# Patient Record
Sex: MALE | Race: BLACK OR AFRICAN AMERICAN | NOT HISPANIC OR LATINO | Employment: OTHER | URBAN - METROPOLITAN AREA
[De-identification: names, ages, dates, MRNs, and addresses within clinical notes are randomized per-mention and may not be internally consistent; named-entity substitution may affect disease eponyms.]

---

## 2017-01-09 ENCOUNTER — OFFICE VISIT (OUTPATIENT)
Dept: UROLOGY | Facility: CLINIC | Age: 72
End: 2017-01-09
Payer: MEDICARE

## 2017-01-09 VITALS
HEART RATE: 97 BPM | BODY MASS INDEX: 28.78 KG/M2 | HEIGHT: 70 IN | SYSTOLIC BLOOD PRESSURE: 137 MMHG | WEIGHT: 201.06 LBS | DIASTOLIC BLOOD PRESSURE: 73 MMHG

## 2017-01-09 DIAGNOSIS — N40.1 BPH WITH URINARY OBSTRUCTION: Primary | ICD-10-CM

## 2017-01-09 DIAGNOSIS — E11.42 DIABETIC PERIPHERAL NEUROPATHY ASSOCIATED WITH TYPE 2 DIABETES MELLITUS: ICD-10-CM

## 2017-01-09 DIAGNOSIS — N13.8 BPH WITH URINARY OBSTRUCTION: Primary | ICD-10-CM

## 2017-01-09 DIAGNOSIS — N40.1 BENIGN NODULAR PROSTATIC HYPERPLASIA WITH LOWER URINARY TRACT SYMPTOMS: ICD-10-CM

## 2017-01-09 DIAGNOSIS — N53.19 DISORDER OF EJACULATION: ICD-10-CM

## 2017-01-09 DIAGNOSIS — R39.12 WEAK URINE STREAM: ICD-10-CM

## 2017-01-09 DIAGNOSIS — N52.9 ED (ERECTILE DYSFUNCTION) OF ORGANIC ORIGIN: ICD-10-CM

## 2017-01-09 PROCEDURE — 3066F NEPHROPATHY DOC TX: CPT | Mod: S$GLB,,, | Performed by: UROLOGY

## 2017-01-09 PROCEDURE — 99205 OFFICE O/P NEW HI 60 MIN: CPT | Mod: S$GLB,,, | Performed by: UROLOGY

## 2017-01-09 PROCEDURE — 1157F ADVNC CARE PLAN IN RCRD: CPT | Mod: S$GLB,,, | Performed by: UROLOGY

## 2017-01-09 PROCEDURE — 1160F RVW MEDS BY RX/DR IN RCRD: CPT | Mod: S$GLB,,, | Performed by: UROLOGY

## 2017-01-09 PROCEDURE — 1159F MED LIST DOCD IN RCRD: CPT | Mod: S$GLB,,, | Performed by: UROLOGY

## 2017-01-09 PROCEDURE — 99999 PR PBB SHADOW E&M-EST. PATIENT-LVL III: CPT | Mod: PBBFAC,,, | Performed by: UROLOGY

## 2017-01-09 PROCEDURE — 3078F DIAST BP <80 MM HG: CPT | Mod: S$GLB,,, | Performed by: UROLOGY

## 2017-01-09 PROCEDURE — 3075F SYST BP GE 130 - 139MM HG: CPT | Mod: S$GLB,,, | Performed by: UROLOGY

## 2017-01-09 PROCEDURE — 3045F PR MOST RECENT HEMOGLOBIN A1C LEVEL 7.0-9.0%: CPT | Mod: S$GLB,,, | Performed by: UROLOGY

## 2017-01-09 PROCEDURE — 1126F AMNT PAIN NOTED NONE PRSNT: CPT | Mod: S$GLB,,, | Performed by: UROLOGY

## 2017-01-09 RX ORDER — TADALAFIL 20 MG/1
20 TABLET ORAL
Qty: 6 TABLET | Refills: 12 | Status: SHIPPED | OUTPATIENT
Start: 2017-01-09 | End: 2017-01-13

## 2017-01-09 RX ORDER — TADALAFIL 20 MG/1
20 TABLET ORAL
Qty: 6 TABLET | Refills: 12 | Status: SHIPPED | OUTPATIENT
Start: 2017-01-09 | End: 2017-01-09 | Stop reason: SDUPTHER

## 2017-01-09 RX ORDER — CIPROFLOXACIN 250 MG/1
500 TABLET, FILM COATED ORAL ONCE
Status: CANCELLED | OUTPATIENT
Start: 2017-01-09 | End: 2017-01-09

## 2017-01-09 RX ORDER — LIDOCAINE HYDROCHLORIDE 20 MG/ML
JELLY TOPICAL ONCE
Status: CANCELLED | OUTPATIENT
Start: 2017-01-09 | End: 2017-01-09

## 2017-01-09 RX ORDER — LORAZEPAM 1 MG/1
2 TABLET ORAL EVERY 6 HOURS PRN
Qty: 2 TABLET | Refills: 0 | Status: SHIPPED | OUTPATIENT
Start: 2017-01-09 | End: 2017-05-03

## 2017-01-09 NOTE — MR AVS SNAPSHOT
[place] [facility]  [address] [facility]  [place] [address]  Phone: [phone]                  Emerson Menendez   2017 8:40 AM   Office Visit    Description:  Male : 1945   Provider:  Juan Walker MD   Department:  [place] [facility]           Reason for Visit     Benign Prostatic Hypertrophy           Diagnoses this Visit        Comments    BPH with urinary obstruction    -  Primary     Benign nodular prostatic hyperplasia with lower urinary tract symptoms         ED (erectile dysfunction) of organic origin         Diabetic peripheral neuropathy associated with type 2 diabetes mellitus         Weak urine stream         Disorder of ejaculation                To Do List           Future Appointments        Provider Department Dept Phone    2017 7:30 AM LAB, HEMONC CANCER BLDG [facility] [phone]    2017 8:30 AM Silvana Engel MD [facility] [phone]    2017 8:00 AM SUDS, UROLOGY [facility] - Urology 4th Floor [phone]    2/3/2017 8:30 AM Roberta Sagastume MD [facility] - Internal Medicine [phone]      Goals (5 Years of Data)     None      Follow-Up and Disposition     Return for suds cysto.       These Medications        Disp Refills Start End    lorazepam (ATIVAN) 1 MG tablet 2 tablet 0 2017     Take 2 tablets (2 mg total) by mouth every 6 (six) hours as needed for Anxiety. Take 2 tablets 30 mins before the procedure - Oral    Pharmacy: [id number] - [place], [place] - [address] AT [facility] Ph #: [phone]       tadalafil (CIALIS) 20 MG Tab 6 tablet 12 2017    Take 1 tablet (20 mg total) by mouth as needed. - Oral    Pharmacy: [id number] - [place], [place] - [address] AT [facility] Ph #: [phone]         [facility]     [facility] Care Line -  Assistance  Registered nurses in  "the [facility] provide clinical advisement, health education, appointment booking, and other advisory services.  Call for this free service at [phone].             Medications           Message regarding Medications     Verify the changes and/or additions to your medication regime listed below are the same as discussed with your clinician today.  If any of these changes or additions are incorrect, please notify your healthcare provider.        START taking these NEW medications        Refills    lorazepam (ATIVAN) 1 MG tablet 0    Sig: Take 2 tablets (2 mg total) by mouth every 6 (six) hours as needed for Anxiety. Take 2 tablets 30 mins before the procedure    Class: Print    Route: Oral    tadalafil (CIALIS) 20 MG Tab 12    Sig: Take 1 tablet (20 mg total) by mouth as needed.    Class: Print    Route: Oral           Verify that the below list of medications is an accurate representation of the medications you are currently taking.  If none reported, the list may be blank. If incorrect, please contact your healthcare provider. Carry this list with you in case of emergency.           Current Medications     ACCU-CHEK OMAYRA Misc USE AS DIRECTED    aspirin (ECOTRIN) 81 MG EC tablet Take 1 tablet (81 mg total) by mouth once daily.    BD INSULIN PEN NEEDLE UF SHORT 31 gauge x 5/16" Ndle USE 1 NEEDLE TO INJECT INSULIN ONE TIME DAILY    blood glucose control, normal (METER-CHECK) Soln One meter. True test meter. Use as directed    blood sugar diagnostic Strp Check twice daily    blood-glucose meter kit Use as instructed. Insulin dependent diabetic    blood-glucose meter kit Use as instructed, accucheck omayra meter.    cholecalciferol, vitamin D3, (VITAMIN D) 2,000 unit Cap Take by mouth. 1 Capsule Oral Every day.  over the counter    citalopram (CELEXA) 20 MG tablet TAKE 1 TABLET BY MOUTH EVERY EVENING AFTER DINNER    diazePAM (VALIUM) 5 MG tablet TAKE 1 TABLET BY MOUTH EVERY 6 HOURS AS NEEDED FOR ANXIETY " "   gabapentin (NEURONTIN) 100 MG capsule One capsule in am, one in afternoon as needed for foot pain and 1-3 at bedtime.    hydrocodone-acetaminophen 5-325mg (NORCO) 5-325 mg per tablet Take 1 tablet by mouth every 4 to 6 hours as needed for Pain.    insulin aspart (NOVOLOG FLEXPEN) 100 unit/mL InPn pen Inject 3 Units into the skin 3 (three) times daily with meals.    insulin syringe-needle U-100 (ADVOCATE SYRINGES) 0.3 mL 31 gauge x 5/16 Syrg Uses 4 time a day.    lancets Misc Use twice daily    LANTUS SOLOSTAR 100 unit/mL (3 mL) InPn pen INJECT 14 UNITS INTO THE SKIN EVERY EVENING (DISCARD AND BEGIN A NEW PEN AFTER 28 DAYS)    latanoprost 0.005 % ophthalmic solution INSTILL 1 DROP INTO BOTH EYES EVERY EVENING.    losartan (COZAAR) 25 MG tablet TAKE 1 TABLET EVERY DAY    omeprazole (PRILOSEC) 20 MG capsule TAKE 2 CAPSULES ONE TIME DAILY    pravastatin (PRAVACHOL) 40 MG tablet TAKE 1 TABLET ONE TIME DAILY    sildenafil (VIAGRA) 100 MG tablet Take by mouth. 1 Tablet Oral    tadalafil (CIALIS) 5 MG tablet Take 4 tablets (20 mg total) by mouth once daily.    tamsulosin (FLOMAX) 0.4 mg Cp24 Take 1 capsule (0.4 mg total) by mouth once daily.    tizanidine (ZANAFLEX) 4 MG tablet 1/2-1 tablet every 8 hours as needed for muscle spasm    trazodone (DESYREL) 50 MG tablet TAKE 1 TABLET BY MOUTH EVERY EVENING    lorazepam (ATIVAN) 1 MG tablet Take 2 tablets (2 mg total) by mouth every 6 (six) hours as needed for Anxiety. Take 2 tablets 30 mins before the procedure    tadalafil (CIALIS) 20 MG Tab Take 1 tablet (20 mg total) by mouth as needed.           Clinical Reference Information           Vital Signs - Last Recorded  Most recent update: 1/9/2017  8:41 AM by Verona Hale MA    BP Pulse Ht Wt BMI    137/73 97 5' 10" (1.778 m) 91.2 kg (201 lb 1 oz) 28.85 kg/m2      Blood Pressure          Most Recent Value    BP  137/73      Allergies as of 1/9/2017     Penicillins      Immunizations Administered on Date of Encounter - " 1/9/2017     None      Orders Placed During Today's Visit     Future Labs/Procedures Expected by Expires    Cystoscopy  As directed 1/9/2018    Simple Urodynamics w/ Cysto  As directed 3/12/2017

## 2017-01-09 NOTE — LETTER
January 9, 2017      Roberta Sagastume MD  [address]  [place] [address]           [place] [facility]  [address] [facility]  [place] [address]  Phone: [phone]          Patient: Emerson Menendez   MR Number: [id number]   YOB: 1945   Date of Visit: 1/9/2017       Dear Dr. Roberta Sagastume:    Thank you for referring Emerson Menendez to me for evaluation. Attached you will find relevant portions of my assessment and plan of care.    If you have questions, please do not hesitate to call me. I look forward to following Emerson Menendez along with you.    Sincerely,    uJan Walker MD    Enclosure  CC:  No Recipients    If you would like to receive this communication electronically, please contact [email or web address] or [phone] to request more information on [employer] access.    For providers and/or their staff who would like to refer a patient to [facility], please contact us through our one-stop-shop provider referral line, [facility], at [phone].    If you feel you have received this communication in error or would no longer like to receive these types of communications, please e-mail [email or web address]

## 2017-01-09 NOTE — PROGRESS NOTES
CC: BPH    Emerson Menendez is a 71 y.o. man who is here for the evaluation of Benign Prostatic Hypertrophy  a new pt referred by his PCP for weak urine flow.  Has been of Flomax.  C/o nocturia 2 x.   C/o thirsty, thus he drinks tea.  C/o ED and lack of ejaculation.    States that he had laser prostatectomy by me on 11/13/13.  I did evaluate him with suds cysto prior to his surgery.  CONCLUSIONS:   1. BPH with obstruction.  2. Incomplete relaxation of the sphincter.  3. Decreased bladder capacity ( 235 ml)  4. Incomplete bladder emptying.  5. Sensory urgency    Hx of DM.  Denies flank pain, dysuira, hematuria .      Past Medical History   Diagnosis Date    Anxiety 3/16/2015    BPH (benign prostatic hypertrophy) 9/24/2012    Depression 3/16/2015    GERD (gastroesophageal reflux disease) 9/24/2012    Microalbuminuria 9/24/2012    Osteoarthritis of cervical spine 9/24/2012    Osteoarthritis of knee 9/24/2012    Type II or unspecified type diabetes mellitus without mention of complication, not stated as uncontrolled 9/24/2012     Past Surgical History   Procedure Laterality Date    Cataract extraction  2012     Right eye    Prostate surgery       Social History   Substance Use Topics    Smoking status: Never Smoker    Smokeless tobacco: Never Used    Alcohol use No     Family History   Problem Relation Age of Onset    Hypertension Brother     Kidney failure Daughter      due to medication    Blindness Neg Hx     Cancer Neg Hx     Cataracts Neg Hx     Diabetes Neg Hx     Glaucoma Neg Hx     Macular degeneration Neg Hx     Retinal detachment Neg Hx     Strabismus Neg Hx     Stroke Neg Hx     Thyroid disease Neg Hx      Allergy:  Review of patient's allergies indicates:   Allergen Reactions    Penicillins      Knees locked up     Outpatient Encounter Prescriptions as of 1/9/2017   Medication Sig Dispense Refill    ACCU-CHEK OMAYRA Misc USE AS DIRECTED 1 each 0    aspirin (ECOTRIN) 81 MG EC  "tablet Take 1 tablet (81 mg total) by mouth once daily. 100 tablet 3    BD INSULIN PEN NEEDLE UF SHORT 31 gauge x 5/16" Ndle USE 1 NEEDLE TO INJECT INSULIN ONE TIME DAILY 300 each 3    blood glucose control, normal (METER-CHECK) Soln One meter. True test meter. Use as directed 1 each 0    blood sugar diagnostic Strp Check twice daily 200 strip 4    blood-glucose meter kit Use as instructed. Insulin dependent diabetic 1 each 0    blood-glucose meter kit Use as instructed, accucheck jose meter. 1 each 0    cholecalciferol, vitamin D3, (VITAMIN D) 2,000 unit Cap Take by mouth. 1 Capsule Oral Every day.  over the counter      citalopram (CELEXA) 20 MG tablet TAKE 1 TABLET BY MOUTH EVERY EVENING AFTER DINNER 30 tablet 11    diazePAM (VALIUM) 5 MG tablet TAKE 1 TABLET BY MOUTH EVERY 6 HOURS AS NEEDED FOR ANXIETY 30 tablet 1    gabapentin (NEURONTIN) 100 MG capsule One capsule in am, one in afternoon as needed for foot pain and 1-3 at bedtime. 450 capsule 3    hydrocodone-acetaminophen 5-325mg (NORCO) 5-325 mg per tablet Take 1 tablet by mouth every 4 to 6 hours as needed for Pain. 60 tablet 0    insulin aspart (NOVOLOG FLEXPEN) 100 unit/mL InPn pen Inject 3 Units into the skin 3 (three) times daily with meals. 8.1 mL 3    insulin syringe-needle U-100 (ADVOCATE SYRINGES) 0.3 mL 31 gauge x 5/16 Syrg Uses 4 time a day. 400 each 3    lancets Misc Use twice daily 200 each 4    LANTUS SOLOSTAR 100 unit/mL (3 mL) InPn pen INJECT 14 UNITS INTO THE SKIN EVERY EVENING (DISCARD AND BEGIN A NEW PEN AFTER 28 DAYS) (Patient taking differently: INJECT 18 UNITS INTO THE SKIN EVERY EVENING (DISCARD AND BEGIN A NEW PEN AFTER 28 DAYS)) 15 mL 3    latanoprost 0.005 % ophthalmic solution INSTILL 1 DROP INTO BOTH EYES EVERY EVENING. 3 Bottle 4    losartan (COZAAR) 25 MG tablet TAKE 1 TABLET EVERY DAY 90 tablet 4    omeprazole (PRILOSEC) 20 MG capsule TAKE 2 CAPSULES ONE TIME DAILY 180 capsule 3    pravastatin (PRAVACHOL) 40 " MG tablet TAKE 1 TABLET ONE TIME DAILY 90 tablet 3    sildenafil (VIAGRA) 100 MG tablet Take by mouth. 1 Tablet Oral      tadalafil (CIALIS) 5 MG tablet Take 4 tablets (20 mg total) by mouth once daily. 30 tablet 2    tamsulosin (FLOMAX) 0.4 mg Cp24 Take 1 capsule (0.4 mg total) by mouth once daily. 90 capsule 3    tizanidine (ZANAFLEX) 4 MG tablet 1/2-1 tablet every 8 hours as needed for muscle spasm 90 tablet 1    trazodone (DESYREL) 50 MG tablet TAKE 1 TABLET BY MOUTH EVERY EVENING (Patient taking differently: TAKE 1 TABLET BY MOUTH EVERY EVENING as needed) 90 tablet 4    lorazepam (ATIVAN) 1 MG tablet Take 2 tablets (2 mg total) by mouth every 6 (six) hours as needed for Anxiety. Take 2 tablets 30 mins before the procedure 2 tablet 0    tadalafil (CIALIS) 20 MG Tab Take 1 tablet (20 mg total) by mouth as needed. 6 tablet 12    [DISCONTINUED] tadalafil (CIALIS) 20 MG Tab Take 1 tablet (20 mg total) by mouth as needed. 6 tablet 12    [DISCONTINUED] tizanidine (ZANAFLEX) 4 MG tablet TAKE 1/2 TO 1 TABLET BY MOUTH EVERY 8 HOURS AS NEEDED FOR MUSCLE SPASMS 270 tablet 1     No facility-administered encounter medications on file as of 1/9/2017.      Review of Systems   General ROS: GENERAL:  No weight gain or loss  SKIN:  No rashes or lacerations  HEAD:  No headaches  EYES:  No exophthalmos, jaundice or blindness  EARS:  No dizziness, tinnitus or hearing loss  NOSE:  No changes in smell  MOUTH & THROAT:  No dyskinetic movements or obvious goiter  CHEST:  No shortness of breath, hyperventilation or cough  CARDIOVASCULAR:  No tachycardia or chest pain  ABDOMEN:  No nausea, vomiting, pain, constipation or diarrhea  URINARY:  No frequency, dysuria or sexual dysfunction  ENDOCRINE:  No polydipsia, polyuria  MUSCULOSKELETAL:  No pain or stiffness of the joints  NEUROLOGIC:  No weakness, sensory changes, seizures, confusion, memory loss, tremor or other abnormal movements  Physical Exam     Vitals:    01/09/17 0838    BP: 137/73   Pulse: 97     General Appearance:  Alert, cooperative, no distress, appears stated age   Head:  Normocephalic, without obvious abnormality, atraumatic   Eyes:  PERRL, conjunctiva/corneas clear, EOM's intact, fundi benign, both eyes   Ears:  Normal TM's and external ear canals, both ears   Nose: Nares normal, septum midline, mucosa normal, no drainage or sinus tenderness   Throat: Lips, mucosa, and tongue normal; teeth and gums normal   Neck: Supple, symmetrical, trachea midline, no adenopathy, thyroid: not enlarged, symmetric, no tenderness/mass/nodules, no carotid bruit or JVD   Back:   Symmetric, no curvature, ROM normal, no CVA tenderness   Lungs:   Clear to auscultation bilaterally, respirations unlabored   Chest Wall:  No tenderness or deformity   Heart:  Regular rate and rhythm, S1, S2 normal, no murmur, rub or gallop   Abdomen:   Soft, non-tender, bowel sounds active all four quadrants,  no masses, no organomegaly of liver and spleen  No hernia noted   Genitalia:  Scrotum: no rash or lesion  Normal symmetric epididymis without masses  Normal vas palpated  Normal size, symmetric testicles with no masses   Normal urethral meatus with no discharge  Normal circumcised penis with no lesion   Rectal:  Normal perineum and anus upon inspection.  Normal tone, no masses or tenderness;   Extremities: Extremities normal, atraumatic, no cyanosis or edema   Pulses: 2+ and symmetric   Skin: Skin color, texture, turgor normal, no rashes or lesions   Lymph nodes: Cervical, supraclavicular, and axillary nodes normal   Neurologic: Normal     Prostate 30 grams smooth with no nodule or tenderness.    LABS:  Lab Results   Component Value Date    PSA 0.82 08/01/2016    PSA 0.72 04/17/2015    PSA 0.79 12/09/2013    PSA 0.63 02/16/2012    PSA 0.8 03/05/2008    PSADIAG 0.77 09/13/2013     Results for orders placed or performed in visit on 09/13/13   Prostate Specific Antigen, Diagnostic   Result Value Ref Range    PSA  DIAGNOSTIC 0.77 0.00 - 4.00 ng/mL     Lab Results   Component Value Date    CREATININE 1.6 (H) 12/19/2016    CREATININE 1.5 (H) 11/21/2016    CREATININE 1.6 (H) 10/24/2016     Results for orders placed or performed in visit on 09/13/13   Testosterone   Result Value Ref Range    Testosterone, Total 608 195.0 - 1138.0 ng/dL     Urine Culture, Routine   Date Value Ref Range Status   11/22/2013 No growth  Final     UA: pH 5, clear    Assessment and Plan:  Emerson was seen today for benign prostatic hypertrophy.    Diagnoses and all orders for this visit:    BPH with urinary obstruction  -     Simple Urodynamics w/ Cysto; Future  -     Cystoscopy; Future    Benign nodular prostatic hyperplasia with lower urinary tract symptoms    ED (erectile dysfunction) of organic origin  -     Discontinue: tadalafil (CIALIS) 20 MG Tab; Take 1 tablet (20 mg total) by mouth as needed.  -     tadalafil (CIALIS) 20 MG Tab; Take 1 tablet (20 mg total) by mouth as needed.    Diabetic peripheral neuropathy associated with type 2 diabetes mellitus  -     Simple Urodynamics w/ Cysto; Future    Weak urine stream  -     Simple Urodynamics w/ Cysto; Future  -     lorazepam (ATIVAN) 1 MG tablet; Take 2 tablets (2 mg total) by mouth every 6 (six) hours as needed for Anxiety. Take 2 tablets 30 mins before the procedure  -     Cystoscopy; Future    Disorder of ejaculation    Other orders  -     lidocaine HCl 2% urojet; Place into the urethra once.  -     ciprofloxacin HCl tablet 500 mg; Take 2 tablets (500 mg total) by mouth once.    extensive discussion carried out.  He was very nervous about SUDS cysto.  He can take Ativan before the procedure.    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    [facility] pharmacy recommended for his tadalafil refills. ( $15 per pill).  Rx given.    Follow-up:  Return for suds cysto.

## 2017-01-16 ENCOUNTER — OFFICE VISIT (OUTPATIENT)
Dept: HEMATOLOGY/ONCOLOGY | Facility: CLINIC | Age: 72
End: 2017-01-16
Payer: MEDICARE

## 2017-01-16 ENCOUNTER — LAB VISIT (OUTPATIENT)
Dept: LAB | Facility: HOSPITAL | Age: 72
End: 2017-01-16
Attending: INTERNAL MEDICINE
Payer: MEDICARE

## 2017-01-16 ENCOUNTER — RESEARCH ENCOUNTER (OUTPATIENT)
Dept: RESEARCH | Facility: HOSPITAL | Age: 72
End: 2017-01-16

## 2017-01-16 VITALS
HEART RATE: 80 BPM | BODY MASS INDEX: 29.06 KG/M2 | HEIGHT: 69 IN | DIASTOLIC BLOOD PRESSURE: 72 MMHG | RESPIRATION RATE: 78 BRPM | SYSTOLIC BLOOD PRESSURE: 153 MMHG | WEIGHT: 196.19 LBS | TEMPERATURE: 98 F

## 2017-01-16 DIAGNOSIS — Z00.6 EXAMINATION OF PARTICIPANT IN CLINICAL TRIAL: ICD-10-CM

## 2017-01-16 DIAGNOSIS — D47.2 SMOLDERING MULTIPLE MYELOMA: Primary | ICD-10-CM

## 2017-01-16 DIAGNOSIS — D47.2 SMOLDERING MULTIPLE MYELOMA (SMM): ICD-10-CM

## 2017-01-16 LAB
ALBUMIN SERPL BCP-MCNC: 3.7 G/DL
ALP SERPL-CCNC: 55 U/L
ALT SERPL W/O P-5'-P-CCNC: 16 U/L
ANION GAP SERPL CALC-SCNC: 9 MMOL/L
AST SERPL-CCNC: 13 U/L
BASOPHILS # BLD AUTO: 0.02 K/UL
BASOPHILS NFR BLD: 0.5 %
BILIRUB SERPL-MCNC: 0.4 MG/DL
BUN SERPL-MCNC: 16 MG/DL
CALCIUM SERPL-MCNC: 9.3 MG/DL
CHLORIDE SERPL-SCNC: 102 MMOL/L
CO2 SERPL-SCNC: 27 MMOL/L
CREAT SERPL-MCNC: 1.6 MG/DL
DIFFERENTIAL METHOD: ABNORMAL
EOSINOPHIL # BLD AUTO: 0.2 K/UL
EOSINOPHIL NFR BLD: 5.5 %
ERYTHROCYTE [DISTWIDTH] IN BLOOD BY AUTOMATED COUNT: 13.2 %
EST. GFR  (AFRICAN AMERICAN): 49.7 ML/MIN/1.73 M^2
EST. GFR  (NON AFRICAN AMERICAN): 43 ML/MIN/1.73 M^2
GLUCOSE SERPL-MCNC: 198 MG/DL
HCT VFR BLD AUTO: 37.2 %
HGB BLD-MCNC: 12.1 G/DL
IGA SERPL-MCNC: 1582 MG/DL
IGG SERPL-MCNC: 903 MG/DL
IGM SERPL-MCNC: 41 MG/DL
LYMPHOCYTES # BLD AUTO: 1.6 K/UL
LYMPHOCYTES NFR BLD: 38.5 %
MAGNESIUM SERPL-MCNC: 1.8 MG/DL
MCH RBC QN AUTO: 28.8 PG
MCHC RBC AUTO-ENTMCNC: 32.5 %
MCV RBC AUTO: 89 FL
MONOCYTES # BLD AUTO: 0.3 K/UL
MONOCYTES NFR BLD: 6.2 %
NEUTROPHILS # BLD AUTO: 2.1 K/UL
NEUTROPHILS NFR BLD: 49.3 %
PHOSPHATE SERPL-MCNC: 2.9 MG/DL
PLATELET # BLD AUTO: 216 K/UL
PMV BLD AUTO: 8.9 FL
POTASSIUM SERPL-SCNC: 4.2 MMOL/L
PROT SERPL-MCNC: 8.4 G/DL
RBC # BLD AUTO: 4.2 M/UL
SODIUM SERPL-SCNC: 138 MMOL/L
WBC # BLD AUTO: 4.18 K/UL

## 2017-01-16 PROCEDURE — 86334 IMMUNOFIX E-PHORESIS SERUM: CPT | Mod: 26,,, | Performed by: PATHOLOGY

## 2017-01-16 PROCEDURE — 99215 OFFICE O/P EST HI 40 MIN: CPT | Mod: Q1,S$GLB,, | Performed by: INTERNAL MEDICINE

## 2017-01-16 PROCEDURE — 1159F MED LIST DOCD IN RCRD: CPT | Mod: Q1,S$GLB,, | Performed by: INTERNAL MEDICINE

## 2017-01-16 PROCEDURE — 99999 PR PBB SHADOW E&M-EST. PATIENT-LVL III: CPT | Mod: PBBFAC,,, | Performed by: INTERNAL MEDICINE

## 2017-01-16 PROCEDURE — 1157F ADVNC CARE PLAN IN RCRD: CPT | Mod: Q1,S$GLB,, | Performed by: INTERNAL MEDICINE

## 2017-01-16 PROCEDURE — 3077F SYST BP >= 140 MM HG: CPT | Mod: Q1,S$GLB,, | Performed by: INTERNAL MEDICINE

## 2017-01-16 PROCEDURE — 84165 PROTEIN E-PHORESIS SERUM: CPT | Mod: 26,Q1,, | Performed by: PATHOLOGY

## 2017-01-16 PROCEDURE — 3078F DIAST BP <80 MM HG: CPT | Mod: Q1,S$GLB,, | Performed by: INTERNAL MEDICINE

## 2017-01-16 NOTE — PROGRESS NOTES
"  Monday, January 16th, 2016      Protocol: E3A06  Investigator: GIL Engel  Pt Initials: P, J      Arm B: Observation  Cycle 12, Day 28       Patient presents to clinic to evaluate ability to proceed with Cycle 13 of observation per above-mentioned protocol. Patient is awake, alert, and oriented to person, place, and time. He is ambulatory and states he has been feeling well. He states his knee pain is worsened and is now affecting his quality of life. He states he is experiencing more pain "than I every have and it has me feeling a little depressed."  He states he notices the limitations this causes when he attempts to climb the 4 flights of stairs up to his apartment.  He states he plans to have a discussion with Dr. Sagastume at his upcoming visit in a few weeks.  He states he also recently saw Dr. Walker who recommends a cystoscopy,  which the patient is "not too excited about." He states he also recently saw the endocrinologist recommended by Dr. Engel who kept his hyperglycemia meds the same but ordered a sliding scale for the patient to follow and he states his sugars are controlled using these guidelines. He states continued willingness to participate in above-mentioned study.      Review of AE's:  1. Hypertension, grade 2: BP today is 153/72 in clinic today. Patient admits becoming "very anxious" when he walks into the [facility] each time. He states he always feels better when he leaves and that BP issue when he comes here is situational. Not concerning per Dr. Engel. He was encouraged to check this at local drug store and report if this remains elevated.  He states understanding.  Will continue to follow this. AE ongoing.  2. Lower Back Pain and Neck Pain, grade 1: Patient has no complaints of this issue this month.  Patient states some intermittent pain to neck but that his work with PT is helping this issue. As previously stated, patient is not suspected to have bony myeloma involvement per Dr. Engel " "as these are pre-existing issues present at baseline. He was encouraged to keep up physical activity and exercise to help alleviate this. He states understanding. AE ongoing.  3.  Pain in extremity (knee), grade 2.  As stated above, patient is experiencing significant knee pain that is limiting his physical activity.  He states he will see his PCP soon to discuss a plan.  In the meantime, he plans to take ibuprofen and other pain medication as directed.  AE, grade 2.  4. Creatinine increased, grade 1: Serum creatinine is 1.6 mg/dl today and GFR 55.36. See urinary and prostate issues as described above. This was noted per Dr. Engel and not concerned for myeloma involvement at this time.  Will continue observation monthly and to monitor renal function closely. AE stable.   5. Peripheral sensory neuropathy, grade 1: This is a pre-existing issue that has increased in significance recently per patient. He states it is not painful and he continues to be capable of carrying out normal activities. He states he takes his gabapentin but "not as often as I'm supposed to." He was encouraged to take medication as prescribed and was encouraged to use his phone and tablet capabilities to assist with this as he always has these with him. He states understanding. AE ongoing.       No changes in other baseline AE's noted.      Per study chair, "the definition of progressive disease for this protocol is developing CRAB criteria that requires treatment systemically." Per Dr Engel, based on today's exam and lab results thus far, patient does not have any s/s of CRAB: Normal Calcium level (9.3), absence of Renal insufficiency (serum creatinine 1.6, and GFR 55.36 per [facility]--patient with a history of kidney dysfunction secondary to diabetes; Dr. Engel aware of these values and not concerned for myeloma involvement), absence of significant Anemia (hemoglobin 12.1) and absence of lytic Bone lesions (per baseline metastatic survey as " well as MRI--see MD note for further comment). See MD note for ECOG score and H&P and flowsheets for laboratory work, vitals, etc. All myeloma-related blood work from last cycle including SPEP and JAGUAR have remained stable and per Dr. Engel, patient shows no evidence of progression today. Patient informed that Dr. Engel will release all lab results to [facility]. He states understanding of this.       RN reinforced importance of patient following up monthly for assessment as well as lab work. Patient verbalizes understanding. Appointment for next month's visit was made in clinic today and appt slip is to be mailed to patient at a later date. Patient was given 's contact information and has MD contact information to call with any concerns, questions, or worsening of symptoms; he verbalized understanding.

## 2017-01-16 NOTE — PROGRESS NOTES
Subjective:       Patient ID: Emerson Menendez is a 71 y.o. male.    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 Test results identify a stable IgA kappa protein with beta globulin band at 1.63g/dL. Kappa free light chain is elevated at 4.40. CBC and calcium are stable. Creatinine with history of stage III CKD is stable at 1.4. Metastatic survey is negative for lytic lesions. MRI of the entire spine demonstrated age related changes but no convincing evidence of myeloma bone disease. Bone marrow biopsy identified about 13% plasma cells by morphology and FISH for myeloma identified a t(11;14) and trisomies 3,7, and 17. The patient is afebrile and appears clinically well. He was seen by his podiatrist and nephrologist since our last visit without any new or acute events.    The patient has not received any therapy for smoldering myeloma including bisphosphonates or steroids. He has been randomized to observation arm of the the clinical study. 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 He has no current or prior history of malignancy.    TODAY  Mr. Menendez returns today for monthly follow-up evaluation. He denies any interval events since last interview. He is having increased left knee pain. He has been walking the stairs in his parking garage as the elevators are out of service increasing his pain. He is taking aspirin/advil as needed. He continues physical therapy. Afebrile. ECOG PS is 0.    HPI  Review of Systems   Constitutional: Negative for activity change, appetite change, diaphoresis, fatigue, fever and unexpected weight change.    HENT: Negative.    Eyes: Negative.    Respiratory: Negative.    Cardiovascular: Negative for leg swelling.   Gastrointestinal: Negative.    Endocrine: Negative.    Genitourinary: Negative.    Musculoskeletal: Negative.    Skin: Negative.    Allergic/Immunologic: Negative.    Neurological:        Grade 0-1 peripheral neuropathy of bilateral feet.    Hematological: Negative for adenopathy. Does not bruise/bleed easily.   Psychiatric/Behavioral: Negative.        Objective:      Physical Exam   Constitutional: He is oriented to person, place, and time. He appears well-developed and well-nourished.   HENT:   Head: Normocephalic and atraumatic.   Right Ear: External ear normal.   Left Ear: External ear normal.   Nose: Nose normal.   Mouth/Throat: Oropharynx is clear and moist.   Eyes: Conjunctivae and EOM are normal. Pupils are equal, round, and reactive to light.   Neck: Normal range of motion. Neck supple.   Cardiovascular: Normal rate, regular rhythm and intact distal pulses.    No murmur heard.  Pulmonary/Chest: Effort normal and breath sounds normal. No respiratory distress.   Abdominal: Soft. Bowel sounds are normal. He exhibits no distension. There is no hepatosplenomegaly.   Musculoskeletal: He exhibits no edema or tenderness.   Neurological: He is alert and oriented to person, place, and time. No cranial nerve deficit.   Skin: Skin is warm and dry. No rash noted. No cyanosis. Nails show no clubbing.   Psychiatric: He has a normal mood and affect. His behavior is normal.   Nursing note and vitals reviewed.      Assessment:       1. Smoldering multiple myeloma        Plan:       The patient has a diagnosis of smoldering myeloma. There is no indication for immediate chemotherapy. We are monitoring renal function closely- basline creatinine of -1.5-1.6  We will continue observation as per the Randomized Phase III Trial of Lenalidomide Versus Observation Alone in Patients with 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. CBC and CMP are stable.  Plan for return in 1 month. Follow-up pending myeloma labs.

## 2017-01-17 LAB
ALBUMIN SERPL ELPH-MCNC: 3.92 G/DL
ALPHA1 GLOB SERPL ELPH-MCNC: 0.29 G/DL
ALPHA2 GLOB SERPL ELPH-MCNC: 0.8 G/DL
B-GLOBULIN SERPL ELPH-MCNC: 2.48 G/DL
GAMMA GLOB SERPL ELPH-MCNC: 0.61 G/DL
INTERPRETATION SERPL IFE-IMP: NORMAL
KAPPA LC SER QL IA: 5.25 MG/DL
KAPPA LC/LAMBDA SER IA: 2.85
LAMBDA LC SER QL IA: 1.84 MG/DL
PATHOLOGIST INTERPRETATION SPE: NORMAL
PROT SERPL-MCNC: 8.1 G/DL

## 2017-01-24 LAB — PATHOLOGIST INTERPRETATION IFE: NORMAL

## 2017-01-26 ENCOUNTER — PATIENT MESSAGE (OUTPATIENT)
Dept: SPORTS MEDICINE | Facility: CLINIC | Age: 72
End: 2017-01-26

## 2017-01-27 DIAGNOSIS — Z00.6 EXAMINATION OF PARTICIPANT IN CLINICAL TRIAL: ICD-10-CM

## 2017-01-27 DIAGNOSIS — C90.00 MULTIPLE MYELOMA, REMISSION STATUS UNSPECIFIED: Primary | ICD-10-CM

## 2017-02-02 ENCOUNTER — HOSPITAL ENCOUNTER (OUTPATIENT)
Dept: RADIOLOGY | Facility: HOSPITAL | Age: 72
Discharge: HOME | End: 2017-02-02
Attending: ORTHOPAEDIC SURGERY
Payer: MEDICARE

## 2017-02-02 ENCOUNTER — PROCEDURE VISIT (OUTPATIENT)
Dept: UROLOGY | Facility: CLINIC | Age: 72
End: 2017-02-02
Payer: MEDICARE

## 2017-02-02 ENCOUNTER — OFFICE VISIT (OUTPATIENT)
Dept: SPORTS MEDICINE | Facility: CLINIC | Age: 72
End: 2017-02-02
Payer: MEDICARE

## 2017-02-02 VITALS
SYSTOLIC BLOOD PRESSURE: 149 MMHG | HEART RATE: 88 BPM | BODY MASS INDEX: 26.48 KG/M2 | HEIGHT: 70 IN | WEIGHT: 185 LBS | TEMPERATURE: 98 F | DIASTOLIC BLOOD PRESSURE: 111 MMHG

## 2017-02-02 VITALS
HEIGHT: 70 IN | SYSTOLIC BLOOD PRESSURE: 134 MMHG | HEART RATE: 105 BPM | DIASTOLIC BLOOD PRESSURE: 75 MMHG | BODY MASS INDEX: 26.48 KG/M2 | WEIGHT: 185 LBS

## 2017-02-02 DIAGNOSIS — N32.81 OAB (OVERACTIVE BLADDER): ICD-10-CM

## 2017-02-02 DIAGNOSIS — R39.12 WEAK URINE STREAM: ICD-10-CM

## 2017-02-02 DIAGNOSIS — M25.561 PAIN IN BOTH KNEES, UNSPECIFIED CHRONICITY: Primary | ICD-10-CM

## 2017-02-02 DIAGNOSIS — N13.8 BPH WITH URINARY OBSTRUCTION: ICD-10-CM

## 2017-02-02 DIAGNOSIS — M25.562 PAIN IN BOTH KNEES, UNSPECIFIED CHRONICITY: ICD-10-CM

## 2017-02-02 DIAGNOSIS — M25.562 PAIN IN BOTH KNEES, UNSPECIFIED CHRONICITY: Primary | ICD-10-CM

## 2017-02-02 DIAGNOSIS — M25.561 PAIN IN BOTH KNEES, UNSPECIFIED CHRONICITY: ICD-10-CM

## 2017-02-02 DIAGNOSIS — D47.2 SMOLDERING MULTIPLE MYELOMA: ICD-10-CM

## 2017-02-02 DIAGNOSIS — E11.42 DIABETIC PERIPHERAL NEUROPATHY ASSOCIATED WITH TYPE 2 DIABETES MELLITUS: ICD-10-CM

## 2017-02-02 DIAGNOSIS — N40.1 BPH WITH URINARY OBSTRUCTION: ICD-10-CM

## 2017-02-02 PROCEDURE — 51741 ELECTRO-UROFLOWMETRY FIRST: CPT | Mod: 26,51,, | Performed by: UROLOGY

## 2017-02-02 PROCEDURE — 3078F DIAST BP <80 MM HG: CPT | Mod: S$GLB,,, | Performed by: ORTHOPAEDIC SURGERY

## 2017-02-02 PROCEDURE — 1160F RVW MEDS BY RX/DR IN RCRD: CPT | Mod: S$GLB,,, | Performed by: ORTHOPAEDIC SURGERY

## 2017-02-02 PROCEDURE — 1159F MED LIST DOCD IN RCRD: CPT | Mod: S$GLB,,, | Performed by: ORTHOPAEDIC SURGERY

## 2017-02-02 PROCEDURE — 20610 DRAIN/INJ JOINT/BURSA W/O US: CPT | Mod: 50,S$GLB,, | Performed by: ORTHOPAEDIC SURGERY

## 2017-02-02 PROCEDURE — 73564 X-RAY EXAM KNEE 4 OR MORE: CPT | Mod: TC,50,PO

## 2017-02-02 PROCEDURE — 52000 CYSTOURETHROSCOPY: CPT | Mod: 59,,, | Performed by: UROLOGY

## 2017-02-02 PROCEDURE — 1157F ADVNC CARE PLAN IN RCRD: CPT | Mod: S$GLB,,, | Performed by: ORTHOPAEDIC SURGERY

## 2017-02-02 PROCEDURE — 99999 PR PBB SHADOW E&M-EST. PATIENT-LVL III: CPT | Mod: PBBFAC,,, | Performed by: ORTHOPAEDIC SURGERY

## 2017-02-02 PROCEDURE — 51728 CYSTOMETROGRAM W/VP: CPT | Mod: 26,,, | Performed by: UROLOGY

## 2017-02-02 PROCEDURE — 73564 X-RAY EXAM KNEE 4 OR MORE: CPT | Mod: 26,50,, | Performed by: RADIOLOGY

## 2017-02-02 PROCEDURE — 51797 INTRAABDOMINAL PRESSURE TEST: CPT | Mod: 26,,, | Performed by: UROLOGY

## 2017-02-02 PROCEDURE — 1125F AMNT PAIN NOTED PAIN PRSNT: CPT | Mod: S$GLB,,, | Performed by: ORTHOPAEDIC SURGERY

## 2017-02-02 PROCEDURE — 51784 ANAL/URINARY MUSCLE STUDY: CPT | Mod: 26,51,, | Performed by: UROLOGY

## 2017-02-02 PROCEDURE — 3075F SYST BP GE 130 - 139MM HG: CPT | Mod: S$GLB,,, | Performed by: ORTHOPAEDIC SURGERY

## 2017-02-02 PROCEDURE — 99214 OFFICE O/P EST MOD 30 MIN: CPT | Mod: 25,S$GLB,, | Performed by: ORTHOPAEDIC SURGERY

## 2017-02-02 RX ORDER — OXYBUTYNIN CHLORIDE 5 MG/1
5 TABLET ORAL 2 TIMES DAILY
Qty: 180 TABLET | Refills: 12 | Status: SHIPPED | OUTPATIENT
Start: 2017-02-02 | End: 2017-02-02 | Stop reason: SDUPTHER

## 2017-02-02 RX ORDER — TAMSULOSIN HYDROCHLORIDE 0.4 MG/1
1 CAPSULE ORAL DAILY
Qty: 90 CAPSULE | Refills: 3 | Status: SHIPPED | OUTPATIENT
Start: 2017-02-02 | End: 2017-05-08 | Stop reason: SDUPTHER

## 2017-02-02 RX ORDER — OXYBUTYNIN CHLORIDE 5 MG/1
5 TABLET ORAL 2 TIMES DAILY
Qty: 180 TABLET | Refills: 12 | Status: SHIPPED | OUTPATIENT
Start: 2017-02-02 | End: 2018-07-20

## 2017-02-02 RX ORDER — TAMSULOSIN HYDROCHLORIDE 0.4 MG/1
1 CAPSULE ORAL DAILY
Qty: 90 CAPSULE | Refills: 3 | Status: SHIPPED | OUTPATIENT
Start: 2017-02-02 | End: 2017-02-02 | Stop reason: SDUPTHER

## 2017-02-02 RX ORDER — LIDOCAINE HYDROCHLORIDE 20 MG/ML
JELLY TOPICAL ONCE
Status: COMPLETED | OUTPATIENT
Start: 2017-02-02 | End: 2017-02-02

## 2017-02-02 RX ORDER — CIPROFLOXACIN 250 MG/1
500 TABLET, FILM COATED ORAL ONCE
Status: COMPLETED | OUTPATIENT
Start: 2017-02-02 | End: 2017-02-02

## 2017-02-02 RX ADMIN — TRIAMCINOLONE ACETONIDE 40 MG: 40 INJECTION, SUSPENSION INTRA-ARTICULAR; INTRAMUSCULAR at 11:02

## 2017-02-02 RX ADMIN — CIPROFLOXACIN 500 MG: 250 TABLET, FILM COATED ORAL at 11:02

## 2017-02-02 RX ADMIN — LIDOCAINE HYDROCHLORIDE: 20 JELLY TOPICAL at 11:02

## 2017-02-02 NOTE — PATIENT INSTRUCTIONS
SIMPLE URODYNAMIC STUDY (SUDS) & CYSTOSCOPY  UROLOGY CLINIC DISCHARGE INSTRUCTIONS    You have had a procedure that will require time to properly heal. Follow the instructions you have been given on how to care for yourself once you are home. Below is additional information to help in your recovery.    ACTIVITY  · There are no restrictions in activity. Start doing again the things you did before the procedure.  · You may experience a slight burning sensation. You may notice a small amount of blood in your urine. This will clear up within a day. Call the clinic if this continues beyond 48 hours.    DIET  · Continue your normal diet. You may eat the same foods you ate before your procedure.  · Drink plenty of fluids during the first 24-48 hours following your procedure.    MEDICATIONS  · Resume all other previous medications from your prescribing physician.  · Continue any pre=procedure antibiotics until they are all gone.    SIGNS AND SYMPTOMS TO REPORT TO THE DOCTOR  · Chills or fever greater than 101° F within 24 hours of procedure.  · Changes in urination, such as increased bleeding, foul smell, cloudy urine, or painful urination.  · Call your doctor with any questions or concerns.    For any emergency situation, call [phone] immediately or go to your nearest emergency room.    [facility]  [phone]      Instructed by_________________________________          Patient Signature___________________________________                                                                                                                                                                                             If any problems after hours or weekends, you may call [phone] and ask for the urology resident on call.

## 2017-02-02 NOTE — MR AVS SNAPSHOT
"    [facility]  [address]  [facility] [address]  Phone: [phone]                  Emerson Menendez   2017 12:30 PM   Appointment    Description:  Male : 1945   Provider:  Chaim Stokes MD   Department:  [facility]                To Do List           Future Appointments        Provider Department Dept Phone    2017 8:00 AM SUDS, UROLOGY [facility] 4th Floor [phone]    2017 12:30 PM Chaim Stokes MD [facility] [phone]    2/3/2017 8:30 AM Roberta Sagastume MD [facility] [phone]    2017 11:50 AM LAB, HEMONC CANCER BLDG [facility] [phone]    2017 1:00 PM Silvana Engel MD [facility] [phone]      Goals (5 Years of Data)     None      [facility]     [facility] Care Line -  Assistance  Registered nurses in the [facility] provide clinical advisement, health education, appointment booking, and other advisory services.  Call for this free service at [phone].             Medications           Message regarding Medications     Verify the changes and/or additions to your medication regime listed below are the same as discussed with your clinician today.  If any of these changes or additions are incorrect, please notify your healthcare provider.             Verify that the below list of medications is an accurate representation of the medications you are currently taking.  If none reported, the list may be blank. If incorrect, please contact your healthcare provider. Carry this list with you in case of emergency.           Current Medications     ACCU-CHEK OMAYRA Misc USE AS DIRECTED    aspirin (ECOTRIN) 81 MG EC tablet Take 1 tablet (81 mg total) by mouth once daily.    BD INSULIN PEN NEEDLE UF SHORT 31 gauge x 5/16" Ndle USE 1 NEEDLE TO INJECT INSULIN ONE TIME DAILY    blood glucose control, " normal (METER-CHECK) Soln One meter. True test meter. Use as directed    blood sugar diagnostic Strp Check twice daily    blood-glucose meter kit Use as instructed. Insulin dependent diabetic    blood-glucose meter kit Use as instructed, accucheck jose meter.    cholecalciferol, vitamin D3, (VITAMIN D) 2,000 unit Cap Take by mouth. 1 Capsule Oral Every day.  over the counter    citalopram (CELEXA) 20 MG tablet TAKE 1 TABLET BY MOUTH EVERY EVENING AFTER DINNER    diazePAM (VALIUM) 5 MG tablet TAKE 1 TABLET BY MOUTH EVERY 6 HOURS AS NEEDED FOR ANXIETY    gabapentin (NEURONTIN) 100 MG capsule One capsule in am, one in afternoon as needed for foot pain and 1-3 at bedtime.    hydrocodone-acetaminophen 5-325mg (NORCO) 5-325 mg per tablet Take 1 tablet by mouth every 4 to 6 hours as needed for Pain.    insulin aspart (NOVOLOG FLEXPEN) 100 unit/mL InPn pen Inject 3 Units into the skin 3 (three) times daily with meals.    insulin syringe-needle U-100 (ADVOCATE SYRINGES) 0.3 mL 31 gauge x 5/16 Syrg Uses 4 time a day.    lancets Misc Use twice daily    LANTUS SOLOSTAR 100 unit/mL (3 mL) InPn pen INJECT 14 UNITS INTO THE SKIN EVERY EVENING (DISCARD AND BEGIN A NEW PEN AFTER 28 DAYS)    latanoprost 0.005 % ophthalmic solution INSTILL 1 DROP INTO BOTH EYES EVERY EVENING.    lorazepam (ATIVAN) 1 MG tablet Take 2 tablets (2 mg total) by mouth every 6 (six) hours as needed for Anxiety. Take 2 tablets 30 mins before the procedure    losartan (COZAAR) 25 MG tablet TAKE 1 TABLET EVERY DAY    omeprazole (PRILOSEC) 20 MG capsule TAKE 2 CAPSULES ONE TIME DAILY    pravastatin (PRAVACHOL) 40 MG tablet TAKE 1 TABLET ONE TIME DAILY    sildenafil (VIAGRA) 100 MG tablet Take by mouth. 1 Tablet Oral    tadalafil (CIALIS) 5 MG tablet Take 4 tablets (20 mg total) by mouth once daily.    tamsulosin (FLOMAX) 0.4 mg Cp24 Take 1 capsule (0.4 mg total) by mouth once daily.    tizanidine (ZANAFLEX) 4 MG tablet 1/2-1 tablet every 8 hours as needed for  muscle spasm    trazodone (DESYREL) 50 MG tablet TAKE 1 TABLET BY MOUTH EVERY EVENING           Clinical Reference Information           Allergies as of 2/2/2017     Penicillins      Immunizations Administered on Date of Encounter - 2/2/2017     None

## 2017-02-02 NOTE — PROGRESS NOTES
CHIEF COMPLAINT: Bilateral Knee pain.                                                          HISTORY OF PRESENT ILLNESS:  The patient is a 71 y.o. male  who presents for follow up evaluation of his bilateral knee pain.  He had bilateral cortisone injections on 6/9/16. Patient had bilateral cortisone injections 4 months ago which he states gave him about 3 months of relief.  He is currently having in the LEFT knee only          Review of Systems   Constitution: Negative. Negative for chills, fever and night sweats.   HENT: Negative for congestion and headaches.    Eyes: Negative for blurred vision, left vision loss and right vision loss.   Cardiovascular: Negative for chest pain and syncope.   Respiratory: Negative for cough and shortness of breath.    Endocrine: Negative for polydipsia, polyphagia and polyuria.   Hematologic/Lymphatic: Negative for bleeding problem. Does not bruise/bleed easily.   Skin: Negative for dry skin, itching and rash.   Musculoskeletal: Negative for falls and muscle weakness.   Gastrointestinal: Negative for abdominal pain and bowel incontinence.   Genitourinary: Negative for bladder incontinence and nocturia.   Neurological: Negative for disturbances in coordination, loss of balance and seizures.   Psychiatric/Behavioral: Negative for depression. The patient does not have insomnia.    Allergic/Immunologic: Negative for hives and persistent infections.     PAST MEDICAL HISTORY:   Past Medical History   Diagnosis Date    Anxiety 3/16/2015    BPH (benign prostatic hypertrophy) 9/24/2012    Depression 3/16/2015    GERD (gastroesophageal reflux disease) 9/24/2012    Microalbuminuria 9/24/2012    Osteoarthritis of cervical spine 9/24/2012    Osteoarthritis of knee 9/24/2012    Type II or unspecified type diabetes mellitus without mention of complication, not stated as uncontrolled 9/24/2012     PAST SURGICAL HISTORY:   Past Surgical History   Procedure Laterality Date    Cataract  "extraction  2012     Right eye    Prostate surgery       FAMILY HISTORY:   Family History   Problem Relation Age of Onset    Hypertension Brother     Kidney failure Daughter      due to medication    Blindness Neg Hx     Cancer Neg Hx     Cataracts Neg Hx     Diabetes Neg Hx     Glaucoma Neg Hx     Macular degeneration Neg Hx     Retinal detachment Neg Hx     Strabismus Neg Hx     Stroke Neg Hx     Thyroid disease Neg Hx      SOCIAL HISTORY:   Social History     Social History    Marital status: Single     Spouse name: N/A    Number of children: N/A    Years of education: N/A     Occupational History    Not on file.     Social History Main Topics    Smoking status: Never Smoker    Smokeless tobacco: Never Used    Alcohol use No    Drug use: No    Sexual activity: Yes     Partners: Male, Female     Other Topics Concern    Not on file     Social History Narrative       MEDICATIONS:   Current Outpatient Prescriptions:     ACCU-CHEK OMAYRA Misc, USE AS DIRECTED, Disp: 1 each, Rfl: 0    aspirin (ECOTRIN) 81 MG EC tablet, Take 1 tablet (81 mg total) by mouth once daily., Disp: 100 tablet, Rfl: 3    BD INSULIN PEN NEEDLE UF SHORT 31 gauge x 5/16" Ndle, USE 1 NEEDLE TO INJECT INSULIN ONE TIME DAILY, Disp: 300 each, Rfl: 3    blood glucose control, normal (METER-CHECK) Soln, One meter. True test meter. Use as directed, Disp: 1 each, Rfl: 0    blood sugar diagnostic Strp, Check twice daily, Disp: 200 strip, Rfl: 4    blood-glucose meter kit, Use as instructed. Insulin dependent diabetic, Disp: 1 each, Rfl: 0    blood-glucose meter kit, Use as instructed, accucheck omayra meter., Disp: 1 each, Rfl: 0    cholecalciferol, vitamin D3, (VITAMIN D) 2,000 unit Cap, Take by mouth. 1 Capsule Oral Every day.  over the counter, Disp: , Rfl:     citalopram (CELEXA) 20 MG tablet, TAKE 1 TABLET BY MOUTH EVERY EVENING AFTER DINNER, Disp: 30 tablet, Rfl: 11    diazePAM (VALIUM) 5 MG tablet, TAKE 1 TABLET BY MOUTH " EVERY 6 HOURS AS NEEDED FOR ANXIETY, Disp: 30 tablet, Rfl: 1    gabapentin (NEURONTIN) 100 MG capsule, One capsule in am, one in afternoon as needed for foot pain and 1-3 at bedtime., Disp: 450 capsule, Rfl: 3    hydrocodone-acetaminophen 5-325mg (NORCO) 5-325 mg per tablet, Take 1 tablet by mouth every 4 to 6 hours as needed for Pain., Disp: 60 tablet, Rfl: 0    insulin aspart (NOVOLOG FLEXPEN) 100 unit/mL InPn pen, Inject 3 Units into the skin 3 (three) times daily with meals., Disp: 8.1 mL, Rfl: 3    insulin syringe-needle U-100 (ADVOCATE SYRINGES) 0.3 mL 31 gauge x 5/16 Syrg, Uses 4 time a day., Disp: 400 each, Rfl: 3    lancets Misc, Use twice daily, Disp: 200 each, Rfl: 4    LANTUS SOLOSTAR 100 unit/mL (3 mL) InPn pen, INJECT 14 UNITS INTO THE SKIN EVERY EVENING (DISCARD AND BEGIN A NEW PEN AFTER 28 DAYS) (Patient taking differently: INJECT 18 UNITS INTO THE SKIN EVERY EVENING (DISCARD AND BEGIN A NEW PEN AFTER 28 DAYS)), Disp: 15 mL, Rfl: 3    latanoprost 0.005 % ophthalmic solution, INSTILL 1 DROP INTO BOTH EYES EVERY EVENING., Disp: 3 Bottle, Rfl: 4    lorazepam (ATIVAN) 1 MG tablet, Take 2 tablets (2 mg total) by mouth every 6 (six) hours as needed for Anxiety. Take 2 tablets 30 mins before the procedure, Disp: 2 tablet, Rfl: 0    losartan (COZAAR) 25 MG tablet, TAKE 1 TABLET EVERY DAY, Disp: 90 tablet, Rfl: 4    omeprazole (PRILOSEC) 20 MG capsule, TAKE 2 CAPSULES ONE TIME DAILY, Disp: 180 capsule, Rfl: 3    oxybutynin (DITROPAN) 5 MG Tab, Take 1 tablet (5 mg total) by mouth 2 (two) times daily., Disp: 180 tablet, Rfl: 12    pravastatin (PRAVACHOL) 40 MG tablet, TAKE 1 TABLET ONE TIME DAILY, Disp: 90 tablet, Rfl: 3    sildenafil (VIAGRA) 100 MG tablet, Take by mouth. 1 Tablet Oral, Disp: , Rfl:     tadalafil (CIALIS) 5 MG tablet, Take 4 tablets (20 mg total) by mouth once daily., Disp: 30 tablet, Rfl: 2    tamsulosin (FLOMAX) 0.4 mg Cp24, Take 1 capsule (0.4 mg total) by mouth once daily.,  "Disp: 90 capsule, Rfl: 3    tizanidine (ZANAFLEX) 4 MG tablet, 1/2-1 tablet every 8 hours as needed for muscle spasm, Disp: 90 tablet, Rfl: 1    trazodone (DESYREL) 50 MG tablet, TAKE 1 TABLET BY MOUTH EVERY EVENING (Patient taking differently: TAKE 1 TABLET BY MOUTH EVERY EVENING as needed), Disp: 90 tablet, Rfl: 4  No current facility-administered medications for this visit.   ALLERGIES:   Review of patient's allergies indicates:   Allergen Reactions    Penicillins      Knees locked up       VITAL SIGNS:   Visit Vitals    /75    Pulse 105    Ht 5' 10" (1.778 m)    Wt 83.9 kg (185 lb)    BMI 26.54 kg/m2        PHYSICAL EXAMINATION    General:  The patient is alert and oriented x 3.  Mood is pleasant.  Observation of ears, eyes and nose reveal no gross abnormalities.  No labored breathing observed.    Bilateral KNEE EXAMINATION     OBSERVATION / INSPECTION   Gait:   Nonantalgic   Alignment:  Neutral   Scars:   None   Muscle atrophy: Mild  Effusion:  None   Warmth:  None   Discoloration:   none     TENDERNESS / CREPITUS (T / C):          T / C      T / C   Patella   - / -   Lateral joint line   + / -   Peripatellar medial  -  Medial joint line    - / -   Peripatellar lateral -  Medial plica   - / -   Patellar tendon -   Popliteal fossa  - / -   Quad tendon   -   Gastrocnemius   -   Prepatellar Bursa - / -   Quadricep   -   Tibial tubercle  -  Thigh/hamstring  -   Pes anserine/HS -  Fibula    -   ITB   - / -  Tibia     -   Tib/fib joint  - / -  LCL    -     MFC   - / -   MCL: Proximal  -    LFC   - / -    Distal   -          ROM: (* = pain)  PASSIVE   ACTIVE    Left :   5 / 0 / 145   5 / 0 / 145     Right :    5 / 0 / 145   5 / 0 / 145    Patellofemoral examination:  See above noted areas of tenderness.   Patella position    Subluxation / dislocation: Centered           Sup. / Inf;   Normal   Crepitus (PF):    Absent   Patellar Mobility:       Medial-lateral:   Normal    Superior-inferior:  Normal "    Inferior tilt   Normal    Patellar tendon:  Normal   Lateral tilt:    Normal   J-sign:     None   Patellofemoral grind:   No pain       MENISCAL SIGNS:     Pain on terminal extension:  +  Pain on terminal flexion:  +  Sofiyas maneuver:  + for pain  Squat     + posterior joint pain    LIGAMENT EXAMINATION:  ACL / Lachman:  negative   PCL-Post.  drawer: normal 0 to 2mm  MCL- Valgus:  normal 0 to 2mm  LCL- Varus:  normal 0 to 2mm  Pivot shift: Negative  Dial Test: difference c/w other side   At 30° flexion: normal (< 5°)    At 90° flexion: normal (< 5°)   Reverse Pivot Shift:   normal (Equal)     STRENGTH: (* = with pain) PAINFUL SIDE   Quadricep   4/5   Hamstrin/5    EXTREMITY NEURO-VASCULAR EXAMINATION:   Sensation:  Grossly intact to light touch all dermatomal regions.   Motor Function:  Fully intact motor function at hip, knee, foot and ankle    DTRs;  quadriceps and  achilles 2+.  No clonus and downgoing Babinski.    Vascular status:  DP and PT pulses 2+, brisk capillary refill, symmetric.     Other Findings:     Radiographs: Mild joint space narrowing noted; no sign of fracture or dislocation       ASSESSMENT:    Left knee > right knee pain  1. Osteoarthritis  2. Lateral meniscal tear    PLAN:   1. Pain control  2. Physical therapy  3. Cortisone injection bilateral knee  4. RTC 2 months

## 2017-02-02 NOTE — MR AVS SNAPSHOT
[facility] - Urology 4th Floor  [address]  [facility] [address]  Phone: [phone]                  Emerson Menendez   2017 8:00 AM   Procedure visit    Description:  Male : 1945   Provider:  SUDS, UROLOGY   Department:  [facility] - Urology 4th Floor           Diagnoses this Visit        Comments    BPH with urinary obstruction         Diabetic peripheral neuropathy associated with type 2 diabetes mellitus         Weak urine stream                To Do List           Future Appointments        Provider Department Dept Phone    2017 12:30 PM Chaim Stokes MD [place] - Sports Medicine [phone]    2/3/2017 8:30 AM Roberta Sagastume MD [facility] - Internal Medicine [phone]    2017 11:50 AM LAB, HEMONC CANCER BLDG [facility] [phone]    2017 1:00 PM Silvana Engel MD [place] [phone]      Goals (5 Years of Data)     None      [facility]     [facility] -  Assistance  Registered nurses in the [facility] provide clinical advisement, health education, appointment booking, and other advisory services.  Call for this free service at [phone].             Medications           Message regarding Medications     Verify the changes and/or additions to your medication regime listed below are the same as discussed with your clinician today.  If any of these changes or additions are incorrect, please notify your healthcare provider.             Verify that the below list of medications is an accurate representation of the medications you are currently taking.  If none reported, the list may be blank. If incorrect, please contact your healthcare provider. Carry this list with you in case of emergency.           Current Medications     aspirin (ECOTRIN) 81 MG EC tablet Take 1 tablet (81 mg total) by mouth once daily.    cholecalciferol, vitamin D3, (VITAMIN D) 2,000 unit Cap Take  "by mouth. 1 Capsule Oral Every day.  over the counter    citalopram (CELEXA) 20 MG tablet TAKE 1 TABLET BY MOUTH EVERY EVENING AFTER DINNER    diazePAM (VALIUM) 5 MG tablet TAKE 1 TABLET BY MOUTH EVERY 6 HOURS AS NEEDED FOR ANXIETY    gabapentin (NEURONTIN) 100 MG capsule One capsule in am, one in afternoon as needed for foot pain and 1-3 at bedtime.    insulin aspart (NOVOLOG FLEXPEN) 100 unit/mL InPn pen Inject 3 Units into the skin 3 (three) times daily with meals.    insulin syringe-needle U-100 (ADVOCATE SYRINGES) 0.3 mL 31 gauge x 5/16 Syrg Uses 4 time a day.    lancets Misc Use twice daily    LANTUS SOLOSTAR 100 unit/mL (3 mL) InPn pen INJECT 14 UNITS INTO THE SKIN EVERY EVENING (DISCARD AND BEGIN A NEW PEN AFTER 28 DAYS)    latanoprost 0.005 % ophthalmic solution INSTILL 1 DROP INTO BOTH EYES EVERY EVENING.    losartan (COZAAR) 25 MG tablet TAKE 1 TABLET EVERY DAY    omeprazole (PRILOSEC) 20 MG capsule TAKE 2 CAPSULES ONE TIME DAILY    pravastatin (PRAVACHOL) 40 MG tablet TAKE 1 TABLET ONE TIME DAILY    tamsulosin (FLOMAX) 0.4 mg Cp24 Take 1 capsule (0.4 mg total) by mouth once daily.    trazodone (DESYREL) 50 MG tablet TAKE 1 TABLET BY MOUTH EVERY EVENING    ACCU-CHEK OMAYRA Misc USE AS DIRECTED    BD INSULIN PEN NEEDLE UF SHORT 31 gauge x 5/16" Ndle USE 1 NEEDLE TO INJECT INSULIN ONE TIME DAILY    blood glucose control, normal (METER-CHECK) Soln One meter. True test meter. Use as directed    blood sugar diagnostic Strp Check twice daily    blood-glucose meter kit Use as instructed. Insulin dependent diabetic    blood-glucose meter kit Use as instructed, accucheck omayra meter.    hydrocodone-acetaminophen 5-325mg (NORCO) 5-325 mg per tablet Take 1 tablet by mouth every 4 to 6 hours as needed for Pain.    lorazepam (ATIVAN) 1 MG tablet Take 2 tablets (2 mg total) by mouth every 6 (six) hours as needed for Anxiety. Take 2 tablets 30 mins before the procedure    sildenafil (VIAGRA) 100 MG tablet Take by mouth. 1 " "Tablet Oral    tadalafil (CIALIS) 5 MG tablet Take 4 tablets (20 mg total) by mouth once daily.    tizanidine (ZANAFLEX) 4 MG tablet 1/2-1 tablet every 8 hours as needed for muscle spasm           Clinical Reference Information           Vital Signs - Last Recorded  Most recent update: 2/2/2017  8:29 AM by Jillian Prince LPN    BP Pulse Temp Ht Wt BMI    (!) (P) 160/92 (P) 86 (P) 98.4 °F (36.9 °C) (Oral) 5' 10" (1.778 m) 83.9 kg (185 lb) 26.54 kg/m2      Blood Pressure          Most Recent Value    BP  (!)  (P) 160/92      Allergies as of 2/2/2017     Penicillins      Immunizations Administered on Date of Encounter - 2/2/2017     None      Orders Placed During Today's Visit      Normal Orders This Visit    Simple Urodynamics w/ Cysto       Instructions    SIMPLE URODYNAMIC STUDY (SUDS) & CYSTOSCOPY  UROLOGY CLINIC DISCHARGE INSTRUCTIONS    You have had a procedure that will require time to properly heal. Follow the instructions you have been given on how to care for yourself once you are home. Below is additional information to help in your recovery.    ACTIVITY  · There are no restrictions in activity. Start doing again the things you did before the procedure.  · You may experience a slight burning sensation. You may notice a small amount of blood in your urine. This will clear up within a day. Call the clinic if this continues beyond 48 hours.    DIET  · Continue your normal diet. You may eat the same foods you ate before your procedure.  · Drink plenty of fluids during the first 24-48 hours following your procedure.    MEDICATIONS  · Resume all other previous medications from your prescribing physician.  · Continue any pre=procedure antibiotics until they are all gone.    SIGNS AND SYMPTOMS TO REPORT TO THE DOCTOR  · Chills or fever greater than 101° F within 24 hours of procedure.  · Changes in urination, such as increased bleeding, foul smell, cloudy urine, or painful urination.  · Call your doctor with any " questions or concerns.    For any emergency situation, call 911 immediately or go to your nearest emergency room.    [facility]  [phone]      Instructed by_________________________________          Patient Signature___________________________________                                                                                                                                                                                             If any problems after hours or weekends, you may call [phone] and ask for the urology resident on call.

## 2017-02-03 ENCOUNTER — OFFICE VISIT (OUTPATIENT)
Dept: INTERNAL MEDICINE | Facility: CLINIC | Age: 72
End: 2017-02-03
Payer: MEDICARE

## 2017-02-03 VITALS
DIASTOLIC BLOOD PRESSURE: 70 MMHG | HEIGHT: 70 IN | OXYGEN SATURATION: 99 % | SYSTOLIC BLOOD PRESSURE: 120 MMHG | WEIGHT: 199.31 LBS | BODY MASS INDEX: 28.53 KG/M2 | HEART RATE: 91 BPM

## 2017-02-03 DIAGNOSIS — R80.9 MICROALBUMINURIA: ICD-10-CM

## 2017-02-03 DIAGNOSIS — E13.9 DIABETES MELLITUS DUE TO ABNORMAL INSULIN: ICD-10-CM

## 2017-02-03 DIAGNOSIS — K21.9 GASTROESOPHAGEAL REFLUX DISEASE WITHOUT ESOPHAGITIS: ICD-10-CM

## 2017-02-03 DIAGNOSIS — E78.00 PURE HYPERCHOLESTEROLEMIA: ICD-10-CM

## 2017-02-03 DIAGNOSIS — D47.2 SMOLDERING MULTIPLE MYELOMA: ICD-10-CM

## 2017-02-03 DIAGNOSIS — I10 ESSENTIAL HYPERTENSION: ICD-10-CM

## 2017-02-03 DIAGNOSIS — Z79.4 TYPE 2 DIABETES MELLITUS WITH DIABETIC NEUROPATHY, WITH LONG-TERM CURRENT USE OF INSULIN: ICD-10-CM

## 2017-02-03 DIAGNOSIS — E11.40 TYPE 2 DIABETES MELLITUS WITH DIABETIC NEUROPATHY, WITH LONG-TERM CURRENT USE OF INSULIN: ICD-10-CM

## 2017-02-03 DIAGNOSIS — M17.0 PRIMARY OSTEOARTHRITIS OF BOTH KNEES: Primary | ICD-10-CM

## 2017-02-03 PROCEDURE — 3078F DIAST BP <80 MM HG: CPT | Mod: S$GLB,,, | Performed by: INTERNAL MEDICINE

## 2017-02-03 PROCEDURE — 99214 OFFICE O/P EST MOD 30 MIN: CPT | Mod: 25,S$GLB,, | Performed by: INTERNAL MEDICINE

## 2017-02-03 PROCEDURE — 99999 PR PBB SHADOW E&M-EST. PATIENT-LVL III: CPT | Mod: PBBFAC,,, | Performed by: INTERNAL MEDICINE

## 2017-02-03 PROCEDURE — 3074F SYST BP LT 130 MM HG: CPT | Mod: S$GLB,,, | Performed by: INTERNAL MEDICINE

## 2017-02-03 PROCEDURE — 3066F NEPHROPATHY DOC TX: CPT | Mod: S$GLB,,, | Performed by: INTERNAL MEDICINE

## 2017-02-03 PROCEDURE — 1157F ADVNC CARE PLAN IN RCRD: CPT | Mod: S$GLB,,, | Performed by: INTERNAL MEDICINE

## 2017-02-03 PROCEDURE — 1159F MED LIST DOCD IN RCRD: CPT | Mod: S$GLB,,, | Performed by: INTERNAL MEDICINE

## 2017-02-03 PROCEDURE — 1160F RVW MEDS BY RX/DR IN RCRD: CPT | Mod: S$GLB,,, | Performed by: INTERNAL MEDICINE

## 2017-02-03 PROCEDURE — 3045F PR MOST RECENT HEMOGLOBIN A1C LEVEL 7.0-9.0%: CPT | Mod: S$GLB,,, | Performed by: INTERNAL MEDICINE

## 2017-02-03 RX ORDER — TRIAMCINOLONE ACETONIDE 40 MG/ML
40 INJECTION, SUSPENSION INTRA-ARTICULAR; INTRAMUSCULAR
Status: DISCONTINUED | OUTPATIENT
Start: 2017-02-03 | End: 2017-02-03 | Stop reason: HOSPADM

## 2017-02-03 RX ORDER — DIAZEPAM 5 MG/1
5 TABLET ORAL EVERY 6 HOURS PRN
Qty: 60 TABLET | Refills: 3 | Status: SHIPPED | OUTPATIENT
Start: 2017-02-03 | End: 2017-05-03 | Stop reason: SDUPTHER

## 2017-02-03 NOTE — PROGRESS NOTES
CHIEF COMPLAINT: Follow up of smoldering multiple myeloma, diabetes, hypertension, back and neck pain    HISTORY OF PRESENT ILLNESS: This is a 71-year-old man who presents for follow up of above     HE is in a study for his smoldering multiple myeloma. He is in the observation arm and is being monitoring monthly. He saw Dr Engel 1/16/17      HE is followed by endocrine for his diabetes. HE wants to see a different person. His medication was changed to Trulicity and he had low blood sugars. He brought the sugar readings in to the office and did not get adequate responses. He took the Trulicity that he had for a month. It is too expensive $280. He is taking Lantus 18 units at bedtime and Novolog 3 - 5 units at meals.  Blood sugars have been in the low 100.  He is taking losartan 25 mg 1 tablet daily to protect his kidney. No polydipsia or polyuria       His back and neck was doing better in the healthy back program. When he stopped exercising his neck and back are a little worse. HE has more range of motion in his neck. He no longer needs hydrocodone apap regularly. He wants to get back to the healthy back program. He has not need tizanidine.       Knees have been worse. He had an injection by Dr Stokes, yesterday 2/2/17 which has helped. His sugars are higher today. He is out of omega XL 2 tablets twice daily which has helped his joints - he plans to go back to that      Reflux is controlled on omeprazole 20 mg 2 tablets daily. No chest pain, shortness of breath, nausea, voimting, constipation, diarrhea, numbness, weakness.        He had laser vaporization and enucleation of the prostate 11/13/13. He denies any dysuria or hematuria now. He has occasional urinary frequency. He is taking FLomax 0.4 mg daily for his prostate. HE had a cystoscope yesterday and oxybutynin was prescribed which he has not started.      He has hyperlipidemia, on pravastatin 40 mg daily. No joint pain or muscle pain from the pravastatin.  "       He has been taking aspirin 81 mg daily vitamin D supplement 2000 units daily.        Anxiety is doing better on celexa 20 mg daily. Mood is better. He takes diazepam 5 mg at bedtime for his sleep and anxiety. It helps him sleep.       HE is taking gabapentin 100 mg one in the morning and one in the afternoon and 3 at bedtime. Foot pain is better and controlled with the gabapentin.    PAST MEDICAL HISTORY:   1. Diabetes mellitus.   2. Hyperlipidemia.   3. Reflux.   4. BPH.   5. Osteoarthritis of the knees.   6. Neck pain.   7. History of right lateral epicondylitis.   8. History of lumps removed from the breast as a teenager.   9. OA cervical spine     SOCIAL HISTORY: Does not smoke, does not drink. 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 He works for the [employer].     FAMILY HISTORY: Mother is living at age 86 with a heart condition. Father   in his late 40s of alcoholism. He has 5 sisters and 1 brother who are  healthy, one has a neurological disorder, one is anxious. His daughter has  lupus, on dialysis. She also has heart problems. She also had a brain   aneurysm that was stented.       PHYSICAL EXAMINATION:    Visit Vitals    /70    Pulse 91    Ht 5' 10" (1.778 m)    Wt 90.4 kg (199 lb 4.7 oz)    SpO2 99%    BMI 28.6 kg/m2       GENERAL: He is alert, oriented, no apparent distress. Affect within normal limits.   Conjunctivae anicteric. PERRL. Tympanic membranes clear. Oropharynx clear.   NECK: Supple. No cervical lymphadenopathy, no thyroid enlargement.   Respiratory: Effort normal. Lungs are clear to auscultation.   HEART: Regular rate and rhythm without murmurs, gallops or rubs.   No lower extremity edema.          ASSESSMENT AND PLAN:          Prevnar   PSA . colonoscopy normal 3/2012 and due in 2019       1. Diabetes mellitus with hyperglycemia and microalbuminuria - hemoglobin A1C ordered  2. Smoldering multiple myeloma - in study. Labs 2017 reviewed  3. OA knee - " better after injection. Physical therapy  4. Hypertension - controlled  5. Hyperlipidemia. On pravastatin  6. BPH. S/p laser -  Saw ruology  7. Depression - stable  8. Vitamin D deficiency - on vitamin D 2,000 units daily.    9.Back pain- better in healthy back program  10. Anxiety and depression- stable on celexa 20 mg one tablet daily. Diazepam as neeeded for sleep  11. Peripheral neuropathy -controlled on gabapentin  12. I will see him back in 3 - 4 months, sooner if problems arise

## 2017-02-03 NOTE — MR AVS SNAPSHOT
[facility] - Internal Medicine  [address]  [facility] [address]  Phone: [phone]  Fax: [phone]                  Emerson Menendez   2/3/2017 8:30 AM   Office Visit    Description:  Male : 1945   Provider:  Roberta Sagastume MD   Department:  [facility] - Internal Medicine           Reason for Visit     Follow-up           Diagnoses this Visit        Comments    Primary osteoarthritis of both knees    -  Primary     Diabetes mellitus due to abnormal insulin         Smoldering multiple myeloma                To Do List           Future Appointments        Provider Department Dept Phone    2017 11:50 AM LAB, HEMONC CANCER BLDG [facility] [phone]    2017 1:00 PM Silvana Engel MD [place] [phone]    2017 8:45 AM Chaim Stokes MD [place] - Sports Medicine [phone]    5/3/2017 8:00 AM Roberta Sagastume MD [facility] - Internal Medicine [phone]      Goals (5 Years of Data)     None       These Medications        Disp Refills Start End    diazePAM (VALIUM) 5 MG tablet 60 tablet 3 2/3/2017     Take 1 tablet (5 mg total) by mouth every 6 (six) hours as needed. - Oral    Pharmacy: [id number] - [place], [place] - [address] AT [facility] Ph #: [phone]         [facility] On Call     [facility] Care [facility] -  Assistance  Registered nurses in the [facility] provide clinical advisement, health education, appointment booking, and other advisory services.  Call for this free service at [phone].             Medications           Message regarding Medications     Verify the changes and/or additions to your medication regime listed below are the same as discussed with your clinician today.  If any of these changes or additions are incorrect, please notify your healthcare provider.        CHANGE how you are taking these medications     Start  "Taking Instead of    diazePAM (VALIUM) 5 MG tablet diazePAM (VALIUM) 5 MG tablet    Dosage:  Take 1 tablet (5 mg total) by mouth every 6 (six) hours as needed. Dosage:  TAKE 1 TABLET BY MOUTH EVERY 6 HOURS AS NEEDED FOR ANXIETY    Reason for Change:  Reorder            Verify that the below list of medications is an accurate representation of the medications you are currently taking.  If none reported, the list may be blank. If incorrect, please contact your healthcare provider. Carry this list with you in case of emergency.           Current Medications     ACCU-CHEK OMAYRA Misc USE AS DIRECTED    aspirin (ECOTRIN) 81 MG EC tablet Take 1 tablet (81 mg total) by mouth once daily.    BD INSULIN PEN NEEDLE UF SHORT 31 gauge x 5/16" Ndle USE 1 NEEDLE TO INJECT INSULIN ONE TIME DAILY    blood glucose control, normal (METER-CHECK) Soln One meter. True test meter. Use as directed    blood sugar diagnostic Strp Check twice daily    blood-glucose meter kit Use as instructed. Insulin dependent diabetic    blood-glucose meter kit Use as instructed, accucheck omayra meter.    cholecalciferol, vitamin D3, (VITAMIN D) 2,000 unit Cap Take by mouth. 1 Capsule Oral Every day.  over the counter    citalopram (CELEXA) 20 MG tablet TAKE 1 TABLET BY MOUTH EVERY EVENING AFTER DINNER    diazePAM (VALIUM) 5 MG tablet Take 1 tablet (5 mg total) by mouth every 6 (six) hours as needed.    gabapentin (NEURONTIN) 100 MG capsule One capsule in am, one in afternoon as needed for foot pain and 1-3 at bedtime.    hydrocodone-acetaminophen 5-325mg (NORCO) 5-325 mg per tablet Take 1 tablet by mouth every 4 to 6 hours as needed for Pain.    insulin aspart (NOVOLOG FLEXPEN) 100 unit/mL InPn pen Inject 3 Units into the skin 3 (three) times daily with meals.    insulin syringe-needle U-100 (ADVOCATE SYRINGES) 0.3 mL 31 gauge x 5/16 Syrg Uses 4 time a day.    lancets Misc Use twice daily    LANTUS SOLOSTAR 100 unit/mL (3 mL) InPn pen INJECT 14 UNITS INTO THE " "SKIN EVERY EVENING (DISCARD AND BEGIN A NEW PEN AFTER 28 DAYS)    latanoprost 0.005 % ophthalmic solution INSTILL 1 DROP INTO BOTH EYES EVERY EVENING.    lorazepam (ATIVAN) 1 MG tablet Take 2 tablets (2 mg total) by mouth every 6 (six) hours as needed for Anxiety. Take 2 tablets 30 mins before the procedure    losartan (COZAAR) 25 MG tablet TAKE 1 TABLET EVERY DAY    omeprazole (PRILOSEC) 20 MG capsule TAKE 2 CAPSULES ONE TIME DAILY    oxybutynin (DITROPAN) 5 MG Tab Take 1 tablet (5 mg total) by mouth 2 (two) times daily.    pravastatin (PRAVACHOL) 40 MG tablet TAKE 1 TABLET ONE TIME DAILY    sildenafil (VIAGRA) 100 MG tablet Take by mouth. 1 Tablet Oral    tadalafil (CIALIS) 5 MG tablet Take 4 tablets (20 mg total) by mouth once daily.    tamsulosin (FLOMAX) 0.4 mg Cp24 Take 1 capsule (0.4 mg total) by mouth once daily.    tizanidine (ZANAFLEX) 4 MG tablet 1/2-1 tablet every 8 hours as needed for muscle spasm    trazodone (DESYREL) 50 MG tablet TAKE 1 TABLET BY MOUTH EVERY EVENING           Clinical Reference Information           Your Vitals Were     BP Pulse Height Weight SpO2 BMI    120/70 91 5' 10" (1.778 m) 90.4 kg (199 lb 4.7 oz) 99% 28.6 kg/m2      Blood Pressure          Most Recent Value    BP  120/70      Allergies as of 2/3/2017     Penicillins      Immunizations Administered on Date of Encounter - 2/3/2017     None      Orders Placed During Today's Visit      Normal Orders This Visit    Ambulatory consult to Physical Therapy     Future Labs/Procedures Expected by Expires    Hemoglobin A1c  As directed 2/3/2018      Language Assistance Services     ATTENTION: Language assistance services are available, free of charge. Please call [phone].      ATENCIÓN: Si abdiazizla sascha, tiene a shelley disposición servicios gratuitos de asistencia lingüística. Llame al [phone].     REVA Ý: N?u b?n nói Ti?ng Vi?t, có các d?ch v? h? tr? ngôn ng? mi?n phí dành cho b?n. G?i s? [phone].         [facility] - " Internal Medicine complies with applicable Federal civil rights laws and does not discriminate on the basis of race, color, national origin, age, disability, or sex.

## 2017-02-03 NOTE — PROCEDURES
Simple Urodynamics w/ Cysto  Date/Time: 2/2/2017 6:14 PM  Performed by: WILFRIDO HOLGUIN.  Authorized by: WILFRIDO HOLGUIN.   Comments: Procedure Date:  02/02/2017    Procedure:   Diagnostic Cystourethroscopy   Complex Cystometrogram   Voiding / Pressure Study with Intrarectal Balloon   Complex Uroflow   Electromyogram of Anal Sphincter.     Pre-OP Diagnosis:   Frequency,urgency, s/p TURP   Post-OP Diagnosis:   same   Anesthesia:   Anesthesia Administered:   Intraurethral instillation of 10 mL 2% lidocaine (Xylocaine) jelly.   Findings:   --- Bladder ---   CYSTOMETROGRAM ( Filling Phase ):   Cystometric Numeric Data:   - First Desire (Sensation): 163 mL at 7cm of water.   - Normal Desire: 176mL at 7 cm of water.   - Strong Desire: 207 mL at 11 cm of water.   - Urgency (Imminent Void) : 271 mL at 12cm of water.   - Maximum Cystometric Capacity: 271 mL.   Compliance:   - normal.   Leak Point Pressure:   - Valsalva ( Abdominal ) Leak Point Pressure: none.   UROFLOW:   - Voided Volume: 262 mL.   - Residual Urine: 100 mL.   - Maximum Flow Rate: 27 mL/sec.   - Flow Pattern: normal  VOIDING PRESSURE STUDY ( Voiding Phase ):   Detrusor Pressure:   - Maximum Detrusor Pressure: 60cm of water.   - Detrusor Pressure at Maximum Flow: 46 cm of water.   - Detrusor Contraction Characteristics: Sustained contraction(s).   ELECTROMYOGRAM:   - normal.     ---Diagnostic Cystourethroscopy ---   Normal urethra.    Prostate s/p TURP with preserved apical tissues  Width of Bladder Neck Opening: Approximately 18 Fr.   Normal bladder with increased hyperemic area, ? Glomerulation, ? IC.   Normal ureteral orifices bilaterally.       Description of Procedure:   Informed Consent:   - Risks, benefits and alternatives of procedure discussed with   patient and informed consent obtained.   Patient Position:   - Supine.   --- Bladder ---   Prep and Drape:   - Patient prepped and draped in usual sterile fashion using povidone   iodine (Betadine).   ---  Diagnostic Cystourethroscopy ---   Instruments:   - 16 Fr flexible cystoscope with 0 degree lens.   Procedure Details:   - Cystoscope passed under vision into bladder.   - Bladder and urethra examined in their entirety with findings as   above.   --- Urodynamic Studies ---   Procedure Details:   Cystometrogram:   - Catheter(s) passed into the bladder.   - Rectal balloon inserted.   - Catheter(s) connected to infusion medium and to pressure recording   device.   - Infusion Rate: 30 mL / min.   Electromyogram:   - Perineal electromyogram pad placed and connected to electromyogram   recording device.   Equipment:   - Catheters: Double lumen catheter.   - Medium: Liquid.   - Pressure Recording Device: Calibrated electronic equipment.   Complications:   No immediate complications.    CONCLUSIONS:   1.  Sensory urgency  2. Early IC  3. S/p TURP    Avoid bladder irritants.  IC smart diet recommended.  Oxybutynin, flomax given.    Diagnoses and associated orders for this visit:     BPH with urinary obstruction  Simple Urodynamics w/ Cysto     Diabetic peripheral neuropathy associated with type 2 diabetes mellitus  Simple Urodynamics w/ Cysto     Weak urine stream  Simple Urodynamics w/ Cysto     OAB (overactive bladder)  Discontinue: oxybutynin (DITROPAN) 5 MG Tab; Take 1 tablet (5 mg total) by mouth 2 (two) times daily.  oxybutynin (DITROPAN) 5 MG Tab; Take 1 tablet (5 mg total) by mouth 2 (two) times daily.     Smoldering multiple myeloma  Discontinue: tamsulosin (FLOMAX) 0.4 mg Cp24; Take 1 capsule (0.4 mg total) by mouth once daily.  tamsulosin (FLOMAX) 0.4 mg Cp24; Take 1 capsule (0.4 mg total) by mouth once daily.     Other orders  ciprofloxacin HCl tablet 500 mg; Take 2 tablets (500 mg total) by mouth once.  lidocaine HCl 2% urojet; Place into the urethra once.      Post-OP Plan:   Patient was discharged home in a stable condition.  Medications: cipro  Follow up:  6 months

## 2017-02-03 NOTE — PROCEDURES
Large Joint Aspiration/Injection  Date/Time: 2/2/2017 11:53 AM  Performed by: DHRUV REIS  Authorized by: DHRUV REIS     Consent Done?:  Yes (Verbal)  Indications:  Pain  Procedure site marked: Yes    Timeout: Prior to procedure the correct patient, procedure, and site was verified      Location:  Knee  Site:  L knee and R knee  Prep: Patient was prepped and draped in usual sterile fashion    Ultrasonic Guidance for needle placement: No  Needle size:  22 G  Approach:  Superior  Medications:  40 mg triamcinolone acetonide 40 mg/mL; 40 mg triamcinolone acetonide 40 mg/mL  Patient tolerance:  Patient tolerated the procedure well with no immediate complications

## 2017-02-09 RX ORDER — PEN NEEDLE, DIABETIC 31 GX5/16"
NEEDLE, DISPOSABLE MISCELLANEOUS
Qty: 100 EACH | Refills: 3 | Status: SHIPPED | OUTPATIENT
Start: 2017-02-09 | End: 2017-05-26 | Stop reason: SDUPTHER

## 2017-02-10 ENCOUNTER — CLINICAL SUPPORT (OUTPATIENT)
Dept: REHABILITATION | Facility: HOSPITAL | Age: 72
End: 2017-02-10
Attending: INTERNAL MEDICINE
Payer: MEDICARE

## 2017-02-10 DIAGNOSIS — R29.898 LEFT LEG WEAKNESS: ICD-10-CM

## 2017-02-10 PROCEDURE — 97110 THERAPEUTIC EXERCISES: CPT

## 2017-02-10 PROCEDURE — G8979 MOBILITY GOAL STATUS: HCPCS | Mod: CK

## 2017-02-10 PROCEDURE — 97161 PT EVAL LOW COMPLEX 20 MIN: CPT

## 2017-02-10 PROCEDURE — G8978 MOBILITY CURRENT STATUS: HCPCS | Mod: CK

## 2017-02-10 NOTE — PROGRESS NOTES
[facility]  PATIENT EVALUATION    Date: 02/10/2017  Referring Physician: Roberta Sagastume MD  Visit #: 1   Start Time:  930  Stop Time:  1030    History     History of Present Illness: Patient with 10 year h/o B knee pain, about 2 months ago started having severe sharp pain left medial knee.  Was in healthy back program with relief of neck and back pain.  Gets regular injections in knees with (+) relief, but sharp pain has not stopped (occur daily).  Pain with walking, pain with bending knees to don shoes.  Pain limits walking more than a half hour. Severe pain with ascending/descending stairs and ladder (semi-retired )  Primary Diagnosis: DJD B knees  Treatment Diagnosis: L knee stiffness, BLE weakness  Past Medical History   Diagnosis Date    Anxiety 3/16/2015    BPH (benign prostatic hypertrophy) 9/24/2012    Depression 3/16/2015    GERD (gastroesophageal reflux disease) 9/24/2012    Microalbuminuria 9/24/2012    Osteoarthritis of cervical spine 9/24/2012    Osteoarthritis of knee 9/24/2012    Type II or unspecified type diabetes mellitus without mention of complication, not stated as uncontrolled 9/24/2012     Prior Therapy: healthy back  Sports/Recreational Activities: works as , walking  Patient Therapy Goals: to be able to walk and ascend/descend stairs with less pain    Subjective     Pain:  Location:left knee  Pain Scale: 0/10 at best 0/10 now  8/10 at worst  Activities Which Increase Pain: walking, steps, ladder  Activities Which Decrease Pain: pain medication, ice and rest    Objective     Gait: decreased pilar and step length, antalgic gait LLE with decreased    Posture: forward head, increased thoracic kyphosis, decreased lumbar lordosis, weight shifted onto RLE     Right Left Right Left    Strength Strength AROM/PROM AROM/PROM   Hip flexion 4 4 WFL WFL   Extension 4- 3+ WFL WFL   Glute max 4- 3+     Abduction 4 4- WFL WFL    ER   WFL WFL   IR   WFL WFL   Knee ext 5 4 5/0 0/0   Knee flexion 5 4 130/135 135/140   DF 5 4 WFL WFL   PF 5 4 WFL WFL       Palpation: mildly tender L medial knee and medial patellar facet    Sensation: light touch intact BLE      Functional Limitations Reports - G Codes  Category: Mobility  CMS Impairment/Limitation/Restriction for FOTO Knee Survey    Status Limitation  G-Code   CMS Severity Modifier  Intake   41%  59%   Current Status  CK - At least 40 percent but less than 60 percent  Predicted  55%  45%   Goal Status   CK - At least 40 percent but less than 60 percent    Treatment:   Bridges with alternating LE extension  Side lying clamshells with orange TB  SLR flexion and extension  Standing gastroc stretch  Single leg heel raise  Leg press in pain free range      Assessment       Initial Assessment:  Patient presents with limitations in  ROM, strength, balance and posture affecting his mobility, ADLs, and work .  He   will benefit from outpatient physical therapy to improve his ROM, strength, balance and posture to enable him  to return to full pain free function  Rehab Potential: good    Short Term Goals (3 Weeks):   - Pt will increase ROM by 5 degrees L knee to enable normal gait and ADLs  - Pt will increase strength by 1/2 grade BLE to enable normal gait and ADLs  - Able to walk 1 mile with less than 4/10 pain  - Able to ascend 8 steps with less than 4/10 pain  - Supervised HEP    Long Term Goals (6 Weeks):   - Pt will increase ROM to WNL to to enable normal gait and ADLs  - Pt will increase strength to 4+/5 BLE  to enable normal gait and ADLs  -  Able to walk 3 miles with less than 3/10 pain  - Able to work with less than 3/10 pain  - Independent HEP  Plan   Pt will be seen 1-2 times per week for 6 weeks. Recommended Treatment Plan will include:  Manual soft tissue and/or joint mobilization  Therapeutic Exercise  Neuromuscular re-education    Individualized Home Exercise Program  Modalities:  heat/ice, estim, US as needed   Pt education  Therapist: Abiel Chakraborty PT    I CERTIFY THE NEED FOR THESE SERVICES FURNISHED UNDER THIS PLAN OF TREATMENT AND WHILE UNDER MY CARE    Physician's comments: ________________________________________________________________________________________________________________________________________________    Physician's Name: ___________________________________

## 2017-02-13 ENCOUNTER — LAB VISIT (OUTPATIENT)
Dept: LAB | Facility: HOSPITAL | Age: 72
End: 2017-02-13
Attending: INTERNAL MEDICINE
Payer: MEDICARE

## 2017-02-13 ENCOUNTER — OFFICE VISIT (OUTPATIENT)
Dept: HEMATOLOGY/ONCOLOGY | Facility: CLINIC | Age: 72
End: 2017-02-13
Payer: MEDICARE

## 2017-02-13 ENCOUNTER — RESEARCH ENCOUNTER (OUTPATIENT)
Dept: RESEARCH | Facility: HOSPITAL | Age: 72
End: 2017-02-13

## 2017-02-13 VITALS
SYSTOLIC BLOOD PRESSURE: 142 MMHG | RESPIRATION RATE: 16 BRPM | DIASTOLIC BLOOD PRESSURE: 74 MMHG | WEIGHT: 194.25 LBS | HEIGHT: 70 IN | BODY MASS INDEX: 27.81 KG/M2 | TEMPERATURE: 97 F | HEART RATE: 99 BPM

## 2017-02-13 DIAGNOSIS — Z00.6 EXAMINATION OF PARTICIPANT IN CLINICAL TRIAL: ICD-10-CM

## 2017-02-13 DIAGNOSIS — D47.2 SMOLDERING MULTIPLE MYELOMA: Primary | ICD-10-CM

## 2017-02-13 DIAGNOSIS — C90.00 MULTIPLE MYELOMA, REMISSION STATUS UNSPECIFIED: ICD-10-CM

## 2017-02-13 DIAGNOSIS — E13.9 DIABETES MELLITUS DUE TO ABNORMAL INSULIN: ICD-10-CM

## 2017-02-13 LAB
ALBUMIN SERPL BCP-MCNC: 3.8 G/DL
ALP SERPL-CCNC: 51 U/L
ALT SERPL W/O P-5'-P-CCNC: 22 U/L
ANION GAP SERPL CALC-SCNC: 8 MMOL/L
AST SERPL-CCNC: 17 U/L
BASOPHILS # BLD AUTO: 0.03 K/UL
BASOPHILS NFR BLD: 0.5 %
BILIRUB SERPL-MCNC: 0.9 MG/DL
BUN SERPL-MCNC: 20 MG/DL
CALCIUM SERPL-MCNC: 9.9 MG/DL
CHLORIDE SERPL-SCNC: 102 MMOL/L
CO2 SERPL-SCNC: 26 MMOL/L
CREAT SERPL-MCNC: 1.6 MG/DL
DIFFERENTIAL METHOD: ABNORMAL
EOSINOPHIL # BLD AUTO: 0.2 K/UL
EOSINOPHIL NFR BLD: 3.5 %
ERYTHROCYTE [DISTWIDTH] IN BLOOD BY AUTOMATED COUNT: 13.8 %
EST. GFR  (AFRICAN AMERICAN): 49.7 ML/MIN/1.73 M^2
EST. GFR  (NON AFRICAN AMERICAN): 43 ML/MIN/1.73 M^2
GLUCOSE SERPL-MCNC: 154 MG/DL
HCT VFR BLD AUTO: 35.2 %
HGB BLD-MCNC: 11.4 G/DL
IGA SERPL-MCNC: 1439 MG/DL
IGG SERPL-MCNC: 901 MG/DL
IGM SERPL-MCNC: 43 MG/DL
LYMPHOCYTES # BLD AUTO: 2.1 K/UL
LYMPHOCYTES NFR BLD: 35.1 %
MAGNESIUM SERPL-MCNC: 1.9 MG/DL
MCH RBC QN AUTO: 28.7 PG
MCHC RBC AUTO-ENTMCNC: 32.4 %
MCV RBC AUTO: 89 FL
MONOCYTES # BLD AUTO: 0.6 K/UL
MONOCYTES NFR BLD: 9.2 %
NEUTROPHILS # BLD AUTO: 3.1 K/UL
NEUTROPHILS NFR BLD: 51.5 %
PHOSPHATE SERPL-MCNC: 3.2 MG/DL
PLATELET # BLD AUTO: 231 K/UL
PMV BLD AUTO: 9.3 FL
POTASSIUM SERPL-SCNC: 4.6 MMOL/L
PROT SERPL-MCNC: 8.3 G/DL
RBC # BLD AUTO: 3.97 M/UL
SODIUM SERPL-SCNC: 136 MMOL/L
WBC # BLD AUTO: 6.06 K/UL

## 2017-02-13 PROCEDURE — 1157F ADVNC CARE PLAN IN RCRD: CPT | Mod: Q1,S$GLB,, | Performed by: INTERNAL MEDICINE

## 2017-02-13 PROCEDURE — 84165 PROTEIN E-PHORESIS SERUM: CPT | Mod: 26,Q1,, | Performed by: PATHOLOGY

## 2017-02-13 PROCEDURE — 86334 IMMUNOFIX E-PHORESIS SERUM: CPT | Mod: 26,Q1,, | Performed by: PATHOLOGY

## 2017-02-13 PROCEDURE — 99999 PR PBB SHADOW E&M-EST. PATIENT-LVL III: CPT | Mod: PBBFAC,,, | Performed by: INTERNAL MEDICINE

## 2017-02-13 PROCEDURE — 99214 OFFICE O/P EST MOD 30 MIN: CPT | Mod: Q1,S$GLB,, | Performed by: INTERNAL MEDICINE

## 2017-02-13 PROCEDURE — 1126F AMNT PAIN NOTED NONE PRSNT: CPT | Mod: Q1,S$GLB,, | Performed by: INTERNAL MEDICINE

## 2017-02-13 PROCEDURE — 3077F SYST BP >= 140 MM HG: CPT | Mod: Q1,S$GLB,, | Performed by: INTERNAL MEDICINE

## 2017-02-13 PROCEDURE — 3078F DIAST BP <80 MM HG: CPT | Mod: Q1,S$GLB,, | Performed by: INTERNAL MEDICINE

## 2017-02-13 PROCEDURE — 1159F MED LIST DOCD IN RCRD: CPT | Mod: Q1,S$GLB,, | Performed by: INTERNAL MEDICINE

## 2017-02-13 NOTE — PROGRESS NOTES
Subjective:       Patient ID: Emerson Menendez is a 71 y.o. male.    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 Test results identify a stable IgA kappa protein with beta globulin band at 1.63g/dL. Kappa free light chain is elevated at 4.40. CBC and calcium are stable. Creatinine with history of stage III CKD is stable at 1.4. Metastatic survey is negative for lytic lesions. MRI of the entire spine demonstrated age related changes but no convincing evidence of myeloma bone disease. Bone marrow biopsy identified about 13% plasma cells by morphology and FISH for myeloma identified a t(11;14) and trisomies 3,7, and 17. The patient is afebrile and appears clinically well. He was seen by his podiatrist and nephrologist since our last visit without any new or acute events.    The patient has not received any therapy for smoldering myeloma including bisphosphonates or steroids. He has been randomized to observation arm of the the clinical study. 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 He has no current or prior history of malignancy.    TODAY  Mr. Menendez returns today for monthly follow-up evaluation. He denies any interval events since last interview. He is having increased left knee pain and plans to restart physical therapy. Afebrile. ECOG PS is 0.    HPI  Review of Systems   Constitutional: Negative for activity change, appetite change, diaphoresis, fatigue, fever and unexpected weight change.   HENT: Negative.    Eyes: Negative.    Respiratory: Negative.    Cardiovascular: Negative for leg swelling.    Gastrointestinal: Negative.    Endocrine: Negative.    Genitourinary: Negative.    Musculoskeletal: Negative.    Skin: Negative.    Allergic/Immunologic: Negative.    Neurological:        Grade 0-1 peripheral neuropathy of bilateral feet.    Hematological: Negative for adenopathy. Does not bruise/bleed easily.   Psychiatric/Behavioral: Negative.        Objective:      Physical Exam   Constitutional: He is oriented to person, place, and time. He appears well-developed and well-nourished.   HENT:   Head: Normocephalic and atraumatic.   Right Ear: External ear normal.   Left Ear: External ear normal.   Nose: Nose normal.   Mouth/Throat: Oropharynx is clear and moist.   Eyes: Conjunctivae and EOM are normal. Pupils are equal, round, and reactive to light.   Neck: Normal range of motion. Neck supple.   Cardiovascular: Normal rate, regular rhythm and intact distal pulses.    No murmur heard.  Pulmonary/Chest: Effort normal and breath sounds normal. No respiratory distress.   Abdominal: Soft. Bowel sounds are normal. He exhibits no distension. There is no hepatosplenomegaly.   Musculoskeletal: He exhibits no edema or tenderness.   Neurological: He is alert and oriented to person, place, and time. No cranial nerve deficit.   Skin: Skin is warm and dry. No rash noted. No cyanosis. Nails show no clubbing.   Psychiatric: He has a normal mood and affect. His behavior is normal.   Nursing note and vitals reviewed.      Assessment:       1. Smoldering multiple myeloma        Plan:       The patient has a diagnosis of smoldering myeloma. There is no indication for immediate chemotherapy. 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 CBC and CMP are stable.  Plan for return in 1 month. Follow-up pending myeloma labs.

## 2017-02-13 NOTE — PROGRESS NOTES
"  Monday, February 13th, 2016      Protocol: [id number]  Investigator: GIL Engel  Pt Initials: LUCÍA LORENZ      Arm B: Observation  Cycle 13, Day 28       Patient presents to clinic to evaluate ability to proceed with Cycle 14 of observation per above-mentioned protocol. Patient is awake, alert, and oriented to person, place, and time. He is ambulatory and states he has been feeling well. He states is knee pain is stable and has set up recurring PT appt's.  He discussed this as well as blood sugar control at a recent visit with Dr. Sagastume.  He denies new potential CRAB symptoms.  He states continued willingness to participate in above-mentioned study.      Review of AE's:  1. Hypertension, grade 2: BP today is 142/74 in clinic today. Patient admits becoming "very anxious" when he walks into the [facility] each time. He states he always feels better when he leaves and that BP issue when he comes here is situational. Not concerning per Dr. Engel. He was encouraged to check this at local drug store and report if this remains elevated.  He states understanding.  Will continue to follow this. AE ongoing.  2. Lower Back Pain and Neck Pain, grade 1: Patient has no complaints of this issue this month.  Patient states some intermittent pain to neck but that his work with PT is helping this issue. As previously stated, patient is not suspected to have bony myeloma involvement per Dr. Engel as these are pre-existing issues present at baseline. He was encouraged to keep up physical activity and exercise to help alleviate this. He states understanding. AE ongoing.  3.  Pain in extremity (knee), grade 2.  As stated above, patient is continues to experience knee pain, but states he has plans to initiate PT for this.  He states he will see his PCP soon to discuss a plan.  In the meantime, he plans to take ibuprofen and other pain medication as directed.  AE, grade 2.  4. Creatinine increased, grade 1: Serum creatinine is 1.6 mg/dl " "today and GFR 53.53. See urinary and prostate issues as described above. This was noted per Dr. Engel and not concerned for myeloma involvement at this time.  Will continue observation monthly and to monitor renal function closely. AE stable.   5. Peripheral sensory neuropathy, grade 1: Patient does not verbalize complaints of this symptom today. He states it is not painful and he continues to be capable of carrying out normal activities. He states he takes his gabapentin but "not as often as I'm supposed to." He was encouraged to take medication as prescribed and was encouraged to use his phone and tablet capabilities to assist with this as he always has these with him. He states understanding. AE ongoing.       No changes in other baseline AE's noted.      Per study chair, "the definition of progressive disease for this protocol is developing CRAB criteria that requires treatment systemically." Per Dr Engel, based on today's exam and lab results thus far, patient does not have any s/s of CRAB: Normal Calcium level (9.9), absence of Renal insufficiency (serum creatinine 1.6, and GFR 53.53 per [facility]-Gault--patient with a history of kidney dysfunction secondary to diabetes; Dr. Engel aware of these values and not concerned for myeloma involvement), absence of significant Anemia (hemoglobin 11.4) and absence of lytic Bone lesions (per baseline metastatic survey as well as MRI--see MD note for further comment). See MD note for ECOG score and H&P and flowsheets for laboratory work, vitals, etc. All myeloma-related blood work from last cycle including SPEP and JAGUAR have remained stable and per Dr. Engel, patient shows no evidence of progression today. Patient informed that Dr. Engel will release all lab results to [facility]. He states understanding of this.       RN reinforced importance of patient following up monthly for assessment as well as lab work. Patient verbalizes understanding. Appointment for next month's visit " was made in clinic today and appt slip is to be mailed to patient at a later date. Patient was given 's contact information and has MD contact information to call with any concerns, questions, or worsening of symptoms; he verbalized understanding.

## 2017-02-14 LAB
ALBUMIN SERPL ELPH-MCNC: 3.94 G/DL
ALPHA1 GLOB SERPL ELPH-MCNC: 0.27 G/DL
ALPHA2 GLOB SERPL ELPH-MCNC: 0.82 G/DL
B-GLOBULIN SERPL ELPH-MCNC: 2.29 G/DL
ESTIMATED AVG GLUCOSE: 186 MG/DL
GAMMA GLOB SERPL ELPH-MCNC: 0.58 G/DL
HBA1C MFR BLD HPLC: 8.1 %
INTERPRETATION SERPL IFE-IMP: NORMAL
KAPPA LC SER QL IA: 4.74 MG/DL
KAPPA LC/LAMBDA SER IA: 2.96
LAMBDA LC SER QL IA: 1.6 MG/DL
PATHOLOGIST INTERPRETATION IFE: NORMAL
PATHOLOGIST INTERPRETATION SPE: NORMAL
PROT SERPL-MCNC: 7.9 G/DL

## 2017-02-22 ENCOUNTER — CLINICAL SUPPORT (OUTPATIENT)
Dept: REHABILITATION | Facility: HOSPITAL | Age: 72
End: 2017-02-22
Attending: INTERNAL MEDICINE
Payer: MEDICARE

## 2017-02-22 DIAGNOSIS — R29.898 LEFT LEG WEAKNESS: ICD-10-CM

## 2017-02-22 PROCEDURE — 97110 THERAPEUTIC EXERCISES: CPT

## 2017-02-22 NOTE — PROGRESS NOTES
"                                                  Physical Therapy Daily Note     Date: 02/22/2017  Name: Emerson Menendez  Clinic Number: [id number]  Diagnosis:   Encounter Diagnosis   Name Primary?    Left leg weakness      Physician: Roberta Sagastume MD    Evaluation Date: 2/10/17  Visit #: 2  Start Time:  740  Stop Time:  830  Total Treatment Time: 50  Subjective     Pt reports "I'm doing about the same.  My knee still really hurts when I walk"    Pain: 8/10 with ambulation, none currently    Objective       Patient received individual therapy to increase strength, ROM, core stabilization and flexibility with activities as follows:     Therapeutic exercises: (50 minutes )   Stationary bike 10 min  Bridges with alternating LE extension  Side lying clamshells with orange TB  SLR flexion with abdominal bracing 3#  SLR extension 3# prone  Standing gastroc stretch  Single leg heel raise  Leg press in pain free range 80# 3x10  Seated leg curl 25# 3x10    Pt demo good understanding of the education provided. Emerson demonstrated good return demonstration of activities.     Education provided:  Emerson verbalized good understanding of education provided.   No spiritual or educational barriers to learning identified.    Assessment     This is a 71 y.o. male referred to outpatient physical therapy and presents with a medical diagnosis of knee DJD and demonstrates limitations in ROM, strength, balance and posture Pt prognosis is Good. Pt will continue to benefit from skilled outpatient physical therapy to address the impaired ROM, strength, balance and posture, provide pt/family education and to maximize pt's level  of independence in the home and community environment.    Will the patient continue to benefit from skilled PT intervention? yes        Medical necessity is demonstrated by:   - Pain and weakness limit functional mobility  -Increased difficulty with ADLs   - Requires skilled supervision to complete and progress " HEP       Plan   Continue with established Plan of Care towards PT goals.      Therapist: Abiel Chakraborty, PT

## 2017-03-06 ENCOUNTER — CLINICAL SUPPORT (OUTPATIENT)
Dept: REHABILITATION | Facility: HOSPITAL | Age: 72
End: 2017-03-06
Attending: INTERNAL MEDICINE
Payer: MEDICARE

## 2017-03-06 DIAGNOSIS — R29.898 LEFT LEG WEAKNESS: ICD-10-CM

## 2017-03-06 PROCEDURE — 97110 THERAPEUTIC EXERCISES: CPT

## 2017-03-06 NOTE — PROGRESS NOTES
"                                                  Physical Therapy Daily Note     Date: 03/06/2017  Name: Emerson Menendez  Clinic Number: [id number]  Diagnosis:   Encounter Diagnosis   Name Primary?    Left leg weakness      Physician: Roberta Sagastume MD    Evaluation Date: 2/10/17  Visit #: 3  Start Time:  730  Stop Time:  830  Total Treatment Time: 60  Subjective     Pt reports "I'm doing about the same.  I need to make a more concentrated effort to do the exercises and then I think I'll start to feel better"    Pain: 7/10 with ambulation, none currently    Objective       Patient received individual therapy to increase strength, ROM, core stabilization and flexibility with activities as follows:     Therapeutic exercises: (50 minutes )   Stationary bike 10 min  Bridges with alternating LE extension  Side lying clamshells with orange TB  SLR flexion with abdominal bracing 3#  SLR extension 3# prone  Standing gastroc stretch  Single leg heel raise  Leg press in pain free range 80# 3x10  Seated leg curl 25# 3x10    Pt demo good understanding of the education provided. Emerson demonstrated good return demonstration of activities.     Education provided:  Emerson verbalized good understanding of education provided.   No spiritual or educational barriers to learning identified.    Assessment     This is a 71 y.o. male referred to outpatient physical therapy and presents with a medical diagnosis of knee DJD and demonstrates limitations in ROM, strength, balance and posture Pt prognosis is Good. Pt will continue to benefit from skilled outpatient physical therapy to address the impaired ROM, strength, balance and posture, provide pt/family education and to maximize pt's level  of independence in the home and community environment.    Will the patient continue to benefit from skilled PT intervention? yes        Medical necessity is demonstrated by:   - Pain and weakness limit functional mobility  -Increased difficulty " with ADLs   - Requires skilled supervision to complete and progress HEP       Plan   Continue with established Plan of Care towards PT goals.      Therapist: Abiel Chakraborty, PT

## 2017-03-08 ENCOUNTER — CLINICAL SUPPORT (OUTPATIENT)
Dept: REHABILITATION | Facility: HOSPITAL | Age: 72
End: 2017-03-08
Attending: INTERNAL MEDICINE
Payer: MEDICARE

## 2017-03-08 DIAGNOSIS — R29.898 LEFT LEG WEAKNESS: ICD-10-CM

## 2017-03-08 PROCEDURE — 97110 THERAPEUTIC EXERCISES: CPT

## 2017-03-08 NOTE — PROGRESS NOTES
"                                                  Physical Therapy Daily Note     Date: 03/08/2017  Name: Emerson Menendez  Clinic Number: [id number]  Diagnosis:   Encounter Diagnosis   Name Primary?    Left leg weakness      Physician: Roberta Sagastume MD    Evaluation Date: 2/10/17  Visit #: 3  Start Time:  730  Stop Time:  830  Total Treatment Time: 60  Subjective     Pt reports "I'm doing a little bit better today.  It still gets sore but its moving better"    Pain: 7/10 with ambulation, none currently    Objective       Patient received individual therapy to increase strength, ROM, core stabilization and flexibility with activities as follows:     Therapeutic exercises: (50 minutes )   Stationary bike 10 min  Bridges with alternating LE extension  Side lying clamshells with orange TB  SLR flexion with abdominal bracing 3#  SLR extension 3# prone  Standing gastroc stretch  Single leg heel raise  Leg press in pain free range 80# 3x10  Seated leg curl 25# 3x10    Pt demo good understanding of the education provided. Emerson demonstrated good return demonstration of activities.     Education provided:  Emerson verbalized good understanding of education provided.   No spiritual or educational barriers to learning identified.    Assessment     This is a 71 y.o. male referred to outpatient physical therapy and presents with a medical diagnosis of knee DJD and demonstrates limitations in ROM, strength, balance and posture Pt prognosis is Good. Pt will continue to benefit from skilled outpatient physical therapy to address the impaired ROM, strength, balance and posture, provide pt/family education and to maximize pt's level  of independence in the home and community environment.    Will the patient continue to benefit from skilled PT intervention? yes        Medical necessity is demonstrated by:   - Pain and weakness limit functional mobility  -Increased difficulty with ADLs   - Requires skilled supervision to complete " and progress HEP       Plan   Continue with established Plan of Care towards PT goals.      Therapist: Abiel Chakraborty, PT

## 2017-03-14 ENCOUNTER — LAB VISIT (OUTPATIENT)
Dept: LAB | Facility: HOSPITAL | Age: 72
End: 2017-03-14
Attending: INTERNAL MEDICINE
Payer: MEDICARE

## 2017-03-14 ENCOUNTER — RESEARCH ENCOUNTER (OUTPATIENT)
Dept: RESEARCH | Facility: HOSPITAL | Age: 72
End: 2017-03-14

## 2017-03-14 ENCOUNTER — OFFICE VISIT (OUTPATIENT)
Dept: HEMATOLOGY/ONCOLOGY | Facility: CLINIC | Age: 72
End: 2017-03-14
Payer: MEDICARE

## 2017-03-14 VITALS
HEIGHT: 70 IN | OXYGEN SATURATION: 97 % | SYSTOLIC BLOOD PRESSURE: 144 MMHG | RESPIRATION RATE: 18 BRPM | HEART RATE: 84 BPM | BODY MASS INDEX: 27.55 KG/M2 | DIASTOLIC BLOOD PRESSURE: 72 MMHG | WEIGHT: 192.44 LBS | TEMPERATURE: 98 F

## 2017-03-14 DIAGNOSIS — C90.00 MULTIPLE MYELOMA, REMISSION STATUS UNSPECIFIED: ICD-10-CM

## 2017-03-14 DIAGNOSIS — D47.2 SMOLDERING MULTIPLE MYELOMA: Primary | ICD-10-CM

## 2017-03-14 DIAGNOSIS — Z00.6 EXAMINATION OF PARTICIPANT IN CLINICAL TRIAL: ICD-10-CM

## 2017-03-14 LAB
ALBUMIN SERPL BCP-MCNC: 3.6 G/DL
ALP SERPL-CCNC: 61 U/L
ALT SERPL W/O P-5'-P-CCNC: 19 U/L
ANION GAP SERPL CALC-SCNC: 10 MMOL/L
AST SERPL-CCNC: 14 U/L
BASOPHILS # BLD AUTO: 0.03 K/UL
BASOPHILS NFR BLD: 0.6 %
BILIRUB SERPL-MCNC: 0.7 MG/DL
BUN SERPL-MCNC: 20 MG/DL
CALCIUM SERPL-MCNC: 9.6 MG/DL
CHLORIDE SERPL-SCNC: 101 MMOL/L
CO2 SERPL-SCNC: 26 MMOL/L
CREAT SERPL-MCNC: 1.7 MG/DL
DIFFERENTIAL METHOD: ABNORMAL
EOSINOPHIL # BLD AUTO: 0.2 K/UL
EOSINOPHIL NFR BLD: 4.2 %
ERYTHROCYTE [DISTWIDTH] IN BLOOD BY AUTOMATED COUNT: 13.8 %
EST. GFR  (AFRICAN AMERICAN): 46.2 ML/MIN/1.73 M^2
EST. GFR  (NON AFRICAN AMERICAN): 40 ML/MIN/1.73 M^2
GLUCOSE SERPL-MCNC: 251 MG/DL
HCT VFR BLD AUTO: 37.3 %
HGB BLD-MCNC: 11.8 G/DL
IGA SERPL-MCNC: 1524 MG/DL
IGG SERPL-MCNC: 915 MG/DL
IGM SERPL-MCNC: 42 MG/DL
LYMPHOCYTES # BLD AUTO: 2.2 K/UL
LYMPHOCYTES NFR BLD: 41.1 %
MAGNESIUM SERPL-MCNC: 1.9 MG/DL
MCH RBC QN AUTO: 28.6 PG
MCHC RBC AUTO-ENTMCNC: 31.6 %
MCV RBC AUTO: 90 FL
MONOCYTES # BLD AUTO: 0.4 K/UL
MONOCYTES NFR BLD: 6.6 %
NEUTROPHILS # BLD AUTO: 2.5 K/UL
NEUTROPHILS NFR BLD: 47.3 %
PHOSPHATE SERPL-MCNC: 3.1 MG/DL
PLATELET # BLD AUTO: 240 K/UL
PMV BLD AUTO: 9.5 FL
POTASSIUM SERPL-SCNC: 4.4 MMOL/L
PROT SERPL-MCNC: 8.3 G/DL
RBC # BLD AUTO: 4.13 M/UL
SODIUM SERPL-SCNC: 137 MMOL/L
WBC # BLD AUTO: 5.3 K/UL

## 2017-03-14 PROCEDURE — 99215 OFFICE O/P EST HI 40 MIN: CPT | Mod: S$GLB,,, | Performed by: INTERNAL MEDICINE

## 2017-03-14 PROCEDURE — 3077F SYST BP >= 140 MM HG: CPT | Mod: Q1,S$GLB,, | Performed by: INTERNAL MEDICINE

## 2017-03-14 PROCEDURE — 1159F MED LIST DOCD IN RCRD: CPT | Mod: Q1,S$GLB,, | Performed by: INTERNAL MEDICINE

## 2017-03-14 PROCEDURE — 99999 PR PBB SHADOW E&M-EST. PATIENT-LVL III: CPT | Mod: PBBFAC,,, | Performed by: INTERNAL MEDICINE

## 2017-03-14 PROCEDURE — 86334 IMMUNOFIX E-PHORESIS SERUM: CPT | Mod: 26,Q1,, | Performed by: PATHOLOGY

## 2017-03-14 PROCEDURE — 1160F RVW MEDS BY RX/DR IN RCRD: CPT | Mod: Q1,S$GLB,, | Performed by: INTERNAL MEDICINE

## 2017-03-14 PROCEDURE — 84165 PROTEIN E-PHORESIS SERUM: CPT | Mod: 26,Q1,, | Performed by: PATHOLOGY

## 2017-03-14 PROCEDURE — 83735 ASSAY OF MAGNESIUM: CPT

## 2017-03-14 PROCEDURE — 1125F AMNT PAIN NOTED PAIN PRSNT: CPT | Mod: Q1,S$GLB,, | Performed by: INTERNAL MEDICINE

## 2017-03-14 PROCEDURE — 84165 PROTEIN E-PHORESIS SERUM: CPT

## 2017-03-14 PROCEDURE — 3078F DIAST BP <80 MM HG: CPT | Mod: Q1,S$GLB,, | Performed by: INTERNAL MEDICINE

## 2017-03-14 PROCEDURE — 36415 COLL VENOUS BLD VENIPUNCTURE: CPT

## 2017-03-14 PROCEDURE — 86334 IMMUNOFIX E-PHORESIS SERUM: CPT

## 2017-03-14 PROCEDURE — 84100 ASSAY OF PHOSPHORUS: CPT

## 2017-03-14 PROCEDURE — 83520 IMMUNOASSAY QUANT NOS NONAB: CPT

## 2017-03-14 PROCEDURE — 85025 COMPLETE CBC W/AUTO DIFF WBC: CPT

## 2017-03-14 PROCEDURE — 82784 ASSAY IGA/IGD/IGG/IGM EACH: CPT | Mod: 59

## 2017-03-14 PROCEDURE — 80053 COMPREHEN METABOLIC PANEL: CPT

## 2017-03-14 PROCEDURE — 82784 ASSAY IGA/IGD/IGG/IGM EACH: CPT

## 2017-03-14 PROCEDURE — 1157F ADVNC CARE PLAN IN RCRD: CPT | Mod: Q1,S$GLB,, | Performed by: INTERNAL MEDICINE

## 2017-03-14 NOTE — PROGRESS NOTES
Subjective:       Patient ID: Emerson Menendez is a 71 y.o. male.    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 Test results identify a stable IgA kappa protein with beta globulin band at 1.63g/dL. Kappa free light chain is elevated at 4.40. CBC and calcium are stable. Creatinine with history of stage III CKD is stable at 1.4. Metastatic survey is negative for lytic lesions. MRI of the entire spine demonstrated age related changes but no convincing evidence of myeloma bone disease. Bone marrow biopsy identified about 13% plasma cells by morphology and FISH for myeloma identified a t(11;14) and trisomies 3,7, and 17. The patient is afebrile and appears clinically well. He was seen by his podiatrist and nephrologist since our last visit without any new or acute events.    The patient has not received any therapy for smoldering myeloma including bisphosphonates or steroids. He has been randomized to observation arm of the the clinical study. 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 He has no current or prior history of malignancy.    TODAY  Mr. Menendez returns today for monthly follow-up evaluation. He denies any interval events since last interview. He is having increased left knee pain and has now completed about 5 physical therapy sessions. Afebrile. ECOG PS is 0.    HPI  Review of Systems   Constitutional: Negative for activity change, appetite change, diaphoresis, fatigue, fever and unexpected weight change.   HENT: Negative.    Eyes: Negative.    Respiratory: Negative.    Cardiovascular: Negative for leg swelling.    Gastrointestinal: Negative.    Endocrine: Negative.    Genitourinary: Negative.    Musculoskeletal: Negative.    Skin: Negative.    Allergic/Immunologic: Negative.    Neurological:        Grade 0-1 peripheral neuropathy of bilateral feet.    Hematological: Negative for adenopathy. Does not bruise/bleed easily.   Psychiatric/Behavioral: Negative.        Objective:      Physical Exam   Constitutional: He is oriented to person, place, and time. He appears well-developed and well-nourished.   HENT:   Head: Normocephalic and atraumatic.   Right Ear: External ear normal.   Left Ear: External ear normal.   Nose: Nose normal.   Mouth/Throat: Oropharynx is clear and moist.   Eyes: Conjunctivae and EOM are normal. Pupils are equal, round, and reactive to light.   Neck: Normal range of motion. Neck supple.   Cardiovascular: Normal rate, regular rhythm and intact distal pulses.    No murmur heard.  Pulmonary/Chest: Effort normal and breath sounds normal. No respiratory distress.   Abdominal: Soft. Bowel sounds are normal. He exhibits no distension. There is no hepatosplenomegaly.   Musculoskeletal: He exhibits no edema or tenderness.   Neurological: He is alert and oriented to person, place, and time. No cranial nerve deficit.   Skin: Skin is warm and dry. No rash noted. No cyanosis. Nails show no clubbing.   Psychiatric: He has a normal mood and affect. His behavior is normal.   Nursing note and vitals reviewed.      Assessment:       1. Smoldering multiple myeloma        Plan:       The patient has a diagnosis of smoldering myeloma. There is no indication for immediate chemotherapy. 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 CBC and CMP are stable.  Plan for return in 1 month. Follow-up pending myeloma labs.

## 2017-03-14 NOTE — PROGRESS NOTES
"    Tuesday, March 14th, 2017      Protocol: [id number]  Investigator: GIL Engel  Pt Initials: LUCÍA LORENZ      Arm B: Observation  Cycle 14, Day 28       Patient presents to clinic to evaluate ability to proceed with Cycle 115 of observation per above-mentioned protocol. Patient is awake, alert, and oriented to person, place, and time. He is ambulatory and states he has been feeling well. He states is knee pain is increasing and he thinks he may be getting to the point where he needs to more seriously consider knee replacement surgery.  He states he continues to work with PT to help improve his lower body strength but it is not helping much in the way of his knee pain.  He also states recent injections have not helped with the pain as it previously has.  He denies new potential CRAB symptoms.  He states continued willingness to participate in above-mentioned study.      Review of AE's:  1. Hypertension, grade 2: BP today is 144/72  in clinic today. Patient admits becoming "very anxious" when he walks into the [facility] [place] each time. He states he always feels better when he leaves and that BP issue when he comes here is situational. Not concerning per Dr. Engel. He was encouraged to check this at local drug store and report if this remains elevated.  He states understanding.  Will continue to follow this. AE ongoing.  2. Lower Back Pain and Neck Pain, grade 1: Patient has no complaints of this issue this month.  Patient states some intermittent pain to neck but that his work with PT is helping this issue. As previously stated, patient is not suspected to have bony myeloma involvement per Dr. Engel as these are pre-existing issues present at baseline. He was encouraged to keep up physical activity and exercise to help alleviate this. He states understanding. AE ongoing.  3.  Pain in extremity (knee), grade 2.  As stated above, patient is having increasing pain to his knees.  He states he may be looking to be set up with new " "orthopedic surgeon "soon" to discuss surgical options.  In the meantime, he plans to take ibuprofen and other pain medication as directed.  AE, grade 2.  4. Creatinine increased, grade 1: Serum creatinine is 1.7 mg/dl today and GFR 49.93. See urinary and prostate issues as described above. He states Dr. Walker has recently prescribed oxybutinin and he has yet to take this regularly, stating he "tends to miss" some of his nighttime doses.  He states he will work on maintaining compliance with this.  This was noted per Dr. Engel and not concerned for myeloma involvement at this time.  Will continue observation monthly and to monitor renal function closely. AE stable.   5. Peripheral sensory neuropathy, grade 1: Patient does not verbalize complaints of this symptom today. He states it is not painful and he continues to be capable of carrying out normal activities. He states he takes his gabapentin but "not as often as I'm supposed to." He was encouraged to take medication as prescribed and was encouraged to use his phone and tablet capabilities to assist with this as he always has these with him. He states understanding. AE ongoing.       No changes in other baseline AE's noted.      Per study chair, "the definition of progressive disease for this protocol is developing CRAB criteria that requires treatment systemically." Per Dr Engel, based on today's exam and lab results thus far, patient does not have any s/s of CRAB: Normal Calcium level (9.6), absence of Renal insufficiency (serum creatinine 1.7, and GFR 49.93 per [facility]--patient with a history of kidney dysfunction secondary to diabetes; Dr. Engel aware of these values and not concerned for myeloma involvement), absence of significant Anemia (hemoglobin 11.8) and absence of lytic Bone lesions (per baseline metastatic survey as well as MRI--see MD note for further comment). See MD note for ECOG score and H&P and flowsheets for laboratory work, vitals, etc. All " myeloma-related blood work from last cycle including SPEP and JAGUAR have remained stable and per Dr. Engel, patient shows no evidence of progression today. Patient informed that Dr. Engel will release all lab results to [facility]. He states understanding of this.       RN reinforced importance of patient following up monthly for assessment as well as lab work. Patient verbalizes understanding. Appointment for next month's visit was made in clinic today and appt slip is to be mailed to patient at a later date. Patient was given 's contact information and has MD contact information to call with any concerns, questions, or worsening of symptoms; he verbalized understanding.

## 2017-03-15 LAB
ALBUMIN SERPL ELPH-MCNC: 3.93 G/DL
ALPHA1 GLOB SERPL ELPH-MCNC: 0.27 G/DL
ALPHA2 GLOB SERPL ELPH-MCNC: 0.76 G/DL
B-GLOBULIN SERPL ELPH-MCNC: 2.41 G/DL
GAMMA GLOB SERPL ELPH-MCNC: 0.54 G/DL
INTERPRETATION SERPL IFE-IMP: NORMAL
KAPPA LC SER QL IA: 5.79 MG/DL
KAPPA LC/LAMBDA SER IA: 3.05
LAMBDA LC SER QL IA: 1.9 MG/DL
PROT SERPL-MCNC: 7.9 G/DL

## 2017-03-20 LAB — PATHOLOGIST INTERPRETATION SPE: NORMAL

## 2017-03-21 LAB — PATHOLOGIST INTERPRETATION IFE: NORMAL

## 2017-04-04 ENCOUNTER — DOCUMENTATION ONLY (OUTPATIENT)
Dept: REHABILITATION | Facility: OTHER | Age: 72
End: 2017-04-04

## 2017-04-04 NOTE — PROGRESS NOTES
DC FROM [facility]     Pt was treated 13 times total from 2/1/16 to 3/2/16 and again 8/5/16 to 12/21/16.  Pt had gaps in treatment due to having to work out of town.  Treatments consisted of stretching and strengthening exercises for the lower back.  Goals of PT partially met.  Pt did not return for further follow up due to other health concerns and finances per outreach call made by our [facility].  DC from [facility] as pt did not return for further care.

## 2017-04-06 ENCOUNTER — OFFICE VISIT (OUTPATIENT)
Dept: SPORTS MEDICINE | Facility: CLINIC | Age: 72
End: 2017-04-06
Payer: MEDICARE

## 2017-04-06 VITALS
HEART RATE: 89 BPM | WEIGHT: 190 LBS | DIASTOLIC BLOOD PRESSURE: 72 MMHG | BODY MASS INDEX: 27.2 KG/M2 | SYSTOLIC BLOOD PRESSURE: 127 MMHG | HEIGHT: 70 IN

## 2017-04-06 DIAGNOSIS — M25.562 LEFT KNEE PAIN: Primary | ICD-10-CM

## 2017-04-06 PROCEDURE — 1157F ADVNC CARE PLAN IN RCRD: CPT | Mod: S$GLB,,, | Performed by: ORTHOPAEDIC SURGERY

## 2017-04-06 PROCEDURE — 1125F AMNT PAIN NOTED PAIN PRSNT: CPT | Mod: S$GLB,,, | Performed by: ORTHOPAEDIC SURGERY

## 2017-04-06 PROCEDURE — 99999 PR PBB SHADOW E&M-EST. PATIENT-LVL III: CPT | Mod: PBBFAC,,, | Performed by: ORTHOPAEDIC SURGERY

## 2017-04-06 PROCEDURE — 99214 OFFICE O/P EST MOD 30 MIN: CPT | Mod: S$GLB,,, | Performed by: ORTHOPAEDIC SURGERY

## 2017-04-06 PROCEDURE — 1159F MED LIST DOCD IN RCRD: CPT | Mod: S$GLB,,, | Performed by: ORTHOPAEDIC SURGERY

## 2017-04-06 PROCEDURE — 3078F DIAST BP <80 MM HG: CPT | Mod: S$GLB,,, | Performed by: ORTHOPAEDIC SURGERY

## 2017-04-06 PROCEDURE — 1160F RVW MEDS BY RX/DR IN RCRD: CPT | Mod: S$GLB,,, | Performed by: ORTHOPAEDIC SURGERY

## 2017-04-06 PROCEDURE — 3074F SYST BP LT 130 MM HG: CPT | Mod: S$GLB,,, | Performed by: ORTHOPAEDIC SURGERY

## 2017-04-06 RX ORDER — MELOXICAM 15 MG/1
15 TABLET ORAL DAILY
Qty: 30 TABLET | Refills: 2 | Status: SHIPPED | OUTPATIENT
Start: 2017-04-06 | End: 2017-04-17 | Stop reason: HOSPADM

## 2017-04-06 NOTE — MR AVS SNAPSHOT
"    [facility]  [address]  [facility] [address]  Phone: [phone]                  Emerson Menendez   2017 8:45 AM   Appointment    Description:  Male : 1945   Provider:  Chaim Stokes MD   Department:  [facility]                To Do List           Future Appointments        Provider Department Dept Phone    2017 8:45 AM Chaim Stokes MD [facility] [phone]    4/10/2017 11:50 AM LAB, [facility] [phone]    4/10/2017 1:20 PM Silvana Engel MD [facility] [phone]    5/3/2017 8:00 AM Roberta Sagastume MD [facility] [phone]      Goals (5 Years of Data)     None      [facility]     [facility] Nurse Care Line -  Assistance  Unless otherwise directed by your provider, please contact [facility], our nurse care line that is available for  assistance.     Registered nurses in the [facility] provide: appointment scheduling, clinical advisement, health education, and other advisory services.  Call: [phone] (toll free)               Medications           Message regarding Medications     Verify the changes and/or additions to your medication regime listed below are the same as discussed with your clinician today.  If any of these changes or additions are incorrect, please notify your healthcare provider.             Verify that the below list of medications is an accurate representation of the medications you are currently taking.  If none reported, the list may be blank. If incorrect, please contact your healthcare provider. Carry this list with you in case of emergency.           Current Medications     ACCU-CHEK OMAYRA Misc USE AS DIRECTED    aspirin (ECOTRIN) 81 MG EC tablet Take 1 tablet (81 mg total) by mouth once daily.    BD INSULIN PEN NEEDLE UF SHORT 31 gauge x 5/16" Ndle USE 1 NEW NEEDLE TO " INJECT INSULIN ONE TIME DAILY    blood glucose control, normal (METER-CHECK) Soln One meter. True test meter. Use as directed    blood sugar diagnostic Strp Check twice daily    blood-glucose meter kit Use as instructed. Insulin dependent diabetic    blood-glucose meter kit Use as instructed, accucheck jose meter.    cholecalciferol, vitamin D3, (VITAMIN D) 2,000 unit Cap Take by mouth. 1 Capsule Oral Every day.  over the counter    citalopram (CELEXA) 20 MG tablet TAKE 1 TABLET BY MOUTH EVERY EVENING AFTER DINNER    diazePAM (VALIUM) 5 MG tablet Take 1 tablet (5 mg total) by mouth every 6 (six) hours as needed.    gabapentin (NEURONTIN) 100 MG capsule One capsule in am, one in afternoon as needed for foot pain and 1-3 at bedtime.    hydrocodone-acetaminophen 5-325mg (NORCO) 5-325 mg per tablet Take 1 tablet by mouth every 4 to 6 hours as needed for Pain.    insulin aspart (NOVOLOG FLEXPEN) 100 unit/mL InPn pen Inject 3 Units into the skin 3 (three) times daily with meals.    insulin syringe-needle U-100 (ADVOCATE SYRINGES) 0.3 mL 31 gauge x 5/16 Syrg Uses 4 time a day.    lancets Misc Use twice daily    LANTUS SOLOSTAR 100 unit/mL (3 mL) InPn pen INJECT 14 UNITS INTO THE SKIN EVERY EVENING (DISCARD AND BEGIN A NEW PEN AFTER 28 DAYS)    latanoprost 0.005 % ophthalmic solution INSTILL 1 DROP INTO BOTH EYES EVERY EVENING.    lorazepam (ATIVAN) 1 MG tablet Take 2 tablets (2 mg total) by mouth every 6 (six) hours as needed for Anxiety. Take 2 tablets 30 mins before the procedure    losartan (COZAAR) 25 MG tablet TAKE 1 TABLET EVERY DAY    omeprazole (PRILOSEC) 20 MG capsule TAKE 2 CAPSULES ONE TIME DAILY    oxybutynin (DITROPAN) 5 MG Tab Take 1 tablet (5 mg total) by mouth 2 (two) times daily.    pravastatin (PRAVACHOL) 40 MG tablet TAKE 1 TABLET ONE TIME DAILY    sildenafil (VIAGRA) 100 MG tablet Take by mouth. 1 Tablet Oral    tadalafil (CIALIS) 5 MG tablet Take 4 tablets (20 mg total) by mouth once daily.     tamsulosin (FLOMAX) 0.4 mg Cp24 Take 1 capsule (0.4 mg total) by mouth once daily.    tizanidine (ZANAFLEX) 4 MG tablet 1/2-1 tablet every 8 hours as needed for muscle spasm    trazodone (DESYREL) 50 MG tablet TAKE 1 TABLET BY MOUTH EVERY EVENING           Clinical Reference Information           Allergies as of 4/6/2017     Penicillins      Immunizations Administered on Date of Encounter - 4/6/2017     None      Language Assistance Services     ATTENTION: Language assistance services are available, free of charge. Please call [phone].      ATENCIÓN: Si abdiazizla sascha, tiene a shelley disposición servicios gratuitos de asistencia lingüística. Llame al [phone].     CHÚ Ý: N?u b?n nói Ti?ng Vi?t, có các d?ch v? h? tr? ngôn ng? mi?n phí dành cho b?n. G?i s? [phone].         [facility] complies with applicable Federal civil rights laws and does not discriminate on the basis of race, color, national origin, age, disability, or sex.

## 2017-04-06 NOTE — PROGRESS NOTES
CHIEF COMPLAINT: Bilateral knee pain (Left>Right)                                                         HISTORY OF PRESENT ILLNESS:  The patient is a 71 y.o. male  who presents for follow up evaluation of his bilateral knee pain.  His LEFT knee is now worse than his RIGHT knee pain.  He feels that physical therapy is not helping.  Describes his left knee pain as anterolateral location and as sharp.  Pain with prolonged walking.  Pain at night.  He wears a knee brace and at time wears it even at night.  He had a bilateral cortisone injections on 2/2/17 and feels this helped his joint line pain.    + mechanical symptoms. Pain: 6/10    Review of Systems   Constitution: Negative. Negative for chills, fever and night sweats.   HENT: Negative for congestion and headaches.    Eyes: Negative for blurred vision, left vision loss and right vision loss.   Cardiovascular: Negative for chest pain and syncope.   Respiratory: Negative for cough and shortness of breath.    Endocrine: Negative for polydipsia, polyphagia and polyuria.   Hematologic/Lymphatic: Negative for bleeding problem. Does not bruise/bleed easily.   Skin: Negative for dry skin, itching and rash.   Musculoskeletal: Negative for falls and muscle weakness.   Gastrointestinal: Negative for abdominal pain and bowel incontinence.   Genitourinary: Negative for bladder incontinence and nocturia.   Neurological: Negative for disturbances in coordination, loss of balance and seizures.   Psychiatric/Behavioral: Negative for depression. The patient does not have insomnia.    Allergic/Immunologic: Negative for hives and persistent infections.     PAST MEDICAL HISTORY:   Past Medical History:   Diagnosis Date    Anxiety 3/16/2015    BPH (benign prostatic hypertrophy) 9/24/2012    Depression 3/16/2015    GERD (gastroesophageal reflux disease) 9/24/2012    Microalbuminuria 9/24/2012    Osteoarthritis of cervical spine 9/24/2012    Osteoarthritis of knee 9/24/2012     "Type II or unspecified type diabetes mellitus without mention of complication, not stated as uncontrolled 9/24/2012     PAST SURGICAL HISTORY:   Past Surgical History:   Procedure Laterality Date    CATARACT EXTRACTION  2012    Right eye    PROSTATE SURGERY       FAMILY HISTORY:   Family History   Problem Relation Age of Onset    Hypertension Brother     Kidney failure Daughter      due to medication    Blindness Neg Hx     Cancer Neg Hx     Cataracts Neg Hx     Diabetes Neg Hx     Glaucoma Neg Hx     Macular degeneration Neg Hx     Retinal detachment Neg Hx     Strabismus Neg Hx     Stroke Neg Hx     Thyroid disease Neg Hx      SOCIAL HISTORY:   Social History     Social History    Marital status: Single     Spouse name: N/A    Number of children: N/A    Years of education: N/A     Occupational History    Not on file.     Social History Main Topics    Smoking status: Never Smoker    Smokeless tobacco: Never Used    Alcohol use No    Drug use: No    Sexual activity: Yes     Partners: Male, Female     Other Topics Concern    Not on file     Social History Narrative       MEDICATIONS:   Current Outpatient Prescriptions:     ACCU-CHEK OMAYRA Misc, USE AS DIRECTED, Disp: 1 each, Rfl: 0    aspirin (ECOTRIN) 81 MG EC tablet, Take 1 tablet (81 mg total) by mouth once daily., Disp: 100 tablet, Rfl: 3    BD INSULIN PEN NEEDLE UF SHORT 31 gauge x 5/16" Ndle, USE 1 NEW NEEDLE TO INJECT INSULIN ONE TIME DAILY, Disp: 100 each, Rfl: 3    blood glucose control, normal (METER-CHECK) Soln, One meter. True test meter. Use as directed, Disp: 1 each, Rfl: 0    blood sugar diagnostic Strp, Check twice daily, Disp: 200 strip, Rfl: 4    blood-glucose meter kit, Use as instructed. Insulin dependent diabetic, Disp: 1 each, Rfl: 0    blood-glucose meter kit, Use as instructed, accucheck omayra meter., Disp: 1 each, Rfl: 0    cholecalciferol, vitamin D3, (VITAMIN D) 2,000 unit Cap, Take by mouth. 1 Capsule Oral " Every day.  over the counter, Disp: , Rfl:     citalopram (CELEXA) 20 MG tablet, TAKE 1 TABLET BY MOUTH EVERY EVENING AFTER DINNER, Disp: 30 tablet, Rfl: 11    diazePAM (VALIUM) 5 MG tablet, Take 1 tablet (5 mg total) by mouth every 6 (six) hours as needed., Disp: 60 tablet, Rfl: 3    gabapentin (NEURONTIN) 100 MG capsule, One capsule in am, one in afternoon as needed for foot pain and 1-3 at bedtime., Disp: 450 capsule, Rfl: 3    hydrocodone-acetaminophen 5-325mg (NORCO) 5-325 mg per tablet, Take 1 tablet by mouth every 4 to 6 hours as needed for Pain., Disp: 60 tablet, Rfl: 0    insulin syringe-needle U-100 (ADVOCATE SYRINGES) 0.3 mL 31 gauge x 5/16 Syrg, Uses 4 time a day., Disp: 400 each, Rfl: 3    lancets Misc, Use twice daily, Disp: 200 each, Rfl: 4    LANTUS SOLOSTAR 100 unit/mL (3 mL) InPn pen, INJECT 14 UNITS INTO THE SKIN EVERY EVENING (DISCARD AND BEGIN A NEW PEN AFTER 28 DAYS) (Patient taking differently: INJECT 18 UNITS INTO THE SKIN EVERY EVENING (DISCARD AND BEGIN A NEW PEN AFTER 28 DAYS)), Disp: 15 mL, Rfl: 3    latanoprost 0.005 % ophthalmic solution, INSTILL 1 DROP INTO BOTH EYES EVERY EVENING., Disp: 3 Bottle, Rfl: 4    lorazepam (ATIVAN) 1 MG tablet, Take 2 tablets (2 mg total) by mouth every 6 (six) hours as needed for Anxiety. Take 2 tablets 30 mins before the procedure, Disp: 2 tablet, Rfl: 0    losartan (COZAAR) 25 MG tablet, TAKE 1 TABLET EVERY DAY, Disp: 90 tablet, Rfl: 4    omeprazole (PRILOSEC) 20 MG capsule, TAKE 2 CAPSULES ONE TIME DAILY, Disp: 180 capsule, Rfl: 3    pravastatin (PRAVACHOL) 40 MG tablet, TAKE 1 TABLET ONE TIME DAILY, Disp: 90 tablet, Rfl: 3    sildenafil (VIAGRA) 100 MG tablet, Take by mouth. 1 Tablet Oral, Disp: , Rfl:     tadalafil (CIALIS) 5 MG tablet, Take 4 tablets (20 mg total) by mouth once daily., Disp: 30 tablet, Rfl: 2    tamsulosin (FLOMAX) 0.4 mg Cp24, Take 1 capsule (0.4 mg total) by mouth once daily., Disp: 90 capsule, Rfl: 3    tizanidine  "(ZANAFLEX) 4 MG tablet, 1/2-1 tablet every 8 hours as needed for muscle spasm, Disp: 90 tablet, Rfl: 1    trazodone (DESYREL) 50 MG tablet, TAKE 1 TABLET BY MOUTH EVERY EVENING (Patient taking differently: TAKE 1 TABLET BY MOUTH EVERY EVENING as needed), Disp: 90 tablet, Rfl: 4    insulin aspart (NOVOLOG FLEXPEN) 100 unit/mL InPn pen, Inject 3 Units into the skin 3 (three) times daily with meals., Disp: 8.1 mL, Rfl: 3    meloxicam (MOBIC) 15 MG tablet, Take 1 tablet (15 mg total) by mouth once daily., Disp: 30 tablet, Rfl: 2    oxybutynin (DITROPAN) 5 MG Tab, Take 1 tablet (5 mg total) by mouth 2 (two) times daily., Disp: 180 tablet, Rfl: 12  ALLERGIES:   Review of patient's allergies indicates:   Allergen Reactions    Penicillins      Knees locked up       VITAL SIGNS:   /72  Pulse 89  Ht 5' 10" (1.778 m)  Wt 86.2 kg (190 lb)  BMI 27.26 kg/m2     PHYSICAL EXAMINATION    General:  The patient is alert and oriented x 3.  Mood is pleasant.  Observation of ears, eyes and nose reveal no gross abnormalities.  No labored breathing observed.    Bilateral KNEE EXAMINATION     OBSERVATION / INSPECTION   Gait:   Antalgic   Alignment:  Neutral   Scars:   None   Muscle atrophy: Mild  Effusion:  None   Warmth:  None   Discoloration:   none     TENDERNESS / CREPITUS (T / C):          T / C      T / C   Patella   - / -   Lateral joint line   + / -   Peripatellar medial  -  Medial joint line    - / -   Peripatellar lateral -  Medial plica   - / -   Patellar tendon -   Popliteal fossa   - / -   Quad tendon   -   Gastrocnemius   -   Prepatellar Bursa - / -   Quadricep   -   Tibial tubercle  -  Thigh/hamstring  -   Pes anserine/HS -  Fibula    -   ITB   - / -  Tibia     -   Tib/fib joint  - / -  LCL    -     MFC   - / -   MCL: Proximal  -    LFC   - / -    Distal   -          ROM: (* = pain)  PASSIVE   ACTIVE    Left :   5 / 0 / 145   5 / 0 / 145     Right :    5 / 0 / 145   5 / 0 / 145    Patellofemoral " examination:  See above noted areas of tenderness.   Patella position    Subluxation / dislocation: Centered           Sup. / Inf;   Normal   Crepitus (PF):    Absent   Patellar Mobility:       Medial-lateral:   Normal    Superior-inferior:  Normal    Inferior tilt   Normal    Patellar tendon:  Normal   Lateral tilt:    Normal   J-sign:     None   Patellofemoral grind:   No pain       MENISCAL SIGNS:     Pain on terminal extension:  +  Pain on terminal flexion:  +  Sofiyas maneuver:  + for pain  Squat     + posterior joint pain    LIGAMENT EXAMINATION:  ACL / Lachman:  negative   PCL-Post.  drawer: normal 0 to 2mm  MCL- Valgus:  normal 0 to 2mm  LCL- Varus:  normal 0 to 2mm  Pivot shift: Negative  Dial Test: difference c/w other side   At 30° flexion: normal (< 5°)    At 90° flexion: normal (< 5°)   Reverse Pivot Shift:   normal (Equal)     STRENGTH: (* = with pain) PAINFUL SIDE   Quadricep   4/5   Hamstrin/5    EXTREMITY NEURO-VASCULAR EXAMINATION:   Sensation:  Grossly intact to light touch all dermatomal regions.   Motor Function:  Fully intact motor function at hip, knee, foot and ankle    DTRs;  quadriceps and  achilles 2+.  No clonus and downgoing Babinski.    Vascular status:  DP and PT pulses 2+, brisk capillary refill, symmetric.     XRAYS(17): There are degenerative changes of both knees most severe in the medial compartments.    ASSESSMENT:    Right knee pain  Left knee pain, possible meniscus tear    PLAN:   1. Stop physical therapy  2. MRI left knee  3. Mobic 15mg  4. Follow up in clinic for MRI results

## 2017-04-10 ENCOUNTER — RESEARCH ENCOUNTER (OUTPATIENT)
Dept: RESEARCH | Facility: HOSPITAL | Age: 72
End: 2017-04-10

## 2017-04-10 ENCOUNTER — LAB VISIT (OUTPATIENT)
Dept: LAB | Facility: HOSPITAL | Age: 72
End: 2017-04-10
Attending: INTERNAL MEDICINE
Payer: MEDICARE

## 2017-04-10 ENCOUNTER — OFFICE VISIT (OUTPATIENT)
Dept: HEMATOLOGY/ONCOLOGY | Facility: CLINIC | Age: 72
End: 2017-04-10
Payer: MEDICARE

## 2017-04-10 VITALS
WEIGHT: 193.13 LBS | DIASTOLIC BLOOD PRESSURE: 78 MMHG | HEIGHT: 70 IN | TEMPERATURE: 98 F | BODY MASS INDEX: 27.65 KG/M2 | HEART RATE: 85 BPM | SYSTOLIC BLOOD PRESSURE: 122 MMHG

## 2017-04-10 DIAGNOSIS — Z00.6 EXAMINATION OF PARTICIPANT IN CLINICAL TRIAL: ICD-10-CM

## 2017-04-10 DIAGNOSIS — D47.2 SMOLDERING MULTIPLE MYELOMA: Primary | ICD-10-CM

## 2017-04-10 DIAGNOSIS — C90.00 MULTIPLE MYELOMA, REMISSION STATUS UNSPECIFIED: ICD-10-CM

## 2017-04-10 LAB
ALBUMIN SERPL BCP-MCNC: 3.8 G/DL
ALP SERPL-CCNC: 56 U/L
ALT SERPL W/O P-5'-P-CCNC: 20 U/L
ANION GAP SERPL CALC-SCNC: 12 MMOL/L
AST SERPL-CCNC: 23 U/L
BASOPHILS # BLD AUTO: 0.04 K/UL
BASOPHILS NFR BLD: 0.8 %
BILIRUB SERPL-MCNC: 0.7 MG/DL
BUN SERPL-MCNC: 22 MG/DL
CALCIUM SERPL-MCNC: 9.6 MG/DL
CHLORIDE SERPL-SCNC: 100 MMOL/L
CO2 SERPL-SCNC: 24 MMOL/L
CREAT SERPL-MCNC: 1.7 MG/DL
DIFFERENTIAL METHOD: ABNORMAL
EOSINOPHIL # BLD AUTO: 0.2 K/UL
EOSINOPHIL NFR BLD: 4.5 %
ERYTHROCYTE [DISTWIDTH] IN BLOOD BY AUTOMATED COUNT: 13.6 %
EST. GFR  (AFRICAN AMERICAN): 46.2 ML/MIN/1.73 M^2
EST. GFR  (NON AFRICAN AMERICAN): 40 ML/MIN/1.73 M^2
GLUCOSE SERPL-MCNC: 228 MG/DL
HCT VFR BLD AUTO: 34.7 %
HGB BLD-MCNC: 11.7 G/DL
IGA SERPL-MCNC: 1499 MG/DL
IGG SERPL-MCNC: 930 MG/DL
IGM SERPL-MCNC: 40 MG/DL
LYMPHOCYTES # BLD AUTO: 1.6 K/UL
LYMPHOCYTES NFR BLD: 31.8 %
MAGNESIUM SERPL-MCNC: 1.8 MG/DL
MCH RBC QN AUTO: 28.5 PG
MCHC RBC AUTO-ENTMCNC: 33.7 %
MCV RBC AUTO: 85 FL
MONOCYTES # BLD AUTO: 0.3 K/UL
MONOCYTES NFR BLD: 6.1 %
NEUTROPHILS # BLD AUTO: 2.8 K/UL
NEUTROPHILS NFR BLD: 56.6 %
PHOSPHATE SERPL-MCNC: 4 MG/DL
PLATELET # BLD AUTO: 248 K/UL
PMV BLD AUTO: 9.2 FL
POTASSIUM SERPL-SCNC: 4.9 MMOL/L
PROT SERPL-MCNC: 8.4 G/DL
RBC # BLD AUTO: 4.1 M/UL
SODIUM SERPL-SCNC: 136 MMOL/L
WBC # BLD AUTO: 4.93 K/UL

## 2017-04-10 PROCEDURE — 1159F MED LIST DOCD IN RCRD: CPT | Mod: Q1,S$GLB,, | Performed by: INTERNAL MEDICINE

## 2017-04-10 PROCEDURE — 86334 IMMUNOFIX E-PHORESIS SERUM: CPT | Mod: 26,,, | Performed by: PATHOLOGY

## 2017-04-10 PROCEDURE — 99999 PR PBB SHADOW E&M-EST. PATIENT-LVL III: CPT | Mod: PBBFAC,,, | Performed by: INTERNAL MEDICINE

## 2017-04-10 PROCEDURE — 3078F DIAST BP <80 MM HG: CPT | Mod: Q1,S$GLB,, | Performed by: INTERNAL MEDICINE

## 2017-04-10 PROCEDURE — 1160F RVW MEDS BY RX/DR IN RCRD: CPT | Mod: Q1,S$GLB,, | Performed by: INTERNAL MEDICINE

## 2017-04-10 PROCEDURE — 99215 OFFICE O/P EST HI 40 MIN: CPT | Mod: Q1,S$GLB,, | Performed by: INTERNAL MEDICINE

## 2017-04-10 PROCEDURE — 84165 PROTEIN E-PHORESIS SERUM: CPT | Mod: 26,,, | Performed by: PATHOLOGY

## 2017-04-10 PROCEDURE — 1126F AMNT PAIN NOTED NONE PRSNT: CPT | Mod: Q1,S$GLB,, | Performed by: INTERNAL MEDICINE

## 2017-04-10 PROCEDURE — 3074F SYST BP LT 130 MM HG: CPT | Mod: Q1,S$GLB,, | Performed by: INTERNAL MEDICINE

## 2017-04-10 NOTE — PROGRESS NOTES
"  Monday, April 10th, 2017      Protocol: [id number]  Investigator: GIL Engel  Pt Initials: LUCÍA LORENZ      Arm B: Observation  Cycle 15, Day 28       Patient presents to clinic to evaluate ability to proceed with Cycle 16 of observation per above-mentioned protocol. Patient is awake, alert, and oriented to person, place, and time. He is ambulatory and states he has been feeling well. He states his knee pain is stable from last month.  He has discontinued himself from PT because he feels he can do the exercises on his own.  He states he has an MRI scheduled tomorrow to further clarify his increased knee pain over the past few weeks and months.  He denies new potential CRAB symptoms.  He states he is still having compliance issues with his medications, but plans to sit down tonight and "organize them." He states continued willingness to participate in above-mentioned study.      Review of AE's:  1. Hypertension, grade 2: BP today is 122/78  in clinic today. Patient admits becoming "very anxious" when he walks into the [facility] each time. He states he always feels better when he leaves and that BP issue when he comes here is situational. Not concerning per Dr. Engel. He was encouraged to check this at local drug store and report if this remains elevated.  He states understanding.  Will continue to follow this. AE ongoing.  2. Lower Back Pain and Neck Pain, grade 1: Patient has no complaints of this issue this month.  Patient states some intermittent pain to neck but that his work with PT is helping this issue. As previously stated, patient is not suspected to have bony myeloma involvement per Dr. Engel as these are pre-existing issues present at baseline. He was encouraged to keep up physical activity and exercise to help alleviate this. He states understanding. AE ongoing.  3.  Pain in extremity (knee), grade 2.  Patient states his ongoing knee pain, particularly to his left knee is much worse.  See above regarding " "upcoming MRI to clarify increased pain issues as of late.  In the meantime, he plans to take ibuprofen and other pain medication as directed.  AE, grade 2.  4. Creatinine increased, grade 1: Serum creatinine is 1.7 mg/dl today and GFR 49.93. See urinary and prostate issues as described above. He states Dr. Walker has recently prescribed oxybutinin and he has yet to take this regularly, stating he "tends to miss" some of his nighttime doses.  He states he will work on maintaining compliance with this.  This was noted per Dr. Engel and not concerned for myeloma involvement at this time.  Will continue observation monthly and to monitor renal function closely. AE stable.   5. Peripheral sensory neuropathy, grade 1: Patient does not verbalize complaints of this symptom today. He states it is not painful and he continues to be capable of carrying out normal activities. He states he takes his gabapentin but "not as often as I'm supposed to." He was encouraged to take medication as prescribed and was encouraged to use his phone and tablet capabilities to assist with this as he always has these with him. He states understanding. AE ongoing.       No changes in other baseline AE's noted.      Per study chair, "the definition of progressive disease for this protocol is developing CRAB criteria that requires treatment systemically." Per Dr Engel, based on today's exam and lab results thus far, patient does not have any s/s of CRAB: Normal Calcium level (9.6), absence of Renal insufficiency (serum creatinine 1.7, and GFR 50.1 per [facility]roft-Gault--patient with a history of kidney dysfunction secondary to diabetes; Dr. Engel aware of these values and not concerned for myeloma involvement), absence of significant Anemia (hemoglobin 11.7) and absence of lytic Bone lesions (per baseline metastatic survey as well as MRI--see MD note for further comment). See MD note for ECOG score and H&P and flowsheets for laboratory work, vitals, etc. All " myeloma-related blood work from last cycle including SPEP and JAGUAR have remained stable and per Dr. Engel, patient shows no evidence of progression today. Patient informed that Dr. Engel will release all lab results to [facility]. He states understanding of this.       RN reinforced importance of patient following up monthly for assessment as well as lab work. Patient verbalizes understanding. Appointment for next month's visit was made in clinic today and appt slip is to be mailed to patient at a later date. Patient was given 's contact information and has MD contact information to call with any concerns, questions, or worsening of symptoms; he verbalized understanding.

## 2017-04-11 LAB
ALBUMIN SERPL ELPH-MCNC: 4.1 G/DL
ALPHA1 GLOB SERPL ELPH-MCNC: 0.25 G/DL
ALPHA2 GLOB SERPL ELPH-MCNC: 0.75 G/DL
B-GLOBULIN SERPL ELPH-MCNC: 2.46 G/DL
GAMMA GLOB SERPL ELPH-MCNC: 0.63 G/DL
INTERPRETATION SERPL IFE-IMP: NORMAL
KAPPA LC SER QL IA: 5.47 MG/DL
KAPPA LC/LAMBDA SER IA: 3.4
LAMBDA LC SER QL IA: 1.61 MG/DL
PATHOLOGIST INTERPRETATION IFE: NORMAL
PATHOLOGIST INTERPRETATION SPE: NORMAL
PROT SERPL-MCNC: 8.2 G/DL

## 2017-04-12 ENCOUNTER — HOSPITAL ENCOUNTER (OUTPATIENT)
Dept: RADIOLOGY | Facility: HOSPITAL | Age: 72
Discharge: HOME | End: 2017-04-12
Attending: ORTHOPAEDIC SURGERY
Payer: MEDICARE

## 2017-04-12 DIAGNOSIS — M25.562 LEFT KNEE PAIN: ICD-10-CM

## 2017-04-12 PROCEDURE — 73721 MRI JNT OF LWR EXTRE W/O DYE: CPT | Mod: 26,LT,, | Performed by: RADIOLOGY

## 2017-04-12 PROCEDURE — 73721 MRI JNT OF LWR EXTRE W/O DYE: CPT | Mod: TC,LT

## 2017-04-16 NOTE — PROGRESS NOTES
Subjective:       Patient ID: Emerson Menendez is a 71 y.o. male.    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 Test results identify a stable IgA kappa protein with beta globulin band at 1.63g/dL. Kappa free light chain is elevated at 4.40. CBC and calcium are stable. Creatinine with history of stage III CKD is stable at 1.4. Metastatic survey is negative for lytic lesions. MRI of the entire spine demonstrated age related changes but no convincing evidence of myeloma bone disease. Bone marrow biopsy identified about 13% plasma cells by morphology and FISH for myeloma identified a t(11;14) and trisomies 3,7, and 17. The patient is afebrile and appears clinically well. He was seen by his podiatrist and nephrologist since our last visit without any new or acute events.    The patient has not received any therapy for smoldering myeloma including bisphosphonates or steroids. He has been randomized to observation arm of the the clinical study. 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 He has no current or prior history of malignancy.    TODAY  Mr. Menendez returns today for monthly follow-up evaluation. He denies any interval events since last interview. He is still having increased left knee pain. Afebrile. ECOG PS is 0.    HPI  Review of Systems   Constitutional: Negative for activity change, appetite change, diaphoresis, fatigue, fever and unexpected weight change.   HENT: Negative.    Eyes: Negative.    Respiratory: Negative.    Cardiovascular: Negative for leg swelling.   Gastrointestinal: Negative.    Endocrine: Negative.     Genitourinary: Negative.    Musculoskeletal: Negative.    Skin: Negative.    Allergic/Immunologic: Negative.    Neurological:        Grade 0-1 peripheral neuropathy of bilateral feet.    Hematological: Negative for adenopathy. Does not bruise/bleed easily.   Psychiatric/Behavioral: Negative.        Objective:      Physical Exam   Constitutional: He is oriented to person, place, and time. He appears well-developed and well-nourished.   HENT:   Head: Normocephalic and atraumatic.   Right Ear: External ear normal.   Left Ear: External ear normal.   Nose: Nose normal.   Mouth/Throat: Oropharynx is clear and moist.   Eyes: Conjunctivae and EOM are normal. Pupils are equal, round, and reactive to light.   Neck: Normal range of motion. Neck supple.   Cardiovascular: Normal rate, regular rhythm and intact distal pulses.    No murmur heard.  Pulmonary/Chest: Effort normal and breath sounds normal. No respiratory distress.   Abdominal: Soft. Bowel sounds are normal. He exhibits no distension. There is no hepatosplenomegaly.   Musculoskeletal: He exhibits no edema or tenderness.   Neurological: He is alert and oriented to person, place, and time. No cranial nerve deficit.   Skin: Skin is warm and dry. No rash noted. No cyanosis. Nails show no clubbing.   Psychiatric: He has a normal mood and affect. His behavior is normal.   Nursing note and vitals reviewed.      Assessment:       1. Smoldering multiple myeloma        Plan:       The patient has a diagnosis of smoldering myeloma. There is no indication for immediate chemotherapy. 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 CBC and CMP are stable.  Plan for return in 1 month. Follow-up pending myeloma labs.

## 2017-04-17 ENCOUNTER — OFFICE VISIT (OUTPATIENT)
Dept: SPORTS MEDICINE | Facility: CLINIC | Age: 72
End: 2017-04-17
Payer: MEDICARE

## 2017-04-17 VITALS
HEART RATE: 95 BPM | SYSTOLIC BLOOD PRESSURE: 139 MMHG | BODY MASS INDEX: 27.63 KG/M2 | DIASTOLIC BLOOD PRESSURE: 65 MMHG | WEIGHT: 193 LBS | HEIGHT: 70 IN

## 2017-04-17 DIAGNOSIS — M17.11 PRIMARY OSTEOARTHRITIS OF RIGHT KNEE: ICD-10-CM

## 2017-04-17 DIAGNOSIS — G89.29 CHRONIC PAIN OF RIGHT KNEE: ICD-10-CM

## 2017-04-17 DIAGNOSIS — M25.561 CHRONIC PAIN OF RIGHT KNEE: ICD-10-CM

## 2017-04-17 DIAGNOSIS — G89.29 CHRONIC PAIN OF LEFT KNEE: Primary | ICD-10-CM

## 2017-04-17 DIAGNOSIS — M25.562 CHRONIC PAIN OF LEFT KNEE: Primary | ICD-10-CM

## 2017-04-17 DIAGNOSIS — M17.12 PRIMARY OSTEOARTHRITIS OF LEFT KNEE: ICD-10-CM

## 2017-04-17 PROCEDURE — 20610 DRAIN/INJ JOINT/BURSA W/O US: CPT | Mod: LT,S$GLB,, | Performed by: PHYSICIAN ASSISTANT

## 2017-04-17 PROCEDURE — 99999 PR PBB SHADOW E&M-EST. PATIENT-LVL V: CPT | Mod: PBBFAC,,, | Performed by: PHYSICIAN ASSISTANT

## 2017-04-17 PROCEDURE — 3075F SYST BP GE 130 - 139MM HG: CPT | Mod: S$GLB,,, | Performed by: PHYSICIAN ASSISTANT

## 2017-04-17 PROCEDURE — 99214 OFFICE O/P EST MOD 30 MIN: CPT | Mod: 25,S$GLB,, | Performed by: PHYSICIAN ASSISTANT

## 2017-04-17 PROCEDURE — 1160F RVW MEDS BY RX/DR IN RCRD: CPT | Mod: S$GLB,,, | Performed by: PHYSICIAN ASSISTANT

## 2017-04-17 PROCEDURE — 1159F MED LIST DOCD IN RCRD: CPT | Mod: S$GLB,,, | Performed by: PHYSICIAN ASSISTANT

## 2017-04-17 PROCEDURE — 3078F DIAST BP <80 MM HG: CPT | Mod: S$GLB,,, | Performed by: PHYSICIAN ASSISTANT

## 2017-04-17 PROCEDURE — 1125F AMNT PAIN NOTED PAIN PRSNT: CPT | Mod: S$GLB,,, | Performed by: PHYSICIAN ASSISTANT

## 2017-04-17 RX ORDER — TRIAMCINOLONE ACETONIDE 40 MG/ML
40 INJECTION, SUSPENSION INTRA-ARTICULAR; INTRAMUSCULAR
Status: COMPLETED | OUTPATIENT
Start: 2017-04-17 | End: 2017-04-17

## 2017-04-17 RX ORDER — LIDOCAINE HYDROCHLORIDE 10 MG/ML
2 INJECTION INFILTRATION; PERINEURAL
Status: COMPLETED | OUTPATIENT
Start: 2017-04-17 | End: 2017-04-17

## 2017-04-17 RX ORDER — BUPIVACAINE HYDROCHLORIDE 2.5 MG/ML
2 INJECTION, SOLUTION INFILTRATION; PERINEURAL
Status: COMPLETED | OUTPATIENT
Start: 2017-04-17 | End: 2017-04-17

## 2017-04-17 RX ORDER — DICLOFENAC SODIUM 10 MG/G
2 GEL TOPICAL 2 TIMES DAILY
Qty: 1 TUBE | Refills: 0 | Status: SHIPPED | OUTPATIENT
Start: 2017-04-17 | End: 2018-05-28

## 2017-04-17 RX ADMIN — BUPIVACAINE HYDROCHLORIDE 5 MG: 2.5 INJECTION, SOLUTION INFILTRATION; PERINEURAL at 09:04

## 2017-04-17 RX ADMIN — TRIAMCINOLONE ACETONIDE 40 MG: 40 INJECTION, SUSPENSION INTRA-ARTICULAR; INTRAMUSCULAR at 09:04

## 2017-04-17 RX ADMIN — LIDOCAINE HYDROCHLORIDE 2 ML: 10 INJECTION INFILTRATION; PERINEURAL at 09:04

## 2017-04-17 NOTE — MR AVS SNAPSHOT
[facility]  [address]  [facility] [address]  Phone: [phone]                  Emerson Menendez   2017 8:30 AM   Office Visit    Description:  Male : 1945   Provider:  Richar Kelly III, PA-C   Department:  [facility]           Reason for Visit     Left Knee - Follow-up           Diagnoses this Visit        Comments    Chronic pain of left knee    -  Primary            To Do List           Future Appointments        Provider Department Dept Phone    5/3/2017 8:00 AM Roberta Sagastume MD [facility] - Internal Medicine [phone]    2017 8:00 AM LAB, HEMONC CANCER BLDG [facility] [phone]    2017 9:00 AM Silvana Engel MD [facility] [phone]      Goals (5 Years of Data)     None       These Medications        Disp Refills Start End    diclofenac sodium (VOLTAREN) 1 % Gel 1 Tube 0 2017     Apply 2 g topically 2 (two) times daily. To the anterior left knee prn pain - Topical    Pharmacy: [id number] - [place], [place] - [address] AT [facility] Ph #: [phone]         [facility]     [facility] Nurse Care Line -  Assistance  Unless otherwise directed by your provider, please contact [facility], our nurse care line that is available for  assistance.     Registered nurses in the [facility] provide: appointment scheduling, clinical advisement, health education, and other advisory services.  Call: [phone] (toll free)               Medications           Message regarding Medications     Verify the changes and/or additions to your medication regime listed below are the same as discussed with your clinician today.  If any of these changes or additions are incorrect, please notify your healthcare provider.        START taking these NEW medications        Refills    diclofenac sodium (VOLTAREN) 1 % Gel 0  "   Sig: Apply 2 g topically 2 (two) times daily. To the anterior left knee prn pain    Class: Normal    Route: Topical      STOP taking these medications     meloxicam (MOBIC) 15 MG tablet Take 1 tablet (15 mg total) by mouth once daily.           Verify that the below list of medications is an accurate representation of the medications you are currently taking.  If none reported, the list may be blank. If incorrect, please contact your healthcare provider. Carry this list with you in case of emergency.           Current Medications     ACCU-CHEK OMAYRA Misc USE AS DIRECTED    aspirin (ECOTRIN) 81 MG EC tablet Take 1 tablet (81 mg total) by mouth once daily.    BD INSULIN PEN NEEDLE UF SHORT 31 gauge x 5/16" Ndle USE 1 NEW NEEDLE TO INJECT INSULIN ONE TIME DAILY    blood glucose control, normal (METER-CHECK) Soln One meter. True test meter. Use as directed    blood sugar diagnostic Strp Check twice daily    blood-glucose meter kit Use as instructed. Insulin dependent diabetic    blood-glucose meter kit Use as instructed, accucheck omayra meter.    cholecalciferol, vitamin D3, (VITAMIN D) 2,000 unit Cap Take by mouth. 1 Capsule Oral Every day.  over the counter    citalopram (CELEXA) 20 MG tablet TAKE 1 TABLET BY MOUTH EVERY EVENING AFTER DINNER    diazePAM (VALIUM) 5 MG tablet Take 1 tablet (5 mg total) by mouth every 6 (six) hours as needed.    gabapentin (NEURONTIN) 100 MG capsule One capsule in am, one in afternoon as needed for foot pain and 1-3 at bedtime.    hydrocodone-acetaminophen 5-325mg (NORCO) 5-325 mg per tablet Take 1 tablet by mouth every 4 to 6 hours as needed for Pain.    insulin syringe-needle U-100 (ADVOCATE SYRINGES) 0.3 mL 31 gauge x 5/16 Syrg Uses 4 time a day.    lancets Misc Use twice daily    LANTUS SOLOSTAR 100 unit/mL (3 mL) InPn pen INJECT 14 UNITS INTO THE SKIN EVERY EVENING (DISCARD AND BEGIN A NEW PEN AFTER 28 DAYS)    latanoprost 0.005 % ophthalmic solution INSTILL 1 DROP INTO BOTH EYES " "EVERY EVENING.    lorazepam (ATIVAN) 1 MG tablet Take 2 tablets (2 mg total) by mouth every 6 (six) hours as needed for Anxiety. Take 2 tablets 30 mins before the procedure    losartan (COZAAR) 25 MG tablet TAKE 1 TABLET EVERY DAY    omeprazole (PRILOSEC) 20 MG capsule TAKE 2 CAPSULES ONE TIME DAILY    pravastatin (PRAVACHOL) 40 MG tablet TAKE 1 TABLET ONE TIME DAILY    sildenafil (VIAGRA) 100 MG tablet Take by mouth. 1 Tablet Oral    tadalafil (CIALIS) 5 MG tablet Take 4 tablets (20 mg total) by mouth once daily.    tamsulosin (FLOMAX) 0.4 mg Cp24 Take 1 capsule (0.4 mg total) by mouth once daily.    tizanidine (ZANAFLEX) 4 MG tablet 1/2-1 tablet every 8 hours as needed for muscle spasm    trazodone (DESYREL) 50 MG tablet TAKE 1 TABLET BY MOUTH EVERY EVENING    diclofenac sodium (VOLTAREN) 1 % Gel Apply 2 g topically 2 (two) times daily. To the anterior left knee prn pain    insulin aspart (NOVOLOG FLEXPEN) 100 unit/mL InPn pen Inject 3 Units into the skin 3 (three) times daily with meals.    oxybutynin (DITROPAN) 5 MG Tab Take 1 tablet (5 mg total) by mouth 2 (two) times daily.           Clinical Reference Information           Your Vitals Were     BP Pulse Height Weight BMI    139/65 95 5' 10" (1.778 m) 87.5 kg (193 lb) 27.69 kg/m2      Blood Pressure          Most Recent Value    BP  139/65      Allergies as of 4/17/2017     Penicillins      Immunizations Administered on Date of Encounter - 4/17/2017     None      Instructions      Stop taking the Mobic (meloxicam) at discharge from clinic today.     You can use the Voltaren gel only as you need it for knee pain.         Diclofenac skin gel  What is this medicine?  DICLOFENAC (dye KLOE fen ak) is a non-steroidal anti-inflammatory drug (NSAID). The 1% skin gel is used to treat osteoarthritis of the hands or knees. The 3% skin gel is used to treat actinic keratosis.  How should I use this medicine?  This medicine is for external use only. Follow the directions on " the prescription label. Wash hands before and after use. Do not get this medicine in your eyes. If you do, rinse out with plenty of cool tap water. Use your doses at regular intervals. Do not use your medicine more often than directed.  A special MedGuide will be given to you by the pharmacist with each prescription and refill of the 1% gel. Be sure to read this information carefully each time.  Talk to your pediatrician regarding the use of this medicine in children. Special care may be needed. The 3% gel is not approved for use in children.  What side effects may I notice from receiving this medicine?  Side effects that you should report to your doctor or health care professional as soon as possible:  · allergic reactions like skin rash, itching or hives, swelling of the face, lips, or tongue  · black or bloody stools, blood in the urine or vomit  · blurred vision  · chest pain  · difficulty breathing or wheezing  · nausea or vomiting  · redness, blistering, peeling or loosening of the skin, including inside the mouth  · slurred speech or weakness on one side of the body  · trouble passing urine or change in the amount of urine  · unexplained weight gain or swelling  · unusually weak or tired  · yellowing of eyes or skin  Side effects that usually do not require medical attention (report to your doctor or health care professional if they continue or are bothersome):  · dizziness  · dry skin  · headache  · heartburn  · increased sensitivity to the sun  · stomach pain  · tingling at the application site  What may interact with this medicine?  · aspirin  · NSAIDs, medicines for pain and inflammation, like ibuprofen or naproxen  Do not use any other skin products without telling your doctor or health care professional.  What if I miss a dose?  If you miss a dose, use it as soon as you can. If it is almost time for your next dose, use only that dose. Do not use double or extra doses.  Where should I keep my  medicine?  Keep out of the reach of children.  Store the 1% gel at room temperature between 15 and 30 degrees C (59 and 86 degrees F). Store the 3% gel at room temperature between 20 and 25 degrees C (68 and 77 degrees F). Protect from light. Throw away any unused medicine after the expiration date.  What should I tell my health care provider before I take this medicine?  They need to know if you have any of these conditions:  · asthma  · bleeding problems  · coronary artery bypass graft (CABG) surgery within the past 2 weeks  · heart disease  · high blood pressure  · if you frequently drink alcohol containing drinks  · kidney disease  · liver disease  · open or infected skin  · stomach problems  · an unusual or allergic reaction to diclofenac, aspirin, other NSAIDs, other medicines, benzyl alcohol (3% gel only), foods, dyes, or preservatives  · pregnant or trying to get pregnant  · breast-feeding  What should I watch for while using this medicine?  Tell your doctor or healthcare professional if your symptoms do not start to get better or if they get worse. You will need to follow up with your health care provider to monitor your progress. You may need to be treated for up to 3 months if you are using the 3% gel, but the full effect may not occur until 1 month after stopping treatment. If you develop a severe skin reaction, contact your doctor or health care professional immediately.  This medicine can make you more sensitive to the sun. Keep out of the sun. If you cannot avoid being in the sun, wear protective clothing and use sunscreen. Do not use sun lamps or tanning beds/booths.  Do not take medicines such as ibuprofen and naproxen with this medicine. Side effects such as stomach upset, nausea, or ulcers may be more likely to occur. Many medicines available without a prescription should not be taken with this medicine.  This medicine does not prevent heart attack or stroke. In fact, this medicine may increase  the chance of a heart attack or stroke. The chance may increase with longer use of this medicine and in people who have heart disease. If you take aspirin to prevent heart attack or stroke, talk with your doctor or health care professional.  This medicine can cause ulcers and bleeding in the stomach and intestines at any time during treatment. Do not smoke cigarettes or drink alcohol. These increase irritation to your stomach and can make it more susceptible to damage from this medicine. Ulcers and bleeding can happen without warning symptoms and can cause death.  You may get drowsy or dizzy. Do not drive, use machinery, or do anything that needs mental alertness until you know how this medicine affects you. Do not stand or sit up quickly, especially if you are an older patient. This reduces the risk of dizzy or fainting spells.  This medicine can cause you to bleed more easily. Try to avoid damage to your teeth and gums when you brush or floss your teeth.  Date Last Reviewed:   NOTE:This sheet is a summary. It may not cover all possible information. If you have questions about this medicine, talk to your doctor, pharmacist, or health care provider. Copyright© 2016 [employer]             Language Assistance Services     ATTENTION: Language assistance services are available, free of charge. Please call [phone].      ATENCIÓN: Si abdiazizla sascha, tiene a shelley disposición servicios gratuitos de asistencia lingüística. Llame al [phone].     REVA Ý: N?u b?n nói Ti?ng Vi?t, có các d?ch v? h? tr? ngôn ng? mi?n phí dành cho b?n. G?i s? [phone].         [facility] complies with applicable Federal civil rights laws and does not discriminate on the basis of race, color, national origin, age, disability, or sex.

## 2017-04-17 NOTE — PATIENT INSTRUCTIONS
Stop taking the Mobic (meloxicam) at discharge from clinic today.     You can use the Voltaren gel only as you need it for knee pain.         Diclofenac skin gel  What is this medicine?  DICLOFENAC (dye KLOE fen ak) is a non-steroidal anti-inflammatory drug (NSAID). The 1% skin gel is used to treat osteoarthritis of the hands or knees. The 3% skin gel is used to treat actinic keratosis.  How should I use this medicine?  This medicine is for external use only. Follow the directions on the prescription label. Wash hands before and after use. Do not get this medicine in your eyes. If you do, rinse out with plenty of cool tap water. Use your doses at regular intervals. Do not use your medicine more often than directed.  A special MedGuide will be given to you by the pharmacist with each prescription and refill of the 1% gel. Be sure to read this information carefully each time.  Talk to your pediatrician regarding the use of this medicine in children. Special care may be needed. The 3% gel is not approved for use in children.  What side effects may I notice from receiving this medicine?  Side effects that you should report to your doctor or health care professional as soon as possible:  · allergic reactions like skin rash, itching or hives, swelling of the face, lips, or tongue  · black or bloody stools, blood in the urine or vomit  · blurred vision  · chest pain  · difficulty breathing or wheezing  · nausea or vomiting  · redness, blistering, peeling or loosening of the skin, including inside the mouth  · slurred speech or weakness on one side of the body  · trouble passing urine or change in the amount of urine  · unexplained weight gain or swelling  · unusually weak or tired  · yellowing of eyes or skin  Side effects that usually do not require medical attention (report to your doctor or health care professional if they continue or are bothersome):  · dizziness  · dry skin  · headache  · heartburn  · increased  sensitivity to the sun  · stomach pain  · tingling at the application site  What may interact with this medicine?  · aspirin  · NSAIDs, medicines for pain and inflammation, like ibuprofen or naproxen  Do not use any other skin products without telling your doctor or health care professional.  What if I miss a dose?  If you miss a dose, use it as soon as you can. If it is almost time for your next dose, use only that dose. Do not use double or extra doses.  Where should I keep my medicine?  Keep out of the reach of children.  Store the 1% gel at room temperature between 15 and 30 degrees C (59 and 86 degrees F). Store the 3% gel at room temperature between 20 and 25 degrees C (68 and 77 degrees F). Protect from light. Throw away any unused medicine after the expiration date.  What should I tell my health care provider before I take this medicine?  They need to know if you have any of these conditions:  · asthma  · bleeding problems  · coronary artery bypass graft (CABG) surgery within the past 2 weeks  · heart disease  · high blood pressure  · if you frequently drink alcohol containing drinks  · kidney disease  · liver disease  · open or infected skin  · stomach problems  · an unusual or allergic reaction to diclofenac, aspirin, other NSAIDs, other medicines, benzyl alcohol (3% gel only), foods, dyes, or preservatives  · pregnant or trying to get pregnant  · breast-feeding  What should I watch for while using this medicine?  Tell your doctor or healthcare professional if your symptoms do not start to get better or if they get worse. You will need to follow up with your health care provider to monitor your progress. You may need to be treated for up to 3 months if you are using the 3% gel, but the full effect may not occur until 1 month after stopping treatment. If you develop a severe skin reaction, contact your doctor or health care professional immediately.  This medicine can make you more sensitive to the sun.  Keep out of the sun. If you cannot avoid being in the sun, wear protective clothing and use sunscreen. Do not use sun lamps or tanning beds/booths.  Do not take medicines such as ibuprofen and naproxen with this medicine. Side effects such as stomach upset, nausea, or ulcers may be more likely to occur. Many medicines available without a prescription should not be taken with this medicine.  This medicine does not prevent heart attack or stroke. In fact, this medicine may increase the chance of a heart attack or stroke. The chance may increase with longer use of this medicine and in people who have heart disease. If you take aspirin to prevent heart attack or stroke, talk with your doctor or health care professional.  This medicine can cause ulcers and bleeding in the stomach and intestines at any time during treatment. Do not smoke cigarettes or drink alcohol. These increase irritation to your stomach and can make it more susceptible to damage from this medicine. Ulcers and bleeding can happen without warning symptoms and can cause death.  You may get drowsy or dizzy. Do not drive, use machinery, or do anything that needs mental alertness until you know how this medicine affects you. Do not stand or sit up quickly, especially if you are an older patient. This reduces the risk of dizzy or fainting spells.  This medicine can cause you to bleed more easily. Try to avoid damage to your teeth and gums when you brush or floss your teeth.  Date Last Reviewed:   NOTE:This sheet is a summary. It may not cover all possible information. If you have questions about this medicine, talk to your doctor, pharmacist, or health care provider. Copyright© 2016 [employer]

## 2017-04-19 NOTE — PROGRESS NOTES
CHIEF COMPLAINT: Bilateral knee pain (Left>Right)                                                         HISTORY OF PRESENT ILLNESS:  The patient is a 71 y.o. male  who presents for follow up evaluation of his bilateral knee pain.  His LEFT knee is now worse than his RIGHT knee pain.  He feels that physical therapy is not helping.  Describes his left knee pain as anterolateral location and as sharp.  Pain with prolonged walking.  Pain at night.  He wears a knee brace and at time wears it even at night.  He had a bilateral cortisone injections on 2/2/17 and feels this helped his joint line pain but pain has now returned. He report moderate to severe left knee pain.    + mechanical symptoms. Pain: 6/10    Review of Systems   Constitution: Negative. Negative for chills, fever and night sweats.   HENT: Negative for congestion and headaches.    Eyes: Negative for blurred vision, left vision loss and right vision loss.   Cardiovascular: Negative for chest pain and syncope.   Respiratory: Negative for cough and shortness of breath.    Endocrine: Negative for polydipsia, polyphagia and polyuria.   Hematologic/Lymphatic: Negative for bleeding problem. Does not bruise/bleed easily.   Skin: Negative for dry skin, itching and rash.   Musculoskeletal: Negative for falls and muscle weakness.   Gastrointestinal: Negative for abdominal pain and bowel incontinence.   Genitourinary: Negative for bladder incontinence and nocturia.   Neurological: Negative for disturbances in coordination, loss of balance and seizures.   Psychiatric/Behavioral: Negative for depression. The patient does not have insomnia.    Allergic/Immunologic: Negative for hives and persistent infections.     PAST MEDICAL HISTORY:   Past Medical History:   Diagnosis Date    Anxiety 3/16/2015    BPH (benign prostatic hypertrophy) 9/24/2012    Depression 3/16/2015    GERD (gastroesophageal reflux disease) 9/24/2012    Microalbuminuria 9/24/2012    Osteoarthritis  "of cervical spine 9/24/2012    Osteoarthritis of knee 9/24/2012    Type II or unspecified type diabetes mellitus without mention of complication, not stated as uncontrolled 9/24/2012     PAST SURGICAL HISTORY:   Past Surgical History:   Procedure Laterality Date    CATARACT EXTRACTION  2012    Right eye    PROSTATE SURGERY       FAMILY HISTORY:   Family History   Problem Relation Age of Onset    Hypertension Brother     Kidney failure Daughter      due to medication    Blindness Neg Hx     Cancer Neg Hx     Cataracts Neg Hx     Diabetes Neg Hx     Glaucoma Neg Hx     Macular degeneration Neg Hx     Retinal detachment Neg Hx     Strabismus Neg Hx     Stroke Neg Hx     Thyroid disease Neg Hx      SOCIAL HISTORY:   Social History     Social History    Marital status: Single     Spouse name: N/A    Number of children: N/A    Years of education: N/A     Occupational History    Not on file.     Social History Main Topics    Smoking status: Never Smoker    Smokeless tobacco: Never Used    Alcohol use No    Drug use: No    Sexual activity: Yes     Partners: Male, Female     Other Topics Concern    Not on file     Social History Narrative       MEDICATIONS:   Current Outpatient Prescriptions:     ACCU-CHEK OMAYRA Misc, USE AS DIRECTED, Disp: 1 each, Rfl: 0    aspirin (ECOTRIN) 81 MG EC tablet, Take 1 tablet (81 mg total) by mouth once daily., Disp: 100 tablet, Rfl: 3    BD INSULIN PEN NEEDLE UF SHORT 31 gauge x 5/16" Ndle, USE 1 NEW NEEDLE TO INJECT INSULIN ONE TIME DAILY, Disp: 100 each, Rfl: 3    blood glucose control, normal (METER-CHECK) Jenny One meter. True test meter. Use as directed, Disp: 1 each, Rfl: 0    blood sugar diagnostic Strp, Check twice daily, Disp: 200 strip, Rfl: 4    blood-glucose meter kit, Use as instructed. Insulin dependent diabetic, Disp: 1 each, Rfl: 0    blood-glucose meter kit, Use as instructed, accucheck omayra meter., Disp: 1 each, Rfl: 0    cholecalciferol, " vitamin D3, (VITAMIN D) 2,000 unit Cap, Take by mouth. 1 Capsule Oral Every day.  over the counter, Disp: , Rfl:     citalopram (CELEXA) 20 MG tablet, TAKE 1 TABLET BY MOUTH EVERY EVENING AFTER DINNER, Disp: 30 tablet, Rfl: 11    diazePAM (VALIUM) 5 MG tablet, Take 1 tablet (5 mg total) by mouth every 6 (six) hours as needed., Disp: 60 tablet, Rfl: 3    gabapentin (NEURONTIN) 100 MG capsule, One capsule in am, one in afternoon as needed for foot pain and 1-3 at bedtime., Disp: 450 capsule, Rfl: 3    hydrocodone-acetaminophen 5-325mg (NORCO) 5-325 mg per tablet, Take 1 tablet by mouth every 4 to 6 hours as needed for Pain., Disp: 60 tablet, Rfl: 0    insulin syringe-needle U-100 (ADVOCATE SYRINGES) 0.3 mL 31 gauge x 5/16 Syrg, Uses 4 time a day., Disp: 400 each, Rfl: 3    lancets Misc, Use twice daily, Disp: 200 each, Rfl: 4    LANTUS SOLOSTAR 100 unit/mL (3 mL) InPn pen, INJECT 14 UNITS INTO THE SKIN EVERY EVENING (DISCARD AND BEGIN A NEW PEN AFTER 28 DAYS) (Patient taking differently: INJECT 18 UNITS INTO THE SKIN EVERY EVENING (DISCARD AND BEGIN A NEW PEN AFTER 28 DAYS)), Disp: 15 mL, Rfl: 3    latanoprost 0.005 % ophthalmic solution, INSTILL 1 DROP INTO BOTH EYES EVERY EVENING., Disp: 3 Bottle, Rfl: 4    lorazepam (ATIVAN) 1 MG tablet, Take 2 tablets (2 mg total) by mouth every 6 (six) hours as needed for Anxiety. Take 2 tablets 30 mins before the procedure, Disp: 2 tablet, Rfl: 0    losartan (COZAAR) 25 MG tablet, TAKE 1 TABLET EVERY DAY, Disp: 90 tablet, Rfl: 4    omeprazole (PRILOSEC) 20 MG capsule, TAKE 2 CAPSULES ONE TIME DAILY, Disp: 180 capsule, Rfl: 3    pravastatin (PRAVACHOL) 40 MG tablet, TAKE 1 TABLET ONE TIME DAILY, Disp: 90 tablet, Rfl: 3    sildenafil (VIAGRA) 100 MG tablet, Take by mouth. 1 Tablet Oral, Disp: , Rfl:     tadalafil (CIALIS) 5 MG tablet, Take 4 tablets (20 mg total) by mouth once daily., Disp: 30 tablet, Rfl: 2    tamsulosin (FLOMAX) 0.4 mg Cp24, Take 1 capsule (0.4 mg  "total) by mouth once daily., Disp: 90 capsule, Rfl: 3    tizanidine (ZANAFLEX) 4 MG tablet, 1/2-1 tablet every 8 hours as needed for muscle spasm, Disp: 90 tablet, Rfl: 1    trazodone (DESYREL) 50 MG tablet, TAKE 1 TABLET BY MOUTH EVERY EVENING (Patient taking differently: TAKE 1 TABLET BY MOUTH EVERY EVENING as needed), Disp: 90 tablet, Rfl: 4    diclofenac sodium (VOLTAREN) 1 % Gel, Apply 2 g topically 2 (two) times daily. To the anterior left knee prn pain, Disp: 1 Tube, Rfl: 0    insulin aspart (NOVOLOG FLEXPEN) 100 unit/mL InPn pen, Inject 3 Units into the skin 3 (three) times daily with meals., Disp: 8.1 mL, Rfl: 3    oxybutynin (DITROPAN) 5 MG Tab, Take 1 tablet (5 mg total) by mouth 2 (two) times daily., Disp: 180 tablet, Rfl: 12  ALLERGIES:   Review of patient's allergies indicates:   Allergen Reactions    Penicillins      Knees locked up       VITAL SIGNS:   /65  Pulse 95  Ht 5' 10" (1.778 m)  Wt 87.5 kg (193 lb)  BMI 27.69 kg/m2     PHYSICAL EXAMINATION    General:  The patient is alert and oriented x 3.  Mood is pleasant.  Observation of ears, eyes and nose reveal no gross abnormalities.  No labored breathing observed.    Bilateral KNEE EXAMINATION     OBSERVATION / INSPECTION   Gait:   Antalgic   Alignment:  Neutral   Scars:   None   Muscle atrophy: Mild  Effusion:  None   Warmth:  None   Discoloration:   none     TENDERNESS / CREPITUS (T / C):          T / C      T / C   Patella   - / -   Lateral joint line   + / -   Peripatellar medial  -  Medial joint line    - / -   Peripatellar lateral -  Medial plica   - / -   Patellar tendon -   Popliteal fossa   - / -   Quad tendon   -   Gastrocnemius   -   Prepatellar Bursa - / -   Quadricep   -   Tibial tubercle  -  Thigh/hamstring  -   Pes anserine/HS -  Fibula    -   ITB   - / -  Tibia     -   Tib/fib joint  - / -  LCL    -     MFC   - / -   MCL: Proximal  -    LFC   - / -    Distal   -          ROM: (* = pain)  PASSIVE   ACTIVE    Left " :   5 / 0 / 135   5 / 0 / 135     Right :    5  0 / 135   5 / 0 / 135    Patellofemoral examination:  See above noted areas of tenderness.   Patella position    Subluxation / dislocation: Centered           Sup. / Inf;   Normal   Crepitus (PF):    Absent   Patellar Mobility:       Medial-lateral:   Normal    Superior-inferior:  Normal    Inferior tilt   Normal    Patellar tendon:  Normal   Lateral tilt:    Normal   J-sign:     None   Patellofemoral grind:   No pain       MENISCAL SIGNS:     Pain on terminal extension:  +  Pain on terminal flexion:  +  Sofiyas maneuver:  + for pain  Squat     + posterior joint pain    LIGAMENT EXAMINATION:  ACL / Lachman:  negative   PCL-Post.  drawer: normal 0 to 2mm  MCL- Valgus:  normal 0 to 2mm  LCL- Varus:  normal 0 to 2mm  Pivot shift: Negative  Dial Test: difference c/w other side   At 30° flexion: normal (< 5°)    At 90° flexion: normal (< 5°)   Reverse Pivot Shift:   normal (Equal)     STRENGTH: (* = with pain) PAINFUL SIDE   Quadricep   4/5   Hamstrin/5    EXTREMITY NEURO-VASCULAR EXAMINATION:   Sensation:  Grossly intact to light touch all dermatomal regions.   Motor Function:  Fully intact motor function at hip, knee, foot and ankle    DTRs;  quadriceps and  achilles 2+.  No clonus and downgoing Babinski.    Vascular status:  DP and PT pulses 2+, brisk capillary refill, symmetric.     XRAYS(17): There are degenerative changes of both knees most severe in the medial compartments.    MRI (17): Left knee    Ligaments and menisci are normal.  Cartilage:   Patellofemoral: Full-thickness fissuring and fibrillation of articular cartilage along the medial patellar facet with mild subchondral edema.  Fibrillation of articular cartilage along the lateral patellar facet without subchondral edema.  Full-thickness fissuring and fibrillation of articular cartilage along the trochlea groove without significant subchondral edema.  Medial tibiofemoral: Full-thickness  defect of articular cartilage along the mid and posterior weight-bearing medial femoral condyle measuring 4.3 x 2.5 cm in AP and transverse dimensions without significant subchondral edema.  Fibrillation and full-thickness fissuring of articular cartilage along the medial tibial plateau without subchondral edema.  Lateral tibiofemoral: Articular cartilage is maintained.      ASSESSMENT:    Chronic Right knee pain  Chronic left knee pain  Bilateral knee osteoarthritis    PLAN:   1. Continue PT.   2. MRI left knee results discussed with the patient today.  3. DC mobic. Use small amount of voltaren gel to left knee prn pain.  4. Injection to left knee today due to severe pain.   PROCEDURE NOTE: left KNEE INJECTION  After time out was performed, including verification of patient ID, procedure, site and side, availability of information and equipment, review of safety issues, and agreement with consent, the procedure site was marked and the patient was prepped aseptically. A diagnostic and therapeutic injection of 1cc 40 mg kenalog and 1% lidocaine/0.5% sensorcaine was given under sterile technique using a 22g x 1.5 needle into the superior lateral left knee with the patient in a lying position.   The patient had no adverse reactions to the medication. Pain decreased. The patient was instructed to apply ice to the joint for 20 minutes and avoid strenuous activities for 24-36 hours following the injection. Patient was warned of possible blood sugar and/or blood pressure changes during that time. Told to call clinic or go to ED if the changes become concerning. Following that time, patient can resume regular activities.    5. Bilateral synvisc prior authorizations placed today. RTC for injections  6. Consider  brace in future.     All patients questions were answered. Patient was advised to call us with any concerns or questions.

## 2017-05-01 ENCOUNTER — PATIENT MESSAGE (OUTPATIENT)
Dept: SPORTS MEDICINE | Facility: CLINIC | Age: 72
End: 2017-05-01

## 2017-05-02 DIAGNOSIS — D47.2 SMOLDERING MULTIPLE MYELOMA: ICD-10-CM

## 2017-05-02 RX ORDER — CITALOPRAM 20 MG/1
TABLET, FILM COATED ORAL
Qty: 90 TABLET | Refills: 11 | Status: SHIPPED | OUTPATIENT
Start: 2017-05-02 | End: 2018-10-16 | Stop reason: SDUPTHER

## 2017-05-02 RX ORDER — GABAPENTIN 100 MG/1
CAPSULE ORAL
Qty: 450 CAPSULE | Refills: 3 | Status: SHIPPED | OUTPATIENT
Start: 2017-05-02 | End: 2018-10-16 | Stop reason: SDUPTHER

## 2017-05-03 ENCOUNTER — OFFICE VISIT (OUTPATIENT)
Dept: INTERNAL MEDICINE | Facility: CLINIC | Age: 72
End: 2017-05-03
Payer: MEDICARE

## 2017-05-03 VITALS
SYSTOLIC BLOOD PRESSURE: 126 MMHG | HEIGHT: 70 IN | BODY MASS INDEX: 27.22 KG/M2 | DIASTOLIC BLOOD PRESSURE: 74 MMHG | WEIGHT: 190.13 LBS | HEART RATE: 80 BPM

## 2017-05-03 DIAGNOSIS — N13.8 BPH WITH URINARY OBSTRUCTION: ICD-10-CM

## 2017-05-03 DIAGNOSIS — D80.4 IGM DEFICIENCY: ICD-10-CM

## 2017-05-03 DIAGNOSIS — I10 ESSENTIAL HYPERTENSION: ICD-10-CM

## 2017-05-03 DIAGNOSIS — E11.42 DIABETIC PERIPHERAL NEUROPATHY ASSOCIATED WITH TYPE 2 DIABETES MELLITUS: ICD-10-CM

## 2017-05-03 DIAGNOSIS — E13.9 DIABETES MELLITUS DUE TO ABNORMAL INSULIN: ICD-10-CM

## 2017-05-03 DIAGNOSIS — N18.30 CKD (CHRONIC KIDNEY DISEASE), STAGE III: ICD-10-CM

## 2017-05-03 DIAGNOSIS — Z00.00 ROUTINE GENERAL MEDICAL EXAMINATION AT A HEALTH CARE FACILITY: Primary | ICD-10-CM

## 2017-05-03 DIAGNOSIS — N40.1 BPH WITH URINARY OBSTRUCTION: ICD-10-CM

## 2017-05-03 DIAGNOSIS — K21.9 GASTROESOPHAGEAL REFLUX DISEASE WITHOUT ESOPHAGITIS: ICD-10-CM

## 2017-05-03 DIAGNOSIS — D47.2 SMOLDERING MULTIPLE MYELOMA: ICD-10-CM

## 2017-05-03 DIAGNOSIS — E11.9 ENCOUNTER FOR DIABETIC FOOT EXAM: ICD-10-CM

## 2017-05-03 DIAGNOSIS — E53.8 VITAMIN B12 DEFICIENCY: ICD-10-CM

## 2017-05-03 DIAGNOSIS — E55.9 VITAMIN D DEFICIENCY DISEASE: ICD-10-CM

## 2017-05-03 PROCEDURE — 99213 OFFICE O/P EST LOW 20 MIN: CPT | Mod: 25,S$GLB,, | Performed by: INTERNAL MEDICINE

## 2017-05-03 PROCEDURE — 99397 PER PM REEVAL EST PAT 65+ YR: CPT | Mod: 25,S$GLB,, | Performed by: INTERNAL MEDICINE

## 2017-05-03 PROCEDURE — 3078F DIAST BP <80 MM HG: CPT | Mod: S$GLB,,, | Performed by: INTERNAL MEDICINE

## 2017-05-03 PROCEDURE — 3074F SYST BP LT 130 MM HG: CPT | Mod: S$GLB,,, | Performed by: INTERNAL MEDICINE

## 2017-05-03 PROCEDURE — 99999 PR PBB SHADOW E&M-EST. PATIENT-LVL V: CPT | Mod: PBBFAC,,, | Performed by: INTERNAL MEDICINE

## 2017-05-03 RX ORDER — INSULIN ASPART 100 [IU]/ML
3-5 INJECTION, SOLUTION INTRAVENOUS; SUBCUTANEOUS
Qty: 1 BOX | Refills: 3 | Status: SHIPPED | OUTPATIENT
Start: 2017-05-03 | End: 2017-05-26 | Stop reason: SDUPTHER

## 2017-05-03 RX ORDER — DIAZEPAM 5 MG/1
5 TABLET ORAL EVERY 6 HOURS PRN
Qty: 60 TABLET | Refills: 3 | Status: SHIPPED | OUTPATIENT
Start: 2017-05-03 | End: 2017-09-13 | Stop reason: SDUPTHER

## 2017-05-03 RX ORDER — TIZANIDINE 4 MG/1
TABLET ORAL
Qty: 90 TABLET | Refills: 1 | Status: SHIPPED | OUTPATIENT
Start: 2017-05-03 | End: 2021-12-13

## 2017-05-03 RX ORDER — OMEPRAZOLE 20 MG/1
40 CAPSULE, DELAYED RELEASE ORAL DAILY
Qty: 180 CAPSULE | Refills: 3 | Status: SHIPPED | OUTPATIENT
Start: 2017-05-03 | End: 2018-03-03 | Stop reason: SDUPTHER

## 2017-05-03 NOTE — PROGRESS NOTES
CHIEF COMPLAINT: Annual exam and Follow up of smoldering multiple myeloma, diabetes, hypertension, back and neck pain    HISTORY OF PRESENT ILLNESS: This is a 71-year-old man who presents for follow up of above      HE is in a study for his smoldering multiple myeloma. He is in the observation arm and is being monitoring monthly. He saw Dr Engel 4/10/17. No indication for chemotherapy at this time.      Blood sugars have been 100-200.  He is taking Lantus 18 units at bedtime and Novolog 3 - 5 units at meals.  He is taking losartan 25 mg 1 tablet daily to protect his kidney. No polydipsia or polyuria       His back and neck was doing better in the healthy back program.  He no longer needs hydrocodone apap regularly. He takes tizanidine as needed.       Knees are better after an injection. He had an injection by Dr Stokes, yesterday 2/2/17 which has helped.     Reflux is controlled on omeprazole 20 mg 2 tablets daily. No chest pain, shortness of breath, nausea, voimting, constipation, diarrhea, numbness, weakness.        He had laser vaporization and enucleation of the prostate 11/13/13. He denies any dysuria or hematuria now. Urinary frequency is better.  He is taking FLomax 0.4 mg daily and oxybutynin 5 mg twice daily for urinary frequency and  for his prostate.    He has hyperlipidemia, on pravastatin 40 mg daily. No joint pain or muscle pain from the pravastatin.        He has been taking aspirin 81 mg daily vitamin D supplement 2000 units daily.        Anxiety is doing better on celexa 20 mg daily. Mood is better. He takes diazepam 5 mg at bedtime for his sleep and anxiety. It helps him sleep.       HE is taking gabapentin 100 mg one in the afternoon and 3 at bedtime. He skips the morning dose due to other medications. Foot pain is better and controlled with the gabapentin.    PAST MEDICAL HISTORY:   1. Diabetes mellitus.   2. Hyperlipidemia.   3. Reflux.   4. BPH.   5. Osteoarthritis of the knees.   6. Neck  "pain.   7. History of right lateral epicondylitis.   8. History of lumps removed from the breast as a teenager.   9. OA cervical spine     SOCIAL HISTORY: Does not smoke, does not drink. 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 He works for the [employer].     FAMILY HISTORY: Mother is living at age 86 with a heart condition. Father   in his late 40s of alcoholism. He has 5 sisters and 1 brother who are  healthy, one has a neurological disorder, one is anxious. His daughter has  lupus, on dialysis. She also has heart problems. She also had a brain   aneurysm that was stented.       PHYSICAL EXAMINATION:    /74 (BP Location: Right arm, Patient Position: Sitting, BP Method: Manual)  Pulse 80  Ht 5' 10" (1.778 m)  Wt 86.3 kg (190 lb 2.4 oz)  BMI 27.28 kg/m2    GENERAL: He is alert, oriented, no apparent distress. Affect within normal limits.   Conjunctivae anicteric. PERRL. Tympanic membranes clear. Oropharynx clear.   NECK: Supple. No cervical lymphadenopathy, no thyroid enlargement.   Respiratory: Effort normal. Lungs are clear to auscultation.   HEART: Regular rate and rhythm without murmurs, gallops or rubs.   No lower extremity edema.          ASSESSMENT AND PLAN:     Annual exam - discussed diet, exercise and safety issues.  Prevnar   PSA . colonoscopy normal 3/2012 and due in 2019      Other medical problems discussed at this visit. Billing will be separate and based on time. Spent 15 minutes in counseling regarding these medical problems.       1. Diabetes mellitus with hyperglycemia and microalbuminuria - hemoglobin A1C ordered  2. 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 Labs  reviewed  3. OA knee - stable  4. Hypertension - controlled  5. Hyperlipidemia. On pravastatin  6. BPH. S/p laser - Saw urology  7. Depression - stable  8. Vitamin D deficiency - on vitamin D 2,000 units daily.    9.Back pain-stable  10. Anxiety and depression- stable on celexa 20 mg one tablet " daily. Diazepam as neeeded for sleep  11. Peripheral neuropathy -controlled on gabapentin. Foot exam  12. CRI - labs  I will see him back in 4 months, sooner if problems arise

## 2017-05-08 DIAGNOSIS — D47.2 SMOLDERING MULTIPLE MYELOMA: ICD-10-CM

## 2017-05-08 DIAGNOSIS — H40.1192 PRIMARY OPEN-ANGLE GLAUCOMA, MODERATE STAGE: ICD-10-CM

## 2017-05-08 RX ORDER — TAMSULOSIN HYDROCHLORIDE 0.4 MG/1
CAPSULE ORAL
Qty: 90 CAPSULE | Refills: 3 | Status: SHIPPED | OUTPATIENT
Start: 2017-05-08 | End: 2018-03-03 | Stop reason: SDUPTHER

## 2017-05-08 RX ORDER — LATANOPROST 50 UG/ML
SOLUTION/ DROPS OPHTHALMIC
Qty: 3 BOTTLE | Refills: 4 | Status: SHIPPED | OUTPATIENT
Start: 2017-05-08 | End: 2019-04-01 | Stop reason: SDUPTHER

## 2017-05-11 ENCOUNTER — OFFICE VISIT (OUTPATIENT)
Dept: HEMATOLOGY/ONCOLOGY | Facility: CLINIC | Age: 72
End: 2017-05-11
Payer: MEDICARE

## 2017-05-11 ENCOUNTER — RESEARCH ENCOUNTER (OUTPATIENT)
Dept: RESEARCH | Facility: HOSPITAL | Age: 72
End: 2017-05-11

## 2017-05-11 ENCOUNTER — LAB VISIT (OUTPATIENT)
Dept: LAB | Facility: HOSPITAL | Age: 72
End: 2017-05-11
Attending: INTERNAL MEDICINE
Payer: MEDICARE

## 2017-05-11 VITALS
BODY MASS INDEX: 27.55 KG/M2 | SYSTOLIC BLOOD PRESSURE: 142 MMHG | DIASTOLIC BLOOD PRESSURE: 70 MMHG | WEIGHT: 192.44 LBS | TEMPERATURE: 98 F | HEART RATE: 69 BPM | HEIGHT: 70 IN

## 2017-05-11 DIAGNOSIS — E55.9 VITAMIN D DEFICIENCY DISEASE: ICD-10-CM

## 2017-05-11 DIAGNOSIS — E53.8 VITAMIN B12 DEFICIENCY: ICD-10-CM

## 2017-05-11 DIAGNOSIS — C90.00 MULTIPLE MYELOMA, REMISSION STATUS UNSPECIFIED: ICD-10-CM

## 2017-05-11 DIAGNOSIS — D47.2 SMOLDERING MULTIPLE MYELOMA: Primary | ICD-10-CM

## 2017-05-11 DIAGNOSIS — E13.9 DIABETES MELLITUS DUE TO ABNORMAL INSULIN: ICD-10-CM

## 2017-05-11 DIAGNOSIS — Z00.6 EXAMINATION OF PARTICIPANT IN CLINICAL TRIAL: ICD-10-CM

## 2017-05-11 DIAGNOSIS — N40.1 BENIGN NODULAR PROSTATIC HYPERPLASIA WITH LOWER URINARY TRACT SYMPTOMS: ICD-10-CM

## 2017-05-11 DIAGNOSIS — E11.42 DIABETIC PERIPHERAL NEUROPATHY ASSOCIATED WITH TYPE 2 DIABETES MELLITUS: ICD-10-CM

## 2017-05-11 LAB
25(OH)D3+25(OH)D2 SERPL-MCNC: 32 NG/ML
ALBUMIN SERPL BCP-MCNC: 3.6 G/DL
ALP SERPL-CCNC: 56 U/L
ALT SERPL W/O P-5'-P-CCNC: 20 U/L
ANION GAP SERPL CALC-SCNC: 10 MMOL/L
AST SERPL-CCNC: 25 U/L
BASOPHILS # BLD AUTO: 0.01 K/UL
BASOPHILS NFR BLD: 0.2 %
BILIRUB SERPL-MCNC: 0.6 MG/DL
BUN SERPL-MCNC: 19 MG/DL
CALCIUM SERPL-MCNC: 9.7 MG/DL
CHLORIDE SERPL-SCNC: 98 MMOL/L
CHOLEST/HDLC SERPL: 5.2 {RATIO}
CO2 SERPL-SCNC: 25 MMOL/L
CREAT SERPL-MCNC: 1.7 MG/DL
DIFFERENTIAL METHOD: ABNORMAL
EOSINOPHIL # BLD AUTO: 0.2 K/UL
EOSINOPHIL NFR BLD: 5.1 %
ERYTHROCYTE [DISTWIDTH] IN BLOOD BY AUTOMATED COUNT: 13.4 %
EST. GFR  (AFRICAN AMERICAN): 46.2 ML/MIN/1.73 M^2
EST. GFR  (NON AFRICAN AMERICAN): 40 ML/MIN/1.73 M^2
ESTIMATED AVG GLUCOSE: 229 MG/DL
GLUCOSE SERPL-MCNC: 352 MG/DL
HBA1C MFR BLD HPLC: 9.6 %
HCT VFR BLD AUTO: 35 %
HDL/CHOLESTEROL RATIO: 19.1 %
HDLC SERPL-MCNC: 131 MG/DL
HDLC SERPL-MCNC: 25 MG/DL
HGB BLD-MCNC: 11.9 G/DL
IGA SERPL-MCNC: 1539 MG/DL
IGG SERPL-MCNC: 920 MG/DL
IGM SERPL-MCNC: 41 MG/DL
LDLC SERPL CALC-MCNC: 50 MG/DL
LYMPHOCYTES # BLD AUTO: 1.5 K/UL
LYMPHOCYTES NFR BLD: 35.3 %
MAGNESIUM SERPL-MCNC: 1.8 MG/DL
MCH RBC QN AUTO: 28.8 PG
MCHC RBC AUTO-ENTMCNC: 34 %
MCV RBC AUTO: 85 FL
MONOCYTES # BLD AUTO: 0.3 K/UL
MONOCYTES NFR BLD: 6.8 %
NEUTROPHILS # BLD AUTO: 2.2 K/UL
NEUTROPHILS NFR BLD: 52.6 %
NONHDLC SERPL-MCNC: 106 MG/DL
PHOSPHATE SERPL-MCNC: 2.5 MG/DL
PLATELET # BLD AUTO: 221 K/UL
PMV BLD AUTO: 8.9 FL
POTASSIUM SERPL-SCNC: 4.9 MMOL/L
PROT SERPL-MCNC: 8.5 G/DL
RBC # BLD AUTO: 4.13 M/UL
SODIUM SERPL-SCNC: 133 MMOL/L
TRIGL SERPL-MCNC: 280 MG/DL
TSH SERPL DL<=0.005 MIU/L-ACNC: 1.3 UIU/ML
VIT B12 SERPL-MCNC: 1544 PG/ML
WBC # BLD AUTO: 4.14 K/UL

## 2017-05-11 PROCEDURE — 99215 OFFICE O/P EST HI 40 MIN: CPT | Mod: Q1,S$GLB,, | Performed by: INTERNAL MEDICINE

## 2017-05-11 PROCEDURE — 84165 PROTEIN E-PHORESIS SERUM: CPT | Mod: 26,Q1,, | Performed by: PATHOLOGY

## 2017-05-11 PROCEDURE — 1160F RVW MEDS BY RX/DR IN RCRD: CPT | Mod: Q1,S$GLB,, | Performed by: INTERNAL MEDICINE

## 2017-05-11 PROCEDURE — 83036 HEMOGLOBIN GLYCOSYLATED A1C: CPT

## 2017-05-11 PROCEDURE — 84165 PROTEIN E-PHORESIS SERUM: CPT

## 2017-05-11 PROCEDURE — 3046F HEMOGLOBIN A1C LEVEL >9.0%: CPT | Mod: Q1,S$GLB,, | Performed by: INTERNAL MEDICINE

## 2017-05-11 PROCEDURE — 82607 VITAMIN B-12: CPT

## 2017-05-11 PROCEDURE — 82784 ASSAY IGA/IGD/IGG/IGM EACH: CPT | Mod: 59

## 2017-05-11 PROCEDURE — 36415 COLL VENOUS BLD VENIPUNCTURE: CPT

## 2017-05-11 PROCEDURE — 86334 IMMUNOFIX E-PHORESIS SERUM: CPT

## 2017-05-11 PROCEDURE — 83735 ASSAY OF MAGNESIUM: CPT

## 2017-05-11 PROCEDURE — 4010F ACE/ARB THERAPY RXD/TAKEN: CPT | Mod: Q1,S$GLB,, | Performed by: INTERNAL MEDICINE

## 2017-05-11 PROCEDURE — 80053 COMPREHEN METABOLIC PANEL: CPT

## 2017-05-11 PROCEDURE — 85025 COMPLETE CBC W/AUTO DIFF WBC: CPT

## 2017-05-11 PROCEDURE — 1126F AMNT PAIN NOTED NONE PRSNT: CPT | Mod: Q1,S$GLB,, | Performed by: INTERNAL MEDICINE

## 2017-05-11 PROCEDURE — 80061 LIPID PANEL: CPT

## 2017-05-11 PROCEDURE — 3078F DIAST BP <80 MM HG: CPT | Mod: Q1,S$GLB,, | Performed by: INTERNAL MEDICINE

## 2017-05-11 PROCEDURE — 99999 PR PBB SHADOW E&M-EST. PATIENT-LVL III: CPT | Mod: PBBFAC,,, | Performed by: INTERNAL MEDICINE

## 2017-05-11 PROCEDURE — 84443 ASSAY THYROID STIM HORMONE: CPT

## 2017-05-11 PROCEDURE — 86334 IMMUNOFIX E-PHORESIS SERUM: CPT | Mod: 26,Q1,, | Performed by: PATHOLOGY

## 2017-05-11 PROCEDURE — 1159F MED LIST DOCD IN RCRD: CPT | Mod: Q1,S$GLB,, | Performed by: INTERNAL MEDICINE

## 2017-05-11 PROCEDURE — 83520 IMMUNOASSAY QUANT NOS NONAB: CPT

## 2017-05-11 PROCEDURE — 82784 ASSAY IGA/IGD/IGG/IGM EACH: CPT

## 2017-05-11 PROCEDURE — 82306 VITAMIN D 25 HYDROXY: CPT

## 2017-05-11 PROCEDURE — 3077F SYST BP >= 140 MM HG: CPT | Mod: Q1,S$GLB,, | Performed by: INTERNAL MEDICINE

## 2017-05-11 PROCEDURE — 84100 ASSAY OF PHOSPHORUS: CPT

## 2017-05-11 NOTE — PROGRESS NOTES
"  Thursday, May 11th, 2017      Protocol: E3A06  Investigator: GIL Engel  Pt Initials: PLUCÍA      Arm B: Observation  Cycle 16, Day 31      Patient presents to clinic to evaluate ability to proceed with Cycle 17 of observation per above-mentioned protocol. Patient is awake, alert, and oriented to person, place, and time. He is ambulatory and states he has been feeling well. He states his knee pain is much improved with use of new knee brace that he presents with today.  He states he feels as though he has a "new lease on life."  He denies new potential CRAB symptoms. He presents several days late for this appointment due to scheduling conflict with his work and being out of town.  He states continued willingness to participate in above-mentioned study.      Review of AE's:  1. Hypertension, grade 2: BP today is 142/70  in clinic today. This is stable from previous visits and consistent with his known hypertensive issues when he has visits here.  He was encouraged to monitor BP's at home or at local drug store and report if this remains elevated.  He states understanding.  Will continue to follow this. AE ongoing.  2. Lower Back Pain and Neck Pain, grade 1: Patient has no complaints of this issue this month.  Patient states some intermittent pain to neck but that his work with PT is helping this issue. As previously stated, patient is not suspected to have bony myeloma involvement per Dr. Engel as these are pre-existing issues present at baseline. He was encouraged to keep up physical activity and exercise to help alleviate this. He states understanding. AE ongoing.  3.  Pain in extremity (knee), grade 2.  See above regarding statements of much improved symptoms.  AE improved, now grade 1.  4. Creatinine increased, grade 1: Serum creatinine is 1.7 mg/dl today and GFR 49.93. See urinary and prostate issues as described above. He states he continues to take oxybutinin and flomax per Dr. Walker. He states he might try to take " "flomax at night now to see if it will help with his night wakings to urinate. This was noted per Dr. Engel and not concerned for myeloma involvement at this time.  Will continue observation monthly and to monitor renal function closely. AE stable.   5. Peripheral sensory neuropathy, grade 1: Patient does not verbalize complaints of this symptom today. He has a pending visit with a podiatrist to get fitted for "diabetic shoes." AE ongoing.       No changes in other baseline AE's noted.      Per study chair, "the definition of progressive disease for this protocol is developing CRAB criteria that requires treatment systemically." Per Dr Engel, based on today's exam and lab results thus far, patient does not have any s/s of CRAB: Normal Calcium level (9.7), absence of Renal insufficiency (serum creatinine 1.7, and GFR 49.93 per [facility]--patient with a history of kidney dysfunction secondary to diabetes; Dr. Engel aware of these values and not concerned for myeloma involvement), absence of significant Anemia (hemoglobin 11.9) and absence of lytic Bone lesions (per baseline metastatic survey as well as MRI--see MD note for further comment). See MD note for ECOG score and H&P and flowsheets for laboratory work, vitals, etc. All myeloma-related blood work from last cycle including SPEP and JAGUAR have remained stable and per Dr. Engel, patient shows no evidence of progression today. Patient informed that Dr. Engel will release all lab results to [facility]. He states understanding of this.       RN reinforced importance of patient following up monthly for assessment as well as lab work. Patient verbalizes understanding. Appointment for next month's visit was made in clinic today and appt slip is to be mailed to patient at a later date. Patient was given 's contact information and has MD contact information to call with any concerns, questions, or worsening of symptoms; he verbalized understanding.          "

## 2017-05-12 LAB
ALBUMIN SERPL ELPH-MCNC: 4 G/DL
ALPHA1 GLOB SERPL ELPH-MCNC: 0.28 G/DL
ALPHA2 GLOB SERPL ELPH-MCNC: 0.79 G/DL
B-GLOBULIN SERPL ELPH-MCNC: 2.49 G/DL
GAMMA GLOB SERPL ELPH-MCNC: 0.64 G/DL
INTERPRETATION SERPL IFE-IMP: NORMAL
KAPPA LC SER QL IA: 5.48 MG/DL
KAPPA LC/LAMBDA SER IA: 3.63
LAMBDA LC SER QL IA: 1.51 MG/DL
PATHOLOGIST INTERPRETATION IFE: NORMAL
PATHOLOGIST INTERPRETATION SPE: NORMAL
PROT SERPL-MCNC: 8.2 G/DL

## 2017-05-14 ENCOUNTER — TELEPHONE (OUTPATIENT)
Dept: INTERNAL MEDICINE | Facility: CLINIC | Age: 72
End: 2017-05-14

## 2017-05-14 DIAGNOSIS — E13.9 DIABETES MELLITUS DUE TO ABNORMAL INSULIN: Primary | ICD-10-CM

## 2017-05-14 NOTE — TELEPHONE ENCOUNTER
Please notify him  BLood sugars are higher - A1C is 9.6 or average of 226.  He needs to see the health  and endocrine again.  Please have him book an endocrine apt and apt with Nelly for discussion of diabetes - need to see why compliance is an issue.

## 2017-05-15 ENCOUNTER — OFFICE VISIT (OUTPATIENT)
Dept: SPORTS MEDICINE | Facility: CLINIC | Age: 72
End: 2017-05-15
Payer: MEDICARE

## 2017-05-15 VITALS
WEIGHT: 192 LBS | DIASTOLIC BLOOD PRESSURE: 67 MMHG | BODY MASS INDEX: 27.49 KG/M2 | HEART RATE: 95 BPM | SYSTOLIC BLOOD PRESSURE: 116 MMHG | HEIGHT: 70 IN

## 2017-05-15 DIAGNOSIS — M25.561 CHRONIC PAIN OF RIGHT KNEE: ICD-10-CM

## 2017-05-15 DIAGNOSIS — G89.29 CHRONIC PAIN OF LEFT KNEE: Primary | ICD-10-CM

## 2017-05-15 DIAGNOSIS — G89.29 CHRONIC PAIN OF RIGHT KNEE: ICD-10-CM

## 2017-05-15 DIAGNOSIS — M17.11 PRIMARY OSTEOARTHRITIS OF RIGHT KNEE: ICD-10-CM

## 2017-05-15 DIAGNOSIS — M25.562 CHRONIC PAIN OF LEFT KNEE: Primary | ICD-10-CM

## 2017-05-15 DIAGNOSIS — M17.12 PRIMARY OSTEOARTHRITIS OF LEFT KNEE: ICD-10-CM

## 2017-05-15 PROCEDURE — 99499 UNLISTED E&M SERVICE: CPT | Mod: S$GLB,,, | Performed by: PHYSICIAN ASSISTANT

## 2017-05-15 PROCEDURE — 99999 PR PBB SHADOW E&M-EST. PATIENT-LVL III: CPT | Mod: PBBFAC,,, | Performed by: PHYSICIAN ASSISTANT

## 2017-05-15 PROCEDURE — 20610 DRAIN/INJ JOINT/BURSA W/O US: CPT | Mod: 50,S$GLB,, | Performed by: PHYSICIAN ASSISTANT

## 2017-05-15 NOTE — LETTER
May 15, 2017      Gabriella Shin MD  [address]  [place] [address]           [facility]  [address]  [facility] [address]  Phone: [phone]          Patient: Emerson Menendez   MR Number: [id number]   YOB: 1945   Date of Visit: 5/15/2017       Dear Dr. Gabriella Shin:    Thank you for referring Emerson Menendez to me for evaluation. Attached you will find relevant portions of my assessment and plan of care.    If you have questions, please do not hesitate to call me. I look forward to following Emerson Menendez along with you.    Sincerely,    Richar Kelly III, PA-C    Enclosure  CC:  No Recipients    If you would like to receive this communication electronically, please contact [email or web address] or [phone] to request more information on [facility] Link access.    For providers and/or their staff who would like to refer a patient to [facility], please contact us through our one-stop-shop provider referral line, [facility], at [phone].    If you feel you have received this communication in error or would no longer like to receive these types of communications, please e-mail [email or web address]

## 2017-05-15 NOTE — MR AVS SNAPSHOT
[facility]  [address]  [facility] [address]  Phone: [phone]                  Emerson Menendez   5/15/2017 10:15 AM   Appointment    Description:  Male : 1945   Provider:  Richar Kelly III, PA-C   Department:  [facility]                To Do List           Future Appointments        Provider Department Dept Phone    5/15/2017 10:15 AM Richar Kelly III, PA-C [facility] [phone]    2017 3:00 PM Richar Kelly III, PA-C [facility] [phone]    2017 3:00 PM Richar Kelly III, PA-C [facility] [phone]    2017 8:30 AM LAB, H[facility] BLDG [facility] [phone]    2017 9:40 AM Silvana Engel MD [facility] [phone]      Goals (5 Years of Data)     None      [facility]     [facility] Nurse Care Line -  Assistance  Unless otherwise directed by your provider, please contact [facility], our nurse care line that is available for  assistance.     Registered nurses in the [facility] Center provide: appointment scheduling, clinical advisement, health education, and other advisory services.  Call: [phone] (toll free)               Medications           Message regarding Medications     Verify the changes and/or additions to your medication regime listed below are the same as discussed with your clinician today.  If any of these changes or additions are incorrect, please notify your healthcare provider.             Verify that the below list of medications is an accurate representation of the medications you are currently taking.  If none reported, the list may be blank. If incorrect, please contact your healthcare provider. Carry this list with you in case of emergency.           Current Medications     ACCU-CHEK OMAYRA Misc USE AS DIRECTED    aspirin (ECOTRIN) 81 MG EC tablet Take 1 tablet (81 mg total)  "by mouth once daily.    BD INSULIN PEN NEEDLE UF SHORT 31 gauge x 5/16" Ndle USE 1 NEW NEEDLE TO INJECT INSULIN ONE TIME DAILY    blood glucose control, normal (METER-CHECK) Soln One meter. True test meter. Use as directed    blood sugar diagnostic Strp Check twice daily    blood-glucose meter kit Use as instructed. Insulin dependent diabetic    blood-glucose meter kit Use as instructed, accucheck jose meter.    cholecalciferol, vitamin D3, (VITAMIN D) 2,000 unit Cap Take by mouth. 1 Capsule Oral Every day.  over the counter    citalopram (CELEXA) 20 MG tablet TAKE 1 TABLET EVERY EVENING AFTER DINNER    diazePAM (VALIUM) 5 MG tablet Take 1 tablet (5 mg total) by mouth every 6 (six) hours as needed.    diclofenac sodium (VOLTAREN) 1 % Gel Apply 2 g topically 2 (two) times daily. To the anterior left knee prn pain    gabapentin (NEURONTIN) 100 MG capsule TAKE 1 CAPSULE EVERY MORNING, 1 CAPSULE IN THE AFTERNOON, AND 1 TO 3 CAPSULE(S) AT BEDTIME AS NEEDED FOR FOOT PAIN    hydrocodone-acetaminophen 5-325mg (NORCO) 5-325 mg per tablet Take 1 tablet by mouth every 4 to 6 hours as needed for Pain.    insulin aspart (NOVOLOG FLEXPEN) 100 unit/mL InPn pen Inject 3-5 Units into the skin 3 (three) times daily with meals.    insulin syringe-needle U-100 (ADVOCATE SYRINGES) 0.3 mL 31 gauge x 5/16 Syrg Uses 4 time a day.    lancets Misc Use twice daily    LANTUS SOLOSTAR 100 unit/mL (3 mL) InPn pen INJECT 14 UNITS INTO THE SKIN EVERY EVENING (DISCARD AND BEGIN A NEW PEN AFTER 28 DAYS)    latanoprost 0.005 % ophthalmic solution INSTILL 1 DROP INTO BOTH EYES EVERY EVENING    losartan (COZAAR) 25 MG tablet TAKE 1 TABLET EVERY DAY    omeprazole (PRILOSEC) 20 MG capsule Take 2 capsules (40 mg total) by mouth once daily.    oxybutynin (DITROPAN) 5 MG Tab Take 1 tablet (5 mg total) by mouth 2 (two) times daily.    pravastatin (PRAVACHOL) 40 MG tablet TAKE 1 TABLET ONE TIME DAILY    sildenafil (VIAGRA) 100 MG tablet Take by mouth. 1 " Tablet Oral    tadalafil (CIALIS) 5 MG tablet Take 4 tablets (20 mg total) by mouth once daily.    tamsulosin (FLOMAX) 0.4 mg Cp24 TAKE 1 CAPSULE ONE TIME DAILY    tizanidine (ZANAFLEX) 4 MG tablet 1/2-1 tablet every 8 hours as needed for muscle spasm           Clinical Reference Information           Allergies as of 5/15/2017     Penicillins      Immunizations Administered on Date of Encounter - 5/15/2017     None      Language Assistance Services     ATTENTION: Language assistance services are available, free of charge. Please call [phone].      ATENCIÓN: Si habla sascha, tiene a shelley disposición servicios gratuitos de asistencia lingüística. Llame al [phone].     REVA Ý: N?u b?n nói Ti?ng Vi?t, có các d?ch v? h? tr? ngôn ng? mi?n phí dành cho b?n. G?i s? [phone].         [facility] complies with applicable Federal civil rights laws and does not discriminate on the basis of race, color, national origin, age, disability, or sex.

## 2017-05-15 NOTE — TELEPHONE ENCOUNTER
Pt advised, he states that he spoke with someone in endocrine who said they would get back with him. After the pt didn't receive a call back he showed up in the Endocrine department to speak with someone and no one would come out. Pt states that he was prescribed trulicity and his blood sugar dropped to 64. Pt wanted to speak with someone because he switched his medication because the medication he was prescribed dropped his blood sugar too low. Pt doesn't want to go back and see the same doctor if they can't help him when needed.

## 2017-05-15 NOTE — PROGRESS NOTES
Subjective:       Patient ID: Emerson Menendez is a 71 y.o. male.    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 Test results identify a stable IgA kappa protein with beta globulin band at 1.63g/dL. Kappa free light chain is elevated at 4.40. CBC and calcium are stable. Creatinine with history of stage III CKD is stable at 1.4. Metastatic survey is negative for lytic lesions. MRI of the entire spine demonstrated age related changes but no convincing evidence of myeloma bone disease. Bone marrow biopsy identified about 13% plasma cells by morphology and FISH for myeloma identified a t(11;14) and trisomies 3,7, and 17. The patient is afebrile and appears clinically well. He was seen by his podiatrist and nephrologist since our last visit without any new or acute events.    The patient has not received any therapy for smoldering myeloma including bisphosphonates or steroids. He has been randomized to observation arm of the the clinical study. 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 He has no current or prior history of malignancy.    TODAY  Mr. Menendez returns today for monthly follow-up evaluation. He denies any interval events since last interview. Bilateral knee pain has resolved with new braces. Afebrile. ECOG PS is 0.    HPI  Review of Systems   Constitutional: Negative for activity change, appetite change, diaphoresis, fatigue, fever and unexpected weight change.   HENT: Negative.    Eyes: Negative.    Respiratory: Negative.    Cardiovascular: Negative for leg swelling.   Gastrointestinal: Negative.    Endocrine: Negative.     Genitourinary: Negative.    Musculoskeletal: Negative.    Skin: Negative.    Allergic/Immunologic: Negative.    Neurological:        Grade 0-1 peripheral neuropathy of bilateral feet.    Hematological: Negative for adenopathy. Does not bruise/bleed easily.   Psychiatric/Behavioral: Negative.        Objective:      Physical Exam   Constitutional: He is oriented to person, place, and time. He appears well-developed and well-nourished.   HENT:   Head: Normocephalic and atraumatic.   Right Ear: External ear normal.   Left Ear: External ear normal.   Nose: Nose normal.   Mouth/Throat: Oropharynx is clear and moist.   Eyes: Conjunctivae and EOM are normal. Pupils are equal, round, and reactive to light.   Neck: Normal range of motion. Neck supple.   Cardiovascular: Normal rate, regular rhythm and intact distal pulses.    No murmur heard.  Pulmonary/Chest: Effort normal and breath sounds normal. No respiratory distress.   Abdominal: Soft. Bowel sounds are normal. He exhibits no distension. There is no hepatosplenomegaly.   Musculoskeletal: He exhibits no edema or tenderness.   Neurological: He is alert and oriented to person, place, and time. No cranial nerve deficit.   Skin: Skin is warm and dry. No rash noted. No cyanosis. Nails show no clubbing.   Psychiatric: He has a normal mood and affect. His behavior is normal.   Nursing note and vitals reviewed.      Assessment:       1. Smoldering multiple myeloma    2. Benign nodular prostatic hyperplasia with lower urinary tract symptoms    3. Diabetic peripheral neuropathy associated with type 2 diabetes mellitus        Plan:       The patient has a diagnosis of smoldering myeloma. There is no indication for immediate chemotherapy. We are monitoring renal function closely- baseline creatinine of -1.5-1.6  We will continue observation as per the Randomized Phase III Trial of Lenalidomide Versus Observation Alone in Patients with 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. CBC and CMP are stable.  Plan for return in 1 month. Follow-up pending myeloma labs.

## 2017-05-16 NOTE — PROGRESS NOTES
Patient is here for follow up of knee arthritis. Pt is requesting synvisc injection #1.  [facility] Has failed other conservative modalities including NSAIDS, activity modification, weight loss.    He has received good relief with synvisc injections in the past.     The prior shots was tolerated well.    He also recently received bilateral knee  braces which are providing him great pain relief.     PHYSICAL EXAMINATION:     General: The patient is alert and oriented x 3. Mood is pleasant.   Observation of ears, eyes and nose reveals no gross abnormalities. No   labored breathing observed.     No signs of infection or adverse reaction to knee.    PROCEDURE NOTE:   bilateral KNEE INJECTION  After time out was performed, including verification of patient ID, procedure, site and side, availability of information and equipment, review of safety issues, and agreement with consent, the procedure site was marked and the patient was prepped aseptically.    The bilateral knee were prepped with betadine and injected under sterile conditions from an anterolateral approach with patient lying and then 2cc of Synvisc was injected into bilateral knee joints . Patient tolerated the injection well. The patient had no adverse reactions to the medication.      RTC 1 week for 2nd injection.  Continue bilateral  braces  All questions were answered, pt will contact us for questions or concerns in the interim.

## 2017-05-16 NOTE — TELEPHONE ENCOUNTER
"Please call patient to schedule diabetes education with "needs empowerment" in the comments    Thanks  "

## 2017-05-16 NOTE — TELEPHONE ENCOUNTER
Tiffanie  Can you see this patient or suggest someone in endocrine who has the time to spend with him?  Sugars are out of control and I suspect compliance is an issue because it seems that he can get his sugars under control pretty quickly  He has seen a few MD in Endocrine but they really do not have the time to spend with him    Michelle Granados

## 2017-05-17 ENCOUNTER — CLINICAL SUPPORT (OUTPATIENT)
Dept: DIABETES | Facility: CLINIC | Age: 72
End: 2017-05-17
Payer: MEDICARE

## 2017-05-17 ENCOUNTER — INITIAL CONSULT (OUTPATIENT)
Dept: OPTOMETRY | Facility: CLINIC | Age: 72
End: 2017-05-17
Payer: MEDICARE

## 2017-05-17 DIAGNOSIS — Z79.4 UNCONTROLLED TYPE 2 DIABETES MELLITUS WITH HYPERGLYCEMIA, WITH LONG-TERM CURRENT USE OF INSULIN: Primary | ICD-10-CM

## 2017-05-17 DIAGNOSIS — Z79.4 CURRENT USE OF INSULIN: ICD-10-CM

## 2017-05-17 DIAGNOSIS — H25.12 NUCLEAR SCLEROSIS, LEFT: ICD-10-CM

## 2017-05-17 DIAGNOSIS — Z96.1 PSEUDOPHAKIA OF RIGHT EYE: ICD-10-CM

## 2017-05-17 DIAGNOSIS — H52.7 REFRACTIVE ERROR: ICD-10-CM

## 2017-05-17 DIAGNOSIS — H40.1132 PRIMARY OPEN ANGLE GLAUCOMA OF BOTH EYES, MODERATE STAGE: ICD-10-CM

## 2017-05-17 DIAGNOSIS — H35.371 EPIRETINAL MEMBRANE (ERM) OF RIGHT EYE: ICD-10-CM

## 2017-05-17 DIAGNOSIS — E11.65 UNCONTROLLED TYPE 2 DIABETES MELLITUS WITH HYPERGLYCEMIA, WITH LONG-TERM CURRENT USE OF INSULIN: Primary | ICD-10-CM

## 2017-05-17 DIAGNOSIS — E13.9 DIABETES MELLITUS DUE TO ABNORMAL INSULIN: ICD-10-CM

## 2017-05-17 DIAGNOSIS — H35.033 HYPERTENSIVE RETINOPATHY OF BOTH EYES: ICD-10-CM

## 2017-05-17 DIAGNOSIS — E11.9 TYPE 2 DIABETES MELLITUS WITHOUT RETINOPATHY: Primary | ICD-10-CM

## 2017-05-17 PROCEDURE — 92014 COMPRE OPH EXAM EST PT 1/>: CPT | Mod: S$GLB,,, | Performed by: OPTOMETRIST

## 2017-05-17 PROCEDURE — 99999 PR PBB SHADOW E&M-EST. PATIENT-LVL II: CPT | Mod: PBBFAC,,, | Performed by: OPTOMETRIST

## 2017-05-17 PROCEDURE — G0108 DIAB MANAGE TRN  PER INDIV: HCPCS | Mod: S$GLB,,, | Performed by: DIETITIAN, REGISTERED

## 2017-05-17 NOTE — LETTER
May 17, 2017      Roberta Sagastume MD  [address]  [facility] [address]           [facility]  [address]  [facility] [address]  Phone: [phone]          Patient: Emerson Menendez   MR Number: [id number]   YOB: 1945   Date of Visit: 5/17/2017       Dear Dr. Roberta Sagastume:    Thank you for referring Emerson Menendez to me for evaluation. Attached you will find relevant portions of my assessment and plan of care.    If you have questions, please do not hesitate to call me. I look forward to following Emerson Menendez along with you.    Sincerely,    Karon Burgos, OD    Enclosure  CC:  No Recipients    If you would like to receive this communication electronically, please contact [email or web address] or [phone] to request more information on [employer] Link access.    For providers and/or their staff who would like to refer a patient to [facility], please contact us through our one-stop-shop provider referral line, [facility] , at [phone].    If you feel you have received this communication in error or would no longer like to receive these types of communications, please e-mail [email or web address]

## 2017-05-17 NOTE — Clinical Note
Dear Dr. Sagastume and Jessica,  Thank you for referring Mr. Menendez for a diabetic eye examination; there is no diabetic retinopathy. He is overdue to see his ophthalmologist for glaucoma monitoring, so we have scheduled this appointment for him. Please let me know if you have questions.  Sincerely, Karon Burgos OD

## 2017-05-17 NOTE — MR AVS SNAPSHOT
[facility]  [address]  [facility] [address]  Phone: [phone]                  Emerson Menendez   2017 9:40 AM   Initial consult    Description:  Male : 1945   Provider:  Karon Burgos OD   Department:  [facility]           Reason for Visit     Diabetic Eye Exam                To Do List           Future Appointments        Provider Department Dept Phone    2017 3:00 PM BITA Gore III  [facility] [phone]    2017 8:00 AM DIABETES EMPOWERMENT PROGRAM [facility] [phone]    2017 3:00 PM BITA Gore III  [facility] [phone]    2017 8:30 AM LAB, HEMON CANCER BLDG [facility] [phone]    2017 9:40 AM Silvana Engel MD [facility] [phone]      Goals (5 Years of Data)     None      [facility]     [facility] Nurse Care Line -  Assistance  Unless otherwise directed by your provider, please contact [facility], our nurse care line that is available for  assistance.     Registered nurses in the [facility] Center provide: appointment scheduling, clinical advisement, health education, and other advisory services.  Call: [phone] (toll free)               Medications           Message regarding Medications     Verify the changes and/or additions to your medication regime listed below are the same as discussed with your clinician today.  If any of these changes or additions are incorrect, please notify your healthcare provider.             Verify that the below list of medications is an accurate representation of the medications you are currently taking.  If none reported, the list may be blank. If incorrect, please contact your healthcare provider. Carry this list with you in case of emergency.           Current Medications     ACCU-CHEK OMAYRA Misc USE AS DIRECTED    aspirin  "(ECOTRIN) 81 MG EC tablet Take 1 tablet (81 mg total) by mouth once daily.    BD INSULIN PEN NEEDLE UF SHORT 31 gauge x 5/16" Ndle USE 1 NEW NEEDLE TO INJECT INSULIN ONE TIME DAILY    blood glucose control, normal (METER-CHECK) Soln One meter. True test meter. Use as directed    blood sugar diagnostic Strp Check twice daily    blood-glucose meter kit Use as instructed. Insulin dependent diabetic    blood-glucose meter kit Use as instructed, accucheck jose meter.    cholecalciferol, vitamin D3, (VITAMIN D) 2,000 unit Cap Take by mouth. 1 Capsule Oral Every day.  over the counter    citalopram (CELEXA) 20 MG tablet TAKE 1 TABLET EVERY EVENING AFTER DINNER    diazePAM (VALIUM) 5 MG tablet Take 1 tablet (5 mg total) by mouth every 6 (six) hours as needed.    diclofenac sodium (VOLTAREN) 1 % Gel Apply 2 g topically 2 (two) times daily. To the anterior left knee prn pain    gabapentin (NEURONTIN) 100 MG capsule TAKE 1 CAPSULE EVERY MORNING, 1 CAPSULE IN THE AFTERNOON, AND 1 TO 3 CAPSULE(S) AT BEDTIME AS NEEDED FOR FOOT PAIN    hydrocodone-acetaminophen 5-325mg (NORCO) 5-325 mg per tablet Take 1 tablet by mouth every 4 to 6 hours as needed for Pain.    insulin aspart (NOVOLOG FLEXPEN) 100 unit/mL InPn pen Inject 3-5 Units into the skin 3 (three) times daily with meals.    insulin syringe-needle U-100 (ADVOCATE SYRINGES) 0.3 mL 31 gauge x 5/16 Syrg Uses 4 time a day.    lancets Misc Use twice daily    LANTUS SOLOSTAR 100 unit/mL (3 mL) InPn pen INJECT 14 UNITS INTO THE SKIN EVERY EVENING (DISCARD AND BEGIN A NEW PEN AFTER 28 DAYS)    latanoprost 0.005 % ophthalmic solution INSTILL 1 DROP INTO BOTH EYES EVERY EVENING    losartan (COZAAR) 25 MG tablet TAKE 1 TABLET EVERY DAY    omeprazole (PRILOSEC) 20 MG capsule Take 2 capsules (40 mg total) by mouth once daily.    oxybutynin (DITROPAN) 5 MG Tab Take 1 tablet (5 mg total) by mouth 2 (two) times daily.    pravastatin (PRAVACHOL) 40 MG tablet TAKE 1 TABLET ONE TIME DAILY    " sildenafil (VIAGRA) 100 MG tablet Take by mouth. 1 Tablet Oral    tadalafil (CIALIS) 5 MG tablet Take 4 tablets (20 mg total) by mouth once daily.    tamsulosin (FLOMAX) 0.4 mg Cp24 TAKE 1 CAPSULE ONE TIME DAILY    tizanidine (ZANAFLEX) 4 MG tablet 1/2-1 tablet every 8 hours as needed for muscle spasm           Clinical Reference Information           Allergies as of 5/17/2017     Penicillins      Immunizations Administered on Date of Encounter - 5/17/2017     None      Instructions    Thank you very much for coming in for your eye examination. It was a pleasure working with you today. Below is some information you might find helpful.     CATARACT    Symptoms and Signs:  A cataract starts out small, and at first has little effect on your vision. You may notice that your vision is blurred a little, like looking through a cloudy piece of glass or viewing an impressionist painting. A cataract may make light from the sun or a lamp seem too bright or glaring. Or you may notice when you drive at night that the oncoming headlights cause more glare than before. Colors may not appear as bright as they once did.  The type of cataract you have will affect exactly which symptoms you experience and how soon they will occur. When a nuclear cataract first develops it can bring about a temporary improvement in your near vision, called second sight. Unfortunately, the improved vision is short-lived and will disappear as the cataract worsens. Meanwhile, a sub-capsular cataract may not produce any symptoms until it's well-developed.    Causes:  No one knows for sure why the eye's lens changes as we age, forming cataracts. Researchers are gradually identifying factors that may cause cataracts - and information that may help to prevent them.  Many studies suggest that exposure to ultraviolet light is associated with cataracts, so eye care practitioners recommend wearing sunglasses and a wide-brimmed hat to lessen your exposure.  Other  studies suggest people with diabetes are at risk for developing a cataract.   Some eye care practitioners believe that a diet high in antioxidants, such as beta-carotene (vitamin A), selenium and vitamins C and E, may forestall cataracts.  The most important of these is probably vitamin C; it might be helpful to supplement the diet with an extra Vitamin C tablet.  Meanwhile, eating a lot of salt may increase your risk.  Other risk factors include cigarette smoke, air pollution and heavy alcohol consumption.  We simply recommend that you be careful to use sunglasses and to take Vitamin C.    Treatment:  When symptoms begin to appear, we can improve your vision for a while using new glasses, strong bifocals, magnification, appropriate lighting or other visual aids.  This is true in your case; your cataract does not impact your vision very much at this time. If you experience any of the symptoms we described you can return at any time. Otherwise it is fine to see you in 1 year.    Karon Burgos, SHAI        Language Assistance Services     ATTENTION: Language assistance services are available, free of charge. Please call [phone].      ATENCIÓN: Si abdiazizla sascha, tiene a shelley disposición servicios gratuitos de asistencia lingüística. Llame al [phone].     REVA Ý: N?u b?n nói Ti?ng Vi?t, có các d?ch v? h? tr? ngôn ng? mi?n phí dành cho b?n. G?i s? [phone].         [facility] complies with applicable Federal civil rights laws and does not discriminate on the basis of race, color, national origin, age, disability, or sex.

## 2017-05-17 NOTE — PROGRESS NOTES
HPI     Mr. Emerson Menendez was referred by Roberta Sagastume MD for a   diabetic eye exam  Pt has glaucoma and is being managed Dr. Wheatley; last visit 9/07/16, pt   is overdue for 4-month f/u but next available appointment with Dr. Wheatley is not until July 2017.    Patient states no complaints about eye or vision today. Pt states he wears   his current glasses for mainly reading only, glasses are about 2 years   old. He declines refraction today.     (+)drops: Latanoprost OU QHS (he reports excellent compliance since last   visit, last used about 8:00pm last night; refills not needed at this time)  (-)flashes  (-)floaters  (-)diplopia    Diabetic yes, since 2000  Pt says blood sugar has not been stable  Hemoglobin A1C       Date                     Value               Ref Range             Status                05/11/2017               9.6 (H)             4.5 - 6.2 %           Final                 02/13/2017               8.1 (H)             4.5 - 6.2 %           Final                11/21/2016               7.3 (H)             4.5 - 6.2 %         Final                  OCULAR HISTORY  Last Eye Exam 09/07/16 Dr. Wheatley   Last DFE: 08/12/15  (+)eye surgery: cataract surgery OD    Cataract OS  Moderate POAG OU, treated with Xalatan QHS OU  Hypertensive retinopathy OU    FAMILY HISTORY  Glaucoma: unknown to pt         Last edited by Karon Burgos, OD on 5/17/2017 10:16 AM.         Assessment /Plan     For exam results, see Encounter Report.    Type 2 diabetes mellitus without retinopathy  Current use of insulin   Uncontrolled, but no retinopathy noted OU. Continue management of DM as directed by PCP. Pt met with dietician earlier today and pt now has a plan to improve blood glucose. Monitor with DFE in 1 year, or RTC immediately with any vision changes.    Primary open angle glaucoma of both eyes, moderate stage   Continue Xalatan QHS OU; pt reports excellent compliance. Pt is overdue for follow-up with  Dr. Wheatley; appointment scheduled for 08/01/17.    Nuclear sclerosis, left   Cause of reduced best-corrected visual acuity OS (PH 20/25-1). Causing narrowing of angle. See Dr. Wheatley on 08/01/17 who will determine necessity of cataract surgery at that time.    Pseudophakia of right eye   Doing well. Monitor.    Epiretinal membrane (ERM) of right eye   Very mild. Not previously noted. Monitor.    Hypertensive retinopathy of both eyes   As previously noted, stable. BP has been well controlled recently. Discussed potential risk for retinal vascular occlusions. Continue management of HTN as directed by PCP. Monitor with yearly DFE.    Refractive error   Pt declined refraction today. Continue with current glasses for now.      RTC 08/01/17 to re-establish care with Dr. Wheatley

## 2017-05-17 NOTE — PROGRESS NOTES
Diabetes Education  Author: Jessica Mercado RD  Date: 5/17/2017    Diabetes Education Visit  Diabetes Education Record Assessment/Progress: Initial    Diabetes Type  Diabetes Type : Type II    Diabetes History  Diabetes Diagnosis: >10 years    Nutrition  Meal Planning: skipping meals, water, artificial sweeteners  Meal Plan 24 Hour Recall - Breakfast: toast, turkey sausage, sugar free jelly  Meal Plan 24 Hour Recall - Lunch: sometimes skips - keeps PB crackers in truck  Meal Plan 24 Hour Recall - Dinner: significant other cooks - meat, starch, vegetable  Meal Plan 24 Hour Recall - Snack: sometimes snacks after breakfast - pecan pinwheel and sometimes late at night - PB and J sandwich    Monitoring   Self Monitoring : SMBG 2-3 times daily - no logs at visit today - reports variability 130-260, had one episode hypo of 68 when on Trulicity and MDI  Blood Glucose Logs: No    Exercise   Exercise Type: use exercise equipment  Frequency: 3-5 Times per week    Current Diabetes Treatment   Current Treatment: Insulin (Novolog 3-5 units ac TID (usually takes 4 units) and Lantus 18 units once every evening)    Social History  Preferred Learning Method: Face to Face, Reading Materials  Primary Support: Self, Other (significant other attended visit today)  Smoking Status: Never a Smoker  Alcohol Use: Never                             Barriers to Change  Barriers to Change: None  Learning Challenges : None    Readiness to Learn   Readiness to Learn : Acceptance    Cultural Influences  Cultural Influences: No    Diabetes Education Assessment/Progress    Acute Complications (preventing, detecting, and treating acute complications): Discussion, Instructed, Competent (verbalizes/demonstrates), Written Materials Provided, Individual Session    Chronic Complications (preventing, detecting, and treating chronic complications): Discussion, Instructed, Competent (verbalizes/demonstrates), Written Materials Provided, Individual Session  (Due for eye exam - scheduled for today with Dr. Burgos. Reviewed care schedule. )    Diabetes Disease Process (diabetes disease process and treatment options): Discussion, Instructed, Competent (verbalizes/demonstrates), Written Materials Provided, Individual Session    Nutrition (Incorporating nutritional management into one's lifestyle): Discussion, Instructed, Competent (verbalizes/demonstrates), Written Materials Provided, Individual Session (Reviewed sources of CHO, serving sizes, timing/spacing of meals, avoid skipping meals, and rec'd eating pattern with 45-60g CHO/meal, 3 meals daily, and limiting most snacks to 0-5g CHO.)    Physical Activity (incorporating physical activity into one's lifestyle): Discussion, Instructed, Competent (verbalizes/demonstrates), Written Materials Provided, Individual Session (Had set back d/t knee problems but has started going to gym in his building. Discussed benefits and goals of physical activity. )    Medications (states correct name, dose, onset, peak, duration, side effects & timing of meds): Discussion, Instructed, Competent (verbalizes/demonstrates), Written Materials Provided, Individual Session (Reviewed timing, dosage, and MOA of DM meds. Reports sometimes takes extra Novolog at night if BG is high. Reviewed acting time of Novolog and Lantus. Stressed Novolog should be taken before breakfast, lunch and dinner and do not take extra between.)    Monitoring (monitoring blood glucose/other parameters & using results): Discussion, Instructed, Competent (verbalizes/demonstrates), Written Materials Provided, Individual Session (Provided log sheets. Advised to SMBG 4 times daily, AC/HS and bring logs to empowerment appt.)    Goal Setting and Problem Solving (verbalizes behavior change strategies & sets realistic goals): Discussion, Individual Session    Behavior Change (developing personal strategies to health & behavior change): Discussion, Individual  Session    Goals  Healthy Eating: Set  Start Date: 05/17/17  Target Date: 08/17/17  Monitoring: Set  Start Date: 05/17/17  Target Date: 08/17/17         Diabetes Care Plan/Intervention  Education Plan/Intervention: Diabetes Empowerment Program (Empowerment appt scheduled for next week, 5/26.)    Diabetes Meal Plan  Restrictions: Restricted Carbohydrate  Carbohydrate Per Meal: 45-60g    Education Units of Time   Time Spent: 60 min      Health Maintenance Topics with due status: Not Due       Topic Last Completion Date    Colonoscopy 03/22/2012    TETANUS VACCINE 12/17/2013    Influenza Vaccine 09/30/2014    Foot Exam 07/19/2016    Eye Exam 09/07/2016    Lipid Panel 05/11/2017    Hemoglobin A1c 05/11/2017     There are no preventive care reminders to display for this patient.

## 2017-05-17 NOTE — PATIENT INSTRUCTIONS
Thank you very much for coming in for your eye examination. It was a pleasure working with you today. Below is some information you might find helpful.     CATARACT    Symptoms and Signs:  A cataract starts out small, and at first has little effect on your vision. You may notice that your vision is blurred a little, like looking through a cloudy piece of glass or viewing an impressionist painting. A cataract may make light from the sun or a lamp seem too bright or glaring. Or you may notice when you drive at night that the oncoming headlights cause more glare than before. Colors may not appear as bright as they once did.  The type of cataract you have will affect exactly which symptoms you experience and how soon they will occur. When a nuclear cataract first develops it can bring about a temporary improvement in your near vision, called second sight. Unfortunately, the improved vision is short-lived and will disappear as the cataract worsens. Meanwhile, a sub-capsular cataract may not produce any symptoms until it's well-developed.    Causes:  No one knows for sure why the eye's lens changes as we age, forming cataracts. Researchers are gradually identifying factors that may cause cataracts - and information that may help to prevent them.  Many studies suggest that exposure to ultraviolet light is associated with cataracts, so eye care practitioners recommend wearing sunglasses and a wide-brimmed hat to lessen your exposure.  Other studies suggest people with diabetes are at risk for developing a cataract.   Some eye care practitioners believe that a diet high in antioxidants, such as beta-carotene (vitamin A), selenium and vitamins C and E, may forestall cataracts.  The most important of these is probably vitamin C; it might be helpful to supplement the diet with an extra Vitamin C tablet.  Meanwhile, eating a lot of salt may increase your risk.  Other risk factors include cigarette smoke, air pollution and heavy  alcohol consumption.  We simply recommend that you be careful to use sunglasses and to take Vitamin C.    Treatment:  When symptoms begin to appear, we can improve your vision for a while using new glasses, strong bifocals, magnification, appropriate lighting or other visual aids.  This is true in your case; your cataract does not impact your vision very much at this time. If you experience any of the symptoms we described you can return at any time. Otherwise it is fine to see you in 1 year.    Karon Burgos, OD

## 2017-05-22 ENCOUNTER — OFFICE VISIT (OUTPATIENT)
Dept: SPORTS MEDICINE | Facility: CLINIC | Age: 72
End: 2017-05-22
Payer: MEDICARE

## 2017-05-22 ENCOUNTER — TELEPHONE (OUTPATIENT)
Dept: INTERNAL MEDICINE | Facility: CLINIC | Age: 72
End: 2017-05-22

## 2017-05-22 VITALS
BODY MASS INDEX: 27.49 KG/M2 | HEART RATE: 96 BPM | WEIGHT: 192 LBS | SYSTOLIC BLOOD PRESSURE: 132 MMHG | DIASTOLIC BLOOD PRESSURE: 66 MMHG | HEIGHT: 70 IN

## 2017-05-22 DIAGNOSIS — M25.562 CHRONIC PAIN OF LEFT KNEE: Primary | ICD-10-CM

## 2017-05-22 DIAGNOSIS — M17.12 PRIMARY OSTEOARTHRITIS OF LEFT KNEE: ICD-10-CM

## 2017-05-22 DIAGNOSIS — G89.29 CHRONIC PAIN OF LEFT KNEE: Primary | ICD-10-CM

## 2017-05-22 DIAGNOSIS — M25.561 CHRONIC PAIN OF RIGHT KNEE: ICD-10-CM

## 2017-05-22 DIAGNOSIS — G89.29 CHRONIC PAIN OF RIGHT KNEE: ICD-10-CM

## 2017-05-22 DIAGNOSIS — M17.11 PRIMARY OSTEOARTHRITIS OF RIGHT KNEE: ICD-10-CM

## 2017-05-22 PROCEDURE — 99999 PR PBB SHADOW E&M-EST. PATIENT-LVL III: CPT | Mod: PBBFAC,,, | Performed by: PHYSICIAN ASSISTANT

## 2017-05-22 PROCEDURE — 20610 DRAIN/INJ JOINT/BURSA W/O US: CPT | Mod: 50,S$GLB,, | Performed by: PHYSICIAN ASSISTANT

## 2017-05-22 PROCEDURE — 99499 UNLISTED E&M SERVICE: CPT | Mod: S$GLB,,, | Performed by: PHYSICIAN ASSISTANT

## 2017-05-22 NOTE — PROGRESS NOTES
Patient is here for follow up of knee arthritis. Pt is requesting synvisc injection #2.  [facility] Has failed other conservative modalities including NSAIDS, activity modification, weight loss.    He has received good relief with synvisc injections in the past.     The prior shots was tolerated well.    He also recently received bilateral knee  braces which are providing him great pain relief.     PHYSICAL EXAMINATION:     General: The patient is alert and oriented x 3. Mood is pleasant.   Observation of ears, eyes and nose reveals no gross abnormalities. No   labored breathing observed.     No signs of infection or adverse reaction to knee.    PROCEDURE NOTE:   bilateral KNEE INJECTION  After time out was performed, including verification of patient ID, procedure, site and side, availability of information and equipment, review of safety issues, and agreement with consent, the procedure site was marked and the patient was prepped aseptically.    The bilateral knee were prepped with betadine and injected under sterile conditions from an anterolateral approach with patient lying and then 2cc of Synvisc was injected into bilateral knee joints . Patient tolerated the injection well. The patient had no adverse reactions to the medication.      RTC 1 week for 3rd injection.  Continue bilateral  braces  All questions were answered, pt will contact us for questions or concerns in the interim.

## 2017-05-22 NOTE — TELEPHONE ENCOUNTER
..Patient referred by Dr. Sagastume for Health coaching (Diabetes, lifestyle changes).  Called patient and gave an explanation about Health Coaching program and invited participation.   Patient states he would like to participate.  Set appointment for 5/31/17 at 0730.   Gave direct contact number to call if he/ she has any questions [phone].   Nelly Delong RN  Health

## 2017-05-22 NOTE — LETTER
May 22, 2017      Gabriella Shin MD  [address]  [place] [address]           [facility]  [address]  [facility] [address]  Phone: [phone]          Patient: Emerson Menendez   MR Number: [id number]   YOB: 1945   Date of Visit: 5/22/2017       Dear Dr. Gabriella Shin:    Thank you for referring Emerson Menendez to me for evaluation. Attached you will find relevant portions of my assessment and plan of care.    If you have questions, please do not hesitate to call me. I look forward to following Emerson Menendez along with you.    Sincerely,    Richar Kelly III, PA-C    Enclosure  CC:  No Recipients    If you would like to receive this communication electronically, please contact [email or web address] or [phone] to request more information on [employer] Link access.    For providers and/or their staff who would like to refer a patient to [facility], please contact us through our one-stop-shop provider referral line, [facility], at [phone].    If you feel you have received this communication in error or would no longer like to receive these types of communications, please e-mail [email or web address]

## 2017-05-26 ENCOUNTER — CLINICAL SUPPORT (OUTPATIENT)
Dept: DIABETES | Facility: CLINIC | Age: 72
End: 2017-05-26
Payer: MEDICARE

## 2017-05-26 VITALS
BODY MASS INDEX: 27.24 KG/M2 | HEART RATE: 69 BPM | HEIGHT: 70 IN | SYSTOLIC BLOOD PRESSURE: 130 MMHG | WEIGHT: 190.25 LBS | DIASTOLIC BLOOD PRESSURE: 74 MMHG

## 2017-05-26 DIAGNOSIS — E11.65 TYPE 2 DIABETES MELLITUS WITH HYPERGLYCEMIA, WITH LONG-TERM CURRENT USE OF INSULIN: Primary | ICD-10-CM

## 2017-05-26 DIAGNOSIS — N18.30 CKD STAGE 3 DUE TO TYPE 2 DIABETES MELLITUS: Chronic | ICD-10-CM

## 2017-05-26 DIAGNOSIS — D47.2 SMOLDERING MULTIPLE MYELOMA: ICD-10-CM

## 2017-05-26 DIAGNOSIS — Z79.4 TYPE 2 DIABETES MELLITUS WITH HYPERGLYCEMIA, WITH LONG-TERM CURRENT USE OF INSULIN: Primary | ICD-10-CM

## 2017-05-26 DIAGNOSIS — E11.22 CKD STAGE 3 DUE TO TYPE 2 DIABETES MELLITUS: Chronic | ICD-10-CM

## 2017-05-26 DIAGNOSIS — E11.29 MICROALBUMINURIA DUE TO TYPE 2 DIABETES MELLITUS: Chronic | ICD-10-CM

## 2017-05-26 DIAGNOSIS — E66.3 OVERWEIGHT (BMI 25.0-29.9): Chronic | ICD-10-CM

## 2017-05-26 DIAGNOSIS — E11.42 TYPE 2 DIABETES MELLITUS WITH DIABETIC POLYNEUROPATHY, WITH LONG-TERM CURRENT USE OF INSULIN: Chronic | ICD-10-CM

## 2017-05-26 DIAGNOSIS — E11.65 TYPE 2 DIABETES MELLITUS WITH HYPERGLYCEMIA, WITH LONG-TERM CURRENT USE OF INSULIN: Primary | Chronic | ICD-10-CM

## 2017-05-26 DIAGNOSIS — E78.00 PURE HYPERCHOLESTEROLEMIA: ICD-10-CM

## 2017-05-26 DIAGNOSIS — Z79.4 TYPE 2 DIABETES MELLITUS WITH HYPERGLYCEMIA, WITH LONG-TERM CURRENT USE OF INSULIN: Primary | Chronic | ICD-10-CM

## 2017-05-26 DIAGNOSIS — Z79.4 TYPE 2 DIABETES MELLITUS WITH DIABETIC POLYNEUROPATHY, WITH LONG-TERM CURRENT USE OF INSULIN: Chronic | ICD-10-CM

## 2017-05-26 DIAGNOSIS — R80.9 MICROALBUMINURIA DUE TO TYPE 2 DIABETES MELLITUS: Chronic | ICD-10-CM

## 2017-05-26 DIAGNOSIS — I10 ESSENTIAL HYPERTENSION: ICD-10-CM

## 2017-05-26 PROCEDURE — 99214 OFFICE O/P EST MOD 30 MIN: CPT | Mod: S$GLB,,, | Performed by: NURSE PRACTITIONER

## 2017-05-26 PROCEDURE — 99999 PR PBB SHADOW E&M-EST. PATIENT-LVL V: CPT | Mod: PBBFAC,,,

## 2017-05-26 PROCEDURE — G0108 DIAB MANAGE TRN  PER INDIV: HCPCS | Mod: S$GLB,,, | Performed by: DIETITIAN, REGISTERED

## 2017-05-26 RX ORDER — PEN NEEDLE, DIABETIC 30 GX3/16"
NEEDLE, DISPOSABLE MISCELLANEOUS
Qty: 350 EACH | Refills: 3 | Status: SHIPPED | OUTPATIENT
Start: 2017-05-26 | End: 2017-07-20

## 2017-05-26 RX ORDER — INSULIN DEGLUDEC 100 U/ML
22 INJECTION, SOLUTION SUBCUTANEOUS NIGHTLY
Qty: 5 SYRINGE | Refills: 3 | Status: SHIPPED | OUTPATIENT
Start: 2017-05-26 | End: 2017-06-29

## 2017-05-26 RX ORDER — INSULIN ASPART 100 [IU]/ML
INJECTION, SOLUTION INTRAVENOUS; SUBCUTANEOUS
Qty: 1 BOX | Refills: 3 | Status: SHIPPED | OUTPATIENT
Start: 2017-05-26 | End: 2017-06-29

## 2017-05-26 NOTE — PROGRESS NOTES
Diabetes Education  Author: Lisa Feng RD  Date: 5/26/2017    Diabetes Education Visit  Diabetes Education Record Assessment/Progress: Comprehensive/Group (empowerment #1)    Diabetes Type  Diabetes Type : Type II    Nutrition  Meal Plan 24 Hour Recall - Lunch:  (sometimes skips lunch)  Meal Plan 24 Hour Recall - Snack:  (will wake up in the middle of the night and have cookies sometimes)      Current Diabetes Treatment   Current Treatment: Insulin (Lantus 14 units HS; Novolog 3-5 units AC)    Social History  Preferred Learning Method: Face to Face, Hands On  Primary Support: Self, Daughter (daughter present at visit)      Barriers to Change  Barriers to Change: None  Learning Challenges : None    Readiness to Learn   Readiness to Learn : Acceptance    Cultural Influences  Cultural Influences: No        Diabetes Education Assessment/Progress  Acute Complications (preventing, detecting, and treating acute complications): Discussion, Instructed (Reviewed hypo symptoms and how to treat)    Nutrition (Incorporating nutritional management into one's lifestyle): Discussion, Competent (verbalizes/demonstrates), Written Materials Provided, Instructed (Reviewed ADA diet and carb counting in more detail. Pt sometimes eats little/no carbs at a meal (tomato/cucumber salad with dressing). Discussed having at least 15g carb at meals to be able to take prandial Novolog dose. Discussed alternative snack ideas to normal sugar free cookies. Reviewed appropriate meals/snacks to have with prandial dose.)    Medications (states correct name, dose, onset, peak, duration, side effects & timing of meds): Discussion, Individual Session, Demonstration, Competent (verbalizes/demonstrates), Written Materials Provided, Instructed (Changing Lantus to Tresiba 22 units nightly. Reviewed MOA of Tresiba; discussed dosage and timing. Continuing Novolog 4/4/3 units with meals. Adding correction scale 150-200 +1. Explained correction scale with  examples. Discussed taking additional dinner dose of 3 units Novolog when he gets up in the middle of the night and eats cookies or other carb snacks. Emphasized importance of taking novolog >4 hours apart)    Monitoring (monitoring blood glucose/other parameters & using results): Discussion, Written Materials Provided, Instructed (Instructed to SMBG 4 times daily and bring logs to all appts)        Goals  Medications: Set (Will take medications as prescibed)         Diabetes Care Plan/Intervention  Education Plan/Intervention: Diabetes Empowerment Program    Diabetes Meal Plan  Restrictions: Restricted Carbohydrate    Education Units of Time   Time Spent: 45 min      Health Maintenance Topics with due status: Not Due       Topic Last Completion Date    Colonoscopy 03/22/2012    TETANUS VACCINE 12/17/2013    Influenza Vaccine 09/30/2014    Lipid Panel 05/11/2017    Hemoglobin A1c 05/11/2017    Eye Exam 05/17/2017     Health Maintenance Due   Topic Date Due    Foot Exam  07/19/2017

## 2017-05-26 NOTE — PATIENT INSTRUCTIONS
Change lantus to tresiba and increase to 22 units daily     Change Novolog to 4 units before every meal, 3 units with dinner     Snacks can be an important part of a balanced, healthy meal plan. They allow you to eat more frequently, feeling full and satisfied throughout the day. Also, they allow you to spread carbohydrates evenly, which may stabilize blood sugars.  Plus, snacks are enjoyable!     The amount of carbohydrate needed at snacks varies. Generally, about 15-30 grams of carbohydrate per snack is recommended.  Below you will find some tasty treats.       0-5 gm carb   Crystal Light   Vitamin Water Zero   Herbal tea, unsweetened   2 tsp peanut butter on celery   1./2 cup sugar-free jell-o   1 sugar-free popsicle   ¼ cup blueberries   8oz Blue Emily unsweetened almond milk   5 baby carrots & celery sticks, cucumbers, bell peppers dipped in ¼ cup salsa, 2Tbsp light ranch dressing or 2Tbsp plain Greek yogurt   10 Goldfish crackers   ½ oz low-fat cheese or string cheese   1 closed handful of nuts, unsalted   1 Tbsp of sunflower seeds, unsalted   1 cup Smart Pop popcorn   1 whole grain brown rice cake        15 gm carb   1 small piece of fruit or ½ banana or 1/2 cup lite canned fruit   3 angy cracker squares   3 cups Smart Pop popcorn, top spray butter, Betancourt lite salt or cinnamon and Truvia   5 Vanilla Wafers   ½ cup low fat, no added sugar ice cream or frozen yogurt (Blue bell, Blue Bunny, Weight Watchers, Skinny Cow)   ½ turkey, ham, or chicken sandwich   ½ c fruit with ½ c Cottage cheese   4-6 unsalted wheat crackers with 1 oz low fat cheese or 1 tbsp peanut butter    30-45 goldfish crackers (depending on flavor)    7-8 Sabianism mini brown rice cakes (caramel, apple cinnamon, chocolate)    12 Sabianism mini brown rice cakes (cheddar, bbq, ranch)    1/3 cup hummus dip with raw veg   1/2 whole wheat bisi, 1Tbsp hummus   Mini Pizza (1/2 whole wheat English muffin, low-fat  cheese,  tomato sauce)   100 calorie snack pack (Oreo, Chips Ahoy, Ritz Mix, Baked Cheetos)   4-6 oz. light or Greek Style yogurt (Chobani, Yoplait, Okios, Stoneyfield)   ½ cup sugar-free pudding     6 in. wheat tortilla or bisi oven toasted chips (topped with spray butter flavoring, cinnamon, Truvia OR spray butter, garlic powder, chili powder)    18 BBQ Popchips (available at Target, Whole Foods, Fresh Market)         Snacks should be less than 15 grams of carbs

## 2017-05-26 NOTE — PROGRESS NOTES
CC:  Diabetes.     HPI: Emerson Menendez is a 71 y.o. male presents [facility]. The patient has had diabetes along with associated comorbidities indicated in the Visit Diagnosis section of this encounter. The patient was diagnosed with Type 2 diabetes in >10 years ago.     VISIT #: 1    1st visit - Today, presents with wife with intermittent BG readings on logs. Currently guessing at Novolog dose to administer with meals, as he does not always check his BG before meal and eats varying carb intake meals. Currently snacking heavily on sugar free chocolate because he thought it didn't have any carbs, not readings labels correctly. Previously tried Trulicit which worked well for controlling BG (even with some hypo related to basal insulin dose + Trulicity) but patient could not afford long term because of donut hole     DIABETES COMPLICATIONS: nephropathy and peripheral neuropathy     STANDARDS of CARE:  Statin:  Yes - lipitor   ACEI or ARB:  Yes - ace-i  ASA:  Yes - 81 mg   Last eye exam 5/2017 no retinopathy   Microalbumin/Creatinine ratio:  Lab Results   Component Value Date    MICALBCREAT 73.2 (H) 06/30/2014       CURRENT A1C:    Hemoglobin A1C   Date Value Ref Range Status   05/11/2017 9.6 (H) 4.5 - 6.2 % Final     Comment:     According to ADA guidelines, hemoglobin A1C <7.0% represents  optimal control in non-pregnant diabetic patients.  Different  metrics may apply to specific populations.   Standards of Medical Care in Diabetes - 2016.  For the purpose of screening for the presence of diabetes:  <5.7%     Consistent with the absence of diabetes  5.7-6.4%  Consistent with increasing risk for diabetes   (prediabetes)  >or=6.5%  Consistent with diabetes  Currently no consensus exists for use of hemoglobin A1C  for diagnosis of diabetes for children.     02/13/2017 8.1 (H) 4.5 - 6.2 % Final     Comment:     According to ADA guidelines, hemoglobin A1C <7.0% represents  optimal  control in non-pregnant diabetic patients.  Different  metrics may apply to specific populations.   Standards of Medical Care in Diabetes - 2016.  For the purpose of screening for the presence of diabetes:  <5.7%     Consistent with the absence of diabetes  5.7-6.4%  Consistent with increasing risk for diabetes   (prediabetes)  >or=6.5%  Consistent with diabetes  Currently no consensus exists for use of hemoglobin A1C  for diagnosis of diabetes for children.     11/21/2016 7.3 (H) 4.5 - 6.2 % Final     Comment:     According to ADA guidelines, hemoglobin A1C <7.0% represents  optimal control in non-pregnant diabetic patients.  Different  metrics may apply to specific populations.   Standards of Medical Care in Diabetes - 2016.  For the purpose of screening for the presence of diabetes:  <5.7%     Consistent with the absence of diabetes  5.7-6.4%  Consistent with increasing risk for diabetes   (prediabetes)  >or=6.5%  Consistent with diabetes  Currently no consensus exists for use of hemoglobin A1C  for diagnosis of diabetes for children.         GOAL A1C: <7%    DM MEDICATIONS USED IN THE PAST: Trulicity, Glimeperide, Metformin, Januvia     MEDICATIONS UPON START OF PROGRAM: Novolog 3-5, Lantus 18 units nightly  Denies missing lantus dose  Guessing on Novolog dose, sometimes missing doses     CURRENT DIABETES MEDICATIONS: Novolog 3-5, Lantus 18 units nightly  Denies missing lantus dose  Guessing on Novolog dose, sometimes missing doses     DO YOU USE THE [facility] EMAIL SYSTEM? Yes    BLOOD GLUCOSE MONITORING:            HYPOGLYCEMIA:  No    MEALS:   Eating   B - 1/2 glucerna or boost shake, turkey sausage and 1 piece toast or toast   L - subway 6 inch or hot dog from gas station  Snacks on peanut butter crackers in between lunch and dinner   D - chicken and salad or vegetable     Bedtime snack peanut butter crackers    CURRENT EXERCISE:  Yes - goes to the gym near his house     OCCUPATION: retired     MEDICATIONS,  "ALLERGIES, LABS, VS's, MEDICAL, SURGICAL, SOCIAL, AND FAMILY HISTORY reviewed and updated in the appropriate sections during this encounter    ROS:     Constitutional: Negative for weight change  Endocrine:  Denies polyuria, polydipsia, +2 nocturia.  HENT: Denies neck swelling, lumps, horseness or trouble swallowing. Denies any personal or family history of thyroid cancer.    Eyes: Negative for visual disturbance.   Respiratory: Negative for cough or shortness or breath.  Cardiovascular: Negative for chest pain or claudication.   Gastrointestinal: Negative for nausea, diarrhea, vomiting, bloating.  No history of pancreatitis or gastroparesis.  Genitourinary: Negative for urgency, frequency, frequent urinary tract infections.  Skin: Denies prolonged wound healing.  Neurological: Negative for syncope, + numbness/burning of extremities.  Psychiatric/Behavioral: Negative for depression or anxiety      All other systems reviewed and are negative.    PE:  Constitutional: Ht 5' 10" (1.778 m)   Wt 86.3 kg (190 lb 4.1 oz)   BMI 27.30 kg/m²    Well developed, well nourished, NAD.    HENT:   External ears, nose: no masses. No significant findings.   Hearing: normal     Neck:  No trachial deviation present, No neck masses noticed   Thyroid:  No thyromegaly present.  No thyroid tenderness.      Cardiovascular:    Auscultation:  No murmurs or abnormal sounds   Lower extremities:  No edema or cyanosis.     Respiratory:    Effort:  Normal, no use of accessory muscles.   Auscultation: breath sounds normal, no adventitious sounds.  Abdomen:     Exam:  Soft, non-tender, no masses.      No hernia noted.  Skin:    Inspection: no rashes, lesion or ulcers, + acanthosis nigracans.   Palpation: no induration or nodules.    Insulin injection sites: no lipoatrophy or lipohypertrophy.  Psychiatric:  Judgement and insight good with normal mood and affect.  Musculoskeletal:  Gait steady. No gross abnormalities.      FOOT EXAMINATION: "   Protective Sensation (w/ 10 gram monofilament):  Right: Intact  Left: Intact    Visual Inspection:  Callus -  Bilateral    Pedal Pulses:   Right: Present  Left: Present    Posterior tibialis:   Right:Present  Left: Present    Decreased vibratory response to 128 Hz tuning fork bilaterally.       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   Reviewed A1c and BG goals.  Discussed diagnosis of DM, progression of disease, long term complications and tx options, including GLP-1 (if qualifies for Medicare extra help) versus Vgo versus adjusting MDI.     For now, will adjust MDI until basal insulin dose >20, then would be great candidate for Vgo. Change Lantus to Tresiba and increase to 22 units nightly, change Novolog to 4/4/3 AC (lower carb intake with dinner) plus correction scale (150 +1), no correction at night      Instructed to monitor BG ac/hs, document on BG logs, and bring complete BG logs to all visits - given BG logs and envelopes to mail logs every 2 weeks until f/u appt     RTC in 6 week for further titration, Vgo discussion       2- CKD 3 with microalbuminuria - discussed presence of CKD 3 and micro, followed by nephrology, needs improved BG control     3 - Peripheral neuropathy - Educated patient to check feet daily for any foreign objects and/or wounds. Discussed with patient the importance of wearing appropriate footwear at all times, not to walk barefoot ever, and to check shoes before putting them on feet. Instructed patient to keep feet dry by regularly changing shoes and socks and drying feet after baths and exercises. Also, instructed patient to report any new lesions, discolorations, or  swelling to a healthcare professional.    4- Hypertension - goal < 140/90 mmHg -  At goal, on ACE-I, continue current medications   BP Readings from Last 3 Encounters:   05/22/17 132/66   05/15/17 116/67   05/11/17 (!) 142/70       5- Hyperlipidemia -  LFT's WNL. On statin, LDL at goal     Lab Results   Component Value Date    LDLCALC 50.0 (L) 05/11/2017       6- Body mass index is 27.3 kg/m². = Overweight. Modest weight loss (5-10%) may greatly improve BG control     7 - Smoldering myeloma - currently enrolled in study

## 2017-05-26 NOTE — Clinical Note
Emerson Menendez attended [facility] today and his insulin doses were adjusted, with the plan to change to Vgo insulin delivery device at his next visit.  Emerson Menendez will be seen back in 6 weeks.  Please let me know if I can be of any assistance. Thank you for allowing me to participate in Emerson Menendez 's care.   Thanks BRITNEY Walters Endocrinology Nurse Practitioner

## 2017-05-29 ENCOUNTER — PATIENT MESSAGE (OUTPATIENT)
Dept: UROLOGY | Facility: CLINIC | Age: 72
End: 2017-05-29

## 2017-05-29 ENCOUNTER — OFFICE VISIT (OUTPATIENT)
Dept: SPORTS MEDICINE | Facility: CLINIC | Age: 72
End: 2017-05-29
Payer: MEDICARE

## 2017-05-29 VITALS
HEART RATE: 91 BPM | SYSTOLIC BLOOD PRESSURE: 118 MMHG | WEIGHT: 190 LBS | BODY MASS INDEX: 27.2 KG/M2 | HEIGHT: 70 IN | DIASTOLIC BLOOD PRESSURE: 67 MMHG

## 2017-05-29 DIAGNOSIS — M17.12 PRIMARY OSTEOARTHRITIS OF LEFT KNEE: ICD-10-CM

## 2017-05-29 DIAGNOSIS — M25.561 CHRONIC PAIN OF RIGHT KNEE: ICD-10-CM

## 2017-05-29 DIAGNOSIS — M25.562 CHRONIC PAIN OF LEFT KNEE: Primary | ICD-10-CM

## 2017-05-29 DIAGNOSIS — M17.11 PRIMARY OSTEOARTHRITIS OF RIGHT KNEE: ICD-10-CM

## 2017-05-29 DIAGNOSIS — G89.29 CHRONIC PAIN OF LEFT KNEE: Primary | ICD-10-CM

## 2017-05-29 DIAGNOSIS — G89.29 CHRONIC PAIN OF RIGHT KNEE: ICD-10-CM

## 2017-05-29 PROCEDURE — 99499 UNLISTED E&M SERVICE: CPT | Mod: S$GLB,,, | Performed by: PHYSICIAN ASSISTANT

## 2017-05-29 PROCEDURE — 99999 PR PBB SHADOW E&M-EST. PATIENT-LVL V: CPT | Mod: PBBFAC,,, | Performed by: PHYSICIAN ASSISTANT

## 2017-05-29 PROCEDURE — 20610 DRAIN/INJ JOINT/BURSA W/O US: CPT | Mod: 50,S$GLB,, | Performed by: PHYSICIAN ASSISTANT

## 2017-05-29 RX ORDER — TADALAFIL 20 MG/1
20 TABLET ORAL
COMMUNITY
End: 2017-05-29 | Stop reason: SDUPTHER

## 2017-05-29 RX ORDER — TADALAFIL 20 MG/1
20 TABLET ORAL
Qty: 6 TABLET | Refills: 11 | Status: SHIPPED | OUTPATIENT
Start: 2017-05-29 | End: 2017-07-20

## 2017-05-29 NOTE — LETTER
May 29, 2017      Gabriella Shin MD  [address]  [place] [address]           [facility]  [address]  [facility] [address]  Phone: [phone]          Patient: Emerson Menendez   MR Number: [id number]   YOB: 1945   Date of Visit: 5/29/2017       Dear Dr. Gabriella Shin:    Thank you for referring Emerson Menendez to me for evaluation. Attached you will find relevant portions of my assessment and plan of care.    If you have questions, please do not hesitate to call me. I look forward to following Emerson Menendez along with you.    Sincerely,    Richar Kelly III, PA-C    Enclosure  CC:  No Recipients    If you would like to receive this communication electronically, please contact [email or web address] or [phone] to request more information on [facility] access.    For providers and/or their staff who would like to refer a patient to [facility], please contact us through our one-stop-shop provider referral line, [facility], at [phone].    If you feel you have received this communication in error or would no longer like to receive these types of communications, please e-mail [email or web address]

## 2017-05-29 NOTE — PROGRESS NOTES
Patient is here for follow up of knee arthritis. Pt is requesting synvisc injection #3.  PMFH reviewed per encounter record. Has failed other conservative modalities including NSAIDS, activity modification, weight loss.    He has received good relief with synvisc injections in the past.     The prior shots was tolerated well.    He also recently received bilateral knee  braces which are providing him great pain relief.     PHYSICAL EXAMINATION:     General: The patient is alert and oriented x 3. Mood is pleasant.   Observation of ears, eyes and nose reveals no gross abnormalities. No   labored breathing observed.     No signs of infection or adverse reaction to knee.    PROCEDURE NOTE:   bilateral KNEE INJECTION  After time out was performed, including verification of patient ID, procedure, site and side, availability of information and equipment, review of safety issues, and agreement with consent, the procedure site was marked and the patient was prepped aseptically.    The bilateral knee were prepped with betadine and injected under sterile conditions from an anterolateral approach with patient lying and then 2cc of Synvisc was injected into bilateral knee joints . Patient tolerated the injection well. The patient had no adverse reactions to the medication.      RTC prn knee pain  Continue bilateral  braces  All questions were answered, pt will contact us for questions or concerns in the interim.

## 2017-06-08 ENCOUNTER — RESEARCH ENCOUNTER (OUTPATIENT)
Dept: RESEARCH | Facility: HOSPITAL | Age: 72
End: 2017-06-08

## 2017-06-08 ENCOUNTER — LAB VISIT (OUTPATIENT)
Dept: LAB | Facility: HOSPITAL | Age: 72
End: 2017-06-08
Attending: INTERNAL MEDICINE
Payer: MEDICARE

## 2017-06-08 ENCOUNTER — OFFICE VISIT (OUTPATIENT)
Dept: HEMATOLOGY/ONCOLOGY | Facility: CLINIC | Age: 72
End: 2017-06-08
Payer: MEDICARE

## 2017-06-08 VITALS
RESPIRATION RATE: 14 BRPM | HEART RATE: 76 BPM | OXYGEN SATURATION: 98 % | HEIGHT: 70 IN | TEMPERATURE: 98 F | WEIGHT: 191.56 LBS | DIASTOLIC BLOOD PRESSURE: 71 MMHG | BODY MASS INDEX: 27.42 KG/M2 | SYSTOLIC BLOOD PRESSURE: 146 MMHG

## 2017-06-08 DIAGNOSIS — E11.42 DIABETIC PERIPHERAL NEUROPATHY ASSOCIATED WITH TYPE 2 DIABETES MELLITUS: ICD-10-CM

## 2017-06-08 DIAGNOSIS — N40.1 BENIGN NODULAR PROSTATIC HYPERPLASIA WITH LOWER URINARY TRACT SYMPTOMS: ICD-10-CM

## 2017-06-08 DIAGNOSIS — Z00.6 EXAMINATION OF PARTICIPANT IN CLINICAL TRIAL: ICD-10-CM

## 2017-06-08 DIAGNOSIS — C90.00 MULTIPLE MYELOMA, REMISSION STATUS UNSPECIFIED: ICD-10-CM

## 2017-06-08 DIAGNOSIS — E11.49 TYPE 2 DIABETES MELLITUS WITH OTHER NEUROLOGIC COMPLICATION: ICD-10-CM

## 2017-06-08 DIAGNOSIS — D47.2 SMOLDERING MULTIPLE MYELOMA: Primary | ICD-10-CM

## 2017-06-08 LAB
ALBUMIN SERPL BCP-MCNC: 3.8 G/DL
ALP SERPL-CCNC: 50 U/L
ALT SERPL W/O P-5'-P-CCNC: 23 U/L
ANION GAP SERPL CALC-SCNC: 10 MMOL/L
AST SERPL-CCNC: 18 U/L
BASOPHILS # BLD AUTO: 0.02 K/UL
BASOPHILS NFR BLD: 0.4 %
BILIRUB SERPL-MCNC: 0.8 MG/DL
BUN SERPL-MCNC: 25 MG/DL
CALCIUM SERPL-MCNC: 10 MG/DL
CHLORIDE SERPL-SCNC: 100 MMOL/L
CO2 SERPL-SCNC: 27 MMOL/L
CREAT SERPL-MCNC: 1.7 MG/DL
DIFFERENTIAL METHOD: ABNORMAL
EOSINOPHIL # BLD AUTO: 0.3 K/UL
EOSINOPHIL NFR BLD: 5.6 %
ERYTHROCYTE [DISTWIDTH] IN BLOOD BY AUTOMATED COUNT: 13.6 %
EST. GFR  (AFRICAN AMERICAN): 46.2 ML/MIN/1.73 M^2
EST. GFR  (NON AFRICAN AMERICAN): 40 ML/MIN/1.73 M^2
GLUCOSE SERPL-MCNC: 184 MG/DL
HCT VFR BLD AUTO: 37.9 %
HGB BLD-MCNC: 12.3 G/DL
IGA SERPL-MCNC: 1594 MG/DL
IGG SERPL-MCNC: 933 MG/DL
IGM SERPL-MCNC: 44 MG/DL
LYMPHOCYTES # BLD AUTO: 1.9 K/UL
LYMPHOCYTES NFR BLD: 38.6 %
MAGNESIUM SERPL-MCNC: 1.8 MG/DL
MCH RBC QN AUTO: 28.4 PG
MCHC RBC AUTO-ENTMCNC: 32.5 %
MCV RBC AUTO: 88 FL
MONOCYTES # BLD AUTO: 0.2 K/UL
MONOCYTES NFR BLD: 4.8 %
NEUTROPHILS # BLD AUTO: 2.5 K/UL
NEUTROPHILS NFR BLD: 50.4 %
PHOSPHATE SERPL-MCNC: 3.7 MG/DL
PLATELET # BLD AUTO: 219 K/UL
PMV BLD AUTO: 9 FL
POTASSIUM SERPL-SCNC: 4.6 MMOL/L
PROT SERPL-MCNC: 8.5 G/DL
RBC # BLD AUTO: 4.33 M/UL
SODIUM SERPL-SCNC: 137 MMOL/L
WBC # BLD AUTO: 4.98 K/UL

## 2017-06-08 PROCEDURE — 99215 OFFICE O/P EST HI 40 MIN: CPT | Mod: Q1,S$GLB,, | Performed by: INTERNAL MEDICINE

## 2017-06-08 PROCEDURE — 3046F HEMOGLOBIN A1C LEVEL >9.0%: CPT | Mod: Q1,S$GLB,, | Performed by: INTERNAL MEDICINE

## 2017-06-08 PROCEDURE — 99999 PR PBB SHADOW E&M-EST. PATIENT-LVL III: CPT | Mod: PBBFAC,,, | Performed by: INTERNAL MEDICINE

## 2017-06-08 PROCEDURE — 84165 PROTEIN E-PHORESIS SERUM: CPT | Mod: 26,Q1,, | Performed by: PATHOLOGY

## 2017-06-08 PROCEDURE — 1126F AMNT PAIN NOTED NONE PRSNT: CPT | Mod: Q1,S$GLB,, | Performed by: INTERNAL MEDICINE

## 2017-06-08 PROCEDURE — 3066F NEPHROPATHY DOC TX: CPT | Mod: Q1,S$GLB,, | Performed by: INTERNAL MEDICINE

## 2017-06-08 PROCEDURE — 1159F MED LIST DOCD IN RCRD: CPT | Mod: Q1,S$GLB,, | Performed by: INTERNAL MEDICINE

## 2017-06-08 PROCEDURE — 86334 IMMUNOFIX E-PHORESIS SERUM: CPT | Mod: 26,Q1,, | Performed by: PATHOLOGY

## 2017-06-08 RX ORDER — GLIMEPIRIDE 2 MG/1
TABLET ORAL
Refills: 2 | COMMUNITY
Start: 2017-05-23 | End: 2017-07-20 | Stop reason: SDUPTHER

## 2017-06-08 NOTE — PROGRESS NOTES
"  Thursday, June 8th, 2017      Protocol: E3A06  Sponsor:  [facility]  IRB #: [id number]  Investigator: GIL Engel  Pt Initials: PLUCÍA      Arm B: Observation  Cycle 17, Day 28      Patient presents to clinic to evaluate ability to proceed with Cycle 18 of observation per above-mentioned protocol. Patient is awake, alert, and oriented to person, place, and time. He is ambulatory and states he has been feeling well. He states his knee pain remains stable but is trying to get a better grasp on controlling his diabetes.  He states he attended a session with a dietician recently and that he "learned a lot."  He denies new potential CRAB symptoms. He states continued willingness to participate in above-mentioned study.      Review of AE's:  1. Hypertension, grade 2: BP today is 146/71  in clinic today. This is stable from previous visits and consistent with his known hypertensive issues when he has visits here.  He was encouraged to monitor this at home as well.  He states understanding.  Will continue to follow this. AE ongoing.  2. Lower Back Pain and Neck Pain, grade 1: Patient has no complaints of this issue this month.  States he learned techniques from past PT visits and is doing this at home. As previously stated, patient is not suspected to have bony myeloma involvement per Dr. Engel as these are pre-existing issues present at baseline. He was encouraged to keep up physical activity and exercise to help alleviate this. He states understanding. AE ongoing.  3.  Pain in extremity (knee), grade 2.  See above regarding statements of much improved symptoms.  AE improved, now grade 1.  4. Creatinine increased, grade 1: Serum creatinine is 1.7 mg/dl today and GFR 49.7. See urinary and prostate issues as described above. He states he continues to take oxybutinin and flomax per Dr. Walker. He states he moved this medication to nighttime and has noticed improvement. This was noted per Dr. Engel and not concerned for myeloma " "involvement at this time.  Will continue observation monthly and to monitor renal function closely. AE stable.   5. Peripheral sensory neuropathy, grade 1: Patient does not verbalize complaints of this symptom today. He still plans to get fitted for diabetic shoes. AE ongoing.       No changes in other baseline AE's noted.      Per study chair, "the definition of progressive disease for this protocol is developing CRAB criteria that requires treatment systemically." Per Dr Engel, based on today's exam and lab results thus far, patient does not have any s/s of CRAB: Normal Calcium level (10.0), absence of Renal insufficiency (serum creatinine 1.7, and GFR 49.7 per [facility]--patient with a history of kidney dysfunction secondary to diabetes; Dr. Engel aware of these values and not concerned for myeloma involvement), absence of significant Anemia (hemoglobin 12.3) and absence of lytic Bone lesions (per baseline metastatic survey as well as MRI--see MD note for further comment). See MD note for ECOG score and H&P and flowsheets for laboratory work, vitals, etc. All myeloma-related blood work from last cycle including SPEP and JAGUAR have remained stable and per Dr. Engel, patient shows no evidence of progression today. Patient informed that Dr. Engel will release all lab results to [facility]. He states understanding of this.       RN reinforced importance of patient following up monthly for assessment as well as lab work. Patient verbalizes understanding. Appointment for next month's visit was made in clinic today and appt slip is to be mailed to patient at a later date. Patient was given 's contact information and has MD contact information to call with any concerns, questions, or worsening of symptoms; he verbalized understanding.          "

## 2017-06-09 LAB
ALBUMIN SERPL ELPH-MCNC: 4.1 G/DL
ALPHA1 GLOB SERPL ELPH-MCNC: 0.28 G/DL
ALPHA2 GLOB SERPL ELPH-MCNC: 0.78 G/DL
B-GLOBULIN SERPL ELPH-MCNC: 2.62 G/DL
GAMMA GLOB SERPL ELPH-MCNC: 0.51 G/DL
INTERPRETATION SERPL IFE-IMP: NORMAL
KAPPA LC SER QL IA: 5.93 MG/DL
KAPPA LC/LAMBDA SER IA: 3.75
LAMBDA LC SER QL IA: 1.58 MG/DL
PATHOLOGIST INTERPRETATION IFE: NORMAL
PATHOLOGIST INTERPRETATION SPE: NORMAL
PROT SERPL-MCNC: 8.3 G/DL

## 2017-06-22 ENCOUNTER — TELEPHONE (OUTPATIENT)
Dept: DIABETES | Facility: CLINIC | Age: 72
End: 2017-06-22

## 2017-06-22 NOTE — TELEPHONE ENCOUNTER
Called and spoke with patient as instructed per Tiffanie Wilhelm NP. Patient explains that, he has NOT been taking the Novolog because when he did, his blood sugars would drop to 60's and 70's and he was very symptomatic- even when he did eat meals.  He is however, still taking his Lantus but is wondering if he even needs it because his blood sugars are between 110-120 in the mornings, 92 after eating lunch and 90 after eating dinner.  He has been not eating any sugars and monitoring his carbohydrates very closely.  He will continue to keep the blood sugar logs for you.  Please advise.  Patient is requesting a call back.  He feels that if he had been eating correctly from the beginning, he would have never had insulin prescribed.

## 2017-06-22 NOTE — TELEPHONE ENCOUNTER
Please notify patient that I received his BG logs. His blood sugar readings are very variable. Is he missing doses of Novolog or holding doses of Novolog, even if he eats?    Thanks

## 2017-06-29 RX ORDER — INSULIN GLARGINE 100 [IU]/ML
18 INJECTION, SOLUTION SUBCUTANEOUS NIGHTLY
Qty: 15 ML | Refills: 3 | Status: SHIPPED | OUTPATIENT
Start: 2017-06-29 | End: 2017-07-20 | Stop reason: SDUPTHER

## 2017-06-29 NOTE — TELEPHONE ENCOUNTER
Pt cannot attend [facility] appt scheduled for next week 7/7. Please reschedule pt for opening on Thurs 7/6 @ 130pm. Pt already aware and confirmed appt time change.    Thanks        Called pt, not taking Novolog due to hypoglycemia. Pt has changed his eating and lifestyle habits, resulting in much improved BG control. Instructed pt to d/c Novolog, continue Lantus 18 units nightly. Instructed pt to bring BG logs to next week's f/u appt and will discuss chronic medications at that time . Pt verbalized understanding

## 2017-07-06 ENCOUNTER — OFFICE VISIT (OUTPATIENT)
Dept: HEMATOLOGY/ONCOLOGY | Facility: CLINIC | Age: 72
End: 2017-07-06
Payer: MEDICARE

## 2017-07-06 ENCOUNTER — RESEARCH ENCOUNTER (OUTPATIENT)
Dept: RESEARCH | Facility: HOSPITAL | Age: 72
End: 2017-07-06

## 2017-07-06 ENCOUNTER — LAB VISIT (OUTPATIENT)
Dept: LAB | Facility: HOSPITAL | Age: 72
End: 2017-07-06
Attending: INTERNAL MEDICINE
Payer: MEDICARE

## 2017-07-06 VITALS
SYSTOLIC BLOOD PRESSURE: 123 MMHG | HEART RATE: 95 BPM | WEIGHT: 185.44 LBS | HEIGHT: 70 IN | TEMPERATURE: 99 F | BODY MASS INDEX: 26.55 KG/M2 | DIASTOLIC BLOOD PRESSURE: 77 MMHG

## 2017-07-06 DIAGNOSIS — C90.00 MULTIPLE MYELOMA, REMISSION STATUS UNSPECIFIED: ICD-10-CM

## 2017-07-06 DIAGNOSIS — M17.0 BILATERAL PRIMARY OSTEOARTHRITIS OF KNEE: ICD-10-CM

## 2017-07-06 DIAGNOSIS — Z00.6 EXAMINATION OF PARTICIPANT IN CLINICAL TRIAL: ICD-10-CM

## 2017-07-06 DIAGNOSIS — Z00.6 CLINICAL TRIAL PARTICIPANT: ICD-10-CM

## 2017-07-06 DIAGNOSIS — D47.2 SMOLDERING MULTIPLE MYELOMA (SMM): Primary | ICD-10-CM

## 2017-07-06 LAB
ALBUMIN SERPL BCP-MCNC: 4.2 G/DL
ALP SERPL-CCNC: 45 U/L
ALT SERPL W/O P-5'-P-CCNC: 24 U/L
ANION GAP SERPL CALC-SCNC: 10 MMOL/L
AST SERPL-CCNC: 20 U/L
BASOPHILS # BLD AUTO: 0.03 K/UL
BASOPHILS NFR BLD: 0.5 %
BILIRUB SERPL-MCNC: 0.9 MG/DL
BUN SERPL-MCNC: 25 MG/DL
CALCIUM SERPL-MCNC: 10 MG/DL
CHLORIDE SERPL-SCNC: 103 MMOL/L
CO2 SERPL-SCNC: 26 MMOL/L
CREAT SERPL-MCNC: 1.9 MG/DL
DIFFERENTIAL METHOD: ABNORMAL
EOSINOPHIL # BLD AUTO: 0.2 K/UL
EOSINOPHIL NFR BLD: 4.1 %
ERYTHROCYTE [DISTWIDTH] IN BLOOD BY AUTOMATED COUNT: 13.7 %
EST. GFR  (AFRICAN AMERICAN): 40 ML/MIN/1.73 M^2
EST. GFR  (NON AFRICAN AMERICAN): 35 ML/MIN/1.73 M^2
GLUCOSE SERPL-MCNC: 141 MG/DL
HCT VFR BLD AUTO: 35.6 %
HGB BLD-MCNC: 11.7 G/DL
IGA SERPL-MCNC: 1494 MG/DL
IGG SERPL-MCNC: 997 MG/DL
IGM SERPL-MCNC: 39 MG/DL
LYMPHOCYTES # BLD AUTO: 1.8 K/UL
LYMPHOCYTES NFR BLD: 31.5 %
MAGNESIUM SERPL-MCNC: 1.9 MG/DL
MCH RBC QN AUTO: 28.5 PG
MCHC RBC AUTO-ENTMCNC: 32.9 %
MCV RBC AUTO: 87 FL
MONOCYTES # BLD AUTO: 0.4 K/UL
MONOCYTES NFR BLD: 6 %
NEUTROPHILS # BLD AUTO: 3.4 K/UL
NEUTROPHILS NFR BLD: 57.7 %
PHOSPHATE SERPL-MCNC: 3.2 MG/DL
PLATELET # BLD AUTO: 261 K/UL
PMV BLD AUTO: 8.8 FL
POTASSIUM SERPL-SCNC: 4.6 MMOL/L
PROT SERPL-MCNC: 8.7 G/DL
RBC # BLD AUTO: 4.1 M/UL
SODIUM SERPL-SCNC: 139 MMOL/L
WBC # BLD AUTO: 5.81 K/UL

## 2017-07-06 PROCEDURE — 86334 IMMUNOFIX E-PHORESIS SERUM: CPT | Mod: 26,Q1,, | Performed by: PATHOLOGY

## 2017-07-06 PROCEDURE — 99999 PR PBB SHADOW E&M-EST. PATIENT-LVL V: CPT | Mod: PBBFAC,,, | Performed by: INTERNAL MEDICINE

## 2017-07-06 PROCEDURE — 84165 PROTEIN E-PHORESIS SERUM: CPT | Mod: 26,Q1,, | Performed by: PATHOLOGY

## 2017-07-06 PROCEDURE — 1159F MED LIST DOCD IN RCRD: CPT | Mod: Q1,S$GLB,, | Performed by: INTERNAL MEDICINE

## 2017-07-06 PROCEDURE — 1126F AMNT PAIN NOTED NONE PRSNT: CPT | Mod: Q1,S$GLB,, | Performed by: INTERNAL MEDICINE

## 2017-07-06 PROCEDURE — 99214 OFFICE O/P EST MOD 30 MIN: CPT | Mod: Q1,S$GLB,, | Performed by: INTERNAL MEDICINE

## 2017-07-06 NOTE — PROGRESS NOTES
Subjective:       Patient ID: Emerson Menendez is a 71 y.o. male.    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 Test results identify a stable IgA kappa protein with beta globulin band at 1.63g/dL. Kappa free light chain is elevated at 4.40. CBC and calcium are stable. Creatinine with history of stage III CKD is stable at 1.4. Metastatic survey is negative for lytic lesions. MRI of the entire spine demonstrated age related changes but no convincing evidence of myeloma bone disease. Bone marrow biopsy identified about 13% plasma cells by morphology and FISH for myeloma identified a t(11;14) and trisomies 3,7, and 17. The patient is afebrile and appears clinically well. He was seen by his podiatrist and nephrologist since our last visit without any new or acute events.    The patient has not received any therapy for smoldering myeloma including bisphosphonates or steroids. He has been randomized to observation arm of the the clinical study. 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 He has no current or prior history of malignancy.    TODAY  Mr. Menendez returns today for monthly follow-up evaluation. He denies any interval events since last interview. Bilateral knee pain significantly resolved with braces but he does find them burdensome.   No other clinical signs/symptoms of progression of his smoldering myeloma.  I am seeing patient for Dr. Engel who is out on vacation.      HPI  Review of Systems   Constitutional: Negative for activity change, appetite change, diaphoresis, fatigue, fever and unexpected weight change.   HENT:  Negative.    Eyes: Negative.    Respiratory: Negative.    Cardiovascular: Negative for leg swelling.   Gastrointestinal: Negative.    Endocrine: Negative.    Genitourinary: Negative.    Musculoskeletal: Negative.    Skin: Negative.    Allergic/Immunologic: Negative.    Neurological:        Grade 0-1 peripheral neuropathy of bilateral feet.    Hematological: Negative for adenopathy. Does not bruise/bleed easily.   Psychiatric/Behavioral: Negative.        Objective:       Vitals:    07/06/17 1423   BP: 123/77   Pulse: 95   Temp: 98.5 °F (36.9 °C)       Physical Exam   Constitutional: He is oriented to person, place, and time. He appears well-developed and well-nourished.   HENT:   Head: Normocephalic and atraumatic.   Right Ear: External ear normal.   Left Ear: External ear normal.   Nose: Nose normal.   Mouth/Throat: Oropharynx is clear and moist.   Eyes: Conjunctivae and EOM are normal. Pupils are equal, round, and reactive to light.   Neck: Normal range of motion. Neck supple.   Cardiovascular: Normal rate, regular rhythm and intact distal pulses.    No murmur heard.  Pulmonary/Chest: Effort normal and breath sounds normal. No respiratory distress.   Abdominal: Soft. Bowel sounds are normal. He exhibits no distension. There is no hepatosplenomegaly.   Musculoskeletal: He exhibits no edema or tenderness.   Neurological: He is alert and oriented to person, place, and time. No cranial nerve deficit.   Skin: Skin is warm and dry. No rash noted. No cyanosis. Nails show no clubbing.   Psychiatric: He has a normal mood and affect. His behavior is normal.   Nursing note and vitals reviewed.      Assessment:       1. Smoldering multiple myeloma (SMM)    2. Bilateral primary osteoarthritis of knee    3. Clinical trial participant        Plan:       The patient has a diagnosis of smoldering myeloma. There is no indication for immediate chemotherapy. We are monitoring renal function closely- baseline creatinine of -1.5-1.6 is  stable today   We will continue observation as per the Randomized Phase III Trial of Lenalidomide Versus Observation Alone in Patients with 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. CBC and CMP are stable.  Complete biochemical studies from today are pending.  Plan for return in 1 month.  Will refer to [facility] in orthopedics for evaluation for surgical intervention for his bilateral knee OA.   Follow-up pending myeloma labs.    Guicho Jiang MD  Hematology & Stem Cell Transplant

## 2017-07-06 NOTE — Clinical Note
eduar, can you please refer patient to Dr. [facility] in orthopedics dept -chrystal, ill defer the follow up date/ scheduing with Dr. Engel in a month to you

## 2017-07-06 NOTE — PROGRESS NOTES
Thursday, July 6th, 2017      Protocol: E3A06  Sponsor:  [facility]  IRB #: [id number]  Investigator: GIL Engel  Pt Initials: PLUCÍA      Arm B: Observation  Cycle 18, Day 28      Patient presents to clinic to evaluate ability to proceed with Cycle 19 of observation per above-mentioned protocol. Patient is awake, alert, and oriented to person, place, and time. He is ambulatory and states he has been feeling well. He states his knee pain remains stable with use of knee stabilizing brace but is trying to get a better grasp on controlling his diabetes.  He states he continues to attend diabetes counseling sessions, and he missed his appt today and wishes to reschedule.  He denies new potential CRAB symptoms. He states continued willingness to participate in above-mentioned study.        Review of AE's:  1. Hypertension, grade 1: BP today is 123/77  in clinic today. This is improved from previous visits and consistent with his known hypertensive issues when he has visits here.  He was encouraged to monitor this at home as well.  He states understanding.  Will continue to follow this. AE ongoing.  2. Lower Back Pain and Neck Pain, grade 1: Patient has no complaints of this issue this month.  States he learned techniques from past PT visits and is doing this at home. As previously stated, patient is not suspected to have bony myeloma involvement per Dr. Engel as these are pre-existing issues present at baseline. He was encouraged to keep up physical activity and exercise to help alleviate this. He states understanding. AE ongoing.  3.  Pain in extremity (knee), grade 2.  See above regarding statements of much improved symptoms.  Wearing knee stabilizing braces today.  Expressed interest at Dr. Jiang's suggestion of pursing minimally invasive knee replacement surgery.  MD to refer to orthopedist.  In any case, patient states he has little to no pain when wearing braces.   AE improved, now grade 1.  4. Creatinine  "increased, grade 1: Serum creatinine is 1.9 mg/dl today and GFR 43.3. Per Dr. Engel this has not been related to myeloma and is related chronic kidney, prostate issues.  He states he continues to take oxybutinin and flomax per Dr. Walker. He states he moved this medication to nighttime and has noticed improvement. This was noted per Dr. Engel and not concerned for myeloma involvement at this time.  Will continue observation monthly and to monitor renal function closely. AE stable.   5. Peripheral sensory neuropathy, grade 1: Patient does not verbalize complaints of this symptom today. He still plans to get fitted for diabetic shoes. AE ongoing.       No changes in other baseline AE's noted.      Per study chair, "the definition of progressive disease for this protocol is developing CRAB criteria that requires treatment systemically." Per Dr Engel, based on today's exam and lab results thus far, patient does not have any s/s of CRAB: Normal Calcium level (10.0), absence of Renal insufficiency (serum creatinine 1.7, and GFR 49.7 per [facility]--patient with a history of kidney dysfunction secondary to diabetes; Dr. Engel aware of these values and not concerned for myeloma involvement), absence of significant Anemia (hemoglobin 11.7, stable from baseline hgb level at study entry) and absence of lytic Bone lesions (per baseline metastatic survey as well as MRI--see MD note for further comment). See MD note for ECOG score and H&P and flowsheets for laboratory work, vitals, etc. All myeloma-related blood work from last cycle including SPEP and JAGUAR have remained stable and per Dr. Engel, patient shows no evidence of progression today. Patient informed that Dr. Engel will release all lab results to [facility]. He states understanding of this.       RN reinforced importance of patient following up monthly for assessment as well as lab work. Patient verbalizes understanding. Appointment for next month's visit was made in clinic " today and appt slip is to be mailed to patient at a later date. Patient was given Research RN's contact information and has MD contact information to call with any concerns, questions, or worsening of symptoms; he verbalized understanding.    MD to refer patient to Dr. [facility] for knee replacement discussion.  Research RN to assist in getting [facility] appt that was missed today rescheduled.

## 2017-07-07 ENCOUNTER — TELEPHONE (OUTPATIENT)
Dept: ORTHOPEDICS | Facility: CLINIC | Age: 72
End: 2017-07-07

## 2017-07-07 ENCOUNTER — TELEPHONE (OUTPATIENT)
Dept: INTERNAL MEDICINE | Facility: CLINIC | Age: 72
End: 2017-07-07

## 2017-07-07 LAB
ALBUMIN SERPL ELPH-MCNC: 4.16 G/DL
ALPHA1 GLOB SERPL ELPH-MCNC: 0.29 G/DL
ALPHA2 GLOB SERPL ELPH-MCNC: 0.82 G/DL
B-GLOBULIN SERPL ELPH-MCNC: 2.66 G/DL
GAMMA GLOB SERPL ELPH-MCNC: 0.58 G/DL
INTERPRETATION SERPL IFE-IMP: NORMAL
KAPPA LC SER QL IA: 5.7 MG/DL
KAPPA LC/LAMBDA SER IA: 3.93
LAMBDA LC SER QL IA: 1.45 MG/DL
PATHOLOGIST INTERPRETATION IFE: NORMAL
PATHOLOGIST INTERPRETATION SPE: NORMAL
PROT SERPL-MCNC: 8.5 G/DL

## 2017-07-07 NOTE — TELEPHONE ENCOUNTER
----- Message from Meme Valles MA sent at 7/6/2017  4:06 PM CDT -----  Pt needs to reschedule empowerment appt he missed today.

## 2017-07-20 ENCOUNTER — LAB VISIT (OUTPATIENT)
Dept: LAB | Facility: HOSPITAL | Age: 72
End: 2017-07-20
Payer: MEDICARE

## 2017-07-20 ENCOUNTER — CLINICAL SUPPORT (OUTPATIENT)
Dept: DIABETES | Facility: CLINIC | Age: 72
End: 2017-07-20
Payer: MEDICARE

## 2017-07-20 ENCOUNTER — PATIENT MESSAGE (OUTPATIENT)
Dept: DIABETES | Facility: CLINIC | Age: 72
End: 2017-07-20

## 2017-07-20 VITALS
BODY MASS INDEX: 27.21 KG/M2 | HEART RATE: 78 BPM | DIASTOLIC BLOOD PRESSURE: 72 MMHG | WEIGHT: 190.06 LBS | SYSTOLIC BLOOD PRESSURE: 132 MMHG | HEIGHT: 70 IN

## 2017-07-20 DIAGNOSIS — Z79.4 TYPE 2 DIABETES MELLITUS WITH DIABETIC POLYNEUROPATHY, WITH LONG-TERM CURRENT USE OF INSULIN: Chronic | ICD-10-CM

## 2017-07-20 DIAGNOSIS — Z79.4 TYPE 2 DIABETES MELLITUS WITH HYPERGLYCEMIA, WITH LONG-TERM CURRENT USE OF INSULIN: Chronic | ICD-10-CM

## 2017-07-20 DIAGNOSIS — D47.2 SMOLDERING MULTIPLE MYELOMA: ICD-10-CM

## 2017-07-20 DIAGNOSIS — E66.3 OVERWEIGHT (BMI 25.0-29.9): Chronic | ICD-10-CM

## 2017-07-20 DIAGNOSIS — I10 ESSENTIAL HYPERTENSION: ICD-10-CM

## 2017-07-20 DIAGNOSIS — E78.00 PURE HYPERCHOLESTEROLEMIA: ICD-10-CM

## 2017-07-20 DIAGNOSIS — E11.65 TYPE 2 DIABETES MELLITUS WITH HYPERGLYCEMIA, WITH LONG-TERM CURRENT USE OF INSULIN: Chronic | ICD-10-CM

## 2017-07-20 DIAGNOSIS — N18.30 CKD STAGE 3 DUE TO TYPE 2 DIABETES MELLITUS: Chronic | ICD-10-CM

## 2017-07-20 DIAGNOSIS — E11.29 MICROALBUMINURIA DUE TO TYPE 2 DIABETES MELLITUS: Chronic | ICD-10-CM

## 2017-07-20 DIAGNOSIS — E11.22 CKD STAGE 3 DUE TO TYPE 2 DIABETES MELLITUS: Chronic | ICD-10-CM

## 2017-07-20 DIAGNOSIS — Z79.4 TYPE 2 DIABETES MELLITUS WITH HYPERGLYCEMIA, WITH LONG-TERM CURRENT USE OF INSULIN: Primary | Chronic | ICD-10-CM

## 2017-07-20 DIAGNOSIS — R80.9 MICROALBUMINURIA DUE TO TYPE 2 DIABETES MELLITUS: Chronic | ICD-10-CM

## 2017-07-20 DIAGNOSIS — E11.65 TYPE 2 DIABETES MELLITUS WITH HYPERGLYCEMIA, WITH LONG-TERM CURRENT USE OF INSULIN: Primary | Chronic | ICD-10-CM

## 2017-07-20 DIAGNOSIS — E11.42 TYPE 2 DIABETES MELLITUS WITH DIABETIC POLYNEUROPATHY, WITH LONG-TERM CURRENT USE OF INSULIN: Chronic | ICD-10-CM

## 2017-07-20 LAB
ESTIMATED AVG GLUCOSE: 163 MG/DL
HBA1C MFR BLD HPLC: 7.3 %

## 2017-07-20 PROCEDURE — 36415 COLL VENOUS BLD VENIPUNCTURE: CPT

## 2017-07-20 PROCEDURE — 83036 HEMOGLOBIN GLYCOSYLATED A1C: CPT

## 2017-07-20 PROCEDURE — 99214 OFFICE O/P EST MOD 30 MIN: CPT | Mod: S$GLB,,, | Performed by: NURSE PRACTITIONER

## 2017-07-20 PROCEDURE — 99999 PR PBB SHADOW E&M-EST. PATIENT-LVL V: CPT | Mod: PBBFAC,,, | Performed by: NURSE PRACTITIONER

## 2017-07-20 RX ORDER — INSULIN GLARGINE 100 [IU]/ML
18 INJECTION, SOLUTION SUBCUTANEOUS NIGHTLY
Qty: 15 ML | Refills: 3
Start: 2017-07-20 | End: 2017-08-09

## 2017-07-20 RX ORDER — INSULIN ASPART 100 [IU]/ML
INJECTION, SOLUTION INTRAVENOUS; SUBCUTANEOUS
Qty: 2 VIAL | Refills: 4 | Status: SHIPPED | OUTPATIENT
Start: 2017-07-20 | End: 2017-08-16

## 2017-07-20 RX ORDER — GLIMEPIRIDE 2 MG/1
2 TABLET ORAL
Qty: 30 TABLET | Refills: 3 | Status: SHIPPED | OUTPATIENT
Start: 2017-07-20 | End: 2017-07-20

## 2017-07-20 NOTE — PATIENT INSTRUCTIONS
Limit snacks to less than 15 grams of carbs    For now, Continue your lantus 18 units nightly. If you are taking Glimeperide, STOP  Taking it    The night before your diabetes education visit to start the Vgo training, do NOT take your Lantus    Once you start the Vgo, you will click 2 times with meals    If you ever have a blood sugar <70 on the Vgo, call me     Goal for your blood sugar is less than 140     Snacks can be an important part of a balanced, healthy meal plan. They allow you to eat more frequently, feeling full and satisfied throughout the day. Also, they allow you to spread carbohydrates evenly, which may stabilize blood sugars.  Plus, snacks are enjoyable!     The amount of carbohydrate needed at snacks varies. Generally, about 15-30 grams of carbohydrate per snack is recommended.  Below you will find some tasty treats.       0-5 gm carb   Crystal Light   Vitamin Water Zero   Herbal tea, unsweetened   2 tsp peanut butter on celery   1./2 cup sugar-free jell-o   1 sugar-free popsicle   ¼ cup blueberries   8oz Blue Emily unsweetened almond milk   5 baby carrots & celery sticks, cucumbers, bell peppers dipped in ¼ cup salsa, 2Tbsp light ranch dressing or 2Tbsp plain Greek yogurt   10 Goldfish crackers   ½ oz low-fat cheese or string cheese   1 closed handful of nuts, unsalted   1 Tbsp of sunflower seeds, unsalted   1 cup Smart Pop popcorn   1 whole grain brown rice cake        15 gm carb   1 small piece of fruit or ½ banana or 1/2 cup lite canned fruit   3 angy cracker squares   3 cups Smart Pop popcorn, top spray butter, Betancourt lite salt or cinnamon and Truvia   5 Vanilla Wafers   ½ cup low fat, no added sugar ice cream or frozen yogurt (Blue bell, Blue Bunny, Weight Watchers, Skinny Cow)   ½ turkey, ham, or chicken sandwich   ½ c fruit with ½ c Cottage cheese   4-6 unsalted wheat crackers with 1 oz low fat cheese or 1 tbsp peanut butter    30-45 goldfish crackers (depending  on flavor)    7-8 Zoroastrian mini brown rice cakes (caramel, apple cinnamon, chocolate)    12 Zoroastrian mini brown rice cakes (cheddar, bbq, ranch)    1/3 cup hummus dip with raw veg   1/2 whole wheat bisi, 1Tbsp hummus   Mini Pizza (1/2 whole wheat English muffin, low-fat  cheese, tomato sauce)   100 calorie snack pack (Oreo, Chips Ahoy, Ritz Mix, Baked Cheetos)   4-6 oz. light or Greek Style yogurt (Chobani, Yoplait, Okios, Stoneyfield)   ½ cup sugar-free pudding     6 in. wheat tortilla or bisi oven toasted chips (topped with spray butter flavoring, cinnamon, Truvia OR spray butter, garlic powder, chili powder)    18 BBQ Popchips (available at [employer], Whole Foods, Fresh Market)                 Continuous Glucose Monitoring (CGM)  Your healthcare provider has put you on the Freestyle Leah Pro Sensor system. At your next appointment, your CGM will be downloaded and reviewed so your healthcare provider can see your blood sugar trends and patterns. Your medication and diet may be adjusted based on this downloaded information.    What You Need To Do:   Wear the sensor on the back of your upper arm for the amount of time designated by the CGM clinic    You have no restrictions on your diet or activity.    Maintain a daily log of your blood glucose readings, diet, exercise, and dosing/timing of your diabetes medications.     Continue regular blood glucose self-testing per your providers recommendation using your own glucometer.    Important Things About Your CGM Test   If you have an MRI, a CT scan, or a diathermy treatment, you must remove your sensor prior to the procedure. If this occurs, notify your healthcare provider.      A Little Extra Care:   Showering, Bathing, and Swimming: The sensor is water-resistant and can be worn while bathing, showering, or swimming as long as you do not take it deeper than 3 feet or keep it underwater for more than 30 minutes at a time.   Getting dressed: Use care to avoid  catching the sensor on clothing while getting dressed.   Exercising: Intensive exercise may cause the sensor to loosen due to sweat or movement of the sensor.     Day of Test (Inserting the CGM Sensor device)    The CGM Sensor device is placed on the top of your skin on the back of your upper arm     Throughout the Test    Continue regular blood glucose self-testing per your providers recommendation using your own glucometer.   Maintain a daily log of your blood glucose readings, diet, exercise, and dosing/timing of your diabetes medications.      Last Day of the Test (Removing the CGM Sensor device)    Loosen the adhesive attached to the CGM Sensor device.    Pull the CGM Sensor device away from your skin    Place the CGM Sensor device in the biohazard plastic bag   Return all items you were given on the first day of the test including log sheets and items you placed in the biohazard bag.    Bring items between 7:30 am and 9:00 am to the [facility] located at [address] [place] [address], [address] [place] [address].     When to Contact the Clinic   Call [phone] (Monday-Friday) or [phone] after hours if:    Your CGM Sensor falls off    You have pain, severe itching, or bleeding at the site     Revised: 01/2017      © 2015 [facility] ([email or web address]) is a non-profit, academic, multi-specialty, healthcare delivery system dedicated to patient care, research and education.     UNDERSTANDING CONTINUOUS GLUCOSE MONITORING     What is continuous glucose monitoring?   Continuous glucose monitoring system (CGMS) is a method used to record your blood sugar levels over a period of time (usually 5 days). Your home blood glucose monitoring is very helpful, but only measures blood glucose levels at various points in time and can miss dangerous high and low patterns, especially during the night while you sleep. Continuous glucose monitoring provides a continuous  24-hour measurement of your blood glucose levels.     Who benefits from continuous glucose monitoring?    People who experience dangerous high and low blood glucose readings.    People who suffer from hypoglycemia unawareness and cannot feel when their blood glucose is dropping.    People who desire better blood glucose control.    People with elevated A1C levels.     How does continuous glucose monitoring work?   A glucose sensor is inserted on the top of your skin on the back of your arm. We do not leave a needle under your skin. The blood glucose sensor measures your blood sugar level every 15 minutes, 24 hours a day. At the end of the 5 days, the CGM Sensor is then downloaded and reviewed so your healthcare provider can see your blood sugar trends and patterns.     What are your responsibilities to ensure successful testing?   It is recommended that you monitor your blood readings as directed by your healthcare provider.  It is vital that you document the correct times of your medications, meals, snacks, blood sugar readings, and any exercise.       How do you prepare for the test?   There is nothing you need to do to prepare for the test. You do not need to fast (restrict food intake) the night before the test.     Revised: 01/2017    © 2015 [facility] ([email or web address]) is a non-profit, academic, multi-specialty, healthcare delivery system dedicated to patient care, research and education.

## 2017-07-20 NOTE — PROGRESS NOTES
CC:  Diabetes.     HPI: Emerson Menendez is a 71 y.o. male presents for visit in [facility]. The patient has had diabetes along with associated comorbidities indicated in the Visit Diagnosis section of this encounter. The patient was diagnosed with Type 2 diabetes in >10 years ago.     VISIT #: 2    1st visit - Presented with girlfriend with intermittent BG readings on logs. Was on Novolog, but not consistently checking BG before meal and eats varying carb intake meals. Could not afford Trulicity long term because of donut hole. At this visit, MDI was redosed.     2nd visit - Today, presents alone with no logs. Since last visit, + hypo and Novolog discontinued. Currently taking basal insulin once daily, unsure if taking Amaryl. Wishes to try Vgo     DIABETES COMPLICATIONS: nephropathy and peripheral neuropathy     STANDARDS of CARE:  Statin:  Yes - lipitor   ACEI or ARB:  Yes - ace-i  ASA:  Yes - 81 mg   Last eye exam 5/2017 no retinopathy   Microalbumin/Creatinine ratio:  Lab Results   Component Value Date    MICALBCREAT 73.2 (H) 06/30/2014       CURRENT A1C:    Hemoglobin A1C   Date Value Ref Range Status   05/11/2017 9.6 (H) 4.5 - 6.2 % Final     Comment:     According to ADA guidelines, hemoglobin A1C <7.0% represents  optimal control in non-pregnant diabetic patients.  Different  metrics may apply to specific populations.   Standards of Medical Care in Diabetes - 2016.  For the purpose of screening for the presence of diabetes:  <5.7%     Consistent with the absence of diabetes  5.7-6.4%  Consistent with increasing risk for diabetes   (prediabetes)  >or=6.5%  Consistent with diabetes  Currently no consensus exists for use of hemoglobin A1C  for diagnosis of diabetes for children.     02/13/2017 8.1 (H) 4.5 - 6.2 % Final     Comment:     According to ADA guidelines, hemoglobin A1C <7.0% represents  optimal control in non-pregnant diabetic patients.  Different  metrics may apply to specific  populations.   Standards of Medical Care in Diabetes - 2016.  For the purpose of screening for the presence of diabetes:  <5.7%     Consistent with the absence of diabetes  5.7-6.4%  Consistent with increasing risk for diabetes   (prediabetes)  >or=6.5%  Consistent with diabetes  Currently no consensus exists for use of hemoglobin A1C  for diagnosis of diabetes for children.     2016 7.3 (H) 4.5 - 6.2 % Final     Comment:     According to ADA guidelines, hemoglobin A1C <7.0% represents  optimal control in non-pregnant diabetic patients.  Different  metrics may apply to specific populations.   Standards of Medical Care in Diabetes - 2016.  For the purpose of screening for the presence of diabetes:  <5.7%     Consistent with the absence of diabetes  5.7-6.4%  Consistent with increasing risk for diabetes   (prediabetes)  >or=6.5%  Consistent with diabetes  Currently no consensus exists for use of hemoglobin A1C  for diagnosis of diabetes for children.         GOAL A1C: <7%    DM MEDICATIONS USED IN THE PAST: Trulicity, Glimeperide, Metformin, Januvia     MEDICATIONS UPON START OF PROGRAM: Novolog 3-5, Lantus 18 units nightly  Denies missing lantus dose  Guessing on Novolog dose, sometimes missing doses     CURRENT DIABETES MEDICATIONS: Lantus 18 units nightly, unsure if he is taking Glimeperide 2 mg daily   Lantus @ 8pm nightly - denies missing any Lantus doses  Unsure if he is taking Glimeperide medication     DO YOU USE THE [facility] EMAIL SYSTEM? Yes    BLOOD GLUCOSE MONITORIN-3 times per day, forgot logs, reports 's-180s, rare 200s    HYPOGLYCEMIA:  No    MEALS:   Eating   B - 1/2 glucerna or boost shake  L - subway 6 inch or hot dog from [employer] station  Snacks on 13g carb snack bar in between lunch and dinner   D - chicken and salad or vegetable     Bedtime snack changed to low carb bar (13g carbs) - much improved     CURRENT EXERCISE:  Yes - goes to the gym near his house and walking at least 3  "miles daily     MEDICATIONS, ALLERGIES, LABS, VS's, MEDICAL, SURGICAL, SOCIAL, AND FAMILY HISTORY reviewed and updated in the appropriate sections during this encounter    ROS:     Constitutional: Negative for weight change  Endocrine:  Denies polyuria, polydipsia, +2 nocturia (chronic).  HENT: Denies neck swelling, lumps, horseness or trouble swallowing. Denies any personal or family history of thyroid cancer.    Eyes: Negative for visual disturbance.   Respiratory: Negative for cough or shortness or breath.  Cardiovascular: Negative for chest pain or claudication.   Gastrointestinal: Negative for nausea, diarrhea, vomiting, bloating.  No history of pancreatitis or gastroparesis.  Genitourinary: Negative for urgency, frequency, frequent urinary tract infections.  Skin: Denies prolonged wound healing.  Neurological: Negative for syncope, + numbness/burning of extremities.  Psychiatric/Behavioral: Negative for depression or anxiety      All other systems reviewed and are negative.    PE:  Constitutional: /72   Pulse 78   Ht 5' 10" (1.778 m)   Wt 86.2 kg (190 lb 0.6 oz)   BMI 27.27 kg/m²    Well developed, well nourished, NAD.    HENT:   External ears, nose: no masses. No significant findings.   Hearing: normal     Neck:  No trachial deviation present, No neck masses noticed   Thyroid:  No thyromegaly present.  No thyroid tenderness.      Cardiovascular:    Auscultation:  No murmurs or abnormal sounds   Lower extremities:  No edema or cyanosis.     Respiratory:    Effort:  Normal, no use of accessory muscles.   Auscultation: breath sounds normal, no adventitious sounds.  Abdomen:     Exam:  Soft, non-tender, no masses.      No hernia noted.  Skin:    Inspection: no rashes, lesion or ulcers, + acanthosis nigracans.   Palpation: no induration or nodules.    Insulin injection sites: no lipoatrophy or lipohypertrophy.  Psychiatric:  Judgement and insight good with normal mood and affect.  Musculoskeletal:  Gait " steady. No gross abnormalities.      FOOT EXAMINATION:   Protective Sensation (w/ 10 gram monofilament):  Right: Intact  Left: Intact    Visual Inspection:  Callus -  Bilateral    Pedal Pulses:   Right: Present  Left: Present    Posterior tibialis:   Right:Present  Left: Present    Decreased vibratory response to 128 Hz tuning fork bilaterally.     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  Discussed tx options, including CARY versus DPP-4. Versus Vgo. Pt wishes to try Vgo. For now, continue Lantus 18 units nightly. Instructed pt to STOP Amaryl if he is still taking it. Once Vgo training complete, start Vgo 20 with 2 clicks with meals.     Limited options, as cannot use Metformin, SGLT-2 due to kidney function     Continue to monitor BG ac/hs, document on BG logs, and bring complete BG logs to all visits - will email logs weekly after Vgo start    F/u in [facility] in 3 months with BMP, A1c and MAC before - then will need chronic endo care with me on 6th floor chronic diabetes clinic   CGMS before f/u visit  Diabetes education in next 2 weeks for Vgo start   A1c today     2- CKD 3 with microalbuminuria - discussed presence of CKD 3 and micro, needs improved BG control. Needs nephrology f/u, to be scheduled      3 - Peripheral neuropathy - Educated patient to check feet daily for any foreign objects and/or wounds. Discussed with patient the importance of wearing appropriate footwear at all times, not to walk barefoot ever, and to check shoes before putting them on feet. Instructed patient to keep feet dry by regularly changing shoes and socks and drying feet after baths and exercises. Also, instructed patient to report any new lesions, discolorations, or swelling to a healthcare professional.    4- Hypertension - goal <  140/90 mmHg -  At goal, on ACE-I, continue current medications   BP Readings from Last 3 Encounters:   07/20/17 132/72   07/06/17 123/77   06/08/17 (!) 146/71       5- Hyperlipidemia -  LFT's WNL. On statin, LDL at goal     Lab Results   Component Value Date    LDLCALC 50.0 (L) 05/11/2017       6- Body mass index is 27.27 kg/m². = Overweight. Modest weight loss (5-10%) may greatly improve BG control     7 - Smoldering myeloma - currently enrolled in study

## 2017-07-26 ENCOUNTER — TELEPHONE (OUTPATIENT)
Dept: ENDOCRINOLOGY | Facility: CLINIC | Age: 72
End: 2017-07-26

## 2017-07-26 ENCOUNTER — OFFICE VISIT (OUTPATIENT)
Dept: NEPHROLOGY | Facility: CLINIC | Age: 72
End: 2017-07-26
Payer: MEDICARE

## 2017-07-26 VITALS
HEART RATE: 104 BPM | OXYGEN SATURATION: 95 % | DIASTOLIC BLOOD PRESSURE: 70 MMHG | SYSTOLIC BLOOD PRESSURE: 118 MMHG | WEIGHT: 188.25 LBS | BODY MASS INDEX: 26.95 KG/M2 | HEIGHT: 70 IN

## 2017-07-26 DIAGNOSIS — D47.2 SMOLDERING MULTIPLE MYELOMA: ICD-10-CM

## 2017-07-26 DIAGNOSIS — N18.30 CKD STAGE 3 DUE TO TYPE 2 DIABETES MELLITUS: Primary | Chronic | ICD-10-CM

## 2017-07-26 DIAGNOSIS — E11.22 CKD STAGE 3 DUE TO TYPE 2 DIABETES MELLITUS: Primary | Chronic | ICD-10-CM

## 2017-07-26 PROCEDURE — 3045F PR MOST RECENT HEMOGLOBIN A1C LEVEL 7.0-9.0%: CPT | Mod: GC,S$GLB,, | Performed by: INTERNAL MEDICINE

## 2017-07-26 PROCEDURE — 99999 PR PBB SHADOW E&M-EST. PATIENT-LVL V: CPT | Mod: PBBFAC,GC,, | Performed by: INTERNAL MEDICINE

## 2017-07-26 PROCEDURE — 1126F AMNT PAIN NOTED NONE PRSNT: CPT | Mod: GC,S$GLB,, | Performed by: INTERNAL MEDICINE

## 2017-07-26 PROCEDURE — 99214 OFFICE O/P EST MOD 30 MIN: CPT | Mod: GC,S$GLB,, | Performed by: INTERNAL MEDICINE

## 2017-07-26 PROCEDURE — 3066F NEPHROPATHY DOC TX: CPT | Mod: GC,S$GLB,, | Performed by: INTERNAL MEDICINE

## 2017-07-26 PROCEDURE — 1159F MED LIST DOCD IN RCRD: CPT | Mod: GC,S$GLB,, | Performed by: INTERNAL MEDICINE

## 2017-07-26 NOTE — ASSESSMENT & PLAN NOTE
Closely followed by heme/onc not on any specific treatment, but in a research study, at this point we need to prove whether his kidneys are involved and if this is the cause for his worsening renal function and/or whether this is only representative of progressive DM nephropathy

## 2017-07-26 NOTE — TELEPHONE ENCOUNTER
----- Message from Kris Mario sent at 7/26/2017 12:15 PM CDT -----  Contact: Patient  Requesting a call back in regards to Rx tadalafil (CIALIS) 5 MG tablet.    Patient stated that a PA is required.    Please call [phone].

## 2017-07-26 NOTE — ASSESSMENT & PLAN NOTE
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

## 2017-07-26 NOTE — PATIENT INSTRUCTIONS
1) get blood work and urine tests done as ordered prior to your next appointment  2) renal ultrasound

## 2017-07-26 NOTE — TELEPHONE ENCOUNTER
Tiffanie, would you like me to move forward and try to get a PA or is there an alternative you would like to use?  I would be happy to call if you prefer.

## 2017-07-26 NOTE — PROGRESS NOTES
Subjective:       Patient ID: Emerson Menendez is a 72 y.o. Black or  male who presents for follow-up evaluation of Acute Renal Failure; Glomerulonephritis; and Hypokalemia    Here for follow up on CKD  The patient has a history of DM with microalbuminuria, depression/anxiety, hyperlipidemia, BPH s/p laser-TUR . No diabetic retinopathy in both eyes   The patient has no family history of kidney disease (daughter on dialysis, had aneurysm), no history of kidney stones. The patient does use NSAIDS (Aleve pm and daily meloxicam) or herbal supplements.   Overall he is doing quite well. In his work up for his kidney disease he was discovered to have new diagnosis of smoldering myeloma (kappa light chain) and is followed by Hem/onc for that.    He was seen by us in May of 2016 and the plan was to see him back in three months for possible renal biopsy, ocer the past year his renal function seems to have gotten slightly worse from more or less stage IIIa last year to close to stage IIIb today.      Review of Systems   Constitutional: Negative for fever and unexpected weight change.   Respiratory: Negative for cough, chest tightness, shortness of breath and wheezing.    Cardiovascular: Negative for chest pain and leg swelling.   Gastrointestinal: Negative for abdominal distention, abdominal pain, diarrhea and nausea.   Endocrine: Negative for polydipsia.   Genitourinary: Negative for difficulty urinating, dysuria, flank pain, frequency and hematuria.   Musculoskeletal: Negative for arthralgias and myalgias.   Skin: Negative for rash.   Allergic/Immunologic: Negative for immunocompromised state.   Neurological: Negative for dizziness and light-headedness.   Hematological: Negative for adenopathy.   Psychiatric/Behavioral: Negative for confusion.       Objective:      Physical Exam   Constitutional: He is oriented to person, place, and time. He appears well-developed.   HENT:   Head: Normocephalic and  atraumatic.   Eyes: EOM are normal.   Neck: No JVD present.   Cardiovascular: Normal rate and regular rhythm.  Exam reveals no friction rub.    Pulmonary/Chest: Effort normal and breath sounds normal.   Abdominal: Soft. He exhibits no distension.   Musculoskeletal: He exhibits no edema.   Neurological: He is alert and oriented to person, place, and time.   Skin: Skin is warm.   Psychiatric: He has a normal mood and affect.       Assessment:       1. CKD stage 3 due to type 2 diabetes mellitus    2. Smoldering multiple myeloma        Plan:       Emerson was seen today for acute renal failure, glomerulonephritis and hypokalemia.    Diagnoses and all orders for this visit:    CKD stage 3 due to type 2 diabetes mellitus  -     US Retroperitoneal Complete; Future  -     Protein / creatinine ratio, urine; Future  -     Renal function panel; Future  -     PTH, intact; Future  -     Ferritin; Future  -     Iron and TIBC; Future  -     CBC auto differential; Future    Smoldering multiple myeloma        Closely followed by heme/onc not on any specific treatment, but in a research study, at this point we need to prove whether his kidneys are involved and if this is the cause for his worsening renal function and/or whether this is only representative of progressive DM nephropathy

## 2017-07-31 NOTE — PROGRESS NOTES
[facility] STAFF NOTE    The note from the fellow/resident was reviewed. I have personally interviewed and examined the patient. There were no additional findings with regards to the history or physical exam.    I agree with the assessment and plan of  Dr. Leon Urena    Patient with worsening renal function and monoclonal gammopathy (MM). Will schedule for kidney biopsy to see if he has any light chain associated renal pathology.

## 2017-08-01 ENCOUNTER — OFFICE VISIT (OUTPATIENT)
Dept: OPHTHALMOLOGY | Facility: CLINIC | Age: 72
End: 2017-08-01
Payer: MEDICARE

## 2017-08-01 ENCOUNTER — TELEPHONE (OUTPATIENT)
Dept: INTERNAL MEDICINE | Facility: CLINIC | Age: 72
End: 2017-08-01

## 2017-08-01 ENCOUNTER — TELEPHONE (OUTPATIENT)
Dept: ENDOCRINOLOGY | Facility: CLINIC | Age: 72
End: 2017-08-01

## 2017-08-01 DIAGNOSIS — Z98.41 STATUS POST CATARACT EXTRACTION AND INSERTION OF INTRAOCULAR LENS OF RIGHT EYE: ICD-10-CM

## 2017-08-01 DIAGNOSIS — H40.1132 PRIMARY OPEN ANGLE GLAUCOMA OF BOTH EYES, MODERATE STAGE: Primary | ICD-10-CM

## 2017-08-01 DIAGNOSIS — H25.12 NUCLEAR SCLEROSIS, LEFT: ICD-10-CM

## 2017-08-01 DIAGNOSIS — Z96.1 STATUS POST CATARACT EXTRACTION AND INSERTION OF INTRAOCULAR LENS OF RIGHT EYE: ICD-10-CM

## 2017-08-01 PROCEDURE — 99999 PR PBB SHADOW E&M-EST. PATIENT-LVL II: CPT | Mod: PBBFAC,,, | Performed by: OPHTHALMOLOGY

## 2017-08-01 PROCEDURE — 92012 INTRM OPH EXAM EST PATIENT: CPT | Mod: S$GLB,,, | Performed by: OPHTHALMOLOGY

## 2017-08-01 RX ORDER — PEN NEEDLE, DIABETIC 31 GX5/16"
NEEDLE, DISPOSABLE MISCELLANEOUS
COMMUNITY
Start: 2017-06-22 | End: 2017-08-09

## 2017-08-01 NOTE — TELEPHONE ENCOUNTER
----- Message from Ness Goyal sent at 8/1/2017  9:16 AM CDT -----  Contact: self  Pt stated that the pharmacy needs prior authorization for tadalafil (CIALIS) 5 MG tablet.  Please call pts pharmacy to discuss.    Pt can be reached at [phone]

## 2017-08-01 NOTE — TELEPHONE ENCOUNTER
Called and spoke with patient.  Explains that he had ran out of his regular insulin and had an appointment for V-go Education on the 7th, but he came in today because he was across the street and spoke to a lady does not remember her name but, she was able to schedule him for tomorrow at 8am versus August 7 and a sample of the V-go also, he was told to just take his Novolog he was on before for now and he took a dose last night- his blood sugars have been running in the 200's.

## 2017-08-01 NOTE — TELEPHONE ENCOUNTER
----- Message from Ness Goyal sent at 8/1/2017  9:11 AM CDT -----  Contact: self  Pt is changing over to the v-go insulin on Aug 7th.  He has been out of his old insulin for two days.  His sugar levels are elevated and needs some insulin to last him the next week until he receives the v-go.  Pt does not want to pay for a whole month of the old insulin because he is going to be receiving the new insulin in 7 days.    Is there any way to prescribe pt just a week of the old insulin just to get him through the next week? Please call pt to discuss.  He can be reached at [phone]    Pt uses [facility] [address] and [facility].

## 2017-08-01 NOTE — PROGRESS NOTES
HPI     Glaucoma    Additional comments: overdue iop chk           Comments   IOP/DFE    DLS: 09/07/2016--Dr. Wheatley  Overdue for check      Latanoprost Q HS OU            Last edited by Leon Duong on 8/1/2017 10:26 AM. (History)            Assessment /Plan     For exam results, see Encounter Report.    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

## 2017-08-02 ENCOUNTER — CLINICAL SUPPORT (OUTPATIENT)
Dept: DIABETES | Facility: CLINIC | Age: 72
End: 2017-08-02
Payer: MEDICARE

## 2017-08-02 DIAGNOSIS — E11.65 TYPE 2 DIABETES MELLITUS WITH HYPERGLYCEMIA, WITH LONG-TERM CURRENT USE OF INSULIN: Chronic | ICD-10-CM

## 2017-08-02 DIAGNOSIS — Z79.4 TYPE 2 DIABETES MELLITUS WITH HYPERGLYCEMIA, WITH LONG-TERM CURRENT USE OF INSULIN: Chronic | ICD-10-CM

## 2017-08-02 PROCEDURE — G0108 DIAB MANAGE TRN  PER INDIV: HCPCS | Mod: S$GLB,,, | Performed by: DIETITIAN, REGISTERED

## 2017-08-02 NOTE — PROGRESS NOTES
Diabetes Education  Author: Lisa Feng RD  Date: 8/2/2017    Diabetes Education Visit  Diabetes Education Record Assessment/Progress: Comprehensive/Group (VGo start)    Diabetes Type  Diabetes Type : Type II    Exercise   Exercise Type:  (fairly active; swimming, walking)    Current Diabetes Treatment   Current Treatment: Insulin, Oral Medication (Lantus 18 units nightly; glimiperide)    Social History  Preferred Learning Method: Face to Face  Primary Support: Self (girlfriend)    Barriers to Change  Barriers to Change: None  Learning Challenges : None    Readiness to Learn   Readiness to Learn : Acceptance    Cultural Influences  Cultural Influences: No      Diabetes Education Assessment/Progress  Acute Complications (preventing, detecting, and treating acute complications): Discussion, Instructed (Reviewed hypo symptoms and how to treat. Instructed to call if any BG's <80)    Medications (states correct name, dose, onset, peak, duration, side effects & timing of meds): Discussion, Demonstration, Return Demonstration, Written Materials Provided, Instructed, Individual Session (Stop all orals; stop lantus. Starting VGo 20. Has been out of Lantus x 3 days.)    V-GO INSULIN DELIVERY INSTRUCTIONS   Patient is here for instructions on use of the V-GO 20 which will provide 20 units of basal insulin given over 24 hours (0.83 units/hr) and up to 36 units of on-demand bolus dosing in 2-Unit increments. Patient will be giving 4 units of Novolog at each meal (2 clicks). Patient had been instructed to hold his Lantus this AM. Patient also advised that she will discontinue taking the Lantus flexpens and that he will only use the Novolog vial with the V-Go system.     Patient was instructed on the following:    Insert vial into EZ Fill and leave in place until vial is empty   Remove plug and insert V-Go    Draw insulin into EZ Fill and fill V-Go with insulin (check that V-Go is full of insulin)    Remove V-GO and clean  and replace plug (advised to wipe the circular opening with an alcohol swab before replacing)    Select site for V-Go (site selection reviewed) and prepare infusion site with aseptic technique    Remove button cover and adhesive liner    Attach V-Go to site selected and insert needle by pushing needle button in to activate basal rate    Instructed patient on how to activate the bolus ready button and to push the bolus delivery button into the V-Go to deliver 2 units of insulin (1 push = 2 units). Patient advised that once all 36 units of the on-demand bolus have been given, the bolus buttons will lock, but the basal insulin will continue for the remainder of the 24 hours    To remove, release needle by sliding and pressing the needle release button    Remove the V-Go and discard (the V-Go is 100% disposable)   Patient instructed that the V-Go needs to be changed every 24 hours.    Patient was able to perform successful return demonstration of all.     General precautions reviewed with the patient:    V-Go needs to be removed before having an X-ray, MRI or CT scan (or any similar test or procedure). Replace with a new V-Go after the test or procedure is completed. Patient also advised to check with her doctor before any type of surgical procedure to see if the V-Go can be worn.    Patient advised to monitor her BG 4 times a day (pre-meals and at HS)    Patient advised to always carry back up Novolog and Levemir insulin pens (non-) should a problem develop with the V-Go. Patient also advised that she should have directions from her MD regarding insulin doses should she need to switch back to Novolog/Levemir pens temporarily.    The V-Go should not be exposed to direct sunlight, hot tub, whirlpool or sauna use (replace with a new filled V-Go afterward)    Check that the V-Go is securely in place during and after periods of increased physical activity, exposure to water or gone under water to the  depth of 3 feet, 3 inches (the V-Go can go under water and continue to work safely)    Reviewed s/s and tx of hypoglycemia  All of patient's questions and concerns were addressed. A sample V-Go 20 kit was provided to the patient. Patient instructed to keep a BG log (log sheets provided) and send BG readings to MD in 1 week for review. Phone number was provided and patient advised to call with any questions or concerns.       Monitoring (monitoring blood glucose/other parameters & using results): Discussion, Written Materials Provided, Instructed (Instructed to SMBG 4 times daily and bring logs to all appts)        Goals  Healthy Eating: In Progress  Monitoring: In Progress  Medications: Set (Will use VGo as instructed)         Diabetes Care Plan/Intervention  Education Plan/Intervention: Diabetes Empowerment Program, Individual Follow-Up DSMT      Diabetes Meal Plan  Restrictions: Restricted Carbohydrate    Education Units of Time   Time Spent: 60 min      Health Maintenance Topics with due status: Not Due       Topic Last Completion Date    Colonoscopy 03/22/2012    TETANUS VACCINE 12/17/2013    Lipid Panel 05/11/2017    Foot Exam 05/26/2017    Hemoglobin A1c 07/20/2017    Eye Exam 08/01/2017     Health Maintenance Due   Topic Date Due    Influenza Vaccine  08/01/2017

## 2017-08-03 ENCOUNTER — RESEARCH ENCOUNTER (OUTPATIENT)
Dept: RESEARCH | Facility: HOSPITAL | Age: 72
End: 2017-08-03

## 2017-08-03 ENCOUNTER — LAB VISIT (OUTPATIENT)
Dept: LAB | Facility: HOSPITAL | Age: 72
End: 2017-08-03
Attending: INTERNAL MEDICINE
Payer: MEDICARE

## 2017-08-03 ENCOUNTER — OFFICE VISIT (OUTPATIENT)
Dept: HEMATOLOGY/ONCOLOGY | Facility: CLINIC | Age: 72
End: 2017-08-03
Payer: MEDICARE

## 2017-08-03 VITALS
HEIGHT: 70 IN | SYSTOLIC BLOOD PRESSURE: 157 MMHG | RESPIRATION RATE: 18 BRPM | HEART RATE: 64 BPM | WEIGHT: 192 LBS | DIASTOLIC BLOOD PRESSURE: 64 MMHG | BODY MASS INDEX: 27.49 KG/M2 | TEMPERATURE: 98 F | OXYGEN SATURATION: 100 %

## 2017-08-03 DIAGNOSIS — E11.22 CKD STAGE 3 DUE TO TYPE 2 DIABETES MELLITUS: Chronic | ICD-10-CM

## 2017-08-03 DIAGNOSIS — D47.2 SMOLDERING MULTIPLE MYELOMA (SMM): Primary | ICD-10-CM

## 2017-08-03 DIAGNOSIS — M17.0 BILATERAL PRIMARY OSTEOARTHRITIS OF KNEE: ICD-10-CM

## 2017-08-03 DIAGNOSIS — C90.00 MULTIPLE MYELOMA, REMISSION STATUS UNSPECIFIED: ICD-10-CM

## 2017-08-03 DIAGNOSIS — Z00.6 EXAMINATION OF PARTICIPANT IN CLINICAL TRIAL: ICD-10-CM

## 2017-08-03 DIAGNOSIS — N18.30 CKD STAGE 3 DUE TO TYPE 2 DIABETES MELLITUS: Chronic | ICD-10-CM

## 2017-08-03 LAB
ALBUMIN SERPL BCP-MCNC: 3.6 G/DL
ALP SERPL-CCNC: 48 U/L
ALT SERPL W/O P-5'-P-CCNC: 23 U/L
ANION GAP SERPL CALC-SCNC: 9 MMOL/L
AST SERPL-CCNC: 17 U/L
BASOPHILS # BLD AUTO: 0.02 K/UL
BASOPHILS NFR BLD: 0.4 %
BILIRUB SERPL-MCNC: 0.6 MG/DL
BUN SERPL-MCNC: 18 MG/DL
CALCIUM SERPL-MCNC: 10 MG/DL
CHLORIDE SERPL-SCNC: 105 MMOL/L
CO2 SERPL-SCNC: 28 MMOL/L
CREAT SERPL-MCNC: 1.6 MG/DL
DIFFERENTIAL METHOD: ABNORMAL
EOSINOPHIL # BLD AUTO: 0 K/UL
EOSINOPHIL NFR BLD: 0.7 %
ERYTHROCYTE [DISTWIDTH] IN BLOOD BY AUTOMATED COUNT: 14 %
EST. GFR  (AFRICAN AMERICAN): 49.4 ML/MIN/1.73 M^2
EST. GFR  (NON AFRICAN AMERICAN): 42.7 ML/MIN/1.73 M^2
GLUCOSE SERPL-MCNC: 117 MG/DL
HCT VFR BLD AUTO: 35.9 %
HGB BLD-MCNC: 11.8 G/DL
IGA SERPL-MCNC: 1415 MG/DL
IGG SERPL-MCNC: 966 MG/DL
IGM SERPL-MCNC: 32 MG/DL
LYMPHOCYTES # BLD AUTO: 1.7 K/UL
LYMPHOCYTES NFR BLD: 38.2 %
MAGNESIUM SERPL-MCNC: 1.7 MG/DL
MCH RBC QN AUTO: 28.9 PG
MCHC RBC AUTO-ENTMCNC: 32.9 G/DL
MCV RBC AUTO: 88 FL
MONOCYTES # BLD AUTO: 0.3 K/UL
MONOCYTES NFR BLD: 6.6 %
NEUTROPHILS # BLD AUTO: 2.4 K/UL
NEUTROPHILS NFR BLD: 54.1 %
PHOSPHATE SERPL-MCNC: 3.2 MG/DL
PLATELET # BLD AUTO: 217 K/UL
PMV BLD AUTO: 8.8 FL
POTASSIUM SERPL-SCNC: 4.8 MMOL/L
PROT SERPL-MCNC: 8.2 G/DL
RBC # BLD AUTO: 4.09 M/UL
SODIUM SERPL-SCNC: 142 MMOL/L
WBC # BLD AUTO: 4.53 K/UL

## 2017-08-03 PROCEDURE — 80053 COMPREHEN METABOLIC PANEL: CPT

## 2017-08-03 PROCEDURE — 84165 PROTEIN E-PHORESIS SERUM: CPT | Mod: 26,Q1,, | Performed by: PATHOLOGY

## 2017-08-03 PROCEDURE — 83735 ASSAY OF MAGNESIUM: CPT

## 2017-08-03 PROCEDURE — 82784 ASSAY IGA/IGD/IGG/IGM EACH: CPT | Mod: 59

## 2017-08-03 PROCEDURE — 82784 ASSAY IGA/IGD/IGG/IGM EACH: CPT

## 2017-08-03 PROCEDURE — 99999 PR PBB SHADOW E&M-EST. PATIENT-LVL III: CPT | Mod: PBBFAC,,, | Performed by: INTERNAL MEDICINE

## 2017-08-03 PROCEDURE — 85025 COMPLETE CBC W/AUTO DIFF WBC: CPT

## 2017-08-03 PROCEDURE — 3066F NEPHROPATHY DOC TX: CPT | Mod: Q1,S$GLB,, | Performed by: INTERNAL MEDICINE

## 2017-08-03 PROCEDURE — 84165 PROTEIN E-PHORESIS SERUM: CPT

## 2017-08-03 PROCEDURE — 99215 OFFICE O/P EST HI 40 MIN: CPT | Mod: Q1,S$GLB,, | Performed by: INTERNAL MEDICINE

## 2017-08-03 PROCEDURE — 86334 IMMUNOFIX E-PHORESIS SERUM: CPT

## 2017-08-03 PROCEDURE — 1126F AMNT PAIN NOTED NONE PRSNT: CPT | Mod: Q1,S$GLB,, | Performed by: INTERNAL MEDICINE

## 2017-08-03 PROCEDURE — 83520 IMMUNOASSAY QUANT NOS NONAB: CPT

## 2017-08-03 PROCEDURE — 1159F MED LIST DOCD IN RCRD: CPT | Mod: Q1,S$GLB,, | Performed by: INTERNAL MEDICINE

## 2017-08-03 PROCEDURE — 86334 IMMUNOFIX E-PHORESIS SERUM: CPT | Mod: 26,Q1,, | Performed by: PATHOLOGY

## 2017-08-03 PROCEDURE — 3045F PR MOST RECENT HEMOGLOBIN A1C LEVEL 7.0-9.0%: CPT | Mod: Q1,S$GLB,, | Performed by: INTERNAL MEDICINE

## 2017-08-03 PROCEDURE — 84100 ASSAY OF PHOSPHORUS: CPT

## 2017-08-03 PROCEDURE — 36415 COLL VENOUS BLD VENIPUNCTURE: CPT

## 2017-08-03 NOTE — PROGRESS NOTES
"  Thursday, August 3rd, 2017      Protocol: E3A06  Sponsor:  [facility]  IRB #: [id number]  Investigator: GIL Engel  Pt Initials: LUCÍA LORENZ      Arm B: Observation  Cycle 19, Day 28      Patient presents to clinic to evaluate ability to proceed with Cycle 20 of observation per above-mentioned protocol. Patient is awake, alert, and oriented to person, place, and time. He is ambulatory and states he has been feeling well. He continues to wear knee braces which allow him to do all of his usual activities, however he looks forward to upcoming visit with Dr. [facility] for potential orthoscopic knee surgery for permanent fix.  He voices frustration with recent diabetes and kidney issues, but that he is "getting a handle on it."  He states his nephrologist would like to perform a kidney biopsy in order to rule out light chain pathology of his recent renal dysfunction.   He states he continues to attend diabetes counseling sessions.  He denies new potential CRAB symptoms. He states continued willingness to participate in above-mentioned study.        Review of AE's:  1. Hypertension, grade 1: BP today is 157/64  in clinic today. This is elevated from last visit and he states it "tends to jump up when I come here."  He was encouraged to monitor this at home as well.  He states understanding.  Will continue to follow this. AE ongoing.  2. Lower Back Pain and Neck Pain, grade 1: Patient has no complaints of this issue this month. As previously stated, patient is not suspected to have bony myeloma involvement per Dr. Engel as these are pre-existing issues present at baseline. He was encouraged to keep up physical activity and exercise to help alleviate this. He states understanding. AE ongoing.  3.  Pain in extremity (knee), grade 1.  See above regarding statements of much improved symptoms.  Wearing knee stabilizing braces today.  Will see Dr. [facility] next week to explore surgical options.   AE, grade 1.  4. Creatinine increased, grade " "1: Serum creatinine is 1.6 mg/dl today and GFR 52.17. Per Dr. Engel this has not been related to myeloma and is related chronic kidney issues likely secondary to diabetes and hypertension.  This result was noted per Dr. Engel and not concerned for myeloma involvement at this time.  But as stated above, Nephrologist plans to do biopsy to confirm source of increased creatinine.  Will continue observation monthly and to monitor renal function closely. AE stable.   5. Peripheral sensory neuropathy, grade 1: Patient does not verbalize complaints of this symptom today. He still plans to get fitted for diabetic shoes. AE ongoing.       No changes in other baseline AE's noted.      Per study chair, "the definition of progressive disease for this protocol is developing CRAB criteria that requires treatment systemically." Per Dr Engel, based on today's exam and lab results thus far, patient does not have any s/s of CRAB: Normal Calcium level (10.0), absence of Renal insufficiency (serum creatinine 1.6, and GFR 52.17 per [facility]--patient with a history of kidney dysfunction secondary to diabetes; Dr. Engel aware of these values and not concerned for myeloma involvement), absence of significant Anemia (hemoglobin 11.8, stable from baseline hgb level at study entry) and absence of lytic Bone lesions (per baseline metastatic survey as well as MRI--see MD note for further comment). See MD note for ECOG score and H&P and flowsheets for laboratory work, vitals, etc. All myeloma-related blood work from last cycle including SPEP and JAGUAR have remained stable and per Dr. Engel, patient shows no evidence of progression today. Patient informed that Dr. Engel will release all lab results to [facility]. He states understanding of this.       RN reinforced importance of patient following up monthly for assessment as well as lab work. Patient verbalizes understanding. Appointment for next month's visit was made in clinic today and appt slip " is to be mailed to patient at a later date. Patient was given Research RN's contact information and has MD contact information to call with any concerns, questions, or worsening of symptoms; he verbalized understanding.

## 2017-08-04 LAB
ALBUMIN SERPL ELPH-MCNC: 3.95 G/DL
ALPHA1 GLOB SERPL ELPH-MCNC: 0.27 G/DL
ALPHA2 GLOB SERPL ELPH-MCNC: 0.76 G/DL
B-GLOBULIN SERPL ELPH-MCNC: 2.45 G/DL
GAMMA GLOB SERPL ELPH-MCNC: 0.57 G/DL
INTERPRETATION SERPL IFE-IMP: NORMAL
KAPPA LC SER QL IA: 4.84 MG/DL
KAPPA LC/LAMBDA SER IA: 3.29
LAMBDA LC SER QL IA: 1.47 MG/DL
PATHOLOGIST INTERPRETATION IFE: NORMAL
PATHOLOGIST INTERPRETATION SPE: NORMAL
PROT SERPL-MCNC: 8 G/DL

## 2017-08-04 NOTE — PROGRESS NOTES
Subjective:       Patient ID: Emerson Menendez is a 72 y.o. male.    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 Test results identify a stable IgA kappa protein with beta globulin band at 1.63g/dL. Kappa free light chain is elevated at 4.40. CBC and calcium are stable. Creatinine with history of stage III CKD is stable at 1.4. Metastatic survey is negative for lytic lesions. MRI of the entire spine demonstrated age related changes but no convincing evidence of myeloma bone disease. Bone marrow biopsy identified about 13% plasma cells by morphology and FISH for myeloma identified a t(11;14) and trisomies 3,7, and 17. The patient is afebrile and appears clinically well. He was seen by his podiatrist and nephrologist since our last visit without any new or acute events.    The patient has not received any therapy for smoldering myeloma including bisphosphonates or steroids. He has been randomized to observation arm of the the clinical study. 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 He has no current or prior history of malignancy.    TODAY  Mr. Menendez returns today for monthly follow-up evaluation. He denies any interval events since last interview.   No clinical signs/symptoms of progression of his smoldering myeloma.    Nephrology has recommended kidney biopsy for chronic renal disease    HPI  Review of Systems   Constitutional: Negative for activity change, appetite change, diaphoresis, fatigue, fever and unexpected weight change.   HENT: Negative.    Eyes: Negative.    Respiratory: Negative.    Cardiovascular:  Negative for leg swelling.   Gastrointestinal: Negative.    Endocrine: Negative.    Genitourinary: Negative.    Musculoskeletal: Negative.    Skin: Negative.    Allergic/Immunologic: Negative.    Neurological:        Grade 0-1 peripheral neuropathy of bilateral feet.    Hematological: Negative for adenopathy. Does not bruise/bleed easily.   Psychiatric/Behavioral: Negative.        Objective:       Vitals:    08/03/17 1420   BP: (!) 157/64   Pulse: 64   Resp: 18   Temp: 98.2 °F (36.8 °C)       Physical Exam   Constitutional: He is oriented to person, place, and time. He appears well-developed and well-nourished.   HENT:   Head: Normocephalic and atraumatic.   Right Ear: External ear normal.   Left Ear: External ear normal.   Nose: Nose normal.   Mouth/Throat: Oropharynx is clear and moist.   Eyes: Conjunctivae and EOM are normal. Pupils are equal, round, and reactive to light.   Neck: Normal range of motion. Neck supple.   Cardiovascular: Normal rate, regular rhythm and intact distal pulses.    No murmur heard.  Pulmonary/Chest: Effort normal and breath sounds normal. No respiratory distress.   Abdominal: Soft. Bowel sounds are normal. He exhibits no distension. There is no hepatosplenomegaly.   Musculoskeletal: He exhibits no edema or tenderness.   Neurological: He is alert and oriented to person, place, and time. No cranial nerve deficit.   Skin: Skin is warm and dry. No rash noted. No cyanosis. Nails show no clubbing.   Psychiatric: He has a normal mood and affect. His behavior is normal.   Nursing note and vitals reviewed.      Assessment:       1. Smoldering multiple myeloma (SMM)    2. Bilateral primary osteoarthritis of knee    3. CKD stage 3 due to type 2 diabetes mellitus        Plan:       The patient has a diagnosis of smoldering myeloma. There is no indication for immediate chemotherapy. We are monitoring renal function closely- baseline creatinine of -1.5-1.6 is stable today   We will continue  observation as per the Randomized Phase III Trial of Lenalidomide Versus Observation Alone in Patients with 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. CBC and CMP are stable.  Complete biochemical studies from today are pending.  Plan for return in 1 month.

## 2017-08-08 ENCOUNTER — OFFICE VISIT (OUTPATIENT)
Dept: ORTHOPEDICS | Facility: CLINIC | Age: 72
End: 2017-08-08
Payer: MEDICARE

## 2017-08-08 VITALS — HEIGHT: 70 IN | BODY MASS INDEX: 27.2 KG/M2 | WEIGHT: 190 LBS

## 2017-08-08 DIAGNOSIS — M17.0 PRIMARY OSTEOARTHRITIS OF BOTH KNEES: Primary | ICD-10-CM

## 2017-08-08 PROCEDURE — 99213 OFFICE O/P EST LOW 20 MIN: CPT | Mod: S$GLB,,, | Performed by: ORTHOPAEDIC SURGERY

## 2017-08-08 PROCEDURE — 1159F MED LIST DOCD IN RCRD: CPT | Mod: S$GLB,,, | Performed by: ORTHOPAEDIC SURGERY

## 2017-08-08 PROCEDURE — 99999 PR PBB SHADOW E&M-EST. PATIENT-LVL II: CPT | Mod: PBBFAC,,, | Performed by: ORTHOPAEDIC SURGERY

## 2017-08-08 PROCEDURE — 1125F AMNT PAIN NOTED PAIN PRSNT: CPT | Mod: S$GLB,,, | Performed by: ORTHOPAEDIC SURGERY

## 2017-08-08 PROCEDURE — 3008F BODY MASS INDEX DOCD: CPT | Mod: S$GLB,,, | Performed by: ORTHOPAEDIC SURGERY

## 2017-08-08 NOTE — LETTER
August 8, 2017      Richar Jiang MD  [address]  [facility] [address]           [facility]  [address]  [facility] [address]  Phone: [phone]          Patient: Emerson Menendez   MR Number: [id number]   YOB: 1945   Date of Visit: 8/8/2017       Dear Dr. Richar Jiang:    Thank you for referring Emerson Menendez to me for evaluation. Attached you will find relevant portions of my assessment and plan of care.    If you have questions, please do not hesitate to call me. I look forward to following Emerson Menendez along with you.    Sincerely,    [facility] MD    Enclosure  CC:  No Recipients    If you would like to receive this communication electronically, please contact [email or web address] or [phone] to request more information on [employer] access.    For providers and/or their staff who would like to refer a patient to [facility], please contact us through our one-stop-shop provider referral line, [facility], at [phone].    If you feel you have received this communication in error or would no longer like to receive these types of communications, please e-mail [email or web address]

## 2017-08-08 NOTE — PROGRESS NOTES
"HPI:    Emerson Menendez is a 72 y.o. male who is here today for   Chief Complaint   Patient presents with    Left Knee - Pain    Right Knee - Pain   .  Patient has been seen by sports medicine for the arthritis in his knee.  He is wearing the unweight brace.    Duration: 6 months  Intensity: severe  Character of pain: sharp  Location: He reports that the pain is predominately  medial    Past Medical History:   Diagnosis Date    Anxiety 3/16/2015    BPH (benign prostatic hypertrophy) 9/24/2012    Depression 3/16/2015    GERD (gastroesophageal reflux disease) 9/24/2012    Microalbuminuria 9/24/2012    Osteoarthritis of cervical spine 9/24/2012    Osteoarthritis of knee 9/24/2012    Type II or unspecified type diabetes mellitus without mention of complication, not stated as uncontrolled 9/24/2012          Current Outpatient Prescriptions:     ACCU-CHEK OMAYRA Misc, USE AS DIRECTED, Disp: 1 each, Rfl: 0    aspirin (ECOTRIN) 81 MG EC tablet, Take 1 tablet (81 mg total) by mouth once daily., Disp: 100 tablet, Rfl: 3    BD INSULIN PEN NEEDLE UF SHORT 31 gauge x 5/16" Ndle, , Disp: , Rfl:     blood glucose control, normal (METER-CHECK) Soln, One meter. True test meter. Use as directed, Disp: 1 each, Rfl: 0    blood sugar diagnostic Strp, Check twice daily, Disp: 200 strip, Rfl: 4    blood-glucose meter kit, Use as instructed, accucheck omayra meter., Disp: 1 each, Rfl: 0    cholecalciferol, vitamin D3, (VITAMIN D) 2,000 unit Cap, Take by mouth. 1 Capsule Oral Every day.  over the counter, Disp: , Rfl:     citalopram (CELEXA) 20 MG tablet, TAKE 1 TABLET EVERY EVENING AFTER DINNER, Disp: 90 tablet, Rfl: 11    diazePAM (VALIUM) 5 MG tablet, Take 1 tablet (5 mg total) by mouth every 6 (six) hours as needed., Disp: 60 tablet, Rfl: 3    diclofenac sodium (VOLTAREN) 1 % Gel, Apply 2 g topically 2 (two) times daily. To the anterior left knee prn pain, Disp: 1 Tube, Rfl: 0    gabapentin (NEURONTIN) 100 MG " capsule, TAKE 1 CAPSULE EVERY MORNING, 1 CAPSULE IN THE AFTERNOON, AND 1 TO 3 CAPSULE(S) AT BEDTIME AS NEEDED FOR FOOT PAIN, Disp: 450 capsule, Rfl: 3    hydrocodone-acetaminophen 5-325mg (NORCO) 5-325 mg per tablet, Take 1 tablet by mouth every 4 to 6 hours as needed for Pain., Disp: 60 tablet, Rfl: 0    insulin aspart (NOVOLOG) 100 unit/mL injection, To use with Vgo 20 with 2 clicks with meals, Disp: 2 vial, Rfl: 4    insulin glargine (LANTUS SOLOSTAR) 100 unit/mL (3 mL) InPn pen, Inject 18 Units into the skin every evening. TO STOP ONCE HE STARTS VGO, Disp: 15 mL, Rfl: 3    lancets Misc, Use twice daily, Disp: 200 each, Rfl: 4    latanoprost 0.005 % ophthalmic solution, INSTILL 1 DROP INTO BOTH EYES EVERY EVENING, Disp: 3 Bottle, Rfl: 4    losartan (COZAAR) 25 MG tablet, TAKE 1 TABLET EVERY DAY, Disp: 90 tablet, Rfl: 4    omeprazole (PRILOSEC) 20 MG capsule, Take 2 capsules (40 mg total) by mouth once daily., Disp: 180 capsule, Rfl: 3    oxybutynin (DITROPAN) 5 MG Tab, Take 1 tablet (5 mg total) by mouth 2 (two) times daily., Disp: 180 tablet, Rfl: 12    pravastatin (PRAVACHOL) 40 MG tablet, TAKE 1 TABLET ONE TIME DAILY, Disp: 90 tablet, Rfl: 3    sub-q insulin device, 20 unit (VGO 20) Nury, 1 Device by Misc.(Non-Drug; Combo Route) route once daily., Disp: 30 Device, Rfl: 4    tadalafil (CIALIS) 5 MG tablet, Take 4 tablets (20 mg total) by mouth once daily., Disp: 30 tablet, Rfl: 2    tamsulosin (FLOMAX) 0.4 mg Cp24, TAKE 1 CAPSULE ONE TIME DAILY, Disp: 90 capsule, Rfl: 3    tizanidine (ZANAFLEX) 4 MG tablet, 1/2-1 tablet every 8 hours as needed for muscle spasm, Disp: 90 tablet, Rfl: 1     Review of patient's allergies indicates:   Allergen Reactions    Penicillins      Knees locked up        ROS  Constitutional: Negative for fever, Negative for weight loss  HENT Negative for congestion  Cardiovascular: Negative chest pain  Respiratory: Negative Shortness of breath  Heme: Negative excessive  "bleeding  Skin:NegativeItching, Negative breakdown  Musculoskeletal:Negative for back pain, Positive for joint pain, Negative muscle pain, Negative muscle weakness  Neurological: Negative for numbness and paresthesias   Psychiatric/Behavioral: Negative altered mental status, Negative for depression    Physical Exam:   Ht 5' 10" (1.778 m)   Wt 86.2 kg (190 lb)   BMI 27.26 kg/m²   General appearance: This is a well-developed, Well nourished male No obvious acute distress.  Psychiatric: normal mood and affect;  pleasant and conversant; judgment and thought content normal  Gait is coordinated. Patient has antalgic gait to the bilateral  Cardiovascular: There are no swelling or varicosities present.   Respiratory: normal respiratory effort   Lymphatic: no adenopathy   Neurologic: alert and oriented to person, place, and time   Deep Tendon Reflexes are normal;  Coordination and Balance: Normal   Musculoskeletal   Neck    ROM shows normal flexion and extension and lateral rotation    Palpation: Non-tender    Stability is normal    Strength is normal    Skin is normal without masses and lesions    Sensation is intact to light touch   Back    ROM showsnormal flexion, extension    and  rotation    Palpation shows no masses    Stability is normal    Strength to flexion and extension well maintained    Core strength is diminished    Skin shows no rashes or cafe au lait spots;     Sensation is intact to light touch    Right Knee  Swelling None  TendernessMedial joint line  Range of Motion: 125    Left Knee: Swelling Mild  TendernessMedial joint line  Range of Motion: 125    Radiograph   Osteoarthritis: moderate  Angle: mild varus    Assessment: Osteoarthritis of the knees.    Plan: Have discussed patient's surgical options and nonsurgical options.  At this point we'll delay any surgical plans.  Patient will continue to use the braces.  "

## 2017-08-09 ENCOUNTER — PATIENT MESSAGE (OUTPATIENT)
Dept: DIABETES | Facility: CLINIC | Age: 72
End: 2017-08-09

## 2017-08-09 ENCOUNTER — TELEPHONE (OUTPATIENT)
Dept: INTERNAL MEDICINE | Facility: CLINIC | Age: 72
End: 2017-08-09

## 2017-08-09 NOTE — TELEPHONE ENCOUNTER
Noted patient has not read letter from Tiffanie Wilhelm NP from 7/20/2017 letter mailed to address on file.

## 2017-08-09 NOTE — TELEPHONE ENCOUNTER
I am doing a PA for the pt's medication Cialis 5 mg tablet. I am wanting to confirm that you want him to take Take 4 tablets (20 mg total) by mouth once daily? Please advise

## 2017-08-14 RX ORDER — INSULIN ASPART 100 [IU]/ML
INJECTION, SOLUTION INTRAVENOUS; SUBCUTANEOUS
Qty: 15 ML | Refills: 0 | Status: SHIPPED | OUTPATIENT
Start: 2017-08-14 | End: 2017-08-16 | Stop reason: SDUPTHER

## 2017-08-15 ENCOUNTER — TELEPHONE (OUTPATIENT)
Dept: DIABETES | Facility: CLINIC | Age: 72
End: 2017-08-15

## 2017-08-15 DIAGNOSIS — E11.42 TYPE 2 DIABETES MELLITUS WITH DIABETIC POLYNEUROPATHY, WITH LONG-TERM CURRENT USE OF INSULIN: Primary | Chronic | ICD-10-CM

## 2017-08-15 DIAGNOSIS — Z79.4 TYPE 2 DIABETES MELLITUS WITH DIABETIC POLYNEUROPATHY, WITH LONG-TERM CURRENT USE OF INSULIN: Primary | Chronic | ICD-10-CM

## 2017-08-15 NOTE — TELEPHONE ENCOUNTER
Please contact pt to obtain BG logs from the past 2 weeks after starting the Vgo.     Pt has not read email inquring about logs    How many times is he clicking with meals?    Thanks

## 2017-08-16 RX ORDER — INSULIN ASPART 100 [IU]/ML
INJECTION, SOLUTION INTRAVENOUS; SUBCUTANEOUS
Qty: 15 ML | Refills: 2 | Status: SHIPPED | OUTPATIENT
Start: 2017-08-16 | End: 2017-09-13 | Stop reason: SDUPTHER

## 2017-08-16 RX ORDER — INSULIN GLARGINE 100 [IU]/ML
18 INJECTION, SOLUTION SUBCUTANEOUS NIGHTLY
Qty: 15 ML | Refills: 2 | Status: SHIPPED | OUTPATIENT
Start: 2017-08-16 | End: 2018-01-15

## 2017-08-16 NOTE — TELEPHONE ENCOUNTER
Called and spoke with patient.  Patient explains that, he has not started the V-go as of yet because he found out that for each container without the medicine is $200 a month and the medicine is $70 each and, he cannot afford that.  He received 2 container samples.  He has only been taking the Novolog.  Patient explains that his blood sugar this morning was 149 and 147 yesterday morning.  I asked him if he could provide me with more readings and explained that, he has not got things together in the last couple of weeks and feels somewhat depressed with all this this situation with the V-go.  I explained to him that you have sent him e-mails through his [facility] portal but, he has not been reading them.  Patient explains that, he is starting to get things together and will be reading them.

## 2017-08-16 NOTE — TELEPHONE ENCOUNTER
Spoke to pt, pt likely in the donut hole considering quoted price of the Vgo. Will change back to Lantus 18 units nightly, will start Novolog 4 units with largest meal of the day, reinforced d/c of Amaryl     Pt verbalized understanding of above, instructed to email me logs in 2 weeks    Also suggested that pt can request appeal via [employer] to decrease cost of Vgo medication. Instructed pt to call [employer] and ask if appeal possible, if so, he will email me and I will complete appeal paperwork

## 2017-08-31 ENCOUNTER — RESEARCH ENCOUNTER (OUTPATIENT)
Dept: RESEARCH | Facility: HOSPITAL | Age: 72
End: 2017-08-31

## 2017-08-31 ENCOUNTER — LAB VISIT (OUTPATIENT)
Dept: LAB | Facility: HOSPITAL | Age: 72
End: 2017-08-31
Attending: INTERNAL MEDICINE
Payer: MEDICARE

## 2017-08-31 ENCOUNTER — OFFICE VISIT (OUTPATIENT)
Dept: HEMATOLOGY/ONCOLOGY | Facility: CLINIC | Age: 72
End: 2017-08-31
Payer: MEDICARE

## 2017-08-31 VITALS
HEART RATE: 71 BPM | TEMPERATURE: 98 F | DIASTOLIC BLOOD PRESSURE: 73 MMHG | HEIGHT: 70 IN | SYSTOLIC BLOOD PRESSURE: 136 MMHG | WEIGHT: 190.94 LBS | BODY MASS INDEX: 27.34 KG/M2

## 2017-08-31 DIAGNOSIS — D47.2 SMOLDERING MULTIPLE MYELOMA (SMM): Primary | ICD-10-CM

## 2017-08-31 DIAGNOSIS — C90.00 MULTIPLE MYELOMA, REMISSION STATUS UNSPECIFIED: ICD-10-CM

## 2017-08-31 DIAGNOSIS — N18.30 CKD STAGE 3 DUE TO TYPE 2 DIABETES MELLITUS: ICD-10-CM

## 2017-08-31 DIAGNOSIS — E11.49 TYPE 2 DIABETES MELLITUS WITH OTHER NEUROLOGIC COMPLICATION: ICD-10-CM

## 2017-08-31 DIAGNOSIS — Z00.6 EXAMINATION OF PARTICIPANT IN CLINICAL TRIAL: ICD-10-CM

## 2017-08-31 DIAGNOSIS — E11.22 CKD STAGE 3 DUE TO TYPE 2 DIABETES MELLITUS: ICD-10-CM

## 2017-08-31 LAB
ALBUMIN SERPL BCP-MCNC: 3.4 G/DL
ALP SERPL-CCNC: 54 U/L
ALT SERPL W/O P-5'-P-CCNC: 19 U/L
ANION GAP SERPL CALC-SCNC: 9 MMOL/L
AST SERPL-CCNC: 17 U/L
BASOPHILS # BLD AUTO: 0.03 K/UL
BASOPHILS NFR BLD: 0.7 %
BILIRUB SERPL-MCNC: 0.5 MG/DL
BUN SERPL-MCNC: 24 MG/DL
CALCIUM SERPL-MCNC: 9.3 MG/DL
CHLORIDE SERPL-SCNC: 103 MMOL/L
CO2 SERPL-SCNC: 25 MMOL/L
CREAT SERPL-MCNC: 1.9 MG/DL
DIFFERENTIAL METHOD: ABNORMAL
EOSINOPHIL # BLD AUTO: 0.2 K/UL
EOSINOPHIL NFR BLD: 5.1 %
ERYTHROCYTE [DISTWIDTH] IN BLOOD BY AUTOMATED COUNT: 14 %
EST. GFR  (AFRICAN AMERICAN): 40.1 ML/MIN/1.73 M^2
EST. GFR  (NON AFRICAN AMERICAN): 34.7 ML/MIN/1.73 M^2
GLUCOSE SERPL-MCNC: 233 MG/DL
HCT VFR BLD AUTO: 34.8 %
HGB BLD-MCNC: 11.4 G/DL
IGA SERPL-MCNC: 1443 MG/DL
IGG SERPL-MCNC: 932 MG/DL
IGM SERPL-MCNC: 28 MG/DL
LYMPHOCYTES # BLD AUTO: 1.4 K/UL
LYMPHOCYTES NFR BLD: 34.5 %
MAGNESIUM SERPL-MCNC: 1.9 MG/DL
MCH RBC QN AUTO: 28.6 PG
MCHC RBC AUTO-ENTMCNC: 32.8 G/DL
MCV RBC AUTO: 87 FL
MONOCYTES # BLD AUTO: 0.3 K/UL
MONOCYTES NFR BLD: 7.7 %
NEUTROPHILS # BLD AUTO: 2.1 K/UL
NEUTROPHILS NFR BLD: 51.8 %
PHOSPHATE SERPL-MCNC: 2.9 MG/DL
PLATELET # BLD AUTO: 200 K/UL
PMV BLD AUTO: 9.3 FL
POTASSIUM SERPL-SCNC: 4.6 MMOL/L
PROT SERPL-MCNC: 7.9 G/DL
RBC # BLD AUTO: 3.99 M/UL
SODIUM SERPL-SCNC: 137 MMOL/L
WBC # BLD AUTO: 4.14 K/UL

## 2017-08-31 PROCEDURE — 84165 PROTEIN E-PHORESIS SERUM: CPT

## 2017-08-31 PROCEDURE — 84165 PROTEIN E-PHORESIS SERUM: CPT | Mod: 26,Q1,, | Performed by: PATHOLOGY

## 2017-08-31 PROCEDURE — 1126F AMNT PAIN NOTED NONE PRSNT: CPT | Mod: Q1,S$GLB,, | Performed by: INTERNAL MEDICINE

## 2017-08-31 PROCEDURE — 82784 ASSAY IGA/IGD/IGG/IGM EACH: CPT | Mod: 59

## 2017-08-31 PROCEDURE — 3066F NEPHROPATHY DOC TX: CPT | Mod: Q1,S$GLB,, | Performed by: INTERNAL MEDICINE

## 2017-08-31 PROCEDURE — 3008F BODY MASS INDEX DOCD: CPT | Mod: Q1,S$GLB,, | Performed by: INTERNAL MEDICINE

## 2017-08-31 PROCEDURE — 36415 COLL VENOUS BLD VENIPUNCTURE: CPT

## 2017-08-31 PROCEDURE — 3078F DIAST BP <80 MM HG: CPT | Mod: Q1,S$GLB,, | Performed by: INTERNAL MEDICINE

## 2017-08-31 PROCEDURE — 86334 IMMUNOFIX E-PHORESIS SERUM: CPT | Mod: 26,Q1,, | Performed by: PATHOLOGY

## 2017-08-31 PROCEDURE — 99215 OFFICE O/P EST HI 40 MIN: CPT | Mod: Q1,S$GLB,, | Performed by: INTERNAL MEDICINE

## 2017-08-31 PROCEDURE — 3045F PR MOST RECENT HEMOGLOBIN A1C LEVEL 7.0-9.0%: CPT | Mod: Q1,S$GLB,, | Performed by: INTERNAL MEDICINE

## 2017-08-31 PROCEDURE — 3075F SYST BP GE 130 - 139MM HG: CPT | Mod: Q1,S$GLB,, | Performed by: INTERNAL MEDICINE

## 2017-08-31 PROCEDURE — 83520 IMMUNOASSAY QUANT NOS NONAB: CPT

## 2017-08-31 PROCEDURE — 85025 COMPLETE CBC W/AUTO DIFF WBC: CPT

## 2017-08-31 PROCEDURE — 82784 ASSAY IGA/IGD/IGG/IGM EACH: CPT

## 2017-08-31 PROCEDURE — 99999 PR PBB SHADOW E&M-EST. PATIENT-LVL III: CPT | Mod: PBBFAC,,, | Performed by: INTERNAL MEDICINE

## 2017-08-31 PROCEDURE — 83735 ASSAY OF MAGNESIUM: CPT

## 2017-08-31 PROCEDURE — 80053 COMPREHEN METABOLIC PANEL: CPT

## 2017-08-31 PROCEDURE — 1159F MED LIST DOCD IN RCRD: CPT | Mod: Q1,S$GLB,, | Performed by: INTERNAL MEDICINE

## 2017-08-31 PROCEDURE — 84100 ASSAY OF PHOSPHORUS: CPT

## 2017-08-31 PROCEDURE — 86334 IMMUNOFIX E-PHORESIS SERUM: CPT

## 2017-08-31 NOTE — PROGRESS NOTES
"  Thursday, August 31st, 2017      Protocol: E3A06  Sponsor:  [facility]  IRB #: [id number]  Investigator: GIL Engel  Pt Initials: LUCÍA LORENZ      Arm B: Observation  Cycle 20, Day 28      Patient presents to clinic to evaluate ability to proceed with Cycle 21 of observation per above-mentioned protocol. Patient is awake, alert, and oriented to person, place, and time. He is ambulatory and states he has been feeling well. He states he recently saw Dr. [facility] who recommended that he continue to wear braces as long as that seems to keep him pain free and engaging in usual activities.  He states once this ceases working, then it may be time to consider that option.  Patient happy that he does not need surgery now and in agreement with MD plan. He states he has been getting up less during the night to use the bathroom. He states he has "figured out" his diabetes medications and his blood sugars have been under better control as of late.  He denies new potential CRAB symptoms.  He states continued willingness to participate in above-mentioned study.        Review of AE's:  1. Hypertension, grade 1: BP today is 136/73  in clinic today. This is improved from that of last visit.  Will continue to follow this. AE ongoing.  2. Lower Back Pain and Neck Pain, grade 1: Patient has no complaints of this issue this month. As previously stated, patient is not suspected to have bony myeloma involvement per Dr. Engel as these are pre-existing issues present at baseline. He states he plans to increase his physical activity and to work out regularly. He states understanding. AE ongoing.  3.  Pain in extremity (knee), grade 1.  See above regarding regarding recent visit with Dr. [facility].  Wearing knee stabilizing braces today.  AE, grade 1.  4. Creatinine increased, grade 1: Serum creatinine is worsened from last month at 1.9 mg/dl today and GFR 52.17. Per Dr. Engel this has not been related to myeloma and is related chronic kidney issues likely " "secondary to diabetes and hypertension.  This result was noted per Dr. Engel and not concerned for myeloma involvement at this time.  But as stated above, Nephrologist plans to do biopsy to confirm source of increased creatinine.  Will continue observation monthly and to monitor renal function closely. AE stable.   5. Peripheral sensory neuropathy, grade 1: Patient does not verbalize complaints of this symptom today. He still plans to get fitted for diabetic shoes. AE ongoing.       No changes in other baseline AE's noted.      Per study chair, "the definition of progressive disease for this protocol is developing CRAB criteria that requires treatment systemically." Per Dr Engel, based on today's exam and lab results thus far, patient does not have any s/s of CRAB: Normal Calcium level (9.3), absence of Renal insufficiency (serum creatinine 1.9, and GFR 43.68 per [facility]--patient with a history of kidney dysfunction secondary to diabetes; Dr. Engel aware of these values and not concerned for myeloma involvement), absence of significant Anemia (hemoglobin 11.9, stable from baseline hgb level at study entry) and absence of lytic Bone lesions (per baseline metastatic survey as well as MRI--see MD note for further comment). See MD note for ECOG score and H&P and flowsheets for laboratory work, vitals, etc. All myeloma-related blood work from last cycle including SPEP and JAGUAR have remained stable and per Dr. Engel, patient shows no evidence of progression today. Patient informed that Dr. Engel will release all lab results to [facility]. He states understanding of this.       RN reinforced importance of patient following up monthly for assessment as well as lab work. Patient verbalizes understanding. Appointment for next month's visit was made in clinic today and appt slip is to be mailed to patient at a later date. Patient was given Research RN's contact information and has MD contact information to call with any " concerns, questions, or worsening of symptoms; he verbalized understanding.

## 2017-08-31 NOTE — PROGRESS NOTES
Subjective:       Patient ID: Emerson Menendez is a 72 y.o. male.    Chief Complaint: No chief complaint on file.  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 Test results identify a stable IgA kappa protein with beta globulin band at 1.63g/dL. Kappa free light chain is elevated at 4.40. CBC and calcium are stable. Creatinine with history of stage III CKD is stable at 1.4. Metastatic survey is negative for lytic lesions. MRI of the entire spine demonstrated age related changes but no convincing evidence of myeloma bone disease. Bone marrow biopsy identified about 13% plasma cells by morphology and FISH for myeloma identified a t(11;14) and trisomies 3,7, and 17. The patient is afebrile and appears clinically well. He was seen by his podiatrist and nephrologist since our last visit without any new or acute events.    The patient has not received any therapy for smoldering myeloma including bisphosphonates or steroids. He has been randomized to observation arm of the the clinical study. 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 He has no current or prior history of malignancy.    TODAY  Mr. Menendez returns today for monthly follow-up evaluation. He denies any interval events since last interview.   No clinical signs/symptoms of progression of his smoldering myeloma.    He has decreased night time urinary frequency but difficulty micturating in the morning. He is now on flomax and oxybutinin.    HPI  Review of Systems   Constitutional: Negative for activity change, appetite change, diaphoresis, fatigue, fever and unexpected weight change.   HENT: Negative.    Eyes:  Negative.    Respiratory: Negative.    Cardiovascular: Negative for leg swelling.   Gastrointestinal: Negative.    Endocrine: Negative.    Genitourinary: Negative.    Musculoskeletal: Negative.    Skin: Negative.    Allergic/Immunologic: Negative.    Neurological:        Grade 0-1 peripheral neuropathy of bilateral feet.    Hematological: Negative for adenopathy. Does not bruise/bleed easily.   Psychiatric/Behavioral: Negative.        Objective:       Vitals:    08/31/17 0901   BP: 136/73   Pulse: 71   Temp: 97.7 °F (36.5 °C)       Physical Exam   Constitutional: He is oriented to person, place, and time. He appears well-developed and well-nourished.   HENT:   Head: Normocephalic and atraumatic.   Right Ear: External ear normal.   Left Ear: External ear normal.   Nose: Nose normal.   Mouth/Throat: Oropharynx is clear and moist.   Eyes: Conjunctivae and EOM are normal. Pupils are equal, round, and reactive to light.   Neck: Normal range of motion. Neck supple.   Cardiovascular: Normal rate, regular rhythm and intact distal pulses.    No murmur heard.  Pulmonary/Chest: Effort normal and breath sounds normal. No respiratory distress.   Abdominal: Soft. Bowel sounds are normal. He exhibits no distension. There is no hepatosplenomegaly.   Musculoskeletal: He exhibits no edema or tenderness.   Neurological: He is alert and oriented to person, place, and time. No cranial nerve deficit.   Skin: Skin is warm and dry. No rash noted. No cyanosis. Nails show no clubbing.   Psychiatric: He has a normal mood and affect. His behavior is normal.   Nursing note and vitals reviewed.      Assessment:       1. Smoldering multiple myeloma (SMM)    2. CKD stage 3 due to type 2 diabetes mellitus    3. Type 2 diabetes mellitus with other neurologic complication        Plan:       The patient has a diagnosis of smoldering myeloma. There is no indication for immediate chemotherapy. We are monitoring renal function closely- baseline  creatinine of -1.9 is stable today- same level 2 months ago with subsequent improvement.   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 CBC and CMP are stable.  Complete biochemical studies from today are pending.  Plan for return in 1 month.

## 2017-09-01 LAB
ALBUMIN SERPL ELPH-MCNC: 3.8 G/DL
ALPHA1 GLOB SERPL ELPH-MCNC: 0.25 G/DL
ALPHA2 GLOB SERPL ELPH-MCNC: 0.72 G/DL
B-GLOBULIN SERPL ELPH-MCNC: 2.41 G/DL
GAMMA GLOB SERPL ELPH-MCNC: 0.51 G/DL
INTERPRETATION SERPL IFE-IMP: NORMAL
KAPPA LC SER QL IA: 5.53 MG/DL
KAPPA LC/LAMBDA SER IA: 3.57
LAMBDA LC SER QL IA: 1.55 MG/DL
PATHOLOGIST INTERPRETATION IFE: NORMAL
PATHOLOGIST INTERPRETATION SPE: NORMAL
PROT SERPL-MCNC: 7.7 G/DL

## 2017-09-13 ENCOUNTER — IMMUNIZATION (OUTPATIENT)
Dept: INTERNAL MEDICINE | Facility: CLINIC | Age: 72
End: 2017-09-13
Payer: MEDICARE

## 2017-09-13 ENCOUNTER — OFFICE VISIT (OUTPATIENT)
Dept: INTERNAL MEDICINE | Facility: CLINIC | Age: 72
End: 2017-09-13
Payer: MEDICARE

## 2017-09-13 VITALS
DIASTOLIC BLOOD PRESSURE: 78 MMHG | HEIGHT: 70 IN | WEIGHT: 193.69 LBS | OXYGEN SATURATION: 98 % | SYSTOLIC BLOOD PRESSURE: 134 MMHG | HEART RATE: 82 BPM | BODY MASS INDEX: 27.73 KG/M2

## 2017-09-13 DIAGNOSIS — I10 ESSENTIAL HYPERTENSION: ICD-10-CM

## 2017-09-13 DIAGNOSIS — Z79.4 TYPE 2 DIABETES MELLITUS WITH DIABETIC POLYNEUROPATHY, WITH LONG-TERM CURRENT USE OF INSULIN: Chronic | ICD-10-CM

## 2017-09-13 DIAGNOSIS — D47.2 SMOLDERING MULTIPLE MYELOMA: ICD-10-CM

## 2017-09-13 DIAGNOSIS — D80.4 IGM DEFICIENCY: ICD-10-CM

## 2017-09-13 DIAGNOSIS — N40.0: Primary | ICD-10-CM

## 2017-09-13 DIAGNOSIS — E11.42 TYPE 2 DIABETES MELLITUS WITH DIABETIC POLYNEUROPATHY, WITH LONG-TERM CURRENT USE OF INSULIN: Chronic | ICD-10-CM

## 2017-09-13 DIAGNOSIS — K21.9 GASTROESOPHAGEAL REFLUX DISEASE WITHOUT ESOPHAGITIS: ICD-10-CM

## 2017-09-13 PROCEDURE — 3066F NEPHROPATHY DOC TX: CPT | Mod: S$GLB,,, | Performed by: INTERNAL MEDICINE

## 2017-09-13 PROCEDURE — 99214 OFFICE O/P EST MOD 30 MIN: CPT | Mod: S$GLB,,, | Performed by: INTERNAL MEDICINE

## 2017-09-13 PROCEDURE — 1159F MED LIST DOCD IN RCRD: CPT | Mod: S$GLB,,, | Performed by: INTERNAL MEDICINE

## 2017-09-13 PROCEDURE — G0008 ADMIN INFLUENZA VIRUS VAC: HCPCS | Mod: S$GLB,,, | Performed by: INTERNAL MEDICINE

## 2017-09-13 PROCEDURE — 90662 IIV NO PRSV INCREASED AG IM: CPT | Mod: S$GLB,,, | Performed by: INTERNAL MEDICINE

## 2017-09-13 PROCEDURE — 99999 PR PBB SHADOW E&M-EST. PATIENT-LVL IV: CPT | Mod: PBBFAC,,, | Performed by: INTERNAL MEDICINE

## 2017-09-13 PROCEDURE — 3045F PR MOST RECENT HEMOGLOBIN A1C LEVEL 7.0-9.0%: CPT | Mod: S$GLB,,, | Performed by: INTERNAL MEDICINE

## 2017-09-13 PROCEDURE — 3008F BODY MASS INDEX DOCD: CPT | Mod: S$GLB,,, | Performed by: INTERNAL MEDICINE

## 2017-09-13 PROCEDURE — 3078F DIAST BP <80 MM HG: CPT | Mod: S$GLB,,, | Performed by: INTERNAL MEDICINE

## 2017-09-13 PROCEDURE — 1126F AMNT PAIN NOTED NONE PRSNT: CPT | Mod: S$GLB,,, | Performed by: INTERNAL MEDICINE

## 2017-09-13 PROCEDURE — 3075F SYST BP GE 130 - 139MM HG: CPT | Mod: S$GLB,,, | Performed by: INTERNAL MEDICINE

## 2017-09-13 RX ORDER — TADALAFIL 5 MG/1
5 TABLET ORAL DAILY PRN
Qty: 30 TABLET | Refills: 6 | Status: SHIPPED | OUTPATIENT
Start: 2017-09-13 | End: 2017-09-13 | Stop reason: SDUPTHER

## 2017-09-13 RX ORDER — LOSARTAN POTASSIUM 25 MG/1
25 TABLET ORAL DAILY
Qty: 90 TABLET | Refills: 4 | Status: SHIPPED | OUTPATIENT
Start: 2017-09-13 | End: 2018-10-16 | Stop reason: SDUPTHER

## 2017-09-13 RX ORDER — DIAZEPAM 5 MG/1
5 TABLET ORAL EVERY 6 HOURS PRN
Qty: 60 TABLET | Refills: 3 | Status: SHIPPED | OUTPATIENT
Start: 2017-09-13 | End: 2017-09-13 | Stop reason: SDUPTHER

## 2017-09-13 RX ORDER — TADALAFIL 5 MG/1
5 TABLET ORAL DAILY PRN
Qty: 30 TABLET | Refills: 6 | Status: SHIPPED | OUTPATIENT
Start: 2017-09-13 | End: 2018-04-18

## 2017-09-13 RX ORDER — GLIMEPIRIDE 2 MG/1
2 TABLET ORAL DAILY
Qty: 90 TABLET | Refills: 4 | Status: SHIPPED | OUTPATIENT
Start: 2017-09-13 | End: 2018-01-15

## 2017-09-13 RX ORDER — PRAVASTATIN SODIUM 40 MG/1
40 TABLET ORAL DAILY
Qty: 90 TABLET | Refills: 4 | Status: SHIPPED | OUTPATIENT
Start: 2017-09-13 | End: 2018-10-16 | Stop reason: SDUPTHER

## 2017-09-13 RX ORDER — HYDROCODONE BITARTRATE AND ACETAMINOPHEN 5; 325 MG/1; MG/1
1 TABLET ORAL
Qty: 60 TABLET | Refills: 0 | Status: SHIPPED | OUTPATIENT
Start: 2017-09-13 | End: 2018-01-17 | Stop reason: SDUPTHER

## 2017-09-13 RX ORDER — INSULIN ASPART 100 [IU]/ML
INJECTION, SOLUTION INTRAVENOUS; SUBCUTANEOUS
Qty: 15 ML | Refills: 2 | Status: SHIPPED | OUTPATIENT
Start: 2017-09-13 | End: 2017-11-21 | Stop reason: SDUPTHER

## 2017-09-13 RX ORDER — TADALAFIL 20 MG/1
TABLET ORAL
Qty: 20 TABLET | Refills: 4 | Status: SHIPPED | OUTPATIENT
Start: 2017-09-13 | End: 2018-03-07 | Stop reason: SDUPTHER

## 2017-09-13 RX ORDER — DIAZEPAM 5 MG/1
5 TABLET ORAL EVERY 6 HOURS PRN
Qty: 180 TABLET | Refills: 1 | Status: SHIPPED | OUTPATIENT
Start: 2017-09-13 | End: 2018-03-07 | Stop reason: SDUPTHER

## 2017-09-13 RX ORDER — TADALAFIL 20 MG/1
TABLET ORAL
Qty: 20 TABLET | Refills: 4 | Status: SHIPPED | OUTPATIENT
Start: 2017-09-13 | End: 2017-09-13 | Stop reason: SDUPTHER

## 2017-09-13 RX ORDER — GLIMEPIRIDE 2 MG/1
2 TABLET ORAL DAILY
COMMUNITY
End: 2017-09-13 | Stop reason: SDUPTHER

## 2017-09-13 NOTE — PROGRESS NOTES
CHIEF COMPLAINT: Follow up of smoldering multiple myeloma, diabetes, hypertension, back and neck pain    HISTORY OF PRESENT ILLNESS: This is a 72-year-old man who presents for follow up of above      HE is in a study for his smoldering multiple myeloma. He is in the observation arm and is being monitoring monthly. He saw Dr Engel 8/31/17. No indication for chemotherapy at this time.      Blood sugars have been better. He has been doing carb counting which has helped. He is taking Lantus 18 units at bedtime and Novolog 4 units at meals. He has not been having to take the Novlog as much as he is taking glimepiride 2 mg daily. He is taking losartan 25 mg 1 tablet daily to protect his kidney. No polydipsia or polyuria       His back and neck was doing better in the healthy back program.  He no longer needs hydrocodone apap regularly. He takes tizanidine as needed. HE is wearing braces for his knees which is helping stability and his pain      Knees are better after an injection. He had an injection by Dr Stokes, yesterday 2/2/17 which has helped.      Reflux is controlled on omeprazole 20 mg 2 tablets daily. HE has heartburn when he does not take the omeprazole No chest pain, shortness of breath, nausea, voimting, constipation, diarrhea, numbness, weakness.        He had laser vaporization and enucleation of the prostate 11/13/13. He denies any dysuria or hematuria now. Urinary frequency is better.  He is taking FLomax 0.4 mg nightly and oxybutynin 5 mg twice daily for urinary frequency and for his prostate.    He has hyperlipidemia, on pravastatin 40 mg daily. No joint pain or muscle pain from the pravastatin.        He has been taking aspirin 81 mg daily vitamin D supplement 2000 units daily.        Anxiety is controlled on celexa 20 mg daily. Mood is better. He takes diazepam 5 mg at bedtime for his sleep and anxiety. It helps him sleep.       HE is taking gabapentin 100 mg 2 twice daily which helps with the  "tingling in his toes.Foot pain is better and controlled with the gabapentin.    PAST MEDICAL HISTORY:   1. Diabetes mellitus.   2. Hyperlipidemia.   3. Reflux.   4. BPH.   5. Osteoarthritis of the knees.   6. Neck pain.   7. History of right lateral epicondylitis.   8. History of lumps removed from the breast as a teenager.   9. OA cervical spine     SOCIAL HISTORY: Does not smoke, does not drink. 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 He works for the [employer].     FAMILY HISTORY: Mother is living at age 86 with a heart condition. Father   in his late 40s of alcoholism. He has 5 sisters and 1 brother who are  healthy, one has a neurological disorder, one is anxious. His daughter has  lupus, on dialysis. She also has heart problems. She also had a brain   aneurysm that was stented.       PHYSICAL EXAMINATION:    /78 (BP Location: Right arm, Patient Position: Sitting, BP Method: Medium (Manual))   Pulse 82   Ht 5' 10" (1.778 m)   Wt 87.9 kg (193 lb 11.2 oz)   SpO2 98%   BMI 27.79 kg/m²     GENERAL: He is alert, oriented, no apparent distress. Affect within normal limits.   Conjunctivae anicteric. PERRL. Tympanic membranes clear. Oropharynx clear.   NECK: Supple. No cervical lymphadenopathy, no thyroid enlargement.   Respiratory: Effort normal. Lungs are clear to auscultation.   HEART: Regular rate and rhythm without murmurs, gallops or rubs.   No lower extremity edema.      Hemoglobin A1C 7.3 on 17    Labs 17 reviewed        ASSESSMENT AND PLAN:      1. Diabetes mellitus with hyperglycemia and microalbuminuria - followed by Tiffanie Wilhelm  2. 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 Labs reviewed  3. OA knee - stable  4. Hypertension - controlled  5. Hyperlipidemia. On pravastatin  6. BPH. S/p laser - Saw urology  7. Depression - stable  8. Vitamin D deficiency - on vitamin D 2,000 units daily.    9.Back pain-stable  10. Anxiety and depression- stable on celexa 20 mg one " tablet daily. Diazepam as neeeded for sleep  11. Peripheral neuropathy -controlled on gabapentin.   12. CRI -monitored closely by heme onc  I will see him back in 4 months, sooner if problems arise       Prevnar 7/16  PSA 8/16. colonoscopy normal 3/2012 and due in 2019

## 2017-09-28 ENCOUNTER — LAB VISIT (OUTPATIENT)
Dept: LAB | Facility: HOSPITAL | Age: 72
End: 2017-09-28
Payer: MEDICARE

## 2017-09-28 ENCOUNTER — PATIENT MESSAGE (OUTPATIENT)
Dept: DIABETES | Facility: CLINIC | Age: 72
End: 2017-09-28

## 2017-09-28 DIAGNOSIS — E11.65 TYPE 2 DIABETES MELLITUS WITH HYPERGLYCEMIA, WITH LONG-TERM CURRENT USE OF INSULIN: Chronic | ICD-10-CM

## 2017-09-28 DIAGNOSIS — Z79.4 TYPE 2 DIABETES MELLITUS WITH HYPERGLYCEMIA, WITH LONG-TERM CURRENT USE OF INSULIN: Chronic | ICD-10-CM

## 2017-09-28 LAB
ANION GAP SERPL CALC-SCNC: 12 MMOL/L
BUN SERPL-MCNC: 18 MG/DL
CALCIUM SERPL-MCNC: 9.7 MG/DL
CHLORIDE SERPL-SCNC: 103 MMOL/L
CO2 SERPL-SCNC: 24 MMOL/L
CREAT SERPL-MCNC: 1.7 MG/DL
EST. GFR  (AFRICAN AMERICAN): 45.9 ML/MIN/1.73 M^2
EST. GFR  (NON AFRICAN AMERICAN): 39.7 ML/MIN/1.73 M^2
ESTIMATED AVG GLUCOSE: 154 MG/DL
GLUCOSE SERPL-MCNC: 171 MG/DL
HBA1C MFR BLD HPLC: 7 %
POTASSIUM SERPL-SCNC: 4.3 MMOL/L
SODIUM SERPL-SCNC: 139 MMOL/L

## 2017-09-28 PROCEDURE — 80048 BASIC METABOLIC PNL TOTAL CA: CPT

## 2017-09-28 PROCEDURE — 36415 COLL VENOUS BLD VENIPUNCTURE: CPT

## 2017-09-28 PROCEDURE — 83036 HEMOGLOBIN GLYCOSYLATED A1C: CPT

## 2017-09-29 ENCOUNTER — PATIENT MESSAGE (OUTPATIENT)
Dept: DIABETES | Facility: CLINIC | Age: 72
End: 2017-09-29

## 2017-10-02 ENCOUNTER — TELEPHONE (OUTPATIENT)
Dept: RESEARCH | Facility: HOSPITAL | Age: 72
End: 2017-10-02

## 2017-10-02 NOTE — TELEPHONE ENCOUNTER
Monday, October 2nd, 2017      Protocol: E3A06  Sponsor:  [facility]  IRB #: [id number]  Investigator: GIL Engel Pt Initials: LUCÍA LORENZ      Arm B: Observation    Telephone    Research RN called patient this AM to follow up on missed appt this morning.  Patient states he thought he had rescheduled the appt and he is out of town until Friday of this week.  Appt rescheduled for Friday, 10/6 with labs at 7:40 and visit at 8:40.  Patient states understanding of this and will also view appt on [facility].    KEYUR Shah RN notified and will forward message to Dr. Engel.

## 2017-10-06 ENCOUNTER — RESEARCH ENCOUNTER (OUTPATIENT)
Dept: RESEARCH | Facility: HOSPITAL | Age: 72
End: 2017-10-06

## 2017-10-06 ENCOUNTER — OFFICE VISIT (OUTPATIENT)
Dept: HEMATOLOGY/ONCOLOGY | Facility: CLINIC | Age: 72
End: 2017-10-06
Payer: MEDICARE

## 2017-10-06 ENCOUNTER — LAB VISIT (OUTPATIENT)
Dept: LAB | Facility: HOSPITAL | Age: 72
End: 2017-10-06
Attending: INTERNAL MEDICINE
Payer: MEDICARE

## 2017-10-06 VITALS
HEART RATE: 76 BPM | DIASTOLIC BLOOD PRESSURE: 74 MMHG | WEIGHT: 198 LBS | HEIGHT: 70 IN | BODY MASS INDEX: 28.35 KG/M2 | SYSTOLIC BLOOD PRESSURE: 137 MMHG | TEMPERATURE: 98 F

## 2017-10-06 DIAGNOSIS — E11.42 TYPE 2 DIABETES MELLITUS WITH DIABETIC POLYNEUROPATHY, WITH LONG-TERM CURRENT USE OF INSULIN: Chronic | ICD-10-CM

## 2017-10-06 DIAGNOSIS — C90.00 MULTIPLE MYELOMA, REMISSION STATUS UNSPECIFIED: ICD-10-CM

## 2017-10-06 DIAGNOSIS — Z79.4 TYPE 2 DIABETES MELLITUS WITH DIABETIC POLYNEUROPATHY, WITH LONG-TERM CURRENT USE OF INSULIN: Chronic | ICD-10-CM

## 2017-10-06 DIAGNOSIS — N18.30 CKD STAGE 3 DUE TO TYPE 2 DIABETES MELLITUS: Chronic | ICD-10-CM

## 2017-10-06 DIAGNOSIS — E11.22 CKD STAGE 3 DUE TO TYPE 2 DIABETES MELLITUS: Chronic | ICD-10-CM

## 2017-10-06 DIAGNOSIS — D47.2 SMOLDERING MULTIPLE MYELOMA: Primary | ICD-10-CM

## 2017-10-06 DIAGNOSIS — M79.671 FOOT PAIN, RIGHT: ICD-10-CM

## 2017-10-06 DIAGNOSIS — Z00.6 EXAMINATION OF PARTICIPANT IN CLINICAL TRIAL: ICD-10-CM

## 2017-10-06 LAB
ALBUMIN SERPL BCP-MCNC: 3.5 G/DL
ALBUMIN SERPL BCP-MCNC: 3.5 G/DL
ALP SERPL-CCNC: 53 U/L
ALT SERPL W/O P-5'-P-CCNC: 26 U/L
ANION GAP SERPL CALC-SCNC: 9 MMOL/L
ANION GAP SERPL CALC-SCNC: 9 MMOL/L
AST SERPL-CCNC: 20 U/L
BASOPHILS # BLD AUTO: 0.02 K/UL
BASOPHILS # BLD AUTO: 0.02 K/UL
BASOPHILS NFR BLD: 0.4 %
BASOPHILS NFR BLD: 0.4 %
BILIRUB SERPL-MCNC: 0.4 MG/DL
BUN SERPL-MCNC: 26 MG/DL
BUN SERPL-MCNC: 26 MG/DL
CALCIUM SERPL-MCNC: 9.7 MG/DL
CALCIUM SERPL-MCNC: 9.7 MG/DL
CHLORIDE SERPL-SCNC: 101 MMOL/L
CHLORIDE SERPL-SCNC: 101 MMOL/L
CO2 SERPL-SCNC: 27 MMOL/L
CO2 SERPL-SCNC: 27 MMOL/L
CREAT SERPL-MCNC: 1.8 MG/DL
CREAT SERPL-MCNC: 1.8 MG/DL
DIFFERENTIAL METHOD: ABNORMAL
DIFFERENTIAL METHOD: ABNORMAL
EOSINOPHIL # BLD AUTO: 0.3 K/UL
EOSINOPHIL # BLD AUTO: 0.3 K/UL
EOSINOPHIL NFR BLD: 4.7 %
EOSINOPHIL NFR BLD: 4.7 %
ERYTHROCYTE [DISTWIDTH] IN BLOOD BY AUTOMATED COUNT: 14.1 %
ERYTHROCYTE [DISTWIDTH] IN BLOOD BY AUTOMATED COUNT: 14.1 %
EST. GFR  (AFRICAN AMERICAN): 42.8 ML/MIN/1.73 M^2
EST. GFR  (AFRICAN AMERICAN): 42.8 ML/MIN/1.73 M^2
EST. GFR  (NON AFRICAN AMERICAN): 37 ML/MIN/1.73 M^2
EST. GFR  (NON AFRICAN AMERICAN): 37 ML/MIN/1.73 M^2
FERRITIN SERPL-MCNC: 497 NG/ML
GLUCOSE SERPL-MCNC: 187 MG/DL
GLUCOSE SERPL-MCNC: 187 MG/DL
HCT VFR BLD AUTO: 35.8 %
HCT VFR BLD AUTO: 35.8 %
HGB BLD-MCNC: 11.5 G/DL
HGB BLD-MCNC: 11.5 G/DL
IGA SERPL-MCNC: 1387 MG/DL
IGG SERPL-MCNC: 944 MG/DL
IGM SERPL-MCNC: 32 MG/DL
IRON SERPL-MCNC: 50 UG/DL
LYMPHOCYTES # BLD AUTO: 1.9 K/UL
LYMPHOCYTES # BLD AUTO: 1.9 K/UL
LYMPHOCYTES NFR BLD: 34.5 %
LYMPHOCYTES NFR BLD: 34.5 %
MAGNESIUM SERPL-MCNC: 1.6 MG/DL
MCH RBC QN AUTO: 28.1 PG
MCH RBC QN AUTO: 28.1 PG
MCHC RBC AUTO-ENTMCNC: 32.1 G/DL
MCHC RBC AUTO-ENTMCNC: 32.1 G/DL
MCV RBC AUTO: 88 FL
MCV RBC AUTO: 88 FL
MONOCYTES # BLD AUTO: 0.4 K/UL
MONOCYTES # BLD AUTO: 0.4 K/UL
MONOCYTES NFR BLD: 7.6 %
MONOCYTES NFR BLD: 7.6 %
NEUTROPHILS # BLD AUTO: 2.9 K/UL
NEUTROPHILS # BLD AUTO: 2.9 K/UL
NEUTROPHILS NFR BLD: 52.6 %
NEUTROPHILS NFR BLD: 52.6 %
PHOSPHATE SERPL-MCNC: 3.4 MG/DL
PHOSPHATE SERPL-MCNC: 3.4 MG/DL
PLATELET # BLD AUTO: 196 K/UL
PLATELET # BLD AUTO: 196 K/UL
PMV BLD AUTO: 9 FL
PMV BLD AUTO: 9 FL
POTASSIUM SERPL-SCNC: 4.4 MMOL/L
POTASSIUM SERPL-SCNC: 4.4 MMOL/L
PROT SERPL-MCNC: 8.1 G/DL
PTH-INTACT SERPL-MCNC: 43 PG/ML
RBC # BLD AUTO: 4.09 M/UL
RBC # BLD AUTO: 4.09 M/UL
SATURATED IRON: 19 %
SODIUM SERPL-SCNC: 137 MMOL/L
SODIUM SERPL-SCNC: 137 MMOL/L
TOTAL IRON BINDING CAPACITY: 265 UG/DL
TRANSFERRIN SERPL-MCNC: 179 MG/DL
WBC # BLD AUTO: 5.56 K/UL
WBC # BLD AUTO: 5.56 K/UL

## 2017-10-06 PROCEDURE — 82784 ASSAY IGA/IGD/IGG/IGM EACH: CPT | Mod: 59

## 2017-10-06 PROCEDURE — 84165 PROTEIN E-PHORESIS SERUM: CPT

## 2017-10-06 PROCEDURE — 99999 PR PBB SHADOW E&M-EST. PATIENT-LVL III: CPT | Mod: PBBFAC,,, | Performed by: INTERNAL MEDICINE

## 2017-10-06 PROCEDURE — 83520 IMMUNOASSAY QUANT NOS NONAB: CPT

## 2017-10-06 PROCEDURE — 99215 OFFICE O/P EST HI 40 MIN: CPT | Mod: Q1,S$GLB,, | Performed by: INTERNAL MEDICINE

## 2017-10-06 PROCEDURE — 82784 ASSAY IGA/IGD/IGG/IGM EACH: CPT

## 2017-10-06 PROCEDURE — 85025 COMPLETE CBC W/AUTO DIFF WBC: CPT

## 2017-10-06 PROCEDURE — 82728 ASSAY OF FERRITIN: CPT

## 2017-10-06 PROCEDURE — 80053 COMPREHEN METABOLIC PANEL: CPT

## 2017-10-06 PROCEDURE — 84100 ASSAY OF PHOSPHORUS: CPT

## 2017-10-06 PROCEDURE — 86334 IMMUNOFIX E-PHORESIS SERUM: CPT | Mod: 26,Q1,, | Performed by: PATHOLOGY

## 2017-10-06 PROCEDURE — 83540 ASSAY OF IRON: CPT

## 2017-10-06 PROCEDURE — 83970 ASSAY OF PARATHORMONE: CPT

## 2017-10-06 PROCEDURE — 83735 ASSAY OF MAGNESIUM: CPT

## 2017-10-06 PROCEDURE — 36415 COLL VENOUS BLD VENIPUNCTURE: CPT

## 2017-10-06 PROCEDURE — 86334 IMMUNOFIX E-PHORESIS SERUM: CPT

## 2017-10-06 PROCEDURE — 84165 PROTEIN E-PHORESIS SERUM: CPT | Mod: 26,Q1,, | Performed by: PATHOLOGY

## 2017-10-06 NOTE — PROGRESS NOTES
Friday, October 6th, 2017      Protocol: E3A06  Sponsor:  [facility]  IRB #: [id number]  Investigator: GIL Engel  Pt Initials: LUCÍA LORENZ      Arm B: Observation  Cycle 21, Day 28      Patient presents to clinic to evaluate ability to proceed with Cycle 22 of observation per above-mentioned protocol. He reports about a week late as MD was unavailabile last week and the patient was out of town earlier this week.  Patient is awake, alert, and oriented to person, place, and time.  He states he had a nice time on his trip and tolerated the trip well.  He states he needs to get a handle on his exercise and medication regimens.  He was provided with a printed list of his medications and each med was reviewed.  He states the med list is up to date.  He plans to keep a log of his medications moving forward.   He states his right achilles area has been giving him pain over the past 3 weeks or so.  He is inquiring about seeing podiatry.  He has seen Dr. Marshall Reyes in the past and was encouraged to send a message to this MD's office via [facility] to make an appt.  He states understanding of this.  He denies new potential CRAB symptoms.  He states continued willingness to participate in above-mentioned study.    Review of AE's:  1. Hypertension, grade 1: BP today is 137/74 in clinic today. This is improved from that of last visit.  Will continue to follow this. AE ongoing.  2. Lower Back Pain and Neck Pain, grade 1: Patient has no complaints of this issue this month. As previously stated, patient is not suspected to have bony myeloma involvement per Dr. Engel as these are pre-existing issues present at baseline. He states he plans to increase his physical activity and to work out regularly. He states understanding. AE ongoing.  3.  Pain in extremity (knee), grade 1.  Continues to wear knee stabilizing braces today.  States having no issues with walking and increased activity on his recent trip.  AE, grade 1.  4. Creatinine increased,  "grade 1: Serum creatinine is slightly improved from last month and is 1.8 mg/dl today and GFR 52.17. Per Dr. Engel this has not been related to myeloma and is related chronic kidney issues likely secondary to diabetes and hypertension.  At last visit, patient states that nephrologist plans to do biopsy to confirm source of increased creatinine.  Will continue observation monthly and to monitor renal function closely. AE stable.   5. Peripheral sensory neuropathy, grade 1: Patient does not verbalize complaints of this symptom today. He still plans to get fitted for diabetic shoes. AE ongoing.       No changes in other baseline AE's noted.      Per study chair, "the definition of progressive disease for this protocol is developing CRAB criteria that requires treatment systemically." Per Dr Engel, based on today's exam and lab results thus far, patient does not have any s/s of CRAB: Normal Calcium level (9.7), absence of Renal insufficiency (serum creatinine 1.8, and GFR 47.81 per [facility]--patient with a history of kidney dysfunction secondary to diabetes; Dr. Engel aware of these values and not concerned for myeloma involvement), absence of significant Anemia (hemoglobin 11.5, stable from baseline hgb level at study entry) and absence of lytic Bone lesions (per baseline metastatic survey as well as MRI--see MD note for further comment). See MD note for ECOG score and H&P and flowsheets for laboratory work, vitals, etc. All myeloma-related blood work from last cycle including SPEP and JAGUAR have remained stable and per Dr. Engel, patient shows no evidence of progression today. Patient informed that Dr. Engel will release all lab results to [facility]. He states understanding of this.       RN reinforced importance of patient following up monthly for assessment as well as lab work. Patient verbalizes understanding. Appointment for next month's visit to be made a later date and appt slip is to be mailed to patient at a " later date. Patient was given Research RN's contact information and has MD contact information to call with any concerns, questions, or worsening of symptoms; he verbalized understanding.

## 2017-10-06 NOTE — PROGRESS NOTES
Subjective:       Patient ID: Emerson Menendez is a 72 y.o. male.    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 Test results identify a stable IgA kappa protein with beta globulin band at 1.63g/dL. Kappa free light chain is elevated at 4.40. CBC and calcium are stable. Creatinine with history of stage III CKD is stable at 1.4. Metastatic survey is negative for lytic lesions. MRI of the entire spine demonstrated age related changes but no convincing evidence of myeloma bone disease. Bone marrow biopsy identified about 13% plasma cells by morphology and FISH for myeloma identified a t(11;14) and trisomies 3,7, and 17. The patient is afebrile and appears clinically well. He was seen by his podiatrist and nephrologist since our last visit without any new or acute events.    The patient has not received any therapy for smoldering myeloma including bisphosphonates or steroids. He has been randomized to observation arm of the the clinical study. 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 He has no current or prior history of malignancy.    TODAY  Mr. Menendez returns today for monthly follow-up evaluation. He denies any acute interval events since last interview.   No clinical signs/symptoms of progression of his smoldering myeloma.    He is having increased pain in the right foot and will request a podiatry evaluation. Will also have an insulin monitor for the past week to help adjust diabetes medications.    HPI  Review of Systems   Constitutional: Negative for activity change, appetite change, diaphoresis, fatigue, fever and unexpected  weight change.   HENT: Negative.    Eyes: Negative.    Respiratory: Negative.    Cardiovascular: Negative for leg swelling.   Gastrointestinal: Negative.    Endocrine: Negative.    Genitourinary: Negative.    Musculoskeletal: Negative.    Skin: Negative.    Allergic/Immunologic: Negative.    Neurological:        Grade 0-1 peripheral neuropathy of bilateral feet.    Hematological: Negative for adenopathy. Does not bruise/bleed easily.   Psychiatric/Behavioral: Negative.        Objective:       Vitals:    10/06/17 0825   BP: 137/74   Pulse: 76   Temp: 97.6 °F (36.4 °C)       Physical Exam   Constitutional: He is oriented to person, place, and time. He appears well-developed and well-nourished.   HENT:   Head: Normocephalic and atraumatic.   Right Ear: External ear normal.   Left Ear: External ear normal.   Nose: Nose normal.   Mouth/Throat: Oropharynx is clear and moist.   Eyes: Conjunctivae and EOM are normal. Pupils are equal, round, and reactive to light.   Neck: Normal range of motion. Neck supple.   Cardiovascular: Normal rate, regular rhythm and intact distal pulses.    No murmur heard.  Pulmonary/Chest: Effort normal and breath sounds normal. No respiratory distress.   Abdominal: Soft. Bowel sounds are normal. He exhibits no distension. There is no hepatosplenomegaly.   Musculoskeletal: He exhibits no edema or tenderness.   Neurological: He is alert and oriented to person, place, and time. No cranial nerve deficit.   Skin: Skin is warm and dry. No rash noted. No cyanosis. Nails show no clubbing.   Psychiatric: He has a normal mood and affect. His behavior is normal.   Nursing note and vitals reviewed.      Assessment:       1. Smoldering multiple myeloma    2. Type 2 diabetes mellitus with diabetic polyneuropathy, with long-term current use of insulin    3. Foot pain, right        Plan:       The patient has a diagnosis of smoldering myeloma. There is no indication for immediate chemotherapy. We are monitoring  renal function closely- baseline creatinine of -1.8 is stable today.   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 CBC and CMP are stable.  Complete biochemical studies from today are pending.  Plan for return in 1 month.

## 2017-10-09 LAB
ALBUMIN SERPL ELPH-MCNC: 3.96 G/DL
ALPHA1 GLOB SERPL ELPH-MCNC: 0.27 G/DL
ALPHA2 GLOB SERPL ELPH-MCNC: 0.73 G/DL
B-GLOBULIN SERPL ELPH-MCNC: 2.35 G/DL
GAMMA GLOB SERPL ELPH-MCNC: 0.59 G/DL
INTERPRETATION SERPL IFE-IMP: NORMAL
KAPPA LC SER QL IA: 5.95 MG/DL
KAPPA LC/LAMBDA SER IA: 3.65
LAMBDA LC SER QL IA: 1.63 MG/DL
PATHOLOGIST INTERPRETATION IFE: NORMAL
PATHOLOGIST INTERPRETATION SPE: NORMAL
PROT SERPL-MCNC: 7.9 G/DL

## 2017-10-15 ENCOUNTER — PATIENT MESSAGE (OUTPATIENT)
Dept: ENDOCRINOLOGY | Facility: CLINIC | Age: 72
End: 2017-10-15

## 2017-10-16 ENCOUNTER — OFFICE VISIT (OUTPATIENT)
Dept: PODIATRY | Facility: CLINIC | Age: 72
End: 2017-10-16
Payer: MEDICARE

## 2017-10-16 VITALS
BODY MASS INDEX: 28.29 KG/M2 | HEIGHT: 70 IN | SYSTOLIC BLOOD PRESSURE: 149 MMHG | WEIGHT: 197.63 LBS | DIASTOLIC BLOOD PRESSURE: 80 MMHG | HEART RATE: 66 BPM

## 2017-10-16 DIAGNOSIS — L84 TYPE 2 DIABETES MELLITUS WITH PRESSURE CALLUS: ICD-10-CM

## 2017-10-16 DIAGNOSIS — M72.2 PLANTAR FASCIITIS: ICD-10-CM

## 2017-10-16 DIAGNOSIS — E11.49 TYPE II DIABETES MELLITUS WITH NEUROLOGICAL MANIFESTATIONS: Primary | ICD-10-CM

## 2017-10-16 DIAGNOSIS — E11.628 TYPE 2 DIABETES MELLITUS WITH PRESSURE CALLUS: ICD-10-CM

## 2017-10-16 DIAGNOSIS — M62.469 GASTROCNEMIUS EQUINUS, UNSPECIFIED LATERALITY: ICD-10-CM

## 2017-10-16 PROCEDURE — 99999 PR PBB SHADOW E&M-EST. PATIENT-LVL V: CPT | Mod: PBBFAC,,, | Performed by: PODIATRIST

## 2017-10-16 PROCEDURE — 99214 OFFICE O/P EST MOD 30 MIN: CPT | Mod: 25,S$GLB,, | Performed by: PODIATRIST

## 2017-10-16 PROCEDURE — 20550 NJX 1 TENDON SHEATH/LIGAMENT: CPT | Mod: RT,S$GLB,, | Performed by: PODIATRIST

## 2017-10-16 RX ORDER — TRIAMCINOLONE ACETONIDE 40 MG/ML
20 INJECTION, SUSPENSION INTRA-ARTICULAR; INTRAMUSCULAR ONCE
Status: COMPLETED | OUTPATIENT
Start: 2017-10-16 | End: 2017-10-16

## 2017-10-16 RX ADMIN — TRIAMCINOLONE ACETONIDE 20 MG: 40 INJECTION, SUSPENSION INTRA-ARTICULAR; INTRAMUSCULAR at 03:10

## 2017-10-16 NOTE — PROGRESS NOTES
Subjective:      Patient ID: Emerson Menendez is a 72 y.o. male.    Chief Complaint: Diabetes Mellitus (PCP Dr. Sagasutme 9/13/17); Diabetic Foot Exam; Routine Foot Care; Foot Problem (right ft./); and Heel Pain (right)    Emerson is a 72 y.o. male who presents to the clinic upon referral from Dr. Mcdowell  for evaluation and treatment of diabetic feet. Emerson has a past medical history of Anxiety (3/16/2015); BPH (benign prostatic hypertrophy) (9/24/2012); Depression (3/16/2015); GERD (gastroesophageal reflux disease) (9/24/2012); Microalbuminuria (9/24/2012); Osteoarthritis of cervical spine (9/24/2012); Osteoarthritis of knee (9/24/2012); and Type II or unspecified type diabetes mellitus without mention of complication, not stated as uncontrolled (9/24/2012). Patient relates no major problem with feet. Only complaints today consist of pain at right plantar heel for the last few week. Denies falls or injury. Pain with first steps after rest and after increased time on feet. Denies history of lower extremity wounds, infections and/or injury.      PCP: Roberta Sagastume MD      Hemoglobin A1C   Date Value Ref Range Status   09/28/2017 7.0 (H) 4.0 - 5.6 % Final     Comment:     According to ADA guidelines, hemoglobin A1c <7.0% represents  optimal control in non-pregnant diabetic patients. Different  metrics may apply to specific patient populations.   Standards of Medical Care in Diabetes-2016.  For the purpose of screening for the presence of diabetes:  <5.7%     Consistent with the absence of diabetes  5.7-6.4%  Consistent with increasing risk for diabetes   (prediabetes)  >or=6.5%  Consistent with diabetes  Currently, no consensus exists for use of hemoglobin A1c  for diagnosis of diabetes for children.  This Hemoglobin A1c assay has significant interference with fetal   hemoglobin   (HbF). The results are invalid for patients with abnormal amounts of   HbF,   including those with known Hereditary Persistence   of  Fetal Hemoglobin. Heterozygous hemoglobin variants (HbAS, HbAC,   HbAD, HbAE, HbA2) do not significantly interfere with this assay;   however, presence of multiple variants in a sample may impact the %   interference.     07/20/2017 7.3 (H) 4.0 - 5.6 % Final     Comment:     According to ADA guidelines, hemoglobin A1c <7.0% represents  optimal control in non-pregnant diabetic patients. Different  metrics may apply to specific patient populations.   Standards of Medical Care in Diabetes-2016.  For the purpose of screening for the presence of diabetes:  <5.7%     Consistent with the absence of diabetes  5.7-6.4%  Consistent with increasing risk for diabetes   (prediabetes)  >or=6.5%  Consistent with diabetes  Currently, no consensus exists for use of hemoglobin A1c  for diagnosis of diabetes for children.  This Hemoglobin A1c assay has significant interference with fetal   hemoglobin   (HbF). The results are invalid for patients with abnormal amounts of   HbF,   including those with known Hereditary Persistence   of Fetal Hemoglobin. Heterozygous hemoglobin variants (HbAS, HbAC,   HbAD, HbAE, HbA2) do not significantly interfere with this assay;   however, presence of multiple variants in a sample may impact the %   interference.     05/11/2017 9.6 (H) 4.5 - 6.2 % Final     Comment:     According to ADA guidelines, hemoglobin A1C <7.0% represents  optimal control in non-pregnant diabetic patients.  Different  metrics may apply to specific populations.   Standards of Medical Care in Diabetes - 2016.  For the purpose of screening for the presence of diabetes:  <5.7%     Consistent with the absence of diabetes  5.7-6.4%  Consistent with increasing risk for diabetes   (prediabetes)  >or=6.5%  Consistent with diabetes  Currently no consensus exists for use of hemoglobin A1C  for diagnosis of diabetes for children.             Review of Systems   Constitution: Negative for chills, fever, weakness, malaise/fatigue and night  sweats.   Cardiovascular: Negative for chest pain, leg swelling, orthopnea and palpitations.   Respiratory: Negative for cough, shortness of breath and wheezing.    Skin: Negative for color change, itching, poor wound healing and rash.   Musculoskeletal: Negative for arthritis, gout, joint pain, joint swelling, muscle weakness and myalgias.   Gastrointestinal: Negative for abdominal pain, constipation and nausea.   Neurological: Positive for numbness and paresthesias. Negative for disturbances in coordination, dizziness, focal weakness and tremors.           Objective:      Physical Exam   Constitutional: He is oriented to person, place, and time. Vital signs are normal. He appears well-developed. He is cooperative. No distress.   Cardiovascular: Intact distal pulses.    Pulses:       Dorsalis pedis pulses are 2+ on the right side, and 2+ on the left side.        Posterior tibial pulses are 2+ on the right side, and 2+ on the left side.   Musculoskeletal:        Right ankle: Normal.        Left ankle: Normal.        Right foot: There is tenderness and deformity. There is normal range of motion, normal capillary refill and no crepitus.        Left foot: There is normal range of motion, no bony tenderness, normal capillary refill, no crepitus and no deformity.   Mild pain on palpation plantar medial and central heel. No pain with ROM or MMT. No pain with medial and lateral compression of heel.    Ankle dorsiflexion is less than 5 degrees past neurtral with knee extended. 10 degrees of DF achieved with knee flexion.       Neurological: He is alert and oriented to person, place, and time. He has normal strength. No sensory deficit. He exhibits normal muscle tone. Gait normal.   Reflex Scores:       Achilles reflexes are 2+ on the right side and 2+ on the left side.  Negative Tinels sign and Sherri's click, bilat.   Skin: Skin is intact. Capillary refill takes 2 to 3 seconds. Lesion noted. No abrasion, no ecchymosis and  no rash noted. No erythema. Nails show no clubbing.   Hyperkeratotic lesions at the following locations:   Plantar hallux IPJ, bilat.              Assessment:       Encounter Diagnoses   Name Primary?    Type II diabetes mellitus with neurological manifestations Yes    Plantar fasciitis - Right Foot     Gastrocnemius equinus, unspecified laterality     Type 2 diabetes mellitus with pressure callus          Plan:       Emerson was seen today for diabetes mellitus, diabetic foot exam, routine foot care, foot problem and heel pain.    Diagnoses and all orders for this visit:    Type II diabetes mellitus with neurological manifestations  -     DIABETIC SHOES FOR HOME USE    Plantar fasciitis - Right Foot  -     DIABETIC SHOES FOR HOME USE  -     triamcinolone acetonide injection 20 mg; Inject 0.5 mLs (20 mg total) into the muscle once.    Gastrocnemius equinus, unspecified laterality  -     DIABETIC SHOES FOR HOME USE    Type 2 diabetes mellitus with pressure callus  -     DIABETIC SHOES FOR HOME USE      I counseled the patient on his conditions, their implications and medical management.        - Shoe inspection. Diabetic Foot Education. Patient reminded of the importance of good nutrition and blood sugar control to help prevent podiatric complications of diabetes. Patient instructed on proper foot hygeine. We discussed wearing proper shoe gear, daily foot inspections, never walking without protective shoe gear.    1. Stretch calf and plantar fascia 10x per day for 30 sec and before getting out of bed.    2. Supportive shoes at all times    3. Orthotic, OTC. Will consider custom as needed.    4. NSAIDS daily x 2-3 weeks    5. ICE massage with frozen water bottle 2x per day for 30 minutes.    6. Will consider night splint, steroid injection and/or physical therapy as needed.      A steroid injection was performed at right plantar fascia using 1% plain Lidocaine and 20 mg of Kenalog. This was well tolerated.

## 2017-10-16 NOTE — PATIENT INSTRUCTIONS
Diabetes: Inspecting Your Feet  Diabetes increases your chances of developing foot problems. So inspect your feet every day. This helps you find small skin irritations before they become serious infections. If you have trouble seeing the bottoms of your feet, use a mirror or ask a family member or friend to help.    How to check your feet  Below are tips to help you look for foot problems. Try to check your feet at the same time each day, such as when you get out of bed in the morning:  · Check the top of each foot. The tops of toes, back of the heel, and outer edge of the foot can get a lot of rubbing from poor-fitting shoes.  · Check the bottom of each foot. Daily wear and tear often leads to problems at pressure spots.  · Check the toes and nails. Fungal infections often occur between toes. Toenail problems can also be a sign of fungal infections or lead to breaks in the skin.  · Check your shoes, too. Loose objects inside a shoe can injure the foot. Use your hand to feel inside your shoes for things like jered, loose stitching, or rough areas that could irritate your skin.  Warning signs  Look for any color changes in the foot. Redness with streaks can signal a severe infection, which needs immediate medical attention. Tell your doctor right away if you have any of these problems:  · Swelling, sometimes with color changes, may be a sign of poor blood flow or infection. Symptoms include tenderness and an increase in the size of your foot.  · Warm or hot areas on your feet may be signs of infection. A foot that is cold may not be getting enough blood.  · Sensations such as burning, tingling, or pins and needles can be signs of a problem. Also check for areas that may be numb.  · Hot spots are caused by friction or pressure. Look for hot spots in areas that get a lot of rubbing. Hot spots can turn into blisters, calluses, or sores.  · Cracks and sores are caused by dry or irritated skin. They are a sign that  the skin is breaking down, which can lead to infection.  · Toenail problems to watch for include nails growing into the skin (ingrown toenail) and causing redness or pain. Thick, yellow, or discolored nails can signal a fungal infection.  · Drainage and odor can develop from untreated sores and ulcers. Call your doctor right away if you notice white or yellow drainage, bleeding, or unpleasant odor.   © 0546-2247 [employer]. [address] [place] [address], [place] [address]. All rights reserved. This information is not intended as a substitute for professional medical care. Always follow your healthcare professional's instructions.    Diabetic Foot Care    Diabetes can lead to a number of different foot complications. Fortunately, most of these complications can be prevented with a little extra foot care. If diabetes is not well controlled, the high blood sugar can cause damage to blood vessels and result in poor circulation to the foot. When the skin does not get enough blood flow, it becomes prone to pressure sores and ulcers, which heal slowly.  High blood sugar can also damage nerves, interfering with the ability to feel pain and pressure. When you cant feel your foot normally, it is easy to injure your skin, bones and joints without knowing it. For these reasons diabetes increases the risk of fungal infections, bunions and ulcers. Deep ulcers can lead to bone infection. Gangrene is the most serious foot complication of diabetes. It usually occurs on the tips of the toes as blacked areas of skin. The black area is dead tissue. In severe cases, gangrene spreads to involve the entire toe, other toes and the entire foot. Foot or toe amputation may be required. Good foot care and blood sugar control can prevent this.    Home Care  1. Wear comfortable, proper fitting shoes.  2. Wash your feet daily with warm water and mild soap.  3. After drying, apply a moisturizing cream or lotion.  4. Check your feet daily  for skin breaks, blisters, swelling, or redness. Look between your toes also.  5. Wear cotton socks and change them every day.  6. Trim toe nails carefully and do not cut your cuticles.  7. Strive to keep your blood sugar under control with a combination of medicines, diet and activity.  8. If you smoke and have diabetes, it is very important that you stop. Smoking reduces blood flow to your foot.  9. Avoid activities that increase your risk of foot injury:  · Do not walk barefoot.  · Do not use heating pads or hot water bottles on your feet.  · Do not put your foot in a hot tub without first checking the temperature with your hand.  10) Schedule yearly foot exams.    Follow Up  with your doctor or as advised by our staff. Report any cut, puncture, scrape, other injury, blister, ingrown toenail or ulcer on your foot.    Get Prompt Medical Attention  if any of the following occur:  -- Open ulcer with pus draining from the wound  -- Increasing foot or leg pain  -- New areas of redness or swelling or tender areas of the foot    © 3222-6746 [employer]. [address] [place] [address]. All rights reserved. This information is not intended as a substitute for professional medical care. Always follow your healthcare professional's instructions.    Diabetic shoe locations:    [facility] [address] [place] [address]  [phone]  No Medicare, [facility], United    [facility]  [address] [place] [address]  [phone]  Medicare  No Medicaid, Aetna, [facility]    Diabetes [facility]  [phone]  No PHN  [facility]  [address] [place] [address] [phone]  Medicare    [facility]  [address], [place] [phone]  [address] [place], [place] [phone]  Medicare      [address] [place] [address]  [phone]  [email or web address]  No Medicare   Medicaid & PHN only    [facility] [place], [facility]  [address] [place] [id number] [phone]  No Medicare  Medicaid, Hum[employer], PHN, BC/BS    [facility]  [address] [place] [address] [phone]  [email or web address]  No Medicare, No Aetna    [facility]  [address] [facility] [place]  [phone]  Medicare, Medicaid, PHN  No Cigna, Aetna    [facility]  [address] [place] [phone]  [email or web address]  Does not accept insurance          1. Stretch calf and plantar fascia 10x per day for 30 sec and before getting out of bed.    2. Supportive shoes at all times (athletic shoe including spence, new balance, asics, HOKA or casual shoes like Dansko, Viviana, Naot, Vionoic, Fit flop  clog or wedge with extra heel padding and arch support.    ([employer], [employer], [employer], Masseys, Goodfeet, Cantilever, Feet First, Foot Solutions, Therapeutic shoes, [employer], [facility] pro shop) [email or web address]    3. Orthotic (recommend the following brands: Superfeet, Spenco, Powerstep, Sof Sole Fit Series)    4. NSAID's for 2-3 weeks if no GI or Kidney problems (example: ibuprofen 600 mg 2 x per day)    5. ICE massage with frozen water bottle 2x per day for 30 minutes.    6. Consider night splint, custom orthotics, therapy and/or steroid injection    What Is Plantar Fasciitis?   The plantar fascia is a ligament-like band running from your heel to the ball of your foot. This band pulls on the heel bone, raising the arch of your foot as it pushes off the ground. But if your foot moves incorrectly, the plantar fascia may become strained. The fascia may swell and its tiny fibers may begin to fray, causing plantar fasciitis.  Causes  Plantar fasciitis is often caused by poor foot mechanics. If your foot flattens too much, the fascia may overstretch and swell. If your foot flattens too little, the fascia may ache from being pulled too tight.    The plantar fascia is a thick,  fibrous layer of tissue that covers the bones on the bottom of your foot. It holds the foot bones in an arched position. Plantar fasciitis is a painful swelling of the plantar fascia.  A heel spur is an overgrowth of bone where the plantar fascia attaches to the heel bone. The heel spur itself usually doesnt cause pain. However, the heel spur might be a sign of plantar fasciitis which may cause your foot pain. There is no specific treatment for heel spurs.   Plantar fasciitis can develop slowly or suddenly. It usually affects one foot at a time. Heel pain can feel sharp, like a knife sticking into the bottom of your foot. You may feel pain after exercising, long-distance jogging, stair climbing, long periods of standing, or after standing up.  Risk factors for plantar fasciitis include: arthritis, diabetes, obesity or recent weight gain, flat foot, and having high arches. Wearing high heels, loose shoes, or shoes with poor arch support adds to the risk.    Foot pain is usually worse in the morning. But it improves with walking. By the end of the day there may be a dull aching. Treatment includes short-term rest and controlling inflammation. It may take up to 9 months before all symptoms go away. In rare cases, a steroid injection in the foot, or surgery, may be needed.  Home care  · If you are overweight, lose weight to help healing.  · Choose supportive shoes with good arch support and shock absorbency. Replace athletic shoes when they become worn out. Dont walk or run barefoot.  · Premade or custom-fitted shoe inserts may be helpful. Inserts made of silicone seem to be the most effective. Custom-made inserts can be provided by a podiatrist or foot specialist, physical therapist, or orthopedist.  · Premade or custom-made night splints keep the heel stretched out while you sleep. They may prevent morning pain.  · Avoid activities that stress the feet: jogging, prolonged standing or walking, contact sports,  etc.  · First thing in the morning and before sports, stretch the bottom of your foot. Gently flex your ankle so the toes move toward your knee.  · Icing may help control heel pain. Apply an ice pack to the heel for 10-20 minutes as a preventive. Or ice your heel after a severe flare-up of symptoms. You may repeat this every 1-2 hours as needed.  · You may use over-the-counter pain medicine to control pain, unless another medicine was prescribed. Anti-inflammatory pain medicines, such as ibuprofen or naproxen, may work better than acetaminophen. If you have chronic liver or kidney disease or ever had a stomach ulcer or GI bleeding, talk with your healthcare provider before using these medicines.  · Shoe inserts, a night splint, or a special boot may be needed. Use these as directed by your healthcare provider.      Treating Plantar Fasciitis    First, your doctor relieves pain. Then, the cause of your problem may be found and corrected. If your pain is due to poor foot mechanics, custom-made shoe inserts (orthoses) may help.    Reduce Symptoms:  · To relieve mild symptoms, try aspirin, ibuprofen, or other medications as directed. Rubbing ice on the affected area may also help.  · To reduce severe pain and swelling, your doctor may prescribe pills or injections or a walking cast in some instances. Physical therapy, such as ultrasound or a daily stretching program, may also be recommended. Surgery is rarely required.  · To reduce symptoms caused by poor foot mechanics, your foot may be taped. This supports the arch and temporarily controls movement. Night splints may also help by stretching the fascia.    Control Movement  If taping helps, your doctor may prescribe orthoses. Built from plaster casts of your feet, these inserts control the way your foot moves. As a result, your symptoms should go away.  If Surgery Is Needed  Your doctor may consider surgery if other types of treatment don't control your pain. During  surgery, the plantar fascia is partially cut to release tension. As you heal, fibrous tissue fills the space between the heel bone and the plantar fascia.   Reduce Overuse  Every time your foot strikes the ground, the plantar fascia is stretched. You can reduce the strain on the plantar fascia and the possibility of overuse by following these suggestions:  · Lose any excess weight.  · Avoid running on hard or uneven ground.  · Use orthoses at all times in your shoes and house slippers.  © 8208-8210 [employer]. [address] [place] [address]. All rights reserved. This information is not intended as a substitute for professional medical care. Always follow your healthcare professional's instructions.            Lower Body Exercises: Calf Stretch    This exercise both stretches and strengthens your lower body to help your back. Do the exercise as often as suggested by your health care provider. As you work out, dont rush or strain. Use an exercise mat, pillow, or folded towel to protect your knees and other sensitive areas.  · Face a wall 2 feet away. Step toward the wall with one foot.  · Place both palms on the wall and bend your front knee.  · Lean forward, keeping the back leg straight and the heel on the floor.  · Hold for 20 seconds. Switch legs.  © 8645-2745 [employer]. [address] [place] [address]. All rights reserved. This information is not intended as a substitute for professional medical care. Always follow your healthcare professional's instructions.      These instructions are for your right foot. Switch sides for your left foot.  10. Sit in a chair. Rest your right ankle on your left knee.  11. Hold your toes with your right hand. Gently bend the toes backward. Feel a stretch in the undersides of the toes and ball of the foot. Hold for 30 to 60 seconds.  12. Then gently bend the toes in the other direction. Gently press on them until your foot is  pointed. Hold for 30 to 60 seconds.  13. Repeat 5 times, or as instructed.  Date Last Reviewed: 5/1/2016  © 2782-8179 [employer], [employer]. [address] [place] [address], [place] [address]. All rights reserved. This information is not intended as a substitute for professional medical care. Always follow your healthcare professional's instructions.

## 2017-10-18 ENCOUNTER — TELEPHONE (OUTPATIENT)
Dept: ENDOCRINOLOGY | Facility: CLINIC | Age: 72
End: 2017-10-18

## 2017-10-18 NOTE — TELEPHONE ENCOUNTER
----- Message from Carlota Geronimo sent at 10/18/2017  9:08 AM CDT -----  Contact: Self [phone]  PT needs to reschedule CGMS monitoring appointment until next week.

## 2017-10-24 ENCOUNTER — OFFICE VISIT (OUTPATIENT)
Dept: SPORTS MEDICINE | Facility: CLINIC | Age: 72
End: 2017-10-24
Payer: MEDICARE

## 2017-10-24 VITALS
SYSTOLIC BLOOD PRESSURE: 131 MMHG | BODY MASS INDEX: 28.2 KG/M2 | HEIGHT: 70 IN | WEIGHT: 197 LBS | HEART RATE: 87 BPM | DIASTOLIC BLOOD PRESSURE: 75 MMHG

## 2017-10-24 DIAGNOSIS — M25.562 BILATERAL KNEE PAIN: ICD-10-CM

## 2017-10-24 DIAGNOSIS — M17.12 DEGENERATIVE ARTHRITIS OF LEFT KNEE: ICD-10-CM

## 2017-10-24 DIAGNOSIS — M25.561 BILATERAL KNEE PAIN: ICD-10-CM

## 2017-10-24 DIAGNOSIS — M17.11 OSTEOARTHRITIS OF RIGHT KNEE: Primary | ICD-10-CM

## 2017-10-24 PROCEDURE — 99214 OFFICE O/P EST MOD 30 MIN: CPT | Mod: 25,S$GLB,, | Performed by: ORTHOPAEDIC SURGERY

## 2017-10-24 PROCEDURE — 20610 DRAIN/INJ JOINT/BURSA W/O US: CPT | Mod: 50,S$GLB,, | Performed by: ORTHOPAEDIC SURGERY

## 2017-10-24 PROCEDURE — 99999 PR PBB SHADOW E&M-EST. PATIENT-LVL III: CPT | Mod: PBBFAC,,, | Performed by: ORTHOPAEDIC SURGERY

## 2017-10-24 RX ORDER — TRIAMCINOLONE ACETONIDE 40 MG/ML
40 INJECTION, SUSPENSION INTRA-ARTICULAR; INTRAMUSCULAR
Status: DISCONTINUED | OUTPATIENT
Start: 2017-10-24 | End: 2017-10-24 | Stop reason: HOSPADM

## 2017-10-24 RX ADMIN — TRIAMCINOLONE ACETONIDE 40 MG: 40 INJECTION, SUSPENSION INTRA-ARTICULAR; INTRAMUSCULAR at 05:10

## 2017-10-24 NOTE — PROGRESS NOTES
CHIEF COMPLAINT: Bilateral knee pain (Left>Right)                                                         HISTORY OF PRESENT ILLNESS:  The patient is a 71 y.o. male  who presents for follow up evaluation of his bilateral knee pain.      He finished the Euflexxa series in May 2017, and did not reports much improvement.  He wears his bilateral knee braces during the day and this helps his knee pain.  Issues is currently at night, when he is not wearing his braces.    + mechanical symptoms. Pain: 7/10    Review of Systems   Constitution: Negative. Negative for chills, fever and night sweats.   HENT: Negative for congestion and headaches.    Eyes: Negative for blurred vision, left vision loss and right vision loss.   Cardiovascular: Negative for chest pain and syncope.   Respiratory: Negative for cough and shortness of breath.    Endocrine: Negative for polydipsia, polyphagia and polyuria.   Hematologic/Lymphatic: Negative for bleeding problem. Does not bruise/bleed easily.   Skin: Negative for dry skin, itching and rash.   Musculoskeletal: Negative for falls and muscle weakness.   Gastrointestinal: Negative for abdominal pain and bowel incontinence.   Genitourinary: Negative for bladder incontinence and nocturia.   Neurological: Negative for disturbances in coordination, loss of balance and seizures.   Psychiatric/Behavioral: Negative for depression. The patient does not have insomnia.    Allergic/Immunologic: Negative for hives and persistent infections.     PAST MEDICAL HISTORY:   Past Medical History:   Diagnosis Date    Anxiety 3/16/2015    BPH (benign prostatic hypertrophy) 9/24/2012    Depression 3/16/2015    GERD (gastroesophageal reflux disease) 9/24/2012    Microalbuminuria 9/24/2012    Osteoarthritis of cervical spine 9/24/2012    Osteoarthritis of knee 9/24/2012    Type II or unspecified type diabetes mellitus without mention of complication, not stated as uncontrolled 9/24/2012     PAST SURGICAL  HISTORY:   Past Surgical History:   Procedure Laterality Date    CATARACT EXTRACTION  2012    Right eye    PROSTATE SURGERY       FAMILY HISTORY:   Family History   Problem Relation Age of Onset    Hypertension Brother     Kidney failure Daughter      due to medication    Blindness Neg Hx     Cancer Neg Hx     Cataracts Neg Hx     Diabetes Neg Hx     Glaucoma Neg Hx     Macular degeneration Neg Hx     Retinal detachment Neg Hx     Strabismus Neg Hx     Stroke Neg Hx     Thyroid disease Neg Hx      SOCIAL HISTORY:   Social History     Social History    Marital status: Single     Spouse name: N/A    Number of children: N/A    Years of education: N/A     Occupational History    Not on file.     Social History Main Topics    Smoking status: Never Smoker    Smokeless tobacco: Never Used    Alcohol use No    Drug use: No    Sexual activity: Yes     Partners: Female     Birth control/ protection: None     Other Topics Concern    Not on file     Social History Narrative    No narrative on file       MEDICATIONS:   Current Outpatient Prescriptions:     ACCU-CHEK OMAYRA Misc, USE AS DIRECTED, Disp: 1 each, Rfl: 0    aspirin (ECOTRIN) 81 MG EC tablet, Take 1 tablet (81 mg total) by mouth once daily., Disp: 100 tablet, Rfl: 3    blood glucose control, normal (METER-CHECK) Soln, One meter. True test meter. Use as directed, Disp: 1 each, Rfl: 0    blood sugar diagnostic Strp, Check twice daily, Disp: 200 strip, Rfl: 4    cholecalciferol, vitamin D3, (VITAMIN D) 2,000 unit Cap, Take by mouth. 1 Capsule Oral Every day.  over the counter, Disp: , Rfl:     citalopram (CELEXA) 20 MG tablet, TAKE 1 TABLET EVERY EVENING AFTER DINNER, Disp: 90 tablet, Rfl: 11    diazePAM (VALIUM) 5 MG tablet, Take 1 tablet (5 mg total) by mouth every 6 (six) hours as needed., Disp: 180 tablet, Rfl: 1    diclofenac sodium (VOLTAREN) 1 % Gel, Apply 2 g topically 2 (two) times daily. To the anterior left knee prn pain, Disp: 1  Tube, Rfl: 0    gabapentin (NEURONTIN) 100 MG capsule, TAKE 1 CAPSULE EVERY MORNING, 1 CAPSULE IN THE AFTERNOON, AND 1 TO 3 CAPSULE(S) AT BEDTIME AS NEEDED FOR FOOT PAIN, Disp: 450 capsule, Rfl: 3    glimepiride (AMARYL) 2 MG tablet, Take 1 tablet (2 mg total) by mouth once daily., Disp: 90 tablet, Rfl: 4    hydrocodone-acetaminophen 5-325mg (NORCO) 5-325 mg per tablet, Take 1 tablet by mouth every 4 to 6 hours as needed for Pain., Disp: 60 tablet, Rfl: 0    insulin aspart (NOVOLOG FLEXPEN) 100 unit/mL InPn pen, To inject 4 units before largest meal of the day plus correction scale, max TDD 50 units daily, Disp: 15 mL, Rfl: 2    insulin glargine (LANTUS SOLOSTAR) 100 unit/mL (3 mL) InPn pen, Inject 18 Units into the skin every evening., Disp: 15 mL, Rfl: 2    lancets Misc, Use twice daily, Disp: 200 each, Rfl: 4    latanoprost 0.005 % ophthalmic solution, INSTILL 1 DROP INTO BOTH EYES EVERY EVENING, Disp: 3 Bottle, Rfl: 4    losartan (COZAAR) 25 MG tablet, Take 1 tablet (25 mg total) by mouth once daily., Disp: 90 tablet, Rfl: 4    omeprazole (PRILOSEC) 20 MG capsule, Take 2 capsules (40 mg total) by mouth once daily., Disp: 180 capsule, Rfl: 3    oxybutynin (DITROPAN) 5 MG Tab, Take 1 tablet (5 mg total) by mouth 2 (two) times daily., Disp: 180 tablet, Rfl: 12    pravastatin (PRAVACHOL) 40 MG tablet, Take 1 tablet (40 mg total) by mouth once daily., Disp: 90 tablet, Rfl: 4    tadalafil (CIALIS) 20 MG Tab, Use one tablet every 36 hours, Disp: 20 tablet, Rfl: 4    tadalafil (CIALIS) 5 MG tablet, Take 1 tablet (5 mg total) by mouth daily as needed for Erectile Dysfunction., Disp: 30 tablet, Rfl: 6    tamsulosin (FLOMAX) 0.4 mg Cp24, TAKE 1 CAPSULE ONE TIME DAILY, Disp: 90 capsule, Rfl: 3    tizanidine (ZANAFLEX) 4 MG tablet, 1/2-1 tablet every 8 hours as needed for muscle spasm, Disp: 90 tablet, Rfl: 1  ALLERGIES:   Review of patient's allergies indicates:   Allergen Reactions    Penicillins      Knees  "locked up       VITAL SIGNS:   /75   Pulse 87   Ht 5' 10" (1.778 m)   Wt 89.4 kg (197 lb)   BMI 28.27 kg/m²      PHYSICAL EXAMINATION    General:  The patient is alert and oriented x 3.  Mood is pleasant.  Observation of ears, eyes and nose reveal no gross abnormalities.  No labored breathing observed.    Bilateral KNEE EXAMINATION     OBSERVATION / INSPECTION   Gait:   Antalgic   Alignment:  Neutral   Scars:   None   Muscle atrophy: Mild  Effusion:  None   Warmth:  None   Discoloration:   none     TENDERNESS / CREPITUS (T / C):          T / C      T / C   Patella   - / -   Lateral joint line   + / -   Peripatellar medial  -  Medial joint line    - / -   Peripatellar lateral -  Medial plica   - / -   Patellar tendon -   Popliteal fossa   - / -   Quad tendon   -   Gastrocnemius   -   Prepatellar Bursa - / -   Quadricep   -   Tibial tubercle  -  Thigh/hamstring  -   Pes anserine/HS -  Fibula    -   ITB   - / -  Tibia     -   Tib/fib joint  - / -  LCL    -     MFC   - / -   MCL: Proximal  -    LFC   - / -    Distal   -          ROM: (* = pain)  PASSIVE   ACTIVE    Left :   5 / 0 / 145   5 / 0 / 145     Right :    5 / 0 / 145   5 / 0 / 145    Patellofemoral examination:  See above noted areas of tenderness.   Patella position    Subluxation / dislocation: Centered           Sup. / Inf;   Normal   Crepitus (PF):    Absent   Patellar Mobility:       Medial-lateral:   Normal    Superior-inferior:  Normal    Inferior tilt   Normal    Patellar tendon:  Normal   Lateral tilt:    Normal   J-sign:     None   Patellofemoral grind:   No pain       MENISCAL SIGNS:     Pain on terminal extension:  +  Pain on terminal flexion:  +  Sofiyas maneuver:  + for pain  Squat     + posterior joint pain    LIGAMENT EXAMINATION:  ACL / Lachman:  negative   PCL-Post.  drawer: normal 0 to 2mm  MCL- Valgus:  normal 0 to 2mm  LCL- Varus:  normal 0 to 2mm  Pivot shift: Negative  Dial Test: difference c/w other side   At 30° " flexion: normal (< 5°)    At 90° flexion: normal (< 5°)   Reverse Pivot Shift:   normal (Equal)     STRENGTH: (* = with pain) PAINFUL SIDE   Quadricep   4/5   Hamstrin/5    EXTREMITY NEURO-VASCULAR EXAMINATION:   Sensation:  Grossly intact to light touch all dermatomal regions.   Motor Function:  Fully intact motor function at hip, knee, foot and ankle    DTRs;  quadriceps and  achilles 2+.  No clonus and downgoing Babinski.    Vascular status:  DP and PT pulses 2+, brisk capillary refill, symmetric.     XRAYS(17): There are degenerative changes of both knees most severe in the medial compartments.    ASSESSMENT:    Bilateral knee pain    PLAN:   1. Bilateral cortisone injection today (2 x 0.5Kenalog:3Marcaine)  2. Follow up in 6 months

## 2017-10-24 NOTE — PROCEDURES
Large Joint Aspiration/Injection  Date/Time: 10/24/2017 5:03 PM  Performed by: DHRUV REIS  Authorized by: DHRUV REIS     Consent Done?:  Yes (Verbal)  Indications:  Pain  Procedure site marked: Yes    Timeout: Prior to procedure the correct patient, procedure, and site was verified      Location:  Knee  Site:  L knee and R knee  Prep: Patient was prepped and draped in usual sterile fashion    Ultrasonic Guidance for needle placement: No  Needle size:  22 G  Approach:  Superior  Medications:  40 mg triamcinolone acetonide 40 mg/mL; 40 mg triamcinolone acetonide 40 mg/mL  Patient tolerance:  Patient tolerated the procedure well with no immediate complications

## 2017-11-03 ENCOUNTER — TELEPHONE (OUTPATIENT)
Dept: HEMATOLOGY/ONCOLOGY | Facility: CLINIC | Age: 72
End: 2017-11-03

## 2017-11-03 ENCOUNTER — TELEPHONE (OUTPATIENT)
Dept: TRANSPLANT | Facility: HOSPITAL | Age: 72
End: 2017-11-03

## 2017-11-03 NOTE — TELEPHONE ENCOUNTER
----- Message from Julianne Yin, RN sent at 11/1/2017  3:26 PM CDT -----  Jersey Pittman,    The patient actually called earlier this week and said he can't make that appt anyway as he will be out of town.  He was supposed to get back with me but haven't heard from him.  I just put another call in to him.  I will also send a message to Dr. Engel asking when she can squeeze him in.    Julianne Martinez      ----- Message -----  From: Kimberly Miller RN  Sent: 11/1/2017   3:05 PM  To: Julianne Yin, RN, Toya Pittman,  I cc'd Julianne who is the research nurse for this trial so she can address the issue.  If you identify the patient's disease and/or open the patient's chart, you'll  know which nurse to send the email to so it's not missed. These days, I don't open Epic everyday.  Let me know if you need help.   Kimberly Martinez    ----- Message -----  From: Toya Ontiveros  Sent: 10/30/2017   3:50 PM  To: PAT Campbell,   Mr. Menendez has an appointment on Nov. 3rd. I just was told Dr. Engel won't be in. Is there another physician the patient can see of another Date?  ----- Message -----  From: Reshma Solorio MA  Sent: 10/30/2017   3:47 PM  To: Toya Ontiveros    I would ask the research nurse Mitzi to see what she would like to do with this concern    Michelle Justice  ----- Message -----  From: Toya Ontiveros  Sent: 10/30/2017   3:39 PM  To: [facility] Staff    Good afternoon,  I scheduled Mr. Menendez for November 3rd with Dr. Engel. I was just told Dr. Engel won't be in that day. Is there another physician the patient can see? If not when can I schedule him? The patient is a research patient.  Thanks

## 2017-11-03 NOTE — TELEPHONE ENCOUNTER
Spoke with Julianne Yin, research RN about pt missing lab and MD visit today. Was informed that pt was out of town and already planned to reschedule with Dr. Engel on Monday at 11:40. Research RN will inform research  to adjust appt date/times.     Daphney Abel DNP, NP  Hematology/Oncology

## 2017-11-10 ENCOUNTER — TELEPHONE (OUTPATIENT)
Dept: RESEARCH | Facility: HOSPITAL | Age: 72
End: 2017-11-10

## 2017-11-10 NOTE — TELEPHONE ENCOUNTER
Friday, November 10th, 2017    Telephone    Research RN called patient regarding appt this morning with Dr. Engel.  He states he is in [place] and is not able to make his appt today.  States he forgot about the appt.  Patient was rescheduled for Tuesday, 11/14 with labs at 7:40 and Dr. Engel at 8:40.  Pt states understanding of this.  Research RN also offered to call the pt the day before his appt to remind him.  He accepts this offer.  Will follow up on Monday.

## 2017-11-14 ENCOUNTER — LAB VISIT (OUTPATIENT)
Dept: LAB | Facility: HOSPITAL | Age: 72
End: 2017-11-14
Attending: INTERNAL MEDICINE
Payer: MEDICARE

## 2017-11-14 ENCOUNTER — OFFICE VISIT (OUTPATIENT)
Dept: HEMATOLOGY/ONCOLOGY | Facility: CLINIC | Age: 72
End: 2017-11-14
Payer: MEDICARE

## 2017-11-14 ENCOUNTER — RESEARCH ENCOUNTER (OUTPATIENT)
Dept: HEMATOLOGY/ONCOLOGY | Facility: CLINIC | Age: 72
End: 2017-11-14

## 2017-11-14 VITALS
TEMPERATURE: 98 F | DIASTOLIC BLOOD PRESSURE: 74 MMHG | WEIGHT: 189 LBS | HEIGHT: 70 IN | BODY MASS INDEX: 27.06 KG/M2 | RESPIRATION RATE: 18 BRPM | OXYGEN SATURATION: 97 % | SYSTOLIC BLOOD PRESSURE: 147 MMHG | HEART RATE: 76 BPM

## 2017-11-14 DIAGNOSIS — E11.42 TYPE 2 DIABETES MELLITUS WITH DIABETIC POLYNEUROPATHY, WITH LONG-TERM CURRENT USE OF INSULIN: ICD-10-CM

## 2017-11-14 DIAGNOSIS — N18.30 CKD STAGE 3 DUE TO TYPE 2 DIABETES MELLITUS: ICD-10-CM

## 2017-11-14 DIAGNOSIS — Z79.4 TYPE 2 DIABETES MELLITUS WITH DIABETIC POLYNEUROPATHY, WITH LONG-TERM CURRENT USE OF INSULIN: ICD-10-CM

## 2017-11-14 DIAGNOSIS — C90.00 MULTIPLE MYELOMA, REMISSION STATUS UNSPECIFIED: ICD-10-CM

## 2017-11-14 DIAGNOSIS — D47.2 SMOLDERING MULTIPLE MYELOMA: Primary | ICD-10-CM

## 2017-11-14 DIAGNOSIS — Z00.6 EXAMINATION OF PARTICIPANT IN CLINICAL TRIAL: ICD-10-CM

## 2017-11-14 DIAGNOSIS — E11.22 CKD STAGE 3 DUE TO TYPE 2 DIABETES MELLITUS: ICD-10-CM

## 2017-11-14 LAB
ALBUMIN SERPL BCP-MCNC: 3.4 G/DL
ALP SERPL-CCNC: 51 U/L
ALT SERPL W/O P-5'-P-CCNC: 26 U/L
ANION GAP SERPL CALC-SCNC: 8 MMOL/L
AST SERPL-CCNC: 19 U/L
BASOPHILS # BLD AUTO: 0.02 K/UL
BASOPHILS NFR BLD: 0.4 %
BILIRUB SERPL-MCNC: 0.7 MG/DL
BUN SERPL-MCNC: 18 MG/DL
CALCIUM SERPL-MCNC: 9.4 MG/DL
CHLORIDE SERPL-SCNC: 104 MMOL/L
CO2 SERPL-SCNC: 27 MMOL/L
CREAT SERPL-MCNC: 1.7 MG/DL
DIFFERENTIAL METHOD: ABNORMAL
EOSINOPHIL # BLD AUTO: 0.2 K/UL
EOSINOPHIL NFR BLD: 3.4 %
ERYTHROCYTE [DISTWIDTH] IN BLOOD BY AUTOMATED COUNT: 14.3 %
EST. GFR  (AFRICAN AMERICAN): 45.9 ML/MIN/1.73 M^2
EST. GFR  (NON AFRICAN AMERICAN): 39.7 ML/MIN/1.73 M^2
GLUCOSE SERPL-MCNC: 105 MG/DL
HCT VFR BLD AUTO: 36.4 %
HGB BLD-MCNC: 11.5 G/DL
IGA SERPL-MCNC: 1481 MG/DL
IGG SERPL-MCNC: 948 MG/DL
IGM SERPL-MCNC: 30 MG/DL
IMM GRANULOCYTES # BLD AUTO: 0.01 K/UL
IMM GRANULOCYTES NFR BLD AUTO: 0.2 %
LYMPHOCYTES # BLD AUTO: 1.5 K/UL
LYMPHOCYTES NFR BLD: 32.5 %
MAGNESIUM SERPL-MCNC: 2.2 MG/DL
MCH RBC QN AUTO: 28.3 PG
MCHC RBC AUTO-ENTMCNC: 31.6 G/DL
MCV RBC AUTO: 90 FL
MONOCYTES # BLD AUTO: 0.4 K/UL
MONOCYTES NFR BLD: 7.9 %
NEUTROPHILS # BLD AUTO: 2.6 K/UL
NEUTROPHILS NFR BLD: 55.6 %
NRBC BLD-RTO: 0 /100 WBC
PHOSPHATE SERPL-MCNC: 2.7 MG/DL
PLATELET # BLD AUTO: 210 K/UL
PMV BLD AUTO: 8.8 FL
POTASSIUM SERPL-SCNC: 4.2 MMOL/L
PROT SERPL-MCNC: 8.3 G/DL
RBC # BLD AUTO: 4.06 M/UL
SODIUM SERPL-SCNC: 139 MMOL/L
WBC # BLD AUTO: 4.71 K/UL

## 2017-11-14 PROCEDURE — 99999 PR PBB SHADOW E&M-EST. PATIENT-LVL V: CPT | Mod: PBBFAC,,, | Performed by: INTERNAL MEDICINE

## 2017-11-14 PROCEDURE — 82784 ASSAY IGA/IGD/IGG/IGM EACH: CPT

## 2017-11-14 PROCEDURE — 84165 PROTEIN E-PHORESIS SERUM: CPT | Mod: 26,Q1,, | Performed by: PATHOLOGY

## 2017-11-14 PROCEDURE — 99215 OFFICE O/P EST HI 40 MIN: CPT | Mod: Q1,S$GLB,, | Performed by: INTERNAL MEDICINE

## 2017-11-14 PROCEDURE — 85025 COMPLETE CBC W/AUTO DIFF WBC: CPT

## 2017-11-14 PROCEDURE — 83735 ASSAY OF MAGNESIUM: CPT

## 2017-11-14 PROCEDURE — 86334 IMMUNOFIX E-PHORESIS SERUM: CPT

## 2017-11-14 PROCEDURE — 84165 PROTEIN E-PHORESIS SERUM: CPT

## 2017-11-14 PROCEDURE — 36415 COLL VENOUS BLD VENIPUNCTURE: CPT

## 2017-11-14 PROCEDURE — 83520 IMMUNOASSAY QUANT NOS NONAB: CPT

## 2017-11-14 PROCEDURE — 84100 ASSAY OF PHOSPHORUS: CPT

## 2017-11-14 PROCEDURE — 80053 COMPREHEN METABOLIC PANEL: CPT

## 2017-11-14 PROCEDURE — 86334 IMMUNOFIX E-PHORESIS SERUM: CPT | Mod: 26,Q1,, | Performed by: PATHOLOGY

## 2017-11-14 PROCEDURE — 82784 ASSAY IGA/IGD/IGG/IGM EACH: CPT | Mod: 59

## 2017-11-14 RX ORDER — PEN NEEDLE, DIABETIC 31 GX5/16"
NEEDLE, DISPOSABLE MISCELLANEOUS
COMMUNITY
Start: 2017-11-13 | End: 2018-01-17 | Stop reason: SDUPTHER

## 2017-11-14 NOTE — PROGRESS NOTES
Subjective:       Patient ID: Emerson Menendez is a 72 y.o. male.    Chief Complaint: Smoldering multiple myeloma and Results  The patient is a very pleasant 70 year old man who returns today after completing his evaluation for enrollment in the ECOG-ACRIN study of the Randomized Phase III Trial of Lenalidomide Versus Observation Alone in Patients with 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. Test results identify a stable IgA kappa protein with beta globulin band at 1.63g/dL. Kappa free light chain is elevated at 4.40. CBC and calcium are stable. Creatinine with history of stage III CKD is stable at 1.4. Metastatic survey is negative for lytic lesions. MRI of the entire spine demonstrated age related changes but no convincing evidence of myeloma bone disease. Bone marrow biopsy identified about 13% plasma cells by morphology and FISH for myeloma identified a t(11;14) and trisomies 3,7, and 17. The patient is afebrile and appears clinically well. He was seen by his podiatrist and nephrologist since our last visit without any new or acute events.    The patient has not received any therapy for smoldering myeloma including bisphosphonates or steroids. He has been randomized to observation arm of the the clinical study. 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 He has no current or prior history of malignancy.    TODAY  Mr. Menendez returns today for monthly follow-up evaluation. He denies any acute interval events since last interview.   No clinical signs/symptoms of progression of his smoldering myeloma.    He plans to  his diabetic shoe prescription this week.    HPI  Review of Systems   Constitutional: Negative for activity change, appetite change, diaphoresis, fatigue, fever and unexpected weight change.   HENT: Negative.    Eyes: Negative.    Respiratory: Negative.     Cardiovascular: Negative for leg swelling.   Gastrointestinal: Negative.    Endocrine: Negative.    Genitourinary: Negative.    Musculoskeletal: Negative.    Skin: Negative.    Allergic/Immunologic: Negative.    Neurological:        Grade 0-1 peripheral neuropathy of bilateral feet.    Hematological: Negative for adenopathy. Does not bruise/bleed easily.   Psychiatric/Behavioral: Negative.        Objective:       Vitals:    11/14/17 0832   BP: (!) 147/74   Pulse: 76   Resp: 18   Temp: 98 °F (36.7 °C)       Physical Exam   Constitutional: He is oriented to person, place, and time. He appears well-developed and well-nourished.   HENT:   Head: Normocephalic and atraumatic.   Right Ear: External ear normal.   Left Ear: External ear normal.   Nose: Nose normal.   Mouth/Throat: Oropharynx is clear and moist.   Eyes: Conjunctivae and EOM are normal. Pupils are equal, round, and reactive to light.   Neck: Normal range of motion. Neck supple.   Cardiovascular: Normal rate, regular rhythm and intact distal pulses.    No murmur heard.  Pulmonary/Chest: Effort normal and breath sounds normal. No respiratory distress.   Abdominal: Soft. Bowel sounds are normal. He exhibits no distension. There is no hepatosplenomegaly.   Musculoskeletal: He exhibits no edema or tenderness.   Neurological: He is alert and oriented to person, place, and time. No cranial nerve deficit.   Skin: Skin is warm and dry. No rash noted. No cyanosis. Nails show no clubbing.   Psychiatric: He has a normal mood and affect. His behavior is normal.   Nursing note and vitals reviewed.      Assessment:       1. Smoldering multiple myeloma    2. Type 2 diabetes mellitus with diabetic polyneuropathy, with long-term current use of insulin    3. CKD stage 3 due to type 2 diabetes mellitus        Plan:       The patient has a diagnosis of smoldering myeloma. There is no indication for immediate chemotherapy. We are monitoring renal function closely- baseline  creatinine of -1.7 is stable today.   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 CBC and CMP are stable.  Complete biochemical studies from today are pending.  Plan for return in 1 month.

## 2017-11-14 NOTE — PROGRESS NOTES
Tuesday, November 14th, 2017      Protocol: [id number] Randomized Phase III Trial of Lenalidomide vs Observation Alone in Patients with 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.   Sponsor:  [facility]  IRB #: [id number]  Investigator: GIL Engel  Pt Initials: LUCÍA LORENZ      Arm B: Observation  Cycle 22, Day 28      Patient presents to clinic to evaluate ability to proceed with Cycle 23 of observation per above-mentioned protocol. He reports 11 days late for this visit.  On his originally scheduled visit on November 3rd, patient forgot he was out of town.  He was rescheduled to Friday, 11/10, but forgot about appt.  He was rescheduled again to today.  Patent was counseled on need to report for visits on time as much as possible.  He states he has had a busy month with two losses in the family as well as work issues.  Will work with patient to help encourage compliance with study schedule.  Patient is awake, alert, and oriented to person, place, and time.  He states he continues to have plantar fascitis bothering him for which he states he received an injection recently which has helped.  He also received an injection to his knees in addition to wearing knee brace.  He denies new potential CRAB symptoms.  He states continued willingness to participate in above-mentioned study.    Review of AE's:  1. Hypertension, grade 1: BP today is 147/74 in clinic today. This is stable, slightly increased from last visit.  Will continue to follow this. AE ongoing.  2. Lower Back Pain and Neck Pain, grade 1: Patient has no complaints of this issue this month. As previously stated, patient is not suspected to have bony myeloma involvement per Dr. Engel as these are pre-existing issues present at baseline. He states he plans to increase his physical activity and to work out regularly. He states understanding. AE ongoing.  3.  Pain in extremity (knee), grade 1.  Continues to wear knee stabilizing braces today.  As stated above, just  "had injections.  States having no issues with walking and increased activity on his recent trip.  AE, grade 1.  4. Creatinine increased, grade 1: Serum creatinine is stable at 1.7 mg/dl today and GFR 48.31. Per Dr. Engel this has not been related to myeloma and is related chronic kidney issues likely secondary to diabetes and hypertension.  Will continue observation monthly and to monitor renal function closely. AE stable.   5. Peripheral sensory neuropathy, grade 1: Patient does not verbalize complaints of this symptom today. He still plans to get fitted for diabetic shoes.  States he is taking neurontin for this per Dr. Sagastume.  He states when he remembers to take it regularly, he has no issues.  He has begun to dispense into his pill box which is helping with compliance.  AE ongoing.       No changes in other baseline AE's noted.      Per study chair, "the definition of progressive disease for this protocol is developing CRAB criteria that requires treatment systemically." Per Dr Engel, based on today's exam and lab results thus far, patient does not have any s/s of CRAB: Normal Calcium level (9.4), absence of Renal insufficiency (serum creatinine 1.7, and GFR 48.31 per [facility]--patient with a history of kidney dysfunction secondary to diabetes; Dr. Engel aware of these values and not concerned for myeloma involvement), absence of significant Anemia (hemoglobin 11.5, stable from baseline hgb level at study entry) and absence of lytic Bone lesions (per baseline metastatic survey as well as MRI--see MD note for further comment). See MD note for ECOG score and H&P and flowsheets for laboratory work, vitals, etc. All myeloma-related blood work from last cycle including SPEP and JAGUAR have remained stable and per Dr. Engel, patient shows no evidence of progression today. Patient informed that Dr. Engel will release all lab results to [facility]. He states understanding of this.       RN reinforced importance of " patient following up monthly for assessment as well as lab work. Patient verbalizes understanding. Appointment for next month's visit to be made a later date and appt slip is to be mailed to patient at a later date. Patient's preferences were changed so that he would get electronic notifications prior to his visits that will hopefully help remind him of his visits.   WESLEY Ontiveros also call call patient the day before his visits to remind him.  Patient was given Research RN's contact information and has MD contact information to call with any concerns, questions, or worsening of symptoms; he verbalized understanding.

## 2017-11-15 LAB
ALBUMIN SERPL ELPH-MCNC: 3.85 G/DL
ALPHA1 GLOB SERPL ELPH-MCNC: 0.26 G/DL
ALPHA2 GLOB SERPL ELPH-MCNC: 0.73 G/DL
B-GLOBULIN SERPL ELPH-MCNC: 2.29 G/DL
GAMMA GLOB SERPL ELPH-MCNC: 0.66 G/DL
INTERPRETATION SERPL IFE-IMP: NORMAL
KAPPA LC SER QL IA: 4.8 MG/DL
KAPPA LC/LAMBDA SER IA: 3.53
LAMBDA LC SER QL IA: 1.36 MG/DL
PATHOLOGIST INTERPRETATION IFE: NORMAL
PATHOLOGIST INTERPRETATION SPE: NORMAL
PROT SERPL-MCNC: 7.8 G/DL

## 2017-11-21 DIAGNOSIS — E11.42 TYPE 2 DIABETES MELLITUS WITH DIABETIC POLYNEUROPATHY, WITH LONG-TERM CURRENT USE OF INSULIN: Chronic | ICD-10-CM

## 2017-11-21 DIAGNOSIS — Z79.4 TYPE 2 DIABETES MELLITUS WITH DIABETIC POLYNEUROPATHY, WITH LONG-TERM CURRENT USE OF INSULIN: Chronic | ICD-10-CM

## 2017-11-21 RX ORDER — INSULIN ASPART 100 [IU]/ML
INJECTION, SOLUTION INTRAVENOUS; SUBCUTANEOUS
Qty: 45 ML | Refills: 2 | Status: SHIPPED | OUTPATIENT
Start: 2017-11-21 | End: 2018-01-15

## 2017-11-22 ENCOUNTER — OFFICE VISIT (OUTPATIENT)
Dept: NEPHROLOGY | Facility: CLINIC | Age: 72
End: 2017-11-22
Payer: MEDICARE

## 2017-11-22 VITALS
HEIGHT: 70 IN | WEIGHT: 194.25 LBS | BODY MASS INDEX: 27.81 KG/M2 | SYSTOLIC BLOOD PRESSURE: 144 MMHG | HEART RATE: 79 BPM | DIASTOLIC BLOOD PRESSURE: 70 MMHG | OXYGEN SATURATION: 99 %

## 2017-11-22 DIAGNOSIS — N18.30 CHRONIC KIDNEY DISEASE, STAGE III (MODERATE): Primary | ICD-10-CM

## 2017-11-22 DIAGNOSIS — C90.00 MULTIPLE MYELOMA, REMISSION STATUS UNSPECIFIED: ICD-10-CM

## 2017-11-22 PROCEDURE — 99999 PR PBB SHADOW E&M-EST. PATIENT-LVL V: CPT | Mod: PBBFAC,GC,, | Performed by: INTERNAL MEDICINE

## 2017-11-22 PROCEDURE — 99213 OFFICE O/P EST LOW 20 MIN: CPT | Mod: GC,S$GLB,, | Performed by: INTERNAL MEDICINE

## 2017-11-22 NOTE — PROGRESS NOTES
Subjective:       Patient ID: Emerson Menendez is a 72 y.o. Black or  male who presents for follow-up evaluation of Chronic Kidney Disease and Chronic Renal Failure    Presenting for follow up of chronic kidney disease, on last visit we wanted more of a work up (including biopsy) due to progression, but unfortunately he was not able to get this done, his renal function has remained stable since July.      Review of Systems   Constitutional: Negative for fever and unexpected weight change.   Respiratory: Negative for cough, chest tightness, shortness of breath and wheezing.    Cardiovascular: Negative for chest pain and leg swelling.   Gastrointestinal: Negative for abdominal distention, abdominal pain, diarrhea and nausea.   Endocrine: Negative for polydipsia.   Genitourinary: Negative for difficulty urinating, dysuria, flank pain, frequency and hematuria.   Musculoskeletal: Negative for arthralgias and myalgias.   Skin: Negative for rash.   Allergic/Immunologic: Negative for immunocompromised state.   Neurological: Negative for dizziness and light-headedness.   Hematological: Negative for adenopathy.   Psychiatric/Behavioral: Negative for confusion.       Objective:      Physical Exam   Constitutional: He is oriented to person, place, and time. He appears well-developed.   HENT:   Head: Normocephalic and atraumatic.   Eyes: EOM are normal.   Neck: No JVD present.   Cardiovascular: Normal rate and regular rhythm.  Exam reveals no friction rub.    Pulmonary/Chest: Effort normal and breath sounds normal.   Abdominal: Soft. He exhibits no distension.   Musculoskeletal: He exhibits no edema.   Neurological: He is alert and oriented to person, place, and time.   Skin: Skin is warm.   Psychiatric: He has a normal mood and affect.       Assessment & Plan:       Chronic kidney disease, stage III (moderate)  Stable since last visit, but as oulined at that time, there has been progression over the past  little over a year or so and for which it is not clear why, ?related to Mm, if so this would likely change his treatment for the Mm, I.e., more aggressive, so although stable since July we're still recommending biopsy (he unfortunately was not able to have done on his previous appointment)

## 2017-11-22 NOTE — ASSESSMENT & PLAN NOTE
Stable since last visit, but as oulined at that time, there has been progression over the past little over a year or so and for which it is not clear why, ?related to Mm, if so this would likely change his treatment for the Mm, I.e., more aggressive, so although stable since July we're still recommending biopsy (he unfortunately was not able to have done on his previous appointment)

## 2017-11-22 NOTE — PATIENT INSTRUCTIONS
1) follow up in January  2) have lab work and imaging studies done before your next appointment  3) avoid NSAIDs

## 2017-11-27 ENCOUNTER — OFFICE VISIT (OUTPATIENT)
Dept: PODIATRY | Facility: CLINIC | Age: 72
End: 2017-11-27
Payer: MEDICARE

## 2017-11-27 VITALS
HEART RATE: 105 BPM | HEIGHT: 70 IN | BODY MASS INDEX: 27.44 KG/M2 | WEIGHT: 191.69 LBS | SYSTOLIC BLOOD PRESSURE: 122 MMHG | DIASTOLIC BLOOD PRESSURE: 73 MMHG

## 2017-11-27 DIAGNOSIS — L84 TYPE 2 DIABETES MELLITUS WITH PRESSURE CALLUS: ICD-10-CM

## 2017-11-27 DIAGNOSIS — M62.469 GASTROCNEMIUS EQUINUS, UNSPECIFIED LATERALITY: ICD-10-CM

## 2017-11-27 DIAGNOSIS — M72.2 PLANTAR FASCIITIS: ICD-10-CM

## 2017-11-27 DIAGNOSIS — E11.628 TYPE 2 DIABETES MELLITUS WITH PRESSURE CALLUS: ICD-10-CM

## 2017-11-27 DIAGNOSIS — E11.49 TYPE II DIABETES MELLITUS WITH NEUROLOGICAL MANIFESTATIONS: Primary | ICD-10-CM

## 2017-11-27 PROCEDURE — 99999 PR PBB SHADOW E&M-EST. PATIENT-LVL V: CPT | Mod: PBBFAC,,, | Performed by: PODIATRIST

## 2017-11-27 PROCEDURE — 99212 OFFICE O/P EST SF 10 MIN: CPT | Mod: 25,S$GLB,, | Performed by: PODIATRIST

## 2017-11-27 PROCEDURE — 20550 NJX 1 TENDON SHEATH/LIGAMENT: CPT | Mod: RT,S$GLB,, | Performed by: PODIATRIST

## 2017-11-27 RX ADMIN — TRIAMCINOLONE ACETONIDE 20 MG: 40 INJECTION, SUSPENSION INTRA-ARTICULAR; INTRAMUSCULAR at 12:11

## 2017-11-27 NOTE — PROGRESS NOTES
Subjective:      Patient ID: Emerson Menendez is a 72 y.o. male.    Chief Complaint: Foot Problem (right ft./9/13/17 PCP Dr. Sagastume); Heel Pain; and Diabetes Mellitus    Emerson is a 72 y.o. male who presents to the clinic upon referral from Dr. Dimple camarillo. provider found  for evaluation and treatment of diabetic feet. Emerson has a past medical history of Anxiety (3/16/2015); BPH (benign prostatic hypertrophy) (9/24/2012); Depression (3/16/2015); GERD (gastroesophageal reflux disease) (9/24/2012); Microalbuminuria (9/24/2012); Osteoarthritis of cervical spine (9/24/2012); Osteoarthritis of knee (9/24/2012); and Type II or unspecified type diabetes mellitus without mention of complication, not stated as uncontrolled (9/24/2012). Patient relates no major problem with feet. Only complaints today consist of pain at right plantar heel for the last few week. Denies falls or injury. Pain with first steps after rest and after increased time on feet. Denies history of lower extremity wounds, infections and/or injury.      PCP: Roberta Sagastume MD      Hemoglobin A1C   Date Value Ref Range Status   09/28/2017 7.0 (H) 4.0 - 5.6 % Final     Comment:     According to ADA guidelines, hemoglobin A1c <7.0% represents  optimal control in non-pregnant diabetic patients. Different  metrics may apply to specific patient populations.   Standards of Medical Care in Diabetes-2016.  For the purpose of screening for the presence of diabetes:  <5.7%     Consistent with the absence of diabetes  5.7-6.4%  Consistent with increasing risk for diabetes   (prediabetes)  >or=6.5%  Consistent with diabetes  Currently, no consensus exists for use of hemoglobin A1c  for diagnosis of diabetes for children.  This Hemoglobin A1c assay has significant interference with fetal   hemoglobin   (HbF). The results are invalid for patients with abnormal amounts of   HbF,   including those with known Hereditary Persistence   of Fetal Hemoglobin. Heterozygous  hemoglobin variants (HbAS, HbAC,   HbAD, HbAE, HbA2) do not significantly interfere with this assay;   however, presence of multiple variants in a sample may impact the %   interference.     07/20/2017 7.3 (H) 4.0 - 5.6 % Final     Comment:     According to ADA guidelines, hemoglobin A1c <7.0% represents  optimal control in non-pregnant diabetic patients. Different  metrics may apply to specific patient populations.   Standards of Medical Care in Diabetes-2016.  For the purpose of screening for the presence of diabetes:  <5.7%     Consistent with the absence of diabetes  5.7-6.4%  Consistent with increasing risk for diabetes   (prediabetes)  >or=6.5%  Consistent with diabetes  Currently, no consensus exists for use of hemoglobin A1c  for diagnosis of diabetes for children.  This Hemoglobin A1c assay has significant interference with fetal   hemoglobin   (HbF). The results are invalid for patients with abnormal amounts of   HbF,   including those with known Hereditary Persistence   of Fetal Hemoglobin. Heterozygous hemoglobin variants (HbAS, HbAC,   HbAD, HbAE, HbA2) do not significantly interfere with this assay;   however, presence of multiple variants in a sample may impact the %   interference.     05/11/2017 9.6 (H) 4.5 - 6.2 % Final     Comment:     According to ADA guidelines, hemoglobin A1C <7.0% represents  optimal control in non-pregnant diabetic patients.  Different  metrics may apply to specific populations.   Standards of Medical Care in Diabetes - 2016.  For the purpose of screening for the presence of diabetes:  <5.7%     Consistent with the absence of diabetes  5.7-6.4%  Consistent with increasing risk for diabetes   (prediabetes)  >or=6.5%  Consistent with diabetes  Currently no consensus exists for use of hemoglobin A1C  for diagnosis of diabetes for children.             Review of Systems   Constitution: Negative for chills, fever, weakness, malaise/fatigue and night sweats.   Cardiovascular:  Negative for chest pain, leg swelling, orthopnea and palpitations.   Respiratory: Negative for cough, shortness of breath and wheezing.    Skin: Negative for color change, itching, poor wound healing and rash.   Musculoskeletal: Negative for arthritis, gout, joint pain, joint swelling, muscle weakness and myalgias.   Gastrointestinal: Negative for abdominal pain, constipation and nausea.   Neurological: Positive for numbness and paresthesias. Negative for disturbances in coordination, dizziness, focal weakness and tremors.           Objective:      Physical Exam   Constitutional: He is oriented to person, place, and time. Vital signs are normal. He appears well-developed. He is cooperative. No distress.   Cardiovascular: Intact distal pulses.    Pulses:       Dorsalis pedis pulses are 2+ on the right side, and 2+ on the left side.        Posterior tibial pulses are 2+ on the right side, and 2+ on the left side.   Musculoskeletal:        Right ankle: Normal.        Left ankle: Normal.        Right foot: There is tenderness and deformity. There is normal range of motion, normal capillary refill and no crepitus.        Left foot: There is normal range of motion, no bony tenderness, normal capillary refill, no crepitus and no deformity.   Mild pain on palpation plantar medial and central heel. No pain with ROM or MMT. No pain with medial and lateral compression of heel.    Ankle dorsiflexion is less than 5 degrees past neurtral with knee extended. 10 degrees of DF achieved with knee flexion.       Neurological: He is alert and oriented to person, place, and time. He has normal strength. No sensory deficit. He exhibits normal muscle tone. Gait normal.   Reflex Scores:       Achilles reflexes are 2+ on the right side and 2+ on the left side.  Negative Tinels sign and Sherri's click, bilat.   Skin: Skin is intact. Capillary refill takes 2 to 3 seconds. Lesion noted. No abrasion, no ecchymosis and no rash noted. No  erythema. Nails show no clubbing.   Hyperkeratotic lesions at the following locations:   Plantar hallux IPJ, bilat.              Assessment:       Encounter Diagnoses   Name Primary?    Plantar fasciitis - Right Foot     Type II diabetes mellitus with neurological manifestations Yes    Gastrocnemius equinus, unspecified laterality     Type 2 diabetes mellitus with pressure callus          Plan:       Emerson was seen today for foot problem, heel pain and diabetes mellitus.    Diagnoses and all orders for this visit:    Type II diabetes mellitus with neurological manifestations  -     DIABETIC SHOES FOR HOME USE    Plantar fasciitis - Right Foot  -     DIABETIC SHOES FOR HOME USE    Gastrocnemius equinus, unspecified laterality  -     DIABETIC SHOES FOR HOME USE    Type 2 diabetes mellitus with pressure callus  -     DIABETIC SHOES FOR HOME USE      I counseled the patient on his conditions, their implications and medical management.        - Shoe inspection. Diabetic Foot Education. Patient reminded of the importance of good nutrition and blood sugar control to help prevent podiatric complications of diabetes. Patient instructed on proper foot hygeine. We discussed wearing proper shoe gear, daily foot inspections, never walking without protective shoe gear.    1. Stretch calf and plantar fascia 10x per day for 30 sec and before getting out of bed.    2. Supportive shoes at all times    3. Orthotic, OTC. Will consider custom as needed.    4. NSAIDS daily x 2-3 weeks    5. ICE massage with frozen water bottle 2x per day for 30 minutes.    6. Will consider night splint, steroid injection and/or physical therapy as needed.      A steroid injection was performed at right plantar fascia using 1% plain Lidocaine and 20 mg of Kenalog. This was well tolerated.

## 2017-11-27 NOTE — PATIENT INSTRUCTIONS
1. Stretch calf and plantar fascia 10x per day for 30 sec and before getting out of bed.    2. Supportive shoes at all times (athletic shoe including spence, new balance, asics, HOKA.    ([employer], Phid[employer]ides, [place] running company, Masseys, Goodfeet, Cantilever, Feet First, Foot Solutions, Therapeutic shoes, SAS, [facility] [employer] [place] pro [employer]) [email or web address]    3. Orthotic (recommend the following brands: Superfeet, Spenco, Powerstep, Sof Sole Fit Series)    4. NSAID's for 2-3 weeks if no GI or Kidney problems (example: ibuprofen 600 mg 2 x per day)    5. ICE massage with frozen water bottle 2x per day for 30 minutes.    6. Consider night splint, custom orthotics, therapy and/or steroid injection    What Is Plantar Fasciitis?   The plantar fascia is a ligament-like band running from your heel to the ball of your foot. This band pulls on the heel bone, raising the arch of your foot as it pushes off the ground. But if your foot moves incorrectly, the plantar fascia may become strained. The fascia may swell and its tiny fibers may begin to fray, causing plantar fasciitis.  Causes  Plantar fasciitis is often caused by poor foot mechanics. If your foot flattens too much, the fascia may overstretch and swell. If your foot flattens too little, the fascia may ache from being pulled too tight.    The plantar fascia is a thick, fibrous layer of tissue that covers the bones on the bottom of your foot. It holds the foot bones in an arched position. Plantar fasciitis is a painful swelling of the plantar fascia.  A heel spur is an overgrowth of bone where the plantar fascia attaches to the heel bone. The heel spur itself usually doesnt cause pain. However, the heel spur might be a sign of plantar fasciitis which may cause your foot pain. There is no specific treatment for heel spurs.   Plantar fasciitis can develop slowly or suddenly. It usually affects one foot at a time. Heel pain can feel sharp, like  a knife sticking into the bottom of your foot. You may feel pain after exercising, long-distance jogging, stair climbing, long periods of standing, or after standing up.  Risk factors for plantar fasciitis include: arthritis, diabetes, obesity or recent weight gain, flat foot, and having high arches. Wearing high heels, loose shoes, or shoes with poor arch support adds to the risk.    Foot pain is usually worse in the morning. But it improves with walking. By the end of the day there may be a dull aching. Treatment includes short-term rest and controlling inflammation. It may take up to 9 months before all symptoms go away. In rare cases, a steroid injection in the foot, or surgery, may be needed.  Home care  · If you are overweight, lose weight to help healing.  · Choose supportive shoes with good arch support and shock absorbency. Replace athletic shoes when they become worn out. Dont walk or run barefoot.  · Premade or custom-fitted shoe inserts may be helpful. Inserts made of silicone seem to be the most effective. Custom-made inserts can be provided by a podiatrist or foot specialist, physical therapist, or orthopedist.  · Premade or custom-made night splints keep the heel stretched out while you sleep. They may prevent morning pain.  · Avoid activities that stress the feet: jogging, prolonged standing or walking, contact sports, etc.  · First thing in the morning and before sports, stretch the bottom of your foot. Gently flex your ankle so the toes move toward your knee.  · Icing may help control heel pain. Apply an ice pack to the heel for 10-20 minutes as a preventive. Or ice your heel after a severe flare-up of symptoms. You may repeat this every 1-2 hours as needed.  · You may use over-the-counter pain medicine to control pain, unless another medicine was prescribed. Anti-inflammatory pain medicines, such as ibuprofen or naproxen, may work better than acetaminophen. If you have chronic liver or kidney  disease or ever had a stomach ulcer or GI bleeding, talk with your healthcare provider before using these medicines.  · Shoe inserts, a night splint, or a special boot may be needed. Use these as directed by your healthcare provider.      Treating Plantar Fasciitis    First, your doctor relieves pain. Then, the cause of your problem may be found and corrected. If your pain is due to poor foot mechanics, custom-made shoe inserts (orthoses) may help.    Reduce Symptoms:  · To relieve mild symptoms, try aspirin, ibuprofen, or other medications as directed. Rubbing ice on the affected area may also help.  · To reduce severe pain and swelling, your doctor may prescribe pills or injections or a walking cast in some instances. Physical therapy, such as ultrasound or a daily stretching program, may also be recommended. Surgery is rarely required.  · To reduce symptoms caused by poor foot mechanics, your foot may be taped. This supports the arch and temporarily controls movement. Night splints may also help by stretching the fascia.    Control Movement  If taping helps, your doctor may prescribe orthoses. Built from plaster casts of your feet, these inserts control the way your foot moves. As a result, your symptoms should go away.  If Surgery Is Needed  Your doctor may consider surgery if other types of treatment don't control your pain. During surgery, the plantar fascia is partially cut to release tension. As you heal, fibrous tissue fills the space between the heel bone and the plantar fascia.   Reduce Overuse  Every time your foot strikes the ground, the plantar fascia is stretched. You can reduce the strain on the plantar fascia and the possibility of overuse by following these suggestions:  · Lose any excess weight.  · Avoid running on hard or uneven ground.  · Use orthoses at all times in your shoes and house slippers.  © 8749-0639 [employer]. [address] [place] [address], [place] [address]. All rights  reserved. This information is not intended as a substitute for professional medical care. Always follow your healthcare professional's instructions.            Lower Body Exercises: Calf Stretch    This exercise both stretches and strengthens your lower body to help your back. Do the exercise as often as suggested by your health care provider. As you work out, dont rush or strain. Use an exercise mat, pillow, or folded towel to protect your knees and other sensitive areas.  · Face a wall 2 feet away. Step toward the wall with one foot.  · Place both palms on the wall and bend your front knee.  · Lean forward, keeping the back leg straight and the heel on the floor.  · Hold for 20 seconds. Switch legs.  © 2386-5622 [employer]. [address] [place] [address]. All rights reserved. This information is not intended as a substitute for professional medical care. Always follow your healthcare professional's instructions.      These instructions are for your right foot. Switch sides for your left foot.  1. Sit in a chair. Rest your right ankle on your left knee.  2. Hold your toes with your right hand. Gently bend the toes backward. Feel a stretch in the undersides of the toes and ball of the foot. Hold for 30 to 60 seconds.  3. Then gently bend the toes in the other direction. Gently press on them until your foot is pointed. Hold for 30 to 60 seconds.  4. Repeat 5 times, or as instructed.  Date Last Reviewed: 5/1/2016  © 6513-7540 [employer]. [address] [place] [address]. All rights reserved. This information is not intended as a substitute for professional medical care. Always follow your healthcare professional's instructions.        Diabetic shoe locations:    [employer]  [facility] [place] [address]  [employer]  [phone]    \[facility]\"" [facility] [address] [facility], [place] [id number] [phone]  No Medicare, Cigna,  [facility]  [address] [facility]  [place] [address]  [phone]  Medicare  No Medicaid, Aetna, BC/BC, [place]    [facility]  [phone]  No PHN  [facility]  [address] [place] [address]  [phone]  Medicare    [employer]  [address] [place], [place] [phone]  [address] [facility]  [place] [address] [phone]  Medicare      [address] [place], [place] [id number] [phone]  [email or web address]  No Medicare   Medicaid & PHN only    [facility] [place]  [address] [place] [address] [phone]  No Medicare  Medicaid, Humana, PHN, BC/    [facility]  [address] [facility]  [place] [address] [phone]  [email or web address]  No Medicare, No Aetna    [facility]  [address] [place], [place]  [phone]  Medicare, Medicaid, PHN  No Cigna, Aetna    [employer]  [address] [place] [phone]  [email or web address]  Does not accept insurance

## 2017-11-28 NOTE — PROGRESS NOTES
[facility] STAFF NOTE    The note from the fellow/resident was reviewed. I have personally interviewed and examined the patient. There were no additional findings with regards to the history or physical exam.    I agree with the assessment and plan of  Dr. Leon Urena    Patient with slowly progressive kidney disease and low degree proteinuria with a Paraprotein band at the beta/gamma junction = 1.62 g/dL. Will schedule for kidney biopsy. Patient informed and agreeable.

## 2017-12-03 RX ORDER — TRIAMCINOLONE ACETONIDE 40 MG/ML
20 INJECTION, SUSPENSION INTRA-ARTICULAR; INTRAMUSCULAR ONCE
Status: COMPLETED | OUTPATIENT
Start: 2017-11-27 | End: 2017-11-27

## 2017-12-13 ENCOUNTER — TELEPHONE (OUTPATIENT)
Dept: RESEARCH | Facility: HOSPITAL | Age: 72
End: 2017-12-13

## 2017-12-13 NOTE — TELEPHONE ENCOUNTER
Telephone    Research RN called to confirm tomorrow's appt, patient states he had a death in the family and cannot make tomorrow's appt.  He asked that it be rescheduled.  Patient to call back as soon as able to give availability to reschedule.      Update:  Thursday, 12/14/17:    Patient contacted by  WESLEY Ontiveros and rescheduled for Monday, 12/18 to see Dr. Engel at 2PM.  Will follow.

## 2017-12-15 ENCOUNTER — TELEPHONE (OUTPATIENT)
Dept: ENDOCRINOLOGY | Facility: CLINIC | Age: 72
End: 2017-12-15

## 2017-12-15 DIAGNOSIS — Z79.4 TYPE 2 DIABETES MELLITUS WITH DIABETIC POLYNEUROPATHY, WITH LONG-TERM CURRENT USE OF INSULIN: Primary | Chronic | ICD-10-CM

## 2017-12-15 DIAGNOSIS — E11.42 TYPE 2 DIABETES MELLITUS WITH DIABETIC POLYNEUROPATHY, WITH LONG-TERM CURRENT USE OF INSULIN: Primary | Chronic | ICD-10-CM

## 2017-12-15 NOTE — TELEPHONE ENCOUNTER
----- Message from Carlota Geronimo sent at 12/15/2017 12:12 PM CST -----  Contact: Self [phone]  PT states Shazia advised him that his prescription for VGO should have been approved. he's asking that a prescription be sent to the pharmacy.

## 2017-12-15 NOTE — TELEPHONE ENCOUNTER
Called and spoke to pt, he heard from his insurance company that Vgo may be covered and he wants to send an rx to the pharmacy to see if the price is affordable.    Discussed with pt that willing to send Rx to [facility] to see coverage but if Vgo covered, needs re-fresher education and can also discuss using regular insulin vials at that time. Pt needs to email me when he finds out if Vgo is covered before he ever attempts to use it, pt agrees.     If pt starts on vgo, needs diabetes education re-fresher and also f/u with me.

## 2017-12-18 ENCOUNTER — HOSPITAL ENCOUNTER (OUTPATIENT)
Dept: RADIOLOGY | Facility: OTHER | Age: 72
Discharge: HOME | End: 2017-12-18
Attending: INTERNAL MEDICINE
Payer: MEDICARE

## 2017-12-18 ENCOUNTER — RESEARCH ENCOUNTER (OUTPATIENT)
Dept: RESEARCH | Facility: HOSPITAL | Age: 72
End: 2017-12-18

## 2017-12-18 ENCOUNTER — OFFICE VISIT (OUTPATIENT)
Dept: HEMATOLOGY/ONCOLOGY | Facility: CLINIC | Age: 72
End: 2017-12-18
Payer: MEDICARE

## 2017-12-18 VITALS
RESPIRATION RATE: 16 BRPM | HEART RATE: 84 BPM | HEIGHT: 70 IN | TEMPERATURE: 98 F | WEIGHT: 191 LBS | OXYGEN SATURATION: 95 % | BODY MASS INDEX: 27.35 KG/M2 | SYSTOLIC BLOOD PRESSURE: 137 MMHG | DIASTOLIC BLOOD PRESSURE: 82 MMHG

## 2017-12-18 DIAGNOSIS — E11.42 TYPE 2 DIABETES MELLITUS WITH DIABETIC POLYNEUROPATHY, WITH LONG-TERM CURRENT USE OF INSULIN: Chronic | ICD-10-CM

## 2017-12-18 DIAGNOSIS — E11.22 CKD STAGE 3 DUE TO TYPE 2 DIABETES MELLITUS: ICD-10-CM

## 2017-12-18 DIAGNOSIS — N18.30 CKD STAGE 3 DUE TO TYPE 2 DIABETES MELLITUS: ICD-10-CM

## 2017-12-18 DIAGNOSIS — Z79.4 TYPE 2 DIABETES MELLITUS WITH DIABETIC POLYNEUROPATHY, WITH LONG-TERM CURRENT USE OF INSULIN: Chronic | ICD-10-CM

## 2017-12-18 DIAGNOSIS — D47.2 SMOLDERING MULTIPLE MYELOMA: Primary | ICD-10-CM

## 2017-12-18 DIAGNOSIS — C90.00 MULTIPLE MYELOMA, REMISSION STATUS UNSPECIFIED: ICD-10-CM

## 2017-12-18 DIAGNOSIS — N18.30 CHRONIC KIDNEY DISEASE, STAGE III (MODERATE): ICD-10-CM

## 2017-12-18 DIAGNOSIS — E11.42 TYPE 2 DIABETES MELLITUS WITH DIABETIC POLYNEUROPATHY, WITH LONG-TERM CURRENT USE OF INSULIN: ICD-10-CM

## 2017-12-18 DIAGNOSIS — Z79.4 TYPE 2 DIABETES MELLITUS WITH DIABETIC POLYNEUROPATHY, WITH LONG-TERM CURRENT USE OF INSULIN: ICD-10-CM

## 2017-12-18 PROCEDURE — 76770 US EXAM ABDO BACK WALL COMP: CPT | Mod: 26,,, | Performed by: RADIOLOGY

## 2017-12-18 PROCEDURE — 99999 PR PBB SHADOW E&M-EST. PATIENT-LVL III: CPT | Mod: PBBFAC,,, | Performed by: INTERNAL MEDICINE

## 2017-12-18 PROCEDURE — 76770 US EXAM ABDO BACK WALL COMP: CPT | Mod: TC

## 2017-12-18 PROCEDURE — 99214 OFFICE O/P EST MOD 30 MIN: CPT | Mod: Q1,S$GLB,, | Performed by: INTERNAL MEDICINE

## 2017-12-18 NOTE — PROGRESS NOTES
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   Sponsor:  [facility]  IRB #: [id number]  Investigator: GIL Engel  Pt Initials: LUCÍA LORENZ      Arm B: Observation  Cycle 23, Day 28      Patient presents to clinic to evaluate ability to proceed with Cycle 24 of observation per above-mentioned protocol. He reports four days late due to a death in the family on his regularly scheduled visit.  Patient was once again counseled on need to report every 28 days as per protocol, see below.  Patient is awake, alert, and oriented to person, place, and time.  He has no new issues to report this month.  He states he had an ultrasound of his kidneys this morning.  He also obtained a prescription for diabetic shoes.  He denies new potential CRAB symptoms.  He states continued willingness to participate in above-mentioned study, continued participation and compliance with protocol parameters detailed below.    Review of AE's:  1. Hypertension, grade 1: BP today is 137/82  in clinic today. This is stable, improved slightly from that of last month.  Will continue to follow this. AE ongoing.  2. Lower Back Pain and Neck Pain, grade 1: Patient has no complaints of this issue this month. As previously stated, patient is not suspected to have bony myeloma involvement per Dr. Engel as these are pre-existing issues present at baseline. He states he plans to increase his physical activity and to work out regularly. He states understanding. AE ongoing.  3.  Pain in extremity (knee), grade 1.  Continues to wear knee stabilizing braces today.  As stated above, just had injections.  States having no issues with walking and increased activity on his recent trip.  AE, grade 1.  4. Creatinine increased, grade 1: Serum creatinine is stable at 1.7 mg/dl today and GFR 48.82. Per Dr. Engel this has not been related to  "myeloma and is related chronic kidney issues likely secondary to diabetes and hypertension.  Will continue observation monthly and to monitor renal function closely. AE stable.   5. Peripheral sensory neuropathy, grade 1: Patient does not verbalize complaints of this symptom today. As stated above, got rx for diabetic shoes.  States he is taking neurontin for this per Dr. Sagastume.  He states when he remembers to take it regularly, he has no issues.  He has begun to dispense into his pill box which is helping with compliance.  AE ongoing.       No changes in other baseline AE's noted.      Per study chair, "the definition of progressive disease for this protocol is developing CRAB criteria that requires treatment systemically." Per Dr Engel, based on today's exam and lab results thus far, patient does not have any s/s of CRAB: Normal Calcium level (9.5), absence of Renal insufficiency (serum creatinine 1.7, and GFR 48.82 per [facility]roft-Gault--patient with a history of kidney dysfunction secondary to diabetes; Dr. Engel aware of these values and not concerned for myeloma involvement), absence of significant Anemia (hemoglobin 11.4, stable from baseline hgb level at study entry) and absence of lytic Bone lesions (per baseline metastatic survey as well as MRI--see MD note for further comment). See MD note for ECOG score and H&P and flowsheets for laboratory work, vitals, etc. All myeloma-related blood work from last cycle including SPEP and JAGUAR have remained stable and per Dr. Engel, patient shows no evidence of progression today. Patient informed that Dr. Engel will release all lab results to [facility]. He states understanding of this.       Research RN and Dr. Engel had lengthy discussion with patient regarding compliance with protocol schedule as over the course of the last several months, patient has not been able to make multiple appointments which required rescheduling and being outside of study-mandated 28 day study " "visit.  Patient was reminded that he agreed to maintain compliance with the trial and study entry.  He was reminded that study participation is voluntary and if he finds the study requirements too difficult, he may revoke his consent at any time.  Patient states he has simply had a "very busy and lots of unexpected events" occur over the past several months, but that things should "die down" in January.  He states he appreciates being followed monthly and would like to continue doing this on trial.      Tentative appointment for next month's visit discussed to be January 15th; appt to be made a later date and appt slip is to be maPailed to patient at a later date.  Patient was given Research RN's contact information and has MD contact information to call with any concerns, questions, or worsening of symptoms; he verbalized understanding.                  "

## 2017-12-19 ENCOUNTER — PATIENT MESSAGE (OUTPATIENT)
Dept: ENDOCRINOLOGY | Facility: CLINIC | Age: 72
End: 2017-12-19

## 2017-12-19 RX ORDER — INSULIN ASPART 100 [IU]/ML
INJECTION, SOLUTION INTRAVENOUS; SUBCUTANEOUS
Qty: 45 ML | Refills: 2 | OUTPATIENT
Start: 2017-12-19

## 2017-12-19 NOTE — PROGRESS NOTES
Subjective:       Patient ID: Emerson Menendez is a 72 y.o. male.    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 Test results identify a stable IgA kappa protein with beta globulin band at 1.63g/dL. Kappa free light chain is elevated at 4.40. CBC and calcium are stable. Creatinine with history of stage III CKD is stable at 1.4. Metastatic survey is negative for lytic lesions. MRI of the entire spine demonstrated age related changes but no convincing evidence of myeloma bone disease. Bone marrow biopsy identified about 13% plasma cells by morphology and FISH for myeloma identified a t(11;14) and trisomies 3,7, and 17. The patient is afebrile and appears clinically well. He was seen by his podiatrist and nephrologist since our last visit without any new or acute events.    The patient has not received any therapy for smoldering myeloma including bisphosphonates or steroids. He has been randomized to observation arm of the the clinical study. 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 He has no current or prior history of malignancy.    TODAY  Mr. Menendez returns today for monthly follow-up evaluation. He denies any acute interval events since last interview.   No clinical signs/symptoms of progression of his smoldering myeloma.    He plans to  his diabetic shoe prescription this week.    HPI  Review of Systems   Constitutional: Negative for activity change, appetite change, diaphoresis, fatigue, fever and unexpected weight change.   HENT: Negative.    Eyes: Negative.    Respiratory: Negative.    Cardiovascular: Negative for leg  swelling.   Gastrointestinal: Negative.    Endocrine: Negative.    Genitourinary: Negative.    Musculoskeletal: Negative.    Skin: Negative.    Allergic/Immunologic: Negative.    Neurological:        Grade 0-1 peripheral neuropathy of bilateral feet.    Hematological: Negative for adenopathy. Does not bruise/bleed easily.   Psychiatric/Behavioral: Negative.        Objective:       Vitals:    12/18/17 1400   BP: 137/82   Pulse: 84   Resp: 16   Temp: 97.7 °F (36.5 °C)       Physical Exam   Constitutional: He is oriented to person, place, and time. He appears well-developed and well-nourished.   HENT:   Head: Normocephalic and atraumatic.   Right Ear: External ear normal.   Left Ear: External ear normal.   Nose: Nose normal.   Mouth/Throat: Oropharynx is clear and moist.   Eyes: Conjunctivae and EOM are normal. Pupils are equal, round, and reactive to light.   Neck: Normal range of motion. Neck supple.   Cardiovascular: Normal rate, regular rhythm and intact distal pulses.    No murmur heard.  Pulmonary/Chest: Effort normal and breath sounds normal. No respiratory distress.   Abdominal: Soft. Bowel sounds are normal. He exhibits no distension. There is no hepatosplenomegaly.   Musculoskeletal: He exhibits no edema or tenderness.   Neurological: He is alert and oriented to person, place, and time. No cranial nerve deficit.   Skin: Skin is warm and dry. No rash noted. No cyanosis. Nails show no clubbing.   Psychiatric: He has a normal mood and affect. His behavior is normal.   Nursing note and vitals reviewed.      Assessment:       1. Smoldering multiple myeloma    2. Type 2 diabetes mellitus with diabetic polyneuropathy, with long-term current use of insulin    3. CKD stage 3 due to type 2 diabetes mellitus        Plan:       The patient has a diagnosis of smoldering myeloma. There is no indication for immediate chemotherapy. We are monitoring renal function closely- baseline creatinine of -1.7 is stable today.   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 CBC and CMP are stable.  Complete biochemical studies from today are pending.  Plan for return in 1 month.

## 2017-12-20 ENCOUNTER — PATIENT OUTREACH (OUTPATIENT)
Dept: DIABETES | Facility: CLINIC | Age: 72
End: 2017-12-20

## 2017-12-27 ENCOUNTER — PATIENT MESSAGE (OUTPATIENT)
Dept: ENDOCRINOLOGY | Facility: CLINIC | Age: 72
End: 2017-12-27

## 2018-01-02 ENCOUNTER — PATIENT MESSAGE (OUTPATIENT)
Dept: ENDOCRINOLOGY | Facility: CLINIC | Age: 73
End: 2018-01-02

## 2018-01-04 ENCOUNTER — TELEPHONE (OUTPATIENT)
Dept: ENDOCRINOLOGY | Facility: CLINIC | Age: 73
End: 2018-01-04

## 2018-01-04 DIAGNOSIS — E11.42 TYPE 2 DIABETES MELLITUS WITH DIABETIC POLYNEUROPATHY, WITH LONG-TERM CURRENT USE OF INSULIN: Primary | Chronic | ICD-10-CM

## 2018-01-04 DIAGNOSIS — Z79.4 TYPE 2 DIABETES MELLITUS WITH DIABETIC POLYNEUROPATHY, WITH LONG-TERM CURRENT USE OF INSULIN: Primary | Chronic | ICD-10-CM

## 2018-01-04 NOTE — TELEPHONE ENCOUNTER
According to patient message to us- he would like an appointment.  Looks like for DM Education for V-go start but then, patient is requesting a call back from Tiffanie Wilhelm NP to discuss what other possible alternative to insulin?

## 2018-01-04 NOTE — TELEPHONE ENCOUNTER
Pt previously seen in [facility]. Needs f/u with any endocrine provider in next 1-3 months (whatever is open) with BMP and A1c before. Please contact pt to schedule     Thanks

## 2018-01-04 NOTE — TELEPHONE ENCOUNTER
Can you please contact pt to schedule Vgo refresher training with RENATA Feng in primary care? Pt received formal Vgo training in August but never started the device because of cost. He is now ready to start it but needs a refresher education visit     Thanks

## 2018-01-04 NOTE — TELEPHONE ENCOUNTER
----- Message from Sarah Lennon sent at 1/4/2018  9:17 AM CST -----  Contact: [employer]  Appointment Request From: Emerson Menendez    With Provider: Other - (see comments)    Would Accept With:Only the person I've selected    Preferred Date Range: Any date 1/4/2018 or later    Preferred Times: Any    Reason for visit: Request an Appt    Comments:  Tiffanie Wilhelm, please call Emerson Menendez in reference to cheaper form to insulin. Thanks

## 2018-01-10 ENCOUNTER — CLINICAL SUPPORT (OUTPATIENT)
Dept: DIABETES | Facility: CLINIC | Age: 73
End: 2018-01-10
Payer: MEDICARE

## 2018-01-10 DIAGNOSIS — Z79.4 TYPE 2 DIABETES MELLITUS WITH HYPERGLYCEMIA, WITH LONG-TERM CURRENT USE OF INSULIN: Chronic | ICD-10-CM

## 2018-01-10 DIAGNOSIS — E11.65 TYPE 2 DIABETES MELLITUS WITH HYPERGLYCEMIA, WITH LONG-TERM CURRENT USE OF INSULIN: Chronic | ICD-10-CM

## 2018-01-10 PROCEDURE — G0108 DIAB MANAGE TRN  PER INDIV: HCPCS | Mod: S$GLB,,, | Performed by: DIETITIAN, REGISTERED

## 2018-01-10 NOTE — TELEPHONE ENCOUNTER
Called and spoke with patient.  Patient is requesting an appointment for next week with any Endocrine provider.  Scheduled to see Dr. Coleman on Monday 1/15/18 with Hgb A1C to be drawn prior- linked to labs already scheduled.  Patient verbalizes understanding.

## 2018-01-12 ENCOUNTER — TELEPHONE (OUTPATIENT)
Dept: RESEARCH | Facility: HOSPITAL | Age: 73
End: 2018-01-12

## 2018-01-12 NOTE — TELEPHONE ENCOUNTER
Called patient to remind of Monday, 1/15 appt with Dr. Engel.  Left voicemail with reminder of date/ time of appt.

## 2018-01-12 NOTE — PROGRESS NOTES
Diabetes Education  Author: Lisa Feng RD  Date: 1/12/2018    Diabetes Education Visit  Diabetes Education Record Assessment/Progress: Comprehensive/Group (VGo re-start/review)    Diabetes Type  Diabetes Type : Type II    Diabetes History  Diabetes Diagnosis: >10 years    Nutrition  Meal Planning:  (2-3 meals per day; occasional snacking, including in the middle of the night)    Monitoring   Self Monitoring :  (SMBG 2-3 times daily)  Blood Glucose Logs: No      Current Diabetes Treatment   Current Treatment: Insulin (lantus 18 units daily; novolog 4 units AC)    Social History  Preferred Learning Method: Face to Face  Primary Support: Self  Occupation:  (retired electritian, but still works often)  Smoking Status: Never a Smoker    Barriers to Change  Barriers to Change: None  Learning Challenges : None    Readiness to Learn   Readiness to Learn : Acceptance    Cultural Influences  Cultural Influences: No    Diabetes Education Assessment/Progress  Medications (states correct name, dose, onset, peak, duration, side effects & timing of meds): Demonstration, Discussion, Return Demonstration, Instructed, Individual Session, Comprehends Key Points, Written Materials Provided, Demonstrates Understanding/Competency(verbalizes/demonstrates)  Receiving re-training on VGo today, as he was trained back in August but never started the Vgo. Pt remembers most of all steps accurately. Reviewed all necessary instructions and guidelines.    He will use of the V-GO 20 which will provide 30 units of basal insulin given over 24 hours (0.83 units/hr) and up to 36 units of on-demand bolus dosing in 2-Unit increments. Patient will be giving 4 units of Relion Regular at each meal (2 clicks). Patient had been instructed to hold his Lantus this AM. Patient also advised that he will discontinue taking the Novolog and Lantus flexpens and that he will only use the Regular vial with the V-Go system.     Patient was instructed on the  following:    Insert vial into EZ Fill and leave in place until vial is empty   Remove plug and insert V-Go    Draw insulin into EZ Fill and fill V-Go with insulin (check that V-Go is full of insulin)    Remove V-GO and clean and replace plug (advised to wipe the circular opening with an alcohol swab before replacing)    Select site for V-Go (site selection reviewed) and prepare infusion site with aseptic technique    Remove button cover and adhesive liner    Attach V-Go to site selected and insert needle by pushing needle button in to activate basal rate    Instructed patient on how to activate the bolus ready button and to push the bolus delivery button into the V-Go to deliver 2 units of insulin (1 push = 2 units). Patient advised that once all 36 units of the on-demand bolus have been given, the bolus buttons will lock, but the basal insulin will continue for the remainder of the 24 hours    To remove, release needle by sliding and pressing the needle release button    Remove the V-Go and discard (the V-Go is 100% disposable)   Patient instructed that the V-Go needs to be changed every 24 hours.    Patient was able to perform successful return demonstration of all.     General precautions reviewed with the patient:    V-Go needs to be removed before having an X-ray, MRI or CT scan (or any similar test or procedure). Replace with a new V-Go after the test or procedure is completed. Patient also advised to check with her doctor before any type of surgical procedure to see if the V-Go can be worn.    Patient advised to monitor her BG 4 times a day (pre-meals and at HS)    Patient advised to always carry back up Novolog and Levemir insulin pens (non-) should a problem develop with the V-Go. Patient also advised that she should have directions from her MD regarding insulin doses should she need to switch back to Novolog/Levemir pens temporarily.    The V-Go should not be exposed to direct  sunlight, hot tub, whirlpool or sauna use (replace with a new filled V-Go afterward)    Check that the V-Go is securely in place during and after periods of increased physical activity, exposure to water or gone under water to the depth of 3 feet, 3 inches (the V-Go can go under water and continue to work safely)    Reviewed s/s and tx of hypoglycemia  All of patient's questions and concerns were addressed.Patient instructed to keep a BG log and bring BG readings to me in 3 weeks for review. Phone number was provided and patient advised to call with any questions or concerns.     Monitoring (monitoring blood glucose/other parameters & using results): Discussion, Instructed  Instructed pt to continue monitoring BG 2-3 times daily and bring logs to all appts.    Acute Complications (preventing, detecting, and treating acute complications): Discussion  Reviewed hypo symptoms and appropriate treatment.    Diabetes Distress and Support Systems: Discussion, Individual Session   Pt reports some mild distress with diabetes providers. Acknowledged pt's disappointment and conveyed reasoning for his reported mix-ups. Encouraged pt to continue following with diabetes team, especially keep in contact with me. Provided pt with multiple phone numbers for contact.        Goals  Patient has selected/evaluated goals during today's session: Yes, evaluated    Medications: In Progress (Will use VGo as instructed)  Start Date: 08/02/17  Target Date: 01/29/18         Diabetes Care Plan/Intervention  Education Plan/Intervention: Individual Follow-Up DSMT      Diabetes Meal Plan  Restrictions: Restricted Carbohydrate      Education Units of Time   Time Spent: 75 min      Health Maintenance Topics with due status: Not Due       Topic Last Completion Date    Colonoscopy 03/22/2012    TETANUS VACCINE 12/17/2013    Lipid Panel 05/11/2017    Foot Exam 05/26/2017    Eye Exam 08/01/2017    Hemoglobin A1c 09/28/2017     There are no preventive  care reminders to display for this patient.

## 2018-01-15 ENCOUNTER — RESEARCH ENCOUNTER (OUTPATIENT)
Dept: RESEARCH | Facility: HOSPITAL | Age: 73
End: 2018-01-15

## 2018-01-15 ENCOUNTER — OFFICE VISIT (OUTPATIENT)
Dept: ENDOCRINOLOGY | Facility: CLINIC | Age: 73
End: 2018-01-15
Payer: MEDICARE

## 2018-01-15 ENCOUNTER — OFFICE VISIT (OUTPATIENT)
Dept: HEMATOLOGY/ONCOLOGY | Facility: CLINIC | Age: 73
End: 2018-01-15
Payer: MEDICARE

## 2018-01-15 ENCOUNTER — LAB VISIT (OUTPATIENT)
Dept: LAB | Facility: HOSPITAL | Age: 73
End: 2018-01-15
Attending: INTERNAL MEDICINE
Payer: MEDICARE

## 2018-01-15 VITALS
WEIGHT: 198 LBS | RESPIRATION RATE: 16 BRPM | TEMPERATURE: 99 F | DIASTOLIC BLOOD PRESSURE: 80 MMHG | HEART RATE: 89 BPM | SYSTOLIC BLOOD PRESSURE: 145 MMHG | BODY MASS INDEX: 28.35 KG/M2 | HEIGHT: 70 IN | OXYGEN SATURATION: 96 %

## 2018-01-15 VITALS
SYSTOLIC BLOOD PRESSURE: 144 MMHG | WEIGHT: 197.56 LBS | BODY MASS INDEX: 28.28 KG/M2 | DIASTOLIC BLOOD PRESSURE: 80 MMHG | HEART RATE: 75 BPM | HEIGHT: 70 IN

## 2018-01-15 DIAGNOSIS — E11.42 TYPE 2 DIABETES MELLITUS WITH DIABETIC POLYNEUROPATHY, WITH LONG-TERM CURRENT USE OF INSULIN: ICD-10-CM

## 2018-01-15 DIAGNOSIS — E11.22 CKD STAGE 3 DUE TO TYPE 2 DIABETES MELLITUS: ICD-10-CM

## 2018-01-15 DIAGNOSIS — D47.2 SMOLDERING MULTIPLE MYELOMA: Primary | ICD-10-CM

## 2018-01-15 DIAGNOSIS — E78.5 HYPERLIPIDEMIA, UNSPECIFIED HYPERLIPIDEMIA TYPE: ICD-10-CM

## 2018-01-15 DIAGNOSIS — N18.30 CKD STAGE 3 DUE TO TYPE 2 DIABETES MELLITUS: ICD-10-CM

## 2018-01-15 DIAGNOSIS — C90.00 MULTIPLE MYELOMA, REMISSION STATUS UNSPECIFIED: ICD-10-CM

## 2018-01-15 DIAGNOSIS — Z79.4 TYPE 2 DIABETES MELLITUS WITH DIABETIC POLYNEUROPATHY, WITH LONG-TERM CURRENT USE OF INSULIN: ICD-10-CM

## 2018-01-15 DIAGNOSIS — E11.65 TYPE 2 DIABETES MELLITUS WITH HYPERGLYCEMIA, UNSPECIFIED LONG TERM INSULIN USE STATUS: Primary | ICD-10-CM

## 2018-01-15 DIAGNOSIS — Z00.6 EXAMINATION OF PARTICIPANT IN CLINICAL TRIAL: ICD-10-CM

## 2018-01-15 LAB
ALBUMIN SERPL BCP-MCNC: 3.7 G/DL
ALP SERPL-CCNC: 49 U/L
ALT SERPL W/O P-5'-P-CCNC: 26 U/L
ANION GAP SERPL CALC-SCNC: 8 MMOL/L
AST SERPL-CCNC: 17 U/L
BASOPHILS # BLD AUTO: 0.02 K/UL
BASOPHILS NFR BLD: 0.4 %
BILIRUB SERPL-MCNC: 0.5 MG/DL
BUN SERPL-MCNC: 18 MG/DL
CALCIUM SERPL-MCNC: 9.6 MG/DL
CHLORIDE SERPL-SCNC: 99 MMOL/L
CO2 SERPL-SCNC: 30 MMOL/L
CREAT SERPL-MCNC: 1.5 MG/DL
DIFFERENTIAL METHOD: ABNORMAL
EOSINOPHIL # BLD AUTO: 0.2 K/UL
EOSINOPHIL NFR BLD: 4.3 %
ERYTHROCYTE [DISTWIDTH] IN BLOOD BY AUTOMATED COUNT: 13.2 %
EST. GFR  (AFRICAN AMERICAN): 53.4 ML/MIN/1.73 M^2
EST. GFR  (NON AFRICAN AMERICAN): 46.2 ML/MIN/1.73 M^2
ESTIMATED AVG GLUCOSE: 166 MG/DL
GLUCOSE SERPL-MCNC: 156 MG/DL
GLUCOSE SERPL-MCNC: 186 MG/DL (ref 70–110)
HBA1C MFR BLD HPLC: 7.4 %
HCT VFR BLD AUTO: 37.6 %
HGB BLD-MCNC: 12.1 G/DL
IGA SERPL-MCNC: 1680 MG/DL
IGG SERPL-MCNC: 929 MG/DL
IGM SERPL-MCNC: 43 MG/DL
IMM GRANULOCYTES # BLD AUTO: 0.01 K/UL
IMM GRANULOCYTES NFR BLD AUTO: 0.2 %
LYMPHOCYTES # BLD AUTO: 1.8 K/UL
LYMPHOCYTES NFR BLD: 34.6 %
MAGNESIUM SERPL-MCNC: 1.8 MG/DL
MCH RBC QN AUTO: 28.7 PG
MCHC RBC AUTO-ENTMCNC: 32.2 G/DL
MCV RBC AUTO: 89 FL
MONOCYTES # BLD AUTO: 0.4 K/UL
MONOCYTES NFR BLD: 7.5 %
NEUTROPHILS # BLD AUTO: 2.7 K/UL
NEUTROPHILS NFR BLD: 53 %
NRBC BLD-RTO: 0 /100 WBC
PHOSPHATE SERPL-MCNC: 2.7 MG/DL
PLATELET # BLD AUTO: 244 K/UL
PMV BLD AUTO: 8.8 FL
POTASSIUM SERPL-SCNC: 4.1 MMOL/L
PROT SERPL-MCNC: 8.8 G/DL
RBC # BLD AUTO: 4.21 M/UL
SODIUM SERPL-SCNC: 137 MMOL/L
WBC # BLD AUTO: 5.06 K/UL

## 2018-01-15 PROCEDURE — 84165 PROTEIN E-PHORESIS SERUM: CPT

## 2018-01-15 PROCEDURE — 84100 ASSAY OF PHOSPHORUS: CPT

## 2018-01-15 PROCEDURE — 83520 IMMUNOASSAY QUANT NOS NONAB: CPT | Mod: 59

## 2018-01-15 PROCEDURE — 99215 OFFICE O/P EST HI 40 MIN: CPT | Mod: Q1,S$GLB,, | Performed by: INTERNAL MEDICINE

## 2018-01-15 PROCEDURE — 82784 ASSAY IGA/IGD/IGG/IGM EACH: CPT

## 2018-01-15 PROCEDURE — 82784 ASSAY IGA/IGD/IGG/IGM EACH: CPT | Mod: 59

## 2018-01-15 PROCEDURE — 36415 COLL VENOUS BLD VENIPUNCTURE: CPT

## 2018-01-15 PROCEDURE — 83735 ASSAY OF MAGNESIUM: CPT

## 2018-01-15 PROCEDURE — 80053 COMPREHEN METABOLIC PANEL: CPT | Mod: Q1

## 2018-01-15 PROCEDURE — 84165 PROTEIN E-PHORESIS SERUM: CPT | Mod: 26,Q1,, | Performed by: PATHOLOGY

## 2018-01-15 PROCEDURE — 99999 PR PBB SHADOW E&M-EST. PATIENT-LVL V: CPT | Mod: PBBFAC,,, | Performed by: INTERNAL MEDICINE

## 2018-01-15 PROCEDURE — 99214 OFFICE O/P EST MOD 30 MIN: CPT | Mod: S$GLB,,, | Performed by: INTERNAL MEDICINE

## 2018-01-15 PROCEDURE — 85025 COMPLETE CBC W/AUTO DIFF WBC: CPT

## 2018-01-15 PROCEDURE — 86334 IMMUNOFIX E-PHORESIS SERUM: CPT | Mod: 26,Q1,, | Performed by: PATHOLOGY

## 2018-01-15 PROCEDURE — 82784 ASSAY IGA/IGD/IGG/IGM EACH: CPT | Mod: 59,Q1

## 2018-01-15 PROCEDURE — 82948 REAGENT STRIP/BLOOD GLUCOSE: CPT | Mod: S$GLB,,, | Performed by: INTERNAL MEDICINE

## 2018-01-15 PROCEDURE — 83036 HEMOGLOBIN GLYCOSYLATED A1C: CPT | Mod: Q1

## 2018-01-15 PROCEDURE — 86334 IMMUNOFIX E-PHORESIS SERUM: CPT

## 2018-01-15 NOTE — PROGRESS NOTES
"[facility]  CLINIC NOTE    Patient Name: Emerson Menendez  YOB: 1945    PRESENTING HISTORY       History of Present Illness:  Mr. Emerson Menendez is a 72 y.o. male w/ significant PMHx of:    # multiple myloma "part of research study"  # CKD "following with nephrology"  # HTN "on losartan"  ## DM type 2 >10 years     Pt is here for follow up on his diabetes, he recently started on V-Go20 two weeks ago, he state that he has some symptoms of hyperglycemia, He had one episodes of hypoglycemia since the V-Go  BS around 70, He thinks he might clicked the V-Go twice.    Currently he click once before each meal, and check the BS after 1 hr and if it's high he give him self another click.  He was unclear about take other oral hypoglycemic medication, he promised to email, being it next visit.  He is not on lantus or any other insulin regimen.    His BS runs 260 - 110  Last HbA1c 01/15/18: 7.4%    Last eye exam = 3 months ago.  Last podiatry visit < 1 year.    Review of Systems:  Constitutional: no fever or chills  Eyes: no visual changes  ENT: no nasal congestion or sore throat  Respiratory: no cough or shortness of breath  Cardiovascular: no chest pain or palpitations  Gastrointestinal: no nausea or vomiting, no abdominal pain or change in bowel habits  Genitourinary: no hematuria or dysuria  Musculoskeletal: no arthralgias or myalgias  Neurological: no seizures or tremors    PAST HISTORY:     Past Medical History:   Diagnosis Date    Anxiety 3/16/2015    BPH (benign prostatic hypertrophy) 9/24/2012    Depression 3/16/2015    GERD (gastroesophageal reflux disease) 9/24/2012    Microalbuminuria 9/24/2012    Osteoarthritis of cervical spine 9/24/2012    Osteoarthritis of knee 9/24/2012    Type II or unspecified type diabetes mellitus without mention of complication, not stated as uncontrolled 9/24/2012       Past Surgical History:   Procedure Laterality Date    CATARACT " "EXTRACTION  2012    Right eye    PROSTATE SURGERY         Family History   Problem Relation Age of Onset    Hypertension Brother     Kidney failure Daughter      due to medication    Blindness Neg Hx     Cancer Neg Hx     Cataracts Neg Hx     Diabetes Neg Hx     Glaucoma Neg Hx     Macular degeneration Neg Hx     Retinal detachment Neg Hx     Strabismus Neg Hx     Stroke Neg Hx     Thyroid disease Neg Hx        Social History     Social History    Marital status: Single     Spouse name: N/A    Number of children: N/A    Years of education: N/A     Social History Main Topics    Smoking status: Never Smoker    Smokeless tobacco: Never Used    Alcohol use No    Drug use: No    Sexual activity: Yes     Partners: Female     Birth control/ protection: None     Other Topics Concern    None     Social History Narrative    None       MEDICATIONS & ALLERGIES:     Current Outpatient Prescriptions on File Prior to Visit   Medication Sig    ACCU-CHEK OMAYRA Misc USE AS DIRECTED    aspirin (ECOTRIN) 81 MG EC tablet Take 1 tablet (81 mg total) by mouth once daily.    BD INSULIN PEN NEEDLE UF SHORT 31 gauge x 5/16" Ndle     blood glucose control, normal (METER-CHECK) Soln One meter. True test meter. Use as directed    blood sugar diagnostic Strp Check twice daily    cholecalciferol, vitamin D3, (VITAMIN D) 2,000 unit Cap Take by mouth. 1 Capsule Oral Every day.  over the counter    citalopram (CELEXA) 20 MG tablet TAKE 1 TABLET EVERY EVENING AFTER DINNER    diazePAM (VALIUM) 5 MG tablet Take 1 tablet (5 mg total) by mouth every 6 (six) hours as needed.    diclofenac sodium (VOLTAREN) 1 % Gel Apply 2 g topically 2 (two) times daily. To the anterior left knee prn pain    gabapentin (NEURONTIN) 100 MG capsule TAKE 1 CAPSULE EVERY MORNING, 1 CAPSULE IN THE AFTERNOON, AND 1 TO 3 CAPSULE(S) AT BEDTIME AS NEEDED FOR FOOT PAIN    glimepiride (AMARYL) 2 MG tablet Take 1 tablet (2 mg total) by mouth once " "daily.    hydrocodone-acetaminophen 5-325mg (NORCO) 5-325 mg per tablet Take 1 tablet by mouth every 4 to 6 hours as needed for Pain.    insulin glargine (LANTUS SOLOSTAR) 100 unit/mL (3 mL) InPn pen Inject 18 Units into the skin every evening.    insulin regular 100 unit/mL Inj injection To use with Vgo 20 with 2 clicks with meals.  * Use Relion brand regular insulin* Needs 2 vials monthly.    lancets Misc Use twice daily    latanoprost 0.005 % ophthalmic solution INSTILL 1 DROP INTO BOTH EYES EVERY EVENING    losartan (COZAAR) 25 MG tablet Take 1 tablet (25 mg total) by mouth once daily.    NOVOLOG FLEXPEN 100 unit/mL InPn pen INJECT  4 UNITS SUBCUTANEOUSLY  BEFORE  LARGEST  MEAL OF THE DAY PLUS CORRECTION SCALE. MAX DAILY DOSE IS 50 UNITS    omeprazole (PRILOSEC) 20 MG capsule Take 2 capsules (40 mg total) by mouth once daily.    oxybutynin (DITROPAN) 5 MG Tab Take 1 tablet (5 mg total) by mouth 2 (two) times daily.    pravastatin (PRAVACHOL) 40 MG tablet Take 1 tablet (40 mg total) by mouth once daily.    sub-q insulin device, 20 unit (VGO 20) Nury 1 Device by Misc.(Non-Drug; Combo Route) route once daily.    tamsulosin (FLOMAX) 0.4 mg Cp24 TAKE 1 CAPSULE ONE TIME DAILY    tizanidine (ZANAFLEX) 4 MG tablet 1/2-1 tablet every 8 hours as needed for muscle spasm    tadalafil (CIALIS) 20 MG Tab Use one tablet every 36 hours    tadalafil (CIALIS) 5 MG tablet Take 1 tablet (5 mg total) by mouth daily as needed for Erectile Dysfunction.     No current facility-administered medications on file prior to visit.        Review of patient's allergies indicates:   Allergen Reactions    Penicillins      Knees locked up       OBJECTIVE:   Vital Signs:  Vitals:    01/15/18 0926   BP: (!) 144/80   Pulse: 75   Weight: 89.6 kg (197 lb 8.5 oz)   Height: 5' 10" (1.778 m)       Recent Results (from the past 24 hour(s))   CBC W/ AUTO DIFFERENTIAL    Collection Time: 01/15/18  7:42 AM   Result Value Ref Range    WBC 5.06 " 3.90 - 12.70 K/uL    RBC 4.21 (L) 4.60 - 6.20 M/uL    Hemoglobin 12.1 (L) 14.0 - 18.0 g/dL    Hematocrit 37.6 (L) 40.0 - 54.0 %    MCV 89 82 - 98 fL    MCH 28.7 27.0 - 31.0 pg    MCHC 32.2 32.0 - 36.0 g/dL    RDW 13.2 11.5 - 14.5 %    Platelets 244 150 - 350 K/uL    MPV 8.8 (L) 9.2 - 12.9 fL    Immature Granulocytes 0.2 0.0 - 0.5 %    Gran # 2.7 1.8 - 7.7 K/uL    Immature Grans (Abs) 0.01 0.00 - 0.04 K/uL    Lymph # 1.8 1.0 - 4.8 K/uL    Mono # 0.4 0.3 - 1.0 K/uL    Eos # 0.2 0.0 - 0.5 K/uL    Baso # 0.02 0.00 - 0.20 K/uL    nRBC 0 0 /100 WBC    Gran% 53.0 38.0 - 73.0 %    Lymph% 34.6 18.0 - 48.0 %    Mono% 7.5 4.0 - 15.0 %    Eosinophil% 4.3 0.0 - 8.0 %    Basophil% 0.4 0.0 - 1.9 %    Differential Method Automated    COMPREHENSIVE METABOLIC PANEL    Collection Time: 01/15/18  7:42 AM   Result Value Ref Range    Sodium 137 136 - 145 mmol/L    Potassium 4.1 3.5 - 5.1 mmol/L    Chloride 99 95 - 110 mmol/L    CO2 30 (H) 23 - 29 mmol/L    Glucose 156 (H) 70 - 110 mg/dL    BUN, Bld 18 8 - 23 mg/dL    Creatinine 1.5 (H) 0.5 - 1.4 mg/dL    Calcium 9.6 8.7 - 10.5 mg/dL    Total Protein 8.8 (H) 6.0 - 8.4 g/dL    Albumin 3.7 3.5 - 5.2 g/dL    Total Bilirubin 0.5 0.1 - 1.0 mg/dL    Alkaline Phosphatase 49 (L) 55 - 135 U/L    AST 17 10 - 40 U/L    ALT 26 10 - 44 U/L    Anion Gap 8 8 - 16 mmol/L    eGFR if African American 53.4 (A) >60 mL/min/1.73 m^2    eGFR if non  46.2 (A) >60 mL/min/1.73 m^2   IGA    Collection Time: 01/15/18  7:42 AM   Result Value Ref Range    IgA 1680 (H) 40 - 350 mg/dL   IGG    Collection Time: 01/15/18  7:42 AM   Result Value Ref Range    IgG - Serum 929 650 - 1600 mg/dL   IGM    Collection Time: 01/15/18  7:42 AM   Result Value Ref Range    IgM 43 (L) 50 - 300 mg/dL   PROTEIN ELECTROPHORESIS, SERUM    Collection Time: 01/15/18  7:42 AM   Result Value Ref Range    Protein, Serum 8.3 6.0 - 8.4 g/dL   MAGNESIUM    Collection Time: 01/15/18  7:42 AM   Result Value Ref Range    Magnesium 1.8  "1.6 - 2.6 mg/dL   PHOSPHORUS    Collection Time: 01/15/18  7:42 AM   Result Value Ref Range    Phosphorus 2.7 2.7 - 4.5 mg/dL   Hemoglobin A1c    Collection Time: 01/15/18  7:42 AM   Result Value Ref Range    Hemoglobin A1C 7.4 (H) 4.0 - 5.6 %    Estimated Avg Glucose 166 (H) 68 - 131 mg/dL   POCT glucose    Collection Time: 01/15/18  9:58 AM   Result Value Ref Range    POC Glucose 186 (A) 70 - 110 mg/dL         Physical Exam:   General:  Well developed, well nourished, no acute distress  HEENT:  Normocephalic, atraumatic, PERRL, EOMI, clear sclera, ears normal, throat clear without erythema or exudates  CVS:  RRR, S1 and S2 normal, no murmurs, rubs, gallops  Resp:  Lungs clear to auscultation, no wheezes, rales, rhonchi, cough  GI:  Abdomen soft, non-tender, non-distended, normoactive bowel sounds, no masses  MSK:  No muscle atrophy, cyanosis, peripheral edema, full range of motion  Skin:  No rashes, ulcers, erythema  Neuro:  CNII-XII grossly intact  Psych:  Alert and oriented to person, place, and time    Foot Exam: intact vibration, not wounds or cuts.    ASSESSMENT & PLAN:     Emerson was seen today for diabetes mellitus.    Diagnoses and all orders for this visit:    Type 2 diabetes mellitus with hyperglycemia, unspecified long term insulin use status  -     POCT glucose  -     TSH; Future  -     Lipid panel; Future  -     Hemoglobin A1c; Future  -     Comprehensive metabolic panel; Future  -     Microalbumin/creatinine urine ratio; Future  -     MEDICAL ALERT BRACELET    - Would continue on V- Go 20, "1 click before small meals and 2 clicks before big meals"  - Keep BS log, fax it to the clinic.  - bring all medicine next visit to verify.    Other orders  -     glucagon (human recombinant) inj 1mg/mL kit; Inject 1 mL (1 mg total) into the muscle as needed.          Reuben Singh  PGY-1      "

## 2018-01-15 NOTE — PROGRESS NOTES
I have reviewed and concur with the resident's history, physical, assessment, and plan.  I have personally interviewed and examined the patient at bedside.  See below addendum for my evaluation and additional findings.    Mr. Menendez is a 72 y.o. M with history of DM2, multiple myeloma, CKD, here for followup.    Longstanding Dm2, recently switched to vGO20.  Pt often tangential but currently taking 1-2 clicks per meal depending on meal size, and sometimes corrects with 1 click 1hr after meals if in the high 100s.     A1c appropriate - no reported low sugars but also no logs - asked pt to bring with next visit.  Also asked pt to take FS 2hr post meals if he wishes and to correct only if FS >200.    For his hyperlipidemia, LDL at goal on pravastatin  Pt also to bring medication list as he thinks he is on an oral medication for DM although says his amaryl was stopped.   Prescribed glucagon kit and medical alert bracelet    Pt to drop off logs in 1 month    David Coleman M.D.  Endocrinology

## 2018-01-15 NOTE — PROGRESS NOTES
Monday, January 15th, 2018      Protocol: [id number] Randomized Phase III Trial of Lenalidomide vs Observation Alone in Patients with 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.   Sponsor:  [facility]  IRB #: [id number]  Investigator: GIL Engel  Pt Initials: LUCÍA LORENZ      Arm B: Observation  Cycle 24, Day 28      Patient presents to clinic to evaluate ability to proceed with Cycle 25 of observation per above-mentioned protocol. He reports on schedule per protocol for his visit today.  He has no significant issues to report this month.  He states he recently performed a urine test and is waiting to hear back regarding results and a decision from kidney doctor regarding need for kidney biopsy.  He just began wearing insulin pump per his endocrinologist and reports stable blod glucose readings since starting.  He states he is doing better with carb recommendations.  He denies new potential CRAB symptoms.  He states continued willingness to participate in above-mentioned study, continued participation and compliance with protocol parameters detailed below.    Review of AE's:  1. Hypertension, grade 1: BP today is 145/80  in clinic today. This is stable from that of last month.  Will continue to follow this. AE ongoing.  2. Lower Back Pain and Neck Pain, grade 1: Patient has no complaints of this issue this month. As previously stated, patient is not suspected to have bony myeloma involvement per Dr. Engel as these are pre-existing issues present at baseline. He states he plans to increase his physical activity and to work out regularly. He states understanding. AE ongoing.  3.  Pain in extremity (knee), grade 1.  Continues to wear knee stabilizing braces today.  No new issues to report regarding this.  AE, grade 1.  4. Creatinine increased, grade 1: Serum creatinine is slightly improved at 1.5 mg/dl today and GFR 57.37. Per Dr. Engel this has not been related to myeloma and is related chronic kidney issues likely  "secondary to diabetes and hypertension.  Will continue observation monthly and to monitor renal function closely. AE stable.   5. Peripheral sensory neuropathy, grade 1: Patient does not verbalize complaints of this symptom today. Recently got rx for diabetic shoes.  States he is taking neurontin for this per Dr. Sagastume.  He states when he remembers to take it regularly, he has no issues.  He has begun to dispense into his pill box which is helping with compliance.  AE ongoing.       No changes in other baseline AE's noted.      Per study chair, "the definition of progressive disease for this protocol is developing CRAB criteria that requires treatment systemically." Per Dr Engel, based on today's exam and lab results thus far, patient does not have any s/s of CRAB: Normal Calcium level (9.6), absence of Renal insufficiency (serum creatinine 1.7, and GFR 57.37 per [facility]--patient with a history of kidney dysfunction secondary to diabetes; Dr. Engel aware of these values and not concerned for myeloma involvement), absence of significant Anemia (hemoglobin 12.1, stable from baseline hgb level at study entry) and absence of lytic Bone lesions (per baseline metastatic survey as well as MRI--see MD note for further comment). See MD note for ECOG score and H&P and flowsheets for laboratory work, vitals, etc. All myeloma-related blood work from last cycle including SPEP and JAGUAR have remained stable and per Dr. Engel, patient shows no evidence of progression today. Patient informed that Dr. Engel will release all lab results to [facility]. He states understanding of this. End of cycle 24 QOL's performed today.  Patient completed QOL's without assistance.       Patient will be out of town for next regularly scheduled appt; appt made for Thursday, 2/8 to accommodate patient's schedule.  Patient was given Research RN's contact information and has MD contact information to call with any concerns, questions, or worsening " of symptoms; he verbalized understanding.

## 2018-01-15 NOTE — PATIENT INSTRUCTIONS
1 click before small meals    2 clicks before big meals    If you check sugar 2 hours after the meal and sugar is >200, then give 1 click if you want    Send in Pharmacy Development log in 1 month    Call me with your medication list when you get home

## 2018-01-15 NOTE — PROGRESS NOTES
Subjective:       Patient ID: Emerson Menendez is a 72 y.o. male.    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 Test results identify a stable IgA kappa protein with beta globulin band at 1.63g/dL. Kappa free light chain is elevated at 4.40. CBC and calcium are stable. Creatinine with history of stage III CKD is stable at 1.4. Metastatic survey is negative for lytic lesions. MRI of the entire spine demonstrated age related changes but no convincing evidence of myeloma bone disease. Bone marrow biopsy identified about 13% plasma cells by morphology and FISH for myeloma identified a t(11;14) and trisomies 3,7, and 17. The patient is afebrile and appears clinically well. He was seen by his podiatrist and nephrologist since our last visit without any new or acute events.    The patient has not received any therapy for smoldering myeloma including bisphosphonates or steroids. He has been randomized to observation arm of the the clinical study. 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 He has no current or prior history of malignancy.    TODAY  Mr. Menendez returns today for monthly follow-up evaluation. He denies any acute interval events since last interview.   No clinical signs/symptoms of progression of his smoldering myeloma.    He has follow-up with nephrology to consider renal biopsy and with endocrinology for review of insulin pump. CBC and CMP are stable today.    HPI  Review of Systems   Constitutional: Negative for activity change, appetite change, diaphoresis, fatigue, fever and unexpected weight change.   HENT: Negative.     Eyes: Negative.    Respiratory: Negative.    Cardiovascular: Negative for leg swelling.   Gastrointestinal: Negative.    Endocrine: Negative.    Genitourinary: Negative.    Musculoskeletal: Negative.    Skin: Negative.    Allergic/Immunologic: Negative.    Neurological:        Grade 0-1 peripheral neuropathy of bilateral feet.    Hematological: Negative for adenopathy. Does not bruise/bleed easily.   Psychiatric/Behavioral: Negative.        Objective:       Vitals:    01/15/18 0812   BP: (!) 145/80   Pulse: 89   Resp: 16   Temp: 99 °F (37.2 °C)       Physical Exam   Constitutional: He is oriented to person, place, and time. He appears well-developed and well-nourished.   HENT:   Head: Normocephalic and atraumatic.   Right Ear: External ear normal.   Left Ear: External ear normal.   Nose: Nose normal.   Mouth/Throat: Oropharynx is clear and moist.   Eyes: Conjunctivae and EOM are normal. Pupils are equal, round, and reactive to light.   Neck: Normal range of motion. Neck supple.   Cardiovascular: Normal rate, regular rhythm and intact distal pulses.    No murmur heard.  Pulmonary/Chest: Effort normal and breath sounds normal. No respiratory distress.   Abdominal: Soft. Bowel sounds are normal. He exhibits no distension. There is no hepatosplenomegaly.   Musculoskeletal: He exhibits no edema or tenderness.   Neurological: He is alert and oriented to person, place, and time. No cranial nerve deficit.   Skin: Skin is warm and dry. No rash noted. No cyanosis. Nails show no clubbing.   Psychiatric: He has a normal mood and affect. His behavior is normal.   Nursing note and vitals reviewed.      Assessment:       1. Smoldering multiple myeloma    2. Type 2 diabetes mellitus with diabetic polyneuropathy, with long-term current use of insulin    3. CKD stage 3 due to type 2 diabetes mellitus        Plan:       The patient has a diagnosis of smoldering myeloma. There is no indication for immediate chemotherapy. We are  monitoring renal function closely- baseline creatinine of -1.7 is stable today.   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 CBC and CMP are stable.  Complete biochemical studies from today are pending.  Plan for return in 1 month.  Endocrinology and Nephrology to monitor diabetes and renal failure respectively.

## 2018-01-16 DIAGNOSIS — D47.2 SMOLDERING MULTIPLE MYELOMA: Primary | ICD-10-CM

## 2018-01-16 DIAGNOSIS — Z00.6 EXAMINATION OF PARTICIPANT IN CLINICAL TRIAL: ICD-10-CM

## 2018-01-16 LAB
ALBUMIN SERPL ELPH-MCNC: 3.95 G/DL
ALPHA1 GLOB SERPL ELPH-MCNC: 0.29 G/DL
ALPHA2 GLOB SERPL ELPH-MCNC: 0.84 G/DL
B-GLOBULIN SERPL ELPH-MCNC: 2.7 G/DL
GAMMA GLOB SERPL ELPH-MCNC: 0.52 G/DL
INTERPRETATION SERPL IFE-IMP: NORMAL
KAPPA LC SER QL IA: 6.05 MG/DL
KAPPA LC/LAMBDA SER IA: 3.64
LAMBDA LC SER QL IA: 1.66 MG/DL
PATHOLOGIST INTERPRETATION IFE: NORMAL
PATHOLOGIST INTERPRETATION SPE: NORMAL
PROT SERPL-MCNC: 8.3 G/DL

## 2018-01-17 ENCOUNTER — OFFICE VISIT (OUTPATIENT)
Dept: INTERNAL MEDICINE | Facility: CLINIC | Age: 73
End: 2018-01-17
Payer: MEDICARE

## 2018-01-17 ENCOUNTER — PATIENT MESSAGE (OUTPATIENT)
Dept: INTERNAL MEDICINE | Facility: CLINIC | Age: 73
End: 2018-01-17

## 2018-01-17 VITALS
WEIGHT: 199 LBS | DIASTOLIC BLOOD PRESSURE: 70 MMHG | SYSTOLIC BLOOD PRESSURE: 120 MMHG | BODY MASS INDEX: 28.49 KG/M2 | HEART RATE: 93 BPM | HEIGHT: 70 IN | OXYGEN SATURATION: 98 %

## 2018-01-17 DIAGNOSIS — N18.30 CHRONIC KIDNEY DISEASE, STAGE III (MODERATE): Chronic | ICD-10-CM

## 2018-01-17 DIAGNOSIS — E11.42 TYPE 2 DIABETES MELLITUS WITH DIABETIC POLYNEUROPATHY, WITH LONG-TERM CURRENT USE OF INSULIN: Chronic | ICD-10-CM

## 2018-01-17 DIAGNOSIS — J20.9 ACUTE BRONCHITIS, UNSPECIFIED ORGANISM: Primary | ICD-10-CM

## 2018-01-17 DIAGNOSIS — D80.4 IGM DEFICIENCY: ICD-10-CM

## 2018-01-17 DIAGNOSIS — M17.9 OSTEOARTHRITIS OF KNEE, UNSPECIFIED LATERALITY, UNSPECIFIED OSTEOARTHRITIS TYPE: ICD-10-CM

## 2018-01-17 DIAGNOSIS — Z79.4 TYPE 2 DIABETES MELLITUS WITH DIABETIC POLYNEUROPATHY, WITH LONG-TERM CURRENT USE OF INSULIN: Chronic | ICD-10-CM

## 2018-01-17 DIAGNOSIS — N40.1 BPH WITH URINARY OBSTRUCTION: ICD-10-CM

## 2018-01-17 DIAGNOSIS — E11.29 MICROALBUMINURIA DUE TO TYPE 2 DIABETES MELLITUS: Chronic | ICD-10-CM

## 2018-01-17 DIAGNOSIS — D47.2 SMOLDERING MULTIPLE MYELOMA: ICD-10-CM

## 2018-01-17 DIAGNOSIS — R80.9 MICROALBUMINURIA DUE TO TYPE 2 DIABETES MELLITUS: Chronic | ICD-10-CM

## 2018-01-17 DIAGNOSIS — N13.8 BPH WITH URINARY OBSTRUCTION: ICD-10-CM

## 2018-01-17 DIAGNOSIS — K21.9 GASTROESOPHAGEAL REFLUX DISEASE WITHOUT ESOPHAGITIS: ICD-10-CM

## 2018-01-17 DIAGNOSIS — I10 ESSENTIAL HYPERTENSION: ICD-10-CM

## 2018-01-17 DIAGNOSIS — E78.00 PURE HYPERCHOLESTEROLEMIA: ICD-10-CM

## 2018-01-17 DIAGNOSIS — M47.812 CERVICAL SPONDYLOSIS WITHOUT MYELOPATHY: ICD-10-CM

## 2018-01-17 PROCEDURE — 99214 OFFICE O/P EST MOD 30 MIN: CPT | Mod: S$GLB,,, | Performed by: INTERNAL MEDICINE

## 2018-01-17 PROCEDURE — 99999 PR PBB SHADOW E&M-EST. PATIENT-LVL III: CPT | Mod: PBBFAC,,, | Performed by: INTERNAL MEDICINE

## 2018-01-17 RX ORDER — HYDROCODONE BITARTRATE AND HOMATROPINE METHYLBROMIDE ORAL SOLUTION 5; 1.5 MG/5ML; MG/5ML
5 LIQUID ORAL EVERY 6 HOURS PRN
Qty: 473 ML | Refills: 0 | Status: SHIPPED | OUTPATIENT
Start: 2018-01-17 | End: 2018-03-07 | Stop reason: ALTCHOICE

## 2018-01-17 RX ORDER — HYDROCODONE BITARTRATE AND ACETAMINOPHEN 5; 325 MG/1; MG/1
1 TABLET ORAL
Qty: 60 TABLET | Refills: 0 | Status: SHIPPED | OUTPATIENT
Start: 2018-01-17 | End: 2018-09-13

## 2018-01-17 RX ORDER — DOXYCYCLINE 100 MG/1
100 CAPSULE ORAL 2 TIMES DAILY
Qty: 20 CAPSULE | Refills: 0 | Status: SHIPPED | OUTPATIENT
Start: 2018-01-17 | End: 2018-01-27

## 2018-01-17 RX ORDER — PEN NEEDLE, DIABETIC 31 GX5/16"
NEEDLE, DISPOSABLE MISCELLANEOUS
Qty: 300 EACH | Refills: 3 | Status: SHIPPED | OUTPATIENT
Start: 2018-01-17 | End: 2019-03-22 | Stop reason: SDUPTHER

## 2018-01-17 NOTE — PROGRESS NOTES
CHIEF COMPLAINT: Follow up of smoldering multiple myeloma, diabetes, hypertension, back and neck pain    HISTORY OF PRESENT ILLNESS: This is a 72-year-old man who presents for follow up of above.    HE had a cough and congestion 2 weeks ago that he got over. His girlfriend got sick and he caught a cold from her 3 days ago. HE has nasal congestion and chest congestion with very little drainage. HE has a dry cough. He has a scratchy throat. No ear pain. No fever, chills, night sweats, nausea, vomiting, constipation, diarrhea. HE has been taking Theraflu and AlkaSelzer cold  With minimal relief.       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 He is in the observation arm and is being monitoring monthly. He saw Dr Engel 1/15/18 No indication for chemotherapy at this time.      Blood sugars have been better. He has been doing carb counting which has helped. He is using the VGO and blood sugars have been good. He is taking losartan 25 mg 1 tablet daily to protect his kidney. No polydipsia or polyuria       His back and neck was doing better in the healthy back program.  He rarely needs hydrocodone apap regularly. He takes tizanidine as needed. HE is wearing braces for his knees which is helping stability and his pain      Knees are better after an injection. He had an injection by Dr Stokes, yesterday 2/2/17 which has helped.      Reflux is controlled on omeprazole 20 mg 2 tablets daily. HE has heartburn when he does not take the omeprazole No chest pain, shortness of breath, nausea, voimting, constipation, diarrhea, numbness, weakness.        He had laser vaporization and enucleation of the prostate 11/13/13. He denies any dysuria or hematuria now. Urinary frequency is better.  He is taking FLomax 0.4 mg nightly and oxybutynin 5 mg twice daily for urinary frequency and for his prostate.    He has hyperlipidemia, on pravastatin 40 mg daily. No joint pain or muscle pain from the pravastatin.        He has  "been taking aspirin 81 mg daily vitamin D supplement 2000 units daily.        Anxiety is controlled on celexa 20 mg daily. Mood is better. He takes diazepam 5 mg at bedtime as needed (2-3 times a week()  for his sleep and anxiety. It helps him sleep.     HE is taking gabapentin 100 mg 2 twice daily which helps with the tingling in his toes.Foot pain is better and controlled with the gabapentin.    PAST MEDICAL HISTORY:   1. Diabetes mellitus.   2. Hyperlipidemia.   3. Reflux.   4. BPH.   5. Osteoarthritis of the knees.   6. Neck pain.   7. History of right lateral epicondylitis.   8. History of lumps removed from the breast as a teenager.   9. OA cervical spine     SOCIAL HISTORY: Does not smoke, does not drink. 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 He works for the [employer].     FAMILY HISTORY: Mother is living at age 86 with a heart condition. Father   in his late 40s of alcoholism. He has 5 sisters and 1 brother who are  healthy, one has a neurological disorder, one is anxious. His daughter has  lupus, on dialysis. She also has heart problems. She also had a brain   aneurysm that was stented.       PHYSICAL EXAMINATION:    /70   Pulse 93   Ht 5' 10" (1.778 m)   Wt 90.3 kg (199 lb)   SpO2 98%   BMI 28.55 kg/m²     GENERAL: He is alert, oriented, no apparent distress. Affect within normal limits.   Conjunctivae anicteric. PERRL. Tympanic membranes clear. Oropharynx clear.   NECK: Supple. No cervical lymphadenopathy, no thyroid enlargement.   Respiratory: Effort normal. Lungs are clear to auscultation.   HEART: Regular rate and rhythm without murmurs, gallops or rubs.   No lower extremity edema.       Hemoglobin A1C 7.4 on 1/15/18     Labs 1/15/18 reviewed        ASSESSMENT AND PLAN:   1. Acute bacterial sinussitis - doxycycline 100 mg twice daily for 10 days. Hycodan cough syruos  1. Diabetes mellitus with hyperglycemia and microalbuminuria - followed by endocrine  2. Smoldering multiple " myeloma with IGM deficiency- in study. Labs reviewed  3. OA knee - stable  4. Hypertension - controlled  5. Hyperlipidemia. On pravastatin  6. BPH. S/p laser - Saw urology  7. Depression - stable  8. Vitamin D deficiency - on vitamin D 2,000 units daily.    9.Back pain-stable  10. Anxiety and depression- stable on celexa 20 mg one tablet daily. Diazepam as neeeded for sleep  11. Peripheral neuropathy -controlled on gabapentin.   12. CRI -monitored closely by heme onc and nephrology. Needs kidney biopsy  I will see him back in 4 months, sooner if problems arise        Prevnar 7/16  PSA 8/16. colonoscopy normal 3/2012 and due in 2019

## 2018-01-17 NOTE — Clinical Note
Saw Mr Menendez today. In your note, you wanted to do a kidney biopsy. Pt would like to schedule. Who does he need to contact or will your office contact him? Thanks Roberta Sagastume

## 2018-01-19 DIAGNOSIS — D47.2 SMOLDERING MULTIPLE MYELOMA (SMM): Primary | ICD-10-CM

## 2018-01-19 DIAGNOSIS — Z00.6 EXAMINATION OF PARTICIPANT IN CLINICAL TRIAL: ICD-10-CM

## 2018-01-25 ENCOUNTER — PATIENT MESSAGE (OUTPATIENT)
Dept: INTERNAL MEDICINE | Facility: CLINIC | Age: 73
End: 2018-01-25

## 2018-01-25 DIAGNOSIS — J98.01 BRONCHOSPASM: Primary | ICD-10-CM

## 2018-01-25 RX ORDER — PREDNISONE 10 MG/1
10 TABLET ORAL DAILY
Qty: 10 TABLET | Refills: 0 | Status: SHIPPED | OUTPATIENT
Start: 2018-01-25 | End: 2018-02-04

## 2018-01-28 ENCOUNTER — PATIENT MESSAGE (OUTPATIENT)
Dept: INTERNAL MEDICINE | Facility: CLINIC | Age: 73
End: 2018-01-28

## 2018-01-28 DIAGNOSIS — R05.9 COUGH: Primary | ICD-10-CM

## 2018-01-29 ENCOUNTER — HOSPITAL ENCOUNTER (OUTPATIENT)
Dept: RADIOLOGY | Facility: HOSPITAL | Age: 73
Discharge: HOME | End: 2018-01-29
Attending: INTERNAL MEDICINE
Payer: MEDICARE

## 2018-01-29 ENCOUNTER — OFFICE VISIT (OUTPATIENT)
Dept: INTERNAL MEDICINE | Facility: CLINIC | Age: 73
End: 2018-01-29
Payer: MEDICARE

## 2018-01-29 ENCOUNTER — CLINICAL SUPPORT (OUTPATIENT)
Dept: DIABETES | Facility: CLINIC | Age: 73
End: 2018-01-29
Payer: MEDICARE

## 2018-01-29 VITALS
OXYGEN SATURATION: 95 % | HEIGHT: 70 IN | HEART RATE: 100 BPM | BODY MASS INDEX: 27.94 KG/M2 | DIASTOLIC BLOOD PRESSURE: 60 MMHG | SYSTOLIC BLOOD PRESSURE: 130 MMHG | WEIGHT: 195.13 LBS

## 2018-01-29 DIAGNOSIS — J98.01 BRONCHOSPASM: Primary | ICD-10-CM

## 2018-01-29 DIAGNOSIS — Z79.4 TYPE 2 DIABETES MELLITUS WITH HYPERGLYCEMIA, WITH LONG-TERM CURRENT USE OF INSULIN: Chronic | ICD-10-CM

## 2018-01-29 DIAGNOSIS — R05.9 COUGH: ICD-10-CM

## 2018-01-29 DIAGNOSIS — E11.65 TYPE 2 DIABETES MELLITUS WITH HYPERGLYCEMIA, WITH LONG-TERM CURRENT USE OF INSULIN: Chronic | ICD-10-CM

## 2018-01-29 PROCEDURE — 94640 AIRWAY INHALATION TREATMENT: CPT | Mod: S$GLB,,, | Performed by: INTERNAL MEDICINE

## 2018-01-29 PROCEDURE — 71046 X-RAY EXAM CHEST 2 VIEWS: CPT | Mod: 26,,, | Performed by: RADIOLOGY

## 2018-01-29 PROCEDURE — 71046 X-RAY EXAM CHEST 2 VIEWS: CPT | Mod: TC

## 2018-01-29 PROCEDURE — 99999 PR PBB SHADOW E&M-EST. PATIENT-LVL II: CPT | Mod: PBBFAC,,, | Performed by: INTERNAL MEDICINE

## 2018-01-29 PROCEDURE — G0108 DIAB MANAGE TRN  PER INDIV: HCPCS | Mod: S$GLB,,, | Performed by: DIETITIAN, REGISTERED

## 2018-01-29 PROCEDURE — 99214 OFFICE O/P EST MOD 30 MIN: CPT | Mod: 25,S$GLB,, | Performed by: INTERNAL MEDICINE

## 2018-01-29 RX ORDER — ALBUTEROL SULFATE 90 UG/1
2 AEROSOL, METERED RESPIRATORY (INHALATION) EVERY 6 HOURS PRN
Qty: 1 EACH | Refills: 11 | Status: SHIPPED | OUTPATIENT
Start: 2018-01-29 | End: 2018-05-28

## 2018-01-29 RX ORDER — ALBUTEROL SULFATE 0.83 MG/ML
2.5 SOLUTION RESPIRATORY (INHALATION)
Status: COMPLETED | OUTPATIENT
Start: 2018-01-29 | End: 2018-01-29

## 2018-01-29 RX ADMIN — ALBUTEROL SULFATE 2.5 MG: 0.83 SOLUTION RESPIRATORY (INHALATION) at 11:01

## 2018-01-29 RX ADMIN — ALBUTEROL SULFATE 2.5 MG: 0.83 SOLUTION RESPIRATORY (INHALATION) at 02:01

## 2018-01-29 NOTE — PROGRESS NOTES
"Diabetes Education  Author: Lisa Feng RD  Date: 1/29/2018    Diabetes Education Visit  Diabetes Education Record Assessment/Progress: Comprehensive/Group    Diabetes Type  Diabetes Type : Type II    Diabetes History  Diabetes Diagnosis: >10 years    Nutrition  Meal Planning: diet drinks (usually 2-3 meals per day; +snacks sometimes; trying to cut back on snacking at night)  What type of sweetener do you use?: Splenda  What type of beverages do you drink?: diet soda/tea  Meal Plan 24 Hour Recall - Breakfast:  (Atkins shake; 2 days per week: 1 pc toast, eggs, sausage)  Meal Plan 24 Hour Recall - Lunch:  (meat, non-starchy veggie; sometimes sandwich)  Meal Plan 24 Hour Recall - Dinner:  (usually meat, starch, vegetable)  Meal Plan 24 Hour Recall - Snack:  (trying to cut back on late night snacking; crackers sometimes)    Monitoring   Self Monitoring :  (SMBG 2-3 times daily; one episode of "hypo" with BG of 70; no other hypos reported; BG averaging 100-180; recently started daily Prednisone and woke up with BG of 190 this AM)  Blood Glucose Logs: No    Exercise   Exercise Type: swimming, walking, use exercise equipment  Frequency: 3-5 Times per week    Current Diabetes Treatment   Current Treatment: Insulin (Relion Regular used in VGo 20 - 1 to 2 clicks with meals)    Social History  Preferred Learning Method: Face to Face, Reading Materials  Primary Support: Self  Educational Level: College Graduate  Occupation:  (retired electritian)  Smoking Status: Never a Smoker    Barriers to Change  Barriers to Change: None  Learning Challenges : None    Readiness to Learn   Readiness to Learn : Acceptance    Cultural Influences  Cultural Influences: No    Diabetes Education Assessment/Progress  Diabetes Disease Process (diabetes disease process and treatment options): Discussion, Written Materials Provided (Reviewed disease course and discussed reasons for minor jump in A1c. Discussed BG ranges and methods to keep BG within " targe range, before and after meals. )    Nutrition (Incorporating nutritional management into one's lifestyle): Discussion, Instructed, Comprehends Key Points (Reviewed carb vs non-carb foods. Discussed stricter carb counting and instructed pt on appropriate carb ranges to correspond with # of VGo clicks. Pt has ultimately been guessing on how many clicks to take based on his pre-meal BG and size (volume) of meal. Reviewed insulin use with total carb amounts. He is currently taking 1 click with small meals and 2 clicks with large meals, though no set structure to this. Instructed pt to take 0 clicks with 0-25 gm carb; he usually has 16 gm carb with breakfast and doesn't click - ends up with BG of 120 before lunch. Instructed to take 1 click with total carb of 26-45 gm, and 2 clicks with 46-65 gm, no matter the total volume/size of the meal. If eating a meal >65 gm carb, which he rarely does, take typical 2 clicks before meal, re-test BG 2 hours after meal and if still >200, take 1 additional click.)    Medications (states correct name, dose, onset, peak, duration, side effects & timing of meds): Discussion, Instructed, Individual Session, Written Materials Provided (Reviewed use of VGo device; pt using and changing appropriately. Clarified # of clicks to take with set carb amounts; see above. Recently started on prednisone for current cough/illness. Discussed need to likely increase insulin dose while taking steroid, and up to 2-3 weeks after stopping steroid. Will discuss with amanda CUEVAS and relay instructions to pt. Instructed pt on ability to wait a designated amount of time after clicks and before eating corresponding meal, IF starting a meal with a higher BG. Provided pt with chart: pre-Meal BG  - take clicks 10 min before eating;  - take clicks 30 min before eating;  - take clicks 40 min before eating; etc.)    Monitoring (monitoring blood glucose/other parameters & using results): Discussion,  "Instructed, Individual Session, Comprehends Key Points, Written Materials Provided (Reviewed goal BG and appropriate increases in BG after meals eaten. Introduced to Freestyle Leah and discussed usage. Pt is very interested in device and would like Rx if covered. Will discuss with Dr. Coleman and send rx if covered by insurance.)    Acute Complications (preventing, detecting, and treating acute complications): Discussion, Instructed, Individual Session, Written Materials Provided, Comprehends Key Points (One "hypo" of 70 since starting Vgo. Feels symptoms <80. Treating appropriately with peppermints. Intends to buy glucose tabs to keep in car.)    Clinical (diabetes and other pertinent medical history): Discussion (Started daily prednisone yesterday; likely needs increase in insulin dosage; will discuss with pt's endo MD.)    Cognitive (knowledge of self-management skills, functional health literacy): Discussion, Individual Session, Demonstrates Understanding/Competency (verbalizes/demonstrates) (Shows competency in basic knowledge of diabetes and insulin management. )    Behavioral (readiness for change, lifestyle practices, self-care behaviors): Demonstrates Understanding/Competency (verbalizes/demonstrates)        Goals  Patient has selected/evaluated goals during today's session: Yes, evaluated    Healthy Eating: Set (Will count carbs and take appropriate amount of clicks depending on amount eaten)  Start Date: 01/29/18    Medications: In Progress (Will use VGo as instructed)  Start Date: 01/10/18         Diabetes Care Plan/Intervention  Education Plan/Intervention: Individual Follow-Up DSMT      Diabetes Meal Plan  Restrictions: Restricted Carbohydrate  Carbohydrate Per Meal: 45-60g, 30-45g  Carbohydrate Per Snack :  (0-10 gm)      Education Units of Time   Time Spent: 60 min      Health Maintenance Topics with due status: Not Due       Topic Last Completion Date    Colonoscopy 03/22/2012    TETANUS VACCINE " 12/17/2013    Lipid Panel 05/11/2017    Foot Exam 05/26/2017    Eye Exam 08/01/2017    Hemoglobin A1c 01/15/2018     There are no preventive care reminders to display for this patient.

## 2018-01-29 NOTE — PROGRESS NOTES
CHIEF COMPLAINT: Cough    HISTORY OF PRESENT ILLNESS: This is a 72-year-old man who presents due to cough.    HE has had a dry cough for the last 2-3 weeks.  He took the course of doxycycline 100 mg twice daily for 10 days which did not help. HE continued to have a dry cough.  HE has been wheezing and short of breath. He started on prednisone 10 mg daily on 1/25/18.  The prednisone has helped a little. Yesterday he had some slight yellow mucous.  He feels like he is opening up a little.  No fever, chills, nausea, vomiting. HE has also been taking alkaselzer cold plus which has helped some.     Blood sugar was higher on the prednisone. HE does an extra click on the V GO which is bring his sugars down. He had a blood sugar of 180 this morning.       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 He is in the observation arm and is being monitoring monthly. He saw Dr Engel 1/15/18 No indication for chemotherapy at this time.       He is taking losartan 25 mg 1 tablet daily to protect his kidney. No polydipsia or polyuria       His back and neck was doing better in the healthy back program.  He rarely needs hydrocodone apap regularly. He takes tizanidine as needed. HE is wearing braces for his knees which is helping stability and his pain      Knees are better after an injection. He had an injection by Dr Stokes, yesterday 2/2/17 which has helped.      Reflux is controlled on omeprazole 20 mg 2 tablets daily. HE has heartburn when he does not take the omeprazole No chest pain, shortness of breath, nausea, voimting, constipation, diarrhea, numbness, weakness.        He had laser vaporization and enucleation of the prostate 11/13/13. He denies any dysuria or hematuria now. Urinary frequency is better.  He is taking FLomax 0.4 mg nightly and oxybutynin 5 mg twice daily for urinary frequency and for his prostate.    He has hyperlipidemia, on pravastatin 40 mg daily. No joint pain or muscle pain from the  "pravastatin.        He has been taking aspirin 81 mg daily vitamin D supplement 2000 units daily.        Anxiety is controlled on celexa 20 mg daily. Mood is better. He takes diazepam 5 mg at bedtime as needed (2-3 times a week()  for his sleep and anxiety. It helps him sleep.      HE is taking gabapentin 100 mg 2 twice daily which helps with the tingling in his toes.Foot pain is better and controlled with the gabapentin.    PAST MEDICAL HISTORY:   1. Diabetes mellitus.   2. Hyperlipidemia.   3. Reflux.   4. BPH.   5. Osteoarthritis of the knees.   6. Neck pain.   7. History of right lateral epicondylitis.   8. History of lumps removed from the breast as a teenager.   9. OA cervical spine     SOCIAL HISTORY: Does not smoke, does not drink. 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     FAMILY HISTORY: Mother is living at age 86 with a heart condition. Father   in his late 40s of alcoholism. He has 5 sisters and 1 brother who are  healthy, one has a neurological disorder, one is anxious. His daughter has  lupus, on dialysis. She also has heart problems. She also had a brain   aneurysm that was stented.       PHYSICAL EXAMINATION:    /60   Pulse 100   Ht 5' 10" (1.778 m)   Wt 88.5 kg (195 lb 1.7 oz)   SpO2 95%   BMI 27.99 kg/m²       GENERAL: He is alert, oriented, no apparent distress. Affect within normal limits.   Conjunctivae anicteric. PERRL. Tympanic membranes clear. Oropharynx clear.   NECK: Supple. No cervical lymphadenopathy, no thyroid enlargement.   Respiratory: Effort normal. Lungs are clear to auscultation.   HEART: Regular rate and rhythm without murmurs, gallops or rubs.   No lower extremity edema.         CXR - no pneumonia    Albuterol neb x2 and cough improved    ASSESSMENT AND PLAN:   1. Bronchospasm - improved after breathing treatment. ALbuterol HFA as needed. Finish prednisone.     1. Diabetes mellitus with hyperglycemia and microalbuminuria - followed " by endocrine. Watch sugars  2. 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 Labs reviewed  3. OA knee - stable  4. Hypertension - controlled  5. Hyperlipidemia. On pravastatin  6. BPH. S/p laser - Saw urology  7. Depression - stable  8. Vitamin D deficiency - on vitamin D 2,000 units daily.    9.Back pain-stable  10. Anxiety and depression- stable on celexa 20 mg one tablet daily. Diazepam as neeeded for sleep  11. Peripheral neuropathy -controlled on gabapentin.   12. CRI -monitored closely by heme onc and nephrology. Needs kidney biopsy  I will see him back in 4 months, sooner if problems arise        Prevnar 7/16  PSA 8/16. colonoscopy normal 3/2012 and due in 2019

## 2018-01-29 NOTE — Clinical Note
Dr. Coleman, Mr Menendez came in for f/u education today. He started 10 mg daily prednisone yesterday for a cough. BG's had been running close to normal, except this morning woke up with 190. I instructed him that he will likely need increased clicks while taking the prednisone. I told him to go up to 2 clicks with small meals, and 3 clicks with large meals (he is currently doing 1 with small, 2 with large). I told him I'd call if you wanted to do something differently. He knows to continue to increase by 1 click at a time if BG's remaining high on prednisone. We also went over the appropriate amount of carb to match appropriate clicks.   He is also VERY interested in the new Freestyle Leah and would like to start using it if covered. Can we send in an Rx for it? Or have your MA figure if covered? I will schedule for training if so.  Thanks! Lisa Feng

## 2018-01-30 RX ORDER — ALBUTEROL SULFATE 0.83 MG/ML
2.5 SOLUTION RESPIRATORY (INHALATION)
Status: COMPLETED | OUTPATIENT
Start: 2018-01-30 | End: 2018-01-29

## 2018-02-08 ENCOUNTER — RESEARCH ENCOUNTER (OUTPATIENT)
Dept: RESEARCH | Facility: HOSPITAL | Age: 73
End: 2018-02-08

## 2018-02-08 ENCOUNTER — OFFICE VISIT (OUTPATIENT)
Dept: HEMATOLOGY/ONCOLOGY | Facility: CLINIC | Age: 73
End: 2018-02-08
Payer: MEDICARE

## 2018-02-08 ENCOUNTER — LAB VISIT (OUTPATIENT)
Dept: LAB | Facility: HOSPITAL | Age: 73
End: 2018-02-08
Attending: INTERNAL MEDICINE
Payer: MEDICARE

## 2018-02-08 VITALS
SYSTOLIC BLOOD PRESSURE: 164 MMHG | RESPIRATION RATE: 16 BRPM | DIASTOLIC BLOOD PRESSURE: 98 MMHG | BODY MASS INDEX: 27.67 KG/M2 | WEIGHT: 193.31 LBS | HEIGHT: 70 IN | TEMPERATURE: 98 F | OXYGEN SATURATION: 97 % | HEART RATE: 96 BPM

## 2018-02-08 DIAGNOSIS — D47.2 SMOLDERING MULTIPLE MYELOMA: Primary | ICD-10-CM

## 2018-02-08 DIAGNOSIS — C90.00 MULTIPLE MYELOMA, REMISSION STATUS UNSPECIFIED: ICD-10-CM

## 2018-02-08 DIAGNOSIS — D47.2 SMOLDERING MULTIPLE MYELOMA: ICD-10-CM

## 2018-02-08 DIAGNOSIS — N18.30 CHRONIC KIDNEY DISEASE, STAGE III (MODERATE): ICD-10-CM

## 2018-02-08 DIAGNOSIS — Z00.6 EXAMINATION OF PARTICIPANT IN CLINICAL TRIAL: ICD-10-CM

## 2018-02-08 LAB
ALBUMIN SERPL BCP-MCNC: 3.4 G/DL
ALBUMIN SERPL BCP-MCNC: 3.4 G/DL
ALP SERPL-CCNC: 47 U/L
ALT SERPL W/O P-5'-P-CCNC: 25 U/L
ANION GAP SERPL CALC-SCNC: 12 MMOL/L
ANION GAP SERPL CALC-SCNC: 12 MMOL/L
AST SERPL-CCNC: 18 U/L
BASOPHILS # BLD AUTO: 0.03 K/UL
BASOPHILS # BLD AUTO: 0.04 K/UL
BASOPHILS NFR BLD: 0.6 %
BASOPHILS NFR BLD: 0.8 %
BILIRUB SERPL-MCNC: 0.8 MG/DL
BUN SERPL-MCNC: 14 MG/DL
BUN SERPL-MCNC: 14 MG/DL
CALCIUM SERPL-MCNC: 9.8 MG/DL
CALCIUM SERPL-MCNC: 9.8 MG/DL
CHLORIDE SERPL-SCNC: 102 MMOL/L
CHLORIDE SERPL-SCNC: 102 MMOL/L
CO2 SERPL-SCNC: 24 MMOL/L
CO2 SERPL-SCNC: 24 MMOL/L
CREAT SERPL-MCNC: 1.5 MG/DL
CREAT SERPL-MCNC: 1.5 MG/DL
DIFFERENTIAL METHOD: ABNORMAL
DIFFERENTIAL METHOD: ABNORMAL
EOSINOPHIL # BLD AUTO: 0.2 K/UL
EOSINOPHIL # BLD AUTO: 0.2 K/UL
EOSINOPHIL NFR BLD: 3.7 %
EOSINOPHIL NFR BLD: 3.8 %
ERYTHROCYTE [DISTWIDTH] IN BLOOD BY AUTOMATED COUNT: 14.1 %
ERYTHROCYTE [DISTWIDTH] IN BLOOD BY AUTOMATED COUNT: 14.2 %
EST. GFR  (AFRICAN AMERICAN): 53.4 ML/MIN/1.73 M^2
EST. GFR  (AFRICAN AMERICAN): 53.4 ML/MIN/1.73 M^2
EST. GFR  (NON AFRICAN AMERICAN): 46.2 ML/MIN/1.73 M^2
EST. GFR  (NON AFRICAN AMERICAN): 46.2 ML/MIN/1.73 M^2
GLUCOSE SERPL-MCNC: 179 MG/DL
GLUCOSE SERPL-MCNC: 179 MG/DL
HCT VFR BLD AUTO: 34 %
HCT VFR BLD AUTO: 34.5 %
HGB BLD-MCNC: 11.4 G/DL
HGB BLD-MCNC: 11.4 G/DL
IGA SERPL-MCNC: 1482 MG/DL
IGG SERPL-MCNC: 946 MG/DL
IGM SERPL-MCNC: 40 MG/DL
IMM GRANULOCYTES # BLD AUTO: 0.01 K/UL
IMM GRANULOCYTES # BLD AUTO: 0.01 K/UL
IMM GRANULOCYTES NFR BLD AUTO: 0.2 %
IMM GRANULOCYTES NFR BLD AUTO: 0.2 %
LYMPHOCYTES # BLD AUTO: 1.4 K/UL
LYMPHOCYTES # BLD AUTO: 1.5 K/UL
LYMPHOCYTES NFR BLD: 27.8 %
LYMPHOCYTES NFR BLD: 27.9 %
MAGNESIUM SERPL-MCNC: 1.8 MG/DL
MCH RBC QN AUTO: 29.1 PG
MCH RBC QN AUTO: 29.4 PG
MCHC RBC AUTO-ENTMCNC: 33 G/DL
MCHC RBC AUTO-ENTMCNC: 33.5 G/DL
MCV RBC AUTO: 88 FL
MCV RBC AUTO: 88 FL
MONOCYTES # BLD AUTO: 0.4 K/UL
MONOCYTES # BLD AUTO: 0.4 K/UL
MONOCYTES NFR BLD: 7.1 %
MONOCYTES NFR BLD: 7.9 %
NEUTROPHILS # BLD AUTO: 3.1 K/UL
NEUTROPHILS # BLD AUTO: 3.2 K/UL
NEUTROPHILS NFR BLD: 59.7 %
NEUTROPHILS NFR BLD: 60.3 %
NRBC BLD-RTO: 0 /100 WBC
NRBC BLD-RTO: 0 /100 WBC
PHOSPHATE SERPL-MCNC: 2.2 MG/DL
PHOSPHATE SERPL-MCNC: 2.3 MG/DL
PLATELET # BLD AUTO: 282 K/UL
PLATELET # BLD AUTO: 284 K/UL
PMV BLD AUTO: 8.9 FL
PMV BLD AUTO: 8.9 FL
POTASSIUM SERPL-SCNC: 4.2 MMOL/L
POTASSIUM SERPL-SCNC: 4.2 MMOL/L
PROT SERPL-MCNC: 8.2 G/DL
RBC # BLD AUTO: 3.88 M/UL
RBC # BLD AUTO: 3.92 M/UL
SODIUM SERPL-SCNC: 138 MMOL/L
SODIUM SERPL-SCNC: 138 MMOL/L
WBC # BLD AUTO: 5.09 K/UL
WBC # BLD AUTO: 5.32 K/UL

## 2018-02-08 PROCEDURE — 86334 IMMUNOFIX E-PHORESIS SERUM: CPT | Mod: 26,Q1,, | Performed by: PATHOLOGY

## 2018-02-08 PROCEDURE — 36415 COLL VENOUS BLD VENIPUNCTURE: CPT | Mod: Q1

## 2018-02-08 PROCEDURE — 1126F AMNT PAIN NOTED NONE PRSNT: CPT | Mod: Q1,S$GLB,, | Performed by: INTERNAL MEDICINE

## 2018-02-08 PROCEDURE — 83735 ASSAY OF MAGNESIUM: CPT

## 2018-02-08 PROCEDURE — 99213 OFFICE O/P EST LOW 20 MIN: CPT | Performed by: INTERNAL MEDICINE

## 2018-02-08 PROCEDURE — 82784 ASSAY IGA/IGD/IGG/IGM EACH: CPT | Mod: 59

## 2018-02-08 PROCEDURE — 99999 PR PBB SHADOW E&M-EST. PATIENT-LVL III: CPT | Mod: PBBFAC,,, | Performed by: INTERNAL MEDICINE

## 2018-02-08 PROCEDURE — 86334 IMMUNOFIX E-PHORESIS SERUM: CPT

## 2018-02-08 PROCEDURE — 99215 OFFICE O/P EST HI 40 MIN: CPT | Mod: Q1,S$GLB,, | Performed by: INTERNAL MEDICINE

## 2018-02-08 PROCEDURE — 85025 COMPLETE CBC W/AUTO DIFF WBC: CPT | Mod: 91

## 2018-02-08 PROCEDURE — 82784 ASSAY IGA/IGD/IGG/IGM EACH: CPT | Mod: Q1

## 2018-02-08 PROCEDURE — 83520 IMMUNOASSAY QUANT NOS NONAB: CPT | Mod: 59,Q1

## 2018-02-08 PROCEDURE — 80053 COMPREHEN METABOLIC PANEL: CPT

## 2018-02-08 PROCEDURE — 3008F BODY MASS INDEX DOCD: CPT | Mod: Q1,S$GLB,, | Performed by: INTERNAL MEDICINE

## 2018-02-08 PROCEDURE — 1159F MED LIST DOCD IN RCRD: CPT | Mod: Q1,S$GLB,, | Performed by: INTERNAL MEDICINE

## 2018-02-08 PROCEDURE — 84165 PROTEIN E-PHORESIS SERUM: CPT

## 2018-02-08 PROCEDURE — 84165 PROTEIN E-PHORESIS SERUM: CPT | Mod: 26,Q1,, | Performed by: PATHOLOGY

## 2018-02-08 PROCEDURE — 80069 RENAL FUNCTION PANEL: CPT | Mod: Q1

## 2018-02-08 PROCEDURE — 84100 ASSAY OF PHOSPHORUS: CPT | Mod: Q1

## 2018-02-08 RX ORDER — INSULIN GLARGINE 100 [IU]/ML
INJECTION, SOLUTION SUBCUTANEOUS
Refills: 2 | COMMUNITY
Start: 2017-11-11 | End: 2018-04-18

## 2018-02-08 NOTE — PROGRESS NOTES
Subjective:       Patient ID: Emerson Menendez is a 72 y.o. male.    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 Test results identify a stable IgA kappa protein with beta globulin band at 1.63g/dL. Kappa free light chain is elevated at 4.40. CBC and calcium are stable. Creatinine with history of stage III CKD is stable at 1.4. Metastatic survey is negative for lytic lesions. MRI of the entire spine demonstrated age related changes but no convincing evidence of myeloma bone disease. Bone marrow biopsy identified about 13% plasma cells by morphology and FISH for myeloma identified a t(11;14) and trisomies 3,7, and 17. The patient is afebrile and appears clinically well. He was seen by his podiatrist and nephrologist since our last visit without any new or acute events.    The patient has not received any therapy for smoldering myeloma including bisphosphonates or steroids. He has been randomized to observation arm of the the clinical study. 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 He has no current or prior history of malignancy.    TODAY  Mr. Menendez returns today for monthly follow-up evaluation. He denies any acute interval events since last interview.   No clinical signs/symptoms of progression of his smoldering myeloma.    Followed up with endocrinology for insulin pump adjustments. Their recommendations caused symptomatic low blood sugar. He is able to keep blood sugar stable through diet. CBC and CMP are stable today.    HPI  Review of Systems   Constitutional: Negative for activity change, appetite change, diaphoresis,  fatigue, fever and unexpected weight change.   HENT: Negative.    Eyes: Negative.    Respiratory: Negative.    Cardiovascular: Negative for leg swelling.   Gastrointestinal: Negative.    Endocrine: Negative.    Genitourinary: Negative.    Musculoskeletal: Negative.    Skin: Negative.    Allergic/Immunologic: Negative.    Neurological:        Grade 0-1 peripheral neuropathy of bilateral feet.    Hematological: Negative for adenopathy. Does not bruise/bleed easily.   Psychiatric/Behavioral: Negative.        Objective:       Vitals:    02/08/18 1437   BP: (!) 164/98   Pulse: 96   Resp: 16   Temp: 97.9 °F (36.6 °C)       Physical Exam   Constitutional: He is oriented to person, place, and time. He appears well-developed and well-nourished.   HENT:   Head: Normocephalic and atraumatic.   Right Ear: External ear normal.   Left Ear: External ear normal.   Nose: Nose normal.   Mouth/Throat: Oropharynx is clear and moist.   Eyes: Conjunctivae and EOM are normal. Pupils are equal, round, and reactive to light.   Neck: Normal range of motion. Neck supple.   Cardiovascular: Normal rate, regular rhythm and intact distal pulses.    No murmur heard.  Pulmonary/Chest: Effort normal and breath sounds normal. No respiratory distress.   Abdominal: Soft. Bowel sounds are normal. He exhibits no distension. There is no hepatosplenomegaly.   Musculoskeletal: He exhibits no edema or tenderness.   Neurological: He is alert and oriented to person, place, and time. No cranial nerve deficit.   Skin: Skin is warm and dry. No rash noted. No cyanosis. Nails show no clubbing.   Psychiatric: He has a normal mood and affect. His behavior is normal.   Nursing note and vitals reviewed.      Assessment:       1. Smoldering multiple myeloma        Plan:       The patient has a diagnosis of smoldering myeloma. There is no indication for immediate chemotherapy. We are monitoring renal function closely- baseline creatinine of -1.5 is stable today.   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 CBC and CMP are stable.  Complete biochemical studies from today are pending.  Plan for return in 1 month.  Endocrinology and Nephrology to monitor diabetes and renal failure respectively.

## 2018-02-08 NOTE — PROGRESS NOTES
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   Sponsor:  [facility]  IRB #: [id number]  Investigator: GIL Engel  Pt Initials: LUCÍA LORENZ       Arm B: Observation  Cycle 25, Day 28      Patient presents to clinic to evaluate ability to proceed with Cycle 26 of observation per above-mentioned protocol. He reports several days ahead of schedule . He states he has been "pretty down" this month as he was suffering from pneumonia, which has since mostly resolved.  He continues to wear insulin pump per his endocrinologist but states he is self-managing the extra injections as he has had several episodes of hyoglycemia following official recommendations.  He denies new potential CRAB symptoms.  He states continued willingness to participate in above-mentioned study, continued participation and compliance with protocol parameters detailed below.    Review of AE's:  1. Hypertension, grade 3: BP today is 164/98  in clinic today.  Will continue to follow this. AE worsened this month, now grade 3.  2. Lower Back Pain and Neck Pain, grade 1: Patient has no complaints of this issue this month. As previously stated, patient is not suspected to have bony myeloma involvement per Dr. Engel as these are pre-existing issues present at baseline. He states he plans to increase his physical activity and to work out regularly. He states understanding. AE ongoing.  3.  Pain in extremity (knee), grade 1.  Continues to wear knee stabilizing braces today.  No new issues to report regarding this.  AE, grade 1.  4. Creatinine increased, grade 1: Serum creatinine is slightly improved at 1.5 mg/dl today and GFR 56.03. Per Dr. Engel this has not been related to myeloma and is related chronic kidney issues likely secondary to diabetes and hypertension.  Will continue observation monthly and to monitor renal function " "closely. AE stable.   5. Peripheral sensory neuropathy, grade 1: Patient does not verbalize complaints of this symptom today. Recently got rx for diabetic shoes.  States he is taking neurontin for this per Dr. Sagastume.  He states when he remembers to take it regularly, he has no issues.  He has begun to dispense into his pill box which is helping with compliance.  AE ongoing.         No changes in other baseline AE's noted.      Per study chair, "the definition of progressive disease for this protocol is developing CRAB criteria that requires treatment systemically." Per Dr Engel, based on today's exam and lab results thus far, patient does not have any s/s of CRAB: Normal Calcium level (9.8), absence of Renal insufficiency (serum creatinine 1.7, and GFR 56.03 per Cockroft-Gault--patient with a history of kidney dysfunction secondary to diabetes; Dr. Engel aware of these values and not concerned for myeloma involvement), absence of significant Anemia (hemoglobin 11.4, stable from baseline hgb level at study entry) and absence of lytic Bone lesions (per baseline metastatic survey as well as MRI--see MD note for further comment). See MD note for ECOG score and H&P and flowsheets for laboratory work, vitals, etc. All myeloma-related blood work from last cycle including SPEP and JAGUAR have remained stable and per Dr. Engel, patient shows no evidence of progression at last result. Patient informed that Dr. Engel will release all lab results to [facility]. He states understanding of this. QOL's not required at this timepoint.        Next appt to be scheduled at a later date.  Patient states he should not have any conflicts with next month's appt.  Patient was given Research RN's contact information and has MD contact information to call with any concerns, questions, or worsening of symptoms; he verbalized understanding.                  "

## 2018-02-09 LAB
ALBUMIN SERPL ELPH-MCNC: 3.92 G/DL
ALPHA1 GLOB SERPL ELPH-MCNC: 0.27 G/DL
ALPHA2 GLOB SERPL ELPH-MCNC: 0.8 G/DL
B-GLOBULIN SERPL ELPH-MCNC: 2.4 G/DL
GAMMA GLOB SERPL ELPH-MCNC: 0.61 G/DL
INTERPRETATION SERPL IFE-IMP: NORMAL
KAPPA LC SER QL IA: 4.97 MG/DL
KAPPA LC/LAMBDA SER IA: 3.45
LAMBDA LC SER QL IA: 1.44 MG/DL
PATHOLOGIST INTERPRETATION IFE: NORMAL
PATHOLOGIST INTERPRETATION SPE: NORMAL
PROT SERPL-MCNC: 8 G/DL

## 2018-02-27 DIAGNOSIS — D47.2 SMOLDERING MULTIPLE MYELOMA: ICD-10-CM

## 2018-02-27 DIAGNOSIS — N32.81 OAB (OVERACTIVE BLADDER): ICD-10-CM

## 2018-02-27 RX ORDER — DIAZEPAM 5 MG/1
TABLET ORAL
Qty: 60 TABLET | Refills: 0 | Status: SHIPPED | OUTPATIENT
Start: 2018-02-27 | End: 2018-03-13 | Stop reason: SDUPTHER

## 2018-02-27 RX ORDER — OXYBUTYNIN CHLORIDE 5 MG/1
TABLET ORAL
Qty: 180 TABLET | Refills: 0 | Status: SHIPPED | OUTPATIENT
Start: 2018-02-27 | End: 2018-03-07 | Stop reason: SDUPTHER

## 2018-03-03 DIAGNOSIS — D47.2 SMOLDERING MULTIPLE MYELOMA: ICD-10-CM

## 2018-03-04 RX ORDER — TAMSULOSIN HYDROCHLORIDE 0.4 MG/1
CAPSULE ORAL
Qty: 90 CAPSULE | Refills: 3 | Status: SHIPPED | OUTPATIENT
Start: 2018-03-04 | End: 2018-08-07 | Stop reason: SDUPTHER

## 2018-03-04 RX ORDER — OMEPRAZOLE 20 MG/1
CAPSULE, DELAYED RELEASE ORAL
Qty: 180 CAPSULE | Refills: 3 | Status: SHIPPED | OUTPATIENT
Start: 2018-03-04 | End: 2018-11-20 | Stop reason: SDUPTHER

## 2018-03-07 ENCOUNTER — OFFICE VISIT (OUTPATIENT)
Dept: NEPHROLOGY | Facility: CLINIC | Age: 73
End: 2018-03-07
Payer: MEDICARE

## 2018-03-07 VITALS
DIASTOLIC BLOOD PRESSURE: 70 MMHG | HEART RATE: 88 BPM | HEIGHT: 70 IN | OXYGEN SATURATION: 98 % | WEIGHT: 197.56 LBS | BODY MASS INDEX: 28.28 KG/M2 | SYSTOLIC BLOOD PRESSURE: 124 MMHG

## 2018-03-07 DIAGNOSIS — N18.30 CHRONIC KIDNEY DISEASE, STAGE III (MODERATE): Chronic | ICD-10-CM

## 2018-03-07 DIAGNOSIS — N18.1 CKD (CHRONIC KIDNEY DISEASE) STAGE 1, GFR 90 ML/MIN OR GREATER: Primary | ICD-10-CM

## 2018-03-07 PROCEDURE — 99999 PR PBB SHADOW E&M-EST. PATIENT-LVL V: CPT | Mod: PBBFAC,GC,, | Performed by: INTERNAL MEDICINE

## 2018-03-07 PROCEDURE — 3078F DIAST BP <80 MM HG: CPT | Mod: GC,S$GLB,, | Performed by: INTERNAL MEDICINE

## 2018-03-07 PROCEDURE — 99214 OFFICE O/P EST MOD 30 MIN: CPT | Mod: GC,S$GLB,, | Performed by: INTERNAL MEDICINE

## 2018-03-07 PROCEDURE — 3074F SYST BP LT 130 MM HG: CPT | Mod: GC,S$GLB,, | Performed by: INTERNAL MEDICINE

## 2018-03-07 RX ORDER — GLIMEPIRIDE 2 MG/1
TABLET ORAL
COMMUNITY
Start: 2018-03-06 | End: 2019-08-13

## 2018-03-07 RX ORDER — INSULIN ASPART 100 [IU]/ML
INJECTION, SOLUTION INTRAVENOUS; SUBCUTANEOUS
COMMUNITY
Start: 2018-03-06 | End: 2018-04-18

## 2018-03-07 NOTE — ASSESSMENT & PLAN NOTE
1. CKD: MM vs Dm, was initially going to biopsy, but has not done yet and now sCr is coming down, we're recommending continuing to observe, repeat labs and UPC in a few months and make decision at that time     Lab Results   Component Value Date    CREATININE 1.5 (H) 02/08/2018    CREATININE 1.5 (H) 02/08/2018     Protein Creatinine Ratios:    Prot/Creat Ratio, Ur   Date Value Ref Range Status   09/28/2017 0.11 0.00 - 0.20 Final   05/03/2016 0.44 (H) 0.00 - 0.20 Final   12/07/2015 0.09 0.00 - 0.20 Final     ·   ·   Acid-Base:   Lab Results   Component Value Date     02/08/2018     02/08/2018    K 4.2 02/08/2018    K 4.2 02/08/2018    CO2 24 02/08/2018    CO2 24 02/08/2018     2. HTN: Blood pressures excellent control    3. Renal osteodystrophy: last PTH   Lab Results   Component Value Date    PTH 43.0 10/06/2017    CALCIUM 9.8 02/08/2018    CALCIUM 9.8 02/08/2018    PHOS 2.2 (L) 02/08/2018    PHOS 2.3 (L) 02/08/2018       4. Anemia:   Lab Results   Component Value Date    HGB 11.4 (L) 02/08/2018    HGB 11.4 (L) 02/08/2018        5. DM:  Last HbA1C   Lab Results   Component Value Date    HGBA1C 7.4 (H) 01/15/2018       6. Lipid management:   Lab Results   Component Value Date    LDLCALC 50.0 (L) 05/11/2017       7. ESRD planing: not on the horizon at this time    Follow up in 3 months with    RFP  [facility]    Patient seen with Dr Baum

## 2018-03-07 NOTE — PROGRESS NOTES
Subjective:       Patient ID: Emerson Menendez is a 72 y.o. Black or  male who presents for follow-up evaluation of Chronic Kidney Disease    Presenting for follow up of chronic kidney disease, on last visit we wanted more of a work up (including biopsy) due to progression, but unfortunately he was not able to get this done, his renal function has now improved and sCr down to 1.5 (from 1.7 on his last visit), last UPC ratio essentially negative from last fall, we don't have one for today's visit.      Review of Systems   Constitutional: Negative for fever and unexpected weight change.   Respiratory: Negative for cough, chest tightness, shortness of breath and wheezing.    Cardiovascular: Negative for chest pain and leg swelling.   Gastrointestinal: Negative for abdominal distention, abdominal pain, diarrhea and nausea.   Endocrine: Negative for polydipsia.   Genitourinary: Negative for difficulty urinating, dysuria, flank pain, frequency and hematuria.   Musculoskeletal: Negative for arthralgias and myalgias.   Skin: Negative for rash.   Allergic/Immunologic: Negative for immunocompromised state.   Neurological: Negative for dizziness and light-headedness.   Hematological: Negative for adenopathy.   Psychiatric/Behavioral: Negative for confusion.       Objective:      Physical Exam   Constitutional: He is oriented to person, place, and time.   HENT:   Head: Atraumatic.   Neck: No JVD present.   Cardiovascular: Normal rate and regular rhythm.  Exam reveals no friction rub.    Pulmonary/Chest: Effort normal and breath sounds normal.   Abdominal: Soft. He exhibits no distension.   Musculoskeletal: He exhibits no edema.   Neurological: He is alert and oriented to person, place, and time.   Skin: Skin is warm.   Psychiatric: He has a normal mood and affect.       Assessment & Plan:       Chronic kidney disease, stage III (moderate)  1. CKD: MM vs Dm, was initially going to biopsy, but has not done yet  and now sCr is coming down, we're recommending continuing to observe, repeat labs and UPC in a few months and make decision at that time     Lab Results   Component Value Date    CREATININE 1.5 (H) 02/08/2018    CREATININE 1.5 (H) 02/08/2018     Protein Creatinine Ratios:    Prot/Creat Ratio, Ur   Date Value Ref Range Status   09/28/2017 0.11 0.00 - 0.20 Final   05/03/2016 0.44 (H) 0.00 - 0.20 Final   12/07/2015 0.09 0.00 - 0.20 Final     ·   ·   Acid-Base:   Lab Results   Component Value Date     02/08/2018     02/08/2018    K 4.2 02/08/2018    K 4.2 02/08/2018    CO2 24 02/08/2018    CO2 24 02/08/2018     2. HTN: Blood pressures excellent control    3. Renal osteodystrophy: last PTH   Lab Results   Component Value Date    PTH 43.0 10/06/2017    CALCIUM 9.8 02/08/2018    CALCIUM 9.8 02/08/2018    PHOS 2.2 (L) 02/08/2018    PHOS 2.3 (L) 02/08/2018       4. Anemia:   Lab Results   Component Value Date    HGB 11.4 (L) 02/08/2018    HGB 11.4 (L) 02/08/2018        5. DM:  Last HbA1C   Lab Results   Component Value Date    HGBA1C 7.4 (H) 01/15/2018       6. Lipid management:   Lab Results   Component Value Date    LDLCALC 50.0 (L) 05/11/2017       7. ESRD planing: not on the horizon at this time    Follow up in 3 months with    RFP  [facility]    Patient seen with Dr Baum

## 2018-03-08 ENCOUNTER — LAB VISIT (OUTPATIENT)
Dept: LAB | Facility: HOSPITAL | Age: 73
End: 2018-03-08
Payer: MEDICARE

## 2018-03-08 ENCOUNTER — RESEARCH ENCOUNTER (OUTPATIENT)
Dept: RESEARCH | Facility: HOSPITAL | Age: 73
End: 2018-03-08

## 2018-03-08 ENCOUNTER — OFFICE VISIT (OUTPATIENT)
Dept: HEMATOLOGY/ONCOLOGY | Facility: CLINIC | Age: 73
End: 2018-03-08
Payer: MEDICARE

## 2018-03-08 VITALS
BODY MASS INDEX: 27.92 KG/M2 | WEIGHT: 195 LBS | SYSTOLIC BLOOD PRESSURE: 125 MMHG | TEMPERATURE: 98 F | RESPIRATION RATE: 16 BRPM | HEIGHT: 70 IN | OXYGEN SATURATION: 96 % | HEART RATE: 86 BPM | DIASTOLIC BLOOD PRESSURE: 70 MMHG

## 2018-03-08 DIAGNOSIS — E11.42 TYPE 2 DIABETES MELLITUS WITH DIABETIC POLYNEUROPATHY, WITH LONG-TERM CURRENT USE OF INSULIN: ICD-10-CM

## 2018-03-08 DIAGNOSIS — Z79.4 TYPE 2 DIABETES MELLITUS WITH DIABETIC POLYNEUROPATHY, WITH LONG-TERM CURRENT USE OF INSULIN: ICD-10-CM

## 2018-03-08 DIAGNOSIS — D47.2 SMOLDERING MULTIPLE MYELOMA: ICD-10-CM

## 2018-03-08 DIAGNOSIS — E11.22 CKD STAGE 3 DUE TO TYPE 2 DIABETES MELLITUS: ICD-10-CM

## 2018-03-08 DIAGNOSIS — D47.2 SMOLDERING MULTIPLE MYELOMA: Primary | ICD-10-CM

## 2018-03-08 DIAGNOSIS — Z00.6 EXAMINATION OF PARTICIPANT IN CLINICAL TRIAL: ICD-10-CM

## 2018-03-08 DIAGNOSIS — N18.30 CKD STAGE 3 DUE TO TYPE 2 DIABETES MELLITUS: ICD-10-CM

## 2018-03-08 LAB
ALBUMIN SERPL BCP-MCNC: 3.7 G/DL
ALP SERPL-CCNC: 44 U/L
ALT SERPL W/O P-5'-P-CCNC: 17 U/L
ANION GAP SERPL CALC-SCNC: 9 MMOL/L
AST SERPL-CCNC: 18 U/L
BASOPHILS # BLD AUTO: 0.04 K/UL
BASOPHILS NFR BLD: 1 %
BILIRUB SERPL-MCNC: 0.5 MG/DL
BUN SERPL-MCNC: 17 MG/DL
CALCIUM SERPL-MCNC: 9.9 MG/DL
CHLORIDE SERPL-SCNC: 102 MMOL/L
CO2 SERPL-SCNC: 27 MMOL/L
CREAT SERPL-MCNC: 1.6 MG/DL
DIFFERENTIAL METHOD: ABNORMAL
EOSINOPHIL # BLD AUTO: 0.1 K/UL
EOSINOPHIL NFR BLD: 3.6 %
ERYTHROCYTE [DISTWIDTH] IN BLOOD BY AUTOMATED COUNT: 13.7 %
EST. GFR  (AFRICAN AMERICAN): 49.4 ML/MIN/1.73 M^2
EST. GFR  (NON AFRICAN AMERICAN): 42.7 ML/MIN/1.73 M^2
GLUCOSE SERPL-MCNC: 136 MG/DL
HCT VFR BLD AUTO: 36.2 %
HGB BLD-MCNC: 11.6 G/DL
IGA SERPL-MCNC: 1551 MG/DL
IGG SERPL-MCNC: 963 MG/DL
IGM SERPL-MCNC: 42 MG/DL
IMM GRANULOCYTES # BLD AUTO: 0.01 K/UL
IMM GRANULOCYTES NFR BLD AUTO: 0.3 %
LYMPHOCYTES # BLD AUTO: 1.1 K/UL
LYMPHOCYTES NFR BLD: 29.5 %
MAGNESIUM SERPL-MCNC: 1.8 MG/DL
MCH RBC QN AUTO: 28.4 PG
MCHC RBC AUTO-ENTMCNC: 32 G/DL
MCV RBC AUTO: 89 FL
MONOCYTES # BLD AUTO: 0.3 K/UL
MONOCYTES NFR BLD: 6.5 %
NEUTROPHILS # BLD AUTO: 2.3 K/UL
NEUTROPHILS NFR BLD: 59.1 %
NRBC BLD-RTO: 0 /100 WBC
PHOSPHATE SERPL-MCNC: 3.3 MG/DL
PLATELET # BLD AUTO: 256 K/UL
PMV BLD AUTO: 8.9 FL
POTASSIUM SERPL-SCNC: 4.7 MMOL/L
PROT SERPL-MCNC: 8.3 G/DL
RBC # BLD AUTO: 4.09 M/UL
SODIUM SERPL-SCNC: 138 MMOL/L
WBC # BLD AUTO: 3.86 K/UL

## 2018-03-08 PROCEDURE — 86334 IMMUNOFIX E-PHORESIS SERUM: CPT | Mod: 26,Q1,, | Performed by: PATHOLOGY

## 2018-03-08 PROCEDURE — 83735 ASSAY OF MAGNESIUM: CPT

## 2018-03-08 PROCEDURE — 86334 IMMUNOFIX E-PHORESIS SERUM: CPT

## 2018-03-08 PROCEDURE — 82784 ASSAY IGA/IGD/IGG/IGM EACH: CPT | Mod: 59,Q1

## 2018-03-08 PROCEDURE — 99215 OFFICE O/P EST HI 40 MIN: CPT | Mod: Q1,S$GLB,, | Performed by: INTERNAL MEDICINE

## 2018-03-08 PROCEDURE — 84165 PROTEIN E-PHORESIS SERUM: CPT

## 2018-03-08 PROCEDURE — 82784 ASSAY IGA/IGD/IGG/IGM EACH: CPT | Mod: 59

## 2018-03-08 PROCEDURE — 83520 IMMUNOASSAY QUANT NOS NONAB: CPT | Mod: 59,Q1

## 2018-03-08 PROCEDURE — 36415 COLL VENOUS BLD VENIPUNCTURE: CPT

## 2018-03-08 PROCEDURE — 80053 COMPREHEN METABOLIC PANEL: CPT | Mod: Q1

## 2018-03-08 PROCEDURE — 84100 ASSAY OF PHOSPHORUS: CPT

## 2018-03-08 PROCEDURE — 85025 COMPLETE CBC W/AUTO DIFF WBC: CPT

## 2018-03-08 PROCEDURE — 99999 PR PBB SHADOW E&M-EST. PATIENT-LVL III: CPT | Mod: PBBFAC,,, | Performed by: INTERNAL MEDICINE

## 2018-03-08 PROCEDURE — 84165 PROTEIN E-PHORESIS SERUM: CPT | Mod: 26,Q1,, | Performed by: PATHOLOGY

## 2018-03-08 PROCEDURE — 82784 ASSAY IGA/IGD/IGG/IGM EACH: CPT

## 2018-03-08 NOTE — PROGRESS NOTES
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   Sponsor:  [facility]  IRB #: [id number]  Investigator: GIL Engel  Pt Initials: LUCÍA LORENZ       Arm B: Observation  Cycle 26, Day 28      Patient presents to clinic to evaluate ability to proceed with Cycle 27 of observation per above-mentioned protocol. He states that he has had a "good month."  He continues to wear insulin pump per his endocrinologist but continues to self-manage the extra injections. He states that he wants to look into a patch that will test his sugars that connects to his smart phone.  He did recently go see his nephrologist, who, per patient, stated that he will not need a biopsy at this time, as his creatinine has remained elevated but stable. He denies new potential CRAB symptoms.  He states continued willingness to participate in above-mentioned study, continued participation and compliance with protocol parameters detailed below.    Review of Baseline AE's:  1. Hypertension, grade 1: BP today is 125/70  in clinic today.  Will continue to follow this. Has worsned at previous visit; Has returned to baseline, currently, AE ongoing  2. Lower Back Pain and Neck Pain, grade 1: Patient has no complaints of this issue this month. As previously stated, patient is not suspected to have bony myeloma involvement per Dr. Engel as these are pre-existing issues present at baseline.  AE ongoing.  3.  Pain in extremity (knee), grade 1.  Continues to wear knee stabilizing braces today.  Per patient, he "thinks it may be time to another injection." States it has been about six months.  AE ongoing  4. Peripheral sensory neuropathy, grade 1: Patient does not verbalize complaints of this symptom today. Continues to take neurontin for this per Dr. Sagastume.  He states when he remembers to take it regularly, he has no issues.  He has begun " "to dispense into his pill box which is helping with compliance.  AE ongoing.   5. Anemia, Grade 1: Hgb/Hct - 11.6/36.2     Review of AE's:   1. Creatinine increased, grade 1: Serum creatinine is slightly improved at 1.6 mg/dl today and GFR 53.01. As previously stated, Dr. Engel states this has not been related to myeloma and is related chronic kidney issues likely secondary to diabetes and hypertension.  Will continue observation monthly and to monitor renal function closely. AE stable, ongoing.   2. White blood cells, decreased, grade 1: WBC: 3.86      *Please note this list is not all-inclusive, please see AE log for physician reviewed list of adverse events.       Per study chair, "the definition of progressive disease for this protocol is developing CRAB criteria that requires treatment systemically." Per Dr Engel, based on today's exam and lab results thus far, patient does not have any s/s of CRAB: Normal Calcium level (9.9), absence of Renal insufficiency (serum creatinine 1.5, and GFR 56.54 per Cockroft-Gault--patient with a history of kidney dysfunction secondary to diabetes; Dr. Engel aware of these values and not concerned for myeloma involvement), absence of significant Anemia (hemoglobin 11.6, stable from baseline hgb level at study entry) and absence of lytic Bone lesions (per baseline metastatic survey as well as MRI--see MD note for further comment). See MD note for ECOG score and H&P and flowsheets for laboratory work, vitals, etc. All myeloma-related blood work from last cycle including SPEP and JAGUAR have remained stable, and are currently pending for this cycle. Per Dr. Engel, patient shows no evidence of progression at last result. Patient informed that Dr. Engel will release all lab results to [facility]. He states understanding of this. QOL's not required at this timepoint.        Next appt to be scheduled at a later date.  Patient states he should not have any conflicts with next month's appt.  " Patient was given Research RN's contact information and has MD contact information to call with any concerns, questions, or worsening of symptoms; he verbalized understanding.

## 2018-03-09 LAB
ALBUMIN SERPL ELPH-MCNC: 3.92 G/DL
ALPHA1 GLOB SERPL ELPH-MCNC: 0.28 G/DL
ALPHA2 GLOB SERPL ELPH-MCNC: 0.8 G/DL
B-GLOBULIN SERPL ELPH-MCNC: 2.54 G/DL
GAMMA GLOB SERPL ELPH-MCNC: 0.56 G/DL
INTERPRETATION SERPL IFE-IMP: NORMAL
KAPPA LC SER QL IA: 5.55 MG/DL
KAPPA LC/LAMBDA SER IA: 3.65
LAMBDA LC SER QL IA: 1.52 MG/DL
PATHOLOGIST INTERPRETATION IFE: NORMAL
PATHOLOGIST INTERPRETATION SPE: NORMAL
PROT SERPL-MCNC: 8.1 G/DL

## 2018-03-09 NOTE — PROGRESS NOTES
Subjective:       Patient ID: Emerson Menendez is a 72 y.o. male.    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 Test results identify a stable IgA kappa protein with beta globulin band at 1.63g/dL. Kappa free light chain is elevated at 4.40. CBC and calcium are stable. Creatinine with history of stage III CKD is stable at 1.4. Metastatic survey is negative for lytic lesions. MRI of the entire spine demonstrated age related changes but no convincing evidence of myeloma bone disease. Bone marrow biopsy identified about 13% plasma cells by morphology and FISH for myeloma identified a t(11;14) and trisomies 3,7, and 17. The patient is afebrile and appears clinically well. He was seen by his podiatrist and nephrologist since our last visit without any new or acute events.    The patient has not received any therapy for smoldering myeloma including bisphosphonates or steroids. He has been randomized to observation arm of the the clinical study. 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 He has no current or prior history of malignancy.    TODAY  Mr. Menendez returns today for monthly follow-up evaluation. He denies any acute interval events since last interview.   No clinical signs/symptoms of progression of his smoldering myeloma.    Followed up with nephrology and prior planned renal biopsy is cancelled due to satisfactory renal function per patient. Blood pressure improved today.    HPI  Review of Systems   Constitutional: Negative for activity change, appetite change, diaphoresis, fatigue, fever and unexpected weight change.   HENT:  Negative.    Eyes: Negative.    Respiratory: Negative.    Cardiovascular: Negative for leg swelling.   Gastrointestinal: Negative.    Endocrine: Negative.    Genitourinary: Negative.    Musculoskeletal: Negative.    Skin: Negative.    Allergic/Immunologic: Negative.    Neurological:        Grade 0-1 peripheral neuropathy of bilateral feet.    Hematological: Negative for adenopathy. Does not bruise/bleed easily.   Psychiatric/Behavioral: Negative.        Objective:       Vitals:    03/08/18 1338   BP: 125/70   Pulse: 86   Resp: 16   Temp: 98.1 °F (36.7 °C)       Physical Exam   Constitutional: He is oriented to person, place, and time. He appears well-developed and well-nourished.   HENT:   Head: Normocephalic and atraumatic.   Right Ear: External ear normal.   Left Ear: External ear normal.   Nose: Nose normal.   Mouth/Throat: Oropharynx is clear and moist.   Eyes: Conjunctivae and EOM are normal. Pupils are equal, round, and reactive to light.   Neck: Normal range of motion. Neck supple.   Cardiovascular: Normal rate, regular rhythm and intact distal pulses.    No murmur heard.  Pulmonary/Chest: Effort normal and breath sounds normal. No respiratory distress.   Abdominal: Soft. Bowel sounds are normal. He exhibits no distension. There is no hepatosplenomegaly.   Musculoskeletal: He exhibits no edema or tenderness.   Neurological: He is alert and oriented to person, place, and time. No cranial nerve deficit.   Skin: Skin is warm and dry. No rash noted. No cyanosis. Nails show no clubbing.   Psychiatric: He has a normal mood and affect. His behavior is normal.   Nursing note and vitals reviewed.      Assessment:       1. Smoldering multiple myeloma    2. Type 2 diabetes mellitus with diabetic polyneuropathy, with long-term current use of insulin    3. CKD stage 3 due to type 2 diabetes mellitus        Plan:       The patient has a diagnosis of smoldering myeloma. There is no indication for immediate chemotherapy. We  are monitoring renal function closely- baseline creatinine of -1.6 is stable today.   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 CBC and CMP are stable.  Complete biochemical studies from today are pending.  Plan for return in 1 month.  Endocrinology and Nephrology to monitor diabetes and renal failure respectively.

## 2018-03-09 NOTE — PROGRESS NOTES
[facility]    The note from the fellow/resident was reviewed. I have personally interviewed and examined the patient. There were no additional findings with regards to the history or physical exam.    I agree with the assessment and plan of  Dr. Leon Urena

## 2018-03-13 DIAGNOSIS — D47.2 SMOLDERING MULTIPLE MYELOMA: ICD-10-CM

## 2018-03-13 RX ORDER — DIAZEPAM 5 MG/1
TABLET ORAL
Qty: 60 TABLET | Refills: 1 | Status: SHIPPED | OUTPATIENT
Start: 2018-03-13 | End: 2018-08-07 | Stop reason: SDUPTHER

## 2018-03-20 ENCOUNTER — PATIENT MESSAGE (OUTPATIENT)
Dept: SPORTS MEDICINE | Facility: CLINIC | Age: 73
End: 2018-03-20

## 2018-03-30 ENCOUNTER — PATIENT MESSAGE (OUTPATIENT)
Dept: INTERNAL MEDICINE | Facility: CLINIC | Age: 73
End: 2018-03-30

## 2018-03-30 ENCOUNTER — PATIENT MESSAGE (OUTPATIENT)
Dept: SPORTS MEDICINE | Facility: CLINIC | Age: 73
End: 2018-03-30

## 2018-03-30 DIAGNOSIS — E13.9 DIABETES MELLITUS DUE TO ABNORMAL INSULIN: Primary | ICD-10-CM

## 2018-04-02 ENCOUNTER — OFFICE VISIT (OUTPATIENT)
Dept: SPORTS MEDICINE | Facility: CLINIC | Age: 73
End: 2018-04-02
Payer: MEDICARE

## 2018-04-02 ENCOUNTER — OFFICE VISIT (OUTPATIENT)
Dept: HEMATOLOGY/ONCOLOGY | Facility: CLINIC | Age: 73
End: 2018-04-02
Payer: MEDICARE

## 2018-04-02 ENCOUNTER — RESEARCH ENCOUNTER (OUTPATIENT)
Dept: RESEARCH | Facility: HOSPITAL | Age: 73
End: 2018-04-02

## 2018-04-02 ENCOUNTER — LAB VISIT (OUTPATIENT)
Dept: LAB | Facility: HOSPITAL | Age: 73
End: 2018-04-02
Attending: INTERNAL MEDICINE
Payer: MEDICARE

## 2018-04-02 VITALS
SYSTOLIC BLOOD PRESSURE: 146 MMHG | TEMPERATURE: 98 F | DIASTOLIC BLOOD PRESSURE: 77 MMHG | RESPIRATION RATE: 16 BRPM | BODY MASS INDEX: 27.77 KG/M2 | HEIGHT: 70 IN | OXYGEN SATURATION: 98 % | WEIGHT: 194 LBS | HEART RATE: 93 BPM

## 2018-04-02 VITALS — BODY MASS INDEX: 27.77 KG/M2 | HEIGHT: 70 IN | WEIGHT: 194 LBS

## 2018-04-02 DIAGNOSIS — Z00.6 EXAMINATION OF PARTICIPANT IN CLINICAL TRIAL: ICD-10-CM

## 2018-04-02 DIAGNOSIS — M17.12 PRIMARY OSTEOARTHRITIS OF LEFT KNEE: ICD-10-CM

## 2018-04-02 DIAGNOSIS — E11.42 TYPE 2 DIABETES MELLITUS WITH DIABETIC POLYNEUROPATHY, WITH LONG-TERM CURRENT USE OF INSULIN: ICD-10-CM

## 2018-04-02 DIAGNOSIS — M17.11 PRIMARY OSTEOARTHRITIS OF RIGHT KNEE: Primary | ICD-10-CM

## 2018-04-02 DIAGNOSIS — D47.2 SMOLDERING MULTIPLE MYELOMA: ICD-10-CM

## 2018-04-02 DIAGNOSIS — Z79.4 TYPE 2 DIABETES MELLITUS WITH DIABETIC POLYNEUROPATHY, WITH LONG-TERM CURRENT USE OF INSULIN: ICD-10-CM

## 2018-04-02 DIAGNOSIS — N18.30 CKD STAGE 3 DUE TO TYPE 2 DIABETES MELLITUS: ICD-10-CM

## 2018-04-02 DIAGNOSIS — D47.2 SMOLDERING MULTIPLE MYELOMA: Primary | ICD-10-CM

## 2018-04-02 DIAGNOSIS — E11.22 CKD STAGE 3 DUE TO TYPE 2 DIABETES MELLITUS: ICD-10-CM

## 2018-04-02 LAB
ALBUMIN SERPL BCP-MCNC: 3.7 G/DL
ALP SERPL-CCNC: 48 U/L
ALT SERPL W/O P-5'-P-CCNC: 20 U/L
ANION GAP SERPL CALC-SCNC: 9 MMOL/L
AST SERPL-CCNC: 15 U/L
BASOPHILS # BLD AUTO: 0.03 K/UL
BASOPHILS NFR BLD: 0.5 %
BILIRUB SERPL-MCNC: 0.5 MG/DL
BUN SERPL-MCNC: 19 MG/DL
CALCIUM SERPL-MCNC: 9.3 MG/DL
CHLORIDE SERPL-SCNC: 104 MMOL/L
CO2 SERPL-SCNC: 23 MMOL/L
CREAT SERPL-MCNC: 1.5 MG/DL
DIFFERENTIAL METHOD: ABNORMAL
EOSINOPHIL # BLD AUTO: 0.2 K/UL
EOSINOPHIL NFR BLD: 3 %
ERYTHROCYTE [DISTWIDTH] IN BLOOD BY AUTOMATED COUNT: 13.6 %
EST. GFR  (AFRICAN AMERICAN): 53.4 ML/MIN/1.73 M^2
EST. GFR  (NON AFRICAN AMERICAN): 46.2 ML/MIN/1.73 M^2
GLUCOSE SERPL-MCNC: 217 MG/DL
HCT VFR BLD AUTO: 34.6 %
HGB BLD-MCNC: 11.4 G/DL
IGA SERPL-MCNC: 1605 MG/DL
IGG SERPL-MCNC: 946 MG/DL
IGM SERPL-MCNC: 36 MG/DL
IMM GRANULOCYTES # BLD AUTO: 0.01 K/UL
IMM GRANULOCYTES NFR BLD AUTO: 0.2 %
LYMPHOCYTES # BLD AUTO: 1.7 K/UL
LYMPHOCYTES NFR BLD: 27.6 %
MAGNESIUM SERPL-MCNC: 2 MG/DL
MCH RBC QN AUTO: 29.3 PG
MCHC RBC AUTO-ENTMCNC: 32.9 G/DL
MCV RBC AUTO: 89 FL
MONOCYTES # BLD AUTO: 0.3 K/UL
MONOCYTES NFR BLD: 4.7 %
NEUTROPHILS # BLD AUTO: 3.8 K/UL
NEUTROPHILS NFR BLD: 64 %
NRBC BLD-RTO: 0 /100 WBC
PHOSPHATE SERPL-MCNC: 2.7 MG/DL
PLATELET # BLD AUTO: 243 K/UL
PMV BLD AUTO: 9.4 FL
POTASSIUM SERPL-SCNC: 4.4 MMOL/L
PROT SERPL-MCNC: 8.3 G/DL
RBC # BLD AUTO: 3.89 M/UL
SODIUM SERPL-SCNC: 136 MMOL/L
WBC # BLD AUTO: 5.98 K/UL

## 2018-04-02 PROCEDURE — 3078F DIAST BP <80 MM HG: CPT | Mod: CPTII,S$GLB,, | Performed by: INTERNAL MEDICINE

## 2018-04-02 PROCEDURE — 99215 OFFICE O/P EST HI 40 MIN: CPT | Mod: S$GLB,,, | Performed by: INTERNAL MEDICINE

## 2018-04-02 PROCEDURE — 99999 PR PBB SHADOW E&M-EST. PATIENT-LVL IV: CPT | Mod: PBBFAC,,, | Performed by: PHYSICIAN ASSISTANT

## 2018-04-02 PROCEDURE — 84165 PROTEIN E-PHORESIS SERUM: CPT

## 2018-04-02 PROCEDURE — 84100 ASSAY OF PHOSPHORUS: CPT

## 2018-04-02 PROCEDURE — 82784 ASSAY IGA/IGD/IGG/IGM EACH: CPT

## 2018-04-02 PROCEDURE — 82784 ASSAY IGA/IGD/IGG/IGM EACH: CPT | Mod: 59

## 2018-04-02 PROCEDURE — 80053 COMPREHEN METABOLIC PANEL: CPT | Mod: Q1

## 2018-04-02 PROCEDURE — 99214 OFFICE O/P EST MOD 30 MIN: CPT | Mod: Q1,25,S$GLB, | Performed by: PHYSICIAN ASSISTANT

## 2018-04-02 PROCEDURE — 84165 PROTEIN E-PHORESIS SERUM: CPT | Mod: 26,,, | Performed by: PATHOLOGY

## 2018-04-02 PROCEDURE — 86334 IMMUNOFIX E-PHORESIS SERUM: CPT | Mod: 26,,, | Performed by: PATHOLOGY

## 2018-04-02 PROCEDURE — 83520 IMMUNOASSAY QUANT NOS NONAB: CPT | Mod: 59

## 2018-04-02 PROCEDURE — 36415 COLL VENOUS BLD VENIPUNCTURE: CPT

## 2018-04-02 PROCEDURE — 86334 IMMUNOFIX E-PHORESIS SERUM: CPT

## 2018-04-02 PROCEDURE — 3077F SYST BP >= 140 MM HG: CPT | Mod: CPTII,S$GLB,, | Performed by: INTERNAL MEDICINE

## 2018-04-02 PROCEDURE — 20610 DRAIN/INJ JOINT/BURSA W/O US: CPT | Mod: 50,S$GLB,, | Performed by: PHYSICIAN ASSISTANT

## 2018-04-02 PROCEDURE — 83735 ASSAY OF MAGNESIUM: CPT

## 2018-04-02 PROCEDURE — 82784 ASSAY IGA/IGD/IGG/IGM EACH: CPT | Mod: 59,Q1

## 2018-04-02 PROCEDURE — 3045F PR MOST RECENT HEMOGLOBIN A1C LEVEL 7.0-9.0%: CPT | Mod: CPTII,S$GLB,, | Performed by: INTERNAL MEDICINE

## 2018-04-02 PROCEDURE — 99999 PR PBB SHADOW E&M-EST. PATIENT-LVL V: CPT | Mod: PBBFAC,,, | Performed by: INTERNAL MEDICINE

## 2018-04-02 PROCEDURE — 85025 COMPLETE CBC W/AUTO DIFF WBC: CPT

## 2018-04-02 RX ORDER — TRIAMCINOLONE ACETONIDE 40 MG/ML
40 INJECTION, SUSPENSION INTRA-ARTICULAR; INTRAMUSCULAR
Status: COMPLETED | OUTPATIENT
Start: 2018-04-02 | End: 2018-04-02

## 2018-04-02 RX ORDER — BUPIVACAINE HYDROCHLORIDE 2.5 MG/ML
3 INJECTION, SOLUTION INFILTRATION; PERINEURAL
Status: COMPLETED | OUTPATIENT
Start: 2018-04-02 | End: 2018-04-02

## 2018-04-02 RX ADMIN — BUPIVACAINE HYDROCHLORIDE 7.5 MG: 2.5 INJECTION, SOLUTION INFILTRATION; PERINEURAL at 04:04

## 2018-04-02 RX ADMIN — TRIAMCINOLONE ACETONIDE 40 MG: 40 INJECTION, SUSPENSION INTRA-ARTICULAR; INTRAMUSCULAR at 04:04

## 2018-04-02 NOTE — PROGRESS NOTES
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   Sponsor:  [facility]  IRB #: [id number]  Investigator: GIL Engel  Pt Initials: LUCÍA LORENZ       Arm B: Observation  Cycle 27, Day 28        Patient presents to clinic to evaluate ability to proceed with Cycle 28 of observation per above-mentioned protocol. He states that he has been doing well since last month.  He continues to wear insulin pump per his endocrinologist but continues to self-manage the extra injections. He states that he wants to look into a patch that will test his sugars that connects to his smart phone. He denies new potential CRAB symptoms.  He states that he has started working out. He states continued willingness to participate in above-mentioned study.    Review of Baseline AE's:  1. Hypertension, grade 2: BP today is 146/77  in clinic today.  Will continue to follow this. AE worsened from baseline currently.(Grade 1) AE now a Grade 2  2. Lower Back Pain and Neck Pain, grade 1: Patient has no complaints of this issue this month. As previously stated, patient is not suspected to have bony myeloma involvement per Dr. Engel as these are pre-existing issues present at baseline.  AE ongoing.  3.  Pain in extremity (knee), grade 1.  Continues to wear knee stabilizing braces today.  Going to get an "injection" today and states that he hopes this helps.  AE ongoing  4. Peripheral sensory neuropathy, grade 1: Patient does not verbalize complaints of this symptom today. Continues to take neurontin for this per Dr. Sagastume.  He states when he remembers to take it regularly, he has no issues.  He has begun to dispense into his pill box which is helping with compliance.  AE ongoing.   5. Anemia, Grade 1: Hgb/Hct - 11.4/34.6      Review of AE's:   1. Creatinine increased, grade 1: Serum creatinine is at 1.5 mg/dl today and GFR 56.22. " "As previously stated, Dr. Engel states this has not been related to myeloma and is related chronic kidney issues likely secondary to diabetes and hypertension.  Will continue observation monthly and to monitor renal function closely. AE stable, ongoing.   2. White blood cells, decreased, grade 1: WBC: 5.98, AE improved at this time.      *Please note this list is not all-inclusive, please see AE log for physician reviewed list of adverse events.       Per study chair, "the definition of progressive disease for this protocol is developing CRAB criteria that requires treatment systemically." Per Dr Egnel, based on today's exam and lab results thus far, patient does not have any s/s of CRAB: Normal Calcium level (9.9), absence of Renal insufficiency (serum creatinine 1.5, and GFR 56.54 per [facility]ro[facility]-Gault--patient with a history of kidney dysfunction secondary to diabetes; Dr. Engel aware of these values and not concerned for myeloma involvement), absence of significant Anemia (hemoglobin 11.6, stable from baseline hgb level at study entry) and absence of lytic Bone lesions (per baseline metastatic survey as well as MRI--see MD note for further comment). See MD note for ECOG score and H&P and flowsheets for laboratory work, vitals, etc. All myeloma-related blood work from last cycle including SPEP and JAGUAR have remained stable, and are currently pending for this cycle. Per Dr. Engel, patient shows no evidence of progression at last result. Patient informed that Dr. Engel will release all lab results to [facility]. He states understanding of this. QOL's not required at this timepoint.        Next appt to be scheduled at a later date.  Patient states he should not have any conflicts with next month's appt.  Patient was given Research RN's contact information and has MD contact information to call with any concerns, questions, or worsening of symptoms; he verbalized understanding.                                  "

## 2018-04-02 NOTE — PROGRESS NOTES
Subjective:       Patient ID: Emerson Menendez is a 72 y.o. male.    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 Test results identify a stable IgA kappa protein with beta globulin band at 1.63g/dL. Kappa free light chain is elevated at 4.40. CBC and calcium are stable. Creatinine with history of stage III CKD is stable at 1.4. Metastatic survey is negative for lytic lesions. MRI of the entire spine demonstrated age related changes but no convincing evidence of myeloma bone disease. Bone marrow biopsy identified about 13% plasma cells by morphology and FISH for myeloma identified a t(11;14) and trisomies 3,7, and 17. The patient is afebrile and appears clinically well. He was seen by his podiatrist and nephrologist since our last visit without any new or acute events.    The patient has not received any therapy for smoldering myeloma including bisphosphonates or steroids. He has been randomized to observation arm of the the clinical study. 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 He has no current or prior history of malignancy.    TODAY  Mr. Menendez returns today for monthly follow-up evaluation. He denies any acute interval events since last interview.   No clinical signs/symptoms of progression of his smoldering myeloma.        HPI  Review of Systems   Constitutional: Negative for activity change, appetite change, diaphoresis, fatigue, fever and unexpected weight change.   HENT: Negative.    Eyes: Negative.    Respiratory: Negative.    Cardiovascular: Negative for leg swelling.   Gastrointestinal: Negative.    Endocrine:  Negative.    Genitourinary: Negative.    Musculoskeletal: Negative.    Skin: Negative.    Allergic/Immunologic: Negative.    Neurological:        Grade 0-1 peripheral neuropathy of bilateral feet.    Hematological: Negative for adenopathy. Does not bruise/bleed easily.   Psychiatric/Behavioral: Negative.        Objective:       Vitals:    04/02/18 1446   BP: (!) 146/77   Pulse: 93   Resp: 16   Temp: 97.9 °F (36.6 °C)       Physical Exam   Constitutional: He is oriented to person, place, and time. He appears well-developed and well-nourished.   HENT:   Head: Normocephalic and atraumatic.   Right Ear: External ear normal.   Left Ear: External ear normal.   Nose: Nose normal.   Mouth/Throat: Oropharynx is clear and moist.   Eyes: Conjunctivae and EOM are normal. Pupils are equal, round, and reactive to light.   Neck: Normal range of motion. Neck supple.   Cardiovascular: Normal rate, regular rhythm and intact distal pulses.    No murmur heard.  Pulmonary/Chest: Effort normal and breath sounds normal. No respiratory distress.   Abdominal: Soft. Bowel sounds are normal. He exhibits no distension. There is no hepatosplenomegaly.   Musculoskeletal: He exhibits no edema or tenderness.   Neurological: He is alert and oriented to person, place, and time. No cranial nerve deficit.   Skin: Skin is warm and dry. No rash noted. No cyanosis. Nails show no clubbing.   Psychiatric: He has a normal mood and affect. His behavior is normal.   Nursing note and vitals reviewed.      Assessment:       1. Smoldering multiple myeloma    2. Type 2 diabetes mellitus with diabetic polyneuropathy, with long-term current use of insulin    3. CKD stage 3 due to type 2 diabetes mellitus        Plan:       The patient has a diagnosis of smoldering myeloma. There is no indication for immediate chemotherapy. We are monitoring renal function closely- baseline creatinine of -1.5 is stable today.   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 CBC and CMP are stable.  Complete biochemical studies from today are pending.  Plan for return in 1 month.  Endocrinology and Nephrology to monitor diabetes and renal failure respectively.

## 2018-04-02 NOTE — PROGRESS NOTES
CHIEF COMPLAINT: Bilateral knee pain                                           HISTORY OF PRESENT ILLNESS:  The patient is a 72 y.o. male  who presents for follow up evaluation of his bilateral knee pain, requesting b/l CSI injections.    He finished the Euflexxa series in May 2017, and did not reports much improvement.  He wears his bilateral knee braces during the day and this helps his knee pain.  Issues is currently at night, when he is not wearing his braces. No new trauma or injury.    + mechanical symptoms. Pain: 8/10 at its worst    Review of Systems   Constitution: Negative. Negative for chills, fever and night sweats.   HENT: Negative for congestion and headaches.    Eyes: Negative for blurred vision, left vision loss and right vision loss.   Cardiovascular: Negative for chest pain and syncope.   Respiratory: Negative for cough and shortness of breath.    Endocrine: Negative for polydipsia, polyphagia and polyuria.   Hematologic/Lymphatic: Negative for bleeding problem. Does not bruise/bleed easily.   Skin: Negative for dry skin, itching and rash.   Musculoskeletal: Negative for falls and muscle weakness.   Gastrointestinal: Negative for abdominal pain and bowel incontinence.   Genitourinary: Negative for bladder incontinence and nocturia.   Neurological: Negative for disturbances in coordination, loss of balance and seizures.   Psychiatric/Behavioral: Negative for depression. The patient does not have insomnia.    Allergic/Immunologic: Negative for hives and persistent infections.     PAST MEDICAL HISTORY:   Past Medical History:   Diagnosis Date    Anxiety 3/16/2015    BPH (benign prostatic hypertrophy) 9/24/2012    Depression 3/16/2015    GERD (gastroesophageal reflux disease) 9/24/2012    Microalbuminuria 9/24/2012    Osteoarthritis of cervical spine 9/24/2012    Osteoarthritis of knee 9/24/2012    Type II or unspecified type diabetes mellitus without mention of complication, not stated as  "uncontrolled 9/24/2012     PAST SURGICAL HISTORY:   Past Surgical History:   Procedure Laterality Date    CATARACT EXTRACTION  2012    Right eye    PROSTATE SURGERY       FAMILY HISTORY:   Family History   Problem Relation Age of Onset    Hypertension Brother     Kidney failure Daughter      due to medication    Blindness Neg Hx     Cancer Neg Hx     Cataracts Neg Hx     Diabetes Neg Hx     Glaucoma Neg Hx     Macular degeneration Neg Hx     Retinal detachment Neg Hx     Strabismus Neg Hx     Stroke Neg Hx     Thyroid disease Neg Hx      SOCIAL HISTORY:   Social History     Social History    Marital status: Single     Spouse name: N/A    Number of children: N/A    Years of education: N/A     Occupational History    Not on file.     Social History Main Topics    Smoking status: Never Smoker    Smokeless tobacco: Never Used    Alcohol use No    Drug use: No    Sexual activity: Yes     Partners: Female     Birth control/ protection: None     Other Topics Concern    Not on file     Social History Narrative    No narrative on file       MEDICATIONS:   Current Outpatient Prescriptions:     ACCU-CHEK OMAYRA Misc, USE AS DIRECTED, Disp: 1 each, Rfl: 0    albuterol 90 mcg/actuation inhaler, Inhale 2 puffs into the lungs every 6 (six) hours as needed for Wheezing., Disp: 1 each, Rfl: 11    aspirin (ECOTRIN) 81 MG EC tablet, Take 1 tablet (81 mg total) by mouth once daily., Disp: 100 tablet, Rfl: 3    BD INSULIN PEN NEEDLE UF SHORT 31 gauge x 5/16" Ndle, USE TO INJECT INSULIN ONE TIME DAILY, Disp: 300 each, Rfl: 3    blood glucose control, normal (METER-CHECK) Soln, One meter. True test meter. Use as directed, Disp: 1 each, Rfl: 0    blood sugar diagnostic Strp, Omayra Meter Check twice daily, Disp: 200 strip, Rfl: 4    cholecalciferol, vitamin D3, (VITAMIN D) 2,000 unit Cap, Take by mouth. 1 Capsule Oral Every day.  over the counter, Disp: , Rfl:     citalopram (CELEXA) 20 MG tablet, TAKE 1 TABLET " EVERY EVENING AFTER DINNER, Disp: 90 tablet, Rfl: 11    diazePAM (VALIUM) 5 MG tablet, TAKE 1 TABLET EVERY 6 HOURS AS NEEDED, Disp: 60 tablet, Rfl: 1    diclofenac sodium (VOLTAREN) 1 % Gel, Apply 2 g topically 2 (two) times daily. To the anterior left knee prn pain, Disp: 1 Tube, Rfl: 0    gabapentin (NEURONTIN) 100 MG capsule, TAKE 1 CAPSULE EVERY MORNING, 1 CAPSULE IN THE AFTERNOON, AND 1 TO 3 CAPSULE(S) AT BEDTIME AS NEEDED FOR FOOT PAIN, Disp: 450 capsule, Rfl: 3    glimepiride (AMARYL) 2 MG tablet, daily with breakfast. , Disp: , Rfl:     glucagon (human recombinant) inj 1mg/mL kit, Inject 1 mL (1 mg total) into the muscle as needed., Disp: 1 kit, Rfl: 0    hydrocodone-acetaminophen 5-325mg (NORCO) 5-325 mg per tablet, Take 1 tablet by mouth every 4 to 6 hours as needed for Pain., Disp: 60 tablet, Rfl: 0    insulin regular 100 unit/mL Inj injection, To use with Vgo 20 with 2 clicks with meals.  * Use Relion brand regular insulin* Needs 2 vials monthly., Disp: 20 mL, Rfl: 3    lancets Misc, Use twice daily, Disp: 200 each, Rfl: 4    LANTUS SOLOSTAR 100 unit/mL (3 mL) InPn pen, INJECT 18 UNITS UNDER THE SKIN  QPM, Disp: , Rfl: 2    latanoprost 0.005 % ophthalmic solution, INSTILL 1 DROP INTO BOTH EYES EVERY EVENING, Disp: 3 Bottle, Rfl: 4    losartan (COZAAR) 25 MG tablet, Take 1 tablet (25 mg total) by mouth once daily., Disp: 90 tablet, Rfl: 4    NOVOLOG FLEXPEN U-100 INSULIN 100 unit/mL InPn pen, , Disp: , Rfl:     omeprazole (PRILOSEC) 20 MG capsule, TAKE 2 CAPSULES ONE TIME DAILY, Disp: 180 capsule, Rfl: 3    oxybutynin (DITROPAN) 5 MG Tab, Take 1 tablet (5 mg total) by mouth 2 (two) times daily., Disp: 180 tablet, Rfl: 12    pravastatin (PRAVACHOL) 40 MG tablet, Take 1 tablet (40 mg total) by mouth once daily., Disp: 90 tablet, Rfl: 4    sub-q insulin device, 20 unit (VGO 20) Nury, 1 Device by Misc.(Non-Drug; Combo Route) route once daily., Disp: 30 Device, Rfl: 4    tadalafil (CIALIS) 5  "MG tablet, Take 1 tablet (5 mg total) by mouth daily as needed for Erectile Dysfunction., Disp: 30 tablet, Rfl: 6    tamsulosin (FLOMAX) 0.4 mg Cp24, TAKE 1 CAPSULE EVERY DAY, Disp: 90 capsule, Rfl: 3    tizanidine (ZANAFLEX) 4 MG tablet, 1/2-1 tablet every 8 hours as needed for muscle spasm, Disp: 90 tablet, Rfl: 1  ALLERGIES:   Review of patient's allergies indicates:   Allergen Reactions    Penicillins      Knees locked up       VITAL SIGNS:   Ht 5' 10" (1.778 m)   Wt 88 kg (194 lb)   BMI 27.84 kg/m²      PHYSICAL EXAMINATION    General:  The patient is alert and oriented x 3.  Mood is pleasant.  Observation of ears, eyes and nose reveal no gross abnormalities.  No labored breathing observed.    Bilateral KNEE EXAMINATION     OBSERVATION / INSPECTION   Gait:   Antalgic   Alignment:  Neutral   Scars:   None   Muscle atrophy: Mild  Effusion:  None   Warmth:  None   Discoloration:   none     TENDERNESS / CREPITUS (T / C):          T / C      T / C   Patella   - / -   Lateral joint line   + / -   Peripatellar medial  -  Medial joint line    - / -   Peripatellar lateral -  Medial plica   - / -   Patellar tendon -   Popliteal fossa   - / -   Quad tendon   -   Gastrocnemius   -   Prepatellar Bursa - / -   Quadricep   -   Tibial tubercle  -  Thigh/hamstring  -   Pes anserine/HS -  Fibula    -   ITB   - / -  Tibia     -   Tib/fib joint  - / -  LCL    -     MFC   - / -   MCL: Proximal  -    LFC   - / -    Distal   -          ROM: (* = pain)  PASSIVE   ACTIVE    Left :   5 / 0 / 145   5 / 0 / 145     Right :    5 / 0 / 145   5 / 0 / 145    Patellofemoral examination:  See above noted areas of tenderness.   Patella position    Subluxation / dislocation: Centered           Sup. / Inf;   Normal   Crepitus (PF):    Absent   Patellar Mobility:       Medial-lateral:   Normal    Superior-inferior:  Normal    Inferior tilt   Normal    Patellar tendon:  Normal   Lateral tilt:    Normal   J-sign:     None   Patellofemoral " grind:   No pain       MENISCAL SIGNS:     Pain on terminal extension:  + (left and right)  Pain on terminal flexion:  + (left)  Sofiyas maneuver:  - for pain  Squat     + posterior joint pain    LIGAMENT EXAMINATION:  ACL / Lachman:  negative   PCL-Post.  drawer: normal 0 to 2mm  MCL- Valgus:  normal 0 to 2mm  LCL- Varus:  normal 0 to 2mm  Pivot shift: Negative  Dial Test: difference c/w other side   At 30° flexion: normal (< 5°)    At 90° flexion: normal (< 5°)   Reverse Pivot Shift:   normal (Equal)     STRENGTH: (* = with pain) PAINFUL SIDE   Quadricep   5/5   Hamstrin/5    EXTREMITY NEURO-VASCULAR EXAMINATION:   Sensation:  Grossly intact to light touch all dermatomal regions.   Motor Function:  Fully intact motor function at hip, knee, foot and ankle    DTRs;  quadriceps and  achilles 2+.  No clonus and downgoing Babinski.    Vascular status:  DP and PT pulses 2+, brisk capillary refill, symmetric.     XRAYS(17): There are degenerative changes of both knees most severe in the medial compartments.    ASSESSMENT:    Bilateral knee pain    PLAN:   1. Bilateral cortisone injection today   Injection Procedure  A time out was performed, including verification of patient ID, procedure, site and side, availability of information and equipment, review of safety issues, and agreement with consent, the procedure site was marked.    After time out was performed, the patient was prepped aseptically with povidone-iodine swabsticks. A diagnostic and therapeutic injection of 3:1cc marcaine:kenalog was given under sterile technique using a 22.5 gauge needle from the Superolateral  aspect of the bilateral Knee Joint in the supine position.      Emerson Menendez had no adverse reactions to the medication. Pain decreased. He was instructed to apply ice to the joint for 20 minutes and avoid strenuous activities for 24-36 hours following the injection. He was warned of possible blood sugar and/or blood pressure  changes during that time. Following that time, he can resume regular activities.    He was reminded to call the clinic immediately for any adverse side effects as explained in clinic today.    2. Follow up in 6 months, card provided  3. Handicap pass filled out

## 2018-04-03 LAB
ALBUMIN SERPL ELPH-MCNC: 3.88 G/DL
ALPHA1 GLOB SERPL ELPH-MCNC: 0.27 G/DL
ALPHA2 GLOB SERPL ELPH-MCNC: 0.73 G/DL
B-GLOBULIN SERPL ELPH-MCNC: 2.5 G/DL
GAMMA GLOB SERPL ELPH-MCNC: 0.52 G/DL
INTERPRETATION SERPL IFE-IMP: NORMAL
KAPPA LC SER QL IA: 5.39 MG/DL
KAPPA LC/LAMBDA SER IA: 3.39
LAMBDA LC SER QL IA: 1.59 MG/DL
PATHOLOGIST INTERPRETATION IFE: NORMAL
PATHOLOGIST INTERPRETATION SPE: NORMAL
PROT SERPL-MCNC: 7.9 G/DL

## 2018-04-09 ENCOUNTER — TELEPHONE (OUTPATIENT)
Dept: RESEARCH | Facility: HOSPITAL | Age: 73
End: 2018-04-09

## 2018-04-09 NOTE — TELEPHONE ENCOUNTER
Monday, April 9th, 2018    Telephone    Second attempt to contact patient to schedule next cycle's appt with Dr. Engel, due around 4/30/18.  No answer, VM left.  Will follow.

## 2018-04-11 ENCOUNTER — LAB VISIT (OUTPATIENT)
Dept: LAB | Facility: HOSPITAL | Age: 73
End: 2018-04-11
Attending: INTERNAL MEDICINE
Payer: MEDICARE

## 2018-04-11 DIAGNOSIS — E11.42 TYPE 2 DIABETES MELLITUS WITH DIABETIC POLYNEUROPATHY, WITH LONG-TERM CURRENT USE OF INSULIN: Chronic | ICD-10-CM

## 2018-04-11 DIAGNOSIS — Z79.4 TYPE 2 DIABETES MELLITUS WITH DIABETIC POLYNEUROPATHY, WITH LONG-TERM CURRENT USE OF INSULIN: Chronic | ICD-10-CM

## 2018-04-11 DIAGNOSIS — E11.65 TYPE 2 DIABETES MELLITUS WITH HYPERGLYCEMIA, UNSPECIFIED LONG TERM INSULIN USE STATUS: ICD-10-CM

## 2018-04-11 LAB
ALBUMIN SERPL BCP-MCNC: 3.9 G/DL
ALP SERPL-CCNC: 51 U/L
ALT SERPL W/O P-5'-P-CCNC: 21 U/L
ANION GAP SERPL CALC-SCNC: 12 MMOL/L
AST SERPL-CCNC: 13 U/L
BILIRUB SERPL-MCNC: 0.7 MG/DL
BUN SERPL-MCNC: 19 MG/DL
CALCIUM SERPL-MCNC: 10.2 MG/DL
CHLORIDE SERPL-SCNC: 98 MMOL/L
CHOLEST SERPL-MCNC: 209 MG/DL
CHOLEST/HDLC SERPL: 6.5 {RATIO}
CO2 SERPL-SCNC: 26 MMOL/L
CREAT SERPL-MCNC: 1.6 MG/DL
EST. GFR  (AFRICAN AMERICAN): 49 ML/MIN/1.73 M^2
EST. GFR  (NON AFRICAN AMERICAN): 43 ML/MIN/1.73 M^2
ESTIMATED AVG GLUCOSE: 171 MG/DL
ESTIMATED AVG GLUCOSE: 171 MG/DL
GLUCOSE SERPL-MCNC: 231 MG/DL
HBA1C MFR BLD HPLC: 7.6 %
HBA1C MFR BLD HPLC: 7.6 %
HDLC SERPL-MCNC: 32 MG/DL
HDLC SERPL: 15.3 %
LDLC SERPL CALC-MCNC: 110.6 MG/DL
NONHDLC SERPL-MCNC: 177 MG/DL
POTASSIUM SERPL-SCNC: 4.4 MMOL/L
PROT SERPL-MCNC: 8.9 G/DL
SODIUM SERPL-SCNC: 136 MMOL/L
TRIGL SERPL-MCNC: 332 MG/DL
TSH SERPL DL<=0.005 MIU/L-ACNC: 1.33 UIU/ML

## 2018-04-11 PROCEDURE — 80061 LIPID PANEL: CPT

## 2018-04-11 PROCEDURE — 84443 ASSAY THYROID STIM HORMONE: CPT

## 2018-04-11 PROCEDURE — 36415 COLL VENOUS BLD VENIPUNCTURE: CPT

## 2018-04-11 PROCEDURE — 80053 COMPREHEN METABOLIC PANEL: CPT

## 2018-04-11 PROCEDURE — 83036 HEMOGLOBIN GLYCOSYLATED A1C: CPT

## 2018-04-18 ENCOUNTER — OFFICE VISIT (OUTPATIENT)
Dept: ENDOCRINOLOGY | Facility: CLINIC | Age: 73
End: 2018-04-18
Payer: MEDICARE

## 2018-04-18 VITALS
DIASTOLIC BLOOD PRESSURE: 64 MMHG | SYSTOLIC BLOOD PRESSURE: 126 MMHG | HEART RATE: 69 BPM | BODY MASS INDEX: 27.15 KG/M2 | WEIGHT: 189.63 LBS | HEIGHT: 70 IN

## 2018-04-18 DIAGNOSIS — E78.5 HYPERLIPIDEMIA, UNSPECIFIED HYPERLIPIDEMIA TYPE: ICD-10-CM

## 2018-04-18 DIAGNOSIS — Z79.4 CONTROLLED TYPE 2 DIABETES MELLITUS WITH OTHER DIABETIC KIDNEY COMPLICATION, WITH LONG-TERM CURRENT USE OF INSULIN: Primary | ICD-10-CM

## 2018-04-18 DIAGNOSIS — E11.29 CONTROLLED TYPE 2 DIABETES MELLITUS WITH OTHER DIABETIC KIDNEY COMPLICATION, WITH LONG-TERM CURRENT USE OF INSULIN: Primary | ICD-10-CM

## 2018-04-18 PROCEDURE — 99999 PR PBB SHADOW E&M-EST. PATIENT-LVL IV: CPT | Mod: PBBFAC,,, | Performed by: INTERNAL MEDICINE

## 2018-04-18 PROCEDURE — 99214 OFFICE O/P EST MOD 30 MIN: CPT | Mod: S$GLB,,, | Performed by: INTERNAL MEDICINE

## 2018-04-18 PROCEDURE — 3045F PR MOST RECENT HEMOGLOBIN A1C LEVEL 7.0-9.0%: CPT | Mod: CPTII,S$GLB,, | Performed by: INTERNAL MEDICINE

## 2018-04-18 PROCEDURE — 3078F DIAST BP <80 MM HG: CPT | Mod: CPTII,S$GLB,, | Performed by: INTERNAL MEDICINE

## 2018-04-18 PROCEDURE — 3074F SYST BP LT 130 MM HG: CPT | Mod: CPTII,S$GLB,, | Performed by: INTERNAL MEDICINE

## 2018-04-18 NOTE — PROGRESS NOTES
Mr. Damon is a 72 y.o. M with history of DM2, Hl, multiple myeloma, CKD, here for followup. Lives with girlfriend who cooks for him.     Longstanding Dm2.     Recently switched to vGO20 (and glimepiride 2mg daily). Takes 0-2 clicks w meals (gives 1-2 clicks if sugars are in high 100s or more), used to give more but had hypoglycemia down to 50s months ago.  2 weeks ago got knee steroid injection.    FS here 130s.      Reviewed log - checking glucoses 4 times daily - FS in morning in low to mid 100s, later in the day FS low to mid 100s as well.  No low sugars recently, no episodes of diaphoresis or tremulousness.  In last 2 weeks some FS in 200s but pt feels due to steroid injections.     In MM study at [facility].     Last ophtho visit late 2017.     Has numbness of feet on gabapentin, takes as needed.         Component      Latest Ref Rng & Units 4/11/2018 1/15/2018   eGFR if African American      >60 mL/min/1.73 m:2 49 (A)    eGFR if non African American      >60 mL/min/1.73 m:2 43 (A)    Cholesterol      120 - 199 mg/dL 209 (H)    Triglycerides      30 - 150 mg/dL 332 (H)    HDL      40 - 75 mg/dL 32 (L)    LDL Cholesterol      63.0 - 159.0 mg/dL 110.6    HDL/Chol Ratio      20.0 - 50.0 % 15.3 (L)    Total Cholesterol/HDL Ratio      2.0 - 5.0 6.5 (H)    Non-HDL Cholesterol      mg/dL 177    Microalbum.,U,Random      ug/mL 323.0    Creatinine, Random Ur      23.0 - 375.0 mg/dL 64.0    Microalb Creat Ratio      0.0 - 30.0 ug/mg 504.7 (H)    Hemoglobin A1C      4.0 - 5.6 % 7.6 (H) 7.4 (H)   Estimated Avg Glucose      68 - 131 mg/dL 171 (H) 166 (H)   TSH      0.400 - 4.000 uIU/mL 1.328      Past Medical History:   Diagnosis Date    Anxiety 3/16/2015    BPH (benign prostatic hypertrophy) 9/24/2012    Depression 3/16/2015    GERD (gastroesophageal reflux disease) 9/24/2012    Microalbuminuria 9/24/2012    Osteoarthritis of cervical spine 9/24/2012    Osteoarthritis of knee 9/24/2012    Type II or unspecified type  "diabetes mellitus without mention of complication, not stated as uncontrolled 9/24/2012        reports that he has never smoked. He has never used smokeless tobacco. He reports that he does not drink alcohol or use drugs.    Family History   Problem Relation Age of Onset    Hypertension Brother     Kidney failure Daughter      due to medication    Blindness Neg Hx     Cancer Neg Hx     Cataracts Neg Hx     Diabetes Neg Hx     Glaucoma Neg Hx     Macular degeneration Neg Hx     Retinal detachment Neg Hx     Strabismus Neg Hx     Stroke Neg Hx     Thyroid disease Neg Hx        Past Surgical History:   Procedure Laterality Date    CATARACT EXTRACTION  2012    Right eye    PROSTATE SURGERY         Patient's Medications   New Prescriptions    No medications on file   Previous Medications    ACCU-CHEK OMAYRA MISC    USE AS DIRECTED    ALBUTEROL 90 MCG/ACTUATION INHALER    Inhale 2 puffs into the lungs every 6 (six) hours as needed for Wheezing.    ASPIRIN (ECOTRIN) 81 MG EC TABLET    Take 1 tablet (81 mg total) by mouth once daily.    BD INSULIN PEN NEEDLE UF SHORT 31 GAUGE X 5/16" NDLE    USE TO INJECT INSULIN ONE TIME DAILY    BLOOD GLUCOSE CONTROL, NORMAL (METER-CHECK) SOLN    One meter. True test meter. Use as directed    BLOOD SUGAR DIAGNOSTIC STRP    Omayra Meter Check twice daily    CHOLECALCIFEROL, VITAMIN D3, (VITAMIN D) 2,000 UNIT CAP    Take by mouth. 1 Capsule Oral Every day.  over the counter    CITALOPRAM (CELEXA) 20 MG TABLET    TAKE 1 TABLET EVERY EVENING AFTER DINNER    DIAZEPAM (VALIUM) 5 MG TABLET    TAKE 1 TABLET EVERY 6 HOURS AS NEEDED    DICLOFENAC SODIUM (VOLTAREN) 1 % GEL    Apply 2 g topically 2 (two) times daily. To the anterior left knee prn pain    GABAPENTIN (NEURONTIN) 100 MG CAPSULE    TAKE 1 CAPSULE EVERY MORNING, 1 CAPSULE IN THE AFTERNOON, AND 1 TO 3 CAPSULE(S) AT BEDTIME AS NEEDED FOR FOOT PAIN    GLIMEPIRIDE (AMARYL) 2 MG TABLET    daily with breakfast.     GLUCAGON (HUMAN " "RECOMBINANT) INJ 1MG/ML KIT    Inject 1 mL (1 mg total) into the muscle as needed.    HYDROCODONE-ACETAMINOPHEN 5-325MG (NORCO) 5-325 MG PER TABLET    Take 1 tablet by mouth every 4 to 6 hours as needed for Pain.    INSULIN REGULAR 100 UNIT/ML INJ INJECTION    To use with Vgo 20 with 2 clicks with meals.  * Use Relion brand regular insulin* Needs 2 vials monthly.    LANCETS MISC    Use twice daily    LANTUS SOLOSTAR 100 UNIT/ML (3 ML) INPN PEN    INJECT 18 UNITS UNDER THE SKIN  QPM    LATANOPROST 0.005 % OPHTHALMIC SOLUTION    INSTILL 1 DROP INTO BOTH EYES EVERY EVENING    LOSARTAN (COZAAR) 25 MG TABLET    Take 1 tablet (25 mg total) by mouth once daily.    NOVOLOG FLEXPEN U-100 INSULIN 100 UNIT/ML INPN PEN        OMEPRAZOLE (PRILOSEC) 20 MG CAPSULE    TAKE 2 CAPSULES ONE TIME DAILY    OXYBUTYNIN (DITROPAN) 5 MG TAB    Take 1 tablet (5 mg total) by mouth 2 (two) times daily.    PRAVASTATIN (PRAVACHOL) 40 MG TABLET    Take 1 tablet (40 mg total) by mouth once daily.    SUB-Q INSULIN DEVICE, 20 UNIT (VGO 20) JARETH    1 Device by Misc.(Non-Drug; Combo Route) route once daily.    TADALAFIL (CIALIS) 5 MG TABLET    Take 1 tablet (5 mg total) by mouth daily as needed for Erectile Dysfunction.    TAMSULOSIN (FLOMAX) 0.4 MG CP24    TAKE 1 CAPSULE EVERY DAY    TIZANIDINE (ZANAFLEX) 4 MG TABLET    1/2-1 tablet every 8 hours as needed for muscle spasm   Modified Medications    No medications on file   Discontinued Medications    No medications on file         Review of Systems:  General: no fevers  Eyes: no vision changes  ENT: no sore throat  Lung: no sob  CV: no palpitations  GI: no nausea   : no dysuria   Endo: no heat intolerance  Heme: no easy bruising   MSK: no joint swelling        /64   Pulse 69   Ht 5' 10" (1.778 m)   Wt 86 kg (189 lb 9.5 oz)   BMI 27.20 kg/m²     Physical Exam:  General: normal body habitus, not in acute distress   Eyes: anicteric, no proptosis   ENT: no facial lesions, no oral lesions "   Neck: no masses, no LAD   Lung; ctabl, no wheezing or stridor   GI: normal bowel sounds, nondistended   CV: RRR, no rubs or murmurs   Skin: exposed skin without bruising or bleeding   Ext: no peripheral edema or erythema  Neuro: AOx3, moving all extremities, normal gait     Labs:    Chemistry        Component Value Date/Time     04/11/2018 0837    K 4.4 04/11/2018 0837    CL 98 04/11/2018 0837    CO2 26 04/11/2018 0837    BUN 19 04/11/2018 0837    CREATININE 1.6 (H) 04/11/2018 0837     (H) 04/11/2018 0837        Component Value Date/Time    CALCIUM 10.2 04/11/2018 0837    ALKPHOS 51 (L) 04/11/2018 0837    AST 13 04/11/2018 0837    ALT 21 04/11/2018 0837    BILITOT 0.7 04/11/2018 0837    ESTGFRAFRICA 49 (A) 04/11/2018 0837    EGFRNONAA 43 (A) 04/11/2018 0837          Lab Results   Component Value Date    WBC 5.98 04/02/2018    HGB 11.4 (L) 04/02/2018    HCT 34.6 (L) 04/02/2018    MCV 89 04/02/2018     04/02/2018        Lab Results   Component Value Date    HDL 32 (L) 04/11/2018    HDL 25 (L) 05/11/2017    HDL 23 (L) 11/21/2016     Lab Results   Component Value Date    LDLCALC 110.6 04/11/2018    LDLCALC 50.0 (L) 05/11/2017    LDLCALC Invalid, Trig>400.0 11/21/2016     Lab Results   Component Value Date    TRIG 332 (H) 04/11/2018    TRIG 280 (H) 05/11/2017    TRIG 513 (H) 11/21/2016     Lab Results   Component Value Date    CHOLHDL 15.3 (L) 04/11/2018    CHOLHDL 19.1 (L) 05/11/2017    CHOLHDL 12.3 (L) 11/21/2016       Hemoglobin A1C   Date Value Ref Range Status   04/11/2018 7.6 (H) 4.0 - 5.6 % Final     Comment:     According to ADA guidelines, hemoglobin A1c <7.0% represents  optimal control in non-pregnant diabetic patients. Different  metrics may apply to specific patient populations.   Standards of Medical Care in Diabetes-2016.  For the purpose of screening for the presence of diabetes:  <5.7%     Consistent with the absence of diabetes  5.7-6.4%  Consistent with increasing risk for  diabetes   (prediabetes)  >or=6.5%  Consistent with diabetes  Currently, no consensus exists for use of hemoglobin A1c  for diagnosis of diabetes for children.  This Hemoglobin A1c assay has significant interference with fetal   hemoglobin   (HbF). The results are invalid for patients with abnormal amounts of   HbF,   including those with known Hereditary Persistence   of Fetal Hemoglobin. Heterozygous hemoglobin variants (HbAS, HbAC,   HbAD, HbAE, HbA2) do not significantly interfere with this assay;   however, presence of multiple variants in a sample may impact the %   interference.     04/11/2018 7.6 (H) 4.0 - 5.6 % Final     Comment:     According to ADA guidelines, hemoglobin A1c <7.0% represents  optimal control in non-pregnant diabetic patients. Different  metrics may apply to specific patient populations.   Standards of Medical Care in Diabetes-2016.  For the purpose of screening for the presence of diabetes:  <5.7%     Consistent with the absence of diabetes  5.7-6.4%  Consistent with increasing risk for diabetes   (prediabetes)  >or=6.5%  Consistent with diabetes  Currently, no consensus exists for use of hemoglobin A1c  for diagnosis of diabetes for children.  This Hemoglobin A1c assay has significant interference with fetal   hemoglobin   (HbF). The results are invalid for patients with abnormal amounts of   HbF,   including those with known Hereditary Persistence   of Fetal Hemoglobin. Heterozygous hemoglobin variants (HbAS, HbAC,   HbAD, HbAE, HbA2) do not significantly interfere with this assay;   however, presence of multiple variants in a sample may impact the %   interference.     01/15/2018 7.4 (H) 4.0 - 5.6 % Final     Comment:     According to ADA guidelines, hemoglobin A1c <7.0% represents  optimal control in non-pregnant diabetic patients. Different  metrics may apply to specific patient populations.   Standards of Medical Care in Diabetes-2016.  For the purpose of screening for the presence  of diabetes:  <5.7%     Consistent with the absence of diabetes  5.7-6.4%  Consistent with increasing risk for diabetes   (prediabetes)  >or=6.5%  Consistent with diabetes  Currently, no consensus exists for use of hemoglobin A1c  for diagnosis of diabetes for children.  This Hemoglobin A1c assay has significant interference with fetal   hemoglobin   (HbF). The results are invalid for patients with abnormal amounts of   HbF,   including those with known Hereditary Persistence   of Fetal Hemoglobin. Heterozygous hemoglobin variants (HbAS, HbAC,   HbAD, HbAE, HbA2) do not significantly interfere with this assay;   however, presence of multiple variants in a sample may impact the %   interference.         Lab Results   Component Value Date    TSH 1.328 04/11/2018     Assessment and Plan:      Mr. Damon with history of DM2, Hl, multiple myeloma, CKD, here for followup.    Dm2:   A1c appropriate - continue to check FS 4 times daily  Continue glimperide 2mg daily and VGO20 0-2 clicks w meals - I offered to switch to lantus as pt doesn't seem to be  Using much clicks however he prefers the option of giving clicks if needed  Pt interested in Leah meter     Hl:  LDL likely acceptable on pravastatin given his age    RTC 3months DM educator  RTC 6 monthsn w labs      David Coleman M.D.  Endocrinology

## 2018-04-30 ENCOUNTER — RESEARCH ENCOUNTER (OUTPATIENT)
Dept: RESEARCH | Facility: HOSPITAL | Age: 73
End: 2018-04-30

## 2018-04-30 ENCOUNTER — LAB VISIT (OUTPATIENT)
Dept: LAB | Facility: HOSPITAL | Age: 73
End: 2018-04-30
Payer: MEDICARE

## 2018-04-30 ENCOUNTER — OFFICE VISIT (OUTPATIENT)
Dept: HEMATOLOGY/ONCOLOGY | Facility: CLINIC | Age: 73
End: 2018-04-30
Payer: MEDICARE

## 2018-04-30 VITALS
DIASTOLIC BLOOD PRESSURE: 72 MMHG | BODY MASS INDEX: 27.49 KG/M2 | TEMPERATURE: 98 F | WEIGHT: 192 LBS | RESPIRATION RATE: 18 BRPM | OXYGEN SATURATION: 95 % | HEART RATE: 95 BPM | HEIGHT: 70 IN | SYSTOLIC BLOOD PRESSURE: 138 MMHG

## 2018-04-30 DIAGNOSIS — E11.22 CKD STAGE 3 DUE TO TYPE 2 DIABETES MELLITUS: ICD-10-CM

## 2018-04-30 DIAGNOSIS — Z00.6 EXAMINATION OF PARTICIPANT IN CLINICAL TRIAL: ICD-10-CM

## 2018-04-30 DIAGNOSIS — Z79.4 TYPE 2 DIABETES MELLITUS WITH DIABETIC POLYNEUROPATHY, WITH LONG-TERM CURRENT USE OF INSULIN: ICD-10-CM

## 2018-04-30 DIAGNOSIS — E11.42 TYPE 2 DIABETES MELLITUS WITH DIABETIC POLYNEUROPATHY, WITH LONG-TERM CURRENT USE OF INSULIN: ICD-10-CM

## 2018-04-30 DIAGNOSIS — D47.2 SMOLDERING MULTIPLE MYELOMA: Primary | ICD-10-CM

## 2018-04-30 DIAGNOSIS — N18.30 CKD STAGE 3 DUE TO TYPE 2 DIABETES MELLITUS: ICD-10-CM

## 2018-04-30 DIAGNOSIS — D47.2 SMOLDERING MULTIPLE MYELOMA: ICD-10-CM

## 2018-04-30 LAB
ALBUMIN SERPL BCP-MCNC: 3.7 G/DL
ALP SERPL-CCNC: 48 U/L
ALT SERPL W/O P-5'-P-CCNC: 29 U/L
ANION GAP SERPL CALC-SCNC: 10 MMOL/L
AST SERPL-CCNC: 20 U/L
BASOPHILS # BLD AUTO: 0.04 K/UL
BASOPHILS NFR BLD: 0.8 %
BILIRUB SERPL-MCNC: 0.6 MG/DL
BUN SERPL-MCNC: 16 MG/DL
CALCIUM SERPL-MCNC: 10.4 MG/DL
CHLORIDE SERPL-SCNC: 102 MMOL/L
CO2 SERPL-SCNC: 25 MMOL/L
CREAT SERPL-MCNC: 1.6 MG/DL
DIFFERENTIAL METHOD: ABNORMAL
EOSINOPHIL # BLD AUTO: 0.1 K/UL
EOSINOPHIL NFR BLD: 2.5 %
ERYTHROCYTE [DISTWIDTH] IN BLOOD BY AUTOMATED COUNT: 13.6 %
EST. GFR  (AFRICAN AMERICAN): 49.4 ML/MIN/1.73 M^2
EST. GFR  (NON AFRICAN AMERICAN): 42.7 ML/MIN/1.73 M^2
GLUCOSE SERPL-MCNC: 170 MG/DL
HCT VFR BLD AUTO: 37.8 %
HGB BLD-MCNC: 12.3 G/DL
IGA SERPL-MCNC: 1699 MG/DL
IGG SERPL-MCNC: 935 MG/DL
IGM SERPL-MCNC: 43 MG/DL
IMM GRANULOCYTES # BLD AUTO: 0.01 K/UL
IMM GRANULOCYTES NFR BLD AUTO: 0.2 %
LYMPHOCYTES # BLD AUTO: 1.7 K/UL
LYMPHOCYTES NFR BLD: 32.5 %
MAGNESIUM SERPL-MCNC: 1.8 MG/DL
MCH RBC QN AUTO: 28.7 PG
MCHC RBC AUTO-ENTMCNC: 32.5 G/DL
MCV RBC AUTO: 88 FL
MONOCYTES # BLD AUTO: 0.4 K/UL
MONOCYTES NFR BLD: 6.7 %
NEUTROPHILS # BLD AUTO: 3 K/UL
NEUTROPHILS NFR BLD: 57.3 %
NRBC BLD-RTO: 0 /100 WBC
PHOSPHATE SERPL-MCNC: 3 MG/DL
PLATELET # BLD AUTO: 247 K/UL
PMV BLD AUTO: 9.1 FL
POTASSIUM SERPL-SCNC: 4.3 MMOL/L
PROT SERPL-MCNC: 8.7 G/DL
RBC # BLD AUTO: 4.28 M/UL
SODIUM SERPL-SCNC: 137 MMOL/L
WBC # BLD AUTO: 5.23 K/UL

## 2018-04-30 PROCEDURE — 84100 ASSAY OF PHOSPHORUS: CPT

## 2018-04-30 PROCEDURE — 86334 IMMUNOFIX E-PHORESIS SERUM: CPT

## 2018-04-30 PROCEDURE — 86334 IMMUNOFIX E-PHORESIS SERUM: CPT | Mod: 26,Q1,, | Performed by: PATHOLOGY

## 2018-04-30 PROCEDURE — 36415 COLL VENOUS BLD VENIPUNCTURE: CPT | Mod: Q1

## 2018-04-30 PROCEDURE — 82784 ASSAY IGA/IGD/IGG/IGM EACH: CPT | Mod: 59,Q1

## 2018-04-30 PROCEDURE — 84165 PROTEIN E-PHORESIS SERUM: CPT

## 2018-04-30 PROCEDURE — 83735 ASSAY OF MAGNESIUM: CPT

## 2018-04-30 PROCEDURE — 83520 IMMUNOASSAY QUANT NOS NONAB: CPT | Mod: 59

## 2018-04-30 PROCEDURE — 99215 OFFICE O/P EST HI 40 MIN: CPT | Mod: Q1,S$GLB,, | Performed by: INTERNAL MEDICINE

## 2018-04-30 PROCEDURE — 80053 COMPREHEN METABOLIC PANEL: CPT | Mod: Q1

## 2018-04-30 PROCEDURE — 84165 PROTEIN E-PHORESIS SERUM: CPT | Mod: 26,Q1,, | Performed by: PATHOLOGY

## 2018-04-30 PROCEDURE — 99999 PR PBB SHADOW E&M-EST. PATIENT-LVL III: CPT | Mod: PBBFAC,,, | Performed by: INTERNAL MEDICINE

## 2018-04-30 PROCEDURE — 85025 COMPLETE CBC W/AUTO DIFF WBC: CPT

## 2018-04-30 PROCEDURE — 82784 ASSAY IGA/IGD/IGG/IGM EACH: CPT

## 2018-04-30 NOTE — PROGRESS NOTES
"  Monday April 30th, 2018      Protocol: [id number] Randomized Phase III Trial of Lenalidomide vs Observation Alone in Patients with 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.   Sponsor:  [facility]  IRB #: [id number]  Investigator: GIL Engel  Pt Initials: LUCÍA LORENZ       Arm B: Observation  Cycle 28, Day 28        Patient presents to clinic to evaluate ability to proceed with Cycle 29 of observation per above-mentioned protocol. He states that he has been doing well since last month.  He continues to wear insulin pump per his endocrinologist but continues to self-manage the extra injections. \He denies new potential CRAB symptoms.  He states that he has started working out. He states continued willingness to participate in above-mentioned study.    Review of Baseline AE's:    *Please note this list is not all-inclusive, please see AE log for physician reviewed list of adverse events.   1. Hypertension, grade 1: BP today is 138/72  in clinic today.  Will continue to follow this. AE worsened from baseline currently.(Grade 1) AE returned to baseline   2. Lower Back Pain and Neck Pain, grade 1: Patient has no complaints of this issue this month. As previously stated, patient is not suspected to have bony myeloma involvement per Dr. Engel as these are pre-existing issues present at baseline.  AE ongoing.  3.  Pain in extremity (knee), grade 1.  Continues to wear knee stabilizing braces today. States that getting his "injection" has helped,  AE ongoing  4. Peripheral sensory neuropathy, grade 1: Patient does not verbalize complaints of this symptom today. Continues to take neurontin for this per Dr. Sagastume.  He states when remembers to take it, it helps. AE ongoing.   5. Anemia, Grade 1: Hgb/Hct - 12.3/37.8     Review of AE's:     *Please note this list is not all-inclusive, please see AE log for physician reviewed list of adverse events.   1. Creatinine increased, grade 1: Serum creatinine is at 1.6 mg/dl today and " "GFR 5217. As previously stated, Dr. Engel states this has not been related to myeloma and is related chronic kidney issues likely secondary to diabetes and hypertension.  Will continue observation monthly and to monitor renal function closely. AE stable, ongoing.     Per study chair, "the definition of progressive disease for this protocol is developing CRAB criteria that requires treatment systemically." Per Dr Engel, based on today's exam and lab results thus far, patient does not have any s/s of CRAB: Normal Calcium level (9.9), absence of Renal insufficiency (serum creatinine 1.5, and GFR 56.54 per [facility]ro[facility]-Gault--patient with a history of kidney dysfunction secondary to diabetes; Dr. Engel aware of these values and not concerned for myeloma involvement), absence of significant Anemia (hemoglobin 11.6, stable from baseline hgb level at study entry) and absence of lytic Bone lesions (per baseline metastatic survey as well as MRI--see MD note for further comment). See MD note for ECOG score and H&P and flowsheets for laboratory work, vitals, etc. All myeloma-related blood work from last cycle including SPEP and JAGUAR have remained stable, and are currently pending for this cycle. Per Dr. Engel, patient shows no evidence of progression at last result. Patient informed that Dr. Engel will release all lab results to [facility]. He states understanding of this. QOL's not required at this timepoint.        Next appts scheduled and given to patient.  Patient states he should not have any conflicts with next month's appt.  Patient was given Research RN's contact information and has MD contact information to call with any concerns, questions, or worsening of symptoms; he verbalized understanding.                                  "

## 2018-05-01 LAB
ALBUMIN SERPL ELPH-MCNC: 4.07 G/DL
ALPHA1 GLOB SERPL ELPH-MCNC: 0.27 G/DL
ALPHA2 GLOB SERPL ELPH-MCNC: 0.78 G/DL
B-GLOBULIN SERPL ELPH-MCNC: 2.78 G/DL
GAMMA GLOB SERPL ELPH-MCNC: 0.5 G/DL
INTERPRETATION SERPL IFE-IMP: NORMAL
KAPPA LC SER QL IA: 5.13 MG/DL
KAPPA LC/LAMBDA SER IA: 3.27
LAMBDA LC SER QL IA: 1.57 MG/DL
PATHOLOGIST INTERPRETATION IFE: NORMAL
PATHOLOGIST INTERPRETATION SPE: NORMAL
PROT SERPL-MCNC: 8.4 G/DL

## 2018-05-01 NOTE — PROGRESS NOTES
Subjective:       Patient ID: Emerson Menendez is a 72 y.o. male.    Chief Complaint: Smoldering multiple myeloma and Results  The patient is a very pleasant 70 year old man who returns today after completing his evaluation for enrollment in the ECOG-ACRIN study of the Randomized Phase III Trial of Lenalidomide Versus Observation Alone in Patients with 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. Test results identify a stable IgA kappa protein with beta globulin band at 1.63g/dL. Kappa free light chain is elevated at 4.40. CBC and calcium are stable. Creatinine with history of stage III CKD is stable at 1.4. Metastatic survey is negative for lytic lesions. MRI of the entire spine demonstrated age related changes but no convincing evidence of myeloma bone disease. Bone marrow biopsy identified about 13% plasma cells by morphology and FISH for myeloma identified a t(11;14) and trisomies 3,7, and 17. The patient is afebrile and appears clinically well. He was seen by his podiatrist and nephrologist since our last visit without any new or acute events.    The patient has not received any therapy for smoldering myeloma including bisphosphonates or steroids. He has been randomized to observation arm of the the clinical study. 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 He has no current or prior history of malignancy.    TODAY  Mr. Menendez returns today for monthly follow-up evaluation. He denies any acute interval events since last interview.   No clinical signs/symptoms of progression of his smoldering myeloma.        HPI  Review of Systems   Constitutional: Negative for activity change, appetite change, diaphoresis, fatigue, fever and unexpected weight change.   HENT: Negative.    Eyes: Negative.    Respiratory: Negative.    Cardiovascular: Negative for leg swelling.   Gastrointestinal:  Negative.    Endocrine: Negative.    Genitourinary: Negative.    Musculoskeletal: Negative.    Skin: Negative.    Allergic/Immunologic: Negative.    Neurological:        Grade 0-1 peripheral neuropathy of bilateral feet.    Hematological: Negative for adenopathy. Does not bruise/bleed easily.   Psychiatric/Behavioral: Negative.        Objective:       Vitals:    04/30/18 1354   BP: 138/72   Pulse: 95   Resp: 18   Temp: 97.7 °F (36.5 °C)       Physical Exam   Constitutional: He is oriented to person, place, and time. He appears well-developed and well-nourished.   HENT:   Head: Normocephalic and atraumatic.   Right Ear: External ear normal.   Left Ear: External ear normal.   Nose: Nose normal.   Mouth/Throat: Oropharynx is clear and moist.   Eyes: Conjunctivae and EOM are normal. Pupils are equal, round, and reactive to light.   Neck: Normal range of motion. Neck supple.   Cardiovascular: Normal rate, regular rhythm and intact distal pulses.    No murmur heard.  Pulmonary/Chest: Effort normal and breath sounds normal. No respiratory distress.   Abdominal: Soft. Bowel sounds are normal. He exhibits no distension. There is no hepatosplenomegaly.   Musculoskeletal: He exhibits no edema or tenderness.   Neurological: He is alert and oriented to person, place, and time. No cranial nerve deficit.   Skin: Skin is warm and dry. No rash noted. No cyanosis. Nails show no clubbing.   Psychiatric: He has a normal mood and affect. His behavior is normal.   Nursing note and vitals reviewed.      Assessment:       1. Smoldering multiple myeloma    2. Type 2 diabetes mellitus with diabetic polyneuropathy, with long-term current use of insulin    3. CKD stage 3 due to type 2 diabetes mellitus        Plan:       The patient has a diagnosis of smoldering myeloma. There is no indication for immediate chemotherapy. We are monitoring renal function closely- baseline creatinine of -1.5 is stable today.   We will continue observation as per  the Randomized Phase III Trial of Lenalidomide Versus Observation Alone in Patients with 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. CBC and CMP are stable.  Complete biochemical studies from today are pending.  Plan for return in 1 month.  Endocrinology and Nephrology to monitor diabetes and renal failure respectively.

## 2018-05-04 ENCOUNTER — TELEPHONE (OUTPATIENT)
Dept: ENDOCRINOLOGY | Facility: CLINIC | Age: 73
End: 2018-05-04

## 2018-05-04 ENCOUNTER — PATIENT OUTREACH (OUTPATIENT)
Dept: DIABETES | Facility: CLINIC | Age: 73
End: 2018-05-04

## 2018-05-04 DIAGNOSIS — E11.65 TYPE 2 DIABETES MELLITUS WITH HYPERGLYCEMIA, UNSPECIFIED WHETHER LONG TERM INSULIN USE: Primary | ICD-10-CM

## 2018-05-04 NOTE — PROGRESS NOTES
Hi - tell patient:    1. I reviewed his log and sugars are good - continue current regimen     2. I have prescribed the new Leah meter - we will try to get it covered by insurance because he is checking sugars 4 times daily now     Let's make him a followup with DM educator in 2-3 weeks for Leah meter teaching.    Thank you.

## 2018-05-04 NOTE — TELEPHONE ENCOUNTER
----- Message from Meme Sullivan MA sent at 5/4/2018 11:24 AM CDT -----  Logs attached, please let your support staff know if there are any changes to the regimen.

## 2018-05-09 DIAGNOSIS — E11.65 TYPE 2 DIABETES MELLITUS WITH HYPERGLYCEMIA, UNSPECIFIED WHETHER LONG TERM INSULIN USE: Primary | ICD-10-CM

## 2018-05-09 RX ORDER — DEXTROSE 4 G
TABLET,CHEWABLE ORAL
Qty: 1 EACH | Refills: 0 | Status: SHIPPED | OUTPATIENT
Start: 2018-05-09 | End: 2018-10-24 | Stop reason: SDUPTHER

## 2018-05-09 NOTE — TELEPHONE ENCOUNTER
----- Message from Carlota Geronimo sent at 5/9/2018  1:23 PM CDT -----  Contact: Self [phone]  PT is requesting an order for an accu check avia plus meter.     ..  ..  [facility] [address] [place], [place] - [address] AT [facility] [address] [place] [address]  Phone: [phone] Fax: [phone]

## 2018-05-17 ENCOUNTER — TELEPHONE (OUTPATIENT)
Dept: INTERNAL MEDICINE | Facility: CLINIC | Age: 73
End: 2018-05-17

## 2018-05-17 NOTE — TELEPHONE ENCOUNTER
PA sent to insurance thru [email or web address] review decision will be faxed to the office within 48-72 hours

## 2018-05-17 NOTE — TELEPHONE ENCOUNTER
"----- Message from Jeannie Rivera sent at 5/17/2018  3:35 PM CDT -----  Contact: XanderVICKY      ----- Message -----  From: Luba Sinha  Sent: 5/17/2018   3:20 PM  To: [facility] BRAUN Staff    Prior Authorization Needed    Rx: diazePAM (VALIUM) 5 MG tablet    To submit the PA:    1: Go to " [email or web address] " and click "Enter a Key"    2. Enter the patient's last name and date of birth and the key.      KEY: [id number]    3. Complete the forms and click "send to Plan"    Please notify pharmacy when prior authorization has been approved.    Thank You    "

## 2018-05-23 ENCOUNTER — OFFICE VISIT (OUTPATIENT)
Dept: INTERNAL MEDICINE | Facility: CLINIC | Age: 73
End: 2018-05-23
Payer: MEDICARE

## 2018-05-23 ENCOUNTER — HOSPITAL ENCOUNTER (OUTPATIENT)
Dept: RADIOLOGY | Facility: HOSPITAL | Age: 73
Discharge: HOME | End: 2018-05-23
Attending: INTERNAL MEDICINE
Payer: MEDICARE

## 2018-05-23 VITALS
DIASTOLIC BLOOD PRESSURE: 70 MMHG | SYSTOLIC BLOOD PRESSURE: 128 MMHG | BODY MASS INDEX: 27.84 KG/M2 | WEIGHT: 194.44 LBS | OXYGEN SATURATION: 98 % | HEIGHT: 70 IN | HEART RATE: 79 BPM

## 2018-05-23 DIAGNOSIS — I10 ESSENTIAL HYPERTENSION: ICD-10-CM

## 2018-05-23 DIAGNOSIS — N18.30 CHRONIC KIDNEY DISEASE, STAGE III (MODERATE): Chronic | ICD-10-CM

## 2018-05-23 DIAGNOSIS — M25.552 LEFT HIP PAIN: Primary | ICD-10-CM

## 2018-05-23 DIAGNOSIS — M25.552 LEFT HIP PAIN: ICD-10-CM

## 2018-05-23 DIAGNOSIS — D47.2 SMOLDERING MULTIPLE MYELOMA: ICD-10-CM

## 2018-05-23 DIAGNOSIS — K21.9 GASTROESOPHAGEAL REFLUX DISEASE WITHOUT ESOPHAGITIS: ICD-10-CM

## 2018-05-23 PROCEDURE — 3078F DIAST BP <80 MM HG: CPT | Mod: CPTII,S$GLB,, | Performed by: INTERNAL MEDICINE

## 2018-05-23 PROCEDURE — 73502 X-RAY EXAM HIP UNI 2-3 VIEWS: CPT | Mod: TC,LT

## 2018-05-23 PROCEDURE — 3074F SYST BP LT 130 MM HG: CPT | Mod: CPTII,S$GLB,, | Performed by: INTERNAL MEDICINE

## 2018-05-23 PROCEDURE — 99999 PR PBB SHADOW E&M-EST. PATIENT-LVL III: CPT | Mod: PBBFAC,,, | Performed by: INTERNAL MEDICINE

## 2018-05-23 PROCEDURE — 99214 OFFICE O/P EST MOD 30 MIN: CPT | Mod: S$GLB,,, | Performed by: INTERNAL MEDICINE

## 2018-05-23 PROCEDURE — 73502 X-RAY EXAM HIP UNI 2-3 VIEWS: CPT | Mod: 26,LT,, | Performed by: RADIOLOGY

## 2018-05-23 RX ORDER — INSULIN ASPART 100 [IU]/ML
INJECTION, SOLUTION INTRAVENOUS; SUBCUTANEOUS
COMMUNITY
Start: 2018-05-17 | End: 2018-07-18 | Stop reason: DRUGHIGH

## 2018-05-23 NOTE — PROGRESS NOTES
CHIEF COMPLAINT: Follow up of multiple myeloma, diabetes, hypertension, reflux, hyperlipidemia    HISTORY OF PRESENT ILLNESS: This is a 72-year-old man who presents for follow up of above     HE is now on the VGo 20 which has helped his blood sugars. HE will give himself one click which will bring his blood sugars down. He has to be very careful for low blood sugars. HE also takes glimepriride 2 mg daily. HE saw Endocrine 4/18/18.    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 He is in the observation arm and is being monitoring monthly. He saw Dr Engel 4/30/18. No indication for chemotherapy at this time.       He is taking losartan 25 mg 1 tablet daily to protect his kidney. No polydipsia or polyuria       His back and neck was doing better in the healthy back program.  He does not take hydrocodone apap. He takes tizanidine as needed (rarely). HE is wearing braces for his knees which is helping stability and his pain      Knees are better after an injection. He had an injection by Dr Stokes, yesterday 2/2/17 which has helped.      Reflux is controlled on omeprazole 20 mg 2 tablets daily. HE has heartburn when he does not take the omeprazole No chest pain, shortness of breath, nausea, voimting, constipation, diarrhea, numbness, weakness.        He had laser vaporization and enucleation of the prostate 11/13/13. He denies any dysuria or hematuria now. Urinary frequency is better.  He is taking FLomax 0.4 mg nightly and oxybutynin 5 mg twice daily for urinary frequency and for his prostate.    He has hyperlipidemia, on pravastatin 40 mg daily. No joint pain or muscle pain from the pravastatin.        He has been taking aspirin 81 mg daily vitamin D supplement 2000 units daily.        Anxiety is controlled on celexa 20 mg daily. Mood is better. He takes diazepam 5 mg at bedtime as needed (2-3 times a week()  for his sleep and anxiety. It helps him sleep.      HE is taking gabapentin 100 mg 2 capsules as  "needed. He does not take daily. Feet are doing ok. He will take the gabapentin when he has tingling in his feet. He had an injection in his left foot for plantar fasciitis.     He has had left hip pain the last 6-7 months. Pain is worse with prolonged sitting.  He limps when he walks.     PAST MEDICAL HISTORY:   1. Diabetes mellitus.   2. Hyperlipidemia.   3. Reflux.   4. BPH.   5. Osteoarthritis of the knees.   6. Neck pain.   7. History of right lateral epicondylitis.   8. History of lumps removed from the breast as a teenager.   9. OA cervical spine     SOCIAL HISTORY: Does not smoke, does not drink. He owns an    company. He works for the [employer].     FAMILY HISTORY: Mother is living at age 86 with a heart condition. Father   in his late 40s of alcoholism. He has 5 sisters and 1 brother who are  healthy, one has a neurological disorder, one is anxious. His daughter has  lupus, on dialysis. She also has heart problems. She also had a brain   aneurysm that was stented.       PHYSICAL EXAMINATION:    /70 (BP Location: Left arm, Patient Position: Sitting, BP Method: Medium (Manual))   Pulse 79   Ht 5' 10" (1.778 m)   Wt 88.2 kg (194 lb 7.1 oz)   SpO2 98%   BMI 27.90 kg/m²     GENERAL: He is alert, oriented, no apparent distress. Affect within normal limits.   Conjunctivae anicteric. PERRL. Tympanic membranes clear. Oropharynx clear.   NECK: Supple. No cervical lymphadenopathy, no thyroid enlargement.   Respiratory: Effort normal. Lungs are clear to auscultation.   HEART: Regular rate and rhythm without murmurs, gallops or rubs.   No lower extremity edema.    ABDOMEN: soft, non distended, non tender, bowel sounds present, no hepatosplenomgaly  Pain with flexion and external rotation of the left hip     ASSESSMENT AND PLAN:   1. Diabetes mellitus with hyperglycemia and microalbuminuria - followed by endocrine. Watch sugars. Doing better. Watch for low blood sugars  2. 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 Labs reviewed  3. OA knee - stable  4. Hypertension - controlled  5. Hyperlipidemia. On pravastatin  6. BPH. S/p laser - Saw urology  7. Depression - stable  8. Vitamin D deficiency - on vitamin D 2,000 units daily.    9.Back pain-stable  10. Anxiety and depression- stable on celexa 20 mg one tablet daily.   11. Peripheral neuropathy -controlled on gabapentin.   12. CRI -monitored closely by heme onc and nephrology.   13. Left hip pain - xray and to ortho  I will see him back in 4 months, sooner if problems arise        Prevnar 7/16  PSA 8/16. colonoscopy normal 3/2012 and due in 2019

## 2018-05-28 ENCOUNTER — LAB VISIT (OUTPATIENT)
Dept: LAB | Facility: HOSPITAL | Age: 73
End: 2018-05-28
Payer: MEDICARE

## 2018-05-28 ENCOUNTER — OFFICE VISIT (OUTPATIENT)
Dept: HEMATOLOGY/ONCOLOGY | Facility: CLINIC | Age: 73
End: 2018-05-28
Payer: MEDICARE

## 2018-05-28 ENCOUNTER — RESEARCH ENCOUNTER (OUTPATIENT)
Dept: RESEARCH | Facility: HOSPITAL | Age: 73
End: 2018-05-28

## 2018-05-28 VITALS
OXYGEN SATURATION: 96 % | HEART RATE: 75 BPM | HEIGHT: 70 IN | SYSTOLIC BLOOD PRESSURE: 140 MMHG | WEIGHT: 191.38 LBS | DIASTOLIC BLOOD PRESSURE: 82 MMHG | BODY MASS INDEX: 27.4 KG/M2

## 2018-05-28 DIAGNOSIS — E11.22 CKD STAGE 3 DUE TO TYPE 2 DIABETES MELLITUS: ICD-10-CM

## 2018-05-28 DIAGNOSIS — D47.2 SMOLDERING MULTIPLE MYELOMA: ICD-10-CM

## 2018-05-28 DIAGNOSIS — E11.42 TYPE 2 DIABETES MELLITUS WITH DIABETIC POLYNEUROPATHY, WITH LONG-TERM CURRENT USE OF INSULIN: ICD-10-CM

## 2018-05-28 DIAGNOSIS — Z79.4 TYPE 2 DIABETES MELLITUS WITH DIABETIC POLYNEUROPATHY, WITH LONG-TERM CURRENT USE OF INSULIN: ICD-10-CM

## 2018-05-28 DIAGNOSIS — D47.2 SMOLDERING MULTIPLE MYELOMA: Primary | ICD-10-CM

## 2018-05-28 DIAGNOSIS — N18.30 CKD STAGE 3 DUE TO TYPE 2 DIABETES MELLITUS: ICD-10-CM

## 2018-05-28 DIAGNOSIS — Z00.6 EXAMINATION OF PARTICIPANT IN CLINICAL TRIAL: ICD-10-CM

## 2018-05-28 LAB
ALBUMIN SERPL BCP-MCNC: 3.7 G/DL
ALP SERPL-CCNC: 44 U/L
ALT SERPL W/O P-5'-P-CCNC: 28 U/L
ANION GAP SERPL CALC-SCNC: 9 MMOL/L
AST SERPL-CCNC: 23 U/L
BASOPHILS # BLD AUTO: 0.03 K/UL
BASOPHILS NFR BLD: 0.7 %
BILIRUB SERPL-MCNC: 0.6 MG/DL
BUN SERPL-MCNC: 13 MG/DL
CALCIUM SERPL-MCNC: 9.8 MG/DL
CHLORIDE SERPL-SCNC: 102 MMOL/L
CO2 SERPL-SCNC: 25 MMOL/L
CREAT SERPL-MCNC: 1.5 MG/DL
DIFFERENTIAL METHOD: ABNORMAL
EOSINOPHIL # BLD AUTO: 0.2 K/UL
EOSINOPHIL NFR BLD: 3.7 %
ERYTHROCYTE [DISTWIDTH] IN BLOOD BY AUTOMATED COUNT: 14.1 %
EST. GFR  (AFRICAN AMERICAN): 53.4 ML/MIN/1.73 M^2
EST. GFR  (NON AFRICAN AMERICAN): 46.2 ML/MIN/1.73 M^2
GLUCOSE SERPL-MCNC: 144 MG/DL
HCT VFR BLD AUTO: 36.1 %
HGB BLD-MCNC: 11.7 G/DL
IGA SERPL-MCNC: 1557 MG/DL
IGG SERPL-MCNC: 894 MG/DL
IGM SERPL-MCNC: 34 MG/DL
IMM GRANULOCYTES # BLD AUTO: 0.01 K/UL
IMM GRANULOCYTES NFR BLD AUTO: 0.2 %
LYMPHOCYTES # BLD AUTO: 1.6 K/UL
LYMPHOCYTES NFR BLD: 35.6 %
MAGNESIUM SERPL-MCNC: 2 MG/DL
MCH RBC QN AUTO: 29.3 PG
MCHC RBC AUTO-ENTMCNC: 32.4 G/DL
MCV RBC AUTO: 91 FL
MONOCYTES # BLD AUTO: 0.3 K/UL
MONOCYTES NFR BLD: 7.2 %
NEUTROPHILS # BLD AUTO: 2.4 K/UL
NEUTROPHILS NFR BLD: 52.6 %
NRBC BLD-RTO: 0 /100 WBC
PHOSPHATE SERPL-MCNC: 3.1 MG/DL
PLATELET # BLD AUTO: 222 K/UL
PMV BLD AUTO: 9 FL
POTASSIUM SERPL-SCNC: 4.3 MMOL/L
PROT SERPL-MCNC: 8.1 G/DL
RBC # BLD AUTO: 3.99 M/UL
SODIUM SERPL-SCNC: 136 MMOL/L
WBC # BLD AUTO: 4.58 K/UL

## 2018-05-28 PROCEDURE — 84165 PROTEIN E-PHORESIS SERUM: CPT | Mod: 26,Q1,, | Performed by: PATHOLOGY

## 2018-05-28 PROCEDURE — 99215 OFFICE O/P EST HI 40 MIN: CPT | Mod: S$GLB,,, | Performed by: INTERNAL MEDICINE

## 2018-05-28 PROCEDURE — 86334 IMMUNOFIX E-PHORESIS SERUM: CPT | Mod: Q1

## 2018-05-28 PROCEDURE — 3045F PR MOST RECENT HEMOGLOBIN A1C LEVEL 7.0-9.0%: CPT | Mod: CPTII,S$GLB,, | Performed by: INTERNAL MEDICINE

## 2018-05-28 PROCEDURE — 85025 COMPLETE CBC W/AUTO DIFF WBC: CPT | Mod: Q1

## 2018-05-28 PROCEDURE — 36415 COLL VENOUS BLD VENIPUNCTURE: CPT | Mod: Q1

## 2018-05-28 PROCEDURE — 3079F DIAST BP 80-89 MM HG: CPT | Mod: CPTII,S$GLB,, | Performed by: INTERNAL MEDICINE

## 2018-05-28 PROCEDURE — 82784 ASSAY IGA/IGD/IGG/IGM EACH: CPT

## 2018-05-28 PROCEDURE — 86334 IMMUNOFIX E-PHORESIS SERUM: CPT | Mod: 26,Q1,, | Performed by: PATHOLOGY

## 2018-05-28 PROCEDURE — 83735 ASSAY OF MAGNESIUM: CPT

## 2018-05-28 PROCEDURE — 82784 ASSAY IGA/IGD/IGG/IGM EACH: CPT | Mod: 59,Q1

## 2018-05-28 PROCEDURE — 84165 PROTEIN E-PHORESIS SERUM: CPT

## 2018-05-28 PROCEDURE — 83520 IMMUNOASSAY QUANT NOS NONAB: CPT | Mod: 59,Q1

## 2018-05-28 PROCEDURE — 84100 ASSAY OF PHOSPHORUS: CPT

## 2018-05-28 PROCEDURE — 99999 PR PBB SHADOW E&M-EST. PATIENT-LVL III: CPT | Mod: PBBFAC,,, | Performed by: INTERNAL MEDICINE

## 2018-05-28 PROCEDURE — 80053 COMPREHEN METABOLIC PANEL: CPT | Mod: Q1

## 2018-05-28 PROCEDURE — 3077F SYST BP >= 140 MM HG: CPT | Mod: CPTII,S$GLB,, | Performed by: INTERNAL MEDICINE

## 2018-05-28 NOTE — PROGRESS NOTES
"Monday May 28th, 2018      Protocol: [id number] Randomized Phase III Trial of Lenalidomide vs Observation Alone in Patients with 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.   Sponsor:  [facility]  IRB #: [id number]  Investigator: GIL Engel  Pt Initials: LUCÍA LORENZ       Arm B: Observation  Cycle 29, Day 28        Patient presents to clinic to evaluate ability to proceed with Cycle 30 of observation per above-mentioned protocol. He states that he has been doing well since last month.  He continues to wear insulin pump per his endocrinologist but continues to self-manage the extra injections. \He denies new potential CRAB symptoms.  He states that he has started working out. He states continued willingness to participate in above-mentioned study.    Review of Baseline AE's:    *Please note this list is not all-inclusive, please see AE log for physician reviewed list of adverse events.   1. Hypertension, grade 2: BP today is 140/82  in clinic today.  Will continue to follow this.   2. Lower Back Pain and Neck Pain, grade 1: Patient has no complaints of this issue this month. As previously stated, patient is not suspected to have bony myeloma involvement per Dr. Engel as these are pre-existing issues present at baseline.  AE ongoing.  3.  Pain in extremity (knee), grade 1.  Continues to wear knee stabilizing braces today. States that when he does not wear him he "runs into trouble." AE ongoing  4. Peripheral sensory neuropathy, grade 1: Patient does not verbalize complaints of this symptom today. Continues to take neurontin for this per Dr. Sagastume. AE ongoing.   5. Anemia, Grade 1: Hgb/Hct - 11.7/36.1     Review of AE's:     *Please note this list is not all-inclusive, please see AE log for physician reviewed list of adverse events.   1. Creatinine increased, grade 1: Serum creatinine is at 1.5 mg/dl today and GFR 55.46. As previously stated, Dr. Engel states this has not been related to myeloma and is related chronic " "kidney issues likely secondary to diabetes and hypertension.  Will continue observation monthly and to monitor renal function closely. AE stable, ongoing.     Per study chair, "the definition of progressive disease for this protocol is developing CRAB criteria that requires treatment systemically." Per Dr Engel, based on today's exam and lab results thus far, patient does not have any s/s of CRAB: Normal Calcium level (9.9), absence of Renal insufficiency (serum creatinine 1.5, and GFR 56.54 per [facility]--patient with a history of kidney dysfunction secondary to diabetes; Dr. Engel aware of these values and not concerned for myeloma involvement), absence of significant Anemia (hemoglobin 11.6, stable from baseline hgb level at study entry) and absence of lytic Bone lesions (per baseline metastatic survey as well as MRI--see MD note for further comment). See MD note for ECOG score and H&P and flowsheets for laboratory work, vitals, etc. All myeloma-related blood work from last cycle including SPEP and JAGUAR have remained stable, and are currently pending for this cycle. Per Dr. Engel, patient shows no evidence of progression at last result. Patient informed that Dr. Engel will release all lab results to [facility]. He states understanding of this. QOL's due at next cycle per protocol.      Next appts scheduled and given to patient.  Patient states he should not have any conflicts with next month's appt.  Patient was given Research RN's contact information and has MD contact information to call with any concerns, questions, or worsening of symptoms; he verbalized understanding.                                  "

## 2018-05-28 NOTE — PROGRESS NOTES
Subjective:       Patient ID: Emerson Menendez is a 72 y.o. male.    Chief Complaint: Smoldering multiple myeloma  The patient is a very pleasant 70 year old man who returns today after completing his evaluation for enrollment in the ECOG-ACRIN study of the Randomized Phase III Trial of Lenalidomide Versus Observation Alone in Patients with 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. Test results identify a stable IgA kappa protein with beta globulin band at 1.63g/dL. Kappa free light chain is elevated at 4.40. CBC and calcium are stable. Creatinine with history of stage III CKD is stable at 1.4. Metastatic survey is negative for lytic lesions. MRI of the entire spine demonstrated age related changes but no convincing evidence of myeloma bone disease. Bone marrow biopsy identified about 13% plasma cells by morphology and FISH for myeloma identified a t(11;14) and trisomies 3,7, and 17. The patient is afebrile and appears clinically well. He was seen by his podiatrist and nephrologist since our last visit without any new or acute events.    The patient has not received any therapy for smoldering myeloma including bisphosphonates or steroids. He has been randomized to observation arm of the the clinical study. 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 He has no current or prior history of malignancy.    TODAY  Mr. Menendez returns today for monthly follow-up evaluation. He denies any acute interval events since last interview.   No clinical signs/symptoms of progression of his smoldering myeloma.        HPI  Review of Systems   Constitutional: Negative for activity change, appetite change, diaphoresis, fatigue, fever and unexpected weight change.   HENT: Negative.    Eyes: Negative.    Respiratory: Negative.    Cardiovascular: Negative for leg swelling.   Gastrointestinal: Negative.     Endocrine: Negative.    Genitourinary: Negative.    Musculoskeletal: Negative.    Skin: Negative.    Allergic/Immunologic: Negative.    Neurological:        Grade 0-1 peripheral neuropathy of bilateral feet.    Hematological: Negative for adenopathy. Does not bruise/bleed easily.   Psychiatric/Behavioral: Negative.        Objective:       Vitals:    05/28/18 1029   BP: (!) 140/82   Pulse: 75       Physical Exam   Constitutional: He is oriented to person, place, and time. He appears well-developed and well-nourished.   HENT:   Head: Normocephalic and atraumatic.   Right Ear: External ear normal.   Left Ear: External ear normal.   Nose: Nose normal.   Mouth/Throat: Oropharynx is clear and moist.   Eyes: Conjunctivae and EOM are normal. Pupils are equal, round, and reactive to light.   Neck: Normal range of motion. Neck supple.   Cardiovascular: Normal rate, regular rhythm and intact distal pulses.    No murmur heard.  Pulmonary/Chest: Effort normal and breath sounds normal. No respiratory distress.   Abdominal: Soft. Bowel sounds are normal. He exhibits no distension. There is no hepatosplenomegaly.   Musculoskeletal: He exhibits no edema or tenderness.   Neurological: He is alert and oriented to person, place, and time. No cranial nerve deficit.   Skin: Skin is warm and dry. No rash noted. No cyanosis. Nails show no clubbing.   Psychiatric: He has a normal mood and affect. His behavior is normal.   Nursing note and vitals reviewed.      Assessment:       1. Smoldering multiple myeloma    2. Type 2 diabetes mellitus with diabetic polyneuropathy, with long-term current use of insulin    3. CKD stage 3 due to type 2 diabetes mellitus        Plan:       The patient has a diagnosis of smoldering myeloma. There is no indication for immediate chemotherapy. We are monitoring renal function closely- baseline creatinine of -1.5 is stable today.   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 CBC and CMP are stable.  Complete biochemical studies from today are pending.  Plan for return in 1 month.  Endocrinology and Nephrology to monitor diabetes and renal failure respectively.

## 2018-05-29 LAB
ALBUMIN SERPL ELPH-MCNC: 3.93 G/DL
ALPHA1 GLOB SERPL ELPH-MCNC: 0.28 G/DL
ALPHA2 GLOB SERPL ELPH-MCNC: 0.83 G/DL
B-GLOBULIN SERPL ELPH-MCNC: 2.55 G/DL
GAMMA GLOB SERPL ELPH-MCNC: 0.51 G/DL
INTERPRETATION SERPL IFE-IMP: NORMAL
KAPPA LC SER QL IA: 5.6 MG/DL
KAPPA LC/LAMBDA SER IA: 3.03
LAMBDA LC SER QL IA: 1.85 MG/DL
PATHOLOGIST INTERPRETATION IFE: NORMAL
PATHOLOGIST INTERPRETATION SPE: NORMAL
PROT SERPL-MCNC: 8.1 G/DL

## 2018-06-03 ENCOUNTER — PATIENT MESSAGE (OUTPATIENT)
Dept: UROLOGY | Facility: CLINIC | Age: 73
End: 2018-06-03

## 2018-06-04 ENCOUNTER — OFFICE VISIT (OUTPATIENT)
Dept: UROLOGY | Facility: CLINIC | Age: 73
End: 2018-06-04
Payer: MEDICARE

## 2018-06-04 VITALS — DIASTOLIC BLOOD PRESSURE: 65 MMHG | HEART RATE: 104 BPM | SYSTOLIC BLOOD PRESSURE: 113 MMHG

## 2018-06-04 DIAGNOSIS — R39.198 DIFFICULTY URINATING: Primary | ICD-10-CM

## 2018-06-04 DIAGNOSIS — R35.1 NOCTURIA: ICD-10-CM

## 2018-06-04 PROCEDURE — 81002 URINALYSIS NONAUTO W/O SCOPE: CPT | Mod: S$GLB,,, | Performed by: PHYSICIAN ASSISTANT

## 2018-06-04 PROCEDURE — 51798 US URINE CAPACITY MEASURE: CPT | Mod: S$GLB,,, | Performed by: PHYSICIAN ASSISTANT

## 2018-06-04 PROCEDURE — 99999 PR PBB SHADOW E&M-EST. PATIENT-LVL III: CPT | Mod: PBBFAC,,, | Performed by: PHYSICIAN ASSISTANT

## 2018-06-04 PROCEDURE — 3078F DIAST BP <80 MM HG: CPT | Mod: CPTII,S$GLB,, | Performed by: PHYSICIAN ASSISTANT

## 2018-06-04 PROCEDURE — 3074F SYST BP LT 130 MM HG: CPT | Mod: CPTII,S$GLB,, | Performed by: PHYSICIAN ASSISTANT

## 2018-06-04 PROCEDURE — 99214 OFFICE O/P EST MOD 30 MIN: CPT | Mod: 25,S$GLB,, | Performed by: PHYSICIAN ASSISTANT

## 2018-06-04 NOTE — PROGRESS NOTES
CHIEF COMPLAINT:    Mr. Menendez is a 72 y.o. male presenting for difficulty urinating at night.    PRESENTING ILLNESS:    Emerson Menendez is a 72 y.o. male with a PMH of BPH who presents for difficulty urinating at night.  For the last couple of weeks patient reports inability to void at night.  He gets up at least twice a night. By the morning he is experiencing bladder pressure and pain.  He is able to void after he drinks 2 cups of coffee.  He limits his fluid intake around 8p.m. and goes to bed around 10:30p.  He started taking oxybutynin at night around the same time he started having trouble urinating at night.  He takes flomax at night.  He does not have any issues urinating during the day.      Diagnostic Cystourethroscopy 2/2017:   Normal urethra.    Prostate s/p TURP with preserved apical tissues  Width of Bladder Neck Opening: Approximately 18 Fr.   Normal bladder with increased hyperemic area, ? Glomerulation, ? IC.   Normal ureteral orifices bilaterally.     CONCLUSIONS:   1.  Sensory urgency  2. Early IC  3. S/p TURP    Avoid bladder irritants.  IC smart diet recommended.  Oxybutynin, flomax given.       REVIEW OF SYSTEMS:    Constitutional: Negative for fever and chills.   HENT: Negative for hearing loss.   Eyes: Negative for visual disturbance.   Respiratory: Negative for shortness of breath.   Cardiovascular: Negative for chest pain.   Gastrointestinal: Negative for vomiting, and constipation.   Genitourinary: See HPI  Neurological: Negative for dizziness.   Hematological: Does not bruise/bleed easily.   Psychiatric/Behavioral: Negative for confusion.       PATIENT HISTORY:    Past Medical History:   Diagnosis Date    Anxiety 3/16/2015    BPH (benign prostatic hypertrophy) 9/24/2012    Depression 3/16/2015    GERD (gastroesophageal reflux disease) 9/24/2012    Microalbuminuria 9/24/2012    Osteoarthritis of cervical spine 9/24/2012    Osteoarthritis of knee 9/24/2012    Type II or  "unspecified type diabetes mellitus without mention of complication, not stated as uncontrolled 9/24/2012       Past Surgical History:   Procedure Laterality Date    CATARACT EXTRACTION  2012    Right eye    PROSTATE SURGERY         Family History   Problem Relation Age of Onset    Hypertension Brother     Kidney failure Daughter         due to medication    Blindness Neg Hx     Cancer Neg Hx     Cataracts Neg Hx     Diabetes Neg Hx     Glaucoma Neg Hx     Macular degeneration Neg Hx     Retinal detachment Neg Hx     Strabismus Neg Hx     Stroke Neg Hx     Thyroid disease Neg Hx        Social History     Social History    Marital status: Single     Spouse name: N/A    Number of children: N/A    Years of education: N/A     Occupational History    Not on file.     Social History Main Topics    Smoking status: Never Smoker    Smokeless tobacco: Never Used    Alcohol use No    Drug use: No    Sexual activity: Yes     Partners: Female     Birth control/ protection: None     Other Topics Concern    Not on file     Social History Narrative    No narrative on file       Allergies:  Penicillins    Medications:    Current Outpatient Prescriptions:     aspirin (ECOTRIN) 81 MG EC tablet, Take 1 tablet (81 mg total) by mouth once daily., Disp: 100 tablet, Rfl: 3    BD INSULIN PEN NEEDLE UF SHORT 31 gauge x 5/16" Ndle, USE TO INJECT INSULIN ONE TIME DAILY, Disp: 300 each, Rfl: 3    blood glucose control, normal (METER-CHECK) Soln, One meter. True test meter. Use as directed, Disp: 1 each, Rfl: 0    blood sugar diagnostic Strp, Omayra Meter Check twice daily, Disp: 200 strip, Rfl: 4    blood-glucose meter (ACCU-CHEK OMAYRA) [facility], USE AS DIRECTED. Accucheck elie plus, Disp: 1 each, Rfl: 0    cholecalciferol, vitamin D3, (VITAMIN D) 2,000 unit Cap, Take by mouth. 1 Capsule Oral Every day.  over the counter, Disp: , Rfl:     citalopram (CELEXA) 20 MG tablet, TAKE 1 TABLET EVERY EVENING AFTER DINNER, Disp: " 90 tablet, Rfl: 11    diazePAM (VALIUM) 5 MG tablet, TAKE 1 TABLET EVERY 6 HOURS AS NEEDED, Disp: 60 tablet, Rfl: 1    flash glucose scanning reader (FREESTYLE CHRISTIANO READER) Misc, 1 each by Misc.(Non-Drug; Combo Route) route 4 (four) times daily., Disp: 1 each, Rfl: 0    flash glucose sensor (FREESTYLE CHRISTIANO SENSOR) Kit, 1 each by Misc.(Non-Drug; Combo Route) route once a week., Disp: 4 kit, Rfl: 6    gabapentin (NEURONTIN) 100 MG capsule, TAKE 1 CAPSULE EVERY MORNING, 1 CAPSULE IN THE AFTERNOON, AND 1 TO 3 CAPSULE(S) AT BEDTIME AS NEEDED FOR FOOT PAIN, Disp: 450 capsule, Rfl: 3    glimepiride (AMARYL) 2 MG tablet, daily with breakfast. , Disp: , Rfl:     glucagon (human recombinant) inj 1mg/mL kit, Inject 1 mL (1 mg total) into the muscle as needed., Disp: 1 kit, Rfl: 0    hydrocodone-acetaminophen 5-325mg (NORCO) 5-325 mg per tablet, Take 1 tablet by mouth every 4 to 6 hours as needed for Pain., Disp: 60 tablet, Rfl: 0    insulin regular 100 unit/mL Inj injection, To use with Vgo 20 with 2 clicks with meals.  * Use Relion brand regular insulin* Needs 2 vials monthly., Disp: 20 mL, Rfl: 3    lancets Misc, Use twice daily, Disp: 200 each, Rfl: 4    latanoprost 0.005 % ophthalmic solution, INSTILL 1 DROP INTO BOTH EYES EVERY EVENING, Disp: 3 Bottle, Rfl: 4    losartan (COZAAR) 25 MG tablet, Take 1 tablet (25 mg total) by mouth once daily., Disp: 90 tablet, Rfl: 4    NOVOLOG FLEXPEN U-100 INSULIN 100 unit/mL InPn pen, , Disp: , Rfl:     omeprazole (PRILOSEC) 20 MG capsule, TAKE 2 CAPSULES ONE TIME DAILY, Disp: 180 capsule, Rfl: 3    oxybutynin (DITROPAN) 5 MG Tab, Take 1 tablet (5 mg total) by mouth 2 (two) times daily., Disp: 180 tablet, Rfl: 12    pravastatin (PRAVACHOL) 40 MG tablet, Take 1 tablet (40 mg total) by mouth once daily., Disp: 90 tablet, Rfl: 4    sub-q insulin device, 20 unit (VGO 20) [place], 1 Device by Misc.(Non-Drug; Combo Route) route once daily., Disp: 30 Device, Rfl: 4     tamsulosin (FLOMAX) 0.4 mg Cp24, TAKE 1 CAPSULE EVERY DAY, Disp: 90 capsule, Rfl: 3    tizanidine (ZANAFLEX) 4 MG tablet, 1/2-1 tablet every 8 hours as needed for muscle spasm, Disp: 90 tablet, Rfl: 1    PHYSICAL EXAMINATION:    Constitutional: He appears well-developed and well-nourished.  He is in no apparent distress.    Eyes: No scleral icterus noted bilaterally.  No discharge noted bilaterally.      Cardiovascular: Normal rate.  No pitting edema noted in lower extremities bilaterally    Pulmonary/Chest: Effort normal. No respiratory distress.     Abdominal:  He exhibits no distension.  There is no CVA tenderness.     Lymphadenopathy:        Right: No supraclavicular adenopathy present.        Left: No supraclavicular adenopathy present.     Neurological: He is alert and oriented to person, place, and time.     Skin: Skin is warm and dry.     Psych: Cooperative with normal affect.    Genitourinary: declined    Physical Exam    Bladder scan performed by Nurse Carrera.  PVR 42ml    LABS:    U/a: 1.015, pH 5, negative.    Lab Results   Component Value Date    PSA 0.82 08/01/2016    PSA 0.72 04/17/2015    PSA 0.79 12/09/2013    PSADIAG 0.77 09/13/2013       IMPRESSION:    Encounter Diagnoses   Name Primary?    Difficulty urinating Yes    Nocturia          PLAN:  I spent 25 minutes with the patient of which more than half was spent in direct consultation with the patient in regards to our treatment and plan.    Instructed patient not to take oxybutynin at night. Discussed MOA of medication and how it causes the bladder not to contract as often.    If he still has difficulty urinating despite stopping oxybutynin, he was instructed that he can increase flomax to BID.  Discussed prostate cancer screening.  He declined VITALY today and will like to follow up with Dr. Walker to check it.  Will mail appointment slip.        Madeline Vazquez PA-C

## 2018-06-07 ENCOUNTER — PATIENT MESSAGE (OUTPATIENT)
Dept: INTERNAL MEDICINE | Facility: CLINIC | Age: 73
End: 2018-06-07

## 2018-06-07 DIAGNOSIS — M72.2 PLANTAR FASCIITIS: Primary | ICD-10-CM

## 2018-06-13 ENCOUNTER — HOSPITAL ENCOUNTER (OUTPATIENT)
Dept: RADIOLOGY | Facility: HOSPITAL | Age: 73
Discharge: HOME | End: 2018-06-13
Attending: PHYSICIAN ASSISTANT
Payer: MEDICARE

## 2018-06-13 ENCOUNTER — OFFICE VISIT (OUTPATIENT)
Dept: ORTHOPEDICS | Facility: CLINIC | Age: 73
End: 2018-06-13
Payer: MEDICARE

## 2018-06-13 VITALS
DIASTOLIC BLOOD PRESSURE: 82 MMHG | WEIGHT: 191.38 LBS | HEART RATE: 87 BPM | BODY MASS INDEX: 27.4 KG/M2 | HEIGHT: 70 IN | SYSTOLIC BLOOD PRESSURE: 145 MMHG

## 2018-06-13 DIAGNOSIS — M79.671 RIGHT FOOT PAIN: ICD-10-CM

## 2018-06-13 DIAGNOSIS — M79.671 RIGHT FOOT PAIN: Primary | ICD-10-CM

## 2018-06-13 DIAGNOSIS — M79.672 LEFT FOOT PAIN: ICD-10-CM

## 2018-06-13 DIAGNOSIS — M72.2 PLANTAR FASCIITIS OF RIGHT FOOT: Primary | ICD-10-CM

## 2018-06-13 DIAGNOSIS — M79.672 LEFT FOOT PAIN: Primary | ICD-10-CM

## 2018-06-13 PROCEDURE — 73630 X-RAY EXAM OF FOOT: CPT | Mod: TC,RT

## 2018-06-13 PROCEDURE — 3077F SYST BP >= 140 MM HG: CPT | Mod: CPTII,S$GLB,, | Performed by: PHYSICIAN ASSISTANT

## 2018-06-13 PROCEDURE — 20550 NJX 1 TENDON SHEATH/LIGAMENT: CPT | Mod: RT,S$GLB,, | Performed by: PHYSICIAN ASSISTANT

## 2018-06-13 PROCEDURE — 99214 OFFICE O/P EST MOD 30 MIN: CPT | Mod: 25,S$GLB,, | Performed by: PHYSICIAN ASSISTANT

## 2018-06-13 PROCEDURE — 99999 PR PBB SHADOW E&M-EST. PATIENT-LVL IV: CPT | Mod: PBBFAC,,, | Performed by: PHYSICIAN ASSISTANT

## 2018-06-13 PROCEDURE — 3079F DIAST BP 80-89 MM HG: CPT | Mod: CPTII,S$GLB,, | Performed by: PHYSICIAN ASSISTANT

## 2018-06-13 PROCEDURE — 73630 X-RAY EXAM OF FOOT: CPT | Mod: 26,RT,, | Performed by: RADIOLOGY

## 2018-06-13 RX ORDER — TRIAMCINOLONE ACETONIDE 40 MG/ML
40 INJECTION, SUSPENSION INTRA-ARTICULAR; INTRAMUSCULAR
Status: COMPLETED | OUTPATIENT
Start: 2018-06-13 | End: 2018-06-13

## 2018-06-13 RX ADMIN — TRIAMCINOLONE ACETONIDE 40 MG: 40 INJECTION, SUSPENSION INTRA-ARTICULAR; INTRAMUSCULAR at 07:06

## 2018-06-14 NOTE — PROGRESS NOTES
"  SUBJECTIVE:     Chief Complaint & History of Present Illness:  Emerson Menendez is a  New  patient 72 y.o. male who is seen here today with a complaint of    Chief Complaint   Patient presents with    Right Foot - Pain    .  He is here today for evaluation treatment of progressively worsening right foot pain in the plantar fascia.  Patient has a well-documented history of a recurrent plantar fasciitis was seen a proximal he 6 months ago underwent injection therapy for his left plantar fascia with very good results he is tried all conservative treatments for his right foot pain to include ice massage anti-inflammatories, over-the-counter orthotics anti-inflammatories and rest but has progressively worsening pain.  He is scheduled to travel out of the country later this week and will be required to undergo some prolonged ambulation.  Additionally has a prescription for diabetic shoes but is unable to find a shop to fill his prescription  On a scale of 1-10, with 10 being worst pain imaginable, he rates this pain as 4 on good days and 8 on bad days.  he describes the pain as tender and sore.    Review of patient's allergies indicates:   Allergen Reactions    Penicillins      Knees locked up         Current Outpatient Prescriptions   Medication Sig Dispense Refill    aspirin (ECOTRIN) 81 MG EC tablet Take 1 tablet (81 mg total) by mouth once daily. 100 tablet 3    BD INSULIN PEN NEEDLE UF SHORT 31 gauge x 5/16" Ndle USE TO INJECT INSULIN ONE TIME DAILY 300 each 3    blood glucose control, normal (METER-CHECK) Soln One meter. True test meter. Use as directed 1 each 0    blood sugar diagnostic Strp Omayra Meter Check twice daily 200 strip 4    blood-glucose meter (ACCU-CHEK OMAYRA) Misc USE AS DIRECTED. Accucheck elie plus 1 each 0    cholecalciferol, vitamin D3, (VITAMIN D) 2,000 unit Cap Take by mouth. 1 Capsule Oral Every day.  over the counter      citalopram (CELEXA) 20 MG tablet TAKE 1 TABLET EVERY " EVENING AFTER DINNER 90 tablet 11    diazePAM (VALIUM) 5 MG tablet TAKE 1 TABLET EVERY 6 HOURS AS NEEDED 60 tablet 1    flash glucose scanning reader (FREESTYLE CHRISTIANO READER) Misc 1 each by Misc.(Non-Drug; Combo Route) route 4 (four) times daily. 1 each 0    flash glucose sensor (FREESTYLE CHRISTIANO SENSOR) Kit 1 each by Misc.(Non-Drug; Combo Route) route once a week. 4 kit 6    gabapentin (NEURONTIN) 100 MG capsule TAKE 1 CAPSULE EVERY MORNING, 1 CAPSULE IN THE AFTERNOON, AND 1 TO 3 CAPSULE(S) AT BEDTIME AS NEEDED FOR FOOT PAIN 450 capsule 3    glimepiride (AMARYL) 2 MG tablet daily with breakfast.       glucagon (human recombinant) inj 1mg/mL kit Inject 1 mL (1 mg total) into the muscle as needed. 1 kit 0    hydrocodone-acetaminophen 5-325mg (NORCO) 5-325 mg per tablet Take 1 tablet by mouth every 4 to 6 hours as needed for Pain. 60 tablet 0    insulin regular 100 unit/mL Inj injection To use with Vgo 20 with 2 clicks with meals.  * Use Relion brand regular insulin* Needs 2 vials monthly. 20 mL 3    lancets Misc Use twice daily 200 each 4    latanoprost 0.005 % ophthalmic solution INSTILL 1 DROP INTO BOTH EYES EVERY EVENING 3 Bottle 4    losartan (COZAAR) 25 MG tablet Take 1 tablet (25 mg total) by mouth once daily. 90 tablet 4    NOVOLOG FLEXPEN U-100 INSULIN 100 unit/mL InPn pen       omeprazole (PRILOSEC) 20 MG capsule TAKE 2 CAPSULES ONE TIME DAILY 180 capsule 3    oxybutynin (DITROPAN) 5 MG Tab Take 1 tablet (5 mg total) by mouth 2 (two) times daily. 180 tablet 12    pravastatin (PRAVACHOL) 40 MG tablet Take 1 tablet (40 mg total) by mouth once daily. 90 tablet 4    sub-q insulin device, 20 unit (VGO 20) Nury 1 Device by Misc.(Non-Drug; Combo Route) route once daily. 30 Device 4    tamsulosin (FLOMAX) 0.4 mg Cp24 TAKE 1 CAPSULE EVERY DAY 90 capsule 3    tizanidine (ZANAFLEX) 4 MG tablet 1/2-1 tablet every 8 hours as needed for muscle spasm 90 tablet 1     No current facility-administered  "medications for this visit.        Past Medical History:   Diagnosis Date    Anxiety 3/16/2015    BPH (benign prostatic hypertrophy) 9/24/2012    Depression 3/16/2015    GERD (gastroesophageal reflux disease) 9/24/2012    Microalbuminuria 9/24/2012    Osteoarthritis of cervical spine 9/24/2012    Osteoarthritis of knee 9/24/2012    Type II or unspecified type diabetes mellitus without mention of complication, not stated as uncontrolled 9/24/2012       Past Surgical History:   Procedure Laterality Date    CATARACT EXTRACTION  2012    Right eye    PROSTATE SURGERY         Vital Signs (Most Recent)  Vitals:    06/13/18 1903   BP: (!) 145/82   Pulse: 87       Review of Systems:  ROS:  Constitutional: no fever or chills  Eyes: no visual changes  ENT: no nasal congestion or sore throat  Respiratory: no cough or shortness of breath  Cardiovascular: no chest pain or palpitations  Gastrointestinal: no nausea or vomiting, tolerating diet, Positive for GERD  Genitourinary: no hematuria or dysuria, Positive CK D stage  Integument/Breast: no rash or pruritis  Hematologic/Lymphatic: no easy bruising or lymphadenopathy, Positive for anemia  Musculoskeletal: no arthralgias or myalgias  Neurological: no seizures or tremors  Behavioral/Psych: no auditory or visual hallucinations, Positive for anxiety and depression  Endocrine: no heat or cold intolerance, Positive for type 1 diabetes with polyneuropathy      OBJECTIVE:     PHYSICAL EXAM:  Height: 5' 10" (177.8 cm) Weight: 86.8 kg (191 lb 5.8 oz), General Appearance: Well nourished, well developed, in no acute distress.  Neurological: Mood & affect are normal.  right  Foot/Ankle    Skin: normal  Swelling: none  Warmth: no warmth  Tenderness: moderate and severe  ROM: 90 degrees dorsiflexion, 180 degrees plantarflexion, 40 degrees inversion and 40 degrees eversion  Strength: normal, 5 on 5, 5 of 5 EHL strength, 5 of 5 EDL strength and 5 of 5 peroneus brevis  Gait: " antalgic  Stability: anterior drawer: negative, exterior rotation test: negative, Lachman: negative and Cotton test: negative    point tenderness over the heel pad and tenderness at Achilles tendon insertion          RADIOGRAPHS:  X-rays taken today films reviewed by demonstrate no evidence of fracture dislocation or about the foot or ankle positive heel spurring at the calcaneus both on the plantar surface in the Achilles insert    ASSESSMENT/PLAN:     Plan:  Length of fasciitis we'll proceed with therapeutic diagnostic cortisone injection of the plantar fascia      The injection site was identified and the skin was prepared with an ETOH solution. The posterior plantar fascia of the   right  foot was injected with 1 ml of Celestone and 2 ml Lidocaine under sterile technique. Emerson Menendez tolerated the procedure well, he was advised to rest the  foot  today, ice and support. he did receive immediate relief of the pain in and about his  foot  he was told this would be short lived and is secondary to the lidocaine. he may have an increase in his discomfort tonight followed by steady improvement over the next several days. It may take 1-3 weeks following the injection to get the full benefit of the medication.  I will see him back in 4-6 months. Sooner if he has any problems or concerns.    Emerson Menendez was advised to monitor his blood sugars closely over the next several days. The steroid may cause a rise in them. If his glucose levels rise to a point he is uncomfortable or he is unable to control them is is to contact his PCP or go immediately to the emergency department.

## 2018-06-25 ENCOUNTER — RESEARCH ENCOUNTER (OUTPATIENT)
Dept: RESEARCH | Facility: HOSPITAL | Age: 73
End: 2018-06-25

## 2018-06-25 ENCOUNTER — OFFICE VISIT (OUTPATIENT)
Dept: HEMATOLOGY/ONCOLOGY | Facility: CLINIC | Age: 73
End: 2018-06-25
Payer: MEDICARE

## 2018-06-25 ENCOUNTER — LAB VISIT (OUTPATIENT)
Dept: LAB | Facility: HOSPITAL | Age: 73
End: 2018-06-25
Payer: MEDICARE

## 2018-06-25 VITALS
WEIGHT: 190.25 LBS | BODY MASS INDEX: 30.58 KG/M2 | OXYGEN SATURATION: 99 % | SYSTOLIC BLOOD PRESSURE: 144 MMHG | RESPIRATION RATE: 20 BRPM | DIASTOLIC BLOOD PRESSURE: 86 MMHG | TEMPERATURE: 98 F | HEART RATE: 96 BPM | HEIGHT: 66 IN

## 2018-06-25 DIAGNOSIS — D47.2 SMOLDERING MULTIPLE MYELOMA: Primary | ICD-10-CM

## 2018-06-25 DIAGNOSIS — Z00.6 EXAMINATION OF PARTICIPANT IN CLINICAL TRIAL: ICD-10-CM

## 2018-06-25 DIAGNOSIS — D47.2 SMOLDERING MULTIPLE MYELOMA: ICD-10-CM

## 2018-06-25 DIAGNOSIS — N18.30 CKD STAGE 3 DUE TO TYPE 2 DIABETES MELLITUS: ICD-10-CM

## 2018-06-25 DIAGNOSIS — E11.22 CKD STAGE 3 DUE TO TYPE 2 DIABETES MELLITUS: ICD-10-CM

## 2018-06-25 DIAGNOSIS — E11.42 TYPE 2 DIABETES MELLITUS WITH DIABETIC POLYNEUROPATHY, WITH LONG-TERM CURRENT USE OF INSULIN: ICD-10-CM

## 2018-06-25 DIAGNOSIS — Z79.4 TYPE 2 DIABETES MELLITUS WITH DIABETIC POLYNEUROPATHY, WITH LONG-TERM CURRENT USE OF INSULIN: ICD-10-CM

## 2018-06-25 LAB
ALBUMIN SERPL BCP-MCNC: 3.8 G/DL
ALP SERPL-CCNC: 54 U/L
ALT SERPL W/O P-5'-P-CCNC: 25 U/L
ANION GAP SERPL CALC-SCNC: 11 MMOL/L
AST SERPL-CCNC: 17 U/L
BASOPHILS # BLD AUTO: 0.03 K/UL
BASOPHILS NFR BLD: 0.5 %
BILIRUB SERPL-MCNC: 0.7 MG/DL
BUN SERPL-MCNC: 22 MG/DL
CALCIUM SERPL-MCNC: 10.2 MG/DL
CHLORIDE SERPL-SCNC: 100 MMOL/L
CO2 SERPL-SCNC: 26 MMOL/L
CREAT SERPL-MCNC: 1.8 MG/DL
DIFFERENTIAL METHOD: ABNORMAL
EOSINOPHIL # BLD AUTO: 0.2 K/UL
EOSINOPHIL NFR BLD: 2.8 %
ERYTHROCYTE [DISTWIDTH] IN BLOOD BY AUTOMATED COUNT: 13.9 %
EST. GFR  (AFRICAN AMERICAN): 42.8 ML/MIN/1.73 M^2
EST. GFR  (NON AFRICAN AMERICAN): 37 ML/MIN/1.73 M^2
GLUCOSE SERPL-MCNC: 264 MG/DL
HCT VFR BLD AUTO: 39.3 %
HGB BLD-MCNC: 12.7 G/DL
IGA SERPL-MCNC: 1717 MG/DL
IGG SERPL-MCNC: 993 MG/DL
IGM SERPL-MCNC: 47 MG/DL
IMM GRANULOCYTES # BLD AUTO: 0.01 K/UL
IMM GRANULOCYTES NFR BLD AUTO: 0.2 %
LYMPHOCYTES # BLD AUTO: 1.7 K/UL
LYMPHOCYTES NFR BLD: 30.4 %
MAGNESIUM SERPL-MCNC: 2.3 MG/DL
MCH RBC QN AUTO: 29.3 PG
MCHC RBC AUTO-ENTMCNC: 32.3 G/DL
MCV RBC AUTO: 91 FL
MONOCYTES # BLD AUTO: 0.4 K/UL
MONOCYTES NFR BLD: 6.5 %
NEUTROPHILS # BLD AUTO: 3.4 K/UL
NEUTROPHILS NFR BLD: 59.6 %
NRBC BLD-RTO: 0 /100 WBC
PHOSPHATE SERPL-MCNC: 2.9 MG/DL
PLATELET # BLD AUTO: 253 K/UL
PMV BLD AUTO: 9.2 FL
POTASSIUM SERPL-SCNC: 4.3 MMOL/L
PROT SERPL-MCNC: 8.9 G/DL
RBC # BLD AUTO: 4.33 M/UL
SODIUM SERPL-SCNC: 137 MMOL/L
WBC # BLD AUTO: 5.7 K/UL

## 2018-06-25 PROCEDURE — 84165 PROTEIN E-PHORESIS SERUM: CPT | Mod: 26,Q1,, | Performed by: PATHOLOGY

## 2018-06-25 PROCEDURE — 83520 IMMUNOASSAY QUANT NOS NONAB: CPT | Mod: 59

## 2018-06-25 PROCEDURE — 80053 COMPREHEN METABOLIC PANEL: CPT | Mod: Q1

## 2018-06-25 PROCEDURE — 36415 COLL VENOUS BLD VENIPUNCTURE: CPT

## 2018-06-25 PROCEDURE — 82784 ASSAY IGA/IGD/IGG/IGM EACH: CPT | Mod: 59

## 2018-06-25 PROCEDURE — 99999 PR PBB SHADOW E&M-EST. PATIENT-LVL V: CPT | Mod: PBBFAC,,, | Performed by: INTERNAL MEDICINE

## 2018-06-25 PROCEDURE — 84100 ASSAY OF PHOSPHORUS: CPT | Mod: Q1

## 2018-06-25 PROCEDURE — 86334 IMMUNOFIX E-PHORESIS SERUM: CPT | Mod: 26,Q1,, | Performed by: PATHOLOGY

## 2018-06-25 PROCEDURE — 83735 ASSAY OF MAGNESIUM: CPT

## 2018-06-25 PROCEDURE — 82784 ASSAY IGA/IGD/IGG/IGM EACH: CPT

## 2018-06-25 PROCEDURE — 86334 IMMUNOFIX E-PHORESIS SERUM: CPT | Mod: Q1

## 2018-06-25 PROCEDURE — 99215 OFFICE O/P EST HI 40 MIN: CPT | Mod: Q1,S$GLB,, | Performed by: INTERNAL MEDICINE

## 2018-06-25 PROCEDURE — 85025 COMPLETE CBC W/AUTO DIFF WBC: CPT

## 2018-06-25 PROCEDURE — 84165 PROTEIN E-PHORESIS SERUM: CPT

## 2018-06-25 NOTE — PROGRESS NOTES
Subjective:       Patient ID: Emerson Menendez is a 72 y.o. male.    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 Test results identify a stable IgA kappa protein with beta globulin band at 1.63g/dL. Kappa free light chain is elevated at 4.40. CBC and calcium are stable. Creatinine with history of stage III CKD is stable at 1.4. Metastatic survey is negative for lytic lesions. MRI of the entire spine demonstrated age related changes but no convincing evidence of myeloma bone disease. Bone marrow biopsy identified about 13% plasma cells by morphology and FISH for myeloma identified a t(11;14) and trisomies 3,7, and 17. The patient is afebrile and appears clinically well. He was seen by his podiatrist and nephrologist since our last visit without any new or acute events.    The patient has not received any therapy for smoldering myeloma including bisphosphonates or steroids. He has been randomized to observation arm of the the clinical study. 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 He has no current or prior history of malignancy.    TODAY  Mr. Menendez returns today for monthly follow-up evaluation. He denies any acute interval events since last interview.   No clinical signs/symptoms of progression of his smoldering myeloma.  \  Creatinine is elevated today, suspect dehydration after limited intake over past month and current extreme heat related to weather changes.      HPI  Review of Systems   Constitutional: Negative for activity change, appetite change, diaphoresis, fatigue, fever and unexpected weight change.    HENT: Negative.    Eyes: Negative.    Respiratory: Negative.    Cardiovascular: Negative for leg swelling.   Gastrointestinal: Negative.    Endocrine: Negative.    Genitourinary: Negative.    Musculoskeletal: Negative.    Skin: Negative.    Allergic/Immunologic: Negative.    Neurological:        Grade 0-1 peripheral neuropathy of bilateral feet.    Hematological: Negative for adenopathy. Does not bruise/bleed easily.   Psychiatric/Behavioral: Negative.        Objective:       Vitals:    06/25/18 0853   BP: (!) 144/86   Pulse: 96   Resp: 20   Temp: 97.5 °F (36.4 °C)       Physical Exam   Constitutional: He is oriented to person, place, and time. He appears well-developed and well-nourished.   HENT:   Head: Normocephalic and atraumatic.   Right Ear: External ear normal.   Left Ear: External ear normal.   Nose: Nose normal.   Mouth/Throat: Oropharynx is clear and moist.   Eyes: Conjunctivae and EOM are normal. Pupils are equal, round, and reactive to light.   Neck: Normal range of motion. Neck supple.   Cardiovascular: Normal rate, regular rhythm and intact distal pulses.    No murmur heard.  Pulmonary/Chest: Effort normal and breath sounds normal. No respiratory distress.   Abdominal: Soft. Bowel sounds are normal. He exhibits no distension. There is no hepatosplenomegaly.   Musculoskeletal: He exhibits no edema or tenderness.   Neurological: He is alert and oriented to person, place, and time. No cranial nerve deficit.   Skin: Skin is warm and dry. No rash noted. No cyanosis. Nails show no clubbing.   Psychiatric: He has a normal mood and affect. His behavior is normal.   Nursing note and vitals reviewed.      Assessment:       1. Smoldering multiple myeloma    2. Type 2 diabetes mellitus with diabetic polyneuropathy, with long-term current use of insulin    3. CKD stage 3 due to type 2 diabetes mellitus        Plan:       The patient has a diagnosis of smoldering myeloma. There is no indication for immediate  chemotherapy. We are monitoring renal function closely- baseline creatinine of -1.5 is increased to 1.8 today. Patient instructed to increase oral hydration.  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 CBC and CMP are stable.  Complete biochemical studies from today are pending.  Plan for return in 1 month.  Endocrinology and Nephrology to monitor diabetes and renal failure respectively.

## 2018-06-25 NOTE — PROGRESS NOTES
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   Sponsor:  [facility]  IRB #: [id number]  Investigator: GIL Engel  Pt Initials: LUCÍA LORENZ       Arm B: Observation  Cycle 30, Day 28        Patient presents to clinic to evaluate ability to proceed with Cycle 31 of observation per above-mentioned protocol. He states that he has been doing well since last month.  He continues to wear insulin pump per his endocrinologist but continues to self-manage the extra injections. He denies new potential CRAB symptoms.  He states that he has started working out and just recently returned from a trip to [place] where he did a lot of walking. He states continued willingness to participate in above-mentioned study.    Review of Baseline AE's:    *Please note this list is not all-inclusive, please see AE log for physician reviewed list of adverse events.   1. Hypertension, grade 2: BP today is 144/86 in clinic today.  Will continue to follow this.   2. Lower Back Pain and Neck Pain, grade 1: Patient has no complaints of this issue this month. As previously stated, patient is not suspected to have bony myeloma involvement per Dr. Engel as these are pre-existing issues present at baseline.  AE ongoing.  3.  Pain in extremity (knee), grade 1.  Continues to wear knee stabilizing braces today and states that pain arises when he takes them off. AE ongoing  4. Peripheral sensory neuropathy, grade 1: Patient does not verbalize complaints of this symptom today. Continues to take neurontin for this per Dr. Sagastume. AE ongoing.   5. Anemia, Grade 1: Hgb/Hct - 12.7/39.3     Review of AE's:     *Please note this list is not all-inclusive, please see AE log for physician reviewed list of adverse events.   1. Creatinine increased, grade 2: Serum creatinine is at 1.8 mg/dl today and GFR 45.95. As previously stated, Dr. Engel  "states this has not been related to myeloma and is related chronic kidney issues likely secondary to diabetes and hypertension.  Will continue observation monthly and to monitor renal function closely. Per MD, encouraged to increase water intake, as patient states he hasn't been drinking a lot recently. AE worsening from baseline.     Per study chair, "the definition of progressive disease for this protocol is developing CRAB criteria that requires treatment systemically." Per Dr Engel, based on today's exam and lab results thus far, patient does not have any s/s of CRAB: Normal Calcium level (9.9), absence of Renal insufficiency (serum creatinine 1.5, and GFR 56.54 per [facility]ro[facility]-Gault--patient with a history of kidney dysfunction secondary to diabetes; Dr. Engel aware of these values and not concerned for myeloma involvement), absence of significant Anemia (hemoglobin 12.7, stable from baseline hgb level at study entry) and absence of lytic Bone lesions (per baseline metastatic survey as well as MRI--see MD note for further comment). See MD note for ECOG score and H&P and flowsheets for laboratory work, vitals, etc. All myeloma-related blood work from last cycle including SPEP and JAGUAR have remained stable, and are currently pending for this cycle. Per Dr. Engel, patient shows no evidence of progression at last result. Patient informed that Dr. Engel will release all lab results to [facility]. He states understanding of this. QOL's completed per study protocol.       Next appts scheduled and given to patient.  Patient states he should not have any conflicts with next month's appt.  Patient was given Research RN's contact information and has MD contact information to call with any concerns, questions, or worsening of symptoms; he verbalized understanding.                                  "

## 2018-06-26 LAB
ALBUMIN SERPL ELPH-MCNC: 4.1 G/DL
ALPHA1 GLOB SERPL ELPH-MCNC: 0.28 G/DL
ALPHA2 GLOB SERPL ELPH-MCNC: 0.85 G/DL
B-GLOBULIN SERPL ELPH-MCNC: 2.84 G/DL
GAMMA GLOB SERPL ELPH-MCNC: 0.52 G/DL
INTERPRETATION SERPL IFE-IMP: NORMAL
KAPPA LC SER QL IA: 6.48 MG/DL
KAPPA LC/LAMBDA SER IA: 3.86
LAMBDA LC SER QL IA: 1.68 MG/DL
PATHOLOGIST INTERPRETATION IFE: NORMAL
PATHOLOGIST INTERPRETATION SPE: NORMAL
PROT SERPL-MCNC: 8.6 G/DL

## 2018-07-01 ENCOUNTER — PATIENT MESSAGE (OUTPATIENT)
Dept: INTERNAL MEDICINE | Facility: CLINIC | Age: 73
End: 2018-07-01

## 2018-07-17 ENCOUNTER — PATIENT MESSAGE (OUTPATIENT)
Dept: INTERNAL MEDICINE | Facility: CLINIC | Age: 73
End: 2018-07-17

## 2018-07-17 DIAGNOSIS — N18.30 TYPE 2 DIABETES MELLITUS WITH STAGE 3 CHRONIC KIDNEY DISEASE, WITH LONG-TERM CURRENT USE OF INSULIN: Primary | ICD-10-CM

## 2018-07-17 DIAGNOSIS — Z79.4 TYPE 2 DIABETES MELLITUS WITH STAGE 3 CHRONIC KIDNEY DISEASE, WITH LONG-TERM CURRENT USE OF INSULIN: Primary | ICD-10-CM

## 2018-07-17 DIAGNOSIS — E11.22 TYPE 2 DIABETES MELLITUS WITH STAGE 3 CHRONIC KIDNEY DISEASE, WITH LONG-TERM CURRENT USE OF INSULIN: Primary | ICD-10-CM

## 2018-07-17 DIAGNOSIS — M25.559 ARTHRALGIA OF HIP, UNSPECIFIED LATERALITY: Primary | ICD-10-CM

## 2018-07-18 ENCOUNTER — TELEPHONE (OUTPATIENT)
Dept: INTERNAL MEDICINE | Facility: CLINIC | Age: 73
End: 2018-07-18

## 2018-07-18 ENCOUNTER — CLINICAL SUPPORT (OUTPATIENT)
Dept: DIABETES | Facility: CLINIC | Age: 73
End: 2018-07-18
Payer: MEDICARE

## 2018-07-18 DIAGNOSIS — Z79.4 CONTROLLED TYPE 2 DIABETES MELLITUS WITH OTHER DIABETIC KIDNEY COMPLICATION, WITH LONG-TERM CURRENT USE OF INSULIN: ICD-10-CM

## 2018-07-18 DIAGNOSIS — E11.29 CONTROLLED TYPE 2 DIABETES MELLITUS WITH OTHER DIABETIC KIDNEY COMPLICATION, WITH LONG-TERM CURRENT USE OF INSULIN: ICD-10-CM

## 2018-07-18 PROCEDURE — G0108 DIAB MANAGE TRN  PER INDIV: HCPCS | Mod: S$GLB,,, | Performed by: INTERNAL MEDICINE

## 2018-07-18 NOTE — PROGRESS NOTES
"No logs with him today. Seen by Dr. Coleman 5/4 and by Lisa Feng diabetes educator 1/29. He started on Vgo Checks BG 5-6 times per day. avgs about 2 clicks per day. Reports no hypoglycemia in past 2 weeks.     If above 180 would give a click.     He and girlfriend broke up a few weeks ago.     Electrical and . He is unsure if he is taking glimepiride or not. He will call m     b- boost  l-mandy  D- girlfriend prepares meal, cucumber and tomatoes and baked chx    Says "sweets are my problem." At night will eat a few cookies. And wakes up during night to urinate, will eat angy crackers and cheese.     Takes glimepiride.       "

## 2018-07-18 NOTE — PROGRESS NOTES
"Diabetes Education  Author: Paola Garnica RD, CDE  Date: 7/18/2018    Diabetes Education Visit  Diabetes Education Record Assessment/Progress: Comprehensive/Group    Diabetes Type  Diabetes Type : Type II    No logs with him today. Seen by Dr. Coleman 5/4 and by Lisa Feng diabetes educator 1/29. He started on Vgo Checks BG 5-6 times per day. avgs about 2 clicks per day. Also taking glimepiride. Reports no hypoglycemia in past 2 weeks.      If above 180 would give a click.      He and girlfriend broke up a few weeks ago.      Electrical and . He is unsure if he is taking glimepiride or not. He will call m      b- boost  l-salad  D- girlfriend prepares meal, cucumber and tomatoes and baked chx     Says "sweets are my problem." At night will eat a few cookies. And wakes up during night to urinate, will eat angy crackers and cheese.      Has 6 vials of unused, unexpired Lantus in refrigerator from when he was previously taking it. Per Dr. Coleman, he will discontinue VGo and regular insulin, continue taking glimepiride and start lantus 20 units hs. He will send in log in 1 week for review.     Monitoring   Self Monitoring : SMBG 3-4 times per day   Blood Glucose Logs: No    Current Diabetes Treatment   Current Treatment: Insulin, Oral Medication (VGo, amaryl)    Social History  Preferred Learning Method: Face to Face    Barriers to Change  Barriers to Change: None  Learning Challenges : None    Readiness to Learn   Readiness to Learn : Eager    Cultural Influences  Cultural Influences: No    Diabetes Education Assessment/Progress  Diabetes Disease Process (diabetes disease process and treatment options): Discussion, Written Materials Provided, Individual Session  Nutrition (Incorporating nutritional management into one's lifestyle): Discussion, Written Materials Provided, Individual Session  Physical Activity (incorporating physical activity into one's lifestyle): Discussion, Written Materials " Provided, Individual Session  Medications (states correct name, dose, onset, peak, duration, side effects & timing of meds): Discussion, Written Materials Provided, Individual Session  Monitoring (monitoring blood glucose/other parameters & using results): Discussion, Individual Session, Written Materials Provided  Acute Complications (preventing, detecting, and treating acute complications): Discussion, Written Materials Provided, Individual Session  Chronic Complications (preventing, detecting, and treating chronic complications): Discussion, Written Materials Provided, Individual Session  Clinical (diabetes, other pertinent medical history, and relevant comorbidities reviewed during visit): Discussion, Written Materials Provided, Individual Session  Cognitive (knowledge of self-management skills, functional health literacy): Discussion, Written Materials Provided, Individual Session  Psychosocial (emotional response to diabetes): Discussion, Written Materials Provided, Individual Session  Diabetes Distress and Support Systems: Discussion, Individual Session, Written Materials Provided  Behavioral (readiness for change, lifestyle practices, self-care behaviors): Discussion, Individual Session, Written Materials Provided    Goals  Patient has selected/evaluated goals during today's session: Yes, evaluated  Monitoring: In Progress (keep written record of BG and return log for return in 1 week)    Diabetes Care Plan/Intervention  Education Plan/Intervention: Individual Follow-Up DSMT    Diabetes Meal Plan  Carbohydrate Per Meal: 45-60g    Education Units of Time   Time Spent: 60 min    Health Maintenance was reviewed today with patient. Discussed with patient importance of routine eye exams, foot exams/foot care, blood work (i.e.: A1c, microalbumin, and lipid), dental visits, yearly flu vaccine, and pneumonia vaccine as indicated by PCP. Patient verbalized understanding.     Health Maintenance Topics with due status:  Not Due       Topic Last Completion Date    Colonoscopy 03/22/2012    TETANUS VACCINE 12/17/2013    Influenza Vaccine 09/13/2017    Lipid Panel 04/11/2018    Hemoglobin A1c 04/11/2018    Low Dose Statin 06/25/2018     Health Maintenance Due   Topic Date Due    Foot Exam  05/26/2018    Eye Exam  08/01/2018

## 2018-07-21 DIAGNOSIS — N32.81 OAB (OVERACTIVE BLADDER): ICD-10-CM

## 2018-07-23 ENCOUNTER — LAB VISIT (OUTPATIENT)
Dept: LAB | Facility: HOSPITAL | Age: 73
End: 2018-07-23
Payer: MEDICARE

## 2018-07-23 ENCOUNTER — OFFICE VISIT (OUTPATIENT)
Dept: HEMATOLOGY/ONCOLOGY | Facility: CLINIC | Age: 73
End: 2018-07-23
Payer: MEDICARE

## 2018-07-23 ENCOUNTER — RESEARCH ENCOUNTER (OUTPATIENT)
Dept: HEMATOLOGY/ONCOLOGY | Facility: CLINIC | Age: 73
End: 2018-07-23

## 2018-07-23 VITALS
RESPIRATION RATE: 17 BRPM | WEIGHT: 191.81 LBS | DIASTOLIC BLOOD PRESSURE: 78 MMHG | HEART RATE: 73 BPM | TEMPERATURE: 97 F | HEIGHT: 66 IN | OXYGEN SATURATION: 100 % | BODY MASS INDEX: 30.82 KG/M2 | SYSTOLIC BLOOD PRESSURE: 139 MMHG

## 2018-07-23 DIAGNOSIS — D47.2 SMOLDERING MULTIPLE MYELOMA: Primary | ICD-10-CM

## 2018-07-23 DIAGNOSIS — Z79.4 TYPE 2 DIABETES MELLITUS WITH DIABETIC POLYNEUROPATHY, WITH LONG-TERM CURRENT USE OF INSULIN: ICD-10-CM

## 2018-07-23 DIAGNOSIS — Z00.6 EXAMINATION OF PARTICIPANT IN CLINICAL TRIAL: ICD-10-CM

## 2018-07-23 DIAGNOSIS — E11.42 TYPE 2 DIABETES MELLITUS WITH DIABETIC POLYNEUROPATHY, WITH LONG-TERM CURRENT USE OF INSULIN: ICD-10-CM

## 2018-07-23 DIAGNOSIS — N18.30 CKD STAGE 3 DUE TO TYPE 2 DIABETES MELLITUS: ICD-10-CM

## 2018-07-23 DIAGNOSIS — E11.22 CKD STAGE 3 DUE TO TYPE 2 DIABETES MELLITUS: ICD-10-CM

## 2018-07-23 DIAGNOSIS — D47.2 SMOLDERING MULTIPLE MYELOMA: ICD-10-CM

## 2018-07-23 LAB
ALBUMIN SERPL BCP-MCNC: 3.8 G/DL
ALP SERPL-CCNC: 50 U/L
ALT SERPL W/O P-5'-P-CCNC: 21 U/L
ANION GAP SERPL CALC-SCNC: 9 MMOL/L
AST SERPL-CCNC: 22 U/L
BASOPHILS # BLD AUTO: 0.03 K/UL
BASOPHILS NFR BLD: 0.7 %
BILIRUB SERPL-MCNC: 0.9 MG/DL
BUN SERPL-MCNC: 22 MG/DL
CALCIUM SERPL-MCNC: 10.1 MG/DL
CHLORIDE SERPL-SCNC: 102 MMOL/L
CO2 SERPL-SCNC: 25 MMOL/L
CREAT SERPL-MCNC: 1.8 MG/DL
DIFFERENTIAL METHOD: ABNORMAL
EOSINOPHIL # BLD AUTO: 0.4 K/UL
EOSINOPHIL NFR BLD: 9.1 %
ERYTHROCYTE [DISTWIDTH] IN BLOOD BY AUTOMATED COUNT: 13.4 %
EST. GFR  (AFRICAN AMERICAN): 42.5 ML/MIN/1.73 M^2
EST. GFR  (NON AFRICAN AMERICAN): 36.8 ML/MIN/1.73 M^2
GLUCOSE SERPL-MCNC: 93 MG/DL
HCT VFR BLD AUTO: 35.5 %
HGB BLD-MCNC: 11.4 G/DL
IGA SERPL-MCNC: 1577 MG/DL
IGG SERPL-MCNC: 957 MG/DL
IGM SERPL-MCNC: 36 MG/DL
IMM GRANULOCYTES # BLD AUTO: 0 K/UL
IMM GRANULOCYTES NFR BLD AUTO: 0 %
LYMPHOCYTES # BLD AUTO: 1.5 K/UL
LYMPHOCYTES NFR BLD: 32.9 %
MAGNESIUM SERPL-MCNC: 2.3 MG/DL
MCH RBC QN AUTO: 29.2 PG
MCHC RBC AUTO-ENTMCNC: 32.1 G/DL
MCV RBC AUTO: 91 FL
MONOCYTES # BLD AUTO: 0.4 K/UL
MONOCYTES NFR BLD: 8.6 %
NEUTROPHILS # BLD AUTO: 2.2 K/UL
NEUTROPHILS NFR BLD: 48.7 %
NRBC BLD-RTO: 0 /100 WBC
PHOSPHATE SERPL-MCNC: 2.6 MG/DL
PLATELET # BLD AUTO: 219 K/UL
PMV BLD AUTO: 9 FL
POTASSIUM SERPL-SCNC: 4.9 MMOL/L
PROT SERPL-MCNC: 8.4 G/DL
RBC # BLD AUTO: 3.9 M/UL
SODIUM SERPL-SCNC: 136 MMOL/L
WBC # BLD AUTO: 4.53 K/UL

## 2018-07-23 PROCEDURE — 3045F PR MOST RECENT HEMOGLOBIN A1C LEVEL 7.0-9.0%: CPT | Mod: CPTII,S$GLB,, | Performed by: INTERNAL MEDICINE

## 2018-07-23 PROCEDURE — 84165 PROTEIN E-PHORESIS SERUM: CPT | Mod: 26,,, | Performed by: PATHOLOGY

## 2018-07-23 PROCEDURE — 86334 IMMUNOFIX E-PHORESIS SERUM: CPT | Mod: 26,,, | Performed by: PATHOLOGY

## 2018-07-23 PROCEDURE — 3075F SYST BP GE 130 - 139MM HG: CPT | Mod: CPTII,S$GLB,, | Performed by: INTERNAL MEDICINE

## 2018-07-23 PROCEDURE — 86334 IMMUNOFIX E-PHORESIS SERUM: CPT

## 2018-07-23 PROCEDURE — 84100 ASSAY OF PHOSPHORUS: CPT

## 2018-07-23 PROCEDURE — 82784 ASSAY IGA/IGD/IGG/IGM EACH: CPT | Mod: 59

## 2018-07-23 PROCEDURE — 84165 PROTEIN E-PHORESIS SERUM: CPT

## 2018-07-23 PROCEDURE — 83735 ASSAY OF MAGNESIUM: CPT

## 2018-07-23 PROCEDURE — 85025 COMPLETE CBC W/AUTO DIFF WBC: CPT

## 2018-07-23 PROCEDURE — 83520 IMMUNOASSAY QUANT NOS NONAB: CPT | Mod: 59

## 2018-07-23 PROCEDURE — 3078F DIAST BP <80 MM HG: CPT | Mod: CPTII,S$GLB,, | Performed by: INTERNAL MEDICINE

## 2018-07-23 PROCEDURE — 80053 COMPREHEN METABOLIC PANEL: CPT

## 2018-07-23 PROCEDURE — 99215 OFFICE O/P EST HI 40 MIN: CPT | Mod: Q1,S$GLB,, | Performed by: INTERNAL MEDICINE

## 2018-07-23 PROCEDURE — 82784 ASSAY IGA/IGD/IGG/IGM EACH: CPT

## 2018-07-23 PROCEDURE — 99999 PR PBB SHADOW E&M-EST. PATIENT-LVL III: CPT | Mod: PBBFAC,,, | Performed by: INTERNAL MEDICINE

## 2018-07-23 RX ORDER — OXYBUTYNIN CHLORIDE 5 MG/1
TABLET ORAL
Qty: 180 TABLET | Refills: 0 | Status: SHIPPED | OUTPATIENT
Start: 2018-07-23 | End: 2018-08-07

## 2018-07-23 NOTE — PROGRESS NOTES
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   Sponsor:  [facility]  IRB #: [id number]  Investigator: GIL Engel  Pt Initials: LUCÍA LORENZ       Arm B: Observation  Cycle 31, Day 28        Patient presents to clinic to evaluate ability to proceed with Cycle 32 of observation per above-mentioned protocol. He states that he has been doing well since last month.  States that the endocrinologist and diabetic nurse are "working on my sugars." and is currently not using the insulin pump. F/U visit with them scheduled in a week.  He denies new potential CRAB symptoms.  He states that he has continued to work out occasionally, and "just needs to get in a routine."  He states continued willingness to participate in above-mentioned study.    Review of Baseline AE's:    *Please note this list is not all-inclusive, please see AE log for physician reviewed list of adverse events.   1. Hypertension, grade 1: BP today is 139/78 in clinic today.  Returned to baseline  2. Lower Back Pain and Neck Pain, grade 1: Patient has no complaints of this issue this month. As previously stated, patient is not suspected to have bony myeloma involvement per Dr. Engel as these are pre-existing issues present at baseline.  AE ongoing.  3.  Pain in extremity (knee), grade 1.  Continues to wear knee stabilizing braces today and states that pain arises when he takes them off. AE ongoing  4. Peripheral sensory neuropathy, grade 1: Patient does not verbalize complaints of this symptom today. Continues to take neurontin for this per Dr. Sagastume. AE ongoing.   5. Anemia, Grade 1: Hgb/Hct - 11.4/35.5     Review of AE's:     *Please note this list is not all-inclusive, please see AE log for physician reviewed list of adverse events.   1. Creatinine increased, grade 2: Serum creatinine is at 1.8 mg/dl today and GFR 45.65. As " "previously stated, Dr. Engel states this has not been related to myeloma and is related chronic kidney issues likely secondary to diabetes and hypertension.  Will continue observation monthly and to monitor renal function closely. Per MD, encouraged to increase water intake, as patient states he hasn't been drinking a lot recently. AE worsening from baseline.   2. Hypophosphatemia, Grade 1: Phosphorus- 2.6, no supplementation needed per Dr. Engel. Will continue to monitor    Per study chair, "the definition of progressive disease for this protocol is developing CRAB criteria that requires treatment systemically." Per Dr Engel, based on today's exam and lab results thus far, patient does not have any s/s of CRAB: Normal Calcium level (10.1), absence of Renal insufficiency (serum creatinine 1.8, and GFR 45.65 per [facility]--patient with a history of kidney dysfunction secondary to diabetes; Dr. Engel aware of these values and not concerned for myeloma involvement), absence of significant Anemia (hemoglobin 11.4, stable from baseline hgb level at study entry) and absence of lytic Bone lesions (per baseline metastatic survey as well as MRI--see MD note for further comment). See MD note for ECOG score and H&P and flowsheets for laboratory work, vitals, etc. All myeloma-related blood work from last cycle including SPEP and JAGUAR have remained stable, and are currently pending for this cycle. Per Dr. Engel, patient shows no evidence of progression at last result. Patient informed that Dr. nEgel will release all lab results to [facility]. He states understanding of this. QOL's note required at this time point per study protocol.       Next appts scheduled and given to patient.  Patient states he should not have any conflicts with next month's appt.  Patient was given Research RN's contact information and has MD contact information to call with any concerns, questions, or worsening of symptoms; he verbalized " understanding.    -----------------------------------  Due to an updated risk features for lenalidomide, the informed consent has been updated. Review and re-signing of the informed consent required for treatment patients (patient is on observational plan). Please see attached informed consent process. Patient verbalizes understanding and was able to summarize updates to the consent. Patient, Emerson Menendez signed the informed consent form for the above-mentioned study. Research RN reviewed each page of the consent form  with the patient and  all questions were answered satisfactorily. Patient denied a copy of the informed consent at this time. The original consent was scanned into electronic medical records (EPIC) and filed into the subject's research study binder.

## 2018-07-24 LAB
ALBUMIN SERPL ELPH-MCNC: 3.96 G/DL
ALPHA1 GLOB SERPL ELPH-MCNC: 0.27 G/DL
ALPHA2 GLOB SERPL ELPH-MCNC: 0.78 G/DL
B-GLOBULIN SERPL ELPH-MCNC: 2.5 G/DL
GAMMA GLOB SERPL ELPH-MCNC: 0.5 G/DL
INTERPRETATION SERPL IFE-IMP: NORMAL
KAPPA LC SER QL IA: 6.35 MG/DL
KAPPA LC/LAMBDA SER IA: 3.55
LAMBDA LC SER QL IA: 1.79 MG/DL
PATHOLOGIST INTERPRETATION IFE: NORMAL
PATHOLOGIST INTERPRETATION SPE: NORMAL
PROT SERPL-MCNC: 8 G/DL

## 2018-07-26 ENCOUNTER — CLINICAL SUPPORT (OUTPATIENT)
Dept: REHABILITATION | Facility: OTHER | Age: 73
End: 2018-07-26
Payer: MEDICARE

## 2018-07-26 DIAGNOSIS — M25.552 PAIN OF BOTH HIP JOINTS: ICD-10-CM

## 2018-07-26 DIAGNOSIS — M25.551 PAIN OF BOTH HIP JOINTS: ICD-10-CM

## 2018-07-26 PROCEDURE — G8979 MOBILITY GOAL STATUS: HCPCS | Mod: CJ,PN | Performed by: PHYSICAL THERAPIST

## 2018-07-26 PROCEDURE — 97161 PT EVAL LOW COMPLEX 20 MIN: CPT | Mod: PN | Performed by: PHYSICAL THERAPIST

## 2018-07-26 PROCEDURE — G8978 MOBILITY CURRENT STATUS: HCPCS | Mod: CJ,PN | Performed by: PHYSICAL THERAPIST

## 2018-07-26 PROCEDURE — 97110 THERAPEUTIC EXERCISES: CPT | Mod: PN | Performed by: PHYSICAL THERAPIST

## 2018-07-26 NOTE — PLAN OF CARE
TIME RECORD    Date: 07/26/2018    Start Time:  0805  Stop Time:  0855      OUTPATIENT PHYSICAL THERAPY   PATIENT EVALUATION    Primary Diagnosis:   1. Pain of both hip joints       Treatment Diagnosis: muscle weakness, decreased rom, decreased flexibility, joints stiffness  Past Medical History:   Diagnosis Date    Anxiety 3/16/2015    BPH (benign prostatic hypertrophy) 9/24/2012    Depression 3/16/2015    GERD (gastroesophageal reflux disease) 9/24/2012    Microalbuminuria 9/24/2012    Osteoarthritis of cervical spine 9/24/2012    Osteoarthritis of knee 9/24/2012    Type II or unspecified type diabetes mellitus without mention of complication, not stated as uncontrolled 9/24/2012     Past Surgical History:   Procedure Laterality Date    CATARACT EXTRACTION  2012    Right eye    PROSTATE SURGERY       Precautions: cancer  Prior Therapy: yes  Medications: Emerson Menendez has a current medication list which includes the following prescription(s): aspirin, bd ultra-fine short pen needle, blood glucose control, normal, blood sugar diagnostic, blood-glucose meter, cholecalciferol (vitamin d3), citalopram, diazepam, flash glucose scanning reader, flash glucose sensor, gabapentin, glimepiride, glucagon (human recombinant), hydrocodone-acetaminophen, lancets, latanoprost, losartan, omeprazole, oxybutynin, pravastatin, tamsulosin, and tizanidine.  Nutrition:  Normal  History of Present Illness: Mild DJD and hip joint space narrowing  Prior Level of Function: Independent; gym in [employer] and does LE weight machines (leg press, HS curls, heel raises)  Social History: works on [place] and lives between [facility]  Place of Residence (Steps/Adaptations): apartment complex   Functional Deficits Leading to Referral/Nature of Injury: pain with prolonged sitting and driving  Patient Therapy Goals: To learn the procedure for treating his hip pain including exercises and heat vs ice    Subjective      Emerson Menendez states B hip pain that started in the left about 6-8 months ago. He is undergoing cancer and diabetic treatments right now so his hip pain was more in the background. No chemo. His hip pain has been progressively worsening and had xrays done in may that showed mild arthritis. He contacted the Dr for a referral to PT to learn what to do about his hip pain. He travels a lot for work between [facility] and [place]. The long drives may be aggravating his pain. He has B knee pain and wears braces for standing and walking. He has been to therapy for his neck and back before and continues to do the exercises at home. Also reports current plantar fascitis and diabetic neuropathy.     Pain:  Location: posterior lateral hip, R buttocks   Description: Tight  Activities Which Increase Pain: prolonged driving,   Activities Which Decrease Pain: sleeping on heating pad  Pain Scale: 5/10 at best 6/10 now  10/10 at worst    Objective     Posture: genu varum B  Palpation: TTP right glute med/min, B greater trochanter and IT band  Sensation: grossly intact to light touch in LE's, diminished plantar feet    Range of Motion/Strength:     Lumbar Spine Active ROM, * Indicates pain with movement    Flexion: 75%  Extension: 50%  Left Side Bend:75%  Right Side Bend:75%     Hip  Right   Left  Pain/Dysfunction with Movement    AROM PROM MMT AROM PROM MMT    Flexion 90 95 4- 100 105 4 Pain in knees   Extension - --- 4- --- --- 4    Abduction --- --- 4- --- --- 4- Painful with resisted MMT B   Adduction   4   4+    Internal rotation 28 30 4+ 35 40 4+ Painful B   External rotation 20 22 4 20 30 4-      MMT Knee:  Flexion: 5 B  Extension: 5 B    Flexibility: Hamstring flexibility: mild tightness L  Hip flexors: tight B R>L  Gastroc: tight B  Gait: Without AD  Analysis: Assistance indepedent  Bed Mobility:Independent  Transfers: Independent  Special Tests:   SLR: neg  Slump: neg  PRERNA:+ B  FADIR: neg  Ely's: positive  "B    Treatment: PT Evaluation Completed? Yes  Discussed Plan of Care with patient: Yes    Patient received 15 minutes of therapeutic exercise & instruction including:  Seated gastroc stretch with towel B  S/l clams x 10  Bridging x 10  Prone glute sets 10 x 10"  Prone rectus femoris str 15" x 4    Patient received 5 minutes of manual therapy including:  Long leg and lateral R hip traction    Written Home Exercises Provided: issued HEP with above exercises  Patient demo good understanding of the education provided. Patient demo good return demo of skill of exercises.      Assessment       Initial Assessment: Emerson is a 73 year old active male referred to physical therapy for diagnosis of B hip pain. Patient has the following impairments upon initial evaluation: decreased lumbar spine and hip ROM, decreased LE flexibility, B hip weakness. Patient to benefit from skilled physical therapy to address above deficits and maximize functional independence. Patient appears motivated to improve condition and is a good candidate for physical therapy. Patient has verbalized understanding of plan of care and set goals. Patient has no identified cultural, spiritual, or educational needs that would impair learning.      History  Co-morbidities and personal factors that may impact the plan of care Examination  Body Structures and Functions, activity limitations and participation restrictions that may impact the plan of care Clinical Presentation   Decision Making/ Complexity Score   Co-morbidities:       smoldering multiple myeloma, DM.            Personal Factors:    Body Regions:back, hips, knees, ankle    Body Systems:     Musculoskeletal:  weakness, impaired functional mobility, pain, decreased ROM, impaired joint extensibility and impaired muscle length    Neuromuscular:    Activity limitations: prolonged sitting      Participation Restrictions: driving         stable      Low     CMS Impairment/Limitation/Restriction for FOTO " Hip Survey  Status Limitation G-Code CMS Severity Modifier  Intake 60% 40% Current Status CK - At least 40 percent but less than 60 percent  Predicted 67% 33% Goal Status+ CJ - At least 20 percent but less than 40 percent    Rehab Potiential: good    Short Term GOALS: 4 weeks  1. Patient to be independent with home exercise program for improved self management of condition  2. Patient demonstrates independence with Postural Awareness.   3. Patient to report reduction in B hip pain by 40% or greater with ADL's  4. Patient to have improved flexibility through hip flexors for improved posture and gait    Long Term GOALS: 8 weeks  1. Patient demonstrates increased lumbar spine AROM to WFL to improve tolerance to functional activities pain free.   2. Patient demonstrates increased strength BLE's to 5/5 or greater to improve tolerance to functional activities pain free.   3. Patient to have decreased subjective report of disability as noted by a score of 33% or less on the FOTO questionnaire   4. Patient demonstrates increased H hip external rotation by 10 degrees or greater for improved performance of ADL's and donning shoes      Plan     Certification Period: 7/26/2018 to 9/26/2018  Recommended Treatment Plan: 1-2 times per week for 6-8 weeks: Gait Training, Manual Therapy, Moist Heat/ Ice, Neuromuscular Re-ed, Patient Education, Therapeutic Activites, Therapeutic Exercise and modalities PRN  Other Recommendations: Patient may be seen by PTA as part of treatment team        Therapist: Jyoti Fagan, PT

## 2018-07-27 NOTE — PROGRESS NOTES
Subjective:       Patient ID: Emerson Menendez is a 73 y.o. male.    Chief Complaint: No chief complaint on file.  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 Test results identify a stable IgA kappa protein with beta globulin band at 1.63g/dL. Kappa free light chain is elevated at 4.40. CBC and calcium are stable. Creatinine with history of stage III CKD is stable at 1.4. Metastatic survey is negative for lytic lesions. MRI of the entire spine demonstrated age related changes but no convincing evidence of myeloma bone disease. Bone marrow biopsy identified about 13% plasma cells by morphology and FISH for myeloma identified a t(11;14) and trisomies 3,7, and 17. The patient is afebrile and appears clinically well. He was seen by his podiatrist and nephrologist since our last visit without any new or acute events.    The patient has not received any therapy for smoldering myeloma including bisphosphonates or steroids. He has been randomized to observation arm of the the clinical study. 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 He has no current or prior history of malignancy.    TODAY  Mr. Menendez returns today for monthly follow-up evaluation. He reports changes to his insulin plan since last visit- he is off his pump and back on injections while endocrinology follows his blood sugar levels.   No clinical signs/symptoms of progression of his smoldering myeloma.  Creatinine is stable today.       HPI  Review of Systems   Constitutional: Negative for activity change, appetite change, diaphoresis, fatigue, fever and unexpected weight change.   HENT: Negative.    Eyes:  Negative.    Respiratory: Negative.    Cardiovascular: Negative for leg swelling.   Gastrointestinal: Negative.    Endocrine: Negative.    Genitourinary: Negative.    Musculoskeletal: Negative.    Skin: Negative.    Allergic/Immunologic: Negative.    Neurological:        Grade 0-1 peripheral neuropathy of bilateral feet.    Hematological: Negative for adenopathy. Does not bruise/bleed easily.   Psychiatric/Behavioral: Negative.        Objective:       Vitals:    07/23/18 1420   BP: 139/78   Pulse: 73   Resp: 17   Temp: 97.4 °F (36.3 °C)       Physical Exam   Constitutional: He is oriented to person, place, and time. He appears well-developed and well-nourished.   HENT:   Head: Normocephalic and atraumatic.   Right Ear: External ear normal.   Left Ear: External ear normal.   Nose: Nose normal.   Mouth/Throat: Oropharynx is clear and moist.   Eyes: Conjunctivae and EOM are normal. Pupils are equal, round, and reactive to light.   Neck: Normal range of motion. Neck supple.   Cardiovascular: Normal rate, regular rhythm and intact distal pulses.    No murmur heard.  Pulmonary/Chest: Effort normal and breath sounds normal. No respiratory distress.   Abdominal: Soft. Bowel sounds are normal. He exhibits no distension. There is no hepatosplenomegaly.   Musculoskeletal: He exhibits no edema or tenderness.   Neurological: He is alert and oriented to person, place, and time. No cranial nerve deficit.   Skin: Skin is warm and dry. No rash noted. No cyanosis. Nails show no clubbing.   Psychiatric: He has a normal mood and affect. His behavior is normal.   Nursing note and vitals reviewed.      Assessment:       1. Smoldering multiple myeloma    2. Type 2 diabetes mellitus with diabetic polyneuropathy, with long-term current use of insulin    3. CKD stage 3 due to type 2 diabetes mellitus        Plan:       The patient has a diagnosis of smoldering myeloma. There is no indication for immediate chemotherapy. We are monitoring  renal function closely- baseline creatinine of -1.5 is stable at 1.8 today. 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 CBC and CMP are stable.  Complete biochemical studies from today are stable.  Plan for return in 1 month.  Endocrinology and Nephrology to monitor diabetes and renal failure respectively.

## 2018-08-02 ENCOUNTER — CLINICAL SUPPORT (OUTPATIENT)
Dept: REHABILITATION | Facility: OTHER | Age: 73
End: 2018-08-02
Payer: MEDICARE

## 2018-08-02 DIAGNOSIS — M25.552 PAIN OF BOTH HIP JOINTS: ICD-10-CM

## 2018-08-02 DIAGNOSIS — M25.551 PAIN OF BOTH HIP JOINTS: ICD-10-CM

## 2018-08-02 PROCEDURE — 97110 THERAPEUTIC EXERCISES: CPT | Mod: PN | Performed by: PHYSICAL THERAPIST

## 2018-08-02 NOTE — PROGRESS NOTES
"                                                    Physical Therapy Daily Note     Name: Emerson Menendez  [facility] Number: [id number]  Diagnosis:   Encounter Diagnosis   Name Primary?    Pain of both hip joints      Physician: Roberta Sagastume MD  Precautions: standard  Visit #: 2 of 20  PTA Visit #: 0  Time In: 0900  Time Out: 0950    Subjective     Pt reports: Doing the stretches daily and seem to help.   Pain Scale: Emerson rates pain on a scale of 0-10 to be 5 currently.    Objective     Emerson received individual therapeutic exercises to develop strength, endurance, ROM, flexibility, posture and core stabilization for 40 minutes including:    Upright bike 6 min    Seated gastroc stretch with towel B- reviewed for HEP  Standing gastroc stretch wedge 30" x 3  S/l clams x 10, gtb  Supine clams with hip adductor block 3 min  Bridging 3 x 10  Supine IT band stretch with strap 20" x 3  Prone glute sets 10 x 10"  Prone rectus femoris str 15" x 4    Emerson received the following manual therapy techniques: Joint mobilizations and Manual traction were applied to the: B hips for 0 minutes including:    The patient received the following supervised modalities after being cleared for contradictions: deferred to home    Written Home Exercises Provided: reviewed HEP from eval   Pt demo good understanding of the education provided. Emerson demonstrated good return demonstration of activities.     Education provided re:  Emerson verbalized good understanding of education provided.   No spiritual or educational barriers to learning provided    Assessment     Patient tolerated treatment well with reduction in symptoms following RX. Pt demonstrating compliance with HEP and motivated to improve condition.   This is a 73 y.o. male referred to outpatient physical therapy and presents with a medical diagnosis of B hip pain and demonstrates limitations as described in the problem list. Pt prognosis is Good. Pt will continue to benefit from " skilled outpatient physical therapy to address the deficits listed in the problem list, provide pt/family education and to maximize pt's level of independence in the home and community environment.     Goals as follows:  Short Term GOALS: 4 weeks  1. Patient to be independent with home exercise program for improved self management of condition  2. Patient demonstrates independence with Postural Awareness.   3. Patient to report reduction in B hip pain by 40% or greater with ADL's  4. Patient to have improved flexibility through hip flexors for improved posture and gait     Long Term GOALS: 8 weeks  1. Patient demonstrates increased lumbar spine AROM to WFL to improve tolerance to functional activities pain free.   2. Patient demonstrates increased strength BLE's to 5/5 or greater to improve tolerance to functional activities pain free.   3. Patient to have decreased subjective report of disability as noted by a score of 33% or less on the FOTO questionnaire   4. Patient demonstrates increased H hip external rotation by 10 degrees or greater for improved performance of ADL's and donning shoes     Plan     Continue with established Plan of Care towards PT goals.    Therapist: Jyoti Fagan, PT  8/2/2018

## 2018-08-07 ENCOUNTER — CLINICAL SUPPORT (OUTPATIENT)
Dept: REHABILITATION | Facility: OTHER | Age: 73
End: 2018-08-07
Payer: MEDICARE

## 2018-08-07 ENCOUNTER — OFFICE VISIT (OUTPATIENT)
Dept: UROLOGY | Facility: CLINIC | Age: 73
End: 2018-08-07
Payer: MEDICARE

## 2018-08-07 VITALS
HEIGHT: 70 IN | DIASTOLIC BLOOD PRESSURE: 73 MMHG | HEART RATE: 96 BPM | SYSTOLIC BLOOD PRESSURE: 117 MMHG | WEIGHT: 189.63 LBS | BODY MASS INDEX: 27.15 KG/M2

## 2018-08-07 DIAGNOSIS — M25.552 PAIN OF BOTH HIP JOINTS: ICD-10-CM

## 2018-08-07 DIAGNOSIS — D47.2 SMOLDERING MULTIPLE MYELOMA: ICD-10-CM

## 2018-08-07 DIAGNOSIS — M25.551 PAIN OF BOTH HIP JOINTS: ICD-10-CM

## 2018-08-07 DIAGNOSIS — N40.1 BPH WITH URINARY OBSTRUCTION: Primary | ICD-10-CM

## 2018-08-07 DIAGNOSIS — N13.8 BPH WITH URINARY OBSTRUCTION: Primary | ICD-10-CM

## 2018-08-07 DIAGNOSIS — E11.65 TYPE 2 DIABETES MELLITUS WITH HYPERGLYCEMIA, UNSPECIFIED WHETHER LONG TERM INSULIN USE: Primary | ICD-10-CM

## 2018-08-07 DIAGNOSIS — R35.1 NOCTURIA: ICD-10-CM

## 2018-08-07 PROCEDURE — 99214 OFFICE O/P EST MOD 30 MIN: CPT | Mod: S$GLB,,, | Performed by: UROLOGY

## 2018-08-07 PROCEDURE — 3074F SYST BP LT 130 MM HG: CPT | Mod: CPTII,S$GLB,, | Performed by: UROLOGY

## 2018-08-07 PROCEDURE — 99999 PR PBB SHADOW E&M-EST. PATIENT-LVL IV: CPT | Mod: PBBFAC,,, | Performed by: UROLOGY

## 2018-08-07 PROCEDURE — 97110 THERAPEUTIC EXERCISES: CPT | Mod: PN | Performed by: PHYSICAL THERAPIST

## 2018-08-07 PROCEDURE — 3078F DIAST BP <80 MM HG: CPT | Mod: CPTII,S$GLB,, | Performed by: UROLOGY

## 2018-08-07 RX ORDER — TAMSULOSIN HYDROCHLORIDE 0.4 MG/1
1 CAPSULE ORAL DAILY
Qty: 90 CAPSULE | Refills: 3 | Status: SHIPPED | OUTPATIENT
Start: 2018-08-07 | End: 2018-09-13 | Stop reason: SDUPTHER

## 2018-08-07 RX ORDER — DIAZEPAM 5 MG/1
5 TABLET ORAL EVERY 6 HOURS PRN
Qty: 60 TABLET | Refills: 1 | Status: SHIPPED | OUTPATIENT
Start: 2018-08-07 | End: 2018-10-16 | Stop reason: SDUPTHER

## 2018-08-07 NOTE — LETTER
August 7, 2018      Roberta Sagastume MD  [address]  [place] [address]           [facility] - Urology 4th Floor  [address]  [place] [address]  Phone: [phone]          Patient: Emerson Menendez   MR Number: [id number]   YOB: 1945   Date of Visit: 8/7/2018       Dear Dr. Roberta Sagastume:    Thank you for referring Emerson Menendez to me for evaluation. Attached you will find relevant portions of my assessment and plan of care.    If you have questions, please do not hesitate to call me. I look forward to following Emerson Menendez along with you.    Sincerely,    Juan Walker MD    Enclosure  CC:  No Recipients    If you would like to receive this communication electronically, please contact [email or web address] or [phone] to request more information on [employer] access.    For providers and/or their staff who would like to refer a patient to [facility], please contact us through our one-stop-shop provider referral line, [facility], at [phone].    If you feel you have received this communication in error or would no longer like to receive these types of communications, please e-mail [email or web address]

## 2018-08-07 NOTE — PROGRESS NOTES
CHIEF COMPLAINT:    Mr. Menendez is a 73 y.o. male presenting for difficulty urinating at night.      PRESENTING ILLNESS:    Emerson Menendez is a 73 y.o. male with a PMH of BPH and diabetes, who presents for difficulty urinating at night. No issues with voiding during the day. Takes Flomax in the morning.  He experienced that after stop taking oxybutynin at night, he has been able to void better.  However, he is still bothered by getting 2 x a night to urinate.    Patient was previously seen by Madeline Vazquez on 06/04/18.    For the last couple of weeks patient reports inability to void at night.  He gets up at least twice a night. By the morning he is experiencing bladder pressure and pain.  He is able to void after he drinks 2 cups of coffee.  He limits his fluid intake around 8p.m. and goes to bed around 10:30p.  He started taking oxybutynin at night around the same time he started having trouble urinating at night.  He takes flomax at night.  He does not have any issues urinating during the day.    S/p laser TURP by me back in 11/13/13.    Diagnostic Cystourethroscopy 2/2017:   Normal urethra.    Prostate s/p TURP with preserved apical tissues  Width of Bladder Neck Opening: Approximately 18 Fr.   Normal bladder with increased hyperemic area, ? Glomerulation, ? IC.   Normal ureteral orifices bilaterally.     CONCLUSIONS:   1. Sensory urgency  2. Early IC  3. S/p TURP          REVIEW OF SYSTEMS:    Constitutional: Negative for fever and chills.   HENT: Negative for hearing loss.   Eyes: Negative for visual disturbance.   Respiratory: Negative for shortness of breath.   Cardiovascular: Negative for chest pain.   Gastrointestinal: Negative for vomiting, and constipation.   Genitourinary: See HPI  Neurological: Negative for dizziness.   Hematological: Does not bruise/bleed easily.   Psychiatric/Behavioral: Negative for confusion.        PATIENT HISTORY:    Past Medical History:   Diagnosis Date    Anxiety  "3/16/2015    BPH (benign prostatic hypertrophy) 9/24/2012    Depression 3/16/2015    GERD (gastroesophageal reflux disease) 9/24/2012    Microalbuminuria 9/24/2012    Osteoarthritis of cervical spine 9/24/2012    Osteoarthritis of knee 9/24/2012    Type II or unspecified type diabetes mellitus without mention of complication, not stated as uncontrolled 9/24/2012       Past Surgical History:   Procedure Laterality Date    CATARACT EXTRACTION  2012    Right eye    PROSTATE SURGERY         Family History   Problem Relation Age of Onset    Hypertension Brother     Kidney failure Daughter         due to medication    Blindness Neg Hx     Cancer Neg Hx     Cataracts Neg Hx     Diabetes Neg Hx     Glaucoma Neg Hx     Macular degeneration Neg Hx     Retinal detachment Neg Hx     Strabismus Neg Hx     Stroke Neg Hx     Thyroid disease Neg Hx        Social History     Social History    Marital status: Single     Spouse name: N/A    Number of children: N/A    Years of education: N/A     Occupational History    Not on file.     Social History Main Topics    Smoking status: Never Smoker    Smokeless tobacco: Never Used    Alcohol use No    Drug use: No    Sexual activity: Yes     Partners: Female     Birth control/ protection: None     Other Topics Concern    Not on file     Social History Narrative    No narrative on file       Allergies:  Penicillins    Medications:    Current Outpatient Prescriptions:     aspirin (ECOTRIN) 81 MG EC tablet, Take 1 tablet (81 mg total) by mouth once daily., Disp: 100 tablet, Rfl: 3    BD INSULIN PEN NEEDLE UF SHORT 31 gauge x 5/16" Ndle, USE TO INJECT INSULIN ONE TIME DAILY, Disp: 300 each, Rfl: 3    blood glucose control, normal (METER-CHECK) Soln, One meter. True test meter. Use as directed, Disp: 1 each, Rfl: 0    blood sugar diagnostic Strp, Omayra Meter Check twice daily, Disp: 200 strip, Rfl: 4    blood-glucose meter (ACCU-CHEK OMAYRA) [facility], USE AS " DIRECTED. Accucheck elie plus, Disp: 1 each, Rfl: 0    cholecalciferol, vitamin D3, (VITAMIN D) 2,000 unit Cap, Take by mouth. 1 Capsule Oral Every day.  over the counter, Disp: , Rfl:     citalopram (CELEXA) 20 MG tablet, TAKE 1 TABLET EVERY EVENING AFTER DINNER, Disp: 90 tablet, Rfl: 11    diazePAM (VALIUM) 5 MG tablet, TAKE 1 TABLET EVERY 6 HOURS AS NEEDED, Disp: 60 tablet, Rfl: 1    flash glucose sensor (FREESTYLE CHRISTIANO SENSOR) Kit, 1 each by Misc.(Non-Drug; Combo Route) route once a week., Disp: 4 kit, Rfl: 6    gabapentin (NEURONTIN) 100 MG capsule, TAKE 1 CAPSULE EVERY MORNING, 1 CAPSULE IN THE AFTERNOON, AND 1 TO 3 CAPSULE(S) AT BEDTIME AS NEEDED FOR FOOT PAIN, Disp: 450 capsule, Rfl: 3    glimepiride (AMARYL) 2 MG tablet, daily with breakfast. , Disp: , Rfl:     lancets Misc, Use twice daily, Disp: 200 each, Rfl: 4    latanoprost 0.005 % ophthalmic solution, INSTILL 1 DROP INTO BOTH EYES EVERY EVENING, Disp: 3 Bottle, Rfl: 4    losartan (COZAAR) 25 MG tablet, Take 1 tablet (25 mg total) by mouth once daily., Disp: 90 tablet, Rfl: 4    omeprazole (PRILOSEC) 20 MG capsule, TAKE 2 CAPSULES ONE TIME DAILY, Disp: 180 capsule, Rfl: 3    pravastatin (PRAVACHOL) 40 MG tablet, Take 1 tablet (40 mg total) by mouth once daily., Disp: 90 tablet, Rfl: 4    tamsulosin (FLOMAX) 0.4 mg Cap, Take 1 capsule (0.4 mg total) by mouth once daily., Disp: 90 capsule, Rfl: 3    tizanidine (ZANAFLEX) 4 MG tablet, 1/2-1 tablet every 8 hours as needed for muscle spasm, Disp: 90 tablet, Rfl: 1    desmopressin (NOCTIVA) 0.83 mcg/spray (0.1 mL) Spry, 1 Squirt by Nasal route every evening., Disp: 3.8 g, Rfl: 5    flash glucose scanning reader (FREESTYLE CHRISTIANO READER) Misc, 1 each by Misc.(Non-Drug; Combo Route) route 4 (four) times daily., Disp: 1 each, Rfl: 0    glucagon (human recombinant) inj 1mg/mL kit, Inject 1 mL (1 mg total) into the muscle as needed., Disp: 1 kit, Rfl: 0    hydrocodone-acetaminophen 5-325mg  (NORCO) 5-325 mg per tablet, Take 1 tablet by mouth every 4 to 6 hours as needed for Pain., Disp: 60 tablet, Rfl: 0    PHYSICAL EXAMINATION:    Constitutional: He appears well-developed and well-nourished.  He is in no apparent distress.    Eyes: No scleral icterus noted bilaterally.  No discharge noted bilaterally.      Cardiovascular: Normal rate.  No pitting edema noted in lower extremities bilaterally    Pulmonary/Chest: Effort normal. No respiratory distress.     Abdominal:  He exhibits no distension.  There is no CVA tenderness.     Lymphadenopathy:        Right: No supraclavicular adenopathy present.        Left: No supraclavicular adenopathy present.     Neurological: He is alert and oriented to person, place, and time.     Skin: Skin is warm and dry.     Psych: Cooperative with normal affect.    Genitourinary: declined    Physical Exam   Constitutional: He is oriented to person, place, and time. He appears well-developed and well-nourished. No distress.   HENT:   Head: Normocephalic and atraumatic.   Right Ear: External ear normal.   Left Ear: External ear normal.   Nose: Nose normal.   Mouth/Throat: Oropharynx is clear and moist.   Eyes: Conjunctivae are normal. Pupils are equal, round, and reactive to light.   Neck: Normal range of motion. Neck supple. No JVD present. No tracheal deviation present. No thyromegaly present.   Cardiovascular: Normal rate, regular rhythm, normal heart sounds and intact distal pulses.  Exam reveals no gallop and no friction rub.    No murmur heard.  Pulmonary/Chest: Effort normal and breath sounds normal. No respiratory distress. He has no wheezes. He exhibits no tenderness.   Abdominal: Soft. Bowel sounds are normal. He exhibits no distension and no mass. There is no tenderness. There is no rebound and no guarding.   Genitourinary: Rectum normal, prostate normal and penis normal. No penile tenderness.   Musculoskeletal: Normal range of motion. He exhibits no edema, tenderness  or deformity.   Lymphadenopathy:     He has no cervical adenopathy.   Neurological: He is alert and oriented to person, place, and time.   Skin: Skin is warm and dry. He is not diaphoretic.     Psychiatric: He has a normal mood and affect. His behavior is normal. Thought content normal.       LABS:    Lab Results   Component Value Date    PSA 0.82 08/01/2016    PSA 0.72 04/17/2015    PSA 0.79 12/09/2013    PSADIAG 0.77 09/13/2013       ASSESSMENT and PLAN :    Emerson was seen today for benign prostatic hypertrophy.    Diagnoses and all orders for this visit:    BPH with urinary obstruction    Nocturia  -     desmopressin (NOCTIVA) 0.83 mcg/spray (0.1 mL) Spry; 1 Squirt by Nasal route every evening.  -     Basic metabolic panel; Future  -     Basic metabolic panel; Future    Smoldering multiple myeloma  -     tamsulosin (FLOMAX) 0.4 mg Cap; Take 1 capsule (0.4 mg total) by mouth once daily.      Continue taking Flomax in the morning.  Discussed history of diabetes in combination with medication causing difficulty urinating at night.  Patient would like to try Noctiva 30 minutes before bed, to reduce urine production.  Follow up with blood test within a week of starting Noctiva and again a month later.  The detailed instruction along with its brochure on Noctiva given.  After 1 month trial, if his nocturia not improving, may consider increase its dose.    I spent 25 minutes with the patient of which more than half was spent in direct consultation with the patient in regards to our treatment and plan.      Follow-up:  Follow-up for BMP 1 wk and 1 month later after Noctiva.   RTC 6 months with me.    IAbdiaziz, am acting as a scribe on this patient encounter in the presence and under the supervision of Dr. Walker.    08/07/2018 4:00 PM    Dr. Aaron CRUZ personally performed the services described in this documentation. All medical record entries made by the scribe were at my direction and in my presence.  I have reviewed  the chart and agree that the record reflects my personal performance and is accurate and complete.     Dr. Walker  4:48 PM 08/07/2018

## 2018-08-07 NOTE — PROGRESS NOTES
"                                                    Physical Therapy Daily Note     Name: Emerson Menendez  Clinic Number: [id number]  Diagnosis:   Encounter Diagnosis   Name Primary?    Pain of both hip joints      Physician: Roberta Sagastume MD  Precautions: standard  Visit #: 3 of 20  PTA Visit #: 0  Time In: 0705  george Out: 0755    Subjective     Pt reports: Working his legs at the gym in his apartment building and his hips are feeling better. No increase pain in his knees following last visit and is without his braces today.   Pain Scale: Emerson rates pain on a scale of 0-10 to be 5 currently.    Objective     Emerson received individual therapeutic exercises to develop strength, endurance, ROM, flexibility, posture and core stabilization for 50 minutes including:    Upright bike 6 min    Seated gastroc stretch with towel B- reviewed for HEP  Standing gastroc stretch wedge 30" x 3  +Heel raises x 30  +DL press 2 cords 3 min  S/l clams x 10, gtb  Supine clams with hip adductor block 3 min  Bridging with PB 3 x 10  Supine IT band stretch with strap 20" x 3  Prone glute sets 10 x 10"  Prone rectus femoris str 15" x 4    Emerson received the following manual therapy techniques: Joint mobilizations and Manual traction were applied to the: B hips for 0 minutes including:    The patient received the following supervised modalities after being cleared for contradictions: deferred to home    Written Home Exercises Provided: reviewed HEP from eval   Pt demo good understanding of the education provided. Emerson demonstrated good return demonstration of activities.     Education provided re:  Emerson verbalized good understanding of education provided.   No spiritual or educational barriers to learning provided    Assessment     Patient tolerated treatment well. Good tolerance to addition of CKC strengthening and progressive weight bearing without provocation of pain.   This is a 73 y.o. male referred to outpatient physical " therapy and presents with a medical diagnosis of B hip pain and demonstrates limitations as described in the problem list. Pt prognosis is Good. Pt will continue to benefit from skilled outpatient physical therapy to address the deficits listed in the problem list, provide pt/family education and to maximize pt's level of independence in the home and community environment.     Goals as follows:  Short Term GOALS: 4 weeks  1. Patient to be independent with home exercise program for improved self management of condition  2. Patient demonstrates independence with Postural Awareness.   3. Patient to report reduction in B hip pain by 40% or greater with ADL's  4. Patient to have improved flexibility through hip flexors for improved posture and gait     Long Term GOALS: 8 weeks  1. Patient demonstrates increased lumbar spine AROM to WFL to improve tolerance to functional activities pain free.   2. Patient demonstrates increased strength BLE's to 5/5 or greater to improve tolerance to functional activities pain free.   3. Patient to have decreased subjective report of disability as noted by a score of 33% or less on the FOTO questionnaire   4. Patient demonstrates increased H hip external rotation by 10 degrees or greater for improved performance of ADL's and donning shoes     Plan     Continue with established Plan of Care towards PT goals.    Therapist: Jyoti Fagan, PT  8/7/2018

## 2018-08-07 NOTE — PATIENT INSTRUCTIONS
Lab Results   Component Value Date    PSA 0.82 08/01/2016    PSA 0.72 04/17/2015    PSA 0.79 12/09/2013    PSADIAG 0.77 09/13/2013

## 2018-08-08 ENCOUNTER — PATIENT MESSAGE (OUTPATIENT)
Dept: OPHTHALMOLOGY | Facility: CLINIC | Age: 73
End: 2018-08-08

## 2018-08-08 ENCOUNTER — PATIENT MESSAGE (OUTPATIENT)
Dept: NEPHROLOGY | Facility: CLINIC | Age: 73
End: 2018-08-08

## 2018-08-09 ENCOUNTER — PATIENT MESSAGE (OUTPATIENT)
Dept: HEMATOLOGY/ONCOLOGY | Facility: CLINIC | Age: 73
End: 2018-08-09

## 2018-08-09 RX ORDER — TAMSULOSIN HYDROCHLORIDE 0.4 MG/1
1 CAPSULE ORAL DAILY
Qty: 90 CAPSULE | Refills: 3 | Status: SHIPPED | OUTPATIENT
Start: 2018-08-09 | End: 2019-08-13 | Stop reason: SDUPTHER

## 2018-08-17 ENCOUNTER — PATIENT MESSAGE (OUTPATIENT)
Dept: DIABETES | Facility: CLINIC | Age: 73
End: 2018-08-17

## 2018-08-20 ENCOUNTER — OFFICE VISIT (OUTPATIENT)
Dept: HEMATOLOGY/ONCOLOGY | Facility: CLINIC | Age: 73
End: 2018-08-20
Payer: MEDICARE

## 2018-08-20 ENCOUNTER — RESEARCH ENCOUNTER (OUTPATIENT)
Dept: LAB | Facility: HOSPITAL | Age: 73
End: 2018-08-20

## 2018-08-20 ENCOUNTER — LAB VISIT (OUTPATIENT)
Dept: LAB | Facility: HOSPITAL | Age: 73
End: 2018-08-20
Payer: MEDICARE

## 2018-08-20 VITALS
SYSTOLIC BLOOD PRESSURE: 136 MMHG | HEART RATE: 83 BPM | RESPIRATION RATE: 18 BRPM | BODY MASS INDEX: 27.65 KG/M2 | DIASTOLIC BLOOD PRESSURE: 79 MMHG | HEIGHT: 70 IN | OXYGEN SATURATION: 95 % | WEIGHT: 193.13 LBS | TEMPERATURE: 98 F

## 2018-08-20 DIAGNOSIS — E11.42 TYPE 2 DIABETES MELLITUS WITH DIABETIC POLYNEUROPATHY, WITH LONG-TERM CURRENT USE OF INSULIN: ICD-10-CM

## 2018-08-20 DIAGNOSIS — D47.2 SMOLDERING MULTIPLE MYELOMA: Primary | ICD-10-CM

## 2018-08-20 DIAGNOSIS — D47.2 SMOLDERING MULTIPLE MYELOMA: ICD-10-CM

## 2018-08-20 DIAGNOSIS — Z00.6 EXAMINATION OF PARTICIPANT IN CLINICAL TRIAL: ICD-10-CM

## 2018-08-20 DIAGNOSIS — N18.30 CKD STAGE 3 DUE TO TYPE 2 DIABETES MELLITUS: ICD-10-CM

## 2018-08-20 DIAGNOSIS — Z79.4 TYPE 2 DIABETES MELLITUS WITH DIABETIC POLYNEUROPATHY, WITH LONG-TERM CURRENT USE OF INSULIN: ICD-10-CM

## 2018-08-20 DIAGNOSIS — E11.22 CKD STAGE 3 DUE TO TYPE 2 DIABETES MELLITUS: ICD-10-CM

## 2018-08-20 LAB
ALBUMIN SERPL BCP-MCNC: 3.7 G/DL
ALP SERPL-CCNC: 47 U/L
ALT SERPL W/O P-5'-P-CCNC: 24 U/L
ANION GAP SERPL CALC-SCNC: 8 MMOL/L
AST SERPL-CCNC: 20 U/L
BASOPHILS # BLD AUTO: 0.05 K/UL
BASOPHILS NFR BLD: 0.8 %
BILIRUB SERPL-MCNC: 0.8 MG/DL
BUN SERPL-MCNC: 17 MG/DL
CALCIUM SERPL-MCNC: 9.6 MG/DL
CHLORIDE SERPL-SCNC: 105 MMOL/L
CO2 SERPL-SCNC: 26 MMOL/L
CREAT SERPL-MCNC: 1.7 MG/DL
DIFFERENTIAL METHOD: ABNORMAL
EOSINOPHIL # BLD AUTO: 0.2 K/UL
EOSINOPHIL NFR BLD: 3.9 %
ERYTHROCYTE [DISTWIDTH] IN BLOOD BY AUTOMATED COUNT: 13.3 %
EST. GFR  (AFRICAN AMERICAN): 45.6 ML/MIN/1.73 M^2
EST. GFR  (NON AFRICAN AMERICAN): 39.4 ML/MIN/1.73 M^2
GLUCOSE SERPL-MCNC: 157 MG/DL
HCT VFR BLD AUTO: 35.8 %
HGB BLD-MCNC: 11.2 G/DL
IGA SERPL-MCNC: 1526 MG/DL
IGG SERPL-MCNC: 974 MG/DL
IGM SERPL-MCNC: 40 MG/DL
IMM GRANULOCYTES # BLD AUTO: 0.01 K/UL
IMM GRANULOCYTES NFR BLD AUTO: 0.2 %
LYMPHOCYTES # BLD AUTO: 1.6 K/UL
LYMPHOCYTES NFR BLD: 26.1 %
MAGNESIUM SERPL-MCNC: 2 MG/DL
MCH RBC QN AUTO: 28.6 PG
MCHC RBC AUTO-ENTMCNC: 31.3 G/DL
MCV RBC AUTO: 91 FL
MONOCYTES # BLD AUTO: 0.5 K/UL
MONOCYTES NFR BLD: 7.8 %
NEUTROPHILS # BLD AUTO: 3.8 K/UL
NEUTROPHILS NFR BLD: 61.2 %
NRBC BLD-RTO: 0 /100 WBC
PHOSPHATE SERPL-MCNC: 2.4 MG/DL
PLATELET # BLD AUTO: 243 K/UL
PMV BLD AUTO: 8.8 FL
POTASSIUM SERPL-SCNC: 5 MMOL/L
PROT SERPL-MCNC: 8.4 G/DL
RBC # BLD AUTO: 3.92 M/UL
SODIUM SERPL-SCNC: 139 MMOL/L
WBC # BLD AUTO: 6.16 K/UL

## 2018-08-20 PROCEDURE — 86334 IMMUNOFIX E-PHORESIS SERUM: CPT | Mod: 26,Q1,, | Performed by: PATHOLOGY

## 2018-08-20 PROCEDURE — 82784 ASSAY IGA/IGD/IGG/IGM EACH: CPT | Mod: Q1

## 2018-08-20 PROCEDURE — 84165 PROTEIN E-PHORESIS SERUM: CPT | Mod: 26,Q1,, | Performed by: PATHOLOGY

## 2018-08-20 PROCEDURE — 36415 COLL VENOUS BLD VENIPUNCTURE: CPT | Mod: Q1

## 2018-08-20 PROCEDURE — 83735 ASSAY OF MAGNESIUM: CPT

## 2018-08-20 PROCEDURE — 85025 COMPLETE CBC W/AUTO DIFF WBC: CPT

## 2018-08-20 PROCEDURE — 99999 PR PBB SHADOW E&M-EST. PATIENT-LVL III: CPT | Mod: PBBFAC,,, | Performed by: INTERNAL MEDICINE

## 2018-08-20 PROCEDURE — 86334 IMMUNOFIX E-PHORESIS SERUM: CPT

## 2018-08-20 PROCEDURE — 84100 ASSAY OF PHOSPHORUS: CPT

## 2018-08-20 PROCEDURE — 84165 PROTEIN E-PHORESIS SERUM: CPT

## 2018-08-20 PROCEDURE — 82784 ASSAY IGA/IGD/IGG/IGM EACH: CPT | Mod: 59

## 2018-08-20 PROCEDURE — 82784 ASSAY IGA/IGD/IGG/IGM EACH: CPT | Mod: 59,Q1

## 2018-08-20 PROCEDURE — 80053 COMPREHEN METABOLIC PANEL: CPT | Mod: Q1

## 2018-08-20 PROCEDURE — 99215 OFFICE O/P EST HI 40 MIN: CPT | Mod: Q1,S$GLB,, | Performed by: INTERNAL MEDICINE

## 2018-08-20 PROCEDURE — 83520 IMMUNOASSAY QUANT NOS NONAB: CPT | Mod: 59,Q1

## 2018-08-20 RX ORDER — INSULIN GLARGINE 100 [IU]/ML
100 INJECTION, SOLUTION SUBCUTANEOUS NIGHTLY
COMMUNITY
End: 2018-08-23

## 2018-08-20 RX ORDER — INSULIN GLARGINE 100 [IU]/ML
100 INJECTION, SOLUTION SUBCUTANEOUS DAILY
Refills: 0 | Status: CANCELLED | OUTPATIENT
Start: 2018-08-20 | End: 2019-08-20

## 2018-08-20 NOTE — PROGRESS NOTES
Monday August 20th, 2018      Protocol: [id number] Randomized Phase III Trial of Lenalidomide vs Observation Alone in Patients with 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.   Sponsor:  [facility]  IRB #: [id number]  Investigator: GIL Engel  Pt Initials: LUCÍA LORENZ       Arm B: Observation  Cycle 32, Day 28        Patient presents to clinic to evaluate ability to proceed with Cycle 33 of observation per above-mentioned protocol. He states that he has been doing well since last month.  Continues to work with his endocrinologist and diabetic nurse to manage his sugars. He denies new potential CRAB symptoms. He states continued willingness to participate in above-mentioned study.    Review of Baseline AE's:    *Please note this list is not all-inclusive, please see AE log for physician reviewed list of adverse events.   1. Hypertension, grade 1: BP today is 136/79 in clinic today, AE ongoing  2. Lower Back Pain and Neck Pain, grade 1: Patient has no complaints of this month. As previously stated, patient is not suspected to have bony myeloma involvement per Dr. Engel as these are pre-existing issues present at baseline.  AE ongoing.  3.  Pain in extremity (knee), grade 1.  Continues to wear knee stabilizing braces today and states that pain arises when he takes them off. AE ongoing  4. Peripheral sensory neuropathy, grade 1: Patient does not verbalize complaints of this today. Continues to take neurontin for this per Dr. Sagastume. AE ongoing.   5. Anemia, Grade 1: Hgb/Hct - 11.2/35.8, AE ongoing     Review of AE's:     *Please note this list is not all-inclusive, please see AE log for physician reviewed list of adverse events.   1. Creatinine increased, grade 2: Serum creatinine is at 1.7 mg/dl today and GFR 48.67. As previously stated, Dr. Engel states this has not been related to myeloma and is related chronic kidney issues likely secondary to diabetes and hypertension.  Will continue observation monthly and to  "monitor renal function closely. Per MD, encouraged to increase water intake, as patient states he hasn't been drinking a lot recently. AE worsening from baseline.   2. Hypophosphatemia, Grade 2: Phosphorus- 2.4, no supplementation needed per Dr. Engel. Will continue to monitor    Per study chair, "the definition of progressive disease for this protocol is developing CRAB criteria that requires treatment systemically." Per Dr Engel, based on today's exam and lab results thus far, patient does not have any s/s of CRAB: Normal Calcium level (9.6), absence of Renal insufficiency (serum creatinine 1.7, and GFR 48.67 per [facility]--patient with a history of kidney dysfunction secondary to diabetes; Dr. Engel aware of these values and not concerned for myeloma involvement), absence of significant Anemia (hemoglobin 11.2, stable from baseline hgb level at study entry) and absence of lytic Bone lesions (per baseline metastatic survey as well as MRI--see MD note for further comment). See MD note for ECOG score and H&P and flowsheets for laboratory work, vitals, etc. All myeloma-related blood work from last cycle including SPEP and JAGUAR have remained stable, and are currently pending for this cycle. Per Dr. Engel, patient shows no evidence of progression at last result. Patient informed that Dr. Engel will release all lab results to [facility]. He states understanding of this. QOL's not required at this time point per study protocol.       Next appts scheduled and given to patient.  Patient states he should not have any conflicts with next month's appt.  Patient was given Research RN's contact information and has MD contact information to call with any concerns, questions, or worsening of symptoms; he verbalized understanding.                                      "

## 2018-08-21 LAB
ALBUMIN SERPL ELPH-MCNC: 3.88 G/DL
ALPHA1 GLOB SERPL ELPH-MCNC: 0.27 G/DL
ALPHA2 GLOB SERPL ELPH-MCNC: 0.86 G/DL
B-GLOBULIN SERPL ELPH-MCNC: 2.62 G/DL
GAMMA GLOB SERPL ELPH-MCNC: 0.57 G/DL
INTERPRETATION SERPL IFE-IMP: NORMAL
KAPPA LC SER QL IA: 5.67 MG/DL
KAPPA LC/LAMBDA SER IA: 3.52
LAMBDA LC SER QL IA: 1.61 MG/DL
PATHOLOGIST INTERPRETATION IFE: NORMAL
PATHOLOGIST INTERPRETATION SPE: NORMAL
PROT SERPL-MCNC: 8.2 G/DL

## 2018-08-21 NOTE — PROGRESS NOTES
Subjective:       Patient ID: Emerson Menendez is a 73 y.o. male.    Chief Complaint: Smoldering multiple myeloma; Results; and medication refill has not been completed by diabetes doctor   The patient is a very pleasant 70 year old man who returns today after completing his evaluation for enrollment in the ECOG-ACRIN study of the Randomized Phase III Trial of Lenalidomide Versus Observation Alone in Patients with 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. Test results identify a stable IgA kappa protein with beta globulin band at 1.63g/dL. Kappa free light chain is elevated at 4.40. CBC and calcium are stable. Creatinine with history of stage III CKD is stable at 1.4. Metastatic survey is negative for lytic lesions. MRI of the entire spine demonstrated age related changes but no convincing evidence of myeloma bone disease. Bone marrow biopsy identified about 13% plasma cells by morphology and FISH for myeloma identified a t(11;14) and trisomies 3,7, and 17. The patient is afebrile and appears clinically well. He was seen by his podiatrist and nephrologist since our last visit without any new or acute events.    The patient has not received any therapy for smoldering myeloma including bisphosphonates or steroids. He has been randomized to observation arm of the the clinical study. 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 He has no current or prior history of malignancy.    TODAY  Mr. Menendez returns today for monthly follow-up evaluation. He reports changes to his insulin plan since last visit- he is off his pump and back on injections while endocrinology follows his blood sugar levels. Also reports his sister was recently diagnosed with stage 1 breast cancer. No clinical signs/symptoms of progression of his smoldering myeloma.  Creatinine is stable today.       HPI  Review of Systems    Constitutional: Negative for activity change, appetite change, diaphoresis, fatigue, fever and unexpected weight change.   HENT: Negative.    Eyes: Negative.    Respiratory: Negative.    Cardiovascular: Negative for leg swelling.   Gastrointestinal: Negative.    Endocrine: Negative.    Genitourinary: Negative.    Musculoskeletal: Negative.    Skin: Negative.    Allergic/Immunologic: Negative.    Neurological:        Grade 0-1 peripheral neuropathy of bilateral feet.    Hematological: Negative for adenopathy. Does not bruise/bleed easily.   Psychiatric/Behavioral: Negative.        Objective:       Vitals:    08/20/18 1342   BP: 136/79   Pulse: 83   Resp: 18   Temp: 97.7 °F (36.5 °C)       Physical Exam   Constitutional: He is oriented to person, place, and time. He appears well-developed and well-nourished.   HENT:   Head: Normocephalic and atraumatic.   Right Ear: External ear normal.   Left Ear: External ear normal.   Nose: Nose normal.   Mouth/Throat: Oropharynx is clear and moist.   Eyes: Conjunctivae and EOM are normal. Pupils are equal, round, and reactive to light.   Neck: Normal range of motion. Neck supple.   Cardiovascular: Normal rate, regular rhythm and intact distal pulses.   No murmur heard.  Pulmonary/Chest: Effort normal and breath sounds normal. No respiratory distress.   Abdominal: Soft. Bowel sounds are normal. He exhibits no distension. There is no hepatosplenomegaly.   Musculoskeletal: He exhibits no edema or tenderness.   Neurological: He is alert and oriented to person, place, and time. No cranial nerve deficit.   Skin: Skin is warm and dry. No rash noted. No cyanosis. Nails show no clubbing.   Psychiatric: He has a normal mood and affect. His behavior is normal.   Nursing note and vitals reviewed.      Assessment:       1. Smoldering multiple myeloma    2. Type 2 diabetes mellitus with diabetic polyneuropathy, with long-term current use of insulin    3. CKD stage 3 due to type 2 diabetes  mellitus        Plan:       The patient has a diagnosis of smoldering myeloma. There is no indication for immediate chemotherapy. We are monitoring renal function closely- baseline creatinine of -1.5 is stable at 17 today. 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 CBC and CMP are stable.  Complete biochemical studies from today are pending.  Plan for return in 1 month.  Endocrinology and Nephrology to monitor diabetes and renal failure respectively.

## 2018-08-23 ENCOUNTER — PATIENT MESSAGE (OUTPATIENT)
Dept: DIABETES | Facility: CLINIC | Age: 73
End: 2018-08-23

## 2018-08-23 ENCOUNTER — PATIENT MESSAGE (OUTPATIENT)
Dept: INTERNAL MEDICINE | Facility: CLINIC | Age: 73
End: 2018-08-23

## 2018-08-23 RX ORDER — INSULIN GLARGINE 100 [IU]/ML
15 INJECTION, SOLUTION SUBCUTANEOUS NIGHTLY
Qty: 1 BOX | Refills: 6 | Status: SHIPPED | OUTPATIENT
Start: 2018-08-23 | End: 2018-09-04 | Stop reason: SDUPTHER

## 2018-08-27 ENCOUNTER — PATIENT MESSAGE (OUTPATIENT)
Dept: SPORTS MEDICINE | Facility: CLINIC | Age: 73
End: 2018-08-27

## 2018-08-28 ENCOUNTER — CLINICAL SUPPORT (OUTPATIENT)
Dept: REHABILITATION | Facility: OTHER | Age: 73
End: 2018-08-28
Payer: MEDICARE

## 2018-08-28 ENCOUNTER — OFFICE VISIT (OUTPATIENT)
Dept: SPORTS MEDICINE | Facility: CLINIC | Age: 73
End: 2018-08-28
Payer: MEDICARE

## 2018-08-28 ENCOUNTER — TELEPHONE (OUTPATIENT)
Dept: SPORTS MEDICINE | Facility: CLINIC | Age: 73
End: 2018-08-28

## 2018-08-28 VITALS
HEIGHT: 70 IN | WEIGHT: 193 LBS | HEART RATE: 94 BPM | BODY MASS INDEX: 27.63 KG/M2 | SYSTOLIC BLOOD PRESSURE: 120 MMHG | DIASTOLIC BLOOD PRESSURE: 64 MMHG

## 2018-08-28 DIAGNOSIS — M25.551 PAIN OF BOTH HIP JOINTS: ICD-10-CM

## 2018-08-28 DIAGNOSIS — M17.11 PRIMARY OSTEOARTHRITIS OF RIGHT KNEE: ICD-10-CM

## 2018-08-28 DIAGNOSIS — M17.12 PRIMARY OSTEOARTHRITIS OF LEFT KNEE: Primary | ICD-10-CM

## 2018-08-28 DIAGNOSIS — M25.552 PAIN OF BOTH HIP JOINTS: ICD-10-CM

## 2018-08-28 PROCEDURE — 3074F SYST BP LT 130 MM HG: CPT | Mod: CPTII,S$GLB,, | Performed by: PHYSICIAN ASSISTANT

## 2018-08-28 PROCEDURE — 99213 OFFICE O/P EST LOW 20 MIN: CPT | Mod: 25,S$GLB,, | Performed by: PHYSICIAN ASSISTANT

## 2018-08-28 PROCEDURE — 99999 PR PBB SHADOW E&M-EST. PATIENT-LVL IV: CPT | Mod: PBBFAC,,, | Performed by: PHYSICIAN ASSISTANT

## 2018-08-28 PROCEDURE — 97110 THERAPEUTIC EXERCISES: CPT | Mod: PN | Performed by: PHYSICAL THERAPIST

## 2018-08-28 PROCEDURE — 3078F DIAST BP <80 MM HG: CPT | Mod: CPTII,S$GLB,, | Performed by: PHYSICIAN ASSISTANT

## 2018-08-28 PROCEDURE — 20610 DRAIN/INJ JOINT/BURSA W/O US: CPT | Mod: LT,S$GLB,, | Performed by: PHYSICIAN ASSISTANT

## 2018-08-28 RX ORDER — BUPIVACAINE HYDROCHLORIDE 2.5 MG/ML
3 INJECTION, SOLUTION INFILTRATION; PERINEURAL
Status: COMPLETED | OUTPATIENT
Start: 2018-08-28 | End: 2018-08-28

## 2018-08-28 RX ORDER — TRIAMCINOLONE ACETONIDE 40 MG/ML
40 INJECTION, SUSPENSION INTRA-ARTICULAR; INTRAMUSCULAR
Status: COMPLETED | OUTPATIENT
Start: 2018-08-28 | End: 2018-08-28

## 2018-08-28 RX ADMIN — BUPIVACAINE HYDROCHLORIDE 7.5 MG: 2.5 INJECTION, SOLUTION INFILTRATION; PERINEURAL at 03:08

## 2018-08-28 RX ADMIN — TRIAMCINOLONE ACETONIDE 40 MG: 40 INJECTION, SUSPENSION INTRA-ARTICULAR; INTRAMUSCULAR at 03:08

## 2018-08-28 NOTE — PROGRESS NOTES
CHIEF COMPLAINT: Left knee pain                                           HISTORY OF PRESENT ILLNESS:  The patient is a 72 y.o. male  who presents for follow up evaluation of his bilateral knee pain, left worse than right, requesting left CSI injection. He has been doing PT at the \[facility]\"" location and a HEP.    He finished the Euflexxa series in May 2017. He wears his bilateral medial  braces during the day and this helps his knee pain.  Issues is currently at night, when he is not wearing his braces. No new trauma or injury.    + mechanical symptoms. Pain: 7/10 at its worst    Review of Systems   Constitution: Negative. Negative for chills, fever and night sweats.   HENT: Negative for congestion and headaches.    Eyes: Negative for blurred vision, left vision loss and right vision loss.   Cardiovascular: Negative for chest pain and syncope.   Respiratory: Negative for cough and shortness of breath.    Endocrine: Negative for polydipsia, polyphagia and polyuria.   Hematologic/Lymphatic: Negative for bleeding problem. Does not bruise/bleed easily.   Skin: Negative for dry skin, itching and rash.   Musculoskeletal: Negative for falls and muscle weakness.   Gastrointestinal: Negative for abdominal pain and bowel incontinence.   Genitourinary: Negative for bladder incontinence and nocturia.   Neurological: Negative for disturbances in coordination, loss of balance and seizures.   Psychiatric/Behavioral: Negative for depression. The patient does not have insomnia.    Allergic/Immunologic: Negative for hives and persistent infections.     PAST MEDICAL HISTORY:   Past Medical History:   Diagnosis Date    Anxiety 3/16/2015    BPH (benign prostatic hypertrophy) 9/24/2012    Depression 3/16/2015    GERD (gastroesophageal reflux disease) 9/24/2012    Microalbuminuria 9/24/2012    Osteoarthritis of cervical spine 9/24/2012    Osteoarthritis of knee 9/24/2012    Type II or unspecified type diabetes mellitus  "without mention of complication, not stated as uncontrolled 9/24/2012     PAST SURGICAL HISTORY:   Past Surgical History:   Procedure Laterality Date    CATARACT EXTRACTION  2012    Right eye    PROSTATE SURGERY       FAMILY HISTORY:   Family History   Problem Relation Age of Onset    Hypertension Brother     Kidney failure Daughter         due to medication    Blindness Neg Hx     Cancer Neg Hx     Cataracts Neg Hx     Diabetes Neg Hx     Glaucoma Neg Hx     Macular degeneration Neg Hx     Retinal detachment Neg Hx     Strabismus Neg Hx     Stroke Neg Hx     Thyroid disease Neg Hx      SOCIAL HISTORY:   Social History     Socioeconomic History    Marital status: Single     Spouse name: Not on file    Number of children: Not on file    Years of education: Not on file    Highest education level: Not on file   Social Needs    Financial resource strain: Not on file    Food insecurity - worry: Not on file    Food insecurity - inability: Not on file    Transportation needs - medical: Not on file    Transportation needs - non-medical: Not on file   Occupational History    Not on file   Tobacco Use    Smoking status: Never Smoker    Smokeless tobacco: Never Used   Substance and Sexual Activity    Alcohol use: No    Drug use: No    Sexual activity: Yes     Partners: Female     Birth control/protection: None   Other Topics Concern    Not on file   Social History Narrative    Not on file       MEDICATIONS:   Current Outpatient Medications:     aspirin (ECOTRIN) 81 MG EC tablet, Take 1 tablet (81 mg total) by mouth once daily., Disp: 100 tablet, Rfl: 3    BD INSULIN PEN NEEDLE UF SHORT 31 gauge x 5/16" Ndle, USE TO INJECT INSULIN ONE TIME DAILY, Disp: 300 each, Rfl: 3    blood glucose control, normal (METER-CHECK) Soln, One meter. True test meter. Use as directed, Disp: 1 each, Rfl: 0    blood sugar diagnostic Strp, Gloria Meter Check twice daily, Disp: 200 strip, Rfl: 4    blood-glucose meter " (ACCU-CHEK OMAYRA) [facility], USE AS DIRECTED. Accucheck elie plus, Disp: 1 each, Rfl: 0    cholecalciferol, vitamin D3, (VITAMIN D) 2,000 unit Cap, Take by mouth. 1 Capsule Oral Every day.  over the counter, Disp: , Rfl:     citalopram (CELEXA) 20 MG tablet, TAKE 1 TABLET EVERY EVENING AFTER DINNER, Disp: 90 tablet, Rfl: 11    desmopressin (NOCTIVA) 0.83 mcg/spray (0.1 mL) Spry, 1 Squirt by Nasal route every evening., Disp: 3.8 g, Rfl: 5    diazePAM (VALIUM) 5 MG tablet, Take 1 tablet (5 mg total) by mouth every 6 (six) hours as needed., Disp: 60 tablet, Rfl: 1    flash glucose scanning reader (FREESTYLE CHRISTIANO READER) Misc, 1 each by Misc.(Non-Drug; Combo Route) route 4 (four) times daily., Disp: 1 each, Rfl: 0    flash glucose sensor (FREESTYLE CHRISTIANO SENSOR) Kit, 1 each by Misc.(Non-Drug; Combo Route) route once a week., Disp: 4 kit, Rfl: 6    gabapentin (NEURONTIN) 100 MG capsule, TAKE 1 CAPSULE EVERY MORNING, 1 CAPSULE IN THE AFTERNOON, AND 1 TO 3 CAPSULE(S) AT BEDTIME AS NEEDED FOR FOOT PAIN, Disp: 450 capsule, Rfl: 3    glimepiride (AMARYL) 2 MG tablet, daily with breakfast. , Disp: , Rfl:     glucagon (human recombinant) inj 1mg/mL kit, Inject 1 mL (1 mg total) into the muscle as needed., Disp: 1 kit, Rfl: 0    hydrocodone-acetaminophen 5-325mg (NORCO) 5-325 mg per tablet, Take 1 tablet by mouth every 4 to 6 hours as needed for Pain., Disp: 60 tablet, Rfl: 0    insulin glargine (LANTUS SOLOSTAR U-100 INSULIN) 100 unit/mL (3 mL) InPn pen, Inject 15 Units into the skin every evening., Disp: 1 Box, Rfl: 6    lancets Misc, Use twice daily, Disp: 200 each, Rfl: 4    latanoprost 0.005 % ophthalmic solution, INSTILL 1 DROP INTO BOTH EYES EVERY EVENING, Disp: 3 Bottle, Rfl: 4    losartan (COZAAR) 25 MG tablet, Take 1 tablet (25 mg total) by mouth once daily., Disp: 90 tablet, Rfl: 4    omeprazole (PRILOSEC) 20 MG capsule, TAKE 2 CAPSULES ONE TIME DAILY, Disp: 180 capsule, Rfl: 3    pravastatin (PRAVACHOL)  "40 MG tablet, Take 1 tablet (40 mg total) by mouth once daily., Disp: 90 tablet, Rfl: 4    tamsulosin (FLOMAX) 0.4 mg Cap, Take 1 capsule (0.4 mg total) by mouth once daily., Disp: 90 capsule, Rfl: 3    tamsulosin (FLOMAX) 0.4 mg Cap, Take 1 capsule (0.4 mg total) by mouth once daily., Disp: 90 capsule, Rfl: 3    tizanidine (ZANAFLEX) 4 MG tablet, 1/2-1 tablet every 8 hours as needed for muscle spasm, Disp: 90 tablet, Rfl: 1  ALLERGIES:   Review of patient's allergies indicates:   Allergen Reactions    Penicillins      Knees locked up       VITAL SIGNS:   /64   Pulse 94   Ht 5' 10" (1.778 m)   Wt 87.5 kg (193 lb)   BMI 27.69 kg/m²      PHYSICAL EXAMINATION    General:  The patient is alert and oriented x 3.  Mood is pleasant.  Observation of ears, eyes and nose reveal no gross abnormalities.  No labored breathing observed.    Bilateral KNEE EXAMINATION     OBSERVATION / INSPECTION   Gait:   Antalgic   Alignment:  Neutral   Scars:   None   Muscle atrophy: Mild  Effusion:  None   Warmth:  None   Discoloration:   none     TENDERNESS / CREPITUS (T / C):          T / C      T / C   Patella   - / -   Lateral joint line   + / -   Peripatellar medial  -  Medial joint line    - / -   Peripatellar lateral -  Medial plica   - / -   Patellar tendon -   Popliteal fossa   - / -   Quad tendon   -   Gastrocnemius   -   Prepatellar Bursa - / -   Quadricep   -   Tibial tubercle  -  Thigh/hamstring  -   Pes anserine/HS -  Fibula    -   ITB   - / -  Tibia     -   Tib/fib joint  - / -  LCL    -     MFC   - / -   MCL: Proximal  -    LFC   - / -    Distal   -          ROM: (* = pain)  PASSIVE   ACTIVE    Left :   0 / 0 / 145   0 / 0 / 145     Right :    0 / 0 / 145   0 / 0 / 145    Patellofemoral examination:  See above noted areas of tenderness.   Patella position    Subluxation / dislocation: Centered           Sup. / Inf;   Normal   Crepitus (PF):    Absent   Patellar " Mobility:       Medial-lateral:   Normal    Superior-inferior:  Normal    Inferior tilt   Normal    Patellar tendon:  Normal   Lateral tilt:    Normal   J-sign:     None   Patellofemoral grind:   No pain       MENISCAL SIGNS:     Pain on terminal extension:  -  Pain on terminal flexion:  + (left)  Sofiyas maneuver:  - for pain  Squat     + posterior joint pain    LIGAMENT EXAMINATION:  ACL / Lachman:  negative   PCL-Post.  drawer: normal 0 to 2mm  MCL- Valgus:  normal 0 to 2mm  LCL- Varus:  normal 0 to 2mm    STRENGTH: (* = with pain) PAINFUL SIDE   Quadricep   5/5   Hamstrin/5    EXTREMITY NEURO-VASCULAR EXAMINATION:   Sensation:  Grossly intact to light touch all dermatomal regions.   Motor Function:  Fully intact motor function at hip, knee, foot and ankle    DTRs;  quadriceps and  achilles 2+.  No clonus and downgoing Babinski.    Vascular status:  DP and PT pulses 2+, brisk capillary refill, symmetric.     XRAYS(17): There are degenerative changes of both knees most severe in the medial compartments.    ASSESSMENT:    Bilateral knee pain    PLAN:   1. Left knee CSI today   Injection Procedure  A time out was performed, including verification of patient ID, procedure, site and side, availability of information and equipment, review of safety issues, and agreement with consent, the procedure site was marked.    After time out was performed, the patient was prepped aseptically with povidone-iodine swabsticks. A diagnostic and therapeutic injection of 3:1cc marcaine:kenalog was given under sterile technique using a 22.5 gauge needle from the Superolateral aspect of the left Knee Joint in the supine position.      Emerson Hugo Gemma had no adverse reactions to the medication. Pain decreased. He was instructed to apply ice to the joint for 20 minutes and avoid strenuous activities for 24-36 hours following the injection. He was warned of possible blood sugar and/or blood pressure changes during that  time. Following that time, he can resume regular activities.    He was reminded to call the clinic immediately for any adverse side effects as explained in clinic today.    2. Follow up in sept. For euflexxa injections

## 2018-08-28 NOTE — PROGRESS NOTES
"                                                    Physical Therapy Daily Note     Name: Emerson Menendez  Clinic Number: [id number]  Diagnosis:   Encounter Diagnosis   Name Primary?    Pain of both hip joints      Physician: Roberta Sagastume MD  Precautions: standard  Visit #: 3 of 20  PTA Visit #: 0  Time In: 0705  george Out: 0755    Subjective     Pt reports: Working his legs at the gym in his apartment building and his hips are feeling better. No increase pain in his knees following last visit and is without his braces today.   Pain Scale: Emerson rates pain on a scale of 0-10 to be 5 currently.    Objective     Lumbar Spine Active ROM and B hip noted to be within functional limits    MMT    Left  Right    Hip:  Flexion    5/5   5/5  Extension   5/5   4+/5  Abduction   5/5   4/5  Adduction   5/5   5/5  External Rotation  5/5   5/5  Internal rotation  5/5   4+/5    Knee:  Flexion    5/5   5/5  Extension   5/5   5/5    Ankle:  Dorsiflexion   5/5   5/5  Plantar flexion   5/5   5/5    Emerson received individual therapeutic exercises to develop strength, endurance, ROM, flexibility, posture and core stabilization for 45 minutes including:    Upright bike 8 min    Seated gastroc stretch with towel B  Standing gastroc stretch wedge 30" x 3  Heel raises x 30  DL press 3 cords 3 min  S/l clams x 10, gtb- reviewed for HEP  Supine clams with hip adductor block 3 min  Bridging with PB 3 x 10  Supine IT band stretch with strap 20" x 3- reviewed for HEP  Prone glute sets 10 x 10"- reviewed for HEP  Prone rectus femoris str 15" x 4- reviewed for HEP  +monster walks otb 2 laps 25ft    Emerson received the following manual therapy techniques: Joint mobilizations and Manual traction were applied to the: B hips for 0 minutes including:    The patient received the following supervised modalities after being cleared for contradictions: deferred to home    Written Home Exercises Provided: reviewed HEP from [facility]   Pt demo good " understanding of the education provided. Emerson demonstrated good return demonstration of activities.     Education provided re:  Emerson verbalized good understanding of education provided.   No spiritual or educational barriers to learning provided    Assessment     Patient tolerated treatment well with One month reassessment performed today. Patient demonstrating improved lumbar spine/ hip ROM and BLE strength as noted by recent objective measures. Patient has met 4/4 short term and 3/4 long term goals.Patient to benefit from continued skilled physical therapy for improved B hip strength and functional mobility.     This is a 73 y.o. male referred to outpatient physical therapy and presents with a medical diagnosis of B hip pain and demonstrates limitations as described in the problem list. Pt prognosis is Good. Pt will continue to benefit from skilled outpatient physical therapy to address the deficits listed in the problem list, provide pt/family education and to maximize pt's level of independence in the home and community environment.     CMS Impairment/Limitation/Restriction for FOTO Hip Survey  Status Limitation G-Code CMS Severity Modifier  Intake 60% 40%  Predicted 67% 33% Goal Status+ CJ - At least 20 percent but less than 40 percent  8/28/2018 69% 31% Current Status CJ - At least 20 percent but less than 40 percent    Goals as follows:  Short Term GOALS: 4 weeks  1. Patient to be independent with home exercise program for improved self management of condition- met  2. Patient demonstrates independence with Postural Awareness. - met  3. Patient to report reduction in B hip pain by 40% or greater with ADL's- met  4. Patient to have improved flexibility through hip flexors for improved posture and gait- met     Long Term GOALS: 8 weeks  1. Patient demonstrates increased lumbar spine AROM to WFL to improve tolerance to functional activities pain free. - met  2. Patient demonstrates increased strength BLE's to  5/5 or greater to improve tolerance to functional activities pain free. - in progress  3. Patient to have decreased subjective report of disability as noted by a score of 33% or less on the FOTO questionnaire - met  4. Patient demonstrates increased H hip external rotation by 10 degrees or greater for improved performance of ADL's and donning shoes- met     Plan     Continue with established Plan of Care towards PT goals.    Therapist: Jyoti Fagan, PT  8/28/2018

## 2018-08-29 NOTE — PROGRESS NOTES
Assessment /Plan     For exam results, see Encounter Report.    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

## 2018-08-31 ENCOUNTER — LAB VISIT (OUTPATIENT)
Dept: LAB | Facility: HOSPITAL | Age: 73
End: 2018-08-31
Payer: MEDICARE

## 2018-08-31 DIAGNOSIS — N18.1 CKD (CHRONIC KIDNEY DISEASE) STAGE 1, GFR 90 ML/MIN OR GREATER: ICD-10-CM

## 2018-08-31 LAB
ALBUMIN SERPL BCP-MCNC: 3.8 G/DL
ANION GAP SERPL CALC-SCNC: 8 MMOL/L
BUN SERPL-MCNC: 16 MG/DL
CALCIUM SERPL-MCNC: 9.5 MG/DL
CHLORIDE SERPL-SCNC: 104 MMOL/L
CO2 SERPL-SCNC: 25 MMOL/L
CREAT SERPL-MCNC: 1.7 MG/DL
EST. GFR  (AFRICAN AMERICAN): 45.6 ML/MIN/1.73 M^2
EST. GFR  (NON AFRICAN AMERICAN): 39.4 ML/MIN/1.73 M^2
GLUCOSE SERPL-MCNC: 155 MG/DL
PHOSPHATE SERPL-MCNC: 2.7 MG/DL
POTASSIUM SERPL-SCNC: 4.2 MMOL/L
SODIUM SERPL-SCNC: 137 MMOL/L

## 2018-08-31 PROCEDURE — 80069 RENAL FUNCTION PANEL: CPT

## 2018-08-31 PROCEDURE — 36415 COLL VENOUS BLD VENIPUNCTURE: CPT

## 2018-09-04 ENCOUNTER — TELEPHONE (OUTPATIENT)
Dept: INTERNAL MEDICINE | Facility: CLINIC | Age: 73
End: 2018-09-04

## 2018-09-04 ENCOUNTER — TELEPHONE (OUTPATIENT)
Dept: ENDOCRINOLOGY | Facility: CLINIC | Age: 73
End: 2018-09-04

## 2018-09-04 DIAGNOSIS — E11.65 TYPE 2 DIABETES MELLITUS WITH HYPERGLYCEMIA, UNSPECIFIED WHETHER LONG TERM INSULIN USE: ICD-10-CM

## 2018-09-04 DIAGNOSIS — N18.30 TYPE 2 DIABETES MELLITUS WITH STAGE 3 CHRONIC KIDNEY DISEASE, WITH LONG-TERM CURRENT USE OF INSULIN: ICD-10-CM

## 2018-09-04 DIAGNOSIS — E11.22 TYPE 2 DIABETES MELLITUS WITH STAGE 3 CHRONIC KIDNEY DISEASE, WITH LONG-TERM CURRENT USE OF INSULIN: ICD-10-CM

## 2018-09-04 DIAGNOSIS — Z79.4 TYPE 2 DIABETES MELLITUS WITH STAGE 3 CHRONIC KIDNEY DISEASE, WITH LONG-TERM CURRENT USE OF INSULIN: ICD-10-CM

## 2018-09-04 RX ORDER — INSULIN GLARGINE 100 [IU]/ML
15 INJECTION, SOLUTION SUBCUTANEOUS NIGHTLY
Qty: 1 BOX | Refills: 6 | Status: SHIPPED | OUTPATIENT
Start: 2018-09-04 | End: 2019-01-24 | Stop reason: SDUPTHER

## 2018-09-12 ENCOUNTER — OFFICE VISIT (OUTPATIENT)
Dept: OPHTHALMOLOGY | Facility: CLINIC | Age: 73
End: 2018-09-12
Payer: MEDICARE

## 2018-09-12 DIAGNOSIS — Z96.1 STATUS POST CATARACT EXTRACTION AND INSERTION OF INTRAOCULAR LENS OF RIGHT EYE: ICD-10-CM

## 2018-09-12 DIAGNOSIS — Z98.41 STATUS POST CATARACT EXTRACTION AND INSERTION OF INTRAOCULAR LENS OF RIGHT EYE: ICD-10-CM

## 2018-09-12 DIAGNOSIS — H40.039 ANATOMICAL NARROW ANGLE: ICD-10-CM

## 2018-09-12 DIAGNOSIS — H25.12 NUCLEAR SCLEROSIS OF LEFT EYE: ICD-10-CM

## 2018-09-12 DIAGNOSIS — H40.1132 PRIMARY OPEN ANGLE GLAUCOMA OF BOTH EYES, MODERATE STAGE: Primary | ICD-10-CM

## 2018-09-12 PROCEDURE — 99999 PR PBB SHADOW E&M-EST. PATIENT-LVL II: CPT | Mod: PBBFAC,,, | Performed by: OPHTHALMOLOGY

## 2018-09-12 PROCEDURE — 92133 CPTRZD OPH DX IMG PST SGM ON: CPT | Mod: PBBFAC | Performed by: OPHTHALMOLOGY

## 2018-09-12 PROCEDURE — 92020 GONIOSCOPY: CPT | Mod: PBBFAC | Performed by: OPHTHALMOLOGY

## 2018-09-12 PROCEDURE — 92014 COMPRE OPH EXAM EST PT 1/>: CPT | Mod: S$PBB,,, | Performed by: OPHTHALMOLOGY

## 2018-09-12 PROCEDURE — 99212 OFFICE O/P EST SF 10 MIN: CPT | Mod: PBBFAC | Performed by: OPHTHALMOLOGY

## 2018-09-12 PROCEDURE — 92020 GONIOSCOPY: CPT | Mod: S$PBB,,, | Performed by: OPHTHALMOLOGY

## 2018-09-12 RX ORDER — INSULIN ASPART 100 [IU]/ML
INJECTION, SOLUTION INTRAVENOUS; SUBCUTANEOUS
COMMUNITY
Start: 2018-08-22 | End: 2019-04-03

## 2018-09-13 ENCOUNTER — OFFICE VISIT (OUTPATIENT)
Dept: NEPHROLOGY | Facility: CLINIC | Age: 73
End: 2018-09-13
Payer: MEDICARE

## 2018-09-13 VITALS
HEIGHT: 70 IN | BODY MASS INDEX: 27.55 KG/M2 | HEART RATE: 91 BPM | DIASTOLIC BLOOD PRESSURE: 70 MMHG | SYSTOLIC BLOOD PRESSURE: 130 MMHG | OXYGEN SATURATION: 99 % | WEIGHT: 192.44 LBS

## 2018-09-13 DIAGNOSIS — N18.30 CHRONIC KIDNEY DISEASE, STAGE III (MODERATE): Chronic | ICD-10-CM

## 2018-09-13 DIAGNOSIS — N18.1 CKD (CHRONIC KIDNEY DISEASE) STAGE 1, GFR 90 ML/MIN OR GREATER: Primary | ICD-10-CM

## 2018-09-13 PROBLEM — H40.039 ANATOMICAL NARROW ANGLE: Status: ACTIVE | Noted: 2018-09-13

## 2018-09-13 PROCEDURE — 3075F SYST BP GE 130 - 139MM HG: CPT | Mod: CPTII,GC,, | Performed by: INTERNAL MEDICINE

## 2018-09-13 PROCEDURE — 99214 OFFICE O/P EST MOD 30 MIN: CPT | Mod: S$PBB,GC,, | Performed by: INTERNAL MEDICINE

## 2018-09-13 PROCEDURE — 3078F DIAST BP <80 MM HG: CPT | Mod: CPTII,GC,, | Performed by: INTERNAL MEDICINE

## 2018-09-13 PROCEDURE — 99999 PR PBB SHADOW E&M-EST. PATIENT-LVL V: CPT | Mod: PBBFAC,GC,, | Performed by: INTERNAL MEDICINE

## 2018-09-13 PROCEDURE — 1101F PT FALLS ASSESS-DOCD LE1/YR: CPT | Mod: CPTII,GC,, | Performed by: INTERNAL MEDICINE

## 2018-09-13 PROCEDURE — 99215 OFFICE O/P EST HI 40 MIN: CPT | Mod: PBBFAC | Performed by: INTERNAL MEDICINE

## 2018-09-13 NOTE — PROGRESS NOTES
Assessment /Plan     For exam results, see Encounter Report.    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

## 2018-09-13 NOTE — PROGRESS NOTES
Subjective:       Patient ID: Emerson Menendez is a 73 y.o. Black or  male who presents for follow-up evaluation of Chronic Renal Failure    Since we saw him last time he has no complaints, his sCr is stable at 1.7, he reports no urinary changes, he denies NSAIDs.  History of DM, A1c in the mid 7s, also MGUS/MM, followed closely by heme/onc and in a study.      Review of Systems   Constitutional: Negative for fever and unexpected weight change.   Respiratory: Negative for cough, chest tightness, shortness of breath and wheezing.    Cardiovascular: Negative for chest pain and leg swelling.   Gastrointestinal: Negative for abdominal distention, abdominal pain, diarrhea and nausea.   Endocrine: Negative for polydipsia.   Genitourinary: Negative for difficulty urinating, dysuria, flank pain, frequency and hematuria.   Musculoskeletal: Negative for arthralgias and myalgias.   Skin: Negative for rash.   Allergic/Immunologic: Negative for immunocompromised state.   Neurological: Negative for dizziness and light-headedness.   Hematological: Negative for adenopathy.   Psychiatric/Behavioral: Negative for confusion.       Objective:      Physical Exam   Constitutional: He is oriented to person, place, and time. He appears well-developed.   HENT:   Head: Normocephalic and atraumatic.   Neck: No JVD present.   Cardiovascular: Normal rate and regular rhythm. Exam reveals no friction rub.   Pulmonary/Chest: Effort normal and breath sounds normal.   Abdominal: Soft. He exhibits no distension.   Musculoskeletal: He exhibits no edema.   Neurological: He is alert and oriented to person, place, and time.   Skin: Skin is warm.   Psychiatric: He has a normal mood and affect.       Assessment & Plan:       Chronic kidney disease, stage III (moderate)  1. CKD: probably from underlying diabets, with possibly an HTN component, noted with MM/MGUS, folled by heme/onc, at this time her renal function is stable and not  changing     Lab Results   Component Value Date    CREATININE 1.7 (H) 08/31/2018     Protein Creatinine Ratios: stable/improving    Prot/Creat Ratio, Ur   Date Value Ref Range Status   09/28/2017 0.11 0.00 - 0.20 Final   05/03/2016 0.44 (H) 0.00 - 0.20 Final   12/07/2015 0.09 0.00 - 0.20 Final     ·   ·   Acid-Base: not an issue  Lab Results   Component Value Date     08/31/2018    K 4.2 08/31/2018    CO2 25 08/31/2018     2. HTN: Blood pressures well controlled    3. Renal osteodystrophy: last PTH WNL  Lab Results   Component Value Date    PTH 43.0 10/06/2017    CALCIUM 9.5 08/31/2018    PHOS 2.7 08/31/2018       4. Anemia: anemia, likely from underlying MM/MGUS, low iron levels as per October of last year, we're repeating  Lab Results   Component Value Date    HGB 11.2 (L) 08/20/2018        5. DM:  Last HbA1C reviewed, managed by PCP  Lab Results   Component Value Date    HGBA1C 7.6 (H) 04/11/2018    HGBA1C 7.6 (H) 04/11/2018       6. Lipid management: reviewed, managed by PCP  Lab Results   Component Value Date    LDLCALC 110.6 04/11/2018       7. ESRD planing: no need for at this time    Follow up in 2-3 months    Discussed with Dr Allan, currently no issues will continue follow serial renal panels and UPC ratios, optimal BP and DM control    Follow up in 4-6 months with    [facility]  [facility]

## 2018-09-13 NOTE — ASSESSMENT & PLAN NOTE
1. CKD: probably from underlying diabets, with possibly an HTN component, noted with MM/MGUS, folled by heme/onc, at this time her renal function is stable and not changing     Lab Results   Component Value Date    CREATININE 1.7 (H) 08/31/2018     Protein Creatinine Ratios: stable/improving    Prot/Creat Ratio, Ur   Date Value Ref Range Status   09/28/2017 0.11 0.00 - 0.20 Final   05/03/2016 0.44 (H) 0.00 - 0.20 Final   12/07/2015 0.09 0.00 - 0.20 Final     ·   ·   Acid-Base: not an issue  Lab Results   Component Value Date     08/31/2018    K 4.2 08/31/2018    CO2 25 08/31/2018     2. HTN: Blood pressures well controlled    3. Renal osteodystrophy: last PTH WNL  Lab Results   Component Value Date    PTH 43.0 10/06/2017    CALCIUM 9.5 08/31/2018    PHOS 2.7 08/31/2018       4. Anemia: anemia, likely from underlying MM/MGUS, low iron levels as per October of last year, we're repeating  Lab Results   Component Value Date    HGB 11.2 (L) 08/20/2018        5. DM:  Last HbA1C reviewed, managed by PCP  Lab Results   Component Value Date    HGBA1C 7.6 (H) 04/11/2018    HGBA1C 7.6 (H) 04/11/2018       6. Lipid management: reviewed, managed by PCP  Lab Results   Component Value Date    LDLCALC 110.6 04/11/2018       7. ESRD planing: no need for at this time    Follow up in 2-3 months    Discussed with Dr Allan, currently no issues will continue follow serial renal panels and UPC ratios, optimal BP and DM control    Follow up in 4-6 months with    [facility]    [facility]

## 2018-09-17 ENCOUNTER — RESEARCH ENCOUNTER (OUTPATIENT)
Dept: RESEARCH | Facility: HOSPITAL | Age: 73
End: 2018-09-17

## 2018-09-17 ENCOUNTER — OFFICE VISIT (OUTPATIENT)
Dept: HEMATOLOGY/ONCOLOGY | Facility: CLINIC | Age: 73
End: 2018-09-17
Payer: MEDICARE

## 2018-09-17 ENCOUNTER — LAB VISIT (OUTPATIENT)
Dept: LAB | Facility: HOSPITAL | Age: 73
End: 2018-09-17
Payer: MEDICARE

## 2018-09-17 VITALS
TEMPERATURE: 98 F | OXYGEN SATURATION: 98 % | SYSTOLIC BLOOD PRESSURE: 128 MMHG | HEART RATE: 84 BPM | DIASTOLIC BLOOD PRESSURE: 69 MMHG | WEIGHT: 192.44 LBS | BODY MASS INDEX: 27.55 KG/M2 | HEIGHT: 70 IN

## 2018-09-17 DIAGNOSIS — Z79.4 TYPE 2 DIABETES MELLITUS WITH HYPERGLYCEMIA, WITH LONG-TERM CURRENT USE OF INSULIN: Chronic | ICD-10-CM

## 2018-09-17 DIAGNOSIS — N18.30 CKD STAGE 3 DUE TO TYPE 2 DIABETES MELLITUS: ICD-10-CM

## 2018-09-17 DIAGNOSIS — D47.2 SMOLDERING MULTIPLE MYELOMA: Primary | ICD-10-CM

## 2018-09-17 DIAGNOSIS — E11.22 CKD STAGE 3 DUE TO TYPE 2 DIABETES MELLITUS: ICD-10-CM

## 2018-09-17 DIAGNOSIS — E11.65 TYPE 2 DIABETES MELLITUS WITH HYPERGLYCEMIA, WITH LONG-TERM CURRENT USE OF INSULIN: Chronic | ICD-10-CM

## 2018-09-17 DIAGNOSIS — Z00.6 EXAMINATION OF PARTICIPANT IN CLINICAL TRIAL: ICD-10-CM

## 2018-09-17 DIAGNOSIS — D47.2 SMOLDERING MULTIPLE MYELOMA: ICD-10-CM

## 2018-09-17 LAB
ALBUMIN SERPL BCP-MCNC: 3.7 G/DL
ALP SERPL-CCNC: 48 U/L
ALT SERPL W/O P-5'-P-CCNC: 27 U/L
ANION GAP SERPL CALC-SCNC: 9 MMOL/L
AST SERPL-CCNC: 18 U/L
BASOPHILS # BLD AUTO: 0.03 K/UL
BASOPHILS NFR BLD: 0.6 %
BILIRUB SERPL-MCNC: 0.9 MG/DL
BUN SERPL-MCNC: 23 MG/DL
CALCIUM SERPL-MCNC: 9.7 MG/DL
CHLORIDE SERPL-SCNC: 100 MMOL/L
CO2 SERPL-SCNC: 25 MMOL/L
CREAT SERPL-MCNC: 1.7 MG/DL
DIFFERENTIAL METHOD: ABNORMAL
EOSINOPHIL # BLD AUTO: 0.1 K/UL
EOSINOPHIL NFR BLD: 2.8 %
ERYTHROCYTE [DISTWIDTH] IN BLOOD BY AUTOMATED COUNT: 13.2 %
EST. GFR  (AFRICAN AMERICAN): 45.6 ML/MIN/1.73 M^2
EST. GFR  (NON AFRICAN AMERICAN): 39.4 ML/MIN/1.73 M^2
GLUCOSE SERPL-MCNC: 215 MG/DL
HCT VFR BLD AUTO: 36.7 %
HGB BLD-MCNC: 11.9 G/DL
IGA SERPL-MCNC: 1668 MG/DL
IGG SERPL-MCNC: 958 MG/DL
IGM SERPL-MCNC: 47 MG/DL
IMM GRANULOCYTES # BLD AUTO: 0.01 K/UL
IMM GRANULOCYTES NFR BLD AUTO: 0.2 %
LYMPHOCYTES # BLD AUTO: 1.6 K/UL
LYMPHOCYTES NFR BLD: 30.7 %
MAGNESIUM SERPL-MCNC: 2 MG/DL
MCH RBC QN AUTO: 29.2 PG
MCHC RBC AUTO-ENTMCNC: 32.4 G/DL
MCV RBC AUTO: 90 FL
MONOCYTES # BLD AUTO: 0.3 K/UL
MONOCYTES NFR BLD: 6.3 %
NEUTROPHILS # BLD AUTO: 3 K/UL
NEUTROPHILS NFR BLD: 59.4 %
NRBC BLD-RTO: 0 /100 WBC
PHOSPHATE SERPL-MCNC: 3.1 MG/DL
PLATELET # BLD AUTO: 244 K/UL
PMV BLD AUTO: 8.9 FL
POTASSIUM SERPL-SCNC: 4.6 MMOL/L
PROT SERPL-MCNC: 8.3 G/DL
RBC # BLD AUTO: 4.07 M/UL
SODIUM SERPL-SCNC: 134 MMOL/L
WBC # BLD AUTO: 5.05 K/UL

## 2018-09-17 PROCEDURE — 3045F PR MOST RECENT HEMOGLOBIN A1C LEVEL 7.0-9.0%: CPT | Mod: CPTII,S$GLB,, | Performed by: INTERNAL MEDICINE

## 2018-09-17 PROCEDURE — 80053 COMPREHEN METABOLIC PANEL: CPT

## 2018-09-17 PROCEDURE — 84165 PROTEIN E-PHORESIS SERUM: CPT

## 2018-09-17 PROCEDURE — 84100 ASSAY OF PHOSPHORUS: CPT

## 2018-09-17 PROCEDURE — 99215 OFFICE O/P EST HI 40 MIN: CPT | Mod: S$GLB,,, | Performed by: INTERNAL MEDICINE

## 2018-09-17 PROCEDURE — 83520 IMMUNOASSAY QUANT NOS NONAB: CPT | Mod: 59,Q1

## 2018-09-17 PROCEDURE — 86334 IMMUNOFIX E-PHORESIS SERUM: CPT | Mod: 26,Q1,, | Performed by: PATHOLOGY

## 2018-09-17 PROCEDURE — 86334 IMMUNOFIX E-PHORESIS SERUM: CPT

## 2018-09-17 PROCEDURE — 85025 COMPLETE CBC W/AUTO DIFF WBC: CPT

## 2018-09-17 PROCEDURE — 84165 PROTEIN E-PHORESIS SERUM: CPT | Mod: 26,Q1,, | Performed by: PATHOLOGY

## 2018-09-17 PROCEDURE — 82784 ASSAY IGA/IGD/IGG/IGM EACH: CPT | Mod: 59

## 2018-09-17 PROCEDURE — 36415 COLL VENOUS BLD VENIPUNCTURE: CPT | Mod: Q1

## 2018-09-17 PROCEDURE — 99213 OFFICE O/P EST LOW 20 MIN: CPT | Mod: PBBFAC | Performed by: INTERNAL MEDICINE

## 2018-09-17 PROCEDURE — 82784 ASSAY IGA/IGD/IGG/IGM EACH: CPT | Mod: 59,Q1

## 2018-09-17 PROCEDURE — 82784 ASSAY IGA/IGD/IGG/IGM EACH: CPT

## 2018-09-17 PROCEDURE — 3078F DIAST BP <80 MM HG: CPT | Mod: CPTII,S$GLB,, | Performed by: INTERNAL MEDICINE

## 2018-09-17 PROCEDURE — 3074F SYST BP LT 130 MM HG: CPT | Mod: CPTII,S$GLB,, | Performed by: INTERNAL MEDICINE

## 2018-09-17 PROCEDURE — 1101F PT FALLS ASSESS-DOCD LE1/YR: CPT | Mod: CPTII,S$GLB,, | Performed by: INTERNAL MEDICINE

## 2018-09-17 PROCEDURE — 83735 ASSAY OF MAGNESIUM: CPT | Mod: Q1

## 2018-09-17 PROCEDURE — 99999 PR PBB SHADOW E&M-EST. PATIENT-LVL III: CPT | Mod: PBBFAC,,, | Performed by: INTERNAL MEDICINE

## 2018-09-17 NOTE — PROGRESS NOTES
Subjective:       Patient ID: Emerson Menendez is a 73 y.o. male.    Chief Complaint: Smoldering multiple myeloma  The patient is a very pleasant 70 year old man who returns today after completing his evaluation for enrollment in the ECOG-ACRIN study of the Randomized Phase III Trial of Lenalidomide Versus Observation Alone in Patients with 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. Test results identify a stable IgA kappa protein with beta globulin band at 1.63g/dL. Kappa free light chain is elevated at 4.40. CBC and calcium are stable. Creatinine with history of stage III CKD is stable at 1.4. Metastatic survey is negative for lytic lesions. MRI of the entire spine demonstrated age related changes but no convincing evidence of myeloma bone disease. Bone marrow biopsy identified about 13% plasma cells by morphology and FISH for myeloma identified a t(11;14) and trisomies 3,7, and 17. The patient is afebrile and appears clinically well. He was seen by his podiatrist and nephrologist since our last visit without any new or acute events.    The patient has not received any therapy for smoldering myeloma including bisphosphonates or steroids. He has been randomized to observation arm of the the clinical study. 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 He has no current or prior history of malignancy.    TODAY  Mr. Menendez returns today for monthly follow-up evaluation. No clinical signs/symptoms of progression of his smoldering myeloma. Creatinine is stable today.       HPI  Review of Systems   Constitutional: Negative for activity change, appetite change, diaphoresis, fatigue, fever and unexpected weight change.   HENT: Negative.    Eyes: Negative.    Respiratory: Negative.    Cardiovascular: Negative for leg swelling.   Gastrointestinal: Negative.    Endocrine: Negative.    Genitourinary:  Negative.    Musculoskeletal: Negative.    Skin: Negative.    Allergic/Immunologic: Negative.    Neurological:        Grade 0-1 peripheral neuropathy of bilateral feet.    Hematological: Negative for adenopathy. Does not bruise/bleed easily.   Psychiatric/Behavioral: Negative.        Objective:       Vitals:    09/17/18 1100   BP: 128/69   Pulse: 84   Temp: 97.7 °F (36.5 °C)       Physical Exam   Constitutional: He is oriented to person, place, and time. He appears well-developed and well-nourished.   HENT:   Head: Normocephalic and atraumatic.   Right Ear: External ear normal.   Left Ear: External ear normal.   Nose: Nose normal.   Mouth/Throat: Oropharynx is clear and moist.   Eyes: Conjunctivae and EOM are normal. Pupils are equal, round, and reactive to light.   Neck: Normal range of motion. Neck supple.   Cardiovascular: Normal rate, regular rhythm and intact distal pulses.   No murmur heard.  Pulmonary/Chest: Effort normal and breath sounds normal. No respiratory distress.   Abdominal: Soft. Bowel sounds are normal. He exhibits no distension. There is no hepatosplenomegaly.   Musculoskeletal: He exhibits no edema or tenderness.   Neurological: He is alert and oriented to person, place, and time. No cranial nerve deficit.   Skin: Skin is warm and dry. No rash noted. No cyanosis. Nails show no clubbing.   Psychiatric: He has a normal mood and affect. His behavior is normal.   Nursing note and vitals reviewed.      Assessment:       1. Smoldering multiple myeloma    2. CKD stage 3 due to type 2 diabetes mellitus    3. Type 2 diabetes mellitus with hyperglycemia, with long-term current use of insulin        Plan:       The patient has a diagnosis of smoldering myeloma. There is no indication for immediate chemotherapy. We are monitoring renal function closely- baseline creatinine of -1.5 is stable at 1.7 today. We will continue observation as per the Randomized Phase III Trial of Lenalidomide Versus Observation  Alone in Patients with 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. CBC and CMP are stable.  Complete biochemical studies from today are pending.  Plan for return in 1 month.  Endocrinology and Nephrology to monitor diabetes and renal failure respectively.

## 2018-09-17 NOTE — PROGRESS NOTES
"  Monday, September 17th, 2018      Protocol: [id number] Randomized Phase III Trial of Lenalidomide vs Observation Alone in Patients with 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.   Sponsor:  [facility]  IRB #: [id number]  Study ID: [id number]  Investigator: GIL Engel  Pt Initials: LUCÍA LORENZ       Arm B: Observation  Cycle 33, Day 28        Patient presents to clinic to evaluate ability to proceed with Cycle 34 of observation per above-mentioned protocol. He states that he has been doing well since last month.  Research RN clarified some confusion regarding recent blood work requested by nephrology, as patient thought it was ordered by Dr. Engel and was "concerned."  He denies new potential CRAB symptoms. He states continued willingness to participate in above-mentioned study.     Review of Baseline AE's:    *Please note this list is not all-inclusive, please see AE log for physician reviewed list of adverse events.   1. Hypertension, grade 1: BP today is improved today at 128/69. AE ongoing  2. Lower Back Pain and Neck Pain, grade 1: Patient has no complaints of this month. As previously stated, patient is not suspected to have bony myeloma involvement per Dr. Engel as these are pre-existing issues present at baseline.  AE ongoing.  3. Pain in extremity (knee), grade 1.  Continues to wear knee stabilizing braces and states he is about to have another "Gel injection" to his knee.  AE ongoing   4. Peripheral sensory neuropathy, grade 1: Patient does not verbalize complaints of this today. Has gabapentin and takes as needed.  "Haven't taken that one in a while" per patient. AE ongoing.   5. Anemia, Grade 1: Hgb/Hct - 11.9/36.7.  Slightly improved from last month. AE ongoing                 Review of AE's:     *Please note this list is not all-inclusive, please see AE log for physician reviewed list of adverse events.   1. Creatinine increased, grade 2: Serum creatinine is at 1.7 mg/dl today and GFR 48.67. As previously " "stated, Dr. Engel states this has not been related to myeloma and is related chronic kidney issues likely secondary to diabetes and hypertension.  Will continue observation monthly and to monitor renal function closely. Per MD, encouraged to increase water intake, as patient states he hasn't been drinking a lot recently. AE worsening from baseline.   2. Hypophosphatemia, Grade 2: Phosphorus WNL at 3.1 today.  AE resolved for now.  3. Hyponatremia, Grade 1:  Sodium slightly out of range at 134 today.  AE, grade 1.      Per study chair, "the definition of progressive disease for this protocol is developing CRAB criteria that requires treatment systemically." Per Dr Engel, based on today's exam and lab results thus far, patient does not have any s/s of CRAB: Normal Calcium level (9.7), absence of Renal insufficiency (serum creatinine 1.7, and GFR 48.67 per [facility]--patient with a history of kidney dysfunction secondary to diabetes; Dr. Engel aware of these values and not concerned for myeloma involvement), absence of significant Anemia (hemoglobin 11.9, stable from baseline hgb level at study entry) and absence of lytic Bone lesions (per baseline metastatic survey as well as MRI--see MD note for further comment). See MD note for ECOG score and H&P and flowsheets for laboratory work, vitals, etc. All myeloma-related blood work from last cycle including SPEP and JAGUAR have remained stable, and are currently pending for this cycle. Per Dr. Engel, patient shows no evidence of progression at last result. Patient informed that Dr. Engel will release all lab results to [facility]. He states understanding of this. QOL's not required at this time point per study protocol.       Next appts scheduled and patient aware of this.  Patient states he should not have any conflicts with next month's appt.  Will continue to schedule patient several months out, which seems to help maintain compliance with visits.    Patient states having " research RN's contact information and has MD contact information to call with any concerns, questions, or worsening of symptoms; he verbalized understanding.

## 2018-09-18 LAB
ALBUMIN SERPL ELPH-MCNC: 3.95 G/DL
ALPHA1 GLOB SERPL ELPH-MCNC: 0.25 G/DL
ALPHA2 GLOB SERPL ELPH-MCNC: 0.79 G/DL
B-GLOBULIN SERPL ELPH-MCNC: 2.65 G/DL
GAMMA GLOB SERPL ELPH-MCNC: 0.57 G/DL
INTERPRETATION SERPL IFE-IMP: NORMAL
KAPPA LC SER QL IA: 5.4 MG/DL
KAPPA LC/LAMBDA SER IA: 3.31
LAMBDA LC SER QL IA: 1.63 MG/DL
PATHOLOGIST INTERPRETATION IFE: NORMAL
PATHOLOGIST INTERPRETATION SPE: NORMAL
PROT SERPL-MCNC: 8.2 G/DL

## 2018-09-18 NOTE — PROGRESS NOTES
ATTENDING PHYSICIAN ATTESTATION  I have personally interviewed and examined the patient. I thoroughly reviewed the demographic, clinical, laboratorial and imaging information available in medical records. I agree with the assessment and recommendations provided by the subspecialty resident. Dr. Urena was under my supervision.

## 2018-09-19 ENCOUNTER — TELEPHONE (OUTPATIENT)
Dept: SPORTS MEDICINE | Facility: CLINIC | Age: 73
End: 2018-09-19

## 2018-09-21 ENCOUNTER — OFFICE VISIT (OUTPATIENT)
Dept: SPORTS MEDICINE | Facility: CLINIC | Age: 73
End: 2018-09-21
Payer: MEDICARE

## 2018-09-21 ENCOUNTER — OFFICE VISIT (OUTPATIENT)
Dept: INTERNAL MEDICINE | Facility: CLINIC | Age: 73
End: 2018-09-21
Payer: MEDICARE

## 2018-09-21 ENCOUNTER — TELEPHONE (OUTPATIENT)
Dept: SPORTS MEDICINE | Facility: CLINIC | Age: 73
End: 2018-09-21

## 2018-09-21 ENCOUNTER — IMMUNIZATION (OUTPATIENT)
Dept: INTERNAL MEDICINE | Facility: CLINIC | Age: 73
End: 2018-09-21
Payer: MEDICARE

## 2018-09-21 VITALS
BODY MASS INDEX: 27.35 KG/M2 | HEART RATE: 94 BPM | DIASTOLIC BLOOD PRESSURE: 78 MMHG | OXYGEN SATURATION: 96 % | HEIGHT: 70 IN | HEIGHT: 70 IN | HEART RATE: 80 BPM | SYSTOLIC BLOOD PRESSURE: 110 MMHG | BODY MASS INDEX: 27.4 KG/M2 | SYSTOLIC BLOOD PRESSURE: 143 MMHG | WEIGHT: 191 LBS | DIASTOLIC BLOOD PRESSURE: 70 MMHG | WEIGHT: 191.38 LBS

## 2018-09-21 DIAGNOSIS — D64.9 ANEMIA, UNSPECIFIED TYPE: ICD-10-CM

## 2018-09-21 DIAGNOSIS — M17.11 PRIMARY OSTEOARTHRITIS OF RIGHT KNEE: Primary | ICD-10-CM

## 2018-09-21 DIAGNOSIS — E78.00 PURE HYPERCHOLESTEROLEMIA: ICD-10-CM

## 2018-09-21 DIAGNOSIS — E11.65 TYPE 2 DIABETES MELLITUS WITH HYPERGLYCEMIA, WITH LONG-TERM CURRENT USE OF INSULIN: Chronic | ICD-10-CM

## 2018-09-21 DIAGNOSIS — Z79.4 TYPE 2 DIABETES MELLITUS WITH HYPERGLYCEMIA, WITH LONG-TERM CURRENT USE OF INSULIN: Chronic | ICD-10-CM

## 2018-09-21 DIAGNOSIS — M17.9 OSTEOARTHRITIS OF KNEE, UNSPECIFIED LATERALITY, UNSPECIFIED OSTEOARTHRITIS TYPE: ICD-10-CM

## 2018-09-21 DIAGNOSIS — N40.0: ICD-10-CM

## 2018-09-21 DIAGNOSIS — I10 ESSENTIAL HYPERTENSION: ICD-10-CM

## 2018-09-21 DIAGNOSIS — Z12.5 SCREENING PSA (PROSTATE SPECIFIC ANTIGEN): Primary | ICD-10-CM

## 2018-09-21 DIAGNOSIS — M17.12 PRIMARY OSTEOARTHRITIS OF LEFT KNEE: ICD-10-CM

## 2018-09-21 DIAGNOSIS — K21.9 GASTROESOPHAGEAL REFLUX DISEASE WITHOUT ESOPHAGITIS: ICD-10-CM

## 2018-09-21 DIAGNOSIS — N18.30 CHRONIC KIDNEY DISEASE, STAGE III (MODERATE): Chronic | ICD-10-CM

## 2018-09-21 DIAGNOSIS — Z00.00 ROUTINE GENERAL MEDICAL EXAMINATION AT A HEALTH CARE FACILITY: ICD-10-CM

## 2018-09-21 PROCEDURE — 3045F PR MOST RECENT HEMOGLOBIN A1C LEVEL 7.0-9.0%: CPT | Mod: CPTII,,, | Performed by: INTERNAL MEDICINE

## 2018-09-21 PROCEDURE — 3078F DIAST BP <80 MM HG: CPT | Mod: CPTII,,, | Performed by: INTERNAL MEDICINE

## 2018-09-21 PROCEDURE — 99397 PER PM REEVAL EST PAT 65+ YR: CPT | Mod: 25,S$PBB,, | Performed by: INTERNAL MEDICINE

## 2018-09-21 PROCEDURE — 99213 OFFICE O/P EST LOW 20 MIN: CPT | Mod: 25,S$PBB,, | Performed by: INTERNAL MEDICINE

## 2018-09-21 PROCEDURE — 99499 UNLISTED E&M SERVICE: CPT | Mod: S$PBB,,, | Performed by: PHYSICIAN ASSISTANT

## 2018-09-21 PROCEDURE — 99212 OFFICE O/P EST SF 10 MIN: CPT | Mod: PBBFAC,27 | Performed by: INTERNAL MEDICINE

## 2018-09-21 PROCEDURE — G0008 ADMIN INFLUENZA VIRUS VAC: HCPCS | Mod: PBBFAC

## 2018-09-21 PROCEDURE — 99999 PR PBB SHADOW E&M-EST. PATIENT-LVL IV: CPT | Mod: PBBFAC,,, | Performed by: PHYSICIAN ASSISTANT

## 2018-09-21 PROCEDURE — 99214 OFFICE O/P EST MOD 30 MIN: CPT | Mod: PBBFAC,PO,25 | Performed by: PHYSICIAN ASSISTANT

## 2018-09-21 PROCEDURE — 99999 PR PBB SHADOW E&M-EST. PATIENT-LVL II: CPT | Mod: PBBFAC,,, | Performed by: INTERNAL MEDICINE

## 2018-09-21 PROCEDURE — 90662 IIV NO PRSV INCREASED AG IM: CPT | Mod: PBBFAC

## 2018-09-21 PROCEDURE — 20610 DRAIN/INJ JOINT/BURSA W/O US: CPT | Mod: 50,S$PBB,, | Performed by: PHYSICIAN ASSISTANT

## 2018-09-21 PROCEDURE — 3074F SYST BP LT 130 MM HG: CPT | Mod: CPTII,,, | Performed by: INTERNAL MEDICINE

## 2018-09-21 PROCEDURE — 20610 DRAIN/INJ JOINT/BURSA W/O US: CPT | Mod: 50,PBBFAC,PO | Performed by: PHYSICIAN ASSISTANT

## 2018-09-21 RX ORDER — DOXYCYCLINE HYCLATE 100 MG
100 TABLET ORAL 2 TIMES DAILY
Qty: 20 TABLET | Refills: 3 | Status: SHIPPED | OUTPATIENT
Start: 2018-09-21 | End: 2018-10-01

## 2018-09-21 RX ADMIN — Medication 20 MG: at 04:09

## 2018-09-21 NOTE — PROGRESS NOTES
CHIEF COMPLAINT: Follow up of multiple myeloma, diabetes, hypertension, reflux, hyperlipidemia    HISTORY OF PRESENT ILLNESS: This is a 73-year-old man who presents for follow up of above    He has seen neprhology 9/13/18 and kidney function is stable.      HE is now on Lantus 18 units daily.  He does not need Novolog.  Blood sugar kept dropping on VGO 20 because he changed his diet. HE is now on a low carbohydrate diet. Blood sugar in the morning is 90. Blood sugar in the evening 100-170. HE also takes glimepriride 2 mg daily.      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 He is in the observation arm and is being monitoring monthly. He saw Dr Engel 9/17/18. No indication for chemotherapy at this time.       He is taking losartan 25 mg 1 tablet daily to protect his kidney. No polydipsia or polyuria       His back and neck was doing better in the healthy back program.  He does not take hydrocodone apap. He takes tizanidine as needed (rarely). HE is wearing braces for his knees which is helping stability and his pain. He is getting EUflexxa injecitons for his knees.        Reflux is controlled on omeprazole 20 mg 2 tablets daily. HE has heartburn when he does not take the omeprazole No chest pain, shortness of breath, nausea, voimting, constipation, diarrhea, numbness, weakness.        He had laser vaporization and enucleation of the prostate 11/13/13. He denies any dysuria or hematuria now. HE saw Dr Walker. Oxybutynin was stopped. HE is urinting better. He continues FLomax 0.4 mg nightly     He has hyperlipidemia, on pravastatin 40 mg daily. No joint pain or muscle pain from the pravastatin.        He has been taking aspirin 81 mg daily vitamin D supplement 2000 units daily.        Anxiety is controlled on celexa 20 mg daily. Mood is better. He takes diazepam 5 mg at bedtime as needed (2-3 times a week()  for his sleep and anxiety. It helps him sleep.      HE is taking gabapentin 100 mg 2 capsules as  "needed. He does not take daily. Feet are doing ok. He will take the gabapentin when he has tingling in his feet. He had an injection in his left foot for plantar fasciitis.     He did physical therapy for his hips which has helped tremendously.     He has recurrent boils in axilla that come and go. They get painful at times.       PAST MEDICAL HISTORY:   1. Diabetes mellitus.   2. Hyperlipidemia.   3. Reflux.   4. BPH.   5. Osteoarthritis of the knees.   6. Neck pain.   7. History of right lateral epicondylitis.   8. History of lumps removed from the breast as a teenager.   9. OA cervical spine     SOCIAL HISTORY: Does not smoke, does not drink. He owns an    company. He works for the [employer].     FAMILY HISTORY: Mother is living at age 86 with a heart condition. Father   in his late 40s of alcoholism. He has 5 sisters and 1 brother who are  healthy, one has a neurological disorder, one is anxious. His daughter has  lupus, on dialysis. She also has heart problems. She also had a brain   aneurysm that was stented.       PHYSICAL EXAMINATION:     /70 (BP Location: Left arm, Patient Position: Sitting, BP Method: Medium (Manual))   Pulse 80   Ht 5' 10" (1.778 m)   Wt 86.8 kg (191 lb 6.4 oz)   SpO2 96%   BMI 27.46 kg/m²     GENERAL: He is alert, oriented, no apparent distress. Affect within normal limits.   Conjunctivae anicteric. PERRL. Tympanic membranes clear. Oropharynx clear.   NECK: Supple. No cervical lymphadenopathy, no thyroid enlargement.   Respiratory: Effort normal. Lungs are clear to auscultation.   HEART: Regular rate and rhythm without murmurs, gallops or rubs.   No lower extremity edema.    ABDOMEN: soft, non distended, non tender, bowel sounds present, no hepatosplenomgaly  Healing boils in axilla bilaterally     ASSESSMENT AND PLAN:     Annual exam - discussed diet, exercise and safety issues.    Prevnar   PSA  - at next lab draw. colonoscopy normal 3/2012 " and due in 2019    Influenza    Other medical problems discussed at this visit. Billing will be separate and based on time. Spent 15 minutes in counseling regarding these medical problems.    1. Diabetes mellitus with hyperglycemia and microalbuminuria - followed by endocrine. Watch sugars. Doing better. Watch for low blood sugars  2. 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 Labs reviewed  3. OA knee - to have injection today  4. Hypertension - controlled  5. Hyperlipidemia. On pravastatin  6. BPH. S/p laser - Saw urology  7. Depression - stable  8. Vitamin D deficiency - on vitamin D 2,000 units daily.    9.Back pain-stable  10. Anxiety and depression- stable on celexa 20 mg one tablet daily.   11. Peripheral neuropathy -controlled on gabapentin.   12. CRI -stable - monitored closely by heme onc and nephrology.   13.boils in axilla bilaterally - doxycycline 100 mg twice daily for 10 days  I will see him back in 4 months, sooner if problems arise        Prevnar 7/16  PSA 8/16. colonoscopy normal 3/2012 and due in 2019

## 2018-09-21 NOTE — PROGRESS NOTES
Patient is here for follow up of bilateral knee arthritis. Patient is requesting Euflexxa injection #1.  [facility] Has failed other conservative modalities including NSAIDS, activity modification, weight loss.    The prior shot was tolerated well.    PHYSICAL EXAMINATION:     General: The patient is alert and oriented x 3. Mood is pleasant.   Observation of ears, eyes and nose reveals no gross abnormalities. No   labored breathing observed.     No signs of infection or adverse reaction to knee.       Injection Procedure  A time out was performed, including verification of patient ID, procedure, site and side, availability of information and equipment, review of safety issues, and agreement with consent, the procedure site was marked.    After time out was performed, the patient was prepped aseptically with povidone-iodine swabsticks. A diagnostic and therapeutic injection of 2cc Euflexxa was given under sterile technique using a 22.5 gauge needle from the Superolateral  aspect of the bilateral Knee Joint in the supine position. Lot No: [id number]. Exp date: 2019-09-16.     Emerson Menendez had no adverse reactions to the medication. Pain decreased. He was instructed to apply ice to the joint for 20 minutes and avoid strenuous activities for 24-36 hours following the injection. He was warned of possible blood sugar and/or blood pressure changes during that time. Following that time, he can resume regular activities.    He was reminded to call the clinic immediately for any adverse side effects as explained in clinic today.

## 2018-09-24 ENCOUNTER — CLINICAL SUPPORT (OUTPATIENT)
Dept: OPHTHALMOLOGY | Facility: CLINIC | Age: 73
End: 2018-09-24
Payer: MEDICARE

## 2018-09-24 ENCOUNTER — OFFICE VISIT (OUTPATIENT)
Dept: OPHTHALMOLOGY | Facility: CLINIC | Age: 73
End: 2018-09-24
Payer: MEDICARE

## 2018-09-24 DIAGNOSIS — Z96.1 STATUS POST CATARACT EXTRACTION AND INSERTION OF INTRAOCULAR LENS OF RIGHT EYE: ICD-10-CM

## 2018-09-24 DIAGNOSIS — H40.039 ANATOMICAL NARROW ANGLE: ICD-10-CM

## 2018-09-24 DIAGNOSIS — H40.1132 PRIMARY OPEN ANGLE GLAUCOMA OF BOTH EYES, MODERATE STAGE: ICD-10-CM

## 2018-09-24 DIAGNOSIS — H40.1132 PRIMARY OPEN ANGLE GLAUCOMA OF BOTH EYES, MODERATE STAGE: Primary | ICD-10-CM

## 2018-09-24 DIAGNOSIS — Z98.41 STATUS POST CATARACT EXTRACTION AND INSERTION OF INTRAOCULAR LENS OF RIGHT EYE: ICD-10-CM

## 2018-09-24 DIAGNOSIS — H25.12 NUCLEAR SCLEROSIS OF LEFT EYE: ICD-10-CM

## 2018-09-24 PROCEDURE — 92012 INTRM OPH EXAM EST PATIENT: CPT | Mod: S$PBB,,, | Performed by: OPHTHALMOLOGY

## 2018-09-24 PROCEDURE — 92083 EXTENDED VISUAL FIELD XM: CPT | Mod: 26,S$PBB,, | Performed by: OPHTHALMOLOGY

## 2018-09-24 PROCEDURE — 99212 OFFICE O/P EST SF 10 MIN: CPT | Mod: PBBFAC,25 | Performed by: OPHTHALMOLOGY

## 2018-09-24 PROCEDURE — 92083 EXTENDED VISUAL FIELD XM: CPT | Mod: PBBFAC

## 2018-09-24 PROCEDURE — 99999 PR PBB SHADOW E&M-EST. PATIENT-LVL II: CPT | Mod: PBBFAC,,, | Performed by: OPHTHALMOLOGY

## 2018-09-28 ENCOUNTER — OFFICE VISIT (OUTPATIENT)
Dept: SPORTS MEDICINE | Facility: CLINIC | Age: 73
End: 2018-09-28
Payer: MEDICARE

## 2018-09-28 VITALS
SYSTOLIC BLOOD PRESSURE: 136 MMHG | HEART RATE: 96 BPM | BODY MASS INDEX: 27.35 KG/M2 | HEIGHT: 70 IN | DIASTOLIC BLOOD PRESSURE: 74 MMHG | WEIGHT: 191 LBS

## 2018-09-28 DIAGNOSIS — M17.11 PRIMARY OSTEOARTHRITIS OF RIGHT KNEE: Primary | ICD-10-CM

## 2018-09-28 DIAGNOSIS — M17.12 PRIMARY OSTEOARTHRITIS OF LEFT KNEE: ICD-10-CM

## 2018-09-28 PROCEDURE — 99499 UNLISTED E&M SERVICE: CPT | Mod: S$PBB,,, | Performed by: PHYSICIAN ASSISTANT

## 2018-09-28 PROCEDURE — 20610 DRAIN/INJ JOINT/BURSA W/O US: CPT | Mod: 50,PBBFAC,PO | Performed by: PHYSICIAN ASSISTANT

## 2018-09-28 PROCEDURE — 99999 PR PBB SHADOW E&M-EST. PATIENT-LVL IV: CPT | Mod: PBBFAC,,, | Performed by: PHYSICIAN ASSISTANT

## 2018-09-28 PROCEDURE — 99214 OFFICE O/P EST MOD 30 MIN: CPT | Mod: PBBFAC,PO | Performed by: PHYSICIAN ASSISTANT

## 2018-09-28 PROCEDURE — 20610 DRAIN/INJ JOINT/BURSA W/O US: CPT | Mod: 50,S$PBB,, | Performed by: PHYSICIAN ASSISTANT

## 2018-09-28 RX ADMIN — Medication 20 MG: at 04:09

## 2018-09-28 NOTE — PROGRESS NOTES
Patient is here for follow up of bilateral knee arthritis. Patient is requesting Euflexxa injection #2.  [facility] Has failed other conservative modalities including NSAIDS, activity modification, weight loss.    The prior shot was tolerated well.    PHYSICAL EXAMINATION:     General: The patient is alert and oriented x 3. Mood is pleasant.   Observation of ears, eyes and nose reveals no gross abnormalities. No   labored breathing observed.     No signs of infection or adverse reaction to knee.       Injection Procedure  A time out was performed, including verification of patient ID, procedure, site and side, availability of information and equipment, review of safety issues, and agreement with consent, the procedure site was marked.    After time out was performed, the patient was prepped aseptically with povidone-iodine swabsticks. A diagnostic and therapeutic injection of 2cc Euflexxa was given under sterile technique using a 22.5 gauge needle from the Superolateral  aspect of the bilateral Knee Joint in the supine position. Lot No: [id number]. Exp date: 2019-09-16.     Emerson Menendez had no adverse reactions to the medication. Pain decreased. He was instructed to apply ice to the joint for 20 minutes and avoid strenuous activities for 24-36 hours following the injection. He was warned of possible blood sugar and/or blood pressure changes during that time. Following that time, he can resume regular activities.    He was reminded to call the clinic immediately for any adverse side effects as explained in clinic today.

## 2018-10-05 ENCOUNTER — OFFICE VISIT (OUTPATIENT)
Dept: SPORTS MEDICINE | Facility: CLINIC | Age: 73
End: 2018-10-05
Payer: MEDICARE

## 2018-10-05 VITALS
SYSTOLIC BLOOD PRESSURE: 122 MMHG | DIASTOLIC BLOOD PRESSURE: 74 MMHG | WEIGHT: 191 LBS | HEIGHT: 70 IN | BODY MASS INDEX: 27.35 KG/M2 | HEART RATE: 107 BPM

## 2018-10-05 DIAGNOSIS — M17.12 PRIMARY OSTEOARTHRITIS OF LEFT KNEE: ICD-10-CM

## 2018-10-05 DIAGNOSIS — M17.11 PRIMARY OSTEOARTHRITIS OF RIGHT KNEE: Primary | ICD-10-CM

## 2018-10-05 PROCEDURE — 20610 DRAIN/INJ JOINT/BURSA W/O US: CPT | Mod: 50,S$PBB,, | Performed by: PHYSICIAN ASSISTANT

## 2018-10-05 PROCEDURE — 20610 DRAIN/INJ JOINT/BURSA W/O US: CPT | Mod: 50,PBBFAC,PO | Performed by: PHYSICIAN ASSISTANT

## 2018-10-05 PROCEDURE — 99214 OFFICE O/P EST MOD 30 MIN: CPT | Mod: PBBFAC,PO,25 | Performed by: PHYSICIAN ASSISTANT

## 2018-10-05 PROCEDURE — 99499 UNLISTED E&M SERVICE: CPT | Mod: S$PBB,,, | Performed by: PHYSICIAN ASSISTANT

## 2018-10-05 PROCEDURE — 99999 PR PBB SHADOW E&M-EST. PATIENT-LVL IV: CPT | Mod: PBBFAC,,, | Performed by: PHYSICIAN ASSISTANT

## 2018-10-05 RX ADMIN — Medication 20 MG: at 04:10

## 2018-10-05 NOTE — PROGRESS NOTES
Patient is here for follow up of bilateral knee arthritis. Patient is requesting Euflexxa injection #3.  [facility] Has failed other conservative modalities including NSAIDS, activity modification, weight loss.    The prior shot was tolerated well.    PHYSICAL EXAMINATION:     General: The patient is alert and oriented x 3. Mood is pleasant.   Observation of ears, eyes and nose reveals no gross abnormalities. No   labored breathing observed.     No signs of infection or adverse reaction to knee.       Injection Procedure  A time out was performed, including verification of patient ID, procedure, site and side, availability of information and equipment, review of safety issues, and agreement with consent, the procedure site was marked.    After time out was performed, the patient was prepped aseptically with povidone-iodine swabsticks. A diagnostic and therapeutic injection of 2cc Euflexxa was given under sterile technique using a 22.5 gauge needle from the Superolateral aspect of the bilateral Knee Joint in the supine position. Lot No: [id number]. Exp date: 2019-09-23.     Emerson Menendez had no adverse reactions to the medication. Pain decreased. He was instructed to apply ice to the joint for 20 minutes and avoid strenuous activities for 24-36 hours following the injection. He was warned of possible blood sugar and/or blood pressure changes during that time. Following that time, he can resume regular activities.    He was reminded to call the clinic immediately for any adverse side effects as explained in clinic today.    RTC in 2 months for follow up

## 2018-10-15 ENCOUNTER — LAB VISIT (OUTPATIENT)
Dept: LAB | Facility: HOSPITAL | Age: 73
End: 2018-10-15
Payer: MEDICARE

## 2018-10-15 ENCOUNTER — RESEARCH ENCOUNTER (OUTPATIENT)
Dept: RESEARCH | Facility: HOSPITAL | Age: 73
End: 2018-10-15

## 2018-10-15 ENCOUNTER — OFFICE VISIT (OUTPATIENT)
Dept: HEMATOLOGY/ONCOLOGY | Facility: CLINIC | Age: 73
End: 2018-10-15
Payer: MEDICARE

## 2018-10-15 VITALS
HEIGHT: 70 IN | BODY MASS INDEX: 26.67 KG/M2 | RESPIRATION RATE: 19 BRPM | HEART RATE: 97 BPM | WEIGHT: 186.31 LBS | TEMPERATURE: 98 F | OXYGEN SATURATION: 100 % | DIASTOLIC BLOOD PRESSURE: 69 MMHG | SYSTOLIC BLOOD PRESSURE: 135 MMHG

## 2018-10-15 DIAGNOSIS — E11.22 CKD STAGE 3 DUE TO TYPE 2 DIABETES MELLITUS: ICD-10-CM

## 2018-10-15 DIAGNOSIS — E11.29 CONTROLLED TYPE 2 DIABETES MELLITUS WITH OTHER DIABETIC KIDNEY COMPLICATION, WITH LONG-TERM CURRENT USE OF INSULIN: ICD-10-CM

## 2018-10-15 DIAGNOSIS — E11.42 TYPE 2 DIABETES MELLITUS WITH DIABETIC POLYNEUROPATHY, WITH LONG-TERM CURRENT USE OF INSULIN: ICD-10-CM

## 2018-10-15 DIAGNOSIS — Z12.5 SCREENING PSA (PROSTATE SPECIFIC ANTIGEN): ICD-10-CM

## 2018-10-15 DIAGNOSIS — N18.30 CKD STAGE 3 DUE TO TYPE 2 DIABETES MELLITUS: ICD-10-CM

## 2018-10-15 DIAGNOSIS — D47.2 SMOLDERING MULTIPLE MYELOMA: Primary | ICD-10-CM

## 2018-10-15 DIAGNOSIS — D47.2 SMOLDERING MULTIPLE MYELOMA: ICD-10-CM

## 2018-10-15 DIAGNOSIS — Z00.6 EXAMINATION OF PARTICIPANT IN CLINICAL TRIAL: ICD-10-CM

## 2018-10-15 DIAGNOSIS — Z79.4 TYPE 2 DIABETES MELLITUS WITH DIABETIC POLYNEUROPATHY, WITH LONG-TERM CURRENT USE OF INSULIN: ICD-10-CM

## 2018-10-15 DIAGNOSIS — E78.5 HYPERLIPIDEMIA, UNSPECIFIED HYPERLIPIDEMIA TYPE: ICD-10-CM

## 2018-10-15 DIAGNOSIS — Z79.4 CONTROLLED TYPE 2 DIABETES MELLITUS WITH OTHER DIABETIC KIDNEY COMPLICATION, WITH LONG-TERM CURRENT USE OF INSULIN: ICD-10-CM

## 2018-10-15 LAB
ALBUMIN SERPL BCP-MCNC: 3.7 G/DL
ALP SERPL-CCNC: 49 U/L
ALT SERPL W/O P-5'-P-CCNC: 40 U/L
ANION GAP SERPL CALC-SCNC: 8 MMOL/L
AST SERPL-CCNC: 21 U/L
BASOPHILS # BLD AUTO: 0.04 K/UL
BASOPHILS NFR BLD: 0.7 %
BILIRUB SERPL-MCNC: 0.3 MG/DL
BUN SERPL-MCNC: 18 MG/DL
CALCIUM SERPL-MCNC: 9.7 MG/DL
CHLORIDE SERPL-SCNC: 103 MMOL/L
CO2 SERPL-SCNC: 26 MMOL/L
COMPLEXED PSA SERPL-MCNC: 1.1 NG/ML
CREAT SERPL-MCNC: 1.7 MG/DL
DIFFERENTIAL METHOD: ABNORMAL
EOSINOPHIL # BLD AUTO: 0.1 K/UL
EOSINOPHIL NFR BLD: 2.2 %
ERYTHROCYTE [DISTWIDTH] IN BLOOD BY AUTOMATED COUNT: 13.8 %
EST. GFR  (AFRICAN AMERICAN): 45.6 ML/MIN/1.73 M^2
EST. GFR  (NON AFRICAN AMERICAN): 39.4 ML/MIN/1.73 M^2
ESTIMATED AVG GLUCOSE: 171 MG/DL
GLUCOSE SERPL-MCNC: 197 MG/DL
HBA1C MFR BLD HPLC: 7.6 %
HCT VFR BLD AUTO: 32.8 %
HGB BLD-MCNC: 10.6 G/DL
IGA SERPL-MCNC: 1402 MG/DL
IGG SERPL-MCNC: 896 MG/DL
IGM SERPL-MCNC: 37 MG/DL
IMM GRANULOCYTES # BLD AUTO: 0.01 K/UL
IMM GRANULOCYTES NFR BLD AUTO: 0.2 %
LYMPHOCYTES # BLD AUTO: 1.5 K/UL
LYMPHOCYTES NFR BLD: 28.2 %
MAGNESIUM SERPL-MCNC: 2.1 MG/DL
MCH RBC QN AUTO: 29.7 PG
MCHC RBC AUTO-ENTMCNC: 32.3 G/DL
MCV RBC AUTO: 92 FL
MONOCYTES # BLD AUTO: 0.3 K/UL
MONOCYTES NFR BLD: 6.3 %
NEUTROPHILS # BLD AUTO: 3.3 K/UL
NEUTROPHILS NFR BLD: 62.4 %
NRBC BLD-RTO: 0 /100 WBC
PHOSPHATE SERPL-MCNC: 2.9 MG/DL
PLATELET # BLD AUTO: 257 K/UL
PMV BLD AUTO: 8.5 FL
POTASSIUM SERPL-SCNC: 4.7 MMOL/L
PROT SERPL-MCNC: 8.1 G/DL
RBC # BLD AUTO: 3.57 M/UL
SODIUM SERPL-SCNC: 137 MMOL/L
TSH SERPL DL<=0.005 MIU/L-ACNC: 1.32 UIU/ML
WBC # BLD AUTO: 5.36 K/UL

## 2018-10-15 PROCEDURE — 83036 HEMOGLOBIN GLYCOSYLATED A1C: CPT | Mod: Q1

## 2018-10-15 PROCEDURE — 84165 PROTEIN E-PHORESIS SERUM: CPT | Mod: 26,Q1,, | Performed by: PATHOLOGY

## 2018-10-15 PROCEDURE — 3078F DIAST BP <80 MM HG: CPT | Mod: CPTII,S$GLB,, | Performed by: INTERNAL MEDICINE

## 2018-10-15 PROCEDURE — 84443 ASSAY THYROID STIM HORMONE: CPT | Mod: Q1

## 2018-10-15 PROCEDURE — 36415 COLL VENOUS BLD VENIPUNCTURE: CPT | Mod: Q1

## 2018-10-15 PROCEDURE — 86334 IMMUNOFIX E-PHORESIS SERUM: CPT | Mod: 26,Q1,, | Performed by: PATHOLOGY

## 2018-10-15 PROCEDURE — 82784 ASSAY IGA/IGD/IGG/IGM EACH: CPT | Mod: 59

## 2018-10-15 PROCEDURE — 80053 COMPREHEN METABOLIC PANEL: CPT

## 2018-10-15 PROCEDURE — 3045F PR MOST RECENT HEMOGLOBIN A1C LEVEL 7.0-9.0%: CPT | Mod: CPTII,S$GLB,, | Performed by: INTERNAL MEDICINE

## 2018-10-15 PROCEDURE — 82784 ASSAY IGA/IGD/IGG/IGM EACH: CPT | Mod: 59,Q1

## 2018-10-15 PROCEDURE — 99215 OFFICE O/P EST HI 40 MIN: CPT | Mod: S$GLB,,, | Performed by: INTERNAL MEDICINE

## 2018-10-15 PROCEDURE — 83520 IMMUNOASSAY QUANT NOS NONAB: CPT

## 2018-10-15 PROCEDURE — 99213 OFFICE O/P EST LOW 20 MIN: CPT | Mod: PBBFAC,Q1 | Performed by: INTERNAL MEDICINE

## 2018-10-15 PROCEDURE — 85025 COMPLETE CBC W/AUTO DIFF WBC: CPT

## 2018-10-15 PROCEDURE — 1101F PT FALLS ASSESS-DOCD LE1/YR: CPT | Mod: CPTII,S$GLB,, | Performed by: INTERNAL MEDICINE

## 2018-10-15 PROCEDURE — 84165 PROTEIN E-PHORESIS SERUM: CPT | Mod: Q1

## 2018-10-15 PROCEDURE — 3075F SYST BP GE 130 - 139MM HG: CPT | Mod: CPTII,S$GLB,, | Performed by: INTERNAL MEDICINE

## 2018-10-15 PROCEDURE — 84153 ASSAY OF PSA TOTAL: CPT

## 2018-10-15 PROCEDURE — 82784 ASSAY IGA/IGD/IGG/IGM EACH: CPT

## 2018-10-15 PROCEDURE — 84100 ASSAY OF PHOSPHORUS: CPT

## 2018-10-15 PROCEDURE — 99999 PR PBB SHADOW E&M-EST. PATIENT-LVL III: CPT | Mod: PBBFAC,,, | Performed by: INTERNAL MEDICINE

## 2018-10-15 PROCEDURE — 86334 IMMUNOFIX E-PHORESIS SERUM: CPT

## 2018-10-15 PROCEDURE — 83735 ASSAY OF MAGNESIUM: CPT

## 2018-10-15 NOTE — PROGRESS NOTES
Subjective:       Patient ID: Emerson Menendez is a 73 y.o. male.    Chief Complaint: No chief complaint on file.  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 Test results identify a stable IgA kappa protein with beta globulin band at 1.63g/dL. Kappa free light chain is elevated at 4.40. CBC and calcium are stable. Creatinine with history of stage III CKD is stable at 1.4. Metastatic survey is negative for lytic lesions. MRI of the entire spine demonstrated age related changes but no convincing evidence of myeloma bone disease. Bone marrow biopsy identified about 13% plasma cells by morphology and FISH for myeloma identified a t(11;14) and trisomies 3,7, and 17. The patient is afebrile and appears clinically well. He was seen by his podiatrist and nephrologist since our last visit without any new or acute events.    The patient has not received any therapy for smoldering myeloma including bisphosphonates or steroids. He has been randomized to observation arm of the the clinical study. 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 He has no current or prior history of malignancy.    TODAY  Mr. Menendez returns today for monthly follow-up evaluation. No clinical signs/symptoms of progression of his smoldering myeloma. Creatinine and blood sugar are elevated today- grandkids stayed with him this weekend, had cake and ice cream.      HPI  Review of Systems   Constitutional: Negative for activity change, appetite change, diaphoresis, fatigue, fever and unexpected weight change.   HENT: Negative.    Eyes: Negative.    Respiratory: Negative.    Cardiovascular: Negative for leg  swelling.   Gastrointestinal: Negative.    Endocrine: Negative.    Genitourinary: Negative.    Musculoskeletal: Negative.    Skin: Negative.    Allergic/Immunologic: Negative.    Neurological:        Grade 0-1 peripheral neuropathy of bilateral feet.    Hematological: Negative for adenopathy. Does not bruise/bleed easily.   Psychiatric/Behavioral: Negative.        Objective:       Vitals:    10/15/18 1315   BP: 135/69   Pulse: 97   Resp: 19   Temp: 97.7 °F (36.5 °C)       Physical Exam   Constitutional: He is oriented to person, place, and time. He appears well-developed and well-nourished.   HENT:   Head: Normocephalic and atraumatic.   Right Ear: External ear normal.   Left Ear: External ear normal.   Nose: Nose normal.   Mouth/Throat: Oropharynx is clear and moist.   Eyes: Conjunctivae and EOM are normal. Pupils are equal, round, and reactive to light.   Neck: Normal range of motion. Neck supple.   Cardiovascular: Normal rate, regular rhythm and intact distal pulses.   No murmur heard.  Pulmonary/Chest: Effort normal and breath sounds normal. No respiratory distress.   Abdominal: Soft. Bowel sounds are normal. He exhibits no distension. There is no hepatosplenomegaly.   Musculoskeletal: He exhibits no edema or tenderness.   Neurological: He is alert and oriented to person, place, and time. No cranial nerve deficit.   Skin: Skin is warm and dry. No rash noted. No cyanosis. Nails show no clubbing.   Psychiatric: He has a normal mood and affect. His behavior is normal.   Nursing note and vitals reviewed.      Assessment:       1. Smoldering multiple myeloma    2. CKD stage 3 due to type 2 diabetes mellitus    3. Type 2 diabetes mellitus with diabetic polyneuropathy, with long-term current use of insulin        Plan:       The patient has a diagnosis of smoldering myeloma. There is no indication for immediate chemotherapy. We are monitoring renal function closely- baseline creatinine of -1.5 is stable at 1.7 today.  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 CBC and CMP are stable.  Complete biochemical studies from today are pending.  Plan for return in 1 month.  Endocrinology and Nephrology to monitor diabetes and renal failure respectively. Diet is now mostly liquid/soft solids due to recent dental work.

## 2018-10-15 NOTE — PROGRESS NOTES
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   Sponsor:  [facility]  IRB #: [id number]  Study ID: [id number]  Investigator: GIL Engel  Pt Initials: LUCÍA LORENZ       Arm B: Observation  Cycle 34, Day 28        Patient presents to clinic to evaluate ability to proceed with Cycle 35 of observation per above-mentioned protocol. He states that he has been doing well since last month. States his tenuous blood sugar numbers is his biggest health issue currently.  He denies new potential CRAB symptoms. He states continued willingness to participate in above-mentioned study.     Review of Baseline AE's:    *Please note this list is not all-inclusive, please see AE log for physician reviewed list of adverse events.   1. Hypertension, grade 1: BP today is improved today at 135/69. AE ongoing  2. Lower Back Pain and Neck Pain, grade 1: Patient has no complaints of this month. As previously stated, patient is not suspected to have bony myeloma involvement per Dr. Engle as these are pre-existing issues present at baseline.  AE ongoing.  3. Pain in extremity (knee), grade 1.  Continues to wear knee stabilizing braces.  AE ongoing   4. Peripheral sensory neuropathy, grade 1: Patient does not verbalize complaints of this today. Has gabapentin and takes as needed.  "Haven't taken that one in a while" per patient. AE ongoing.   5. Anemia, Grade 1: Hgb/Hct - 10.6/32.8.  This has decreased from last month and was noted per Dr. Engel.  Myeloma labs pending. AE ongoing                 Review of AE's:     *Please note this list is not all-inclusive, please see AE log for physician reviewed list of adverse events.   1. Creatinine increased, grade 2: Serum creatinine is at 1.7 mg/dl today and GFR 48.67. As previously stated, Dr. Engel states this has not been related to myeloma and is related chronic kidney issues " "likely secondary to diabetes and hypertension.  Will continue observation monthly and to monitor renal function closely. Per MD, encouraged to increase water intake, as patient states he hasn't been drinking a lot recently. AE worsened from baseline.   2. Hyponatremia, Grade 1:  WNL today at 137.  AE resolved for now.      Per study chair, "the definition of progressive disease for this protocol is developing CRAB criteria that requires treatment systemically." Per Dr Engel, based on today's exam and lab results thus far, patient does not have any s/s of CRAB: Normal Calcium level (9.7), absence of Renal insufficiency (serum creatinine 1.7, and GFR 46.94 per Cockroft-Gault--patient with a history of kidney dysfunction secondary to diabetes; Dr. Engel aware of these values and not concerned for myeloma involvement), absence of significant Anemia (hemoglobin 10.6, has decreased from baseline; will await myeloma labs for clarity relationship to myeloma) and absence of lytic Bone lesions (per baseline metastatic survey as well as MRI--see MD note for further comment). See MD note for ECOG score and H&P and flowsheets for laboratory work, vitals, etc. All myeloma-related blood work from last cycle including SPEP and JAGUAR have remained stable, and are currently pending for this cycle. Per Dr. Engel, patient shows no evidence of progression at last result. Patient informed that Dr. Engel will release all lab results to [facility]. He states understanding of this. QOL's not required at this time point per study protocol.       Patient states conflict with next month's appointments; being modified to meet patient's needs.  Message sent to  for adjustment.  Will continue to schedule patient several months out, which seems to help maintain compliance with visits.    Patient states having research RN's contact information and has MD contact information to call with any concerns, questions, or worsening of symptoms; he " verbalized understanding.

## 2018-10-16 ENCOUNTER — OFFICE VISIT (OUTPATIENT)
Dept: INTERNAL MEDICINE | Facility: CLINIC | Age: 73
End: 2018-10-16
Payer: MEDICARE

## 2018-10-16 VITALS
HEIGHT: 70 IN | BODY MASS INDEX: 27.16 KG/M2 | HEART RATE: 80 BPM | WEIGHT: 189.69 LBS | SYSTOLIC BLOOD PRESSURE: 122 MMHG | DIASTOLIC BLOOD PRESSURE: 70 MMHG

## 2018-10-16 DIAGNOSIS — M47.812 OSTEOARTHRITIS OF CERVICAL SPINE, UNSPECIFIED SPINAL OSTEOARTHRITIS COMPLICATION STATUS: ICD-10-CM

## 2018-10-16 DIAGNOSIS — D47.2 SMOLDERING MULTIPLE MYELOMA: ICD-10-CM

## 2018-10-16 DIAGNOSIS — M17.9 OSTEOARTHRITIS OF KNEE, UNSPECIFIED LATERALITY, UNSPECIFIED OSTEOARTHRITIS TYPE: ICD-10-CM

## 2018-10-16 DIAGNOSIS — N18.30 CHRONIC KIDNEY DISEASE, STAGE III (MODERATE): Chronic | ICD-10-CM

## 2018-10-16 DIAGNOSIS — K21.9 GASTROESOPHAGEAL REFLUX DISEASE WITHOUT ESOPHAGITIS: ICD-10-CM

## 2018-10-16 DIAGNOSIS — L02.429 BOIL, AXILLA: ICD-10-CM

## 2018-10-16 DIAGNOSIS — I10 ESSENTIAL HYPERTENSION: Primary | ICD-10-CM

## 2018-10-16 LAB
ALBUMIN SERPL ELPH-MCNC: 3.79 G/DL
ALPHA1 GLOB SERPL ELPH-MCNC: 0.32 G/DL
ALPHA2 GLOB SERPL ELPH-MCNC: 0.85 G/DL
B-GLOBULIN SERPL ELPH-MCNC: 2.41 G/DL
GAMMA GLOB SERPL ELPH-MCNC: 0.52 G/DL
INTERPRETATION SERPL IFE-IMP: NORMAL
KAPPA LC SER QL IA: 5.37 MG/DL
KAPPA LC/LAMBDA SER IA: 3.4
LAMBDA LC SER QL IA: 1.58 MG/DL
PATHOLOGIST INTERPRETATION IFE: NORMAL
PATHOLOGIST INTERPRETATION SPE: NORMAL
PROT SERPL-MCNC: 7.9 G/DL

## 2018-10-16 PROCEDURE — 1101F PT FALLS ASSESS-DOCD LE1/YR: CPT | Mod: CPTII,,, | Performed by: INTERNAL MEDICINE

## 2018-10-16 PROCEDURE — 99999 PR PBB SHADOW E&M-EST. PATIENT-LVL III: CPT | Mod: PBBFAC,,, | Performed by: INTERNAL MEDICINE

## 2018-10-16 PROCEDURE — 99213 OFFICE O/P EST LOW 20 MIN: CPT | Mod: PBBFAC | Performed by: INTERNAL MEDICINE

## 2018-10-16 PROCEDURE — 3078F DIAST BP <80 MM HG: CPT | Mod: CPTII,,, | Performed by: INTERNAL MEDICINE

## 2018-10-16 PROCEDURE — 3074F SYST BP LT 130 MM HG: CPT | Mod: CPTII,,, | Performed by: INTERNAL MEDICINE

## 2018-10-16 PROCEDURE — 99214 OFFICE O/P EST MOD 30 MIN: CPT | Mod: S$PBB,,, | Performed by: INTERNAL MEDICINE

## 2018-10-16 RX ORDER — GABAPENTIN 100 MG/1
CAPSULE ORAL
Qty: 450 CAPSULE | Refills: 3 | Status: SHIPPED | OUTPATIENT
Start: 2018-10-16 | End: 2020-04-22 | Stop reason: SDUPTHER

## 2018-10-16 RX ORDER — PRAVASTATIN SODIUM 40 MG/1
40 TABLET ORAL DAILY
Qty: 90 TABLET | Refills: 4 | Status: SHIPPED | OUTPATIENT
Start: 2018-10-16 | End: 2019-08-13 | Stop reason: SDUPTHER

## 2018-10-16 RX ORDER — DIAZEPAM 5 MG/1
5 TABLET ORAL EVERY 6 HOURS PRN
Qty: 60 TABLET | Refills: 1 | Status: SHIPPED | OUTPATIENT
Start: 2018-10-16 | End: 2019-01-31 | Stop reason: SDUPTHER

## 2018-10-16 RX ORDER — LOSARTAN POTASSIUM 25 MG/1
25 TABLET ORAL DAILY
Qty: 90 TABLET | Refills: 4 | Status: SHIPPED | OUTPATIENT
Start: 2018-10-16 | End: 2019-08-13 | Stop reason: SDUPTHER

## 2018-10-16 RX ORDER — CITALOPRAM 20 MG/1
20 TABLET, FILM COATED ORAL DAILY
Qty: 90 TABLET | Refills: 11 | Status: SHIPPED | OUTPATIENT
Start: 2018-10-16 | End: 2019-08-13 | Stop reason: SDUPTHER

## 2018-10-16 RX ORDER — DOXYCYCLINE HYCLATE 50 MG/1
50 CAPSULE ORAL DAILY
Qty: 30 CAPSULE | Refills: 1 | Status: SHIPPED | OUTPATIENT
Start: 2018-10-16 | End: 2018-12-17 | Stop reason: SDUPTHER

## 2018-10-16 NOTE — PROGRESS NOTES
CHIEF COMPLAINT: Follow up of multiple myeloma, diabetes, hypertension, reflux, hyperlipidemia    HISTORY OF PRESENT ILLNESS: This is a 73-year-old man who presents for follow up of above    HE has had his wisdom teeth removed in Mid September 2018. He had to have them removed for dentures. HE had to have repeat procedure to remove a piece of bone that had broken off.  He had been on a liquid diet.  He has lost some weight. HE is healing. He needs at least a week before dentures can be placed.      He has seen neprhology 9/13/18 and kidney function is stable.      He is on Lantus 18 units daily.  HE did not have low blood sugars when he was not eating well. He does not need Novolog.  Blood sugar kept dropping on VGO 20 because he changed his diet. HE is now on a low carbohydrate diet. Blood sugar in the morning is 90. Blood sugar in the evening 100-170. HE also takes glimepriride 2 mg daily.      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 He is in the observation arm and is being monitoring monthly. He saw Dr Engel 10/15/18. No indication for chemotherapy at this time.       He is taking losartan 25 mg 1 tablet daily to protect his kidney. No polydipsia or polyuria       His back and neck was doing better in the healthy back program.  He does not take hydrocodone apap. He takes tizanidine as needed (rarely). HE is wearing braces for his knees which is helping stability and his pain. He got EUflexxa injecitons for his knees.      Reflux is controlled on omeprazole 20 mg 2 tablets daily. HE has heartburn when he does not take the omeprazole No chest pain, shortness of breath, nausea, voimting, constipation, diarrhea, numbness, weakness.        He had laser vaporization and enucleation of the prostate 11/13/13. He denies any dysuria or hematuria now. HE saw Dr Walker. Oxybutynin was stopped. HE is urinting better. He continues FLomax 0.4 mg nightly     He has hyperlipidemia, on pravastatin 40 mg daily. No joint  "pain or muscle pain from the pravastatin.        He has been taking aspirin 81 mg daily vitamin D supplement 2000 units daily.        Anxiety is controlled on celexa 20 mg daily. Mood is better. He takes diazepam 5 mg at bedtime as needed (2-3 times a week()  for his sleep and anxiety. It helps him sleep.      HE is taking gabapentin 100 mg 2 capsules as needed. He does not take daily. Feet are doing ok. He will take the gabapentin when he has tingling in his feet. He had an injection in his left foot for plantar fasciitis.      He did physical therapy for his hips which has helped tremendously.      He has recurrent boils in axilla that come and go. HE takes doxycycline 100 mg once daily which helps the boils       PAST MEDICAL HISTORY:   1. Diabetes mellitus.   2. Hyperlipidemia.   3. Reflux.   4. BPH.   5. Osteoarthritis of the knees.   6. Neck pain.   7. History of right lateral epicondylitis.   8. History of lumps removed from the breast as a teenager.   9. OA cervical spine     SOCIAL HISTORY: Does not smoke, does not drink. He owns an    company. He works for the [employer].     FAMILY HISTORY: Mother is living at age 86 with a heart condition. Father   in his late 40s of alcoholism. He has 5 sisters and 1 brother who are  healthy, one has a neurological disorder, one is anxious. His daughter has  lupus, on dialysis. She also has heart problems. She also had a brain   aneurysm that was stented.       PHYSICAL EXAMINATION:    /70 (BP Location: Right arm, Patient Position: Sitting, BP Method: Medium (Manual))   Pulse 80   Ht 5' 10" (1.778 m)   Wt 86 kg (189 lb 11.2 oz)   BMI 27.22 kg/m²     GENERAL: He is alert, oriented, no apparent distress. Affect within normal limits.   Conjunctivae anicteric. PERRL. Tympanic membranes clear. Oropharynx gumes healing.    NECK: Supple. No cervical lymphadenopathy, no thyroid enlargement.   Respiratory: Effort normal. Lungs are clear to " auscultation.   HEART: Regular rate and rhythm without murmurs, gallops or rubs.   No lower extremity edema.    ABDOMEN: soft, non distended, non tender, bowel sounds present, no hepatosplenomgaly  Healing boils in axilla bilaterally     ASSESSMENT AND PLAN:        1. Diabetes mellitus with hyperglycemia and microalbuminuria - followed by endocrine. Doing better. Watch for low blood sugars  2. 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 Labs reviewed  3. OA knee -doing well.   4. Hypertension - controlled  5. Hyperlipidemia. On pravastatin  6. BPH. S/p laser - Saw urology 8/18  7. Vitamin D deficiency - on vitamin D 2,000 units daily.    9. Back pain-stable  10. Anxiety and depression- stable on celexa 20 mg one tablet daily.   11. Peripheral neuropathy -controlled on gabapentin.   12. CRI -stable - monitored closely by heme onc and nephrology.   13. boils in axilla bilaterally - doxycycline 50 mg daily.  I will see him back in 4 months, sooner if problems arise        Prevnar 7/16  PSA 8/16. colonoscopy normal 3/2012 and due in 2019    Influenza 9/21/18

## 2018-10-20 DIAGNOSIS — N32.81 OAB (OVERACTIVE BLADDER): ICD-10-CM

## 2018-10-22 RX ORDER — OXYBUTYNIN CHLORIDE 5 MG/1
TABLET ORAL
Qty: 180 TABLET | Refills: 0 | Status: SHIPPED | OUTPATIENT
Start: 2018-10-22 | End: 2019-01-19 | Stop reason: SDUPTHER

## 2018-10-24 ENCOUNTER — PATIENT MESSAGE (OUTPATIENT)
Dept: INTERNAL MEDICINE | Facility: CLINIC | Age: 73
End: 2018-10-24

## 2018-10-24 DIAGNOSIS — E11.65 TYPE 2 DIABETES MELLITUS WITH HYPERGLYCEMIA, UNSPECIFIED WHETHER LONG TERM INSULIN USE: ICD-10-CM

## 2018-10-24 RX ORDER — DEXTROSE 4 G
TABLET,CHEWABLE ORAL
Qty: 1 EACH | Refills: 0 | Status: SHIPPED | OUTPATIENT
Start: 2018-10-24 | End: 2019-04-16 | Stop reason: SDUPTHER

## 2018-11-12 ENCOUNTER — RESEARCH ENCOUNTER (OUTPATIENT)
Dept: RESEARCH | Facility: HOSPITAL | Age: 73
End: 2018-11-12

## 2018-11-12 ENCOUNTER — LAB VISIT (OUTPATIENT)
Dept: LAB | Facility: HOSPITAL | Age: 73
End: 2018-11-12
Attending: INTERNAL MEDICINE
Payer: MEDICARE

## 2018-11-12 DIAGNOSIS — D47.2 SMOLDERING MULTIPLE MYELOMA: ICD-10-CM

## 2018-11-12 DIAGNOSIS — Z00.6 EXAMINATION OF PARTICIPANT IN CLINICAL TRIAL: ICD-10-CM

## 2018-11-12 LAB
ALBUMIN SERPL BCP-MCNC: 3.6 G/DL
ALP SERPL-CCNC: 42 U/L
ALT SERPL W/O P-5'-P-CCNC: 20 U/L
ANION GAP SERPL CALC-SCNC: 7 MMOL/L
AST SERPL-CCNC: 18 U/L
BASOPHILS # BLD AUTO: 0.04 K/UL
BASOPHILS NFR BLD: 0.7 %
BILIRUB SERPL-MCNC: 0.7 MG/DL
BUN SERPL-MCNC: 17 MG/DL
CALCIUM SERPL-MCNC: 10.1 MG/DL
CHLORIDE SERPL-SCNC: 103 MMOL/L
CO2 SERPL-SCNC: 28 MMOL/L
CREAT SERPL-MCNC: 1.6 MG/DL
DIFFERENTIAL METHOD: ABNORMAL
EOSINOPHIL # BLD AUTO: 0.2 K/UL
EOSINOPHIL NFR BLD: 2.6 %
ERYTHROCYTE [DISTWIDTH] IN BLOOD BY AUTOMATED COUNT: 13.7 %
EST. GFR  (AFRICAN AMERICAN): 49 ML/MIN/1.73 M^2
EST. GFR  (NON AFRICAN AMERICAN): 42.4 ML/MIN/1.73 M^2
GLUCOSE SERPL-MCNC: 102 MG/DL
HCT VFR BLD AUTO: 33.4 %
HGB BLD-MCNC: 10.7 G/DL
IGA SERPL-MCNC: 1478 MG/DL
IGG SERPL-MCNC: 907 MG/DL
IGM SERPL-MCNC: 41 MG/DL
IMM GRANULOCYTES # BLD AUTO: 0.01 K/UL
IMM GRANULOCYTES NFR BLD AUTO: 0.2 %
LYMPHOCYTES # BLD AUTO: 1.7 K/UL
LYMPHOCYTES NFR BLD: 30.3 %
MAGNESIUM SERPL-MCNC: 1.9 MG/DL
MCH RBC QN AUTO: 29 PG
MCHC RBC AUTO-ENTMCNC: 32 G/DL
MCV RBC AUTO: 91 FL
MONOCYTES # BLD AUTO: 0.4 K/UL
MONOCYTES NFR BLD: 6.7 %
NEUTROPHILS # BLD AUTO: 3.4 K/UL
NEUTROPHILS NFR BLD: 59.5 %
NRBC BLD-RTO: 0 /100 WBC
PHOSPHATE SERPL-MCNC: 3.3 MG/DL
PLATELET # BLD AUTO: 236 K/UL
PMV BLD AUTO: 8.6 FL
POTASSIUM SERPL-SCNC: 4.7 MMOL/L
PROT SERPL-MCNC: 8.2 G/DL
RBC # BLD AUTO: 3.69 M/UL
SODIUM SERPL-SCNC: 138 MMOL/L
WBC # BLD AUTO: 5.71 K/UL

## 2018-11-12 PROCEDURE — 84100 ASSAY OF PHOSPHORUS: CPT

## 2018-11-12 PROCEDURE — 80053 COMPREHEN METABOLIC PANEL: CPT

## 2018-11-12 PROCEDURE — 83520 IMMUNOASSAY QUANT NOS NONAB: CPT | Mod: Q1

## 2018-11-12 PROCEDURE — 82784 ASSAY IGA/IGD/IGG/IGM EACH: CPT | Mod: 59,Q1

## 2018-11-12 PROCEDURE — 82784 ASSAY IGA/IGD/IGG/IGM EACH: CPT | Mod: Q1

## 2018-11-12 PROCEDURE — 36415 COLL VENOUS BLD VENIPUNCTURE: CPT | Mod: Q1

## 2018-11-12 PROCEDURE — 86334 IMMUNOFIX E-PHORESIS SERUM: CPT | Mod: 26,Q1,, | Performed by: PATHOLOGY

## 2018-11-12 PROCEDURE — 84165 PROTEIN E-PHORESIS SERUM: CPT | Mod: Q1

## 2018-11-12 PROCEDURE — 86334 IMMUNOFIX E-PHORESIS SERUM: CPT | Mod: Q1

## 2018-11-12 PROCEDURE — 84165 PROTEIN E-PHORESIS SERUM: CPT | Mod: 26,Q1,, | Performed by: PATHOLOGY

## 2018-11-12 PROCEDURE — 83735 ASSAY OF MAGNESIUM: CPT | Mod: Q1

## 2018-11-12 PROCEDURE — 85025 COMPLETE CBC W/AUTO DIFF WBC: CPT | Mod: Q1

## 2018-11-12 PROCEDURE — 82784 ASSAY IGA/IGD/IGG/IGM EACH: CPT | Mod: 59

## 2018-11-13 LAB
ALBUMIN SERPL ELPH-MCNC: 3.87 G/DL
ALPHA1 GLOB SERPL ELPH-MCNC: 0.29 G/DL
ALPHA2 GLOB SERPL ELPH-MCNC: 0.82 G/DL
B-GLOBULIN SERPL ELPH-MCNC: 2.51 G/DL
GAMMA GLOB SERPL ELPH-MCNC: 0.51 G/DL
INTERPRETATION SERPL IFE-IMP: NORMAL
KAPPA LC SER QL IA: 5.22 MG/DL
KAPPA LC/LAMBDA SER IA: 3.87
LAMBDA LC SER QL IA: 1.35 MG/DL
PATHOLOGIST INTERPRETATION IFE: NORMAL
PATHOLOGIST INTERPRETATION SPE: NORMAL
PROT SERPL-MCNC: 8 G/DL

## 2018-11-13 NOTE — PROGRESS NOTES
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   Sponsor:  [facility]  IRB #: [id number]  Study ID: [id number]  Investigator: GIL Engel Pt Initials: LUCÍA LORENZ       Arm B: Observation  Cycle 35, Day 28     LABS and AE Assessment ONLY       Patient presented to clinic two days ahead of scheduled visit by mistake.  Patient was able to have blood work drawn and was instructed to come back so that he may be seen by nurse practitioner.  However he states she had car trouble and was not able to return to the clinic that day.  RN called him next day (11/14) and did AE assessment over the phone (see below).   He states that he has been doing well since last month.  He denies new potential CRAB symptoms. Dr. Engel OK with basing this month's evaluation off of blood work and AE reporting this month.  Will log missed procedures as deviations.  He states continued willingness to participate in above-mentioned study.     Review of Baseline AE's:    *Please note this list is not all-inclusive, please see AE log for physician reviewed list of adverse events.   1. Hypertension, grade 1:not able to be evaluated at this visit. AE ongoing  2. Lower Back Pain and Neck Pain, grade 1: Patient has no complaints of this month. As previously stated, patient is not suspected to have bony myeloma involvement per Dr. Engel as these are pre-existing issues present at baseline.  AE ongoing.  3. Pain in extremity (knee), grade 1.  Continues to wear knee stabilizing braces.  AE ongoing   4. Peripheral sensory neuropathy, grade 1: Patient does not verbalize complaints of this today. Has gabapentin and takes as needed.  "Haven't taken that one in a while" per patient. AE ongoing.   5. Anemia, Grade 1: Hgb/Hct -10.7/33.4.  Result reviewed by Dr. Engel.   AE ongoing                 Review of AE's:     *Please note this list " "is not all-inclusive, please see AE log for physician reviewed list of adverse events.   1. Creatinine increased, grade 2: Serum creatinine is at 1.6 mg/dl today and GFR 49.99 (based on last month's weight).  Reviewed per Dr. Engel.   As previously stated, Dr. Engel states this has not been related to myeloma and is related chronic kidney issues likely secondary to diabetes and hypertension.  Will continue observation monthly and to monitor renal function closely. Per MD, encouraged to increase water intake, as patient states he hasn't been drinking a lot recently. AE worsened from baseline.      Per study chair, "the definition of progressive disease for this protocol is developing CRAB criteria that requires treatment systemically." Per Dr Engel, based on today's exam and lab results thus far, patient does not have any s/s of CRAB: Normal Calcium level (10.1), absence of Renal insufficiency (serum creatinine 1.6, and GFR 49.99 per Cockroft-Gault--patient with a history of kidney dysfunction secondary to diabetes; Dr. Engel aware of these values and not concerned for myeloma involvement), absence of significant Anemia (hemoglobin 10.7, has decreased from baseline; will await myeloma labs for clarity relationship to myeloma) and absence of lytic Bone lesions (per baseline metastatic survey as well as MRI--see MD note for further comment). ECOG score, H&P and  Vitals,missed at this timepoint. All myeloma-related blood work from last cycle including SPEP and JAGUAR have remained stable, and are currently pending for this cycle. Per Dr. Engel, patient shows no evidence of progression at last result. Patient informed that Dr. Engel will release all lab results to [facility]. He states understanding of this. QOL's not required at this time point per study protocol.       Next month's appt reviewed with patient, he states understanding.  Will continue to schedule patient several months out, which seems to help maintain compliance " with visits.    Patient states having research RN's contact information and has MD contact information to call with any concerns, questions, or worsening of symptoms; he verbalized understanding.

## 2018-11-20 ENCOUNTER — PATIENT MESSAGE (OUTPATIENT)
Dept: INTERNAL MEDICINE | Facility: CLINIC | Age: 73
End: 2018-11-20

## 2018-11-20 RX ORDER — OMEPRAZOLE 20 MG/1
40 CAPSULE, DELAYED RELEASE ORAL DAILY
Qty: 180 CAPSULE | Refills: 3 | Status: SHIPPED | OUTPATIENT
Start: 2018-11-20 | End: 2019-01-31 | Stop reason: SDUPTHER

## 2018-11-21 ENCOUNTER — TELEPHONE (OUTPATIENT)
Dept: SPORTS MEDICINE | Facility: CLINIC | Age: 73
End: 2018-11-21

## 2018-12-06 ENCOUNTER — PATIENT MESSAGE (OUTPATIENT)
Dept: SPORTS MEDICINE | Facility: CLINIC | Age: 73
End: 2018-12-06

## 2018-12-07 ENCOUNTER — OFFICE VISIT (OUTPATIENT)
Dept: SPORTS MEDICINE | Facility: CLINIC | Age: 73
End: 2018-12-07
Payer: MEDICARE

## 2018-12-07 ENCOUNTER — TELEPHONE (OUTPATIENT)
Dept: SPORTS MEDICINE | Facility: CLINIC | Age: 73
End: 2018-12-07

## 2018-12-07 VITALS
DIASTOLIC BLOOD PRESSURE: 67 MMHG | WEIGHT: 189 LBS | SYSTOLIC BLOOD PRESSURE: 128 MMHG | HEIGHT: 70 IN | HEART RATE: 106 BPM | BODY MASS INDEX: 27.06 KG/M2

## 2018-12-07 DIAGNOSIS — M17.12 PRIMARY OSTEOARTHRITIS OF LEFT KNEE: Primary | ICD-10-CM

## 2018-12-07 DIAGNOSIS — M17.11 PRIMARY OSTEOARTHRITIS OF RIGHT KNEE: ICD-10-CM

## 2018-12-07 PROCEDURE — 99999 PR PBB SHADOW E&M-EST. PATIENT-LVL IV: CPT | Mod: PBBFAC,,, | Performed by: PHYSICIAN ASSISTANT

## 2018-12-07 PROCEDURE — 3074F SYST BP LT 130 MM HG: CPT | Mod: CPTII,S$GLB,, | Performed by: PHYSICIAN ASSISTANT

## 2018-12-07 PROCEDURE — 99214 OFFICE O/P EST MOD 30 MIN: CPT | Mod: 25,S$GLB,, | Performed by: PHYSICIAN ASSISTANT

## 2018-12-07 PROCEDURE — 3078F DIAST BP <80 MM HG: CPT | Mod: CPTII,S$GLB,, | Performed by: PHYSICIAN ASSISTANT

## 2018-12-07 PROCEDURE — 1101F PT FALLS ASSESS-DOCD LE1/YR: CPT | Mod: CPTII,S$GLB,, | Performed by: PHYSICIAN ASSISTANT

## 2018-12-07 PROCEDURE — 20610 DRAIN/INJ JOINT/BURSA W/O US: CPT | Mod: LT,S$GLB,, | Performed by: PHYSICIAN ASSISTANT

## 2018-12-07 RX ORDER — LIDOCAINE HYDROCHLORIDE 10 MG/ML
1 INJECTION INFILTRATION; PERINEURAL
Status: COMPLETED | OUTPATIENT
Start: 2018-12-07 | End: 2018-12-07

## 2018-12-07 RX ORDER — BUPIVACAINE HYDROCHLORIDE 5 MG/ML
2 INJECTION, SOLUTION PERINEURAL
Status: COMPLETED | OUTPATIENT
Start: 2018-12-07 | End: 2018-12-07

## 2018-12-07 RX ORDER — TRIAMCINOLONE ACETONIDE 40 MG/ML
40 INJECTION, SUSPENSION INTRA-ARTICULAR; INTRAMUSCULAR
Status: COMPLETED | OUTPATIENT
Start: 2018-12-07 | End: 2018-12-07

## 2018-12-07 RX ADMIN — TRIAMCINOLONE ACETONIDE 40 MG: 40 INJECTION, SUSPENSION INTRA-ARTICULAR; INTRAMUSCULAR at 08:12

## 2018-12-07 RX ADMIN — BUPIVACAINE HYDROCHLORIDE 10 MG: 5 INJECTION, SOLUTION PERINEURAL at 08:12

## 2018-12-07 RX ADMIN — LIDOCAINE HYDROCHLORIDE 1 ML: 10 INJECTION INFILTRATION; PERINEURAL at 08:12

## 2018-12-07 NOTE — PROGRESS NOTES
CHIEF COMPLAINT: bilateral knee pain                                           HISTORY OF PRESENT ILLNESS:  The patient is a 72 y.o. male  who presents for follow up evaluation of his bilateral knee pain, left worse than right, requesting left CSI injection. He has been doing a HEP in his gym. He finished the Euflexxa series in October 2017. He was doing well but had a lot of night pain last night. He wears his medial  brace during the day and this helps his knee pain.  Issues is currently at night, when he is not wearing his braces. No new trauma or injury. He wants to avoid getting a TKA.    + mechanical symptoms. Pain: 8/10 at its worst    Review of Systems   Constitution: Negative. Negative for chills, fever and night sweats.   HENT: Negative for congestion and headaches.    Eyes: Negative for blurred vision, left vision loss and right vision loss.   Cardiovascular: Negative for chest pain and syncope.   Respiratory: Negative for cough and shortness of breath.    Endocrine: Negative for polydipsia, polyphagia and polyuria.   Hematologic/Lymphatic: Negative for bleeding problem. Does not bruise/bleed easily.   Skin: Negative for dry skin, itching and rash.   Musculoskeletal: Negative for falls and muscle weakness.   Gastrointestinal: Negative for abdominal pain and bowel incontinence.   Genitourinary: Negative for bladder incontinence and nocturia.   Neurological: Negative for disturbances in coordination, loss of balance and seizures.   Psychiatric/Behavioral: Negative for depression. The patient does not have insomnia.    Allergic/Immunologic: Negative for hives and persistent infections.     PAST MEDICAL HISTORY:   Past Medical History:   Diagnosis Date    Anxiety 3/16/2015    BPH (benign prostatic hypertrophy) 9/24/2012    Depression 3/16/2015    GERD (gastroesophageal reflux disease) 9/24/2012    Microalbuminuria 9/24/2012    Osteoarthritis of cervical spine 9/24/2012    Osteoarthritis of knee  "9/24/2012    Type II or unspecified type diabetes mellitus without mention of complication, not stated as uncontrolled 9/24/2012     PAST SURGICAL HISTORY:   Past Surgical History:   Procedure Laterality Date    CATARACT EXTRACTION  2012    Right eye    CYSTOSCOPY N/A 11/13/2013    Performed by Juan Walker MD at [facility] OR [address] [place] [address]    PROSTATE SURGERY      TURP, USING THULIUM LASER N/A 11/13/2013    Performed by Juan Walker MD at [facility] OR [address] [place] [address]     FAMILY HISTORY:   Family History   Problem Relation Age of Onset    Hypertension Brother     Kidney failure Daughter         due to medication    Blindness Neg Hx     Cancer Neg Hx     Cataracts Neg Hx     Diabetes Neg Hx     Glaucoma Neg Hx     Macular degeneration Neg Hx     Retinal detachment Neg Hx     Strabismus Neg Hx     Stroke Neg Hx     Thyroid disease Neg Hx      SOCIAL HISTORY:   Social History     Socioeconomic History    Marital status: Single     Spouse name: Not on file    Number of children: Not on file    Years of education: Not on file    Highest education level: Not on file   Social Needs    Financial resource strain: Not on file    Food insecurity - worry: Not on file    Food insecurity - inability: Not on file    Transportation needs - medical: Not on file    Transportation needs - non-medical: Not on file   Occupational History    Not on file   Tobacco Use    Smoking status: Never Smoker    Smokeless tobacco: Never Used   Substance and Sexual Activity    Alcohol use: No    Drug use: No    Sexual activity: Yes     Partners: Female     Birth control/protection: None   Other Topics Concern    Not on file   Social History Narrative    Not on file       MEDICATIONS:   Current Outpatient Medications:     aspirin (ECOTRIN) 81 MG EC tablet, Take 1 tablet (81 mg total) by mouth once daily., Disp: 100 tablet, Rfl: 3    BD INSULIN PEN NEEDLE UF SHORT 31 gauge x 5/16" Ndle, USE TO INJECT INSULIN ONE TIME DAILY, Disp: 300 " each, Rfl: 3    blood glucose control, normal (METER-CHECK) Soln, One meter. True test meter. Use as directed, Disp: 1 each, Rfl: 0    blood sugar diagnostic Strp, Omayra Meter Check twice daily, Disp: 200 strip, Rfl: 4    blood-glucose meter (ACCU-CHEK OMAYRA) [facility], USE AS DIRECTED. Accucheck elie plus, Disp: 1 each, Rfl: 0    cholecalciferol, vitamin D3, (VITAMIN D) 2,000 unit Cap, Take by mouth. 1 Capsule Oral Every day.  over the counter, Disp: , Rfl:     citalopram (CELEXA) 20 MG tablet, Take 1 tablet (20 mg total) by mouth once daily., Disp: 90 tablet, Rfl: 11    diazePAM (VALIUM) 5 MG tablet, Take 1 tablet (5 mg total) by mouth every 6 (six) hours as needed., Disp: 60 tablet, Rfl: 1    doxycycline (VIBRAMYCIN) 50 MG capsule, Take 1 capsule (50 mg total) by mouth once daily., Disp: 30 capsule, Rfl: 1    flash glucose scanning reader (FREESTYLE CHRISTIANO READER) Misc, 1 each by Misc.(Non-Drug; Combo Route) route 4 (four) times daily., Disp: 1 each, Rfl: 0    flash glucose sensor (FREESTYLE CHRISTIANO SENSOR) Kit, 1 each by Misc.(Non-Drug; Combo Route) route once a week., Disp: 3 kit, Rfl: 6    gabapentin (NEURONTIN) 100 MG capsule, TAKE 1 CAPSULE EVERY MORNING, 1 CAPSULE IN THE AFTERNOON, AND 1 TO 3 CAPSULE(S) AT BEDTIME AS NEEDED FOR FOOT PAIN, Disp: 450 capsule, Rfl: 3    glimepiride (AMARYL) 2 MG tablet, daily with breakfast. , Disp: , Rfl:     glucagon (human recombinant) inj 1mg/mL kit, Inject 1 mL (1 mg total) into the muscle as needed., Disp: 1 kit, Rfl: 0    insulin glargine (LANTUS SOLOSTAR U-100 INSULIN) 100 unit/mL (3 mL) InPn pen, Inject 15 Units into the skin every evening., Disp: 1 Box, Rfl: 6    lancets Misc, Use twice daily, Disp: 200 each, Rfl: 4    latanoprost 0.005 % ophthalmic solution, INSTILL 1 DROP INTO BOTH EYES EVERY EVENING, Disp: 3 Bottle, Rfl: 4    losartan (COZAAR) 25 MG tablet, Take 1 tablet (25 mg total) by mouth once daily., Disp: 90 tablet, Rfl: 4    NOVOLOG FLEXPEN U-100  "INSULIN 100 unit/mL InPn pen, , Disp: , Rfl:     omeprazole (PRILOSEC) 20 MG capsule, Take 2 capsules (40 mg total) by mouth once daily., Disp: 180 capsule, Rfl: 3    oxybutynin (DITROPAN) 5 MG Tab, TAKE 1 TABLET(5 MG) BY MOUTH TWICE DAILY, Disp: 180 tablet, Rfl: 0    pravastatin (PRAVACHOL) 40 MG tablet, Take 1 tablet (40 mg total) by mouth once daily., Disp: 90 tablet, Rfl: 4    tamsulosin (FLOMAX) 0.4 mg Cap, Take 1 capsule (0.4 mg total) by mouth once daily., Disp: 90 capsule, Rfl: 3    tizanidine (ZANAFLEX) 4 MG tablet, 1/2-1 tablet every 8 hours as needed for muscle spasm, Disp: 90 tablet, Rfl: 1  ALLERGIES:   Review of patient's allergies indicates:   Allergen Reactions    Penicillins      Knees locked up       VITAL SIGNS:   /67   Pulse 106   Ht 5' 10" (1.778 m)   Wt 85.7 kg (189 lb)   BMI 27.12 kg/m²      PHYSICAL EXAMINATION    General:  The patient is alert and oriented x 3.  Mood is pleasant.  Observation of ears, eyes and nose reveal no gross abnormalities.  No labored breathing observed.    Bilateral KNEE EXAMINATION     OBSERVATION / INSPECTION   Gait:   Antalgic   Alignment:  Neutral   Scars:   None   Muscle atrophy: Mild  Effusion:  None   Warmth:  None   Discoloration:   none     TENDERNESS / CREPITUS (T / C):          T / C      T / C   Patella   - / -   Lateral joint line   - / -   Peripatellar medial  -  Medial joint line    + / -   Peripatellar lateral -  Medial plica   - / -   Patellar tendon -   Popliteal fossa   - / -   Quad tendon   -   Gastrocnemius   -   Prepatellar Bursa - / -   Quadricep   -   Tibial tubercle  -  Thigh/hamstring  -   Pes anserine/HS -  Fibula    -   ITB   - / -  Tibia     -   Tib/fib joint  - / -  LCL    -     MFC   - / -   MCL: Proximal  -    LFC   - / -    Distal   -          ROM: (* = pain)  PASSIVE   ACTIVE    Left :   0 / 0 / 145   0 / 0 / 145     Right :    0 / 0 / 145   0 / 0 / 145    Patellofemoral examination:  See above noted areas of " tenderness.   Patella position    Subluxation / dislocation: Centered           Sup. / Inf;   Normal   Crepitus (PF):    Absent   Patellar Mobility:       Medial-lateral:   Normal    Superior-inferior:  Normal    Inferior tilt   Normal    Patellar tendon:  Normal   Lateral tilt:    Normal   J-sign:     None   Patellofemoral grind:   No pain       MENISCAL SIGNS:     Pain on terminal extension:  -  Pain on terminal flexion:  + (left)  Sofiyas maneuver:  - for pain  Squat     + posterior joint pain    LIGAMENT EXAMINATION:  ACL / Lachman:  negative   PCL-Post.  drawer: normal 0 to 2mm  MCL- Valgus:  normal 0 to 2mm  LCL- Varus:  normal 0 to 2mm    STRENGTH: (* = with pain) PAINFUL SIDE   Quadricep   5/5   Hamstrin/5    EXTREMITY NEURO-VASCULAR EXAMINATION:   Sensation:  Grossly intact to light touch all dermatomal regions.   Motor Function:  Fully intact motor function at hip, knee, foot and ankle    DTRs;  quadriceps and  achilles 2+.  No clonus and downgoing Babinski.    Vascular status:  DP and PT pulses 2+, brisk capillary refill, symmetric.     XRAYS(17): There are degenerative changes of both knees most severe in the medial compartments.    ASSESSMENT:    Bilateral knee pain    PLAN:   1. Left knee CSI today   Injection Procedure  A time out was performed, including verification of patient ID, procedure, site and side, availability of information and equipment, review of safety issues, and agreement with consent, the procedure site was marked.    After time out was performed, the patient was prepped aseptically with povidone-iodine swabsticks. A diagnostic and therapeutic injection of 2:1:1cc sensorcaine:lidocaine:kenalog was given under sterile technique using a 22.5 gauge needle from the Superolateral aspect of the left Knee Joint in the supine position.      Emerson Menendez had no adverse reactions to the medication. Pain decreased. He was instructed to apply ice to the joint for 20  minutes and avoid strenuous activities for 24-36 hours following the injection. He was warned of possible blood sugar and/or blood pressure changes during that time. Following that time, he can resume regular activities.    He was reminded to call the clinic immediately for any adverse side effects as explained in clinic today.    2. Follow up for second series of euflexxa injections

## 2018-12-12 ENCOUNTER — LAB VISIT (OUTPATIENT)
Dept: LAB | Facility: HOSPITAL | Age: 73
End: 2018-12-12
Payer: MEDICARE

## 2018-12-12 ENCOUNTER — OFFICE VISIT (OUTPATIENT)
Dept: HEMATOLOGY/ONCOLOGY | Facility: CLINIC | Age: 73
End: 2018-12-12
Payer: MEDICARE

## 2018-12-12 ENCOUNTER — RESEARCH ENCOUNTER (OUTPATIENT)
Dept: RESEARCH | Facility: HOSPITAL | Age: 73
End: 2018-12-12

## 2018-12-12 VITALS
OXYGEN SATURATION: 97 % | DIASTOLIC BLOOD PRESSURE: 78 MMHG | TEMPERATURE: 99 F | HEIGHT: 70 IN | BODY MASS INDEX: 27.24 KG/M2 | RESPIRATION RATE: 20 BRPM | SYSTOLIC BLOOD PRESSURE: 137 MMHG | HEART RATE: 97 BPM | WEIGHT: 190.25 LBS

## 2018-12-12 DIAGNOSIS — E11.22 CKD STAGE 3 DUE TO TYPE 2 DIABETES MELLITUS: ICD-10-CM

## 2018-12-12 DIAGNOSIS — D47.2 SMOLDERING MULTIPLE MYELOMA: Primary | ICD-10-CM

## 2018-12-12 DIAGNOSIS — Z79.4 TYPE 2 DIABETES MELLITUS WITH DIABETIC POLYNEUROPATHY, WITH LONG-TERM CURRENT USE OF INSULIN: ICD-10-CM

## 2018-12-12 DIAGNOSIS — Z00.6 EXAMINATION OF PARTICIPANT IN CLINICAL TRIAL: ICD-10-CM

## 2018-12-12 DIAGNOSIS — N18.30 CKD STAGE 3 DUE TO TYPE 2 DIABETES MELLITUS: ICD-10-CM

## 2018-12-12 DIAGNOSIS — E11.42 TYPE 2 DIABETES MELLITUS WITH DIABETIC POLYNEUROPATHY, WITH LONG-TERM CURRENT USE OF INSULIN: ICD-10-CM

## 2018-12-12 DIAGNOSIS — D47.2 SMOLDERING MULTIPLE MYELOMA: ICD-10-CM

## 2018-12-12 LAB
ALBUMIN SERPL BCP-MCNC: 3.7 G/DL
ALP SERPL-CCNC: 46 U/L
ALT SERPL W/O P-5'-P-CCNC: 27 U/L
ANION GAP SERPL CALC-SCNC: 10 MMOL/L
AST SERPL-CCNC: 17 U/L
BASOPHILS # BLD AUTO: 0.03 K/UL
BASOPHILS NFR BLD: 0.4 %
BILIRUB SERPL-MCNC: 0.5 MG/DL
BUN SERPL-MCNC: 16 MG/DL
CALCIUM SERPL-MCNC: 10 MG/DL
CHLORIDE SERPL-SCNC: 102 MMOL/L
CO2 SERPL-SCNC: 24 MMOL/L
CREAT SERPL-MCNC: 1.7 MG/DL
DIFFERENTIAL METHOD: ABNORMAL
EOSINOPHIL # BLD AUTO: 0.1 K/UL
EOSINOPHIL NFR BLD: 2 %
ERYTHROCYTE [DISTWIDTH] IN BLOOD BY AUTOMATED COUNT: 13 %
EST. GFR  (AFRICAN AMERICAN): 45.6 ML/MIN/1.73 M^2
EST. GFR  (NON AFRICAN AMERICAN): 39.4 ML/MIN/1.73 M^2
GLUCOSE SERPL-MCNC: 135 MG/DL
HCT VFR BLD AUTO: 35 %
HGB BLD-MCNC: 11.5 G/DL
IGA SERPL-MCNC: 1491 MG/DL
IGG SERPL-MCNC: 929 MG/DL
IGM SERPL-MCNC: 40 MG/DL
IMM GRANULOCYTES # BLD AUTO: 0.02 K/UL
IMM GRANULOCYTES NFR BLD AUTO: 0.3 %
LYMPHOCYTES # BLD AUTO: 1.4 K/UL
LYMPHOCYTES NFR BLD: 19.4 %
MAGNESIUM SERPL-MCNC: 1.8 MG/DL
MCH RBC QN AUTO: 29 PG
MCHC RBC AUTO-ENTMCNC: 32.9 G/DL
MCV RBC AUTO: 88 FL
MONOCYTES # BLD AUTO: 0.5 K/UL
MONOCYTES NFR BLD: 6.8 %
NEUTROPHILS # BLD AUTO: 5 K/UL
NEUTROPHILS NFR BLD: 71.1 %
NRBC BLD-RTO: 0 /100 WBC
PHOSPHATE SERPL-MCNC: 2.9 MG/DL
PLATELET # BLD AUTO: 225 K/UL
PMV BLD AUTO: 8.8 FL
POTASSIUM SERPL-SCNC: 4.4 MMOL/L
PROT SERPL-MCNC: 8.3 G/DL
RBC # BLD AUTO: 3.96 M/UL
SODIUM SERPL-SCNC: 136 MMOL/L
WBC # BLD AUTO: 7.05 K/UL

## 2018-12-12 PROCEDURE — 99999 PR PBB SHADOW E&M-EST. PATIENT-LVL III: CPT | Mod: PBBFAC,,, | Performed by: INTERNAL MEDICINE

## 2018-12-12 PROCEDURE — 1101F PT FALLS ASSESS-DOCD LE1/YR: CPT | Mod: CPTII,S$GLB,, | Performed by: INTERNAL MEDICINE

## 2018-12-12 PROCEDURE — 36415 COLL VENOUS BLD VENIPUNCTURE: CPT

## 2018-12-12 PROCEDURE — 84100 ASSAY OF PHOSPHORUS: CPT | Mod: Q1

## 2018-12-12 PROCEDURE — 83735 ASSAY OF MAGNESIUM: CPT

## 2018-12-12 PROCEDURE — 82784 ASSAY IGA/IGD/IGG/IGM EACH: CPT | Mod: 59

## 2018-12-12 PROCEDURE — 83520 IMMUNOASSAY QUANT NOS NONAB: CPT | Mod: Q1

## 2018-12-12 PROCEDURE — 3045F PR MOST RECENT HEMOGLOBIN A1C LEVEL 7.0-9.0%: CPT | Mod: CPTII,S$GLB,, | Performed by: INTERNAL MEDICINE

## 2018-12-12 PROCEDURE — 3078F DIAST BP <80 MM HG: CPT | Mod: CPTII,S$GLB,, | Performed by: INTERNAL MEDICINE

## 2018-12-12 PROCEDURE — 84165 PROTEIN E-PHORESIS SERUM: CPT

## 2018-12-12 PROCEDURE — 3075F SYST BP GE 130 - 139MM HG: CPT | Mod: CPTII,S$GLB,, | Performed by: INTERNAL MEDICINE

## 2018-12-12 PROCEDURE — 82784 ASSAY IGA/IGD/IGG/IGM EACH: CPT

## 2018-12-12 PROCEDURE — 80053 COMPREHEN METABOLIC PANEL: CPT | Mod: Q1

## 2018-12-12 PROCEDURE — 86334 IMMUNOFIX E-PHORESIS SERUM: CPT | Mod: Q1

## 2018-12-12 PROCEDURE — 99215 OFFICE O/P EST HI 40 MIN: CPT | Mod: S$GLB,,, | Performed by: INTERNAL MEDICINE

## 2018-12-12 PROCEDURE — 86334 IMMUNOFIX E-PHORESIS SERUM: CPT | Mod: 26,Q1,, | Performed by: PATHOLOGY

## 2018-12-12 PROCEDURE — 84165 PROTEIN E-PHORESIS SERUM: CPT | Mod: 26,Q1,, | Performed by: PATHOLOGY

## 2018-12-12 PROCEDURE — 85025 COMPLETE CBC W/AUTO DIFF WBC: CPT

## 2018-12-13 LAB
ALBUMIN SERPL ELPH-MCNC: 3.96 G/DL
ALPHA1 GLOB SERPL ELPH-MCNC: 0.29 G/DL
ALPHA2 GLOB SERPL ELPH-MCNC: 0.84 G/DL
B-GLOBULIN SERPL ELPH-MCNC: 2.48 G/DL
GAMMA GLOB SERPL ELPH-MCNC: 0.63 G/DL
INTERPRETATION SERPL IFE-IMP: NORMAL
KAPPA LC SER QL IA: 4.66 MG/DL
KAPPA LC/LAMBDA SER IA: 3.67
LAMBDA LC SER QL IA: 1.27 MG/DL
PATHOLOGIST INTERPRETATION IFE: NORMAL
PATHOLOGIST INTERPRETATION SPE: NORMAL
PROT SERPL-MCNC: 8.2 G/DL

## 2018-12-14 NOTE — PROGRESS NOTES
Subjective:       Patient ID: Emerson Menendez is a 73 y.o. male.    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 Test results identify a stable IgA kappa protein with beta globulin band at 1.63g/dL. Kappa free light chain is elevated at 4.40. CBC and calcium are stable. Creatinine with history of stage III CKD is stable at 1.4. Metastatic survey is negative for lytic lesions. MRI of the entire spine demonstrated age related changes but no convincing evidence of myeloma bone disease. Bone marrow biopsy identified about 13% plasma cells by morphology and FISH for myeloma identified a t(11;14) and trisomies 3,7, and 17. The patient is afebrile and appears clinically well. He was seen by his podiatrist and nephrologist since our last visit without any new or acute events.    The patient has not received any therapy for smoldering myeloma including bisphosphonates or steroids. He has been randomized to observation arm of the the clinical study. 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 He has no current or prior history of malignancy.    TODAY  Mr. Menendez returns today for monthly follow-up evaluation. No clinical signs/symptoms of progression of his smoldering myeloma.       HPI  Review of Systems   Constitutional: Negative for activity change, appetite change, diaphoresis, fatigue, fever and unexpected weight change.   HENT: Negative.    Eyes: Negative.    Respiratory: Negative.    Cardiovascular: Negative for leg swelling.   Gastrointestinal: Negative.    Endocrine: Negative.    Genitourinary: Negative.    Musculoskeletal: Negative.     Skin: Negative.    Allergic/Immunologic: Negative.    Neurological:        Grade 0-1 peripheral neuropathy of bilateral feet.    Hematological: Negative for adenopathy. Does not bruise/bleed easily.   Psychiatric/Behavioral: Negative.        Objective:       Vitals:    12/12/18 1455   BP: 137/78   Pulse: 97   Resp: 20   Temp: 98.6 °F (37 °C)       Physical Exam   Constitutional: He is oriented to person, place, and time. He appears well-developed and well-nourished.   HENT:   Head: Normocephalic and atraumatic.   Right Ear: External ear normal.   Left Ear: External ear normal.   Nose: Nose normal.   Mouth/Throat: Oropharynx is clear and moist.   Eyes: Conjunctivae and EOM are normal. Pupils are equal, round, and reactive to light.   Neck: Normal range of motion. Neck supple.   Cardiovascular: Normal rate, regular rhythm and intact distal pulses.   No murmur heard.  Pulmonary/Chest: Effort normal and breath sounds normal. No respiratory distress.   Abdominal: Soft. Bowel sounds are normal. He exhibits no distension. There is no hepatosplenomegaly.   Musculoskeletal: He exhibits no edema or tenderness.   Neurological: He is alert and oriented to person, place, and time. No cranial nerve deficit.   Skin: Skin is warm and dry. No rash noted. No cyanosis. Nails show no clubbing.   Psychiatric: He has a normal mood and affect. His behavior is normal.   Nursing note and vitals reviewed.      Assessment:       1. Smoldering multiple myeloma    2. CKD stage 3 due to type 2 diabetes mellitus    3. Type 2 diabetes mellitus with diabetic polyneuropathy, with long-term current use of insulin        Plan:       The patient has a diagnosis of smoldering myeloma. There is no indication for immediate chemotherapy. We are monitoring renal function closely- baseline creatinine of -1.5 is stable at 1.7 today. 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 CBC and CMP are stable.  Complete biochemical studies from today are pending.  Plan for return in 1 month.  Endocrinology and Nephrology to monitor diabetes and renal failure respectively.

## 2018-12-17 RX ORDER — DOXYCYCLINE HYCLATE 50 MG/1
CAPSULE ORAL
Qty: 30 CAPSULE | Refills: 0 | Status: SHIPPED | OUTPATIENT
Start: 2018-12-17 | End: 2019-04-16

## 2018-12-22 ENCOUNTER — PATIENT MESSAGE (OUTPATIENT)
Dept: INTERNAL MEDICINE | Facility: CLINIC | Age: 73
End: 2018-12-22

## 2018-12-22 DIAGNOSIS — N52.01 ERECTILE DYSFUNCTION DUE TO ARTERIAL INSUFFICIENCY: Primary | ICD-10-CM

## 2018-12-26 RX ORDER — TADALAFIL 5 MG/1
5 TABLET ORAL DAILY PRN
Qty: 30 TABLET | Refills: 0 | Status: SHIPPED | OUTPATIENT
Start: 2018-12-26 | End: 2018-12-27 | Stop reason: SDUPTHER

## 2018-12-27 ENCOUNTER — PATIENT MESSAGE (OUTPATIENT)
Dept: INTERNAL MEDICINE | Facility: CLINIC | Age: 73
End: 2018-12-27

## 2018-12-27 DIAGNOSIS — N52.01 ERECTILE DYSFUNCTION DUE TO ARTERIAL INSUFFICIENCY: ICD-10-CM

## 2018-12-27 RX ORDER — TADALAFIL 5 MG/1
5 TABLET ORAL DAILY PRN
Qty: 30 TABLET | Refills: 4 | Status: SHIPPED | OUTPATIENT
Start: 2018-12-27 | End: 2019-01-24 | Stop reason: SDUPTHER

## 2019-01-06 ENCOUNTER — PATIENT MESSAGE (OUTPATIENT)
Dept: INTERNAL MEDICINE | Facility: CLINIC | Age: 74
End: 2019-01-06

## 2019-01-16 ENCOUNTER — OFFICE VISIT (OUTPATIENT)
Dept: OPHTHALMOLOGY | Facility: CLINIC | Age: 74
End: 2019-01-16
Payer: MEDICARE

## 2019-01-16 DIAGNOSIS — Z98.41 STATUS POST CATARACT EXTRACTION AND INSERTION OF INTRAOCULAR LENS OF RIGHT EYE: ICD-10-CM

## 2019-01-16 DIAGNOSIS — D47.2 SMOLDERING MULTIPLE MYELOMA: ICD-10-CM

## 2019-01-16 DIAGNOSIS — C90.00 MULTIPLE MYELOMA, REMISSION STATUS UNSPECIFIED: Primary | ICD-10-CM

## 2019-01-16 DIAGNOSIS — H40.1132 PRIMARY OPEN ANGLE GLAUCOMA OF BOTH EYES, MODERATE STAGE: Primary | ICD-10-CM

## 2019-01-16 DIAGNOSIS — Z96.1 STATUS POST CATARACT EXTRACTION AND INSERTION OF INTRAOCULAR LENS OF RIGHT EYE: ICD-10-CM

## 2019-01-16 DIAGNOSIS — H40.039 ANATOMICAL NARROW ANGLE: ICD-10-CM

## 2019-01-16 DIAGNOSIS — Z00.6 EXAMINATION OF PARTICIPANT IN CLINICAL TRIAL: ICD-10-CM

## 2019-01-16 DIAGNOSIS — H25.12 NUCLEAR SCLEROSIS OF LEFT EYE: ICD-10-CM

## 2019-01-16 PROCEDURE — 92012 INTRM OPH EXAM EST PATIENT: CPT | Mod: S$GLB,,, | Performed by: OPHTHALMOLOGY

## 2019-01-16 PROCEDURE — 99999 PR PBB SHADOW E&M-EST. PATIENT-LVL II: CPT | Mod: PBBFAC,,, | Performed by: OPHTHALMOLOGY

## 2019-01-16 PROCEDURE — 99999 PR PBB SHADOW E&M-EST. PATIENT-LVL II: ICD-10-PCS | Mod: PBBFAC,,, | Performed by: OPHTHALMOLOGY

## 2019-01-16 PROCEDURE — 92012 PR EYE EXAM, EST PATIENT,INTERMED: ICD-10-PCS | Mod: S$GLB,,, | Performed by: OPHTHALMOLOGY

## 2019-01-16 NOTE — PROGRESS NOTES
Assessment /Plan     For exam results, see Encounter Report.    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 --> discussed      Both eyes --> better adherence  Xal q HS    NSC OS --> discussed narrow angling / glaucoma  Observe    PC IOL OD  Quiet  Clear and intact    NIDDM since 2000  No BDR or CSME  Control    HTN ret  Control      Plan  RTC 6 weeks with IOP ==> 1/16/2019 steroid response Knee ?? --> discussed  RTC sooner prn with good understanding

## 2019-01-19 DIAGNOSIS — N32.81 OAB (OVERACTIVE BLADDER): ICD-10-CM

## 2019-01-21 RX ORDER — OXYBUTYNIN CHLORIDE 5 MG/1
TABLET ORAL
Qty: 180 TABLET | Refills: 0 | Status: SHIPPED | OUTPATIENT
Start: 2019-01-21 | End: 2019-04-16

## 2019-01-23 ENCOUNTER — PATIENT MESSAGE (OUTPATIENT)
Dept: INTERNAL MEDICINE | Facility: CLINIC | Age: 74
End: 2019-01-23

## 2019-01-23 DIAGNOSIS — N52.01 ERECTILE DYSFUNCTION DUE TO ARTERIAL INSUFFICIENCY: ICD-10-CM

## 2019-01-24 ENCOUNTER — PATIENT MESSAGE (OUTPATIENT)
Dept: INTERNAL MEDICINE | Facility: CLINIC | Age: 74
End: 2019-01-24

## 2019-01-24 ENCOUNTER — LAB VISIT (OUTPATIENT)
Dept: LAB | Facility: HOSPITAL | Age: 74
End: 2019-01-24
Attending: INTERNAL MEDICINE
Payer: MEDICARE

## 2019-01-24 DIAGNOSIS — Z00.6 EXAMINATION OF PARTICIPANT IN CLINICAL TRIAL: ICD-10-CM

## 2019-01-24 DIAGNOSIS — C90.00 MULTIPLE MYELOMA, REMISSION STATUS UNSPECIFIED: ICD-10-CM

## 2019-01-24 LAB
ALBUMIN SERPL BCP-MCNC: 3.5 G/DL
ALP SERPL-CCNC: 44 U/L
ALT SERPL W/O P-5'-P-CCNC: 23 U/L
ANION GAP SERPL CALC-SCNC: 11 MMOL/L
AST SERPL-CCNC: 22 U/L
BASOPHILS # BLD AUTO: 0.04 K/UL
BASOPHILS NFR BLD: 0.8 %
BILIRUB SERPL-MCNC: 0.6 MG/DL
BUN SERPL-MCNC: 30 MG/DL
CALCIUM SERPL-MCNC: 9.9 MG/DL
CHLORIDE SERPL-SCNC: 104 MMOL/L
CO2 SERPL-SCNC: 23 MMOL/L
CREAT SERPL-MCNC: 1.6 MG/DL
DIFFERENTIAL METHOD: ABNORMAL
EOSINOPHIL # BLD AUTO: 0.2 K/UL
EOSINOPHIL NFR BLD: 3.6 %
ERYTHROCYTE [DISTWIDTH] IN BLOOD BY AUTOMATED COUNT: 13.7 %
EST. GFR  (AFRICAN AMERICAN): 49 ML/MIN/1.73 M^2
EST. GFR  (NON AFRICAN AMERICAN): 42.4 ML/MIN/1.73 M^2
GLUCOSE SERPL-MCNC: 127 MG/DL
HCT VFR BLD AUTO: 34.4 %
HGB BLD-MCNC: 11.3 G/DL
IGA SERPL-MCNC: 1476 MG/DL
IGG SERPL-MCNC: 824 MG/DL
IGM SERPL-MCNC: 40 MG/DL
IMM GRANULOCYTES # BLD AUTO: 0.01 K/UL
IMM GRANULOCYTES NFR BLD AUTO: 0.2 %
LYMPHOCYTES # BLD AUTO: 1.5 K/UL
LYMPHOCYTES NFR BLD: 29.6 %
MAGNESIUM SERPL-MCNC: 1.7 MG/DL
MCH RBC QN AUTO: 28.8 PG
MCHC RBC AUTO-ENTMCNC: 32.8 G/DL
MCV RBC AUTO: 88 FL
MONOCYTES # BLD AUTO: 0.4 K/UL
MONOCYTES NFR BLD: 8 %
NEUTROPHILS # BLD AUTO: 2.9 K/UL
NEUTROPHILS NFR BLD: 57.8 %
NRBC BLD-RTO: 0 /100 WBC
PHOSPHATE SERPL-MCNC: 3.3 MG/DL
PLATELET # BLD AUTO: 252 K/UL
PMV BLD AUTO: 8.8 FL
POTASSIUM SERPL-SCNC: 4.3 MMOL/L
PROT SERPL-MCNC: 8.2 G/DL
RBC # BLD AUTO: 3.92 M/UL
SODIUM SERPL-SCNC: 138 MMOL/L
WBC # BLD AUTO: 5 K/UL

## 2019-01-24 PROCEDURE — 84165 PROTEIN E-PHORESIS SERUM: CPT | Mod: 26,Q1,, | Performed by: PATHOLOGY

## 2019-01-24 PROCEDURE — 80053 COMPREHEN METABOLIC PANEL: CPT

## 2019-01-24 PROCEDURE — 84165 PROTEIN E-PHORESIS SERUM: CPT | Mod: Q1

## 2019-01-24 PROCEDURE — 86334 PATHOLOGIST INTERPRETATION IFE: ICD-10-PCS | Mod: 26,Q1,, | Performed by: PATHOLOGY

## 2019-01-24 PROCEDURE — 86334 IMMUNOFIX E-PHORESIS SERUM: CPT | Mod: 26,Q1,, | Performed by: PATHOLOGY

## 2019-01-24 PROCEDURE — 84165 PATHOLOGIST INTERPRETATION SPE: ICD-10-PCS | Mod: 26,Q1,, | Performed by: PATHOLOGY

## 2019-01-24 PROCEDURE — 83735 ASSAY OF MAGNESIUM: CPT

## 2019-01-24 PROCEDURE — 86334 IMMUNOFIX E-PHORESIS SERUM: CPT | Mod: Q1

## 2019-01-24 PROCEDURE — 36415 COLL VENOUS BLD VENIPUNCTURE: CPT

## 2019-01-24 PROCEDURE — 82784 ASSAY IGA/IGD/IGG/IGM EACH: CPT | Mod: 59

## 2019-01-24 PROCEDURE — 84100 ASSAY OF PHOSPHORUS: CPT | Mod: Q1

## 2019-01-24 PROCEDURE — 83520 IMMUNOASSAY QUANT NOS NONAB: CPT | Mod: Q1

## 2019-01-24 PROCEDURE — 85025 COMPLETE CBC W/AUTO DIFF WBC: CPT

## 2019-01-24 RX ORDER — INSULIN GLARGINE 100 [IU]/ML
15 INJECTION, SOLUTION SUBCUTANEOUS NIGHTLY
Qty: 1 BOX | Refills: 6 | Status: SHIPPED | OUTPATIENT
Start: 2019-01-24 | End: 2019-08-13

## 2019-01-24 RX ORDER — TADALAFIL 5 MG/1
5 TABLET ORAL DAILY PRN
Qty: 30 TABLET | Refills: 4 | Status: SHIPPED | OUTPATIENT
Start: 2019-01-24 | End: 2019-01-31 | Stop reason: SDUPTHER

## 2019-01-24 RX ORDER — INSULIN GLARGINE 100 [IU]/ML
15 INJECTION, SOLUTION SUBCUTANEOUS NIGHTLY
Qty: 1 BOX | Refills: 6 | Status: SHIPPED | OUTPATIENT
Start: 2019-01-24 | End: 2019-01-24 | Stop reason: SDUPTHER

## 2019-01-25 ENCOUNTER — RESEARCH ENCOUNTER (OUTPATIENT)
Dept: RESEARCH | Facility: HOSPITAL | Age: 74
End: 2019-01-25

## 2019-01-25 ENCOUNTER — OFFICE VISIT (OUTPATIENT)
Dept: HEMATOLOGY/ONCOLOGY | Facility: CLINIC | Age: 74
End: 2019-01-25
Payer: MEDICARE

## 2019-01-25 VITALS
SYSTOLIC BLOOD PRESSURE: 142 MMHG | DIASTOLIC BLOOD PRESSURE: 73 MMHG | RESPIRATION RATE: 20 BRPM | TEMPERATURE: 98 F | HEIGHT: 70 IN | WEIGHT: 195.13 LBS | OXYGEN SATURATION: 97 % | HEART RATE: 92 BPM | BODY MASS INDEX: 27.94 KG/M2

## 2019-01-25 DIAGNOSIS — D47.2 SMOLDERING MULTIPLE MYELOMA: Primary | ICD-10-CM

## 2019-01-25 LAB
ALBUMIN SERPL ELPH-MCNC: 4.16 G/DL
ALPHA1 GLOB SERPL ELPH-MCNC: 0.27 G/DL
ALPHA2 GLOB SERPL ELPH-MCNC: 0.81 G/DL
B-GLOBULIN SERPL ELPH-MCNC: 2.44 G/DL
GAMMA GLOB SERPL ELPH-MCNC: 0.52 G/DL
INTERPRETATION SERPL IFE-IMP: NORMAL
KAPPA LC SER QL IA: 6.4 MG/DL
KAPPA LC/LAMBDA SER IA: 3.72
LAMBDA LC SER QL IA: 1.72 MG/DL
PATHOLOGIST INTERPRETATION IFE: NORMAL
PATHOLOGIST INTERPRETATION SPE: NORMAL
PROT SERPL-MCNC: 8.2 G/DL

## 2019-01-25 PROCEDURE — 99999 PR PBB SHADOW E&M-EST. PATIENT-LVL III: ICD-10-PCS | Mod: PBBFAC,,, | Performed by: INTERNAL MEDICINE

## 2019-01-25 PROCEDURE — 99999 PR PBB SHADOW E&M-EST. PATIENT-LVL III: CPT | Mod: PBBFAC,,, | Performed by: INTERNAL MEDICINE

## 2019-01-25 PROCEDURE — 99215 PR OFFICE/OUTPT VISIT, EST, LEVL V, 40-54 MIN: ICD-10-PCS | Mod: Q1,S$GLB,, | Performed by: INTERNAL MEDICINE

## 2019-01-25 PROCEDURE — 99215 OFFICE O/P EST HI 40 MIN: CPT | Mod: Q1,S$GLB,, | Performed by: INTERNAL MEDICINE

## 2019-01-25 NOTE — PROGRESS NOTES
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   Sponsor:  [facility]  IRB# [id number]  Study ID: [id number]  Investigator: GIL Engel  Pt Initials: LUCÍA LORENZ       Arm B: Observation  Cycle 37, Day 28       Patient presented to evaluate for ability to proceed with above-mentioned protocol.  He presents about two weeks late as he states he forgot about his 1/10 appt with Dr. Engel and her schedule could not accommodate his visit until today.  States no new issues. He denies new potential CRAB symptoms.  He states continued willingness to participate in above-mentioned study.     Review of Baseline AE's:    *Please note this list is not all-inclusive, please see AE log for physician reviewed list of adverse events.   1. Hypertension, grade 1:142/73 today.  AE ongoing  2. Lower Back Pain and Neck Pain, grade 1: Patient has no complaints of this month. As previously stated, patient is not suspected to have bony myeloma involvement per Dr. Engel as these are pre-existing issues present at baseline.  AE ongoing.  3. Pain in extremity (knee), grade 1.  Continues to wear knee stabilizing braces, recently s/p injections to the knee.  AE ongoing   4. Peripheral sensory neuropathy, grade 1: Patient does not verbalize complaints of this today. Has gabapentin and takes as needed.  "Haven't taken that one in a while" per patient. AE ongoing.   5. Anemia, Grade 1: Hgb/Hct -11.3/34.4.  Result reviewed by Dr. Engel.   AE ongoing                 Review of AE's:     *Please note this list is not all-inclusive, please see AE log for physician reviewed list of adverse events.   1. Creatinine increased, grade 2: Serum creatinine is at 1.6 mg/dl today and GFR 52.24.  Reviewed per Dr. Engel.   As previously stated, Dr. Engel states this has not been related to myeloma and is related chronic kidney issues likely " "secondary to diabetes and hypertension.  Will continue observation monthly and to monitor renal function closely. Per MD, encouraged to increase water intake. AE worsened from baseline.      Per study chair, "the definition of progressive disease for this protocol is developing CRAB criteria that requires treatment systemically." Per Dr Engel, based on today's exam and lab results thus far, patient does not have any s/s of CRAB: Normal Calcium level (10.0), absence of Renal insufficiency (serum creatinine 1.6, and GFR 52.24 per Cockroft-Gault--patient with a history of kidney dysfunction secondary to diabetes; Dr. Engel aware of these values and not concerned for myeloma involvement), absence of significant Anemia (hemoglobin 11.5, has decreased from baseline; will await myeloma labs for clarity relationship to myeloma) and absence of lytic Bone lesions (per baseline metastatic survey as well as MRI--see MD note for further comment). See MD note for ECOG score and H&P and flowsheets for laboratory work, vitals, etc. All myeloma-related blood work from last cycle including SPEP and JAGUAR have remained stable, and are currently pending for this cycle. Per Dr. Engel, patient shows no evidence of progression at last result. Patient informed that Dr. Engel will release all lab results to [facility]. He states understanding of this. QOL's not required at this timepoint.         Discussed next month's appointments with patient; he states having a conflict as he will be out of town at next scheduled visit.  He states he will be back in town on 2/28, appts scheduled for 7AM labs and 730 MD visit.   Will continue to schedule patient as far out as possible, which seems to help maintain compliance with visits.    Patient states having research RN's contact information and has MD contact information to call with any concerns, questions, or worsening of symptoms; he verbalized understanding.        "

## 2019-01-27 NOTE — PROGRESS NOTES
Subjective:       Patient ID: Emerson Menendez is a 73 y.o. male.    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 Test results identify a stable IgA kappa protein with beta globulin band at 1.63g/dL. Kappa free light chain is elevated at 4.40. CBC and calcium are stable. Creatinine with history of stage III CKD is stable at 1.4. Metastatic survey is negative for lytic lesions. MRI of the entire spine demonstrated age related changes but no convincing evidence of myeloma bone disease. Bone marrow biopsy identified about 13% plasma cells by morphology and FISH for myeloma identified a t(11;14) and trisomies 3,7, and 17. The patient is afebrile and appears clinically well. He was seen by his podiatrist and nephrologist since our last visit without any new or acute events.    The patient has not received any therapy for smoldering myeloma including bisphosphonates or steroids. He has been randomized to observation arm of the the clinical study. 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 He has no current or prior history of malignancy.    TODAY  Mr. Menendez returns today for monthly follow-up evaluation. No clinical signs/symptoms of progression of his smoldering myeloma.       HPI  Review of Systems   Constitutional: Negative for activity change, appetite change, diaphoresis, fatigue, fever and unexpected weight change.   HENT: Negative.    Eyes: Negative.    Respiratory: Negative.    Cardiovascular: Negative for leg swelling.   Gastrointestinal: Negative.    Endocrine: Negative.    Genitourinary: Negative.    Musculoskeletal: Negative.     Skin: Negative.    Allergic/Immunologic: Negative.    Neurological:        Grade 0-1 peripheral neuropathy of bilateral feet.    Hematological: Negative for adenopathy. Does not bruise/bleed easily.   Psychiatric/Behavioral: Negative.        Objective:       Vitals:    01/25/19 0744   BP: (!) 142/73   Pulse: 92   Resp: 20   Temp: 98.3 °F (36.8 °C)       Physical Exam   Constitutional: He is oriented to person, place, and time. He appears well-developed and well-nourished.   HENT:   Head: Normocephalic and atraumatic.   Right Ear: External ear normal.   Left Ear: External ear normal.   Nose: Nose normal.   Mouth/Throat: Oropharynx is clear and moist.   Eyes: Conjunctivae and EOM are normal. Pupils are equal, round, and reactive to light.   Neck: Normal range of motion. Neck supple.   Cardiovascular: Normal rate, regular rhythm and intact distal pulses.   No murmur heard.  Pulmonary/Chest: Effort normal and breath sounds normal. No respiratory distress.   Abdominal: Soft. Bowel sounds are normal. He exhibits no distension. There is no hepatosplenomegaly.   Musculoskeletal: He exhibits no edema or tenderness.   Neurological: He is alert and oriented to person, place, and time. No cranial nerve deficit.   Skin: Skin is warm and dry. No rash noted. No cyanosis. Nails show no clubbing.   Psychiatric: He has a normal mood and affect. His behavior is normal.   Nursing note and vitals reviewed.      Assessment:       1. Smoldering multiple myeloma        Plan:       The patient has a diagnosis of smoldering myeloma. There is no indication for immediate chemotherapy. We are monitoring renal function closely- baseline creatinine of -1.5 is stable at 1.6 today. 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 CBC and CMP are stable.  Complete biochemical studies from today are pending.  Plan for  return in 1 month.  Endocrinology and Nephrology to monitor diabetes and renal failure respectively.

## 2019-01-31 ENCOUNTER — TELEPHONE (OUTPATIENT)
Dept: INTERNAL MEDICINE | Facility: CLINIC | Age: 74
End: 2019-01-31

## 2019-01-31 DIAGNOSIS — D47.2 SMOLDERING MULTIPLE MYELOMA: ICD-10-CM

## 2019-01-31 DIAGNOSIS — E13.9 DIABETES MELLITUS DUE TO ABNORMAL INSULIN: Primary | ICD-10-CM

## 2019-01-31 DIAGNOSIS — N52.01 ERECTILE DYSFUNCTION DUE TO ARTERIAL INSUFFICIENCY: ICD-10-CM

## 2019-01-31 RX ORDER — OMEPRAZOLE 20 MG/1
40 CAPSULE, DELAYED RELEASE ORAL DAILY
Qty: 180 CAPSULE | Refills: 3 | Status: SHIPPED | OUTPATIENT
Start: 2019-01-31 | End: 2019-08-13 | Stop reason: SDUPTHER

## 2019-02-01 RX ORDER — TADALAFIL 5 MG/1
5 TABLET ORAL DAILY PRN
Qty: 30 TABLET | Refills: 4 | Status: SHIPPED | OUTPATIENT
Start: 2019-02-01 | End: 2019-08-02 | Stop reason: SDUPTHER

## 2019-02-01 RX ORDER — DIAZEPAM 5 MG/1
5 TABLET ORAL EVERY 6 HOURS PRN
Qty: 60 TABLET | Refills: 1 | Status: SHIPPED | OUTPATIENT
Start: 2019-02-01 | End: 2019-08-13

## 2019-02-01 NOTE — TELEPHONE ENCOUNTER
----- Message from Lisa Schmidt sent at 2/1/2019  3:19 PM CST -----  Contact: PT Portal Request  Appointment Request From: Emerson Menendez    With Provider: Roberta Sagastume MD [[facility] - Internal Medicine]    Preferred Date Range: 2/1/2019 - 2/16/2019    Preferred Times: Monday Morning, Tuesday Morning, Wednesday Morning, Thursday Morning, Friday Morning    Reason for visit: Diabetic educator    Comments:  Time to revisit

## 2019-02-27 ENCOUNTER — PATIENT MESSAGE (OUTPATIENT)
Dept: OPHTHALMOLOGY | Facility: CLINIC | Age: 74
End: 2019-02-27

## 2019-02-28 ENCOUNTER — OFFICE VISIT (OUTPATIENT)
Dept: HEMATOLOGY/ONCOLOGY | Facility: CLINIC | Age: 74
End: 2019-02-28
Payer: MEDICARE

## 2019-02-28 ENCOUNTER — RESEARCH ENCOUNTER (OUTPATIENT)
Dept: RESEARCH | Facility: HOSPITAL | Age: 74
End: 2019-02-28

## 2019-02-28 ENCOUNTER — LAB VISIT (OUTPATIENT)
Dept: LAB | Facility: HOSPITAL | Age: 74
End: 2019-02-28
Attending: INTERNAL MEDICINE
Payer: MEDICARE

## 2019-02-28 VITALS
OXYGEN SATURATION: 98 % | TEMPERATURE: 98 F | HEIGHT: 70 IN | BODY MASS INDEX: 27.94 KG/M2 | SYSTOLIC BLOOD PRESSURE: 139 MMHG | RESPIRATION RATE: 20 BRPM | WEIGHT: 195.13 LBS | DIASTOLIC BLOOD PRESSURE: 77 MMHG | HEART RATE: 90 BPM

## 2019-02-28 DIAGNOSIS — E11.22 CKD STAGE 3 DUE TO TYPE 2 DIABETES MELLITUS: ICD-10-CM

## 2019-02-28 DIAGNOSIS — E11.42 TYPE 2 DIABETES MELLITUS WITH DIABETIC POLYNEUROPATHY, WITH LONG-TERM CURRENT USE OF INSULIN: ICD-10-CM

## 2019-02-28 DIAGNOSIS — D47.2 SMOLDERING MULTIPLE MYELOMA: Primary | ICD-10-CM

## 2019-02-28 DIAGNOSIS — Z79.4 TYPE 2 DIABETES MELLITUS WITH DIABETIC POLYNEUROPATHY, WITH LONG-TERM CURRENT USE OF INSULIN: ICD-10-CM

## 2019-02-28 DIAGNOSIS — Z00.6 EXAMINATION OF PARTICIPANT IN CLINICAL TRIAL: ICD-10-CM

## 2019-02-28 DIAGNOSIS — N18.30 CKD STAGE 3 DUE TO TYPE 2 DIABETES MELLITUS: ICD-10-CM

## 2019-02-28 DIAGNOSIS — C90.00 MULTIPLE MYELOMA, REMISSION STATUS UNSPECIFIED: ICD-10-CM

## 2019-02-28 LAB
ALBUMIN SERPL BCP-MCNC: 3.7 G/DL
ALP SERPL-CCNC: 44 U/L
ALT SERPL W/O P-5'-P-CCNC: 30 U/L
ANION GAP SERPL CALC-SCNC: 9 MMOL/L
AST SERPL-CCNC: 32 U/L
BASOPHILS # BLD AUTO: 0.04 K/UL
BASOPHILS NFR BLD: 0.8 %
BILIRUB SERPL-MCNC: 1 MG/DL
BUN SERPL-MCNC: 24 MG/DL
CALCIUM SERPL-MCNC: 9.8 MG/DL
CHLORIDE SERPL-SCNC: 102 MMOL/L
CO2 SERPL-SCNC: 27 MMOL/L
CREAT SERPL-MCNC: 1.9 MG/DL
DIFFERENTIAL METHOD: ABNORMAL
EOSINOPHIL # BLD AUTO: 0.3 K/UL
EOSINOPHIL NFR BLD: 5.8 %
ERYTHROCYTE [DISTWIDTH] IN BLOOD BY AUTOMATED COUNT: 14.3 %
EST. GFR  (AFRICAN AMERICAN): 39.8 ML/MIN/1.73 M^2
EST. GFR  (NON AFRICAN AMERICAN): 34.5 ML/MIN/1.73 M^2
GLUCOSE SERPL-MCNC: 155 MG/DL
HCT VFR BLD AUTO: 35.7 %
HGB BLD-MCNC: 11.6 G/DL
IGA SERPL-MCNC: 1603 MG/DL
IGG SERPL-MCNC: 867 MG/DL
IGM SERPL-MCNC: 37 MG/DL
IMM GRANULOCYTES # BLD AUTO: 0.01 K/UL
IMM GRANULOCYTES NFR BLD AUTO: 0.2 %
LYMPHOCYTES # BLD AUTO: 1.9 K/UL
LYMPHOCYTES NFR BLD: 39.4 %
MAGNESIUM SERPL-MCNC: 1.9 MG/DL
MCH RBC QN AUTO: 28.7 PG
MCHC RBC AUTO-ENTMCNC: 32.5 G/DL
MCV RBC AUTO: 88 FL
MONOCYTES # BLD AUTO: 0.4 K/UL
MONOCYTES NFR BLD: 8.1 %
NEUTROPHILS # BLD AUTO: 2.2 K/UL
NEUTROPHILS NFR BLD: 45.7 %
NRBC BLD-RTO: 0 /100 WBC
PHOSPHATE SERPL-MCNC: 3.9 MG/DL
PLATELET # BLD AUTO: 222 K/UL
PMV BLD AUTO: 8.6 FL
POTASSIUM SERPL-SCNC: 4.6 MMOL/L
PROT SERPL-MCNC: 8 G/DL
RBC # BLD AUTO: 4.04 M/UL
SODIUM SERPL-SCNC: 138 MMOL/L
WBC # BLD AUTO: 4.82 K/UL

## 2019-02-28 PROCEDURE — 99999 PR PBB SHADOW E&M-EST. PATIENT-LVL III: ICD-10-PCS | Mod: PBBFAC,,, | Performed by: INTERNAL MEDICINE

## 2019-02-28 PROCEDURE — 84165 PROTEIN E-PHORESIS SERUM: CPT | Mod: Q1

## 2019-02-28 PROCEDURE — 83735 ASSAY OF MAGNESIUM: CPT | Mod: Q1

## 2019-02-28 PROCEDURE — 82784 ASSAY IGA/IGD/IGG/IGM EACH: CPT | Mod: 59,Q1

## 2019-02-28 PROCEDURE — 80053 COMPREHEN METABOLIC PANEL: CPT

## 2019-02-28 PROCEDURE — 86334 PATHOLOGIST INTERPRETATION IFE: ICD-10-PCS | Mod: 26,Q1,, | Performed by: PATHOLOGY

## 2019-02-28 PROCEDURE — 86334 IMMUNOFIX E-PHORESIS SERUM: CPT | Mod: 26,Q1,, | Performed by: PATHOLOGY

## 2019-02-28 PROCEDURE — 99215 OFFICE O/P EST HI 40 MIN: CPT | Mod: Q1,S$GLB,, | Performed by: INTERNAL MEDICINE

## 2019-02-28 PROCEDURE — 36415 COLL VENOUS BLD VENIPUNCTURE: CPT

## 2019-02-28 PROCEDURE — 84165 PROTEIN E-PHORESIS SERUM: CPT | Mod: 26,Q1,, | Performed by: PATHOLOGY

## 2019-02-28 PROCEDURE — 86334 IMMUNOFIX E-PHORESIS SERUM: CPT

## 2019-02-28 PROCEDURE — 84100 ASSAY OF PHOSPHORUS: CPT | Mod: Q1

## 2019-02-28 PROCEDURE — 83520 IMMUNOASSAY QUANT NOS NONAB: CPT

## 2019-02-28 PROCEDURE — 99215 PR OFFICE/OUTPT VISIT, EST, LEVL V, 40-54 MIN: ICD-10-PCS | Mod: Q1,S$GLB,, | Performed by: INTERNAL MEDICINE

## 2019-02-28 PROCEDURE — 84165 PATHOLOGIST INTERPRETATION SPE: ICD-10-PCS | Mod: 26,Q1,, | Performed by: PATHOLOGY

## 2019-02-28 PROCEDURE — 85025 COMPLETE CBC W/AUTO DIFF WBC: CPT | Mod: Q1

## 2019-02-28 PROCEDURE — 99999 PR PBB SHADOW E&M-EST. PATIENT-LVL III: CPT | Mod: PBBFAC,,, | Performed by: INTERNAL MEDICINE

## 2019-02-28 NOTE — PROGRESS NOTES
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   Sponsor:  [facility]  IRB# [id number]  Study ID: [id number]  Investigator: GIL Engel  Pt Initials: LUCÍA LORENZ       Arm B: Observation  Cycle 38, Day 28       Patient presented to evaluate for ability to proceed with above-mentioned protocol.  He presents about a week late due to being out of town and inavailability of Dr. Engel on his scheduled visit date.  States no new issues. He denies new potential CRAB symptoms.  He states continued willingness to participate in above-mentioned study.     Review of Baseline AE's:    *Please note this list is not all-inclusive, please see AE log for physician reviewed list of adverse events.   1. Hypertension, grade 1:139/77 today.  AE ongoing  2. Lower Back Pain and Neck Pain, grade 1: Patient has no complaints of this month. Worked with PT and helps with range of motion.  As previously stated, patient is not suspected to have bony myeloma involvement per Dr. Engel as these are pre-existing issues present at baseline.  AE ongoing.  3. Pain in extremity (knee), grade 1.  Continues to wear knee stabilizing braces,  s/p "gel" injections to the knee about 3 months ago.  AE ongoing   4. Peripheral sensory neuropathy, grade 1: Patient does not verbalize complaints of this today. Has gabapentin and takes as needed.  "Haven't taken that one in a while" per patient. AE ongoing.   5. Anemia, Grade 1: Hgb/Hct -11.6/35.7.  Result reviewed by Dr. Engel.   AE ongoing                 Review of AE's:     *Please note this list is not all-inclusive, please see AE log for physician reviewed list of adverse events.   1. Creatinine increased, grade 2: Serum creatinine is increased at 1.9 mg/dl today and GFR 43.99.  Per Dr. Engel, they patient is likely not drinking enough and was encouraged to hydrate to improve this. As " "previously stated, Dr. Engel states this has not been related to myeloma and is related chronic kidney issues likely secondary to diabetes and hypertension.  Will continue observation monthly and to monitor renal function closely. Per MD, encouraged to increase water intake. AE worsened from baseline.      Per study chair, "the definition of progressive disease for this protocol is developing CRAB criteria that requires treatment systemically." Per Dr Engel, based on today's exam and lab results thus far, patient does not have any s/s of CRAB: Normal Calcium level (9.8), absence of Renal insufficiency (serum creatinine 1.9, and GFR 43.99 per Cockroft-Gault (thought to be r/t dehydration)--patient with a history of kidney dysfunction secondary to diabetes; Dr. Engel aware of these values and not concerned for myeloma involvement), absence of significant Anemia (hemoglobin 11.6, has decreased from baseline; will await myeloma labs for clarity relationship to myeloma) and absence of lytic Bone lesions (per baseline metastatic survey as well as MRI--see MD note for further comment). See MD note for ECOG score and H&P and flowsheets for laboratory work, vitals, etc. All myeloma-related blood work from last cycle including SPEP and JAGUAR have remained stable, and are currently pending for this cycle. Per Dr. Engel, patient shows no evidence of progression at last result. Patient informed that Dr. Engel will release all lab results to [facility]. He states understanding of this. QOL's not required at this timepoint.         Patient has f/u scheduled for next several months.  Patient was informed to review appointments and alert us of any conflict.   Will continue to schedule patient as far out as possible, which seems to help maintain compliance with visits.    Patient states having research RN's contact information and has MD contact information to call with any concerns, questions, or worsening of symptoms; he verbalized " understanding.

## 2019-02-28 NOTE — PROGRESS NOTES
Subjective:       Patient ID: Emerson Menendez is a 73 y.o. male.    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 Test results identify a stable IgA kappa protein with beta globulin band at 1.63g/dL. Kappa free light chain is elevated at 4.40. CBC and calcium are stable. Creatinine with history of stage III CKD is stable at 1.4. Metastatic survey is negative for lytic lesions. MRI of the entire spine demonstrated age related changes but no convincing evidence of myeloma bone disease. Bone marrow biopsy identified about 13% plasma cells by morphology and FISH for myeloma identified a t(11;14) and trisomies 3,7, and 17. The patient is afebrile and appears clinically well. He was seen by his podiatrist and nephrologist since our last visit without any new or acute events.    The patient has not received any therapy for smoldering myeloma including bisphosphonates or steroids. He has been randomized to observation arm of the the clinical study. 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 He has no current or prior history of malignancy.    TODAY  Mr. Menendez returns today for monthly follow-up evaluation. No clinical signs/symptoms of progression of his smoldering myeloma.       HPI  Review of Systems   Constitutional: Negative for activity change, appetite change, diaphoresis, fatigue, fever and unexpected weight change.   HENT: Negative.    Eyes: Negative.    Respiratory: Negative.    Cardiovascular: Negative for leg swelling.   Gastrointestinal: Negative.    Endocrine: Negative.    Genitourinary: Negative.    Musculoskeletal: Negative.     Skin: Negative.    Allergic/Immunologic: Negative.    Neurological:        Grade 0-1 peripheral neuropathy of bilateral feet.    Hematological: Negative for adenopathy. Does not bruise/bleed easily.   Psychiatric/Behavioral: Negative.        Objective:       Vitals:    02/28/19 0715   BP: 139/77   Pulse: 90   Resp: 20   Temp: 98.4 °F (36.9 °C)       Physical Exam   Constitutional: He is oriented to person, place, and time. He appears well-developed and well-nourished.   HENT:   Head: Normocephalic and atraumatic.   Right Ear: External ear normal.   Left Ear: External ear normal.   Nose: Nose normal.   Mouth/Throat: Oropharynx is clear and moist.   Eyes: Conjunctivae and EOM are normal. Pupils are equal, round, and reactive to light.   Neck: Normal range of motion. Neck supple.   Cardiovascular: Normal rate, regular rhythm and intact distal pulses.   No murmur heard.  Pulmonary/Chest: Effort normal and breath sounds normal. No respiratory distress.   Abdominal: Soft. Bowel sounds are normal. He exhibits no distension. There is no hepatosplenomegaly.   Musculoskeletal: He exhibits no edema or tenderness.   Neurological: He is alert and oriented to person, place, and time. No cranial nerve deficit.   Skin: Skin is warm and dry. No rash noted. No cyanosis. Nails show no clubbing.   Psychiatric: He has a normal mood and affect. His behavior is normal.   Nursing note and vitals reviewed.      Assessment:       No diagnosis found.    Plan:       The patient has a diagnosis of smoldering myeloma. There is no indication for immediate chemotherapy. We are monitoring renal function closely- baseline creatinine of -1.5 is increased at 1.9 today. Recommend increased oral hydration- repeat in 1 month.  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 CBC and CMP are overall stable.  Complete  biochemical studies from today are pending.  Plan for return in 1 month.  Endocrinology and Nephrology to monitor diabetes and renal failure respectively.

## 2019-03-01 LAB
ALBUMIN SERPL ELPH-MCNC: 4.08 G/DL
ALPHA1 GLOB SERPL ELPH-MCNC: 0.25 G/DL
ALPHA2 GLOB SERPL ELPH-MCNC: 0.77 G/DL
B-GLOBULIN SERPL ELPH-MCNC: 2.49 G/DL
GAMMA GLOB SERPL ELPH-MCNC: 0.5 G/DL
INTERPRETATION SERPL IFE-IMP: NORMAL
KAPPA LC SER QL IA: 6.13 MG/DL
KAPPA LC/LAMBDA SER IA: 3.3
LAMBDA LC SER QL IA: 1.86 MG/DL
PATHOLOGIST INTERPRETATION IFE: NORMAL
PATHOLOGIST INTERPRETATION SPE: NORMAL
PROT SERPL-MCNC: 8.1 G/DL

## 2019-03-20 ENCOUNTER — TELEPHONE (OUTPATIENT)
Dept: SPORTS MEDICINE | Facility: CLINIC | Age: 74
End: 2019-03-20

## 2019-03-21 ENCOUNTER — TELEPHONE (OUTPATIENT)
Dept: SPORTS MEDICINE | Facility: CLINIC | Age: 74
End: 2019-03-21

## 2019-03-22 DIAGNOSIS — E13.9 DIABETES MELLITUS DUE TO ABNORMAL INSULIN: ICD-10-CM

## 2019-03-22 RX ORDER — BLOOD SUGAR DIAGNOSTIC
STRIP MISCELLANEOUS
Qty: 400 STRIP | Refills: 3 | Status: SHIPPED | OUTPATIENT
Start: 2019-03-22 | End: 2019-04-16 | Stop reason: SDUPTHER

## 2019-03-22 RX ORDER — PEN NEEDLE, DIABETIC 31 GX5/16"
NEEDLE, DISPOSABLE MISCELLANEOUS
Qty: 100 EACH | Refills: 3 | Status: SHIPPED | OUTPATIENT
Start: 2019-03-22 | End: 2020-03-06 | Stop reason: SDUPTHER

## 2019-03-22 RX ORDER — GLIMEPIRIDE 2 MG/1
2 TABLET ORAL DAILY
Qty: 90 TABLET | Refills: 4 | Status: SHIPPED | OUTPATIENT
Start: 2019-03-22 | End: 2020-04-22 | Stop reason: SDUPTHER

## 2019-03-27 ENCOUNTER — PATIENT MESSAGE (OUTPATIENT)
Dept: INTERNAL MEDICINE | Facility: CLINIC | Age: 74
End: 2019-03-27

## 2019-03-27 DIAGNOSIS — D47.2 SMOLDERING MULTIPLE MYELOMA: ICD-10-CM

## 2019-03-28 RX ORDER — DIAZEPAM 5 MG/1
TABLET ORAL
Qty: 60 TABLET | Refills: 1 | Status: SHIPPED | OUTPATIENT
Start: 2019-03-28 | End: 2019-09-17

## 2019-03-29 ENCOUNTER — OFFICE VISIT (OUTPATIENT)
Dept: HEMATOLOGY/ONCOLOGY | Facility: CLINIC | Age: 74
End: 2019-03-29
Payer: MEDICARE

## 2019-03-29 ENCOUNTER — PATIENT MESSAGE (OUTPATIENT)
Dept: SPORTS MEDICINE | Facility: CLINIC | Age: 74
End: 2019-03-29

## 2019-03-29 ENCOUNTER — LAB VISIT (OUTPATIENT)
Dept: LAB | Facility: HOSPITAL | Age: 74
End: 2019-03-29
Attending: INTERNAL MEDICINE
Payer: MEDICARE

## 2019-03-29 ENCOUNTER — RESEARCH ENCOUNTER (OUTPATIENT)
Dept: RESEARCH | Facility: HOSPITAL | Age: 74
End: 2019-03-29

## 2019-03-29 VITALS
BODY MASS INDEX: 27.27 KG/M2 | WEIGHT: 190.5 LBS | OXYGEN SATURATION: 97 % | HEIGHT: 70 IN | DIASTOLIC BLOOD PRESSURE: 79 MMHG | SYSTOLIC BLOOD PRESSURE: 139 MMHG | HEART RATE: 91 BPM | TEMPERATURE: 98 F | RESPIRATION RATE: 20 BRPM

## 2019-03-29 DIAGNOSIS — Z00.6 EXAMINATION OF PARTICIPANT IN CLINICAL TRIAL: ICD-10-CM

## 2019-03-29 DIAGNOSIS — E11.22 CKD STAGE 3 DUE TO TYPE 2 DIABETES MELLITUS: ICD-10-CM

## 2019-03-29 DIAGNOSIS — E11.42 TYPE 2 DIABETES MELLITUS WITH DIABETIC POLYNEUROPATHY, WITH LONG-TERM CURRENT USE OF INSULIN: ICD-10-CM

## 2019-03-29 DIAGNOSIS — Z79.4 TYPE 2 DIABETES MELLITUS WITH DIABETIC POLYNEUROPATHY, WITH LONG-TERM CURRENT USE OF INSULIN: ICD-10-CM

## 2019-03-29 DIAGNOSIS — M79.671 FOOT PAIN, RIGHT: ICD-10-CM

## 2019-03-29 DIAGNOSIS — D47.2 SMOLDERING MULTIPLE MYELOMA: Primary | ICD-10-CM

## 2019-03-29 DIAGNOSIS — C90.00 MULTIPLE MYELOMA, REMISSION STATUS UNSPECIFIED: ICD-10-CM

## 2019-03-29 DIAGNOSIS — N18.30 CKD STAGE 3 DUE TO TYPE 2 DIABETES MELLITUS: ICD-10-CM

## 2019-03-29 LAB
ALBUMIN SERPL BCP-MCNC: 3.5 G/DL (ref 3.5–5.2)
ALP SERPL-CCNC: 52 U/L (ref 55–135)
ALT SERPL W/O P-5'-P-CCNC: 23 U/L (ref 10–44)
ANION GAP SERPL CALC-SCNC: 12 MMOL/L (ref 8–16)
AST SERPL-CCNC: 24 U/L (ref 10–40)
BASOPHILS # BLD AUTO: 0.06 K/UL (ref 0–0.2)
BASOPHILS NFR BLD: 1.1 % (ref 0–1.9)
BILIRUB SERPL-MCNC: 0.4 MG/DL (ref 0.1–1)
BUN SERPL-MCNC: 25 MG/DL (ref 8–23)
CALCIUM SERPL-MCNC: 9.5 MG/DL (ref 8.7–10.5)
CHLORIDE SERPL-SCNC: 101 MMOL/L (ref 95–110)
CO2 SERPL-SCNC: 24 MMOL/L (ref 23–29)
CREAT SERPL-MCNC: 1.8 MG/DL (ref 0.5–1.4)
DIFFERENTIAL METHOD: ABNORMAL
EOSINOPHIL # BLD AUTO: 0.3 K/UL (ref 0–0.5)
EOSINOPHIL NFR BLD: 5.1 % (ref 0–8)
ERYTHROCYTE [DISTWIDTH] IN BLOOD BY AUTOMATED COUNT: 14 % (ref 11.5–14.5)
EST. GFR  (AFRICAN AMERICAN): 42.5 ML/MIN/1.73 M^2
EST. GFR  (NON AFRICAN AMERICAN): 36.8 ML/MIN/1.73 M^2
GLUCOSE SERPL-MCNC: 144 MG/DL (ref 70–110)
HCT VFR BLD AUTO: 38.5 % (ref 40–54)
HGB BLD-MCNC: 12.4 G/DL (ref 14–18)
IGA SERPL-MCNC: 1600 MG/DL (ref 40–350)
IGG SERPL-MCNC: 928 MG/DL (ref 650–1600)
IGM SERPL-MCNC: 47 MG/DL (ref 50–300)
IMM GRANULOCYTES # BLD AUTO: 0 K/UL (ref 0–0.04)
IMM GRANULOCYTES NFR BLD AUTO: 0 % (ref 0–0.5)
LYMPHOCYTES # BLD AUTO: 2.1 K/UL (ref 1–4.8)
LYMPHOCYTES NFR BLD: 36.8 % (ref 18–48)
MAGNESIUM SERPL-MCNC: 2.3 MG/DL (ref 1.6–2.6)
MCH RBC QN AUTO: 29.2 PG (ref 27–31)
MCHC RBC AUTO-ENTMCNC: 32.2 G/DL (ref 32–36)
MCV RBC AUTO: 91 FL (ref 82–98)
MONOCYTES # BLD AUTO: 0.4 K/UL (ref 0.3–1)
MONOCYTES NFR BLD: 7.7 % (ref 4–15)
NEUTROPHILS # BLD AUTO: 2.8 K/UL (ref 1.8–7.7)
NEUTROPHILS NFR BLD: 49.3 % (ref 38–73)
NRBC BLD-RTO: 0 /100 WBC
PHOSPHATE SERPL-MCNC: 3.6 MG/DL (ref 2.7–4.5)
PLATELET # BLD AUTO: 281 K/UL (ref 150–350)
PMV BLD AUTO: 9.3 FL (ref 9.2–12.9)
POTASSIUM SERPL-SCNC: 4.9 MMOL/L (ref 3.5–5.1)
PROT SERPL-MCNC: 8.5 G/DL (ref 6–8.4)
RBC # BLD AUTO: 4.25 M/UL (ref 4.6–6.2)
SODIUM SERPL-SCNC: 137 MMOL/L (ref 136–145)
WBC # BLD AUTO: 5.7 K/UL (ref 3.9–12.7)

## 2019-03-29 PROCEDURE — 84165 PROTEIN E-PHORESIS SERUM: CPT | Mod: Q1

## 2019-03-29 PROCEDURE — 36415 COLL VENOUS BLD VENIPUNCTURE: CPT

## 2019-03-29 PROCEDURE — 83520 IMMUNOASSAY QUANT NOS NONAB: CPT | Mod: 59

## 2019-03-29 PROCEDURE — 80053 COMPREHEN METABOLIC PANEL: CPT

## 2019-03-29 PROCEDURE — 84165 PATHOLOGIST INTERPRETATION SPE: ICD-10-PCS | Mod: 26,Q1,, | Performed by: PATHOLOGY

## 2019-03-29 PROCEDURE — 85025 COMPLETE CBC W/AUTO DIFF WBC: CPT

## 2019-03-29 PROCEDURE — 99215 OFFICE O/P EST HI 40 MIN: CPT | Mod: Q1,S$GLB,, | Performed by: INTERNAL MEDICINE

## 2019-03-29 PROCEDURE — 83735 ASSAY OF MAGNESIUM: CPT | Mod: Q1

## 2019-03-29 PROCEDURE — 84165 PROTEIN E-PHORESIS SERUM: CPT | Mod: 26,Q1,, | Performed by: PATHOLOGY

## 2019-03-29 PROCEDURE — 86334 IMMUNOFIX E-PHORESIS SERUM: CPT | Mod: 26,Q1,, | Performed by: PATHOLOGY

## 2019-03-29 PROCEDURE — 82784 ASSAY IGA/IGD/IGG/IGM EACH: CPT | Mod: 59

## 2019-03-29 PROCEDURE — 99999 PR PBB SHADOW E&M-EST. PATIENT-LVL III: CPT | Mod: PBBFAC,,, | Performed by: INTERNAL MEDICINE

## 2019-03-29 PROCEDURE — 86334 PATHOLOGIST INTERPRETATION IFE: ICD-10-PCS | Mod: 26,Q1,, | Performed by: PATHOLOGY

## 2019-03-29 PROCEDURE — 84100 ASSAY OF PHOSPHORUS: CPT

## 2019-03-29 PROCEDURE — 86334 IMMUNOFIX E-PHORESIS SERUM: CPT

## 2019-03-29 PROCEDURE — 99215 PR OFFICE/OUTPT VISIT, EST, LEVL V, 40-54 MIN: ICD-10-PCS | Mod: Q1,S$GLB,, | Performed by: INTERNAL MEDICINE

## 2019-03-29 PROCEDURE — 99999 PR PBB SHADOW E&M-EST. PATIENT-LVL III: ICD-10-PCS | Mod: PBBFAC,,, | Performed by: INTERNAL MEDICINE

## 2019-03-29 NOTE — PROGRESS NOTES
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   Sponsor:  [facility]  IRB# [id number]  Study ID: [id number]  Investigator: GIL Engel Pt Initials: LUCÍA LORENZ       Arm B: Observation  Cycle 39, Day 28       Patient presented to evaluate for ability to proceed with above-mentioned protocol.  He presents one day late due to scheduling conflicts.  States no new issues. States he has been exercising more.  He states having a period recently where he was more fatigued than usual, but attributes this to increased activity.  He denies other new potential CRAB symptoms.  He states continued willingness to participate in above-mentioned study.     Review of Baseline AE's:    *Please note this list is not all-inclusive, please see AE log for physician reviewed list of adverse events.   1. Hypertension, grade 1:139/79 today.  AE ongoing  2. Lower Back Pain and Neck Pain, grade 1: Patient has no complaints of this month. Worked with PT and helps with range of motion.  As previously stated, patient is not suspected to have bony myeloma involvement per Dr. Engel as these are pre-existing issues present at baseline.  AE ongoing.  3. Pain in extremity (knee), grade 1.  Continues to wear knee stabilizing braces,  Has planned "gel" injections to the knee in a few weeks.  AE ongoing   4. Peripheral sensory neuropathy, grade 1: Patient does not verbalize complaints of this today. Has gabapentin and takes as needed.  "Haven't taken that one in a while" per patient. AE ongoing.   5. Anemia, Grade 1: Hgb/Hct -12.4/38.5.  Result reviewed by Dr. Engel.   AE ongoing                 Review of AE's:     *Please note this list is not all-inclusive, please see AE log for physician reviewed list of adverse events.   1. Creatinine increased, grade 2: Serum creatinine is increased at 1.8 mg/dl today and GFR 45.33.  This is " "slightly improved from last cycle. As previously stated, Dr. Engel states this has not been related to myeloma and is related chronic kidney issues likely secondary to diabetes and hypertension.  Will continue observation monthly and to monitor renal function closely. Per MD, encouraged to increase water intake. AE worsened from baseline.      Per study chair, "the definition of progressive disease for this protocol is developing CRAB criteria that requires treatment systemically." Per Dr Engel, based on today's exam and lab results thus far, patient does not have any s/s of CRAB: Normal Calcium level (9.5), absence of Renal insufficiency (serum creatinine 1.8, and GFR 45.33 per [facility]roft-Gault (thought to be r/t dehydration)--patient with a history of kidney dysfunction secondary to diabetes; Dr. Engel aware of these values and not concerned for myeloma involvement), absence of significant Anemia (hemoglobin 12.4, improved; will await myeloma labs for clarity relationship to myeloma) and absence of lytic Bone lesions (per baseline metastatic survey as well as MRI--see MD note for further comment). See MD note for ECOG score and H&P and flowsheets for laboratory work, vitals, etc. All myeloma-related blood work from last cycle including SPEP and JAGUAR have remained stable, and are currently pending for this cycle. Per Dr. Engel, patient shows no evidence of progression at last result. Patient informed that Dr. Engel will release all lab results to [facility]. He states understanding of this. QOL's not required at this timepoint.         Patient has f/u scheduled for next several months.  Patient was informed to review appointments and alert us of any conflict.   Will continue to schedule patient as far out as possible, which seems to help maintain compliance with visits.    Patient states having research RN's contact information and has MD contact information to call with any concerns, questions, or worsening of symptoms; he " verbalized understanding.

## 2019-03-29 NOTE — PROGRESS NOTES
Subjective:       Patient ID: Emerson Menendez is a 73 y.o. male.    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 Test results identify a stable IgA kappa protein with beta globulin band at 1.63g/dL. Kappa free light chain is elevated at 4.40. CBC and calcium are stable. Creatinine with history of stage III CKD is stable at 1.4. Metastatic survey is negative for lytic lesions. MRI of the entire spine demonstrated age related changes but no convincing evidence of myeloma bone disease. Bone marrow biopsy identified about 13% plasma cells by morphology and FISH for myeloma identified a t(11;14) and trisomies 3,7, and 17. The patient is afebrile and appears clinically well. He was seen by his podiatrist and nephrologist since our last visit without any new or acute events.    The patient has not received any therapy for smoldering myeloma including bisphosphonates or steroids. He has been randomized to observation arm of the the clinical study. 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 He has no current or prior history of malignancy.    TODAY  Mr. Menendez returns today for monthly follow-up evaluation. No clinical signs/symptoms of progression of his smoldering myeloma.   No acute interval events. He has increased physical activity to loose weight. He is having plantar fascitis pain of the right foot and will contact PCP for referral to Sports Medicine for injection.  Also also scheduled in Mid-April for knee injections.      HPI  Review of Systems   Constitutional: Negative for activity change, appetite change,  diaphoresis, fatigue, fever and unexpected weight change.   HENT: Negative.    Eyes: Negative.    Respiratory: Negative.    Cardiovascular: Negative for leg swelling.   Gastrointestinal: Negative.    Endocrine: Negative.    Genitourinary: Negative.    Musculoskeletal: Negative.    Skin: Negative.    Allergic/Immunologic: Negative.    Neurological:        Grade 0-1 peripheral neuropathy of bilateral feet.    Hematological: Negative for adenopathy. Does not bruise/bleed easily.   Psychiatric/Behavioral: Negative.        Objective:       Vitals:    03/29/19 0734   BP: 139/79   Pulse: 91   Resp: 20   Temp: 98.3 °F (36.8 °C)       Physical Exam   Constitutional: He is oriented to person, place, and time. He appears well-developed and well-nourished.   HENT:   Head: Normocephalic and atraumatic.   Right Ear: External ear normal.   Left Ear: External ear normal.   Nose: Nose normal.   Mouth/Throat: Oropharynx is clear and moist.   Eyes: Pupils are equal, round, and reactive to light. Conjunctivae and EOM are normal.   Neck: Normal range of motion. Neck supple.   Cardiovascular: Normal rate, regular rhythm and intact distal pulses.   No murmur heard.  Pulmonary/Chest: Effort normal and breath sounds normal. No respiratory distress.   Abdominal: Soft. Bowel sounds are normal. He exhibits no distension. There is no hepatosplenomegaly.   Musculoskeletal: He exhibits no edema or tenderness.   Neurological: He is alert and oriented to person, place, and time. No cranial nerve deficit.   Skin: Skin is warm and dry. No rash noted. No cyanosis. Nails show no clubbing.   Psychiatric: He has a normal mood and affect. His behavior is normal.   Nursing note and vitals reviewed.      Assessment:       1. Smoldering multiple myeloma    2. CKD stage 3 due to type 2 diabetes mellitus    3. Type 2 diabetes mellitus with diabetic polyneuropathy, with long-term current use of insulin    4. Foot pain, right        Plan:       The patient has a  diagnosis of smoldering myeloma. There is no indication for immediate chemotherapy. We are monitoring renal function closely- baseline creatinine of -1.5 is pending repeat today. Recommend increased oral hydration- repeat in 1 month.  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 CBC and CMP are overall stable.  Complete biochemical studies from today are pending.  Plan for return in 1 month.  Endocrinology and Nephrology to monitor diabetes and renal failure respectively.

## 2019-04-01 ENCOUNTER — TELEPHONE (OUTPATIENT)
Dept: INTERNAL MEDICINE | Facility: CLINIC | Age: 74
End: 2019-04-01

## 2019-04-01 DIAGNOSIS — H40.1192 PRIMARY OPEN-ANGLE GLAUCOMA, MODERATE STAGE: ICD-10-CM

## 2019-04-01 DIAGNOSIS — M79.673 PAIN OF FOOT, UNSPECIFIED LATERALITY: Primary | ICD-10-CM

## 2019-04-01 LAB
ALBUMIN SERPL ELPH-MCNC: 4.29 G/DL (ref 3.35–5.55)
ALPHA1 GLOB SERPL ELPH-MCNC: 0.28 G/DL (ref 0.17–0.41)
ALPHA2 GLOB SERPL ELPH-MCNC: 0.92 G/DL (ref 0.43–0.99)
B-GLOBULIN SERPL ELPH-MCNC: 2.61 G/DL (ref 0.5–1.1)
GAMMA GLOB SERPL ELPH-MCNC: 0.7 G/DL (ref 0.67–1.58)
INTERPRETATION SERPL IFE-IMP: NORMAL
KAPPA LC SER QL IA: 6.25 MG/DL (ref 0.33–1.94)
KAPPA LC/LAMBDA SER IA: 3.43 (ref 0.26–1.65)
LAMBDA LC SER QL IA: 1.82 MG/DL (ref 0.57–2.63)
PROT SERPL-MCNC: 8.8 G/DL (ref 6–8.4)

## 2019-04-01 RX ORDER — LATANOPROST 50 UG/ML
1 SOLUTION/ DROPS OPHTHALMIC NIGHTLY
Qty: 7.5 ML | Refills: 3 | Status: SHIPPED | OUTPATIENT
Start: 2019-04-01 | End: 2021-02-19 | Stop reason: SDUPTHER

## 2019-04-01 NOTE — TELEPHONE ENCOUNTER
----- Message from Lisa Schmidt sent at 4/1/2019 11:47 AM CDT -----  Contact: PT Portal Request  Appointment Request From: Emerson Menendez    With Provider: Roberta Sagastume MD [[facility] - Internal Medicine]    Preferred Date Range: Any date 4/3/2019 or later    Preferred Times: Any time    Reason for visit: Plantar fasciitis    Comments:  Need injection , can't walk on foot.

## 2019-04-02 ENCOUNTER — OFFICE VISIT (OUTPATIENT)
Dept: ORTHOPEDICS | Facility: CLINIC | Age: 74
End: 2019-04-02
Payer: MEDICARE

## 2019-04-02 VITALS
HEIGHT: 70 IN | DIASTOLIC BLOOD PRESSURE: 89 MMHG | WEIGHT: 192.13 LBS | SYSTOLIC BLOOD PRESSURE: 157 MMHG | BODY MASS INDEX: 27.51 KG/M2 | HEART RATE: 82 BPM

## 2019-04-02 DIAGNOSIS — M72.2 PLANTAR FASCIITIS OF RIGHT FOOT: Primary | ICD-10-CM

## 2019-04-02 LAB
PATHOLOGIST INTERPRETATION IFE: NORMAL
PATHOLOGIST INTERPRETATION SPE: NORMAL

## 2019-04-02 PROCEDURE — 3079F DIAST BP 80-89 MM HG: CPT | Mod: CPTII,S$GLB,, | Performed by: PHYSICIAN ASSISTANT

## 2019-04-02 PROCEDURE — 99213 OFFICE O/P EST LOW 20 MIN: CPT | Mod: 25,S$GLB,, | Performed by: PHYSICIAN ASSISTANT

## 2019-04-02 PROCEDURE — 20551 NJX 1 TENDON ORIGIN/INSJ: CPT | Mod: RT,S$GLB,, | Performed by: PHYSICIAN ASSISTANT

## 2019-04-02 PROCEDURE — 3077F SYST BP >= 140 MM HG: CPT | Mod: CPTII,S$GLB,, | Performed by: PHYSICIAN ASSISTANT

## 2019-04-02 PROCEDURE — 99213 PR OFFICE/OUTPT VISIT, EST, LEVL III, 20-29 MIN: ICD-10-PCS | Mod: 25,S$GLB,, | Performed by: PHYSICIAN ASSISTANT

## 2019-04-02 PROCEDURE — 3077F PR MOST RECENT SYSTOLIC BLOOD PRESSURE >= 140 MM HG: ICD-10-PCS | Mod: CPTII,S$GLB,, | Performed by: PHYSICIAN ASSISTANT

## 2019-04-02 PROCEDURE — 99999 PR PBB SHADOW E&M-EST. PATIENT-LVL III: ICD-10-PCS | Mod: PBBFAC,,, | Performed by: PHYSICIAN ASSISTANT

## 2019-04-02 PROCEDURE — 3079F PR MOST RECENT DIASTOLIC BLOOD PRESSURE 80-89 MM HG: ICD-10-PCS | Mod: CPTII,S$GLB,, | Performed by: PHYSICIAN ASSISTANT

## 2019-04-02 PROCEDURE — 1101F PR PT FALLS ASSESS DOC 0-1 FALLS W/OUT INJ PAST YR: ICD-10-PCS | Mod: CPTII,S$GLB,, | Performed by: PHYSICIAN ASSISTANT

## 2019-04-02 PROCEDURE — 20551 PR INJECT TENDON ORIGIN/INSERT: ICD-10-PCS | Mod: RT,S$GLB,, | Performed by: PHYSICIAN ASSISTANT

## 2019-04-02 PROCEDURE — 1101F PT FALLS ASSESS-DOCD LE1/YR: CPT | Mod: CPTII,S$GLB,, | Performed by: PHYSICIAN ASSISTANT

## 2019-04-02 PROCEDURE — 99999 PR PBB SHADOW E&M-EST. PATIENT-LVL III: CPT | Mod: PBBFAC,,, | Performed by: PHYSICIAN ASSISTANT

## 2019-04-02 RX ORDER — TRIAMCINOLONE ACETONIDE 40 MG/ML
40 INJECTION, SUSPENSION INTRA-ARTICULAR; INTRAMUSCULAR
Status: COMPLETED | OUTPATIENT
Start: 2019-04-02 | End: 2019-04-02

## 2019-04-02 RX ADMIN — TRIAMCINOLONE ACETONIDE 40 MG: 40 INJECTION, SUSPENSION INTRA-ARTICULAR; INTRAMUSCULAR at 03:04

## 2019-04-02 NOTE — PROGRESS NOTES
"  SUBJECTIVE:     Chief Complaint & History of Present Illness:  Emerson Menendez is a  Established  patient 73 y.o. male who is seen here today with a complaint of  right foot pain .  He has patient well-known to me was last seen and treated the clinic for this condition 06/13/2018 at which time he had undergone a cortisone injection of the right plantar fascia.  He responded very well from the injection has done great until approximately 3-4 weeks ago beginning have return of pain soreness in the foot. He did get orthotics after our last visit and has been very diligent about protecting his foot or in the proper footwear.  On a scale of 1-10, with 10 being worst pain imaginable, he rates this pain as 3 on good days and 6 on bad days.  he describes the pain as tender.    Review of patient's allergies indicates:   Allergen Reactions    Penicillins      Knees locked up         Current Outpatient Medications   Medication Sig Dispense Refill    ACCU-CHEK ELIE PLUS TEST STRP Strp TEST FOUR TIMES DAILY 400 strip 3    aspirin (ECOTRIN) 81 MG EC tablet Take 1 tablet (81 mg total) by mouth once daily. 100 tablet 3    BD ULTRA-FINE SHORT PEN NEEDLE 31 gauge x 5/16" Ndle USE TO INJECT INSULIN ONE TIME DAILY 100 each 3    blood glucose control, normal (METER-CHECK) Soln One meter. True test meter. Use as directed 1 each 0    blood-glucose meter (ACCU-CHEK OMAYRA) Misc USE AS DIRECTED. Accucheck elie plus 1 each 0    cholecalciferol, vitamin D3, (VITAMIN D) 2,000 unit Cap Take by mouth. 1 Capsule Oral Every day.  over the counter      citalopram (CELEXA) 20 MG tablet Take 1 tablet (20 mg total) by mouth once daily. 90 tablet 11    diazePAM (VALIUM) 5 MG tablet Take 1 tablet (5 mg total) by mouth every 6 (six) hours as needed. 60 tablet 1    diazePAM (VALIUM) 5 MG tablet TAKE 1 TABLET(5 MG) BY MOUTH EVERY 6 HOURS AS NEEDED 60 tablet 1    doxycycline (VIBRAMYCIN) 50 MG capsule TAKE 1 CAPSULE(50 MG) BY MOUTH EVERY " DAY 30 capsule 0    flash glucose scanning reader (FREESTYLE CHRISTIANO READER) Misc 1 each by Misc.(Non-Drug; Combo Route) route 4 (four) times daily. 1 each 0    flash glucose sensor (FREESTYLE CHRISTIANO SENSOR) Kit 1 each by Misc.(Non-Drug; Combo Route) route once a week. 3 kit 6    gabapentin (NEURONTIN) 100 MG capsule TAKE 1 CAPSULE EVERY MORNING, 1 CAPSULE IN THE AFTERNOON, AND 1 TO 3 CAPSULE(S) AT BEDTIME AS NEEDED FOR FOOT PAIN 450 capsule 3    glimepiride (AMARYL) 2 MG tablet daily with breakfast.       glimepiride (AMARYL) 2 MG tablet TAKE 1 TABLET (2 MG TOTAL) BY MOUTH ONCE DAILY. 90 tablet 4    glucagon (human recombinant) inj 1mg/mL kit Inject 1 mL (1 mg total) into the muscle as needed. 1 kit 0    insulin (LANTUS SOLOSTAR U-100 INSULIN) glargine 100 units/mL (3mL) SubQ pen Inject 15 Units into the skin every evening. 1 Box 6    insulin (LANTUS SOLOSTAR U-100 INSULIN) glargine 100 units/mL (3mL) SubQ pen Inject 15 Units into the skin every evening. 1 Box 6    lancets Misc Use twice daily 200 each 4    latanoprost 0.005 % ophthalmic solution Place 1 drop into both eyes every evening. 7.5 mL 3    losartan (COZAAR) 25 MG tablet Take 1 tablet (25 mg total) by mouth once daily. 90 tablet 4    NOVOLOG FLEXPEN U-100 INSULIN 100 unit/mL InPn pen       omeprazole (PRILOSEC) 20 MG capsule Take 2 capsules (40 mg total) by mouth once daily. 180 capsule 3    oxybutynin (DITROPAN) 5 MG Tab TAKE 1 TABLET(5 MG) BY MOUTH TWICE DAILY 180 tablet 0    pravastatin (PRAVACHOL) 40 MG tablet Take 1 tablet (40 mg total) by mouth once daily. 90 tablet 4    tadalafil (CIALIS) 5 MG tablet Take 1 tablet (5 mg total) by mouth daily as needed for Erectile Dysfunction. 30 tablet 4    tamsulosin (FLOMAX) 0.4 mg Cap Take 1 capsule (0.4 mg total) by mouth once daily. 90 capsule 3    tizanidine (ZANAFLEX) 4 MG tablet 1/2-1 tablet every 8 hours as needed for muscle spasm 90 tablet 1     Current Facility-Administered Medications    Medication Dose Route Frequency Provider Last Rate Last Dose    triamcinolone acetonide injection 40 mg  40 mg Intra-articular 1 time in Clinic/HOD Sonu Dickerson PA-C           Past Medical History:   Diagnosis Date    Anxiety 3/16/2015    BPH (benign prostatic hypertrophy) 9/24/2012    Depression 3/16/2015    GERD (gastroesophageal reflux disease) 9/24/2012    Microalbuminuria 9/24/2012    Osteoarthritis of cervical spine 9/24/2012    Osteoarthritis of knee 9/24/2012    Type II or unspecified type diabetes mellitus without mention of complication, not stated as uncontrolled 9/24/2012       Past Surgical History:   Procedure Laterality Date    CATARACT EXTRACTION  2012    Right eye    CYSTOSCOPY N/A 11/13/2013    Performed by Juan Walker MD at [facility] OR [address] [place] [address]    PROSTATE SURGERY      TURP, USING THULIUM LASER N/A 11/13/2013    Performed by Juan Walker MD at [facility] OR [address] [place] [address]       Vital Signs (Most Recent)  There were no vitals filed for this visit.    Review of Systems:  ROS:  Constitutional: no fever or chills  Eyes: no visual changes  ENT: no nasal congestion or sore throat  Respiratory: no cough or shortness of breath  Cardiovascular: no chest pain or palpitations  Gastrointestinal: no nausea or vomiting, tolerating diet, Positive for GERD  Genitourinary: no hematuria or dysuria, Positive CK D stage  Integument/Breast: no rash or pruritis  Hematologic/Lymphatic: no easy bruising or lymphadenopathy, Positive for anemia  Musculoskeletal: no arthralgias or myalgias  Neurological: no seizures or tremors  Behavioral/Psych: no auditory or visual hallucinations, Positive for anxiety and depression  Endocrine: no heat or cold intolerance, Positive for type 1 diabetes with polyneuropathy         OBJECTIVE:     PHYSICAL EXAM:     , General Appearance: Well nourished, well developed, in no acute distress.  Neurological: Mood & affect are normal.  right  Foot/Ankle    Skin: normal  Swelling:  none  Warmth: no warmth  Tenderness: moderate and severe  ROM: 90 degrees dorsiflexion, 180 degrees plantarflexion, 30 degrees inversion and 25 degrees eversion  Strength: normal, 5 on 5 tibialis anterior strength, 5 of 5 EHL strength, 5 of 5 EDL strength, 5 of 5 FHL and 5 of 5 FDL  Gait: normal  Stability: anterior drawer: negative, exterior rotation test: negative, Lachman: negative and Cotton test: negative    soft tissue swelling noted over the Insertion of the plantar fascia, point tenderness of the mid plantar fascia and normal microcirculation and capillary reflow            ASSESSMENT/PLAN:       ICD-10-CM ICD-9-CM   1. Plantar fasciitis of right foot M72.2 728.71       Plan:  Patient requesting repeat injection today plantar fasciitis  Prescription for new custom orthotics bilateral feet  Scheduled follow-up appoint with Podiatry for diabetic foot care    The injection site was identified and the skin was prepared with an ETOH solution. The insertion of the plantar fascia of the   right  foot was injected with 1 ml of Kenelog and 2 ml Lidocaine under sterile technique. Emerson Menendez tolerated the procedure well, he was advised to rest the  foot  today, ice and support. he did receive immediate relief of the pain in and about his  foot  he was told this would be short lived and is secondary to the lidocaine. he may have an increase in his discomfort tonight followed by steady improvement over the next several days. It may take 1-3 weeks following the injection to get the full benefit of the medication.  I will see him back in 6 months. Sooner if he has any problems or concerns.    Emerson Menendez was advised to monitor his blood sugars closely over the next several days. The steroid may cause a rise in them. If his glucose levels rise to a point he is uncomfortable or he is unable to control them is is to contact his PCP or go immediately to the emergency department.

## 2019-04-03 ENCOUNTER — CLINICAL SUPPORT (OUTPATIENT)
Dept: DIABETES | Facility: CLINIC | Age: 74
End: 2019-04-03
Payer: MEDICARE

## 2019-04-03 DIAGNOSIS — E13.9 DIABETES MELLITUS DUE TO ABNORMAL INSULIN: ICD-10-CM

## 2019-04-03 DIAGNOSIS — E11.9 DIABETES MELLITUS WITHOUT COMPLICATION: Primary | ICD-10-CM

## 2019-04-03 PROCEDURE — G0108 PR DIAB MANAGE TRN  PER INDIV: ICD-10-PCS | Mod: S$GLB,,, | Performed by: DIETITIAN, REGISTERED

## 2019-04-03 PROCEDURE — G0108 DIAB MANAGE TRN  PER INDIV: HCPCS | Mod: S$GLB,,, | Performed by: DIETITIAN, REGISTERED

## 2019-04-03 NOTE — PROGRESS NOTES
Diabetes Education  Author: Jo Ibanez RD, CDE  Date: 4/3/2019    Diabetes Care Management Summary  Diabetes Education Record Assessment/Progress: Initial     Diabetes Type  Diabetes Type : Type II(Last seen 7/18/18)    Diabetes History  Diabetes Diagnosis: >10 years  Current Treatment: Oral Medication, Insulin    Health Maintenance was reviewed today with patient. Discussed with patient importance of routine eye exams, foot exams/foot care, blood work (i.e.: A1c, microalbumin, and lipid), dental visits, yearly flu vaccine, and pneumonia vaccine as indicated by PCP. Patient verbalized understanding.     Health Maintenance Topics with due status: Not Due       Topic Last Completion Date    TETANUS VACCINE 12/17/2013    Lipid Panel 04/11/2018    Hemoglobin A1c 10/15/2018    Eye Exam 01/16/2019    Low Dose Statin 04/02/2019     Health Maintenance Due   Topic Date Due    Foot Exam  01/15/2019    Colonoscopy  03/22/2019     Nutrition  Meal Planning: skipping meals, snacks between meal, artificial sweeteners, water, eats out often(2-3 meals a day)  What type of sweetener do you use?: Splenda  What type of beverages do you drink?: other (see comments), diet soda/tea, water, milk(Protein shake in the AM (milk in shake))    Monitoring   Self Monitoring : (Checks 2-3 times a day)  Blood Glucose Logs: No(Reports BGs run 100-160s)  Do you use a personal continuous glucose monitor?: No  In the last month, how often have you had a low blood sugar reaction?: never  What are your symptoms of low blood sugar?: (N/A)  How do you treat low blood sugar?: (N/A)  Can you tell when your blood sugar is too high?: no  How do you treat high blood sugar?: (None)    Exercise   Exercise Type: (Exercise bands)  Frequency: (2-3 days a week)  Duration: (Varies from 20-1.5 hours)    Current Diabetes Treatment   Current Treatment: Oral Medication, Insulin    Social History  Preferred Learning Method: Face to Face  Primary Support: Self  Smoking  Status: Never a Smoker  Alcohol Use: Never    DDS-2 Score  ( > 3 = SIGNIFICANT DISTRESS): 1    Barriers to Change  Barriers to Change: None  Learning Challenges : None    Readiness to Learn   Readiness to Learn : Acceptance    Cultural Influences  Cultural Influences: No    Diabetes Education Assessment/Progress  Diabetes Disease Process (diabetes disease process and treatment options): Instructed, Discussion, Individual Session, Written Materials Provided  Nutrition (Incorporating nutritional management into one's lifestyle): Instructed, Discussion, Individual Session, Written Materials Provided  Physical Activity (incorporating physical activity into one's lifestyle): Instructed, Discussion, Individual Session, Written Materials Provided  Medications (states correct name, dose, onset, peak, duration, side effects & timing of meds): Instructed, Discussion, Individual Session, Written Materials Provided  Monitoring (monitoring blood glucose/other parameters & using results): Instructed, Discussion, Individual Session, Written Materials Provided  Acute Complications (preventing, detecting, and treating acute complications): Instructed, Discussion, Individual Session, Written Materials Provided  Chronic Complications (preventing, detecting, and treating chronic complications): Instructed, Discussion, Individual Session, Written Materials Provided  Clinical (diabetes, other pertinent medical history, and relevant comorbidities reviewed during visit): Instructed, Discussion, Individual Session  Cognitive (knowledge of self-management skills, functional health literacy): Instructed, Discussion, Individual Session  Psychosocial (emotional response to diabetes): Instructed, Discussion, Individual Session  Diabetes Distress and Support Systems: Instructed, Discussion, Individual Session  Behavioral (readiness for change, lifestyle practices, self-care behaviors): Instructed, Discussion, Individual Session    Goals  Patient  has selected/evaluated goals during today's session: Yes, selected  Other: Set(Continue current DM MGT)    Diabetes Care Plan/Intervention  Education Plan/Intervention: Individual Follow-Up DSMT    Diabetes Meal Plan  Carbohydrate Per Meal: 30-45g  Carbohydrate Per Snack : 7-15g    Today's Self-Management Care Plan was developed with the patient's input and is based on barriers identified during today's assessment.    The long and short-term goals in the care plan were written with the patient/caregiver's input. The patient has agreed to work toward these goals to improve his overall diabetes control.      The patient received a copy of today's self-management plan and verbalized understanding of the care plan, goals, and all of today's instructions.      The patient was encouraged to communicate with his physician and care team regarding his condition(s) and treatment.  I provided the patient with my contact information today and encouraged him to contact me via phone or patient portal as needed.     Education Units of Time   Time Spent: 45 min

## 2019-04-11 ENCOUNTER — OFFICE VISIT (OUTPATIENT)
Dept: PODIATRY | Facility: CLINIC | Age: 74
End: 2019-04-11
Payer: MEDICARE

## 2019-04-11 VITALS
BODY MASS INDEX: 36.25 KG/M2 | WEIGHT: 192 LBS | SYSTOLIC BLOOD PRESSURE: 138 MMHG | HEART RATE: 92 BPM | DIASTOLIC BLOOD PRESSURE: 74 MMHG | HEIGHT: 61 IN

## 2019-04-11 DIAGNOSIS — Z79.4 TYPE 2 DIABETES MELLITUS WITH DIABETIC POLYNEUROPATHY, WITH LONG-TERM CURRENT USE OF INSULIN: Primary | Chronic | ICD-10-CM

## 2019-04-11 DIAGNOSIS — E11.42 TYPE 2 DIABETES MELLITUS WITH DIABETIC POLYNEUROPATHY, WITH LONG-TERM CURRENT USE OF INSULIN: Primary | Chronic | ICD-10-CM

## 2019-04-11 PROCEDURE — 1101F PT FALLS ASSESS-DOCD LE1/YR: CPT | Mod: CPTII,S$GLB,, | Performed by: PODIATRIST

## 2019-04-11 PROCEDURE — 99213 PR OFFICE/OUTPT VISIT, EST, LEVL III, 20-29 MIN: ICD-10-PCS | Mod: S$GLB,,, | Performed by: PODIATRIST

## 2019-04-11 PROCEDURE — 99999 PR PBB SHADOW E&M-EST. PATIENT-LVL IV: ICD-10-PCS | Mod: PBBFAC,,, | Performed by: PODIATRIST

## 2019-04-11 PROCEDURE — 1101F PR PT FALLS ASSESS DOC 0-1 FALLS W/OUT INJ PAST YR: ICD-10-PCS | Mod: CPTII,S$GLB,, | Performed by: PODIATRIST

## 2019-04-11 PROCEDURE — 99999 PR PBB SHADOW E&M-EST. PATIENT-LVL IV: CPT | Mod: PBBFAC,,, | Performed by: PODIATRIST

## 2019-04-11 PROCEDURE — 3078F PR MOST RECENT DIASTOLIC BLOOD PRESSURE < 80 MM HG: ICD-10-PCS | Mod: CPTII,S$GLB,, | Performed by: PODIATRIST

## 2019-04-11 PROCEDURE — 99213 OFFICE O/P EST LOW 20 MIN: CPT | Mod: S$GLB,,, | Performed by: PODIATRIST

## 2019-04-11 PROCEDURE — 3078F DIAST BP <80 MM HG: CPT | Mod: CPTII,S$GLB,, | Performed by: PODIATRIST

## 2019-04-11 PROCEDURE — 3045F PR MOST RECENT HEMOGLOBIN A1C LEVEL 7.0-9.0%: ICD-10-PCS | Mod: CPTII,S$GLB,, | Performed by: PODIATRIST

## 2019-04-11 PROCEDURE — 3075F PR MOST RECENT SYSTOLIC BLOOD PRESS GE 130-139MM HG: ICD-10-PCS | Mod: CPTII,S$GLB,, | Performed by: PODIATRIST

## 2019-04-11 PROCEDURE — 3075F SYST BP GE 130 - 139MM HG: CPT | Mod: CPTII,S$GLB,, | Performed by: PODIATRIST

## 2019-04-11 PROCEDURE — 3045F PR MOST RECENT HEMOGLOBIN A1C LEVEL 7.0-9.0%: CPT | Mod: CPTII,S$GLB,, | Performed by: PODIATRIST

## 2019-04-11 NOTE — PROGRESS NOTES
Subjective:      Patient ID: Emerson Menendez is a 73 y.o. male.    Chief Complaint: Diabetes Mellitus (10/16/2018 dr foster branch) and Diabetic Foot Exam    Emerson is a 73 y.o. male who presents to the clinic upon referral from Dr. Branch  for evaluation and treatment of diabetic feet. Emerson has a past medical history of Anxiety (3/16/2015), BPH (benign prostatic hypertrophy) (9/24/2012), Depression (3/16/2015), GERD (gastroesophageal reflux disease) (9/24/2012), Microalbuminuria (9/24/2012), Osteoarthritis of cervical spine (9/24/2012), Osteoarthritis of knee (9/24/2012), and Type II or unspecified type diabetes mellitus without mention of complication, not stated as uncontrolled (9/24/2012). Patient relates no major problem with feet. Only complaints today consist of  Diabetic foot exam and some mild pain in his right heel.    PCP: Foster Branch MD    Date Last Seen by PCP: 10/16/2018 dr foster branch    Current shoe gear: Tennis shoes    Hemoglobin A1C   Date Value Ref Range Status   10/15/2018 7.6 (H) 4.0 - 5.6 % Final     Comment:     ADA Screening Guidelines:  5.7-6.4%  Consistent with prediabetes  >or=6.5%  Consistent with diabetes  High levels of fetal hemoglobin interfere with the HbA1C  assay. Heterozygous hemoglobin variants (HbS, HgC, etc)do  not significantly interfere with this assay.   However, presence of multiple variants may affect accuracy.     04/11/2018 7.6 (H) 4.0 - 5.6 % Final     Comment:     According to ADA guidelines, hemoglobin A1c <7.0% represents  optimal control in non-pregnant diabetic patients. Different  metrics may apply to specific patient populations.   Standards of Medical Care in Diabetes-2016.  For the purpose of screening for the presence of diabetes:  <5.7%     Consistent with the absence of diabetes  5.7-6.4%  Consistent with increasing risk for diabetes   (prediabetes)  >or=6.5%  Consistent with diabetes  Currently, no consensus exists for use of hemoglobin  A1c  for diagnosis of diabetes for children.  This Hemoglobin A1c assay has significant interference with fetal   hemoglobin   (HbF). The results are invalid for patients with abnormal amounts of   HbF,   including those with known Hereditary Persistence   of Fetal Hemoglobin. Heterozygous hemoglobin variants (HbAS, HbAC,   HbAD, HbAE, HbA2) do not significantly interfere with this assay;   however, presence of multiple variants in a sample may impact the %   interference.     04/11/2018 7.6 (H) 4.0 - 5.6 % Final     Comment:     According to ADA guidelines, hemoglobin A1c <7.0% represents  optimal control in non-pregnant diabetic patients. Different  metrics may apply to specific patient populations.   Standards of Medical Care in Diabetes-2016.  For the purpose of screening for the presence of diabetes:  <5.7%     Consistent with the absence of diabetes  5.7-6.4%  Consistent with increasing risk for diabetes   (prediabetes)  >or=6.5%  Consistent with diabetes  Currently, no consensus exists for use of hemoglobin A1c  for diagnosis of diabetes for children.  This Hemoglobin A1c assay has significant interference with fetal   hemoglobin   (HbF). The results are invalid for patients with abnormal amounts of   HbF,   including those with known Hereditary Persistence   of Fetal Hemoglobin. Heterozygous hemoglobin variants (HbAS, HbAC,   HbAD, HbAE, HbA2) do not significantly interfere with this assay;   however, presence of multiple variants in a sample may impact the %   interference.             Review of Systems   Constitution: Negative for chills, fever and malaise/fatigue.   HENT: Negative for hearing loss.    Cardiovascular: Negative for claudication.   Respiratory: Negative for shortness of breath.    Skin: Negative for flushing and rash.   Musculoskeletal: Negative for joint pain and myalgias.   Neurological: Negative for loss of balance, numbness, paresthesias and sensory change.   Psychiatric/Behavioral:  Negative for altered mental status.           Objective:      Physical Exam   Cardiovascular:   Pulses:       Dorsalis pedis pulses are 2+ on the right side, and 2+ on the left side.        Posterior tibial pulses are 2+ on the right side, and 2+ on the left side.   No edema noted to b/L LEs   Musculoskeletal:   Adequate joint ROM noted to all lower extremity muscle groups with no pain or crepitation noted. Muscle strength is 5/5 in all groups bilaterally.  Mild POP to right plantar heel   Feet:   Right Foot:   Protective Sensation: 5 sites tested. 5 sites sensed.   Left Foot:   Protective Sensation: 5 sites tested. 5 sites sensed.   Neurological:   Intact gross sensation noted to b/L LEs   Skin:   Normal skin tugor noted.   No open lesion noted b/L  Skin temp is warm to warm from proximal to distal b/L.  Webspaces clean, dry, and intact  Nails x10 are elongated and non-dystrophic               Assessment:       Encounter Diagnosis   Name Primary?    Type 2 diabetes mellitus with diabetic polyneuropathy, with long-term current use of insulin Yes         Plan:       Emerson was seen today for diabetes mellitus and diabetic foot exam.    Diagnoses and all orders for this visit:    Type 2 diabetes mellitus with diabetic polyneuropathy, with long-term current use of insulin      I counseled the patient on his conditions, their implications and medical management.      Diabetic foot exam performed on pt. Pt advised on daily monitoring and moisturizing of the feet and keeping the webspaces clean and dry Patient advised to contact the clinic if any new pedal problems should arise.   RTC in 1 year or sooner if any new pedal problems should arise.    .

## 2019-04-12 ENCOUNTER — OFFICE VISIT (OUTPATIENT)
Dept: SPORTS MEDICINE | Facility: CLINIC | Age: 74
End: 2019-04-12
Payer: MEDICARE

## 2019-04-12 VITALS
WEIGHT: 192 LBS | BODY MASS INDEX: 36.25 KG/M2 | SYSTOLIC BLOOD PRESSURE: 138 MMHG | DIASTOLIC BLOOD PRESSURE: 76 MMHG | HEIGHT: 61 IN | HEART RATE: 99 BPM

## 2019-04-12 DIAGNOSIS — M17.11 PRIMARY OSTEOARTHRITIS OF RIGHT KNEE: ICD-10-CM

## 2019-04-12 DIAGNOSIS — M17.12 PRIMARY OSTEOARTHRITIS OF LEFT KNEE: Primary | ICD-10-CM

## 2019-04-12 PROCEDURE — 99499 NO LOS: ICD-10-PCS | Mod: S$GLB,,, | Performed by: PHYSICIAN ASSISTANT

## 2019-04-12 PROCEDURE — 99999 PR PBB SHADOW E&M-EST. PATIENT-LVL IV: CPT | Mod: PBBFAC,,, | Performed by: PHYSICIAN ASSISTANT

## 2019-04-12 PROCEDURE — 20610 PR DRAIN/INJECT LARGE JOINT/BURSA: ICD-10-PCS | Mod: 50,S$GLB,, | Performed by: PHYSICIAN ASSISTANT

## 2019-04-12 PROCEDURE — 99499 UNLISTED E&M SERVICE: CPT | Mod: S$GLB,,, | Performed by: PHYSICIAN ASSISTANT

## 2019-04-12 PROCEDURE — 99999 PR PBB SHADOW E&M-EST. PATIENT-LVL IV: ICD-10-PCS | Mod: PBBFAC,,, | Performed by: PHYSICIAN ASSISTANT

## 2019-04-12 PROCEDURE — 20610 DRAIN/INJ JOINT/BURSA W/O US: CPT | Mod: 50,S$GLB,, | Performed by: PHYSICIAN ASSISTANT

## 2019-04-12 NOTE — PROGRESS NOTES
Patient is here for follow up of bilateral knee arthritis. Patient is requesting Euflexxa injection #1.  [facility] Has failed other conservative modalities including NSAIDS, activity modification, weight loss.    The prior shot was tolerated well.    PHYSICAL EXAMINATION:     General: The patient is alert and oriented x 3. Mood is pleasant.   Observation of ears, eyes and nose reveals no gross abnormalities. No   labored breathing observed.     No signs of infection or adverse reaction to knee.       Injection Procedure  A time out was performed, including verification of patient ID, procedure, site and side, availability of information and equipment, review of safety issues, and agreement with consent, the procedure site was marked.    After time out was performed, the patient was prepped aseptically with povidone-iodine swabsticks. A diagnostic and therapeutic injection of 2cc Euflexxa was given under sterile technique using a 22.5 gauge needle from the Superolateral  aspect of the bilateral Knee Joint in the supine position. Lot No: [id number]. Exp date: 2020-03-17.     Emerson Menendez had no adverse reactions to the medication. Pain decreased. He was instructed to apply ice to the joint for 20 minutes and avoid strenuous activities for 24-36 hours following the injection. He was warned of possible blood sugar and/or blood pressure changes during that time. Following that time, he can resume regular activities.    He was reminded to call the clinic immediately for any adverse side effects as explained in clinic today.

## 2019-04-16 ENCOUNTER — LAB VISIT (OUTPATIENT)
Dept: LAB | Facility: HOSPITAL | Age: 74
End: 2019-04-16
Attending: INTERNAL MEDICINE
Payer: MEDICARE

## 2019-04-16 ENCOUNTER — OFFICE VISIT (OUTPATIENT)
Dept: INTERNAL MEDICINE | Facility: CLINIC | Age: 74
End: 2019-04-16
Payer: MEDICARE

## 2019-04-16 VITALS
SYSTOLIC BLOOD PRESSURE: 122 MMHG | DIASTOLIC BLOOD PRESSURE: 70 MMHG | WEIGHT: 187 LBS | BODY MASS INDEX: 26.77 KG/M2 | HEART RATE: 96 BPM | OXYGEN SATURATION: 97 % | HEIGHT: 70 IN

## 2019-04-16 DIAGNOSIS — E53.8 VITAMIN B12 DEFICIENCY: ICD-10-CM

## 2019-04-16 DIAGNOSIS — E11.65 TYPE 2 DIABETES MELLITUS WITH HYPERGLYCEMIA, WITH LONG-TERM CURRENT USE OF INSULIN: Primary | Chronic | ICD-10-CM

## 2019-04-16 DIAGNOSIS — E55.9 VITAMIN D DEFICIENCY DISEASE: ICD-10-CM

## 2019-04-16 DIAGNOSIS — I10 ESSENTIAL HYPERTENSION: ICD-10-CM

## 2019-04-16 DIAGNOSIS — K21.9 GASTROESOPHAGEAL REFLUX DISEASE WITHOUT ESOPHAGITIS: ICD-10-CM

## 2019-04-16 DIAGNOSIS — D47.2 SMOLDERING MULTIPLE MYELOMA: ICD-10-CM

## 2019-04-16 DIAGNOSIS — M17.9 OSTEOARTHRITIS OF KNEE, UNSPECIFIED LATERALITY, UNSPECIFIED OSTEOARTHRITIS TYPE: ICD-10-CM

## 2019-04-16 DIAGNOSIS — E13.9 DIABETES MELLITUS DUE TO ABNORMAL INSULIN: ICD-10-CM

## 2019-04-16 DIAGNOSIS — E11.65 TYPE 2 DIABETES MELLITUS WITH HYPERGLYCEMIA, UNSPECIFIED WHETHER LONG TERM INSULIN USE: ICD-10-CM

## 2019-04-16 DIAGNOSIS — N18.30 CHRONIC KIDNEY DISEASE, STAGE III (MODERATE): Chronic | ICD-10-CM

## 2019-04-16 DIAGNOSIS — E11.9 DIABETES MELLITUS WITHOUT COMPLICATION: ICD-10-CM

## 2019-04-16 DIAGNOSIS — Z79.4 TYPE 2 DIABETES MELLITUS WITH HYPERGLYCEMIA, WITH LONG-TERM CURRENT USE OF INSULIN: Primary | Chronic | ICD-10-CM

## 2019-04-16 LAB
25(OH)D3+25(OH)D2 SERPL-MCNC: 22 NG/ML (ref 30–96)
ESTIMATED AVG GLUCOSE: 177 MG/DL (ref 68–131)
HBA1C MFR BLD HPLC: 7.8 % (ref 4–5.6)
TSH SERPL DL<=0.005 MIU/L-ACNC: 1.31 UIU/ML (ref 0.4–4)
VIT B12 SERPL-MCNC: 1391 PG/ML (ref 210–950)

## 2019-04-16 PROCEDURE — 3045F PR MOST RECENT HEMOGLOBIN A1C LEVEL 7.0-9.0%: CPT | Mod: CPTII,S$GLB,, | Performed by: INTERNAL MEDICINE

## 2019-04-16 PROCEDURE — 1101F PT FALLS ASSESS-DOCD LE1/YR: CPT | Mod: CPTII,S$GLB,, | Performed by: INTERNAL MEDICINE

## 2019-04-16 PROCEDURE — 3045F PR MOST RECENT HEMOGLOBIN A1C LEVEL 7.0-9.0%: ICD-10-PCS | Mod: CPTII,S$GLB,, | Performed by: INTERNAL MEDICINE

## 2019-04-16 PROCEDURE — 1101F PR PT FALLS ASSESS DOC 0-1 FALLS W/OUT INJ PAST YR: ICD-10-PCS | Mod: CPTII,S$GLB,, | Performed by: INTERNAL MEDICINE

## 2019-04-16 PROCEDURE — 99999 PR PBB SHADOW E&M-EST. PATIENT-LVL III: CPT | Mod: PBBFAC,,, | Performed by: INTERNAL MEDICINE

## 2019-04-16 PROCEDURE — 82607 VITAMIN B-12: CPT

## 2019-04-16 PROCEDURE — 99214 PR OFFICE/OUTPT VISIT, EST, LEVL IV, 30-39 MIN: ICD-10-PCS | Mod: S$GLB,,, | Performed by: INTERNAL MEDICINE

## 2019-04-16 PROCEDURE — 36415 COLL VENOUS BLD VENIPUNCTURE: CPT

## 2019-04-16 PROCEDURE — 99214 OFFICE O/P EST MOD 30 MIN: CPT | Mod: S$GLB,,, | Performed by: INTERNAL MEDICINE

## 2019-04-16 PROCEDURE — 3074F PR MOST RECENT SYSTOLIC BLOOD PRESSURE < 130 MM HG: ICD-10-PCS | Mod: CPTII,S$GLB,, | Performed by: INTERNAL MEDICINE

## 2019-04-16 PROCEDURE — 84443 ASSAY THYROID STIM HORMONE: CPT

## 2019-04-16 PROCEDURE — 83036 HEMOGLOBIN GLYCOSYLATED A1C: CPT

## 2019-04-16 PROCEDURE — 82306 VITAMIN D 25 HYDROXY: CPT

## 2019-04-16 PROCEDURE — 99999 PR PBB SHADOW E&M-EST. PATIENT-LVL III: ICD-10-PCS | Mod: PBBFAC,,, | Performed by: INTERNAL MEDICINE

## 2019-04-16 PROCEDURE — 3078F PR MOST RECENT DIASTOLIC BLOOD PRESSURE < 80 MM HG: ICD-10-PCS | Mod: CPTII,S$GLB,, | Performed by: INTERNAL MEDICINE

## 2019-04-16 PROCEDURE — 3074F SYST BP LT 130 MM HG: CPT | Mod: CPTII,S$GLB,, | Performed by: INTERNAL MEDICINE

## 2019-04-16 PROCEDURE — 3078F DIAST BP <80 MM HG: CPT | Mod: CPTII,S$GLB,, | Performed by: INTERNAL MEDICINE

## 2019-04-16 RX ORDER — DEXTROSE 4 G
TABLET,CHEWABLE ORAL
Qty: 1 EACH | Refills: 0 | Status: SHIPPED | OUTPATIENT
Start: 2019-04-16 | End: 2021-02-11 | Stop reason: SDUPTHER

## 2019-04-16 NOTE — PROGRESS NOTES
CHIEF COMPLAINT: Follow up of multiple myeloma, diabetes, hypertension, reflux, hyperlipidemia    HISTORY OF PRESENT ILLNESS: This is a 73-year-old man who presents for follow up of above     HE continues to have dental work done. Appetite has been ok. He has been working on his diet and has lost 8 pounds in the last 2 months.     HE had Euflexxa injection #1 on 4/12/19 in both knees.  He continues to have some knee pain.      He has seen neprhology 9/13/18 and kidney function is stable. He needs a follow up with nephrology     HE saw the diabetic educator in early April. He is on Lantus 20 units daily.  HE did not have low blood sugars when he was not eating well. He does not need Novolog.  Blood sugar kept dropping on VGO 20 because he changed his diet. HE is now on a low carbohydrate diet. Blood sugar in the morning is 90. Blood sugar in the evening 100-170. HE also takes glimepriride 2 mg daily.      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 He is in the observation arm and is being monitoring monthly. He saw Dr Engel 3/29/19. No indication for chemotherapy at this time.       He is taking losartan 25 mg 1 tablet daily to protect his kidney. No polydipsia or polyuria       His back and neck was doing better in the healthy back program.  He does not take hydrocodone apap. He takes tizanidine as needed (rarely). HE is wearing braces for his knees which is helping stability and his pain.      Reflux is controlled on omeprazole 20 mg 2 tablets daily. HE has heartburn when he does not take the omeprazole No chest pain, shortness of breath, nausea, voimting, constipation, diarrhea, numbness, weakness.        He had laser vaporization and enucleation of the prostate 11/13/13. He denies any dysuria or hematuria now. HE saw Dr Walker. Oxybutynin was stopped. HE is urinting better. He continues FLomax 0.4 mg nightly     He has hyperlipidemia, on pravastatin 40 mg daily. No joint pain or muscle pain from the  "pravastatin.        He has been taking aspirin 81 mg daily vitamin D supplement 2000 units daily.        Anxiety is controlled on celexa 20 mg daily. Mood is better. He takes diazepam 5 mg at bedtime as needed (2-3 times a week()  for his sleep and anxiety. It helps him sleep.      HE is taking gabapentin 100 mg 2 capsules as needed. He does not take daily. Feet are doing ok. He continues to have some mild pain in his feet from plantar fasciitis.  Pain is better.    He did physical therapy for his hips which has helped tremendously.      He has finished doxycycline 50 mg daily for recurrent boils. Boils have resolved.      PAST MEDICAL HISTORY:   1. Diabetes mellitus.   2. Hyperlipidemia.   3. Reflux.   4. BPH.   5. Osteoarthritis of the knees.   6. Neck pain.   7. History of right lateral epicondylitis.   8. History of lumps removed from the breast as a teenager.   9. OA cervical spine     SOCIAL HISTORY: Does not smoke, does not drink. He owns an    company. He works for the [employer].     FAMILY HISTORY: Mother is living at age 95 with a heart condition. She had a mild stroke. Father  in his late 40s of alcoholism. He has 5 sisters and 1 brother. one has a neurological disorder, one is anxious. ONe sister  after a fall. His daughter has  lupus, on dialysis, heart problems, and a brain aneurysm that was stented.       PHYSICAL EXAMINATION:    /70 (BP Location: Right arm, Patient Position: Sitting, BP Method: Large (Manual))   Pulse 96   Ht 5' 10" (1.778 m)   Wt 84.8 kg (187 lb)   SpO2 97%   BMI 26.83 kg/m²     GENERAL: He is alert, oriented, no apparent distress. Affect within normal limits.   Conjunctivae anicteric. PERRL. Tympanic membranes clear. Oropharynx gumes healing.    NECK: Supple. No cervical lymphadenopathy, no thyroid enlargement.   Respiratory: Effort normal. Lungs are clear to auscultation.   HEART: Regular rate and rhythm without murmurs, gallops or rubs.   No " lower extremity edema.    ABDOMEN: soft, non distended, non tender, bowel sounds present, no hepatosplenomgaly       ASSESSMENT AND PLAN:        1. Diabetes mellitus with hyperglycemia and microalbuminuria - followed by endocrine. Doing better. Watch for low blood sugars  2. 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 Labs reviewed  3. OA knee -getting Euflexxa.   4. Hypertension - controlled  5. Hyperlipidemia. On pravastatin  6. BPH. S/p laser - Saw urology 8/18  7. Vitamin D deficiency - on vitamin D 2,000 units daily.    9. Back pain-stable  10. Anxiety and depression- stable on celexa 20 mg one tablet daily.   11. Peripheral neuropathy -controlled on gabapentin.   12. CRI -stable - monitored closely by heme onc and nephrology.   13. I will see him back in 4 months, sooner if problems arise        Prevnar 7/16  PSA 8/16. colonoscopy normal 3/2012 and due in 2019    Influenza 9/21/18

## 2019-04-18 ENCOUNTER — OFFICE VISIT (OUTPATIENT)
Dept: SPORTS MEDICINE | Facility: CLINIC | Age: 74
End: 2019-04-18
Payer: MEDICARE

## 2019-04-18 VITALS
WEIGHT: 187 LBS | BODY MASS INDEX: 26.77 KG/M2 | HEIGHT: 70 IN | SYSTOLIC BLOOD PRESSURE: 137 MMHG | DIASTOLIC BLOOD PRESSURE: 76 MMHG | HEART RATE: 91 BPM

## 2019-04-18 DIAGNOSIS — M17.12 PRIMARY OSTEOARTHRITIS OF LEFT KNEE: Primary | ICD-10-CM

## 2019-04-18 DIAGNOSIS — M17.11 PRIMARY OSTEOARTHRITIS OF RIGHT KNEE: ICD-10-CM

## 2019-04-18 PROCEDURE — 99999 PR PBB SHADOW E&M-EST. PATIENT-LVL IV: CPT | Mod: PBBFAC,,, | Performed by: PHYSICIAN ASSISTANT

## 2019-04-18 PROCEDURE — 20610 PR DRAIN/INJECT LARGE JOINT/BURSA: ICD-10-PCS | Mod: 50,S$GLB,, | Performed by: PHYSICIAN ASSISTANT

## 2019-04-18 PROCEDURE — 99999 PR PBB SHADOW E&M-EST. PATIENT-LVL IV: ICD-10-PCS | Mod: PBBFAC,,, | Performed by: PHYSICIAN ASSISTANT

## 2019-04-18 PROCEDURE — 99499 NO LOS: ICD-10-PCS | Mod: S$GLB,,, | Performed by: PHYSICIAN ASSISTANT

## 2019-04-18 PROCEDURE — 99499 UNLISTED E&M SERVICE: CPT | Mod: S$GLB,,, | Performed by: PHYSICIAN ASSISTANT

## 2019-04-18 PROCEDURE — 20610 DRAIN/INJ JOINT/BURSA W/O US: CPT | Mod: 50,S$GLB,, | Performed by: PHYSICIAN ASSISTANT

## 2019-04-18 NOTE — PROGRESS NOTES
Patient is here for follow up of bilateral knee arthritis. Patient is requesting Euflexxa injection #2.  [facility] Has failed other conservative modalities including NSAIDS, activity modification, weight loss.    The prior shot was tolerated well.    PHYSICAL EXAMINATION:     General: The patient is alert and oriented x 3. Mood is pleasant.   Observation of ears, eyes and nose reveals no gross abnormalities. No   labored breathing observed.     No signs of infection or adverse reaction to knee.       Injection Procedure  A time out was performed, including verification of patient ID, procedure, site and side, availability of information and equipment, review of safety issues, and agreement with consent, the procedure site was marked.    After time out was performed, the patient was prepped aseptically with povidone-iodine swabsticks. A diagnostic and therapeutic injection of 2cc Euflexxa was given under sterile technique using a 22.5 gauge needle from the Superolateral  aspect of the bilateral Knee Joint in the supine position. Lot No: [id number]. Exp date: 2020-04-14.     Emerson Menendez had no adverse reactions to the medication. Pain decreased. He was instructed to apply ice to the joint for 20 minutes and avoid strenuous activities for 24-36 hours following the injection. He was warned of possible blood sugar and/or blood pressure changes during that time. Following that time, he can resume regular activities.    He was reminded to call the clinic immediately for any adverse side effects as explained in clinic today.

## 2019-04-24 ENCOUNTER — TELEPHONE (OUTPATIENT)
Dept: RESEARCH | Facility: HOSPITAL | Age: 74
End: 2019-04-24

## 2019-04-24 NOTE — TELEPHONE ENCOUNTER
Wednesday, April 24th, 2019      Protocol: [id number] Randomized Phase III Trial of Lenalidomide vs Observation Alone in Patients with 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.   Sponsor:  [facility]  IRB# [id number]  Study ID: [id number]  Investigator: GIL Engel  Pt Initials: P, J       Telephone    Called patient to remind him of his appointments tomorrow AM for labs and to see Dr. Engel.  He states understanding.  He was also instructed that my colleague Kimberly will be seeing him along with Dr. Engel as I will be out of office.  He states understanding.    No new issues to report at this time.  Will follow.

## 2019-04-25 ENCOUNTER — RESEARCH ENCOUNTER (OUTPATIENT)
Dept: HEMATOLOGY/ONCOLOGY | Facility: CLINIC | Age: 74
End: 2019-04-25

## 2019-04-25 ENCOUNTER — OFFICE VISIT (OUTPATIENT)
Dept: HEMATOLOGY/ONCOLOGY | Facility: CLINIC | Age: 74
End: 2019-04-25
Payer: MEDICARE

## 2019-04-25 ENCOUNTER — LAB VISIT (OUTPATIENT)
Dept: LAB | Facility: HOSPITAL | Age: 74
End: 2019-04-25
Attending: INTERNAL MEDICINE
Payer: MEDICARE

## 2019-04-25 VITALS
OXYGEN SATURATION: 97 % | DIASTOLIC BLOOD PRESSURE: 80 MMHG | TEMPERATURE: 98 F | HEART RATE: 94 BPM | BODY MASS INDEX: 27.61 KG/M2 | HEIGHT: 70 IN | RESPIRATION RATE: 15 BRPM | WEIGHT: 192.88 LBS | SYSTOLIC BLOOD PRESSURE: 146 MMHG

## 2019-04-25 DIAGNOSIS — C90.00 MULTIPLE MYELOMA, REMISSION STATUS UNSPECIFIED: ICD-10-CM

## 2019-04-25 DIAGNOSIS — D47.2 SMOLDERING MULTIPLE MYELOMA: Primary | ICD-10-CM

## 2019-04-25 DIAGNOSIS — Z79.4 TYPE 2 DIABETES MELLITUS WITH DIABETIC POLYNEUROPATHY, WITH LONG-TERM CURRENT USE OF INSULIN: ICD-10-CM

## 2019-04-25 DIAGNOSIS — E11.42 TYPE 2 DIABETES MELLITUS WITH DIABETIC POLYNEUROPATHY, WITH LONG-TERM CURRENT USE OF INSULIN: ICD-10-CM

## 2019-04-25 DIAGNOSIS — Z00.6 EXAMINATION OF PARTICIPANT IN CLINICAL TRIAL: ICD-10-CM

## 2019-04-25 DIAGNOSIS — N18.30 CKD STAGE 3 DUE TO TYPE 2 DIABETES MELLITUS: ICD-10-CM

## 2019-04-25 DIAGNOSIS — E11.22 CKD STAGE 3 DUE TO TYPE 2 DIABETES MELLITUS: ICD-10-CM

## 2019-04-25 LAB
ALBUMIN SERPL BCP-MCNC: 3.7 G/DL (ref 3.5–5.2)
ALP SERPL-CCNC: 54 U/L (ref 55–135)
ALT SERPL W/O P-5'-P-CCNC: 20 U/L (ref 10–44)
ANION GAP SERPL CALC-SCNC: 12 MMOL/L (ref 8–16)
AST SERPL-CCNC: 18 U/L (ref 10–40)
BASOPHILS # BLD AUTO: 0.05 K/UL (ref 0–0.2)
BASOPHILS NFR BLD: 1 % (ref 0–1.9)
BILIRUB SERPL-MCNC: 0.6 MG/DL (ref 0.1–1)
BUN SERPL-MCNC: 27 MG/DL (ref 8–23)
CALCIUM SERPL-MCNC: 9.9 MG/DL (ref 8.7–10.5)
CHLORIDE SERPL-SCNC: 103 MMOL/L (ref 95–110)
CO2 SERPL-SCNC: 23 MMOL/L (ref 23–29)
CREAT SERPL-MCNC: 1.8 MG/DL (ref 0.5–1.4)
DIFFERENTIAL METHOD: ABNORMAL
EOSINOPHIL # BLD AUTO: 0.2 K/UL (ref 0–0.5)
EOSINOPHIL NFR BLD: 4.5 % (ref 0–8)
ERYTHROCYTE [DISTWIDTH] IN BLOOD BY AUTOMATED COUNT: 13.9 % (ref 11.5–14.5)
EST. GFR  (AFRICAN AMERICAN): 42.5 ML/MIN/1.73 M^2
EST. GFR  (NON AFRICAN AMERICAN): 36.8 ML/MIN/1.73 M^2
GLUCOSE SERPL-MCNC: 157 MG/DL (ref 70–110)
HCT VFR BLD AUTO: 36.5 % (ref 40–54)
HGB BLD-MCNC: 12 G/DL (ref 14–18)
IGA SERPL-MCNC: 1630 MG/DL (ref 40–350)
IGG SERPL-MCNC: 914 MG/DL (ref 650–1600)
IGM SERPL-MCNC: 36 MG/DL (ref 50–300)
IMM GRANULOCYTES # BLD AUTO: 0 K/UL (ref 0–0.04)
IMM GRANULOCYTES NFR BLD AUTO: 0 % (ref 0–0.5)
LYMPHOCYTES # BLD AUTO: 1.7 K/UL (ref 1–4.8)
LYMPHOCYTES NFR BLD: 33.7 % (ref 18–48)
MAGNESIUM SERPL-MCNC: 2.2 MG/DL (ref 1.6–2.6)
MCH RBC QN AUTO: 29.3 PG (ref 27–31)
MCHC RBC AUTO-ENTMCNC: 32.9 G/DL (ref 32–36)
MCV RBC AUTO: 89 FL (ref 82–98)
MONOCYTES # BLD AUTO: 0.4 K/UL (ref 0.3–1)
MONOCYTES NFR BLD: 8.2 % (ref 4–15)
NEUTROPHILS # BLD AUTO: 2.7 K/UL (ref 1.8–7.7)
NEUTROPHILS NFR BLD: 52.6 % (ref 38–73)
NRBC BLD-RTO: 0 /100 WBC
PHOSPHATE SERPL-MCNC: 3.6 MG/DL (ref 2.7–4.5)
PLATELET # BLD AUTO: 252 K/UL (ref 150–350)
PMV BLD AUTO: 9.5 FL (ref 9.2–12.9)
POTASSIUM SERPL-SCNC: 4.6 MMOL/L (ref 3.5–5.1)
PROT SERPL-MCNC: 8.4 G/DL (ref 6–8.4)
RBC # BLD AUTO: 4.09 M/UL (ref 4.6–6.2)
SODIUM SERPL-SCNC: 138 MMOL/L (ref 136–145)
WBC # BLD AUTO: 5.1 K/UL (ref 3.9–12.7)

## 2019-04-25 PROCEDURE — 82784 ASSAY IGA/IGD/IGG/IGM EACH: CPT | Mod: Q1

## 2019-04-25 PROCEDURE — 86334 IMMUNOFIX E-PHORESIS SERUM: CPT | Mod: 26,Q1,, | Performed by: PATHOLOGY

## 2019-04-25 PROCEDURE — 99215 PR OFFICE/OUTPT VISIT, EST, LEVL V, 40-54 MIN: ICD-10-PCS | Mod: Q1,S$GLB,, | Performed by: INTERNAL MEDICINE

## 2019-04-25 PROCEDURE — 86334 PATHOLOGIST INTERPRETATION IFE: ICD-10-PCS | Mod: 26,Q1,, | Performed by: PATHOLOGY

## 2019-04-25 PROCEDURE — 99999 PR PBB SHADOW E&M-EST. PATIENT-LVL V: ICD-10-PCS | Mod: PBBFAC,,, | Performed by: INTERNAL MEDICINE

## 2019-04-25 PROCEDURE — 83520 IMMUNOASSAY QUANT NOS NONAB: CPT

## 2019-04-25 PROCEDURE — 86334 IMMUNOFIX E-PHORESIS SERUM: CPT

## 2019-04-25 PROCEDURE — 84100 ASSAY OF PHOSPHORUS: CPT | Mod: Q1

## 2019-04-25 PROCEDURE — 85025 COMPLETE CBC W/AUTO DIFF WBC: CPT | Mod: Q1

## 2019-04-25 PROCEDURE — 99999 PR PBB SHADOW E&M-EST. PATIENT-LVL V: CPT | Mod: PBBFAC,,, | Performed by: INTERNAL MEDICINE

## 2019-04-25 PROCEDURE — 84165 PROTEIN E-PHORESIS SERUM: CPT

## 2019-04-25 PROCEDURE — 99215 OFFICE O/P EST HI 40 MIN: CPT | Mod: Q1,S$GLB,, | Performed by: INTERNAL MEDICINE

## 2019-04-25 PROCEDURE — 36415 COLL VENOUS BLD VENIPUNCTURE: CPT

## 2019-04-25 PROCEDURE — 84165 PATHOLOGIST INTERPRETATION SPE: ICD-10-PCS | Mod: 26,Q1,, | Performed by: PATHOLOGY

## 2019-04-25 PROCEDURE — 84165 PROTEIN E-PHORESIS SERUM: CPT | Mod: 26,Q1,, | Performed by: PATHOLOGY

## 2019-04-25 PROCEDURE — 83735 ASSAY OF MAGNESIUM: CPT

## 2019-04-25 PROCEDURE — 80053 COMPREHEN METABOLIC PANEL: CPT

## 2019-04-25 NOTE — PROGRESS NOTES
Subjective:       Patient ID: Emerson Menendez is a 73 y.o. male.    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 Test results identify a stable IgA kappa protein with beta globulin band at 1.63g/dL. Kappa free light chain is elevated at 4.40. CBC and calcium are stable. Creatinine with history of stage III CKD is stable at 1.4. Metastatic survey is negative for lytic lesions. MRI of the entire spine demonstrated age related changes but no convincing evidence of myeloma bone disease. Bone marrow biopsy identified about 13% plasma cells by morphology and FISH for myeloma identified a t(11;14) and trisomies 3,7, and 17. The patient is afebrile and appears clinically well. He was seen by his podiatrist and nephrologist since our last visit without any new or acute events.    The patient has not received any therapy for smoldering myeloma including bisphosphonates or steroids. He has been randomized to observation arm of the the clinical study. 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 He has no current or prior history of malignancy.    TODAY  Mr. Menendez returns today for monthly follow-up evaluation. No clinical signs/symptoms of progression of his smoldering myeloma.   No acute interval events. Knee pain is stable today. Plantar fasciitis pain has improved.  Getting new BG monitor from Endocrine; current one is broken.    HPI  Review of Systems   Constitutional: Negative for activity change, appetite change, diaphoresis, fatigue, fever and unexpected weight change.   HENT: Negative.    Eyes: Negative.    Respiratory:  Negative.    Cardiovascular: Negative for leg swelling.   Gastrointestinal: Negative.    Endocrine: Negative.    Genitourinary: Negative.    Musculoskeletal: Negative.    Skin: Negative.    Allergic/Immunologic: Negative.    Neurological:        Grade 0-1 peripheral neuropathy of bilateral feet.    Hematological: Negative for adenopathy. Does not bruise/bleed easily.   Psychiatric/Behavioral: Negative.        Objective:       Vitals:    04/25/19 0929   BP: (!) 146/80   Pulse: 94   Resp: 15   Temp: 98.3 °F (36.8 °C)       Physical Exam   Constitutional: He is oriented to person, place, and time. He appears well-developed and well-nourished.   HENT:   Head: Normocephalic and atraumatic.   Right Ear: External ear normal.   Left Ear: External ear normal.   Nose: Nose normal.   Mouth/Throat: Oropharynx is clear and moist.   Eyes: Pupils are equal, round, and reactive to light. Conjunctivae and EOM are normal.   Neck: Normal range of motion. Neck supple.   Cardiovascular: Normal rate, regular rhythm and intact distal pulses.   No murmur heard.  Pulmonary/Chest: Effort normal and breath sounds normal. No respiratory distress.   Abdominal: Soft. Bowel sounds are normal. He exhibits no distension. There is no hepatosplenomegaly.   Musculoskeletal: He exhibits no edema or tenderness.   Neurological: He is alert and oriented to person, place, and time. No cranial nerve deficit.   Skin: Skin is warm and dry. No rash noted. No cyanosis. Nails show no clubbing.   Psychiatric: He has a normal mood and affect. His behavior is normal.   Nursing note and vitals reviewed.      Assessment:       1. Smoldering multiple myeloma    2. CKD stage 3 due to type 2 diabetes mellitus    3. Type 2 diabetes mellitus with diabetic polyneuropathy, with long-term current use of insulin        Plan:       The patient has a diagnosis of smoldering myeloma. There is no indication for immediate chemotherapy. We are monitoring renal function closely-  baseline creatinine of -1.5 is pending repeat today.  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 CBC and CMP are overall stable. Total protein is unchanged from last month. Complete biochemical studies from today are pending.  Plan for return in 1 month.  Endocrinology and Nephrology to monitor diabetes and renal failure respectively.

## 2019-04-25 NOTE — PROGRESS NOTES
"  Thursday, April 25th, 2019      Protocol: [id number] Randomized Phase III Trial of Lenalidomide vs Observation Alone in Patients with 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.   Sponsor:  [facility]  IRB# [id number]  Study ID: [id number]  Investigator: GIL Engel  Pt Initials: LUCÍA LORENZ       Arm B: Observation  Cycle 40, Day 28       Patient presented to evaluate for ability to proceed with above-mentioned protocol. States no new issues. He denies other new potential CRAB symptoms. He states continued willingness to participate in above-mentioned study.     Review of Baseline AE's:    *Please note this list is not all-inclusive, please see AE log for physician reviewed list of adverse events.   1. Hypertension, grade 1:146/80 today.  AE ongoing  2. Lower Back Pain and Neck Pain, grade 1: Patient has no complaints of this month. Worked with PT and helps with range of motion.  As previously stated, patient is not suspected to have bony myeloma involvement per Dr. Engel as these are pre-existing issues present at baseline.  AE ongoing.  3. Pain in extremity (knee), grade 1.  Continues to wear knee stabilizing braces, received "gel" injections to the knee within the last couple of weeks.  AE ongoing   4. Peripheral sensory neuropathy, grade 1: Patient does not verbalize complaints of this today. Has gabapentin and takes as needed.  "Haven't taken that one in a while" per patient. AE ongoing.   5. Anemia, Grade 1: Hgb/Hct -12/36.5.  Result reviewed by Dr. Engel. AE ongoing            Review of AE's:     *Please note this list is not all-inclusive, please see AE log for physician reviewed list of adverse events.   1. Creatinine increased, grade 2: Serum creatinine remains increased at 1.8 mg/dl today and GFR 46. This is slightly improved from last cycle. As previously stated, Dr. Engel states this has not been related to myeloma and is related chronic kidney issues likely secondary to diabetes and hypertension. Will continue " "observation monthly and to monitor renal function closely. Per MD, encouraged to increase water intake. AE worsened from baseline.      Per study chair, "the definition of progressive disease for this protocol is developing CRAB criteria that requires treatment systemically." Per Dr nEgel, based on today's exam and lab results thus far, patient does not have any s/s of CRAB: Normal Calcium level (9.9), absence of Renal insufficiency (serum creatinine 1.8, and GFR 46 per [facility]roft-Gault (thought to be r/t dehydration)--patient with a history of kidney dysfunction secondary to diabetes; Dr. Engel aware of these values and not concerned for myeloma involvement), absence of significant Anemia (hemoglobin 12, stable; will await myeloma labs for clarity relationship to myeloma) and absence of lytic Bone lesions (per baseline metastatic survey as well as MRI--see MD note for further comment). See MD note for ECOG score and H&P and flowsheets for laboratory work, vitals, etc. All myeloma-related blood work from last cycle including SPEP and JAGUAR have remained stable, and are currently pending for this cycle. Per Dr. Engel, patient shows no evidence of progression at last result. Patient informed that Dr. Engel will release all lab results to [facility]. He states understanding of this. QOL's not required at this timepoint.         Patient has f/u scheduled for next several months.  Patient was informed to review appointments and alert us of any conflict. Will continue to schedule patient as far out as possible, which seems to help maintain compliance with visits. Patient states having research RN's contact information and has MD contact information to call with any concerns, questions, or worsening of symptoms; he verbalized understanding.          "

## 2019-04-26 ENCOUNTER — OFFICE VISIT (OUTPATIENT)
Dept: SPORTS MEDICINE | Facility: CLINIC | Age: 74
End: 2019-04-26
Payer: MEDICARE

## 2019-04-26 VITALS
SYSTOLIC BLOOD PRESSURE: 124 MMHG | WEIGHT: 192 LBS | HEART RATE: 96 BPM | HEIGHT: 70 IN | DIASTOLIC BLOOD PRESSURE: 72 MMHG | BODY MASS INDEX: 27.49 KG/M2

## 2019-04-26 DIAGNOSIS — M17.12 PRIMARY OSTEOARTHRITIS OF LEFT KNEE: Primary | ICD-10-CM

## 2019-04-26 DIAGNOSIS — M17.11 PRIMARY OSTEOARTHRITIS OF RIGHT KNEE: ICD-10-CM

## 2019-04-26 LAB
ALBUMIN SERPL ELPH-MCNC: 4.45 G/DL (ref 3.35–5.55)
ALPHA1 GLOB SERPL ELPH-MCNC: 0.26 G/DL (ref 0.17–0.41)
ALPHA2 GLOB SERPL ELPH-MCNC: 0.85 G/DL (ref 0.43–0.99)
B-GLOBULIN SERPL ELPH-MCNC: 2.72 G/DL (ref 0.5–1.1)
GAMMA GLOB SERPL ELPH-MCNC: 0.51 G/DL (ref 0.67–1.58)
INTERPRETATION SERPL IFE-IMP: NORMAL
KAPPA LC SER QL IA: 6.2 MG/DL (ref 0.33–1.94)
KAPPA LC/LAMBDA SER IA: 3.83 (ref 0.26–1.65)
LAMBDA LC SER QL IA: 1.62 MG/DL (ref 0.57–2.63)
PATHOLOGIST INTERPRETATION IFE: NORMAL
PATHOLOGIST INTERPRETATION SPE: NORMAL
PROT SERPL-MCNC: 8.8 G/DL (ref 6–8.4)

## 2019-04-26 PROCEDURE — 20610 DRAIN/INJ JOINT/BURSA W/O US: CPT | Mod: 50,S$GLB,, | Performed by: PHYSICIAN ASSISTANT

## 2019-04-26 PROCEDURE — 99999 PR PBB SHADOW E&M-EST. PATIENT-LVL IV: ICD-10-PCS | Mod: PBBFAC,,, | Performed by: PHYSICIAN ASSISTANT

## 2019-04-26 PROCEDURE — 20610 PR DRAIN/INJECT LARGE JOINT/BURSA: ICD-10-PCS | Mod: 50,S$GLB,, | Performed by: PHYSICIAN ASSISTANT

## 2019-04-26 PROCEDURE — 99499 UNLISTED E&M SERVICE: CPT | Mod: S$GLB,,, | Performed by: PHYSICIAN ASSISTANT

## 2019-04-26 PROCEDURE — 99499 NO LOS: ICD-10-PCS | Mod: S$GLB,,, | Performed by: PHYSICIAN ASSISTANT

## 2019-04-26 PROCEDURE — 99999 PR PBB SHADOW E&M-EST. PATIENT-LVL IV: CPT | Mod: PBBFAC,,, | Performed by: PHYSICIAN ASSISTANT

## 2019-04-26 NOTE — PROGRESS NOTES
Patient is here for follow up of bilateral knee arthritis. Patient is requesting Euflexxa injection #3.  [facility] encounter record. Has failed other conservative modalities including NSAIDS, activity modification, weight loss.    The prior shot was tolerated well.    PHYSICAL EXAMINATION:     General: The patient is alert and oriented x 3. Mood is pleasant.   Observation of ears, eyes and nose reveals no gross abnormalities. No   labored breathing observed.     No signs of infection or adverse reaction to knee.       Injection Procedure  A time out was performed, including verification of patient ID, procedure, site and side, availability of information and equipment, review of safety issues, and agreement with consent, the procedure site was marked.    After time out was performed, the patient was prepped aseptically with povidone-iodine swabsticks. A diagnostic and therapeutic injection of 2cc Euflexxa was given under sterile technique using a 22.5 gauge needle from the Superolateral  aspect of the bilateral Knee Joint in the supine position. Lot No: [id number]. Exp date: 2020-04-14.     Emerson Menendez had no adverse reactions to the medication. Pain decreased. He was instructed to apply ice to the joint for 20 minutes and avoid strenuous activities for 24-36 hours following the injection. He was warned of possible blood sugar and/or blood pressure changes during that time. Following that time, he can resume regular activities.    He was reminded to call the clinic immediately for any adverse side effects as explained in clinic today.

## 2019-05-23 ENCOUNTER — LAB VISIT (OUTPATIENT)
Dept: LAB | Facility: HOSPITAL | Age: 74
End: 2019-05-23
Attending: INTERNAL MEDICINE
Payer: MEDICARE

## 2019-05-23 ENCOUNTER — OFFICE VISIT (OUTPATIENT)
Dept: HEMATOLOGY/ONCOLOGY | Facility: CLINIC | Age: 74
End: 2019-05-23
Payer: MEDICARE

## 2019-05-23 ENCOUNTER — RESEARCH ENCOUNTER (OUTPATIENT)
Dept: HEMATOLOGY/ONCOLOGY | Facility: CLINIC | Age: 74
End: 2019-05-23

## 2019-05-23 VITALS
RESPIRATION RATE: 15 BRPM | DIASTOLIC BLOOD PRESSURE: 84 MMHG | WEIGHT: 195.56 LBS | HEART RATE: 99 BPM | BODY MASS INDEX: 28 KG/M2 | TEMPERATURE: 98 F | SYSTOLIC BLOOD PRESSURE: 134 MMHG | OXYGEN SATURATION: 98 % | HEIGHT: 70 IN

## 2019-05-23 DIAGNOSIS — C90.00 MULTIPLE MYELOMA, REMISSION STATUS UNSPECIFIED: ICD-10-CM

## 2019-05-23 DIAGNOSIS — Z79.4 TYPE 2 DIABETES MELLITUS WITH DIABETIC POLYNEUROPATHY, WITH LONG-TERM CURRENT USE OF INSULIN: ICD-10-CM

## 2019-05-23 DIAGNOSIS — Z00.6 EXAMINATION OF PARTICIPANT IN CLINICAL TRIAL: ICD-10-CM

## 2019-05-23 DIAGNOSIS — D47.2 SMOLDERING MULTIPLE MYELOMA: Primary | ICD-10-CM

## 2019-05-23 DIAGNOSIS — N18.30 CKD STAGE 3 DUE TO TYPE 2 DIABETES MELLITUS: ICD-10-CM

## 2019-05-23 DIAGNOSIS — E11.42 TYPE 2 DIABETES MELLITUS WITH DIABETIC POLYNEUROPATHY, WITH LONG-TERM CURRENT USE OF INSULIN: ICD-10-CM

## 2019-05-23 DIAGNOSIS — E11.22 CKD STAGE 3 DUE TO TYPE 2 DIABETES MELLITUS: ICD-10-CM

## 2019-05-23 LAB
ALBUMIN SERPL BCP-MCNC: 3.6 G/DL (ref 3.5–5.2)
ALP SERPL-CCNC: 53 U/L (ref 55–135)
ALT SERPL W/O P-5'-P-CCNC: 24 U/L (ref 10–44)
ANION GAP SERPL CALC-SCNC: 9 MMOL/L (ref 8–16)
AST SERPL-CCNC: 25 U/L (ref 10–40)
BASOPHILS # BLD AUTO: 0.03 K/UL (ref 0–0.2)
BASOPHILS NFR BLD: 0.5 % (ref 0–1.9)
BILIRUB SERPL-MCNC: 0.5 MG/DL (ref 0.1–1)
BUN SERPL-MCNC: 19 MG/DL (ref 8–23)
CALCIUM SERPL-MCNC: 9.9 MG/DL (ref 8.7–10.5)
CHLORIDE SERPL-SCNC: 103 MMOL/L (ref 95–110)
CO2 SERPL-SCNC: 26 MMOL/L (ref 23–29)
CREAT SERPL-MCNC: 1.8 MG/DL (ref 0.5–1.4)
DIFFERENTIAL METHOD: ABNORMAL
EOSINOPHIL # BLD AUTO: 0.2 K/UL (ref 0–0.5)
EOSINOPHIL NFR BLD: 3.1 % (ref 0–8)
ERYTHROCYTE [DISTWIDTH] IN BLOOD BY AUTOMATED COUNT: 14.1 % (ref 11.5–14.5)
EST. GFR  (AFRICAN AMERICAN): 42.5 ML/MIN/1.73 M^2
EST. GFR  (NON AFRICAN AMERICAN): 36.8 ML/MIN/1.73 M^2
GLUCOSE SERPL-MCNC: 112 MG/DL (ref 70–110)
HCT VFR BLD AUTO: 34.5 % (ref 40–54)
HGB BLD-MCNC: 11.3 G/DL (ref 14–18)
IGA SERPL-MCNC: 1618 MG/DL (ref 40–350)
IGG SERPL-MCNC: 892 MG/DL (ref 650–1600)
IGM SERPL-MCNC: 31 MG/DL (ref 50–300)
IMM GRANULOCYTES # BLD AUTO: 0.01 K/UL (ref 0–0.04)
IMM GRANULOCYTES NFR BLD AUTO: 0.2 % (ref 0–0.5)
LYMPHOCYTES # BLD AUTO: 1.7 K/UL (ref 1–4.8)
LYMPHOCYTES NFR BLD: 28.7 % (ref 18–48)
MAGNESIUM SERPL-MCNC: 2.1 MG/DL (ref 1.6–2.6)
MCH RBC QN AUTO: 28.4 PG (ref 27–31)
MCHC RBC AUTO-ENTMCNC: 32.8 G/DL (ref 32–36)
MCV RBC AUTO: 87 FL (ref 82–98)
MONOCYTES # BLD AUTO: 0.5 K/UL (ref 0.3–1)
MONOCYTES NFR BLD: 7.8 % (ref 4–15)
NEUTROPHILS # BLD AUTO: 3.4 K/UL (ref 1.8–7.7)
NEUTROPHILS NFR BLD: 59.7 % (ref 38–73)
NRBC BLD-RTO: 0 /100 WBC
PHOSPHATE SERPL-MCNC: 3 MG/DL (ref 2.7–4.5)
PLATELET # BLD AUTO: 238 K/UL (ref 150–350)
PMV BLD AUTO: 9.3 FL (ref 9.2–12.9)
POTASSIUM SERPL-SCNC: 4.7 MMOL/L (ref 3.5–5.1)
PROT SERPL-MCNC: 8.3 G/DL (ref 6–8.4)
RBC # BLD AUTO: 3.98 M/UL (ref 4.6–6.2)
SODIUM SERPL-SCNC: 138 MMOL/L (ref 136–145)
WBC # BLD AUTO: 5.74 K/UL (ref 3.9–12.7)

## 2019-05-23 PROCEDURE — 84165 PATHOLOGIST INTERPRETATION SPE: ICD-10-PCS | Mod: 26,Q1,, | Performed by: PATHOLOGY

## 2019-05-23 PROCEDURE — 99215 PR OFFICE/OUTPT VISIT, EST, LEVL V, 40-54 MIN: ICD-10-PCS | Mod: Q1,S$GLB,, | Performed by: INTERNAL MEDICINE

## 2019-05-23 PROCEDURE — 82784 ASSAY IGA/IGD/IGG/IGM EACH: CPT | Mod: 59

## 2019-05-23 PROCEDURE — 86334 IMMUNOFIX E-PHORESIS SERUM: CPT | Mod: 26,Q1,, | Performed by: PATHOLOGY

## 2019-05-23 PROCEDURE — 83520 IMMUNOASSAY QUANT NOS NONAB: CPT | Mod: Q1

## 2019-05-23 PROCEDURE — 86334 IMMUNOFIX E-PHORESIS SERUM: CPT | Mod: Q1

## 2019-05-23 PROCEDURE — 99999 PR PBB SHADOW E&M-EST. PATIENT-LVL V: CPT | Mod: PBBFAC,,, | Performed by: INTERNAL MEDICINE

## 2019-05-23 PROCEDURE — 84165 PROTEIN E-PHORESIS SERUM: CPT | Mod: 26,Q1,, | Performed by: PATHOLOGY

## 2019-05-23 PROCEDURE — 86334 PATHOLOGIST INTERPRETATION IFE: ICD-10-PCS | Mod: 26,Q1,, | Performed by: PATHOLOGY

## 2019-05-23 PROCEDURE — 84165 PROTEIN E-PHORESIS SERUM: CPT | Mod: Q1

## 2019-05-23 PROCEDURE — 84100 ASSAY OF PHOSPHORUS: CPT | Mod: Q1

## 2019-05-23 PROCEDURE — 99215 OFFICE O/P EST HI 40 MIN: CPT | Mod: Q1,S$GLB,, | Performed by: INTERNAL MEDICINE

## 2019-05-23 PROCEDURE — 80053 COMPREHEN METABOLIC PANEL: CPT

## 2019-05-23 PROCEDURE — 36415 COLL VENOUS BLD VENIPUNCTURE: CPT

## 2019-05-23 PROCEDURE — 99999 PR PBB SHADOW E&M-EST. PATIENT-LVL V: ICD-10-PCS | Mod: PBBFAC,,, | Performed by: INTERNAL MEDICINE

## 2019-05-23 PROCEDURE — 83735 ASSAY OF MAGNESIUM: CPT | Mod: Q1

## 2019-05-23 PROCEDURE — 85025 COMPLETE CBC W/AUTO DIFF WBC: CPT

## 2019-05-23 NOTE — PROGRESS NOTES
Subjective:       Patient ID: Emerson Menendez is a 73 y.o. male.    Chief Complaint: Smoldering multiple myeloma  The patient is a very pleasant 70 year old man who returns today after completing his evaluation for enrollment in the ECOG-ACRIN study of the Randomized Phase III Trial of Lenalidomide Versus Observation Alone in Patients with 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. Test results identify a stable IgA kappa protein with beta globulin band at 1.63g/dL. Kappa free light chain is elevated at 4.40. CBC and calcium are stable. Creatinine with history of stage III CKD is stable at 1.4. Metastatic survey is negative for lytic lesions. MRI of the entire spine demonstrated age related changes but no convincing evidence of myeloma bone disease. Bone marrow biopsy identified about 13% plasma cells by morphology and FISH for myeloma identified a t(11;14) and trisomies 3,7, and 17. The patient is afebrile and appears clinically well. He was seen by his podiatrist and nephrologist since our last visit without any new or acute events.    The patient has not received any therapy for smoldering myeloma including bisphosphonates or steroids. He has been randomized to observation arm of the the clinical study. 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 He has no current or prior history of malignancy.    TODAY  Mr. Menendez returns today for monthly follow-up evaluation. No clinical signs/symptoms of progression of his smoldering myeloma.   No acute interval events. Knee pain is stable today. Plantar fasciitis pain has improved after injections.  CBC, CMP, is stable. M protein and free light chains are pending.    HPI  Review of Systems   Constitutional: Negative for activity change, appetite change, diaphoresis, fatigue, fever and unexpected weight change.   HENT: Negative.    Eyes: Negative.     Respiratory: Negative.    Cardiovascular: Negative for leg swelling.   Gastrointestinal: Negative.    Endocrine: Negative.    Genitourinary: Negative.    Musculoskeletal: Negative.    Skin: Negative.    Allergic/Immunologic: Negative.    Neurological:        Grade 0-1 peripheral neuropathy of bilateral feet.    Hematological: Negative for adenopathy. Does not bruise/bleed easily.   Psychiatric/Behavioral: Negative.        Objective:       Vitals:    05/23/19 1507   BP: 134/84   Pulse: 99   Resp: 15   Temp: 98.4 °F (36.9 °C)       Physical Exam   Constitutional: He is oriented to person, place, and time. He appears well-developed and well-nourished.   HENT:   Head: Normocephalic and atraumatic.   Right Ear: External ear normal.   Left Ear: External ear normal.   Nose: Nose normal.   Mouth/Throat: Oropharynx is clear and moist.   Eyes: Pupils are equal, round, and reactive to light. Conjunctivae and EOM are normal.   Neck: Normal range of motion. Neck supple.   Cardiovascular: Normal rate, regular rhythm and intact distal pulses.   No murmur heard.  Pulmonary/Chest: Effort normal and breath sounds normal. No respiratory distress.   Abdominal: Soft. Bowel sounds are normal. He exhibits no distension. There is no hepatosplenomegaly.   Musculoskeletal: He exhibits no edema or tenderness.   Neurological: He is alert and oriented to person, place, and time. No cranial nerve deficit.   Skin: Skin is warm and dry. No rash noted. No cyanosis. Nails show no clubbing.   Psychiatric: He has a normal mood and affect. His behavior is normal.   Nursing note and vitals reviewed.      Assessment:       1. Smoldering multiple myeloma    2. CKD stage 3 due to type 2 diabetes mellitus    3. Type 2 diabetes mellitus with diabetic polyneuropathy, with long-term current use of insulin        Plan:       The patient has a diagnosis of smoldering myeloma. There is no indication for immediate chemotherapy. We are monitoring renal function  closely- baseline creatinine of -1.5 - has been stable at 1.8 past few months.  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 Total protein remains normal, Mprotein increased to 1.9 2 months ago, then decreased to 1.6 last month. Free light chains are stable.  Plan for return in 1 month.  Endocrinology and Nephrology to monitor diabetes and renal failure respectively.

## 2019-05-24 LAB
ALBUMIN SERPL ELPH-MCNC: 3.95 G/DL (ref 3.35–5.55)
ALPHA1 GLOB SERPL ELPH-MCNC: 0.26 G/DL (ref 0.17–0.41)
ALPHA2 GLOB SERPL ELPH-MCNC: 0.8 G/DL (ref 0.43–0.99)
B-GLOBULIN SERPL ELPH-MCNC: 2.48 G/DL (ref 0.5–1.1)
GAMMA GLOB SERPL ELPH-MCNC: 0.5 G/DL (ref 0.67–1.58)
INTERPRETATION SERPL IFE-IMP: NORMAL
KAPPA LC SER QL IA: 4.68 MG/DL (ref 0.33–1.94)
KAPPA LC/LAMBDA SER IA: 3.06 (ref 0.26–1.65)
LAMBDA LC SER QL IA: 1.53 MG/DL (ref 0.57–2.63)
PATHOLOGIST INTERPRETATION IFE: NORMAL
PATHOLOGIST INTERPRETATION SPE: NORMAL
PROT SERPL-MCNC: 8 G/DL (ref 6–8.4)

## 2019-05-24 NOTE — PROGRESS NOTES
"  Thursday, May 24th, 2019      Protocol: [id number] Randomized Phase III Trial of Lenalidomide vs Observation Alone in Patients with 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.   Sponsor:  [facility]  IRB# [id number]  Study ID: [id number]  Investigator: GIL Engel  Pt Initials: LUCÍA LORENZ       Arm B: Observation  Cycle 41, Day 28       Patient presented to evaluate for ability to proceed with above-mentioned protocol. States no new issues. He denies other new potential CRAB symptoms. He states continued willingness to participate in above-mentioned study.     Review of Baseline AE's:    *Please note this list is not all-inclusive, please see AE log for physician reviewed list of adverse events.   1. Hypertension, grade 1:134/84 today.  AE ongoing  2. Lower Back Pain and Neck Pain, grade 1: Patient has no complaints of this month. Worked with PT and helps with range of motion.  As previously stated, patient is not suspected to have bony myeloma involvement per Dr. Engel as these are pre-existing issues present at baseline.  AE ongoing.  3. Pain in extremity (knee), grade 1.  Continues to wear knee stabilizing braces, received "gel" injections to the knee within the last couple of weeks.  AE ongoing   4. Peripheral sensory neuropathy, grade 1: Patient does not verbalize complaints of this today. Has gabapentin and takes as needed.  "Haven't taken that one in a while" per patient. AE ongoing.   5. Anemia, Grade 1: Hgb/Hct -11.3/34.5.  Result reviewed by Dr. Engel. AE ongoing            Review of AE's:     *Please note this list is not all-inclusive, please see AE log for physician reviewed list of adverse events.   1. Creatinine increased, grade 2: Serum creatinine remains increased at 1.8 mg/dl today and GFR 42. This is slightly worse from last cycle. As previously stated, Dr. Engel states this has not been related to myeloma and is related chronic kidney issues likely secondary to diabetes and hypertension. Will continue " "observation monthly and to monitor renal function closely. Per MD, encouraged to increase water intake. AE worsened from baseline.      Per study chair, "the definition of progressive disease for this protocol is developing CRAB criteria that requires treatment systemically." Per Dr Engel, based on today's exam and lab results thus far, patient does not have any s/s of CRAB: Normal Calcium level (9.9), absence of Renal insufficiency (serum creatinine 1.8, and GFR 42 per Cockroft-Gault (thought to be r/t dehydration)--patient with a history of kidney dysfunction secondary to diabetes; Dr. Engel aware of these values and not concerned for myeloma involvement), absence of significant Anemia (hemoglobin 11.3, stable; will await myeloma labs for clarity relationship to myeloma) and absence of lytic Bone lesions (per baseline metastatic survey as well as MRI--see MD note for further comment). See MD note for ECOG score and H&P and flowsheets for laboratory work, vitals, etc. All myeloma-related blood work from last cycle including SPEP and JAGUAR have remained stable, and are currently pending for this cycle. Per Dr. Engel, patient shows no evidence of progression at last result. Patient informed that Dr. Engel will release all lab results to [facility]. He states understanding of this. QOL's not required at this timepoint.         Patient has f/u scheduled for next several months.  Patient was informed to review appointments and alert us of any conflict. Will continue to schedule patient as far out as possible, which seems to help maintain compliance with visits. Patient states having research RN's contact information and has MD contact information to call with any concerns, questions, or worsening of symptoms; he verbalized understanding.          "

## 2019-06-26 ENCOUNTER — LAB VISIT (OUTPATIENT)
Dept: LAB | Facility: HOSPITAL | Age: 74
End: 2019-06-26
Attending: INTERNAL MEDICINE
Payer: MEDICARE

## 2019-06-26 ENCOUNTER — OFFICE VISIT (OUTPATIENT)
Dept: HEMATOLOGY/ONCOLOGY | Facility: CLINIC | Age: 74
End: 2019-06-26
Payer: MEDICARE

## 2019-06-26 ENCOUNTER — RESEARCH ENCOUNTER (OUTPATIENT)
Dept: RESEARCH | Facility: HOSPITAL | Age: 74
End: 2019-06-26

## 2019-06-26 VITALS
BODY MASS INDEX: 27.61 KG/M2 | DIASTOLIC BLOOD PRESSURE: 83 MMHG | TEMPERATURE: 98 F | SYSTOLIC BLOOD PRESSURE: 117 MMHG | OXYGEN SATURATION: 98 % | HEIGHT: 70 IN | HEART RATE: 88 BPM | WEIGHT: 192.88 LBS

## 2019-06-26 DIAGNOSIS — Z79.4 TYPE 2 DIABETES MELLITUS WITH DIABETIC POLYNEUROPATHY, WITH LONG-TERM CURRENT USE OF INSULIN: ICD-10-CM

## 2019-06-26 DIAGNOSIS — N18.30 CKD STAGE 3 DUE TO TYPE 2 DIABETES MELLITUS: ICD-10-CM

## 2019-06-26 DIAGNOSIS — E11.42 TYPE 2 DIABETES MELLITUS WITH DIABETIC POLYNEUROPATHY, WITH LONG-TERM CURRENT USE OF INSULIN: ICD-10-CM

## 2019-06-26 DIAGNOSIS — C90.00 MULTIPLE MYELOMA, REMISSION STATUS UNSPECIFIED: ICD-10-CM

## 2019-06-26 DIAGNOSIS — D47.2 SMOLDERING MULTIPLE MYELOMA: Primary | ICD-10-CM

## 2019-06-26 DIAGNOSIS — E11.22 CKD STAGE 3 DUE TO TYPE 2 DIABETES MELLITUS: ICD-10-CM

## 2019-06-26 DIAGNOSIS — Z00.6 EXAMINATION OF PARTICIPANT IN CLINICAL TRIAL: ICD-10-CM

## 2019-06-26 LAB
ALBUMIN SERPL BCP-MCNC: 3.4 G/DL (ref 3.5–5.2)
ALP SERPL-CCNC: 53 U/L (ref 55–135)
ALT SERPL W/O P-5'-P-CCNC: 20 U/L (ref 10–44)
ANION GAP SERPL CALC-SCNC: 8 MMOL/L (ref 8–16)
AST SERPL-CCNC: 19 U/L (ref 10–40)
BASOPHILS # BLD AUTO: 0.04 K/UL (ref 0–0.2)
BASOPHILS NFR BLD: 0.6 % (ref 0–1.9)
BILIRUB SERPL-MCNC: 0.4 MG/DL (ref 0.1–1)
BUN SERPL-MCNC: 17 MG/DL (ref 8–23)
CALCIUM SERPL-MCNC: 9.7 MG/DL (ref 8.7–10.5)
CHLORIDE SERPL-SCNC: 101 MMOL/L (ref 95–110)
CO2 SERPL-SCNC: 27 MMOL/L (ref 23–29)
CREAT SERPL-MCNC: 1.6 MG/DL (ref 0.5–1.4)
DIFFERENTIAL METHOD: ABNORMAL
EOSINOPHIL # BLD AUTO: 0.2 K/UL (ref 0–0.5)
EOSINOPHIL NFR BLD: 2.3 % (ref 0–8)
ERYTHROCYTE [DISTWIDTH] IN BLOOD BY AUTOMATED COUNT: 13.9 % (ref 11.5–14.5)
EST. GFR  (AFRICAN AMERICAN): 49 ML/MIN/1.73 M^2
EST. GFR  (NON AFRICAN AMERICAN): 42.4 ML/MIN/1.73 M^2
GLUCOSE SERPL-MCNC: 132 MG/DL (ref 70–110)
HCT VFR BLD AUTO: 36.2 % (ref 40–54)
HGB BLD-MCNC: 11.6 G/DL (ref 14–18)
IGA SERPL-MCNC: 1559 MG/DL (ref 40–350)
IGG SERPL-MCNC: 893 MG/DL (ref 650–1600)
IGM SERPL-MCNC: 41 MG/DL (ref 50–300)
IMM GRANULOCYTES # BLD AUTO: 0.02 K/UL (ref 0–0.04)
IMM GRANULOCYTES NFR BLD AUTO: 0.3 % (ref 0–0.5)
LYMPHOCYTES # BLD AUTO: 1.8 K/UL (ref 1–4.8)
LYMPHOCYTES NFR BLD: 26.4 % (ref 18–48)
MAGNESIUM SERPL-MCNC: 1.9 MG/DL (ref 1.6–2.6)
MCH RBC QN AUTO: 28.5 PG (ref 27–31)
MCHC RBC AUTO-ENTMCNC: 32 G/DL (ref 32–36)
MCV RBC AUTO: 89 FL (ref 82–98)
MONOCYTES # BLD AUTO: 0.4 K/UL (ref 0.3–1)
MONOCYTES NFR BLD: 6.2 % (ref 4–15)
NEUTROPHILS # BLD AUTO: 4.5 K/UL (ref 1.8–7.7)
NEUTROPHILS NFR BLD: 64.2 % (ref 38–73)
NRBC BLD-RTO: 0 /100 WBC
PHOSPHATE SERPL-MCNC: 3.1 MG/DL (ref 2.7–4.5)
PLATELET # BLD AUTO: 245 K/UL (ref 150–350)
PMV BLD AUTO: 9 FL (ref 9.2–12.9)
POTASSIUM SERPL-SCNC: 5 MMOL/L (ref 3.5–5.1)
PROT SERPL-MCNC: 8.3 G/DL (ref 6–8.4)
RBC # BLD AUTO: 4.07 M/UL (ref 4.6–6.2)
SODIUM SERPL-SCNC: 136 MMOL/L (ref 136–145)
WBC # BLD AUTO: 6.98 K/UL (ref 3.9–12.7)

## 2019-06-26 PROCEDURE — 86334 PATHOLOGIST INTERPRETATION IFE: ICD-10-PCS | Mod: 26,Q1,, | Performed by: PATHOLOGY

## 2019-06-26 PROCEDURE — 99215 OFFICE O/P EST HI 40 MIN: CPT | Mod: Q1,S$GLB,, | Performed by: INTERNAL MEDICINE

## 2019-06-26 PROCEDURE — 82784 ASSAY IGA/IGD/IGG/IGM EACH: CPT | Mod: 59

## 2019-06-26 PROCEDURE — 99215 PR OFFICE/OUTPT VISIT, EST, LEVL V, 40-54 MIN: ICD-10-PCS | Mod: Q1,S$GLB,, | Performed by: INTERNAL MEDICINE

## 2019-06-26 PROCEDURE — 84165 PROTEIN E-PHORESIS SERUM: CPT

## 2019-06-26 PROCEDURE — 85025 COMPLETE CBC W/AUTO DIFF WBC: CPT | Mod: Q1

## 2019-06-26 PROCEDURE — 86334 IMMUNOFIX E-PHORESIS SERUM: CPT

## 2019-06-26 PROCEDURE — 99999 PR PBB SHADOW E&M-EST. PATIENT-LVL III: CPT | Mod: PBBFAC,,, | Performed by: INTERNAL MEDICINE

## 2019-06-26 PROCEDURE — 84165 PROTEIN E-PHORESIS SERUM: CPT | Mod: 26,Q1,, | Performed by: PATHOLOGY

## 2019-06-26 PROCEDURE — 84100 ASSAY OF PHOSPHORUS: CPT | Mod: Q1

## 2019-06-26 PROCEDURE — 36415 COLL VENOUS BLD VENIPUNCTURE: CPT | Mod: Q1

## 2019-06-26 PROCEDURE — 83735 ASSAY OF MAGNESIUM: CPT | Mod: Q1

## 2019-06-26 PROCEDURE — 84165 PATHOLOGIST INTERPRETATION SPE: ICD-10-PCS | Mod: 26,Q1,, | Performed by: PATHOLOGY

## 2019-06-26 PROCEDURE — 86334 IMMUNOFIX E-PHORESIS SERUM: CPT | Mod: 26,Q1,, | Performed by: PATHOLOGY

## 2019-06-26 PROCEDURE — 83520 IMMUNOASSAY QUANT NOS NONAB: CPT

## 2019-06-26 PROCEDURE — 80053 COMPREHEN METABOLIC PANEL: CPT

## 2019-06-26 PROCEDURE — 99999 PR PBB SHADOW E&M-EST. PATIENT-LVL III: ICD-10-PCS | Mod: PBBFAC,,, | Performed by: INTERNAL MEDICINE

## 2019-06-26 NOTE — PROGRESS NOTES
Subjective:       Patient ID: Emerson Menendez is a 73 y.o. male.    Chief Complaint: Smoldering multiple myeloma  The patient is a very pleasant 70 year old man who returns today after completing his evaluation for enrollment in the ECOG-ACRIN study of the Randomized Phase III Trial of Lenalidomide Versus Observation Alone in Patients with 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. Test results identify a stable IgA kappa protein with beta globulin band at 1.63g/dL. Kappa free light chain is elevated at 4.40. CBC and calcium are stable. Creatinine with history of stage III CKD is stable at 1.4. Metastatic survey is negative for lytic lesions. MRI of the entire spine demonstrated age related changes but no convincing evidence of myeloma bone disease. Bone marrow biopsy identified about 13% plasma cells by morphology and FISH for myeloma identified a t(11;14) and trisomies 3,7, and 17. The patient is afebrile and appears clinically well. He was seen by his podiatrist and nephrologist since our last visit without any new or acute events.    The patient has not received any therapy for smoldering myeloma including bisphosphonates or steroids. He has been randomized to observation arm of the the clinical study. 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 He has no current or prior history of malignancy.    TODAY  Mr. Menendez returns today for monthly follow-up evaluation. No clinical signs/symptoms of progression of his smoldering myeloma.   No acute interval events. Knee pain is stable today. Recent episode of food poisoning while traveling from [place] this past week.  CBC, CMP, is stable. M protein and free light chains are pending.    HPI  Review of Systems   Constitutional: Negative for activity change, appetite change, diaphoresis, fatigue, fever and unexpected weight change.   HENT:  Negative.    Eyes: Negative.    Respiratory: Negative.    Cardiovascular: Negative for leg swelling.   Gastrointestinal: Negative.    Endocrine: Negative.    Genitourinary: Negative.    Musculoskeletal: Negative.    Skin: Negative.    Allergic/Immunologic: Negative.    Neurological:        Grade 0-1 peripheral neuropathy of bilateral feet.    Hematological: Negative for adenopathy. Does not bruise/bleed easily.   Psychiatric/Behavioral: Negative.        Objective:       Vitals:    06/26/19 1353   BP: 117/83   Pulse: 88   Temp: 98.3 °F (36.8 °C)       Physical Exam   Constitutional: He is oriented to person, place, and time. He appears well-developed and well-nourished.   HENT:   Head: Normocephalic and atraumatic.   Right Ear: External ear normal.   Left Ear: External ear normal.   Nose: Nose normal.   Mouth/Throat: Oropharynx is clear and moist.   Eyes: Pupils are equal, round, and reactive to light. Conjunctivae and EOM are normal.   Neck: Normal range of motion. Neck supple.   Cardiovascular: Normal rate, regular rhythm and intact distal pulses.   No murmur heard.  Pulmonary/Chest: Effort normal and breath sounds normal. No respiratory distress.   Abdominal: Soft. Bowel sounds are normal. He exhibits no distension. There is no hepatosplenomegaly.   Musculoskeletal: He exhibits no edema or tenderness.   Neurological: He is alert and oriented to person, place, and time. No cranial nerve deficit.   Skin: Skin is warm and dry. No rash noted. No cyanosis. Nails show no clubbing.   Psychiatric: He has a normal mood and affect. His behavior is normal.   Nursing note and vitals reviewed.      Assessment:       1. Smoldering multiple myeloma    2. CKD stage 3 due to type 2 diabetes mellitus    3. Type 2 diabetes mellitus with diabetic polyneuropathy, with long-term current use of insulin        Plan:       The patient has a diagnosis of smoldering myeloma. There is no indication for immediate chemotherapy. We are  monitoring renal function closely- baseline creatinine of -1.5 - has been stable at 1.8 past few months; now trending to 1.6.  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 Total protein remains normal, M protein has been stable; pending today. Free light chains are stable; pending today.  Plan for return in 1 month.  Endocrinology and Nephrology to monitor diabetes and renal failure respectively.

## 2019-06-26 NOTE — PROGRESS NOTES
"   Wednesday, June 26th, 2019    Protocol: [id number] Randomized Phase III Trial of Lenalidomide vs Observation Alone in Patients with 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.   Sponsor:  [facility]  IRB# [id number]  Study ID: [id number]  Investigator: GIL Engel  Pt Initials: LUCÍA LORENZ       Arm B: Observation  Cycle 42, Day 28       Patient presented to evaluate for ability to proceed with above-mentioned protocol. Patient presents for visit 6 days late due to patient being out of town and limited provider availability. States he just got over food poisoning but is feeling normal again for first day.  He denies other new potential CRAB symptoms. He states continued willingness to participate in above-mentioned study.     Review of Baseline AE's:    *Please note this list is not all-inclusive, please see AE log for physician reviewed list of adverse events.   1. Hypertension, grade 1: Well controlled today at 117/83 today.  AE ongoing  2. Lower Back Pain and Neck Pain, grade 1: Patient has no complaints of this month. Worked with PT and helps with range of motion.  As previously stated, patient is not suspected to have bony myeloma involvement per Dr. Engel as these are pre-existing issues present at baseline.  AE ongoing.  3. Pain in extremity (knee), grade 1.  Continues to wear knee stabilizing braces, received "gel" injections to the knee within the last couple of weeks.  AE ongoing   4. Peripheral sensory neuropathy, grade 1: Patient does not verbalize complaints of this today. Has gabapentin and takes as needed.  "Haven't taken that one in a while" per patient. AE ongoing.   5. Anemia, Grade 1: Hgb/Hct - stable at 11.6/36.2.  Result reviewed by Dr. Engel. AE ongoing            Review of AE's:     *Please note this list is not all-inclusive, please see AE log for physician reviewed list of adverse events.   1. Creatinine increased, grade 2: Serum creatinine remains increased at 1.6 mg/dl today and GFR 51.65. This is " "slightly improved from last cycle. As previously stated, Dr. Engel states this has not been related to myeloma and is related chronic kidney issues likely secondary to diabetes and hypertension. Will continue observation monthly and to monitor renal function closely. Per MD, encouraged to increase water intake. AE stable from baseline      Per study chair, "the definition of progressive disease for this protocol is developing CRAB criteria that requires treatment systemically." Per Dr Engel, based on today's exam and lab results thus far, patient does not have any s/s of CRAB: Normal Calcium level (9.7), absence of Renal insufficiency (serum creatinine 1.6, and GFR 51.65 per Cockroft-Gault) --patient with a history of kidney dysfunction secondary to diabetes; Dr. Engel aware of these values and not concerned for myeloma involvement), absence of significant Anemia (hemoglobin 11.6, stable; will await myeloma labs for clarity relationship to myeloma) and absence of lytic Bone lesions (per baseline metastatic survey as well as MRI--see MD note for further comment). See MD note for ECOG score and H&P and flowsheets for laboratory work, vitals, etc. All myeloma-related blood work from last cycle including SPEP and JAGUAR have remained stable, and are currently pending for this cycle. Per Dr. Engel, patient shows no evidence of progression at last result. Patient informed that Dr. Engel will release all lab results to [facility]. He states understanding of this. QOL's  required at this timepoint, performed over the phone following visit as paper forms were not available.           Patient has f/u scheduled for next several months.  Patient was informed to review appointments and alert us of any conflict. Will continue to schedule patient as far out as possible, which seems to help maintain compliance with visits. Patient states having research RN's contact information and has MD contact information to call with any concerns, " questions, or worsening of symptoms; he verbalized understanding.

## 2019-06-27 LAB
ALBUMIN SERPL ELPH-MCNC: 3.78 G/DL (ref 3.35–5.55)
ALPHA1 GLOB SERPL ELPH-MCNC: 0.3 G/DL (ref 0.17–0.41)
ALPHA2 GLOB SERPL ELPH-MCNC: 0.9 G/DL (ref 0.43–0.99)
B-GLOBULIN SERPL ELPH-MCNC: 2.5 G/DL (ref 0.5–1.1)
GAMMA GLOB SERPL ELPH-MCNC: 0.52 G/DL (ref 0.67–1.58)
INTERPRETATION SERPL IFE-IMP: NORMAL
KAPPA LC SER QL IA: 8.3 MG/DL (ref 0.33–1.94)
KAPPA LC/LAMBDA SER IA: 4.23 (ref 0.26–1.65)
LAMBDA LC SER QL IA: 1.96 MG/DL (ref 0.57–2.63)
PATHOLOGIST INTERPRETATION IFE: NORMAL
PATHOLOGIST INTERPRETATION SPE: NORMAL
PROT SERPL-MCNC: 8 G/DL (ref 6–8.4)

## 2019-07-09 ENCOUNTER — PATIENT MESSAGE (OUTPATIENT)
Dept: PODIATRY | Facility: CLINIC | Age: 74
End: 2019-07-09

## 2019-07-15 ENCOUNTER — PATIENT MESSAGE (OUTPATIENT)
Dept: INTERNAL MEDICINE | Facility: CLINIC | Age: 74
End: 2019-07-15

## 2019-07-15 ENCOUNTER — PATIENT MESSAGE (OUTPATIENT)
Dept: SPORTS MEDICINE | Facility: CLINIC | Age: 74
End: 2019-07-15

## 2019-07-15 ENCOUNTER — TELEPHONE (OUTPATIENT)
Dept: PODIATRY | Facility: CLINIC | Age: 74
End: 2019-07-15

## 2019-07-15 DIAGNOSIS — M72.2 PLANTAR FASCIITIS: Primary | ICD-10-CM

## 2019-07-15 NOTE — TELEPHONE ENCOUNTER
----- Message from Sarika Thacker sent at 7/15/2019 11:58 AM CDT -----  Contact: patient   Please call pt at [phone]    Patient is requesting a immediate appt. Patient is not able to walk    Thank you

## 2019-07-15 NOTE — TELEPHONE ENCOUNTER
Called patient states he is in severe pain unable to walk and needs an injection soon, schedule appointment for 7/16/19 at 2:45 with Dr. Short for evaluation of foot pain.

## 2019-07-17 ENCOUNTER — OFFICE VISIT (OUTPATIENT)
Dept: PODIATRY | Facility: CLINIC | Age: 74
End: 2019-07-17
Payer: MEDICARE

## 2019-07-17 VITALS
WEIGHT: 192 LBS | BODY MASS INDEX: 36.25 KG/M2 | SYSTOLIC BLOOD PRESSURE: 148 MMHG | HEART RATE: 104 BPM | HEIGHT: 61 IN | DIASTOLIC BLOOD PRESSURE: 90 MMHG

## 2019-07-17 DIAGNOSIS — M72.2 PLANTAR FASCIITIS: Primary | ICD-10-CM

## 2019-07-17 PROCEDURE — 3080F PR MOST RECENT DIASTOLIC BLOOD PRESSURE >= 90 MM HG: ICD-10-PCS | Mod: CPTII,S$GLB,, | Performed by: PODIATRIST

## 2019-07-17 PROCEDURE — 3080F DIAST BP >= 90 MM HG: CPT | Mod: CPTII,S$GLB,, | Performed by: PODIATRIST

## 2019-07-17 PROCEDURE — 3077F SYST BP >= 140 MM HG: CPT | Mod: CPTII,S$GLB,, | Performed by: PODIATRIST

## 2019-07-17 PROCEDURE — 20550 NJX 1 TENDON SHEATH/LIGAMENT: CPT | Mod: RT,S$GLB,, | Performed by: PODIATRIST

## 2019-07-17 PROCEDURE — 20550 PR INJECT TENDON SHEATH/LIGAMENT: ICD-10-PCS | Mod: RT,S$GLB,, | Performed by: PODIATRIST

## 2019-07-17 PROCEDURE — 3077F PR MOST RECENT SYSTOLIC BLOOD PRESSURE >= 140 MM HG: ICD-10-PCS | Mod: CPTII,S$GLB,, | Performed by: PODIATRIST

## 2019-07-17 PROCEDURE — 99213 OFFICE O/P EST LOW 20 MIN: CPT | Mod: 25,S$GLB,, | Performed by: PODIATRIST

## 2019-07-17 PROCEDURE — 1101F PT FALLS ASSESS-DOCD LE1/YR: CPT | Mod: CPTII,S$GLB,, | Performed by: PODIATRIST

## 2019-07-17 PROCEDURE — 99213 PR OFFICE/OUTPT VISIT, EST, LEVL III, 20-29 MIN: ICD-10-PCS | Mod: 25,S$GLB,, | Performed by: PODIATRIST

## 2019-07-17 PROCEDURE — 1101F PR PT FALLS ASSESS DOC 0-1 FALLS W/OUT INJ PAST YR: ICD-10-PCS | Mod: CPTII,S$GLB,, | Performed by: PODIATRIST

## 2019-07-17 PROCEDURE — 99999 PR PBB SHADOW E&M-EST. PATIENT-LVL IV: ICD-10-PCS | Mod: PBBFAC,,, | Performed by: PODIATRIST

## 2019-07-17 PROCEDURE — 99999 PR PBB SHADOW E&M-EST. PATIENT-LVL IV: CPT | Mod: PBBFAC,,, | Performed by: PODIATRIST

## 2019-07-17 RX ORDER — MELOXICAM 15 MG/1
15 TABLET ORAL DAILY
Qty: 30 TABLET | Refills: 0 | Status: SHIPPED | OUTPATIENT
Start: 2019-07-17 | End: 2019-07-17 | Stop reason: SDUPTHER

## 2019-07-17 RX ADMIN — TRIAMCINOLONE ACETONIDE 40 MG: 40 INJECTION, SUSPENSION INTRA-ARTICULAR; INTRAMUSCULAR at 09:07

## 2019-07-17 RX ADMIN — DEXAMETHASONE SODIUM PHOSPHATE 4 MG: 4 INJECTION, SOLUTION INTRA-ARTICULAR; INTRALESIONAL; INTRAMUSCULAR; INTRAVENOUS; SOFT TISSUE at 09:07

## 2019-07-18 ENCOUNTER — OFFICE VISIT (OUTPATIENT)
Dept: SPORTS MEDICINE | Facility: CLINIC | Age: 74
End: 2019-07-18
Payer: MEDICARE

## 2019-07-18 VITALS
WEIGHT: 185 LBS | BODY MASS INDEX: 26.48 KG/M2 | DIASTOLIC BLOOD PRESSURE: 75 MMHG | HEART RATE: 112 BPM | HEIGHT: 70 IN | SYSTOLIC BLOOD PRESSURE: 129 MMHG

## 2019-07-18 DIAGNOSIS — M17.12 PRIMARY OSTEOARTHRITIS OF LEFT KNEE: ICD-10-CM

## 2019-07-18 DIAGNOSIS — M17.11 PRIMARY OSTEOARTHRITIS OF RIGHT KNEE: Primary | ICD-10-CM

## 2019-07-18 PROCEDURE — 99214 OFFICE O/P EST MOD 30 MIN: CPT | Mod: 25,S$GLB,, | Performed by: PHYSICIAN ASSISTANT

## 2019-07-18 PROCEDURE — 3074F SYST BP LT 130 MM HG: CPT | Mod: CPTII,S$GLB,, | Performed by: PHYSICIAN ASSISTANT

## 2019-07-18 PROCEDURE — 3078F PR MOST RECENT DIASTOLIC BLOOD PRESSURE < 80 MM HG: ICD-10-PCS | Mod: CPTII,S$GLB,, | Performed by: PHYSICIAN ASSISTANT

## 2019-07-18 PROCEDURE — 1101F PR PT FALLS ASSESS DOC 0-1 FALLS W/OUT INJ PAST YR: ICD-10-PCS | Mod: CPTII,S$GLB,, | Performed by: PHYSICIAN ASSISTANT

## 2019-07-18 PROCEDURE — 3078F DIAST BP <80 MM HG: CPT | Mod: CPTII,S$GLB,, | Performed by: PHYSICIAN ASSISTANT

## 2019-07-18 PROCEDURE — 99999 PR PBB SHADOW E&M-EST. PATIENT-LVL IV: CPT | Mod: PBBFAC,,, | Performed by: PHYSICIAN ASSISTANT

## 2019-07-18 PROCEDURE — 99214 PR OFFICE/OUTPT VISIT, EST, LEVL IV, 30-39 MIN: ICD-10-PCS | Mod: 25,S$GLB,, | Performed by: PHYSICIAN ASSISTANT

## 2019-07-18 PROCEDURE — 3074F PR MOST RECENT SYSTOLIC BLOOD PRESSURE < 130 MM HG: ICD-10-PCS | Mod: CPTII,S$GLB,, | Performed by: PHYSICIAN ASSISTANT

## 2019-07-18 PROCEDURE — 99999 PR PBB SHADOW E&M-EST. PATIENT-LVL IV: ICD-10-PCS | Mod: PBBFAC,,, | Performed by: PHYSICIAN ASSISTANT

## 2019-07-18 PROCEDURE — 20610 DRAIN/INJ JOINT/BURSA W/O US: CPT | Mod: 50,S$GLB,, | Performed by: PHYSICIAN ASSISTANT

## 2019-07-18 PROCEDURE — 1101F PT FALLS ASSESS-DOCD LE1/YR: CPT | Mod: CPTII,S$GLB,, | Performed by: PHYSICIAN ASSISTANT

## 2019-07-18 PROCEDURE — 20610 PR DRAIN/INJECT LARGE JOINT/BURSA: ICD-10-PCS | Mod: 50,S$GLB,, | Performed by: PHYSICIAN ASSISTANT

## 2019-07-18 RX ORDER — LIDOCAINE HYDROCHLORIDE 10 MG/ML
2 INJECTION INFILTRATION; PERINEURAL
Status: COMPLETED | OUTPATIENT
Start: 2019-07-18 | End: 2019-07-18

## 2019-07-18 RX ORDER — BUPIVACAINE HYDROCHLORIDE 2.5 MG/ML
2 INJECTION, SOLUTION INFILTRATION; PERINEURAL
Status: COMPLETED | OUTPATIENT
Start: 2019-07-18 | End: 2019-07-18

## 2019-07-18 RX ORDER — TRIAMCINOLONE ACETONIDE 40 MG/ML
40 INJECTION, SUSPENSION INTRA-ARTICULAR; INTRAMUSCULAR
Status: COMPLETED | OUTPATIENT
Start: 2019-07-18 | End: 2019-07-18

## 2019-07-18 RX ADMIN — TRIAMCINOLONE ACETONIDE 40 MG: 40 INJECTION, SUSPENSION INTRA-ARTICULAR; INTRAMUSCULAR at 03:07

## 2019-07-18 RX ADMIN — BUPIVACAINE HYDROCHLORIDE 5 MG: 2.5 INJECTION, SOLUTION INFILTRATION; PERINEURAL at 03:07

## 2019-07-18 RX ADMIN — LIDOCAINE HYDROCHLORIDE 2 ML: 10 INJECTION INFILTRATION; PERINEURAL at 03:07

## 2019-07-18 NOTE — PROGRESS NOTES
CHIEF COMPLAINT: bilateral knee pain                              Interval hx: Pt reports a return of knee pain 3 months after Euflexxa series injections. % relief until now. Pain symptoms are similar as before. Requesting CSI today.           HPI:  The patient is a 72 y.o. male  who presents for follow up evaluation of his bilateral knee pain, left worse than right, requesting left CSI injection. He has been doing a HEP in his gym. He finished the Euflexxa series in October 2017. He was doing well but had a lot of night pain last night. He wears his medial  brace during the day and this helps his knee pain.  Issues is currently at night, when he is not wearing his braces. No new trauma or injury. He wants to avoid getting a TKA.    + mechanical symptoms. Pain: 8/10 at its worst    Review of Systems   Constitution: Negative. Negative for chills, fever and night sweats.   HENT: Negative for congestion and headaches.    Eyes: Negative for blurred vision, left vision loss and right vision loss.   Cardiovascular: Negative for chest pain and syncope.   Respiratory: Negative for cough and shortness of breath.    Endocrine: Negative for polydipsia, polyphagia and polyuria.   Hematologic/Lymphatic: Negative for bleeding problem. Does not bruise/bleed easily.   Skin: Negative for dry skin, itching and rash.   Musculoskeletal: Negative for falls and muscle weakness.   Gastrointestinal: Negative for abdominal pain and bowel incontinence.   Genitourinary: Negative for bladder incontinence and nocturia.   Neurological: Negative for disturbances in coordination, loss of balance and seizures.   Psychiatric/Behavioral: Negative for depression. The patient does not have insomnia.    Allergic/Immunologic: Negative for hives and persistent infections.     PAST MEDICAL HISTORY:   Past Medical History:   Diagnosis Date    Anxiety 3/16/2015    BPH (benign prostatic hypertrophy) 9/24/2012    Depression 3/16/2015    GERD  (gastroesophageal reflux disease) 9/24/2012    Microalbuminuria 9/24/2012    Osteoarthritis of cervical spine 9/24/2012    Osteoarthritis of knee 9/24/2012    Type II or unspecified type diabetes mellitus without mention of complication, not stated as uncontrolled 9/24/2012     PAST SURGICAL HISTORY:   Past Surgical History:   Procedure Laterality Date    CATARACT EXTRACTION  2012    Right eye    CYSTOSCOPY N/A 11/13/2013    Performed by Juan Walker MD at [facility] OR [address] [place] [address]    PROSTATE SURGERY      TURP, USING THULIUM LASER N/A 11/13/2013    Performed by Juan Walker MD at [facility] OR [address] [place] [address]     FAMILY HISTORY:   Family History   Problem Relation Age of Onset    Hypertension Brother     Kidney failure Daughter         due to medication    Blindness Neg Hx     Cancer Neg Hx     Cataracts Neg Hx     Diabetes Neg Hx     Glaucoma Neg Hx     Macular degeneration Neg Hx     Retinal detachment Neg Hx     Strabismus Neg Hx     Stroke Neg Hx     Thyroid disease Neg Hx      SOCIAL HISTORY:   Social History     Socioeconomic History    Marital status: Single     Spouse name: Not on file    Number of children: Not on file    Years of education: Not on file    Highest education level: Not on file   Occupational History    Not on file   Social Needs    Financial resource strain: Not on file    Food insecurity:     Worry: Not on file     Inability: Not on file    Transportation needs:     Medical: Not on file     Non-medical: Not on file   Tobacco Use    Smoking status: Never Smoker    Smokeless tobacco: Never Used   Substance and Sexual Activity    Alcohol use: No    Drug use: No    Sexual activity: Yes     Partners: Female     Birth control/protection: None   Lifestyle    Physical activity:     Days per week: Not on file     Minutes per session: Not on file    Stress: Not on file   Relationships    Social connections:     Talks on phone: Not on file     Gets together: Not on file     Attends  "Church service: Not on file     Active member of club or organization: Not on file     Attends meetings of clubs or organizations: Not on file     Relationship status: Not on file   Other Topics Concern    Not on file   Social History Narrative    Not on file       MEDICATIONS:   Current Outpatient Medications:     aspirin (ECOTRIN) 81 MG EC tablet, Take 1 tablet (81 mg total) by mouth once daily., Disp: 100 tablet, Rfl: 3    BD ULTRA-FINE SHORT PEN NEEDLE 31 gauge x 5/16" Ndle, USE TO INJECT INSULIN ONE TIME DAILY, Disp: 100 each, Rfl: 3    blood glucose control, normal (METER-CHECK) Soln, One meter. True test meter. Use as directed, Disp: 1 each, Rfl: 0    blood sugar diagnostic (ACCU-CHEK STEFANO PLUS TEST STRP) Strp, TEST FOUR TIMES DAILY, Disp: 400 strip, Rfl: 3    blood-glucose meter (ACCU-CHEK OMAYRA) [facility], USE AS DIRECTED. Accucheck stefano plus, Disp: 1 each, Rfl: 0    cholecalciferol, vitamin D3, (VITAMIN D) 2,000 unit Cap, Take by mouth. 1 Capsule Oral Every day.  over the counter, Disp: , Rfl:     citalopram (CELEXA) 20 MG tablet, Take 1 tablet (20 mg total) by mouth once daily., Disp: 90 tablet, Rfl: 11    diazePAM (VALIUM) 5 MG tablet, Take 1 tablet (5 mg total) by mouth every 6 (six) hours as needed., Disp: 60 tablet, Rfl: 1    diazePAM (VALIUM) 5 MG tablet, TAKE 1 TABLET(5 MG) BY MOUTH EVERY 6 HOURS AS NEEDED, Disp: 60 tablet, Rfl: 1    gabapentin (NEURONTIN) 100 MG capsule, TAKE 1 CAPSULE EVERY MORNING, 1 CAPSULE IN THE AFTERNOON, AND 1 TO 3 CAPSULE(S) AT BEDTIME AS NEEDED FOR FOOT PAIN, Disp: 450 capsule, Rfl: 3    glimepiride (AMARYL) 2 MG tablet, daily with breakfast. , Disp: , Rfl:     glimepiride (AMARYL) 2 MG tablet, TAKE 1 TABLET (2 MG TOTAL) BY MOUTH ONCE DAILY., Disp: 90 tablet, Rfl: 4    insulin (LANTUS SOLOSTAR U-100 INSULIN) glargine 100 units/mL (3mL) SubQ pen, Inject 15 Units into the skin every evening., Disp: 1 Box, Rfl: 6    insulin (LANTUS SOLOSTAR U-100 INSULIN) " "glargine 100 units/mL (3mL) SubQ pen, Inject 15 Units into the skin every evening., Disp: 1 Box, Rfl: 6    lancets Misc, Use twice daily, Disp: 200 each, Rfl: 4    latanoprost 0.005 % ophthalmic solution, Place 1 drop into both eyes every evening., Disp: 7.5 mL, Rfl: 3    losartan (COZAAR) 25 MG tablet, Take 1 tablet (25 mg total) by mouth once daily., Disp: 90 tablet, Rfl: 4    meloxicam (MOBIC) 15 MG tablet, Take 1 tablet (15 mg total) by mouth once daily., Disp: 30 tablet, Rfl: 0    omeprazole (PRILOSEC) 20 MG capsule, Take 2 capsules (40 mg total) by mouth once daily., Disp: 180 capsule, Rfl: 3    pravastatin (PRAVACHOL) 40 MG tablet, Take 1 tablet (40 mg total) by mouth once daily., Disp: 90 tablet, Rfl: 4    tadalafil (CIALIS) 5 MG tablet, Take 1 tablet (5 mg total) by mouth daily as needed for Erectile Dysfunction., Disp: 30 tablet, Rfl: 4    tamsulosin (FLOMAX) 0.4 mg Cap, Take 1 capsule (0.4 mg total) by mouth once daily., Disp: 90 capsule, Rfl: 3    tizanidine (ZANAFLEX) 4 MG tablet, 1/2-1 tablet every 8 hours as needed for muscle spasm, Disp: 90 tablet, Rfl: 1    glucagon (human recombinant) inj 1mg/mL kit, Inject 1 mL (1 mg total) into the muscle as needed., Disp: 1 kit, Rfl: 0  ALLERGIES:   Review of patient's allergies indicates:   Allergen Reactions    Penicillins      Knees locked up       VITAL SIGNS:   /75   Pulse (!) 112   Ht 5' 10" (1.778 m)   Wt 83.9 kg (185 lb)   BMI 26.54 kg/m²      PHYSICAL EXAMINATION    General:  The patient is alert and oriented x 3.  Mood is pleasant.  Observation of ears, eyes and nose reveal no gross abnormalities.  No labored breathing observed.    Bilateral KNEE EXAMINATION     OBSERVATION / INSPECTION   Gait:   Antalgic   Alignment:  Neutral   Scars:   None   Muscle atrophy: Mild  Effusion:  None   Warmth:  None   Discoloration:   none     TENDERNESS / CREPITUS (T / C):          T / C      T / C   Patella   - / -   Lateral joint line   - / " -   Peripatellar medial  -  Medial joint line    + / -   Peripatellar lateral -  Medial plica   - / -   Patellar tendon -   Popliteal fossa   - / -   Quad tendon   -   Gastrocnemius   -   Prepatellar Bursa - / -   Quadricep   -   Tibial tubercle  -  Thigh/hamstring  -   Pes anserine/HS -  Fibula    -   ITB   - / -  Tibia     -   Tib/fib joint  - / -  LCL    -     MFC   - / -   MCL: Proximal  -    LFC   - / -    Distal   -          ROM: (* = pain)  PASSIVE   ACTIVE    Left :   *0 / 0 / *145   *0 / 0 / 145     Right :    *0 / 0 / *145   0 / 0 / 145    Patellofemoral examination:  See above noted areas of tenderness.   Patella position    Subluxation / dislocation: Centered           Sup. / Inf;   Normal   Crepitus (PF):    Absent   Patellar Mobility:       Medial-lateral:   Normal    Superior-inferior:  Normal    Inferior tilt   Normal    Patellar tendon:  Normal   Lateral tilt:    Normal   J-sign:     None   Patellofemoral grind:   No pain       MENISCAL SIGNS:     Pain on terminal extension:  -  Pain on terminal flexion:  + (left)  Sofiyas maneuver:  - for pain  Squat     + posterior joint pain    LIGAMENT EXAMINATION:  ACL / Lachman:  negative   PCL-Post.  drawer: normal 0 to 2mm  MCL- Valgus:  normal 0 to 2mm  LCL- Varus:  normal 0 to 2mm    STRENGTH: (* = with pain) PAINFUL SIDE   Quadricep   *4/5   Hamstrin/5    EXTREMITY NEURO-VASCULAR EXAMINATION:   Sensation:  Grossly intact to light touch all dermatomal regions.   Motor Function:  Fully intact motor function at hip, knee, foot and ankle    DTRs;  quadriceps and  achilles 2+.  No clonus and downgoing Babinski.    Vascular status:  DP and PT pulses 2+, brisk capillary refill, symmetric.     XRAYS(17): There are degenerative changes of both knees most severe in the medial compartments.    Pt has a hx of Diabetes, type 2  Lab Results   Component Value Date    HGBA1C 7.8 (H) 2019         ASSESSMENT:      ICD-10-CM ICD-9-CM   1. Primary  osteoarthritis of right knee M17.11 715.16   2. Primary osteoarthritis of left knee M17.12 715.16       PLAN:   1. We have discussed a variety of treatment options including medications, injections, physical therapy and other alternative treatments. Pt is requesting CSI and HEP at this time. Euflexxa at next visit.    I made the decision to obtain old records of the patient including previous notes and imaging. I independently reviewed and interpreted lab results today as well as prior imaging.     1. Injection Procedure  A time out was performed, including verification of patient ID, procedure, site and side, availability of information and equipment, review of safety issues, and agreement with consent, the procedure site was marked.    After time out was performed, the patient was prepped aseptically with chloraprep swabsticks. A diagnostic and therapeutic injection of 1:4cc Kenalog/Lidocaine/Marcaine was given under sterile technique using a 22g x 1.5 needle from the Superolateral  aspect of the bilateral Knee Joint in the supine position.      Emerson Menendez had no adverse reactions to the medication. Pain decreased. He was instructed to apply ice to the joint for 20 minutes and avoid strenuous activities for 24-36 hours following the injection. He was warned of possible blood sugar and/or blood pressure changes during that time. Following that time, he can resume regular activities.    He was reminded to call the clinic immediately for any adverse side effects as explained in clinic today.    2. Prior-authorization for bilateral Euflexxa series injections.  3. Continue HEP  4. Ice compress to the affected area 2-3x a day for 15-20 minutes as needed for pain management.  5. RTC to see Christophe Gallo PA-C in 3 months for follow-up and Euflexxa series bilateral knee.    All of the patient's questions were answered and the patient will contact us if they have any questions or concerns in the interim.

## 2019-07-21 RX ORDER — DEXAMETHASONE SODIUM PHOSPHATE 4 MG/ML
4 INJECTION, SOLUTION INTRA-ARTICULAR; INTRALESIONAL; INTRAMUSCULAR; INTRAVENOUS; SOFT TISSUE ONCE
Status: COMPLETED | OUTPATIENT
Start: 2019-07-17 | End: 2019-07-17

## 2019-07-21 RX ORDER — TRIAMCINOLONE ACETONIDE 40 MG/ML
40 INJECTION, SUSPENSION INTRA-ARTICULAR; INTRAMUSCULAR ONCE
Status: COMPLETED | OUTPATIENT
Start: 2019-07-17 | End: 2019-07-17

## 2019-07-22 RX ORDER — MELOXICAM 15 MG/1
TABLET ORAL
Qty: 90 TABLET | Refills: 0 | Status: SHIPPED | OUTPATIENT
Start: 2019-07-22 | End: 2019-08-13

## 2019-07-22 NOTE — PROGRESS NOTES
Subjective:      Patient ID: Emerson Menendez is a 73 y.o. male.    Chief Complaint: Diabetes Mellitus (04/16/2019 dr foster branch) and Diabetic Foot Exam    Emerson is a 73 y.o. male who presents to the clinic complaining of heel pain in the right foot, especially with the first step in the morning. The pain is described as Throbbing. The onset of the pain was sudden and has worsened over the past several days. Emerson rates the pain as 9/10. He denies a history of trauma. Prior treatments include none.    Review of Systems   Constitution: Negative for chills, fever and malaise/fatigue.   HENT: Negative for hearing loss.    Cardiovascular: Negative for claudication.   Respiratory: Negative for shortness of breath.    Skin: Negative for flushing and rash.   Musculoskeletal: Negative for joint pain and myalgias.   Neurological: Negative for loss of balance, numbness, paresthesias and sensory change.   Psychiatric/Behavioral: Negative for altered mental status.           Objective:      Physical Exam   Cardiovascular:   Pulses:       Dorsalis pedis pulses are 1+ on the right side, and 2+ on the left side.        Posterior tibial pulses are 2+ on the right side, and 2+ on the left side.   No edema noted b/L   Musculoskeletal:   Non reducible equinus noted to right lower extremity  Pain upon palpation to plantar medial heel noted, right       Feet:   Right Foot:   Protective Sensation: 5 sites tested. 5 sites sensed.   Left Foot:   Protective Sensation: 5 sites tested. 5 sites sensed.   Neurological: He has normal strength.   Gross sensation intact b/L   Skin:   Normal skin tugor noted.   No open lesion noted b/L  Skin temp is warm to warm from proximal to distal b/L.  Webspaces clean, dry, and intact     Psychiatric: He has a normal mood and affect.             Assessment:       Encounter Diagnosis   Name Primary?    Plantar fasciitis - Right Foot Yes         Plan:       Emerson was seen today for diabetes mellitus and  diabetic foot exam.    Diagnoses and all orders for this visit:    Plantar fasciitis - Right Foot    Other orders  -     meloxicam (MOBIC) 15 MG tablet; Take 1 tablet (15 mg total) by mouth once daily.  -     dexamethasone injection 4 mg  -     triamcinolone acetonide injection 40 mg      I counseled the patient on his conditions, their implications and medical management.      Etiologies of plantar fasciitis discussed with the pt. Pt advised to stretch the achilles tendon complex daily and especially before increased activity and sports. Pt advised on RICE and OTC NSAIDs for residual pain. Pt advised to purchase gel heel cups to be worn in shoes.        Pt prescribed melixocam 15mg once daily for pain.     Injection consisting of 1:1:1 mixture of 1% lidocaine plain, dexamethasone, and kenalog 40 given into right heel, pt tolerated the injection well.  RTC in 2 weeks or sooner if any new pedal problems should arise or if condition worsens.       .

## 2019-07-23 ENCOUNTER — LAB VISIT (OUTPATIENT)
Dept: LAB | Facility: HOSPITAL | Age: 74
End: 2019-07-23
Attending: INTERNAL MEDICINE
Payer: MEDICARE

## 2019-07-23 ENCOUNTER — OFFICE VISIT (OUTPATIENT)
Dept: HEMATOLOGY/ONCOLOGY | Facility: CLINIC | Age: 74
End: 2019-07-23
Payer: MEDICARE

## 2019-07-23 ENCOUNTER — RESEARCH ENCOUNTER (OUTPATIENT)
Dept: RESEARCH | Facility: HOSPITAL | Age: 74
End: 2019-07-23

## 2019-07-23 VITALS
OXYGEN SATURATION: 97 % | WEIGHT: 188.5 LBS | HEART RATE: 103 BPM | BODY MASS INDEX: 26.99 KG/M2 | SYSTOLIC BLOOD PRESSURE: 117 MMHG | HEIGHT: 70 IN | DIASTOLIC BLOOD PRESSURE: 84 MMHG | TEMPERATURE: 99 F

## 2019-07-23 DIAGNOSIS — C90.00 MULTIPLE MYELOMA, REMISSION STATUS UNSPECIFIED: ICD-10-CM

## 2019-07-23 DIAGNOSIS — D47.2 SMOLDERING MULTIPLE MYELOMA: Primary | ICD-10-CM

## 2019-07-23 DIAGNOSIS — Z79.4 TYPE 2 DIABETES MELLITUS WITH DIABETIC POLYNEUROPATHY, WITH LONG-TERM CURRENT USE OF INSULIN: ICD-10-CM

## 2019-07-23 DIAGNOSIS — Z00.6 EXAMINATION OF PARTICIPANT IN CLINICAL TRIAL: ICD-10-CM

## 2019-07-23 DIAGNOSIS — E11.22 CKD STAGE 3 DUE TO TYPE 2 DIABETES MELLITUS: ICD-10-CM

## 2019-07-23 DIAGNOSIS — N18.30 CKD STAGE 3 DUE TO TYPE 2 DIABETES MELLITUS: ICD-10-CM

## 2019-07-23 DIAGNOSIS — E11.42 TYPE 2 DIABETES MELLITUS WITH DIABETIC POLYNEUROPATHY, WITH LONG-TERM CURRENT USE OF INSULIN: ICD-10-CM

## 2019-07-23 LAB
ALBUMIN SERPL BCP-MCNC: 3.5 G/DL (ref 3.5–5.2)
ALP SERPL-CCNC: 53 U/L (ref 55–135)
ALT SERPL W/O P-5'-P-CCNC: 22 U/L (ref 10–44)
ANION GAP SERPL CALC-SCNC: 9 MMOL/L (ref 8–16)
AST SERPL-CCNC: 12 U/L (ref 10–40)
BASOPHILS # BLD AUTO: 0.03 K/UL (ref 0–0.2)
BASOPHILS NFR BLD: 0.5 % (ref 0–1.9)
BILIRUB SERPL-MCNC: 0.5 MG/DL (ref 0.1–1)
BUN SERPL-MCNC: 22 MG/DL (ref 8–23)
CALCIUM SERPL-MCNC: 9.7 MG/DL (ref 8.7–10.5)
CHLORIDE SERPL-SCNC: 105 MMOL/L (ref 95–110)
CO2 SERPL-SCNC: 24 MMOL/L (ref 23–29)
CREAT SERPL-MCNC: 1.7 MG/DL (ref 0.5–1.4)
DIFFERENTIAL METHOD: ABNORMAL
EOSINOPHIL # BLD AUTO: 0.2 K/UL (ref 0–0.5)
EOSINOPHIL NFR BLD: 3.7 % (ref 0–8)
ERYTHROCYTE [DISTWIDTH] IN BLOOD BY AUTOMATED COUNT: 14 % (ref 11.5–14.5)
EST. GFR  (AFRICAN AMERICAN): 45.2 ML/MIN/1.73 M^2
EST. GFR  (NON AFRICAN AMERICAN): 39.1 ML/MIN/1.73 M^2
GLUCOSE SERPL-MCNC: 202 MG/DL (ref 70–110)
HCT VFR BLD AUTO: 36.8 % (ref 40–54)
HGB BLD-MCNC: 11.9 G/DL (ref 14–18)
IGA SERPL-MCNC: 1640 MG/DL (ref 40–350)
IGG SERPL-MCNC: 958 MG/DL (ref 650–1600)
IGM SERPL-MCNC: 32 MG/DL (ref 50–300)
IMM GRANULOCYTES # BLD AUTO: 0.02 K/UL (ref 0–0.04)
IMM GRANULOCYTES NFR BLD AUTO: 0.3 % (ref 0–0.5)
LYMPHOCYTES # BLD AUTO: 2.2 K/UL (ref 1–4.8)
LYMPHOCYTES NFR BLD: 35.1 % (ref 18–48)
MAGNESIUM SERPL-MCNC: 1.9 MG/DL (ref 1.6–2.6)
MCH RBC QN AUTO: 28.2 PG (ref 27–31)
MCHC RBC AUTO-ENTMCNC: 32.3 G/DL (ref 32–36)
MCV RBC AUTO: 87 FL (ref 82–98)
MONOCYTES # BLD AUTO: 0.5 K/UL (ref 0.3–1)
MONOCYTES NFR BLD: 7.4 % (ref 4–15)
NEUTROPHILS # BLD AUTO: 3.3 K/UL (ref 1.8–7.7)
NEUTROPHILS NFR BLD: 53 % (ref 38–73)
NRBC BLD-RTO: 0 /100 WBC
PHOSPHATE SERPL-MCNC: 2.9 MG/DL (ref 2.7–4.5)
PLATELET # BLD AUTO: 257 K/UL (ref 150–350)
PMV BLD AUTO: 9 FL (ref 9.2–12.9)
POTASSIUM SERPL-SCNC: 4.5 MMOL/L (ref 3.5–5.1)
PROT SERPL-MCNC: 8.3 G/DL (ref 6–8.4)
RBC # BLD AUTO: 4.22 M/UL (ref 4.6–6.2)
SODIUM SERPL-SCNC: 138 MMOL/L (ref 136–145)
WBC # BLD AUTO: 6.18 K/UL (ref 3.9–12.7)

## 2019-07-23 PROCEDURE — 82784 ASSAY IGA/IGD/IGG/IGM EACH: CPT | Mod: 59,Q1

## 2019-07-23 PROCEDURE — 80053 COMPREHEN METABOLIC PANEL: CPT | Mod: Q1

## 2019-07-23 PROCEDURE — 83735 ASSAY OF MAGNESIUM: CPT | Mod: Q1

## 2019-07-23 PROCEDURE — 84100 ASSAY OF PHOSPHORUS: CPT | Mod: Q1

## 2019-07-23 PROCEDURE — 86334 PATHOLOGIST INTERPRETATION IFE: ICD-10-PCS | Mod: 26,Q1,, | Performed by: PATHOLOGY

## 2019-07-23 PROCEDURE — 83520 IMMUNOASSAY QUANT NOS NONAB: CPT | Mod: 59

## 2019-07-23 PROCEDURE — 99999 PR PBB SHADOW E&M-EST. PATIENT-LVL IV: CPT | Mod: PBBFAC,,, | Performed by: INTERNAL MEDICINE

## 2019-07-23 PROCEDURE — 99215 PR OFFICE/OUTPT VISIT, EST, LEVL V, 40-54 MIN: ICD-10-PCS | Mod: Q1,S$GLB,, | Performed by: INTERNAL MEDICINE

## 2019-07-23 PROCEDURE — 86334 IMMUNOFIX E-PHORESIS SERUM: CPT | Mod: 26,Q1,, | Performed by: PATHOLOGY

## 2019-07-23 PROCEDURE — 99999 PR PBB SHADOW E&M-EST. PATIENT-LVL IV: ICD-10-PCS | Mod: PBBFAC,,, | Performed by: INTERNAL MEDICINE

## 2019-07-23 PROCEDURE — 84165 PATHOLOGIST INTERPRETATION SPE: ICD-10-PCS | Mod: 26,Q1,, | Performed by: PATHOLOGY

## 2019-07-23 PROCEDURE — 85025 COMPLETE CBC W/AUTO DIFF WBC: CPT | Mod: Q1

## 2019-07-23 PROCEDURE — 99215 OFFICE O/P EST HI 40 MIN: CPT | Mod: Q1,S$GLB,, | Performed by: INTERNAL MEDICINE

## 2019-07-23 PROCEDURE — 36415 COLL VENOUS BLD VENIPUNCTURE: CPT

## 2019-07-23 PROCEDURE — 84165 PROTEIN E-PHORESIS SERUM: CPT | Mod: 26,Q1,, | Performed by: PATHOLOGY

## 2019-07-23 PROCEDURE — 86334 IMMUNOFIX E-PHORESIS SERUM: CPT

## 2019-07-23 PROCEDURE — 84165 PROTEIN E-PHORESIS SERUM: CPT | Mod: Q1

## 2019-07-23 NOTE — PROGRESS NOTES
"   Tuesday July 23, 2019    Protocol: W0V85--T Randomized Phase III Tr ial of Lenalidomide vs Observation Alone in Patients with 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.   Sponsor:  [facility]  IRB# [id number]  Study ID: [id number]  Investigator: GIL Engel Pt Initials: LUCÍA LORENZ       Arm B: Observation  Cycle 43, Day 28       Patient presents to clinic this morning for above cycle evaluation. He states continued willingness to participate in above-mentioned study and reports no new symptoms or exacerbation of existing symptoms other than return of "plantar fascitis." See AE note below.      Review of Baseline AE's:    *Please note this list is not all-inclusive, please see AE log for physician reviewed list of adverse events.   1. Hypertension, grade 1: Well controlled today at 117/84 today.  AE ongoing  2. Lower Back Pain and Neck Pain, grade 1: Patient has no complaints as of today/ over last month. Worked with PT and helps with range of motion.  As previously stated, patient is not suspected to have bony myeloma involvement per Dr. Engel  as these are pre-existing issues present at baseline.  AE ongoing.  3. Pain in extremity (knee), grade 1. States no change today; continues to wear knee stabilizing braces, but did not wear today as was "in a hurry to get here."  AE ongoing   4. Peripheral sensory neuropathy, grade 1: Patient does not verbalize complaints today. Has gabapentin prescribed and takes as needed.  States has not taken in last weeks. AE ongoing.   5. Anemia, Grade 1: Hgb/Hct - stable at 11.9/36.8.  Result reviewed by Dr. Engel. AE ongoing  6. Pain in extremity (right arch/foot-plantar fascitis) Grade 1: Patient states he has had this issue in the past; "acting up a few weeks ago, I couldn't really walk around." Patient states he had a steriod injection which has resulted in relief/resolution of the pain to manageable, not requiring any additional medication. Will monitor.               Review of AE's: " "    *Please note this list is not all-inclusive, please see AE log for physician reviewed list of adverse events.   1. Creatinine increased, grade 2: Serum creatinine remains increased at 1.7 mg/dl today and calculated GFR 47. (GFR 45.2 per Epic/Lab calculation) This is slightly worse from last cycle but has trended in the recent past/monthly. As previously stated, Dr. Engel states this has not been related to myeloma and is related chronic kidney issues likely secondary to diabetes and hypertension. Will continue observation monthly and to monitor renal function closely. Per MD, encouraged to increase water intake. AE stable from baseline      Per study chair, "the definition of progressive disease for this protocol is developing CRAB criteria that requires treatment systemically." Per Dr Engel, based on today's exam and lab results thus far, patient does not have any s/s of CRAB: Normal Calcium level (9.7), absence of Renal insufficiency (serum creatinine 1.7, and GFR 47 per [facility]ro[facility]-Gault) --patient with a history of kidney dysfunction secondary to diabetes; Dr. Engel aware of these values and not concerned for myeloma involvement), absence of significant Anemia (hemoglobin 11.9, stable; will await myeloma labs for clarity relationship to myeloma) and absence of lytic Bone lesions (per baseline metastatic survey as well as MRI--see MD note for further comment). See MD note for ECOG score and H&P and flowsheets for laboratory work, vitals, etc. All myeloma-related blood work from last cycle including SPEP and JAGUAR have remained stable, and are currently pending for this cycle. Per Dr. Engel, patient shows no evidence of progression at last result. Patient informed that Dr. Engel will release all lab results to [facility]. He states understanding of this. QOL's not required at this timepoint, informed patient these will be due again in December/C48D28 visit. He states understanding.           Patient expressed recent " "("over the last month or so") noncompliance with diabetes management; discussed with Dr. Engel with her recommendation then to return to [facility] for assistance with regaining compliance. Patient informed that last HgA1c completed in April 2019; he recalls value/result and was encouraged to follow up with endocrinologist for labs/management. Patient states understanding and also expressed desire "to get back on track." Of note, he has f/u scheduled with [facility] for next month; he acknowledges this.    Patient also discussed recent loss of significant other/girlfriend with Dr. Engel and that he is processing this emotionally and at this time is managing as well as can be expected. Encouraged patient to reach out/contact staff if feels need to discuss/any issues. He states understanding.    Patient was informed to review appointments and alert us of any conflict. Will continue to schedule patient as far out as possible, which seems to help maintain compliance with visits. Patient states having research RN's contact information and has MD contact information to call with any concerns, questions, or worsening of symptoms; he verbalized understanding.             "

## 2019-07-23 NOTE — PROGRESS NOTES
Subjective:       Patient ID: Emerson Menendez is a 74 y.o. male.    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 Test results identify a stable IgA kappa protein with beta globulin band at 1.63g/dL. Kappa free light chain is elevated at 4.40. CBC and calcium are stable. Creatinine with history of stage III CKD is stable at 1.4. Metastatic survey is negative for lytic lesions. MRI of the entire spine demonstrated age related changes but no convincing evidence of myeloma bone disease. Bone marrow biopsy identified about 13% plasma cells by morphology and FISH for myeloma identified a t(11;14) and trisomies 3,7, and 17. The patient is afebrile and appears clinically well. He was seen by his podiatrist and nephrologist since our last visit without any new or acute events.    The patient has not received any therapy for smoldering myeloma including bisphosphonates or steroids. He has been randomized to observation arm of the the clinical study. 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 He has no current or prior history of malignancy.    TODAY  Mr. Menendez returns today for monthly follow-up evaluation. No clinical signs/symptoms of progression of his smoldering myeloma with CBC stable and rest of labs pending.  No acute interval events. Knee pain is stable today; had repeat injection for right foot plantar fasciitis with relief. Now using inserts and socks.   His longtime  he had recently seperated from passed, she was unknowingly ill and entered hospice.      HPI  Review of Systems   Constitutional: Negative  for activity change, appetite change, diaphoresis, fatigue, fever and unexpected weight change.   HENT: Negative.    Eyes: Negative.    Respiratory: Negative.    Cardiovascular: Negative for leg swelling.   Gastrointestinal: Negative.    Endocrine: Negative.    Genitourinary: Negative.    Musculoskeletal: Negative.    Skin: Negative.    Allergic/Immunologic: Negative.    Neurological:        Grade 0-1 peripheral neuropathy of bilateral feet.    Hematological: Negative for adenopathy. Does not bruise/bleed easily.   Psychiatric/Behavioral: Negative.        Objective:       Vitals:    07/23/19 0843   BP: 117/84   Pulse: 103   Temp: 98.6 °F (37 °C)       Physical Exam   Constitutional: He is oriented to person, place, and time. He appears well-developed and well-nourished.   HENT:   Head: Normocephalic and atraumatic.   Right Ear: External ear normal.   Left Ear: External ear normal.   Nose: Nose normal.   Mouth/Throat: Oropharynx is clear and moist.   Eyes: Pupils are equal, round, and reactive to light. Conjunctivae and EOM are normal.   Neck: Normal range of motion. Neck supple.   Cardiovascular: Normal rate, regular rhythm and intact distal pulses.   No murmur heard.  Pulmonary/Chest: Effort normal and breath sounds normal. No respiratory distress.   Abdominal: Soft. Bowel sounds are normal. He exhibits no distension. There is no hepatosplenomegaly.   Musculoskeletal: He exhibits no edema or tenderness.   Neurological: He is alert and oriented to person, place, and time. No cranial nerve deficit.   Skin: Skin is warm and dry. No rash noted. No cyanosis. Nails show no clubbing.   Psychiatric: He has a normal mood and affect. His behavior is normal.   Nursing note and vitals reviewed.      Assessment:       1. Smoldering multiple myeloma    2. CKD stage 3 due to type 2 diabetes mellitus    3. Type 2 diabetes mellitus with diabetic polyneuropathy, with long-term current use of insulin        Plan:       The patient  has a diagnosis of smoldering myeloma. There is no indication for immediate chemotherapy. We are monitoring renal function closely- baseline creatinine of -1.5 - has been stable at 1.6 last visit.  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 Total protein remains normal, M protein has been stable; pending today. Free light chains are stable; pending today.  Plan for return in 1 month.  Endocrinology and Nephrology to monitor diabetes and renal failure respectively. Upcoming visit with PCP is scheduled.

## 2019-07-24 LAB
ALBUMIN SERPL ELPH-MCNC: 3.83 G/DL (ref 3.35–5.55)
ALPHA1 GLOB SERPL ELPH-MCNC: 0.23 G/DL (ref 0.17–0.41)
ALPHA2 GLOB SERPL ELPH-MCNC: 0.8 G/DL (ref 0.43–0.99)
B-GLOBULIN SERPL ELPH-MCNC: 2.39 G/DL (ref 0.5–1.1)
GAMMA GLOB SERPL ELPH-MCNC: 0.55 G/DL (ref 0.67–1.58)
INTERPRETATION SERPL IFE-IMP: NORMAL
KAPPA LC SER QL IA: 5.43 MG/DL (ref 0.33–1.94)
KAPPA LC/LAMBDA SER IA: 2.94 (ref 0.26–1.65)
LAMBDA LC SER QL IA: 1.85 MG/DL (ref 0.57–2.63)
PATHOLOGIST INTERPRETATION IFE: NORMAL
PATHOLOGIST INTERPRETATION SPE: NORMAL
PROT SERPL-MCNC: 7.8 G/DL (ref 6–8.4)

## 2019-08-02 DIAGNOSIS — N52.01 ERECTILE DYSFUNCTION DUE TO ARTERIAL INSUFFICIENCY: ICD-10-CM

## 2019-08-02 RX ORDER — TADALAFIL 5 MG/1
TABLET ORAL
Qty: 90 TABLET | Refills: 2 | Status: SHIPPED | OUTPATIENT
Start: 2019-08-02 | End: 2019-10-28 | Stop reason: SDUPTHER

## 2019-08-07 ENCOUNTER — TELEPHONE (OUTPATIENT)
Dept: RESEARCH | Facility: HOSPITAL | Age: 74
End: 2019-08-07

## 2019-08-07 NOTE — TELEPHONE ENCOUNTER
Wednesday, August 7th, 2019    Protocol: I5H53--D Randomized Phase III Tr ial of Lenalidomide vs Observation Alone in Patients with 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.   Sponsor:  [facility]  IRB# [id number]  Study ID: [id number]  Investigator: GIL Engel Pt Initials: LUCÍA LORENZ       Arm B: Observation      Research RN checked in with patient as he seemed out of sorts at visit on 7/23/19. No answer, left VM to call if any issues or concerns.

## 2019-08-08 ENCOUNTER — RESEARCH ENCOUNTER (OUTPATIENT)
Dept: RESEARCH | Facility: HOSPITAL | Age: 74
End: 2019-08-08

## 2019-08-08 NOTE — PROGRESS NOTES
"   Thursday, August 8th, 2019    Protocol: X2T25--Y Randomized Phase III Tr ial of Lenalidomide vs Observation Alone in Patients with 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.   Sponsor:  [facility]  IRB# [id number]  Study ID: [id number]  Investigator: GIL Engel Pt Initials: LUCÍA OLRENZ       Arm B: Observation Follow Up Phone Call Regarding Status/Future Appointment Confirmation    Contacted subject via telephone to assess his current overall health status as he had reported minor symptoms at last study visit; refer to note dated 7/23/19.  Subject states he is doing well overall; states he is "feeling fine" and verbalizes dates of next set of appointments.   He states he has appointment next Tuesday, August 13th with internal medicine doctor to discuss diabetes/overall health; also states he has "shots" due for his knees.        This research RN reviewed potential October appointment dates; subject states he will call back later today to confirm that dates are acceptable.         "

## 2019-08-13 ENCOUNTER — DOCUMENTATION ONLY (OUTPATIENT)
Dept: INTERNAL MEDICINE | Facility: CLINIC | Age: 74
End: 2019-08-13

## 2019-08-13 ENCOUNTER — OFFICE VISIT (OUTPATIENT)
Dept: INTERNAL MEDICINE | Facility: CLINIC | Age: 74
End: 2019-08-13
Payer: MEDICARE

## 2019-08-13 ENCOUNTER — PATIENT MESSAGE (OUTPATIENT)
Dept: ADMINISTRATIVE | Facility: OTHER | Age: 74
End: 2019-08-13

## 2019-08-13 VITALS
OXYGEN SATURATION: 98 % | WEIGHT: 184.75 LBS | TEMPERATURE: 98 F | HEIGHT: 70 IN | BODY MASS INDEX: 26.45 KG/M2 | HEART RATE: 95 BPM | SYSTOLIC BLOOD PRESSURE: 124 MMHG | DIASTOLIC BLOOD PRESSURE: 76 MMHG

## 2019-08-13 DIAGNOSIS — D47.2 SMOLDERING MULTIPLE MYELOMA: ICD-10-CM

## 2019-08-13 DIAGNOSIS — E78.00 PURE HYPERCHOLESTEROLEMIA: ICD-10-CM

## 2019-08-13 DIAGNOSIS — E13.9 DIABETES MELLITUS DUE TO ABNORMAL INSULIN: Primary | ICD-10-CM

## 2019-08-13 DIAGNOSIS — I10 ESSENTIAL HYPERTENSION: ICD-10-CM

## 2019-08-13 DIAGNOSIS — M17.9 OSTEOARTHRITIS OF KNEE, UNSPECIFIED LATERALITY, UNSPECIFIED OSTEOARTHRITIS TYPE: ICD-10-CM

## 2019-08-13 DIAGNOSIS — N18.30 CHRONIC KIDNEY DISEASE, STAGE III (MODERATE): Chronic | ICD-10-CM

## 2019-08-13 DIAGNOSIS — N18.9 CHRONIC RENAL IMPAIRMENT, UNSPECIFIED CKD STAGE: ICD-10-CM

## 2019-08-13 DIAGNOSIS — K21.9 GASTROESOPHAGEAL REFLUX DISEASE WITHOUT ESOPHAGITIS: ICD-10-CM

## 2019-08-13 DIAGNOSIS — Z12.11 SCREENING FOR MALIGNANT NEOPLASM OF COLON: ICD-10-CM

## 2019-08-13 PROCEDURE — 99214 OFFICE O/P EST MOD 30 MIN: CPT | Mod: S$GLB,,, | Performed by: INTERNAL MEDICINE

## 2019-08-13 PROCEDURE — 99999 PR PBB SHADOW E&M-EST. PATIENT-LVL III: ICD-10-PCS | Mod: PBBFAC,,, | Performed by: INTERNAL MEDICINE

## 2019-08-13 PROCEDURE — 1101F PT FALLS ASSESS-DOCD LE1/YR: CPT | Mod: CPTII,S$GLB,, | Performed by: INTERNAL MEDICINE

## 2019-08-13 PROCEDURE — 3074F SYST BP LT 130 MM HG: CPT | Mod: CPTII,S$GLB,, | Performed by: INTERNAL MEDICINE

## 2019-08-13 PROCEDURE — 3074F PR MOST RECENT SYSTOLIC BLOOD PRESSURE < 130 MM HG: ICD-10-PCS | Mod: CPTII,S$GLB,, | Performed by: INTERNAL MEDICINE

## 2019-08-13 PROCEDURE — 99214 PR OFFICE/OUTPT VISIT, EST, LEVL IV, 30-39 MIN: ICD-10-PCS | Mod: S$GLB,,, | Performed by: INTERNAL MEDICINE

## 2019-08-13 PROCEDURE — 1101F PR PT FALLS ASSESS DOC 0-1 FALLS W/OUT INJ PAST YR: ICD-10-PCS | Mod: CPTII,S$GLB,, | Performed by: INTERNAL MEDICINE

## 2019-08-13 PROCEDURE — 3078F DIAST BP <80 MM HG: CPT | Mod: CPTII,S$GLB,, | Performed by: INTERNAL MEDICINE

## 2019-08-13 PROCEDURE — 3045F PR MOST RECENT HEMOGLOBIN A1C LEVEL 7.0-9.0%: CPT | Mod: CPTII,S$GLB,, | Performed by: INTERNAL MEDICINE

## 2019-08-13 PROCEDURE — 3045F PR MOST RECENT HEMOGLOBIN A1C LEVEL 7.0-9.0%: ICD-10-PCS | Mod: CPTII,S$GLB,, | Performed by: INTERNAL MEDICINE

## 2019-08-13 PROCEDURE — 3078F PR MOST RECENT DIASTOLIC BLOOD PRESSURE < 80 MM HG: ICD-10-PCS | Mod: CPTII,S$GLB,, | Performed by: INTERNAL MEDICINE

## 2019-08-13 PROCEDURE — 99999 PR PBB SHADOW E&M-EST. PATIENT-LVL III: CPT | Mod: PBBFAC,,, | Performed by: INTERNAL MEDICINE

## 2019-08-13 RX ORDER — LOSARTAN POTASSIUM 25 MG/1
25 TABLET ORAL DAILY
Qty: 90 TABLET | Refills: 4 | Status: SHIPPED | OUTPATIENT
Start: 2019-08-13 | End: 2020-06-25

## 2019-08-13 RX ORDER — PRAVASTATIN SODIUM 40 MG/1
40 TABLET ORAL DAILY
Qty: 90 TABLET | Refills: 4 | Status: SHIPPED | OUTPATIENT
Start: 2019-08-13 | End: 2020-06-26 | Stop reason: SDUPTHER

## 2019-08-13 RX ORDER — OMEPRAZOLE 20 MG/1
40 CAPSULE, DELAYED RELEASE ORAL DAILY
Qty: 180 CAPSULE | Refills: 3 | Status: SHIPPED | OUTPATIENT
Start: 2019-08-13 | End: 2020-03-06 | Stop reason: SDUPTHER

## 2019-08-13 RX ORDER — CITALOPRAM 20 MG/1
30 TABLET, FILM COATED ORAL DAILY
Qty: 135 TABLET | Refills: 4 | Status: SHIPPED | OUTPATIENT
Start: 2019-08-13 | End: 2019-12-13 | Stop reason: SDUPTHER

## 2019-08-13 RX ORDER — INSULIN GLARGINE 100 [IU]/ML
20 INJECTION, SOLUTION SUBCUTANEOUS NIGHTLY
Qty: 1 BOX | Refills: 6 | Status: SHIPPED | OUTPATIENT
Start: 2019-08-13 | End: 2020-03-19 | Stop reason: SDUPTHER

## 2019-08-13 RX ORDER — TAMSULOSIN HYDROCHLORIDE 0.4 MG/1
1 CAPSULE ORAL DAILY
Qty: 90 CAPSULE | Refills: 3 | Status: SHIPPED | OUTPATIENT
Start: 2019-08-13 | End: 2021-02-19 | Stop reason: SDUPTHER

## 2019-08-13 NOTE — PROGRESS NOTES
CHIEF COMPLAINT: Follow up of multiple myeloma, diabetes, hypertension, reflux, hyperlipidemia    HISTORY OF PRESENT ILLNESS: This is a 74-year-old man who presents for follow up of above    HE had steroid injection in his right foot for plantar fasciitis.  Foot is better. He has no pain in his right foot. He now has inserts in his shoes which helps as well.      HE had  3 Euflexxa injections (ending 4/26/19).  The Euflexxa injections lasted for about 2.5 months.  He had pain in the knees and had a steroid injections in both knees on 7/18/19. He does not have knee pain now with wearing braces.     Sugars were high after these injections. It took about 2 weeks to get his blood sugars under control. He had to use some Novolog to get it under control. Sugars are now in the 100's range.  He is on Lantus 20 units daily and glimepriride 2 mg daily. .    He has seen neprhology 9/13/18 and kidney function is stable. He needs a follow up with nephrology     HE is in a study for his smoldering multiple myeloma. He is in the observation arm and is being monitoring monthly. He saw Dr Engel 3/29/19. No indication for chemotherapy at this time.       He is taking losartan 25 mg 1 tablet daily to protect his kidney. No polydipsia or polyuria       His back and neck was doing better in the healthy back program.  He does not take hydrocodone apap. He takes tizanidine as needed (rarely). HE is wearing braces for his knees which is helping stability and his pain.      Reflux is controlled on omeprazole 20 mg 2 tablets daily. HE has heartburn when he does not take the omeprazole.  No chest pain, shortness of breath, nausea, voimting, constipation, diarrhea, numbness, weakness.        He had laser vaporization and enucleation of the prostate 11/13/13. He denies any dysuria or hematuria now. HE saw Dr Walker. Oxybutynin was stopped. HE is urinting better. He continues FLomax 0.4 mg nightly     He has hyperlipidemia, on pravastatin 40 mg  "daily. No joint pain or muscle pain from the pravastatin.        He has been taking aspirin 81 mg daily vitamin D supplement 2000 units daily.        Mood has been a little worse the last several months. Anxiety is controlled on celexa 20 mg daily.. He takes diazepam 5 mg at bedtime as needed (2-3 times a week()  for his sleep and anxiety. It helps him sleep.      HE is taking gabapentin 100 mg 2 capsules as needed. He does not take daily. Feet are doing ok. He continues to have some mild pain in his feet from plantar fasciitis.  Pain is better.     He has finished doxycycline 50 mg daily for recurrent boils. Boils have resolved.      PAST MEDICAL HISTORY:   1. Diabetes mellitus.   2. Hyperlipidemia.   3. Reflux.   4. BPH.   5. Osteoarthritis of the knees.   6. Neck pain.   7. History of right lateral epicondylitis.   8. History of lumps removed from the breast as a teenager.   9. OA cervical spine     SOCIAL HISTORY: Does not smoke, does not drink. 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 He works for the [employer].     FAMILY HISTORY: Mother is living at age 95 with a heart condition. She had a mild stroke. Father  in his late 40s of alcoholism. He has 5 sisters and 1 brother. one has a neurological disorder, one is anxious. ONe sister  after a fall. His daughter has  lupus, on dialysis, heart problems, and a brain aneurysm that was stented.       PHYSICAL EXAMINATION:    /76 (BP Location: Left arm, Patient Position: Sitting, BP Method: Large (Manual))   Pulse 95   Temp 97.5 °F (36.4 °C) (Oral)   Ht 5' 10" (1.778 m)   Wt 83.8 kg (184 lb 11.9 oz)   SpO2 98%   BMI 26.51 kg/m²     GENERAL: He is alert, oriented, no apparent distress. Affect within normal limits.   Conjunctivae anicteric. PERRL. Tympanic membranes clear. Oropharynx gumes healing.    NECK: Supple. No cervical lymphadenopathy, no thyroid enlargement.   Respiratory: Effort normal. Lungs are clear to auscultation.   HEART: Regular " rate and rhythm without murmurs, gallops or rubs.   No lower extremity edema.    ABDOMEN: soft, non distended, non tender, bowel sounds present, no hepatosplenomgaly            ASSESSMENT AND PLAN:        1. Diabetes mellitus with hyperglycemia and microalbuminuria - Digital diabetes.  Doing better. Watch for low blood sugars  2. 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 Labs reviewed  3. OA knee -stable currently  4. Hypertension - controlled  5. Hyperlipidemia. On pravastatin  6. BPH. S/p laser - Saw urology 8/18  7. Vitamin D deficiency - on vitamin D 2,000 units daily.    9. Back pain-stable  10. Anxiety and depression- increase citalopram to 30 mg daily. .   11. Peripheral neuropathy -controlled on gabapentin as needed   12. CRI -stable - monitored closely by heme onc and nephrology. Nephrology apt.  13. I will see him back in 4 months, sooner if problems arise        Prevnar 7/16  PSA 10/18. colonoscopy normal 3/2012 and due 2022.  FitKit was given to patient on 8/13/2019 2:15 PM   Influenza 9/21/18

## 2019-08-14 DIAGNOSIS — E11.9 TYPE 2 DIABETES MELLITUS: ICD-10-CM

## 2019-08-19 ENCOUNTER — TELEPHONE (OUTPATIENT)
Dept: NEPHROLOGY | Facility: CLINIC | Age: 74
End: 2019-08-19

## 2019-08-19 ENCOUNTER — TELEPHONE (OUTPATIENT)
Dept: RESEARCH | Facility: HOSPITAL | Age: 74
End: 2019-08-19

## 2019-08-19 NOTE — TELEPHONE ENCOUNTER
Explained that Dr. Urena has left but when the new fellows open their schedule or Dr. Baum, we will get him in. He said alright.  
10-Apr-2018 13:33

## 2019-08-20 ENCOUNTER — LAB VISIT (OUTPATIENT)
Dept: LAB | Facility: HOSPITAL | Age: 74
End: 2019-08-20
Attending: INTERNAL MEDICINE
Payer: MEDICARE

## 2019-08-20 ENCOUNTER — RESEARCH ENCOUNTER (OUTPATIENT)
Dept: RESEARCH | Facility: HOSPITAL | Age: 74
End: 2019-08-20

## 2019-08-20 ENCOUNTER — OFFICE VISIT (OUTPATIENT)
Dept: HEMATOLOGY/ONCOLOGY | Facility: CLINIC | Age: 74
End: 2019-08-20
Payer: MEDICARE

## 2019-08-20 VITALS
RESPIRATION RATE: 15 BRPM | HEART RATE: 81 BPM | BODY MASS INDEX: 26.61 KG/M2 | WEIGHT: 185.88 LBS | SYSTOLIC BLOOD PRESSURE: 130 MMHG | OXYGEN SATURATION: 99 % | TEMPERATURE: 98 F | DIASTOLIC BLOOD PRESSURE: 73 MMHG | HEIGHT: 70 IN

## 2019-08-20 DIAGNOSIS — D47.2 SMOLDERING MULTIPLE MYELOMA: Primary | ICD-10-CM

## 2019-08-20 DIAGNOSIS — C90.00 MULTIPLE MYELOMA, REMISSION STATUS UNSPECIFIED: ICD-10-CM

## 2019-08-20 DIAGNOSIS — Z00.6 EXAMINATION OF PARTICIPANT IN CLINICAL TRIAL: ICD-10-CM

## 2019-08-20 LAB
ALBUMIN SERPL BCP-MCNC: 3.6 G/DL (ref 3.5–5.2)
ALP SERPL-CCNC: 56 U/L (ref 55–135)
ALT SERPL W/O P-5'-P-CCNC: 20 U/L (ref 10–44)
ANION GAP SERPL CALC-SCNC: 12 MMOL/L (ref 8–16)
AST SERPL-CCNC: 19 U/L (ref 10–40)
BASOPHILS # BLD AUTO: 0.05 K/UL (ref 0–0.2)
BASOPHILS NFR BLD: 1 % (ref 0–1.9)
BILIRUB SERPL-MCNC: 0.3 MG/DL (ref 0.1–1)
BUN SERPL-MCNC: 23 MG/DL (ref 8–23)
CALCIUM SERPL-MCNC: 9.7 MG/DL (ref 8.7–10.5)
CHLORIDE SERPL-SCNC: 101 MMOL/L (ref 95–110)
CO2 SERPL-SCNC: 22 MMOL/L (ref 23–29)
CREAT SERPL-MCNC: 1.7 MG/DL (ref 0.5–1.4)
DIFFERENTIAL METHOD: ABNORMAL
EOSINOPHIL # BLD AUTO: 0.2 K/UL (ref 0–0.5)
EOSINOPHIL NFR BLD: 3.3 % (ref 0–8)
ERYTHROCYTE [DISTWIDTH] IN BLOOD BY AUTOMATED COUNT: 14.3 % (ref 11.5–14.5)
EST. GFR  (AFRICAN AMERICAN): 45.2 ML/MIN/1.73 M^2
EST. GFR  (NON AFRICAN AMERICAN): 39.1 ML/MIN/1.73 M^2
GLUCOSE SERPL-MCNC: 187 MG/DL (ref 70–110)
HCT VFR BLD AUTO: 36.7 % (ref 40–54)
HGB BLD-MCNC: 12.2 G/DL (ref 14–18)
IGA SERPL-MCNC: 1554 MG/DL (ref 40–350)
IGG SERPL-MCNC: 867 MG/DL (ref 650–1600)
IGM SERPL-MCNC: 42 MG/DL (ref 50–300)
IMM GRANULOCYTES # BLD AUTO: 0.01 K/UL (ref 0–0.04)
IMM GRANULOCYTES NFR BLD AUTO: 0.2 % (ref 0–0.5)
LYMPHOCYTES # BLD AUTO: 1.7 K/UL (ref 1–4.8)
LYMPHOCYTES NFR BLD: 34.2 % (ref 18–48)
MAGNESIUM SERPL-MCNC: 1.8 MG/DL (ref 1.6–2.6)
MCH RBC QN AUTO: 30.3 PG (ref 27–31)
MCHC RBC AUTO-ENTMCNC: 33.2 G/DL (ref 32–36)
MCV RBC AUTO: 91 FL (ref 82–98)
MONOCYTES # BLD AUTO: 0.3 K/UL (ref 0.3–1)
MONOCYTES NFR BLD: 6.7 % (ref 4–15)
NEUTROPHILS # BLD AUTO: 2.8 K/UL (ref 1.8–7.7)
NEUTROPHILS NFR BLD: 54.6 % (ref 38–73)
NRBC BLD-RTO: 0 /100 WBC
PHOSPHATE SERPL-MCNC: 2.8 MG/DL (ref 2.7–4.5)
PLATELET # BLD AUTO: 271 K/UL (ref 150–350)
PMV BLD AUTO: 9.1 FL (ref 9.2–12.9)
POTASSIUM SERPL-SCNC: 5.1 MMOL/L (ref 3.5–5.1)
PROT SERPL-MCNC: 8.2 G/DL (ref 6–8.4)
RBC # BLD AUTO: 4.03 M/UL (ref 4.6–6.2)
SODIUM SERPL-SCNC: 135 MMOL/L (ref 136–145)
WBC # BLD AUTO: 5.09 K/UL (ref 3.9–12.7)

## 2019-08-20 PROCEDURE — 80053 COMPREHEN METABOLIC PANEL: CPT

## 2019-08-20 PROCEDURE — 82784 ASSAY IGA/IGD/IGG/IGM EACH: CPT | Mod: 59

## 2019-08-20 PROCEDURE — 86334 IMMUNOFIX E-PHORESIS SERUM: CPT | Mod: 26,Q1,, | Performed by: PATHOLOGY

## 2019-08-20 PROCEDURE — 84100 ASSAY OF PHOSPHORUS: CPT | Mod: Q1

## 2019-08-20 PROCEDURE — 36415 COLL VENOUS BLD VENIPUNCTURE: CPT

## 2019-08-20 PROCEDURE — 84165 PATHOLOGIST INTERPRETATION SPE: ICD-10-PCS | Mod: 26,,, | Performed by: PATHOLOGY

## 2019-08-20 PROCEDURE — 86334 IMMUNOFIX E-PHORESIS SERUM: CPT | Mod: Q1

## 2019-08-20 PROCEDURE — 86334 PATHOLOGIST INTERPRETATION IFE: ICD-10-PCS | Mod: 26,Q1,, | Performed by: PATHOLOGY

## 2019-08-20 PROCEDURE — 84165 PROTEIN E-PHORESIS SERUM: CPT | Mod: 26,,, | Performed by: PATHOLOGY

## 2019-08-20 PROCEDURE — 99999 PR PBB SHADOW E&M-EST. PATIENT-LVL IV: CPT | Mod: PBBFAC,,, | Performed by: INTERNAL MEDICINE

## 2019-08-20 PROCEDURE — 99215 PR OFFICE/OUTPT VISIT, EST, LEVL V, 40-54 MIN: ICD-10-PCS | Mod: Q1,S$GLB,, | Performed by: INTERNAL MEDICINE

## 2019-08-20 PROCEDURE — 85025 COMPLETE CBC W/AUTO DIFF WBC: CPT | Mod: Q1

## 2019-08-20 PROCEDURE — 84165 PROTEIN E-PHORESIS SERUM: CPT

## 2019-08-20 PROCEDURE — 99999 PR PBB SHADOW E&M-EST. PATIENT-LVL IV: ICD-10-PCS | Mod: PBBFAC,,, | Performed by: INTERNAL MEDICINE

## 2019-08-20 PROCEDURE — 83520 IMMUNOASSAY QUANT NOS NONAB: CPT

## 2019-08-20 PROCEDURE — 99215 OFFICE O/P EST HI 40 MIN: CPT | Mod: Q1,S$GLB,, | Performed by: INTERNAL MEDICINE

## 2019-08-20 PROCEDURE — 83735 ASSAY OF MAGNESIUM: CPT | Mod: Q1

## 2019-08-20 NOTE — PROGRESS NOTES
"   Tuesday August 20, 2019    Protocol: [id number] Randomized Phase III Tr ial of Lenalidomide vs Observation Alone in Patients with 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.   Sponsor:  [facility]  IRB# [id number]  Study ID: [id number]  Investigator: GIL Engel Pt Initials: LCUÍA LORENZ       Arm B: Observation  Cycle 44, Day 28       Patient presents to clinic this morning for above cycle evaluation. He states continued willingness to participate in above-mentioned study and reports no new symptoms or exacerbation of existing symptoms. He states he feels that his diet is better controlled and that he, overall, feeling "good."  See AE note below.      Review of Baseline AE's:    *Please note this list is not all-inclusive, please see AE log for physician reviewed list of adverse events.   1. Hypertension, grade 1: Well controlled today at 130/73 today.  AE ongoing  2. Lower Back Pain and Neck Pain, grade 1: Patient has no complaints as of today/ over last month. Worked with PT and helps with range of motion.  As previously stated, patient is not suspected to have bony myeloma involvement per Dr. Engel as these are pre-existing issues present at baseline.  AE ongoing.  3. Pain in extremity (knee), grade 1. States no issues today; presents with knee stabilizing braces intact; has scheduled injection appointments for November 2019.  AE ongoing   4. Peripheral sensory neuropathy, grade 1: Patient does not verbalize complaints today. Has gabapentin prescribed and takes as needed.  States has not taken in last weeks. AE ongoing.   5. Anemia, Grade 1: Hgb/Hct - stable at 12.2/36.7.  Result reviewed by Dr. Engel. AE ongoing  6. Pain in extremity (right arch/foot-plantar fascitis) Grade 1: Patient states he currently has no issues with this; denies pain as per last visit. Patient states steriod injection last month resulted in relief/resolution of the pain to manageable, not requiring any additional medication. Will monitor. " "              Review of AE's:     *Please note this list is not all-inclusive, please see AE log for physician reviewed list of adverse events.   1. Creatinine increased, grade 2: Serum creatinine remains increased today at 1.7 mg/dl today and calculated GFR 47. (GFR 45.2 per Epic/Lab calculation) This is same value from last cycle and continues to trend monthly. As previously stated, Dr. Engel states this has not been related to myeloma and is related chronic kidney issues likely secondary to diabetes and hypertension. Will continue observation monthly and to monitor renal function closely. Per MD, encouraged to increase water intake. AE stable from baseline  Hyponatremia, Grade 1: Serum sodium today is 135 mmol/L; Dr Engel notified and aware. No orders noted. Will monitor.       Per study chair, "the definition of progressive disease for this protocol is developing CRAB criteria that requires treatment systemically." Per Dr Engel, based on today's exam and lab results thus far, patient does not have any s/s of CRAB: Normal Calcium level (9.7), absence of Renal insufficiency (serum creatinine 1.7, and GFR 47 per [facility]-G[facility]) --patient with a history of kidney dysfunction secondary to diabetes; Dr. Engel aware of these values and not concerned for myeloma involvement), absence of significant Anemia (hemoglobin 11.9, stable; will await myeloma labs for clarity relationship to myeloma) and absence of lytic Bone lesions (per baseline metastatic survey as well as MRI--see MD note for further comment). See MD note for ECOG score and H&P and flowsheets for laboratory work, vitals, etc. All myeloma-related blood work from last cycle including SPEP and JAGUAR have remained stable, and are currently pending for this cycle. Per Dr. Engel, patient shows no evidence of progression at last result. Patient informed that Dr. Engel will release all lab results to [facility]. He states understanding of this. QOL's not required at this " "timepoint, informed patient these will be due again in December/C48D28 visit. He states understanding.           Patient states, as per above interval history, diet is more aligned with controlling blood sugars.He is still scheduled to return to [facility] for assistance/surveillance with regard to maintaining compliance.  Of note, he has f/u scheduled with Internal Medicince for next month; he acknowledges this.    Patient also discussed recent relationship with "good friend" in [place] whom he relies on for company/support. Encouraged patient to reach out/contact staff if feels need to discuss any issues. He states understanding.    Patient was informed to reviewSeptember appointments and alert us of any conflict. Will continue to schedule patient as far out as possible, which seems to help maintain compliance with visits. Patient states having research RN's contact information and has MD contact information to call with any concerns, questions, or worsening of symptoms; he verbalized understanding.             "

## 2019-08-21 LAB
INTERPRETATION SERPL IFE-IMP: NORMAL
KAPPA LC SER QL IA: 5.38 MG/DL (ref 0.33–1.94)
KAPPA LC/LAMBDA SER IA: 3.13 (ref 0.26–1.65)
LAMBDA LC SER QL IA: 1.72 MG/DL (ref 0.57–2.63)
PATHOLOGIST INTERPRETATION IFE: NORMAL

## 2019-08-21 NOTE — PROGRESS NOTES
Subjective:       Patient ID: Emerson Menendez is a 74 y.o. male.    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 Test results identify a stable IgA kappa protein with beta globulin band at 1.63g/dL. Kappa free light chain is elevated at 4.40. CBC and calcium are stable. Creatinine with history of stage III CKD is stable at 1.4. Metastatic survey is negative for lytic lesions. MRI of the entire spine demonstrated age related changes but no convincing evidence of myeloma bone disease. Bone marrow biopsy identified about 13% plasma cells by morphology and FISH for myeloma identified a t(11;14) and trisomies 3,7, and 17. The patient is afebrile and appears clinically well. He was seen by his podiatrist and nephrologist since our last visit without any new or acute events.    The patient has not received any therapy for smoldering myeloma including bisphosphonates or steroids. He has been randomized to observation arm of the the clinical study. 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 He has no current or prior history of malignancy.    TODAY  Mr. Menendez returns today for monthly follow-up evaluation. No clinical signs/symptoms of progression of his smoldering myeloma with CBC stable and rest of labs pending.  No acute interval events. Knee pain is stable today; wearing bilateral braces.  Report blood sugar control has improved.      HPI  Review of Systems   Constitutional: Negative for activity change, appetite change, diaphoresis, fatigue, fever and unexpected weight change.   HENT: Negative.    Eyes: Negative.     Respiratory: Negative.    Cardiovascular: Negative for leg swelling.   Gastrointestinal: Negative.    Endocrine: Negative.    Genitourinary: Negative.    Musculoskeletal: Negative.    Skin: Negative.    Allergic/Immunologic: Negative.    Neurological:        Grade 0-1 peripheral neuropathy of bilateral feet.    Hematological: Negative for adenopathy. Does not bruise/bleed easily.   Psychiatric/Behavioral: Negative.        Objective:       Vitals:    08/20/19 0828   BP: 130/73   Pulse: 81   Resp: 15   Temp: 97.9 °F (36.6 °C)       Physical Exam   Constitutional: He is oriented to person, place, and time. He appears well-developed and well-nourished.   HENT:   Head: Normocephalic and atraumatic.   Right Ear: External ear normal.   Left Ear: External ear normal.   Nose: Nose normal.   Mouth/Throat: Oropharynx is clear and moist.   Eyes: Pupils are equal, round, and reactive to light. Conjunctivae and EOM are normal.   Neck: Normal range of motion. Neck supple.   Cardiovascular: Normal rate, regular rhythm and intact distal pulses.   No murmur heard.  Pulmonary/Chest: Effort normal and breath sounds normal. No respiratory distress.   Abdominal: Soft. Bowel sounds are normal. He exhibits no distension. There is no hepatosplenomegaly.   Musculoskeletal: He exhibits no edema or tenderness.   Neurological: He is alert and oriented to person, place, and time. No cranial nerve deficit.   Skin: Skin is warm and dry. No rash noted. No cyanosis. Nails show no clubbing.   Psychiatric: He has a normal mood and affect. His behavior is normal.   Nursing note and vitals reviewed.      Assessment:       1. Smoldering multiple myeloma        Plan:       The patient has a diagnosis of smoldering myeloma. There is no indication for immediate chemotherapy. We are monitoring renal function closely- baseline creatinine of -1.5..  We will continue observation as per the Randomized Phase III Trial of Lenalidomide Versus Observation Alone in  Patients with 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. Total protein remains normal, M protein has been stable; pending today. Free light chains are stable; pending today.  Plan for return in 1 month.  Endocrinology and Nephrology to monitor diabetes and renal failure respectively. Upcoming visit with PCP is scheduled.

## 2019-08-22 DIAGNOSIS — N18.1 CKD (CHRONIC KIDNEY DISEASE) STAGE 1, GFR 90 ML/MIN OR GREATER: Primary | ICD-10-CM

## 2019-08-26 LAB
ALBUMIN SERPL ELPH-MCNC: 4.01 G/DL (ref 3.35–5.55)
ALPHA1 GLOB SERPL ELPH-MCNC: 0.29 G/DL (ref 0.17–0.41)
ALPHA2 GLOB SERPL ELPH-MCNC: 0.88 G/DL (ref 0.43–0.99)
B-GLOBULIN SERPL ELPH-MCNC: 2.56 G/DL (ref 0.5–1.1)
GAMMA GLOB SERPL ELPH-MCNC: 0.56 G/DL (ref 0.67–1.58)
PATHOLOGIST INTERPRETATION SPE: NORMAL
PROT SERPL-MCNC: 8.3 G/DL (ref 6–8.4)

## 2019-09-13 DIAGNOSIS — D47.2 SMOLDERING MULTIPLE MYELOMA: ICD-10-CM

## 2019-09-14 RX ORDER — DIAZEPAM 5 MG/1
TABLET ORAL
Qty: 60 TABLET | Refills: 0 | Status: SHIPPED | OUTPATIENT
Start: 2019-09-14 | End: 2019-09-17 | Stop reason: SDUPTHER

## 2019-09-17 ENCOUNTER — LAB VISIT (OUTPATIENT)
Dept: LAB | Facility: HOSPITAL | Age: 74
End: 2019-09-17
Attending: INTERNAL MEDICINE
Payer: MEDICARE

## 2019-09-17 ENCOUNTER — OFFICE VISIT (OUTPATIENT)
Dept: HEMATOLOGY/ONCOLOGY | Facility: CLINIC | Age: 74
End: 2019-09-17
Payer: MEDICARE

## 2019-09-17 ENCOUNTER — RESEARCH ENCOUNTER (OUTPATIENT)
Dept: RESEARCH | Facility: HOSPITAL | Age: 74
End: 2019-09-17

## 2019-09-17 ENCOUNTER — TELEPHONE (OUTPATIENT)
Dept: INTERNAL MEDICINE | Facility: CLINIC | Age: 74
End: 2019-09-17

## 2019-09-17 VITALS
HEART RATE: 96 BPM | WEIGHT: 187.38 LBS | TEMPERATURE: 98 F | HEIGHT: 70 IN | SYSTOLIC BLOOD PRESSURE: 142 MMHG | OXYGEN SATURATION: 96 % | DIASTOLIC BLOOD PRESSURE: 77 MMHG | BODY MASS INDEX: 26.82 KG/M2

## 2019-09-17 DIAGNOSIS — Z00.6 EXAMINATION OF PARTICIPANT IN CLINICAL TRIAL: ICD-10-CM

## 2019-09-17 DIAGNOSIS — C90.00 MULTIPLE MYELOMA, REMISSION STATUS UNSPECIFIED: ICD-10-CM

## 2019-09-17 DIAGNOSIS — D47.2 SMOLDERING MULTIPLE MYELOMA: Primary | ICD-10-CM

## 2019-09-17 DIAGNOSIS — E13.9 DIABETES MELLITUS DUE TO ABNORMAL INSULIN: ICD-10-CM

## 2019-09-17 DIAGNOSIS — D47.2 SMOLDERING MULTIPLE MYELOMA: ICD-10-CM

## 2019-09-17 LAB
ALBUMIN SERPL BCP-MCNC: 3.7 G/DL (ref 3.5–5.2)
ALP SERPL-CCNC: 58 U/L (ref 55–135)
ALT SERPL W/O P-5'-P-CCNC: 21 U/L (ref 10–44)
ANION GAP SERPL CALC-SCNC: 11 MMOL/L (ref 8–16)
AST SERPL-CCNC: 21 U/L (ref 10–40)
BASOPHILS # BLD AUTO: 0.05 K/UL (ref 0–0.2)
BASOPHILS NFR BLD: 0.8 % (ref 0–1.9)
BILIRUB SERPL-MCNC: 0.5 MG/DL (ref 0.1–1)
BUN SERPL-MCNC: 16 MG/DL (ref 8–23)
CALCIUM SERPL-MCNC: 9.4 MG/DL (ref 8.7–10.5)
CHLORIDE SERPL-SCNC: 104 MMOL/L (ref 95–110)
CHOLEST SERPL-MCNC: 181 MG/DL (ref 120–199)
CHOLEST/HDLC SERPL: 6.2 {RATIO} (ref 2–5)
CO2 SERPL-SCNC: 23 MMOL/L (ref 23–29)
CREAT SERPL-MCNC: 1.7 MG/DL (ref 0.5–1.4)
DIFFERENTIAL METHOD: ABNORMAL
EOSINOPHIL # BLD AUTO: 0.2 K/UL (ref 0–0.5)
EOSINOPHIL NFR BLD: 2.7 % (ref 0–8)
ERYTHROCYTE [DISTWIDTH] IN BLOOD BY AUTOMATED COUNT: 14.5 % (ref 11.5–14.5)
EST. GFR  (AFRICAN AMERICAN): 45.2 ML/MIN/1.73 M^2
EST. GFR  (NON AFRICAN AMERICAN): 39.1 ML/MIN/1.73 M^2
ESTIMATED AVG GLUCOSE: 174 MG/DL (ref 68–131)
GLUCOSE SERPL-MCNC: 166 MG/DL (ref 70–110)
HBA1C MFR BLD HPLC: 7.7 % (ref 4–5.6)
HCT VFR BLD AUTO: 37.3 % (ref 40–54)
HDLC SERPL-MCNC: 29 MG/DL (ref 40–75)
HDLC SERPL: 16 % (ref 20–50)
HGB BLD-MCNC: 12.1 G/DL (ref 14–18)
IGA SERPL-MCNC: 1732 MG/DL (ref 40–350)
IGG SERPL-MCNC: 942 MG/DL (ref 650–1600)
IGM SERPL-MCNC: 57 MG/DL (ref 50–300)
IMM GRANULOCYTES # BLD AUTO: 0.02 K/UL (ref 0–0.04)
IMM GRANULOCYTES NFR BLD AUTO: 0.3 % (ref 0–0.5)
LDLC SERPL CALC-MCNC: 93 MG/DL (ref 63–159)
LYMPHOCYTES # BLD AUTO: 1.4 K/UL (ref 1–4.8)
LYMPHOCYTES NFR BLD: 21.7 % (ref 18–48)
MAGNESIUM SERPL-MCNC: 1.7 MG/DL (ref 1.6–2.6)
MCH RBC QN AUTO: 28.5 PG (ref 27–31)
MCHC RBC AUTO-ENTMCNC: 32.4 G/DL (ref 32–36)
MCV RBC AUTO: 88 FL (ref 82–98)
MONOCYTES # BLD AUTO: 0.4 K/UL (ref 0.3–1)
MONOCYTES NFR BLD: 5.6 % (ref 4–15)
NEUTROPHILS # BLD AUTO: 4.6 K/UL (ref 1.8–7.7)
NEUTROPHILS NFR BLD: 68.9 % (ref 38–73)
NONHDLC SERPL-MCNC: 152 MG/DL
NRBC BLD-RTO: 0 /100 WBC
PHOSPHATE SERPL-MCNC: 2.7 MG/DL (ref 2.7–4.5)
PLATELET # BLD AUTO: 296 K/UL (ref 150–350)
PMV BLD AUTO: 9 FL (ref 9.2–12.9)
POTASSIUM SERPL-SCNC: 4.6 MMOL/L (ref 3.5–5.1)
PROT SERPL-MCNC: 8.6 G/DL (ref 6–8.4)
RBC # BLD AUTO: 4.25 M/UL (ref 4.6–6.2)
SODIUM SERPL-SCNC: 138 MMOL/L (ref 136–145)
TRIGL SERPL-MCNC: 295 MG/DL (ref 30–150)
WBC # BLD AUTO: 6.63 K/UL (ref 3.9–12.7)

## 2019-09-17 PROCEDURE — 84100 ASSAY OF PHOSPHORUS: CPT

## 2019-09-17 PROCEDURE — 83036 HEMOGLOBIN GLYCOSYLATED A1C: CPT

## 2019-09-17 PROCEDURE — 99215 OFFICE O/P EST HI 40 MIN: CPT | Mod: Q1,S$GLB,, | Performed by: INTERNAL MEDICINE

## 2019-09-17 PROCEDURE — 86334 IMMUNOFIX E-PHORESIS SERUM: CPT | Mod: Q1

## 2019-09-17 PROCEDURE — 84165 PATHOLOGIST INTERPRETATION SPE: ICD-10-PCS | Mod: 26,Q1,, | Performed by: PATHOLOGY

## 2019-09-17 PROCEDURE — 99999 PR PBB SHADOW E&M-EST. PATIENT-LVL III: CPT | Mod: PBBFAC,,, | Performed by: INTERNAL MEDICINE

## 2019-09-17 PROCEDURE — 84165 PROTEIN E-PHORESIS SERUM: CPT

## 2019-09-17 PROCEDURE — 99215 PR OFFICE/OUTPT VISIT, EST, LEVL V, 40-54 MIN: ICD-10-PCS | Mod: Q1,S$GLB,, | Performed by: INTERNAL MEDICINE

## 2019-09-17 PROCEDURE — 83735 ASSAY OF MAGNESIUM: CPT

## 2019-09-17 PROCEDURE — 85025 COMPLETE CBC W/AUTO DIFF WBC: CPT

## 2019-09-17 PROCEDURE — 83520 IMMUNOASSAY QUANT NOS NONAB: CPT | Mod: 59

## 2019-09-17 PROCEDURE — 86334 IMMUNOFIX E-PHORESIS SERUM: CPT | Mod: 26,Q1,, | Performed by: PATHOLOGY

## 2019-09-17 PROCEDURE — 99999 PR PBB SHADOW E&M-EST. PATIENT-LVL III: ICD-10-PCS | Mod: PBBFAC,,, | Performed by: INTERNAL MEDICINE

## 2019-09-17 PROCEDURE — 80053 COMPREHEN METABOLIC PANEL: CPT | Mod: Q1

## 2019-09-17 PROCEDURE — 86334 PATHOLOGIST INTERPRETATION IFE: ICD-10-PCS | Mod: 26,Q1,, | Performed by: PATHOLOGY

## 2019-09-17 PROCEDURE — 36415 COLL VENOUS BLD VENIPUNCTURE: CPT

## 2019-09-17 PROCEDURE — 84165 PROTEIN E-PHORESIS SERUM: CPT | Mod: 26,Q1,, | Performed by: PATHOLOGY

## 2019-09-17 PROCEDURE — 80061 LIPID PANEL: CPT | Mod: Q1

## 2019-09-17 PROCEDURE — 82784 ASSAY IGA/IGD/IGG/IGM EACH: CPT | Mod: 59,Q1

## 2019-09-17 RX ORDER — DIAZEPAM 5 MG/1
TABLET ORAL
Qty: 60 TABLET | Refills: 0 | Status: SHIPPED | OUTPATIENT
Start: 2019-09-17 | End: 2019-11-07 | Stop reason: SDUPTHER

## 2019-09-17 NOTE — TELEPHONE ENCOUNTER
----- Message from Amy Howell sent at 9/17/2019 12:25 PM CDT -----  Contact: [phone]/ [facility]  Pharmacy is requesting a call from the office regarding patient's medication  diazePAM (VALIUM) 5 MG tablet.  The medication is on back order.    Pharmacy wants to know if they can fill the diazePAM 10mg, and the patient can take half.    Please advise, thank you.

## 2019-09-17 NOTE — TELEPHONE ENCOUNTER
Please notiyf   [facility] does not have diazepam.  Dr COURTNEY sent the prescription of diazepam to the primary care pharmacy in her building.

## 2019-09-17 NOTE — PROGRESS NOTES
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   Sponsor:  [facility]  IRB# [id number]  Study ID: [id number]  Investigator: GIL Engel  Pt Initials: LUCÍA LOERNZ       Arm B: Observation  Cycle 45, Day 28       Patient presents to clinic this afternoon for above cycle evaluation. He states continued willingness to participate in above-mentioned study and reports no new symptoms or exacerbation of existing symptoms. He states he feels that his diet is "a little bit off with too much sugar" but that he, overall, is feeling "good."  See AE note below.      Review of Baseline AE's:   1.Hypertension, Grade2 diastolic: BP today is 142/77 today. Subject denies headache/dizziness; no symptoms noted.   AE ongoing  2. Lower Back Pain and Neck Pain, grade 1: Patient has no complaints today/ over last month.  As previously stated, patient is not suspected to have bony myeloma involvement per Dr. Engel as these are pre-existing issues present at baseline.  AE ongoing.  3. Pain in extremity (knee), grade 1. States no issues today; presents with knee stabilizing braces intact; has scheduled injection appointments for November 2019.  AE ongoing   4. Peripheral sensory neuropathy, grade 1: Patient does not verbalize complaints today. Has gabapentin prescribed and takes as needed.  States has not taken in last weeks. AE ongoing.   5. Anemia, Grade 1: Hgb/Hct - stable at 12.1/37.3.  Result reviewed by Dr. Engel. AE ongoing  6. Pain in extremity (right arch/foot-plantar fascitis) Grade 1: Patient states he currently has no issues with this; denies pain as per last visit. Patient states steriod injection last month resulted in relief/resolution of the pain to manageable, not requiring any additional medication. AE resolved for now.              Review of AE's:     *Please note this list is not all-inclusive, please " "see AE log for physician reviewed list of adverse events.   1. Creatinine increased, grade 2: Serum creatinine remains increased today at 1.7 mg/dl today and calculated GFR 47. (GFR 45.2 per Epic/Lab calculation) This is same value from last cycle and continues to trend monthly. As previously stated, Dr. Engel states this has not been related to myeloma and is related chronic kidney issues likely secondary to diabetes and hypertension. Will continue observation monthly and to monitor renal function closely. Per MD, encouraged to increase water intake. AE stable from baseline  Hyponatremia, Grade 1: Serum sodium today is 138 mmol/L; AE resolved for now. Will monitor  Increased triglycerides,      Per study chair, "the definition of progressive disease for this protocol is developing CRAB criteria that requires treatment systemically." Per Dr Engel, based on today's exam and lab results thus far, patient does not have any s/s of CRAB: Normal Calcium level (9.7), absence of Renal insufficiency (serum creatinine 1.7, and GFR 47 per [facility]) --patient with a history of kidney dysfunction secondary to diabetes; Dr. Engel aware of these values and not concerned for myeloma involvement), absence of significant Anemia (hemoglobin 11.9, stable; will await myeloma labs for clarity relationship to myeloma) and absence of lytic Bone lesions (per baseline metastatic survey as well as MRI--see MD note for further comment). See MD note for ECOG score and H&P and flowsheets for laboratory work, vitals, etc. All myeloma-related blood work from last cycle including SPEP and JAGUAR have remained stable, and are currently pending for this cycle. Per Dr. Engel, patient shows no evidence of progression at last result. Patient informed that Dr. Engel will release all lab results to [facility]. He states understanding of this. QOL's not required at this timepoint, informed patient these will be due again in December/C48D28 visit. He states " "understanding.           Patient states, as per above interval history, diet is "a bit off" due to significant other baking sweets for him as well as current work schedule.  He states he will work to get more aligned with controlling blood sugars.He is still scheduled to return to [facility]. Lipid panel drawwn today; results abnormal; encouraged subject to discuss with primary care MD/diabetic care MD; states understanding. Dr. Engel aware of labs as well.    Patient remains in relationship with "good friend" in [place] whom he relies on for company/support. Encouraged patient to reach out/contact staff if feels need to discuss any issues. He states understanding.    Patient was informed to review October appointments and alert us of any conflict. Will continue to schedule patient as far out as possible, which seems to help maintain compliance with visits. Patient states having research RN's contact information and has MD contact information to call with any concerns, questions, or worsening of symptoms; he verbalized understanding.             "

## 2019-09-17 NOTE — PROGRESS NOTES
Subjective:       Patient ID: Emerson Menendez is a 74 y.o. male.    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 Test results identify a stable IgA kappa protein with beta globulin band at 1.63g/dL. Kappa free light chain is elevated at 4.40. CBC and calcium are stable. Creatinine with history of stage III CKD is stable at 1.4. Metastatic survey is negative for lytic lesions. MRI of the entire spine demonstrated age related changes but no convincing evidence of myeloma bone disease. Bone marrow biopsy identified about 13% plasma cells by morphology and FISH for myeloma identified a t(11;14) and trisomies 3,7, and 17. The patient is afebrile and appears clinically well. He was seen by his podiatrist and nephrologist since our last visit without any new or acute events.    The patient has not received any therapy for smoldering myeloma including bisphosphonates or steroids. He has been randomized to observation arm of the the clinical study. 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 He has no current or prior history of malignancy.    TODAY  Mr. Menendez returns today for monthly follow-up evaluation. No clinical signs/symptoms of progression of his smoldering myeloma with CBC stable and rest of labs pending.  No acute interval events.       HPI  Review of Systems   Constitutional: Negative for activity change, appetite change, diaphoresis, fatigue, fever and unexpected weight change.   HENT: Negative.    Eyes: Negative.    Respiratory: Negative.    Cardiovascular: Negative for leg swelling.   Gastrointestinal:  Negative.    Endocrine: Negative.    Genitourinary: Negative.    Musculoskeletal: Negative.    Skin: Negative.    Allergic/Immunologic: Negative.    Neurological:        Grade 0-1 peripheral neuropathy of bilateral feet.    Hematological: Negative for adenopathy. Does not bruise/bleed easily.   Psychiatric/Behavioral: Negative.        Objective:       Vitals:    09/17/19 1321   BP: (!) 142/77   Pulse: 96   Temp: 97.6 °F (36.4 °C)       Physical Exam   Constitutional: He is oriented to person, place, and time. He appears well-developed and well-nourished.   HENT:   Head: Normocephalic and atraumatic.   Right Ear: External ear normal.   Left Ear: External ear normal.   Nose: Nose normal.   Mouth/Throat: Oropharynx is clear and moist.   Eyes: Pupils are equal, round, and reactive to light. Conjunctivae and EOM are normal.   Neck: Normal range of motion. Neck supple.   Cardiovascular: Normal rate, regular rhythm and intact distal pulses.   No murmur heard.  Pulmonary/Chest: Effort normal and breath sounds normal. No respiratory distress.   Abdominal: Soft. Bowel sounds are normal. He exhibits no distension. There is no hepatosplenomegaly.   Musculoskeletal: He exhibits no edema or tenderness.   Neurological: He is alert and oriented to person, place, and time. No cranial nerve deficit.   Skin: Skin is warm and dry. No rash noted. No cyanosis. Nails show no clubbing.   Psychiatric: He has a normal mood and affect. His behavior is normal.   Nursing note and vitals reviewed.      Assessment:       1. Smoldering multiple myeloma        Plan:       The patient has a diagnosis of smoldering myeloma. There is no indication for immediate chemotherapy. We are monitoring renal function closely- baseline creatinine of -1.5..  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 Total protein remains normal, M  protein has been stable; pending today. Free light chains are stable; pending today.  Plan for return in 1 month.  Endocrinology and Nephrology to monitor diabetes and renal failure respectively. Upcoming visit with PCP is scheduled.

## 2019-09-18 LAB
ALBUMIN SERPL ELPH-MCNC: 4.03 G/DL (ref 3.35–5.55)
ALPHA1 GLOB SERPL ELPH-MCNC: 0.33 G/DL (ref 0.17–0.41)
ALPHA2 GLOB SERPL ELPH-MCNC: 0.83 G/DL (ref 0.43–0.99)
B-GLOBULIN SERPL ELPH-MCNC: 1.07 G/DL (ref 0.5–1.1)
GAMMA GLOB SERPL ELPH-MCNC: 2.04 G/DL (ref 0.67–1.58)
INTERPRETATION SERPL IFE-IMP: NORMAL
KAPPA LC SER QL IA: 4.58 MG/DL (ref 0.33–1.94)
KAPPA LC/LAMBDA SER IA: 2.68 (ref 0.26–1.65)
LAMBDA LC SER QL IA: 1.71 MG/DL (ref 0.57–2.63)
PATHOLOGIST INTERPRETATION IFE: NORMAL
PATHOLOGIST INTERPRETATION SPE: NORMAL
PROT SERPL-MCNC: 8.3 G/DL (ref 6–8.4)

## 2019-10-09 ENCOUNTER — HOSPITAL ENCOUNTER (OUTPATIENT)
Dept: RADIOLOGY | Facility: HOSPITAL | Age: 74
Discharge: HOME | End: 2019-10-09
Attending: PHYSICIAN ASSISTANT
Payer: MEDICARE

## 2019-10-09 ENCOUNTER — OFFICE VISIT (OUTPATIENT)
Dept: SPORTS MEDICINE | Facility: CLINIC | Age: 74
End: 2019-10-09
Payer: MEDICARE

## 2019-10-09 VITALS
SYSTOLIC BLOOD PRESSURE: 139 MMHG | HEIGHT: 70 IN | WEIGHT: 187 LBS | DIASTOLIC BLOOD PRESSURE: 71 MMHG | BODY MASS INDEX: 26.77 KG/M2 | HEART RATE: 97 BPM

## 2019-10-09 DIAGNOSIS — M25.561 PAIN IN BOTH KNEES, UNSPECIFIED CHRONICITY: ICD-10-CM

## 2019-10-09 DIAGNOSIS — M17.11 PRIMARY OSTEOARTHRITIS OF RIGHT KNEE: ICD-10-CM

## 2019-10-09 DIAGNOSIS — M17.12 PRIMARY OSTEOARTHRITIS OF LEFT KNEE: ICD-10-CM

## 2019-10-09 DIAGNOSIS — M25.562 PAIN IN BOTH KNEES, UNSPECIFIED CHRONICITY: ICD-10-CM

## 2019-10-09 DIAGNOSIS — M25.561 PAIN IN BOTH KNEES, UNSPECIFIED CHRONICITY: Primary | ICD-10-CM

## 2019-10-09 DIAGNOSIS — M25.562 PAIN IN BOTH KNEES, UNSPECIFIED CHRONICITY: Primary | ICD-10-CM

## 2019-10-09 PROCEDURE — 99999 PR PBB SHADOW E&M-EST. PATIENT-LVL IV: ICD-10-PCS | Mod: PBBFAC,,, | Performed by: PHYSICIAN ASSISTANT

## 2019-10-09 PROCEDURE — 20610 DRAIN/INJ JOINT/BURSA W/O US: CPT | Mod: LT,S$GLB,, | Performed by: PHYSICIAN ASSISTANT

## 2019-10-09 PROCEDURE — 3078F PR MOST RECENT DIASTOLIC BLOOD PRESSURE < 80 MM HG: ICD-10-PCS | Mod: CPTII,S$GLB,, | Performed by: PHYSICIAN ASSISTANT

## 2019-10-09 PROCEDURE — 1101F PR PT FALLS ASSESS DOC 0-1 FALLS W/OUT INJ PAST YR: ICD-10-PCS | Mod: CPTII,S$GLB,, | Performed by: PHYSICIAN ASSISTANT

## 2019-10-09 PROCEDURE — 20610 PR DRAIN/INJECT LARGE JOINT/BURSA: ICD-10-PCS | Mod: LT,S$GLB,, | Performed by: PHYSICIAN ASSISTANT

## 2019-10-09 PROCEDURE — 73564 X-RAY EXAM KNEE 4 OR MORE: CPT | Mod: 26,50,, | Performed by: RADIOLOGY

## 2019-10-09 PROCEDURE — 1101F PT FALLS ASSESS-DOCD LE1/YR: CPT | Mod: CPTII,S$GLB,, | Performed by: PHYSICIAN ASSISTANT

## 2019-10-09 PROCEDURE — 99999 PR PBB SHADOW E&M-EST. PATIENT-LVL IV: CPT | Mod: PBBFAC,,, | Performed by: PHYSICIAN ASSISTANT

## 2019-10-09 PROCEDURE — 3075F SYST BP GE 130 - 139MM HG: CPT | Mod: CPTII,S$GLB,, | Performed by: PHYSICIAN ASSISTANT

## 2019-10-09 PROCEDURE — 3075F PR MOST RECENT SYSTOLIC BLOOD PRESS GE 130-139MM HG: ICD-10-PCS | Mod: CPTII,S$GLB,, | Performed by: PHYSICIAN ASSISTANT

## 2019-10-09 PROCEDURE — 99213 PR OFFICE/OUTPT VISIT, EST, LEVL III, 20-29 MIN: ICD-10-PCS | Mod: 25,S$GLB,, | Performed by: PHYSICIAN ASSISTANT

## 2019-10-09 PROCEDURE — 73564 X-RAY EXAM KNEE 4 OR MORE: CPT | Mod: TC,50

## 2019-10-09 PROCEDURE — 3078F DIAST BP <80 MM HG: CPT | Mod: CPTII,S$GLB,, | Performed by: PHYSICIAN ASSISTANT

## 2019-10-09 PROCEDURE — 99213 OFFICE O/P EST LOW 20 MIN: CPT | Mod: 25,S$GLB,, | Performed by: PHYSICIAN ASSISTANT

## 2019-10-09 PROCEDURE — 73564 XR KNEE ORTHO BILAT WITH FLEXION: ICD-10-PCS | Mod: 26,50,, | Performed by: RADIOLOGY

## 2019-10-09 RX ORDER — TRIAMCINOLONE ACETONIDE 40 MG/ML
40 INJECTION, SUSPENSION INTRA-ARTICULAR; INTRAMUSCULAR
Status: COMPLETED | OUTPATIENT
Start: 2019-10-09 | End: 2019-10-09

## 2019-10-09 RX ORDER — LIDOCAINE HYDROCHLORIDE 10 MG/ML
2 INJECTION INFILTRATION; PERINEURAL
Status: COMPLETED | OUTPATIENT
Start: 2019-10-09 | End: 2019-10-09

## 2019-10-09 RX ORDER — BUPIVACAINE HYDROCHLORIDE 2.5 MG/ML
2 INJECTION, SOLUTION INFILTRATION; PERINEURAL
Status: COMPLETED | OUTPATIENT
Start: 2019-10-09 | End: 2019-10-09

## 2019-10-09 RX ADMIN — BUPIVACAINE HYDROCHLORIDE 5 MG: 2.5 INJECTION, SOLUTION INFILTRATION; PERINEURAL at 05:10

## 2019-10-09 RX ADMIN — TRIAMCINOLONE ACETONIDE 40 MG: 40 INJECTION, SUSPENSION INTRA-ARTICULAR; INTRAMUSCULAR at 05:10

## 2019-10-09 RX ADMIN — LIDOCAINE HYDROCHLORIDE 2 ML: 10 INJECTION INFILTRATION; PERINEURAL at 05:10

## 2019-10-09 NOTE — PROGRESS NOTES
CHIEF COMPLAINT: bilateral knee pain (L>R)                            Interval hx: Pt reports a return of knee pain 6 months after Euflexxa series injections and 3 months from previous Bilateral CSI. % relief until now. Pain symptoms are similar as before. Requesting CSI today in left knee only.           HPI:  The patient is a 72 y.o. male  who presents for follow up evaluation of his bilateral knee pain, left worse than right, requesting left CSI injection. He has been doing a HEP in his gym. He finished the Euflexxa series in October 2017. He was doing well but had a lot of night pain last night. He wears his medial  brace during the day and this helps his knee pain.  Issues is currently at night, when he is not wearing his braces. No new trauma or injury. He wants to avoid getting a TKA.    + mechanical symptoms. Pain: 8/10 at its worst    Review of Systems   Constitution: Negative. Negative for chills, fever and night sweats.   HENT: Negative for congestion and headaches.    Eyes: Negative for blurred vision, left vision loss and right vision loss.   Cardiovascular: Negative for chest pain and syncope.   Respiratory: Negative for cough and shortness of breath.    Endocrine: Negative for polydipsia, polyphagia and polyuria.   Hematologic/Lymphatic: Negative for bleeding problem. Does not bruise/bleed easily.   Skin: Negative for dry skin, itching and rash.   Musculoskeletal: Negative for falls and muscle weakness.   Gastrointestinal: Negative for abdominal pain and bowel incontinence.   Genitourinary: Negative for bladder incontinence and nocturia.   Neurological: Negative for disturbances in coordination, loss of balance and seizures.   Psychiatric/Behavioral: Negative for depression. The patient does not have insomnia.    Allergic/Immunologic: Negative for hives and persistent infections.     PAST MEDICAL HISTORY:   Past Medical History:   Diagnosis Date    Anxiety 3/16/2015    BPH (benign  prostatic hypertrophy) 9/24/2012    Depression 3/16/2015    GERD (gastroesophageal reflux disease) 9/24/2012    Microalbuminuria 9/24/2012    Osteoarthritis of cervical spine 9/24/2012    Osteoarthritis of knee 9/24/2012    Type II or unspecified type diabetes mellitus without mention of complication, not stated as uncontrolled 9/24/2012     PAST SURGICAL HISTORY:   Past Surgical History:   Procedure Laterality Date    CATARACT EXTRACTION  2012    Right eye    PROSTATE SURGERY       FAMILY HISTORY:   Family History   Problem Relation Age of Onset    Hypertension Brother     Kidney failure Daughter         due to medication    Blindness Neg Hx     Cancer Neg Hx     Cataracts Neg Hx     Diabetes Neg Hx     Glaucoma Neg Hx     Macular degeneration Neg Hx     Retinal detachment Neg Hx     Strabismus Neg Hx     Stroke Neg Hx     Thyroid disease Neg Hx      SOCIAL HISTORY:   Social History     Socioeconomic History    Marital status: Single     Spouse name: Not on file    Number of children: Not on file    Years of education: Not on file    Highest education level: Not on file   Occupational History    Not on file   Social Needs    Financial resource strain: Not on file    Food insecurity:     Worry: Not on file     Inability: Not on file    Transportation needs:     Medical: Not on file     Non-medical: Not on file   Tobacco Use    Smoking status: Never Smoker    Smokeless tobacco: Never Used   Substance and Sexual Activity    Alcohol use: No    Drug use: No    Sexual activity: Yes     Partners: Female     Birth control/protection: None   Lifestyle    Physical activity:     Days per week: Not on file     Minutes per session: Not on file    Stress: Not on file   Relationships    Social connections:     Talks on phone: Not on file     Gets together: Not on file     Attends Buddhist service: Not on file     Active member of club or organization: Not on file     Attends meetings of clubs  "or organizations: Not on file     Relationship status: Not on file   Other Topics Concern    Not on file   Social History Narrative    Not on file       MEDICATIONS:   Current Outpatient Medications:     aspirin (ECOTRIN) 81 MG EC tablet, Take 1 tablet (81 mg total) by mouth once daily., Disp: 100 tablet, Rfl: 3    BD ULTRA-FINE SHORT PEN NEEDLE 31 gauge x 5/16" Ndle, USE TO INJECT INSULIN ONE TIME DAILY, Disp: 100 each, Rfl: 3    blood glucose control, normal (METER-CHECK) Soln, One meter. True test meter. Use as directed, Disp: 1 each, Rfl: 0    blood sugar diagnostic (ACCU-CHEK STEFANO PLUS TEST STRP) Strp, TEST FOUR TIMES DAILY, Disp: 400 strip, Rfl: 3    blood-glucose meter (ACCU-CHEK OMAYRA) [facility], USE AS DIRECTED. Accucheck stefano plus, Disp: 1 each, Rfl: 0    cholecalciferol, vitamin D3, (VITAMIN D) 2,000 unit Cap, Take by mouth. 1 Capsule Oral Every day.  over the counter, Disp: , Rfl:     citalopram (CELEXA) 20 MG tablet, Take 1.5 tablets (30 mg total) by mouth once daily., Disp: 135 tablet, Rfl: 4    diazePAM (VALIUM) 5 MG tablet, TAKE 1 TABLET(5 MG) BY MOUTH EVERY 6 HOURS AS NEEDED, Disp: 60 tablet, Rfl: 0    flash glucose scanning reader (FREESTYLE CHRISTIANO 14 DAY READER) Misc, use as directed, Disp: 1 each, Rfl: 0    gabapentin (NEURONTIN) 100 MG capsule, TAKE 1 CAPSULE EVERY MORNING, 1 CAPSULE IN THE AFTERNOON, AND 1 TO 3 CAPSULE(S) AT BEDTIME AS NEEDED FOR FOOT PAIN, Disp: 450 capsule, Rfl: 3    glimepiride (AMARYL) 2 MG tablet, TAKE 1 TABLET (2 MG TOTAL) BY MOUTH ONCE DAILY., Disp: 90 tablet, Rfl: 4    insulin (LANTUS SOLOSTAR U-100 INSULIN) glargine 100 units/mL (3mL) SubQ pen, Inject 20 Units into the skin every evening., Disp: 1 Box, Rfl: 6    lancets Misc, Use twice daily, Disp: 200 each, Rfl: 4    latanoprost 0.005 % ophthalmic solution, Place 1 drop into both eyes every evening., Disp: 7.5 mL, Rfl: 3    losartan (COZAAR) 25 MG tablet, Take 1 tablet (25 mg total) by mouth once daily., " "Disp: 90 tablet, Rfl: 4    omeprazole (PRILOSEC) 20 MG capsule, Take 2 capsules (40 mg total) by mouth once daily., Disp: 180 capsule, Rfl: 3    pravastatin (PRAVACHOL) 40 MG tablet, Take 1 tablet (40 mg total) by mouth once daily., Disp: 90 tablet, Rfl: 4    tadalafil (CIALIS) 5 MG tablet, TAKE 1 TABLET(5 MG) BY MOUTH DAILY AS NEEDED FOR ERECTILE DYSFUNCTION, Disp: 90 tablet, Rfl: 2    tamsulosin (FLOMAX) 0.4 mg Cap, Take 1 capsule (0.4 mg total) by mouth once daily., Disp: 90 capsule, Rfl: 3    tizanidine (ZANAFLEX) 4 MG tablet, 1/2-1 tablet every 8 hours as needed for muscle spasm, Disp: 90 tablet, Rfl: 1    glucagon (human recombinant) inj 1mg/mL kit, Inject 1 mL (1 mg total) into the muscle as needed., Disp: 1 kit, Rfl: 0  No current facility-administered medications for this visit.   ALLERGIES:   Review of patient's allergies indicates:   Allergen Reactions    Penicillins      Knees locked up       VITAL SIGNS:   /71   Pulse 97   Ht 5' 10" (1.778 m)   Wt 84.8 kg (187 lb)   BMI 26.83 kg/m²      PHYSICAL EXAMINATION    General:  The patient is alert and oriented x 3.  Mood is pleasant.  Observation of ears, eyes and nose reveal no gross abnormalities.  No labored breathing observed.    Bilateral KNEE EXAMINATION     OBSERVATION / INSPECTION   Gait:   Antalgic   Alignment:  Neutral   Scars:   None   Muscle atrophy: Mild  Effusion:  None   Warmth:  None   Discoloration:   none     TENDERNESS / CREPITUS (T / C):          T / C      T / C   Patella   - / -   Lateral joint line   - / -   Peripatellar medial  -  Medial joint line    + / -   Peripatellar lateral -  Medial plica   - / -   Patellar tendon -   Popliteal fossa   - / -   Quad tendon   -   Gastrocnemius   -   Prepatellar Bursa - / -   Quadricep   -   Tibial tubercle  -  Thigh/hamstring  -   Pes anserine/HS -  Fibula    -   ITB   - / -  Tibia     -   Tib/fib joint  - / -  LCL    -     MFC   - / -   MCL: Proximal  -    LFC   - / - "    Distal   -          ROM: (* = pain)  PASSIVE   ACTIVE    Left :   *0 / 0 / *145   *0 / 0 / 145     Right :    *0 / 0 / *145   0 / 0 / 145    Patellofemoral examination:  See above noted areas of tenderness.   Patella position    Subluxation / dislocation: Centered           Sup. / Inf;   Normal   Crepitus (PF):    Absent   Patellar Mobility:       Medial-lateral:   Normal    Superior-inferior:  Normal    Inferior tilt   Normal    Patellar tendon:  Normal   Lateral tilt:    Normal   J-sign:     None   Patellofemoral grind:   No pain       MENISCAL SIGNS:     Pain on terminal extension:  -  Pain on terminal flexion:  + (left)  Sofiyas maneuver:  - for pain  Squat     + posterior joint pain    LIGAMENT EXAMINATION:  ACL / Lachman:  negative   PCL-Post.  drawer: normal 0 to 2mm  MCL- Valgus:  normal 0 to 2mm  LCL- Varus:  normal 0 to 2mm    STRENGTH: (* = with pain) PAINFUL SIDE   Quadricep   *4/5   Hamstrin/5    EXTREMITY NEURO-VASCULAR EXAMINATION:   Sensation:  Grossly intact to light touch all dermatomal regions.   Motor Function:  Fully intact motor function at hip, knee, foot and ankle    DTRs;  quadriceps and  achilles 2+.  No clonus and downgoing Babinski.    Vascular status:  DP and PT pulses 2+, brisk capillary refill, symmetric.     XRAYS(17): There are degenerative changes of both knees most severe in the medial compartments.    Pt has a hx of Diabetes, type 2  Lab Results   Component Value Date    HGBA1C 7.7 (H) 2019         ASSESSMENT:      ICD-10-CM ICD-9-CM   1. Pain in both knees, unspecified chronicity M25.561 719.46    M25.562    2. Primary osteoarthritis of left knee M17.12 715.16   3. Primary osteoarthritis of right knee M17.11 715.16       PLAN:   1. We have discussed a variety of treatment options including medications, injections, physical therapy and other alternative treatments. Pt is requesting CSI and HEP at this time. Euflexxa at next visit.    I made the decision  to obtain old records of the patient including previous notes and imaging. I independently reviewed and interpreted lab results today as well as prior imaging.     1. Injection Procedure  A time out was performed, including verification of patient ID, procedure, site and side, availability of information and equipment, review of safety issues, and agreement with consent, the procedure site was marked.    After time out was performed, the patient was prepped aseptically with chloraprep swabsticks. A diagnostic and therapeutic injection of 1:4cc Kenalog/Lidocaine/Marcaine was given under sterile technique using a 22g x 1.5 needle from the Superolateral  aspect of the left Knee Joint in the supine position.      Emerson Menendez had no adverse reactions to the medication. Pain decreased. He was instructed to apply ice to the joint for 20 minutes and avoid strenuous activities for 24-36 hours following the injection. He was warned of possible blood sugar and/or blood pressure changes during that time. Following that time, he can resume regular activities.    He was reminded to call the clinic immediately for any adverse side effects as explained in clinic today.    2. Prior-authorization for bilateral orthrovisc series injections.  3. Continue HEP  4. Ice compress to the affected area 2-3x a day for 15-20 minutes as needed for pain management.  5. RTC to see Christophe Gallo PA-C in 3 months for follow-up and Euflexxa series bilateral knee.    All of the patient's questions were answered and the patient will contact us if they have any questions or concerns in the interim.

## 2019-10-15 ENCOUNTER — RESEARCH ENCOUNTER (OUTPATIENT)
Dept: RESEARCH | Facility: HOSPITAL | Age: 74
End: 2019-10-15

## 2019-10-15 ENCOUNTER — LAB VISIT (OUTPATIENT)
Dept: LAB | Facility: HOSPITAL | Age: 74
End: 2019-10-15
Attending: INTERNAL MEDICINE
Payer: MEDICARE

## 2019-10-15 ENCOUNTER — OFFICE VISIT (OUTPATIENT)
Dept: HEMATOLOGY/ONCOLOGY | Facility: CLINIC | Age: 74
End: 2019-10-15
Payer: MEDICARE

## 2019-10-15 VITALS
HEIGHT: 70 IN | SYSTOLIC BLOOD PRESSURE: 131 MMHG | WEIGHT: 188.5 LBS | TEMPERATURE: 98 F | HEART RATE: 96 BPM | OXYGEN SATURATION: 97 % | RESPIRATION RATE: 15 BRPM | DIASTOLIC BLOOD PRESSURE: 69 MMHG | BODY MASS INDEX: 26.99 KG/M2

## 2019-10-15 DIAGNOSIS — D47.2 SMOLDERING MULTIPLE MYELOMA: Primary | ICD-10-CM

## 2019-10-15 DIAGNOSIS — N18.30 CKD STAGE 3 DUE TO TYPE 2 DIABETES MELLITUS: ICD-10-CM

## 2019-10-15 DIAGNOSIS — E11.42 TYPE 2 DIABETES MELLITUS WITH DIABETIC POLYNEUROPATHY, WITH LONG-TERM CURRENT USE OF INSULIN: ICD-10-CM

## 2019-10-15 DIAGNOSIS — Z79.4 TYPE 2 DIABETES MELLITUS WITH DIABETIC POLYNEUROPATHY, WITH LONG-TERM CURRENT USE OF INSULIN: ICD-10-CM

## 2019-10-15 DIAGNOSIS — C90.00 MULTIPLE MYELOMA, REMISSION STATUS UNSPECIFIED: ICD-10-CM

## 2019-10-15 DIAGNOSIS — E11.22 CKD STAGE 3 DUE TO TYPE 2 DIABETES MELLITUS: ICD-10-CM

## 2019-10-15 DIAGNOSIS — Z00.6 EXAMINATION OF PARTICIPANT IN CLINICAL TRIAL: ICD-10-CM

## 2019-10-15 LAB
ALBUMIN SERPL BCP-MCNC: 3.5 G/DL (ref 3.5–5.2)
ALP SERPL-CCNC: 59 U/L (ref 55–135)
ALT SERPL W/O P-5'-P-CCNC: 15 U/L (ref 10–44)
ANION GAP SERPL CALC-SCNC: 10 MMOL/L (ref 8–16)
AST SERPL-CCNC: 16 U/L (ref 10–40)
BASOPHILS # BLD AUTO: 0.06 K/UL (ref 0–0.2)
BASOPHILS NFR BLD: 0.9 % (ref 0–1.9)
BILIRUB SERPL-MCNC: 0.3 MG/DL (ref 0.1–1)
BUN SERPL-MCNC: 21 MG/DL (ref 8–23)
CALCIUM SERPL-MCNC: 9.4 MG/DL (ref 8.7–10.5)
CHLORIDE SERPL-SCNC: 103 MMOL/L (ref 95–110)
CO2 SERPL-SCNC: 25 MMOL/L (ref 23–29)
CREAT SERPL-MCNC: 1.7 MG/DL (ref 0.5–1.4)
DIFFERENTIAL METHOD: ABNORMAL
EOSINOPHIL # BLD AUTO: 0.4 K/UL (ref 0–0.5)
EOSINOPHIL NFR BLD: 6 % (ref 0–8)
ERYTHROCYTE [DISTWIDTH] IN BLOOD BY AUTOMATED COUNT: 14.1 % (ref 11.5–14.5)
EST. GFR  (AFRICAN AMERICAN): 45.2 ML/MIN/1.73 M^2
EST. GFR  (NON AFRICAN AMERICAN): 39.1 ML/MIN/1.73 M^2
GLUCOSE SERPL-MCNC: 231 MG/DL (ref 70–110)
HCT VFR BLD AUTO: 39.8 % (ref 40–54)
HGB BLD-MCNC: 12.6 G/DL (ref 14–18)
IGA SERPL-MCNC: 1564 MG/DL (ref 40–350)
IGG SERPL-MCNC: 899 MG/DL (ref 650–1600)
IGM SERPL-MCNC: 52 MG/DL (ref 50–300)
IMM GRANULOCYTES # BLD AUTO: 0.03 K/UL (ref 0–0.04)
IMM GRANULOCYTES NFR BLD AUTO: 0.5 % (ref 0–0.5)
LYMPHOCYTES # BLD AUTO: 2.2 K/UL (ref 1–4.8)
LYMPHOCYTES NFR BLD: 33.9 % (ref 18–48)
MAGNESIUM SERPL-MCNC: 1.7 MG/DL (ref 1.6–2.6)
MCH RBC QN AUTO: 29.2 PG (ref 27–31)
MCHC RBC AUTO-ENTMCNC: 31.7 G/DL (ref 32–36)
MCV RBC AUTO: 92 FL (ref 82–98)
MONOCYTES # BLD AUTO: 0.4 K/UL (ref 0.3–1)
MONOCYTES NFR BLD: 6.3 % (ref 4–15)
NEUTROPHILS # BLD AUTO: 3.4 K/UL (ref 1.8–7.7)
NEUTROPHILS NFR BLD: 52.4 % (ref 38–73)
NRBC BLD-RTO: 0 /100 WBC
PHOSPHATE SERPL-MCNC: 2.9 MG/DL (ref 2.7–4.5)
PLATELET # BLD AUTO: 285 K/UL (ref 150–350)
PMV BLD AUTO: 9.1 FL (ref 9.2–12.9)
POTASSIUM SERPL-SCNC: 5 MMOL/L (ref 3.5–5.1)
PROT SERPL-MCNC: 8 G/DL (ref 6–8.4)
RBC # BLD AUTO: 4.31 M/UL (ref 4.6–6.2)
SODIUM SERPL-SCNC: 138 MMOL/L (ref 136–145)
WBC # BLD AUTO: 6.49 K/UL (ref 3.9–12.7)

## 2019-10-15 PROCEDURE — 83520 IMMUNOASSAY QUANT NOS NONAB: CPT | Mod: 59,Q1

## 2019-10-15 PROCEDURE — 84100 ASSAY OF PHOSPHORUS: CPT

## 2019-10-15 PROCEDURE — 80053 COMPREHEN METABOLIC PANEL: CPT

## 2019-10-15 PROCEDURE — 85025 COMPLETE CBC W/AUTO DIFF WBC: CPT | Mod: Q1

## 2019-10-15 PROCEDURE — 36415 COLL VENOUS BLD VENIPUNCTURE: CPT

## 2019-10-15 PROCEDURE — 84165 PROTEIN E-PHORESIS SERUM: CPT | Mod: 26,Q1,, | Performed by: PATHOLOGY

## 2019-10-15 PROCEDURE — 99999 PR PBB SHADOW E&M-EST. PATIENT-LVL III: ICD-10-PCS | Mod: PBBFAC,,, | Performed by: INTERNAL MEDICINE

## 2019-10-15 PROCEDURE — 84165 PATHOLOGIST INTERPRETATION SPE: ICD-10-PCS | Mod: 26,Q1,, | Performed by: PATHOLOGY

## 2019-10-15 PROCEDURE — 99215 OFFICE O/P EST HI 40 MIN: CPT | Mod: Q1,GC,S$GLB, | Performed by: INTERNAL MEDICINE

## 2019-10-15 PROCEDURE — 84165 PROTEIN E-PHORESIS SERUM: CPT | Mod: Q1

## 2019-10-15 PROCEDURE — 86334 IMMUNOFIX E-PHORESIS SERUM: CPT

## 2019-10-15 PROCEDURE — 99999 PR PBB SHADOW E&M-EST. PATIENT-LVL III: CPT | Mod: PBBFAC,,, | Performed by: INTERNAL MEDICINE

## 2019-10-15 PROCEDURE — 82784 ASSAY IGA/IGD/IGG/IGM EACH: CPT | Mod: 59,Q1

## 2019-10-15 PROCEDURE — 99215 PR OFFICE/OUTPT VISIT, EST, LEVL V, 40-54 MIN: ICD-10-PCS | Mod: Q1,GC,S$GLB, | Performed by: INTERNAL MEDICINE

## 2019-10-15 PROCEDURE — 86334 IMMUNOFIX E-PHORESIS SERUM: CPT | Mod: 26,Q1,, | Performed by: PATHOLOGY

## 2019-10-15 PROCEDURE — 83735 ASSAY OF MAGNESIUM: CPT | Mod: Q1

## 2019-10-15 PROCEDURE — 86334 PATHOLOGIST INTERPRETATION IFE: ICD-10-PCS | Mod: 26,Q1,, | Performed by: PATHOLOGY

## 2019-10-15 NOTE — PROGRESS NOTES
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   Sponsor:  [facility]  IRB# [id number]  Study ID: [id number]  Investigator: GIL Engel  Pt Initials: LUCÍA LORENZ       Arm B: Observation  Cycle 46, Day 28       Patient presents to clinic this afternoon for above cycle evaluation. He states continued willingness to participate in above-mentioned study and reports no new symptoms or exacerbation of existing symptoms. He states he recently had a steroid injection to his knee that made is blood sugars "go crazy."  See AE note below.      Review of Baseline AE's:   1.Hypertension, Grade2 diastolic: BP today is 131/69 today. Subject denies headache/dizziness; no symptoms noted.   AE ongoing  2. Lower Back Pain and Neck Pain, grade 1: Patient has no complaints today/ over last month.  As previously stated, patient is not suspected to have bony myeloma involvement per Dr. Engel as these are pre-existing issues present at baseline.  AE ongoing.  3. Pain in extremity (knee), grade 1. As above, states he had recent injection to the knee.  Presents with knee stabilizing braces intact; has further injection appointments scheduled through end of year.  AE ongoing   4. Peripheral sensory neuropathy, grade 1: Patient does not verbalize complaints today. Has gabapentin prescribed and takes as needed.  States has not taken in last weeks. AE ongoing.   5. Anemia, Grade 1: Hgb/Hct - stable at 12.6/39.8.  Result reviewed by Dr. Engel. AE ongoing  6. Pain in extremity (right arch/foot-plantar fascitis) Grade 1: Patient states he currently has no issues with this; denies pain as per last visit. Patient states steriod injection last month resulted in relief/resolution of the pain to manageable, not requiring any additional medication. AE resolved for now.              Review of AE's:     *Please note this list is not " "all-inclusive, please see AE log for physician reviewed list of adverse events.   1. Creatinine increased, grade 2: Serum creatinine remains increased today at 1.7 mg/dl today and calculated GFR 46.8. (GFR 45.2 per Epic/Lab calculation) This is same value from last cycle and continues to trend monthly. As previously stated, Dr. Engel states this has not been related to myeloma and is related chronic kidney issues likely secondary to diabetes and hypertension. Will continue observation monthly and to monitor renal function closely. Per MD, encouraged to increase water intake. AE stable from baseline  2. Hyponatremia, Grade 1: Serum sodium today is 138 mmol/L; AE resolved for now. Will monitor  Increased triglycerides,      Per study chair, "the definition of progressive disease for this protocol is developing CRAB criteria that requires treatment systemically." Per Dr Engel, based on today's exam and lab results thus far, patient does not have any s/s of CRAB: Normal Calcium level (9.4), absence of Renal insufficiency (serum creatinine 1.7, and GFR 46.8 per [facility]) --patient with a history of kidney dysfunction secondary to diabetes; Dr. Engel aware of these values and not concerned for myeloma involvement), absence of significant Anemia (hemoglobin 12.6, stable; will await myeloma labs for clarity relationship to myeloma) and absence of lytic Bone lesions (per baseline metastatic survey as well as MRI--see MD note for further comment). See MD note for ECOG score and H&P and flowsheets for laboratory work, vitals, etc. All myeloma-related blood work from last cycle including SPEP and JAGUAR have remained stable, and are currently pending for this cycle. Per Dr. Engel, patient shows no evidence of progression at last result. Patient informed that Dr. Engel will release all lab results to [facility]. He states understanding of this. QOL's not required at this timepoint, informed patient these will be due again in " December/C48D28 visit. He states understanding.           He is still scheduled to return to Diabetes Clinic. Encouraged subject to discuss with primary care MD/diabetic care MD--particularly to get guidance on how to control blood glucose levels around steroid injections; states understanding. Dr. Engel aware of labs as well.      Encouraged patient to reach out/contact staff if feels need to discuss any issues. He states understanding.  Patient was informed to review October appointments and alert us of any conflict. Will continue to schedule patient as far out as possible, which seems to help maintain compliance with visits. Patient states having research RN's contact information and has MD contact information to call with any concerns, questions, or worsening of symptoms.  Discussed next month's appt date and time, he verbalized understanding.

## 2019-10-16 LAB
ALBUMIN SERPL ELPH-MCNC: 4.06 G/DL (ref 3.35–5.55)
ALPHA1 GLOB SERPL ELPH-MCNC: 0.36 G/DL (ref 0.17–0.41)
ALPHA2 GLOB SERPL ELPH-MCNC: 0.87 G/DL (ref 0.43–0.99)
B-GLOBULIN SERPL ELPH-MCNC: 2.6 G/DL (ref 0.5–1.1)
GAMMA GLOB SERPL ELPH-MCNC: 0.51 G/DL (ref 0.67–1.58)
INTERPRETATION SERPL IFE-IMP: NORMAL
KAPPA LC SER QL IA: 5.42 MG/DL (ref 0.33–1.94)
KAPPA LC/LAMBDA SER IA: 4.04 (ref 0.26–1.65)
LAMBDA LC SER QL IA: 1.34 MG/DL (ref 0.57–2.63)
PATHOLOGIST INTERPRETATION IFE: NORMAL
PATHOLOGIST INTERPRETATION SPE: NORMAL
PROT SERPL-MCNC: 8.4 G/DL (ref 6–8.4)

## 2019-10-21 RX ORDER — MELOXICAM 15 MG/1
TABLET ORAL
Qty: 90 TABLET | Refills: 0 | Status: SHIPPED | OUTPATIENT
Start: 2019-10-21 | End: 2020-11-19

## 2019-10-25 ENCOUNTER — PATIENT MESSAGE (OUTPATIENT)
Dept: UROLOGY | Facility: CLINIC | Age: 74
End: 2019-10-25

## 2019-10-28 ENCOUNTER — PATIENT MESSAGE (OUTPATIENT)
Dept: INTERNAL MEDICINE | Facility: CLINIC | Age: 74
End: 2019-10-28

## 2019-10-28 ENCOUNTER — LAB VISIT (OUTPATIENT)
Dept: LAB | Facility: OTHER | Age: 74
End: 2019-10-28
Payer: MEDICARE

## 2019-10-28 DIAGNOSIS — N52.01 ERECTILE DYSFUNCTION DUE TO ARTERIAL INSUFFICIENCY: ICD-10-CM

## 2019-10-28 DIAGNOSIS — N18.1 CKD (CHRONIC KIDNEY DISEASE) STAGE 1, GFR 90 ML/MIN OR GREATER: ICD-10-CM

## 2019-10-28 LAB
ANION GAP SERPL CALC-SCNC: 11 MMOL/L (ref 8–16)
BASOPHILS # BLD AUTO: 0.05 K/UL (ref 0–0.2)
BASOPHILS NFR BLD: 1.1 % (ref 0–1.9)
BUN SERPL-MCNC: 14 MG/DL (ref 8–23)
CALCIUM SERPL-MCNC: 9.5 MG/DL (ref 8.7–10.5)
CHLORIDE SERPL-SCNC: 102 MMOL/L (ref 95–110)
CO2 SERPL-SCNC: 25 MMOL/L (ref 23–29)
CREAT SERPL-MCNC: 1.6 MG/DL (ref 0.5–1.4)
DIFFERENTIAL METHOD: ABNORMAL
EOSINOPHIL # BLD AUTO: 0.3 K/UL (ref 0–0.5)
EOSINOPHIL NFR BLD: 6.6 % (ref 0–8)
ERYTHROCYTE [DISTWIDTH] IN BLOOD BY AUTOMATED COUNT: 14 % (ref 11.5–14.5)
EST. GFR  (AFRICAN AMERICAN): 49 ML/MIN/1.73 M^2
EST. GFR  (NON AFRICAN AMERICAN): 42 ML/MIN/1.73 M^2
GLUCOSE SERPL-MCNC: 185 MG/DL (ref 70–110)
HCT VFR BLD AUTO: 37.5 % (ref 40–54)
HGB BLD-MCNC: 11.8 G/DL (ref 14–18)
IMM GRANULOCYTES # BLD AUTO: 0.01 K/UL (ref 0–0.04)
IMM GRANULOCYTES NFR BLD AUTO: 0.2 % (ref 0–0.5)
LYMPHOCYTES # BLD AUTO: 1.6 K/UL (ref 1–4.8)
LYMPHOCYTES NFR BLD: 35.9 % (ref 18–48)
MCH RBC QN AUTO: 28.9 PG (ref 27–31)
MCHC RBC AUTO-ENTMCNC: 31.5 G/DL (ref 32–36)
MCV RBC AUTO: 92 FL (ref 82–98)
MONOCYTES # BLD AUTO: 0.4 K/UL (ref 0.3–1)
MONOCYTES NFR BLD: 8 % (ref 4–15)
NEUTROPHILS # BLD AUTO: 2.1 K/UL (ref 1.8–7.7)
NEUTROPHILS NFR BLD: 48.2 % (ref 38–73)
NRBC BLD-RTO: 0 /100 WBC
PLATELET # BLD AUTO: 245 K/UL (ref 150–350)
PMV BLD AUTO: 9.1 FL (ref 9.2–12.9)
POTASSIUM SERPL-SCNC: 4.1 MMOL/L (ref 3.5–5.1)
RBC # BLD AUTO: 4.09 M/UL (ref 4.6–6.2)
SODIUM SERPL-SCNC: 138 MMOL/L (ref 136–145)
WBC # BLD AUTO: 4.37 K/UL (ref 3.9–12.7)

## 2019-10-28 PROCEDURE — 80048 BASIC METABOLIC PNL TOTAL CA: CPT

## 2019-10-28 PROCEDURE — 85025 COMPLETE CBC W/AUTO DIFF WBC: CPT

## 2019-10-28 PROCEDURE — 36415 COLL VENOUS BLD VENIPUNCTURE: CPT

## 2019-10-28 RX ORDER — TADALAFIL 5 MG/1
TABLET ORAL
Qty: 90 TABLET | Refills: 2 | Status: SHIPPED | OUTPATIENT
Start: 2019-10-28 | End: 2020-03-05 | Stop reason: SDUPTHER

## 2019-10-29 ENCOUNTER — PATIENT OUTREACH (OUTPATIENT)
Dept: ADMINISTRATIVE | Facility: OTHER | Age: 74
End: 2019-10-29

## 2019-10-31 ENCOUNTER — TELEPHONE (OUTPATIENT)
Dept: SPORTS MEDICINE | Facility: CLINIC | Age: 74
End: 2019-10-31

## 2019-10-31 NOTE — TELEPHONE ENCOUNTER
LVM for patient to let him know his appt is still scheduled for 11/1 at 8:00 with Chritsophe Gallo PA-C.

## 2019-11-01 ENCOUNTER — OFFICE VISIT (OUTPATIENT)
Dept: SPORTS MEDICINE | Facility: CLINIC | Age: 74
End: 2019-11-01
Payer: MEDICARE

## 2019-11-01 VITALS
SYSTOLIC BLOOD PRESSURE: 143 MMHG | HEIGHT: 70 IN | HEART RATE: 95 BPM | DIASTOLIC BLOOD PRESSURE: 80 MMHG | BODY MASS INDEX: 26.92 KG/M2 | WEIGHT: 188 LBS

## 2019-11-01 DIAGNOSIS — M17.12 PRIMARY OSTEOARTHRITIS OF LEFT KNEE: Primary | ICD-10-CM

## 2019-11-01 DIAGNOSIS — M17.11 PRIMARY OSTEOARTHRITIS OF RIGHT KNEE: ICD-10-CM

## 2019-11-01 PROCEDURE — 99999 PR PBB SHADOW E&M-EST. PATIENT-LVL III: ICD-10-PCS | Mod: PBBFAC,,, | Performed by: PHYSICIAN ASSISTANT

## 2019-11-01 PROCEDURE — 99999 PR PBB SHADOW E&M-EST. PATIENT-LVL III: CPT | Mod: PBBFAC,,, | Performed by: PHYSICIAN ASSISTANT

## 2019-11-01 PROCEDURE — 20610 DRAIN/INJ JOINT/BURSA W/O US: CPT | Mod: 50,S$GLB,, | Performed by: PHYSICIAN ASSISTANT

## 2019-11-01 PROCEDURE — 20610 PR DRAIN/INJECT LARGE JOINT/BURSA: ICD-10-PCS | Mod: 50,S$GLB,, | Performed by: PHYSICIAN ASSISTANT

## 2019-11-01 PROCEDURE — 99499 NO LOS: ICD-10-PCS | Mod: S$GLB,,, | Performed by: PHYSICIAN ASSISTANT

## 2019-11-01 PROCEDURE — 99499 UNLISTED E&M SERVICE: CPT | Mod: S$GLB,,, | Performed by: PHYSICIAN ASSISTANT

## 2019-11-01 NOTE — PROGRESS NOTES
Emerson Menendez is here for follow up of bilateral knee arthritis. Pt is requesting Orthovisc series injections #1 of 3.  [facility] reviewed per encounter record. Has failed other conservative modalities including NSAIDS, activity modification, weight loss.    The prior shot was tolerated well.    PHYSICAL EXAMINATION:     General: The patient is alert and oriented x 3. Mood is pleasant.   Observation of ears, eyes and nose reveals no gross abnormalities. No   labored breathing observed.     No signs of infection or adverse reaction to knee.    Injection Procedure  A time out was performed, including verification of patient ID, procedure, site and side, availability of information and equipment, review of safety issues, and agreement with consent, the procedure site was marked.    After time out was performed, the patient was prepped aseptically with chloraprep. A diagnostic and therapeutic injection of 2cc Orthovisc was given under sterile technique using a 22g x 1.5 needle from the Superolateral  aspect of the bilateral Knee Joint in the supine position.      Emerson Menendez had no adverse reactions to the medication. Pain decreased. He was instructed to apply ice to the joint for 20 minutes and avoid strenuous activities for 24-36 hours following the injection. He was warned of possible blood sugar and/or blood pressure changes during that time. Following that time, he can resume regular activities.    He was reminded to call the clinic immediately for any adverse side effects as explained in clinic today.    RTC to see Christophe Gallo PA-C for Euflexxa series injections #2 of 3.    All of the patient's questions were answered and the patient will contact us if they have any questions or concerns in the interim.

## 2019-11-05 ENCOUNTER — PATIENT MESSAGE (OUTPATIENT)
Dept: UROLOGY | Facility: CLINIC | Age: 74
End: 2019-11-05

## 2019-11-05 ENCOUNTER — PATIENT MESSAGE (OUTPATIENT)
Dept: DIABETES | Facility: CLINIC | Age: 74
End: 2019-11-05

## 2019-11-06 ENCOUNTER — PATIENT OUTREACH (OUTPATIENT)
Dept: ADMINISTRATIVE | Facility: OTHER | Age: 74
End: 2019-11-06

## 2019-11-06 ENCOUNTER — PATIENT MESSAGE (OUTPATIENT)
Dept: INTERNAL MEDICINE | Facility: CLINIC | Age: 74
End: 2019-11-06

## 2019-11-07 ENCOUNTER — OFFICE VISIT (OUTPATIENT)
Dept: INTERNAL MEDICINE | Facility: CLINIC | Age: 74
End: 2019-11-07
Payer: MEDICARE

## 2019-11-07 ENCOUNTER — PATIENT MESSAGE (OUTPATIENT)
Dept: INTERNAL MEDICINE | Facility: CLINIC | Age: 74
End: 2019-11-07

## 2019-11-07 ENCOUNTER — TELEPHONE (OUTPATIENT)
Dept: INTERNAL MEDICINE | Facility: CLINIC | Age: 74
End: 2019-11-07

## 2019-11-07 ENCOUNTER — PATIENT OUTREACH (OUTPATIENT)
Dept: OTHER | Facility: OTHER | Age: 74
End: 2019-11-07

## 2019-11-07 VITALS
DIASTOLIC BLOOD PRESSURE: 80 MMHG | SYSTOLIC BLOOD PRESSURE: 156 MMHG | HEIGHT: 70 IN | WEIGHT: 193.56 LBS | HEART RATE: 87 BPM | OXYGEN SATURATION: 97 % | BODY MASS INDEX: 27.71 KG/M2

## 2019-11-07 DIAGNOSIS — Z79.4 TYPE 2 DIABETES MELLITUS WITH HYPERGLYCEMIA, WITH LONG-TERM CURRENT USE OF INSULIN: Chronic | ICD-10-CM

## 2019-11-07 DIAGNOSIS — R68.89 FLU-LIKE SYMPTOMS: Primary | ICD-10-CM

## 2019-11-07 DIAGNOSIS — E11.65 TYPE 2 DIABETES MELLITUS WITH HYPERGLYCEMIA, WITH LONG-TERM CURRENT USE OF INSULIN: Chronic | ICD-10-CM

## 2019-11-07 DIAGNOSIS — I10 ESSENTIAL HYPERTENSION: ICD-10-CM

## 2019-11-07 DIAGNOSIS — R05.9 COUGH: ICD-10-CM

## 2019-11-07 DIAGNOSIS — D47.2 SMOLDERING MULTIPLE MYELOMA: ICD-10-CM

## 2019-11-07 LAB
INFLUENZA A, MOLECULAR: NEGATIVE
INFLUENZA B, MOLECULAR: NEGATIVE
SPECIMEN SOURCE: NORMAL

## 2019-11-07 PROCEDURE — 1101F PR PT FALLS ASSESS DOC 0-1 FALLS W/OUT INJ PAST YR: ICD-10-PCS | Mod: CPTII,S$GLB,, | Performed by: NURSE PRACTITIONER

## 2019-11-07 PROCEDURE — 3079F PR MOST RECENT DIASTOLIC BLOOD PRESSURE 80-89 MM HG: ICD-10-PCS | Mod: CPTII,S$GLB,, | Performed by: NURSE PRACTITIONER

## 2019-11-07 PROCEDURE — 99999 PR PBB SHADOW E&M-EST. PATIENT-LVL V: CPT | Mod: PBBFAC,,, | Performed by: NURSE PRACTITIONER

## 2019-11-07 PROCEDURE — 99213 PR OFFICE/OUTPT VISIT, EST, LEVL III, 20-29 MIN: ICD-10-PCS | Mod: S$GLB,,, | Performed by: NURSE PRACTITIONER

## 2019-11-07 PROCEDURE — 99213 OFFICE O/P EST LOW 20 MIN: CPT | Mod: S$GLB,,, | Performed by: NURSE PRACTITIONER

## 2019-11-07 PROCEDURE — 3077F SYST BP >= 140 MM HG: CPT | Mod: CPTII,S$GLB,, | Performed by: NURSE PRACTITIONER

## 2019-11-07 PROCEDURE — 87502 INFLUENZA DNA AMP PROBE: CPT

## 2019-11-07 PROCEDURE — 1101F PT FALLS ASSESS-DOCD LE1/YR: CPT | Mod: CPTII,S$GLB,, | Performed by: NURSE PRACTITIONER

## 2019-11-07 PROCEDURE — 3079F DIAST BP 80-89 MM HG: CPT | Mod: CPTII,S$GLB,, | Performed by: NURSE PRACTITIONER

## 2019-11-07 PROCEDURE — 99999 PR PBB SHADOW E&M-EST. PATIENT-LVL V: ICD-10-PCS | Mod: PBBFAC,,, | Performed by: NURSE PRACTITIONER

## 2019-11-07 PROCEDURE — 3077F PR MOST RECENT SYSTOLIC BLOOD PRESSURE >= 140 MM HG: ICD-10-PCS | Mod: CPTII,S$GLB,, | Performed by: NURSE PRACTITIONER

## 2019-11-07 RX ORDER — DIAZEPAM 5 MG/1
TABLET ORAL
Qty: 60 TABLET | Refills: 0 | Status: SHIPPED | OUTPATIENT
Start: 2019-11-07 | End: 2019-12-13 | Stop reason: SDUPTHER

## 2019-11-07 RX ORDER — BENZONATATE 100 MG/1
100 CAPSULE ORAL 3 TIMES DAILY PRN
Qty: 30 CAPSULE | Refills: 0 | Status: SHIPPED | OUTPATIENT
Start: 2019-11-07 | End: 2019-11-17

## 2019-11-07 NOTE — PATIENT INSTRUCTIONS
Coricidin HBP   Mucinex 600mg twice daily    Alternate tylenol and motrin as needed for body aches and fever

## 2019-11-07 NOTE — PROGRESS NOTES
"INTERNAL MEDICINE URGENT CARE NOTE    CHIEF COMPLAINT     Chief Complaint   Patient presents with    Cough     since sunday       HPI     Emerson Menendez is a 74 y.o. male with OA of the cervical spine, anxiety/depression, HTN, HLD, CKD, BPH, OAB, anemia, multiple myeloma, DM2, GERD who presents for an urgent visit today.  He is an established pt of Dr Sagastume.     Here with c/o cough x4 days  - cough is nonproductive but loose.   Chest congestion and headaches.   Pt denies fever and chills. +body aches   Rib pain with coughing   Taking alkasetlzer cold plus and theraflu     HTN- did not take medication this morning     DM2- CBG poorly controlled 180 this morning     Past Medical History:  Past Medical History:   Diagnosis Date    Anxiety 3/16/2015    BPH (benign prostatic hypertrophy) 9/24/2012    Depression 3/16/2015    GERD (gastroesophageal reflux disease) 9/24/2012    Microalbuminuria 9/24/2012    Osteoarthritis of cervical spine 9/24/2012    Osteoarthritis of knee 9/24/2012    Type II or unspecified type diabetes mellitus without mention of complication, not stated as uncontrolled 9/24/2012       Home Medications:  Prior to Admission medications    Medication Sig Start Date End Date Taking? Authorizing Provider   aspirin (ECOTRIN) 81 MG EC tablet Take 1 tablet (81 mg total) by mouth once daily. 1/25/13   Roberta Sagastume MD   BD ULTRA-FINE SHORT PEN NEEDLE 31 gauge x 5/16" Ndle USE TO INJECT INSULIN ONE TIME DAILY 3/22/19   Roberta Sagastume MD   blood glucose control, normal (METER-CHECK) Soln One meter. True test meter. Use as directed 12/1/15   Roberta Sagastume MD   blood sugar diagnostic (ACCU-CHEK STEFANO PLUS TEST STRP) Strp TEST FOUR TIMES DAILY 4/16/19   Roberta Sagastume MD   blood-glucose meter (ACCU-CHEK OMAYRA) Misc USE AS DIRECTED. Accucheck stefano plus 4/16/19   Roberta Sagastume MD   cholecalciferol, vitamin D3, (VITAMIN D) 2,000 unit Cap Take by mouth. 1 Capsule Oral Every day. "  over the counter    Historical Provider, MD   citalopram (CELEXA) 20 MG tablet Take 1.5 tablets (30 mg total) by mouth once daily. 8/13/19   Roberta Sagastume MD   diazePAM (VALIUM) 5 MG tablet TAKE 1 TABLET(5 MG) BY MOUTH EVERY 6 HOURS AS NEEDED 9/17/19   Roberta Sagastume MD   flash glucose scanning reader (FREESTYLE CHRISTIANO 14 DAY READER) Misc use as directed 8/13/19   Roberta Sagastume MD   gabapentin (NEURONTIN) 100 MG capsule TAKE 1 CAPSULE EVERY MORNING, 1 CAPSULE IN THE AFTERNOON, AND 1 TO 3 CAPSULE(S) AT BEDTIME AS NEEDED FOR FOOT PAIN 10/16/18   Roberta Sagastume MD   glimepiride (AMARYL) 2 MG tablet TAKE 1 TABLET (2 MG TOTAL) BY MOUTH ONCE DAILY. 3/22/19   Roberta Sagastume MD   glucagon (human recombinant) inj 1mg/mL kit Inject 1 mL (1 mg total) into the muscle as needed. 1/15/18 10/15/19  David Coleman MD   insulin (LANTUS SOLOSTAR U-100 INSULIN) glargine 100 units/mL (3mL) SubQ pen Inject 20 Units into the skin every evening. 8/13/19 8/12/20  Roberta Sagastume MD   lancets Misc Use twice daily 3/16/15   Roberta Sagastume MD   latanoprost 0.005 % ophthalmic solution Place 1 drop into both eyes every evening. 4/1/19   Jose L Wheatley MD   losartan (COZAAR) 25 MG tablet Take 1 tablet (25 mg total) by mouth once daily. 8/13/19   Roberta Sagastume MD   meloxicam (MOBIC) 15 MG tablet TAKE 1 TABLET(15 MG) BY MOUTH EVERY DAY 10/21/19   Concepcion Short DPM   omeprazole (PRILOSEC) 20 MG capsule Take 2 capsules (40 mg total) by mouth once daily. 8/13/19   Roberta Sagastume MD   pravastatin (PRAVACHOL) 40 MG tablet Take 1 tablet (40 mg total) by mouth once daily. 8/13/19   Roberta Sagastume MD   tadalafil (CIALIS) 5 MG tablet TAKE 1 TABLET(5 MG) BY MOUTH DAILY AS NEEDED FOR ERECTILE DYSFUNCTION 10/28/19   Roberta Sagastume MD   tamsulosin (FLOMAX) 0.4 mg Cap Take 1 capsule (0.4 mg total) by mouth once daily. 8/13/19   Roberta Sagastume MD   tizanidine (ZANAFLEX) 4 MG tablet 1/2-1 tablet every 8  hours as needed for muscle spasm 5/3/17   Roberta Sagastume MD       Review of Systems:  Review of Systems   Constitutional: Negative for chills, fatigue and fever.   HENT: Positive for congestion and sore throat.    Respiratory: Positive for cough, chest tightness, shortness of breath and wheezing.    Cardiovascular: Negative for chest pain and palpitations.       Health Maintainence:   Immunizations:  Health Maintenance       Date Due Completion Date    Shingles Vaccine (1 of 2) 07/22/1996 ---    Colonoscopy 03/22/2019 3/22/2012    Influenza Vaccine (1) 09/01/2019 9/21/2018    Eye Exam 01/16/2020 1/16/2019    Hemoglobin A1c 03/17/2020 9/17/2019    Foot Exam 08/13/2020 8/13/2019    Override on 7/17/2019: Done    Override on 1/15/2018: Done (Per MD note)    Lipid Panel 09/17/2020 9/17/2019    Low Dose Statin 11/01/2020 11/1/2019    TETANUS VACCINE 12/17/2023 12/17/2013           PHYSICAL EXAM     There were no vitals taken for this visit.    Physical Exam   Constitutional: He is oriented to person, place, and time. He appears well-developed and well-nourished.   HENT:   Head: Normocephalic.   Right Ear: Tympanic membrane and external ear normal.   Left Ear: Tympanic membrane and external ear normal.   Nose: Rhinorrhea present. Right sinus exhibits no maxillary sinus tenderness and no frontal sinus tenderness. Left sinus exhibits no maxillary sinus tenderness and no frontal sinus tenderness.   Mouth/Throat: Oropharynx is clear and moist. No oropharyngeal exudate, posterior oropharyngeal edema or posterior oropharyngeal erythema.   Eyes: Pupils are equal, round, and reactive to light.   Neck: No JVD present. No tracheal deviation present. No thyromegaly present.   Cardiovascular: Normal rate, regular rhythm and intact distal pulses. Exam reveals no gallop and no friction rub.   No murmur heard.  Pulmonary/Chest: Breath sounds normal. No respiratory distress. He has no wheezes. He has no rales. He exhibits no  tenderness.   Abdominal: Soft. Bowel sounds are normal. He exhibits no distension. There is no tenderness.   Musculoskeletal: Normal range of motion. He exhibits no edema or tenderness.   Lymphadenopathy:     He has no cervical adenopathy.   Neurological: He is alert and oriented to person, place, and time.   Skin: Skin is warm and dry. No rash noted.   Psychiatric: He has a normal mood and affect. His behavior is normal.   Vitals reviewed.      LABS     Lab Results   Component Value Date    HGBA1C 7.7 (H) 09/17/2019     CMP  Sodium   Date Value Ref Range Status   10/28/2019 138 136 - 145 mmol/L Final     Potassium   Date Value Ref Range Status   10/28/2019 4.1 3.5 - 5.1 mmol/L Final     Chloride   Date Value Ref Range Status   10/28/2019 102 95 - 110 mmol/L Final     CO2   Date Value Ref Range Status   10/28/2019 25 23 - 29 mmol/L Final     Glucose   Date Value Ref Range Status   10/28/2019 185 (H) 70 - 110 mg/dL Final     BUN, Bld   Date Value Ref Range Status   10/28/2019 14 8 - 23 mg/dL Final     Creatinine   Date Value Ref Range Status   10/28/2019 1.6 (H) 0.5 - 1.4 mg/dL Final     Calcium   Date Value Ref Range Status   10/28/2019 9.5 8.7 - 10.5 mg/dL Final     Total Protein   Date Value Ref Range Status   10/15/2019 8.0 6.0 - 8.4 g/dL Final     Albumin   Date Value Ref Range Status   10/15/2019 3.5 3.5 - 5.2 g/dL Final     Total Bilirubin   Date Value Ref Range Status   10/15/2019 0.3 0.1 - 1.0 mg/dL Final     Comment:     For infants and newborns, interpretation of results should be based  on gestational age, weight and in agreement with clinical  observations.  Premature Infant recommended reference ranges:  Up to 24 hours.............<8.0 mg/dL  Up to 48 hours............<12.0 mg/dL  3-5 days..................<15.0 mg/dL  6-29 days.................<15.0 mg/dL       Alkaline Phosphatase   Date Value Ref Range Status   10/15/2019 59 55 - 135 U/L Final     AST   Date Value Ref Range Status   10/15/2019 16 10  - 40 U/L Final     ALT   Date Value Ref Range Status   10/15/2019 15 10 - 44 U/L Final     Anion Gap   Date Value Ref Range Status   10/28/2019 11 8 - 16 mmol/L Final     eGFR if    Date Value Ref Range Status   10/28/2019 49 (A) >60 mL/min/1.73 m^2 Final     eGFR if non    Date Value Ref Range Status   10/28/2019 42 (A) >60 mL/min/1.73 m^2 Final     Comment:     Calculation used to obtain the estimated glomerular filtration  rate (eGFR) is the CKD-EPI equation.        Lab Results   Component Value Date    WBC 4.37 10/28/2019    HGB 11.8 (L) 10/28/2019    HCT 37.5 (L) 10/28/2019    MCV 92 10/28/2019     10/28/2019     Lab Results   Component Value Date    CHOL 181 09/17/2019    CHOL 209 (H) 04/11/2018    CHOL 131 05/11/2017     Lab Results   Component Value Date    HDL 29 (L) 09/17/2019    HDL 32 (L) 04/11/2018    HDL 25 (L) 05/11/2017     Lab Results   Component Value Date    LDLCALC 93.0 09/17/2019    LDLCALC 110.6 04/11/2018    LDLCALC 50.0 (L) 05/11/2017     Lab Results   Component Value Date    TRIG 295 (H) 09/17/2019    TRIG 332 (H) 04/11/2018    TRIG 280 (H) 05/11/2017     Lab Results   Component Value Date    CHOLHDL 16.0 (L) 09/17/2019    CHOLHDL 15.3 (L) 04/11/2018    CHOLHDL 19.1 (L) 05/11/2017     Lab Results   Component Value Date    TSH 1.307 04/16/2019       ASSESSMENT/PLAN     Emerson Menendez is a 74 y.o. male with  Past Medical History:   Diagnosis Date    Anxiety 3/16/2015    BPH (benign prostatic hypertrophy) 9/24/2012    Depression 3/16/2015    GERD (gastroesophageal reflux disease) 9/24/2012    Microalbuminuria 9/24/2012    Osteoarthritis of cervical spine 9/24/2012    Osteoarthritis of knee 9/24/2012    Type II or unspecified type diabetes mellitus without mention of complication, not stated as uncontrolled 9/24/2012     Flu-like symptoms- will send flu swab. likely viral URI. Will treat symptoms. May alternate tylenol and advil for fever and  body aches. Increase fluids and rest.   -     Influenza A & B by Molecular    Cough- benzonatate as needed and mucinex as needed. Increase fluids.   -     benzonatate (TESSALON) 100 MG capsule; Take 1 capsule (100 mg total) by mouth 3 (three) times daily as needed.  Dispense: 30 capsule; Refill: 0    Type 2 diabetes mellitus with hyperglycemia, with long-term current use of insulin    Essential hypertension- poorly controlled off meds and BP increased by phenylephrine. Please avoid theraflu and alkaseltzer cold and flu. May use coricidin HBP and mucinex.       Follow up with PCP    Patient education provided from [facility]. Patient was counseled on when and how to seek emergent care.       Lois ELLISON, APRN, FNP-c   Department of Internal Medicine - [facility]  7:46 AM

## 2019-11-07 NOTE — LETTER
November 14, 2019     Emerson Menendez  [address]  [address]  [place] [address]       Dear Emerson,    Welcome to [facility] Our goal is to make care effective, proactive and convenient by using data you send us from home to better treat your chronic conditions.          My name is Ofelia Zavala, and I am your dedicated Digital Medicine clinician. As an expert in medication management, I will help ensure that the medications you are taking continue to provide the intended benefits and help you reach your goals. You can reach me directly at [phone] or by sending me a message directly through your [facility] account.      I am Arabella Belle and I will be your health . My job is to help you identify lifestyle changes to improve your disease control. We will talk about nutrition, exercise, and other ways you may be able to adjust your current habits to better your health. Additionally, we will help ensure you are completing the tests and screenings that are necessary to help manage your conditions. You can reach me directly at  or by sending me a message directly through your [facility] account.    Most importantly, YOU are at the center of this team. Together, we will work to improve your overall health and encourage you to meet your goals for a healthier lifestyle.     What we expect from YOU:  · Please take frequent home blood sugar measurements according to the frequency your physician and Digital Medicine care team specify. It is important that your team see both fasting and after meal readings.      Be available to receive phone calls or [facility] messages, when appropriate, from your care team. Please let us know if there are any specific days or times that work best for us to reach you via phone.     Complete routine tests and screenings. Dont worry, we will help keep you on track!           What you should expect from your Digital Medicine Care  Team:   We will work with you to create a personalized plan of care and provide you with encouragement and education, including regarding lifestyle changes, that could help you manage your disease states.     We will adjust your current medications, if needed, and continue to monitor your long-term progress.     We will provide you and your physician with monthly progress reports after you have been in the program for more than 30 days.     We will send you reminders through [facility] and text messages to help ensure you do not miss any testing deadlines to help manage your disease states.    You will be able to reach us by phone or through your [facility] account by clicking our names under Care Team on the right side of the home screen.    I look forward to working with you to achieve your blood pressure goals!    We look forward to working with you to help manage your health,    Sincerely,    Your Digital Medicine Team    Please visit our websites to learn more:   · Diabetes: [email or web address]      Remember, we are not available for emergencies. If you have an emergency, please contact your doctors office directly or call [facility] on-call ([phone] or [phone]) or 911.    Diabetes: We want help you get important tests and screenings done regularly to assure that your health needs are met. We have put a new system in place, called CareTouch that will help us improve how we monitor and reach out to you about the following lab tests that you will need to help manage your diabetes.  · Hemoglobin A1c testing (Frequency: Every 3 to 6 months, dependent on A1c goal)  · Nephropathy Assessment, generally urine micro albumin testing (Frequency: Yearly)  · Eye exam through a quick 30-minute Eye Photo Exam (Frequency: 1-2 Years, depending on result)    When necessary you can come in to one of the labs on the attached page any weekday between 10:30 am and 4:00 pm to have your tests done  prior to their due date. Tell the  you received a CareTouch letter, or just look for the CareTouch sign.

## 2019-11-08 ENCOUNTER — OFFICE VISIT (OUTPATIENT)
Dept: SPORTS MEDICINE | Facility: CLINIC | Age: 74
End: 2019-11-08
Payer: MEDICARE

## 2019-11-08 VITALS
BODY MASS INDEX: 27.63 KG/M2 | HEART RATE: 87 BPM | HEIGHT: 70 IN | DIASTOLIC BLOOD PRESSURE: 75 MMHG | SYSTOLIC BLOOD PRESSURE: 133 MMHG | WEIGHT: 193 LBS

## 2019-11-08 DIAGNOSIS — M17.12 PRIMARY OSTEOARTHRITIS OF LEFT KNEE: Primary | ICD-10-CM

## 2019-11-08 DIAGNOSIS — M17.11 PRIMARY OSTEOARTHRITIS OF RIGHT KNEE: ICD-10-CM

## 2019-11-08 PROCEDURE — 20610 PR DRAIN/INJECT LARGE JOINT/BURSA: ICD-10-PCS | Mod: 50,S$GLB,, | Performed by: PHYSICIAN ASSISTANT

## 2019-11-08 PROCEDURE — 99999 PR PBB SHADOW E&M-EST. PATIENT-LVL IV: CPT | Mod: PBBFAC,,, | Performed by: PHYSICIAN ASSISTANT

## 2019-11-08 PROCEDURE — 99499 UNLISTED E&M SERVICE: CPT | Mod: S$GLB,,, | Performed by: PHYSICIAN ASSISTANT

## 2019-11-08 PROCEDURE — 99999 PR PBB SHADOW E&M-EST. PATIENT-LVL IV: ICD-10-PCS | Mod: PBBFAC,,, | Performed by: PHYSICIAN ASSISTANT

## 2019-11-08 PROCEDURE — 99499 NO LOS: ICD-10-PCS | Mod: S$GLB,,, | Performed by: PHYSICIAN ASSISTANT

## 2019-11-08 PROCEDURE — 20610 DRAIN/INJ JOINT/BURSA W/O US: CPT | Mod: 50,S$GLB,, | Performed by: PHYSICIAN ASSISTANT

## 2019-11-08 NOTE — PROGRESS NOTES
Emerson Menendez is here for follow up of bilateral knee arthritis. Pt is requesting Orthovisc series injections #2 of 3.  [facility] reviewed per encounter record. Has failed other conservative modalities including NSAIDS, activity modification, weight loss.    The prior shot was tolerated well.    PHYSICAL EXAMINATION:     General: The patient is alert and oriented x 3. Mood is pleasant.   Observation of ears, eyes and nose reveals no gross abnormalities. No   labored breathing observed.     No signs of infection or adverse reaction to knee.    Injection Procedure  A time out was performed, including verification of patient ID, procedure, site and side, availability of information and equipment, review of safety issues, and agreement with consent, the procedure site was marked.    After time out was performed, the patient was prepped aseptically with chloraprep. A diagnostic and therapeutic injection of 2cc Orthovisc was given under sterile technique using a 22g x 1.5 needle from the Superolateral  aspect of the bilateral Knee Joint in the supine position.      Emerson Menendez had no adverse reactions to the medication. Pain decreased. He was instructed to apply ice to the joint for 20 minutes and avoid strenuous activities for 24-36 hours following the injection. He was warned of possible blood sugar and/or blood pressure changes during that time. Following that time, he can resume regular activities.    He was reminded to call the clinic immediately for any adverse side effects as explained in clinic today.    RTC to see Christophe Gallo PA-C for Euflexxa series injections #3 of 3.    All of the patient's questions were answered and the patient will contact us if they have any questions or concerns in the interim.

## 2019-11-11 ENCOUNTER — TELEPHONE (OUTPATIENT)
Dept: RESEARCH | Facility: HOSPITAL | Age: 74
End: 2019-11-11

## 2019-11-11 ENCOUNTER — TELEPHONE (OUTPATIENT)
Dept: HEMATOLOGY/ONCOLOGY | Facility: CLINIC | Age: 74
End: 2019-11-11

## 2019-11-11 ENCOUNTER — PATIENT MESSAGE (OUTPATIENT)
Dept: INTERNAL MEDICINE | Facility: CLINIC | Age: 74
End: 2019-11-11

## 2019-11-12 ENCOUNTER — OFFICE VISIT (OUTPATIENT)
Dept: HEMATOLOGY/ONCOLOGY | Facility: CLINIC | Age: 74
End: 2019-11-12
Payer: MEDICARE

## 2019-11-12 ENCOUNTER — RESEARCH ENCOUNTER (OUTPATIENT)
Dept: RESEARCH | Facility: HOSPITAL | Age: 74
End: 2019-11-12

## 2019-11-12 ENCOUNTER — LAB VISIT (OUTPATIENT)
Dept: LAB | Facility: HOSPITAL | Age: 74
End: 2019-11-12
Attending: INTERNAL MEDICINE
Payer: MEDICARE

## 2019-11-12 VITALS
HEART RATE: 83 BPM | SYSTOLIC BLOOD PRESSURE: 161 MMHG | HEIGHT: 70 IN | DIASTOLIC BLOOD PRESSURE: 89 MMHG | OXYGEN SATURATION: 98 % | WEIGHT: 192.88 LBS | RESPIRATION RATE: 17 BRPM | BODY MASS INDEX: 27.61 KG/M2 | TEMPERATURE: 98 F

## 2019-11-12 DIAGNOSIS — Z00.6 EXAMINATION OF PARTICIPANT IN CLINICAL TRIAL: ICD-10-CM

## 2019-11-12 DIAGNOSIS — Z79.4 TYPE 2 DIABETES MELLITUS WITH DIABETIC POLYNEUROPATHY, WITH LONG-TERM CURRENT USE OF INSULIN: ICD-10-CM

## 2019-11-12 DIAGNOSIS — E11.22 CKD STAGE 3 DUE TO TYPE 2 DIABETES MELLITUS: ICD-10-CM

## 2019-11-12 DIAGNOSIS — D47.2 SMOLDERING MULTIPLE MYELOMA: Primary | ICD-10-CM

## 2019-11-12 DIAGNOSIS — C90.00 MULTIPLE MYELOMA, REMISSION STATUS UNSPECIFIED: ICD-10-CM

## 2019-11-12 DIAGNOSIS — N18.30 CKD STAGE 3 DUE TO TYPE 2 DIABETES MELLITUS: ICD-10-CM

## 2019-11-12 DIAGNOSIS — E11.42 TYPE 2 DIABETES MELLITUS WITH DIABETIC POLYNEUROPATHY, WITH LONG-TERM CURRENT USE OF INSULIN: ICD-10-CM

## 2019-11-12 LAB
ALBUMIN SERPL BCP-MCNC: 3.5 G/DL (ref 3.5–5.2)
ALP SERPL-CCNC: 53 U/L (ref 55–135)
ALT SERPL W/O P-5'-P-CCNC: 24 U/L (ref 10–44)
ANION GAP SERPL CALC-SCNC: 7 MMOL/L (ref 8–16)
AST SERPL-CCNC: 30 U/L (ref 10–40)
BASOPHILS # BLD AUTO: 0.04 K/UL (ref 0–0.2)
BASOPHILS NFR BLD: 0.9 % (ref 0–1.9)
BILIRUB SERPL-MCNC: 0.4 MG/DL (ref 0.1–1)
BUN SERPL-MCNC: 16 MG/DL (ref 8–23)
CALCIUM SERPL-MCNC: 10 MG/DL (ref 8.7–10.5)
CHLORIDE SERPL-SCNC: 103 MMOL/L (ref 95–110)
CO2 SERPL-SCNC: 29 MMOL/L (ref 23–29)
CREAT SERPL-MCNC: 1.8 MG/DL (ref 0.5–1.4)
DIFFERENTIAL METHOD: ABNORMAL
EOSINOPHIL # BLD AUTO: 0.4 K/UL (ref 0–0.5)
EOSINOPHIL NFR BLD: 8.4 % (ref 0–8)
ERYTHROCYTE [DISTWIDTH] IN BLOOD BY AUTOMATED COUNT: 13.7 % (ref 11.5–14.5)
EST. GFR  (AFRICAN AMERICAN): 42.2 ML/MIN/1.73 M^2
EST. GFR  (NON AFRICAN AMERICAN): 36.5 ML/MIN/1.73 M^2
GLUCOSE SERPL-MCNC: 157 MG/DL (ref 70–110)
HCT VFR BLD AUTO: 37.3 % (ref 40–54)
HGB BLD-MCNC: 11.5 G/DL (ref 14–18)
IGA SERPL-MCNC: 1630 MG/DL (ref 40–350)
IGG SERPL-MCNC: 900 MG/DL (ref 650–1600)
IGM SERPL-MCNC: 50 MG/DL (ref 50–300)
IMM GRANULOCYTES # BLD AUTO: 0.01 K/UL (ref 0–0.04)
IMM GRANULOCYTES NFR BLD AUTO: 0.2 % (ref 0–0.5)
LYMPHOCYTES # BLD AUTO: 1.8 K/UL (ref 1–4.8)
LYMPHOCYTES NFR BLD: 38.3 % (ref 18–48)
MAGNESIUM SERPL-MCNC: 1.8 MG/DL (ref 1.6–2.6)
MCH RBC QN AUTO: 28.4 PG (ref 27–31)
MCHC RBC AUTO-ENTMCNC: 30.8 G/DL (ref 32–36)
MCV RBC AUTO: 92 FL (ref 82–98)
MONOCYTES # BLD AUTO: 0.4 K/UL (ref 0.3–1)
MONOCYTES NFR BLD: 8 % (ref 4–15)
NEUTROPHILS # BLD AUTO: 2.1 K/UL (ref 1.8–7.7)
NEUTROPHILS NFR BLD: 44.2 % (ref 38–73)
NRBC BLD-RTO: 0 /100 WBC
PHOSPHATE SERPL-MCNC: 3.6 MG/DL (ref 2.7–4.5)
PLATELET # BLD AUTO: 248 K/UL (ref 150–350)
PMV BLD AUTO: 9 FL (ref 9.2–12.9)
POTASSIUM SERPL-SCNC: 5 MMOL/L (ref 3.5–5.1)
PROT SERPL-MCNC: 8.7 G/DL (ref 6–8.4)
RBC # BLD AUTO: 4.05 M/UL (ref 4.6–6.2)
SODIUM SERPL-SCNC: 139 MMOL/L (ref 136–145)
WBC # BLD AUTO: 4.65 K/UL (ref 3.9–12.7)

## 2019-11-12 PROCEDURE — 86334 IMMUNOFIX E-PHORESIS SERUM: CPT | Mod: Q1

## 2019-11-12 PROCEDURE — 83735 ASSAY OF MAGNESIUM: CPT | Mod: Q1

## 2019-11-12 PROCEDURE — 99999 PR PBB SHADOW E&M-EST. PATIENT-LVL III: ICD-10-PCS | Mod: PBBFAC,,, | Performed by: INTERNAL MEDICINE

## 2019-11-12 PROCEDURE — 99215 OFFICE O/P EST HI 40 MIN: CPT | Mod: Q1,S$GLB,, | Performed by: INTERNAL MEDICINE

## 2019-11-12 PROCEDURE — 84165 PATHOLOGIST INTERPRETATION SPE: ICD-10-PCS | Mod: 26,Q1,, | Performed by: PATHOLOGY

## 2019-11-12 PROCEDURE — 84100 ASSAY OF PHOSPHORUS: CPT

## 2019-11-12 PROCEDURE — 84165 PROTEIN E-PHORESIS SERUM: CPT | Mod: 26,Q1,, | Performed by: PATHOLOGY

## 2019-11-12 PROCEDURE — 82784 ASSAY IGA/IGD/IGG/IGM EACH: CPT | Mod: 59

## 2019-11-12 PROCEDURE — 86334 PATHOLOGIST INTERPRETATION IFE: ICD-10-PCS | Mod: 26,Q1,, | Performed by: PATHOLOGY

## 2019-11-12 PROCEDURE — 86334 IMMUNOFIX E-PHORESIS SERUM: CPT | Mod: 26,Q1,, | Performed by: PATHOLOGY

## 2019-11-12 PROCEDURE — 83520 IMMUNOASSAY QUANT NOS NONAB: CPT

## 2019-11-12 PROCEDURE — 80053 COMPREHEN METABOLIC PANEL: CPT

## 2019-11-12 PROCEDURE — 84165 PROTEIN E-PHORESIS SERUM: CPT | Mod: Q1

## 2019-11-12 PROCEDURE — 36415 COLL VENOUS BLD VENIPUNCTURE: CPT | Mod: Q1

## 2019-11-12 PROCEDURE — 99999 PR PBB SHADOW E&M-EST. PATIENT-LVL III: CPT | Mod: PBBFAC,,, | Performed by: INTERNAL MEDICINE

## 2019-11-12 PROCEDURE — 99215 PR OFFICE/OUTPT VISIT, EST, LEVL V, 40-54 MIN: ICD-10-PCS | Mod: Q1,S$GLB,, | Performed by: INTERNAL MEDICINE

## 2019-11-12 PROCEDURE — 85025 COMPLETE CBC W/AUTO DIFF WBC: CPT | Mod: Q1

## 2019-11-12 NOTE — PROGRESS NOTES
Subjective:       Patient ID: Emerson Menendez is a 74 y.o. male.    Chief Complaint: No chief complaint on file.  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 Test results identify a stable IgA kappa protein with beta globulin band at 1.63g/dL. Kappa free light chain is elevated at 4.40. CBC and calcium are stable. Creatinine with history of stage III CKD is stable at 1.4. Metastatic survey is negative for lytic lesions. MRI of the entire spine demonstrated age related changes but no convincing evidence of myeloma bone disease. Bone marrow biopsy identified about 13% plasma cells by morphology and FISH for myeloma identified a t(11;14) and trisomies 3,7, and 17. The patient is afebrile and appears clinically well. He was seen by his podiatrist and nephrologist since our last visit without any new or acute events.    The patient has not received any therapy for smoldering myeloma including bisphosphonates or steroids. He has been randomized to observation arm of the the clinical study. 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 He has no current or prior history of malignancy.    TODAY  Mr. Menendez returns today for monthly follow-up evaluation. No clinical signs/symptoms of progression of his smoldering myeloma with CBC stable and CMP are stable. Myeloma labs are pending.  No acute interval events. Remains very active.      HPI  Review of Systems   Constitutional: Negative for activity change, appetite change, diaphoresis, fatigue, fever and unexpected weight change.   HENT: Negative.    Eyes: Negative.    Respiratory: Negative.    Cardiovascular: Negative for leg  swelling.   Gastrointestinal: Negative.    Endocrine: Negative.    Genitourinary: Negative.    Musculoskeletal: Negative.    Skin: Negative.    Allergic/Immunologic: Negative.    Neurological:        Grade 0-1 peripheral neuropathy of bilateral feet.    Hematological: Negative for adenopathy. Does not bruise/bleed easily.   Psychiatric/Behavioral: Negative.        Objective:       Vitals:    11/12/19 0846   BP: (!) 161/89   Pulse: 83   Resp: 17   Temp: 98.2 °F (36.8 °C)       Physical Exam   Constitutional: He is oriented to person, place, and time. He appears well-developed and well-nourished.   HENT:   Head: Normocephalic and atraumatic.   Right Ear: External ear normal.   Left Ear: External ear normal.   Nose: Nose normal.   Mouth/Throat: Oropharynx is clear and moist.   Eyes: Pupils are equal, round, and reactive to light. Conjunctivae and EOM are normal.   Neck: Normal range of motion. Neck supple.   Cardiovascular: Normal rate, regular rhythm and intact distal pulses.   No murmur heard.  Pulmonary/Chest: Effort normal and breath sounds normal. No respiratory distress.   Abdominal: Soft. Bowel sounds are normal. He exhibits no distension. There is no hepatosplenomegaly.   Musculoskeletal: He exhibits no edema or tenderness.   Neurological: He is alert and oriented to person, place, and time. No cranial nerve deficit.   Skin: Skin is warm and dry. No rash noted. No cyanosis. Nails show no clubbing.   Psychiatric: He has a normal mood and affect. His behavior is normal.   Nursing note and vitals reviewed.      Assessment:       No diagnosis found.    Plan:       The patient has a diagnosis of smoldering myeloma. There is no indication for immediate chemotherapy. We are monitoring renal function closely- baseline creatinine of -1.5..  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 Total  protein remains normal, M protein has been stable; pending levels today. Free light chains are stable..  Plan for return in 1 month.  Endocrinology and Nephrology to monitor diabetes and renal failure respectively.

## 2019-11-12 NOTE — PROGRESS NOTES
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   Sponsor:  [facility]  IRB# [id number]  Study ID: [id number]  Investigator: GIL Engel  Pt Initials: LUCÍA LORENZ       Arm B: Observation  Cycle 47, Day 28       Patient presents to clinic this morning for above cycle evaluation. He states continued willingness to participate in above-mentioned study and reports he is recovering from an upper respiratory/sinus infection for which he saw his internal medicine MD. He states he is recovering; has not had any fever; "just nasal congestion." He also states he is due for gel injections for his knee; reports no change to this AE.      Review of Baseline AE's:   1.Hypertension, Grade2 diastolic: BP today is 161/89 today. Subject denies headache/dizziness; no symptoms noted.   AE ongoing  2. Lower Back Pain and Neck Pain, grade 1: Patient has no complaints today/ over last month.  As previously stated, patient is not suspected to have bony myeloma involvement per Dr. Engel as these are pre-existing issues present at baseline.  AE ongoing.  3. Pain in extremity (knee), grade 1. As above, states he is due injection to the knee this week and that the injections do help alleviate symptoms.  Presents with knee stabilizing braces intact;    4. Peripheral sensory neuropathy, grade 1: Patient does not verbalize complaints today. Has gabapentin prescribed and takes as needed.  States has not taken recently.  AE ongoing.   5. Anemia, Grade 1: Hgb/Hct - stable at 11.5/37.3.  Result reviewed by Dr. Engel. AE ongoing            Review of AE's:     *Please note this list is not all-inclusive, please see AE log for physician reviewed list of adverse events.   1. Creatinine increased, grade 2: Serum creatinine remains increased today at 1.8 mg/dl today and calculated GFR 45. (GFR 36.5 per Epic/Lab calculation) This is " "decreased from last cycle and continues to trend monthly. As previously stated, Dr. Engel states this has not been related to myeloma and is related chronic kidney issues likely secondary to diabetes and hypertension. Will continue observation monthly and to monitor renal function closely. Per MD, encouraged to increase water intake. AE stable from baseline  2. Hyponatremia, Grade 1: Serum sodium today is 139 mmol/L; AE resolved for now. Will monitor  Increased triglycerides,      Per study chair, "the definition of progressive disease for this protocol is developing CRAB criteria that requires treatment systemically." Per Dr Engel, based on today's exam and lab results thus far, patient does not have any s/s of CRAB: Normal Calcium level (9.4), absence of Renal insufficiency (serum creatinine 1.7, and GFR 46.8 per [facility]) --patient with a history of kidney dysfunction secondary to diabetes; Dr. Engel aware of these values and not concerned for myeloma involvement), absence of significant Anemia (hemoglobin 12.6, stable; will await myeloma labs for clarity relationship to myeloma) and absence of lytic Bone lesions (per baseline metastatic survey as well as MRI--see MD note for further comment). See MD note for ECOG score and H&P and flowsheets for laboratory work, vitals, etc. All myeloma-related blood work from last cycle including SPEP and JAGUAR have remained stable, and are currently pending for this cycle. Per Dr. Engel, patient shows no evidence of progression at last result. Patient informed that Dr. Engel will release all lab results to [facility]. He states understanding of this. QOL's not required at this timepoint, informed patient these will be due again in December/C48D28 visit. He states understanding.           He is still scheduled to return to Diabetes Clinic. Encouraged subject to discuss with primary care MD/diabetic care MD--particularly to get guidance on how to control blood glucose levels " around steroid injections; states understanding. Dr. Engel aware of labs as well.      Encouraged patient to reach out/contact staff if feels need to discuss any issues. He states understanding.  Patient was informed to review December appointments and alert us of any conflict. He states he wishes only to see Dr Engel and will not show for appt if scheduled with any other provider. Discussed with Dr Engel; will take deviation for appt as she is not available Cycle 47 D28. Appt to be scheduled one day later.  Will continue to schedule patient as far out as possible, which seems to help maintain compliance with visits. Patient states having research RN's contact information and has MD contact information to call with any concerns, questions, or worsening of symptoms.  Discussed next month's appt date and time, he verbalized understanding.

## 2019-11-13 ENCOUNTER — PATIENT OUTREACH (OUTPATIENT)
Dept: ADMINISTRATIVE | Facility: OTHER | Age: 74
End: 2019-11-13

## 2019-11-13 ENCOUNTER — CLINICAL SUPPORT (OUTPATIENT)
Dept: DIABETES | Facility: CLINIC | Age: 74
End: 2019-11-13
Payer: MEDICARE

## 2019-11-13 DIAGNOSIS — E11.65 UNCONTROLLED TYPE 2 DIABETES MELLITUS WITH HYPERGLYCEMIA: Primary | ICD-10-CM

## 2019-11-13 DIAGNOSIS — Z79.4 TYPE 2 DIABETES MELLITUS WITH HYPERGLYCEMIA, WITH LONG-TERM CURRENT USE OF INSULIN: Chronic | ICD-10-CM

## 2019-11-13 DIAGNOSIS — E11.65 TYPE 2 DIABETES MELLITUS WITH HYPERGLYCEMIA, WITH LONG-TERM CURRENT USE OF INSULIN: Chronic | ICD-10-CM

## 2019-11-13 LAB
ALBUMIN SERPL ELPH-MCNC: 3.65 G/DL (ref 3.35–5.55)
ALPHA1 GLOB SERPL ELPH-MCNC: 0.35 G/DL (ref 0.17–0.41)
ALPHA2 GLOB SERPL ELPH-MCNC: 0.92 G/DL (ref 0.43–0.99)
B-GLOBULIN SERPL ELPH-MCNC: 2.54 G/DL (ref 0.5–1.1)
GAMMA GLOB SERPL ELPH-MCNC: 0.45 G/DL (ref 0.67–1.58)
INTERPRETATION SERPL IFE-IMP: NORMAL
KAPPA LC SER QL IA: 6.64 MG/DL (ref 0.33–1.94)
KAPPA LC/LAMBDA SER IA: 3.19 (ref 0.26–1.65)
LAMBDA LC SER QL IA: 2.08 MG/DL (ref 0.57–2.63)
PATHOLOGIST INTERPRETATION IFE: NORMAL
PATHOLOGIST INTERPRETATION SPE: NORMAL
PROT SERPL-MCNC: 7.9 G/DL (ref 6–8.4)

## 2019-11-13 PROCEDURE — 99999 PR PBB SHADOW E&M-EST. PATIENT-LVL III: CPT | Mod: PBBFAC,,,

## 2019-11-13 PROCEDURE — G0108 PR DIAB MANAGE TRN  PER INDIV: ICD-10-PCS | Mod: S$GLB,,, | Performed by: DIETITIAN, REGISTERED

## 2019-11-13 PROCEDURE — 99999 PR PBB SHADOW E&M-EST. PATIENT-LVL III: ICD-10-PCS | Mod: PBBFAC,,,

## 2019-11-13 PROCEDURE — G0108 DIAB MANAGE TRN  PER INDIV: HCPCS | Mod: S$GLB,,, | Performed by: DIETITIAN, REGISTERED

## 2019-11-13 NOTE — LETTER
November 13, 2019      Roberta Sagastume MD  [address] [facility] [address]         Patient: Emerson Menendez   MR Number: [id number]   YOB: 1945   Date of Visit: 11/13/2019       Dear Dr. Sagastume:    Thank you for referring Emerson for diabetes self-management education and support. He has completed all components of our Diabetes Management Program and his Self-Management Support Plan. Below is a summary of his clinical outcomes and goal progress.    Patient Outcomes:    A1c Status:   Lab Results   Component Value Date    HGBA1C 7.7 (H) 09/17/2019    HGBA1C 7.8 (H) 04/16/2019     Goals  Patient has selected/evaluated goals during today's session: Yes, evaluated  Other: % Met  Met Percentage : 100%    Follow up:   · Emerson to follow diabetes support plan indicated above  · Emerson to attend medical appointments as scheduled  · Emerson to update you on his DM education progress as needed      If you have questions, please do not hesitate to call me. I look forward to providing additional education and support as needed.    Sincerely,    Jo Ibanez RD, CDE

## 2019-11-13 NOTE — PROGRESS NOTES
Diabetes Education  Author: Jo Ibanez RD, CDE  Date: 11/13/2019    Diabetes Care Management Summary  Diabetes Education Record Assessment/Progress: Post Program/Follow-up(Recently enrolled in digital medicine; but has not yet started; someone reached out to him last week but patient was not feeling well and requested a call back)     Diabetes Type  Diabetes Type : Type II    Diabetes History  Diabetes Diagnosis: >10 years  Current Treatment: Oral Medication, Insulin    Health Maintenance was reviewed today with patient. Discussed with patient importance of routine eye exams, foot exams/foot care, blood work (i.e.: A1c, microalbumin, and lipid), dental visits, yearly flu vaccine, and pneumonia vaccine as indicated by PCP. Patient verbalized understanding.     Health Maintenance Topics with due status: Not Due       Topic Last Completion Date    TETANUS VACCINE 12/17/2013    Eye Exam 01/16/2019    Foot Exam 08/13/2019    Lipid Panel 09/17/2019    Hemoglobin A1c 09/17/2019    Low Dose Statin 11/12/2019     Health Maintenance Due   Topic Date Due    Colonoscopy  03/22/2019     Nutrition  Meal Planning: snacks between meal, 3 meals per day, water, artificial sweeteners, diet drinks, eats out seldom(Has a protein shake in the morning and eats lunch and dinner; may have a snack in the afternoon; soft drink occasionally)  What type of sweetener do you use?: Splenda, Equal  What type of beverages do you drink?: diet soda/tea, water, milk(Milk w/ protein shake)     Monitoring   Self Monitoring : (Checks BG 2+ times a day; recently enrolled with digital medicine; )  Blood Glucose Logs: Yes  Do you use a personal continuous glucose monitor?: No  In the last month, how often have you had a low blood sugar reaction?: never(May have one occasionally, but not often - may get to 60, 70s)  What are your symptoms of low blood sugar?: (Shaky, sweaty)  How do you treat low blood sugar?: (Glucose tablets)  Can you tell when your  blood sugar is too high?: no  How do you treat high blood sugar?: (None)    Exercise   Exercise Type: (Goes to the gym 2-3 days a week for 1-1.5 hours )    Current Diabetes Treatment   Current Treatment: Oral Medication, Insulin    Social History  Preferred Learning Method: Face to Face  Primary Support: Self  Smoking Status: Never a Smoker  Alcohol Use: Never    DDS-2 Score  ( > 3 = SIGNIFICANT DISTRESS): 1    Barriers to Change  Barriers to Change: None  Learning Challenges : None    Readiness to Learn   Readiness to Learn : Acceptance    Cultural Influences  Cultural Influences: No    Diabetes Education Assessment/Progress  Diabetes Disease Process (diabetes disease process and treatment options): Instructed, Discussion, Individual Session, Written Materials Provided, Demonstrates Understanding/Competency(verbalizes/demonstrates)  Nutrition (Incorporating nutritional management into one's lifestyle): Instructed, Discussion, Individual Session, Written Materials Provided, Demonstrates Understanding/Competency (verbalizes/demonstrates)(Reviewed CHO counting, label reading and addt'l resources to assist w/ CHO counting; plate method reviewed)  Physical Activity (incorporating physical activity into one's lifestyle): Instructed, Discussion, Individual Session, Written Materials Provided, Demonstrates Understanding/Competency (verbalizes/demonstrates)(Reviewed goals and benefits)  Medications (states correct name, dose, onset, peak, duration, side effects & timing of meds): Instructed, Discussion, Individual Session, Written Materials Provided, Demonstrates Understanding/Competency(verbalizes/demonstrates)(Reviewed goals and benefits)  Monitoring (monitoring blood glucose/other parameters & using results): Instructed, Discussion, Individual Session, Written Materials Provided, Demonstrates Understanding/Competency (verbalizes/demonstrates)(Reviewed SMBG schedule and BG goals)  Acute Complications (preventing,  detecting, and treating acute complications): Instructed, Discussion, Individual Session, Written Materials Provided, Demonstrates Understanding/Competency (verbalizes/demonstrates)(Reviewed s/s and treatment of hypoglycemia)  Chronic Complications (preventing, detecting, and treating chronic complications): Instructed, Discussion, Individual Session, Written Materials Provided, Demonstrates Understanding/Competency (verbalizes/demonstrates)(Reviewed standards of care)  Clinical (diabetes, other pertinent medical history, and relevant comorbidities reviewed during visit): Discussion, Comprehends Key Points  Cognitive (knowledge of self-management skills, functional health literacy): Discussion, Comprehends Key Points  Psychosocial (emotional response to diabetes): Discussion, Comprehends Key Points  Diabetes Distress and Support Systems: Discussion, Comprehends Key Points  Behavioral (readiness for change, lifestyle practices, self-care behaviors): Discussion, Comprehends Key Points    Overall patient does well with his diet and exercise in managing his diabetes. Elevated BGs may be related more to stress - emotional (daughter with health issues), recent cold, painful knees - needs knee replacement (receiving joint injections)    Goals  Patient has selected/evaluated goals during today's session: Yes, evaluated  Other: % Met  Met Percentage : 100%    Diabetes Self-Management Support Plan  Diabetes Learning: other  Other learning: (Digital Medicine Program)  Review Status: Patient has selected and agrees to support plan., Patient was provided Diabetes Self-Management Support Plan document that includes support options.    Diabetes Meal Plan  Carbohydrate Per Meal: 45-60g  Carbohydrate Per Snack : 7-15g    Today's Self-Management Care Plan was developed with the patient's input and is based on barriers identified during today's assessment.    The long and short-term goals in the care plan were written with the  patient/caregiver's input. The patient has agreed to work toward these goals to improve his overall diabetes control.      The patient received a copy of today's self-management plan and verbalized understanding of the care plan, goals, and all of today's instructions.      The patient was encouraged to communicate with his physician and care team regarding his condition(s) and treatment.  I provided the patient with my contact information today and encouraged him to contact me via phone or patient portal as needed.     Education Units of Time   Time Spent: 60 min

## 2019-11-14 NOTE — PROGRESS NOTES
Digital Medicine: Health  Introduction    Introduced Emerson Menendez to Digital Medicine. Discussed health  role and recommended lifestyle modifications.    The history is provided by the patient.     DIABETES    Our goal is to decrease A1c within patient-specific target levels and make the process convenient so patient can avoid extra trips to the office. Reducing A1C by merely 1% results in a decreased risk of complications of at least 10%. For example, an A1C reduced from 8.5% to 7.5% results in almost 40% lower risk of kidney, eye, and nerve disease      Reviewed that the Digital Medicine care team - consisting of a clinician and a health  - will follow the most current evidence-based national guidelines for treating your condition.  The health  will focus on lifestyle modifications and motivation while the clinician will focus on medication therapy.  The care team will review all data on a regular basis and reach out as needed.      Explained that one of the key parts of the program is communication with the care team.  Asked patient to respond to outreach attempts and complete questionnaires.  Stressed importance of medication adherence.      Instructed patient not to allow anyone else to use phone and monitoring device.  Explained that we expect patient to test their blood sugar as prescribed by their physician or Digital Medicine Clinician.      Reviewed general Self-Monitoring of Blood Glucose (SMBG) goals:  · FPG: <  mg/dL  · 1h PPG: < 180 mg/dL  · 2h PPG: < 160 mg/dL  · Bedtime: < 150 mg/dL    Explained to patient that the digital medicine team is not available for emergencies.  [facility] on[facility]call ([phone] or [phone]) or [phone] if needed.      Expected SMBG schedule: BID  Reviewed signs and symptoms of hypoglycemia (weakness, dizziness, hunger, shakiness, nausea, headache, heart palpitations, sweating, fatigue, anxiety, etc.).  Reviewed treatment of  hypoglycemia (15/15 rule).      Patient's A1C goal is less than or equal to 7.  Patient's most recent A1C result is above goal.  Lab Results     Component                Value               Date                     HGBA1C                   7.7 (H)             09/17/2019              Patient stated that he is still getting over an upper respiratory infection and thinks this has some effect on his recent BG readings.   Started taking readings after he had gotten steroid injections. Patient stated that his readings have been running much higher than they usually run since then.  Patient stated that he is well-aware of the signs and symptoms of hypoglycemia and knows how to treat it.             Last 6 Patient Entered Readings                                          Most Recent A1c:      Recent Readings 11/14/2019 11/13/2019 11/12/2019 11/11/2019 11/11/2019    Blood Glucose (mg/dL) 296 138 160 157 197          Intervention/Plan    There are no preventive care reminders to display for this patient.    Reviewed the importance of self-monitoring, medication adherence, and that the health  can be used as a resource for lifestyle modifications to help reduce or maintain a healthy lifestyle.    Sent link to [facility] webpages and my contact information via [employer] for future questions. Follow up scheduled.             Diet Screening   He has the following dietary restrictions: diabetic    He eats 2 meals and 1 snacks per day.  He skips meals regularly.  He has no standard fasting periods.      The patient states that he typically eats a non-home cooked meal (ex: dining out, take out, frozen meals) 0 times per week.  He has meals prepared by friends.    Patient lets friends grocery shop.  He gets groceries from the grocery store.      Barriers to a Healthy Diet: no barriers to healthy eating    Patient stated that a friend cooks his food for him and pre-packages all of his meals. The meals are cooked and  "frozen and they do not usually include rice or carbohydrates. Patient stated that he has weaned himself off of fried and fast food. He said that he has been able to stay away from those types of foods unless it is a social event. He said that he eats in small portions.   Patient stated that he always eats dinner but the other meal will be either breakfast or lunch. He said that sometimes if he does not eat, he will make a protein shake or drink a smoothie from [employer] or a Boost. He will also snack on things (peanut butter crackers/Ritz and peanut butter/wheat bread with peanut butter) in between meals.   Patient stated that he drinks about 6 cups of tea per day.   He said that since he has been diagnosed with Diabetes, he has never had as much control over his BG as he does now.     Physical Activity Screening   When asked if exercising, patient responded: no    He identified the following barriers to physical activity: motivation    Patient stated that he does not exercise but he knows that if he did, it would probably help his BG levels stay controlled. He said that he had been having some discrepancies with his condo management. He likes to sometimes work out in jeans and they told him that was prohibited and it "throws him off" to have to go upstairs to his condo to change and then come back down. Patient said that to solve that, he has started keeping a pair of sweatpants in his truck, but he still sometimes lacks the motivation to go and exercise. He said that he has a membership to [employer] as well.     Intervention(s): goal setting     Assigning the following patient goal(s): increase physical activity    Medication Adherence Screening   He misses doses: never      Patient identified the following reasons for non-compliance: schedule concerns    Patient has some confusion about what medication he should be taking because he has been stopped and started on two different kinds. He said that he " "still takes his "old" medication as it was prescribed but was not at home and could not recall the names of the medications he was referring to. He said that he saw Dr. Ibanez yesterday and she informed him that he soul ask his PCP for more instruction. Patient has an upcoming appointment with his PCP to address this issue, but not until December.     Tobacco and Alcohol Screening       Deferred.       SDOH  "

## 2019-11-15 ENCOUNTER — OFFICE VISIT (OUTPATIENT)
Dept: SPORTS MEDICINE | Facility: CLINIC | Age: 74
End: 2019-11-15
Payer: MEDICARE

## 2019-11-15 VITALS
DIASTOLIC BLOOD PRESSURE: 70 MMHG | WEIGHT: 192 LBS | SYSTOLIC BLOOD PRESSURE: 137 MMHG | BODY MASS INDEX: 27.49 KG/M2 | HEIGHT: 70 IN | HEART RATE: 92 BPM

## 2019-11-15 DIAGNOSIS — M17.12 PRIMARY OSTEOARTHRITIS OF LEFT KNEE: Primary | ICD-10-CM

## 2019-11-15 DIAGNOSIS — M17.11 PRIMARY OSTEOARTHRITIS OF RIGHT KNEE: ICD-10-CM

## 2019-11-15 PROCEDURE — 20610 DRAIN/INJ JOINT/BURSA W/O US: CPT | Mod: 50,S$GLB,, | Performed by: PHYSICIAN ASSISTANT

## 2019-11-15 PROCEDURE — 99999 PR PBB SHADOW E&M-EST. PATIENT-LVL IV: ICD-10-PCS | Mod: PBBFAC,,, | Performed by: PHYSICIAN ASSISTANT

## 2019-11-15 PROCEDURE — 99499 NO LOS: ICD-10-PCS | Mod: S$GLB,,, | Performed by: PHYSICIAN ASSISTANT

## 2019-11-15 PROCEDURE — 99499 UNLISTED E&M SERVICE: CPT | Mod: S$GLB,,, | Performed by: PHYSICIAN ASSISTANT

## 2019-11-15 PROCEDURE — 20610 PR DRAIN/INJECT LARGE JOINT/BURSA: ICD-10-PCS | Mod: 50,S$GLB,, | Performed by: PHYSICIAN ASSISTANT

## 2019-11-15 PROCEDURE — 99999 PR PBB SHADOW E&M-EST. PATIENT-LVL IV: CPT | Mod: PBBFAC,,, | Performed by: PHYSICIAN ASSISTANT

## 2019-11-15 NOTE — PROGRESS NOTES
Emerson Menendez is here for follow up of bilateral knee arthritis. Pt is requesting Orthovisc series injections #3 of 3.  [facility]. Has failed other conservative modalities including NSAIDS, activity modification, weight loss.    The prior shot was tolerated well.    PHYSICAL EXAMINATION:     General: The patient is alert and oriented x 3. Mood is pleasant.   Observation of ears, eyes and nose reveals no gross abnormalities. No   labored breathing observed.     No signs of infection or adverse reaction to knee.    Injection Procedure  A time out was performed, including verification of patient ID, procedure, site and side, availability of information and equipment, review of safety issues, and agreement with consent, the procedure site was marked.    After time out was performed, the patient was prepped aseptically with chloraprep. A diagnostic and therapeutic injection of 2cc Orthovisc was given under sterile technique using a 22g x 1.5 needle from the Superolateral  aspect of the bilateral Knee Joint in the supine position.      Emerson Menendez had no adverse reactions to the medication. Pain decreased. He was instructed to apply ice to the joint for 20 minutes and avoid strenuous activities for 24-36 hours following the injection. He was warned of possible blood sugar and/or blood pressure changes during that time. Following that time, he can resume regular activities.    He was reminded to call the clinic immediately for any adverse side effects as explained in clinic today.    RTC to see Christophe Gallo PA-C in 3 months for follow-up.    All of the patient's questions were answered and the patient will contact us if they have any questions or concerns in the interim.

## 2019-11-21 ENCOUNTER — PATIENT OUTREACH (OUTPATIENT)
Dept: OTHER | Facility: OTHER | Age: 74
End: 2019-11-21

## 2019-11-21 NOTE — PROGRESS NOTES
Digital Medicine: Clinician Introduction    Emerson Menendez is a 74 y.o. male who is newly enrolled in the Digital Medicine Clinic.    The following information was reviewed and updated:  Preferred pharmacy   [id number] - [place], [place] - [address] [facility] [address]  [address]  [facility] [address]  Phone: [phone] Fax: [phone]    [facility] - [place], [place] - [address]  [address] [facility] [address]  Phone: [phone] Fax: [phone]    [facility]  [address] [address]  Phone: [phone] Fax: [phone]    [facility] [place], [place] - [address]  [address]   [facility] [address]  Phone: [phone] Fax: [phone]      Patient prefers a 30 days supply.     Review of patient's allergies indicates:   Allergen Reactions    Penicillins      Knees locked up       Called patient to follow up regarding the DDMP (Diabetes Digital Medicine Program). Patient endorses adherence to medication regimen. Patient denies hypoglycemic s/sx (dizziness, extreme hunger, headaches, confusion, trouble concentrating, sweating, shaking, blurred vision, personality changes); patient denies hyperglycemic s/sx (increased thirst, increased urination, headaches, trouble concentrating, blurred vision, fatigue). He reports that he had a prescription for Novolog 4 units before meals but was informed about the VGO but did not like it because it was dropping his blood sugar too low. He now keeps glucose tablets with him at all times. He reports that now he checks the nutrition label for sugar content and carbohydrates. He has a friend who helps him cook meals so that he does not eat canned foods. He reports that he drinks shakes in the morning. He reports that he used to exercise regularly however when he began to have problems with his knees he stopped working out.  "He reports that he knows that sometimes his meals contribute to his elevated SMBG readings. He gets up in the middle of the night to eat some "unhealthy snacks".      The history is provided by the patient. No  was used.     DIABETES  Instructed patient not to allow anyone else to use phone and monitoring device.  Explained that we expect patient to test their blood sugar as prescribed by their physician or Digital Medicine Clinician.      Reviewed general Self-Monitoring of Blood Glucose (SMBG) goals:  · FPG: <  mg/dL  · 1h PPG: < 180 mg/dL  · 2h PPG: < 160 mg/dL  · Bedtime: < 150 mg/dL    Explained to patient that the digital medicine team is not available for emergencies.  Patient will call [facility] on-call ([phone] or [phone]) or [phone] if needed.      Expected SMBG schedule: BID  Patient does not have history of hypoglycemia.    Reviewed signs and symptoms of hypoglycemia (weakness, dizziness, hunger, shakiness, nausea, headache, heart palpitations, sweating, fatigue, anxiety, etc.).  Reviewed treatment of hypoglycemia (15/15 rule).      Reviewed signs or symptoms of hyperglycemia (headache, increased thirst, increased urination, fatigue, blurred vision, etc.).      Patient is on ace inhibitor or angiotensin II receptor blocker.   Patient is on statin.     Patient's A1C goals is less than or equal to 7. Patient's most recent A1C result is above goal. Lab Results     Component                Value               Date                     HGBA1C                   7.7 (H)             09/17/2019             Med Review complete.    Allergies reviewed.      Last 6 Patient Entered Readings                                          Most Recent A1c: 7.7% on 9/17/2019  (Goal: 7%)     Recent Readings 11/21/2019 11/20/2019 11/20/2019 11/19/2019 11/19/2019    Blood Glucose (mg/dL) 200 130 135 127 188        INTERVENTION(S)  reviewed appropriate dose schedule, reviewed monitoring technique, " encouragement/support and goal setting    PLAN  patient verbalizes understanding and continue monitoring    Assessment:  Reviewed recent readings. Per ADA guidelines (goal A1C < 7%), DM need to be addressed more throroughly today     Plan:  Continue current medication regimen. Provided a patient with list of healthy snack options and patient is to follow up with health  for a more extensive list. Reviewed SMBG goals with patient. Encouraged patient to refrain from midnight snacks by filling up on dinner with healthy vegetable options. In future, if SMBG remains elevated despite changes in his dietary habits, may initiate Trulicity 0.75 mg SQ weekly. I will continue to monitor regularly and will follow-up in 3 to 4  weeks, sooner if blood sugar begins to trend upward or downward.         There are no preventive care reminders to display for this patient.    Current Medication Regimen:    Diabetes Medications             glimepiride (AMARYL) 2 MG tablet TAKE 1 TABLET (2 MG TOTAL) BY MOUTH ONCE DAILY.    glucagon (human recombinant) inj 1mg/mL kit Inject 1 mL (1 mg total) into the muscle as needed.    insulin (LANTUS SOLOSTAR U-100 INSULIN) glargine 100 units/mL (3mL) SubQ pen Inject 20 Units into the skin every evening.        Reviewed the importance of self-monitoring, medication adherence, and that the health  can be used as a resource for lifestyle modifications to help reduce or maintain a healthy lifestyle.    Sent link to [facility]'s [employer] webpages and my contact information via [employer] for future questions. Follow up scheduled.

## 2019-11-29 ENCOUNTER — PATIENT OUTREACH (OUTPATIENT)
Dept: ADMINISTRATIVE | Facility: HOSPITAL | Age: 74
End: 2019-11-29

## 2019-11-29 DIAGNOSIS — Z12.11 COLON CANCER SCREENING: Primary | ICD-10-CM

## 2019-12-03 ENCOUNTER — PATIENT OUTREACH (OUTPATIENT)
Dept: OTHER | Facility: OTHER | Age: 74
End: 2019-12-03

## 2019-12-03 NOTE — PROGRESS NOTES
Digital Medicine: Health  Follow-Up    The history is provided by the patient.     Follow Up  Follow-up reason(s): goal follow-up and responding to task    Patient stated that he has been trying to do a little better with his BG readings. He said that he has been dealing with a bit of a cold recently so he thinks that has contributed to his BG readings.     Patient stated [facility] and he did not have his medicine for a few days but has since got back on track with taking his medication.     Task was placed for me to provide patient with some snack choices for him to take with him during the day. Patient would like these snack options so he does not have to be hungry between meals. Patient requested these resources be emailed to him.       INTERVENTION(S)  encouragement/support and Snacks     PLAN  patient verbalizes understanding, patient amenable to changes and continue monitoring      There are no preventive care reminders to display for this patient.      Last 6 Patient Entered Readings                                          Most Recent A1c: 7.7% on 9/17/2019  (Goal: 7%)     Recent Readings 12/3/2019 12/2/2019 12/2/2019 12/2/2019 12/1/2019    Blood Glucose (mg/dL) 174 188 258 166 313                    Diet Screening   No change to diet.      Physical Activity Screening   No change to exercise routine.        Medication Adherence Screening   He misses doses: never    He does not wonder if medications are working.  He knows purpose of medications.      Patient identified the following reasons for non-compliance: none    Patient reported no questions or concerns about his medication.     Tobacco and Alcohol Screening       Deferred.       SDOH

## 2019-12-10 ENCOUNTER — LAB VISIT (OUTPATIENT)
Dept: LAB | Facility: HOSPITAL | Age: 74
End: 2019-12-10
Attending: INTERNAL MEDICINE
Payer: MEDICARE

## 2019-12-10 DIAGNOSIS — C90.00 MULTIPLE MYELOMA, REMISSION STATUS UNSPECIFIED: ICD-10-CM

## 2019-12-10 DIAGNOSIS — Z00.6 EXAMINATION OF PARTICIPANT IN CLINICAL TRIAL: ICD-10-CM

## 2019-12-10 DIAGNOSIS — E11.65 UNCONTROLLED TYPE 2 DIABETES MELLITUS WITH HYPERGLYCEMIA: ICD-10-CM

## 2019-12-10 LAB
ALBUMIN SERPL BCP-MCNC: 3.6 G/DL (ref 3.5–5.2)
ALP SERPL-CCNC: 49 U/L (ref 55–135)
ALT SERPL W/O P-5'-P-CCNC: 28 U/L (ref 10–44)
ANION GAP SERPL CALC-SCNC: 10 MMOL/L (ref 8–16)
AST SERPL-CCNC: 25 U/L (ref 10–40)
BASOPHILS # BLD AUTO: 0.05 K/UL (ref 0–0.2)
BASOPHILS NFR BLD: 1 % (ref 0–1.9)
BILIRUB SERPL-MCNC: 0.5 MG/DL (ref 0.1–1)
BUN SERPL-MCNC: 14 MG/DL (ref 8–23)
CALCIUM SERPL-MCNC: 9.4 MG/DL (ref 8.7–10.5)
CHLORIDE SERPL-SCNC: 103 MMOL/L (ref 95–110)
CO2 SERPL-SCNC: 26 MMOL/L (ref 23–29)
CREAT SERPL-MCNC: 1.5 MG/DL (ref 0.5–1.4)
DIFFERENTIAL METHOD: ABNORMAL
EOSINOPHIL # BLD AUTO: 0.4 K/UL (ref 0–0.5)
EOSINOPHIL NFR BLD: 7.1 % (ref 0–8)
ERYTHROCYTE [DISTWIDTH] IN BLOOD BY AUTOMATED COUNT: 14.1 % (ref 11.5–14.5)
EST. GFR  (AFRICAN AMERICAN): 52.6 ML/MIN/1.73 M^2
EST. GFR  (NON AFRICAN AMERICAN): 45.5 ML/MIN/1.73 M^2
ESTIMATED AVG GLUCOSE: 171 MG/DL (ref 68–131)
GLUCOSE SERPL-MCNC: 125 MG/DL (ref 70–110)
HBA1C MFR BLD HPLC: 7.6 % (ref 4–5.6)
HCT VFR BLD AUTO: 37.4 % (ref 40–54)
HGB BLD-MCNC: 11.7 G/DL (ref 14–18)
IGA SERPL-MCNC: 1604 MG/DL (ref 40–350)
IGG SERPL-MCNC: 953 MG/DL (ref 650–1600)
IGM SERPL-MCNC: 46 MG/DL (ref 50–300)
IMM GRANULOCYTES # BLD AUTO: 0.01 K/UL (ref 0–0.04)
IMM GRANULOCYTES NFR BLD AUTO: 0.2 % (ref 0–0.5)
LYMPHOCYTES # BLD AUTO: 1.8 K/UL (ref 1–4.8)
LYMPHOCYTES NFR BLD: 35.2 % (ref 18–48)
MAGNESIUM SERPL-MCNC: 1.6 MG/DL (ref 1.6–2.6)
MCH RBC QN AUTO: 28.3 PG (ref 27–31)
MCHC RBC AUTO-ENTMCNC: 31.3 G/DL (ref 32–36)
MCV RBC AUTO: 91 FL (ref 82–98)
MONOCYTES # BLD AUTO: 0.3 K/UL (ref 0.3–1)
MONOCYTES NFR BLD: 5.7 % (ref 4–15)
NEUTROPHILS # BLD AUTO: 2.6 K/UL (ref 1.8–7.7)
NEUTROPHILS NFR BLD: 50.8 % (ref 38–73)
NRBC BLD-RTO: 0 /100 WBC
PHOSPHATE SERPL-MCNC: 2.7 MG/DL (ref 2.7–4.5)
PLATELET # BLD AUTO: 243 K/UL (ref 150–350)
PMV BLD AUTO: 9.1 FL (ref 9.2–12.9)
POTASSIUM SERPL-SCNC: 4.2 MMOL/L (ref 3.5–5.1)
PROT SERPL-MCNC: 8.2 G/DL (ref 6–8.4)
RBC # BLD AUTO: 4.13 M/UL (ref 4.6–6.2)
SODIUM SERPL-SCNC: 139 MMOL/L (ref 136–145)
WBC # BLD AUTO: 5.09 K/UL (ref 3.9–12.7)

## 2019-12-10 PROCEDURE — 85025 COMPLETE CBC W/AUTO DIFF WBC: CPT

## 2019-12-10 PROCEDURE — 86334 IMMUNOFIX E-PHORESIS SERUM: CPT | Mod: 26,Q1,, | Performed by: PATHOLOGY

## 2019-12-10 PROCEDURE — 84165 PROTEIN E-PHORESIS SERUM: CPT

## 2019-12-10 PROCEDURE — 80053 COMPREHEN METABOLIC PANEL: CPT | Mod: Q1

## 2019-12-10 PROCEDURE — 84100 ASSAY OF PHOSPHORUS: CPT | Mod: Q1

## 2019-12-10 PROCEDURE — 36415 COLL VENOUS BLD VENIPUNCTURE: CPT

## 2019-12-10 PROCEDURE — 83735 ASSAY OF MAGNESIUM: CPT

## 2019-12-10 PROCEDURE — 84165 PROTEIN E-PHORESIS SERUM: CPT | Mod: 26,Q1,, | Performed by: PATHOLOGY

## 2019-12-10 PROCEDURE — 83036 HEMOGLOBIN GLYCOSYLATED A1C: CPT

## 2019-12-10 PROCEDURE — 84165 PATHOLOGIST INTERPRETATION SPE: ICD-10-PCS | Mod: 26,Q1,, | Performed by: PATHOLOGY

## 2019-12-10 PROCEDURE — 83520 IMMUNOASSAY QUANT NOS NONAB: CPT

## 2019-12-10 PROCEDURE — 86334 IMMUNOFIX E-PHORESIS SERUM: CPT

## 2019-12-10 PROCEDURE — 82784 ASSAY IGA/IGD/IGG/IGM EACH: CPT | Mod: 59,Q1

## 2019-12-10 PROCEDURE — 86334 PATHOLOGIST INTERPRETATION IFE: ICD-10-PCS | Mod: 26,Q1,, | Performed by: PATHOLOGY

## 2019-12-11 ENCOUNTER — PATIENT MESSAGE (OUTPATIENT)
Dept: ORTHOPEDICS | Facility: CLINIC | Age: 74
End: 2019-12-11

## 2019-12-11 ENCOUNTER — PATIENT MESSAGE (OUTPATIENT)
Dept: UROLOGY | Facility: CLINIC | Age: 74
End: 2019-12-11

## 2019-12-11 ENCOUNTER — RESEARCH ENCOUNTER (OUTPATIENT)
Dept: RESEARCH | Facility: HOSPITAL | Age: 74
End: 2019-12-11

## 2019-12-11 ENCOUNTER — OFFICE VISIT (OUTPATIENT)
Dept: HEMATOLOGY/ONCOLOGY | Facility: CLINIC | Age: 74
End: 2019-12-11
Payer: MEDICARE

## 2019-12-11 VITALS
WEIGHT: 187.19 LBS | HEART RATE: 85 BPM | HEIGHT: 70 IN | DIASTOLIC BLOOD PRESSURE: 78 MMHG | BODY MASS INDEX: 26.8 KG/M2 | SYSTOLIC BLOOD PRESSURE: 153 MMHG | RESPIRATION RATE: 17 BRPM | OXYGEN SATURATION: 100 % | TEMPERATURE: 98 F

## 2019-12-11 DIAGNOSIS — D47.2 SMOLDERING MULTIPLE MYELOMA: Primary | ICD-10-CM

## 2019-12-11 DIAGNOSIS — N18.30 CKD STAGE 3 DUE TO TYPE 2 DIABETES MELLITUS: ICD-10-CM

## 2019-12-11 DIAGNOSIS — Z79.4 TYPE 2 DIABETES MELLITUS WITH DIABETIC POLYNEUROPATHY, WITH LONG-TERM CURRENT USE OF INSULIN: ICD-10-CM

## 2019-12-11 DIAGNOSIS — E11.22 CKD STAGE 3 DUE TO TYPE 2 DIABETES MELLITUS: ICD-10-CM

## 2019-12-11 DIAGNOSIS — E11.42 TYPE 2 DIABETES MELLITUS WITH DIABETIC POLYNEUROPATHY, WITH LONG-TERM CURRENT USE OF INSULIN: ICD-10-CM

## 2019-12-11 LAB
ALBUMIN SERPL ELPH-MCNC: 3.97 G/DL (ref 3.35–5.55)
ALPHA1 GLOB SERPL ELPH-MCNC: 0.3 G/DL (ref 0.17–0.41)
ALPHA2 GLOB SERPL ELPH-MCNC: 0.82 G/DL (ref 0.43–0.99)
B-GLOBULIN SERPL ELPH-MCNC: 1.03 G/DL (ref 0.5–1.1)
GAMMA GLOB SERPL ELPH-MCNC: 1.88 G/DL (ref 0.67–1.58)
INTERPRETATION SERPL IFE-IMP: NORMAL
KAPPA LC SER QL IA: 6.09 MG/DL (ref 0.33–1.94)
KAPPA LC/LAMBDA SER IA: 4.03 (ref 0.26–1.65)
LAMBDA LC SER QL IA: 1.51 MG/DL (ref 0.57–2.63)
PROT SERPL-MCNC: 8 G/DL (ref 6–8.4)

## 2019-12-11 PROCEDURE — 99999 PR PBB SHADOW E&M-EST. PATIENT-LVL III: CPT | Mod: PBBFAC,,, | Performed by: INTERNAL MEDICINE

## 2019-12-11 PROCEDURE — 99215 OFFICE O/P EST HI 40 MIN: CPT | Mod: Q1,S$GLB,, | Performed by: INTERNAL MEDICINE

## 2019-12-11 PROCEDURE — 99999 PR PBB SHADOW E&M-EST. PATIENT-LVL III: ICD-10-PCS | Mod: PBBFAC,,, | Performed by: INTERNAL MEDICINE

## 2019-12-11 PROCEDURE — 99215 PR OFFICE/OUTPT VISIT, EST, LEVL V, 40-54 MIN: ICD-10-PCS | Mod: Q1,S$GLB,, | Performed by: INTERNAL MEDICINE

## 2019-12-11 NOTE — PROGRESS NOTES
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   Sponsor:  [facility]  IRB# [id number]  Study ID: [id number]  Investigator: GIL Engel  Pt Initials: LUCÍA LORENZ       Arm B: Observation  Cycle 48, Day 28       Patient presents to clinic this morning for above cycle evaluation.  He has no major complaints today, other than ongoing issues with his knee, stating he feels it "may be time" for a knee replacement.  Plans to see ortho soon.  He states continued willingness to participate in above-mentioned study      Review of Baseline AE's:   1.Hypertension, Grade 2 diastolic: BP today is 153/78 today. Subject denies headache/dizziness; no symptoms noted.   AE ongoing  2. Lower Back Pain and Neck Pain, grade 1: Patient has no complaints today/ over last month.  As previously stated, patient is not suspected to have bony myeloma involvement per Dr. Engel as these are pre-existing issues present at baseline.  AE ongoing.  3. Pain in extremity (knee), grade 1. As above, states he is due injection to the knee this week and that the injections do help alleviate symptoms.  Presents with knee stabilizing braces intact.  As above will see ortho soon to discuss potential knee replacement.     4. Peripheral sensory neuropathy, grade 1: Patient does not verbalize complaints today. Has gabapentin prescribed and takes as needed.  States has not taken recently.  AE ongoing.   5. Anemia, Grade 1: Hgb/Hct - stable at 11.7/37.4.  Result reviewed by Dr. Engel. AE ongoing            Review of AE's:     *Please note this list is not all-inclusive, please see AE log for physician reviewed list of adverse events.   1. Creatinine increased, grade 2: Serum creatinine remains increased today at 1.5 mg/dl today and calculated GFR 52.67. (GFR 45.5 per Epic/Lab calculation) This is decreased from last cycle and continues to " "trend monthly. As previously stated, Dr. Engel states this has not been related to myeloma and is related chronic kidney issues likely secondary to diabetes and hypertension. Will continue observation monthly and to monitor renal function closely. Per MD, encouraged to increase water intake. AE stable from baseline  2. Hyponatremia, Grade 1: Serum sodium today is 139 mmol/L; AE resolved for now. Will monitor  Increased triglycerides,      Per study chair, "the definition of progressive disease for this protocol is developing CRAB criteria that requires treatment systemically." Per Dr Engel, based on today's exam and lab results thus far, patient does not have any s/s of CRAB: Normal Calcium level (9.4), absence of Renal insufficiency (serum creatinine 1.5, and GFR 52.67 per [place]ro[place]-Gault) --patient with a history of kidney dysfunction secondary to diabetes; Dr. Engel aware of these values and not concerned for myeloma involvement), absence of significant Anemia (hemoglobin 11.7, stable; will await myeloma labs for clarity relationship to myeloma) and absence of lytic Bone lesions (per baseline metastatic survey as well as MRI--see MD note for further comment). See MD note for ECOG score and H&P and flowsheets for laboratory work, vitals, etc. All myeloma-related blood work from last cycle including SPEP and JAGUAR have remained stable, and are currently pending for this cycle. Per Dr. Engel, patient shows no evidence of progression at last result. Patient informed that Dr. Engel will release all lab results to [facility]. He states understanding of this. QOL's required at this timepoint and completed independently by patient.           Encouraged patient to reach out/contact staff if feels need to discuss any issues. He states understanding.  Patient was informed to review January appointments and alert us of any conflict. He states he wishes only to see Dr Engel and will not show for appt if scheduled with any other " provider. Discussed with Dr Engel; will take deviation for appt as she is not available Cycle 47 D28. Appt to be scheduled one day later.  Will continue to schedule patient as far out as possible, which seems to help maintain compliance with visits. Patient states having research RN's contact information and has MD contact information to call with any concerns, questions, or worsening of symptoms.  Discussed next month's appt date and time, he verbalized understanding.

## 2019-12-11 NOTE — PROGRESS NOTES
Subjective:       Patient ID: Emerson Menendez is a 74 y.o. male.    Chief Complaint: No chief complaint on file.  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 Test results identify a stable IgA kappa protein with beta globulin band at 1.63g/dL. Kappa free light chain is elevated at 4.40. CBC and calcium are stable. Creatinine with history of stage III CKD is stable at 1.4. Metastatic survey is negative for lytic lesions. MRI of the entire spine demonstrated age related changes but no convincing evidence of myeloma bone disease. Bone marrow biopsy identified about 13% plasma cells by morphology and FISH for myeloma identified a t(11;14) and trisomies 3,7, and 17. The patient is afebrile and appears clinically well. He was seen by his podiatrist and nephrologist since our last visit without any new or acute events.    The patient has not received any therapy for smoldering myeloma including bisphosphonates or steroids. He has been randomized to observation arm of the the clinical study. 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 He has no current or prior history of malignancy.    TODAY  Mr. Menendez returns today for monthly follow-up evaluation. No clinical signs/symptoms of progression of his smoldering myeloma with CBC stable and CMP are stable. Myeloma labs are pending.  No acute interval events. Remains very active. Plans to revisit orthopedists about knees.      HPI  Review of Systems   Constitutional: Negative for activity change, appetite change, diaphoresis, fatigue, fever and unexpected weight change.   HENT: Negative.    Eyes: Negative.    Respiratory:  Negative.    Cardiovascular: Negative for leg swelling.   Gastrointestinal: Negative.    Endocrine: Negative.    Genitourinary: Negative.    Musculoskeletal: Negative.    Skin: Negative.    Allergic/Immunologic: Negative.    Neurological:        Grade 0-1 peripheral neuropathy of bilateral feet.    Hematological: Negative for adenopathy. Does not bruise/bleed easily.   Psychiatric/Behavioral: Negative.        Objective:       Vitals:    12/11/19 1057   BP: (!) 153/78   Pulse: 85   Resp: 17   Temp: 97.6 °F (36.4 °C)       Physical Exam   Constitutional: He is oriented to person, place, and time. He appears well-developed and well-nourished.   HENT:   Head: Normocephalic and atraumatic.   Right Ear: External ear normal.   Left Ear: External ear normal.   Nose: Nose normal.   Mouth/Throat: Oropharynx is clear and moist.   Eyes: Pupils are equal, round, and reactive to light. Conjunctivae and EOM are normal.   Neck: Normal range of motion. Neck supple.   Cardiovascular: Normal rate, regular rhythm and intact distal pulses.   No murmur heard.  Pulmonary/Chest: Effort normal and breath sounds normal. No respiratory distress.   Abdominal: Soft. Bowel sounds are normal. He exhibits no distension. There is no hepatosplenomegaly.   Musculoskeletal: He exhibits no edema or tenderness.   Neurological: He is alert and oriented to person, place, and time. No cranial nerve deficit.   Skin: Skin is warm and dry. No rash noted. No cyanosis. Nails show no clubbing.   Psychiatric: He has a normal mood and affect. His behavior is normal.   Nursing note and vitals reviewed.      Assessment:       1. Smoldering multiple myeloma    2. CKD stage 3 due to type 2 diabetes mellitus    3. Type 2 diabetes mellitus with diabetic polyneuropathy, with long-term current use of insulin        Plan:       The patient has a diagnosis of smoldering myeloma. There is no indication for immediate chemotherapy. We are monitoring renal function closely-  baseline creatinine of -1.5..  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 Total protein remains normal, M protein has been stable; pending levels today. Free light chains are stable..  Plan for return in 1 month.  Endocrinology and Nephrology to monitor diabetes and renal failure respectively.

## 2019-12-12 LAB
PATHOLOGIST INTERPRETATION IFE: NORMAL
PATHOLOGIST INTERPRETATION SPE: NORMAL

## 2019-12-13 ENCOUNTER — OFFICE VISIT (OUTPATIENT)
Dept: INTERNAL MEDICINE | Facility: CLINIC | Age: 74
End: 2019-12-13
Payer: MEDICARE

## 2019-12-13 VITALS
SYSTOLIC BLOOD PRESSURE: 120 MMHG | DIASTOLIC BLOOD PRESSURE: 70 MMHG | BODY MASS INDEX: 26.57 KG/M2 | HEIGHT: 70 IN | WEIGHT: 185.63 LBS | HEART RATE: 89 BPM | OXYGEN SATURATION: 99 %

## 2019-12-13 DIAGNOSIS — Z01.00 DIABETIC EYE EXAM: ICD-10-CM

## 2019-12-13 DIAGNOSIS — Z00.00 NORMAL FOOT EXAM: ICD-10-CM

## 2019-12-13 DIAGNOSIS — H40.9 GLAUCOMA, UNSPECIFIED GLAUCOMA TYPE, UNSPECIFIED LATERALITY: ICD-10-CM

## 2019-12-13 DIAGNOSIS — E11.9 DIABETIC EYE EXAM: ICD-10-CM

## 2019-12-13 DIAGNOSIS — D47.2 SMOLDERING MULTIPLE MYELOMA: ICD-10-CM

## 2019-12-13 DIAGNOSIS — Z00.00 ROUTINE GENERAL MEDICAL EXAMINATION AT A HEALTH CARE FACILITY: Primary | ICD-10-CM

## 2019-12-13 PROCEDURE — 3078F DIAST BP <80 MM HG: CPT | Mod: CPTII,S$GLB,, | Performed by: INTERNAL MEDICINE

## 2019-12-13 PROCEDURE — 3074F SYST BP LT 130 MM HG: CPT | Mod: CPTII,S$GLB,, | Performed by: INTERNAL MEDICINE

## 2019-12-13 PROCEDURE — 99999 PR PBB SHADOW E&M-EST. PATIENT-LVL III: CPT | Mod: PBBFAC,,, | Performed by: INTERNAL MEDICINE

## 2019-12-13 PROCEDURE — 99999 PR PBB SHADOW E&M-EST. PATIENT-LVL III: ICD-10-PCS | Mod: PBBFAC,,, | Performed by: INTERNAL MEDICINE

## 2019-12-13 PROCEDURE — 99397 PER PM REEVAL EST PAT 65+ YR: CPT | Mod: S$GLB,,, | Performed by: INTERNAL MEDICINE

## 2019-12-13 PROCEDURE — 99397 PR PREVENTIVE VISIT,EST,65 & OVER: ICD-10-PCS | Mod: S$GLB,,, | Performed by: INTERNAL MEDICINE

## 2019-12-13 PROCEDURE — 3078F PR MOST RECENT DIASTOLIC BLOOD PRESSURE < 80 MM HG: ICD-10-PCS | Mod: CPTII,S$GLB,, | Performed by: INTERNAL MEDICINE

## 2019-12-13 PROCEDURE — 3074F PR MOST RECENT SYSTOLIC BLOOD PRESSURE < 130 MM HG: ICD-10-PCS | Mod: CPTII,S$GLB,, | Performed by: INTERNAL MEDICINE

## 2019-12-13 RX ORDER — DIAZEPAM 5 MG/1
TABLET ORAL
Qty: 60 TABLET | Refills: 2 | Status: SHIPPED | OUTPATIENT
Start: 2019-12-13 | End: 2019-12-31 | Stop reason: SDUPTHER

## 2019-12-13 RX ORDER — CITALOPRAM 20 MG/1
30 TABLET, FILM COATED ORAL DAILY
Qty: 135 TABLET | Refills: 4 | Status: SHIPPED | OUTPATIENT
Start: 2019-12-13 | End: 2020-04-22 | Stop reason: SDUPTHER

## 2019-12-13 RX ORDER — TADALAFIL 20 MG/1
20 TABLET ORAL DAILY PRN
Qty: 30 TABLET | Refills: 3 | Status: SHIPPED | OUTPATIENT
Start: 2019-12-13 | End: 2020-03-19 | Stop reason: SDUPTHER

## 2019-12-13 NOTE — PROGRESS NOTES
[facility]PLA[facility]: Annual exam and Follow up of multiple myeloma, diabetes, hypertension, reflux, hyperlipidemia    HISTORY OF PRESENT ILLNESS: This is a 74-year-old man who presents for follow up of above     HE had an upper respiratory tract infection and got over it. Has a slight cough now.    HE had 2 steroid injections in his right foot for plantar fasciitis. He has inserts in his shoes.  NO problems in his foot.        HE had completed Orthovisc series injections in both knees on 11/15/19. Right knee is better. Left knee is worse. He will see Dr [facility] 12/17/19. He takes meloxicam 15 mg daily for his knees    He is in digitial diabetes now. He has not been exercising lately due to knees.  Hemoglobin A1C is 7.6 on 12/10/19 which is improved. Sugars are now in the 100's range.  He is on Lantus 20 units daily and glimepriride 2 mg daily. .     He has seen neprhology 9/13/18 and kidney function is stable. He needs a follow up with nephrology - has an apt 12/19/19 - needs to reschedule due to the holiday     HE is in a study for his smoldering multiple myeloma. He is in the observation arm and is being monitoring monthly. He saw Dr Engel 12/12/19. No indication for chemotherapy at this time.       He is taking losartan 25 mg 1 tablet daily to protect his kidney. No polydipsia or polyuria       His back and neck was doing better in the healthy back program.  He does not take hydrocodone apap. He takes tizanidine as needed (rarely). HE is wearing braces for his knees which is helping stability and his pain.      Reflux is controlled on omeprazole 20 mg 2 tablets daily. HE has heartburn when he does not take the omeprazole.  No chest pain, shortness of breath, nausea, voimting, constipation, diarrhea, numbness, weakness.        He had laser vaporization and enucleation of the prostate 11/13/13. He denies any dysuria or hematuria now. HE saw Dr Walker. Oxybutynin was stopped. HE is urinting better. He continues FLomax  0.4 mg nightly     He has hyperlipidemia, on pravastatin 40 mg daily. No joint pain or muscle pain from the pravastatin.        He has been taking aspirin 81 mg daily vitamin D supplement 2000 units daily.        After our last visit, he did not increase citalopram to 30 mg daily. Elias has been ill which has affected his mood. He continues to take celexa 20 mg daily.. He takes diazepam 5 mg at bedtime as needed (2-3 times a week()  for his sleep and anxiety. It helps him sleep.      HE is taking gabapentin 100 mg 2 capsules as needed. He does not take lately. Feet are doing ok.      He has finished doxycycline 50 mg daily for recurrent boils. Boils have resolved.      PAST MEDICAL HISTORY:   1. Diabetes mellitus.   2. Hyperlipidemia.   3. Reflux.   4. BPH.   5. Osteoarthritis of the knees.   6. Neck pain.   7. History of right lateral epicondylitis.   8. History of lumps removed from the breast as a teenager.   9. OA cervical spine   10. Multiple myeloma    SOCIAL HISTORY: Does not smoke, does not drink. He owns an    company. He works for the [employer].     FAMILY HISTORY: Mother is living at age 95 with a heart condition. She had a mild stroke. Father  in his late 40s of alcoholism. He has 5 sisters and 1 brother. one has a neurological disorder, one is anxious. ONe sister  after a fall. His daughter has  lupus, on dialysis, heart problems, and a brain aneurysm that was stented.       PHYSICAL EXAMINATION:      GENERAL: He is alert, oriented, no apparent distress. Affect within normal limits.   Conjunctivae anicteric. PERRL. Tympanic membranes clear. Oropharynx gumes healing.    NECK: Supple. No cervical lymphadenopathy, no thyroid enlargement.   Respiratory: Effort normal. Lungs are clear to auscultation.   HEART: Regular rate and rhythm without murmurs, gallops or rubs.   No lower extremity edema.    ABDOMEN: soft, non distended, non tender, bowel sounds present, no  hepatosplenomgaly        Labs 12/10/19  Reviewed A1C 7.6     ASSESSMENT AND PLAN:     Annual exam - discussed diet, exercise and safety issues.         1. Diabetes mellitus with hyperglycemia and microalbuminuria - Digital diabetes.  Doing better. Watch for low blood sugars  2. 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 Labs reviewed  3. OA knee -s/p Orthovisc series injections 11/15/19  4. Hypertension - controlled  5. Hyperlipidemia. On pravastatin  6. BPH. S/p laser - Saw urology 8/18- has erectile dysfunction - interested in penile pump - to urology. cialis 20 mg as needed  7. Vitamin D deficiency - on vitamin D 2,000 units daily.    9. Back pain-stable  10. Anxiety and depression- increase citalopram to 30 mg daily. .   11. Peripheral neuropathy -controlled on gabapentin as needed   12. CRI -stable - monitored closely by heme onc and nephrology. Nephrology apt.  13. I will see him back in 4 months, sooner if problems arise        Prevnar 7/16  PSA 10/18. colonoscopy normal 3/2012 and due 2022.  FitKit was given to patient on 8/13/2019 2:15 PM   Influenza 9/21/18

## 2019-12-16 ENCOUNTER — PATIENT OUTREACH (OUTPATIENT)
Dept: ADMINISTRATIVE | Facility: OTHER | Age: 74
End: 2019-12-16

## 2019-12-16 DIAGNOSIS — E11.42 TYPE 2 DIABETES MELLITUS WITH DIABETIC POLYNEUROPATHY, WITH LONG-TERM CURRENT USE OF INSULIN: Primary | Chronic | ICD-10-CM

## 2019-12-16 DIAGNOSIS — Z79.4 TYPE 2 DIABETES MELLITUS WITH DIABETIC POLYNEUROPATHY, WITH LONG-TERM CURRENT USE OF INSULIN: Primary | Chronic | ICD-10-CM

## 2019-12-17 ENCOUNTER — LAB VISIT (OUTPATIENT)
Dept: LAB | Facility: HOSPITAL | Age: 74
End: 2019-12-17
Payer: MEDICARE

## 2019-12-17 ENCOUNTER — DOCUMENTATION ONLY (OUTPATIENT)
Dept: INTERNAL MEDICINE | Facility: CLINIC | Age: 74
End: 2019-12-17

## 2019-12-17 ENCOUNTER — TELEPHONE (OUTPATIENT)
Dept: NEPHROLOGY | Facility: CLINIC | Age: 74
End: 2019-12-17

## 2019-12-17 ENCOUNTER — OFFICE VISIT (OUTPATIENT)
Dept: ORTHOPEDICS | Facility: CLINIC | Age: 74
End: 2019-12-17
Payer: MEDICARE

## 2019-12-17 VITALS — WEIGHT: 189.69 LBS | HEIGHT: 70 IN | BODY MASS INDEX: 27.16 KG/M2

## 2019-12-17 DIAGNOSIS — M17.0 PRIMARY OSTEOARTHRITIS OF BOTH KNEES: Primary | ICD-10-CM

## 2019-12-17 DIAGNOSIS — N18.1 CKD (CHRONIC KIDNEY DISEASE) STAGE 1, GFR 90 ML/MIN OR GREATER: Primary | ICD-10-CM

## 2019-12-17 DIAGNOSIS — N18.30 STAGE 3 CHRONIC KIDNEY DISEASE: ICD-10-CM

## 2019-12-17 DIAGNOSIS — N18.30 STAGE 3 CHRONIC KIDNEY DISEASE: Primary | ICD-10-CM

## 2019-12-17 LAB
25(OH)D3+25(OH)D2 SERPL-MCNC: 22 NG/ML (ref 30–96)
PTH-INTACT SERPL-MCNC: 51 PG/ML (ref 9–77)

## 2019-12-17 PROCEDURE — 20610 DRAIN/INJ JOINT/BURSA W/O US: CPT | Mod: LT,S$GLB,, | Performed by: ORTHOPAEDIC SURGERY

## 2019-12-17 PROCEDURE — 1125F PR PAIN SEVERITY QUANTIFIED, PAIN PRESENT: ICD-10-PCS | Mod: S$GLB,,, | Performed by: ORTHOPAEDIC SURGERY

## 2019-12-17 PROCEDURE — 1101F PT FALLS ASSESS-DOCD LE1/YR: CPT | Mod: CPTII,S$GLB,, | Performed by: ORTHOPAEDIC SURGERY

## 2019-12-17 PROCEDURE — 1101F PR PT FALLS ASSESS DOC 0-1 FALLS W/OUT INJ PAST YR: ICD-10-PCS | Mod: CPTII,S$GLB,, | Performed by: ORTHOPAEDIC SURGERY

## 2019-12-17 PROCEDURE — 99214 PR OFFICE/OUTPT VISIT, EST, LEVL IV, 30-39 MIN: ICD-10-PCS | Mod: 25,S$GLB,, | Performed by: ORTHOPAEDIC SURGERY

## 2019-12-17 PROCEDURE — 99999 PR PBB SHADOW E&M-EST. PATIENT-LVL III: ICD-10-PCS | Mod: PBBFAC,,, | Performed by: ORTHOPAEDIC SURGERY

## 2019-12-17 PROCEDURE — 99999 PR PBB SHADOW E&M-EST. PATIENT-LVL III: CPT | Mod: PBBFAC,,, | Performed by: ORTHOPAEDIC SURGERY

## 2019-12-17 PROCEDURE — 1125F AMNT PAIN NOTED PAIN PRSNT: CPT | Mod: S$GLB,,, | Performed by: ORTHOPAEDIC SURGERY

## 2019-12-17 PROCEDURE — 1159F MED LIST DOCD IN RCRD: CPT | Mod: S$GLB,,, | Performed by: ORTHOPAEDIC SURGERY

## 2019-12-17 PROCEDURE — 36415 COLL VENOUS BLD VENIPUNCTURE: CPT

## 2019-12-17 PROCEDURE — 82306 VITAMIN D 25 HYDROXY: CPT

## 2019-12-17 PROCEDURE — 83970 ASSAY OF PARATHORMONE: CPT

## 2019-12-17 PROCEDURE — 99214 OFFICE O/P EST MOD 30 MIN: CPT | Mod: 25,S$GLB,, | Performed by: ORTHOPAEDIC SURGERY

## 2019-12-17 PROCEDURE — 1159F PR MEDICATION LIST DOCUMENTED IN MEDICAL RECORD: ICD-10-PCS | Mod: S$GLB,,, | Performed by: ORTHOPAEDIC SURGERY

## 2019-12-17 PROCEDURE — 20610 LARGE JOINT ASPIRATION/INJECTION: L KNEE: ICD-10-PCS | Mod: LT,S$GLB,, | Performed by: ORTHOPAEDIC SURGERY

## 2019-12-17 RX ORDER — TRIAMCINOLONE ACETONIDE 40 MG/ML
40 INJECTION, SUSPENSION INTRA-ARTICULAR; INTRAMUSCULAR
Status: DISCONTINUED | OUTPATIENT
Start: 2019-12-17 | End: 2019-12-17 | Stop reason: HOSPADM

## 2019-12-17 RX ADMIN — TRIAMCINOLONE ACETONIDE 40 MG: 40 INJECTION, SUSPENSION INTRA-ARTICULAR; INTRAMUSCULAR at 01:12

## 2019-12-17 NOTE — PROGRESS NOTES
"HPI:    Emerson Menendez is a 74 y.o. male who is here today for   Chief Complaint   Patient presents with    Left Knee - Pain    Right Knee - Pain   .  Longstanding osteoarthritis of both knees.  Has MRI evidence of full cartilage loss medial joint.  Patient has been using injections of various sorts and braces to continue to be as active.  He has had a really bad last month.    Duration: 1 months  Intensity: severe  Character of pain: sharp  Location: He reports that the pain is predominately  medial    Past Medical History:   Diagnosis Date    Anxiety 3/16/2015    BPH (benign prostatic hypertrophy) 9/24/2012    Depression 3/16/2015    GERD (gastroesophageal reflux disease) 9/24/2012    Microalbuminuria 9/24/2012    Osteoarthritis of cervical spine 9/24/2012    Osteoarthritis of knee 9/24/2012    Type II or unspecified type diabetes mellitus without mention of complication, not stated as uncontrolled 9/24/2012          Current Outpatient Medications:     aspirin (ECOTRIN) 81 MG EC tablet, Take 1 tablet (81 mg total) by mouth once daily., Disp: 100 tablet, Rfl: 3    BD ULTRA-FINE SHORT PEN NEEDLE 31 gauge x 5/16" Ndle, USE TO INJECT INSULIN ONE TIME DAILY, Disp: 100 each, Rfl: 3    blood glucose control, normal (METER-CHECK) Soln, One meter. True test meter. Use as directed, Disp: 1 each, Rfl: 0    blood sugar diagnostic (ACCU-CHEK ELIE PLUS TEST STRP) Strp, TEST FOUR TIMES DAILY, Disp: 400 strip, Rfl: 3    blood-glucose meter (ACCU-CHEK OMAYRA) [facility] [place], USE AS DIRECTED. Accucheck elie plus, Disp: 1 each, Rfl: 0    cholecalciferol, vitamin D3, (VITAMIN D) 2,000 unit Cap, Take by mouth. 1 Capsule Oral Every day.  over the counter, Disp: , Rfl:     citalopram (CELEXA) 20 MG tablet, Take 1.5 tablets (30 mg total) by mouth once daily., Disp: 135 tablet, Rfl: 4    diazePAM (VALIUM) 5 MG tablet, TAKE 1 TABLET(5 MG) BY MOUTH EVERY 6 HOURS AS NEEDED, Disp: 60 tablet, Rfl: 2    flash glucose scanning " reader (AductionsE 14 DAY READER) Misc, use as directed, Disp: 1 each, Rfl: 0    gabapentin (NEURONTIN) 100 MG capsule, TAKE 1 CAPSULE EVERY MORNING, 1 CAPSULE IN THE AFTERNOON, AND 1 TO 3 CAPSULE(S) AT BEDTIME AS NEEDED FOR FOOT PAIN, Disp: 450 capsule, Rfl: 3    glimepiride (AMARYL) 2 MG tablet, TAKE 1 TABLET (2 MG TOTAL) BY MOUTH ONCE DAILY., Disp: 90 tablet, Rfl: 4    insulin (LANTUS SOLOSTAR U-100 INSULIN) glargine 100 units/mL (3mL) SubQ pen, Inject 20 Units into the skin every evening., Disp: 1 Box, Rfl: 6    lancets Misc, Use twice daily, Disp: 200 each, Rfl: 4    latanoprost 0.005 % ophthalmic solution, Place 1 drop into both eyes every evening., Disp: 7.5 mL, Rfl: 3    losartan (COZAAR) 25 MG tablet, Take 1 tablet (25 mg total) by mouth once daily., Disp: 90 tablet, Rfl: 4    meloxicam (MOBIC) 15 MG tablet, TAKE 1 TABLET(15 MG) BY MOUTH EVERY DAY, Disp: 90 tablet, Rfl: 0    omeprazole (PRILOSEC) 20 MG capsule, Take 2 capsules (40 mg total) by mouth once daily., Disp: 180 capsule, Rfl: 3    pravastatin (PRAVACHOL) 40 MG tablet, Take 1 tablet (40 mg total) by mouth once daily., Disp: 90 tablet, Rfl: 4    tadalafil (CIALIS) 20 MG Tab, Take 1 tablet (20 mg total) by mouth daily as needed., Disp: 30 tablet, Rfl: 3    tadalafil (CIALIS) 5 MG tablet, TAKE 1 TABLET(5 MG) BY MOUTH DAILY AS NEEDED FOR ERECTILE DYSFUNCTION, Disp: 90 tablet, Rfl: 2    tamsulosin (FLOMAX) 0.4 mg Cap, Take 1 capsule (0.4 mg total) by mouth once daily., Disp: 90 capsule, Rfl: 3    tizanidine (ZANAFLEX) 4 MG tablet, 1/2-1 tablet every 8 hours as needed for muscle spasm, Disp: 90 tablet, Rfl: 1    glucagon (human recombinant) inj 1mg/mL kit, Inject 1 mL (1 mg total) into the muscle as needed., Disp: 1 kit, Rfl: 0     Review of patient's allergies indicates:   Allergen Reactions    Penicillins      Knees locked up        ROS  Constitutional: Negative for fever, Negative for weight loss  HENT Negative for  "congestion  Cardiovascular: Negative chest pain  Respiratory: Negative Shortness of breath  Heme: Negative excessive bleeding  Skin:NegativeItching, Negative breakdown  Musculoskeletal:Negative for back pain, Positive for joint pain, Positive muscle pain, Positive muscle weakness  Neurological: Positive for numbness and paresthesias   Psychiatric/Behavioral: Negative altered mental status, Negative for depression    Physical Exam:   Ht 5' 10" (1.778 m)   Wt 86 kg (189 lb 11.3 oz)   BMI 27.22 kg/m²   General appearance: This is a well-developed, Well nourished male No obvious acute distress.  Psychiatric: normal mood and affect;  pleasant and conversant; judgment and thought content normal  Gait is coordinated. Patient has antalgic gait to the left  Cardiovascular: There are no swelling or varicosities present.   Respiratory: normal respiratory effort   Lymphatic: no adenopathy   Neurologic: alert and oriented to person, place, and time   Deep Tendon Reflexes are normal;  Coordination and Balance: Normal   Musculoskeletal   Neck    Right Knee  Swelling Mild  TendernessMedial joint line  Range of Motion: 125    Left Knee: Swelling Mild  TendernessMedial joint line  Range of Motion: 5-110    Radiograph   Osteoarthritis: severe  Angle: mild varus    Assessment:  Osteoarthritis and inflammation left knee    Plan:  Plan to do a low-dose cortisone injection left knee.  Patient has significant diabetes and do not want to cause problems.  "

## 2019-12-17 NOTE — PROCEDURES
Large Joint Aspiration/Injection: L knee  Date/Time: 12/17/2019 1:30 PM  Performed by: John L. [facility] Jr., MD  Authorized by: John L. [facility] Jr., MD     Consent Done?:  Yes (Verbal)  Indications:  Pain  Procedure site marked: Yes    Timeout: Prior to procedure the correct patient, procedure, and site was verified      Location:  Knee  Site:  L knee  Prep: Patient was prepped and draped in usual sterile fashion    Needle size:  22 G  Ultrasonic Guidance for needle placement: No  Approach:  Anteromedial  Medications:  40 mg triamcinolone acetonide 40 mg/mL  Patient tolerance:  Patient tolerated the procedure well with no immediate complications

## 2019-12-20 ENCOUNTER — PATIENT MESSAGE (OUTPATIENT)
Dept: UROLOGY | Facility: CLINIC | Age: 74
End: 2019-12-20

## 2019-12-30 ENCOUNTER — PATIENT OUTREACH (OUTPATIENT)
Dept: ADMINISTRATIVE | Facility: OTHER | Age: 74
End: 2019-12-30

## 2019-12-31 DIAGNOSIS — D47.2 SMOLDERING MULTIPLE MYELOMA: ICD-10-CM

## 2020-01-01 RX ORDER — DIAZEPAM 5 MG/1
TABLET ORAL
Qty: 60 TABLET | Refills: 2 | Status: SHIPPED | OUTPATIENT
Start: 2020-01-01 | End: 2020-03-19 | Stop reason: SDUPTHER

## 2020-01-02 ENCOUNTER — OFFICE VISIT (OUTPATIENT)
Dept: NEPHROLOGY | Facility: CLINIC | Age: 75
End: 2020-01-02
Payer: MEDICARE

## 2020-01-02 VITALS
BODY MASS INDEX: 26.7 KG/M2 | WEIGHT: 186.5 LBS | OXYGEN SATURATION: 98 % | DIASTOLIC BLOOD PRESSURE: 62 MMHG | HEIGHT: 70 IN | HEART RATE: 91 BPM | SYSTOLIC BLOOD PRESSURE: 120 MMHG

## 2020-01-02 DIAGNOSIS — N18.30 CHRONIC KIDNEY DISEASE, STAGE III (MODERATE): Primary | ICD-10-CM

## 2020-01-02 PROCEDURE — 3078F DIAST BP <80 MM HG: CPT | Mod: CPTII,GC,S$GLB, | Performed by: STUDENT IN AN ORGANIZED HEALTH CARE EDUCATION/TRAINING PROGRAM

## 2020-01-02 PROCEDURE — 99214 PR OFFICE/OUTPT VISIT, EST, LEVL IV, 30-39 MIN: ICD-10-PCS | Mod: GC,S$GLB,, | Performed by: STUDENT IN AN ORGANIZED HEALTH CARE EDUCATION/TRAINING PROGRAM

## 2020-01-02 PROCEDURE — 1159F MED LIST DOCD IN RCRD: CPT | Mod: GC,S$GLB,, | Performed by: STUDENT IN AN ORGANIZED HEALTH CARE EDUCATION/TRAINING PROGRAM

## 2020-01-02 PROCEDURE — 99999 PR PBB SHADOW E&M-EST. PATIENT-LVL IV: ICD-10-PCS | Mod: PBBFAC,GC,, | Performed by: STUDENT IN AN ORGANIZED HEALTH CARE EDUCATION/TRAINING PROGRAM

## 2020-01-02 PROCEDURE — 3074F SYST BP LT 130 MM HG: CPT | Mod: CPTII,GC,S$GLB, | Performed by: STUDENT IN AN ORGANIZED HEALTH CARE EDUCATION/TRAINING PROGRAM

## 2020-01-02 PROCEDURE — 1101F PR PT FALLS ASSESS DOC 0-1 FALLS W/OUT INJ PAST YR: ICD-10-PCS | Mod: CPTII,GC,S$GLB, | Performed by: STUDENT IN AN ORGANIZED HEALTH CARE EDUCATION/TRAINING PROGRAM

## 2020-01-02 PROCEDURE — 3074F PR MOST RECENT SYSTOLIC BLOOD PRESSURE < 130 MM HG: ICD-10-PCS | Mod: CPTII,GC,S$GLB, | Performed by: STUDENT IN AN ORGANIZED HEALTH CARE EDUCATION/TRAINING PROGRAM

## 2020-01-02 PROCEDURE — 99214 OFFICE O/P EST MOD 30 MIN: CPT | Mod: GC,S$GLB,, | Performed by: STUDENT IN AN ORGANIZED HEALTH CARE EDUCATION/TRAINING PROGRAM

## 2020-01-02 PROCEDURE — 1101F PT FALLS ASSESS-DOCD LE1/YR: CPT | Mod: CPTII,GC,S$GLB, | Performed by: STUDENT IN AN ORGANIZED HEALTH CARE EDUCATION/TRAINING PROGRAM

## 2020-01-02 PROCEDURE — 1125F PR PAIN SEVERITY QUANTIFIED, PAIN PRESENT: ICD-10-PCS | Mod: GC,S$GLB,, | Performed by: STUDENT IN AN ORGANIZED HEALTH CARE EDUCATION/TRAINING PROGRAM

## 2020-01-02 PROCEDURE — 1125F AMNT PAIN NOTED PAIN PRSNT: CPT | Mod: GC,S$GLB,, | Performed by: STUDENT IN AN ORGANIZED HEALTH CARE EDUCATION/TRAINING PROGRAM

## 2020-01-02 PROCEDURE — 1159F PR MEDICATION LIST DOCUMENTED IN MEDICAL RECORD: ICD-10-PCS | Mod: GC,S$GLB,, | Performed by: STUDENT IN AN ORGANIZED HEALTH CARE EDUCATION/TRAINING PROGRAM

## 2020-01-02 PROCEDURE — 3078F PR MOST RECENT DIASTOLIC BLOOD PRESSURE < 80 MM HG: ICD-10-PCS | Mod: CPTII,GC,S$GLB, | Performed by: STUDENT IN AN ORGANIZED HEALTH CARE EDUCATION/TRAINING PROGRAM

## 2020-01-02 PROCEDURE — 99999 PR PBB SHADOW E&M-EST. PATIENT-LVL IV: CPT | Mod: PBBFAC,GC,, | Performed by: STUDENT IN AN ORGANIZED HEALTH CARE EDUCATION/TRAINING PROGRAM

## 2020-01-02 NOTE — PATIENT INSTRUCTIONS
- Return to clinic in 6 months   - Labs for next appointment: CBC, CMP, U/A, UPr/Cr  - Monitor blood pressure at home

## 2020-01-03 NOTE — PROGRESS NOTES
I have personally interviewed and examined the patient. Clinical, laboratorial and imaging information available in medical records were reveiwed by me. I agree with the assessment and recommendations provided by Dr. Ferguson who was under my supervision.

## 2020-01-06 ENCOUNTER — PATIENT OUTREACH (OUTPATIENT)
Dept: ADMINISTRATIVE | Facility: OTHER | Age: 75
End: 2020-01-06

## 2020-01-09 ENCOUNTER — TELEPHONE (OUTPATIENT)
Dept: HEMATOLOGY/ONCOLOGY | Facility: CLINIC | Age: 75
End: 2020-01-09

## 2020-01-09 NOTE — TELEPHONE ENCOUNTER
----- Message from Radha Barrios sent at 1/9/2020  1:06 PM CST -----  Contact: pt  Reason: Calling to Reschedule appt that is now on 01/13/2020    Communication: [phone]

## 2020-01-09 NOTE — TELEPHONE ENCOUNTER
Informed patient that we will reach out tomorrow after consulting with the research team about changing the appointment.

## 2020-01-10 ENCOUNTER — PATIENT OUTREACH (OUTPATIENT)
Dept: OTHER | Facility: OTHER | Age: 75
End: 2020-01-10

## 2020-01-16 ENCOUNTER — LAB VISIT (OUTPATIENT)
Dept: LAB | Facility: HOSPITAL | Age: 75
End: 2020-01-16
Attending: INTERNAL MEDICINE
Payer: MEDICARE

## 2020-01-16 ENCOUNTER — RESEARCH ENCOUNTER (OUTPATIENT)
Dept: RESEARCH | Facility: HOSPITAL | Age: 75
End: 2020-01-16

## 2020-01-16 ENCOUNTER — OFFICE VISIT (OUTPATIENT)
Dept: HEMATOLOGY/ONCOLOGY | Facility: CLINIC | Age: 75
End: 2020-01-16
Payer: MEDICARE

## 2020-01-16 VITALS
SYSTOLIC BLOOD PRESSURE: 121 MMHG | DIASTOLIC BLOOD PRESSURE: 69 MMHG | HEIGHT: 70 IN | RESPIRATION RATE: 16 BRPM | TEMPERATURE: 98 F | HEART RATE: 91 BPM | OXYGEN SATURATION: 96 % | BODY MASS INDEX: 26.67 KG/M2 | WEIGHT: 186.31 LBS

## 2020-01-16 DIAGNOSIS — D47.2 SMOLDERING MULTIPLE MYELOMA: Primary | ICD-10-CM

## 2020-01-16 DIAGNOSIS — Z79.4 TYPE 2 DIABETES MELLITUS WITH DIABETIC POLYNEUROPATHY, WITH LONG-TERM CURRENT USE OF INSULIN: ICD-10-CM

## 2020-01-16 DIAGNOSIS — E11.42 TYPE 2 DIABETES MELLITUS WITH DIABETIC POLYNEUROPATHY, WITH LONG-TERM CURRENT USE OF INSULIN: ICD-10-CM

## 2020-01-16 DIAGNOSIS — Z00.6 EXAMINATION OF PARTICIPANT IN CLINICAL TRIAL: ICD-10-CM

## 2020-01-16 DIAGNOSIS — E11.22 CKD STAGE 3 DUE TO TYPE 2 DIABETES MELLITUS: ICD-10-CM

## 2020-01-16 DIAGNOSIS — C90.00 MULTIPLE MYELOMA, REMISSION STATUS UNSPECIFIED: ICD-10-CM

## 2020-01-16 DIAGNOSIS — N18.30 CKD STAGE 3 DUE TO TYPE 2 DIABETES MELLITUS: ICD-10-CM

## 2020-01-16 LAB
ALBUMIN SERPL BCP-MCNC: 3.6 G/DL (ref 3.5–5.2)
ALP SERPL-CCNC: 47 U/L (ref 55–135)
ALT SERPL W/O P-5'-P-CCNC: 31 U/L (ref 10–44)
ANION GAP SERPL CALC-SCNC: 7 MMOL/L (ref 8–16)
AST SERPL-CCNC: 20 U/L (ref 10–40)
BASOPHILS # BLD AUTO: 0.03 K/UL (ref 0–0.2)
BASOPHILS NFR BLD: 0.7 % (ref 0–1.9)
BILIRUB SERPL-MCNC: 0.4 MG/DL (ref 0.1–1)
BUN SERPL-MCNC: 16 MG/DL (ref 8–23)
CALCIUM SERPL-MCNC: 9.5 MG/DL (ref 8.7–10.5)
CHLORIDE SERPL-SCNC: 101 MMOL/L (ref 95–110)
CO2 SERPL-SCNC: 27 MMOL/L (ref 23–29)
CREAT SERPL-MCNC: 1.7 MG/DL (ref 0.5–1.4)
DIFFERENTIAL METHOD: ABNORMAL
EOSINOPHIL # BLD AUTO: 0.2 K/UL (ref 0–0.5)
EOSINOPHIL NFR BLD: 5.6 % (ref 0–8)
ERYTHROCYTE [DISTWIDTH] IN BLOOD BY AUTOMATED COUNT: 13.9 % (ref 11.5–14.5)
EST. GFR  (AFRICAN AMERICAN): 45.2 ML/MIN/1.73 M^2
EST. GFR  (NON AFRICAN AMERICAN): 39.1 ML/MIN/1.73 M^2
GLUCOSE SERPL-MCNC: 128 MG/DL (ref 70–110)
HCT VFR BLD AUTO: 36.7 % (ref 40–54)
HGB BLD-MCNC: 11.3 G/DL (ref 14–18)
IGA SERPL-MCNC: 1551 MG/DL (ref 40–350)
IGG SERPL-MCNC: 902 MG/DL (ref 650–1600)
IGM SERPL-MCNC: 43 MG/DL (ref 50–300)
IMM GRANULOCYTES # BLD AUTO: 0.01 K/UL (ref 0–0.04)
IMM GRANULOCYTES NFR BLD AUTO: 0.2 % (ref 0–0.5)
LYMPHOCYTES # BLD AUTO: 1.3 K/UL (ref 1–4.8)
LYMPHOCYTES NFR BLD: 30.6 % (ref 18–48)
MAGNESIUM SERPL-MCNC: 1.7 MG/DL (ref 1.6–2.6)
MCH RBC QN AUTO: 28.4 PG (ref 27–31)
MCHC RBC AUTO-ENTMCNC: 30.8 G/DL (ref 32–36)
MCV RBC AUTO: 92 FL (ref 82–98)
MONOCYTES # BLD AUTO: 0.3 K/UL (ref 0.3–1)
MONOCYTES NFR BLD: 6.3 % (ref 4–15)
NEUTROPHILS # BLD AUTO: 2.4 K/UL (ref 1.8–7.7)
NEUTROPHILS NFR BLD: 56.6 % (ref 38–73)
NRBC BLD-RTO: 0 /100 WBC
PHOSPHATE SERPL-MCNC: 2.9 MG/DL (ref 2.7–4.5)
PLATELET # BLD AUTO: 217 K/UL (ref 150–350)
PMV BLD AUTO: 8.9 FL (ref 9.2–12.9)
POTASSIUM SERPL-SCNC: 4.3 MMOL/L (ref 3.5–5.1)
PROT SERPL-MCNC: 8.1 G/DL (ref 6–8.4)
RBC # BLD AUTO: 3.98 M/UL (ref 4.6–6.2)
SODIUM SERPL-SCNC: 135 MMOL/L (ref 136–145)
WBC # BLD AUTO: 4.31 K/UL (ref 3.9–12.7)

## 2020-01-16 PROCEDURE — 99999 PR PBB SHADOW E&M-EST. PATIENT-LVL III: CPT | Mod: PBBFAC,,, | Performed by: INTERNAL MEDICINE

## 2020-01-16 PROCEDURE — 85025 COMPLETE CBC W/AUTO DIFF WBC: CPT | Mod: Q1

## 2020-01-16 PROCEDURE — 84165 PATHOLOGIST INTERPRETATION SPE: ICD-10-PCS | Mod: 26,Q1,, | Performed by: PATHOLOGY

## 2020-01-16 PROCEDURE — 83520 IMMUNOASSAY QUANT NOS NONAB: CPT | Mod: Q1

## 2020-01-16 PROCEDURE — 86334 IMMUNOFIX E-PHORESIS SERUM: CPT | Mod: 26,Q1,, | Performed by: PATHOLOGY

## 2020-01-16 PROCEDURE — 99215 OFFICE O/P EST HI 40 MIN: CPT | Mod: Q1,S$GLB,, | Performed by: INTERNAL MEDICINE

## 2020-01-16 PROCEDURE — 82784 ASSAY IGA/IGD/IGG/IGM EACH: CPT | Mod: 59,Q1

## 2020-01-16 PROCEDURE — 99499 RISK ADDL DX/OHS AUDIT: ICD-10-PCS | Mod: HCNC,S$GLB,, | Performed by: INTERNAL MEDICINE

## 2020-01-16 PROCEDURE — 84165 PROTEIN E-PHORESIS SERUM: CPT | Mod: Q1

## 2020-01-16 PROCEDURE — 99999 PR PBB SHADOW E&M-EST. PATIENT-LVL III: ICD-10-PCS | Mod: PBBFAC,,, | Performed by: INTERNAL MEDICINE

## 2020-01-16 PROCEDURE — 83735 ASSAY OF MAGNESIUM: CPT

## 2020-01-16 PROCEDURE — 80053 COMPREHEN METABOLIC PANEL: CPT

## 2020-01-16 PROCEDURE — 36415 COLL VENOUS BLD VENIPUNCTURE: CPT

## 2020-01-16 PROCEDURE — 99215 PR OFFICE/OUTPT VISIT, EST, LEVL V, 40-54 MIN: ICD-10-PCS | Mod: Q1,S$GLB,, | Performed by: INTERNAL MEDICINE

## 2020-01-16 PROCEDURE — 84100 ASSAY OF PHOSPHORUS: CPT

## 2020-01-16 PROCEDURE — 86334 PATHOLOGIST INTERPRETATION IFE: ICD-10-PCS | Mod: 26,Q1,, | Performed by: PATHOLOGY

## 2020-01-16 PROCEDURE — 99499 UNLISTED E&M SERVICE: CPT | Mod: HCNC,S$GLB,, | Performed by: INTERNAL MEDICINE

## 2020-01-16 PROCEDURE — 86334 IMMUNOFIX E-PHORESIS SERUM: CPT

## 2020-01-16 PROCEDURE — 84165 PROTEIN E-PHORESIS SERUM: CPT | Mod: 26,Q1,, | Performed by: PATHOLOGY

## 2020-01-16 NOTE — PROGRESS NOTES
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   Sponsor:  [facility]  IRB# [id number]  Study ID: [id number]  Investigator: GIL Engel  Pt Initials: LUCÍA LORENZ       Arm B: Observation  Cycle 49, Day 28       Patient presents to clinic this morning for above cycle evaluation.  He states he has been "stressed out, more than usual" due to personal circumstances with family members/health issues over last month that caused him to cancel regularly scheduled appointments. Subject stated he was in [place], [place] and unable to travel to make his regularly scheduled and rescheduled appointment with Dr Engel; he now verbalizes to Dr Engel "doing better."  voices no other complaints today, other than ongoing issues with his knee as well as diabetes control; states he has a friend cooking for him and feels he is making good choices with regard to diet. He states continued willingness to participate in above-mentioned study      Review of Baseline AE's:   1.Hypertension, Grade 2 diastolic: BP today is 121/69. Subject denies headache/dizziness; no symptoms noted.   AE ongoing  2. Lower Back Pain and Neck Pain, grade 1: Patient has no complaints today/ over last month.  As previously stated, patient is not suspected to have bony myeloma involvement per Dr. Engel as these are pre-existing issues present at baseline.  AE ongoing.  3. Pain in extremity (knee), grade 1. As above, states he is due injection to the knee this week and that the injections do help alleviate symptoms.  Presents with knee stabilizing braces intact.  As above will see ortho soon to discuss potential knee replacement.     4. Peripheral sensory neuropathy, grade 1: Patient does not verbalize complaints today. Has gabapentin prescribed and takes as needed.  States has not taken recently.  AE ongoing.   5. Anemia, Grade 1: Hgb/Hct - stable " "at 11.3/36.7.  Result reviewed by Dr. Engel. AE ongoing            Review of AE's:     *Please note this list is not all-inclusive, please see AE log for physician reviewed list of adverse events.   1. Creatinine increased, grade 2: Serum creatinine remains increased today at 1.7 mg/dl today and calculated GFR 46 ml/min. (GFR 45.2 per Epic/Lab calculation) This is decreased from last cycle and continues to trend monthly. As previously stated, Dr. Engel states this has not been related to myeloma and is related chronic kidney issues likely secondary to diabetes and hypertension. Will continue observation monthly and to monitor renal function closely. Per MD, encouraged to increase water intake. AE stable from baseline  2. Hyponatremia, Grade 1: Serum sodium today is 135 mmol/L; will continue to monitor AE; per Dr Engel related to CKD; no interventions necessary.         Per study chair, "the definition of progressive disease for this protocol is developing CRAB criteria that requires treatment systemically." Per Dr Engel, based on today's exam and lab results thus far, patient does not have any s/s of CRAB: Normal Calcium level (9.5), absence of Renal insufficiency (serum creatinine 1.7, and GFR 46 per [place]-G[place]) --patient with a history of kidney dysfunction secondary to diabetes; Dr. Engel aware of these values and not concerned for myeloma involvement), absence of significant Anemia (hemoglobin 11.3, stable; will await myeloma labs for clarity relationship to myeloma) and absence of lytic Bone lesions (per baseline metastatic survey as well as MRI--see MD note for further comment).   See MD note for ECOG score and H&P and flowsheets for laboratory work, vitals, etc. All myeloma-related blood work from last cycle including SPEP and JAGUAR have remained stable, and are currently pending for this cycle.   Per Dr. Engel, patient shows no evidence of progression at last result. Patient informed that Dr. Engel will release " all lab results to [facility]. He states understanding of this. QOL's required at this timepoint and completed independently by patient.           Encouraged patient to reach out/contact staff if feels need to discuss any issues. He states understanding.  Patient was informed to review February appointments and alert us of any conflict.   He states he wishes only to see Dr Engel and will not show for appt if scheduled with any other provider. Discussed with Dr Engel who states this site will take deviation from study group for appts subject recently missed for current cycle over last 2 weeks as circumstances were unavoidable. Will document on case report forms as well. Moving forward, per Dr Engel, subject will be seen monthly as per usual with this date today as baseline date.   Patient states having research RN's contact information and has MD contact information to call with any concerns, questions, new or worsening of symptoms.  Discussed next month's appt date and time, he verbalized understanding and states will notify research team/Dr Engel clinic staff if not able to keep appts as scheduled.

## 2020-01-17 DIAGNOSIS — Z00.6 RESEARCH EXAM: ICD-10-CM

## 2020-01-17 DIAGNOSIS — C90.00 MULTIPLE MYELOMA, REMISSION STATUS UNSPECIFIED: Primary | ICD-10-CM

## 2020-01-17 LAB
ALBUMIN SERPL ELPH-MCNC: 3.89 G/DL (ref 3.35–5.55)
ALPHA1 GLOB SERPL ELPH-MCNC: 0.32 G/DL (ref 0.17–0.41)
ALPHA2 GLOB SERPL ELPH-MCNC: 0.77 G/DL (ref 0.43–0.99)
B-GLOBULIN SERPL ELPH-MCNC: 1.05 G/DL (ref 0.5–1.1)
GAMMA GLOB SERPL ELPH-MCNC: 1.77 G/DL (ref 0.67–1.58)
INTERPRETATION SERPL IFE-IMP: NORMAL
KAPPA LC SER QL IA: 5.87 MG/DL (ref 0.33–1.94)
KAPPA LC/LAMBDA SER IA: 3.62 (ref 0.26–1.65)
LAMBDA LC SER QL IA: 1.62 MG/DL (ref 0.57–2.63)
PATHOLOGIST INTERPRETATION IFE: NORMAL
PATHOLOGIST INTERPRETATION SPE: NORMAL
PROT SERPL-MCNC: 7.8 G/DL (ref 6–8.4)

## 2020-01-17 NOTE — PROGRESS NOTES
Subjective:       Patient ID: Emerson Menendez is a 74 y.o. male.    Chief Complaint: No chief complaint on file.  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 Test results identify a stable IgA kappa protein with beta globulin band at 1.63g/dL. Kappa free light chain is elevated at 4.40. CBC and calcium are stable. Creatinine with history of stage III CKD is stable at 1.4. Metastatic survey is negative for lytic lesions. MRI of the entire spine demonstrated age related changes but no convincing evidence of myeloma bone disease. Bone marrow biopsy identified about 13% plasma cells by morphology and FISH for myeloma identified a t(11;14) and trisomies 3,7, and 17. The patient is afebrile and appears clinically well. He was seen by his podiatrist and nephrologist since our last visit without any new or acute events.    The patient has not received any therapy for smoldering myeloma including bisphosphonates or steroids. He has been randomized to observation arm of the the clinical study. 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 He has no current or prior history of malignancy.    TODAY  Mr. Menendez returns today for monthly follow-up evaluation. No clinical signs/symptoms of progression of his smoldering myeloma with CBC stable and CMP are stable. Myeloma labs are pending.  No acute interval events. Daughter has been acutely ill in [place]; increased stress recently      HPI  Review of Systems   Constitutional: Negative for activity change, appetite change, diaphoresis, fatigue, fever and unexpected weight change.   HENT: Negative.    Eyes: Negative.    Respiratory:  Negative.    Cardiovascular: Negative for leg swelling.   Gastrointestinal: Negative.    Endocrine: Negative.    Genitourinary: Negative.    Musculoskeletal: Negative.    Skin: Negative.    Allergic/Immunologic: Negative.    Neurological:        Grade 0-1 peripheral neuropathy of bilateral feet.    Hematological: Negative for adenopathy. Does not bruise/bleed easily.   Psychiatric/Behavioral: Negative.        Objective:       Vitals:    01/16/20 1112   BP: 121/69   Pulse: 91   Resp: 16   Temp: 97.6 °F (36.4 °C)       Physical Exam   Constitutional: He is oriented to person, place, and time. He appears well-developed and well-nourished.   HENT:   Head: Normocephalic and atraumatic.   Right Ear: External ear normal.   Left Ear: External ear normal.   Nose: Nose normal.   Mouth/Throat: Oropharynx is clear and moist.   Eyes: Pupils are equal, round, and reactive to light. Conjunctivae and EOM are normal.   Neck: Normal range of motion. Neck supple.   Cardiovascular: Normal rate, regular rhythm and intact distal pulses.   No murmur heard.  Pulmonary/Chest: Effort normal and breath sounds normal. No respiratory distress.   Abdominal: Soft. Bowel sounds are normal. He exhibits no distension. There is no hepatosplenomegaly.   Musculoskeletal: He exhibits no edema or tenderness.   Neurological: He is alert and oriented to person, place, and time. No cranial nerve deficit.   Skin: Skin is warm and dry. No rash noted. No cyanosis. Nails show no clubbing.   Psychiatric: He has a normal mood and affect. His behavior is normal.   Nursing note and vitals reviewed.      Assessment:       No diagnosis found.    Plan:       The patient has a diagnosis of smoldering myeloma. There is no indication for immediate chemotherapy. We are monitoring renal function closely- baseline creatinine of -1.5..  We will continue observation as per the Randomized Phase III Trial of Lenalidomide Versus Observation Alone in Patients with 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. Total protein remains normal, M protein has been stable; pending levels today. Free light chains are stable..  Plan for return in 1 month.  Endocrinology and Nephrology to monitor diabetes and renal failure respectively.

## 2020-01-17 NOTE — PROGRESS NOTES
"Digital Medicine: Health  Follow-Up    The history is provided by the patient.     Follow Up  Follow-up reason(s): goal follow-up    Patient said that he has identified where his problem lies. He said that when his BG is lower or within range, he will feel as though he can eat what he wants. (ice cream or a snack). He said that he is noticing that he should not "treat himself" every time his sugar readings are lower. He also mentioned that he has a better understanding of the way his medication works. He said that he now understands that he has to monitor his BG for highs and lows. Patient said that he has a tendency to not eat lunch. He is going on a trip to [place] this weekend and said that he has already packed his meter and some glucose tablets just in case. Commended patient for paying more close attention to his BG readings and learning what works for him during his elevated readings and lower readings. Patient was receptive.       INTERVENTION(S)  encouragement/support    PLAN  patient verbalizes understanding and continue monitoring          Topic    Eye Exam          Last 6 Patient Entered Readings                                          Most Recent A1c: 7.6% on 12/10/2019  (Goal: 7%)     Recent Readings 1/17/2020 1/16/2020 1/16/2020 1/15/2020 1/15/2020    Blood Glucose (mg/dL) 186 115 202 146 117                    Diet Screening   No change to diet.      Patient said that he has been using the resources I sent him. He said that he has been working on drinking more water and has been buying flavored water packets. I encouraged patient to try favoring his water naturally with darcie or limes, different fruits. Patient said that he is unsure about that but he might give it a try. Commended patient on his efforts to stay hydrated. Patient was receptive and verbalized understanding of why staying hydrated is important.     Physical Activity Screening   When asked if exercising, patient responded: " yes3 day(s) a week.      Patient stated that he has started back on his exercise program. He said that he usually goes about 3 days per week. Patient said that he had to stop this week because his daughter fell ill and has been out of town this week but does plan to start back once he returns home.     Intervention(s): goal tracking     Medication Adherence Screening   He misses doses: never    He does not wonder if medications are working.  He knows purpose of medications.      Patient identified the following reasons for non-compliance: none    Patient reported no questions or concerns about his medication.       SDOH

## 2020-01-20 DIAGNOSIS — Z00.6 RESEARCH EXAM: ICD-10-CM

## 2020-01-20 DIAGNOSIS — D47.2 SMOLDERING MULTIPLE MYELOMA: Primary | ICD-10-CM

## 2020-01-22 RX ORDER — MELOXICAM 15 MG/1
TABLET ORAL
Qty: 90 TABLET | Refills: 0 | OUTPATIENT
Start: 2020-01-22

## 2020-01-22 NOTE — TELEPHONE ENCOUNTER
Medication refused due to failing protocol.    Requested Prescriptions   Pending Prescriptions Disp Refills    meloxicam (MOBIC) 15 MG tablet [Pharmacy Med Name: MELOXICAM 15MG TABLETS] 90 tablet 0     Sig: TAKE 1 TABLET(15 MG) BY MOUTH EVERY DAY       NSAIDs Protocol Failed - 1/22/2020 12:52 PM        Failed - Serum Creatinine less than 1.4 on file in the past 12 months     Lab Results   Component Value Date    CREATININE 1.7 (H) 01/16/2020    CREATININE 1.5 (H) 12/10/2019    CREATININE 1.8 (H) 11/12/2019     Lab Results   Component Value Date    ESTGFRAFRICA 45.2 (A) 01/16/2020    ESTGFRAFRICA 52.6 (A) 12/10/2019    ESTGFRAFRICA 42.2 (A) 11/12/2019               Passed - Visit with authorizing provider in past 12 months or upcoming 90 days        Passed - HGB greater than 10 or HCT greater than 30 in past 12 months        Passed - AST in past 12 months      Lab Results   Component Value Date    AST 20 01/16/2020    AST 25 12/10/2019    AST 30 11/12/2019              Passed - Serum Potassium less than 5.2 on file in the past 12 months     Lab Results   Component Value Date    K 4.3 01/16/2020    K 4.2 12/10/2019    K 5.0 11/12/2019                  Passed - Blood Pressure below 139/89 on file in past 12 months      BP Readings from Last 3 Encounters:   01/16/20 121/69   01/02/20 120/62   12/13/19 120/70             Passed - ALT less than 95 in past 12 months     Lab Results   Component Value Date    ALT 31 01/16/2020    ALT 28 12/10/2019    ALT 24 11/12/2019

## 2020-02-05 ENCOUNTER — PATIENT MESSAGE (OUTPATIENT)
Dept: ADMINISTRATIVE | Facility: OTHER | Age: 75
End: 2020-02-05

## 2020-02-06 ENCOUNTER — PATIENT OUTREACH (OUTPATIENT)
Dept: ADMINISTRATIVE | Facility: OTHER | Age: 75
End: 2020-02-06

## 2020-02-07 ENCOUNTER — OFFICE VISIT (OUTPATIENT)
Dept: SPORTS MEDICINE | Facility: CLINIC | Age: 75
End: 2020-02-07
Payer: MEDICARE

## 2020-02-07 VITALS
HEART RATE: 79 BPM | HEIGHT: 70 IN | DIASTOLIC BLOOD PRESSURE: 77 MMHG | SYSTOLIC BLOOD PRESSURE: 154 MMHG | BODY MASS INDEX: 26.63 KG/M2 | WEIGHT: 186 LBS

## 2020-02-07 DIAGNOSIS — M25.562 PAIN IN BOTH KNEES, UNSPECIFIED CHRONICITY: ICD-10-CM

## 2020-02-07 DIAGNOSIS — N18.30 CKD STAGE 3 DUE TO TYPE 2 DIABETES MELLITUS: ICD-10-CM

## 2020-02-07 DIAGNOSIS — M17.12 PRIMARY OSTEOARTHRITIS OF LEFT KNEE: Primary | ICD-10-CM

## 2020-02-07 DIAGNOSIS — M25.561 PAIN IN BOTH KNEES, UNSPECIFIED CHRONICITY: ICD-10-CM

## 2020-02-07 DIAGNOSIS — E11.22 CKD STAGE 3 DUE TO TYPE 2 DIABETES MELLITUS: ICD-10-CM

## 2020-02-07 DIAGNOSIS — M17.11 PRIMARY OSTEOARTHRITIS OF RIGHT KNEE: ICD-10-CM

## 2020-02-07 PROCEDURE — 3077F PR MOST RECENT SYSTOLIC BLOOD PRESSURE >= 140 MM HG: ICD-10-PCS | Mod: HCNC,CPTII,S$GLB, | Performed by: PHYSICIAN ASSISTANT

## 2020-02-07 PROCEDURE — 3078F PR MOST RECENT DIASTOLIC BLOOD PRESSURE < 80 MM HG: ICD-10-PCS | Mod: HCNC,CPTII,S$GLB, | Performed by: PHYSICIAN ASSISTANT

## 2020-02-07 PROCEDURE — 3077F SYST BP >= 140 MM HG: CPT | Mod: HCNC,CPTII,S$GLB, | Performed by: PHYSICIAN ASSISTANT

## 2020-02-07 PROCEDURE — 3078F DIAST BP <80 MM HG: CPT | Mod: HCNC,CPTII,S$GLB, | Performed by: PHYSICIAN ASSISTANT

## 2020-02-07 PROCEDURE — 99213 OFFICE O/P EST LOW 20 MIN: CPT | Mod: 25,HCNC,S$GLB, | Performed by: PHYSICIAN ASSISTANT

## 2020-02-07 PROCEDURE — 99213 PR OFFICE/OUTPT VISIT, EST, LEVL III, 20-29 MIN: ICD-10-PCS | Mod: 25,HCNC,S$GLB, | Performed by: PHYSICIAN ASSISTANT

## 2020-02-07 PROCEDURE — 1159F MED LIST DOCD IN RCRD: CPT | Mod: HCNC,S$GLB,, | Performed by: PHYSICIAN ASSISTANT

## 2020-02-07 PROCEDURE — 1125F AMNT PAIN NOTED PAIN PRSNT: CPT | Mod: HCNC,S$GLB,, | Performed by: PHYSICIAN ASSISTANT

## 2020-02-07 PROCEDURE — 1125F PR PAIN SEVERITY QUANTIFIED, PAIN PRESENT: ICD-10-PCS | Mod: HCNC,S$GLB,, | Performed by: PHYSICIAN ASSISTANT

## 2020-02-07 PROCEDURE — 99999 PR PBB SHADOW E&M-EST. PATIENT-LVL IV: ICD-10-PCS | Mod: PBBFAC,HCNC,, | Performed by: PHYSICIAN ASSISTANT

## 2020-02-07 PROCEDURE — 99999 PR PBB SHADOW E&M-EST. PATIENT-LVL IV: CPT | Mod: PBBFAC,HCNC,, | Performed by: PHYSICIAN ASSISTANT

## 2020-02-07 PROCEDURE — 1101F PR PT FALLS ASSESS DOC 0-1 FALLS W/OUT INJ PAST YR: ICD-10-PCS | Mod: HCNC,CPTII,S$GLB, | Performed by: PHYSICIAN ASSISTANT

## 2020-02-07 PROCEDURE — 1159F PR MEDICATION LIST DOCUMENTED IN MEDICAL RECORD: ICD-10-PCS | Mod: HCNC,S$GLB,, | Performed by: PHYSICIAN ASSISTANT

## 2020-02-07 PROCEDURE — 20610 PR DRAIN/INJECT LARGE JOINT/BURSA: ICD-10-PCS | Mod: HCNC,LT,S$GLB, | Performed by: PHYSICIAN ASSISTANT

## 2020-02-07 PROCEDURE — 20610 DRAIN/INJ JOINT/BURSA W/O US: CPT | Mod: HCNC,LT,S$GLB, | Performed by: PHYSICIAN ASSISTANT

## 2020-02-07 PROCEDURE — 1101F PT FALLS ASSESS-DOCD LE1/YR: CPT | Mod: HCNC,CPTII,S$GLB, | Performed by: PHYSICIAN ASSISTANT

## 2020-02-07 RX ORDER — TRIAMCINOLONE ACETONIDE 40 MG/ML
40 INJECTION, SUSPENSION INTRA-ARTICULAR; INTRAMUSCULAR
Status: COMPLETED | OUTPATIENT
Start: 2020-02-07 | End: 2020-02-07

## 2020-02-07 RX ORDER — BUPIVACAINE HYDROCHLORIDE 2.5 MG/ML
4 INJECTION, SOLUTION INFILTRATION; PERINEURAL
Status: COMPLETED | OUTPATIENT
Start: 2020-02-07 | End: 2020-02-07

## 2020-02-07 RX ADMIN — TRIAMCINOLONE ACETONIDE 40 MG: 40 INJECTION, SUSPENSION INTRA-ARTICULAR; INTRAMUSCULAR at 08:02

## 2020-02-07 RX ADMIN — BUPIVACAINE HYDROCHLORIDE 10 MG: 2.5 INJECTION, SOLUTION INFILTRATION; PERINEURAL at 08:02

## 2020-02-07 NOTE — PROGRESS NOTES
Care Everywhere: updated   Immunization: updated  Health Maintenance: updated  Media Review: reviewed for possible outside eye or colonoscopy report  Legacy Review: n/a  Order placed: n/a  Upcoming appts:n/a  Ambulatory referral to ophthalmology on 12/13/2019

## 2020-02-07 NOTE — PROGRESS NOTES
Subjective:          Chief Complaint: Emerson Menendez is a 74 y.o. male who had concerns including Pain of the Left Knee.    CHIEF COMPLAINT: bilateral knee pain (L>R)                             Interval Hx:  Patient presents 3 months s/p bilateral Orthovisc injections.  He reports significant relief in the right knee, and no relief in the left knee.  He is ready to consider total knee arthroplasty in the left knee, he would like to schedule for summer 2020.  Today he is requesting CSI and left knee.    10/19/2019: Pt reports a return of knee pain 6 months after Euflexxa series injections and 3 months from previous Bilateral CSI. % relief until now. Pain symptoms are similar as before. Requesting CSI today in left knee only.          HPI:  The patient is a 72 y.o. male  who presents for follow up evaluation of his bilateral knee pain, left worse than right, requesting left CSI injection. He has been doing a HEP in his gym. He finished the Euflexxa series in October 2017. He was doing well but had a lot of night pain last night. He wears his medial  brace during the day and this helps his knee pain.  Issues is currently at night, when he is not wearing his braces. No new trauma or injury. He wants to avoid getting a TKA.     + mechanical symptoms. Pain: 8/10 at its worst          Review of Systems   Constitution: Negative for chills and fever.   HENT: Negative for congestion and sore throat.    Eyes: Negative for discharge and double vision.   Cardiovascular: Negative for chest pain, palpitations and syncope.   Respiratory: Negative for cough and shortness of breath.    Endocrine: Negative for cold intolerance and heat intolerance.   Skin: Negative for dry skin and rash.   Musculoskeletal: Positive for arthritis, joint pain, joint swelling and stiffness. Negative for falls.   Gastrointestinal: Negative for abdominal pain, nausea and vomiting.   Neurological: Negative for focal weakness, numbness  and paresthesias.       Pain Related Questions  Over the past 3 days, what was your average pain during activity? (I.e. running, jogging, walking, climbing stairs, getting dressed, ect.): 9  Over the past 3 days, what was your highest pain level?: 9  Over the past 3 days, what was your lowest pain level? : 5    Other  How many nights a week are you awakened by your affected body part?: 3  Was the patient's HEIGHT measured or patient reported?: Patient Reported  Was the patient's WEIGHT measured or patient reported?: Measured      Objective:        General: Emerson is well-developed, well-nourished, appears stated age, in no acute distress, alert and oriented to time, place and person.     General    Vitals reviewed.  Constitutional: He is oriented to person, place, and time. He appears well-developed and well-nourished. No distress.   Eyes: Conjunctivae and EOM are normal. Pupils are equal, round, and reactive to light.   Neck: Normal range of motion. Neck supple. No JVD present.   Cardiovascular: Normal heart sounds and intact distal pulses.    No murmur heard.  Pulmonary/Chest: Effort normal and breath sounds normal. No respiratory distress.   Abdominal: Soft. Bowel sounds are normal. He exhibits no distension. There is no tenderness.   Neurological: He is alert and oriented to person, place, and time. Coordination normal.   Psychiatric: He has a normal mood and affect. His behavior is normal. Judgment and thought content normal.     General Musculoskeletal Exam   Gait: abnormal and antalgic       Right Knee Exam     Inspection   Erythema: absent  Scars: absent  Swelling: present  Effusion: absent  Deformity: absent  Bruising: absent    Tenderness   The patient is tender to palpation of the medial joint line.    Range of Motion   Extension: 0   Flexion:  130 (+ pain) abnormal     Tests   Meniscus   Sofiya:  Medial - negative Lateral - negative  Ligament Examination Lachman: normal (-1 to 2mm) PCL-Posterior Drawer:  normal (0 to 2mm)     MCL - Valgus: normal (0 to 2mm)  LCL - Varus: normalDial Test at 90 degrees: normal (< 5 degrees)  Posterolateral Corner: unstable (>15 degrees difference)  Patella   Patellar Glide (quadrants): Lateral - 1   Medial - 2  Patellar Grind: positive    Other   Popliteal (Baker's) Cyst: absent  Sensation: normal    Left Knee Exam     Inspection   Erythema: absent  Scars: absent  Swelling: present  Effusion: absent  Deformity: absent  Bruising: absent    Tenderness   The patient tender to palpation of the medial joint line, patella and lateral joint line.    Crepitus   The patient has crepitus of the patella.    Range of Motion   Extension:  5 (+ pain) abnormal   Flexion:  120 (+ pain) abnormal     Tests   Meniscus   Sofiya:  Medial - negative Lateral - negative  Stability Lachman: normal (-1 to 2mm) PCL-Posterior Drawer: normal (0 to 2mm)  MCL - Valgus: normal (0 to 2mm)  LCL - Varus: normal (0 to 2mm)Dial Test at 90 degrees: normal (< 5 degrees)  Posterolateral Corner: unstable (>15 degrees difference)  Patella   Patellar Glide (Quadrants): Lateral - 1 Medial - 2  Patellar Grind: positive    Other   Popliteal (Baker's) Cyst: absent  Sensation: normal    Right Hip Exam     Tests   Evaristo: negative  Left Hip Exam     Tests   Evaristo: negative          Muscle Strength   Right Lower Extremity   Hip Abduction: 4/5   Quadriceps:  4/5   Hamstrin/5   Left Lower Extremity   Hip Abduction: 4/5   Quadriceps:  4/5   Hamstrin/5     Vascular Exam     Right Pulses  Dorsalis Pedis:      2+  Posterior Tibial:      2+        Left Pulses  Dorsalis Pedis:      2+  Posterior Tibial:      2+        Edema  Right Lower Leg: absent  Left Lower Leg: absent    Radiographic Findings:    Medial tibiofemoral compartment joint space narrowing is again noted bilaterally, as is some spurring involving the tibial spines.  No significant joint space narrowing involving the lateral tibiofemoral compartment or the  patellofemoral joints on either side.  Osseous structures demonstrate no evidence of recent or healing fracture, lytic destructive process, or other significant abnormality.  Some minimal superior patellar spurring is noted on both sides.  No demonstrable joint effusion is seen on either side.    Xrays of the knees were ordered and reviewed by me today. These findings were discussed and reviewed with the patient.          Assessment:       Encounter Diagnoses   Name Primary?    Primary osteoarthritis of left knee Yes    Primary osteoarthritis of right knee     Pain in both knees, unspecified chronicity           Plan:       We have discussed a variety of treatment options including medications, injections, physical therapy and other alternative treatments. Pt is requesting left knee CSI and continued visco-supplementation for right knee. Plan for left total knee arthroplasty Summer 2020. Today, the patient chooses iaCSI and understands a minimal of 3 months time must lapse after injection, prior to a surgical procedure with a prosthesis due to increased risk of periprosthetic infection.     1. We reviewed with Emerson Menendez today, the pathology and natural history of his diagnosis. We have discussed a variety of treatment options including medications, physical therapy and other alternative treatments. I also explained the indications, risks and benefits of surgery. After discussion, he decided to proceed with surgery. The decision was made to go forward with      1. Left total knee arthroplasty (Depuy StoreFlix)  2. Femoral autologous bone grafting    We also discussed, given their PMHx, medically optimization and clearance from their primary care physician.     The details of the surgical procedure were explained, including the location of probable incisions and a description of likely hardware and/or grafts to be used.  The patient understands the likely convalescence after surgery.  Also, we have  thoroughly discussed the risks, benefits and alternatives to surgery, including, but not limited to, the risk of infection, joint stiffness, blood clot (including DVT and/or pulmonary embolus), neurologic and vascular injury.  It was explained that, if tissue has been repaired or reconstructed, there is a chance of failure, which may require further management.    I made the decision to obtain old records of the patient including previous notes and imaging. I independently reviewed and interpreted lab results today as well as prior imaging.     2. Injection Procedure  A time out was performed, including verification of patient ID, procedure, site and side, availability of information and equipment, review of safety issues, and agreement with consent, the procedure site was marked.    After time out was performed, the patient was prepped aseptically with chloraprep swabsticks. A diagnostic and therapeutic injection of 1:4cc Kenalog/Marcaine was given under sterile technique using a 22g x 1.5 needle from the Superolateral  aspect of the left Knee Joint in the supine position.      Emerson Menendez had no adverse reactions to the medication. Pain decreased. He was instructed to apply ice to the joint for 20 minutes and avoid strenuous activities for 24-36 hours following the injection. He was warned of possible blood sugar and/or blood pressure changes during that time. Following that time, he can resume regular activities.    He was reminded to call the clinic immediately for any adverse side effects as explained in clinic today.    3. Mobic 15 mg 1 time daily PRN for pain management. Patient understands to take with food and/or OTC prilosec to decrease GI side effects.  4. Ambulatory referral to physical therapy for patellofemoral strength and conditioning protocol  5. Ice compress to the affected area 2-3x a day for 15-20 minutes as needed for pain management.  6. RTC to see Christophe Gallo PA-C in 3 months for  follow-up.    All of the patient's questions were answered and the patient will contact us if they have any questions or concerns in the interim.                        [facility] patient questionnaires have been collected today.

## 2020-02-13 ENCOUNTER — TELEPHONE (OUTPATIENT)
Dept: RESEARCH | Facility: HOSPITAL | Age: 75
End: 2020-02-13

## 2020-02-14 ENCOUNTER — PATIENT OUTREACH (OUTPATIENT)
Dept: OTHER | Facility: OTHER | Age: 75
End: 2020-02-14

## 2020-02-14 NOTE — PROGRESS NOTES
Digital Medicine: Health  Follow-Up    The history is provided by the patient.     Follow Up  Follow-up reason(s): goal follow-up    Patient said that he has been in [place] taking care of his daughter. He said that some times while with his daughter he has not been eating much and thinks this is why his BG readings have been elevated. He has not been able to eat every day/sometimes not until at night. When he is at home he eats consistent meals but since he has been out of town, he has deviated away from his normal routine. Patient stated that he will try to eat at least some snacks during the day. He requested I give him a little bit of time in between my next call due to the sickness of his daughter. I will push my next call out a few weeks and encouraged patient to give us a call if he needs anything.       Intervention/Plan        Topic    Eye Exam          Last 6 Patient Entered Readings                                          Most Recent A1c: 7.6% on 12/10/2019  (Goal: 7%)     Recent Readings 2/14/2020 2/13/2020 2/13/2020 2/12/2020 2/12/2020    Blood Glucose (mg/dL) 320 99 100 278 200                Screenings    SDOH

## 2020-02-18 ENCOUNTER — RESEARCH ENCOUNTER (OUTPATIENT)
Dept: RESEARCH | Facility: HOSPITAL | Age: 75
End: 2020-02-18

## 2020-02-18 NOTE — PROGRESS NOTES
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   Sponsor:  [facility]  IRB# [id number]  Study ID: [id number]  Investigator: GIL Engel Pt Initials: LUCÍA LORENZ       Arm B: Observation  Cycle 50 Day 28 Missed Appointment on 2/13/20:  Follow up Phone Call    This research RN contacted subject to get update regarding status from last week; subject called to cancel his C50 D28 appt due to daughter's illness/status on this date. Per subject, today he states "my physical health is fine but my emotional health is all over the place."  He reports his daughter's health has declined and she will be placed in to hospice care today. He states he is "handling it ok" and that "we are a family of carolyn, the  has been here, my daughter has a lot of friends, they been coming in and out all day." He states he feels he has adequate emotional support from clergy and immediate family members. Informed subject will notify Dr Engel of his current status: he denies any new symptoms or problems; states "my sugar is kind of all over the place but I been talking to them on the phone and I know what to do." Denies any other health issues; states he will "wait it out up here ([place]) with my daguther." Also states will notify Dr Engel/team with any concerns.   Provided encouragement to subject to reach out with any concerns and reassured him ok to contact Dr Engel or research team members if he requires any assistance or guidance. He states understanding. Dr Engel notified of subject's current status; no additional orders/concerns at this time.   Per Dr Engel this team will plan to follow up with subject next week via telephone contact on Monday or Tuesday to get update and address any concerns he may have.      "

## 2020-02-20 ENCOUNTER — PATIENT MESSAGE (OUTPATIENT)
Dept: ADMINISTRATIVE | Facility: OTHER | Age: 75
End: 2020-02-20

## 2020-02-28 ENCOUNTER — PATIENT OUTREACH (OUTPATIENT)
Dept: ADMINISTRATIVE | Facility: OTHER | Age: 75
End: 2020-02-28

## 2020-02-28 NOTE — PROGRESS NOTES
Chart reviewed.   Requested updates from Care Everywhere.  Immunizations reconciled.   HM updated.  Fit kit previously ordered.

## 2020-03-02 ENCOUNTER — PATIENT MESSAGE (OUTPATIENT)
Dept: DIABETES | Facility: CLINIC | Age: 75
End: 2020-03-02

## 2020-03-04 DIAGNOSIS — N52.01 ERECTILE DYSFUNCTION DUE TO ARTERIAL INSUFFICIENCY: ICD-10-CM

## 2020-03-04 RX ORDER — TADALAFIL 20 MG/1
20 TABLET ORAL DAILY PRN
Qty: 30 TABLET | Refills: 3 | Status: SHIPPED | OUTPATIENT
Start: 2020-03-04 | End: 2020-04-22

## 2020-03-05 RX ORDER — TADALAFIL 5 MG/1
TABLET ORAL
Qty: 90 TABLET | Refills: 2 | Status: SHIPPED | OUTPATIENT
Start: 2020-03-05 | End: 2020-04-22

## 2020-03-06 ENCOUNTER — PATIENT MESSAGE (OUTPATIENT)
Dept: ADMINISTRATIVE | Facility: OTHER | Age: 75
End: 2020-03-06

## 2020-03-06 ENCOUNTER — PATIENT MESSAGE (OUTPATIENT)
Dept: HEMATOLOGY/ONCOLOGY | Facility: CLINIC | Age: 75
End: 2020-03-06

## 2020-03-06 ENCOUNTER — TELEPHONE (OUTPATIENT)
Dept: INTERNAL MEDICINE | Facility: CLINIC | Age: 75
End: 2020-03-06

## 2020-03-06 DIAGNOSIS — E13.9 DIABETES MELLITUS DUE TO ABNORMAL INSULIN: ICD-10-CM

## 2020-03-06 DIAGNOSIS — D47.2 SMOLDERING MULTIPLE MYELOMA: ICD-10-CM

## 2020-03-06 RX ORDER — INSULIN PUMP SYRINGE, 3 ML
EACH MISCELLANEOUS
Qty: 1 EACH | Refills: 0 | Status: SHIPPED | OUTPATIENT
Start: 2020-03-06 | End: 2020-03-06 | Stop reason: SDUPTHER

## 2020-03-06 RX ORDER — INSULIN PUMP SYRINGE, 3 ML
EACH MISCELLANEOUS
Qty: 1 EACH | Refills: 0 | Status: SHIPPED | OUTPATIENT
Start: 2020-03-06 | End: 2020-04-22 | Stop reason: SDUPTHER

## 2020-03-06 RX ORDER — OMEPRAZOLE 20 MG/1
40 CAPSULE, DELAYED RELEASE ORAL DAILY
Qty: 180 CAPSULE | Refills: 3 | Status: SHIPPED | OUTPATIENT
Start: 2020-03-06 | End: 2020-04-22 | Stop reason: SDUPTHER

## 2020-03-06 RX ORDER — LANCETS
EACH MISCELLANEOUS
Qty: 200 EACH | Refills: 4 | Status: SHIPPED | OUTPATIENT
Start: 2020-03-06 | End: 2020-04-22 | Stop reason: SDUPTHER

## 2020-03-06 RX ORDER — PEN NEEDLE, DIABETIC 30 GX3/16"
NEEDLE, DISPOSABLE MISCELLANEOUS
Qty: 100 EACH | Refills: 3 | Status: SHIPPED | OUTPATIENT
Start: 2020-03-06 | End: 2023-11-27 | Stop reason: SDUPTHER

## 2020-03-06 RX ORDER — DIAZEPAM 5 MG/1
TABLET ORAL
Qty: 60 TABLET | Refills: 2 | Status: CANCELLED | OUTPATIENT
Start: 2020-03-06

## 2020-03-06 NOTE — TELEPHONE ENCOUNTER
----- Message from Inocencia Adams sent at 3/6/2020  1:53 PM CST -----  Contact: [facility]  [phone]  Pharmacy is calling to clarify an RX.  RX name:  blood sugar diagnostic (ACCU-CHEK STEFANO PLUS TEST STRP) Strp  What do they need to clarify:  This has a high co pay for patient.  Comments: Want to switch to one that has a low co payment.

## 2020-03-06 NOTE — PROGRESS NOTES
Refill Routing Note     Medication(s) are appropriate for refill:    Medication Outside of Protocol    Appointments  past 15m or future 3m with PCP    Date Provider   Last Visit   12/13/2019 Roberta Sagastume MD   Next Visit   4/22/2020 Roberta Sagastume MD       Automatic Epic Protocol Generated Data:    Requested Prescriptions   Pending Prescriptions Disp Refills    diazePAM (VALIUM) 5 MG tablet 60 tablet 2     Sig: TAKE 1 TABLET(5 MG) BY MOUTH EVERY 6 HOURS AS NEEDED       Anticonvulsants Protocol Passed - 3/6/2020  8:28 AM        Passed - Visit with Authorizing provider in past 9 months or upcoming 90 days           Note created:10:56 AM 03/06/2020

## 2020-03-10 ENCOUNTER — PATIENT MESSAGE (OUTPATIENT)
Dept: ADMINISTRATIVE | Facility: OTHER | Age: 75
End: 2020-03-10

## 2020-03-10 ENCOUNTER — PATIENT OUTREACH (OUTPATIENT)
Dept: OTHER | Facility: OTHER | Age: 75
End: 2020-03-10

## 2020-03-10 ENCOUNTER — PATIENT MESSAGE (OUTPATIENT)
Dept: INTERNAL MEDICINE | Facility: CLINIC | Age: 75
End: 2020-03-10

## 2020-03-10 DIAGNOSIS — Z79.4 TYPE 2 DIABETES MELLITUS WITH DIABETIC POLYNEUROPATHY, WITH LONG-TERM CURRENT USE OF INSULIN: Primary | Chronic | ICD-10-CM

## 2020-03-10 DIAGNOSIS — E11.42 TYPE 2 DIABETES MELLITUS WITH DIABETIC POLYNEUROPATHY, WITH LONG-TERM CURRENT USE OF INSULIN: Primary | Chronic | ICD-10-CM

## 2020-03-10 NOTE — PROGRESS NOTES
Digital Medicine: Clinician Follow-Up    Called patient to follow up regarding the DDMP (Diabetes Digital Medicine Program). Patient endorses adherence to medication regimen. Patient denies hypoglycemic s/sx (dizziness, extreme hunger, headaches, confusion, trouble concentrating, sweating, shaking, blurred vision, personality changes); patient denies hyperglycemic s/sx (increased thirst, increased urination, headaches, trouble concentrating, blurred vision, fatigue).    He reports that he is unsure what is contributing to his varying readings. He states that his reading of 99 mg/dL was fasting prior to taking his medications and eating. He states that the reading 217 mg/dL was 3 hours after he had boost shake. He states that when he re-checked his SMBG again, the reading was 100 mg/dL.     He reports that he self medicated using benadryl and Valium to go to sleep and not think about the passing of his daughter.    The history is provided by the patient. No  was used.     Follow Up  Follow-up reason(s): routine education      Routine Education Topics: Diabetes distress      Assessment:  Reviewed recent readings. Per ADA guidelines (goal A1C < 7%), DM need to be addressed more throroughly today.     Last 6 Patient Entered Readings                                          Most Recent A1c: 7.6% on 12/10/2019  (Goal: 7%)     Recent Readings 3/10/2020 3/10/2020 3/9/2020 3/9/2020 3/9/2020    Blood Glucose (mg/dL) 217 99 250 134 154         INTERVENTION(S)  reviewed appropriate dose schedule, reviewed monitoring technique, encouragement/support and goal setting    PLAN  patient verbalizes understanding, additional monitoring needed and continue monitoring    >Continue current medication regimen.  >Informed patient to take his glucometer to the [facility] this Thursday after his appointment and ask for control solution. Provided patient with the phone number for for durable medical equipment for test strip  refills.  >I will continue to monitor regularly and will follow-up in ~3 weeks, sooner if blood sugar begins to trend upward or downward.   >Patient has my contact information and knows to call with any concerns or clinical changes.          Topic    Eye Exam      Diabetes Medications             glimepiride (AMARYL) 2 MG tablet TAKE 1 TABLET (2 MG TOTAL) BY MOUTH ONCE DAILY.    glucagon (human recombinant) inj 1mg/mL kit Inject 1 mL (1 mg total) into the muscle as needed.    insulin (LANTUS SOLOSTAR U-100 INSULIN) glargine 100 units/mL (3mL) SubQ pen Inject 20 Units into the skin every evening.

## 2020-03-12 ENCOUNTER — RESEARCH ENCOUNTER (OUTPATIENT)
Dept: RESEARCH | Facility: HOSPITAL | Age: 75
End: 2020-03-12

## 2020-03-12 ENCOUNTER — LAB VISIT (OUTPATIENT)
Dept: LAB | Facility: HOSPITAL | Age: 75
End: 2020-03-12
Attending: INTERNAL MEDICINE
Payer: MEDICARE

## 2020-03-12 ENCOUNTER — OFFICE VISIT (OUTPATIENT)
Dept: PSYCHIATRY | Facility: CLINIC | Age: 75
End: 2020-03-12
Payer: MEDICARE

## 2020-03-12 ENCOUNTER — TELEPHONE (OUTPATIENT)
Dept: INFUSION THERAPY | Facility: HOSPITAL | Age: 75
End: 2020-03-12

## 2020-03-12 ENCOUNTER — OFFICE VISIT (OUTPATIENT)
Dept: HEMATOLOGY/ONCOLOGY | Facility: CLINIC | Age: 75
End: 2020-03-12
Payer: MEDICARE

## 2020-03-12 VITALS
TEMPERATURE: 98 F | SYSTOLIC BLOOD PRESSURE: 149 MMHG | OXYGEN SATURATION: 99 % | RESPIRATION RATE: 16 BRPM | DIASTOLIC BLOOD PRESSURE: 82 MMHG | HEIGHT: 70 IN | HEART RATE: 82 BPM | WEIGHT: 189.38 LBS | BODY MASS INDEX: 27.11 KG/M2

## 2020-03-12 DIAGNOSIS — Z00.6 RESEARCH EXAM: ICD-10-CM

## 2020-03-12 DIAGNOSIS — Z00.6 EXAMINATION OF PARTICIPANT IN CLINICAL TRIAL: ICD-10-CM

## 2020-03-12 DIAGNOSIS — N18.30 CKD STAGE 3 DUE TO TYPE 2 DIABETES MELLITUS: ICD-10-CM

## 2020-03-12 DIAGNOSIS — D47.2 SMOLDERING MULTIPLE MYELOMA: Primary | ICD-10-CM

## 2020-03-12 DIAGNOSIS — D47.2 SMOLDERING MULTIPLE MYELOMA: ICD-10-CM

## 2020-03-12 DIAGNOSIS — C90.00 MULTIPLE MYELOMA, REMISSION STATUS UNSPECIFIED: ICD-10-CM

## 2020-03-12 DIAGNOSIS — F43.23 ADJUSTMENT DISORDER WITH MIXED ANXIETY AND DEPRESSED MOOD: Primary | ICD-10-CM

## 2020-03-12 DIAGNOSIS — Z79.4 TYPE 2 DIABETES MELLITUS WITH DIABETIC POLYNEUROPATHY, WITH LONG-TERM CURRENT USE OF INSULIN: ICD-10-CM

## 2020-03-12 DIAGNOSIS — E11.42 TYPE 2 DIABETES MELLITUS WITH DIABETIC POLYNEUROPATHY, WITH LONG-TERM CURRENT USE OF INSULIN: ICD-10-CM

## 2020-03-12 DIAGNOSIS — E11.22 CKD STAGE 3 DUE TO TYPE 2 DIABETES MELLITUS: ICD-10-CM

## 2020-03-12 LAB
ALBUMIN SERPL BCP-MCNC: 3.4 G/DL (ref 3.5–5.2)
ALP SERPL-CCNC: 52 U/L (ref 55–135)
ALT SERPL W/O P-5'-P-CCNC: 22 U/L (ref 10–44)
ANION GAP SERPL CALC-SCNC: 11 MMOL/L (ref 8–16)
AST SERPL-CCNC: 25 U/L (ref 10–40)
BASOPHILS # BLD AUTO: 0.03 K/UL (ref 0–0.2)
BASOPHILS # BLD AUTO: 0.03 K/UL (ref 0–0.2)
BASOPHILS NFR BLD: 0.6 % (ref 0–1.9)
BASOPHILS NFR BLD: 0.6 % (ref 0–1.9)
BILIRUB SERPL-MCNC: 0.2 MG/DL (ref 0.1–1)
BUN SERPL-MCNC: 10 MG/DL (ref 8–23)
CALCIUM SERPL-MCNC: 9 MG/DL (ref 8.7–10.5)
CHLORIDE SERPL-SCNC: 101 MMOL/L (ref 95–110)
CO2 SERPL-SCNC: 25 MMOL/L (ref 23–29)
CREAT SERPL-MCNC: 1.5 MG/DL (ref 0.5–1.4)
DIFFERENTIAL METHOD: ABNORMAL
DIFFERENTIAL METHOD: ABNORMAL
EOSINOPHIL # BLD AUTO: 0.3 K/UL (ref 0–0.5)
EOSINOPHIL # BLD AUTO: 0.3 K/UL (ref 0–0.5)
EOSINOPHIL NFR BLD: 6.4 % (ref 0–8)
EOSINOPHIL NFR BLD: 6.4 % (ref 0–8)
ERYTHROCYTE [DISTWIDTH] IN BLOOD BY AUTOMATED COUNT: 13.2 % (ref 11.5–14.5)
ERYTHROCYTE [DISTWIDTH] IN BLOOD BY AUTOMATED COUNT: 13.2 % (ref 11.5–14.5)
EST. GFR  (AFRICAN AMERICAN): 52.6 ML/MIN/1.73 M^2
EST. GFR  (NON AFRICAN AMERICAN): 45.5 ML/MIN/1.73 M^2
GLUCOSE SERPL-MCNC: 161 MG/DL (ref 70–110)
HCT VFR BLD AUTO: 34.7 % (ref 40–54)
HCT VFR BLD AUTO: 34.7 % (ref 40–54)
HGB BLD-MCNC: 10.5 G/DL (ref 14–18)
HGB BLD-MCNC: 10.5 G/DL (ref 14–18)
IGA SERPL-MCNC: 1451 MG/DL (ref 40–350)
IGG SERPL-MCNC: 894 MG/DL (ref 650–1600)
IGM SERPL-MCNC: 35 MG/DL (ref 50–300)
IMM GRANULOCYTES # BLD AUTO: 0.01 K/UL (ref 0–0.04)
IMM GRANULOCYTES # BLD AUTO: 0.01 K/UL (ref 0–0.04)
IMM GRANULOCYTES NFR BLD AUTO: 0.2 % (ref 0–0.5)
IMM GRANULOCYTES NFR BLD AUTO: 0.2 % (ref 0–0.5)
LDH SERPL L TO P-CCNC: 124 U/L (ref 110–260)
LYMPHOCYTES # BLD AUTO: 1.8 K/UL (ref 1–4.8)
LYMPHOCYTES # BLD AUTO: 1.8 K/UL (ref 1–4.8)
LYMPHOCYTES NFR BLD: 38.4 % (ref 18–48)
LYMPHOCYTES NFR BLD: 38.4 % (ref 18–48)
MAGNESIUM SERPL-MCNC: 1.6 MG/DL (ref 1.6–2.6)
MCH RBC QN AUTO: 28.6 PG (ref 27–31)
MCH RBC QN AUTO: 28.6 PG (ref 27–31)
MCHC RBC AUTO-ENTMCNC: 30.3 G/DL (ref 32–36)
MCHC RBC AUTO-ENTMCNC: 30.3 G/DL (ref 32–36)
MCV RBC AUTO: 95 FL (ref 82–98)
MCV RBC AUTO: 95 FL (ref 82–98)
MONOCYTES # BLD AUTO: 0.4 K/UL (ref 0.3–1)
MONOCYTES # BLD AUTO: 0.4 K/UL (ref 0.3–1)
MONOCYTES NFR BLD: 8.2 % (ref 4–15)
MONOCYTES NFR BLD: 8.2 % (ref 4–15)
NEUTROPHILS # BLD AUTO: 2.2 K/UL (ref 1.8–7.7)
NEUTROPHILS # BLD AUTO: 2.2 K/UL (ref 1.8–7.7)
NEUTROPHILS NFR BLD: 46.2 % (ref 38–73)
NEUTROPHILS NFR BLD: 46.2 % (ref 38–73)
NRBC BLD-RTO: 0 /100 WBC
NRBC BLD-RTO: 0 /100 WBC
PHOSPHATE SERPL-MCNC: 3.3 MG/DL (ref 2.7–4.5)
PLATELET # BLD AUTO: 315 K/UL (ref 150–350)
PLATELET # BLD AUTO: 315 K/UL (ref 150–350)
PMV BLD AUTO: 9 FL (ref 9.2–12.9)
PMV BLD AUTO: 9 FL (ref 9.2–12.9)
POTASSIUM SERPL-SCNC: 4.4 MMOL/L (ref 3.5–5.1)
PROT SERPL-MCNC: 8 G/DL (ref 6–8.4)
RBC # BLD AUTO: 3.67 M/UL (ref 4.6–6.2)
RBC # BLD AUTO: 3.67 M/UL (ref 4.6–6.2)
SODIUM SERPL-SCNC: 137 MMOL/L (ref 136–145)
WBC # BLD AUTO: 4.66 K/UL (ref 3.9–12.7)
WBC # BLD AUTO: 4.66 K/UL (ref 3.9–12.7)

## 2020-03-12 PROCEDURE — 84165 PROTEIN E-PHORESIS SERUM: CPT | Mod: HCNC,Q1

## 2020-03-12 PROCEDURE — 99215 OFFICE O/P EST HI 40 MIN: CPT | Mod: Q1,HCNC,S$GLB, | Performed by: INTERNAL MEDICINE

## 2020-03-12 PROCEDURE — 80053 COMPREHEN METABOLIC PANEL: CPT | Mod: HCNC,Q1

## 2020-03-12 PROCEDURE — 82784 ASSAY IGA/IGD/IGG/IGM EACH: CPT | Mod: 59,HCNC

## 2020-03-12 PROCEDURE — 99499 RISK ADDL DX/OHS AUDIT: ICD-10-PCS | Mod: HCNC,S$GLB,, | Performed by: INTERNAL MEDICINE

## 2020-03-12 PROCEDURE — 84100 ASSAY OF PHOSPHORUS: CPT | Mod: HCNC,Q1

## 2020-03-12 PROCEDURE — 99215 PR OFFICE/OUTPT VISIT, EST, LEVL V, 40-54 MIN: ICD-10-PCS | Mod: Q1,HCNC,S$GLB, | Performed by: INTERNAL MEDICINE

## 2020-03-12 PROCEDURE — 90791 PR PSYCHIATRIC DIAGNOSTIC EVALUATION: ICD-10-PCS | Mod: HCNC,S$GLB,, | Performed by: PSYCHOLOGIST

## 2020-03-12 PROCEDURE — 83615 LACTATE (LD) (LDH) ENZYME: CPT | Mod: HCNC

## 2020-03-12 PROCEDURE — 99999 PR PBB SHADOW E&M-EST. PATIENT-LVL IV: CPT | Mod: PBBFAC,HCNC,, | Performed by: INTERNAL MEDICINE

## 2020-03-12 PROCEDURE — 83520 IMMUNOASSAY QUANT NOS NONAB: CPT | Mod: 59,HCNC,Q1

## 2020-03-12 PROCEDURE — 86334 IMMUNOFIX E-PHORESIS SERUM: CPT | Mod: HCNC,Q1

## 2020-03-12 PROCEDURE — 84165 PATHOLOGIST INTERPRETATION SPE: ICD-10-PCS | Mod: 26,Q1,HCNC, | Performed by: PATHOLOGY

## 2020-03-12 PROCEDURE — 99499 UNLISTED E&M SERVICE: CPT | Mod: HCNC,S$GLB,, | Performed by: INTERNAL MEDICINE

## 2020-03-12 PROCEDURE — 86334 PATHOLOGIST INTERPRETATION IFE: ICD-10-PCS | Mod: 26,Q1,HCNC, | Performed by: PATHOLOGY

## 2020-03-12 PROCEDURE — 99999 PR PBB SHADOW E&M-EST. PATIENT-LVL II: ICD-10-PCS | Mod: PBBFAC,HCNC,, | Performed by: PSYCHOLOGIST

## 2020-03-12 PROCEDURE — 36415 COLL VENOUS BLD VENIPUNCTURE: CPT | Mod: HCNC

## 2020-03-12 PROCEDURE — 99999 PR PBB SHADOW E&M-EST. PATIENT-LVL II: CPT | Mod: PBBFAC,HCNC,, | Performed by: PSYCHOLOGIST

## 2020-03-12 PROCEDURE — 3051F HG A1C>EQUAL 7.0%<8.0%: CPT | Mod: Q1,HCNC,CPTII,S$GLB | Performed by: INTERNAL MEDICINE

## 2020-03-12 PROCEDURE — 90791 PSYCH DIAGNOSTIC EVALUATION: CPT | Mod: HCNC,S$GLB,, | Performed by: PSYCHOLOGIST

## 2020-03-12 PROCEDURE — 86334 IMMUNOFIX E-PHORESIS SERUM: CPT | Mod: 26,Q1,HCNC, | Performed by: PATHOLOGY

## 2020-03-12 PROCEDURE — 3051F PR MOST RECENT HEMOGLOBIN A1C LEVEL 7.0 - < 8.0%: ICD-10-PCS | Mod: Q1,HCNC,CPTII,S$GLB | Performed by: INTERNAL MEDICINE

## 2020-03-12 PROCEDURE — 99999 PR PBB SHADOW E&M-EST. PATIENT-LVL IV: ICD-10-PCS | Mod: PBBFAC,HCNC,, | Performed by: INTERNAL MEDICINE

## 2020-03-12 PROCEDURE — 83735 ASSAY OF MAGNESIUM: CPT | Mod: HCNC

## 2020-03-12 PROCEDURE — 84165 PROTEIN E-PHORESIS SERUM: CPT | Mod: 26,Q1,HCNC, | Performed by: PATHOLOGY

## 2020-03-12 PROCEDURE — 85025 COMPLETE CBC W/AUTO DIFF WBC: CPT | Mod: HCNC,Q1

## 2020-03-12 RX ORDER — LANCETS 33 GAUGE
EACH MISCELLANEOUS
COMMUNITY
Start: 2020-03-06

## 2020-03-12 NOTE — PROGRESS NOTES
Subjective:       Patient ID: Emerson Menendez is a 74 y.o. male.    Chief Complaint: No chief complaint on file.  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 Test results identify a stable IgA kappa protein with beta globulin band at 1.63g/dL. Kappa free light chain is elevated at 4.40. CBC and calcium are stable. Creatinine with history of stage III CKD is stable at 1.4. Metastatic survey is negative for lytic lesions. MRI of the entire spine demonstrated age related changes but no convincing evidence of myeloma bone disease. Bone marrow biopsy identified about 13% plasma cells by morphology and FISH for myeloma identified a t(11;14) and trisomies 3,7, and 17. The patient is afebrile and appears clinically well. He was seen by his podiatrist and nephrologist since our last visit without any new or acute events.    The patient has not received any therapy for smoldering myeloma including bisphosphonates or steroids. He has been randomized to observation arm of the the clinical study. 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 He has no current or prior history of malignancy.    TODAY  Mr. Menendez returns today for monthly follow-up evaluation. No clinical signs/symptoms of progression of his smoldering myeloma with CBC stable and CMP are stable. Myeloma labs are pending.  Since our last visit his daughter  in [place]. He is a peace with her passing but there has been increased stress dealing with family, including his grandchildren.  He notes the need to discuss his feelings with someone.      HPI  Review of Systems   Constitutional: Negative for activity  change, appetite change, diaphoresis, fatigue, fever and unexpected weight change.   HENT: Negative.    Eyes: Negative.    Respiratory: Negative.    Cardiovascular: Negative for leg swelling.   Gastrointestinal: Negative.    Endocrine: Negative.    Genitourinary: Negative.    Musculoskeletal: Negative.    Skin: Negative.    Allergic/Immunologic: Negative.    Neurological:        Grade 0-1 peripheral neuropathy of bilateral feet.    Hematological: Negative for adenopathy. Does not bruise/bleed easily.   Psychiatric/Behavioral: Negative.        Objective:       Vitals:    03/12/20 0930   BP: (!) 149/82   Pulse: 82   Resp: 16   Temp: 97.6 °F (36.4 °C)       Physical Exam   Constitutional: He is oriented to person, place, and time. He appears well-developed and well-nourished.   HENT:   Head: Normocephalic and atraumatic.   Right Ear: External ear normal.   Left Ear: External ear normal.   Nose: Nose normal.   Mouth/Throat: Oropharynx is clear and moist.   Eyes: Pupils are equal, round, and reactive to light. Conjunctivae and EOM are normal.   Neck: Normal range of motion. Neck supple.   Cardiovascular: Normal rate, regular rhythm and intact distal pulses.   No murmur heard.  Pulmonary/Chest: Effort normal and breath sounds normal. No respiratory distress.   Abdominal: Soft. Bowel sounds are normal. He exhibits no distension. There is no hepatosplenomegaly.   Musculoskeletal: He exhibits no edema or tenderness.   Neurological: He is alert and oriented to person, place, and time. No cranial nerve deficit.   Skin: Skin is warm and dry. No rash noted. No cyanosis. Nails show no clubbing.   Psychiatric: He has a normal mood and affect. His behavior is normal.   Nursing note and vitals reviewed.      Assessment:       No diagnosis found.    Plan:       The patient has a diagnosis of smoldering myeloma. There is no indication for immediate chemotherapy. We are monitoring renal function closely- baseline creatinine of -1.5..   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 Total protein remains normal, M protein has been stable; pending levels today. Free light chains are stable..  Plan for return in 1 month.  Endocrinology and Nephrology to monitor diabetes and renal failure respectively.     Referral to Dr. Perkins and first visit today.

## 2020-03-12 NOTE — PROGRESS NOTES
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   Sponsor:  [facility]  IRB# [id number]  Study ID: [id number]  Investigator: GIL Engel  Pt Initials: LUCÍA LORENZ       Arm B: Observation  Cycle 51, Day 28       Patient presents to clinic this morning for above cycle evaluation.  He is awake, alert and oriented, mood and affect appropriate to situation and states he is "extremely stressed out" as his daughter has passed away and he is in the midst of financial and personal adjustments to accommodate the pre, post hospitalization, and death of his daughter. Dr Engel present and speaking with subject; he states he wishes to "get back on track" and continue on study and that "I need to be here to talk and get all this grief out of me"; he is requesting to see a grief counselor.        Review of Baseline AE's:   1.Hypertension, Grade 2 diastolic: BP today is 149/82. Systolic is Grade 2; diastolic Grade 1 today. Subject denies headache/dizziness; no additional symptoms other than anxiety over current situation noted.  AE ongoing  2. Lower Back Pain and Neck Pain, grade 1: Patient has no new complaints today.  As previously stated, patient is not suspected to have bony myeloma involvement per Dr. Engel as these are pre-existing issues present at baseline.  AE ongoing.  3. Pain in extremity (knee), grade 1. As above,  Presents with knee stabilizing braces intact and reports no new issues or complaints today.     4. Peripheral sensory neuropathy, grade 1: Patient does not verbalize complaints today. Has gabapentin prescribed and takes as needed.  States has not taken recently.  AE ongoing.   5. Anemia, Grade 1: Hgb/Hct - stable at 10.5/35.7.  Result reviewed by Dr. Engel. AE ongoing            Review of AE's:     *Please note this list is not all-inclusive, please see AE log for physician reviewed list of " "adverse events.   1. Creatinine increased, grade 2: Serum creatinine remains increased today at 1.5 mg/dl today and calculated GFR 53 ml/min. (GFR 52.6 per Epic/Lab calculation) This is increased from last cycle and continues to trend monthly. As previously stated, Dr. Engel states this has not been related to myeloma and is related chronic kidney issues likely secondary to diabetes and hypertension. Will continue observation monthly and to monitor renal function closely. Per MD, encouraged to increase water intake. AE stable from baseline  2. Hyponatremia, Grade 1: Serum sodium today is 135 mmol/L; will continue to monitor AE; per Dr Engel related to CKD; no interventions necessary.   3. Anxiety, Grade 1: Subject self reports mild situational anxiety; see interval history above. Subject requests to see grief counselor today; acknowledged per Dr Engel who has set up immediate appointment today. Subject agrees he wishes to see someone today versus waiting.   4. Hypoalbuminemia, Grade 1: Subject's serum albumin is 3.4 today; will monitor. Subject states his diet "isn't the best right now" but states he is trying to "get back on track."   5. Hyperglycemia, Grade 1: Subject's glucose today is 161mg/dl; subject states he has not been eating well, not following diabetic diet; states has been supplementing with Boost and other shakes, which raises his glucose significantly. He also states he plans to go to clinic to get his meter checked/recalibrated as necessary.        Per study chair, "the definition of progressive disease for this protocol is developing CRAB criteria that requires treatment systemically." Per Dr Engel, based on today's exam and lab results thus far, patient does not have any s/s of CRAB: Normal Calcium level (9.5), absence of Renal insufficiency (serum creatinine 1.7, and GFR 46 per [place]) --patient with a history of kidney dysfunction secondary to diabetes; Dr. Engel aware of these values and not " "concerned for myeloma involvement), absence of significant Anemia (hemoglobin 11.3, stable; will await myeloma labs for clarity relationship to myeloma) and absence of lytic Bone lesions (per baseline metastatic survey as well as MRI--see MD note for further comment).   See MD note for ECOG score and H&P and flowsheets for laboratory work, vitals, etc. All myeloma-related blood work from last cycle including SPEP and JAGUAR have remained stable, and are currently pending for this cycle.   Per Dr. Engel, patient shows no evidence of progression at last result. Patient informed that Dr. Engel will release all lab results to [facility]. He states understanding of this. QOL's required at this timepoint and completed independently by patient.           Encouraged patient to reach out/contact staff if feels need to discuss any issues. He states understanding.  Patient states he feels as if talking to someone about helping him deal with grief/anger issues will "help me immensely" April follow up appt with Dr Engel confirmed with subject; he states he will contact this team with any conflicts.    Will document on case report forms last month's missed appointments per subject's personal situations; cycle 50 Day 28 appointment missed. See note from Feb regarding this. Moving forward, per Dr Engel, subject will be seen monthly as per usual with this date today as baseline date.   Patient states having research RN's contact information and has MD contact information to call with any concerns, questions, new or worsening of symptoms.  Discussed next month's appt date and time, he verbalized understanding and states will notify research team/Dr Engel clinic staff if not able to keep appts as scheduled.              "

## 2020-03-12 NOTE — LETTER
March 15, 2020        Silvana Engel MD  [address]  [facility] [address]             [facility] [facility]  [address] [facility] [address]  Phone: [phone]  Fax: [phone]   Patient: Emerson Menendez   MR Number: [id number]   YOB: 1945   Date of Visit: 3/12/2020       Dear Dr. Engel:    Thank you for referring Emerson Menendez to me for evaluation. Below are the relevant portions of my assessment and plan of care.     Emerson Menendez is a 74 y.o. male referred by Silvana Engel MD for psychological evaluation and treatment.  Mr. Menendez appears to be coping with difficulty with the responses of other family members to his daughter's death.He has been irregular in his antidepressant use (now back on schedule). He has also discontinued participation in activities which normally help him manage his stress (exercise, time with friends, discussions with ).  Patient was encouraged to speak to his primary care physician about his response to Celexa and his use of Valium for sleep. He is interested in CBT to address depression/anxiety/insomnia and will follow up with me for that purpose. He was also encouraged to improve his sleep hygiene.    If you have questions, please do not hesitate to call me. I look forward to following Emerson along with you.    Sincerely,      Amish Perkins, PhD           CC  Roberta Sagastume MD

## 2020-03-13 LAB
ALBUMIN SERPL ELPH-MCNC: 3.66 G/DL (ref 3.35–5.55)
ALPHA1 GLOB SERPL ELPH-MCNC: 0.34 G/DL (ref 0.17–0.41)
ALPHA2 GLOB SERPL ELPH-MCNC: 0.88 G/DL (ref 0.43–0.99)
B-GLOBULIN SERPL ELPH-MCNC: 0.99 G/DL (ref 0.5–1.1)
GAMMA GLOB SERPL ELPH-MCNC: 1.83 G/DL (ref 0.67–1.58)
INTERPRETATION SERPL IFE-IMP: NORMAL
KAPPA LC SER QL IA: 6.63 MG/DL (ref 0.33–1.94)
KAPPA LC/LAMBDA SER IA: 3.68 (ref 0.26–1.65)
LAMBDA LC SER QL IA: 1.8 MG/DL (ref 0.57–2.63)
PATHOLOGIST INTERPRETATION IFE: NORMAL
PATHOLOGIST INTERPRETATION SPE: NORMAL
PROT SERPL-MCNC: 7.7 G/DL (ref 6–8.4)

## 2020-03-15 PROBLEM — F43.23 ADJUSTMENT DISORDER WITH MIXED ANXIETY AND DEPRESSED MOOD: Status: ACTIVE | Noted: 2020-03-15

## 2020-03-15 NOTE — PROGRESS NOTES
PSYCHO-ONCOLOGY INTAKE    Diagnostic Interview - CPT 18734    Date: 3/12/2020  Site: [facility]     Evaluation Length (direct face-to-face time):  2 hours     Referral Source:Oncologist: Silvana Engel MD    PCP: Roberta Sagastume MD    Clinical status of patient: Outpatient    Emerson Menendez, a 74 y.o. male, seen for initial evaluation visit.  Met with patient.depression, anger and anxiety    Chief complaint/reason for encounter: adjustment to illness,     Medical/Surgical History:    Patient Active Problem List   Diagnosis    Essential hypertension    Pure hypercholesterolemia    Microalbuminuria due to type 2 diabetes mellitus    GERD (gastroesophageal reflux disease)    Osteoarthritis of cervical spine    Osteoarthritis of knee    Hypertrophy of breast    Lump or mass in breast    Mastodynia    Unspecified hyperplasia of prostate without urinary obstruction and other lower urinary tract symptoms (LUTS)    No diabetic retinopathy in both eyes - Both Eyes    Nuclear sclerosis - Left Eye    ED (erectile dysfunction) of organic origin    Retrograde ejaculation    BPH with urinary obstruction    Type 2 diabetes mellitus with diabetic polyneuropathy, with long-term current use of insulin    Open nondisplaced fracture of distal phalanx of right thumb - s/p I&D, closure and splinting 12/17/13    Laceration of thumb, right    Fracture of thumb, right open    Cervical spondylosis without myelopathy    Anxiety    Depression    Chronic kidney disease, stage III (moderate)    Anemia    Smoldering multiple myeloma    Primary open angle glaucoma of both eyes, moderate stage    Status post cataract extraction and insertion of intraocular lens of right eye    Disorder of ejaculation    OAB (overactive bladder)    Left leg weakness    Type 2 diabetes mellitus with hyperglycemia, with long-term current use of insulin    Overweight (BMI 25.0-29.9)    IgM deficiency    Foot pain, right     "CKD stage 3 due to type 2 diabetes mellitus    Pain of both hip joints    Nocturia    Anatomical narrow angle    Boil, axilla    Adjustment disorder with mixed anxiety and depressed mood       Health Behaviors:       ETOH Use: No       Tobacco Use: No   Illicit Drug Use:  No     Prescription Misuse:No   Caffeine: minimal   Exercise:The patient engaged in regular exercise prior to injury to knee 5 months ago.   Advanced directives:No     Family History:   Psychiatric illness: Yes ("all of my brothers and sisters take antidepressants"; daughter depressed prior to her death)    Alcohol/Drug Abuse: No     Suicide: No      Past Psychiatric History:   Inpatient treatment: No     Outpatient treatment: No     Prior substance abuse treatment: No     Suicide Attempts: No     Psychotropic Medications:  Current: Celexa and Valium       Past: none    Current medications as per below, allergies reviewed in chart.    Current Outpatient Medications   Medication    aspirin (ECOTRIN) 81 MG EC tablet    blood glucose control, normal (METER-CHECK) Soln    blood sugar diagnostic (ACCU-CHEK STEFANO PLUS TEST STRP) Strp    blood-glucose meter (ACCU-CHEK OMAYRA) Misc    cholecalciferol, vitamin D3, (VITAMIN D) 2,000 unit Cap    citalopram (CELEXA) 20 MG tablet    diazePAM (VALIUM) 5 MG tablet    flash glucose scanning reader (FREESTYLE CHRISTIANO 14 DAY READER) Misc    gabapentin (NEURONTIN) 100 MG capsule    glimepiride (AMARYL) 2 MG tablet    glucagon (human recombinant) inj 1mg/mL kit    insulin (LANTUS SOLOSTAR U-100 INSULIN) glargine 100 units/mL (3mL) SubQ pen    lancets Misc    latanoprost 0.005 % ophthalmic solution    losartan (COZAAR) 25 MG tablet    meloxicam (MOBIC) 15 MG tablet    omeprazole (PRILOSEC) 20 MG capsule    pen needle, diabetic (BD ULTRA-FINE SHORT PEN NEEDLE) 31 gauge x 5/16" Ndle    pravastatin (PRAVACHOL) 40 MG tablet    tadalafil (CIALIS) 20 MG Tab    tadalafil (CIALIS) 20 MG Tab    tadalafil " (CIALIS) 5 MG tablet    tamsulosin (FLOMAX) 0.4 mg Cap    tizanidine (ZANAFLEX) 4 MG tablet    TRUEPLUS LANCETS 33 gauge [facility]     No current facility-administered medications for this visit.         CAM Therapies: None           Social situation/Stressors: Emerson Menendez lives alone (time split between [place] and [place]).  He is a semi-retired ).   Emerson Menendez has been  2x ( from his daughter's mother 40 years ago) and has 1 (now  daughter) and 2 grandchildren (granddaughter 26 in [place], grandson 19 in ).  He has 4 living sisters and 1 living brother. His mother is also alive.  Mr. Menendez was in a romantic relationship with a long-term girlfriend that broke up 9 months ago). The patient reports good social support from his brother and his . He and one of his sisters sometimes experience tension related to the care of their mother. He normally has an adequate relationship with his ex-wife.  Emerson Menendez is an active member of the [employer].  Emerson Menendez's hobbies include exercise, travel, and involvement in non-profit work ([place] and [facility], [facility] [employer] board).  Additional stressors: Bereavement   Death of daughter 2020 after a long illness- grandson and granddaughter are dealing poorly with the death and have both mismanaged financial decisions (including related to daughter's )   Aunt's death same week as daughter   Mother's health- stroke 3 weeks ago    Strengths:Housing stability, Able to vocalize needs, Values and traditions, Setting and pursuing goals, hopes, dreams, aspirations, Resources - social, interpersonal, monetary, Vocational interests, hobbies and/or talents, Interpersonal relationships and supports available - family, relatives, friends, Cultural/spiritual/Church and community involvement and Financial stability  Liabilities: Complicated medical illness and  Other: bereavement    Current Evaluation:     Mental Status Exam: Emerson Menendez was seen at the request of Dr. Engel due to his distress during her visit. The patient was fully cooperative throughout the interview and was an adequate, though digressive and rambling, historian   Appearance: younger than stated age, casually  dressed, well groomed  Behavior/Cooperation: friendly and cooperative  Speech: appropriate quality, quantity and organization of sentences, slightly loud and moderately pressured  Mood: angry, anxious, depressed  Affect: agitated   Thought Process: goal-directed, logical  Thought Content: normal, no suicidality, no homicidality, delusions, or paranoia;did not appear to be responding to internal stimuli during the interview.   Orientation: grossly intact  Memory: Grossly intact  Attention Span/Concentration: Attends to interview without distraction; reports subjective difficulty  Fund of Knowledge: average  Estimate of Intelligence: average from verbal skills and history  Cognition: grossly intact  Insight: patient has awareness of illness; good insight into own behavior and behavior of others  Judgment: the patient's behavior is adequate to circumstances    Distress Score             Practical Problems Physical Problems                                                   Family Problems                                         Emotional Problems                                                         Spiritual/Religions Concerns               Other Problems            MMSE:     Pain: pain due to knee injury    History of present illness:  The patient has been diagnosed with smoldering myeloma (12/2015) and is on observational regimen due to research trial.    Emerson Menendez has adjusted to illness fairly well primarily through active coping strategies, focus on alternative activities, focus on work, focus on family and exercise. He has engaged in appropriate information gathering. HIs  "primary stressors at the present time are social in nature (see above).  The patient has an excellent partnership with his [facility] oncology treatment team. The patient reports the following barriers to cancer care:none.   Symptoms:   · Mood: depressed mood, diminished interest, weight loss, insomnia, hypersomnia, psychomotor agitation, fatigue, worthlessness/guilt, tearfulness and social isolation;  prior depression: after daughter's aneurysm 12 years ago, afer breakup with girlfriend 9 months ago and no SI/HI; PHQ-9=20  Lisseth: Denies Psychosis: Denies   · Anxiety: Feeling nervous, anxious, or on edge, Uncontrollable worry (about work, grandchildren, daughter prior to her death), Excessive worry (interfering with sleep, enjoyment), Difficulty relaxing, Restlessness and Irritability; anxiety throughout adulthood; previously started Celexa as per PCP several years ago; missed several doses around daughter's death, but now back on (at lower dose than prescribed);  SARA-7=20  · Substance abuse: denied  · Cognitive functioning: denied  · Health behaviors: no exercise due to knee pain  · Sleep:  restless sleep  and interrupted sleep , no sleep onset difficulty  and early AM awakening without return to sleep, no EDS , no reported apneic events and occasional naps, (+) psychophysiological factors (+) use of OTC Tylenol PM when wakes in early AM- leads to grogginess  ; uses Valium for sleep ("doesn't last long-enough")        Assessment - Diagnosis - Goals:       ICD-10-CM ICD-9-CM   1. Adjustment disorder with mixed anxiety and depressed mood F43.23 309.28       Plan:individual psychotherapy and medication management by physician    Summary and Recommendations  Emerson Menendez is a 74 y.o. male referred by Silvana Engel MD for psychological evaluation and treatment.  Mr. Menendez appears to be coping with difficulty with the responses of other family members to his daughter's death.He has been irregular in his antidepressant " use (now back on schedule). He has also discontinued participation in activities which normally help him manage his stress (exercise, time with friends, discussions with ).  Patient was encouraged to speak to his primary care physician about his response to Celexa and his use of Valium for sleep. He is interested in CBT to address depression/anxiety/insomnia and will follow up with me for that purpose. He was also encouraged to improve his sleep hygiene.    GOALS:   Increase exercise  Discuss psychotropic medication options with PCP  Be Well Audio relaxation exercises   No iPad in bed  Decrease contact with annoying people    Amish Casas, PhD  Clinical Psychologist  [place] License #[id number]

## 2020-03-16 ENCOUNTER — TELEPHONE (OUTPATIENT)
Dept: INFUSION THERAPY | Facility: HOSPITAL | Age: 75
End: 2020-03-16

## 2020-03-18 ENCOUNTER — PATIENT MESSAGE (OUTPATIENT)
Dept: PSYCHIATRY | Facility: CLINIC | Age: 75
End: 2020-03-18

## 2020-03-19 ENCOUNTER — OFFICE VISIT (OUTPATIENT)
Dept: PSYCHIATRY | Facility: CLINIC | Age: 75
End: 2020-03-19
Payer: MEDICARE

## 2020-03-19 DIAGNOSIS — D47.2 SMOLDERING MULTIPLE MYELOMA: ICD-10-CM

## 2020-03-19 DIAGNOSIS — F43.23 ADJUSTMENT DISORDER WITH MIXED ANXIETY AND DEPRESSED MOOD: Primary | ICD-10-CM

## 2020-03-19 PROCEDURE — 90834 PR PSYCHOTHERAPY W/PATIENT, 45 MIN: ICD-10-PCS | Mod: HCNC,95,, | Performed by: PSYCHOLOGIST

## 2020-03-19 PROCEDURE — 90834 PSYTX W PT 45 MINUTES: CPT | Mod: HCNC,95,, | Performed by: PSYCHOLOGIST

## 2020-03-19 RX ORDER — TADALAFIL 20 MG/1
20 TABLET ORAL DAILY PRN
Qty: 30 TABLET | Refills: 1 | Status: SHIPPED | OUTPATIENT
Start: 2020-03-19 | End: 2020-04-22

## 2020-03-19 RX ORDER — DIAZEPAM 5 MG/1
TABLET ORAL
Qty: 60 TABLET | Refills: 1 | Status: SHIPPED | OUTPATIENT
Start: 2020-03-19 | End: 2020-04-22 | Stop reason: SDUPTHER

## 2020-03-19 RX ORDER — INSULIN GLARGINE 100 [IU]/ML
20 INJECTION, SOLUTION SUBCUTANEOUS NIGHTLY
Qty: 1 BOX | Refills: 6 | Status: SHIPPED | OUTPATIENT
Start: 2020-03-19 | End: 2020-04-22 | Stop reason: SDUPTHER

## 2020-03-19 NOTE — PROGRESS NOTES
Telemedicine PSYCHO-ONCOLOGY NOTE/ Individual Psychotherapy       Consultation started: 3/19/2020 at 8:52 AM   The chief complaint leading to consultation is: adjustment, bereavement  The patient location is:  Patient home in [place]  Virtual visit with synchronous audio and video  Patient alone at the time of consultation      Each patient provided medical services by telemedicine is:  (1) informed of the relationship between the physician and patient and the respective role of any other health care provider with respect to management of the patient; and (2) notified that he or she may decline to receive medical services by telemedicine and may withdraw from such care at any time.    Date: 3/19/2020   Site of therapist:  [facility]         Therapeutic Intervention: Met with patient.  Outpatient - Behavior modifying psychotherapy 45 min - CPT code 13444    Referring provider:    Chief complaint/reason for encounter: depression   Met with patient to evaluate psychosocial adaptation to bereavement    Patient was last seen by me on 3/12/2020    Objective:      Emerson Menendez arrived promptly for the session.  Mr. Menendez was independently ambulatory at the time of session. The patient was fully cooperative throughout the session.  Appearance: age appropriate, casually  dressed, adequately  groomed  Behavior/Cooperation: friendly and cooperative  Speech: normal in rate, volume, and tone and appropriate quality, quantity and organization of sentences  Mood: dysthymic  Affect: anxious and dysphoric  Thought Process: goal-directed, logical  Thought Content: normal,  No delusions or paranoia; did not appear to be responding to internal stimuli during the session  Orientation: grossly intact  Memory: Grossly intact  Attention Span/Concentration: Attends to session without distraction; reports no difficulty  Fund of Knowledge: average  Estimate of Intelligence: average from verbal skills and  history  Cognition: grossly intact  Insight: patient has awareness of illness; good insight into own behavior and behavior of others  Judgment: the patient's behavior is adequate to circumstances      Interval history and content of current session: Patient discussed events/activities since the time of last visits. Patient's life plans have been significantly disrupted due to COVID-19 concerns. He is going back to [place] to be closer to family and to avoid feared increased risk of [place] exposure.  He is concerned about coping with his grandson and increased reminders of his daughter in that location.  Coping strategies explored. Reports to be coping adequately, but not ideally (sleeping too much).  He is also getting minimal exercise (3K steps/day) and not keeping to his normal diabetic diet (rice, shake, crackers yesterday- AM elevation in blood glucose-280). He feels that being in [place] with family will increase his activity level and accountability in all of these areas. Evaluated cognitive response, paying particular attention to negative intrusive thoughts of a persistent and detrimental nature. Thoughts of this type are in evidence with moderate distress. Identified and evaluated psychosocial and environmental stressors secondary to diagnosis and treatment.     Focused on providing cognitive behavioral therapy to address negative cognitions. Examined proactive behaviors that may be implemented to minimize or ameliorate psychosocial stressors secondary to diagnosis and treatment.     Risk parameters:   Patient reports no suicidal ideation  Patient reports no homicidal ideation  Patient reports no self-injurious behavior  Patient reports no violent behavior   Safety needs:  None at this time      Verbal deficits: None     Patient's response to intervention:The patient's response to intervention is accepting, motivated.     Progress toward goals and other mental status changes:  The patient's  progress toward goals is fair .      Progress to date:No Progress - Continue Objectives      Goals from last visit: Not attempted      Patient reported outcomes:      Distress Thermometer:   Distress Score    Distress Score: 7        Practical Problems Physical Problems                                                   Family Problems                                         Emotional Problems                                                         Spiritual/Religions Concerns               Other Problems              PHQ-9= Not completed on this date   SARA-7= Not completed on this date     Client Strengths: verbal, intelligent, successful, good social support, good insight, commitment to wellness, strong carolyn, strong cultural traditions      Treatment Plan:individual psychotherapy and medication management by physician  · Target symptoms: depression, anxiety , adjustment, grief  · Why chosen therapy is appropriate versus another modality: relevant to diagnosis, patient responds to this modality, evidence based practice  · Outcome monitoring methods: self-report, checklist/rating scale  · Therapeutic intervention type: behavior modifying psychotherapy  · Prognosis: Good      Behavioral goals:    Exercise:  Home exercise with treadmill and biking with brother daily   Stress management: seek family's assistance when dealing with grandson   Social engagement:   Nutrition:  Back on diabetic diet- search for lower carbohydrate night snack foods   Smoking Cessation:   Therapy: out of bed daily        Return to clinic: as needed; Availability of video visits throughout the state discussed. Patient will contact us to set additional visits as he determines short-term goals in the next few weeks     Length of Service (minutes direct face-to-face contact): 45    Diagnosis:    1. Adjustment disorder with mixed anxiety and depressed mood  F43.23 309.28                Amish Perkins, PhD  [place] License #[id number]

## 2020-03-23 ENCOUNTER — PATIENT MESSAGE (OUTPATIENT)
Dept: RESEARCH | Facility: HOSPITAL | Age: 75
End: 2020-03-23

## 2020-03-25 ENCOUNTER — PATIENT OUTREACH (OUTPATIENT)
Dept: OTHER | Facility: OTHER | Age: 75
End: 2020-03-25

## 2020-03-25 NOTE — PROGRESS NOTES
Digital Medicine: Clinician Follow-Up    Called patient to follow up regarding the DDMP (Diabetes Digital Medicine Program). Patient endorses adherence to medication regimen. Patient denies hypoglycemic s/sx (dizziness, extreme hunger, headaches, confusion, trouble concentrating, sweating, shaking, blurred vision, personality changes); patient denies hyperglycemic s/sx (increased thirst, increased urination, headaches, trouble concentrating, blurred vision, fatigue).    He reports that he is working on his diet since the passing of daughter. He states that in the mornings he notices readings are elevated, likely due to midnight snacks. He snacks on sweets that are usually for his grandchildren.     The history is provided by the patient.     Follow Up  Follow-up reason(s): reading review and routine education      Readings are trending up     Assessment:  Reviewed recent readings. Per ADA guidelines (goal A1C < 7%), DM need to be addressed more throroughly today.    Last 6 Patient Entered Readings                                          Most Recent A1c: 7.6% on 12/10/2019  (Goal: 7%)     Recent Readings 3/25/2020 3/24/2020 3/24/2020 3/24/2020 3/23/2020    Blood Glucose (mg/dL) 161 151 121 223 165         INTERVENTION(S)  reviewed appropriate dose schedule, reviewed monitoring technique, encouragement/support and goal setting    PLAN  patient verbalizes understanding and continue monitoring    >Continue current medication regimen.  >Suggested low carbohydrate and low sugar snack options for patient to try such as vegetable tray or half cup of sugar free ice cream.   >I will continue to monitor regularly and will follow-up in 3 to 4 weeks, sooner if blood sugar begins to trend upward or downward.   >Patient has my contact information and knows to call with any concerns or clinical changes.        Topic    Eye Exam        Diabetes Medications             glimepiride (AMARYL) 2 MG tablet TAKE 1 TABLET (2 MG TOTAL) BY  MOUTH ONCE DAILY.    glucagon (human recombinant) inj 1mg/mL kit Inject 1 mL (1 mg total) into the muscle as needed.    insulin (LANTUS SOLOSTAR U-100 INSULIN) glargine 100 units/mL (3mL) SubQ pen Inject 20 Units into the skin every evening.

## 2020-03-26 ENCOUNTER — RESEARCH ENCOUNTER (OUTPATIENT)
Dept: RESEARCH | Facility: HOSPITAL | Age: 75
End: 2020-03-26

## 2020-03-26 NOTE — PROGRESS NOTES
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   Sponsor:  [facility]  IRB# [id number]  Study ID: [id number]  Investigator: GIL Duke  Pt Initials: LUCÍA LORENZ       Arm B: Observation  Telephone Call Re: Converting April 9th appointment with Dr Duke to Virtual Visit.     Contacted subject to inform him that his next appointment will be conducted via virtual visit due to COVID-19 guidelines per this institution regarding patient visits. Rationale that patient safety is primary concern; lessening risk of exposure to COVID 19 by virtual vs in person visit. Subject states understanding. He also states he is currently in [place], [place] and is agreeable to switching his lab appt to there as he intends to stay in [facility] next month. Subject states he has iPad and phone and has access/active on [facility]; that he believes he has already checked capability as he has virtual appt next week. Agrees to leave lab appt at 8am on 4/9/20 and Dr Duke at 9am on same date. Confirmed subject has contact numbers for research team. Informed subject this RN will contact him prior to virtual visit with Dr Duke to insure he has virtual capability/trouble shoot any issues. He states agreement.

## 2020-04-02 ENCOUNTER — TELEPHONE (OUTPATIENT)
Dept: PSYCHIATRY | Facility: CLINIC | Age: 75
End: 2020-04-02

## 2020-04-02 ENCOUNTER — RESEARCH ENCOUNTER (OUTPATIENT)
Dept: RESEARCH | Facility: HOSPITAL | Age: 75
End: 2020-04-02

## 2020-04-02 NOTE — PROGRESS NOTES
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   Sponsor:  [facility]  IRB# [id number]  Study ID: [id number]  Investigator: GIL Engel  Pt Initials: LUCÍA LORENZ       Arm B: Observation  Telephone Call Re: Patient Notification No Labs to be Collected Prior to Virtual Visit 4/9/20L     Contacted subject to inform him that per Dr Engel, no labs to be collected next week prior to virtual visit in order to reduce exposure and in lieu of COVID 19 crisis/high number of cases where subject is currently located. Subject reports he has been adhering to the stay at home mandate in place and denies any fever, shortness of breath, loss of taste or smell, cough or any new symptoms. 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 Reviewed contact information with him for Dr Engel and this research RN in the event he has any questions or concerns prior to appt.   "

## 2020-04-03 ENCOUNTER — OFFICE VISIT (OUTPATIENT)
Dept: PSYCHIATRY | Facility: CLINIC | Age: 75
End: 2020-04-03
Payer: MEDICARE

## 2020-04-03 DIAGNOSIS — F43.23 ADJUSTMENT DISORDER WITH MIXED ANXIETY AND DEPRESSED MOOD: Primary | ICD-10-CM

## 2020-04-03 PROCEDURE — 90834 PR PSYCHOTHERAPY W/PATIENT, 45 MIN: ICD-10-PCS | Mod: 95,HCNC,, | Performed by: PSYCHOLOGIST

## 2020-04-03 PROCEDURE — 90834 PSYTX W PT 45 MINUTES: CPT | Mod: 95,HCNC,, | Performed by: PSYCHOLOGIST

## 2020-04-03 NOTE — PROGRESS NOTES
Telemedicine PSYCHO-ONCOLOGY NOTE/ Individual Psychotherapy       Consultation started: 4/3/2020 at 10:00 AM   The chief complaint leading to consultation is: adjustment, bereavement  The patient location is:  Patient home in [place]  Virtual visit with synchronous audio and video  Patient alone at the time of consultation      Each patient provided medical services by telemedicine is:  (1) informed of the relationship between the physician and patient and the respective role of any other health care provider with respect to management of the patient; and (2) notified that he or she may decline to receive medical services by telemedicine and may withdraw from such care at any time.    Date: 4/3/2020   Site of therapist:  [facility]         Therapeutic Intervention: Met with patient.  Outpatient - Behavior modifying psychotherapy 45 min - CPT code 95624    Initial referring provider: Silvana Engel MD    Chief complaint/reason for encounter: depression   Met with patient to evaluate psychosocial adaptation to bereavement    Patient was last seen by me on 3/19/2020    Objective:      Emerson Menendez arrived promptly for the session.  Mr. Menendez was independently ambulatory at the time of session. The patient was fully cooperative throughout the session.  Appearance: age appropriate, casually  dressed, adequately  groomed  Behavior/Cooperation: friendly and cooperative  Speech: normal in rate, volume, and tone and appropriate quality, quantity and organization of sentences  Mood: dysthymic  Affect: anxious and dysphoric  Thought Process: goal-directed, logical  Thought Content: normal,  No delusions or paranoia; did not appear to be responding to internal stimuli during the session  Orientation: grossly intact  Memory: Grossly intact  Attention Span/Concentration: Attends to session without distraction; reports no difficulty  Fund of Knowledge: average  Estimate of Intelligence: average from verbal skills  and history  Cognition: grossly intact  Insight: patient has awareness of illness; good insight into own behavior and behavior of others  Judgment: the patient's behavior is adequate to circumstances      Interval history and content of current session: Patient discussed events/activities since the time of last visits. Patient is now in [place], caring for his grandson and greatgrandson (5) He states this has been a wonderful experience and has improved his relationship with his grandchildren. Communication with his grandson is vastly improved.  His ex-wife has been diagnosed with COVID and is in the hospital. Patient  reports to be coping adequately, but not ideally (sleeping too much).  He has engaged in greater activity, but continues to struggle with sleeping each afternoon.  He continues getting minimal exercise (4K steps/day), but is working toward improving his diet (has switched back to sugar free snacks).  Examined proactive behaviors that may be implemented to minimize or ameliorate psychosocial stressors.     Risk parameters:   Patient reports no suicidal ideation  Patient reports no homicidal ideation  Patient reports no self-injurious behavior  Patient reports no violent behavior   Safety needs:  None at this time      Verbal deficits: None     Patient's response to intervention:The patient's response to intervention is accepting, motivated.     Progress toward goals and other mental status changes:  The patient's progress toward goals is fair .      Progress to date:Progress - Ongoing, but Slow      Goals from last visit: Attempted, partially met      Patient reported outcomes:      Distress Thermometer:   Distress Score    Distress Score: 5        Practical Problems Physical Problems                                                   Family Problems                                         Emotional Problems                                                         Spiritual/Religions Concerns                Other Problems              PHQ-9= Not completed on this date   SARA-7= Not completed on this date     Client Strengths: verbal, intelligent, successful, good social support, good insight, commitment to wellness, strong carolyn, strong cultural traditions      Treatment Plan:individual psychotherapy and medication management by physician  · Target symptoms: depression, anxiety , adjustment, grief  · Why chosen therapy is appropriate versus another modality: relevant to diagnosis, patient responds to this modality, evidence based practice  · Outcome monitoring methods: self-report, checklist/rating scale  · Therapeutic intervention type: behavior modifying psychotherapy  · Prognosis: Good      Behavioral goals:    Exercise:  Home exercise with treadmill and biking    Stress management:    Social engagement:   Nutrition:  Continue focus on diabetic-appropriate diet   Smoking Cessation:   Therapy: out of bed daily- decreased valium dose at night since feels sluggish in AM    Back to appropriate/prescribed Celexa dose    Call Dr. Sagastume for video visit (if possible)        Return to clinic: 2 weeks     Length of Service (minutes direct face-to-face contact): 45     Diagnosis:    1. Adjustment disorder with mixed anxiety and depressed mood       [phone]           Amish Perkins, PhD  [place] License #[id number]

## 2020-04-09 ENCOUNTER — OFFICE VISIT (OUTPATIENT)
Dept: HEMATOLOGY/ONCOLOGY | Facility: CLINIC | Age: 75
End: 2020-04-09
Payer: MEDICARE

## 2020-04-09 ENCOUNTER — RESEARCH ENCOUNTER (OUTPATIENT)
Dept: RESEARCH | Facility: HOSPITAL | Age: 75
End: 2020-04-09

## 2020-04-09 DIAGNOSIS — D47.2 SMOLDERING MULTIPLE MYELOMA: Primary | ICD-10-CM

## 2020-04-09 PROCEDURE — 1159F MED LIST DOCD IN RCRD: CPT | Mod: 95,,, | Performed by: INTERNAL MEDICINE

## 2020-04-09 PROCEDURE — 1101F PT FALLS ASSESS-DOCD LE1/YR: CPT | Mod: CPTII,95,, | Performed by: INTERNAL MEDICINE

## 2020-04-09 PROCEDURE — 99215 OFFICE O/P EST HI 40 MIN: CPT | Mod: 95,,, | Performed by: INTERNAL MEDICINE

## 2020-04-09 PROCEDURE — 1159F PR MEDICATION LIST DOCUMENTED IN MEDICAL RECORD: ICD-10-PCS | Mod: 95,,, | Performed by: INTERNAL MEDICINE

## 2020-04-09 PROCEDURE — 99215 PR OFFICE/OUTPT VISIT, EST, LEVL V, 40-54 MIN: ICD-10-PCS | Mod: 95,,, | Performed by: INTERNAL MEDICINE

## 2020-04-09 PROCEDURE — 1101F PR PT FALLS ASSESS DOC 0-1 FALLS W/OUT INJ PAST YR: ICD-10-PCS | Mod: CPTII,95,, | Performed by: INTERNAL MEDICINE

## 2020-04-09 NOTE — PROGRESS NOTES
Subjective:   The patient location is: home ([place])  The chief complaint leading to consultation is: smoldering myeloma  Visit type: Virtual visit with synchronous audio and video  Total time spent with patient: 15 minutes  Each patient to whom he or she provides medical services by telemedicine is:  (1) informed of the relationship between the physician and patient and the respective role of any other health care provider with respect to management of the patient; and (2) notified that he or she may decline to receive medical services by telemedicine and may withdraw from such care at any time.       Patient ID: Emerson Menendez is a 74 y.o. male.    Chief Complaint: No chief complaint on file.  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 Test results identify a stable IgA kappa protein with beta globulin band at 1.63g/dL. Kappa free light chain is elevated at 4.40. CBC and calcium are stable. Creatinine with history of stage III CKD is stable at 1.4. Metastatic survey is negative for lytic lesions. MRI of the entire spine demonstrated age related changes but no convincing evidence of myeloma bone disease. Bone marrow biopsy identified about 13% plasma cells by morphology and FISH for myeloma identified a t(11;14) and trisomies 3,7, and 17. The patient is afebrile and appears clinically well. He was seen by his podiatrist and nephrologist since our last visit without any new or acute events.    The patient has not received any therapy for smoldering myeloma including bisphosphonates or steroids. He has been randomized to observation arm of the the clinical study. The patient has a history of mild, less than grade 1 peripheral neuropathy of bilateral lower extremities that is  not likely hernadez to his plasma cell dyscrasia. He has no current or prior history of malignancy.    TODAY  Mr. Menendez returns today for monthly follow-up evaluation (virtual due to COVID19). No acute interval events. Currently in [place] living with his grandsons and relationship with them is better at this time. Due to COVID19 they are all staying at home and staying protected. No one in the family or household has had any symptoms of the virus. Plans to return to [place] in near future and will be able to do lab testing at that time.      HPI  Review of Systems   Constitutional: Negative for activity change, appetite change, diaphoresis, fatigue, fever and unexpected weight change.   HENT: Negative.    Eyes: Negative.    Respiratory: Negative.    Cardiovascular: Negative for leg swelling.   Gastrointestinal: Negative.    Endocrine: Negative.    Genitourinary: Negative.    Musculoskeletal: Negative.    Skin: Negative.    Allergic/Immunologic: Negative.    Neurological:        Grade 0-1 peripheral neuropathy of bilateral feet.    Hematological: Negative for adenopathy. Does not bruise/bleed easily.   Psychiatric/Behavioral: Negative.        Objective:       There were no vitals filed for this visit.    Physical Exam   Constitutional: He is oriented to person, place, and time. He appears well-developed and well-nourished.   HENT:   Head: Normocephalic and atraumatic.   Right Ear: External ear normal.   Left Ear: External ear normal.   Nose: Nose normal.   Mouth/Throat: Oropharynx is clear and moist.   Eyes: Pupils are equal, round, and reactive to light. Conjunctivae and EOM are normal.   Neck: Normal range of motion. Neck supple.   Cardiovascular: Normal rate, regular rhythm and intact distal pulses.   No murmur heard.  Pulmonary/Chest: Effort normal and breath sounds normal. No respiratory distress.   Abdominal: Soft. Bowel sounds are normal. He exhibits no distension. There is no hepatosplenomegaly.    Musculoskeletal: He exhibits no edema or tenderness.   Neurological: He is alert and oriented to person, place, and time. No cranial nerve deficit.   Skin: Skin is warm and dry. No rash noted. No cyanosis. Nails show no clubbing.   Psychiatric: He has a normal mood and affect. His behavior is normal.   Nursing note and vitals reviewed.      Assessment:       No diagnosis found.    Plan:       The patient has a diagnosis of smoldering myeloma. There is no indication for immediate chemotherapy. We are monitoring renal function closely- baseline creatinine of -1.5..  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 Total protein remains normal, M protein has been stable. No labs prior to this virtual visit due to COVID19. Plan to complete labs when Emerson returns to [place] and he will let us know of this date.  Plan for return in 1 month.  Endocrinology and Nephrology to monitor diabetes and renal failure respectively.

## 2020-04-09 NOTE — PROGRESS NOTES
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   Sponsor:  [facility]  IRB# [id number]  Study ID: [id number]  Investigator: GIL Engel Pt Initials: LUCÍA LORENZ        Cycle 52 Day 28 Arm B: Observation  Telephone Call Re: Patient Reminder regarding virtual visit with Dr Engel this morning at 9am/Research Note For Current Cycle (52D28)    NOTE: Due to COVID 19 institutional policies currently in place, all patient visits are being conducted virtually to minimize patient exposure/risk. No vital signs or weight were obtained; no safety labs were collected as per Dr Engel's ok since subject in currently in [facility] where COVID outbreak is significant and subject not comfortable going to lab there. Refer to MD note for details for virtual/audio visit conducted today.     8:30am: Contacted subject this morning to remind him that  Dr Engel will be doing virtual visit at 9am as per previously scheduled.   Subject confirms he has capability and that he had no issues with visit last week with another provider. Subject reports he has been adhering to the stay at home mandate in place and denies any fever, shortness of breath, loss of taste or smell, cough or any new symptoms.    Reviewed virtual visit details with subject regarding appt this morning; states he understands process; states he will complete appt precheck and knows he must wait until within 15 minutes of appt to log on to actual visit. Reviewed contact information with him for Dr Engel and this research RN in the event he has any questions or concerns prior to appt. Informed subject that Dr Engel will call him if virtual set up does not work; he states understanding.    10:00am: contacted per Dr Engel that subject had completed audio visit only as virtual visit capability throughout system currently down. Per Dr Engel subject doing well; no  new adverse events or worsening of current adverse events. Refer to Dr Engel's note dated today.   Per Dr Engel, subject states he will be in [facility] in the near future (next week to two weeks) and agrees to have labs done here at [facility] when he is in town. Subject given guidance to contact this research RN to set up lab appt when he is in town; verbalizes understanding.   Review of AE's:    As per above, unable to assess hypertension, anemia, increased creatinine, hyponatremia, hyoalbuminemia, or hyperglycemia as no labs collected at time of call/audio visit per Dr Engel today.   Lower Back/Neck Pain, Grade 1: subject verbalizes no change to this AE at present time.   Pain in extremity, knee, Grade 1: subject states no change to this AE and continues to wear brace.   Peripheral Sensory Neuropathy, Grade 1: Patient states no change and reports no meds recently for this.  Anxiety Grade 1: per Dr Engel subject states he is adjusting well following daughter's death, States he is living with his grandchildren and is keeping his appts for psvch. Will update accordingly.    Informed subject will work with him regarding next set of appts with Dr Engel as there is still uncertainty regarding virtual vs in person. He states understanding and confirms all contact information and that he will contact this team to set up lab appt,

## 2020-04-13 ENCOUNTER — PATIENT OUTREACH (OUTPATIENT)
Dept: OTHER | Facility: OTHER | Age: 75
End: 2020-04-13

## 2020-04-13 NOTE — PROGRESS NOTES
Digital Medicine: Health  Follow-Up    The history is provided by the patient.     Follow Up  Follow-up reason(s): reading review      Readings are trending down Patient said that he has finally gotten on a routine that works for him and his BG readings. He said that he has finally settled down to a specific routine. This past week, he made a concentrated effort to get back onto his schedule. He said that he is inside with his grandson and great-grandson, so it has been a challenge controlling what he eats. Patient also mentioned that he has still been talking with his therapist via video-chat.       INTERVENTION(S)  encouragement/support    PLAN  patient verbalizes understanding and continue monitoring          Topic    Eye Exam          Last 6 Patient Entered Readings                                          Most Recent A1c: 7.6% on 12/10/2019  (Goal: 7%)     Recent Readings 4/12/2020 4/11/2020 4/11/2020 4/11/2020 4/10/2020    Blood Glucose (mg/dL) 151 115 102 101 85                    Diet Screening       Patient mentioned that he found a brand of sugar-free ice cream that he likes. He has also found a diet root beer that he likes the taste of. Patient said that he has changed and improved his snacking habits, and thinks this has helped his BG readings decrease.     Physical Activity Screening   When asked if exercising, patient responded: yes    He identified the following barriers to physical activity: no barriers to being active    Patient said that he has gone to [employer] to  some exercise equipment to exercise at his house. He is currently in [place] instead of [place]. Usually, he would exercise at the gym in his apartment. He now has a treadmill at his home in [place], and plans to begin using it this week. Encouraged and commended patient for continuing with his physical activity.     Intervention(s): goal tracking       SDOH

## 2020-04-15 ENCOUNTER — TELEPHONE (OUTPATIENT)
Dept: RESEARCH | Facility: HOSPITAL | Age: 75
End: 2020-04-15

## 2020-04-15 NOTE — TELEPHONE ENCOUNTER
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   Sponsor:  [facility]  IRB# [id number]  Study ID: [id number]  Investigator: GIL Engel Pt Initials: LUCÍA LORENZ       Telephone    Called patient to determine if he has made plans to return to [facility] in order to get blood work scheduled as previously discussed.  No answer, VM left with call back information.  Will follow.

## 2020-04-15 NOTE — PROGRESS NOTES
04/15/2020 - patient spoke with health  this week and reports that he has been changing his snack habits and plans to engage in more physical activity. Will follow up in 3 weeks. May consider increasing Lantus dose to 24 units daily.

## 2020-04-17 ENCOUNTER — TELEPHONE (OUTPATIENT)
Dept: PSYCHIATRY | Facility: CLINIC | Age: 75
End: 2020-04-17

## 2020-04-17 NOTE — TELEPHONE ENCOUNTER
Patient not able to be reached at the time of scheduled video visit. Message left.  TC Perkins, PhD

## 2020-04-22 ENCOUNTER — OFFICE VISIT (OUTPATIENT)
Dept: INTERNAL MEDICINE | Facility: CLINIC | Age: 75
End: 2020-04-22
Payer: MEDICARE

## 2020-04-22 DIAGNOSIS — N18.30 CHRONIC KIDNEY DISEASE, STAGE III (MODERATE): Chronic | ICD-10-CM

## 2020-04-22 DIAGNOSIS — Z79.4 TYPE 2 DIABETES MELLITUS WITH HYPERGLYCEMIA, WITH LONG-TERM CURRENT USE OF INSULIN: Chronic | ICD-10-CM

## 2020-04-22 DIAGNOSIS — D47.2 SMOLDERING MULTIPLE MYELOMA: ICD-10-CM

## 2020-04-22 DIAGNOSIS — E13.9 DIABETES MELLITUS DUE TO ABNORMAL INSULIN: ICD-10-CM

## 2020-04-22 DIAGNOSIS — M17.9 OSTEOARTHRITIS OF KNEE, UNSPECIFIED LATERALITY, UNSPECIFIED OSTEOARTHRITIS TYPE: ICD-10-CM

## 2020-04-22 DIAGNOSIS — E11.65 TYPE 2 DIABETES MELLITUS WITH HYPERGLYCEMIA, WITH LONG-TERM CURRENT USE OF INSULIN: Chronic | ICD-10-CM

## 2020-04-22 DIAGNOSIS — E78.00 PURE HYPERCHOLESTEROLEMIA: ICD-10-CM

## 2020-04-22 DIAGNOSIS — F43.23 ADJUSTMENT DISORDER WITH MIXED ANXIETY AND DEPRESSED MOOD: ICD-10-CM

## 2020-04-22 DIAGNOSIS — I10 ESSENTIAL HYPERTENSION: ICD-10-CM

## 2020-04-22 DIAGNOSIS — K21.9 GASTROESOPHAGEAL REFLUX DISEASE WITHOUT ESOPHAGITIS: Primary | ICD-10-CM

## 2020-04-22 DIAGNOSIS — F43.21 GRIEF: ICD-10-CM

## 2020-04-22 PROCEDURE — 99499 UNLISTED E&M SERVICE: CPT | Mod: HCNC,95,, | Performed by: INTERNAL MEDICINE

## 2020-04-22 PROCEDURE — 99215 OFFICE O/P EST HI 40 MIN: CPT | Mod: 95,,, | Performed by: INTERNAL MEDICINE

## 2020-04-22 PROCEDURE — 1101F PR PT FALLS ASSESS DOC 0-1 FALLS W/OUT INJ PAST YR: ICD-10-PCS | Mod: CPTII,95,, | Performed by: INTERNAL MEDICINE

## 2020-04-22 PROCEDURE — 1101F PT FALLS ASSESS-DOCD LE1/YR: CPT | Mod: CPTII,95,, | Performed by: INTERNAL MEDICINE

## 2020-04-22 PROCEDURE — 1159F MED LIST DOCD IN RCRD: CPT | Mod: 95,,, | Performed by: INTERNAL MEDICINE

## 2020-04-22 PROCEDURE — 99499 RISK ADDL DX/OHS AUDIT: ICD-10-PCS | Mod: HCNC,95,, | Performed by: INTERNAL MEDICINE

## 2020-04-22 PROCEDURE — 3051F HG A1C>EQUAL 7.0%<8.0%: CPT | Mod: CPTII,95,, | Performed by: INTERNAL MEDICINE

## 2020-04-22 PROCEDURE — 99215 PR OFFICE/OUTPT VISIT, EST, LEVL V, 40-54 MIN: ICD-10-PCS | Mod: 95,,, | Performed by: INTERNAL MEDICINE

## 2020-04-22 PROCEDURE — 1159F PR MEDICATION LIST DOCUMENTED IN MEDICAL RECORD: ICD-10-PCS | Mod: 95,,, | Performed by: INTERNAL MEDICINE

## 2020-04-22 PROCEDURE — 3051F PR MOST RECENT HEMOGLOBIN A1C LEVEL 7.0 - < 8.0%: ICD-10-PCS | Mod: CPTII,95,, | Performed by: INTERNAL MEDICINE

## 2020-04-22 RX ORDER — DIAZEPAM 5 MG/1
TABLET ORAL
Qty: 60 TABLET | Refills: 0 | Status: SHIPPED | OUTPATIENT
Start: 2020-04-22 | End: 2020-07-27

## 2020-04-22 RX ORDER — GABAPENTIN 100 MG/1
CAPSULE ORAL
Qty: 450 CAPSULE | Refills: 3 | Status: SHIPPED | OUTPATIENT
Start: 2020-04-22 | End: 2021-02-11 | Stop reason: SDUPTHER

## 2020-04-22 RX ORDER — LANCETS
EACH MISCELLANEOUS
Qty: 200 EACH | Refills: 4 | Status: SHIPPED | OUTPATIENT
Start: 2020-04-22 | End: 2021-03-03

## 2020-04-22 RX ORDER — TADALAFIL 20 MG/1
20 TABLET ORAL DAILY PRN
Qty: 30 TABLET | Refills: 1 | Status: SHIPPED | OUTPATIENT
Start: 2020-04-22 | End: 2020-04-23 | Stop reason: SDUPTHER

## 2020-04-22 RX ORDER — OMEPRAZOLE 40 MG/1
40 CAPSULE, DELAYED RELEASE ORAL DAILY
Qty: 90 CAPSULE | Refills: 4 | Status: SHIPPED | OUTPATIENT
Start: 2020-04-22 | End: 2020-06-26 | Stop reason: SDUPTHER

## 2020-04-22 RX ORDER — INSULIN PUMP SYRINGE, 3 ML
EACH MISCELLANEOUS
Qty: 1 EACH | Refills: 0 | Status: SHIPPED | OUTPATIENT
Start: 2020-04-22 | End: 2021-02-11 | Stop reason: SDUPTHER

## 2020-04-22 RX ORDER — GLIMEPIRIDE 2 MG/1
2 TABLET ORAL DAILY
Qty: 90 TABLET | Refills: 4 | Status: SHIPPED | OUTPATIENT
Start: 2020-04-22 | End: 2021-02-11 | Stop reason: SDUPTHER

## 2020-04-22 RX ORDER — INSULIN GLARGINE 100 [IU]/ML
20 INJECTION, SOLUTION SUBCUTANEOUS NIGHTLY
Qty: 1 BOX | Refills: 6 | Status: SHIPPED | OUTPATIENT
Start: 2020-04-22 | End: 2020-08-28

## 2020-04-22 RX ORDER — CITALOPRAM 20 MG/1
30 TABLET, FILM COATED ORAL DAILY
Qty: 135 TABLET | Refills: 4 | Status: SHIPPED | OUTPATIENT
Start: 2020-04-22 | End: 2020-06-26 | Stop reason: SDUPTHER

## 2020-04-22 NOTE — PROGRESS NOTES
The patient location is: home  The chief complaint leading to consultation is:Visit type: audiovisual  Total time spent with patient: 42 min  Each patient to whom he or she provides medical services by telemedicine is:  (1) informed of the relationship between the physician and patient and the respective role of any other health care provider with respect to management of the patient; and (2) notified that he or she may decline to receive medical services by telemedicine and may withdraw from such care at any time.    Notes:   CHIEF COMPLAINT:  Follow up of grief, multiple myeloma, diabetes, hypertension, reflux, hyperlipidemia    HISTORY OF PRESENT ILLNESS: This is a 74-year-old man who presents for follow up of above     Since our last visit, his daughter  of complications of lupus and ESRD. She was on dialysis and was septic. She was at home in hospice. Mr Menendez is now staying in [place]. His 19 year old grandson and 5 year old great grandson are living with him.  He has done counseling for his grief through virtual visits in psychiatry. He continues to take citalopram to 30 mg daily and diazepam 5 mg 1/2 at bedtime. He is sleeping well with this dose.  His mother was ill with a stroke and his aunt  all in February. His great grandson and his grandson keep him very busy.      HE had completed Orthovisc series injections in both knees on 11/15/19. Right knee is better. Left knee is worse. He saw Dr [facility]. He takes meloxicam 15 mg daily for his knees.  HE needs a left knee replacement. HE is wearing braces for his knees which is helping stability and his pain. HE will see ortho in [place].      He is in digitial diabetes. Sugars have been better.  Sugars are now in the 100's range.  He is on Lantus 20 units daily and glimepriride 2 mg daily. NO polydipsia or polyuria.      He has seen neprhology 18 and kidney function is stable. 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 He is in the observation arm and is being monitoring monthly. He sess Dr Engel monthly.  No indication for chemotherapy at this time. HE gets monthly blood work.        He is taking losartan 25 mg 1 tablet daily to protect his kidney. No polydipsia or polyuria       His back and neck was doing better in the healthy back program.  He does not take hydrocodone apap. He takes tizanidine as needed (rarely).      Reflux is controlled on omeprazole 20 mg 2 tablets daily. HE has heartburn when he does not take the omeprazole.  No chest pain, shortness of breath, nausea, voimting, constipation, diarrhea, numbness, weakness.        He had laser vaporization and enucleation of the prostate 13. He denies any dysuria or hematuria now. HE saw Dr Walker.  HE is urinting well. He continues FLomax 0.4 mg nightly     He has hyperlipidemia, on pravastatin 40 mg daily. No joint pain or muscle pain from the pravastatin.        He has been taking aspirin 81 mg daily vitamin D supplement 2000 units daily.         HE is taking gabapentin 100 mg 2 capsules as needed. He does not take lately. Feet are doing ok.        PAST MEDICAL HISTORY:   1. Diabetes mellitus.   2. Hyperlipidemia.   3. Reflux.   4. BPH.   5. Osteoarthritis of the knees.   6. Neck pain.   7. History of right lateral epicondylitis.   8. History of lumps removed from the breast as a teenager.   9. OA cervical spine   10. Multiple myeloma    SOCIAL HISTORY: Does not smoke, does not drink. 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 He works for the [employer].     FAMILY HISTORY: Mother is living at age 95 with a heart condition. She had a mild stroke. Father  in his late 40s of alcoholism. He has 5 sisters and 1 brother. one has a neurological disorder, one is anxious. ONe sister  after a fall. His daughter has  lupus, on dialysis, heart problems, and a brain aneurysm that was stented.       PHYSICAL EXAMINATION:       GENERAL: He is  alert, oriented, no apparent distress. Affect within normal limits.   Conjunctivae anicteric.   Speech normal      ASSESSMENT AND PLAN:    1. Diabetes mellitus with hyperglycemia and microalbuminuria - in Digital diabetes.  Doing better. Watch for low blood sugars  2. 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 Follow up with DR Engel  3. OA knee -s/p Orthovisc series injections 11/15/19. Will see ortho in [place]  4. Hypertension - controlled. Monitor BP at home  5. Hyperlipidemia. On pravastatin  6. BPH. S/p laser - Saw urology 8/18- has erectile dysfunction -stable  7. Vitamin D deficiency - on vitamin D 2,000 units daily.    9. Back pain-stable  10. Anxiety and depression- long counseling session. To follow up with psychiatyr  11. Peripheral neuropathy -controlled on gabapentin as needed   12. CRI -stable - monitored closely by heme onc and nephrology.   13. I will see him back in 4 months, sooner if problems arise        Prevnar 7/16  PSA 10/18. colonoscopy normal 3/2012 and due 2022.  Fit[employer] was given to patient on 8/13/2019 2:15 PM   Influenza 9/21/18    Answers for HPI/ROS submitted by the patient on 4/22/2020   Diabetes problem  Diabetes type: type 1  MedicAlert ID: Yes  Disease duration: 10 years  blurred vision: No  foot paresthesias: No  polyphagia: No  weakness: No  Symptom course: stable  confusion: No  dizziness: No  hunger: No  mood changes: No  pallor: No  seizures: No  speech difficulty: No  sweats: No  blackouts: No  hospitalization: No  required assistance: No  required glucagon: No  CVA: No  heart disease: No  nephropathy: Yes  peripheral neuropathy: Yes  retinopathy: No  autonomic neuropathy: Yes  CAD risks: no known risk factors  Current treatments: oral agent (dual therapy)  Treatment compliance: all of the time  Dose schedule: pre-breakfast  Given by: patient  Injection sites: abdominal wall  Home blood tests: 3-4 x per day  Monitoring compliance: good  Dietitian visit:  Yes  Eye exam current: No  Sees podiatrist: No

## 2020-04-23 ENCOUNTER — PATIENT MESSAGE (OUTPATIENT)
Dept: INTERNAL MEDICINE | Facility: CLINIC | Age: 75
End: 2020-04-23

## 2020-04-23 DIAGNOSIS — E13.9 DIABETES MELLITUS DUE TO ABNORMAL INSULIN: ICD-10-CM

## 2020-04-23 RX ORDER — TADALAFIL 20 MG/1
20 TABLET ORAL DAILY PRN
Qty: 30 TABLET | Refills: 1 | Status: SHIPPED | OUTPATIENT
Start: 2020-04-23 | End: 2020-05-06 | Stop reason: SDUPTHER

## 2020-04-24 ENCOUNTER — TELEPHONE (OUTPATIENT)
Dept: INTERNAL MEDICINE | Facility: CLINIC | Age: 75
End: 2020-04-24

## 2020-04-24 RX ORDER — SILDENAFIL CITRATE 20 MG/1
TABLET ORAL
Qty: 30 TABLET | Refills: 1 | Status: SHIPPED | OUTPATIENT
Start: 2020-04-24 | End: 2021-01-04 | Stop reason: CLARIF

## 2020-04-24 NOTE — TELEPHONE ENCOUNTER
Pt insurance will no longer cover tadalafil 20 mg. Please resend another Rx for pt to take to [facility].

## 2020-04-27 ENCOUNTER — TELEPHONE (OUTPATIENT)
Dept: RESEARCH | Facility: HOSPITAL | Age: 75
End: 2020-04-27

## 2020-04-27 ENCOUNTER — PATIENT MESSAGE (OUTPATIENT)
Dept: RESEARCH | Facility: HOSPITAL | Age: 75
End: 2020-04-27

## 2020-04-28 ENCOUNTER — TELEPHONE (OUTPATIENT)
Dept: RESEARCH | Facility: HOSPITAL | Age: 75
End: 2020-04-28

## 2020-04-29 ENCOUNTER — PATIENT MESSAGE (OUTPATIENT)
Dept: SPORTS MEDICINE | Facility: CLINIC | Age: 75
End: 2020-04-29

## 2020-04-29 ENCOUNTER — TELEPHONE (OUTPATIENT)
Dept: SPORTS MEDICINE | Facility: CLINIC | Age: 75
End: 2020-04-29

## 2020-04-29 NOTE — TELEPHONE ENCOUNTER
Pt elected to cancel appointment due to the fact he is now in [place]. I'll send him information so he may contact to get scheduled there.

## 2020-05-04 ENCOUNTER — PATIENT MESSAGE (OUTPATIENT)
Dept: SPORTS MEDICINE | Facility: CLINIC | Age: 75
End: 2020-05-04

## 2020-05-05 ENCOUNTER — TELEPHONE (OUTPATIENT)
Dept: RESEARCH | Facility: HOSPITAL | Age: 75
End: 2020-05-05

## 2020-05-05 ENCOUNTER — PATIENT MESSAGE (OUTPATIENT)
Dept: RESEARCH | Facility: HOSPITAL | Age: 75
End: 2020-05-05

## 2020-05-05 NOTE — TELEPHONE ENCOUNTER
Tuesday, May 5th, 2020    Protocol: [id number] Randomized Phase III Tr ial of Lenalidomide vs Observation Alone in Patients with 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.  Sponsor:  [facility]  IRB# [id number]  Study ID: [id number]  Investigator: GIL Engel Pt Initials: P, J       Telephone    Attempted to reach patient regarding appts on the books for tomorrow, 5/6 and cancelled appts for 5/11.  BEATRIZ Oro RN spoke with URI Burciaga who cancelled 5/11 virutal appointments but cannot recall the reason.  Phone call (left voicemail) and message sent to patient to clarify appointments, call back requested at patient's earliest convenience, call back information provided.  Will follow.

## 2020-05-05 NOTE — PROGRESS NOTES
Hi Bert Emerson,  We are attempting to reach you regarding your appointments with Dr. Engel.  There's some confusion about which appt you may be aware of and we would like for you the please call us back at your soonest convenience before you report to us or log in for a virtual visit.  You can reach me in the office today or tomorrow at [phone].  Thanks,  Julianne

## 2020-05-06 ENCOUNTER — TELEPHONE (OUTPATIENT)
Dept: SPORTS MEDICINE | Facility: CLINIC | Age: 75
End: 2020-05-06

## 2020-05-06 ENCOUNTER — PATIENT OUTREACH (OUTPATIENT)
Dept: OTHER | Facility: OTHER | Age: 75
End: 2020-05-06

## 2020-05-06 ENCOUNTER — RESEARCH ENCOUNTER (OUTPATIENT)
Dept: RESEARCH | Facility: HOSPITAL | Age: 75
End: 2020-05-06

## 2020-05-06 ENCOUNTER — PATIENT MESSAGE (OUTPATIENT)
Dept: SPORTS MEDICINE | Facility: CLINIC | Age: 75
End: 2020-05-06

## 2020-05-06 ENCOUNTER — OFFICE VISIT (OUTPATIENT)
Dept: HEMATOLOGY/ONCOLOGY | Facility: CLINIC | Age: 75
End: 2020-05-06
Payer: MEDICARE

## 2020-05-06 ENCOUNTER — LAB VISIT (OUTPATIENT)
Dept: LAB | Facility: HOSPITAL | Age: 75
End: 2020-05-06
Payer: MEDICARE

## 2020-05-06 VITALS
HEIGHT: 70 IN | OXYGEN SATURATION: 97 % | SYSTOLIC BLOOD PRESSURE: 167 MMHG | BODY MASS INDEX: 27.15 KG/M2 | RESPIRATION RATE: 18 BRPM | TEMPERATURE: 98 F | HEART RATE: 93 BPM | DIASTOLIC BLOOD PRESSURE: 82 MMHG | WEIGHT: 189.63 LBS

## 2020-05-06 DIAGNOSIS — D47.2 SMOLDERING MULTIPLE MYELOMA: ICD-10-CM

## 2020-05-06 DIAGNOSIS — N52.01 ERECTILE DYSFUNCTION DUE TO ARTERIAL INSUFFICIENCY: ICD-10-CM

## 2020-05-06 DIAGNOSIS — D47.2 SMOLDERING MULTIPLE MYELOMA: Primary | ICD-10-CM

## 2020-05-06 DIAGNOSIS — Z00.6 RESEARCH EXAM: ICD-10-CM

## 2020-05-06 LAB
ALBUMIN SERPL BCP-MCNC: 3.6 G/DL (ref 3.5–5.2)
ALP SERPL-CCNC: 65 U/L (ref 55–135)
ALT SERPL W/O P-5'-P-CCNC: 27 U/L (ref 10–44)
ANION GAP SERPL CALC-SCNC: 9 MMOL/L (ref 8–16)
AST SERPL-CCNC: 20 U/L (ref 10–40)
BASOPHILS # BLD AUTO: 0.03 K/UL (ref 0–0.2)
BASOPHILS NFR BLD: 0.5 % (ref 0–1.9)
BILIRUB SERPL-MCNC: 0.8 MG/DL (ref 0.1–1)
BUN SERPL-MCNC: 23 MG/DL (ref 8–23)
CALCIUM SERPL-MCNC: 9.5 MG/DL (ref 8.7–10.5)
CHLORIDE SERPL-SCNC: 102 MMOL/L (ref 95–110)
CO2 SERPL-SCNC: 24 MMOL/L (ref 23–29)
CREAT SERPL-MCNC: 1.9 MG/DL (ref 0.5–1.4)
DIFFERENTIAL METHOD: ABNORMAL
EOSINOPHIL # BLD AUTO: 0.6 K/UL (ref 0–0.5)
EOSINOPHIL NFR BLD: 9.3 % (ref 0–8)
ERYTHROCYTE [DISTWIDTH] IN BLOOD BY AUTOMATED COUNT: 13.6 % (ref 11.5–14.5)
EST. GFR  (AFRICAN AMERICAN): 39.6 ML/MIN/1.73 M^2
EST. GFR  (NON AFRICAN AMERICAN): 34.2 ML/MIN/1.73 M^2
GLUCOSE SERPL-MCNC: 145 MG/DL (ref 70–110)
HCT VFR BLD AUTO: 34.9 % (ref 40–54)
HGB BLD-MCNC: 11 G/DL (ref 14–18)
IGA SERPL-MCNC: 1501 MG/DL (ref 40–350)
IGG SERPL-MCNC: 935 MG/DL (ref 650–1600)
IGM SERPL-MCNC: 42 MG/DL (ref 50–300)
IMM GRANULOCYTES # BLD AUTO: 0.02 K/UL (ref 0–0.04)
IMM GRANULOCYTES NFR BLD AUTO: 0.3 % (ref 0–0.5)
LDH SERPL L TO P-CCNC: 156 U/L (ref 110–260)
LYMPHOCYTES # BLD AUTO: 1.6 K/UL (ref 1–4.8)
LYMPHOCYTES NFR BLD: 25.3 % (ref 18–48)
MAGNESIUM SERPL-MCNC: 1.8 MG/DL (ref 1.6–2.6)
MCH RBC QN AUTO: 29 PG (ref 27–31)
MCHC RBC AUTO-ENTMCNC: 31.5 G/DL (ref 32–36)
MCV RBC AUTO: 92 FL (ref 82–98)
MONOCYTES # BLD AUTO: 0.4 K/UL (ref 0.3–1)
MONOCYTES NFR BLD: 6.7 % (ref 4–15)
NEUTROPHILS # BLD AUTO: 3.6 K/UL (ref 1.8–7.7)
NEUTROPHILS NFR BLD: 57.9 % (ref 38–73)
NRBC BLD-RTO: 0 /100 WBC
PHOSPHATE SERPL-MCNC: 4.2 MG/DL (ref 2.7–4.5)
PLATELET # BLD AUTO: 204 K/UL (ref 150–350)
PMV BLD AUTO: 8.9 FL (ref 9.2–12.9)
POTASSIUM SERPL-SCNC: 5 MMOL/L (ref 3.5–5.1)
PROT SERPL-MCNC: 8.3 G/DL (ref 6–8.4)
RBC # BLD AUTO: 3.79 M/UL (ref 4.6–6.2)
SODIUM SERPL-SCNC: 135 MMOL/L (ref 136–145)
WBC # BLD AUTO: 6.16 K/UL (ref 3.9–12.7)

## 2020-05-06 PROCEDURE — 84165 PATHOLOGIST INTERPRETATION SPE: ICD-10-PCS | Mod: 26,Q1,HCNC, | Performed by: PATHOLOGY

## 2020-05-06 PROCEDURE — 83615 LACTATE (LD) (LDH) ENZYME: CPT | Mod: HCNC,Q1

## 2020-05-06 PROCEDURE — 82784 ASSAY IGA/IGD/IGG/IGM EACH: CPT | Mod: 59,HCNC,Q1

## 2020-05-06 PROCEDURE — 85025 COMPLETE CBC W/AUTO DIFF WBC: CPT | Mod: HCNC

## 2020-05-06 PROCEDURE — 83520 IMMUNOASSAY QUANT NOS NONAB: CPT | Mod: 59,HCNC

## 2020-05-06 PROCEDURE — 99999 PR PBB SHADOW E&M-EST. PATIENT-LVL III: ICD-10-PCS | Mod: PBBFAC,HCNC,, | Performed by: INTERNAL MEDICINE

## 2020-05-06 PROCEDURE — 99215 OFFICE O/P EST HI 40 MIN: CPT | Mod: HCNC,S$GLB,, | Performed by: INTERNAL MEDICINE

## 2020-05-06 PROCEDURE — 99215 PR OFFICE/OUTPT VISIT, EST, LEVL V, 40-54 MIN: ICD-10-PCS | Mod: HCNC,S$GLB,, | Performed by: INTERNAL MEDICINE

## 2020-05-06 PROCEDURE — 86334 IMMUNOFIX E-PHORESIS SERUM: CPT | Mod: HCNC

## 2020-05-06 PROCEDURE — 84100 ASSAY OF PHOSPHORUS: CPT | Mod: HCNC

## 2020-05-06 PROCEDURE — 86334 IMMUNOFIX E-PHORESIS SERUM: CPT | Mod: 26,Q1,HCNC, | Performed by: PATHOLOGY

## 2020-05-06 PROCEDURE — 84165 PROTEIN E-PHORESIS SERUM: CPT | Mod: 26,Q1,HCNC, | Performed by: PATHOLOGY

## 2020-05-06 PROCEDURE — 36415 COLL VENOUS BLD VENIPUNCTURE: CPT | Mod: HCNC

## 2020-05-06 PROCEDURE — 84165 PROTEIN E-PHORESIS SERUM: CPT | Mod: HCNC

## 2020-05-06 PROCEDURE — 83735 ASSAY OF MAGNESIUM: CPT | Mod: HCNC

## 2020-05-06 PROCEDURE — 99999 PR PBB SHADOW E&M-EST. PATIENT-LVL III: CPT | Mod: PBBFAC,HCNC,, | Performed by: INTERNAL MEDICINE

## 2020-05-06 PROCEDURE — 86334 PATHOLOGIST INTERPRETATION IFE: ICD-10-PCS | Mod: 26,Q1,HCNC, | Performed by: PATHOLOGY

## 2020-05-06 PROCEDURE — 80053 COMPREHEN METABOLIC PANEL: CPT | Mod: HCNC,Q1

## 2020-05-06 RX ORDER — TADALAFIL 5 MG/1
TABLET ORAL
Qty: 90 TABLET | Refills: 2 | Status: SHIPPED | OUTPATIENT
Start: 2020-05-06 | End: 2021-01-04 | Stop reason: CLARIF

## 2020-05-06 RX ORDER — TADALAFIL 20 MG/1
20 TABLET ORAL DAILY PRN
Qty: 30 TABLET | Refills: 1 | Status: SHIPPED | OUTPATIENT
Start: 2020-05-06 | End: 2020-07-20 | Stop reason: CLARIF

## 2020-05-06 NOTE — PROGRESS NOTES
Wednesday, May 7th, 2020    Protocol: [id number] Randomized Phase III Tr ial of Lenalidomide vs Observation Alone in Patients with 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.   Sponsor:  [facility]  IRB# [id number]  Study ID: [id number]  Investigator: GIL Engel Pt Initials: LUCÍA LORENZ       Cycle 53 Day 28 Arm B: Observation    Patient presents to clinic today for above cycle evaluation.  He states he had a recent fall off of a ladder in which he twisted his ankle and his knee.  He presents with an ace wrap on his left ankle and wearing his usual brace to his left knee.  He denies any other CRAB symptoms.  He states continued willingness to participate in above-mentioned study.      Review of Baseline AE's:   1.Hypertension, Grade 2 diastolic: BP today is 167/82 today. Subject denies headache/dizziness; no symptoms noted.   AE ongoing  2. Lower Back Pain and Neck Pain, grade 1: Patient has no complaints today/ over last month.  As previously stated, patient is not suspected to have bony myeloma involvement per Dr. Engel as these are pre-existing issues present at baseline.  AE ongoing.  3. Pain in extremity (knee), grade 1. As above, he experienced a recent fall with injury.  Able to walk on it today without issue.  Plans to get an injection to the knees tomorrow to help alleviate symptoms.  Presents with knee stabilizing braces intact.  Still has plans to see ortho soon to discuss potential knee replacement.     4. Peripheral sensory neuropathy, grade 1: Patient does not verbalize complaints today. Has gabapentin prescribed and takes as needed.  States has not taken recently.  AE ongoing.   5. Anemia, Grade 1: Hgb/Hct - stable at 11.0/37.9.  Result reviewed by Dr. Engel. AE ongoing            Review of AE's:     *Please note this list is not all-inclusive, please see AE log for physician reviewed list of adverse events.   1. Creatinine increased, grade 2: Serum creatinine increased to 1.9 mg/dl today and calculated GFR  "42.12. (GFR 39.6 per Epic/Lab calculation) This is has increased from previous levels, but reviewed per Dr. Engel and is "about his baseline" and NCS today.  As previously stated, Dr. Engel states this has not been related to myeloma and is related chronic kidney issues likely secondary to diabetes and hypertension. Will continue observation monthly and to monitor renal function closely. Per MD, encouraged to increase water intake. AE stable from baseline  2. Hyponatremia, Grade 1: Serum sodium today is 135 mmol/L; AE ongoing. Will monitor    Per study chair, "the definition of progressive disease for this protocol is developing CRAB criteria that requires treatment systemically." Per Dr Engel, based on today's exam and lab results thus far, patient does not have any s/s of CRAB: Normal Calcium level (9.5), absence of Renal insufficiency (serum creatinine 1.9, and GFR 42.12 per [place]) --patient with a history of kidney dysfunction secondary to diabetes; Dr. Engel aware of these values and not concerned for myeloma involvement), absence of significant Anemia (hemoglobin 11.0, stable; will await myeloma labs for clarity relationship to myeloma) and absence of lytic Bone lesions (per baseline metastatic survey as well as MRI--see MD note for further comment). See MD note for ECOG score and H&P and flowsheets for laboratory work, vitals, etc. All myeloma-related blood work from last cycle including SPEP and JAGUAR have remained stable, and are currently pending for this cycle. Per Dr. Engel, patient shows no evidence of progression at last result. Patient informed that Dr. Engel will release all lab results to [facility]. He states understanding of this. QOL's required at this timepoint and completed independently by patient.           Encouraged patient to reach out/contact staff if feels need to discuss any issues. He states understanding.  Patient was informed to review all scheduled appointments in [facility] and " notify us if any conflicts.  He states he had previous plans to move to [place] full time; however he states today that his rent has recently been adjusted and he will continue to keep a residence here in [place] for the time being therefore he will still be able to keep his monthly appointments in [place] with Dr. Engel. He states he wishes only to see Dr Engel and will not show for appt if scheduled with any other provider. Deviation for today's visit will be logged in [facility].  Will continue to schedule patient as far out as possible, which seems to help maintain compliance with visits. Patient states having research RN's contact information and has MD contact information to call with any concerns, questions, or worsening of symptoms.  Discussed next month's appt date and time, he verbalized understanding.

## 2020-05-06 NOTE — PROGRESS NOTES
Subjective:    Patient ID: Emerson Menendez is a 74 y.o. male.    Chief Complaint: No chief complaint on file.  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 Test results identify a stable IgA kappa protein with beta globulin band at 1.63g/dL. Kappa free light chain is elevated at 4.40. CBC and calcium are stable. Creatinine with history of stage III CKD is stable at 1.4. Metastatic survey is negative for lytic lesions. MRI of the entire spine demonstrated age related changes but no convincing evidence of myeloma bone disease. Bone marrow biopsy identified about 13% plasma cells by morphology and FISH for myeloma identified a t(11;14) and trisomies 3,7, and 17. The patient is afebrile and appears clinically well. He was seen by his podiatrist and nephrologist since our last visit without any new or acute events.    The patient has not received any therapy for smoldering myeloma including bisphosphonates or steroids. He has been randomized to observation arm of the the clinical study. 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 He has no current or prior history of malignancy.    TODAY  Mr. Menendez returns today for monthly follow-up evaluation (virtual due to COVID19). No acute interval events. Currently in [place] living with his grandson and relationship with them is better at this time. Due to COVID19 they are all staying at home and staying protected. No one in the family or household has had any symptoms of the virus. He is in [place] today to  some furniture. He did have a recent injury on a ladder- trauma to left ankle, knee, and wrist. Scheduled to  have injections of his knees tomorrow.  CBC and CMP are stable. Myeloma labs are pending.      HPI  Review of Systems   Constitutional: Negative for activity change, appetite change, diaphoresis, fatigue, fever and unexpected weight change.   HENT: Negative.    Eyes: Negative.    Respiratory: Negative.    Cardiovascular: Negative for leg swelling.   Gastrointestinal: Negative.    Endocrine: Negative.    Genitourinary: Negative.    Musculoskeletal: Negative.    Skin: Negative.    Allergic/Immunologic: Negative.    Neurological:        Grade 0-1 peripheral neuropathy of bilateral feet.    Hematological: Negative for adenopathy. Does not bruise/bleed easily.   Psychiatric/Behavioral: Negative.        Objective:       Vitals:    05/06/20 1351   BP: (!) 167/82   Pulse: 93   Resp: 18   Temp: 97.9 °F (36.6 °C)       Physical Exam   Constitutional: He is oriented to person, place, and time. He appears well-developed and well-nourished.   HENT:   Head: Normocephalic and atraumatic.   Right Ear: External ear normal.   Left Ear: External ear normal.   Nose: Nose normal.   Mouth/Throat: Oropharynx is clear and moist.   Eyes: Pupils are equal, round, and reactive to light. Conjunctivae and EOM are normal.   Neck: Normal range of motion. Neck supple.   Cardiovascular: Normal rate, regular rhythm and intact distal pulses.   No murmur heard.  Pulmonary/Chest: Effort normal and breath sounds normal. No respiratory distress.   Abdominal: Soft. Bowel sounds are normal. He exhibits no distension. There is no hepatosplenomegaly.   Musculoskeletal: He exhibits no edema or tenderness.   Neurological: He is alert and oriented to person, place, and time. No cranial nerve deficit.   Skin: Skin is warm and dry. No rash noted. No cyanosis. Nails show no clubbing.   Psychiatric: He has a normal mood and affect. His behavior is normal.   Nursing note and vitals reviewed.      Assessment:       No diagnosis found.    Plan:       The patient has a  diagnosis of smoldering myeloma. There is no indication for immediate chemotherapy. We are monitoring renal function closely- baseline creatinine of -1.5..  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 Total protein remains normal, M protein has been stable.Plan to complete labs when Emerson returns to [place] and he will let us know of this date.  Plan for return in 1month.  Endocrinology and Nephrology to monitor diabetes and renal failure respectively.

## 2020-05-06 NOTE — PROGRESS NOTES
05/06/2020 12:34 PM - patient is entering a doctor's appointment at this time. Would like to increase insulin dose to 24 units daily. Will follow at future encounter to discuss diet and possible medication change.  05/13/2020 3:52 PM Called patient and left voicemail with call back number. Will follow up with patient at next encounter.

## 2020-05-07 ENCOUNTER — PATIENT OUTREACH (OUTPATIENT)
Dept: ADMINISTRATIVE | Facility: OTHER | Age: 75
End: 2020-05-07

## 2020-05-07 ENCOUNTER — HOSPITAL ENCOUNTER (OUTPATIENT)
Dept: RADIOLOGY | Facility: HOSPITAL | Age: 75
Discharge: HOME | End: 2020-05-07
Attending: PHYSICIAN ASSISTANT
Payer: MEDICARE

## 2020-05-07 ENCOUNTER — OFFICE VISIT (OUTPATIENT)
Dept: SPORTS MEDICINE | Facility: CLINIC | Age: 75
End: 2020-05-07
Payer: MEDICARE

## 2020-05-07 VITALS
HEART RATE: 109 BPM | WEIGHT: 189 LBS | HEIGHT: 70 IN | SYSTOLIC BLOOD PRESSURE: 146 MMHG | DIASTOLIC BLOOD PRESSURE: 75 MMHG | BODY MASS INDEX: 27.06 KG/M2

## 2020-05-07 DIAGNOSIS — M25.561 ACUTE PAIN OF RIGHT KNEE: Primary | ICD-10-CM

## 2020-05-07 DIAGNOSIS — M25.561 ACUTE PAIN OF RIGHT KNEE: ICD-10-CM

## 2020-05-07 LAB
ALBUMIN SERPL ELPH-MCNC: 4.07 G/DL (ref 3.35–5.55)
ALPHA1 GLOB SERPL ELPH-MCNC: 0.34 G/DL (ref 0.17–0.41)
ALPHA2 GLOB SERPL ELPH-MCNC: 0.77 G/DL (ref 0.43–0.99)
B-GLOBULIN SERPL ELPH-MCNC: 0.96 G/DL (ref 0.5–1.1)
GAMMA GLOB SERPL ELPH-MCNC: 1.86 G/DL (ref 0.67–1.58)
INTERPRETATION SERPL IFE-IMP: NORMAL
KAPPA LC SER QL IA: 6.55 MG/DL (ref 0.33–1.94)
KAPPA LC/LAMBDA SER IA: 3.7 (ref 0.26–1.65)
LAMBDA LC SER QL IA: 1.77 MG/DL (ref 0.57–2.63)
PATHOLOGIST INTERPRETATION IFE: NORMAL
PATHOLOGIST INTERPRETATION SPE: NORMAL
PROT SERPL-MCNC: 8 G/DL (ref 6–8.4)

## 2020-05-07 PROCEDURE — 73564 X-RAY EXAM KNEE 4 OR MORE: CPT | Mod: 26,50,HCNC, | Performed by: RADIOLOGY

## 2020-05-07 PROCEDURE — 99999 PR PBB SHADOW E&M-EST. PATIENT-LVL V: ICD-10-PCS | Mod: PBBFAC,HCNC,, | Performed by: PHYSICIAN ASSISTANT

## 2020-05-07 PROCEDURE — 99499 NO LOS: ICD-10-PCS | Mod: HCNC,S$GLB,, | Performed by: PHYSICIAN ASSISTANT

## 2020-05-07 PROCEDURE — 99499 UNLISTED E&M SERVICE: CPT | Mod: HCNC,S$GLB,, | Performed by: PHYSICIAN ASSISTANT

## 2020-05-07 PROCEDURE — 99999 PR PBB SHADOW E&M-EST. PATIENT-LVL V: CPT | Mod: PBBFAC,HCNC,, | Performed by: PHYSICIAN ASSISTANT

## 2020-05-07 PROCEDURE — 73564 XR KNEE ORTHO BILAT WITH FLEXION: ICD-10-PCS | Mod: 26,50,HCNC, | Performed by: RADIOLOGY

## 2020-05-07 PROCEDURE — 73564 X-RAY EXAM KNEE 4 OR MORE: CPT | Mod: TC,50,HCNC

## 2020-05-07 NOTE — PROGRESS NOTES
Care Everywhere: updated  Immunization: updated  Health Maintenance: updated  Media Review: reviewed for possible outside colon cancer report  Legacy Review: n/a  Order placed: n/a  Upcoming appts:opthalmology 6.1.2020

## 2020-05-07 NOTE — TELEPHONE ENCOUNTER
If the cialis is not covered, none of the medications in this class will be covered. The cheepest place to buy it is at the [facility]

## 2020-05-07 NOTE — PROGRESS NOTES
Patient presented requesting left knee injection.  Although there was some confusion because patient still plans to have surgery left total left knee arthroplasty sometime this summer.  It was discussed a minimal of 3 months time must lapse after injection, prior to a surgical procedure with a prosthesis due to increased risk of periprosthetic infection.  Patient previously discussed with Dr. [facility]. Due to the COVID-19 pandemic, patient would be high risk due to multiple commorbities. If and when he is deemed an appropriate surgical candidate it would likely be at [facility]. After discussion with Danielle Pascal MD, it would be appropriate to have patient follow-up with Ino Colindres MD for evaluation.  We also discussed, given their PMHx, medically optimization and clearance from their primary care physician. Other clearances may be necessary.     All of the patient's questions were answered and the patient will contact us if they have any questions or concerns in the interim.

## 2020-05-13 NOTE — PROGRESS NOTES
Digital Medicine: Clinician Follow-Up    Called patient to follow up regarding the DDMP (Diabetes Digital Medicine Program). Patient endorses adherence to medication regimen. Patient denies hypoglycemic s/sx (dizziness, extreme hunger, headaches, confusion, trouble concentrating, sweating, shaking, blurred vision, personality changes); patient denies hyperglycemic s/sx (increased thirst, increased urination, headaches, trouble concentrating, blurred vision, fatigue). He reports that he went to urgent care this week because he was having inflammation in his wrist. He states that he notices that he is doing better with his blood sugar readings by noticing what causes his blood sugar to spike. He states that he has been eating more carbohydrates lately since he is caring for his grandchildren and he bought more sugary snacks for them.     The history is provided by the patient. No  was used.     Follow Up  Follow-up reason(s): routine education      Assessment:  Reviewed recent readings. Per ADA guidelines (goal A1C < 7%), DM need to be addressed more throroughly today.     Last 6 Patient Entered Readings                                          Most Recent A1c: 7.6% on 12/10/2019  (Goal: 7%)     Recent Readings 5/13/2020 5/13/2020 5/12/2020 5/12/2020 5/11/2020    Blood Glucose (mg/dL) 159 183 114 148 197        INTERVENTION(S)  reviewed appropriate dose schedule, recommended diet modifications, reviewed monitoring technique, encouragement/support and goal setting    PLAN  patient verbalizes understanding and continue monitoring    >Continue current medication regimen.  >Reviewed dietary factors that may cause his blood sugar to be elevated.  >I will continue to monitor regularly and will follow-up in 3 to 4 weeks, sooner if blood sugar begins to trend upward or downward.   Patient has my contact information and knows to call with any concerns or clinical changes.      There are no preventive care  reminders to display for this patient.    Diabetes Medications             glimepiride (AMARYL) 2 MG tablet Take 1 tablet (2 mg total) by mouth once daily.    glucagon (human recombinant) inj 1mg/mL kit Inject 1 mL (1 mg total) into the muscle as needed.    insulin (LANTUS SOLOSTAR U-100 INSULIN) glargine 100 units/mL (3mL) SubQ pen Inject 20 Units into the skin every evening.

## 2020-05-21 ENCOUNTER — PATIENT MESSAGE (OUTPATIENT)
Dept: ORTHOPEDICS | Facility: CLINIC | Age: 75
End: 2020-05-21

## 2020-05-21 ENCOUNTER — PATIENT MESSAGE (OUTPATIENT)
Dept: PODIATRY | Facility: CLINIC | Age: 75
End: 2020-05-21

## 2020-05-21 ENCOUNTER — PATIENT MESSAGE (OUTPATIENT)
Dept: OPHTHALMOLOGY | Facility: CLINIC | Age: 75
End: 2020-05-21

## 2020-05-26 ENCOUNTER — PATIENT OUTREACH (OUTPATIENT)
Dept: ADMINISTRATIVE | Facility: OTHER | Age: 75
End: 2020-05-26

## 2020-05-26 NOTE — PROGRESS NOTES
Chart reviewed.   Immunizations: updated  Orders placed: n/a  Upcoming appts to satisfy MIRNA topics: Ophthalmology 6/01  Health Maintenance: updated

## 2020-05-27 ENCOUNTER — OFFICE VISIT (OUTPATIENT)
Dept: ORTHOPEDICS | Facility: CLINIC | Age: 75
End: 2020-05-27
Payer: MEDICARE

## 2020-05-27 ENCOUNTER — PATIENT MESSAGE (OUTPATIENT)
Dept: HEMATOLOGY/ONCOLOGY | Facility: CLINIC | Age: 75
End: 2020-05-27

## 2020-05-27 DIAGNOSIS — M17.12 PRIMARY OSTEOARTHRITIS OF LEFT KNEE: Primary | ICD-10-CM

## 2020-05-27 PROCEDURE — 1101F PT FALLS ASSESS-DOCD LE1/YR: CPT | Mod: HCNC,CPTII,95, | Performed by: ORTHOPAEDIC SURGERY

## 2020-05-27 PROCEDURE — 99213 OFFICE O/P EST LOW 20 MIN: CPT | Mod: HCNC,95,, | Performed by: ORTHOPAEDIC SURGERY

## 2020-05-27 PROCEDURE — 1101F PR PT FALLS ASSESS DOC 0-1 FALLS W/OUT INJ PAST YR: ICD-10-PCS | Mod: HCNC,CPTII,95, | Performed by: ORTHOPAEDIC SURGERY

## 2020-05-27 PROCEDURE — 99213 PR OFFICE/OUTPT VISIT, EST, LEVL III, 20-29 MIN: ICD-10-PCS | Mod: HCNC,95,, | Performed by: ORTHOPAEDIC SURGERY

## 2020-05-27 PROCEDURE — 1159F MED LIST DOCD IN RCRD: CPT | Mod: HCNC,95,, | Performed by: ORTHOPAEDIC SURGERY

## 2020-05-27 PROCEDURE — 1159F PR MEDICATION LIST DOCUMENTED IN MEDICAL RECORD: ICD-10-PCS | Mod: HCNC,95,, | Performed by: ORTHOPAEDIC SURGERY

## 2020-05-27 NOTE — PROGRESS NOTES
Subjective:      Patient ID: Emerson Menendez is a 74 y.o. male.    Chief Complaint: Pain of the Left Knee    HPI  Emreson Menendez is a 74 year old male here with a several year history of bilateral knee pain. Left >> right.  He was see by Dr. [facility] in the past. The patient is a  retiree. There was not a history of recent  trauma.  The pain is severe The pain is located in the global aspect of the knee. There is is radiation.  The pain radaites to the proximal leg.  There is not catching or locking.   The pain is described as achy. The patient has not had prior surgery. It is aggravated by sitting, standing and walking.  It is not alleviated by rest. There is not numbness or tingling of the lower extremity.  There is back pain.  He  has tried medications and injections. They have helped.  He does have difficulty getting in or out of a car, getting dressed, or going up or down stairs.  The patient does not use an assistive device.   knee    Past Medical History:   Diagnosis Date    Anxiety 3/16/2015    BPH (benign prostatic hypertrophy) 9/24/2012    Depression 3/16/2015    GERD (gastroesophageal reflux disease) 9/24/2012    Hx of psychiatric care     Celexa, Valium    Hyperlipidemia     Microalbuminuria 9/24/2012    Osteoarthritis of cervical spine 9/24/2012    Osteoarthritis of knee 9/24/2012    Psychiatric problem     Type II or unspecified type diabetes mellitus without mention of complication, not stated as uncontrolled 9/24/2012     Past Surgical History:   Procedure Laterality Date    CATARACT EXTRACTION  2012    Right eye    PROSTATE SURGERY       Family History   Problem Relation Age of Onset    Hypertension Brother     Depression Brother     Kidney failure Daughter         due to medication    Depression Sister     Blindness Neg Hx     Cancer Neg Hx     Cataracts Neg Hx     Diabetes Neg Hx     Glaucoma Neg Hx     Macular degeneration Neg Hx     Retinal detachment Neg Hx      Strabismus Neg Hx     Stroke Neg Hx     Thyroid disease Neg Hx      Social History     Socioeconomic History    Marital status:      Spouse name: Not on file    Number of children: 1    Years of education: Not on file    Highest education level: Not on file   Occupational History    Not on file   Social Needs    Financial resource strain: Somewhat hard    Food insecurity:     Worry: Never true     Inability: Never true    Transportation needs:     Medical: No     Non-medical: No   Tobacco Use    Smoking status: Never Smoker    Smokeless tobacco: Never Used   Substance and Sexual Activity    Alcohol use: No     Frequency: 2-4 times a month     Drinks per session: 1 or 2     Binge frequency: Never    Drug use: No    Sexual activity: Yes     Partners: Female     Birth control/protection: None   Lifestyle    Physical activity:     Days per week: 5 days     Minutes per session: 40 min    Stress: Not on file   Relationships    Social connections:     Talks on phone: Never     Gets together: Never     Attends [employer] service: Not on file     Active member of club or organization: No     Attends meetings of clubs or organizations: Never     Relationship status:    Other Topics Concern    Patient feels they ought to cut down on drinking/drug use Not Asked    Patient annoyed by others criticizing their drinking/drug use Not Asked    Patient has felt bad or guilty about drinking/drug use Not Asked    Patient has had a drink/used drugs as an eye opener in the AM Not Asked   Social History Narrative     x2, 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 100 tablet 3    blood glucose control, normal (METER-CHECK) Soln One meter.  Use as directed. Meter of insurance choice 1 each 0    blood sugar diagnostic  "(ACCU-CHEK STEFANO PLUS TEST STRP) Strp Accu check stefano plus TEST FOUR TIMES DAILY. Test strtips of insurance choice to go with meter and lancets 400 strip 3    blood sugar diagnostic Strp I [facility] - check blood sugars 4 times daily 400 strip 4    blood-glucose meter (ACCU-CHEK OMAYRA) Misc USE AS DIRECTED. Accucheck stefano plus 1 each 0    cholecalciferol, vitamin D3, (VITAMIN D) 2,000 unit Cap Take by mouth. 1 Capsule Oral Every day.  over the counter      citalopram (CELEXA) 20 MG tablet Take 1.5 tablets (30 mg total) by mouth once daily. 135 tablet 4    diazePAM (VALIUM) 5 MG tablet TAKE 1 TABLET(5 MG) BY MOUTH EVERY 6 HOURS AS NEEDED 60 tablet 0    gabapentin (NEURONTIN) 100 MG capsule TAKE 1 CAPSULE EVERY MORNING, 1 CAPSULE IN THE AFTERNOON, AND 1 TO 3 CAPSULE(S) AT BEDTIME AS NEEDED FOR FOOT PAIN 450 capsule 3    glimepiride (AMARYL) 2 MG tablet Take 1 tablet (2 mg total) by mouth once daily. 90 tablet 4    glucagon (human recombinant) inj 1mg/mL kit Inject 1 mL (1 mg total) into the muscle as needed. 1 kit 0    ibuprofen (ADVIL,MOTRIN) 800 MG tablet Take 1 tablet (800 mg total) by mouth 3 (three) times daily. 30 tablet 0    insulin (LANTUS SOLOSTAR U-100 INSULIN) glargine 100 units/mL (3mL) SubQ pen Inject 20 Units into the skin every evening. 1 Box 6    lancets Misc Use twice daily 200 each 4    latanoprost 0.005 % ophthalmic solution Place 1 drop into both eyes every evening. 7.5 mL 3    losartan (COZAAR) 25 MG tablet Take 1 tablet (25 mg total) by mouth once daily. 90 tablet 4    meloxicam (MOBIC) 15 MG tablet TAKE 1 TABLET(15 MG) BY MOUTH EVERY DAY 90 tablet 0    omeprazole (PRILOSEC) 40 MG capsule Take 1 capsule (40 mg total) by mouth once daily. 90 capsule 4    pen needle, diabetic (BD ULTRA-FINE SHORT PEN NEEDLE) 31 gauge x 5/16" Ndle USE TO INJECT INSULIN ONE TIME DAILY 100 each 3    pravastatin (PRAVACHOL) 40 MG tablet Take 1 tablet (40 mg total) by mouth once daily. 90 tablet 4 "    sildenafil (REVATIO) 20 mg Tab 2-3 tablets daily as needed for erectile dysfunction 30 tablet 1    tadalafiL (CIALIS) 20 MG Tab Take 1 tablet (20 mg total) by mouth daily as needed. 30 tablet 1    tadalafiL (CIALIS) 5 MG tablet TAKE 1 TABLET(5 MG) BY MOUTH DAILY AS NEEDED FOR ERECTILE DYSFUNCTION 90 tablet 2    tamsulosin (FLOMAX) 0.4 mg Cap Take 1 capsule (0.4 mg total) by mouth once daily. 90 capsule 3    tizanidine (ZANAFLEX) 4 MG tablet 1/2-1 tablet every 8 hours as needed for muscle spasm 90 tablet 1    traMADoL (ULTRAM) 50 mg tablet Take 1 tablet (50 mg total) by mouth every 6 (six) hours as needed for Pain. (Patient not taking: Reported on 5/21/2020) 12 tablet 0    TRUEPLUS LANCETS 33 gauge Misc        No current facility-administered medications on file prior to visit.      Review of patient's allergies indicates:   Allergen Reactions    Penicillins      Knees locked up       ROS      Objective:      There is no height or weight on file to calculate BMI.  There were no vitals filed for this visit.      General    Constitutional: He is oriented to person, place, and time. He appears well-developed and well-nourished.   HENT:   Head: Normocephalic and atraumatic.   Eyes: EOM are normal.   Pulmonary/Chest: Effort normal.   Neurological: He is alert and oriented to person, place, and time.   Psychiatric: He has a normal mood and affect. His behavior is normal.     General Musculoskeletal Exam   Gait: abnormal and antalgic       Right Knee Exam     Inspection   Swelling: absent    Left Knee Exam     Inspection   Swelling: present  Deformity: present (varus)    Range of Motion   Extension: 5   Flexion: 110     Radiographs taken recently and reviewed by me demonstrate severe arthritic change of the left KNEE(s).There  is not bone destruction.  There is not a fracture. The medial compartment is most involved.  There is a varus deformity.  The changes are tricompartmental.              Assessment:        Encounter Diagnosis   Name Primary?    Primary osteoarthritis of left knee Yes          Plan:       Emerson was seen today for pain.    Diagnoses and all orders for this visit:    Primary osteoarthritis of left knee      He would like an injection.  He is considering TKA.  He is in [facility] now, and would like the injection done there.  I contacts Dr. Olguin to see if this can be done.  He would also like a call from out [facility] people to discuss the cot of knee replacement.  F/U with me in 6 weeks.    The patient location is: [place], [place]  The chief complaint leading to consultation is: Left knee pain    Visit type: audiovisual    Face to Face time with patient: 10 min  15 minutes of total time spent on the encounter, which includes face to face time and non-face to face time preparing to see the patient (eg, review of tests), Obtaining and/or reviewing separately obtained history, Documenting clinical information in the electronic or other health record, Independently interpreting results (not separately reported) and communicating results to the patient/family/caregiver, or Care coordination (not separately reported).         Each patient to whom he or she provides medical services by telemedicine is:  (1) informed of the relationship between the physician and patient and the respective role of any other health care provider with respect to management of the patient; and (2) notified that he or she may decline to receive medical services by telemedicine and may withdraw from such care at any time.    Notes: See above

## 2020-05-31 PROCEDURE — 99454 REM MNTR PHYSIOL PARAM 16-30: CPT | Mod: S$GLB,,, | Performed by: INTERNAL MEDICINE

## 2020-05-31 PROCEDURE — 99454 PR REMOTE MNTR, PHYS PARAM, INITIAL, EA 30 DAYS: ICD-10-PCS | Mod: S$GLB,,, | Performed by: INTERNAL MEDICINE

## 2020-06-01 ENCOUNTER — PATIENT OUTREACH (OUTPATIENT)
Dept: ADMINISTRATIVE | Facility: OTHER | Age: 75
End: 2020-06-01

## 2020-06-01 RX ORDER — TADALAFIL 20 MG/1
20 TABLET ORAL DAILY PRN
Qty: 30 TABLET | Refills: 3 | Status: SHIPPED | OUTPATIENT
Start: 2020-06-01 | End: 2020-10-19 | Stop reason: SDUPTHER

## 2020-06-01 NOTE — PROGRESS NOTES
Chart reviewed.   Immunizations: updated  Orders placed: n/a  Upcoming appts to satisfy [facility] topics: Ophthalmology 6/01

## 2020-06-03 ENCOUNTER — TELEPHONE (OUTPATIENT)
Dept: HEMATOLOGY/ONCOLOGY | Facility: CLINIC | Age: 75
End: 2020-06-03

## 2020-06-04 ENCOUNTER — OFFICE VISIT (OUTPATIENT)
Dept: HEMATOLOGY/ONCOLOGY | Facility: CLINIC | Age: 75
End: 2020-06-04
Payer: MEDICARE

## 2020-06-04 ENCOUNTER — RESEARCH ENCOUNTER (OUTPATIENT)
Dept: RESEARCH | Facility: HOSPITAL | Age: 75
End: 2020-06-04

## 2020-06-04 DIAGNOSIS — D47.2 SMOLDERING MULTIPLE MYELOMA: Primary | ICD-10-CM

## 2020-06-04 PROCEDURE — 99215 OFFICE O/P EST HI 40 MIN: CPT | Mod: HCNC,95,, | Performed by: INTERNAL MEDICINE

## 2020-06-04 PROCEDURE — 1159F PR MEDICATION LIST DOCUMENTED IN MEDICAL RECORD: ICD-10-PCS | Mod: HCNC,95,, | Performed by: INTERNAL MEDICINE

## 2020-06-04 PROCEDURE — 1159F MED LIST DOCD IN RCRD: CPT | Mod: HCNC,95,, | Performed by: INTERNAL MEDICINE

## 2020-06-04 PROCEDURE — 1101F PT FALLS ASSESS-DOCD LE1/YR: CPT | Mod: HCNC,CPTII,95, | Performed by: INTERNAL MEDICINE

## 2020-06-04 PROCEDURE — 1101F PR PT FALLS ASSESS DOC 0-1 FALLS W/OUT INJ PAST YR: ICD-10-PCS | Mod: HCNC,CPTII,95, | Performed by: INTERNAL MEDICINE

## 2020-06-04 PROCEDURE — 99215 PR OFFICE/OUTPT VISIT, EST, LEVL V, 40-54 MIN: ICD-10-PCS | Mod: HCNC,95,, | Performed by: INTERNAL MEDICINE

## 2020-06-04 NOTE — PROGRESS NOTES
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   Sponsor:  [facility]  IRB# [id number]  Study ID: [id number]  Investigator: GIL Engel Pt Initials: LUCÍA LORENZ       Cycle 54 Day 28 Arm B: Observation    Patient had virtual visit this morning with Dr Engel as he is currently in [place], [place] and did not wish to travel to [place] until clinic fully open to patients/guidelines for COVID are more relaxed. Per Dr Engel she is in agreement to allow subject to continue virtual as long as guidelines allow. He had labs yesterday at facility in [place] yesterday, June 3rd. Refer to results dated yesterday; myeloma labs pending.  Since visit last month he states "everthing is wonderful" and denies any complaints. He states he has an appt with orhthopedics for follow up regarding his knee/ankle and had injection last month following fall. He states no pain or swelling associated with prior fall from ladder.   He states he is monitoring blood pressure and glucose (blood) at home and via telemedicine and has no complaints currently; states "everything is good right now." He denies any other CRAB symptoms.  He states continued willingness to participate in above-mentioned study.      Review of Baseline AE's:   1.Hypertension, Grade 2 diastolic: BP not measurable today as visit with Dr Engel is virtual.  Subject denies headache/dizziness; no symptoms noted.   AE ongoing  2. Lower Back Pain and Neck Pain, grade 1: Patient has no complaints today/ over last month.  As previously stated, patient is not suspected to have bony myeloma involvement per Dr. Engel as these are pre-existing issues present at baseline.  AE ongoing.  3. Pain in extremity (knee), grade 1. As above, no complaints he experienced a recent fall with injury.  Able to walk on it today without issue.  Plans to get an injection to the knees " "tomorrow to help alleviate symptoms.  Presents with knee stabilizing braces intact.  Still has plans to see ortho soon to discuss potential knee replacement.     4. Peripheral sensory neuropathy, grade 1: Patient does not verbalize complaints today. Has gabapentin prescribed and takes as needed.  States has not taken recently.  AE ongoing.   5. Anemia, Grade 1: Hgb/Hct - stable at 11.9/37.3.  Result reviewed by Dr. Engel. AE ongoing            Review of AE's:     *Please note this list is not all-inclusive, please see AE log for physician reviewed list of adverse events.   1. Creatinine increased, grade 2: Serum creatinine decreased slightly to 1.7 mg/dl today. Because no weight available for this visit not able to calculate GFR. Per Epic result GFR 45.2; NCS today per Dr Engel as previously stated, Dr. Engel states this has not been related to myeloma and is related chronic kidney issues likely secondary to diabetes and hypertension. Will continue observation monthly and to monitor renal function closely. Per MD, encouraged to increase water intake. AE stable from baseline  2. Hyponatremia, Grade 1: Serum sodium today is 135 mmol/L; AE ongoing. Will monitor    Per study chair, "the definition of progressive disease for this protocol is developing CRAB criteria that requires treatment systemically." Per Dr Engel, based on today's exam and lab results thus far, patient does not have any s/s of CRAB: Normal Calcium level (9.5), absence of Renal insufficiency (serum creatinine 1.9, and GFR 42.12 per [place]) --patient with a history of kidney dysfunction secondary to diabetes; Dr. Engel aware of these values and not concerned for myeloma involvement), absence of significant Anemia (hemoglobin 11.0, stable; will await myeloma labs for clarity relationship to myeloma) and absence of lytic Bone lesions (per baseline metastatic survey as well as MRI--see MD note for further comment). See MD note for ECOG score and H&P " and flowsheets for laboratory work, vitals, etc. All myeloma-related blood work from last cycle including SPEP and JAGUAR have remained stable, and are currently pending for this cycle. Per Dr. Engel, patient shows no evidence of progression at last result. Patient informed that Dr. Engel will release all lab results to [facility]. He states understanding of this. QOL's required at this timepoint and completed independently by patient.           Instructed subject to contact research staff here at [facility] with any questions/concerns. He states he plans to come to [facility] next month for appt.He states he still keeps residence in [place] although spending most time in [place], he states he wishes only to see Dr Engel and will not show for appt if scheduled with any other provider. Deviation for today's visit will be logged in [employer].  Will continue to schedule patient as far out as possible, which seems to help maintain compliance with visits. Patient states having research RN's contact information and has MD contact information to call with any concerns, questions, or worsening of symptoms.  Discussed next month's appt date and time, he verbalized understanding.

## 2020-06-05 NOTE — PROGRESS NOTES
Subjective:    Patient ID: Emerson Menendez is a 74 y.o. male.    Chief Complaint: No chief complaint on file.  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 Test results identify a stable IgA kappa protein with beta globulin band at 1.63g/dL. Kappa free light chain is elevated at 4.40. CBC and calcium are stable. Creatinine with history of stage III CKD is stable at 1.4. Metastatic survey is negative for lytic lesions. MRI of the entire spine demonstrated age related changes but no convincing evidence of myeloma bone disease. Bone marrow biopsy identified about 13% plasma cells by morphology and FISH for myeloma identified a t(11;14) and trisomies 3,7, and 17. The patient is afebrile and appears clinically well. He was seen by his podiatrist and nephrologist since our last visit without any new or acute events.    The patient has not received any therapy for smoldering myeloma including bisphosphonates or steroids. He has been randomized to observation arm of the the clinical study. 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 He has no current or prior history of malignancy.      The patient location is: home  The chief complaint leading to consultation is: smoldering myeloma    Visit type: audiovisual    Face to Face time with patient: 25 minutes  35 minutes of total time spent on the encounter, which includes face to face time and non-face to face time preparing to see the patient (eg, review of tests), Obtaining and/or reviewing separately obtained history, Documenting clinical information in the electronic or other health record, Independently interpreting results  (not separately reported) and communicating results to the patient/family/caregiver, or Care coordination (not separately reported).         Each patient to whom he or she provides medical services by telemedicine is:  (1) informed of the relationship between the physician and patient and the respective role of any other health care provider with respect to management of the patient; and (2) notified that he or she may decline to receive medical services by telemedicine and may withdraw from such care at any time.    Notes:   TODAY  Mr. Menendez returns today for monthly follow-up evaluation (virtual due to COVID19). No acute interval events. Currently in [place] living. Grandson has moved out but their relationship is still good. Working on his house and reconnecting with some friends. No one in the family or household has had any symptoms of the virus. He will return to [place] next month for his next visit. He did have a recent injury on a ladder- trauma to left ankle, knee, and wrist. Arm is now in a cast. Had recent injections to his knees and still wants knee replacement surgery. BG was elevated but improved with diet changes.  CBC and CMP are stable. Myeloma labs are pending.      HPI  Review of Systems   Constitutional: Negative for activity change, appetite change, diaphoresis, fatigue, fever and unexpected weight change.   HENT: Negative.    Eyes: Negative.    Respiratory: Negative.    Cardiovascular: Negative for leg swelling.   Gastrointestinal: Negative.    Endocrine: Negative.    Genitourinary: Negative.    Musculoskeletal: Negative.    Skin: Negative.    Allergic/Immunologic: Negative.    Neurological:        Grade 0-1 peripheral neuropathy of bilateral feet.    Hematological: Negative for adenopathy. Does not bruise/bleed easily.   Psychiatric/Behavioral: Negative.        Objective:       There were no vitals filed for this visit.    Physical Exam   Constitutional: He is oriented to person,  place, and time. He appears well-developed and well-nourished.   HENT:   Head: Normocephalic and atraumatic.   Right Ear: External ear normal.   Left Ear: External ear normal.   Nose: Nose normal.   Mouth/Throat: Oropharynx is clear and moist.   Eyes: Pupils are equal, round, and reactive to light. Conjunctivae and EOM are normal.   Neck: Normal range of motion. Neck supple.   Cardiovascular: Normal rate, regular rhythm and intact distal pulses.   No murmur heard.  Pulmonary/Chest: Effort normal and breath sounds normal. No respiratory distress.   Abdominal: Soft. Bowel sounds are normal. He exhibits no distension. There is no hepatosplenomegaly.   Musculoskeletal: He exhibits no edema or tenderness.   Neurological: He is alert and oriented to person, place, and time. No cranial nerve deficit.   Skin: Skin is warm and dry. No rash noted. No cyanosis. Nails show no clubbing.   Psychiatric: He has a normal mood and affect. His behavior is normal.   Nursing note and vitals reviewed.      Assessment:       1. Smoldering multiple myeloma        Plan:       The patient has a diagnosis of smoldering myeloma. There is no indication for immediate chemotherapy. We are monitoring renal function closely- baseline creatinine of -1.5..  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 Total protein remains normal, M protein has been stable.Plan to complete labs when Emerson returns to [place] and he will let us know of this date.  Plan for return in 1month.  Endocrinology and Nephrology to monitor diabetes and renal failure respectively.

## 2020-06-11 ENCOUNTER — TELEPHONE (OUTPATIENT)
Dept: NEPHROLOGY | Facility: CLINIC | Age: 75
End: 2020-06-11

## 2020-06-11 DIAGNOSIS — N18.30 STAGE 3 CHRONIC KIDNEY DISEASE: Primary | ICD-10-CM

## 2020-06-18 DIAGNOSIS — N18.1 CKD (CHRONIC KIDNEY DISEASE) STAGE 1, GFR 90 ML/MIN OR GREATER: Primary | ICD-10-CM

## 2020-06-18 DIAGNOSIS — N18.30 CHRONIC KIDNEY DISEASE, STAGE III (MODERATE): Primary | ICD-10-CM

## 2020-06-19 ENCOUNTER — PATIENT OUTREACH (OUTPATIENT)
Dept: ADMINISTRATIVE | Facility: OTHER | Age: 75
End: 2020-06-19

## 2020-06-20 ENCOUNTER — PATIENT MESSAGE (OUTPATIENT)
Dept: PODIATRY | Facility: CLINIC | Age: 75
End: 2020-06-20

## 2020-06-20 NOTE — PROGRESS NOTES
Chart reviewed.   Immunizations: UPDATED  Orders placed: N/A  Upcoming appts to satisfy [facility] topic: Ophthalmology 6/23

## 2020-06-22 ENCOUNTER — PATIENT MESSAGE (OUTPATIENT)
Dept: PODIATRY | Facility: CLINIC | Age: 75
End: 2020-06-22

## 2020-06-22 ENCOUNTER — PATIENT MESSAGE (OUTPATIENT)
Dept: INTERNAL MEDICINE | Facility: CLINIC | Age: 75
End: 2020-06-22

## 2020-06-22 ENCOUNTER — TELEPHONE (OUTPATIENT)
Dept: ORTHOPEDICS | Facility: CLINIC | Age: 75
End: 2020-06-22

## 2020-06-23 ENCOUNTER — HOSPITAL ENCOUNTER (OUTPATIENT)
Dept: RADIOLOGY | Facility: HOSPITAL | Age: 75
Discharge: HOME | End: 2020-06-23
Attending: PHYSICIAN ASSISTANT
Payer: MEDICARE

## 2020-06-23 ENCOUNTER — TELEPHONE (OUTPATIENT)
Dept: ORTHOPEDICS | Facility: CLINIC | Age: 75
End: 2020-06-23

## 2020-06-23 ENCOUNTER — OFFICE VISIT (OUTPATIENT)
Dept: ORTHOPEDICS | Facility: CLINIC | Age: 75
End: 2020-06-23
Payer: MEDICARE

## 2020-06-23 ENCOUNTER — TELEPHONE (OUTPATIENT)
Dept: INTERNAL MEDICINE | Facility: CLINIC | Age: 75
End: 2020-06-23

## 2020-06-23 ENCOUNTER — OFFICE VISIT (OUTPATIENT)
Dept: INTERNAL MEDICINE | Facility: CLINIC | Age: 75
End: 2020-06-23
Payer: MEDICARE

## 2020-06-23 VITALS
WEIGHT: 187.38 LBS | SYSTOLIC BLOOD PRESSURE: 138 MMHG | DIASTOLIC BLOOD PRESSURE: 82 MMHG | HEIGHT: 70 IN | HEART RATE: 99 BPM | BODY MASS INDEX: 26.82 KG/M2

## 2020-06-23 VITALS
SYSTOLIC BLOOD PRESSURE: 147 MMHG | HEIGHT: 70 IN | BODY MASS INDEX: 26.92 KG/M2 | TEMPERATURE: 97 F | HEART RATE: 107 BPM | WEIGHT: 188.06 LBS | DIASTOLIC BLOOD PRESSURE: 78 MMHG

## 2020-06-23 DIAGNOSIS — M17.0 PRIMARY OSTEOARTHRITIS OF BOTH KNEES: Primary | ICD-10-CM
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
## 2020-06-23 DIAGNOSIS — M25.512 ACUTE PAIN OF LEFT SHOULDER: ICD-10-CM

## 2020-06-23 DIAGNOSIS — M79.642 LEFT HAND PAIN: ICD-10-CM

## 2020-06-23 DIAGNOSIS — M25.532 LEFT WRIST PAIN: ICD-10-CM
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
## 2020-06-23 PROCEDURE — 99213 OFFICE O/P EST LOW 20 MIN: CPT | Mod: 25,HCNC,S$GLB, | Performed by: PHYSICIAN ASSISTANT

## 2020-06-23 PROCEDURE — 73110 X-RAY EXAM OF WRIST: CPT | Mod: 26,HCNC,LT, | Performed by: RADIOLOGY

## 2020-06-23 PROCEDURE — 20610 PR DRAIN/INJECT LARGE JOINT/BURSA: ICD-10-PCS | Mod: HCNC,LT,S$GLB, | Performed by: PHYSICIAN ASSISTANT

## 2020-06-23 PROCEDURE — 99214 OFFICE O/P EST MOD 30 MIN: CPT | Mod: HCNC,S$GLB,, | Performed by: PHYSICIAN ASSISTANT

## 2020-06-23 PROCEDURE — 1159F PR MEDICATION LIST DOCUMENTED IN MEDICAL RECORD: ICD-10-PCS | Mod: HCNC,S$GLB,, | Performed by: PHYSICIAN ASSISTANT

## 2020-06-23 PROCEDURE — 3078F DIAST BP <80 MM HG: CPT | Mod: HCNC,CPTII,S$GLB, | Performed by: PHYSICIAN ASSISTANT

## 2020-06-23 PROCEDURE — 3079F PR MOST RECENT DIASTOLIC BLOOD PRESSURE 80-89 MM HG: ICD-10-PCS | Mod: HCNC,CPTII,S$GLB, | Performed by: PHYSICIAN ASSISTANT

## 2020-06-23 PROCEDURE — 99999 PR PBB SHADOW E&M-EST. PATIENT-LVL III: CPT | Mod: PBBFAC,HCNC,, | Performed by: PHYSICIAN ASSISTANT

## 2020-06-23 PROCEDURE — 99214 PR OFFICE/OUTPT VISIT, EST, LEVL IV, 30-39 MIN: ICD-10-PCS | Mod: HCNC,S$GLB,, | Performed by: PHYSICIAN ASSISTANT

## 2020-06-23 PROCEDURE — 1101F PT FALLS ASSESS-DOCD LE1/YR: CPT | Mod: HCNC,CPTII,S$GLB, | Performed by: PHYSICIAN ASSISTANT

## 2020-06-23 PROCEDURE — 1101F PR PT FALLS ASSESS DOC 0-1 FALLS W/OUT INJ PAST YR: ICD-10-PCS | Mod: HCNC,CPTII,S$GLB, | Performed by: PHYSICIAN ASSISTANT

## 2020-06-23 PROCEDURE — 99999 PR PBB SHADOW E&M-EST. PATIENT-LVL V: ICD-10-PCS | Mod: PBBFAC,HCNC,, | Performed by: PHYSICIAN ASSISTANT

## 2020-06-23 PROCEDURE — 73030 X-RAY EXAM OF SHOULDER: CPT | Mod: 26,HCNC,LT, | Performed by: RADIOLOGY

## 2020-06-23 PROCEDURE — 73130 X-RAY EXAM OF HAND: CPT | Mod: 26,HCNC,LT, | Performed by: RADIOLOGY

## 2020-06-23 PROCEDURE — 1159F MED LIST DOCD IN RCRD: CPT | Mod: HCNC,S$GLB,, | Performed by: PHYSICIAN ASSISTANT

## 2020-06-23 PROCEDURE — 93971 US UPPER EXTREMITY VEINS LEFT: ICD-10-PCS | Mod: 26,HCNC,LT, | Performed by: RADIOLOGY

## 2020-06-23 PROCEDURE — 3077F SYST BP >= 140 MM HG: CPT | Mod: HCNC,CPTII,S$GLB, | Performed by: PHYSICIAN ASSISTANT

## 2020-06-23 PROCEDURE — 99213 PR OFFICE/OUTPT VISIT, EST, LEVL III, 20-29 MIN: ICD-10-PCS | Mod: 25,HCNC,S$GLB, | Performed by: PHYSICIAN ASSISTANT

## 2020-06-23 PROCEDURE — 3075F PR MOST RECENT SYSTOLIC BLOOD PRESS GE 130-139MM HG: ICD-10-PCS | Mod: HCNC,CPTII,S$GLB, | Performed by: PHYSICIAN ASSISTANT

## 2020-06-23 PROCEDURE — 73130 X-RAY EXAM OF HAND: CPT | Mod: TC,HCNC,LT

## 2020-06-23 PROCEDURE — 73110 X-RAY EXAM OF WRIST: CPT | Mod: TC,HCNC,LT

## 2020-06-23 PROCEDURE — 73130 XR HAND COMPLETE 3 VIEW LEFT: ICD-10-PCS | Mod: 26,HCNC,LT, | Performed by: RADIOLOGY

## 2020-06-23 PROCEDURE — 20610 DRAIN/INJ JOINT/BURSA W/O US: CPT | Mod: HCNC,LT,S$GLB, | Performed by: PHYSICIAN ASSISTANT

## 2020-06-23 PROCEDURE — 1125F PR PAIN SEVERITY QUANTIFIED, PAIN PRESENT: ICD-10-PCS | Mod: HCNC,S$GLB,, | Performed by: PHYSICIAN ASSISTANT

## 2020-06-23 PROCEDURE — 73110 XR WRIST COMPLETE 3 VIEWS LEFT: ICD-10-PCS | Mod: 26,HCNC,LT, | Performed by: RADIOLOGY

## 2020-06-23 PROCEDURE — 93971 EXTREMITY STUDY: CPT | Mod: TC,HCNC,LT

## 2020-06-23 PROCEDURE — 3077F PR MOST RECENT SYSTOLIC BLOOD PRESSURE >= 140 MM HG: ICD-10-PCS | Mod: HCNC,CPTII,S$GLB, | Performed by: PHYSICIAN ASSISTANT

## 2020-06-23 PROCEDURE — 73030 X-RAY EXAM OF SHOULDER: CPT | Mod: TC,HCNC,LT

## 2020-06-23 PROCEDURE — 99999 PR PBB SHADOW E&M-EST. PATIENT-LVL V: CPT | Mod: PBBFAC,HCNC,, | Performed by: PHYSICIAN ASSISTANT

## 2020-06-23 PROCEDURE — 3008F PR BODY MASS INDEX (BMI) DOCUMENTED: ICD-10-PCS | Mod: HCNC,CPTII,S$GLB, | Performed by: PHYSICIAN ASSISTANT

## 2020-06-23 PROCEDURE — 1125F AMNT PAIN NOTED PAIN PRSNT: CPT | Mod: HCNC,S$GLB,, | Performed by: PHYSICIAN ASSISTANT

## 2020-06-23 PROCEDURE — 3075F SYST BP GE 130 - 139MM HG: CPT | Mod: HCNC,CPTII,S$GLB, | Performed by: PHYSICIAN ASSISTANT

## 2020-06-23 PROCEDURE — 3078F PR MOST RECENT DIASTOLIC BLOOD PRESSURE < 80 MM HG: ICD-10-PCS | Mod: HCNC,CPTII,S$GLB, | Performed by: PHYSICIAN ASSISTANT

## 2020-06-23 PROCEDURE — 3079F DIAST BP 80-89 MM HG: CPT | Mod: HCNC,CPTII,S$GLB, | Performed by: PHYSICIAN ASSISTANT

## 2020-06-23 PROCEDURE — 73030 XR SHOULDER TRAUMA 3 VIEW LEFT: ICD-10-PCS | Mod: 26,HCNC,LT, | Performed by: RADIOLOGY

## 2020-06-23 PROCEDURE — 73060 X-RAY EXAM OF HUMERUS: CPT | Mod: TC,HCNC,LT

## 2020-06-23 PROCEDURE — 93971 EXTREMITY STUDY: CPT | Mod: 26,HCNC,LT, | Performed by: RADIOLOGY

## 2020-06-23 PROCEDURE — 99999 PR PBB SHADOW E&M-EST. PATIENT-LVL III: ICD-10-PCS | Mod: PBBFAC,HCNC,, | Performed by: PHYSICIAN ASSISTANT

## 2020-06-23 PROCEDURE — 73060 X-RAY EXAM OF HUMERUS: CPT | Mod: 26,HCNC,LT, | Performed by: RADIOLOGY

## 2020-06-23 PROCEDURE — 73060 XR HUMERUS 2 VIEW LEFT: ICD-10-PCS | Mod: 26,HCNC,LT, | Performed by: RADIOLOGY

## 2020-06-23 PROCEDURE — 3008F BODY MASS INDEX DOCD: CPT | Mod: HCNC,CPTII,S$GLB, | Performed by: PHYSICIAN ASSISTANT

## 2020-06-23 RX ORDER — TRIAMCINOLONE ACETONIDE 40 MG/ML
40 INJECTION, SUSPENSION INTRA-ARTICULAR; INTRAMUSCULAR
Status: COMPLETED | OUTPATIENT
Start: 2020-06-23 | End: 2020-06-23

## 2020-06-23 RX ADMIN — TRIAMCINOLONE ACETONIDE 40 MG: 40 INJECTION, SUSPENSION INTRA-ARTICULAR; INTRAMUSCULAR at 11:06

## 2020-06-23 NOTE — PROGRESS NOTES
"  SUBJECTIVE:     Chief Complaint : left knee pain    History of Present Illness:  Emerson Menendez is a 74 y.o. male seen in clinic today with a chief complaint of left knee pain. Symptoms have been present for many years. He has been treated with injections and braces. Most recent was CSI by sports medicine PA 2/2020. He would like to have TKA sometime this year in [place] where he has family.  Pain is medial and lateral. He has tried NSAIDs. Right knee is not bothering him much now. Pt is diabetic. Last A1c was 6.9. BMI 26.98. Pt recently lost his only child. He has been seeing grief counselor.     Past Medical History:   Diagnosis Date    Anxiety 3/16/2015    BPH (benign prostatic hypertrophy) 9/24/2012    Depression 3/16/2015    GERD (gastroesophageal reflux disease) 9/24/2012    Hx of psychiatric care     Celexa, Valium    Hyperlipidemia     Microalbuminuria 9/24/2012    Osteoarthritis of cervical spine 9/24/2012    Osteoarthritis of knee 9/24/2012    Psychiatric problem     Type II or unspecified type diabetes mellitus without mention of complication, not stated as uncontrolled 9/24/2012       Review of Systems:  Constitutional: no fever or chills  ENT: no nasal congestion or sore throat  Respiratory: no cough or shortness of breath  Cardiovascular: no chest pain or palpitations  Gastrointestinal: no nausea or vomiting, tolerating diet  Genitourinary: no hematuria or dysuria  Integument/Breast: no rash or pruritis  Hematologic/Lymphatic: no easy bruising or lymphadenopathy  Musculoskeletal: see HPI  Neurological: no seizures or tremors  Behavioral/Psych: no auditory or visual hallucinations    OBJECTIVE:     PHYSICAL EXAM:  Blood pressure (!) 147/78, pulse 107, temperature 97.2 °F (36.2 °C), temperature source Oral, height 5' 10" (1.778 m), weight 85.3 kg (188 lb 0.8 oz).   General Appearance: WDWN, NAD  Gait: Normal  Neuro/Psych: Mood & affect appropriate  Lungs: Respirations equal and " unlabored.   CV: 2+ bilateral upper and lower extremity pulses.   Skin: Intact throughout LE  Extremities: No LE edema    Right Knee Exam  Range of Motion:0-115 active   Effusion:none  Condition of skin:intact  Location of tenderness:Medial joint line   Strength:5 of 5 quadriceps strength and 5 of 5 hamstring strength  Stability:stable to testing  Sofiya: negative/negative    Left Knee Exam  Range of Motion:5-110 active   Effusion:none  Condition of skin:intact  Location of tenderness:Medial joint line and Lateral joint line   Strength:5 of 5 quadriceps strength and 5 of 5 hamstring strength  Stability:stable to testing  Sofiya: negative/negative    Alignment:neutral     Right Hip Examination: no pain with PROM     Left Hip Examination: no pain with PROM     RADIOGRAPHS: AP, lateral and merchant knee x-rays reviewed today by me reveal moderately severe degenerative changes of both knees, L>R    ASSESSMENT/PLAN:   Primary osteoarthritis of both knees  Left knee pain  - Continue braces, medications as prescribed  - CSI L knee today. Pt will carefully monitor glucose.   - F/u scheduled with ortho in [place] to discuss L TKA  - F/u here prn    Knee Injection Procedure Note  Diagnosis: left knee degenerative arthritis  Indications: left knee pain  Procedure Details: Verbal consent was obtained for the procedure. The injection site was identified and the skin was prepared with alcohol. The left knee was injected from an anterolateral approach with 1 ml of Kenalog and 2 ml Lidocaine under sterile technique using a 22 gauge needle. The needle was removed and the area cleansed and dressed.  Complications:  Patient tolerated the procedure well.    he was advised to rest the knee today, using ice and elevation as needed for comfort and swelling.Immediate relief of the knee pain may be short lived and secondary to the lidocaine. he may have an increase in discomfort tonight followed by steady improvement over the next  several days. It may take 1-2 weeks following the injection to get the full benefit of the medication.

## 2020-06-23 NOTE — PATIENT INSTRUCTIONS
Arthralgia    Arthralgia is the term for pain in or around the joint. It is a symptom, not a disease. This pain may involve one or more joints. In some cases, the pain moves from joint to joint.  There are many causes for joint pain. These include:  · Injury  · Osteoarthritis (wearing out of the joint surface)  · Gout (inflammation of the joint due to crystals in the joint fluid)  · Infection inside the joint    · Bursitis (inflammation of the fluid-filled sacs around the joint)  · Autoimmune disorders such as rheumatoid arthritis or lupus  · Tendonitis (inflammation of chords that attach muscle to bone)  Home care  · Rest the involved joint(s) until your symptoms improve.   · You may be prescribed pain medicine. If none is prescribed, you may use acetaminophen or ibuprofen to control pain and inflammation.  Follow-up care  Follow up with your healthcare provider or as advised.  When to seek medical advice  Contact your healthcare provider right away if any of the following occurs:  · Pain, swelling, or redness of joint increases  · Pain worsens or recurs after a period of improvement  · Pain moves to other joints  · You cannot bear weight on the affected joint   · You cannot move the affected joint  · Joint appears deformed  · New rash appears  · Fever of 100.4ºF (38ºC) or higher, or as directed by your healthcare provider  Date Last Reviewed: 3/1/2017  © 5386-2682 [employer]. [address] [place] [address], [place] [address]. All rights reserved. This information is not intended as a substitute for professional medical care. Always follow your healthcare professional's instructions.

## 2020-06-23 NOTE — TELEPHONE ENCOUNTER
I left a message for patient that if he can get her before 11:00am Michaela will be able to gave him his injection. I told him he can call or leave a message in My Chart.

## 2020-06-23 NOTE — PROGRESS NOTES
Subjective:       Patient ID: Emerson Menendez is a 74 y.o. male.        Chief Complaint: Shoulder Injury (L pain=7 x2weeks) and Wrist Pain (L pain=7)    Emerson Menendez is an established patient of Roberta Sagastume MD here today for urgent care visit.    He was getting his roof done.  He was stepping onto the roof and the ladder slipped.  He fell down from the top of the roof onto his left shoulder/wrist/arm.  He decided not to go to the ER.  He did not hit his head or have LOC.  He initially had some ankle pain as well but this has resolved.    His left arm was quite swollen but that seems better.      He lives in [place] but will be here for the next 2 weeks.      He was seen in  5/11 with negative x-ray of wrist and 6/18 given wrist brace.           Review of Systems   Constitutional: Negative for appetite change, chills, fatigue and fever.   HENT: Negative for congestion and sore throat.    Eyes: Negative for visual disturbance.   Respiratory: Negative for cough, chest tightness and shortness of breath.    Cardiovascular: Negative for chest pain, palpitations and leg swelling.   Gastrointestinal: Negative for abdominal pain, blood in stool, constipation, diarrhea, nausea and vomiting.   Genitourinary: Negative for dysuria, frequency, hematuria and urgency.   Musculoskeletal: Positive for arthralgias and joint swelling. Negative for back pain.   Skin: Negative for rash.   Neurological: Negative for dizziness, syncope, weakness and headaches.   Psychiatric/Behavioral: Negative for dysphoric mood and sleep disturbance. The patient is not nervous/anxious.        Objective:      Physical Exam  Constitutional:       General: He is not in acute distress.     Appearance: He is well-developed. He is not diaphoretic.   HENT:      Head: Normocephalic and atraumatic.      Left Ear: External ear normal.   Neck:      Musculoskeletal: Neck supple.   Pulmonary:      Effort: Pulmonary effort is normal.   Abdominal:       Palpations: Abdomen is soft.   Musculoskeletal:      Left shoulder: He exhibits tenderness (diffuse, entire shoulder). He exhibits normal range of motion, no swelling, no spasm, normal pulse and normal strength.      Left wrist: He exhibits tenderness and swelling. He exhibits normal range of motion.      Left upper arm: He exhibits no tenderness and no swelling.      Left forearm: He exhibits no tenderness and no swelling.      Left hand: He exhibits tenderness. He exhibits normal range of motion and normal two-point discrimination. Normal sensation noted. Normal strength noted.        Hands:       Comments: LUE neurovascularly intact   Skin:     General: Skin is warm and dry.   Neurological:      Mental Status: He is alert.         Assessment:       1. Fall, initial encounter    2. Acute pain of left shoulder    3. Left wrist pain    4. Left hand pain        Plan:       Emerson was seen today for shoulder injury and wrist pain.    Diagnoses and all orders for this visit:    Fall, initial encounter  -     X-Ray Hand 3 view Left; Future  -     X-Ray Humerus 2 View Left; Future  -     X-Ray Wrist 2 View Left; Future  -     X-Ray Shoulder 2 or More Views Left; Future  -     US Upper Extremity Veins Left; Future    Acute pain of left shoulder  -     X-Ray Humerus 2 View Left; Future  -     X-Ray Shoulder 2 or More Views Left; Future  -     US Upper Extremity Veins Left; Future    Left wrist pain  -     Ambulatory referral/consult to Orthopedics; Future  -     US Upper Extremity Veins Left; Future    Left hand pain  -     X-Ray Hand 3 view Left; Future  -     US Upper Extremity Veins Left; Future    Wrist x-ray-  Three views: Lateral view demonstrates a possible small triquetral chip avulsion fracture  Will expedite hand clinic appointment to review - note injury was 6 weeks ago     Pt has been given instructions populated from [employer] and has verbalized understanding of the after visit summary and  "information contained wherein.    Follow up with a primary care provider. May go to ER for acute shortness of breath, lightheadedness, fever, or any other emergent complaints or changes in condition.    "This note will be shared with the patient"    Future Appointments   Date Time Provider Department Center   6/23/2020  5:45 PM [facility] OIC-US1 MASTER [facility] ULTR [facility]   6/25/2020  8:00 AM DAYAMI Auguste [facility] HAND [facility]   6/25/2020  3:00 PM Myron Ferguson MD [facility] NEPHRO [address]   6/30/2020 10:30 AM Jo Meier DPM [facility]   7/2/2020  8:00 AM LAB, HEMON CANCER BLDG [facility] LAB HO [address]   7/2/2020  9:00 AM Silvana Engel MD [facility] HC BMT [address]   7/10/2020 10:30 AM Kaleb Olguin MD [facility] ACCORTH ACC   7/15/2020 10:20 AM Pascual Valentine OD [facility] OPTOMTY [address]   7/28/2020  1:00 PM Jose L Wheatley MD [facility] OPHTHAL [address]   7/30/2020  7:10 AM LAB, HEMON CANCER BLDG [facility] LAB HO [address]   7/30/2020  7:30 AM Silvana Engel MD [facility] BMT [address]                 "

## 2020-06-23 NOTE — TELEPHONE ENCOUNTER
Please call patient.  There is a possible small fracture in hand/wrist area.  He has hand clinic appointment 6/25 where they can eval this.  Could you call hand clinic and see if they could see him today or tomorrow?    This injury happened 6 weeks ago or more FYI.  Let me know.  Thanks!

## 2020-06-24 ENCOUNTER — PATIENT OUTREACH (OUTPATIENT)
Dept: OTHER | Facility: OTHER | Age: 75
End: 2020-06-24

## 2020-06-24 NOTE — PROGRESS NOTES
Nephrology Clinic Progress Note    Patient ID: Emerson Menendez is a 74 y.o. Black or  male who presents for follow up.    HPI:  Emerson Menendez is a 74 y.o. Black or  male with CKD stage 3(baseline sCr ~1.7), DM2, HTN and MGUS/MM comes for follow up.  He reports no urinary changes, he denies NSAIDs.   The patient denies any SOB, chest pain, palpitations, dysuria, problems voiding, N/V/D, taking NSAIDs or new antibiotics, recreational drugs, recent episode of dehydration, acute illness, hospitalization or exposure to IV radiocontrast.    Interval Hx:  - 6/25/20: patient complaining of pain on left wrist due to fall sustained 6 mo ago, was prescribed Tramadol but currently is not taking it.  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 With close monitoring with labs monthly. Not on chemo. Stable renal function with baseline sCr ~1.6-1.8 since 2016    Past Medical History:   Diagnosis Date    Anxiety 3/16/2015    BPH (benign prostatic hypertrophy) 9/24/2012    Depression 3/16/2015    GERD (gastroesophageal reflux disease) 9/24/2012    Hx of psychiatric care     Celexa, Valium    Hyperlipidemia     Microalbuminuria 9/24/2012    Osteoarthritis of cervical spine 9/24/2012    Osteoarthritis of knee 9/24/2012    Psychiatric problem     Type II or unspecified type diabetes mellitus without mention of complication, not stated as uncontrolled 9/24/2012       Family History   Problem Relation Age of Onset    Hypertension Brother     Depression Brother     Kidney failure Daughter         due to medication    Depression Sister     Blindness Neg Hx     Cancer Neg Hx     Cataracts Neg Hx     Diabetes Neg Hx     Glaucoma Neg Hx     Macular degeneration Neg Hx     Retinal detachment Neg Hx     Strabismus Neg Hx     Stroke Neg Hx     Thyroid disease Neg Hx        Past Surgical History:   Procedure Laterality Date    CATARACT EXTRACTION  2012    Right eye    PROSTATE  SURGERY           Current Outpatient Medications:     aspirin (ECOTRIN) 81 MG EC tablet, Take 1 tablet (81 mg total) by mouth once daily., Disp: 100 tablet, Rfl: 3    blood glucose control, normal (METER-CHECK) Soln, One meter.  Use as directed. Meter of insurance choice, Disp: 1 each, Rfl: 0    blood sugar diagnostic (ACCU-CHEK STEFANO PLUS TEST STRP) Strp, Accu check stefano plus TEST FOUR TIMES DAILY. Test strtips of insurance choice to go with meter and lancets, Disp: 400 strip, Rfl: 3    blood sugar diagnostic Strp, [facility] - check blood sugars 4 times daily, Disp: 400 strip, Rfl: 4    blood-glucose meter (ACCU-CHEK OMAYRA) [facility], USE AS DIRECTED. Accucheck stefano plus, Disp: 1 each, Rfl: 0    cholecalciferol, vitamin D3, (VITAMIN D) 2,000 unit Cap, Take by mouth. 1 Capsule Oral Every day.  over the counter, Disp: , Rfl:     citalopram (CELEXA) 20 MG tablet, Take 1.5 tablets (30 mg total) by mouth once daily., Disp: 135 tablet, Rfl: 4    diazePAM (VALIUM) 5 MG tablet, TAKE 1 TABLET(5 MG) BY MOUTH EVERY 6 HOURS AS NEEDED, Disp: 60 tablet, Rfl: 0    gabapentin (NEURONTIN) 100 MG capsule, TAKE 1 CAPSULE EVERY MORNING, 1 CAPSULE IN THE AFTERNOON, AND 1 TO 3 CAPSULE(S) AT BEDTIME AS NEEDED FOR FOOT PAIN, Disp: 450 capsule, Rfl: 3    glimepiride (AMARYL) 2 MG tablet, Take 1 tablet (2 mg total) by mouth once daily., Disp: 90 tablet, Rfl: 4    glucagon (human recombinant) inj 1mg/mL kit, Inject 1 mL (1 mg total) into the muscle as needed., Disp: 1 kit, Rfl: 0    ibuprofen (ADVIL,MOTRIN) 800 MG tablet, Take 1 tablet (800 mg total) by mouth 3 (three) times daily., Disp: 30 tablet, Rfl: 0    insulin (LANTUS SOLOSTAR U-100 INSULIN) glargine 100 units/mL (3mL) SubQ pen, Inject 20 Units into the skin every evening., Disp: 1 Box, Rfl: 6    lancets Misc, Use twice daily, Disp: 200 each, Rfl: 4    latanoprost 0.005 % ophthalmic solution, Place 1 drop into both eyes every evening., Disp: 7.5 mL, Rfl: 3     "losartan (COZAAR) 25 MG tablet, Take 1 tablet (25 mg total) by mouth once daily., Disp: 90 tablet, Rfl: 4    meloxicam (MOBIC) 15 MG tablet, TAKE 1 TABLET(15 MG) BY MOUTH EVERY DAY, Disp: 90 tablet, Rfl: 0    omeprazole (PRILOSEC) 40 MG capsule, Take 1 capsule (40 mg total) by mouth once daily., Disp: 90 capsule, Rfl: 4    pen needle, diabetic (BD ULTRA-FINE SHORT PEN NEEDLE) 31 gauge x 5/16" Ndle, USE TO INJECT INSULIN ONE TIME DAILY, Disp: 100 each, Rfl: 3    pravastatin (PRAVACHOL) 40 MG tablet, Take 1 tablet (40 mg total) by mouth once daily., Disp: 90 tablet, Rfl: 4    sildenafil (REVATIO) 20 mg Tab, 2-3 tablets daily as needed for erectile dysfunction, Disp: 30 tablet, Rfl: 1    tadalafiL (CIALIS) 20 MG Tab, Take 1 tablet (20 mg total) by mouth daily as needed., Disp: 30 tablet, Rfl: 1    tadalafiL (CIALIS) 20 MG Tab, Take 1 tablet (20 mg total) by mouth daily as needed., Disp: 30 tablet, Rfl: 3    tadalafiL (CIALIS) 5 MG tablet, TAKE 1 TABLET(5 MG) BY MOUTH DAILY AS NEEDED FOR ERECTILE DYSFUNCTION (Patient not taking: Reported on 6/23/2020), Disp: 90 tablet, Rfl: 2    tamsulosin (FLOMAX) 0.4 mg Cap, Take 1 capsule (0.4 mg total) by mouth once daily., Disp: 90 capsule, Rfl: 3    tizanidine (ZANAFLEX) 4 MG tablet, 1/2-1 tablet every 8 hours as needed for muscle spasm, Disp: 90 tablet, Rfl: 1    traMADoL (ULTRAM) 50 mg tablet, Take 1 tablet (50 mg total) by mouth every 6 (six) hours as needed for Pain. (Patient not taking: Reported on 6/23/2020), Disp: 12 tablet, Rfl: 0    TRUEPLUS LANCETS 33 gauge Misc, , Disp: , Rfl:     Patient's medical, family, surgical, and medication hx reviewed.    Review of Systems    Constitutional: Negative for chills, diaphoresis, fever and weight loss.   HENT: Negative for nosebleeds and tinnitus.    Eyes: Negative for blurred vision, double vision and photophobia.   Respiratory: Negative for cough and shortness of breath.    Cardiovascular: . Negative for chest pain, " palpitations, swelling orthopnea and PND.   Gastrointestinal: Negative for abdominal pain, diarrhea, constipation nausea and vomiting.   Genitourinary: Negative for dysuria, flank pain, frequency, hematuria and urgency.   Musculoskeletal: Negative for back pain, falls, joint pain, myalgias and neck pain.   Skin: Negative.    Neurological: Negative for dizziness, tingling, tremors, sensory change, speech change, focal weakness, seizures, loss of consciousness, weakness and headaches.   Endo/Heme/Allergies: Negative for environmental allergies and polydipsia. Does not bruise/bleed easily.   Psychiatric/Behavioral: Negative for depression, hallucinations, memory loss, substance abuse and suicidal ideas. The patient is not nervous/anxious and does not have insomnia.        Objective:     Wt Readings from Last 3 Encounters:   06/23/20 85 kg (187 lb 6.3 oz)   06/23/20 85.3 kg (188 lb 0.8 oz)   06/18/20 81.6 kg (180 lb)     Temp Readings from Last 3 Encounters:   06/23/20 97.2 °F (36.2 °C) (Oral)   06/18/20 98 °F (36.7 °C)   06/05/20 98 °F (36.7 °C) (Oral)     BP Readings from Last 3 Encounters:   06/23/20 138/82   06/23/20 (!) 147/78   06/18/20 (!) 153/71     Pulse Readings from Last 3 Encounters:   06/23/20 99   06/23/20 107   06/18/20 97        Physical Exam    Constitutional:  well-developed and well-nourished. No distress.   HENT:   Head: Normocephalic and atraumatic.   Neck: Normal range of motion. Neck supple.   Cardiovascular: Normal rate, regular rhythm, normal heart sounds and intact distal pulses.  Exam reveals no gallop and no friction rub.    No murmur heard.  Pulmonary/Chest: Effort normal and breath sounds normal. No respiratory distress. no wheezes, no rales, no tenderness.   Abdominal: Soft. Bowel sounds are normal, no distension. There is no tenderness. There is no rebound and no guarding.   Musculoskeletal: Normal range of motion. No edema or deformity.   Neurological: Awake alert and oriented x 4. Motor  "strength preserved 5/5. DTR symmetrical.  Skin: Skin is warm and dry. No rash noted. She is not diaphoretic. No erythema. No pallor.       Assessment:       - CKD stage 3  - Smoldering MM  - HTN  - Diabetes Mellitus type 2    Plan:   1. CKD stage G3/A2: renal function stable for the past year. Underlying chronicity likely 2/2 probably from underlying diabetes, with possibly an HTN component and smoldering MM>   - Continue to monitor.   - CrCL of 47.42.   Baseline Creatine: ~1.6-1.8  Lab Results   Component Value Date    CREATININE 1.70 (H) 06/18/2020       Urine Protein: continue to monitor. Stable.   Prot/Creat Ratio, Ur   Date Value Ref Range Status   12/17/2019 0.30 (H) 0.00 - 0.20 Final   10/28/2019 0.72 (H) 0.00 - 0.20 Final   09/28/2017 0.11 0.00 - 0.20 Final       Acid-Base: within acceptable range.   Lab Results   Component Value Date     06/18/2020    K 4.8 06/18/2020    CO2 29 06/18/2020           2. HTN: 148/68mm/hg. Refers that this BP is isolated and that he has pain on his left wrist 2/2 a fall. Scheduled for steroid injection next week.   On Losartan 25mg. Patient with high BP readings in the past, will increase Losartan to 50 mg PO daily and repeat BMP in 7-10 days. Oriented patient about low salt diet. Instructional pamphlet given.       3. CKD-MBD: Vit D 25 level 18.9. Currently on Cholecalciferol 2K unit.  Refers that has not been drinking vit D every day because doesn't have a "routine" to take vitamins. Will d/c cholecalciferol and start   Ergocalciferol 50 K units oral weekly.   Lab Results   Component Value Date    PTH 58.8 06/18/2020    CALCIUM 9.6 06/18/2020    PHOS 3.5 06/18/2020         Plan/Rec:  - Return to clinic in 6 months  - Increase Losartan to 50 mg oral daily   - Repeat renal panel laboratory in 7-10 days   - Discontinue Cholecalciferol. Will start Ergocalciferol 50,000 units oral every 7 days. Repeat Vit D level in 3 months.   - Low salt diet   - F/U with primary care " physician   - Ok to take Tramadol for wrist pain     **Case discussed with attending nephrologist, Dr. Beach.             Myron Ferguson MD  Nephrology Fellow  [facility]

## 2020-06-24 NOTE — PROGRESS NOTES
Subjective:      Patient ID: Emerson Menendez is a 74 y.o. male.    Chief Complaint: Pain of the Left Wrist      HPI  Emerson Menendez is a right hand dominant 74 y.o. male presenting today for left wrist and shoulder pain.  There was a history of trauma. He was seen in urgent care this week, reported that he was working on his roof and the ladder slipped - he fell from the top of the roof onto his left side. Injury occurred 6-7 weeks ago.  He reports intermittent use of a wrist brace, reports that occasionally it feels better in the brace and occasionally feels better out of the brace.  He also reports intermittent finger numbness and tingling on the left side.  His left shoulder pain is mild-to-moderate, the wrist pain is severe.  He reports pain is increased with motion, pain is not improved over the past 6 weeks.  He does occasionally take Tylenol or ibuprofen for pain, with some relief.  He lives in [place] but will be here for the next 1-2 weeks.      Review of patient's allergies indicates:   Allergen Reactions    Penicillins      Knees locked up         Current Outpatient Medications   Medication Sig Dispense Refill    aspirin (ECOTRIN) 81 MG EC tablet Take 1 tablet (81 mg total) by mouth once daily. 100 tablet 3    blood glucose control, normal (METER-CHECK) Soln One meter.  Use as directed. Meter of insurance choice 1 each 0    blood sugar diagnostic (ACCU-CHEK ELIE PLUS TEST STRP) Strp Accu check elie plus TEST FOUR TIMES DAILY. Test strtips of insurance choice to go with meter and lancets 400 strip 3    blood sugar diagnostic [facility] - check blood sugars 4 times daily 400 strip 4    blood-glucose meter (ACCU-CHEK OMAYRA) Misc USE AS DIRECTED. Accucheck elie plus 1 each 0    citalopram (CELEXA) 20 MG tablet Take 1.5 tablets (30 mg total) by mouth once daily. 135 tablet 4    diazePAM (VALIUM) 5 MG tablet TAKE 1 TABLET(5 MG) BY MOUTH EVERY 6 HOURS AS NEEDED 60 tablet 0     "gabapentin (NEURONTIN) 100 MG capsule TAKE 1 CAPSULE EVERY MORNING, 1 CAPSULE IN THE AFTERNOON, AND 1 TO 3 CAPSULE(S) AT BEDTIME AS NEEDED FOR FOOT PAIN 450 capsule 3    glimepiride (AMARYL) 2 MG tablet Take 1 tablet (2 mg total) by mouth once daily. 90 tablet 4    ibuprofen (ADVIL,MOTRIN) 800 MG tablet Take 1 tablet (800 mg total) by mouth 3 (three) times daily. (Patient not taking: Reported on 6/25/2020) 30 tablet 0    insulin (LANTUS SOLOSTAR U-100 INSULIN) glargine 100 units/mL (3mL) SubQ pen Inject 20 Units into the skin every evening. 1 Box 6    lancets Misc Use twice daily 200 each 4    latanoprost 0.005 % ophthalmic solution Place 1 drop into both eyes every evening. 7.5 mL 3    meloxicam (MOBIC) 15 MG tablet TAKE 1 TABLET(15 MG) BY MOUTH EVERY DAY (Patient not taking: Reported on 6/25/2020) 90 tablet 0    omeprazole (PRILOSEC) 40 MG capsule Take 1 capsule (40 mg total) by mouth once daily. 90 capsule 4    pen needle, diabetic (BD ULTRA-FINE SHORT PEN NEEDLE) 31 gauge x 5/16" Ndle USE TO INJECT INSULIN ONE TIME DAILY 100 each 3    pravastatin (PRAVACHOL) 40 MG tablet Take 1 tablet (40 mg total) by mouth once daily. 90 tablet 4    sildenafil (REVATIO) 20 mg Tab 2-3 tablets daily as needed for erectile dysfunction 30 tablet 1    tadalafiL (CIALIS) 20 MG Tab Take 1 tablet (20 mg total) by mouth daily as needed. 30 tablet 1    tadalafiL (CIALIS) 20 MG Tab Take 1 tablet (20 mg total) by mouth daily as needed. (Patient not taking: Reported on 6/25/2020) 30 tablet 3    tadalafiL (CIALIS) 5 MG tablet TAKE 1 TABLET(5 MG) BY MOUTH DAILY AS NEEDED FOR ERECTILE DYSFUNCTION 90 tablet 2    tamsulosin (FLOMAX) 0.4 mg Cap Take 1 capsule (0.4 mg total) by mouth once daily. 90 capsule 3    tizanidine (ZANAFLEX) 4 MG tablet 1/2-1 tablet every 8 hours as needed for muscle spasm (Patient not taking: Reported on 6/25/2020) 90 tablet 1    TRUEPLUS LANCETS 33 gauge Misc       ergocalciferol (ERGOCALCIFEROL) 50,000 " "unit Cap Take 1 capsule (50,000 Units total) by mouth every 7 days. 4 capsule 3    glucagon (human recombinant) inj 1mg/mL kit Inject 1 mL (1 mg total) into the muscle as needed. (Patient not taking: Reported on 6/25/2020) 1 kit 0    losartan (COZAAR) 25 MG tablet Take 2 tablets (50 mg total) by mouth once daily. 90 tablet 6    traMADoL (ULTRAM) 50 mg tablet Take 1 tablet (50 mg total) by mouth every 8 (eight) hours as needed for Pain (moderate to severe pain). (Patient not taking: Reported on 6/25/2020) 21 tablet 0     No current facility-administered medications for this visit.        Past Medical History:   Diagnosis Date    Anxiety 3/16/2015    BPH (benign prostatic hypertrophy) 9/24/2012    Depression 3/16/2015    GERD (gastroesophageal reflux disease) 9/24/2012    Hx of psychiatric care     Celexa, Valium    Hyperlipidemia     Microalbuminuria 9/24/2012    Osteoarthritis of cervical spine 9/24/2012    Osteoarthritis of knee 9/24/2012    Psychiatric problem     Type II or unspecified type diabetes mellitus without mention of complication, not stated as uncontrolled 9/24/2012       Past Surgical History:   Procedure Laterality Date    CATARACT EXTRACTION  2012    Right eye    PROSTATE SURGERY           Review of Systems:  Review of Systems   Constitution: Negative for chills and fever.   Skin: Negative for rash and suspicious lesions.   Musculoskeletal:        See HPI   Neurological: Negative for dizziness, headaches and light-headedness.   Psychiatric/Behavioral: Negative for depression. The patient is not nervous/anxious.         + Grief from his daughter passing away 4 months ago         OBJECTIVE:     PHYSICAL EXAM:  Height: 5' 10" (177.8 cm) Weight: 85.3 kg (188 lb)  Vitals:    06/25/20 0802   BP: 138/79   Pulse: 90   Weight: 85.3 kg (188 lb)   Height: 5' 10" (1.778 m)   PainSc:   8     General    Vitals reviewed.  Constitutional: He is oriented to person, place, and time. He appears " well-developed and well-nourished.   HENT:   Head: Normocephalic and atraumatic.   Neck: Normal range of motion.   Cardiovascular: Normal rate.    Pulmonary/Chest: Effort normal. No respiratory distress.   Neurological: He is alert and oriented to person, place, and time.   Psychiatric: He has a normal mood and affect. His behavior is normal. Judgment and thought content normal.             Musculoskeletal:  No lacerations or abrasions.  No significant edema.  He is diffusely tender to palpation over the left wrist dorsally, volarly, radially, and ulnarly.  Left wrist range of motion is very limited, he has severe pain with motion.  Left shoulder is tender to palpation posteriorly and anteriorly.  He has good forward flexion, adduction, internal rotation, and external rotation; he has decreased abduction on the left.  He reports pain at in range of motion.  Neurovascularly intact-good sensation and motor function, good capillary refill, 2+ radial pulses.  Negative Tinel's over the left carpal tunnel, positive Durkan's over the left carpal tunnel.    RADIOGRAPHS:  Left hand XRay, 6/23/2020  FINDINGS:  On the lateral view there is a possible small chip avulsion fracture of the posterior triquetrum.  There is mild DJD.    Left wrist XRay, 6/23/2020  FINDINGS:  Three views: Lateral view demonstrates a possible small triquetral chip avulsion fracture.    Left Shoulder and Humerus XRay, 6/23/2020  FINDINGS:  Mild DJD AC and glenohumeral joints.  Impression:  No fracture dislocation.    Left UE ultrasound, 6/23/2020  Impression:  No thrombus in central veins of the left upper extremity.    Comments: I have personally reviewed the imaging and I agree with the above radiologist's report.    ASSESSMENT/PLAN:   Emerson was seen today for pain.    Diagnoses and all orders for this visit:    Triquetral chip fracture, left, closed, initial encounter  -     MRI Wrist Joint Without Contrast Left; Future    Left wrist pain  -      Ambulatory referral/consult to Orthopedics  -     MRI Wrist Joint Without Contrast Left; Future    Decreased range of motion of left wrist  -     MRI Wrist Joint Without Contrast Left; Future    Other orders  -     traMADoL (ULTRAM) 50 mg tablet; Take 1 tablet (50 mg total) by mouth every 8 (eight) hours as needed for Pain (moderate to severe pain). (Patient not taking: Reported on 6/25/2020)           - We talked at length about the anatomy and pathophysiology of   Encounter Diagnoses   Name Primary?    Left wrist pain     Triquetral chip fracture, left, closed, initial encounter Yes    Decreased range of motion of left wrist        - discussed patient's symptoms and physical exam findings, discussed persistent severe left wrist pain and moderate left shoulder pain.  Discussed conservative treatment options.  Patient is diabetic and recently underwent a steroid injection for the knee, with a slight rise in his blood glucose.  He would like to hold off on steroid injection at this time.  - discussed further imaging with MRI  - MRI ordered and scheduled  - follow-up next week after MRI  - call with questions or concerns  - continue regular use of the wrist brace  - Tylenol as needed for mild-to-moderate pain; tramadol every 8 hr as needed for severe pain or nighttime pain  - continue elevating, use of ice, and rest    Disclaimer: This note has been generated using voice-recognition software. There may be typographical errors that have been missed during proof-reading.

## 2020-06-24 NOTE — PROGRESS NOTES
Digital Medicine: Health  Follow-Up    The history is provided by the patient.   Follow Up  Follow-up reason(s): reading review      Readings are missing.   [facility] support needed.Patient mentioned that since leaving [facility] in the past few days, he left his medication there. He is now in [place], and was able to get his insulin since he was due for a refill. He also stated that he needs to keep an eye on his sugar levels since receiving his knee injection yesterday.     Normally he has a Boost in the morning, and lettuce and tomatoes for lunch. He plans to keep doing that, and to exercise while he is at his apartment building to compensate for not having all of his medication.      We also discussed why he has been unable to get his glucometer to turn on. He said that he has charged it, and has ran out of test strips. He plans to take his glucometer to the [facility] tomorrow to have it looked at and to get some new test strips. I will follow up with patient in a few weeks to check in for more readings.       INTERVENTION(S)  reviewed appropriate dose schedule and encouragement/support    PLAN  patient verbalizes understanding, patient amenable to changes and continue monitoring      There are no preventive care reminders to display for this patient.      Last 6 Patient Entered Readings                                          Most Recent A1c: 6.9% on 6/18/2020  (Goal: 7%)     Recent Readings 5/27/2020 5/27/2020 5/26/2020 5/24/2020 5/24/2020    Blood Glucose (mg/dL) 172 211 240 171 188                Screenings    SDOH

## 2020-06-24 NOTE — PROGRESS NOTES
06/24/2020 - patient spoke with health  today. He has been without medication for some time but is back home with medication. Will go to the [facility] tomorrow to get assistance with glucometer Will follow up at future encounter.

## 2020-06-25 ENCOUNTER — PATIENT MESSAGE (OUTPATIENT)
Dept: INTERNAL MEDICINE | Facility: CLINIC | Age: 75
End: 2020-06-25

## 2020-06-25 ENCOUNTER — TELEPHONE (OUTPATIENT)
Dept: ORTHOPEDICS | Facility: CLINIC | Age: 75
End: 2020-06-25

## 2020-06-25 ENCOUNTER — OFFICE VISIT (OUTPATIENT)
Dept: ORTHOPEDICS | Facility: CLINIC | Age: 75
End: 2020-06-25
Payer: MEDICARE

## 2020-06-25 ENCOUNTER — OFFICE VISIT (OUTPATIENT)
Dept: NEPHROLOGY | Facility: CLINIC | Age: 75
End: 2020-06-25
Payer: MEDICARE

## 2020-06-25 ENCOUNTER — TELEPHONE (OUTPATIENT)
Dept: NEPHROLOGY | Facility: CLINIC | Age: 75
End: 2020-06-25

## 2020-06-25 VITALS
DIASTOLIC BLOOD PRESSURE: 79 MMHG | HEART RATE: 90 BPM | WEIGHT: 188 LBS | SYSTOLIC BLOOD PRESSURE: 138 MMHG | BODY MASS INDEX: 26.92 KG/M2 | HEIGHT: 70 IN

## 2020-06-25 VITALS
BODY MASS INDEX: 27.52 KG/M2 | WEIGHT: 191.81 LBS | HEART RATE: 86 BPM | OXYGEN SATURATION: 97 % | SYSTOLIC BLOOD PRESSURE: 148 MMHG | DIASTOLIC BLOOD PRESSURE: 68 MMHG

## 2020-06-25 DIAGNOSIS — S62.112A TRIQUETRAL CHIP FRACTURE, LEFT, CLOSED, INITIAL ENCOUNTER: Primary | ICD-10-CM

## 2020-06-25 DIAGNOSIS — M25.632 DECREASED RANGE OF MOTION OF LEFT WRIST: ICD-10-CM

## 2020-06-25 DIAGNOSIS — M25.532 LEFT WRIST PAIN: ICD-10-CM

## 2020-06-25 DIAGNOSIS — I10 ESSENTIAL HYPERTENSION: Primary | ICD-10-CM

## 2020-06-25 DIAGNOSIS — N18.30 CHRONIC KIDNEY DISEASE, STAGE III (MODERATE): Chronic | ICD-10-CM

## 2020-06-25 DIAGNOSIS — E11.42 TYPE 2 DIABETES MELLITUS WITH DIABETIC POLYNEUROPATHY, WITH LONG-TERM CURRENT USE OF INSULIN: Chronic | ICD-10-CM

## 2020-06-25 DIAGNOSIS — Z79.4 TYPE 2 DIABETES MELLITUS WITH DIABETIC POLYNEUROPATHY, WITH LONG-TERM CURRENT USE OF INSULIN: Chronic | ICD-10-CM

## 2020-06-25 DIAGNOSIS — E55.9 VITAMIN D DEFICIENCY: ICD-10-CM

## 2020-06-25 PROCEDURE — 3288F PR FALLS RISK ASSESSMENT DOCUMENTED: ICD-10-PCS | Mod: HCNC,CPTII,S$GLB, | Performed by: PHYSICIAN ASSISTANT

## 2020-06-25 PROCEDURE — 3075F SYST BP GE 130 - 139MM HG: CPT | Mod: HCNC,CPTII,S$GLB, | Performed by: PHYSICIAN ASSISTANT

## 2020-06-25 PROCEDURE — 1125F PR PAIN SEVERITY QUANTIFIED, PAIN PRESENT: ICD-10-PCS | Mod: HCNC,S$GLB,, | Performed by: PHYSICIAN ASSISTANT

## 2020-06-25 PROCEDURE — 3074F SYST BP LT 130 MM HG: CPT | Mod: HCNC,CPTII,GC,S$GLB | Performed by: STUDENT IN AN ORGANIZED HEALTH CARE EDUCATION/TRAINING PROGRAM

## 2020-06-25 PROCEDURE — 3288F FALL RISK ASSESSMENT DOCD: CPT | Mod: HCNC,CPTII,S$GLB, | Performed by: PHYSICIAN ASSISTANT

## 2020-06-25 PROCEDURE — 3288F PR FALLS RISK ASSESSMENT DOCUMENTED: ICD-10-PCS | Mod: HCNC,CPTII,GC,S$GLB | Performed by: STUDENT IN AN ORGANIZED HEALTH CARE EDUCATION/TRAINING PROGRAM

## 2020-06-25 PROCEDURE — 99214 PR OFFICE/OUTPT VISIT, EST, LEVL IV, 30-39 MIN: ICD-10-PCS | Mod: HCNC,GC,S$GLB, | Performed by: STUDENT IN AN ORGANIZED HEALTH CARE EDUCATION/TRAINING PROGRAM

## 2020-06-25 PROCEDURE — 1159F PR MEDICATION LIST DOCUMENTED IN MEDICAL RECORD: ICD-10-PCS | Mod: HCNC,S$GLB,, | Performed by: PHYSICIAN ASSISTANT

## 2020-06-25 PROCEDURE — 99204 PR OFFICE/OUTPT VISIT, NEW, LEVL IV, 45-59 MIN: ICD-10-PCS | Mod: HCNC,S$GLB,, | Performed by: PHYSICIAN ASSISTANT

## 2020-06-25 PROCEDURE — 1159F MED LIST DOCD IN RCRD: CPT | Mod: HCNC,GC,S$GLB, | Performed by: STUDENT IN AN ORGANIZED HEALTH CARE EDUCATION/TRAINING PROGRAM

## 2020-06-25 PROCEDURE — 3008F PR BODY MASS INDEX (BMI) DOCUMENTED: ICD-10-PCS | Mod: HCNC,CPTII,GC,S$GLB | Performed by: STUDENT IN AN ORGANIZED HEALTH CARE EDUCATION/TRAINING PROGRAM

## 2020-06-25 PROCEDURE — 1100F PTFALLS ASSESS-DOCD GE2>/YR: CPT | Mod: HCNC,CPTII,S$GLB, | Performed by: PHYSICIAN ASSISTANT

## 2020-06-25 PROCEDURE — 3044F PR MOST RECENT HEMOGLOBIN A1C LEVEL <7.0%: ICD-10-PCS | Mod: HCNC,CPTII,GC,S$GLB | Performed by: STUDENT IN AN ORGANIZED HEALTH CARE EDUCATION/TRAINING PROGRAM

## 2020-06-25 PROCEDURE — 1100F PR PT FALLS ASSESS DOC 2+ FALLS/FALL W/INJURY/YR: ICD-10-PCS | Mod: HCNC,CPTII,GC,S$GLB | Performed by: STUDENT IN AN ORGANIZED HEALTH CARE EDUCATION/TRAINING PROGRAM

## 2020-06-25 PROCEDURE — 1125F AMNT PAIN NOTED PAIN PRSNT: CPT | Mod: HCNC,S$GLB,, | Performed by: PHYSICIAN ASSISTANT

## 2020-06-25 PROCEDURE — 99999 PR PBB SHADOW E&M-EST. PATIENT-LVL V: CPT | Mod: PBBFAC,HCNC,, | Performed by: PHYSICIAN ASSISTANT

## 2020-06-25 PROCEDURE — 99214 OFFICE O/P EST MOD 30 MIN: CPT | Mod: HCNC,GC,S$GLB, | Performed by: STUDENT IN AN ORGANIZED HEALTH CARE EDUCATION/TRAINING PROGRAM

## 2020-06-25 PROCEDURE — 1159F MED LIST DOCD IN RCRD: CPT | Mod: HCNC,S$GLB,, | Performed by: PHYSICIAN ASSISTANT

## 2020-06-25 PROCEDURE — 3074F PR MOST RECENT SYSTOLIC BLOOD PRESSURE < 130 MM HG: ICD-10-PCS | Mod: HCNC,CPTII,GC,S$GLB | Performed by: STUDENT IN AN ORGANIZED HEALTH CARE EDUCATION/TRAINING PROGRAM

## 2020-06-25 PROCEDURE — 3075F PR MOST RECENT SYSTOLIC BLOOD PRESS GE 130-139MM HG: ICD-10-PCS | Mod: HCNC,CPTII,S$GLB, | Performed by: PHYSICIAN ASSISTANT

## 2020-06-25 PROCEDURE — 3008F BODY MASS INDEX DOCD: CPT | Mod: HCNC,CPTII,GC,S$GLB | Performed by: STUDENT IN AN ORGANIZED HEALTH CARE EDUCATION/TRAINING PROGRAM

## 2020-06-25 PROCEDURE — 99204 OFFICE O/P NEW MOD 45 MIN: CPT | Mod: HCNC,S$GLB,, | Performed by: PHYSICIAN ASSISTANT

## 2020-06-25 PROCEDURE — 99999 PR PBB SHADOW E&M-EST. PATIENT-LVL V: ICD-10-PCS | Mod: PBBFAC,HCNC,GC, | Performed by: STUDENT IN AN ORGANIZED HEALTH CARE EDUCATION/TRAINING PROGRAM

## 2020-06-25 PROCEDURE — 3044F HG A1C LEVEL LT 7.0%: CPT | Mod: HCNC,CPTII,GC,S$GLB | Performed by: STUDENT IN AN ORGANIZED HEALTH CARE EDUCATION/TRAINING PROGRAM

## 2020-06-25 PROCEDURE — 3078F DIAST BP <80 MM HG: CPT | Mod: HCNC,CPTII,S$GLB, | Performed by: PHYSICIAN ASSISTANT

## 2020-06-25 PROCEDURE — 3078F DIAST BP <80 MM HG: CPT | Mod: HCNC,CPTII,GC,S$GLB | Performed by: STUDENT IN AN ORGANIZED HEALTH CARE EDUCATION/TRAINING PROGRAM

## 2020-06-25 PROCEDURE — 1100F PR PT FALLS ASSESS DOC 2+ FALLS/FALL W/INJURY/YR: ICD-10-PCS | Mod: HCNC,CPTII,S$GLB, | Performed by: PHYSICIAN ASSISTANT

## 2020-06-25 PROCEDURE — 1125F AMNT PAIN NOTED PAIN PRSNT: CPT | Mod: HCNC,GC,S$GLB, | Performed by: STUDENT IN AN ORGANIZED HEALTH CARE EDUCATION/TRAINING PROGRAM

## 2020-06-25 PROCEDURE — 1159F PR MEDICATION LIST DOCUMENTED IN MEDICAL RECORD: ICD-10-PCS | Mod: HCNC,GC,S$GLB, | Performed by: STUDENT IN AN ORGANIZED HEALTH CARE EDUCATION/TRAINING PROGRAM

## 2020-06-25 PROCEDURE — 3288F FALL RISK ASSESSMENT DOCD: CPT | Mod: HCNC,CPTII,GC,S$GLB | Performed by: STUDENT IN AN ORGANIZED HEALTH CARE EDUCATION/TRAINING PROGRAM

## 2020-06-25 PROCEDURE — 3078F PR MOST RECENT DIASTOLIC BLOOD PRESSURE < 80 MM HG: ICD-10-PCS | Mod: HCNC,CPTII,S$GLB, | Performed by: PHYSICIAN ASSISTANT

## 2020-06-25 PROCEDURE — 1100F PTFALLS ASSESS-DOCD GE2>/YR: CPT | Mod: HCNC,CPTII,GC,S$GLB | Performed by: STUDENT IN AN ORGANIZED HEALTH CARE EDUCATION/TRAINING PROGRAM

## 2020-06-25 PROCEDURE — 3078F PR MOST RECENT DIASTOLIC BLOOD PRESSURE < 80 MM HG: ICD-10-PCS | Mod: HCNC,CPTII,GC,S$GLB | Performed by: STUDENT IN AN ORGANIZED HEALTH CARE EDUCATION/TRAINING PROGRAM

## 2020-06-25 PROCEDURE — 99999 PR PBB SHADOW E&M-EST. PATIENT-LVL V: CPT | Mod: PBBFAC,HCNC,GC, | Performed by: STUDENT IN AN ORGANIZED HEALTH CARE EDUCATION/TRAINING PROGRAM

## 2020-06-25 PROCEDURE — 1125F PR PAIN SEVERITY QUANTIFIED, PAIN PRESENT: ICD-10-PCS | Mod: HCNC,GC,S$GLB, | Performed by: STUDENT IN AN ORGANIZED HEALTH CARE EDUCATION/TRAINING PROGRAM

## 2020-06-25 PROCEDURE — 99999 PR PBB SHADOW E&M-EST. PATIENT-LVL V: ICD-10-PCS | Mod: PBBFAC,HCNC,, | Performed by: PHYSICIAN ASSISTANT

## 2020-06-25 RX ORDER — LOSARTAN POTASSIUM 25 MG/1
50 TABLET ORAL DAILY
Qty: 90 TABLET | Refills: 6 | Status: SHIPPED | OUTPATIENT
Start: 2020-06-25 | End: 2020-06-26 | Stop reason: SDUPTHER

## 2020-06-25 RX ORDER — ERGOCALCIFEROL 1.25 MG/1
50000 CAPSULE ORAL
Qty: 4 CAPSULE | Refills: 3 | Status: SHIPPED | OUTPATIENT
Start: 2020-06-25 | End: 2020-10-19 | Stop reason: SDUPTHER

## 2020-06-25 RX ORDER — TRAMADOL HYDROCHLORIDE 50 MG/1
50 TABLET ORAL EVERY 8 HOURS PRN
Qty: 21 TABLET | Refills: 0 | Status: SHIPPED | OUTPATIENT
Start: 2020-06-25 | End: 2020-07-16 | Stop reason: SDUPTHER

## 2020-06-25 NOTE — LETTER
June 25, 2020      Shanika Diaz PA-C  [address]  [facility] [address]           [place] [address]  [address], [address]  [facility] [address]  Phone: [phone]          Patient: Emerson Menendez   MR Number: [id number]   YOB: 1945   Date of Visit: 6/25/2020       Dear Shanika Diaz:    Thank you for referring Emerson Menendez to me for evaluation. Attached you will find relevant portions of my assessment and plan of care.    If you have questions, please do not hesitate to call me. I look forward to following Emerson Menendez along with you.    Sincerely,    DAYAMI Auguste    Enclosure  CC:  No Recipients    If you would like to receive this communication electronically, please contact [email or web address] or [phone] to request more information on [employer] access.    For providers and/or their staff who would like to refer a patient to [facility], please contact us through our one-stop-shop provider referral line, [facility], at [phone].    If you feel you have received this communication in error or would no longer like to receive these types of communications, please e-mail [email or web address]

## 2020-06-25 NOTE — PATIENT INSTRUCTIONS
The maximum dose of Acetaminophen (Tylenol) in 24 hours is 3000 mg    You may use Voltaren gel 2-3 times per day for the wrist and shoulder    Tramadol for nighttime pain

## 2020-06-25 NOTE — TELEPHONE ENCOUNTER
Lm on vm informing pt his f/u with Esmer has been schedule for 6/30/20 at 215 pm at the [place] location.  Pt was advised to call office back if this time does not work for him.

## 2020-06-25 NOTE — PROGRESS NOTES
Clinical, laboratorial and imaging information available in medical records were reveiwed by me. I agree with the assessment and recommendations provided by Dr. Ferguson who was under my supervision.

## 2020-06-26 DIAGNOSIS — E55.9 VITAMIN D DEFICIENCY: ICD-10-CM

## 2020-06-26 DIAGNOSIS — N18.30 CHRONIC KIDNEY DISEASE, STAGE III (MODERATE): Chronic | ICD-10-CM

## 2020-06-26 DIAGNOSIS — I10 ESSENTIAL HYPERTENSION: ICD-10-CM

## 2020-06-26 DIAGNOSIS — D47.2 SMOLDERING MULTIPLE MYELOMA: ICD-10-CM

## 2020-06-26 RX ORDER — OMEPRAZOLE 40 MG/1
40 CAPSULE, DELAYED RELEASE ORAL DAILY
Qty: 90 CAPSULE | Refills: 0 | Status: SHIPPED | OUTPATIENT
Start: 2020-06-26 | End: 2020-10-01 | Stop reason: SDUPTHER

## 2020-06-26 RX ORDER — PRAVASTATIN SODIUM 40 MG/1
40 TABLET ORAL DAILY
Qty: 90 TABLET | Refills: 0 | Status: SHIPPED | OUTPATIENT
Start: 2020-06-26 | End: 2021-01-11 | Stop reason: SDUPTHER

## 2020-06-26 RX ORDER — LOSARTAN POTASSIUM 25 MG/1
50 TABLET ORAL DAILY
Qty: 90 TABLET | Refills: 0 | Status: SHIPPED | OUTPATIENT
Start: 2020-06-26 | End: 2020-12-21 | Stop reason: SDUPTHER

## 2020-06-26 RX ORDER — CITALOPRAM 20 MG/1
30 TABLET, FILM COATED ORAL DAILY
Qty: 135 TABLET | Refills: 0 | Status: SHIPPED | OUTPATIENT
Start: 2020-06-26 | End: 2021-02-11 | Stop reason: SDUPTHER

## 2020-06-29 ENCOUNTER — HOSPITAL ENCOUNTER (OUTPATIENT)
Dept: RADIOLOGY | Facility: HOSPITAL | Age: 75
Discharge: HOME | End: 2020-06-29
Attending: PHYSICIAN ASSISTANT
Payer: MEDICARE

## 2020-06-29 DIAGNOSIS — M25.632 DECREASED RANGE OF MOTION OF LEFT WRIST: ICD-10-CM

## 2020-06-29 DIAGNOSIS — S62.112A TRIQUETRAL CHIP FRACTURE, LEFT, CLOSED, INITIAL ENCOUNTER: ICD-10-CM

## 2020-06-29 DIAGNOSIS — M25.532 LEFT WRIST PAIN: ICD-10-CM

## 2020-06-29 PROCEDURE — 73221 MRI WRIST WITHOUT CONTRAST LEFT: ICD-10-PCS | Mod: 26,HCNC,LT, | Performed by: RADIOLOGY

## 2020-06-29 PROCEDURE — 73221 MRI JOINT UPR EXTREM W/O DYE: CPT | Mod: TC,HCNC,LT

## 2020-06-29 PROCEDURE — 73221 MRI JOINT UPR EXTREM W/O DYE: CPT | Mod: 26,HCNC,LT, | Performed by: RADIOLOGY

## 2020-06-30 ENCOUNTER — HOSPITAL ENCOUNTER (OUTPATIENT)
Dept: RADIOLOGY | Facility: HOSPITAL | Age: 75
Discharge: HOME | End: 2020-06-30
Attending: PODIATRIST
Payer: MEDICARE

## 2020-06-30 ENCOUNTER — OFFICE VISIT (OUTPATIENT)
Dept: ORTHOPEDICS | Facility: CLINIC | Age: 75
End: 2020-06-30
Payer: MEDICARE

## 2020-06-30 ENCOUNTER — PATIENT OUTREACH (OUTPATIENT)
Dept: ADMINISTRATIVE | Facility: OTHER | Age: 75
End: 2020-06-30

## 2020-06-30 ENCOUNTER — OFFICE VISIT (OUTPATIENT)
Dept: PODIATRY | Facility: CLINIC | Age: 75
End: 2020-06-30
Payer: MEDICARE

## 2020-06-30 VITALS
DIASTOLIC BLOOD PRESSURE: 69 MMHG | HEIGHT: 70 IN | HEART RATE: 104 BPM | WEIGHT: 191 LBS | BODY MASS INDEX: 27.35 KG/M2 | SYSTOLIC BLOOD PRESSURE: 136 MMHG

## 2020-06-30 VITALS
SYSTOLIC BLOOD PRESSURE: 125 MMHG | BODY MASS INDEX: 27.52 KG/M2 | HEART RATE: 95 BPM | HEIGHT: 70 IN | DIASTOLIC BLOOD PRESSURE: 68 MMHG

## 2020-06-30 DIAGNOSIS — M20.40 HAMMER TOE, UNSPECIFIED LATERALITY: ICD-10-CM

## 2020-06-30 DIAGNOSIS — Z79.4 TYPE 2 DIABETES MELLITUS WITH DIABETIC POLYNEUROPATHY, WITH LONG-TERM CURRENT USE OF INSULIN: Primary | ICD-10-CM

## 2020-06-30 DIAGNOSIS — M79.672 LEFT FOOT PAIN: ICD-10-CM

## 2020-06-30 DIAGNOSIS — M25.512 ACUTE PAIN OF LEFT SHOULDER: ICD-10-CM

## 2020-06-30 DIAGNOSIS — S63.592A COMPLEX TEAR OF TRIANGULAR FIBROCARTILAGE OF LEFT WRIST, INITIAL ENCOUNTER: Primary | ICD-10-CM

## 2020-06-30 DIAGNOSIS — S92.515A CLOSED NONDISPLACED FRACTURE OF PROXIMAL PHALANX OF LESSER TOE OF LEFT FOOT, INITIAL ENCOUNTER: Primary | ICD-10-CM

## 2020-06-30 DIAGNOSIS — S62.112A TRIQUETRAL CHIP FRACTURE, LEFT, CLOSED, INITIAL ENCOUNTER: ICD-10-CM

## 2020-06-30 DIAGNOSIS — M21.619 BUNION: ICD-10-CM

## 2020-06-30 DIAGNOSIS — E11.42 TYPE 2 DIABETES MELLITUS WITH DIABETIC POLYNEUROPATHY, WITH LONG-TERM CURRENT USE OF INSULIN: Primary | ICD-10-CM

## 2020-06-30 PROCEDURE — 1125F AMNT PAIN NOTED PAIN PRSNT: CPT | Mod: HCNC,S$GLB,, | Performed by: PHYSICIAN ASSISTANT

## 2020-06-30 PROCEDURE — 3044F PR MOST RECENT HEMOGLOBIN A1C LEVEL <7.0%: ICD-10-PCS | Mod: HCNC,CPTII,S$GLB, | Performed by: PODIATRIST

## 2020-06-30 PROCEDURE — 3008F BODY MASS INDEX DOCD: CPT | Mod: HCNC,CPTII,S$GLB, | Performed by: PHYSICIAN ASSISTANT

## 2020-06-30 PROCEDURE — 1159F PR MEDICATION LIST DOCUMENTED IN MEDICAL RECORD: ICD-10-PCS | Mod: HCNC,S$GLB,, | Performed by: PHYSICIAN ASSISTANT

## 2020-06-30 PROCEDURE — 3078F DIAST BP <80 MM HG: CPT | Mod: HCNC,CPTII,S$GLB, | Performed by: PODIATRIST

## 2020-06-30 PROCEDURE — 99999 PR PBB SHADOW E&M-EST. PATIENT-LVL III: ICD-10-PCS | Mod: PBBFAC,HCNC,, | Performed by: PHYSICIAN ASSISTANT

## 2020-06-30 PROCEDURE — 99999 PR PBB SHADOW E&M-EST. PATIENT-LVL III: CPT | Mod: PBBFAC,HCNC,, | Performed by: PODIATRIST

## 2020-06-30 PROCEDURE — 73630 X-RAY EXAM OF FOOT: CPT | Mod: 26,HCNC,LT, | Performed by: RADIOLOGY

## 2020-06-30 PROCEDURE — 1101F PR PT FALLS ASSESS DOC 0-1 FALLS W/OUT INJ PAST YR: ICD-10-PCS | Mod: HCNC,CPTII,S$GLB, | Performed by: PHYSICIAN ASSISTANT

## 2020-06-30 PROCEDURE — 99213 PR OFFICE/OUTPT VISIT, EST, LEVL III, 20-29 MIN: ICD-10-PCS | Mod: HCNC,S$GLB,, | Performed by: PODIATRIST

## 2020-06-30 PROCEDURE — 1100F PR PT FALLS ASSESS DOC 2+ FALLS/FALL W/INJURY/YR: ICD-10-PCS | Mod: HCNC,CPTII,S$GLB, | Performed by: PODIATRIST

## 2020-06-30 PROCEDURE — 3078F PR MOST RECENT DIASTOLIC BLOOD PRESSURE < 80 MM HG: ICD-10-PCS | Mod: HCNC,CPTII,S$GLB, | Performed by: PHYSICIAN ASSISTANT

## 2020-06-30 PROCEDURE — 3008F PR BODY MASS INDEX (BMI) DOCUMENTED: ICD-10-PCS | Mod: HCNC,CPTII,S$GLB, | Performed by: PHYSICIAN ASSISTANT

## 2020-06-30 PROCEDURE — 99213 PR OFFICE/OUTPT VISIT, EST, LEVL III, 20-29 MIN: ICD-10-PCS | Mod: HCNC,S$GLB,, | Performed by: PHYSICIAN ASSISTANT

## 2020-06-30 PROCEDURE — 1125F PR PAIN SEVERITY QUANTIFIED, PAIN PRESENT: ICD-10-PCS | Mod: HCNC,S$GLB,, | Performed by: PODIATRIST

## 2020-06-30 PROCEDURE — 1100F PTFALLS ASSESS-DOCD GE2>/YR: CPT | Mod: HCNC,CPTII,S$GLB, | Performed by: PODIATRIST

## 2020-06-30 PROCEDURE — 1125F AMNT PAIN NOTED PAIN PRSNT: CPT | Mod: HCNC,S$GLB,, | Performed by: PODIATRIST

## 2020-06-30 PROCEDURE — 1159F MED LIST DOCD IN RCRD: CPT | Mod: HCNC,S$GLB,, | Performed by: PODIATRIST

## 2020-06-30 PROCEDURE — 73630 X-RAY EXAM OF FOOT: CPT | Mod: TC,HCNC,LT

## 2020-06-30 PROCEDURE — 3288F PR FALLS RISK ASSESSMENT DOCUMENTED: ICD-10-PCS | Mod: HCNC,CPTII,S$GLB, | Performed by: PODIATRIST

## 2020-06-30 PROCEDURE — 3074F PR MOST RECENT SYSTOLIC BLOOD PRESSURE < 130 MM HG: ICD-10-PCS | Mod: HCNC,CPTII,S$GLB, | Performed by: PODIATRIST

## 2020-06-30 PROCEDURE — 1159F MED LIST DOCD IN RCRD: CPT | Mod: HCNC,S$GLB,, | Performed by: PHYSICIAN ASSISTANT

## 2020-06-30 PROCEDURE — 73630 XR FOOT COMPLETE 3 VIEW LEFT: ICD-10-PCS | Mod: 26,HCNC,LT, | Performed by: RADIOLOGY

## 2020-06-30 PROCEDURE — 1125F PR PAIN SEVERITY QUANTIFIED, PAIN PRESENT: ICD-10-PCS | Mod: HCNC,S$GLB,, | Performed by: PHYSICIAN ASSISTANT

## 2020-06-30 PROCEDURE — 3078F DIAST BP <80 MM HG: CPT | Mod: HCNC,CPTII,S$GLB, | Performed by: PHYSICIAN ASSISTANT

## 2020-06-30 PROCEDURE — 3075F SYST BP GE 130 - 139MM HG: CPT | Mod: HCNC,CPTII,S$GLB, | Performed by: PHYSICIAN ASSISTANT

## 2020-06-30 PROCEDURE — 3008F BODY MASS INDEX DOCD: CPT | Mod: HCNC,CPTII,S$GLB, | Performed by: PODIATRIST

## 2020-06-30 PROCEDURE — 3044F HG A1C LEVEL LT 7.0%: CPT | Mod: HCNC,CPTII,S$GLB, | Performed by: PODIATRIST

## 2020-06-30 PROCEDURE — 99999 PR PBB SHADOW E&M-EST. PATIENT-LVL III: ICD-10-PCS | Mod: PBBFAC,HCNC,, | Performed by: PODIATRIST

## 2020-06-30 PROCEDURE — 99999 PR PBB SHADOW E&M-EST. PATIENT-LVL III: CPT | Mod: PBBFAC,HCNC,, | Performed by: PHYSICIAN ASSISTANT

## 2020-06-30 PROCEDURE — 3288F FALL RISK ASSESSMENT DOCD: CPT | Mod: HCNC,CPTII,S$GLB, | Performed by: PODIATRIST

## 2020-06-30 PROCEDURE — 3078F PR MOST RECENT DIASTOLIC BLOOD PRESSURE < 80 MM HG: ICD-10-PCS | Mod: HCNC,CPTII,S$GLB, | Performed by: PODIATRIST

## 2020-06-30 PROCEDURE — 99213 OFFICE O/P EST LOW 20 MIN: CPT | Mod: HCNC,S$GLB,, | Performed by: PHYSICIAN ASSISTANT

## 2020-06-30 PROCEDURE — 1159F PR MEDICATION LIST DOCUMENTED IN MEDICAL RECORD: ICD-10-PCS | Mod: HCNC,S$GLB,, | Performed by: PODIATRIST

## 2020-06-30 PROCEDURE — 1101F PT FALLS ASSESS-DOCD LE1/YR: CPT | Mod: HCNC,CPTII,S$GLB, | Performed by: PHYSICIAN ASSISTANT

## 2020-06-30 PROCEDURE — 3074F SYST BP LT 130 MM HG: CPT | Mod: HCNC,CPTII,S$GLB, | Performed by: PODIATRIST

## 2020-06-30 PROCEDURE — 3008F PR BODY MASS INDEX (BMI) DOCUMENTED: ICD-10-PCS | Mod: HCNC,CPTII,S$GLB, | Performed by: PODIATRIST

## 2020-06-30 PROCEDURE — 99213 OFFICE O/P EST LOW 20 MIN: CPT | Mod: HCNC,S$GLB,, | Performed by: PODIATRIST

## 2020-06-30 PROCEDURE — 3075F PR MOST RECENT SYSTOLIC BLOOD PRESS GE 130-139MM HG: ICD-10-PCS | Mod: HCNC,CPTII,S$GLB, | Performed by: PHYSICIAN ASSISTANT

## 2020-06-30 NOTE — PROGRESS NOTES
Subjective:      Patient ID: Emerson Menendez is a 74 y.o. male.    Chief Complaint: Pain of the Left Wrist      HPI  Emerson Menendez is a right hand dominant 74 y.o. male presenting today for follow up of recent MRI and left wrist and shoulder pain.  There was a history of trauma. He was seen in urgent care this week, reported that he was working on his roof and the ladder slipped - he fell from the top of the roof onto his left side. Injury occurred 7 weeks ago.  He was planning to come for shoulder and wrist injections today, as he did not want this at his last visit due to his blood glucose.     He reports intermittent use of a wrist brace, reports that occasionally it feels better in the brace and occasionally feels better out of the brace.  He also reports intermittent finger numbness and tingling on the left side.  His left shoulder pain is mild-to-moderate, the wrist pain is severe.  He reports pain is increased with motion, pain is not improved over the past 6 weeks.  He does occasionally take Tylenol or ibuprofen for pain, with some relief.  He lives in [place], he will return to [place] the week of 7/13.      Review of patient's allergies indicates:   Allergen Reactions    Penicillins      Knees locked up         Current Outpatient Medications   Medication Sig Dispense Refill    aspirin (ECOTRIN) 81 MG EC tablet Take 1 tablet (81 mg total) by mouth once daily. 100 tablet 3    blood glucose control, normal (METER-CHECK) Soln One meter.  Use as directed. Meter of insurance choice 1 each 0    blood sugar diagnostic (ACCU-CHEK ELIE PLUS TEST STRP) Str Accu check elie plus TEST FOUR TIMES DAILY. Test strtips of insurance choice to go with meter and lancets 400 strip 3    blood sugar diagnostic [facility] - check blood sugars 4 times daily 400 strip 4    blood-glucose meter (ACCU-CHEK OMAYRA) Misc USE AS DIRECTED. Accucheck elie plus 1 each 0    citalopram (CELEXA) 20 MG tablet  "Take 1.5 tablets (30 mg total) by mouth once daily. 135 tablet 0    diazePAM (VALIUM) 5 MG tablet TAKE 1 TABLET(5 MG) BY MOUTH EVERY 6 HOURS AS NEEDED 60 tablet 0    ergocalciferol (ERGOCALCIFEROL) 50,000 unit Cap Take 1 capsule (50,000 Units total) by mouth every 7 days. 4 capsule 3    gabapentin (NEURONTIN) 100 MG capsule TAKE 1 CAPSULE EVERY MORNING, 1 CAPSULE IN THE AFTERNOON, AND 1 TO 3 CAPSULE(S) AT BEDTIME AS NEEDED FOR FOOT PAIN 450 capsule 3    glimepiride (AMARYL) 2 MG tablet Take 1 tablet (2 mg total) by mouth once daily. 90 tablet 4    ibuprofen (ADVIL,MOTRIN) 800 MG tablet Take 1 tablet (800 mg total) by mouth 3 (three) times daily. 30 tablet 0    insulin (LANTUS SOLOSTAR U-100 INSULIN) glargine 100 units/mL (3mL) SubQ pen Inject 20 Units into the skin every evening. 1 Box 6    lancets Misc Use twice daily 200 each 4    latanoprost 0.005 % ophthalmic solution Place 1 drop into both eyes every evening. 7.5 mL 3    losartan (COZAAR) 25 MG tablet Take 2 tablets (50 mg total) by mouth once daily. 90 tablet 0    meloxicam (MOBIC) 15 MG tablet TAKE 1 TABLET(15 MG) BY MOUTH EVERY DAY 90 tablet 0    omeprazole (PRILOSEC) 40 MG capsule Take 1 capsule (40 mg total) by mouth once daily. 90 capsule 0    pen needle, diabetic (BD ULTRA-FINE SHORT PEN NEEDLE) 31 gauge x 5/16" Ndle USE TO INJECT INSULIN ONE TIME DAILY 100 each 3    pravastatin (PRAVACHOL) 40 MG tablet Take 1 tablet (40 mg total) by mouth once daily. 90 tablet 0    sildenafil (REVATIO) 20 mg Tab 2-3 tablets daily as needed for erectile dysfunction 30 tablet 1    tadalafiL (CIALIS) 20 MG Tab Take 1 tablet (20 mg total) by mouth daily as needed. 30 tablet 1    tadalafiL (CIALIS) 20 MG Tab Take 1 tablet (20 mg total) by mouth daily as needed. 30 tablet 3    tadalafiL (CIALIS) 5 MG tablet TAKE 1 TABLET(5 MG) BY MOUTH DAILY AS NEEDED FOR ERECTILE DYSFUNCTION 90 tablet 2    tamsulosin (FLOMAX) 0.4 mg Cap Take 1 capsule (0.4 mg total) by " "mouth once daily. 90 capsule 3    tizanidine (ZANAFLEX) 4 MG tablet 1/2-1 tablet every 8 hours as needed for muscle spasm 90 tablet 1    traMADoL (ULTRAM) 50 mg tablet Take 1 tablet (50 mg total) by mouth every 8 (eight) hours as needed for Pain (moderate to severe pain). 21 tablet 0    TRUEPLUS LANCETS 33 gauge Misc       glucagon (human recombinant) inj 1mg/mL kit Inject 1 mL (1 mg total) into the muscle as needed. (Patient not taking: Reported on 6/25/2020) 1 kit 0     No current facility-administered medications for this visit.        Past Medical History:   Diagnosis Date    Anxiety 3/16/2015    BPH (benign prostatic hypertrophy) 9/24/2012    Depression 3/16/2015    GERD (gastroesophageal reflux disease) 9/24/2012    Hx of psychiatric care     Celexa, Valium    Hyperlipidemia     Microalbuminuria 9/24/2012    Osteoarthritis of cervical spine 9/24/2012    Osteoarthritis of knee 9/24/2012    Psychiatric problem     Type II or unspecified type diabetes mellitus without mention of complication, not stated as uncontrolled 9/24/2012       Past Surgical History:   Procedure Laterality Date    CATARACT EXTRACTION  2012    Right eye    PROSTATE SURGERY           Review of Systems:  Review of Systems   Constitution: Negative for chills and fever.   Skin: Negative for rash and suspicious lesions.   Musculoskeletal:        See HPI   Neurological: Negative for dizziness, headaches and light-headedness.   Psychiatric/Behavioral: Negative for depression. The patient is not nervous/anxious.         + Grief from his daughter passing away 4 months ago         OBJECTIVE:     PHYSICAL EXAM:  Height: 5' 10" (177.8 cm) Weight: 86.6 kg (191 lb)  Vitals:    06/30/20 1401   BP: 136/69   Pulse: 104   Weight: 86.6 kg (191 lb)   Height: 5' 10" (1.778 m)   PainSc:   5     General    Vitals reviewed.  Constitutional: He is oriented to person, place, and time. He appears well-developed and well-nourished.   HENT:   Head: " Normocephalic and atraumatic.   Neck: Normal range of motion.   Cardiovascular: Normal rate.    Pulmonary/Chest: Effort normal. No respiratory distress.   Neurological: He is alert and oriented to person, place, and time.   Psychiatric: He has a normal mood and affect. His behavior is normal. Judgment and thought content normal.             Musculoskeletal:  No lacerations or abrasions.  No significant edema.  He is diffusely tender to palpation over the left wrist dorsally, volarly, radially, and ulnarly.  Left wrist range of motion is very limited, he has severe pain with motion.  Neurovascularly intact- good sensation and motor function, good capillary refill, 2+ radial pulses.  Negative Tinel's over the left carpal tunnel, positive Durkan's over the left carpal tunnel.  Left shoulder is tender to palpation posteriorly and anteriorly.  He has good forward flexion, adduction, internal rotation, and external rotation; he has decreased abduction on the left.  He reports pain at in range of motion.    RADIOGRAPHS:  Left Wrist MRI, 6/29/2020  FINDINGS:  Severe motion artifacts on several acquired sequences.     A marker is applied along the ulnar side of the distal wrist.     The distal radius and distal ulna appear to be intact.     There is nonspecific mild edema of the lunate and scaphoid.     Mild edema of the capitate.     Tiny areas of cystic changes noted at the scaphoid, at the base of the 1st metacarpal, the head of the 1st metacarpal consistent with degenerative change.     No acute fractures identified, no osseous lesions.  The visualized small osseous fragment on the radiograph of 06/23/2020 is not well seen on this MRI possibly too small.     The central portion of the triangular fibrocartilage is absent.  There is narrowing at the radiocarpal and ulnocarpal joint space.  There is tenosynovitis involving more so the flexor pollicis longus, 4th and 5th flexor tendon sheaths.  Mild tenosynovitis of the  extensor digitorum tendons.  The extensor carpi ulnaris appear intact.  No cystic mass seen.     Impression:  Tear of the central portion of the triangular fibrocartilage.     Tenosynovitis of the flexor pollicis longus, 4th and 5th flexor tendon sheaths and mild tenosynovitis of the extensor digitorum tendons.    Left hand XRay, 6/23/2020  FINDINGS:  On the lateral view there is a possible small chip avulsion fracture of the posterior triquetrum.  There is mild DJD.    Left wrist XRay, 6/23/2020  FINDINGS:  Three views: Lateral view demonstrates a possible small triquetral chip avulsion fracture.    Left Shoulder and Humerus XRay, 6/23/2020  FINDINGS:  Mild DJD AC and glenohumeral joints.  Impression:  No fracture dislocation.    Left UE ultrasound, 6/23/2020  Impression:  No thrombus in central veins of the left upper extremity.    Comments: I have personally reviewed the imaging and I agree with the above radiologist's report.    ASSESSMENT/PLAN:   Emerson was seen today for pain.    Diagnoses and all orders for this visit:    Complex tear of triangular fibrocartilage of left wrist, initial encounter    Triquetral chip fracture, left, closed, initial encounter    Acute pain of left shoulder           - We talked at length about the anatomy and pathophysiology of   Encounter Diagnoses   Name Primary?    Complex tear of triangular fibrocartilage of left wrist, initial encounter Yes    Triquetral chip fracture, left, closed, initial encounter     Acute pain of left shoulder          - MRI results discussed, he is interested in possible surgery for TFCC tear and persistent wrist pain and decreased ROM.  Will hold on steroid injection today as he would like to further discuss surgical options.  - follow-up with Dr. Castillo to discuss TFCC tear and surgical recommendations  - call with questions or concerns  - continue regular use of the wrist brace  - Tylenol as needed for mild-to-moderate pain; tramadol every 8  hr as needed for severe pain or nighttime pain  - continue elevating, use of ice, and rest    Disclaimer: This note has been generated using voice-recognition software. There may be typographical errors that have been missed during proof-reading.

## 2020-06-30 NOTE — PROGRESS NOTES
Called pt to inform him of fracture to the little toe. Recommend stiff shoes and rexray in 3 weeks to assess for changes. Will need appointment in 4 weeks.

## 2020-06-30 NOTE — PROGRESS NOTES
Care Everywhere: updated  Immunization: updated  Health Maintenance: updated  Media Review: reviewed for outside colon cancer report  Legacy Review:   Order placed:   Upcoming appts:optometry 7.15.2020

## 2020-06-30 NOTE — PROGRESS NOTES
Subjective:      Patient ID: Emerson Menendez is a 74 y.o. male.    Chief Complaint:   Diabetes Mellitus (PCP Dr. Sagastume 4/22/20), Diabetic Foot Exam, Routine Foot Care, and Foot Problem (left ft./ had a fall saw PA 6/23/20/ no xray )    Emerson is a 74 y.o. male who presents to the clinic upon referral from Dr. Dimple camarillo. provider found  for evaluation and treatment of diabetic feet. Emerson has a past medical history of Anxiety (3/16/2015), BPH (benign prostatic hypertrophy) (9/24/2012), Depression (3/16/2015), GERD (gastroesophageal reflux disease) (9/24/2012), psychiatric care, Hyperlipidemia, Microalbuminuria (9/24/2012), Osteoarthritis of cervical spine (9/24/2012), Osteoarthritis of knee (9/24/2012), Psychiatric problem, and Type II or unspecified type diabetes mellitus without mention of complication, not stated as uncontrolled (9/24/2012). Patient relates he has been having left foot pain since his fall off a ladder 6 weeks ago.He relates at the time he did not notice it. He relates he is getting treatment for his wrist and knee on the left.     He also would like to get dm shoes .. he got a rx in the past but tried 5 places and could not get it filled.   Pt relates he goes back and forth from living in [place] and living in [place]. He would like to keep his doctors in [place],.     Pt is retired... he trys to stay very active.    He does get neuropathy symptoms at night.     Pt relates he did get an injection in his heel before but that is not hurting today.       PCP: Roberta Sagastume MD    Date Last Seen by PCP: 4/22/20    Current shoe gear: Extra depth shoes well worn    Hemoglobin A1C   Date Value Ref Range Status   06/18/2020 6.9 (H) 3.9 - 6.1 % Final     Comment:     ADA Screening Guidelines:  5.7-6.4%  Consistent with prediabetes  >or=6.5%  Consistent with diabetes     12/10/2019 7.6 (H) 4.0 - 5.6 % Final     Comment:     ADA Screening Guidelines:  5.7-6.4%  Consistent with  prediabetes  >or=6.5%  Consistent with diabetes  High levels of fetal hemoglobin interfere with the HbA1C  assay. Heterozygous hemoglobin variants (HbS, HgC, etc)do  not significantly interfere with this assay.   However, presence of multiple variants may affect accuracy.     09/17/2019 7.7 (H) 4.0 - 5.6 % Final     Comment:     ADA Screening Guidelines:  5.7-6.4%  Consistent with prediabetes  >or=6.5%  Consistent with diabetes  High levels of fetal hemoglobin interfere with the HbA1C  assay. Heterozygous hemoglobin variants (HbS, HgC, etc)do  not significantly interfere with this assay.   However, presence of multiple variants may affect accuracy.              Past Medical History:   Diagnosis Date    Anxiety 3/16/2015    BPH (benign prostatic hypertrophy) 9/24/2012    Depression 3/16/2015    GERD (gastroesophageal reflux disease) 9/24/2012    Hx of psychiatric care     Celexa, Valium    Hyperlipidemia     Microalbuminuria 9/24/2012    Osteoarthritis of cervical spine 9/24/2012    Osteoarthritis of knee 9/24/2012    Psychiatric problem     Type II or unspecified type diabetes mellitus without mention of complication, not stated as uncontrolled 9/24/2012     Past Surgical History:   Procedure Laterality Date    CATARACT EXTRACTION  2012    Right eye    PROSTATE SURGERY       Current Outpatient Medications on File Prior to Visit   Medication Sig Dispense Refill    aspirin (ECOTRIN) 81 MG EC tablet Take 1 tablet (81 mg total) by mouth once daily. 100 tablet 3    blood glucose control, normal (METER-CHECK) Soln One meter.  Use as directed. Meter of insurance choice 1 each 0    blood sugar diagnostic (ACCU-CHEK STEFANO PLUS TEST STRP) Str Accu check stefano plus TEST FOUR TIMES DAILY. Test strtips of insurance choice to go with meter and lancets 400 strip 3    [facility] blood [facility] daily 400 strip 4    blood-glucose meter (ACCU-CHEK OMAYRA) Misc USE AS  "DIRECTED. Accucheck elie plus 1 each 0    citalopram (CELEXA) 20 MG tablet Take 1.5 tablets (30 mg total) by mouth once daily. 135 tablet 0    diazePAM (VALIUM) 5 MG tablet TAKE 1 TABLET(5 MG) BY MOUTH EVERY 6 HOURS AS NEEDED 60 tablet 0    ergocalciferol (ERGOCALCIFEROL) 50,000 unit Cap Take 1 capsule (50,000 Units total) by mouth every 7 days. 4 capsule 3    gabapentin (NEURONTIN) 100 MG capsule TAKE 1 CAPSULE EVERY MORNING, 1 CAPSULE IN THE AFTERNOON, AND 1 TO 3 CAPSULE(S) AT BEDTIME AS NEEDED FOR FOOT PAIN 450 capsule 3    glimepiride (AMARYL) 2 MG tablet Take 1 tablet (2 mg total) by mouth once daily. 90 tablet 4    ibuprofen (ADVIL,MOTRIN) 800 MG tablet Take 1 tablet (800 mg total) by mouth 3 (three) times daily. 30 tablet 0    insulin (LANTUS SOLOSTAR U-100 INSULIN) glargine 100 units/mL (3mL) SubQ pen Inject 20 Units into the skin every evening. 1 Box 6    lancets Misc Use twice daily 200 each 4    latanoprost 0.005 % ophthalmic solution Place 1 drop into both eyes every evening. 7.5 mL 3    losartan (COZAAR) 25 MG tablet Take 2 tablets (50 mg total) by mouth once daily. 90 tablet 0    meloxicam (MOBIC) 15 MG tablet TAKE 1 TABLET(15 MG) BY MOUTH EVERY DAY 90 tablet 0    omeprazole (PRILOSEC) 40 MG capsule Take 1 capsule (40 mg total) by mouth once daily. 90 capsule 0    pen needle, diabetic (BD ULTRA-FINE SHORT PEN NEEDLE) 31 gauge x 5/16" Ndle USE TO INJECT INSULIN ONE TIME DAILY 100 each 3    pravastatin (PRAVACHOL) 40 MG tablet Take 1 tablet (40 mg total) by mouth once daily. 90 tablet 0    sildenafil (REVATIO) 20 mg Tab 2-3 tablets daily as needed for erectile dysfunction 30 tablet 1    tadalafiL (CIALIS) 20 MG Tab Take 1 tablet (20 mg total) by mouth daily as needed. 30 tablet 1    tadalafiL (CIALIS) 20 MG Tab Take 1 tablet (20 mg total) by mouth daily as needed. 30 tablet 3    tadalafiL (CIALIS) 5 MG tablet TAKE 1 TABLET(5 MG) BY MOUTH DAILY AS NEEDED FOR ERECTILE DYSFUNCTION 90 " tablet 2    tamsulosin (FLOMAX) 0.4 mg Cap Take 1 capsule (0.4 mg total) by mouth once daily. 90 capsule 3    tizanidine (ZANAFLEX) 4 MG tablet 1/2-1 tablet every 8 hours as needed for muscle spasm 90 tablet 1    traMADoL (ULTRAM) 50 mg tablet Take 1 tablet (50 mg total) by mouth every 8 (eight) hours as needed for Pain (moderate to severe pain). 21 tablet 0    TRUEPLUS LANCETS 33 gauge Misc       glucagon (human recombinant) inj 1mg/mL kit Inject 1 mL (1 mg total) into the muscle as needed. (Patient not taking: Reported on 6/25/2020) 1 kit 0     No current facility-administered medications on file prior to visit.      Review of patient's allergies indicates:   Allergen Reactions    Penicillins      Knees locked up       Review of Systems   Constitution: Negative for chills, decreased appetite, fever, malaise/fatigue, night sweats, weight gain and weight loss.   Cardiovascular: Negative for chest pain, claudication, dyspnea on exertion, leg swelling, palpitations and syncope.   Respiratory: Negative for cough and shortness of breath.    Endocrine: Negative for cold intolerance and heat intolerance.   Hematologic/Lymphatic: Negative for bleeding problem. Does not bruise/bleed easily.   Skin: Negative for color change, dry skin, flushing, itching, nail changes, poor wound healing, rash, skin cancer, suspicious lesions and unusual hair distribution.   Musculoskeletal: Positive for arthritis, joint pain and joint swelling. Negative for back pain, falls, gout, muscle cramps, muscle weakness, myalgias, neck pain and stiffness.   Gastrointestinal: Negative for diarrhea, nausea and vomiting.   Neurological: Positive for numbness. Negative for dizziness, focal weakness, light-headedness, paresthesias, tremors, vertigo and weakness.   Psychiatric/Behavioral: Negative for altered mental status and depression. The patient does not have insomnia.    Allergic/Immunologic: Negative.            Objective:       Vitals:     "06/30/20 1041   BP: 125/68   Pulse: 95   Height: 5' 10" (1.778 m)   PainSc:   4   PainLoc: Foot         Physical Exam  Vitals signs and nursing note reviewed.   Constitutional:       General: He is not in acute distress.     Appearance: He is well-developed. He is not ill-appearing, toxic-appearing or diaphoretic.   Cardiovascular:      Pulses:           Dorsalis pedis pulses are 2+ on the right side and 2+ on the left side.        Posterior tibial pulses are 2+ on the right side and 2+ on the left side.   Musculoskeletal:         General: No tenderness.      Right lower leg: No edema.      Left lower leg: No edema.      Right foot: Decreased range of motion. Deformity, bunion and prominent metatarsal heads present.      Left foot: Decreased range of motion. Deformity, bunion and prominent metatarsal heads present.      Comments: + diffuse pop to posterior calcaenaus left with side to side compression. No ankle pain , midfoot or forefoot pain with rom     1st MPJ exostosis w/ lateral deviation of hallux, non trackbound. Reducible extensor and flexor contractures at the MTPJ and PIPJ of toes 2-5, bilat.     No pain right   Feet:      Right foot:      Protective Sensation: 10 sites tested. 10 sites sensed.      Skin integrity: Dry skin present. No ulcer, blister, skin breakdown, erythema, warmth or callus.      Left foot:      Protective Sensation: 10 sites tested. 10 sites sensed.      Skin integrity: Dry skin present. No ulcer, blister, skin breakdown, erythema, warmth or callus.   Skin:     General: Skin is warm.      Capillary Refill: Capillary refill takes 2 to 3 seconds.      Coloration: Skin is not pale.      Findings: No erythema or rash.      Nails: There is no clubbing.     Neurological:      Mental Status: He is alert and oriented to person, place, and time.      Sensory: No sensory deficit.      Gait: Gait abnormal.      Comments: Knee brace left   Psychiatric:         Behavior: Behavior normal.         " Thought Content: Thought content normal.         Judgment: Judgment normal.               Assessment:       Encounter Diagnoses   Name Primary?    Type 2 diabetes mellitus with diabetic polyneuropathy, with long-term current use of insulin Yes    Left foot pain     Hammer toe, unspecified laterality     Bunion          Plan:       Emerson was seen today for diabetes mellitus, diabetic foot exam, routine foot care and foot problem.    Diagnoses and all orders for this visit:    Type 2 diabetes mellitus with diabetic polyneuropathy, with long-term current use of insulin  -     DIABETIC SHOES FOR HOME USE    Left foot pain  -     X-Ray Foot Complete Left; Future    Hammer toe, unspecified laterality  -     DIABETIC SHOES FOR HOME USE    Bunion  -     DIABETIC SHOES FOR HOME USE      I counseled the patient on his conditions, their implications and medical management.    - recommend trying to get the dm shoes again.         New rx    Shoe inspection. Diabetic Foot Education. Patient reminded of the importance of good nutrition and blood sugar control to help prevent podiatric complications of diabetes. Patient instructed on proper foot hygeine. We discussed wearing proper shoe gear, daily foot inspections, never walking without protective shoe gear, never putting sharp instruments to feet    - will call with xray results    - Return to clinic in one year or sooner if problems arise   .

## 2020-07-02 ENCOUNTER — OFFICE VISIT (OUTPATIENT)
Dept: HEMATOLOGY/ONCOLOGY | Facility: CLINIC | Age: 75
End: 2020-07-02
Payer: MEDICARE

## 2020-07-02 ENCOUNTER — RESEARCH ENCOUNTER (OUTPATIENT)
Dept: HEMATOLOGY/ONCOLOGY | Facility: CLINIC | Age: 75
End: 2020-07-02

## 2020-07-02 ENCOUNTER — LAB VISIT (OUTPATIENT)
Dept: LAB | Facility: HOSPITAL | Age: 75
End: 2020-07-02
Payer: MEDICARE

## 2020-07-02 VITALS
HEART RATE: 96 BPM | BODY MASS INDEX: 27.57 KG/M2 | SYSTOLIC BLOOD PRESSURE: 155 MMHG | OXYGEN SATURATION: 95 % | TEMPERATURE: 98 F | HEIGHT: 70 IN | RESPIRATION RATE: 18 BRPM | WEIGHT: 192.56 LBS | DIASTOLIC BLOOD PRESSURE: 89 MMHG

## 2020-07-02 DIAGNOSIS — D47.2 SMOLDERING MULTIPLE MYELOMA: ICD-10-CM

## 2020-07-02 DIAGNOSIS — N18.30 CKD STAGE 3 DUE TO TYPE 2 DIABETES MELLITUS: ICD-10-CM

## 2020-07-02 DIAGNOSIS — D47.2 SMOLDERING MULTIPLE MYELOMA: Primary | ICD-10-CM

## 2020-07-02 DIAGNOSIS — Z00.6 RESEARCH EXAM: ICD-10-CM

## 2020-07-02 DIAGNOSIS — E11.22 CKD STAGE 3 DUE TO TYPE 2 DIABETES MELLITUS: ICD-10-CM

## 2020-07-02 DIAGNOSIS — Z79.4 TYPE 2 DIABETES MELLITUS WITH DIABETIC POLYNEUROPATHY, WITH LONG-TERM CURRENT USE OF INSULIN: ICD-10-CM

## 2020-07-02 DIAGNOSIS — E11.42 TYPE 2 DIABETES MELLITUS WITH DIABETIC POLYNEUROPATHY, WITH LONG-TERM CURRENT USE OF INSULIN: ICD-10-CM

## 2020-07-02 LAB
ALBUMIN SERPL BCP-MCNC: 3.6 G/DL (ref 3.5–5.2)
ALP SERPL-CCNC: 48 U/L (ref 55–135)
ALT SERPL W/O P-5'-P-CCNC: 16 U/L (ref 10–44)
ANION GAP SERPL CALC-SCNC: 9 MMOL/L (ref 8–16)
AST SERPL-CCNC: 16 U/L (ref 10–40)
BASOPHILS # BLD AUTO: 0.02 K/UL (ref 0–0.2)
BASOPHILS NFR BLD: 0.4 % (ref 0–1.9)
BILIRUB SERPL-MCNC: 0.4 MG/DL (ref 0.1–1)
BUN SERPL-MCNC: 22 MG/DL (ref 8–23)
CALCIUM SERPL-MCNC: 9.6 MG/DL (ref 8.7–10.5)
CHLORIDE SERPL-SCNC: 102 MMOL/L (ref 95–110)
CO2 SERPL-SCNC: 24 MMOL/L (ref 23–29)
CREAT SERPL-MCNC: 1.6 MG/DL (ref 0.5–1.4)
DIFFERENTIAL METHOD: ABNORMAL
EOSINOPHIL # BLD AUTO: 0.2 K/UL (ref 0–0.5)
EOSINOPHIL NFR BLD: 4.5 % (ref 0–8)
ERYTHROCYTE [DISTWIDTH] IN BLOOD BY AUTOMATED COUNT: 14.3 % (ref 11.5–14.5)
EST. GFR  (AFRICAN AMERICAN): 48.7 ML/MIN/1.73 M^2
EST. GFR  (NON AFRICAN AMERICAN): 42.1 ML/MIN/1.73 M^2
GLUCOSE SERPL-MCNC: 165 MG/DL (ref 70–110)
HCT VFR BLD AUTO: 35.7 % (ref 40–54)
HGB BLD-MCNC: 11 G/DL (ref 14–18)
IGA SERPL-MCNC: 1423 MG/DL (ref 40–350)
IGG SERPL-MCNC: 894 MG/DL (ref 650–1600)
IGM SERPL-MCNC: 43 MG/DL (ref 50–300)
IMM GRANULOCYTES # BLD AUTO: 0 K/UL (ref 0–0.04)
IMM GRANULOCYTES NFR BLD AUTO: 0 % (ref 0–0.5)
LDH SERPL L TO P-CCNC: 96 U/L (ref 110–260)
LYMPHOCYTES # BLD AUTO: 1.4 K/UL (ref 1–4.8)
LYMPHOCYTES NFR BLD: 32.2 % (ref 18–48)
MAGNESIUM SERPL-MCNC: 1.7 MG/DL (ref 1.6–2.6)
MCH RBC QN AUTO: 28.9 PG (ref 27–31)
MCHC RBC AUTO-ENTMCNC: 30.8 G/DL (ref 32–36)
MCV RBC AUTO: 94 FL (ref 82–98)
MONOCYTES # BLD AUTO: 0.3 K/UL (ref 0.3–1)
MONOCYTES NFR BLD: 6.7 % (ref 4–15)
NEUTROPHILS # BLD AUTO: 2.5 K/UL (ref 1.8–7.7)
NEUTROPHILS NFR BLD: 56.2 % (ref 38–73)
NRBC BLD-RTO: 0 /100 WBC
PHOSPHATE SERPL-MCNC: 3.1 MG/DL (ref 2.7–4.5)
PLATELET # BLD AUTO: 262 K/UL (ref 150–350)
PMV BLD AUTO: 9.1 FL (ref 9.2–12.9)
POTASSIUM SERPL-SCNC: 4.6 MMOL/L (ref 3.5–5.1)
PROT SERPL-MCNC: 8.3 G/DL (ref 6–8.4)
RBC # BLD AUTO: 3.8 M/UL (ref 4.6–6.2)
SODIUM SERPL-SCNC: 135 MMOL/L (ref 136–145)
WBC # BLD AUTO: 4.47 K/UL (ref 3.9–12.7)

## 2020-07-02 PROCEDURE — 86334 IMMUNOFIX E-PHORESIS SERUM: CPT | Mod: HCNC,Q1

## 2020-07-02 PROCEDURE — 80053 COMPREHEN METABOLIC PANEL: CPT | Mod: HCNC

## 2020-07-02 PROCEDURE — 84165 PROTEIN E-PHORESIS SERUM: CPT | Mod: 26,Q1,HCNC, | Performed by: PATHOLOGY

## 2020-07-02 PROCEDURE — 99999 PR PBB SHADOW E&M-EST. PATIENT-LVL III: ICD-10-PCS | Mod: PBBFAC,HCNC,, | Performed by: INTERNAL MEDICINE

## 2020-07-02 PROCEDURE — 86334 PATHOLOGIST INTERPRETATION IFE: ICD-10-PCS | Mod: 26,Q1,HCNC, | Performed by: PATHOLOGY

## 2020-07-02 PROCEDURE — 83615 LACTATE (LD) (LDH) ENZYME: CPT | Mod: HCNC,Q1

## 2020-07-02 PROCEDURE — 83520 IMMUNOASSAY QUANT NOS NONAB: CPT | Mod: 59,HCNC,Q1

## 2020-07-02 PROCEDURE — 83735 ASSAY OF MAGNESIUM: CPT | Mod: HCNC

## 2020-07-02 PROCEDURE — 99214 OFFICE O/P EST MOD 30 MIN: CPT | Mod: HCNC,S$GLB,, | Performed by: INTERNAL MEDICINE

## 2020-07-02 PROCEDURE — 84100 ASSAY OF PHOSPHORUS: CPT | Mod: HCNC,Q1

## 2020-07-02 PROCEDURE — 82784 ASSAY IGA/IGD/IGG/IGM EACH: CPT | Mod: 59,HCNC

## 2020-07-02 PROCEDURE — 86334 IMMUNOFIX E-PHORESIS SERUM: CPT | Mod: 26,Q1,HCNC, | Performed by: PATHOLOGY

## 2020-07-02 PROCEDURE — 99214 PR OFFICE/OUTPT VISIT, EST, LEVL IV, 30-39 MIN: ICD-10-PCS | Mod: HCNC,S$GLB,, | Performed by: INTERNAL MEDICINE

## 2020-07-02 PROCEDURE — 84165 PROTEIN E-PHORESIS SERUM: CPT | Mod: HCNC,Q1

## 2020-07-02 PROCEDURE — 85025 COMPLETE CBC W/AUTO DIFF WBC: CPT | Mod: HCNC

## 2020-07-02 PROCEDURE — 99999 PR PBB SHADOW E&M-EST. PATIENT-LVL III: CPT | Mod: PBBFAC,HCNC,, | Performed by: INTERNAL MEDICINE

## 2020-07-02 PROCEDURE — 36415 COLL VENOUS BLD VENIPUNCTURE: CPT | Mod: HCNC

## 2020-07-02 PROCEDURE — 84165 PATHOLOGIST INTERPRETATION SPE: ICD-10-PCS | Mod: 26,Q1,HCNC, | Performed by: PATHOLOGY

## 2020-07-02 NOTE — PROGRESS NOTES
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   Sponsor:  [facility]  IRB# [id number]  Study ID: [id number]  Investigator: GIL Engel Pt Initials: LUCÍA LORENZ       Cycle 55 Day 28 Arm B: Observation    Patient presents to clinic today unaccompanied. He is alert, oriented to person, place, and time; mood and affect appropriate. He presents with left wrist/hand velcro brace and states " I was clearing leaves from a tree and fell back, I went to orthopedics here ([place]) and they said it was fine, but it wasn't. So I came down here and they told me there's a bone fragment and some torn ligaments so I need surgery." He states he will schedule this in approximately 3 weeks. States he is managing well in brace, no issues. Also states had visit to kidney specialist who increased his lisinopril dosing. Refer to medication report for update. Also states he is in "good spirits...been fixing up my house in [place] so I am busy" States has been maintaining CDC/government guidelines regarding COVID and wears masks, practices social distancing, and has not had any COVID related symptoms in recent weeks.     Review of Baseline AE's:   1.Hypertension, Grade 2 systolic and diastolic: BP today is 155/89. Subject denies headache/dizziness; no symptoms noted; states compliance with medications and as per above lisinopril dose recently increased. AE ongoing  2. Lower Back Pain and Neck Pain, grade 1: Patient has no complaints today/ over last month.  As previously stated, patient is not suspected to have bony myeloma involvement per Dr. Engel as these are pre-existing issues present at baseline.  AE ongoing.  3. Pain in extremity (knee), grade 1. As per above regarding hand surgery, subject states "I really need to have my knee done too, I want to try to have surgery at the same time as my hand but I don't " "think that will happen." Subject endorses continued stiffness and pain, no increase. States orthopedist recommends he replace.He is  able to walk on it today without issue and has braces on and intact for stabilization.      4. Peripheral sensory neuropathy, grade 1: Patient does not verbalize complaints today. Has gabapentin prescribed and takes as needed.  States has not taken recently.  AE ongoing.   5. Anemia, Grade 1: Hgb/Hct - stable at 11.0/35.7.  Result reviewed by Dr. Engel. AE ongoing            Review of AE's:     *Please note this list is not all-inclusive, please see AE log for physician reviewed list of adverse events.   1. Creatinine increased, grade 2: Serum creatinine decreased slightly to 1.6 mg/dl today. Calculated GFR is 51 ml/min per cockcroft-gualt formula. Per Epic result GFR 48.7; NCS today per Dr Engel as previously stated, Dr. Engel states this has not been related to myeloma and is related chronic kidney issues likely secondary to diabetes and hypertension. Will continue observation monthly and to monitor renal function closely. Per MD, encouraged to increase water intake. AE stable from baseline  2. Hyponatremia, Grade 1: Serum sodium today is 135 mmol/L; AE ongoing. Will monitor  3. Hyperglycemia, Grade 1: ongoing with subject as is type II diabetic. Sees specialist regularly and does monitoring and reporting of sugars. Lab today is nonfasting; NCS per Dr. Engel.    Per study chair, "the definition of progressive disease for this protocol is developing CRAB criteria that requires treatment systemically." Per Dr Engel, based on today's exam and lab results thus far, patient does not have any s/s of CRAB: Normal Calcium level (9.5), absence of Renal insufficiency (serum creatinine 1.9, and GFR 42.12 per Cockroft-Gault) --patient with a history of kidney dysfunction secondary to diabetes; Dr. Engel aware of these values and not concerned for myeloma involvement), absence of significant Anemia " (hemoglobin 11.0, stable; will await myeloma labs for clarity relationship to myeloma) and absence of lytic Bone lesions (per baseline metastatic survey as well as MRI--see MD note for further comment).     See MD note for ECOG score and H&P and flowsheets for laboratory work, vitals, etc. All myeloma-related blood work from last cycle including SPEP and JAGUAR have remained stable, and are currently pending for this cycle. Per Dr. Engel, patient shows no evidence of progression at last result. Patient informed that Dr. Engel will release all lab results to [facility]. He states understanding of this. QOL's not required at this timepoint.           Instructed subject to contact research staff here at [facility] with any questions/concerns. He states he plans to come to [facility] next month for appt.He states he still keeps residence in [place] although spending most time in [place], he states he wishes only to see Dr Engel and will not show for appt if scheduled with any other provider.  Will continue to schedule patient as far out as possible, which seems to help maintain compliance with visits. Patient states having research RN's contact information and has MD contact information to call with any concerns, questions, or worsening of symptoms.  Discussed next month's appt date and time, he verbalized understanding.

## 2020-07-06 ENCOUNTER — PATIENT MESSAGE (OUTPATIENT)
Dept: ADMINISTRATIVE | Facility: HOSPITAL | Age: 75
End: 2020-07-06

## 2020-07-06 LAB
ALBUMIN SERPL ELPH-MCNC: 4.09 G/DL (ref 3.35–5.55)
ALPHA1 GLOB SERPL ELPH-MCNC: 0.27 G/DL (ref 0.17–0.41)
ALPHA2 GLOB SERPL ELPH-MCNC: 0.82 G/DL (ref 0.43–0.99)
B-GLOBULIN SERPL ELPH-MCNC: 0.98 G/DL (ref 0.5–1.1)
GAMMA GLOB SERPL ELPH-MCNC: 1.84 G/DL (ref 0.67–1.58)
INTERPRETATION SERPL IFE-IMP: NORMAL
KAPPA LC SER QL IA: 6.34 MG/DL (ref 0.33–1.94)
KAPPA LC/LAMBDA SER IA: 3.91 (ref 0.26–1.65)
LAMBDA LC SER QL IA: 1.62 MG/DL (ref 0.57–2.63)
PATHOLOGIST INTERPRETATION IFE: NORMAL
PATHOLOGIST INTERPRETATION SPE: NORMAL
PROT SERPL-MCNC: 8 G/DL (ref 6–8.4)

## 2020-07-08 NOTE — PROGRESS NOTES
Subjective:    Patient ID: Emerson Menendez is a 74 y.o. male.    Chief Complaint: No chief complaint on file.  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 Test results identify a stable IgA kappa protein with beta globulin band at 1.63g/dL. Kappa free light chain is elevated at 4.40. CBC and calcium are stable. Creatinine with history of stage III CKD is stable at 1.4. Metastatic survey is negative for lytic lesions. MRI of the entire spine demonstrated age related changes but no convincing evidence of myeloma bone disease. Bone marrow biopsy identified about 13% plasma cells by morphology and FISH for myeloma identified a t(11;14) and trisomies 3,7, and 17. The patient is afebrile and appears clinically well. He was seen by his podiatrist and nephrologist since our last visit without any new or acute events.    The patient has not received any therapy for smoldering myeloma including bisphosphonates or steroids. He has been randomized to observation arm of the the clinical study. 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 He has no current or prior history of malignancy.    TODAY  Mr. Menendez returns today for monthly follow-up evaluation. No acute interval events. Currently in [place]. Grandson has moved out but their relationship is still good. Working on his house and reconnecting with some friends. No one in the family or household has had any symptoms of the virus. He will return to [place] next month for his next visit. He did have a recent injury on a ladder- trauma to left ankle, knee, and wrist. Arm is now in a cast and will need surgical repair. Had  recent injections to his knees and still wants knee replacement surgery.  CBC and CMP are stable. Myeloma labs remain stable.      HPI  Review of Systems   Constitutional: Negative for activity change, appetite change, diaphoresis, fatigue, fever and unexpected weight change.   HENT: Negative.    Eyes: Negative.    Respiratory: Negative.    Cardiovascular: Negative for leg swelling.   Gastrointestinal: Negative.    Endocrine: Negative.    Genitourinary: Negative.    Musculoskeletal: Negative.    Skin: Negative.    Allergic/Immunologic: Negative.    Neurological:        Grade 0-1 peripheral neuropathy of bilateral feet.    Hematological: Negative for adenopathy. Does not bruise/bleed easily.   Psychiatric/Behavioral: Negative.        Objective:       Vitals:    07/02/20 0842   BP: (!) 155/89   Pulse: 96   Resp: 18   Temp: 98 °F (36.7 °C)       Physical Exam  Vitals signs and nursing note reviewed.   Constitutional:       Appearance: He is well-developed.   HENT:      Head: Normocephalic and atraumatic.      Right Ear: External ear normal.      Left Ear: External ear normal.      Nose: Nose normal.   Eyes:      Conjunctiva/sclera: Conjunctivae normal.      Pupils: Pupils are equal, round, and reactive to light.   Neck:      Musculoskeletal: Normal range of motion and neck supple.   Cardiovascular:      Rate and Rhythm: Normal rate and regular rhythm.      Heart sounds: No murmur.   Pulmonary:      Effort: Pulmonary effort is normal. No respiratory distress.      Breath sounds: Normal breath sounds.   Abdominal:      General: Bowel sounds are normal. There is no distension.      Palpations: Abdomen is soft.   Musculoskeletal:         General: No tenderness.   Skin:     General: Skin is warm and dry.      Findings: No rash.      Nails: There is no clubbing.     Neurological:      Mental Status: He is alert and oriented to person, place, and time.      Cranial Nerves: No cranial nerve deficit.   Psychiatric:          Behavior: Behavior normal.         Assessment:       No diagnosis found.    Plan:       The patient has a diagnosis of smoldering myeloma. There is no indication for immediate chemotherapy. We are monitoring renal function closely- baseline creatinine of -1.5..  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 Total protein remains normal, M protein has been stable.Plan to complete labs when Emerson returns to [place] and he will let us know of this date.  Plan for return in 1month.  Endocrinology and Nephrology to monitor diabetes and renal failure respectively.

## 2020-07-14 ENCOUNTER — PATIENT OUTREACH (OUTPATIENT)
Dept: ADMINISTRATIVE | Facility: OTHER | Age: 75
End: 2020-07-14

## 2020-07-14 NOTE — PROGRESS NOTES
Chart reviewed.   Immunizations: updated  Orders placed: n/a  Upcoming appts to satisfy [facility] topics: Optometry 7/15

## 2020-07-15 ENCOUNTER — OFFICE VISIT (OUTPATIENT)
Dept: OPTOMETRY | Facility: CLINIC | Age: 75
End: 2020-07-15
Payer: COMMERCIAL

## 2020-07-15 DIAGNOSIS — Z01.00 ENCOUNTER FOR EYE EXAM: Primary | ICD-10-CM

## 2020-07-15 DIAGNOSIS — H52.4 HYPEROPIA OF BOTH EYES WITH ASTIGMATISM AND PRESBYOPIA: ICD-10-CM

## 2020-07-15 DIAGNOSIS — H52.03 HYPEROPIA OF BOTH EYES WITH ASTIGMATISM AND PRESBYOPIA: ICD-10-CM

## 2020-07-15 DIAGNOSIS — H52.203 HYPEROPIA OF BOTH EYES WITH ASTIGMATISM AND PRESBYOPIA: ICD-10-CM

## 2020-07-15 PROCEDURE — 99999 PR PBB SHADOW E&M-EST. PATIENT-LVL III: ICD-10-PCS | Mod: PBBFAC,HCNC,, | Performed by: OPTOMETRIST

## 2020-07-15 PROCEDURE — 99999 PR PBB SHADOW E&M-EST. PATIENT-LVL III: CPT | Mod: PBBFAC,HCNC,, | Performed by: OPTOMETRIST

## 2020-07-15 PROCEDURE — 92015 DETERMINE REFRACTIVE STATE: CPT | Mod: HCNC,S$GLB,, | Performed by: OPTOMETRIST

## 2020-07-15 PROCEDURE — 92015 PR REFRACTION: ICD-10-PCS | Mod: HCNC,S$GLB,, | Performed by: OPTOMETRIST

## 2020-07-15 PROCEDURE — 92014 PR EYE EXAM, EST PATIENT,COMPREHESV: ICD-10-PCS | Mod: HCNC,S$GLB,, | Performed by: OPTOMETRIST

## 2020-07-15 PROCEDURE — 92014 COMPRE OPH EXAM EST PT 1/>: CPT | Mod: HCNC,S$GLB,, | Performed by: OPTOMETRIST

## 2020-07-15 NOTE — PROGRESS NOTES
HPI     [facility] Exam, diabetic    Primary open angle glaucoma of both eyes, moderate stage  Status post cataract extraction and insertion of intraocular lens of right   eye  Nuclear sclerosis, left with narrow angle concerns     Latanoprost QHS OU poor compliance, only regularly been using past couple   days    Lost to follow up Dr. Wheatley    Hemoglobin A1C       Date                     Value               Ref Range             Status                06/18/2020               6.9 (H)             3.9 - 6.1 %           Final                  12/10/2019               7.6 (H)             4.0 - 5.6 %           Final                  09/17/2019               7.7 (H)             4.0 - 5.6 %           Final           Last edited by Pascual Valentine, OD on 7/15/2020 11:09 AM. (History)            Assessment /Plan     For exam results, see Encounter Report.    Encounter for eye exam  -     Ambulatory referral/consult to Optometry  -[facility]  -No retinopathy noted today.  Continued control with primary care physician and annual comprehensive eye exam.  -referral Dr. Wheatley, re-establish care    Hyperopia of both eyes with astigmatism and presbyopia  Eyeglass Final Rx     Ey[facility]lass Final Rx       Sphere Cylinder Axis Dist VA Add    Right [place] +0.75 020 20/40- +2.50    Left +1.00 +0.50 0015 20/30++ +2.50    Type: PAL    Expiration Date: 7/16/2021                  RTC 3 mo rosalee

## 2020-07-15 NOTE — LETTER
July 15, 2020      Roberta Sagastume MD  [address] [facility]  [facility] [address]           [facility]ometry  [address]  [place] [address]  Phone: [phone]  Fax: [phone]          Patient: Emerson Menendez   MR Number: [id number]   YOB: 1945   Date of Visit: 7/15/2020       Dear Dr. Roberta Sagastume:    Thank you for referring Emerson Menendez to me for evaluation. Attached you will find relevant portions of my assessment and plan of care.    If you have questions, please do not hesitate to call me. I look forward to following Emerson Menendez along with you.    Sincerely,    Pascual Valentine, OD    Enclosure  CC:  No Recipients    If you would like to receive this communication electronically, please contact [email or web address] or [phone] to request more information on [employer] access.    For providers and/or their staff who would like to refer a patient to [facility], please contact us through our one-stop-shop provider referral line, [facility], at [phone].    If you feel you have received this communication in error or would no longer like to receive these types of communications, please e-mail [email or web address]

## 2020-07-16 ENCOUNTER — TELEPHONE (OUTPATIENT)
Dept: INTERNAL MEDICINE | Facility: CLINIC | Age: 75
End: 2020-07-16

## 2020-07-16 ENCOUNTER — OFFICE VISIT (OUTPATIENT)
Dept: ORTHOPEDICS | Facility: CLINIC | Age: 75
End: 2020-07-16
Payer: MEDICARE

## 2020-07-16 VITALS
SYSTOLIC BLOOD PRESSURE: 147 MMHG | HEIGHT: 70 IN | HEART RATE: 79 BPM | WEIGHT: 192 LBS | DIASTOLIC BLOOD PRESSURE: 77 MMHG | BODY MASS INDEX: 27.49 KG/M2

## 2020-07-16 DIAGNOSIS — R20.2 NUMBNESS AND TINGLING IN LEFT HAND: ICD-10-CM

## 2020-07-16 DIAGNOSIS — R52 SEVERE PAIN: ICD-10-CM

## 2020-07-16 DIAGNOSIS — S69.82XA INJURY OF TRIANGULAR FIBROCARTILAGE COMPLEX (TFCC) OF LEFT WRIST, INITIAL ENCOUNTER: Primary | ICD-10-CM

## 2020-07-16 DIAGNOSIS — R20.0 NUMBNESS AND TINGLING IN LEFT HAND: ICD-10-CM

## 2020-07-16 DIAGNOSIS — G56.02 CARPAL TUNNEL SYNDROME ON LEFT: Primary | ICD-10-CM

## 2020-07-16 DIAGNOSIS — Z01.818 PRE-OP TESTING: Primary | ICD-10-CM

## 2020-07-16 PROCEDURE — 1101F PR PT FALLS ASSESS DOC 0-1 FALLS W/OUT INJ PAST YR: ICD-10-PCS | Mod: HCNC,CPTII,S$GLB, | Performed by: ORTHOPAEDIC SURGERY

## 2020-07-16 PROCEDURE — 1125F AMNT PAIN NOTED PAIN PRSNT: CPT | Mod: HCNC,S$GLB,, | Performed by: ORTHOPAEDIC SURGERY

## 2020-07-16 PROCEDURE — 3078F PR MOST RECENT DIASTOLIC BLOOD PRESSURE < 80 MM HG: ICD-10-PCS | Mod: HCNC,CPTII,S$GLB, | Performed by: ORTHOPAEDIC SURGERY

## 2020-07-16 PROCEDURE — 1159F PR MEDICATION LIST DOCUMENTED IN MEDICAL RECORD: ICD-10-PCS | Mod: HCNC,S$GLB,, | Performed by: ORTHOPAEDIC SURGERY

## 2020-07-16 PROCEDURE — 1125F PR PAIN SEVERITY QUANTIFIED, PAIN PRESENT: ICD-10-PCS | Mod: HCNC,S$GLB,, | Performed by: ORTHOPAEDIC SURGERY

## 2020-07-16 PROCEDURE — 3077F SYST BP >= 140 MM HG: CPT | Mod: HCNC,CPTII,S$GLB, | Performed by: ORTHOPAEDIC SURGERY

## 2020-07-16 PROCEDURE — 1159F MED LIST DOCD IN RCRD: CPT | Mod: HCNC,S$GLB,, | Performed by: ORTHOPAEDIC SURGERY

## 2020-07-16 PROCEDURE — 3077F PR MOST RECENT SYSTOLIC BLOOD PRESSURE >= 140 MM HG: ICD-10-PCS | Mod: HCNC,CPTII,S$GLB, | Performed by: ORTHOPAEDIC SURGERY

## 2020-07-16 PROCEDURE — 99999 PR PBB SHADOW E&M-EST. PATIENT-LVL IV: ICD-10-PCS | Mod: PBBFAC,HCNC,, | Performed by: ORTHOPAEDIC SURGERY

## 2020-07-16 PROCEDURE — 3078F DIAST BP <80 MM HG: CPT | Mod: HCNC,CPTII,S$GLB, | Performed by: ORTHOPAEDIC SURGERY

## 2020-07-16 PROCEDURE — 99214 PR OFFICE/OUTPT VISIT, EST, LEVL IV, 30-39 MIN: ICD-10-PCS | Mod: 25,HCNC,S$GLB, | Performed by: ORTHOPAEDIC SURGERY

## 2020-07-16 PROCEDURE — 99214 OFFICE O/P EST MOD 30 MIN: CPT | Mod: 25,HCNC,S$GLB, | Performed by: ORTHOPAEDIC SURGERY

## 2020-07-16 PROCEDURE — 1101F PT FALLS ASSESS-DOCD LE1/YR: CPT | Mod: HCNC,CPTII,S$GLB, | Performed by: ORTHOPAEDIC SURGERY

## 2020-07-16 PROCEDURE — 99999 PR PBB SHADOW E&M-EST. PATIENT-LVL IV: CPT | Mod: PBBFAC,HCNC,, | Performed by: ORTHOPAEDIC SURGERY

## 2020-07-16 PROCEDURE — 3008F PR BODY MASS INDEX (BMI) DOCUMENTED: ICD-10-PCS | Mod: HCNC,CPTII,S$GLB, | Performed by: ORTHOPAEDIC SURGERY

## 2020-07-16 PROCEDURE — 3008F BODY MASS INDEX DOCD: CPT | Mod: HCNC,CPTII,S$GLB, | Performed by: ORTHOPAEDIC SURGERY

## 2020-07-16 RX ORDER — TRAMADOL HYDROCHLORIDE 50 MG/1
50 TABLET ORAL EVERY 8 HOURS PRN
Qty: 15 TABLET | Refills: 0 | Status: SHIPPED | OUTPATIENT
Start: 2020-07-16 | End: 2020-12-22

## 2020-07-16 NOTE — H&P (VIEW-ONLY)
Subjective:      Patient ID: Emerson Menendez is a 74 y.o. male.    Chief Complaint: Pain of the Left Wrist and Results (MRI Results)      HPI  Emerson Menendez is a right hand dominant 74 y.o. male with DMII presenting today for follow up left wrist pain. He had an injury in May, he fell from a ladder. He reports persistent left wrist pain since this time. Pain is over the ulnar wrist, it is increased with use. Pain occasionally awakens him/ causes difficulty sleeping. He has tried bracing with little relief. He has not tried injections or bracing. He also reports intermittent numbness and tingling in the left hand. He has prior MRI with a central TFCC tear. Pt also reports chronic left shoulder pain, increased pain with full motion and occasionally at night.      Review of patient's allergies indicates:   Allergen Reactions    Penicillins      Knees locked up         Current Outpatient Medications   Medication Sig Dispense Refill    aspirin (ECOTRIN) 81 MG EC tablet Take 1 tablet (81 mg total) by mouth once daily. 100 tablet 3    blood glucose control, normal (METER-CHECK) Soln One meter.  Use as directed. Meter of insurance choice 1 each 0    blood sugar diagnostic (ACCU-CHEK ELIE PLUS TEST STRP) Strp Accu check elie plus TEST FOUR TIMES DAILY. Test strtips of insurance choice to go with meter and lancets 400 strip 3    blood sugar diagnostic Strp [facility] - check blood sugars 4 times daily 400 strip 4    blood-glucose meter (ACCU-CHEK OMAYRA) Misc USE AS DIRECTED. Accucheck elie plus 1 each 0    citalopram (CELEXA) 20 MG tablet Take 1.5 tablets (30 mg total) by mouth once daily. 135 tablet 0    diazePAM (VALIUM) 5 MG tablet TAKE 1 TABLET(5 MG) BY MOUTH EVERY 6 HOURS AS NEEDED 60 tablet 0    ergocalciferol (ERGOCALCIFEROL) 50,000 unit Cap Take 1 capsule (50,000 Units total) by mouth every 7 days. 4 capsule 3    gabapentin (NEURONTIN) 100 MG capsule TAKE 1 CAPSULE EVERY MORNING, 1 CAPSULE  "IN THE AFTERNOON, AND 1 TO 3 CAPSULE(S) AT BEDTIME AS NEEDED FOR FOOT PAIN 450 capsule 3    glimepiride (AMARYL) 2 MG tablet Take 1 tablet (2 mg total) by mouth once daily. 90 tablet 4    glucagon (human recombinant) inj 1mg/mL kit Inject 1 mL (1 mg total) into the muscle as needed. (Patient not taking: Reported on 6/25/2020) 1 kit 0    ibuprofen (ADVIL,MOTRIN) 800 MG tablet Take 1 tablet (800 mg total) by mouth 3 (three) times daily. 30 tablet 0    insulin (LANTUS SOLOSTAR U-100 INSULIN) glargine 100 units/mL (3mL) SubQ pen Inject 20 Units into the skin every evening. 1 Box 6    lancets Misc Use twice daily 200 each 4    latanoprost 0.005 % ophthalmic solution Place 1 drop into both eyes every evening. 7.5 mL 3    losartan (COZAAR) 25 MG tablet Take 2 tablets (50 mg total) by mouth once daily. 90 tablet 0    meloxicam (MOBIC) 15 MG tablet TAKE 1 TABLET(15 MG) BY MOUTH EVERY DAY 90 tablet 0    omeprazole (PRILOSEC) 40 MG capsule Take 1 capsule (40 mg total) by mouth once daily. 90 capsule 0    pen needle, diabetic (BD ULTRA-FINE SHORT PEN NEEDLE) 31 gauge x 5/16" Ndle USE TO INJECT INSULIN ONE TIME DAILY 100 each 3    pravastatin (PRAVACHOL) 40 MG tablet Take 1 tablet (40 mg total) by mouth once daily. 90 tablet 0    sildenafil (REVATIO) 20 mg Tab 2-3 tablets daily as needed for erectile dysfunction 30 tablet 1    tadalafiL (CIALIS) 20 MG Tab Take 1 tablet (20 mg total) by mouth daily as needed. 30 tablet 1    tadalafiL (CIALIS) 20 MG Tab Take 1 tablet (20 mg total) by mouth daily as needed. 30 tablet 3    tadalafiL (CIALIS) 5 MG tablet TAKE 1 TABLET(5 MG) BY MOUTH DAILY AS NEEDED FOR ERECTILE DYSFUNCTION 90 tablet 2    tamsulosin (FLOMAX) 0.4 mg Cap Take 1 capsule (0.4 mg total) by mouth once daily. 90 capsule 3    tizanidine (ZANAFLEX) 4 MG tablet 1/2-1 tablet every 8 hours as needed for muscle spasm 90 tablet 1    traMADoL (ULTRAM) 50 mg tablet Take 1 tablet (50 mg total) by mouth every 8 " "(eight) hours as needed for Pain (moderate to severe pain). 15 tablet 0    TRUEPLUS LANCETS 33 gauge Misc        No current facility-administered medications for this visit.        Past Medical History:   Diagnosis Date    Anxiety 3/16/2015    BPH (benign prostatic hypertrophy) 9/24/2012    Depression 3/16/2015    GERD (gastroesophageal reflux disease) 9/24/2012    Hx of psychiatric care     Celexa, Valium    Hyperlipidemia     Microalbuminuria 9/24/2012    Osteoarthritis of cervical spine 9/24/2012    Osteoarthritis of knee 9/24/2012    Psychiatric problem     Type II or unspecified type diabetes mellitus without mention of complication, not stated as uncontrolled 9/24/2012       Past Surgical History:   Procedure Laterality Date    CATARACT EXTRACTION  2012    Right eye    PROSTATE SURGERY         Review of Systems:  Constitutional: Negative for chills and fever.   Respiratory: Negative for cough and shortness of breath.    Gastrointestinal: Negative for nausea and vomiting.   Skin: Negative for rash.   Neurological: Negative for dizziness and headaches.   Psychiatric/Behavioral: Negative for depression.   MSK as in HPI       OBJECTIVE:     PHYSICAL EXAM:  BP (!) 147/77   Pulse 79   Ht 5' 10" (1.778 m)   Wt 87.1 kg (192 lb)   BMI 27.55 kg/m²     GEN:  NAD, well-developed, well-groomed.  NEURO: Awake, alert, and oriented. Normal attention and concentration.    PSYCH: Normal mood and affect. Behavior is normal.  HEENT: No cervical lymphadenopathy noted.  CARDIOVASCULAR: Radial pulses 2+ bilaterally. No LE edema noted.  PULMONARY: Breath sounds normal. No respiratory distress.  SKIN: Intact, no rashes.      MSK:   LUE:  Good active ROM of the wrist and fingers. He has ttp over the ulnar wrist over the TFCC. He has positive tinels and durkans over the carpal tunnel. AIN/PIN/Radial/Median/Ulnar Nerves assessed in isolation without deficit. Radial & Ulnar arteries palpated 2+. Capillary Refill " <3s.      RADIOGRAPHS:  Xray left wrist 6/23/20  FINDINGS:  Three views: Lateral view demonstrates a possible small triquetral chip avulsion fracture.    MRI left wrist 6/29/20  Impression:     Tear of the central portion of the triangular fibrocartilage.     Tenosynovitis of the flexor pollicis longus, 4th and 5th flexor tendon sheaths and mild tenosynovitis of the extensor digitorum tendons.    Xray left shoulder 6/23/20   Impression:     No fracture, dislocation    Comments: I have personally reviewed the imaging and I agree with the above radiologist's report.    ASSESSMENT/PLAN:       ICD-10-CM ICD-9-CM   1. Injury of triangular fibrocartilage complex (TFCC) of left wrist, initial encounter  S69.82XA 718.03   2. Numbness and tingling in left hand  R20.0 782.0    R20.2        Orders Placed This Encounter    EMG W/ ULTRASOUND AND NERVE CONDUCTION TEST 2 Extremities    traMADoL (ULTRAM) 50 mg tablet     Plan:   -MRI reviewed. Treatment options discussed, he wishes to proceed with a left wrist arthroscopy and left carpal tunnel release. We will obtain EMG prior to surgery. Consents reviewed and signed, all questions answered.   -left shoulder injection today   -tramadol script refilled   -RTC post op       I have explained the risks, benefits, and alternatives of the procedure in detail.  The patient voices understanding and all questions have been answered.  The patient agrees to proceed as planned. After a sterile prep of the skin in the normal the left shoulder is injected from the posterior approach using a 22 gauge needle with a combination of 8cc 1% lidocaine and 80 mg of kenalog. The patient is cautioned and immediate relief of pain is secondary to the local anesthetic and will be temporary.  After the anesthetic wears off there may be a increase in pain that may last for a few hours or a few days and they should use ice to help alleviate this flair up of pain.       The patient indicates understanding of  these issues and agrees to the plan.    Rani Alfaro PA-C  Hand Clinic   [facility] [place], [place]

## 2020-07-16 NOTE — PROGRESS NOTES
I have personally taken the history and examined the patient. I agree with the Hand Surgery PA's note. The plan will be   1) left shoulder injection  2) EMG/NCS for CTS  3) Consents for left wrist scope possible CTR  3) Tramadol for pain

## 2020-07-16 NOTE — PROGRESS NOTES
Subjective:      Patient ID: Emerson Menendez is a 74 y.o. male.    Chief Complaint: Pain of the Left Wrist and Results (MRI Results)      HPI  Emerson Menendez is a right hand dominant 74 y.o. male with DMII presenting today for follow up left wrist pain. He had an injury in May, he fell from a ladder. He reports persistent left wrist pain since this time. Pain is over the ulnar wrist, it is increased with use. Pain occasionally awakens him/ causes difficulty sleeping. He has tried bracing with little relief. He has not tried injections or bracing. He also reports intermittent numbness and tingling in the left hand. He has prior MRI with a central TFCC tear. Pt also reports chronic left shoulder pain, increased pain with full motion and occasionally at night.      Review of patient's allergies indicates:   Allergen Reactions    Penicillins      Knees locked up         Current Outpatient Medications   Medication Sig Dispense Refill    aspirin (ECOTRIN) 81 MG EC tablet Take 1 tablet (81 mg total) by mouth once daily. 100 tablet 3    blood glucose control, normal (METER-CHECK) Soln One meter.  Use as directed. Meter of insurance choice 1 each 0    blood sugar diagnostic (ACCU-CHEK ELIE PLUS TEST STRP) Strp Accu check elie plus TEST FOUR TIMES DAILY. Test strtips of insurance choice to go with meter and lancets 400 strip 3    blood sugar diagnostic Strp [facility] - check blood sugars 4 times daily 400 strip 4    blood-glucose meter (ACCU-CHEK OMAYRA) Misc USE AS DIRECTED. Accucheck elie plus 1 each 0    citalopram (CELEXA) 20 MG tablet Take 1.5 tablets (30 mg total) by mouth once daily. 135 tablet 0    diazePAM (VALIUM) 5 MG tablet TAKE 1 TABLET(5 MG) BY MOUTH EVERY 6 HOURS AS NEEDED 60 tablet 0    ergocalciferol (ERGOCALCIFEROL) 50,000 unit Cap Take 1 capsule (50,000 Units total) by mouth every 7 days. 4 capsule 3    gabapentin (NEURONTIN) 100 MG capsule TAKE 1 CAPSULE EVERY MORNING, 1 CAPSULE  "IN THE AFTERNOON, AND 1 TO 3 CAPSULE(S) AT BEDTIME AS NEEDED FOR FOOT PAIN 450 capsule 3    glimepiride (AMARYL) 2 MG tablet Take 1 tablet (2 mg total) by mouth once daily. 90 tablet 4    glucagon (human recombinant) inj 1mg/mL kit Inject 1 mL (1 mg total) into the muscle as needed. (Patient not taking: Reported on 6/25/2020) 1 kit 0    ibuprofen (ADVIL,MOTRIN) 800 MG tablet Take 1 tablet (800 mg total) by mouth 3 (three) times daily. 30 tablet 0    insulin (LANTUS SOLOSTAR U-100 INSULIN) glargine 100 units/mL (3mL) SubQ pen Inject 20 Units into the skin every evening. 1 Box 6    lancets Misc Use twice daily 200 each 4    latanoprost 0.005 % ophthalmic solution Place 1 drop into both eyes every evening. 7.5 mL 3    losartan (COZAAR) 25 MG tablet Take 2 tablets (50 mg total) by mouth once daily. 90 tablet 0    meloxicam (MOBIC) 15 MG tablet TAKE 1 TABLET(15 MG) BY MOUTH EVERY DAY 90 tablet 0    omeprazole (PRILOSEC) 40 MG capsule Take 1 capsule (40 mg total) by mouth once daily. 90 capsule 0    pen needle, diabetic (BD ULTRA-FINE SHORT PEN NEEDLE) 31 gauge x 5/16" Ndle USE TO INJECT INSULIN ONE TIME DAILY 100 each 3    pravastatin (PRAVACHOL) 40 MG tablet Take 1 tablet (40 mg total) by mouth once daily. 90 tablet 0    sildenafil (REVATIO) 20 mg Tab 2-3 tablets daily as needed for erectile dysfunction 30 tablet 1    tadalafiL (CIALIS) 20 MG Tab Take 1 tablet (20 mg total) by mouth daily as needed. 30 tablet 1    tadalafiL (CIALIS) 20 MG Tab Take 1 tablet (20 mg total) by mouth daily as needed. 30 tablet 3    tadalafiL (CIALIS) 5 MG tablet TAKE 1 TABLET(5 MG) BY MOUTH DAILY AS NEEDED FOR ERECTILE DYSFUNCTION 90 tablet 2    tamsulosin (FLOMAX) 0.4 mg Cap Take 1 capsule (0.4 mg total) by mouth once daily. 90 capsule 3    tizanidine (ZANAFLEX) 4 MG tablet 1/2-1 tablet every 8 hours as needed for muscle spasm 90 tablet 1    traMADoL (ULTRAM) 50 mg tablet Take 1 tablet (50 mg total) by mouth every 8 " "(eight) hours as needed for Pain (moderate to severe pain). 15 tablet 0    TRUEPLUS LANCETS 33 gauge Misc        No current facility-administered medications for this visit.        Past Medical History:   Diagnosis Date    Anxiety 3/16/2015    BPH (benign prostatic hypertrophy) 9/24/2012    Depression 3/16/2015    GERD (gastroesophageal reflux disease) 9/24/2012    Hx of psychiatric care     Celexa, Valium    Hyperlipidemia     Microalbuminuria 9/24/2012    Osteoarthritis of cervical spine 9/24/2012    Osteoarthritis of knee 9/24/2012    Psychiatric problem     Type II or unspecified type diabetes mellitus without mention of complication, not stated as uncontrolled 9/24/2012       Past Surgical History:   Procedure Laterality Date    CATARACT EXTRACTION  2012    Right eye    PROSTATE SURGERY         Review of Systems:  Constitutional: Negative for chills and fever.   Respiratory: Negative for cough and shortness of breath.    Gastrointestinal: Negative for nausea and vomiting.   Skin: Negative for rash.   Neurological: Negative for dizziness and headaches.   Psychiatric/Behavioral: Negative for depression.   MSK as in HPI       OBJECTIVE:     PHYSICAL EXAM:  BP (!) 147/77   Pulse 79   Ht 5' 10" (1.778 m)   Wt 87.1 kg (192 lb)   BMI 27.55 kg/m²     GEN:  NAD, well-developed, well-groomed.  NEURO: Awake, alert, and oriented. Normal attention and concentration.    PSYCH: Normal mood and affect. Behavior is normal.  HEENT: No cervical lymphadenopathy noted.  CARDIOVASCULAR: Radial pulses 2+ bilaterally. No LE edema noted.  PULMONARY: Breath sounds normal. No respiratory distress.  SKIN: Intact, no rashes.      MSK:   LUE:  Good active ROM of the wrist and fingers. He has ttp over the ulnar wrist over the TFCC. He has positive tinels and durkans over the carpal tunnel. AIN/PIN/Radial/Median/Ulnar Nerves assessed in isolation without deficit. Radial & Ulnar arteries palpated 2+. Capillary Refill " <3s.      RADIOGRAPHS:  Xray left wrist 6/23/20  FINDINGS:  Three views: Lateral view demonstrates a possible small triquetral chip avulsion fracture.    MRI left wrist 6/29/20  Impression:     Tear of the central portion of the triangular fibrocartilage.     Tenosynovitis of the flexor pollicis longus, 4th and 5th flexor tendon sheaths and mild tenosynovitis of the extensor digitorum tendons.    Xray left shoulder 6/23/20   Impression:     No fracture, dislocation    Comments: I have personally reviewed the imaging and I agree with the above radiologist's report.    ASSESSMENT/PLAN:       ICD-10-CM ICD-9-CM   1. Injury of triangular fibrocartilage complex (TFCC) of left wrist, initial encounter  S69.82XA 718.03   2. Numbness and tingling in left hand  R20.0 782.0    R20.2        Orders Placed This Encounter    EMG W/ ULTRASOUND AND NERVE CONDUCTION TEST 2 Extremities    traMADoL (ULTRAM) 50 mg tablet     Plan:   -MRI reviewed. Treatment options discussed, he wishes to proceed with a left wrist arthroscopy and left carpal tunnel release. We will obtain EMG prior to surgery. Consents reviewed and signed, all questions answered.   -left shoulder injection today   -tramadol script refilled   -RTC post op       I have explained the risks, benefits, and alternatives of the procedure in detail.  The patient voices understanding and all questions have been answered.  The patient agrees to proceed as planned. After a sterile prep of the skin in the normal the left shoulder is injected from the posterior approach using a 22 gauge needle with a combination of 8cc 1% lidocaine and 80 mg of kenalog. The patient is cautioned and immediate relief of pain is secondary to the local anesthetic and will be temporary.  After the anesthetic wears off there may be a increase in pain that may last for a few hours or a few days and they should use ice to help alleviate this flair up of pain.       The patient indicates understanding of  these issues and agrees to the plan.    Rani Alfaor PA-C  Hand Clinic   [facility] [place], [place]

## 2020-07-16 NOTE — TELEPHONE ENCOUNTER
----- Message from Liz Isaac MA sent at 7/16/2020  9:32 AM CDT -----  Regarding: Asprin 81 mg  Patient is undergoing left wrist surgery on 7/22/20 when may patient stop taking Asprin 81 mg?

## 2020-07-19 ENCOUNTER — CLINICAL SUPPORT (OUTPATIENT)
Dept: URGENT CARE | Facility: CLINIC | Age: 75
End: 2020-07-19
Payer: MEDICARE

## 2020-07-19 DIAGNOSIS — Z01.818 PRE-OP TESTING: ICD-10-CM

## 2020-07-19 PROCEDURE — U0003 INFECTIOUS AGENT DETECTION BY NUCLEIC ACID (DNA OR RNA); SEVERE ACUTE RESPIRATORY SYNDROME CORONAVIRUS 2 (SARS-COV-2) (CORONAVIRUS DISEASE [COVID-19]), AMPLIFIED PROBE TECHNIQUE, MAKING USE OF HIGH THROUGHPUT TECHNOLOGIES AS DESCRIBED BY CMS-2020-01-R: HCPCS | Mod: HCNC

## 2020-07-20 ENCOUNTER — TELEPHONE (OUTPATIENT)
Dept: ORTHOPEDICS | Facility: CLINIC | Age: 75
End: 2020-07-20

## 2020-07-20 LAB — SARS-COV-2 RNA RESP QL NAA+PROBE: NOT DETECTED

## 2020-07-20 NOTE — TELEPHONE ENCOUNTER
Left patient a voicemail stating we are having some insurance coverage issues about one of your procedures I will talk to  in the morning and see if we can get things worked out?----- Message from Yesenia Alves sent at 7/20/2020  3:14 PM CDT -----  Contact: Self [phone]  Type:  Patient Returning Call    Who Called: Self    Who Left Message for Patient: Liz    Does the patient know what this is regarding?: yes    Would the patient rather a call back or a response via [facility]? Call back    Best Call Back Number: [phone]

## 2020-07-20 NOTE — PRE ADMISSION SCREENING
Pre admit phone call completed.    Instructions given to patient about NPO status as follows:     The evening before surgery do not eat anything after 9 p.m. ( this includes hard candy, chewing gum and mints).  You may only have GATORADE, POWERADE AND WATER from 9 p.m. until you leave your home. DO NOT  DRINK ANY LIQUIDS ON THE WAY TO THE HOSPITAL.      Patient was also instructed on the below information:    Park in the Parking lot behind the hospital or in the [employer] [facility] across the street from the parking lot.  Parking is complimentary.  If you will be discharged the same day as your procedure, please arrange for a responsible adult to drive you home or  to accompany you if traveling by taxi.  YOU WILL NOT BE PERMITTED TO DRIVE OR TO LEAVE THE HOSPITAL ALONE AFTER SURGERY.  It is strongly recommended that you arrange for someone to remain with you for the first 24 hrs following your surgery.    Patient verbalized understanding of above instructions.

## 2020-07-20 NOTE — TELEPHONE ENCOUNTER
----- Message from Narcisa Gudino sent at 7/20/2020 11:44 AM CDT -----  Regarding: self  Type:  Patient Returning Call    Who Called: Self    Who Left Message for Patient:  Liz    Does the patient know what this is regarding?: no   Would the patient rather a call back or a response via [facility]? Call     Best Call Back Number: [phone]

## 2020-07-20 NOTE — TELEPHONE ENCOUNTER
Left patient a voicemail to give the office a call back at [phone].----- Message from Laura Tanner sent at 7/20/2020  9:08 AM CDT -----  Patient requests to speak with you regarding surgery scheduled on 7/22. Please call [phone].

## 2020-07-21 ENCOUNTER — TELEPHONE (OUTPATIENT)
Dept: ORTHOPEDICS | Facility: CLINIC | Age: 75
End: 2020-07-21

## 2020-07-21 DIAGNOSIS — Z98.890 POST-OPERATIVE STATE: Primary | ICD-10-CM

## 2020-07-21 DIAGNOSIS — Z01.818 PRE-OP TESTING: Primary | ICD-10-CM

## 2020-07-21 RX ORDER — HYDROCODONE BITARTRATE AND ACETAMINOPHEN 5; 325 MG/1; MG/1
1 TABLET ORAL EVERY 6 HOURS PRN
Qty: 28 TABLET | Refills: 0 | Status: SHIPPED | OUTPATIENT
Start: 2020-07-21 | End: 2020-08-01 | Stop reason: SDUPTHER

## 2020-07-21 NOTE — TELEPHONE ENCOUNTER
----- Message from Cherelle Navarro sent at 7/21/2020 12:58 PM CDT -----      Name of Who is Calling: RIGO PHILLIPS [[id number]]      What is the request in detail: Pt called to confirm if insurance issues were resolved and what time to come to the procedure.Please contact to further discuss and advise.          Can the clinic reply by [facility]: N      What [facility]: [phone]

## 2020-07-21 NOTE — TELEPHONE ENCOUNTER
Spoke c pt. Informed him that unfortunately because he has not had an EMG, the CTR has not been approved by his insurance company. After a long conversation, it was decided to postpone his surgery so both procedures can be performed at the same time. EMG scheduled 07/31/20, new COVID lab ordered (to be completed at [facility]), r/s surgery (CTR & wrist arthroscopy) 08/03 & PO appt to 08/17/20.     It should be noted that the pt expressed frustration c this situation. He stated that he called several times & was told many different things but no one ever really understood what his issue was. I explained that there was unfortunately miscommunication that occurred on behalf of our staff regarding his EMG scheduling & surgery. I apologized on behalf of our entire staff for the mistakes made on out end that have caused his procedure to be delayed.     By the end of the conversation, he was pleased c appts being r/s as well as surgery. Informed him I will send [email or web address] message with the information for his surgery at [facility] on 08/03/20. Pt expressed understanding & was thankful.

## 2020-07-21 NOTE — TELEPHONE ENCOUNTER
Attempted to contact pt. Left voicemail. Asked pt to return call to clinic at [phone] to discuss surgery tomorrow.     ==  CTR is NOT approved. Only approved for L wrist scope.

## 2020-07-22 ENCOUNTER — PATIENT OUTREACH (OUTPATIENT)
Dept: OTHER | Facility: OTHER | Age: 75
End: 2020-07-22

## 2020-07-22 DIAGNOSIS — E11.42 TYPE 2 DIABETES MELLITUS WITH DIABETIC POLYNEUROPATHY, WITH LONG-TERM CURRENT USE OF INSULIN: Primary | Chronic | ICD-10-CM

## 2020-07-22 DIAGNOSIS — Z79.4 TYPE 2 DIABETES MELLITUS WITH DIABETIC POLYNEUROPATHY, WITH LONG-TERM CURRENT USE OF INSULIN: Primary | Chronic | ICD-10-CM

## 2020-07-22 RX ORDER — INSULIN ASPART 100 [IU]/ML
4 INJECTION, SOLUTION INTRAVENOUS; SUBCUTANEOUS 2 TIMES DAILY WITH MEALS
Qty: 3 ML | Refills: 3
Start: 2020-07-22 | End: 2020-07-22 | Stop reason: SDUPTHER

## 2020-07-22 RX ORDER — INSULIN ASPART 100 [IU]/ML
4 INJECTION, SOLUTION INTRAVENOUS; SUBCUTANEOUS 2 TIMES DAILY WITH MEALS
Qty: 3 ML | Refills: 3 | Status: SHIPPED | OUTPATIENT
Start: 2020-07-22 | End: 2022-04-25 | Stop reason: SDUPTHER

## 2020-07-22 NOTE — PROGRESS NOTES
Digital Medicine: Clinician Follow-Up    He reports that he had a fall and hurt his shoulder and wrist. He was given a steroid injection in his shoulder.  He reports that he used 4-6 units of novolog without eating a meal which provided a reading of 118 mg/dL on July 19th. He reports that he is afraid to eat because he is afraid that his blood sugar will continue to rise.    The history is provided by the patient.   Follow-up reason(s): routine follow up.     Readings are trending up   due to lifestyle change.  Patient is not experiencing signs/symptoms of hypoglycemia.  Patient is not experiencing signs/symptoms of hyperglycemia.          Last 6 Patient Entered Readings                                          Most Recent A1c: 6.9% on 6/18/2020  (Goal: 7%)     Recent Readings 7/20/2020 7/19/2020 7/19/2020 7/19/2020 7/19/2020    Blood Glucose (mg/dL) 255 220 249 118 333           Screenings    ASSESSMENT(S)  Patient's most recent A1C result is Lab Results     Component                Value               Date                     HGBA1C                   6.9 (H)             06/18/2020           and is at goal. Patient's A1C goal is less than or equal to 7.    Patient's SMBG readings are elevated likely due to his steroid injection. Given patient's renal history, would like to address hyperglycemia to prevent further deterioration of renal function.    PLAN  Medication change: Initiate novolog 4 units with lunch and with dinner. Informed patient on the importance of refraining from self adjusting his medication.  Additional monitoring needed:  Continue current therapy:  Provided patient education: Encouraged patient not to use novolog if he does not have lunch or dinner.     Patient verbalizes understanding. Patient did not express questions or concerns and patient has contact information if needed.          There are no preventive care reminders to display for this patient.    Diabetes Medications              glimepiride (AMARYL) 2 MG tablet Take 1 tablet (2 mg total) by mouth once daily.    glucagon (human recombinant) inj 1mg/mL kit Inject 1 mL (1 mg total) into the muscle as needed.    Insuline (NOVOLOG) 100 units/mL SubQ pen Inject 4 units into the skin twice daily before lunch and before dinner.    insulin (LANTUS SOLOSTAR U-100 INSULIN) glargine 100 units/mL (3mL) SubQ pen Inject 20 Units into the skin every evening.

## 2020-07-26 DIAGNOSIS — D47.2 SMOLDERING MULTIPLE MYELOMA: ICD-10-CM

## 2020-07-27 ENCOUNTER — ANESTHESIA EVENT (OUTPATIENT)
Dept: SURGERY | Facility: HOSPITAL | Age: 75
End: 2020-07-27
Payer: MEDICARE

## 2020-07-27 ENCOUNTER — PATIENT OUTREACH (OUTPATIENT)
Dept: ADMINISTRATIVE | Facility: OTHER | Age: 75
End: 2020-07-27

## 2020-07-27 RX ORDER — DIAZEPAM 5 MG/1
TABLET ORAL
Qty: 60 TABLET | Refills: 0 | Status: SHIPPED | OUTPATIENT
Start: 2020-07-27 | End: 2020-08-24

## 2020-07-27 NOTE — ANESTHESIA PREPROCEDURE EVALUATION
07/27/2020  Emerson Menendez is a 75 y.o., male.    This patients chart has been evaluated and they have been found to be acceptable for surgery at [facility].    Lakhwinder Rubalcava MD      Patient Active Problem List   Diagnosis    Essential hypertension    Pure hypercholesterolemia    Microalbuminuria due to type 2 diabetes mellitus    GERD (gastroesophageal reflux disease)    Osteoarthritis of cervical spine    Osteoarthritis of knee    Hypertrophy of breast    Lump or mass in breast    Mastodynia    Unspecified hyperplasia of prostate without urinary obstruction and other lower urinary tract symptoms (LUTS)    No diabetic retinopathy in both eyes - Both Eyes    Nuclear sclerosis - Left Eye    ED (erectile dysfunction) of organic origin    Retrograde ejaculation    BPH with urinary obstruction    Type 2 diabetes mellitus with diabetic polyneuropathy, with long-term current use of insulin    Open nondisplaced fracture of distal phalanx of right thumb - s/p I&D, closure and splinting 12/17/13    Laceration of thumb, right    Fracture of thumb, right open    Cervical spondylosis without myelopathy    Anxiety    Depression    Chronic kidney disease, stage III (moderate)    Anemia    Smoldering multiple myeloma    Primary open angle glaucoma of both eyes, moderate stage    Status post cataract extraction and insertion of intraocular lens of right eye    Disorder of ejaculation    OAB (overactive bladder)    Left leg weakness    Type 2 diabetes mellitus with hyperglycemia, with long-term current use of insulin    Overweight (BMI 25.0-29.9)    IgM deficiency    Foot pain, right    CKD stage 3 due to type 2 diabetes mellitus    Pain of both hip joints    Nocturia    Anatomical narrow angle    Boil, axilla    Adjustment disorder with mixed anxiety and depressed mood    Grief     Vitamin D deficiency    Carpal tunnel syndrome on left       Past Surgical History:   Procedure Laterality Date    CATARACT EXTRACTION  2012    Right eye    PROSTATE SURGERY          Tobacco Use:  The patient  reports that he has never smoked. He has never used smokeless tobacco.     No results found. However, due to the size of the patient record, not all encounters were searched. Please check Results Review for a complete set of results.          Lab Results   Component Value Date    WBC 6.40 07/30/2020    HGB 11.8 (L) 07/30/2020    HCT 38.0 (L) 07/30/2020    MCV 93 07/30/2020     07/30/2020     BMP  Lab Results   Component Value Date     07/30/2020    K 5.1 07/30/2020     07/30/2020    CO2 25 07/30/2020    BUN 21 07/30/2020    CREATININE 1.7 (H) 07/30/2020    CALCIUM 9.9 07/30/2020    ANIONGAP 9 07/30/2020    GLU 77 07/30/2020     (H) 07/02/2020    GLU 92 06/18/2020       No results found for this or any previous visit.        Anesthesia Evaluation    I have reviewed the Patient Summary Reports.    I have reviewed the Nursing Notes. I have reviewed the NPO Status.      Review of Systems  Anesthesia Hx:  No previous Anesthesia    Social:  Non-Smoker, No Alcohol Use    Hematology/Oncology:  Hematology Normal   Oncology Normal     EENT/Dental:EENT/Dental Normal   Pulmonary:  Pulmonary Normal        Physical Exam  General:  Well nourished    Airway/Jaw/Neck:  Airway Findings: Mouth Opening: Normal Tongue: Normal  General Airway Assessment: Adult  Mallampati: III  Improves to III with phonation.  TM Distance: Normal, at least 6 cm  Jaw/Neck Findings:     Neck ROM: Normal ROM      Dental:  Dental Findings: Upper Dentures   Chest/Lungs:  Chest/Lungs Findings: Clear to auscultation, Normal Respiratory Rate     Heart/Vascular:  Heart Findings: Rate: Normal  Rhythm: Regular Rhythm  Sounds: Normal        Mental Status:  Mental Status Findings:  Cooperative, Alert and Oriented         Anesthesia  Plan  Type of Anesthesia, risks & benefits discussed:  Anesthesia Type:  MAC, regional, general  Patient's Preference:   Intra-op Monitoring Plan: standard ASA monitors  Intra-op Monitoring Plan Comments:   Post Op Pain Control Plan: per primary service following discharge from PACU, IV/PO Opioids PRN, multimodal analgesia and peripheral nerve block  Post Op Pain Control Plan Comments:   Induction:   IV  Beta Blocker:  Patient is not currently on a Beta-Blocker (No further documentation required).       Informed Consent: Patient understands risks and agrees with Anesthesia plan.  Questions answered. Anesthesia consent signed with patient.  ASA Score: 3     Day of Surgery Review of History & Physical: I have interviewed and examined the patient. I have reviewed the patient's H&P dated:            Ready For Surgery From Anesthesia Perspective.

## 2020-07-27 NOTE — PROGRESS NOTES
Refill Routing Note   Medication(s) are not appropriate for processing by [facility]:    - Outside of Protocol       Automatic Epic Protocol Generated Data:    Requested Prescriptions   Pending Prescriptions Disp Refills    diazePAM (VALIUM) 5 MG tablet [Pharmacy Med Name: DIAZEPAM 5MG TABLETS] 60 tablet      Sig: TAKE 1 TABLET(5 MG) BY MOUTH EVERY 6 HOURS AS NEEDED       Anticonvulsants Protocol Passed - 7/26/2020  7:40 PM        Passed - Visit with Authorizing provider in past 9 months or upcoming 90 days              Appointments [id number] or future 3m with PCP    Date Provider   Last Visit   4/22/2020 Roberta Sagastume MD   Next Visit   Visit date not found Roberta Sagastume MD   ED visits in past 90 days: 0     Note composed:11:18 AM 07/27/2020

## 2020-07-27 NOTE — PROGRESS NOTES
Chart reviewed.   Immunizations: updated  Orders placed: n/a  Upcoming appts to satisfy [facility] topics: Ophthalmology 7/28

## 2020-07-28 ENCOUNTER — OFFICE VISIT (OUTPATIENT)
Dept: OPHTHALMOLOGY | Facility: CLINIC | Age: 75
End: 2020-07-28
Payer: MEDICARE

## 2020-07-28 ENCOUNTER — OFFICE VISIT (OUTPATIENT)
Dept: PODIATRY | Facility: CLINIC | Age: 75
End: 2020-07-28
Payer: MEDICARE

## 2020-07-28 VITALS
HEIGHT: 70 IN | SYSTOLIC BLOOD PRESSURE: 142 MMHG | BODY MASS INDEX: 25.83 KG/M2 | DIASTOLIC BLOOD PRESSURE: 81 MMHG | HEART RATE: 86 BPM

## 2020-07-28 DIAGNOSIS — H25.12 NUCLEAR SCLEROSIS OF LEFT EYE: ICD-10-CM

## 2020-07-28 DIAGNOSIS — H40.039 ANATOMICAL NARROW ANGLE: ICD-10-CM

## 2020-07-28 DIAGNOSIS — S92.515D CLOSED NONDISPLACED FRACTURE OF PROXIMAL PHALANX OF LESSER TOE OF LEFT FOOT WITH ROUTINE HEALING, SUBSEQUENT ENCOUNTER: Primary | ICD-10-CM

## 2020-07-28 DIAGNOSIS — E11.42 TYPE 2 DIABETES MELLITUS WITH DIABETIC POLYNEUROPATHY, WITH LONG-TERM CURRENT USE OF INSULIN: ICD-10-CM

## 2020-07-28 DIAGNOSIS — M72.2 PLANTAR FASCIITIS: ICD-10-CM

## 2020-07-28 DIAGNOSIS — Z79.4 TYPE 2 DIABETES MELLITUS WITH DIABETIC POLYNEUROPATHY, WITH LONG-TERM CURRENT USE OF INSULIN: ICD-10-CM

## 2020-07-28 DIAGNOSIS — Z96.1 STATUS POST CATARACT EXTRACTION AND INSERTION OF INTRAOCULAR LENS OF RIGHT EYE: ICD-10-CM

## 2020-07-28 DIAGNOSIS — Z98.41 STATUS POST CATARACT EXTRACTION AND INSERTION OF INTRAOCULAR LENS OF RIGHT EYE: ICD-10-CM

## 2020-07-28 DIAGNOSIS — H40.1132 PRIMARY OPEN ANGLE GLAUCOMA OF BOTH EYES, MODERATE STAGE: Primary | ICD-10-CM

## 2020-07-28 PROCEDURE — 99212 OFFICE O/P EST SF 10 MIN: CPT | Mod: HCNC,S$GLB,, | Performed by: PODIATRIST

## 2020-07-28 PROCEDURE — 3079F PR MOST RECENT DIASTOLIC BLOOD PRESSURE 80-89 MM HG: ICD-10-PCS | Mod: HCNC,CPTII,S$GLB, | Performed by: PODIATRIST

## 2020-07-28 PROCEDURE — 99999 PR PBB SHADOW E&M-EST. PATIENT-LVL II: ICD-10-PCS | Mod: PBBFAC,HCNC,, | Performed by: OPHTHALMOLOGY

## 2020-07-28 PROCEDURE — 3008F PR BODY MASS INDEX (BMI) DOCUMENTED: ICD-10-PCS | Mod: HCNC,CPTII,S$GLB, | Performed by: PODIATRIST

## 2020-07-28 PROCEDURE — 3044F HG A1C LEVEL LT 7.0%: CPT | Mod: HCNC,CPTII,S$GLB, | Performed by: PODIATRIST

## 2020-07-28 PROCEDURE — 99212 PR OFFICE/OUTPT VISIT, EST, LEVL II, 10-19 MIN: ICD-10-PCS | Mod: HCNC,S$GLB,, | Performed by: PODIATRIST

## 2020-07-28 PROCEDURE — 99499 UNLISTED E&M SERVICE: CPT | Mod: HCNC,S$GLB,, | Performed by: OPHTHALMOLOGY

## 2020-07-28 PROCEDURE — 99999 PR PBB SHADOW E&M-EST. PATIENT-LVL II: CPT | Mod: PBBFAC,HCNC,, | Performed by: OPHTHALMOLOGY

## 2020-07-28 PROCEDURE — 1101F PT FALLS ASSESS-DOCD LE1/YR: CPT | Mod: HCNC,CPTII,S$GLB, | Performed by: PODIATRIST

## 2020-07-28 PROCEDURE — 1159F MED LIST DOCD IN RCRD: CPT | Mod: HCNC,S$GLB,, | Performed by: PODIATRIST

## 2020-07-28 PROCEDURE — 1125F AMNT PAIN NOTED PAIN PRSNT: CPT | Mod: HCNC,S$GLB,, | Performed by: PODIATRIST

## 2020-07-28 PROCEDURE — 92012 PR EYE EXAM, EST PATIENT,INTERMED: ICD-10-PCS | Mod: HCNC,S$GLB,, | Performed by: OPHTHALMOLOGY

## 2020-07-28 PROCEDURE — 3079F DIAST BP 80-89 MM HG: CPT | Mod: HCNC,CPTII,S$GLB, | Performed by: PODIATRIST

## 2020-07-28 PROCEDURE — 99999 PR PBB SHADOW E&M-EST. PATIENT-LVL III: ICD-10-PCS | Mod: PBBFAC,HCNC,, | Performed by: PODIATRIST

## 2020-07-28 PROCEDURE — 3077F PR MOST RECENT SYSTOLIC BLOOD PRESSURE >= 140 MM HG: ICD-10-PCS | Mod: HCNC,CPTII,S$GLB, | Performed by: PODIATRIST

## 2020-07-28 PROCEDURE — 3008F BODY MASS INDEX DOCD: CPT | Mod: HCNC,CPTII,S$GLB, | Performed by: PODIATRIST

## 2020-07-28 PROCEDURE — 99499 RISK ADDL DX/OHS AUDIT: ICD-10-PCS | Mod: HCNC,S$GLB,, | Performed by: OPHTHALMOLOGY

## 2020-07-28 PROCEDURE — 92012 INTRM OPH EXAM EST PATIENT: CPT | Mod: HCNC,S$GLB,, | Performed by: OPHTHALMOLOGY

## 2020-07-28 PROCEDURE — 1125F PR PAIN SEVERITY QUANTIFIED, PAIN PRESENT: ICD-10-PCS | Mod: HCNC,S$GLB,, | Performed by: PODIATRIST

## 2020-07-28 PROCEDURE — 1101F PR PT FALLS ASSESS DOC 0-1 FALLS W/OUT INJ PAST YR: ICD-10-PCS | Mod: HCNC,CPTII,S$GLB, | Performed by: PODIATRIST

## 2020-07-28 PROCEDURE — 99999 PR PBB SHADOW E&M-EST. PATIENT-LVL III: CPT | Mod: PBBFAC,HCNC,, | Performed by: PODIATRIST

## 2020-07-28 PROCEDURE — 1159F PR MEDICATION LIST DOCUMENTED IN MEDICAL RECORD: ICD-10-PCS | Mod: HCNC,S$GLB,, | Performed by: PODIATRIST

## 2020-07-28 PROCEDURE — 3077F SYST BP >= 140 MM HG: CPT | Mod: HCNC,CPTII,S$GLB, | Performed by: PODIATRIST

## 2020-07-28 PROCEDURE — 3044F PR MOST RECENT HEMOGLOBIN A1C LEVEL <7.0%: ICD-10-PCS | Mod: HCNC,CPTII,S$GLB, | Performed by: PODIATRIST

## 2020-07-28 NOTE — PROGRESS NOTES
Subjective:      Patient ID: Emerson Menendez is a 75 y.o. male.    Chief Complaint:   Foot Problem (right ft./) and Follow-up    Emerson is a 75 y.o. male who rtc to f/u left foot xray/5th toe fracture.    Pt relates he did not remember that the 5th toe was broken... he did not repeat the xray before this visit. He does want to get the dm shoes but misplaced the rx.   He did get new shoes.     Pt has not new foot c/o.. no heel pain. Pt relates he is still wearing the wrist brace and knee brace... he is scheduling his upcoming hand/wrist surgery.     PCP: Roberta Sagastume MD    Date Last Seen by PCP: 4/22/20    Current shoe gear: Extra depth shoes  new    Hemoglobin A1C   Date Value Ref Range Status   06/18/2020 6.9 (H) 3.9 - 6.1 % Final     Comment:     ADA Screening Guidelines:  5.7-6.4%  Consistent with prediabetes  >or=6.5%  Consistent with diabetes     12/10/2019 7.6 (H) 4.0 - 5.6 % Final     Comment:     ADA Screening Guidelines:  5.7-6.4%  Consistent with prediabetes  >or=6.5%  Consistent with diabetes  High levels of fetal hemoglobin interfere with the HbA1C  assay. Heterozygous hemoglobin variants (HbS, HgC, etc)do  not significantly interfere with this assay.   However, presence of multiple variants may affect accuracy.     09/17/2019 7.7 (H) 4.0 - 5.6 % Final     Comment:     ADA Screening Guidelines:  5.7-6.4%  Consistent with prediabetes  >or=6.5%  Consistent with diabetes  High levels of fetal hemoglobin interfere with the HbA1C  assay. Heterozygous hemoglobin variants (HbS, HgC, etc)do  not significantly interfere with this assay.   However, presence of multiple variants may affect accuracy.              Past Medical History:   Diagnosis Date    Anxiety 3/16/2015    BPH (benign prostatic hypertrophy) 9/24/2012    Carpal tunnel syndrome     Depression 3/16/2015    GERD (gastroesophageal reflux disease) 9/24/2012    Hx of psychiatric care     Celexa, Valium    Hyperlipidemia      Microalbuminuria 9/24/2012    Osteoarthritis of cervical spine 9/24/2012    Osteoarthritis of knee 9/24/2012    Psychiatric problem     Type II or unspecified type diabetes mellitus without mention of complication, not stated as uncontrolled 9/24/2012     Past Surgical History:   Procedure Laterality Date    CATARACT EXTRACTION  2012    Right eye    PROSTATE SURGERY       Current Outpatient Medications on File Prior to Visit   Medication Sig Dispense Refill    aspirin (ECOTRIN) 81 MG EC tablet Take 1 tablet (81 mg total) by mouth once daily. 100 tablet 3    blood glucose control, normal (METER-CHECK) Soln One meter.  Use as directed. Meter of insurance choice 1 each 0    blood sugar diagnostic (ACCU-CHEK STEFANO PLUS TEST STRP) Str Accu check stefano plus TEST FOUR TIMES DAILY. Test strtips of insurance choice to go with meter and lancets 400 strip 3    blood sugar diagnostic [facility] - check blood sugars 4 times daily 400 strip 4    blood-glucose meter (ACCU-CHEK OMAYRA) Misc USE AS DIRECTED. Accucheck stefano plus 1 each 0    citalopram (CELEXA) 20 MG tablet Take 1.5 tablets (30 mg total) by mouth once daily. 135 tablet 0    diazePAM (VALIUM) 5 MG tablet TAKE 1 TABLET(5 MG) BY MOUTH EVERY 6 HOURS AS NEEDED 60 tablet 0    ergocalciferol (ERGOCALCIFEROL) 50,000 unit Cap Take 1 capsule (50,000 Units total) by mouth every 7 days. 4 capsule 3    gabapentin (NEURONTIN) 100 MG capsule TAKE 1 CAPSULE EVERY MORNING, 1 CAPSULE IN THE AFTERNOON, AND 1 TO 3 CAPSULE(S) AT BEDTIME AS NEEDED FOR FOOT PAIN 450 capsule 3    glimepiride (AMARYL) 2 MG tablet Take 1 tablet (2 mg total) by mouth once daily. 90 tablet 4    HYDROcodone-acetaminophen (NORCO) 5-325 mg per tablet Take 1 tablet by mouth every 6 (six) hours as needed for Pain. 28 tablet 0    ibuprofen (ADVIL,MOTRIN) 800 MG tablet Take 1 tablet (800 mg total) by mouth 3 (three) times daily. 30 tablet 0    insulin (LANTUS SOLOSTAR U-100 INSULIN)  "glargine 100 units/mL (3mL) SubQ pen Inject 20 Units into the skin every evening. 1 Box 6    insulin aspart U-100 (NOVOLOG FLEXPEN U-100 INSULIN) 100 unit/mL (3 mL) InPn pen Inject 4 Units into the skin 2 (two) times daily with meals. 3 mL 3    lancets Misc Use twice daily 200 each 4    latanoprost 0.005 % ophthalmic solution Place 1 drop into both eyes every evening. 7.5 mL 3    losartan (COZAAR) 25 MG tablet Take 2 tablets (50 mg total) by mouth once daily. 90 tablet 0    meloxicam (MOBIC) 15 MG tablet TAKE 1 TABLET(15 MG) BY MOUTH EVERY DAY 90 tablet 0    omeprazole (PRILOSEC) 40 MG capsule Take 1 capsule (40 mg total) by mouth once daily. 90 capsule 0    pen needle, diabetic (BD ULTRA-FINE SHORT PEN NEEDLE) 31 gauge x 5/16" Ndle USE TO INJECT INSULIN ONE TIME DAILY 100 each 3    pravastatin (PRAVACHOL) 40 MG tablet Take 1 tablet (40 mg total) by mouth once daily. 90 tablet 0    sildenafil (REVATIO) 20 mg Tab 2-3 tablets daily as needed for erectile dysfunction 30 tablet 1    tadalafiL (CIALIS) 20 MG Tab Take 1 tablet (20 mg total) by mouth daily as needed. 30 tablet 3    tadalafiL (CIALIS) 5 MG tablet TAKE 1 TABLET(5 MG) BY MOUTH DAILY AS NEEDED FOR ERECTILE DYSFUNCTION 90 tablet 2    tamsulosin (FLOMAX) 0.4 mg Cap Take 1 capsule (0.4 mg total) by mouth once daily. 90 capsule 3    tizanidine (ZANAFLEX) 4 MG tablet 1/2-1 tablet every 8 hours as needed for muscle spasm 90 tablet 1    traMADoL (ULTRAM) 50 mg tablet Take 1 tablet (50 mg total) by mouth every 8 (eight) hours as needed for Pain (moderate to severe pain). 15 tablet 0    TRUEPLUS LANCETS 33 gauge Misc       glucagon (human recombinant) inj 1mg/mL kit Inject 1 mL (1 mg total) into the muscle as needed. 1 kit 0     No current facility-administered medications on file prior to visit.      Review of patient's allergies indicates:   Allergen Reactions    Penicillins      Knees locked up       Review of Systems   Constitution: Negative for " "chills, decreased appetite, fever, malaise/fatigue, night sweats, weight gain and weight loss.   Cardiovascular: Negative for chest pain, claudication, dyspnea on exertion, leg swelling, palpitations and syncope.   Respiratory: Negative for cough and shortness of breath.    Endocrine: Negative for cold intolerance and heat intolerance.   Hematologic/Lymphatic: Negative for bleeding problem. Does not bruise/bleed easily.   Skin: Negative for color change, dry skin, flushing, itching, nail changes, poor wound healing, rash, skin cancer, suspicious lesions and unusual hair distribution.   Musculoskeletal: Positive for arthritis, joint pain and joint swelling. Negative for back pain, falls, gout, muscle cramps, muscle weakness, myalgias, neck pain and stiffness.   Gastrointestinal: Negative for diarrhea, nausea and vomiting.   Neurological: Positive for numbness. Negative for dizziness, focal weakness, light-headedness, paresthesias, tremors, vertigo and weakness.   Psychiatric/Behavioral: Negative for altered mental status and depression. The patient does not have insomnia.    Allergic/Immunologic: Negative.            Objective:       Vitals:    07/28/20 0951   Height: 5' 10" (1.778 m)   PainSc:   2   PainLoc: Foot         Physical Exam  Vitals signs and nursing note reviewed.   Constitutional:       General: He is not in acute distress.     Appearance: He is well-developed. He is not ill-appearing, toxic-appearing or diaphoretic.   Cardiovascular:      Pulses:           Dorsalis pedis pulses are 2+ on the right side and 2+ on the left side.        Posterior tibial pulses are 2+ on the right side and 2+ on the left side.   Musculoskeletal:         General: No tenderness.      Right lower leg: No edema.      Left lower leg: No edema.      Right foot: Decreased range of motion. Deformity, bunion and prominent metatarsal heads present.      Left foot: Decreased range of motion. Deformity, bunion and prominent metatarsal " heads present.      Comments: + diffuse pop to posterior calcaenaus left with side to side compression. No ankle pain , midfoot or forefoot pain with rom     1st MPJ exostosis w/ lateral deviation of hallux, non trackbound. Reducible extensor and flexor contractures at the MTPJ and PIPJ of toes 2-5, bilat.     No pain right   Feet:      Right foot:      Protective Sensation: 10 sites tested. 10 sites sensed.      Skin integrity: Dry skin present. No ulcer, blister, skin breakdown, erythema, warmth or callus.      Left foot:      Protective Sensation: 10 sites tested. 10 sites sensed.      Skin integrity: Dry skin present. No ulcer, blister, skin breakdown, erythema, warmth or callus.   Skin:     General: Skin is warm.      Capillary Refill: Capillary refill takes 2 to 3 seconds.      Coloration: Skin is not pale.      Findings: No erythema or rash.      Nails: There is no clubbing.     Neurological:      Mental Status: He is alert and oriented to person, place, and time.      Sensory: No sensory deficit.      Gait: Gait abnormal.      Comments: Knee brace left   Psychiatric:         Behavior: Behavior normal.         Thought Content: Thought content normal.         Judgment: Judgment normal.       X-Ray Foot Complete Left  EXAMINATION:  XR FOOT COMPLETE 3 VIEW LEFT    CLINICAL HISTORY:  left foot pain. trauma 6 weeks ago no xray;  Pain in left foot    FINDINGS:  Three views: There is a healing fracture of the base of the proximal phalanx of the 5th digit.    Electronically signed by: Daren Lan MD  Date:    06/30/2020  Time:    13:03          Assessment:       Encounter Diagnoses   Name Primary?    Closed nondisplaced fracture of proximal phalanx of lesser toe of left foot with routine healing, subsequent encounter Yes    Type 2 diabetes mellitus with diabetic polyneuropathy, with long-term current use of insulin     Plantar fasciitis - Right Foot          Plan:       Emerson was seen today for foot problem and  follow-up.    Diagnoses and all orders for this visit:    Closed nondisplaced fracture of proximal phalanx of lesser toe of left foot with routine healing, subsequent encounter  -     X-Ray Foot Complete Left; Future    Type 2 diabetes mellitus with diabetic polyneuropathy, with long-term current use of insulin    Plantar fasciitis - Right Foot      I counseled the patient on his conditions, their implications and medical management.    - recommend trying to get the dm shoes = reprinted the rx           Shoe inspection. Diabetic Foot Education. Patient reminded of the importance of good nutrition and blood sugar control to help prevent podiatric complications of diabetes. Patient instructed on proper foot hygeine. We discussed wearing proper shoe gear, daily foot inspections, never walking without protective shoe gear, never putting sharp instruments to feet    - will call with xray results; pt relates will get xray when goes [facility]    - Return to clinic in one year or sooner if problems arise   .

## 2020-07-29 ENCOUNTER — TELEPHONE (OUTPATIENT)
Dept: ORTHOPEDICS | Facility: CLINIC | Age: 75
End: 2020-07-29

## 2020-07-29 ENCOUNTER — PATIENT OUTREACH (OUTPATIENT)
Dept: OTHER | Facility: OTHER | Age: 75
End: 2020-07-29

## 2020-07-29 DIAGNOSIS — Z01.818 PRE-OP TESTING: ICD-10-CM

## 2020-07-29 DIAGNOSIS — Z79.4 TYPE 2 DIABETES MELLITUS WITH HYPERGLYCEMIA, WITH LONG-TERM CURRENT USE OF INSULIN: Primary | Chronic | ICD-10-CM

## 2020-07-29 DIAGNOSIS — E11.65 TYPE 2 DIABETES MELLITUS WITH HYPERGLYCEMIA, WITH LONG-TERM CURRENT USE OF INSULIN: Primary | Chronic | ICD-10-CM

## 2020-07-29 NOTE — TELEPHONE ENCOUNTER
Please give us a call back to scheduled your covid test in needs to be done on 7/31/20.This is Liz give me a call back at [phone].

## 2020-07-30 ENCOUNTER — OFFICE VISIT (OUTPATIENT)
Dept: HEMATOLOGY/ONCOLOGY | Facility: CLINIC | Age: 75
End: 2020-07-30
Payer: MEDICARE

## 2020-07-30 ENCOUNTER — LAB VISIT (OUTPATIENT)
Dept: LAB | Facility: HOSPITAL | Age: 75
End: 2020-07-30
Payer: MEDICARE

## 2020-07-30 ENCOUNTER — PATIENT MESSAGE (OUTPATIENT)
Dept: INTERNAL MEDICINE | Facility: CLINIC | Age: 75
End: 2020-07-30

## 2020-07-30 ENCOUNTER — RESEARCH ENCOUNTER (OUTPATIENT)
Dept: HEMATOLOGY/ONCOLOGY | Facility: CLINIC | Age: 75
End: 2020-07-30

## 2020-07-30 VITALS
WEIGHT: 187.19 LBS | TEMPERATURE: 98 F | HEART RATE: 85 BPM | DIASTOLIC BLOOD PRESSURE: 76 MMHG | SYSTOLIC BLOOD PRESSURE: 161 MMHG | OXYGEN SATURATION: 99 % | HEIGHT: 70 IN | BODY MASS INDEX: 26.8 KG/M2 | RESPIRATION RATE: 17 BRPM

## 2020-07-30 DIAGNOSIS — N18.30 CKD STAGE 3 DUE TO TYPE 2 DIABETES MELLITUS: ICD-10-CM

## 2020-07-30 DIAGNOSIS — E11.42 TYPE 2 DIABETES MELLITUS WITH DIABETIC POLYNEUROPATHY, WITH LONG-TERM CURRENT USE OF INSULIN: ICD-10-CM

## 2020-07-30 DIAGNOSIS — E11.65 TYPE 2 DIABETES MELLITUS WITH HYPERGLYCEMIA, UNSPECIFIED WHETHER LONG TERM INSULIN USE: ICD-10-CM

## 2020-07-30 DIAGNOSIS — Z79.4 TYPE 2 DIABETES MELLITUS WITH DIABETIC POLYNEUROPATHY, WITH LONG-TERM CURRENT USE OF INSULIN: ICD-10-CM

## 2020-07-30 DIAGNOSIS — E13.9 DIABETES MELLITUS DUE TO ABNORMAL INSULIN: ICD-10-CM

## 2020-07-30 DIAGNOSIS — D47.2 SMOLDERING MULTIPLE MYELOMA: Primary | ICD-10-CM

## 2020-07-30 DIAGNOSIS — Z00.6 RESEARCH EXAM: ICD-10-CM

## 2020-07-30 DIAGNOSIS — E11.22 CKD STAGE 3 DUE TO TYPE 2 DIABETES MELLITUS: ICD-10-CM

## 2020-07-30 DIAGNOSIS — D47.2 SMOLDERING MULTIPLE MYELOMA: ICD-10-CM

## 2020-07-30 LAB
ALBUMIN SERPL BCP-MCNC: 3.6 G/DL (ref 3.5–5.2)
ALP SERPL-CCNC: 44 U/L (ref 55–135)
ALT SERPL W/O P-5'-P-CCNC: 32 U/L (ref 10–44)
ANION GAP SERPL CALC-SCNC: 9 MMOL/L (ref 8–16)
AST SERPL-CCNC: 15 U/L (ref 10–40)
BASOPHILS # BLD AUTO: 0.03 K/UL (ref 0–0.2)
BASOPHILS NFR BLD: 0.5 % (ref 0–1.9)
BILIRUB SERPL-MCNC: 0.5 MG/DL (ref 0.1–1)
BUN SERPL-MCNC: 21 MG/DL (ref 8–23)
CALCIUM SERPL-MCNC: 9.9 MG/DL (ref 8.7–10.5)
CHLORIDE SERPL-SCNC: 104 MMOL/L (ref 95–110)
CO2 SERPL-SCNC: 25 MMOL/L (ref 23–29)
CREAT SERPL-MCNC: 1.7 MG/DL (ref 0.5–1.4)
DIFFERENTIAL METHOD: ABNORMAL
EOSINOPHIL # BLD AUTO: 0.1 K/UL (ref 0–0.5)
EOSINOPHIL NFR BLD: 1.7 % (ref 0–8)
ERYTHROCYTE [DISTWIDTH] IN BLOOD BY AUTOMATED COUNT: 14.4 % (ref 11.5–14.5)
EST. GFR  (AFRICAN AMERICAN): 44.9 ML/MIN/1.73 M^2
EST. GFR  (NON AFRICAN AMERICAN): 38.9 ML/MIN/1.73 M^2
GLUCOSE SERPL-MCNC: 77 MG/DL (ref 70–110)
HCT VFR BLD AUTO: 38 % (ref 40–54)
HGB BLD-MCNC: 11.8 G/DL (ref 14–18)
IGA SERPL-MCNC: 1485 MG/DL (ref 40–350)
IGG SERPL-MCNC: 944 MG/DL (ref 650–1600)
IGM SERPL-MCNC: 41 MG/DL (ref 50–300)
IMM GRANULOCYTES # BLD AUTO: 0.01 K/UL (ref 0–0.04)
IMM GRANULOCYTES NFR BLD AUTO: 0.2 % (ref 0–0.5)
LDH SERPL L TO P-CCNC: 105 U/L (ref 110–260)
LYMPHOCYTES # BLD AUTO: 1.7 K/UL (ref 1–4.8)
LYMPHOCYTES NFR BLD: 27 % (ref 18–48)
MAGNESIUM SERPL-MCNC: 2.1 MG/DL (ref 1.6–2.6)
MCH RBC QN AUTO: 29 PG (ref 27–31)
MCHC RBC AUTO-ENTMCNC: 31.1 G/DL (ref 32–36)
MCV RBC AUTO: 93 FL (ref 82–98)
MONOCYTES # BLD AUTO: 0.4 K/UL (ref 0.3–1)
MONOCYTES NFR BLD: 6.1 % (ref 4–15)
NEUTROPHILS # BLD AUTO: 4.1 K/UL (ref 1.8–7.7)
NEUTROPHILS NFR BLD: 64.5 % (ref 38–73)
NRBC BLD-RTO: 0 /100 WBC
PHOSPHATE SERPL-MCNC: 2.9 MG/DL (ref 2.7–4.5)
PLATELET # BLD AUTO: 251 K/UL (ref 150–350)
PMV BLD AUTO: 9 FL (ref 9.2–12.9)
POTASSIUM SERPL-SCNC: 5.1 MMOL/L (ref 3.5–5.1)
PROT SERPL-MCNC: 8.1 G/DL (ref 6–8.4)
RBC # BLD AUTO: 4.07 M/UL (ref 4.6–6.2)
SODIUM SERPL-SCNC: 138 MMOL/L (ref 136–145)
WBC # BLD AUTO: 6.4 K/UL (ref 3.9–12.7)

## 2020-07-30 PROCEDURE — 84165 PATHOLOGIST INTERPRETATION SPE: ICD-10-PCS | Mod: 26,HCNC,, | Performed by: PATHOLOGY

## 2020-07-30 PROCEDURE — 84165 PROTEIN E-PHORESIS SERUM: CPT | Mod: HCNC,Q1

## 2020-07-30 PROCEDURE — 1126F AMNT PAIN NOTED NONE PRSNT: CPT | Mod: HCNC,S$GLB,, | Performed by: INTERNAL MEDICINE

## 2020-07-30 PROCEDURE — 86334 PATHOLOGIST INTERPRETATION IFE: ICD-10-PCS | Mod: 26,HCNC,, | Performed by: PATHOLOGY

## 2020-07-30 PROCEDURE — 1126F PR PAIN SEVERITY QUANTIFIED, NO PAIN PRESENT: ICD-10-PCS | Mod: HCNC,S$GLB,, | Performed by: INTERNAL MEDICINE

## 2020-07-30 PROCEDURE — 1101F PR PT FALLS ASSESS DOC 0-1 FALLS W/OUT INJ PAST YR: ICD-10-PCS | Mod: HCNC,CPTII,S$GLB, | Performed by: INTERNAL MEDICINE

## 2020-07-30 PROCEDURE — 3044F PR MOST RECENT HEMOGLOBIN A1C LEVEL <7.0%: ICD-10-PCS | Mod: HCNC,CPTII,S$GLB, | Performed by: INTERNAL MEDICINE

## 2020-07-30 PROCEDURE — 99215 PR OFFICE/OUTPT VISIT, EST, LEVL V, 40-54 MIN: ICD-10-PCS | Mod: HCNC,S$GLB,, | Performed by: INTERNAL MEDICINE

## 2020-07-30 PROCEDURE — 3008F PR BODY MASS INDEX (BMI) DOCUMENTED: ICD-10-PCS | Mod: HCNC,CPTII,S$GLB, | Performed by: INTERNAL MEDICINE

## 2020-07-30 PROCEDURE — 85025 COMPLETE CBC W/AUTO DIFF WBC: CPT | Mod: HCNC,Q1

## 2020-07-30 PROCEDURE — 99215 OFFICE O/P EST HI 40 MIN: CPT | Mod: HCNC,S$GLB,, | Performed by: INTERNAL MEDICINE

## 2020-07-30 PROCEDURE — 36415 COLL VENOUS BLD VENIPUNCTURE: CPT | Mod: HCNC

## 2020-07-30 PROCEDURE — 83520 IMMUNOASSAY QUANT NOS NONAB: CPT | Mod: 59,HCNC,Q1

## 2020-07-30 PROCEDURE — 3077F SYST BP >= 140 MM HG: CPT | Mod: HCNC,CPTII,S$GLB, | Performed by: INTERNAL MEDICINE

## 2020-07-30 PROCEDURE — 3044F HG A1C LEVEL LT 7.0%: CPT | Mod: HCNC,CPTII,S$GLB, | Performed by: INTERNAL MEDICINE

## 2020-07-30 PROCEDURE — 1159F PR MEDICATION LIST DOCUMENTED IN MEDICAL RECORD: ICD-10-PCS | Mod: HCNC,S$GLB,, | Performed by: INTERNAL MEDICINE

## 2020-07-30 PROCEDURE — 99999 PR PBB SHADOW E&M-EST. PATIENT-LVL III: ICD-10-PCS | Mod: PBBFAC,HCNC,, | Performed by: INTERNAL MEDICINE

## 2020-07-30 PROCEDURE — 3008F BODY MASS INDEX DOCD: CPT | Mod: HCNC,CPTII,S$GLB, | Performed by: INTERNAL MEDICINE

## 2020-07-30 PROCEDURE — 83735 ASSAY OF MAGNESIUM: CPT | Mod: HCNC

## 2020-07-30 PROCEDURE — 1159F MED LIST DOCD IN RCRD: CPT | Mod: HCNC,S$GLB,, | Performed by: INTERNAL MEDICINE

## 2020-07-30 PROCEDURE — 80053 COMPREHEN METABOLIC PANEL: CPT | Mod: HCNC

## 2020-07-30 PROCEDURE — 3077F PR MOST RECENT SYSTOLIC BLOOD PRESSURE >= 140 MM HG: ICD-10-PCS | Mod: HCNC,CPTII,S$GLB, | Performed by: INTERNAL MEDICINE

## 2020-07-30 PROCEDURE — 1101F PT FALLS ASSESS-DOCD LE1/YR: CPT | Mod: HCNC,CPTII,S$GLB, | Performed by: INTERNAL MEDICINE

## 2020-07-30 PROCEDURE — 99999 PR PBB SHADOW E&M-EST. PATIENT-LVL III: CPT | Mod: PBBFAC,HCNC,, | Performed by: INTERNAL MEDICINE

## 2020-07-30 PROCEDURE — 86334 IMMUNOFIX E-PHORESIS SERUM: CPT | Mod: HCNC,Q1

## 2020-07-30 PROCEDURE — 3078F PR MOST RECENT DIASTOLIC BLOOD PRESSURE < 80 MM HG: ICD-10-PCS | Mod: HCNC,CPTII,S$GLB, | Performed by: INTERNAL MEDICINE

## 2020-07-30 PROCEDURE — 3078F DIAST BP <80 MM HG: CPT | Mod: HCNC,CPTII,S$GLB, | Performed by: INTERNAL MEDICINE

## 2020-07-30 PROCEDURE — 86334 IMMUNOFIX E-PHORESIS SERUM: CPT | Mod: 26,HCNC,, | Performed by: PATHOLOGY

## 2020-07-30 PROCEDURE — 84100 ASSAY OF PHOSPHORUS: CPT | Mod: HCNC,Q1

## 2020-07-30 PROCEDURE — 83615 LACTATE (LD) (LDH) ENZYME: CPT | Mod: HCNC

## 2020-07-30 PROCEDURE — 82784 ASSAY IGA/IGD/IGG/IGM EACH: CPT | Mod: 59,HCNC

## 2020-07-30 PROCEDURE — 84165 PROTEIN E-PHORESIS SERUM: CPT | Mod: 26,HCNC,, | Performed by: PATHOLOGY

## 2020-07-30 RX ORDER — BLOOD SUGAR DIAGNOSTIC
STRIP MISCELLANEOUS
Qty: 400 STRIP | Refills: 3 | Status: SHIPPED | OUTPATIENT
Start: 2020-07-30 | End: 2020-08-28 | Stop reason: SDUPTHER

## 2020-07-30 NOTE — PROGRESS NOTES
"  Thursday, July 30, 2020    Protocol: R8T00--B Randomized Phase III Tr ial of Lenalidomide vs Observation Alone in Patients with 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.   Sponsor:  [facility]  IRB# [id number]  Study ID: [id number]  Investigator: GIL Engel Pt Initials: LUCÍA LORENZ       Cycle 56 Day 28 Arm B: Observation    Patient presents to clinic today unaccompanied. He is alert, oriented to person, place, and time; mood and affect appropriate. He states overall he has been doing well; he is trying to schedule his wrist and knee surgeries with orthopedics and currently presents without left wrist/hand velcro brace and with left knee only in brace today. States he is managing well in braces, took them off "to take a break for now; its in my truck." He reports he is currently trying to get in touch with his PCP Dr Sagastume to reorder strips for his glucometer. States he has shoulder pain 2 weeks ago for which he received "injection that made my sugars go really high, I was worried." He reports the sugars were "in the high 100's" for approximately one week after the injection but are now trending down to wnl at last check when he had strips (says last check yesterday but now has no strips and has run out all together and left message with PCP office.) This research RN also contacted Dr Sagastume's medical assistant to request this script get filled asap. Subject states he has been splitting time between [place] and [place] and has been maintaining CDC/government guidelines regarding COVID and wears masks, practices social distancing, and has not had any COVID related symptoms in recent weeks.     Review of Baseline AE's:   1.Hypertension, Grade 2 systolic and diastolic: BP today is 161/76. Subject denies headache/dizziness; no symptoms noted; states compliance with medications and as per above lisinopril dose recently increased. AE ongoing  2. Lower Back Pain and Neck Pain, grade 1: Patient has no complaints " "today/ over last month.  As previously stated, patient is not suspected to have bony myeloma involvement per Dr. Engel as these are pre-existing issues present at baseline.  AE ongoing.  3. Pain in extremity (knee/wrist), grade 1. As per above interval history.  Subject endorses continued stiffness and pain, no increase. States orthopedist continues to recommend he replace his left knee. He is able to walk on it today without issue and has left brace on and intact for stabilization.      4. Peripheral sensory neuropathy, grade 1: Patient does not verbalize complaints today. Has gabapentin prescribed and takes as needed.  States has not taken recently.  AE ongoing.   5. Anemia, Grade 1: Hgb/Hct - stable at 11.8/38.  Result reviewed by Dr. Engel. AE ongoing            Review of AE's:     *Please note this list is not all-inclusive, please see AE log for physician reviewed list of adverse events.   1. Creatinine increased, grade 2: Serum creatinine increased slightly to 1.7 mg/dl today. Calculated GFR is 46 ml/min per cockcroft-gualt formula. Per Epic result GFR 48.7; NCS today per Dr Engel as previously stated, Dr. Engel states this has not been related to myeloma and is related chronic kidney issues likely secondary to diabetes and hypertension. Will continue observation monthly and to monitor renal function closely. Per MD, encouraged to increase water intake. AE stable from baseline  2. Hyponatremia, Grade 1: Serum sodium today is 135 mmol/L; AE ongoing. Will monitor  3. Hyperglycemia, Grade 1: ongoing with subject as is type II diabetic. See interval note above. Serum glucose today wnl and subject states labs were fasting this morning. Subject notified via phone when chemistry results posted. He is aware his glucose was wnl today encouraged him to follow through with getting strips for glucometer to continue daily monitoring.     Per study chair, "the definition of progressive disease for this protocol is developing " "CRAB criteria that requires treatment systemically." Per Dr Engel, based on today's exam and lab results thus far, patient does not have any s/s of CRAB: Normal Calcium level (9.5), absence of Renal insufficiency (serum creatinine 1.9, and GFR 42.12 per Cockroft-Gault) --patient with a history of kidney dysfunction secondary to diabetes; Dr. Engel aware of these values and not concerned for myeloma involvement), absence of significant Anemia (hemoglobin 11.0, stable; will await myeloma labs for clarity relationship to myeloma) and absence of lytic Bone lesions (per baseline metastatic survey as well as MRI--see MD note for further comment).     See MD note for ECOG score and H&P and flowsheets for laboratory work, vitals, etc. All myeloma-related blood work from last cycle including SPEP and JAGUAR have remained stable, and are currently pending for this cycle. Per Dr. Engel, patient shows no evidence of progression at last result. Patient informed that Dr. Engel will release all lab results to [facility]. He states understanding of this. QOL's not required at this timepoint.           Instructed subject to contact research staff here at [facility] with any questions/concerns. He states he plans to continue to come to [facility] next month for appt.He states he still keeps residence in [place] although spending most time in [place], he states he wishes only to see Dr Engel and will not show for appt if scheduled with any other provider.  Will continue to schedule patient as far out as possible, which seems to help maintain compliance with visits. Patient states having research RN's contact information and has MD contact information to call with any concerns, questions, or worsening of symptoms.  Discussed next month's appt date and time, he verbalized understanding.        "

## 2020-07-30 NOTE — TELEPHONE ENCOUNTER
Care Due:                  Date            Visit Type   Department     Provider  --------------------------------------------------------------------------------                                PATRICK SEARS      [facility] INTERNAL  Last Visit: 04-      VISIT        MEDICINE       GEOVANNA ALCAZAR  Next Visit: None Scheduled  None         None Found                                                            Last  Test          Frequency    Reason                     Performed    Due Date  --------------------------------------------------------------------------------    HDL.........  12 months..  pravastatin..............  09- 09-    LDL.........  12 months..  pravastatin..............  09- 09-    Total         12 months..  pravastatin..............  09- 09-  Cholesterol.    Triglyceride  12 months..  pravastatin..............  09- 09-  s...........    Powered by [employer]. Reference number: [id number]. 7/30/2020 7:46:41 AM CDT

## 2020-07-31 ENCOUNTER — TELEPHONE (OUTPATIENT)
Dept: ORTHOPEDICS | Facility: CLINIC | Age: 75
End: 2020-07-31

## 2020-07-31 ENCOUNTER — PROCEDURE VISIT (OUTPATIENT)
Dept: PHYSICAL MEDICINE AND REHAB | Facility: CLINIC | Age: 75
End: 2020-07-31
Payer: MEDICARE

## 2020-07-31 DIAGNOSIS — R20.2 NUMBNESS AND TINGLING IN LEFT HAND: ICD-10-CM

## 2020-07-31 DIAGNOSIS — R20.0 NUMBNESS AND TINGLING IN LEFT HAND: ICD-10-CM

## 2020-07-31 DIAGNOSIS — Z98.890 POST-OPERATIVE STATE: Primary | ICD-10-CM

## 2020-07-31 LAB
ALBUMIN SERPL ELPH-MCNC: 4.05 G/DL (ref 3.35–5.55)
ALPHA1 GLOB SERPL ELPH-MCNC: 0.24 G/DL (ref 0.17–0.41)
ALPHA2 GLOB SERPL ELPH-MCNC: 0.78 G/DL (ref 0.43–0.99)
B-GLOBULIN SERPL ELPH-MCNC: 1.02 G/DL (ref 0.5–1.1)
GAMMA GLOB SERPL ELPH-MCNC: 1.8 G/DL (ref 0.67–1.58)
KAPPA LC SER QL IA: 5.41 MG/DL (ref 0.33–1.94)
KAPPA LC/LAMBDA SER IA: 3.98 (ref 0.26–1.65)
LAMBDA LC SER QL IA: 1.36 MG/DL (ref 0.57–2.63)
PATHOLOGIST INTERPRETATION SPE: NORMAL
PROT SERPL-MCNC: 7.9 G/DL (ref 6–8.4)

## 2020-07-31 PROCEDURE — 95910 NRV CNDJ TEST 7-8 STUDIES: CPT | Mod: HCNC,S$GLB,, | Performed by: PHYSICAL MEDICINE & REHABILITATION

## 2020-07-31 PROCEDURE — 99454 PR REMOTE MNTR, PHYS PARAM, INITIAL, EA 30 DAYS: ICD-10-PCS | Mod: S$GLB,,, | Performed by: INTERNAL MEDICINE

## 2020-07-31 PROCEDURE — 95885 MUSC TST DONE W/NERV TST LIM: CPT | Mod: 59,HCNC,S$GLB, | Performed by: PHYSICAL MEDICINE & REHABILITATION

## 2020-07-31 PROCEDURE — 95886 MUSC TEST DONE W/N TEST COMP: CPT | Mod: HCNC,S$GLB,, | Performed by: PHYSICAL MEDICINE & REHABILITATION

## 2020-07-31 PROCEDURE — 99454 REM MNTR PHYSIOL PARAM 16-30: CPT | Mod: S$GLB,,, | Performed by: INTERNAL MEDICINE

## 2020-07-31 PROCEDURE — 95910 PR NERVE CONDUCTION STUDY; 7-8 STUDIES: ICD-10-PCS | Mod: HCNC,S$GLB,, | Performed by: PHYSICAL MEDICINE & REHABILITATION

## 2020-07-31 PROCEDURE — 95886 PR EMG COMPLETE, W/ NERVE CONDUCTION STUDIES, 5+ MUSCLES: ICD-10-PCS | Mod: HCNC,S$GLB,, | Performed by: PHYSICAL MEDICINE & REHABILITATION

## 2020-07-31 PROCEDURE — 95885 PR MUSC TST DONE W/NERV TST LIM: ICD-10-PCS | Mod: 59,HCNC,S$GLB, | Performed by: PHYSICAL MEDICINE & REHABILITATION

## 2020-07-31 RX ORDER — HYDROCODONE BITARTRATE AND ACETAMINOPHEN 5; 325 MG/1; MG/1
1 TABLET ORAL
Qty: 35 TABLET | Refills: 0 | Status: SHIPPED | OUTPATIENT
Start: 2020-07-31 | End: 2021-01-04 | Stop reason: CLARIF

## 2020-07-31 NOTE — PROCEDURES
Test Date:  2020    Patient: Emerson Menendez : 1945 Physician: Jose L Lambert D.O.   ID#:  SEX: Male Ref. Phys: Rani Alfaro PA-C     HPI: Emerson Menendez is a 75 y.o.male who presents for NCS/EMG to evaluate CTS on the left    NCV & EMG Findings:   Evaluation of the left median motor nerve showed prolonged distal onset latency.   The left median sensory and the right median sensory nerves showed prolonged distal peak latency, reduced amplitude, and decreased conduction velocity.   The bilateral ulnar sensory nerves show borderline amplitudes   All remaining nerves (as indicated in the following tables) were within normal limits.    Impression:  1. There is electrophysiologic evidence of a bilateral (sensory on the right, sensorimotor on the left) median mononeuropathy across the wrist (I.e. Carpal tunnel syndrome).  There is no motor axonal loss.  There is no active denervation.  This is graded as Moderate in severity on the left, and Mild on the Right.     2. There is also evidence of a mild sensory axonal peripheral polyneuropathy, likely due to his known DM.    ___________________________  Jose L Lambert D.O.        NCS+  Motor Nerve Results      Latency Amplitude F-Lat Segment Distance CV Comment   Site (ms) Norm (mV) Norm (ms)  (cm) (m/s) Norm    Left Median (APB)   Wrist *4.8  < 4.7 4.7  > 3.8  Wrist-Palm - - -    Elbow 9.7 - 4.4 -  Elbow-Wrist 26 53  > 47    Right Median (APB)   Wrist 3.8  < 4.7 7.3  > 3.8  Wrist-Palm - - -    Elbow 8.7 - 7.9 -  Elbow-Wrist 24 49  > 47    Left Ulnar (ADM)   Wrist 3.5  < 3.7 5.5  > 3.0         Bel Elbow 7.7 - 4.3 -  Bel Elbow-Wrist 23 55  > 52    Abv Elbow 10.0 - 4.9 -  Abv Elbow-Bel Elbow 14 61  > 43    Right Ulnar (ADM)   Wrist 3.0  < 3.7 4.2  > 3.0         Bel Elbow 7.3 - 4.5 -  Bel Elbow-Wrist 26 60  > 52    Abv Elbow 8.5 - 3.8 -  Abv Elbow-Bel Elbow 12 100  > 43      Sensory Nerve Results      Latency (Peak) Amplitude (P-P) Segment  Distance CV Comment   Site (ms) Norm (µV) Norm  (cm) (m/s) Norm    Left Median (Stim:Wrist)   Dig II *5.2  < 4.0 *5  > 8 Wrist-Dig II 14 *27  > 39    Right Median (Stim:Wrist)   Dig II *4.5  < 4.0 *7  > 8 Wrist-Dig II 14 *31  > 39    Left Ulnar (Stim:Wrist)   Dig V 3.9  < 4.0 4  > 4 Wrist-Dig V 15 38  > 38    Right Ulnar (Stim:Wrist)   Dig V 3.9  < 4.0 5  > 4 Wrist-Dig V 15 38  > 38      EMG+     Side Muscle Nerve Root Ins Act Fibs Psw Amp Dur Poly Recrt Int Pat Comment   Left Biceps Musculocut C5-C6 Nml Nml Nml Nml Nml 0 Nml Nml    Left Triceps Radial C6-C8 Nml Nml Nml Nml Nml 0 Nml Nml    Left Brachiorad Radial C5-C6 Nml Nml Nml Nml Nml 0 Nml Nml    Left FDI Ulnar C8-T1 Nml Nml Nml Nml Nml 0 Nml Nml    Left APB Median C8-T1 Nml Nml Nml Nml Nml 0 Nml Nml    Right APB Median C8-T1 Nml Nml Nml Nml Nml 0 Nml Nml            Waveforms:    Motor              Sensory

## 2020-07-31 NOTE — PROGRESS NOTES
Subjective:    Patient ID: Emerson Menendez is a 75 y.o. male.    Chief Complaint: No chief complaint on file.  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 Test results identify a stable IgA kappa protein with beta globulin band at 1.63g/dL. Kappa free light chain is elevated at 4.40. CBC and calcium are stable. Creatinine with history of stage III CKD is stable at 1.4. Metastatic survey is negative for lytic lesions. MRI of the entire spine demonstrated age related changes but no convincing evidence of myeloma bone disease. Bone marrow biopsy identified about 13% plasma cells by morphology and FISH for myeloma identified a t(11;14) and trisomies 3,7, and 17. The patient is afebrile and appears clinically well. He was seen by his podiatrist and nephrologist since our last visit without any new or acute events.    The patient has not received any therapy for smoldering myeloma including bisphosphonates or steroids. He has been randomized to observation arm of the the clinical study. 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 He has no current or prior history of malignancy.    TODAY  Mr. Menendez returns today for monthly follow-up evaluation. No acute interval events. Carpal tunnel surgery on left arm is scheduled for tomorrow.   Received steroid injection in left shoulder recently that caused blood sugar to increase. Out of test strips for BG meter.  Right knee not in brace today. Intentional weight loss since last visit; at a healthy weight      HPI  Review of Systems   Constitutional: Negative for activity change, appetite change, diaphoresis, fatigue, fever and  unexpected weight change.   HENT: Negative.    Eyes: Negative.    Respiratory: Negative.    Cardiovascular: Negative for leg swelling.   Gastrointestinal: Negative.    Endocrine: Negative.    Genitourinary: Negative.    Musculoskeletal: Negative.    Skin: Negative.    Allergic/Immunologic: Negative.    Neurological:        Grade 0-1 peripheral neuropathy of bilateral feet.    Hematological: Negative for adenopathy. Does not bruise/bleed easily.   Psychiatric/Behavioral: Negative.        Objective:       Vitals:    07/30/20 0756   BP: (!) 161/76   Pulse: 85   Resp: 17   Temp: 97.9 °F (36.6 °C)       Physical Exam  Vitals signs and nursing note reviewed.   Constitutional:       Appearance: He is well-developed.   HENT:      Head: Normocephalic and atraumatic.      Right Ear: External ear normal.      Left Ear: External ear normal.      Nose: Nose normal.   Eyes:      Conjunctiva/sclera: Conjunctivae normal.      Pupils: Pupils are equal, round, and reactive to light.   Neck:      Musculoskeletal: Normal range of motion and neck supple.   Cardiovascular:      Rate and Rhythm: Normal rate and regular rhythm.      Heart sounds: No murmur.   Pulmonary:      Effort: Pulmonary effort is normal. No respiratory distress.      Breath sounds: Normal breath sounds.   Abdominal:      General: Bowel sounds are normal. There is no distension.      Palpations: Abdomen is soft.   Musculoskeletal:         General: No tenderness.   Skin:     General: Skin is warm and dry.      Findings: No rash.      Nails: There is no clubbing.     Neurological:      Mental Status: He is alert and oriented to person, place, and time.      Cranial Nerves: No cranial nerve deficit.   Psychiatric:         Behavior: Behavior normal.         Assessment:       No diagnosis found.    Plan:       The patient has a diagnosis of smoldering myeloma. There is no indication for immediate chemotherapy. We are monitoring renal function closely- baseline creatinine  of -1.5..  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 Total protein remains normal, M protein has been stable.Plan to complete labs when Emerson returns to [place] and he will let us know of this date.  Plan for return in 1month.  Endocrinology and Nephrology to monitor diabetes and renal failure respectively.

## 2020-07-31 NOTE — TELEPHONE ENCOUNTER
Attempted to contact pt. Left voicemail. Informed pt of 0715 arrival time for 08/03/20 surgery at the [facility]. Asked pt to return call to clinic at [phone] to confirm.     ==  Attempted to contact pt's emergency contact, Naima. No answer. Unable to leave voicemail.     ==  Attempted to contact pt. Left voicemail. Informed pt of 0715 arrival time for 08/03/20 surgery at the [facility]. Asked pt to return call to clinic at [phone] to confirm.

## 2020-07-31 NOTE — TELEPHONE ENCOUNTER
Informed patient of arrival time for surgery (0715), location, and scheduled patient for 2 week post op visit. Patient verbalized understanding.

## 2020-07-31 NOTE — PROGRESS NOTES
Assessment /Plan     For exam results, see Encounter Report.    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 --> discussed      Both eyes --> poor adherence --> wanting to try SLT --> discussed response rate & magnitude  Xal q HS    NSC OS --> discussed narrow angling / glaucoma  Observe    PC IOL OD  Quiet  Clear and intact    NIDDM since   No BDR or CSME  Control    HTN ret  Control        Laser Trabeculoplasty  both eyes    Glaucoma Laser Trabeculoplasty Surgery Consent:  Patient with glaucoma and IOP control not at target for the health of the eye.  Discussed with patient options, risks and benefits, expectations of glaucoma laser surgery with questions and answers to facilitate discussion.  The  patient voice good understanding and wishes to proceed with surgery.  The patient will likely benefit from laser surgery and patient  signed consent for Right & Left Eye.      Plan  RTC 6 weeks with IOP, HVF and OCT RNFL  ==> Possible SLT OD then OS  RTC sooner prn with good understanding

## 2020-08-03 ENCOUNTER — ANESTHESIA (OUTPATIENT)
Dept: SURGERY | Facility: HOSPITAL | Age: 75
End: 2020-08-03
Payer: MEDICARE

## 2020-08-03 ENCOUNTER — HOSPITAL ENCOUNTER (OUTPATIENT)
Facility: HOSPITAL | Age: 75
Discharge: HOME | End: 2020-08-03
Attending: ORTHOPAEDIC SURGERY | Admitting: ORTHOPAEDIC SURGERY
Payer: MEDICARE

## 2020-08-03 DIAGNOSIS — G56.02 CARPAL TUNNEL SYNDROME ON LEFT: Primary | ICD-10-CM

## 2020-08-03 LAB
GLUCOSE SERPL-MCNC: 89 MG/DL (ref 70–110)
INTERPRETATION SERPL IFE-IMP: NORMAL
PATHOLOGIST INTERPRETATION IFE: NORMAL
POCT GLUCOSE: 61 MG/DL (ref 70–110)
POCT GLUCOSE: 63 MG/DL (ref 70–110)
POCT GLUCOSE: 89 MG/DL (ref 70–110)

## 2020-08-03 PROCEDURE — 29846 WRIST ARTHROSCOPY/SURGERY: CPT | Mod: LT,,, | Performed by: ORTHOPAEDIC SURGERY

## 2020-08-03 PROCEDURE — 71000015 HC POSTOP RECOV 1ST HR: Performed by: ORTHOPAEDIC SURGERY

## 2020-08-03 PROCEDURE — 36000708 HC OR TIME LEV III 1ST 15 MIN: Performed by: ORTHOPAEDIC SURGERY

## 2020-08-03 PROCEDURE — D9220A PRA ANESTHESIA: Mod: CRNA,,, | Performed by: NURSE ANESTHETIST, CERTIFIED REGISTERED

## 2020-08-03 PROCEDURE — 71000033 HC RECOVERY, INTIAL HOUR: Performed by: ORTHOPAEDIC SURGERY

## 2020-08-03 PROCEDURE — 64721 CARPAL TUNNEL SURGERY: CPT | Mod: 51,LT,, | Performed by: ORTHOPAEDIC SURGERY

## 2020-08-03 PROCEDURE — 25000003 PHARM REV CODE 250: Performed by: ANESTHESIOLOGY

## 2020-08-03 PROCEDURE — 94761 N-INVAS EAR/PLS OXIMETRY MLT: CPT

## 2020-08-03 PROCEDURE — 25000003 PHARM REV CODE 250: Performed by: ORTHOPAEDIC SURGERY

## 2020-08-03 PROCEDURE — D9220A PRA ANESTHESIA: ICD-10-PCS | Mod: ANES,,, | Performed by: ANESTHESIOLOGY

## 2020-08-03 PROCEDURE — 64415 NJX AA&/STRD BRCH PLXS IMG: CPT | Performed by: ANESTHESIOLOGY

## 2020-08-03 PROCEDURE — C1894 INTRO/SHEATH, NON-LASER: HCPCS | Performed by: ORTHOPAEDIC SURGERY

## 2020-08-03 PROCEDURE — 29846 PR WRIST ARTHROSCOP,EXCIS TRIANG CART: ICD-10-PCS | Mod: LT,,, | Performed by: ORTHOPAEDIC SURGERY

## 2020-08-03 PROCEDURE — 63600175 PHARM REV CODE 636 W HCPCS: Performed by: NURSE ANESTHETIST, CERTIFIED REGISTERED

## 2020-08-03 PROCEDURE — 25000003 PHARM REV CODE 250: Performed by: NURSE ANESTHETIST, CERTIFIED REGISTERED

## 2020-08-03 PROCEDURE — 36000709 HC OR TIME LEV III EA ADD 15 MIN: Performed by: ORTHOPAEDIC SURGERY

## 2020-08-03 PROCEDURE — 64415 PR NERVE BLOCK INJ, ANES/STEROID, BRACHIAL PLEXUS, INCL IMAG GUIDANCE: ICD-10-PCS | Mod: 59,LT,, | Performed by: ANESTHESIOLOGY

## 2020-08-03 PROCEDURE — 99900035 HC TECH TIME PER 15 MIN (STAT)

## 2020-08-03 PROCEDURE — 76942 ECHO GUIDE FOR BIOPSY: CPT | Mod: 26,,, | Performed by: ANESTHESIOLOGY

## 2020-08-03 PROCEDURE — 76942 PR U/S GUIDANCE FOR NEEDLE GUIDANCE: ICD-10-PCS | Mod: 26,,, | Performed by: ANESTHESIOLOGY

## 2020-08-03 PROCEDURE — D9220A PRA ANESTHESIA: ICD-10-PCS | Mod: CRNA,,, | Performed by: NURSE ANESTHETIST, CERTIFIED REGISTERED

## 2020-08-03 PROCEDURE — 76942 ECHO GUIDE FOR BIOPSY: CPT | Performed by: ANESTHESIOLOGY

## 2020-08-03 PROCEDURE — 82962 GLUCOSE BLOOD TEST: CPT | Performed by: ORTHOPAEDIC SURGERY

## 2020-08-03 PROCEDURE — 64415 NJX AA&/STRD BRCH PLXS IMG: CPT | Mod: 59,LT,, | Performed by: ANESTHESIOLOGY

## 2020-08-03 PROCEDURE — D9220A PRA ANESTHESIA: Mod: ANES,,, | Performed by: ANESTHESIOLOGY

## 2020-08-03 PROCEDURE — 37000008 HC ANESTHESIA 1ST 15 MINUTES: Performed by: ORTHOPAEDIC SURGERY

## 2020-08-03 PROCEDURE — 63600175 PHARM REV CODE 636 W HCPCS: Performed by: ANESTHESIOLOGY

## 2020-08-03 PROCEDURE — 94770 HC EXHALED C02 TEST: CPT

## 2020-08-03 PROCEDURE — 27201423 OPTIME MED/SURG SUP & DEVICES STERILE SUPPLY: Performed by: ORTHOPAEDIC SURGERY

## 2020-08-03 PROCEDURE — 27200750 HC INSULATED NEEDLE/ STIMUPLEX: Performed by: ANESTHESIOLOGY

## 2020-08-03 PROCEDURE — 64721 PR REVISE MEDIAN N/CARPAL TUNNEL SURG: ICD-10-PCS | Mod: 51,LT,, | Performed by: ORTHOPAEDIC SURGERY

## 2020-08-03 PROCEDURE — 37000009 HC ANESTHESIA EA ADD 15 MINS: Performed by: ORTHOPAEDIC SURGERY

## 2020-08-03 RX ORDER — SODIUM CHLORIDE 0.9 % (FLUSH) 0.9 %
10 SYRINGE (ML) INJECTION
Status: DISCONTINUED | OUTPATIENT
Start: 2020-08-03 | End: 2020-08-03 | Stop reason: HOSPADM

## 2020-08-03 RX ORDER — LIDOCAINE HYDROCHLORIDE 20 MG/ML
INJECTION INTRAVENOUS
Status: DISCONTINUED | OUTPATIENT
Start: 2020-08-03 | End: 2020-08-03

## 2020-08-03 RX ORDER — PROPOFOL 10 MG/ML
VIAL (ML) INTRAVENOUS CONTINUOUS PRN
Status: DISCONTINUED | OUTPATIENT
Start: 2020-08-03 | End: 2020-08-03

## 2020-08-03 RX ORDER — FENTANYL CITRATE 50 UG/ML
25 INJECTION, SOLUTION INTRAMUSCULAR; INTRAVENOUS EVERY 5 MIN PRN
Status: DISCONTINUED | OUTPATIENT
Start: 2020-08-03 | End: 2020-08-03 | Stop reason: HOSPADM

## 2020-08-03 RX ORDER — SODIUM CHLORIDE 9 MG/ML
INJECTION, SOLUTION INTRAVENOUS CONTINUOUS PRN
Status: DISCONTINUED | OUTPATIENT
Start: 2020-08-03 | End: 2020-08-03

## 2020-08-03 RX ORDER — ACETAMINOPHEN 500 MG
1000 TABLET ORAL
Status: COMPLETED | OUTPATIENT
Start: 2020-08-03 | End: 2020-08-03

## 2020-08-03 RX ORDER — CLINDAMYCIN PHOSPHATE 900 MG/50ML
900 INJECTION, SOLUTION INTRAVENOUS
Status: COMPLETED | OUTPATIENT
Start: 2020-08-03 | End: 2020-08-03

## 2020-08-03 RX ORDER — SODIUM CHLORIDE 0.9 % (FLUSH) 0.9 %
5 SYRINGE (ML) INJECTION
Status: DISCONTINUED | OUTPATIENT
Start: 2020-08-03 | End: 2020-08-03 | Stop reason: HOSPADM

## 2020-08-03 RX ORDER — GLYCOPYRROLATE 0.2 MG/ML
INJECTION INTRAMUSCULAR; INTRAVENOUS
Status: DISCONTINUED | OUTPATIENT
Start: 2020-08-03 | End: 2020-08-03

## 2020-08-03 RX ORDER — LIDOCAINE HYDROCHLORIDE 10 MG/ML
1 INJECTION, SOLUTION EPIDURAL; INFILTRATION; INTRACAUDAL; PERINEURAL ONCE
Status: DISCONTINUED | OUTPATIENT
Start: 2020-08-03 | End: 2020-08-03 | Stop reason: HOSPADM

## 2020-08-03 RX ORDER — ONDANSETRON 2 MG/ML
4 INJECTION INTRAMUSCULAR; INTRAVENOUS DAILY PRN
Status: DISCONTINUED | OUTPATIENT
Start: 2020-08-03 | End: 2020-08-03 | Stop reason: HOSPADM

## 2020-08-03 RX ORDER — ONDANSETRON 2 MG/ML
4 INJECTION INTRAMUSCULAR; INTRAVENOUS EVERY 12 HOURS PRN
Status: DISCONTINUED | OUTPATIENT
Start: 2020-08-03 | End: 2020-08-03 | Stop reason: HOSPADM

## 2020-08-03 RX ORDER — IBUPROFEN 200 MG
8 TABLET ORAL
COMMUNITY

## 2020-08-03 RX ORDER — HYDROCODONE BITARTRATE AND ACETAMINOPHEN 5; 325 MG/1; MG/1
1 TABLET ORAL EVERY 4 HOURS PRN
Status: DISCONTINUED | OUTPATIENT
Start: 2020-08-03 | End: 2020-08-03 | Stop reason: HOSPADM

## 2020-08-03 RX ORDER — MIDAZOLAM HYDROCHLORIDE 1 MG/ML
0.5 INJECTION INTRAMUSCULAR; INTRAVENOUS
Status: DISCONTINUED | OUTPATIENT
Start: 2020-08-03 | End: 2020-08-03 | Stop reason: HOSPADM

## 2020-08-03 RX ORDER — HYDROCODONE BITARTRATE AND ACETAMINOPHEN 10; 325 MG/1; MG/1
1 TABLET ORAL EVERY 4 HOURS PRN
Status: DISCONTINUED | OUTPATIENT
Start: 2020-08-03 | End: 2020-08-03 | Stop reason: HOSPADM

## 2020-08-03 RX ORDER — MIDAZOLAM HYDROCHLORIDE 1 MG/ML
2 INJECTION INTRAMUSCULAR; INTRAVENOUS
Status: DISCONTINUED | OUTPATIENT
Start: 2020-08-03 | End: 2020-08-03 | Stop reason: HOSPADM

## 2020-08-03 RX ADMIN — PROPOFOL 50 MCG/KG/MIN: 10 INJECTION, EMULSION INTRAVENOUS at 10:08

## 2020-08-03 RX ADMIN — GLYCOPYRROLATE 0.2 MG: 0.2 INJECTION, SOLUTION INTRAMUSCULAR; INTRAVENOUS at 10:08

## 2020-08-03 RX ADMIN — SODIUM CHLORIDE: 0.9 INJECTION, SOLUTION INTRAVENOUS at 09:08

## 2020-08-03 RX ADMIN — MIDAZOLAM 1 MG: 1 INJECTION INTRAMUSCULAR; INTRAVENOUS at 09:08

## 2020-08-03 RX ADMIN — FENTANYL CITRATE 50 MCG: 50 INJECTION INTRAMUSCULAR; INTRAVENOUS at 09:08

## 2020-08-03 RX ADMIN — ACETAMINOPHEN 1000 MG: 500 TABLET ORAL at 08:08

## 2020-08-03 RX ADMIN — LIDOCAINE HYDROCHLORIDE 50 MG: 20 INJECTION, SOLUTION INTRAVENOUS at 10:08

## 2020-08-03 RX ADMIN — CLINDAMYCIN PHOSPHATE 900 MG: 18 INJECTION, SOLUTION INTRAVENOUS at 10:08

## 2020-08-03 NOTE — ANESTHESIA POSTPROCEDURE EVALUATION
Anesthesia Post Evaluation    Patient: Emerson Menendez    Procedure(s) Performed: Procedure(s) (LRB):  RELEASE, CARPAL TUNNEL LEFT (Left)  ARTHROSCOPY, WRIST LEFT (Left)    Final Anesthesia Type: general    Patient location during evaluation: PACU  Patient participation: Yes- Able to Participate  Level of consciousness: awake and alert and oriented  Post-procedure vital signs: reviewed and stable  Pain management: adequate  Airway patency: patent    PONV status at discharge: No PONV  Anesthetic complications: no      Cardiovascular status: blood pressure returned to baseline and hemodynamically stable  Respiratory status: unassisted, spontaneous ventilation and room air  Hydration status: euvolemic  Follow-up not needed.          Vitals Value Taken Time   /81 08/03/20 1131   Temp 36.3 °C (97.3 °F) 08/03/20 1135   Pulse 77 08/03/20 1137   Resp 16 08/03/20 1137   SpO2 100 % 08/03/20 1137   Vitals shown include unvalidated device data.      Event Time   Out of Recovery 11:36:00         Pain/Caleb Score: Pain Rating Prior to Med Admin: 2 (8/3/2020  9:08 AM)  Pain Rating Post Med Admin: 0 (8/3/2020  9:20 AM)  Caleb Score: 9 (8/3/2020 11:05 AM)

## 2020-08-03 NOTE — PLAN OF CARE
Spoke with pts friend Madisyn, updated on pts status via phone, verbalized understanding. States she will call pt when she is leaving her house.

## 2020-08-03 NOTE — INTERVAL H&P NOTE
The patient has been examined and the H&P has been reviewed:    I concur with the findings and no changes have occurred since H&P was written.    Surgery risks, benefits and alternative options discussed and understood by patient/family.          Active Hospital Problems    Diagnosis  POA    Carpal tunnel syndrome on left [G56.02]  Yes      Resolved Hospital Problems   No resolved problems to display.

## 2020-08-03 NOTE — PLAN OF CARE
VSS. Pt able to tolerate oral liquids. Pt denies c/o pain. Dressing and sling intact. Thigh/knee TEDs intact. Pt received home meds per bedside delivery. Pt instructed not to wear JING hose without wearing shoes/ socks due to increased risk of falls, verbalized understanding.  No distress noted. Pt states he is ready for D/C. D/C instructions reviewed with pt, verbalized understanding. Awaiting call from Madisyn for ride home.

## 2020-08-03 NOTE — DISCHARGE INSTRUCTIONS
AFTER HAND SURGERY    DOS:   Keep affected wrist elevated above the level of your heart   Check circulation frequently in fingers by pressing on the nail bed. Nail bed should turn white and then pink when released.   Exercise fingers to promote circulation.   Advance diet as tolerated   Resume home medications today.      DONT:   No driving for 24 hours or while taking narcotic pain medication   DO NOT TAKE ADDITIONAL TYLENOL/ACETAMINOPHEN WHILE TAKING NARCOTIC PAIN MEDICATION THAT CONTAINS TYLENOL/ACETAMINOPHEN.    CALL PHYSICIAN FOR:   Obvious bleeding   Excessive swelling   Drainage (pus) from the wound   Pain unrelieved by pain medication.

## 2020-08-03 NOTE — TRANSFER OF CARE
"Anesthesia Transfer of Care Note    Patient: Emerson Menendez    Procedure(s) Performed: Procedure(s) (LRB):  RELEASE, CARPAL TUNNEL LEFT (Left)  ARTHROSCOPY, WRIST LEFT (Left)    Patient location: PACU    Anesthesia Type: general    Transport from OR: Transported from OR on 2-3 L/min O2 by NC with adequate spontaneous ventilation    Post pain: adequate analgesia    Post assessment: no apparent anesthetic complications    Post vital signs: stable    Level of consciousness: awake    Nausea/Vomiting: no nausea/vomiting    Complications: none    Transfer of care protocol was followed      Last vitals:   Visit Vitals  /69 (BP Location: Right arm, Patient Position: Lying)   Pulse 73   Temp 36.7 °C (98 °F) (Oral)   Resp 16   Ht 5' 10" (1.778 m)   Wt 81.6 kg (180 lb)   SpO2 100%   BMI 25.83 kg/m²     "

## 2020-08-03 NOTE — BRIEF OP NOTE
[facility]  Brief Operative Note    Surgery Date: 8/3/2020     Surgeon(s) and Role:     * Kindra Castillo MD - Primary    Assisting Surgeon: None    Pre-op Diagnosis:  Carpal tunnel syndrome on left [G56.02]  Severe pain [R52]    Post-op Diagnosis:  Post-Op Diagnosis Codes:     * Carpal tunnel syndrome on left [G56.02]     * Severe pain [R52]    Procedure(s) (LRB):  RELEASE, CARPAL TUNNEL LEFT (Left)  ARTHROSCOPY, WRIST LEFT (Left)    Anesthesia: Regional    Description of the findings of the procedure(s): see op note    Estimated Blood Loss: * No values recorded between 8/3/2020 10:16 AM and 8/3/2020 11:06 AM *         Specimens:   Specimen (12h ago, onward)    None            Discharge Note    OUTCOME: Patient tolerated treatment/procedure well without complication and is now ready for discharge.    DISPOSITION: Home or Self Care    FINAL DIAGNOSIS:  Carpal tunnel syndrome on left    FOLLOWUP: In clinic    DISCHARGE INSTRUCTIONS:    Discharge Procedure Orders   Diet general     Sponge bath only until clinic visit     Keep surgical extremity elevated     Ice to affected area     Lifting restrictions   Order Comments: No lifting with operative extremity     Call MD for:  temperature >100.4     Call MD for:  persistent nausea and vomiting     Call MD for:  severe uncontrolled pain     Call MD for:  difficulty breathing, headache or visual disturbances     Call MD for:  redness, tenderness, or signs of infection (pain, swelling, redness, odor or green/yellow discharge around incision site)     Call MD for:  hives     Call MD for:  persistent dizziness or light-headedness     Call MD for:  extreme fatigue     Leave dressing on - Keep it clean, dry, and intact until clinic visit

## 2020-08-03 NOTE — ANESTHESIA PROCEDURE NOTES
Infraclavicular SS    Patient location during procedure: pre-op   Block not for primary anesthetic.  Reason for block: at surgeon's request and post-op pain management   Post-op Pain Location: Left Wrist  Start time: 8/3/2020 9:08 AM  Timeout: 8/3/2020 9:07 AM   End time: 8/3/2020 9:13 AM    Staffing  Authorizing Provider: Lakhwinder Rubalcava MD  Performing Provider: Lakhwinder Rubalcava MD    Preanesthetic Checklist  Completed: patient identified, site marked, surgical consent, pre-op evaluation, timeout performed, IV checked, risks and benefits discussed and monitors and equipment checked  Peripheral Block  Patient position: sitting  Prep: ChloraPrep  Patient monitoring: heart rate, cardiac monitor, continuous pulse ox, continuous capnometry and frequent blood pressure checks  Block type: infraclavicular  Laterality: left  Injection technique: single shot  Needle  Needle type: Stimuplex   Needle gauge: 21 G  Needle length: 4 in  Needle localization: ultrasound guidance and anatomical landmarks   -ultrasound image captured on disc.  Assessment  Injection assessment: negative aspiration and negative parasthesia  Paresthesia pain: none  Heart rate change: no  Slow fractionated injection: yes  Additional Notes  VSS.  DOSC RN monitoring vitals throughout procedure.  Patient tolerated procedure well.  Dosed with 1.5% Mepivacaine 10 cc and 0.5% Naropin with Epi 1:200,000 + Clonidine 30 mcg + Decadron 1 mg.

## 2020-08-03 NOTE — PLAN OF CARE
Bedside glucose 63 at 0852. Pt states glucose did not feel low. Repeat glucose 61 at 0853. Dr Rubalcava notified. Pt stated starting to feel a little low and requested permission to take his own chewable glucose tablet in his pt belonging bag. Dr Rubalcava said ok for pt to take his own.

## 2020-08-04 VITALS
HEART RATE: 75 BPM | TEMPERATURE: 97 F | SYSTOLIC BLOOD PRESSURE: 167 MMHG | RESPIRATION RATE: 18 BRPM | BODY MASS INDEX: 25.77 KG/M2 | WEIGHT: 180 LBS | DIASTOLIC BLOOD PRESSURE: 81 MMHG | HEIGHT: 70 IN | OXYGEN SATURATION: 100 %

## 2020-08-04 NOTE — OP NOTE
[facility]  Surgery Department  Operative Note    SUMMARY     Date of Procedure: 8/3/2020     Procedure: Procedure(s) (LRB):  RELEASE, CARPAL TUNNEL LEFT (Left)  ARTHROSCOPY, WRIST LEFT (Left)     Surgeon(s) and Role:     * Kindra Castillo MD - Primary    Assisting Surgeon: None    Pre-Operative Diagnosis: Carpal tunnel syndrome on left [G56.02]  Severe pain [R52]    Post-Operative Diagnosis: Post-Op Diagnosis Codes:     * Carpal tunnel syndrome on left [G56.02]     * Severe pain [R52]    Anesthesia: Regional    Technical Procedures Used: surgery    Description of the Findings of the Procedure:   INDICATIONS FOR PROCEDURE: Mr. Menendez is a 75-year-old male who had numbness   and tingling into his left hand. He has failed conservative treatment, EMG   was positive for carpal tunnel syndrome. Therefore, operative intervention was   deemed necessary. Risks and benefits were explained to the patient in clinic   since performed in clinic.   PROCEDURE IN DETAIL: After correct site was marked with the patient's   participation in the holding area, the patient was brought to the Operating   Room, placed in supine position, underwent MAC anesthesia. A well-padded   nonsterile tourniquet was placed on the left arm. A time-out was called for   correct site, procedure and patient to be indicated. Under sterile conditions,   an injection of lidocaine 1% was injected into the carpal tunnel space. The arm   was prepped and draped in normal sterile fashion. The incision was marked out   using Black cardinal lines. The arm was exsanguinated using Esmarch.   Tourniquet was insufflated to 250 mmHg The incision was made down to the palmar fascia. The palmar   fascia was sharply incised. The transverse ligament was identified. It was   very thick in nature. It was sharply incised. Median nerve was identified. A   Mosquito hemostat was passed from the undersurface of the transverse ligament   proximally and  distally to free it from the median nerve. Metzenbaum scissors   were utilized to cut the transverse ligament proximally and distally. Once that   was completely released, a Mosquito hemostat was passed again to confirm a   complete release. Once that was performed, the area was irrigated with copious   amounts of normal saline. Nylon closed the skin.   Patient was then placed in a well-padded wrist arthroscopic traction mehta with 10-15 lb of pressure cross the index and long finger the 3445 portals were marked out 18 gauge needle was introduced in the 3 poor poor oral the wrist was insufflated with 5 cc of normal saline incision was made the arthroscope was introduced immediately he be seen significant amount of frayed tissue and arthritis 4 5 portal was created shaver was introduced loose bodies were removed which showed the repair to in nature he had significant arthritis after chondroplasty was conducted and soft tissue was debrided that was hanging into the joint evaluation the TFCC Brian a very large central tear this was debrided as well the ulna did not appear to impinge though he had a very large TFCC tear after it was debrided loose bodies removed the instruments were removed Monocryl closed the skin  Sterile dressing was applied.   Tourniquet was deflated. Brisk capillary refill ensued.  Patient was placed in a well-padded splint The patient was   transported to the Recovery Room in stable condition.   POSTOPERATIVE PLAN FOR THIS PATIENT:  The patient is to keep the dressing clean and dry until 2 weeks at 2 weeks will be placed in a brace after sutures removed to be initiated therapy of note he does have chronic issues with his wrist we can discussed other surgical options in the future if he fails this conservative treatment    Significant Surgical Tasks Conducted by the Assistant(s), if Applicable: retraction    Complications: No    Estimated Blood Loss (EBL): * No values recorded between 8/3/2020  10:16 AM and 8/3/2020 11:04 AM *           Implants: * No implants in log *    Specimens:   Specimen (12h ago, onward)    None                  Condition: Good    Disposition: PACU - hemodynamically stable.    Attestation: I performed the procedure.    Discharge Note    SUMMARY     Admit Date: 8/3/2020    Discharge Date and Time: 8/3/2020 12:20 PM    Hospital Course (synopsis of major diagnoses, care, treatment, and services provided during the course of the hospital stay): surgery     Final Diagnosis: Post-Op Diagnosis Codes:     * Carpal tunnel syndrome on left [G56.02]     * Severe pain [R52]    Disposition: Home or Self Care    Follow Up/Patient Instructions:     Medications:  Reconciled Home Medications:      Medication List      CONTINUE taking these medications    aspirin 81 MG EC tablet  Commonly known as: ECOTRIN  Take 1 tablet (81 mg total) by mouth once daily.     blood glucose control, normal Soln  Commonly known as: METER-CHECK  One meter.  Use as directed. Meter of insurance choice     * blood sugar diagnostic [facility] - check blood sugars 4 times daily     * ACCU-CHEK STEFANO PLUS TEST STRP Strp  Generic drug: blood sugar diagnostic  Accu check stefano plus TEST FOUR TIMES DAILY. Test strtips of insurance choice to go with meter and lancets     blood-glucose meter Misc  Commonly known as: ACCU-CHEK OMAYRA  USE AS DIRECTED. Accucheck stefano plus     citalopram 20 MG tablet  Commonly known as: CELEXA  Take 1.5 tablets (30 mg total) by mouth once daily.     diazePAM 5 MG tablet  Commonly known as: VALIUM  TAKE 1 TABLET(5 MG) BY MOUTH EVERY 6 HOURS AS NEEDED     gabapentin 100 MG capsule  Commonly known as: NEURONTIN  TAKE 1 CAPSULE EVERY MORNING, 1 CAPSULE IN THE AFTERNOON, AND 1 TO 3 CAPSULE(S) AT BEDTIME AS NEEDED FOR FOOT PAIN     glimepiride 2 MG tablet  Commonly known as: AMARYL  Take 1 tablet (2 mg total) by mouth once daily.     glucagon (human recombinant) 1 mg Kit  Commonly known as:  "GLUCAGON EMERGENCY KIT (HUMAN)  Inject 1 mL (1 mg total) into the muscle as needed.     glucose 4 GM chewable tablet  Take 8 g by mouth as needed for Low blood sugar.     HYDROcodone-acetaminophen 5-325 mg per tablet  Commonly known as: NORCO  Take 1 tablet by mouth every 4 to 6 hours as needed for Pain.     ibuprofen 800 MG tablet  Commonly known as: ADVIL,MOTRIN  Take 1 tablet (800 mg total) by mouth 3 (three) times daily.     insulin aspart U-100 100 unit/mL (3 mL) Inpn pen  Commonly known as: NovoLOG Flexpen U-100 Insulin  Inject 4 Units into the skin 2 (two) times daily with meals.     insulin glargine 100 units/mL (3mL) SubQ pen  Commonly known as: LANTUS SOLOSTAR U-100 INSULIN  Inject 20 Units into the skin every evening.     latanoprost 0.005 % ophthalmic solution  Place 1 drop into both eyes every evening.     losartan 25 MG tablet  Commonly known as: COZAAR  Take 2 tablets (50 mg total) by mouth once daily.     meloxicam 15 MG tablet  Commonly known as: MOBIC  TAKE 1 TABLET(15 MG) BY MOUTH EVERY DAY     omeprazole 40 MG capsule  Commonly known as: PRILOSEC  Take 1 capsule (40 mg total) by mouth once daily.     pen needle, diabetic 31 gauge x 5/16" Ndle  Commonly known as: BD ULTRA-FINE SHORT PEN NEEDLE  USE TO INJECT INSULIN ONE TIME DAILY     pravastatin 40 MG tablet  Commonly known as: PRAVACHOL  Take 1 tablet (40 mg total) by mouth once daily.     sildenafil 20 mg Tab  Commonly known as: REVATIO  2-3 tablets daily as needed for erectile dysfunction     * tadalafiL 5 MG tablet  Commonly known as: CIALIS  TAKE 1 TABLET(5 MG) BY MOUTH DAILY AS NEEDED FOR ERECTILE DYSFUNCTION     * tadalafiL 20 MG Tab  Commonly known as: CIALIS  Take 1 tablet (20 mg total) by mouth daily as needed.     tamsulosin 0.4 mg Cap  Commonly known as: FLOMAX  Take 1 capsule (0.4 mg total) by mouth once daily.     tiZANidine 4 MG tablet  Commonly known as: ZANAFLEX  1/2-1 tablet every 8 hours as needed for muscle spasm     traMADoL " 50 mg tablet  Commonly known as: ULTRAM  Take 1 tablet (50 mg total) by mouth every 8 (eight) hours as needed for Pain (moderate to severe pain).     * TRUEPLUS LANCETS 33 gauge Misc  Generic drug: lancets     * lancets Misc  Use twice daily     VITAMIN D2 50,000 unit Cap  Generic drug: ergocalciferol  Take 1 capsule (50,000 Units total) by mouth every 7 days.         * This list has 6 medication(s) that are the same as other medications prescribed for you. Read the directions carefully, and ask your doctor or other care provider to review them with you.              Discharge Procedure Orders   Diet general     Sponge bath only until clinic visit     Keep surgical extremity elevated     Ice to affected area     Lifting restrictions   Order Comments: No lifting with operative extremity     Call MD for:  temperature >100.4     Call MD for:  persistent nausea and vomiting     Call MD for:  severe uncontrolled pain     Call MD for:  difficulty breathing, headache or visual disturbances     Call MD for:  redness, tenderness, or signs of infection (pain, swelling, redness, odor or green/yellow discharge around incision site)     Call MD for:  hives     Call MD for:  persistent dizziness or light-headedness     Call MD for:  extreme fatigue     Leave dressing on - Keep it clean, dry, and intact until clinic visit

## 2020-08-10 ENCOUNTER — PATIENT OUTREACH (OUTPATIENT)
Dept: ADMINISTRATIVE | Facility: OTHER | Age: 75
End: 2020-08-10

## 2020-08-10 NOTE — PROGRESS NOTES
Chart reviewed.   Immunizations: updared  Orders placed: n/a  Upcoming appts to satisfy [facility] topics: Ophthalmology 8/11

## 2020-08-13 NOTE — PROGRESS NOTES
Assessment /Plan     For exam results, see Encounter Report.    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 --> discussed      Both eyes --> poor adherence --> wanting to try SLT --> discussed response rate & magnitude  Xal q HS    SP SLT OD 2020      NSC OS  Narrow but open  Observe  CE PRN      PC IOL OD  Quiet  Clear and intact    NIDDM since   No BDR or CSME  Control    HTN ret  Control        Laser Trabeculoplasty  both eyes    Glaucoma Laser Trabeculoplasty Surgery Consent:  Patient with glaucoma and IOP control not at target for the health of the eye.  Discussed with patient options, risks and benefits, expectations of glaucoma laser surgery with questions and answers to facilitate discussion.  The  patient voice good understanding and wishes to proceed with surgery.  The patient will likely benefit from laser surgery and patient  signed consent for Right & Left Eye.        Laser Trabeculoplasty  right eye    The correct eye was identified by patient prior to start of the procedure.      Performed with Selective LT Yag    Total Energy:   85.2 mJ  Extent   360 Degrees  Total # of Exposures: 100 Applications    Patient  tolerated procedure well       Emerson understands the information Dr. Wheatley provided at the time of visit.  The patient voices good understanding of these these instructions and agrees with the plan.  Patient will return to clinic sooner as needed for pain, decreasing vision etc.    Possible:  PF 1 % 4x/day for 4 Days in eye with laser procedure PRN - patient to call with good understanding      Plan  RTC 6 weeks with IOP p SLT OD and Possible SLT OS  RTC sooner prn with good understanding

## 2020-08-13 NOTE — TELEPHONE ENCOUNTER
SPOKE WITH PATIENT, SCHEDULED HIS APPOINTMENT.   Speech Therapy    Patient not seen     Unavailable due to medical tests/procedures.  Treatment on hold due to medical condition.      Re-attempt plan: per established plan of care    Additional details:  Mr. Garcia is currently NPO for GI procedure planned for today. Should he undergo general anesthesia, new speech therapy orders are needed. This service will plan to check status tomorrow. Writer is available via page or secure chat should he become available later today. Thank you!      OBJECTIVE

## 2020-08-17 ENCOUNTER — OFFICE VISIT (OUTPATIENT)
Dept: ORTHOPEDICS | Facility: CLINIC | Age: 75
End: 2020-08-17
Payer: MEDICARE

## 2020-08-17 VITALS
WEIGHT: 180 LBS | HEIGHT: 70 IN | DIASTOLIC BLOOD PRESSURE: 84 MMHG | HEART RATE: 91 BPM | SYSTOLIC BLOOD PRESSURE: 164 MMHG | BODY MASS INDEX: 25.77 KG/M2

## 2020-08-17 DIAGNOSIS — S69.82XA INJURY OF TRIANGULAR FIBROCARTILAGE COMPLEX (TFCC) OF LEFT WRIST, INITIAL ENCOUNTER: Primary | ICD-10-CM

## 2020-08-17 DIAGNOSIS — Z47.89 ORTHOPEDIC AFTERCARE: ICD-10-CM

## 2020-08-17 DIAGNOSIS — G56.02 CARPAL TUNNEL SYNDROME ON LEFT: ICD-10-CM

## 2020-08-17 PROCEDURE — 99999 PR PBB SHADOW E&M-EST. PATIENT-LVL V: CPT | Mod: PBBFAC,HCNC,, | Performed by: PHYSICIAN ASSISTANT

## 2020-08-17 PROCEDURE — 97760 ORTHOTIC MGMT&TRAING 1ST ENC: CPT | Mod: HCNC,GP,S$GLB, | Performed by: PHYSICIAN ASSISTANT

## 2020-08-17 PROCEDURE — 99024 PR POST-OP FOLLOW-UP VISIT: ICD-10-PCS | Mod: HCNC,S$GLB,, | Performed by: PHYSICIAN ASSISTANT

## 2020-08-17 PROCEDURE — 99999 PR PBB SHADOW E&M-EST. PATIENT-LVL V: ICD-10-PCS | Mod: PBBFAC,HCNC,, | Performed by: PHYSICIAN ASSISTANT

## 2020-08-17 PROCEDURE — 97760 PR ORTHOTIC MGMT&TRAINJ INITIAL ENC EA 15 MINS: ICD-10-PCS | Mod: HCNC,GP,S$GLB, | Performed by: PHYSICIAN ASSISTANT

## 2020-08-17 PROCEDURE — 99024 POSTOP FOLLOW-UP VISIT: CPT | Mod: HCNC,S$GLB,, | Performed by: PHYSICIAN ASSISTANT

## 2020-08-17 NOTE — PROGRESS NOTES
"Mr. Menendez is here today for a post-operative visit.  He is 14 days status post left carpal tunnel release and left wrist arthroscopy with chondroplasty and debridement as well as loose body removal by Dr. Castillo on 8/3/2020. He reports that he is doing well.  Pain is mild, 4/10.  He is taking pain medication as needed, reports rarely taking 1/2 tab of the pain medication.  Reports improvement in his finger numbness and wrist pain.  He denies fever, chills, and sweats since the time of the surgery.     Physical exam:    Vitals:    08/17/20 0846   BP: (!) 164/84   Pulse: 91   Weight: 81.6 kg (180 lb)   Height: 5' 10" (1.778 m)   PainSc:   4     Vital signs are stable, patient is afebrile.  Patient is well dressed and well groomed, no acute distress.  Alert and oriented to person, place, and time.  Post op dressing taken down.  Incision is clean, dry, and intact; mild splitting of the palmar incision superficially after suture removal.  Deep layers of incision intact. Steri-Strips applied.  There is no erythema or exudate.  There is no sign of any infection. He is NVI. Sutures removed without difficulty.      Assessment: 14 days status post left carpal tunnel release and left wrist arthroscopy with chondroplasty and debridement as well as loose body removal    Plan:  Emerson was seen today for post-op evaluation.    Diagnoses and all orders for this visit:    Injury of triangular fibrocartilage complex (TFCC) of left wrist, initial encounter  -     Ambulatory referral/consult to Physical/Occupational Therapy    Carpal tunnel syndrome on left  -     Ambulatory referral/consult to Physical/Occupational Therapy    Orthopedic aftercare        - PO instruction reviewed and provided to patient  - Forearm brace provided (15 minutes spent preparing, fitting, and educating on brace).  - Orders for OT (external)  - Follow up in 4 weeks  - No lifting or weight bearing  - Discussed scar massage  - Call with questions or " concerns

## 2020-08-24 DIAGNOSIS — D47.2 SMOLDERING MULTIPLE MYELOMA: ICD-10-CM

## 2020-08-24 RX ORDER — DIAZEPAM 5 MG/1
TABLET ORAL
Qty: 60 TABLET | Refills: 3 | Status: SHIPPED | OUTPATIENT
Start: 2020-08-24 | End: 2021-03-11

## 2020-08-24 NOTE — PROGRESS NOTES
Refill Routing Note   Medication(s) are not appropriate for processing by [facility]:       - Outside of protocol           Medication reconciliation completed: No      Automatic Epic Generated Protocol Data:        Requested Prescriptions   Pending Prescriptions Disp Refills    diazePAM (VALIUM) 5 MG tablet [Pharmacy Med Name: DIAZEPAM 5MG TABLETS] 60 tablet      Sig: TAKE 1 TABLET(5 MG) BY MOUTH EVERY 6 HOURS AS NEEDED       Anticonvulsants Protocol Passed - 8/24/2020  9:30 AM        Passed - Visit with Authorizing provider in past 9 months or upcoming 90 days              Appointments  past 12m or future 3m with PCP    Date Provider   Last Visit   4/22/2020 Roberta Sagastume MD   Next Visit   Visit date not found Roberta Sagastume MD   ED visits in past 90 days: 0     Note composed:9:52 AM 08/24/2020

## 2020-08-25 ENCOUNTER — CLINICAL SUPPORT (OUTPATIENT)
Dept: OPHTHALMOLOGY | Facility: CLINIC | Age: 75
End: 2020-08-25
Payer: MEDICARE

## 2020-08-25 ENCOUNTER — OFFICE VISIT (OUTPATIENT)
Dept: OPHTHALMOLOGY | Facility: CLINIC | Age: 75
End: 2020-08-25
Payer: MEDICARE

## 2020-08-25 DIAGNOSIS — Z98.41 STATUS POST CATARACT EXTRACTION AND INSERTION OF INTRAOCULAR LENS OF RIGHT EYE: ICD-10-CM

## 2020-08-25 DIAGNOSIS — Z96.1 STATUS POST CATARACT EXTRACTION AND INSERTION OF INTRAOCULAR LENS OF RIGHT EYE: ICD-10-CM

## 2020-08-25 DIAGNOSIS — H25.12 NUCLEAR SCLEROSIS OF LEFT EYE: ICD-10-CM

## 2020-08-25 DIAGNOSIS — H40.1132 PRIMARY OPEN ANGLE GLAUCOMA OF BOTH EYES, MODERATE STAGE: Primary | ICD-10-CM

## 2020-08-25 PROCEDURE — 92083 EXTENDED VISUAL FIELD XM: CPT | Mod: HCNC,S$GLB,, | Performed by: OPHTHALMOLOGY

## 2020-08-25 PROCEDURE — 99999 PR PBB SHADOW E&M-EST. PATIENT-LVL IV: CPT | Mod: PBBFAC,HCNC,, | Performed by: OPHTHALMOLOGY

## 2020-08-25 PROCEDURE — 99499 RISK ADDL DX/OHS AUDIT: ICD-10-PCS | Mod: HCNC,S$GLB,, | Performed by: OPHTHALMOLOGY

## 2020-08-25 PROCEDURE — 99999 PR PBB SHADOW E&M-EST. PATIENT-LVL IV: ICD-10-PCS | Mod: PBBFAC,HCNC,, | Performed by: OPHTHALMOLOGY

## 2020-08-25 PROCEDURE — 65855 TRABECULOPLASTY LASER SURG: CPT | Mod: HCNC,RT,S$GLB, | Performed by: OPHTHALMOLOGY

## 2020-08-25 PROCEDURE — 99499 UNLISTED E&M SERVICE: CPT | Mod: HCNC,S$GLB,, | Performed by: OPHTHALMOLOGY

## 2020-08-25 PROCEDURE — 92133 POSTERIOR SEGMENT OCT OPTIC NERVE(OCULAR COHERENCE TOMOGRAPHY) - OU - BOTH EYES: ICD-10-PCS | Mod: HCNC,S$GLB,, | Performed by: OPHTHALMOLOGY

## 2020-08-25 PROCEDURE — 92083 HUMPHREY VISUAL FIELD - OU - BOTH EYES: ICD-10-PCS | Mod: HCNC,S$GLB,, | Performed by: OPHTHALMOLOGY

## 2020-08-25 PROCEDURE — 92012 PR EYE EXAM, EST PATIENT,INTERMED: ICD-10-PCS | Mod: 25,HCNC,S$GLB, | Performed by: OPHTHALMOLOGY

## 2020-08-25 PROCEDURE — 65855 ARGON TRABECULOPLASTY - OD - RIGHT EYE: ICD-10-PCS | Mod: HCNC,RT,S$GLB, | Performed by: OPHTHALMOLOGY

## 2020-08-25 PROCEDURE — 92012 INTRM OPH EXAM EST PATIENT: CPT | Mod: 25,HCNC,S$GLB, | Performed by: OPHTHALMOLOGY

## 2020-08-25 PROCEDURE — 92133 CPTRZD OPH DX IMG PST SGM ON: CPT | Mod: HCNC,S$GLB,, | Performed by: OPHTHALMOLOGY

## 2020-08-27 ENCOUNTER — OFFICE VISIT (OUTPATIENT)
Dept: HEMATOLOGY/ONCOLOGY | Facility: CLINIC | Age: 75
End: 2020-08-27
Payer: MEDICARE

## 2020-08-27 ENCOUNTER — PATIENT MESSAGE (OUTPATIENT)
Dept: INTERNAL MEDICINE | Facility: CLINIC | Age: 75
End: 2020-08-27

## 2020-08-27 ENCOUNTER — LAB VISIT (OUTPATIENT)
Dept: LAB | Facility: HOSPITAL | Age: 75
End: 2020-08-27
Payer: MEDICARE

## 2020-08-27 ENCOUNTER — RESEARCH ENCOUNTER (OUTPATIENT)
Dept: HEMATOLOGY/ONCOLOGY | Facility: CLINIC | Age: 75
End: 2020-08-27

## 2020-08-27 VITALS
OXYGEN SATURATION: 96 % | HEART RATE: 84 BPM | SYSTOLIC BLOOD PRESSURE: 163 MMHG | DIASTOLIC BLOOD PRESSURE: 83 MMHG | WEIGHT: 190.19 LBS | BODY MASS INDEX: 27.23 KG/M2 | RESPIRATION RATE: 16 BRPM | TEMPERATURE: 98 F | HEIGHT: 70 IN

## 2020-08-27 DIAGNOSIS — D47.2 SMOLDERING MULTIPLE MYELOMA: Primary | ICD-10-CM

## 2020-08-27 DIAGNOSIS — D47.2 SMOLDERING MULTIPLE MYELOMA: ICD-10-CM

## 2020-08-27 DIAGNOSIS — Z79.4 TYPE 2 DIABETES MELLITUS WITH DIABETIC POLYNEUROPATHY, WITH LONG-TERM CURRENT USE OF INSULIN: ICD-10-CM

## 2020-08-27 DIAGNOSIS — N18.30 CKD STAGE 3 DUE TO TYPE 2 DIABETES MELLITUS: ICD-10-CM

## 2020-08-27 DIAGNOSIS — E11.42 TYPE 2 DIABETES MELLITUS WITH DIABETIC POLYNEUROPATHY, WITH LONG-TERM CURRENT USE OF INSULIN: ICD-10-CM

## 2020-08-27 DIAGNOSIS — Z00.6 RESEARCH EXAM: ICD-10-CM

## 2020-08-27 DIAGNOSIS — E11.22 CKD STAGE 3 DUE TO TYPE 2 DIABETES MELLITUS: ICD-10-CM

## 2020-08-27 LAB
ALBUMIN SERPL BCP-MCNC: 3.5 G/DL (ref 3.5–5.2)
ALP SERPL-CCNC: 50 U/L (ref 55–135)
ALT SERPL W/O P-5'-P-CCNC: 17 U/L (ref 10–44)
ANION GAP SERPL CALC-SCNC: 10 MMOL/L (ref 8–16)
AST SERPL-CCNC: 14 U/L (ref 10–40)
BASOPHILS # BLD AUTO: 0.04 K/UL (ref 0–0.2)
BASOPHILS NFR BLD: 0.8 % (ref 0–1.9)
BILIRUB SERPL-MCNC: 0.3 MG/DL (ref 0.1–1)
BUN SERPL-MCNC: 15 MG/DL (ref 8–23)
CALCIUM SERPL-MCNC: 9.6 MG/DL (ref 8.7–10.5)
CHLORIDE SERPL-SCNC: 101 MMOL/L (ref 95–110)
CO2 SERPL-SCNC: 24 MMOL/L (ref 23–29)
CREAT SERPL-MCNC: 1.5 MG/DL (ref 0.5–1.4)
DIFFERENTIAL METHOD: ABNORMAL
EOSINOPHIL # BLD AUTO: 0.2 K/UL (ref 0–0.5)
EOSINOPHIL NFR BLD: 3.1 % (ref 0–8)
ERYTHROCYTE [DISTWIDTH] IN BLOOD BY AUTOMATED COUNT: 14.6 % (ref 11.5–14.5)
EST. GFR  (AFRICAN AMERICAN): 52.3 ML/MIN/1.73 M^2
EST. GFR  (NON AFRICAN AMERICAN): 45.2 ML/MIN/1.73 M^2
GLUCOSE SERPL-MCNC: 193 MG/DL (ref 70–110)
HCT VFR BLD AUTO: 37.1 % (ref 40–54)
HGB BLD-MCNC: 11.8 G/DL (ref 14–18)
IGA SERPL-MCNC: 1402 MG/DL (ref 40–350)
IGG SERPL-MCNC: 812 MG/DL (ref 650–1600)
IGM SERPL-MCNC: 41 MG/DL (ref 50–300)
IMM GRANULOCYTES # BLD AUTO: 0.02 K/UL (ref 0–0.04)
IMM GRANULOCYTES NFR BLD AUTO: 0.4 % (ref 0–0.5)
LDH SERPL L TO P-CCNC: 145 U/L (ref 110–260)
LYMPHOCYTES # BLD AUTO: 1.6 K/UL (ref 1–4.8)
LYMPHOCYTES NFR BLD: 32.6 % (ref 18–48)
MAGNESIUM SERPL-MCNC: 1.5 MG/DL (ref 1.6–2.6)
MCH RBC QN AUTO: 30 PG (ref 27–31)
MCHC RBC AUTO-ENTMCNC: 31.8 G/DL (ref 32–36)
MCV RBC AUTO: 94 FL (ref 82–98)
MONOCYTES # BLD AUTO: 0.3 K/UL (ref 0.3–1)
MONOCYTES NFR BLD: 6.3 % (ref 4–15)
NEUTROPHILS # BLD AUTO: 2.8 K/UL (ref 1.8–7.7)
NEUTROPHILS NFR BLD: 56.8 % (ref 38–73)
NRBC BLD-RTO: 0 /100 WBC
PHOSPHATE SERPL-MCNC: 3.2 MG/DL (ref 2.7–4.5)
PLATELET # BLD AUTO: 252 K/UL (ref 150–350)
PMV BLD AUTO: 8.6 FL (ref 9.2–12.9)
POTASSIUM SERPL-SCNC: 4.6 MMOL/L (ref 3.5–5.1)
PROT SERPL-MCNC: 8.4 G/DL (ref 6–8.4)
RBC # BLD AUTO: 3.93 M/UL (ref 4.6–6.2)
SODIUM SERPL-SCNC: 135 MMOL/L (ref 136–145)
WBC # BLD AUTO: 4.91 K/UL (ref 3.9–12.7)

## 2020-08-27 PROCEDURE — 85025 COMPLETE CBC W/AUTO DIFF WBC: CPT | Mod: HCNC

## 2020-08-27 PROCEDURE — 99999 PR PBB SHADOW E&M-EST. PATIENT-LVL V: CPT | Mod: PBBFAC,HCNC,, | Performed by: INTERNAL MEDICINE

## 2020-08-27 PROCEDURE — 86334 IMMUNOFIX E-PHORESIS SERUM: CPT | Mod: HCNC

## 2020-08-27 PROCEDURE — 86334 IMMUNOFIX E-PHORESIS SERUM: CPT | Mod: 26,Q1,HCNC, | Performed by: PATHOLOGY

## 2020-08-27 PROCEDURE — 83615 LACTATE (LD) (LDH) ENZYME: CPT | Mod: HCNC

## 2020-08-27 PROCEDURE — 84165 PROTEIN E-PHORESIS SERUM: CPT | Mod: HCNC,Q1

## 2020-08-27 PROCEDURE — 84100 ASSAY OF PHOSPHORUS: CPT | Mod: HCNC

## 2020-08-27 PROCEDURE — 99999 PR PBB SHADOW E&M-EST. PATIENT-LVL V: ICD-10-PCS | Mod: PBBFAC,HCNC,, | Performed by: INTERNAL MEDICINE

## 2020-08-27 PROCEDURE — 80053 COMPREHEN METABOLIC PANEL: CPT | Mod: HCNC,Q1

## 2020-08-27 PROCEDURE — 99215 PR OFFICE/OUTPT VISIT, EST, LEVL V, 40-54 MIN: ICD-10-PCS | Mod: Q1,HCNC,S$GLB, | Performed by: INTERNAL MEDICINE

## 2020-08-27 PROCEDURE — 82784 ASSAY IGA/IGD/IGG/IGM EACH: CPT | Mod: 59,HCNC

## 2020-08-27 PROCEDURE — 99215 OFFICE O/P EST HI 40 MIN: CPT | Mod: Q1,HCNC,S$GLB, | Performed by: INTERNAL MEDICINE

## 2020-08-27 PROCEDURE — 84165 PROTEIN E-PHORESIS SERUM: CPT | Mod: 26,Q1,HCNC, | Performed by: PATHOLOGY

## 2020-08-27 PROCEDURE — 83520 IMMUNOASSAY QUANT NOS NONAB: CPT | Mod: 59,HCNC

## 2020-08-27 PROCEDURE — 83735 ASSAY OF MAGNESIUM: CPT | Mod: HCNC,Q1

## 2020-08-27 PROCEDURE — 84165 PATHOLOGIST INTERPRETATION SPE: ICD-10-PCS | Mod: 26,Q1,HCNC, | Performed by: PATHOLOGY

## 2020-08-27 PROCEDURE — 36415 COLL VENOUS BLD VENIPUNCTURE: CPT | Mod: HCNC

## 2020-08-27 PROCEDURE — 86334 PATHOLOGIST INTERPRETATION IFE: ICD-10-PCS | Mod: 26,Q1,HCNC, | Performed by: PATHOLOGY

## 2020-08-27 NOTE — PROGRESS NOTES
"  Thursday, August 27, 2020    Protocol: D6U40--H Randomized Phase III Tr ial of Lenalidomide vs Observation Alone in Patients with 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.   Sponsor:  [facility]  IRB# [id number]  Study ID: [id number]  Investigator: GIL Engel Pt Initials: LUCÍA LORENZ       Cycle 57 Day 28 Arm B: Observation    Patient presents to clinic today unaccompanied. He is alert, oriented to person, place, and time; mood and affect appropriate. He states willingness to continue study participation. He reports he has had surgical intervention for his left wrist and states no complaints with regard to recovery. He also states he has had "cataract" surgery on one eye and has other eye scheduled in upcoming week. He reports he received a new [employer] glucometer about 3 weeks ago prior to his orthopedic surgery and has adequate supply of strips. He states he has been checking his glucose and "doing ok with that." He presents with brace to left knee and states no change to this; states "I really need to schedule the surgery to get that replaced." He reports no issues with ambulating, no falls, no change in intensity to pain.   He reports he continues to follow CDC guidelines regarding COVID 19, no known exposure and no symptoms noted. 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 Overall he reports to Dr Engel that "All is well, I am doing good right now, I can't complain."    Review of Baseline AE's:   1.Hypertension, Grade 2 systolic and diastolic: BP today is 163/83. Subject denies headache/dizziness; no symptoms noted; states compliance with medications and reports tolerating without issues. AE ongoing  2. Lower Back Pain and Neck Pain, grade 1: Patient has no complaints today/ over last month.  As previously stated, patient is not suspected to have bony myeloma involvement per Dr. Engel as these are pre-existing issues present at baseline.  AE ongoing.  3. Pain in " "extremity (knee/wrist), grade 1. As per above interval history.  Subject endorses continued stiffness and pain, no increase. States orthopedist continues to recommend he replace his left knee. He is able to walk on it today without issue and has left brace on and intact for stabilization.      4. Peripheral sensory neuropathy, grade 1: Patient does not verbalize complaints today. Has gabapentin prescribed and takes as needed.  States has not taken recently.  AE ongoing.   5. Anemia, Grade 1: Hgb/Hct - stable at 11.8/37.1. Result reviewed by Dr. Engel. AE ongoing            Review of AE's:     *Please note this list is not all-inclusive, please see AE log for physician reviewed list of adverse events.   1. Creatinine increased, grade 2: Serum creatinine decreased to 1.5 mg/dl today. Calculated GFR is 53 ml/min per cockcroft-gualt formula. Per Epic result GFR 52.3; NCS today per Dr Engel as previously stated, Dr. Engel states this has not been related to myeloma and is related chronic kidney issues likely secondary to diabetes and hypertension. Will continue observation monthly and to monitor renal function closely. Per MD, encouraged to increase water intake. AE stable from baseline  2. Hyponatremia, Grade 1: Serum sodium today is 135 mmol/L; AE ongoing. Will monitor  3. Hyperglycemia, Grade 1: ongoing with subject as is type II diabetic. See interval note above. Serum glucose today 193 and subject states labs were fasting this morning. Subject states he communicates regularly with diabetes care team and has digital submission of serum glucoses. States diet improved but "will work on getting better about that."     Per study chair, "the definition of progressive disease for this protocol is developing CRAB criteria that requires treatment systemically." Per Dr Engel, based on today's exam and lab results thus far, patient does not have any s/s of CRAB: Normal Calcium level (9.5), absence of Renal insufficiency (serum " "creatinine 1.9, and GFR 42.12 per Cockroft-Gault) --patient with a history of kidney dysfunction secondary to diabetes; Dr. Engel aware of these values and not concerned for myeloma involvement), absence of significant Anemia (hemoglobin 11.0, stable; will await myeloma labs for clarity relationship to myeloma) and absence of lytic Bone lesions (per baseline metastatic survey as well as MRI--see MD note for further comment).     See MD note for ECOG score and H&P and flowsheets for laboratory work, vitals, etc. All myeloma-related blood work from last cycle including SPEP and JAGUAR have remained stable, and are currently pending for this cycle. Per Dr. Engel, patient shows no evidence of progression at last result. Patient informed that Dr. Engel will release all lab results to [facility]. He states understanding of this. QOL's not required at this timepoint.           Instructed subject to contact research staff here at [facility] with any questions/concerns. He states he plans to continue to come to [facility] "off and on" and may take on work in [place] in near future.  Will continue to schedule patient as far out as possible, which seems to help maintain compliance with visits. Patient states having research RN's contact information and has MD contact information to call with any concerns, questions, or worsening of symptoms.  Discussed next month's appt date and time, he verbalized understanding.          "

## 2020-08-27 NOTE — PROGRESS NOTES
Subjective:    Patient ID: Emerson Menendez is a 75 y.o. male.    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 Test results identify a stable IgA kappa protein with beta globulin band at 1.63g/dL. Kappa free light chain is elevated at 4.40. CBC and calcium are stable. Creatinine with history of stage III CKD is stable at 1.4. Metastatic survey is negative for lytic lesions. MRI of the entire spine demonstrated age related changes but no convincing evidence of myeloma bone disease. Bone marrow biopsy identified about 13% plasma cells by morphology and FISH for myeloma identified a t(11;14) and trisomies 3,7, and 17. The patient is afebrile and appears clinically well. He was seen by his podiatrist and nephrologist since our last visit without any new or acute events.    The patient has not received any therapy for smoldering myeloma including bisphosphonates or steroids. He has been randomized to observation arm of the the clinical study. 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 He has no current or prior history of malignancy.    TODAY  Mr. Menendez returns today for monthly follow-up evaluation. No acute interval events. Carpal tunnel surgery on left arm completed and incision is healing well.  Right knee not in brace today. Intentional weight loss since last visit; at a healthy weight. Decreased exercise now due to limitation after wrist surgery.  New health monitor acquired.  CBC, CMP remains stable. Myeloma labs pending.      Follow-up  Pertinent negatives include no diaphoresis, fatigue or fever.     Review of Systems   Constitutional:  Negative for activity change, appetite change, diaphoresis, fatigue, fever and unexpected weight change.   HENT: Negative.    Eyes: Negative.    Respiratory: Negative.    Cardiovascular: Negative for leg swelling.   Gastrointestinal: Negative.    Endocrine: Negative.    Genitourinary: Negative.    Musculoskeletal: Negative.    Skin: Negative.    Allergic/Immunologic: Negative.    Neurological:        Grade 0-1 peripheral neuropathy of bilateral feet.    Hematological: Negative for adenopathy. Does not bruise/bleed easily.   Psychiatric/Behavioral: Negative.        Objective:       Vitals:    08/27/20 0847   BP: (!) 163/83   Pulse: 84   Resp: 16   Temp: 98.2 °F (36.8 °C)       Physical Exam  Vitals signs and nursing note reviewed.   Constitutional:       Appearance: He is well-developed.   HENT:      Head: Normocephalic and atraumatic.      Right Ear: External ear normal.      Left Ear: External ear normal.      Nose: Nose normal.   Eyes:      Conjunctiva/sclera: Conjunctivae normal.      Pupils: Pupils are equal, round, and reactive to light.   Neck:      Musculoskeletal: Normal range of motion and neck supple.   Cardiovascular:      Rate and Rhythm: Normal rate and regular rhythm.      Heart sounds: No murmur.   Pulmonary:      Effort: Pulmonary effort is normal. No respiratory distress.      Breath sounds: Normal breath sounds.   Abdominal:      General: Bowel sounds are normal. There is no distension.      Palpations: Abdomen is soft.   Musculoskeletal:         General: No tenderness.   Skin:     General: Skin is warm and dry.      Findings: No rash.      Nails: There is no clubbing.     Neurological:      Mental Status: He is alert and oriented to person, place, and time.      Cranial Nerves: No cranial nerve deficit.   Psychiatric:         Behavior: Behavior normal.         Assessment:       No diagnosis found.    Plan:       The patient has a diagnosis of smoldering myeloma. There is no indication for  immediate chemotherapy. We are monitoring renal function closely- baseline creatinine of -1.5..  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 Total protein remains normal, M protein has been stable.Plan to complete labs when Emerson returns to [place] and he will let us know of this date.  Plan for return in 1month.  Endocrinology and Nephrology to monitor diabetes and renal failure respectively.

## 2020-08-28 ENCOUNTER — TELEPHONE (OUTPATIENT)
Dept: INTERNAL MEDICINE | Facility: CLINIC | Age: 75
End: 2020-08-28

## 2020-08-28 DIAGNOSIS — E13.9 DIABETES MELLITUS DUE TO ABNORMAL INSULIN: ICD-10-CM

## 2020-08-28 LAB
ALBUMIN SERPL ELPH-MCNC: 4.08 G/DL (ref 3.35–5.55)
ALPHA1 GLOB SERPL ELPH-MCNC: 0.34 G/DL (ref 0.17–0.41)
ALPHA2 GLOB SERPL ELPH-MCNC: 0.85 G/DL (ref 0.43–0.99)
B-GLOBULIN SERPL ELPH-MCNC: 1.02 G/DL (ref 0.5–1.1)
GAMMA GLOB SERPL ELPH-MCNC: 1.72 G/DL (ref 0.67–1.58)
INTERPRETATION SERPL IFE-IMP: NORMAL
KAPPA LC SER QL IA: 5.57 MG/DL (ref 0.33–1.94)
KAPPA LC/LAMBDA SER IA: 4.01 (ref 0.26–1.65)
LAMBDA LC SER QL IA: 1.39 MG/DL (ref 0.57–2.63)
PATHOLOGIST INTERPRETATION IFE: NORMAL
PATHOLOGIST INTERPRETATION SPE: NORMAL
PROT SERPL-MCNC: 8 G/DL (ref 6–8.4)

## 2020-08-28 RX ORDER — BLOOD SUGAR DIAGNOSTIC
STRIP MISCELLANEOUS
Qty: 400 STRIP | Refills: 3 | Status: SHIPPED | OUTPATIENT
Start: 2020-08-28 | End: 2020-10-21 | Stop reason: SDUPTHER

## 2020-08-28 NOTE — PROGRESS NOTES
Digital Medicine: Clinician Follow-Up    Called patient to follow up regarding medication change and address his glucometer concerns. He reports that on 08/27/2020 his [employer] glucometer (344 mg/dL and 504 mg/dL) yielded higher readings than his Accu-Check glucometer which was 160 mg/dL at that time. He reports the readings on his [employer] glucometer are false readings and he would like to continue to use his Accu-Check glucometer and manually enter in the readings.     His before breakfast readings are as follows - 176 mg/dL, 109 mg/dL, 222 mg/dL, 243 mg/dL, 107 mg/dL.   He states that he is not taking Novolog 4 units with meals out of fear that it will drop his blood sugar too low. He reports that he has been snacking on sugar free cookies, candy, pudding and bananas in the middle of the night when he wakes up to use the bathroom around 1 AM and 3 AM. He reports that he usually wakes up 2 times per night to use the bathroom.     The history is provided by the patient.      Review of patient's allergies indicates:   -- Penicillins     --  Knees locked up  Follow-up reason(s): medication change follow-up and routine follow up.   Patient is not experiencing signs/symptoms of hypoglycemia.  Patient is not experiencing signs/symptoms of hyperglycemia.    Patient did not make medication change.    Is patient tolerating med change? no            Last 6 Patient Entered Readings                                          Most Recent A1c: 6.9% on 6/18/2020  (Goal: 7%)     Recent Readings 8/27/2020 8/27/2020 8/27/2020 8/26/2020 8/26/2020    Blood Glucose (mg/dL) 504 344 210 179 195             Depression Screening  Did not address depression screening.    Sleep Apnea Screening    Did not address sleep apnea screening.     Medication Affordability Screening  Did not address medication affordability screening.     Medication Adherence-Medication adherence was assessed.          ASSESSMENT(S)  Patient's A1C goal is less than or  equal to 7 per 2020 ADA guidelines. Patient's most recent A1C result is above goal. Lab Results    Component                Value               Date                     HGBA1C                   6.9 (H)             06/18/2020          .        T2DM is appropriately managed with long acting insulin and sulfonylurea.      Diabetes Plan  Prescribed SMBG testing frequency.  Additional monitoring needed. Reviewed method to manually input SMBG readings to ensure proper management of therapy.  Continue current therapy.  Continue current diet/physical activity routine.  Await md intervention. Informed patient's PCP that he would like a refill on Accu-Check test strips.  Provided patient education.  Informed patient that symptoms of frequent urination may be a sign of high blood sugar. Also informed patient to refrain from eating snacks in the middle of the night by having a post dinner healthy snack. Educated patient on the use of his medications and encouraged him to remain adherent to Lantus and glimepiride. Reviewed SMBG goals with patient.      Addressed any questions or concerns and patient has my contact information if needed prior to next outreach. Patient verbalizes understanding.      Explained the importance of self-monitoring and medication adherence. Encouraged the patient to communicate with their health  for lifestyle modifications to help improve or maintain a healthy lifestyle.              There are no preventive care reminders to display for this patient.    Diabetes Medications             glimepiride (AMARYL) 2 MG tablet Take 1 tablet (2 mg total) by mouth once daily.    glucagon (human recombinant) inj 1mg/mL kit Inject 1 mL (1 mg total) into the muscle as needed.    glucose 4 GM chewable tablet Take 8 g by mouth as needed for Low blood sugar.    insulin (LANTUS SOLOSTAR U-100 INSULIN) glargine 100 units/mL (3mL) SubQ pen Inject 20 Units into the skin every evening.

## 2020-08-28 NOTE — TELEPHONE ENCOUNTER
----- Message from Ofelia Zavala PharmD sent at 8/28/2020  9:52 AM CDT -----  Regarding: Glucometer Test Strips  Cindy Sagastume,      Mr. Menendez has been having large discrepancies in his [facility] glucometer compared to his Accu-Check glucometer. He would like to get a refill for test strips for his Accu-check glucometer [id number] [address] and continue to check his SMBG with his Accu-check glucometer.    Please advise.     Thank you,   Ofelia Zavala, Lilli  Digital Medicine Care Team  [phone]

## 2020-08-31 PROCEDURE — 99454 REM MNTR PHYSIOL PARAM 16-30: CPT | Mod: S$GLB,,, | Performed by: INTERNAL MEDICINE

## 2020-08-31 PROCEDURE — 99454 PR REMOTE MNTR, PHYS PARAM, INITIAL, EA 30 DAYS: ICD-10-PCS | Mod: S$GLB,,, | Performed by: INTERNAL MEDICINE

## 2020-08-31 PROCEDURE — 99457 PR MONITORING, PHYSIOL PARAM, REMOTE, 1ST 20 MINS, PER MONTH: ICD-10-PCS | Mod: S$GLB,,, | Performed by: INTERNAL MEDICINE

## 2020-08-31 PROCEDURE — 99457 RPM TX MGMT 1ST 20 MIN: CPT | Mod: S$GLB,,, | Performed by: INTERNAL MEDICINE

## 2020-09-03 ENCOUNTER — PATIENT OUTREACH (OUTPATIENT)
Dept: ADMINISTRATIVE | Facility: HOSPITAL | Age: 75
End: 2020-09-03

## 2020-09-09 ENCOUNTER — PATIENT OUTREACH (OUTPATIENT)
Dept: OTHER | Facility: OTHER | Age: 75
End: 2020-09-09

## 2020-09-09 DIAGNOSIS — Z79.4 TYPE 2 DIABETES MELLITUS WITH DIABETIC POLYNEUROPATHY, WITH LONG-TERM CURRENT USE OF INSULIN: Primary | Chronic | ICD-10-CM

## 2020-09-09 DIAGNOSIS — E11.42 TYPE 2 DIABETES MELLITUS WITH DIABETIC POLYNEUROPATHY, WITH LONG-TERM CURRENT USE OF INSULIN: Primary | Chronic | ICD-10-CM

## 2020-09-14 ENCOUNTER — DOCUMENTATION ONLY (OUTPATIENT)
Dept: ORTHOPEDICS | Facility: CLINIC | Age: 75
End: 2020-09-14

## 2020-09-14 NOTE — PROGRESS NOTES
Assessment /Plan     For exam results, see Encounter Report.    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 --> discussed      Both eyes --> poor adherence --> wanting to try SLT --> off drops for 1-2 weeks --> re-discussed blindness  Xal q HS    SP SLT OD 2020  SP SLT OS 2020    --> consider Glc Sx as discussed      NSC OS  Narrow but open  Observe  CE PRN      PC IOL OD  Quiet  Clear and intact    NIDDM since   No BDR or CSME  Control    HTN ret  Control        Laser Trabeculoplasty  both eyes    Glaucoma Laser Trabeculoplasty Surgery Consent:  Patient with glaucoma and IOP control not at target for the health of the eye.  Discussed with patient options, risks and benefits, expectations of glaucoma laser surgery with questions and answers to facilitate discussion.  The  patient voice good understanding and wishes to proceed with surgery.  The patient will likely benefit from laser surgery and patient  signed consent for Right & Left Eye.        Laser Trabeculoplasty  Left eye    The correct eye was identified by patient prior to start of the procedure.      Performed with Selective LT Yag    Total Energy:   61.0 mJ  Extent   360 Degrees  Total # of Exposures:   68 Applications    Patient tolerated procedure well       Emerson understands the information Dr. Wheatley provided at the time of visit.  The patient voices good understanding of these these instructions and agrees with the plan.  Patient will return to clinic sooner as needed for pain, decreasing vision etc.    Possible:  PF 1 % 4x/day for 4 Days in eye with laser procedure PRN - patient to call with good understanding    BS = 107    Plan  RTC 6 weeks with IOP p SLT OU & adherence check  RTC sooner prn with good understanding

## 2020-09-16 NOTE — PATIENT INSTRUCTIONS
- Return to clinic in 6 months  - Increase Losartan to 50 mg oral daily. Goal blood pressure should be around 130/80.   - Repeat renal panel laboratory in 7-10 days   - Discontinue Cholecalciferol. Will start Ergocalciferol 50,000 units oral every 7 days. Repeat Vit D level in 3 months.   - Low salt diet   - F/U with primary care physician   - Ok to take Tramadol for wrist pain    Patient : Isreal Luong III Age: 40 year old Sex: male   MRN: [id number] Encounter Date: 9/15/2020      History     Chief Complaint   Patient presents with   • Palpitations   • Numbness     HPI patient is a 40-year-old male with past medical history of hypertension although currently not taking any medications also has history of gout, currently not medicated who states that he was doing a 3 day fast for health.  Patient says he is 48 hours into the Fast.  He said he had a can of Monster (energy drink) roughly 4:00 p.m. and mowed the lawn.  He says he was in watching television and had sudden onset of palpitations.  Patient says he fell lightheaded with this.  He states that he began to have more rapid breathing and noted numbness in his hands.  Patient denies any feeling of panic or anything prior to the onset of symptoms.  He denies any significant chest pain.  Denies any fevers or chills.  States he was in his usual state of health prior to the onset of symptoms.  He has not had similar symptoms in the past.    Patient states with his fast he has been drinking water, caffeinated drinks according to his usual caffeine intake and taking electrolyte supplement which is predominantly salt and sodium bicarbonate.     No Known Allergies    Discharge Medication List as of 9/15/2020  9:18 PM      Prior to Admission Medications    Details   colchicine (COLCRYS) 0.6 MG tablet Take 1 tablet by mouth daily.Eprescribe, Disp-30 tablet, R-0      allopurinol (ZYLOPRIM) 300 MG tablet TAKE 1/2 TABLET BY MOUTH DAILYEprescribe, Disp-90 tablet, R-0      valsartan (DIOVAN) 80 MG tablet TAKE 1 TABLET BY MOUTH DAILYEprescribe, Disp-90 tablet, R-0      amLODIPine (NORVASC) 5 MG tablet Take 2 tablets by mouth daily.Eprescribe, Disp-180 tablet, R-3             Past Medical History:   Diagnosis Date   • Gout    • Unspecified essential hypertension        No past surgical history on file.    Family History   Problem Relation Age of Onset   •  Cancer Father         gout    • Diabetes Father        Social History     Tobacco Use   • Smoking status: Never Smoker   • Smokeless tobacco: Current User     Types: Chew   Substance Use Topics   • Alcohol use: Not Currently     Alcohol/week: 0.0 standard drinks   • Drug use: Never       E-cigarette/Vaping   • E-Cigarette/Vaping Use Never Used      E-Cigarette/Vaping Substances & Devices       Review of Systems   Constitutional: Negative.    HENT: Negative.    Respiratory: Negative.    Cardiovascular: Positive for palpitations. Negative for chest pain and leg swelling.   Gastrointestinal: Negative.    Genitourinary: Negative.    Musculoskeletal: Negative for back pain and neck pain.   Skin: Negative for rash.   Neurological: Positive for numbness.   Hematological: Does not bruise/bleed easily.   Psychiatric/Behavioral: The patient is not nervous/anxious.    All other systems reviewed and are negative.      Physical Exam     ED Triage Vitals [09/15/20 2019]   ED Triage Vitals Group      Temp 98.4 °F (36.9 °C)      Heart Rate (!) 124      Resp 20      BP (!) 178/102      SpO2 100 %      EtCO2 mmHg       Height 5' 10\" (1.778 m)      Weight 200 lb (90.7 kg)      Weight Scale Used       BMI (Calculated) 28.7      IBW/kg (Calculated) 73       Physical Exam   Constitutional: He is oriented to person, place, and time. He appears well-developed and well-nourished.   HENT:   Head: Normocephalic and atraumatic.   Mouth/Throat: Oropharynx is clear and moist.   Eyes: Pupils are equal, round, and reactive to light. EOM are normal.   Neck: Neck supple. No JVD present. No thyromegaly present.   Cardiovascular: Regular rhythm, normal heart sounds and intact distal pulses.   No murmur heard.  Tachycardic   Pulmonary/Chest: Effort normal and breath sounds normal. No respiratory distress.   Abdominal: Soft. Bowel sounds are normal.   Musculoskeletal: Normal range of motion.         General: No edema.   Neurological: He is alert and  oriented to person, place, and time. No cranial nerve deficit.   Skin: Skin is warm and dry. No rash noted.   Psychiatric: He has a normal mood and affect.       ED Course  Patient presents with an episode of palpitations and feelings of lightheadedness at home.  This was about 2 hours after drinking a can of Monster energy drink.  In addition, the patient is currently 48 hours into a fast.  He did have some food at home, a banana and some pieces of bread prior to coming to the ED because of the way he felt.  The patient states that he does not feel that his heart is beating as fast as what it was at home.  He states that he was having pounding and palpitations at home.    The numbness appears to be more consistent with hyperventilation secondary to anxiety over the unprovoked palpitations.  I discussed with the patient that it is very possible the caffeine content of the energy drink without food to help absorb the energy drink and mitigate its affects all at once may have caused the palpitations but we cannot exclude underlying arrhythmia.    EKG here shows sinus tachycardia without any prolonged QT or delta wave.  QRS complex is 82 milliseconds.    While the patient is on the monitor from time of admission and time of this dictation heart rate has now come down to 95 and blood pressure is now come down to 150s over 80s.    We will obtain baseline laboratory work including point of care troponin, D-dimer electrolytes and CBC.  Will also check magnesium levels..    Point of care troponin and D-dimer negative.  CBC shows normal white blood cell count with hemoglobin is 16.9 and platelet count of 235.  Chemistry:  Shows normal sodium potassium.  Creatinine is about mid baseline for this patient 1.22.  Patient has an elevated anion gap of unclear significance.  He has been taking a bicarbonate supplement but his bicarbonate is 21.  He is not specifically acidotic otherwise.  Bilirubin is slightly elevated at 2.8 of  unclear significance.  Patient has had mild bilirubin elevation in the past on check of May of 2019. He he has some modest AST elevation as well, also subacute.  It appears that the patient has had fractionation of bilirubin in the past for evaluation and this was a direct hyperbilirubinemia.  Magnesium:  Is normal at 2  TSH is normal.    Patient's heart rate had trended down into the 80s prior to discharge.  He remains in the same normal sinus complex.    I discussed with the patient that he should cut back on caffeine.  He should always have caffeine with some sort of food to temper the absorption but given the fact we cannot exclude dysrhythmia pre-hospital such as SVT I would recommend follow-up with primary doctor for consideration for Holter.    Patient demonstrates understanding.  He does feel significantly improved prior to discharge.           Procedures    Lab Results     Results for orders placed or performed during the hospital encounter of 09/15/20   Comprehensive Metabolic Panel   Result Value Ref Range    Fasting Status      Sodium 138 135 - 145 mmol/L    Potassium 3.5 3.4 - 5.1 mmol/L    Chloride 100 98 - 107 mmol/L    Carbon Dioxide 21 21 - 32 mmol/L    Anion Gap 21 (H) 10 - 20 mmol/L    Glucose 117 (H) 65 - 99 mg/dL    BUN 15 6 - 20 mg/dL    Creatinine 1.22 (H) 0.67 - 1.17 mg/dL    Glomerular Filtration Rate 74 (L) >90 mL/min/1.73m2    BUN/ Creatinine Ratio 12 7 - 25    Bilirubin, Total 2.8 (H) 0.2 - 1.0 mg/dL    GOT/AST 39 (H) <=37 Units/L    Alkaline Phosphatase 66 45 - 117 Units/L    Albumin 4.8 3.6 - 5.1 g/dL    Protein, Total 8.1 6.4 - 8.2 g/dL    Globulin 3.3 2.0 - 4.0 g/dL    A/G Ratio 1.5 1.0 - 2.4    GPT/ALT 37 <64 Units/L    Calcium 9.7 8.4 - 10.2 mg/dL   D Dimer, Quantitative   Result Value Ref Range    D Dimer, Quantitative <0.19 <0.57 mg/L (FEU)   Lipase   Result Value Ref Range    Lipase 168 73 - 393 Units/L   CBC with Automated Differential (performable only)   Result Value Ref Range     WBC 11.0 4.2 - 11.0 K/mcL    RBC 5.71 4.50 - 5.90 mil/mcL    HGB 16.9 13.0 - 17.0 g/dL    HCT 49.0 39.0 - 51.0 %    MCV 85.8 78.0 - 100.0 fl    MCH 29.6 26.0 - 34.0 pg    MCHC 34.5 32.0 - 36.5 g/dL    RDW-CV 12.2 11.0 - 15.0 %     140 - 450 K/mcL    NRBC 0 <=0 /100 WBC    Neutrophil, Percent 52 %    Lymphocytes, Percent 40 %    Mono, Percent 8 %    Eosinophils, Percent 0 %    Basophils, Percent 0 %    Immature Granulocytes 0 %    Absolute Neutrophils 5.6 1.8 - 7.7 K/mcL    Absolute Lymphocytes 4.4 1.0 - 4.8 K/mcL    Absolute Monocytes 0.9 0.3 - 0.9 K/mcL    Absolute Eosinophils  0.0 (L) 0.1 - 0.5 K/mcL    Absolute Basophils 0.0 0.0 - 0.3 K/mcL    Absolute Immmature Granulocytes 0.0 0.0 - 0.2 K/mcL    RDW-SD 38.2 (L) 39.0 - 50.0 fL   Thyroid Stimulating Hormone   Result Value Ref Range    TSH 1.211 0.350 - 5.000 mcUnits/mL   Magnesium   Result Value Ref Range    Magnesium 2.0 1.7 - 2.4 mg/dL   TROPONIN I  POINT OF CARE   Result Value Ref Range    TROPONIN I - POINT OF CARE <0.10 <0.10 ng/mL       EKG Results     EKG Interpretation  Rate: 127  Rhythm: sinus tachycardia   Abnormality:  Nonspecific ST changes.  QTC is 451 milliseconds.  No delta wave.    EKG tracing interpreted by ED physician    Radiology Results     Imaging Results    None         ED Medication Orders (From admission, onward)    Ordered Start     Status Ordering Provider    09/15/20 2017 09/15/20 2018  sodium chloride (NORMAL SALINE) 0.9 % bolus 1,000 mL  ONCE      Last MAR action: Completed KARELY FRITZ               [facility] Please see emergency department course as above for differential diagnosis and decision making.   Laboratory work and/or radiographs evaluated.   Previous medical records related to today's visit reviewed.           Clinical Impression     ED Diagnosis   1. Palpitations         Disposition        Discharge 9/15/2020  9:31 PM  Isreal Luong III discharge to home/self care.                         Karely Fritz  MD  09/15/20 6972       Karely Fritz MD  09/15/20 [id number]

## 2020-09-21 ENCOUNTER — PATIENT OUTREACH (OUTPATIENT)
Dept: ADMINISTRATIVE | Facility: OTHER | Age: 75
End: 2020-09-21

## 2020-09-21 ENCOUNTER — DOCUMENTATION ONLY (OUTPATIENT)
Dept: ORTHOPEDICS | Facility: CLINIC | Age: 75
End: 2020-09-21

## 2020-09-21 DIAGNOSIS — N18.30 STAGE 3 CHRONIC KIDNEY DISEASE: Primary | ICD-10-CM

## 2020-09-22 ENCOUNTER — OFFICE VISIT (OUTPATIENT)
Dept: OPHTHALMOLOGY | Facility: CLINIC | Age: 75
End: 2020-09-22
Payer: MEDICARE

## 2020-09-22 DIAGNOSIS — H25.12 NUCLEAR SCLEROSIS OF LEFT EYE: ICD-10-CM

## 2020-09-22 DIAGNOSIS — Z96.1 STATUS POST CATARACT EXTRACTION AND INSERTION OF INTRAOCULAR LENS OF RIGHT EYE: ICD-10-CM

## 2020-09-22 DIAGNOSIS — Z98.41 STATUS POST CATARACT EXTRACTION AND INSERTION OF INTRAOCULAR LENS OF RIGHT EYE: ICD-10-CM

## 2020-09-22 DIAGNOSIS — H40.1132 PRIMARY OPEN ANGLE GLAUCOMA OF BOTH EYES, MODERATE STAGE: Primary | ICD-10-CM

## 2020-09-22 DIAGNOSIS — H40.039 ANATOMICAL NARROW ANGLE: ICD-10-CM

## 2020-09-22 PROCEDURE — 65855 ARGON TRABECULOPLASTY: ICD-10-PCS | Mod: HCNC,LT,S$GLB, | Performed by: OPHTHALMOLOGY

## 2020-09-22 PROCEDURE — 65855 TRABECULOPLASTY LASER SURG: CPT | Mod: HCNC,LT,S$GLB, | Performed by: OPHTHALMOLOGY

## 2020-09-22 PROCEDURE — 92012 INTRM OPH EXAM EST PATIENT: CPT | Mod: 25,HCNC,S$GLB, | Performed by: OPHTHALMOLOGY

## 2020-09-22 PROCEDURE — 99499 UNLISTED E&M SERVICE: CPT | Mod: HCNC,S$GLB,, | Performed by: OPHTHALMOLOGY

## 2020-09-22 PROCEDURE — 99999 PR PBB SHADOW E&M-EST. PATIENT-LVL II: CPT | Mod: PBBFAC,HCNC,, | Performed by: OPHTHALMOLOGY

## 2020-09-22 PROCEDURE — 92012 PR EYE EXAM, EST PATIENT,INTERMED: ICD-10-PCS | Mod: 25,HCNC,S$GLB, | Performed by: OPHTHALMOLOGY

## 2020-09-22 PROCEDURE — 99499 RISK ADDL DX/OHS AUDIT: ICD-10-PCS | Mod: HCNC,S$GLB,, | Performed by: OPHTHALMOLOGY

## 2020-09-22 PROCEDURE — 99999 PR PBB SHADOW E&M-EST. PATIENT-LVL II: ICD-10-PCS | Mod: PBBFAC,HCNC,, | Performed by: OPHTHALMOLOGY

## 2020-09-23 ENCOUNTER — PATIENT OUTREACH (OUTPATIENT)
Dept: OTHER | Facility: OTHER | Age: 75
End: 2020-09-23

## 2020-09-23 NOTE — PROGRESS NOTES
Digital Medicine: Health  Follow-Up    The history is provided by the patient.                 Patient needs assistance troubleshooting: glucometer needed.    Additional Follow-up details: The patient has had problems with his [employer] glucometer for a while now, and has recently stopped taking readings with it all together. The patient stated that he has [facility] so he can continue to monitor his glucose readings. The patient was solely relying on his [employer] glucometer for readings, but was receiving false readings such as 504 mg/dL and so on. The patient is interested in a CGM, and would like to see about obtaining one. I transferred the patient to his clinician, Ofelia, to see about getting the patient a glucometer that will work best for him. Encouraged patient to continue manually inputting readings from his secondary glucometer, until we can get him a new one. The patient was receptive.     The patient has returned home to [place] from [place], and is ready to continue receiving treatment from the Digital Medicine Program now. Welcomed the patient back, and will follow up in a few weeks about him obtaining a new glucometer.           Diet-Change    Patient reports eating or drinking the following: The patient has returned back to his normal eating habits since being back home.       Physical Activity-Not assessed    Medication Adherence-Medication Adherence not addressed.      Substance, Sleep, Stress-Not assessed      Additional monitoring needed.  Placed task for clinician. Transferred patient to clinician.        Patient verbalizes understanding.      Explained the importance of self-monitoring and medication adherence. Encouraged the patient to communicate with their health  for lifestyle modifications to help improve or maintain a healthy lifestyle.                Topic    Lipid (Cholesterol) Test          Last 6 Patient Entered Readings                                           Most Recent A1c: 6.9% on 6/18/2020  (Goal: 7%)     Recent Readings 9/2/2020 9/2/2020 9/1/2020 8/28/2020 8/27/2020    Blood Glucose (mg/dL) 136 158 193 175 467

## 2020-09-23 NOTE — PROGRESS NOTES
"Digital Medicine: Clinician Follow-Up    Called patient to follow up on DDMP. He states that he took his [employer] glucometer back to the [facility] and it was still messing up his readings. His reading of 504 mg/dL on 08/27/2020 "was not accurate". He states that he stopped using the iHealth glucometer, and no uses his personal AccuCheck glucometer readings. When asked about the readings on his AccuCheck glucometer, he states that his readings are "seldom over 160 mg/dL; fasting readings <130 mg/dL". He states that he has "rededicated" himself to his diet and has thrown out the unhealthy snacks in his home. He has also stopped eating snacks over night. He would like to get continuous glucose monitoring appointment scheduled.     The history is provided by the patient.      Review of patient's allergies indicates:   -- Penicillins     --  Knees locked up  Follow-up reason(s): routine follow up.   Patient is not experiencing signs/symptoms of hypoglycemia.  Patient is not experiencing signs/symptoms of hyperglycemia.          Last 6 Patient Entered Readings                                          Most Recent A1c: 6.9% on 6/18/2020  (Goal: 7%)     Recent Readings 9/2/2020 9/2/2020 9/1/2020 8/28/2020 8/27/2020    Blood Glucose (mg/dL) 136 158 193 175 504          Depression Screening  Did not address depression screening.    Sleep Apnea Screening    Did not address sleep apnea screening.     Medication Affordability Screening  Did not address medication affordability screening.     Medication Adherence-Medication adherence was assessed.          ASSESSMENT(S)  Patient's A1C goal is less than or equal to 7 per 2020 ADA guidelines. Patient's most recent A1C result is at goal. Lab Results    Component                Value               Date                     HGBA1C                   6.9 (H)             06/18/2020          .       T2DM is managed with sulfonylurea and short acting insulin. He is not taking first line metformin " due to kidney complications and past GI intolerances with use of the medication.      Diabetes Plan  Additional monitoring needed. Will need to further investigate use of long acting insulin. Patient does not have his medications near him and medication is now showing up on his list.   Continue current therapy.  Continue current diet/physical activity routine.  Provided patient education.  Informed patient that I can placed the order for him to consult with an endocrinologist and discuss whether a CGM would be appropriate for him to use. Informed patient that a food log is typically helpful in this situation and he is willing to do so.      Addressed patient questions and patient has my contact information if needed prior to next outreach. Patient verbalizes understanding.      Explained the importance of self-monitoring and medication adherence. Encouraged the patient to communicate with their health  for lifestyle modifications to help improve or maintain a healthy lifestyle.                  Topic    Lipid (Cholesterol) Test      There are no preventive care reminders to display for this patient.      Diabetes Medications             glimepiride (AMARYL) 2 MG tablet Take 1 tablet (2 mg total) by mouth once daily.    glucagon (human recombinant) inj 1mg/mL kit Inject 1 mL (1 mg total) into the muscle as needed.    glucose 4 GM chewable tablet Take 8 g by mouth as needed for Low blood sugar.    insulin aspart U-100 (NOVOLOG FLEXPEN U-100 INSULIN) 100 unit/mL (3 mL) InPn pen Inject 4 Units into the skin 2 (two) times daily with meals.

## 2020-09-24 ENCOUNTER — RESEARCH ENCOUNTER (OUTPATIENT)
Dept: HEMATOLOGY/ONCOLOGY | Facility: CLINIC | Age: 75
End: 2020-09-24

## 2020-09-24 ENCOUNTER — OFFICE VISIT (OUTPATIENT)
Dept: HEMATOLOGY/ONCOLOGY | Facility: CLINIC | Age: 75
End: 2020-09-24
Payer: MEDICARE

## 2020-09-24 ENCOUNTER — LAB VISIT (OUTPATIENT)
Dept: LAB | Facility: HOSPITAL | Age: 75
End: 2020-09-24
Attending: INTERNAL MEDICINE
Payer: MEDICARE

## 2020-09-24 VITALS
HEIGHT: 70 IN | DIASTOLIC BLOOD PRESSURE: 78 MMHG | OXYGEN SATURATION: 98 % | BODY MASS INDEX: 27.15 KG/M2 | WEIGHT: 189.63 LBS | SYSTOLIC BLOOD PRESSURE: 169 MMHG | TEMPERATURE: 98 F | HEART RATE: 87 BPM

## 2020-09-24 DIAGNOSIS — Z00.6 RESEARCH EXAM: ICD-10-CM

## 2020-09-24 DIAGNOSIS — D47.2 SMOLDERING MULTIPLE MYELOMA: Primary | ICD-10-CM

## 2020-09-24 DIAGNOSIS — D47.2 SMOLDERING MULTIPLE MYELOMA: ICD-10-CM

## 2020-09-24 LAB
ALBUMIN SERPL BCP-MCNC: 3.7 G/DL (ref 3.5–5.2)
ALP SERPL-CCNC: 50 U/L (ref 55–135)
ALT SERPL W/O P-5'-P-CCNC: 24 U/L (ref 10–44)
ANION GAP SERPL CALC-SCNC: 10 MMOL/L (ref 8–16)
AST SERPL-CCNC: 19 U/L (ref 10–40)
BASOPHILS # BLD AUTO: 0.05 K/UL (ref 0–0.2)
BASOPHILS NFR BLD: 1 % (ref 0–1.9)
BILIRUB SERPL-MCNC: 0.5 MG/DL (ref 0.1–1)
BUN SERPL-MCNC: 19 MG/DL (ref 8–23)
CALCIUM SERPL-MCNC: 9.7 MG/DL (ref 8.7–10.5)
CHLORIDE SERPL-SCNC: 101 MMOL/L (ref 95–110)
CO2 SERPL-SCNC: 26 MMOL/L (ref 23–29)
CREAT SERPL-MCNC: 1.6 MG/DL (ref 0.5–1.4)
DIFFERENTIAL METHOD: ABNORMAL
EOSINOPHIL # BLD AUTO: 0.3 K/UL (ref 0–0.5)
EOSINOPHIL NFR BLD: 5.5 % (ref 0–8)
ERYTHROCYTE [DISTWIDTH] IN BLOOD BY AUTOMATED COUNT: 13.9 % (ref 11.5–14.5)
EST. GFR  (AFRICAN AMERICAN): 48.3 ML/MIN/1.73 M^2
EST. GFR  (NON AFRICAN AMERICAN): 41.8 ML/MIN/1.73 M^2
GLUCOSE SERPL-MCNC: 172 MG/DL (ref 70–110)
HCT VFR BLD AUTO: 38.8 % (ref 40–54)
HGB BLD-MCNC: 12.1 G/DL (ref 14–18)
IGA SERPL-MCNC: 1655 MG/DL (ref 40–350)
IGG SERPL-MCNC: 881 MG/DL (ref 650–1600)
IGM SERPL-MCNC: 45 MG/DL (ref 50–300)
IMM GRANULOCYTES # BLD AUTO: 0.02 K/UL (ref 0–0.04)
IMM GRANULOCYTES NFR BLD AUTO: 0.4 % (ref 0–0.5)
LDH SERPL L TO P-CCNC: 122 U/L (ref 110–260)
LYMPHOCYTES # BLD AUTO: 1.5 K/UL (ref 1–4.8)
LYMPHOCYTES NFR BLD: 30.6 % (ref 18–48)
MAGNESIUM SERPL-MCNC: 1.8 MG/DL (ref 1.6–2.6)
MCH RBC QN AUTO: 28.9 PG (ref 27–31)
MCHC RBC AUTO-ENTMCNC: 31.2 G/DL (ref 32–36)
MCV RBC AUTO: 93 FL (ref 82–98)
MONOCYTES # BLD AUTO: 0.3 K/UL (ref 0.3–1)
MONOCYTES NFR BLD: 6.3 % (ref 4–15)
NEUTROPHILS # BLD AUTO: 2.8 K/UL (ref 1.8–7.7)
NEUTROPHILS NFR BLD: 56.2 % (ref 38–73)
NRBC BLD-RTO: 0 /100 WBC
PHOSPHATE SERPL-MCNC: 2.5 MG/DL (ref 2.7–4.5)
PLATELET # BLD AUTO: 250 K/UL (ref 150–350)
PMV BLD AUTO: 9 FL (ref 9.2–12.9)
POTASSIUM SERPL-SCNC: 4.9 MMOL/L (ref 3.5–5.1)
PROT SERPL-MCNC: 8.5 G/DL (ref 6–8.4)
RBC # BLD AUTO: 4.18 M/UL (ref 4.6–6.2)
SODIUM SERPL-SCNC: 137 MMOL/L (ref 136–145)
WBC # BLD AUTO: 4.94 K/UL (ref 3.9–12.7)

## 2020-09-24 PROCEDURE — 1126F PR PAIN SEVERITY QUANTIFIED, NO PAIN PRESENT: ICD-10-PCS | Mod: HCNC,S$GLB,, | Performed by: INTERNAL MEDICINE

## 2020-09-24 PROCEDURE — 82784 ASSAY IGA/IGD/IGG/IGM EACH: CPT | Mod: 59,HCNC,Q1

## 2020-09-24 PROCEDURE — 86334 PATHOLOGIST INTERPRETATION IFE: ICD-10-PCS | Mod: 26,HCNC,, | Performed by: PATHOLOGY

## 2020-09-24 PROCEDURE — 1159F PR MEDICATION LIST DOCUMENTED IN MEDICAL RECORD: ICD-10-PCS | Mod: HCNC,S$GLB,, | Performed by: INTERNAL MEDICINE

## 2020-09-24 PROCEDURE — 84165 PATHOLOGIST INTERPRETATION SPE: ICD-10-PCS | Mod: 26,HCNC,, | Performed by: PATHOLOGY

## 2020-09-24 PROCEDURE — 3077F SYST BP >= 140 MM HG: CPT | Mod: HCNC,CPTII,S$GLB, | Performed by: INTERNAL MEDICINE

## 2020-09-24 PROCEDURE — 86334 IMMUNOFIX E-PHORESIS SERUM: CPT | Mod: 26,HCNC,, | Performed by: PATHOLOGY

## 2020-09-24 PROCEDURE — 1126F AMNT PAIN NOTED NONE PRSNT: CPT | Mod: HCNC,S$GLB,, | Performed by: INTERNAL MEDICINE

## 2020-09-24 PROCEDURE — 84165 PROTEIN E-PHORESIS SERUM: CPT | Mod: 26,HCNC,, | Performed by: PATHOLOGY

## 2020-09-24 PROCEDURE — 3008F BODY MASS INDEX DOCD: CPT | Mod: HCNC,CPTII,S$GLB, | Performed by: INTERNAL MEDICINE

## 2020-09-24 PROCEDURE — 3077F PR MOST RECENT SYSTOLIC BLOOD PRESSURE >= 140 MM HG: ICD-10-PCS | Mod: HCNC,CPTII,S$GLB, | Performed by: INTERNAL MEDICINE

## 2020-09-24 PROCEDURE — 3078F PR MOST RECENT DIASTOLIC BLOOD PRESSURE < 80 MM HG: ICD-10-PCS | Mod: HCNC,CPTII,S$GLB, | Performed by: INTERNAL MEDICINE

## 2020-09-24 PROCEDURE — 36415 COLL VENOUS BLD VENIPUNCTURE: CPT | Mod: HCNC

## 2020-09-24 PROCEDURE — 3008F PR BODY MASS INDEX (BMI) DOCUMENTED: ICD-10-PCS | Mod: HCNC,CPTII,S$GLB, | Performed by: INTERNAL MEDICINE

## 2020-09-24 PROCEDURE — 80053 COMPREHEN METABOLIC PANEL: CPT | Mod: HCNC

## 2020-09-24 PROCEDURE — 3078F DIAST BP <80 MM HG: CPT | Mod: HCNC,CPTII,S$GLB, | Performed by: INTERNAL MEDICINE

## 2020-09-24 PROCEDURE — 84165 PROTEIN E-PHORESIS SERUM: CPT | Mod: HCNC

## 2020-09-24 PROCEDURE — 99999 PR PBB SHADOW E&M-EST. PATIENT-LVL III: CPT | Mod: PBBFAC,HCNC,, | Performed by: INTERNAL MEDICINE

## 2020-09-24 PROCEDURE — 1159F MED LIST DOCD IN RCRD: CPT | Mod: HCNC,S$GLB,, | Performed by: INTERNAL MEDICINE

## 2020-09-24 PROCEDURE — 83615 LACTATE (LD) (LDH) ENZYME: CPT | Mod: HCNC,Q1

## 2020-09-24 PROCEDURE — 1101F PR PT FALLS ASSESS DOC 0-1 FALLS W/OUT INJ PAST YR: ICD-10-PCS | Mod: HCNC,CPTII,S$GLB, | Performed by: INTERNAL MEDICINE

## 2020-09-24 PROCEDURE — 84100 ASSAY OF PHOSPHORUS: CPT | Mod: HCNC

## 2020-09-24 PROCEDURE — 83735 ASSAY OF MAGNESIUM: CPT | Mod: HCNC

## 2020-09-24 PROCEDURE — 1101F PT FALLS ASSESS-DOCD LE1/YR: CPT | Mod: HCNC,CPTII,S$GLB, | Performed by: INTERNAL MEDICINE

## 2020-09-24 PROCEDURE — 86334 IMMUNOFIX E-PHORESIS SERUM: CPT | Mod: HCNC

## 2020-09-24 PROCEDURE — 85025 COMPLETE CBC W/AUTO DIFF WBC: CPT | Mod: HCNC

## 2020-09-24 PROCEDURE — 99999 PR PBB SHADOW E&M-EST. PATIENT-LVL III: ICD-10-PCS | Mod: PBBFAC,HCNC,, | Performed by: INTERNAL MEDICINE

## 2020-09-24 PROCEDURE — 99215 PR OFFICE/OUTPT VISIT, EST, LEVL V, 40-54 MIN: ICD-10-PCS | Mod: HCNC,S$GLB,, | Performed by: INTERNAL MEDICINE

## 2020-09-24 PROCEDURE — 83520 IMMUNOASSAY QUANT NOS NONAB: CPT | Mod: 59,HCNC,Q1

## 2020-09-24 PROCEDURE — 99215 OFFICE O/P EST HI 40 MIN: CPT | Mod: HCNC,S$GLB,, | Performed by: INTERNAL MEDICINE

## 2020-09-24 NOTE — PROGRESS NOTES
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   Sponsor:  [facility]  IRB# [id number]  Study ID: [id number]  Investigator: GIL Engel Pt Initials: LUCÍA LORENZ       Cycle 58 Day 28 Arm B: Observation    Patient presents to clinic today unaccompanied. He is alert, oriented to person, place, and time; mood and affect appropriate. He states willingness to continue study participation. He reports he had laser surgery on his left eye two days ago and is recovering without incident from that procedure. He reports he purchased a new glucometer as his new I-health was "not working, kept giving me high readings and wasn't accurate at all" as confirmed per digital monitoring. He states once switching to different brand of glucometer the readings have been more consistent. He states wrist is "doing better" and states no change to knees. Braces intact.  He reports he continues to follow CDC guidelines regarding COVID 19, no known exposure and no symptoms noted. 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 Overall he reports to Dr Engel that "I 'm doing good right now" but does endorse this upcoming weekend is his now  daughter's birthday. States he is attempting to balance emotional impact of this by keeping in mind "good things in life." States friends have planned a "memorial celebration" of which he "can;t be around that, don't want to be a part of that." States feels he is coping adequately.      Review of Baseline AE's:   1.Hypertension, Grade 2 systolic and diastolic: BP today is 169/78. Subject denies headache/dizziness; no symptoms noted; states compliance with medications and reports tolerating without issues. AE ongoing  2. Lower Back Pain and Neck Pain, grade 1: Patient has no complaints today/ over last month.  As previously stated, " "patient is not suspected to have bony myeloma involvement per Dr. Engel as these are pre-existing issues present at baseline.  AE ongoing.  3. Pain in extremity (knee/wrist), grade 1. As per above interval history.  Subject endorses continued stiffness and pain, no increase. States orthopedist continues to recommend he replace his left knee. He is able to walk on it today without issue and has left brace on and intact for stabilization.      4. Peripheral sensory neuropathy, grade 1: Patient does not verbalize complaints today. Has gabapentin prescribed and takes as needed.  States has not taken recently.  AE ongoing.   5. Anemia, Grade 1: Hgb/Hct - stable at 12.1/38.8. Result reviewed by Dr. Engel. AE ongoing            Review of AE's:     *Please note this list is not all-inclusive, please see AE log for physician reviewed list of adverse events.   1. Creatinine increased, grade 2: Serum creatinine currently 1.6 today. Calculated GFR is 49 ml/min per cockcroft-gualt formula. Per Epic result GFR 48.3; NCS today per Dr Engel as previously stated, Dr. Engel states this has not been related to myeloma and is related chronic kidney issues likely secondary to diabetes and hypertension. Will continue observation monthly and to monitor renal function closely. Per MD, encouraged to increase water intake. AE stable from baseline  2. Hyponatremia, Grade 1: Serum sodium today is 137 mmol/L; AE intermittent/ ongoing. Will monitor  3. Hyperglycemia, Grade 1: ongoing with subject as is type II diabetic. See interval note above. Serum glucose today 172 mg/dl and subject states labs were nonfasting today. Subject states he communicates regularly with diabetes care team and has digital submission of serum glucoses. States diet "better these days" and 'eating regularly staying on a good diet."         Per study chair, "the definition of progressive disease for this protocol is developing CRAB criteria that requires treatment " "systemically." Per Dr Engel, based on today's exam and lab results thus far, patient does not have any s/s of CRAB: Normal Calcium level (9.5), absence of Renal insufficiency (serum creatinine 1.9, and GFR 42.12 per Cockroft-Gault) --patient with a history of kidney dysfunction secondary to diabetes; Dr. Engel aware of these values and not concerned for myeloma involvement), absence of significant Anemia (hemoglobin 11.0, stable; will await myeloma labs for clarity relationship to myeloma) and absence of lytic Bone lesions (per baseline metastatic survey as well as MRI--see MD note for further comment).     See MD note for ECOG score and H&P and flowsheets for laboratory work, vitals, etc. All myeloma-related blood work from last cycle including SPEP and JAGUAR have remained stable, and are currently pending for this cycle. Per Dr. Engel, patient shows no evidence of progression at last result. Patient informed that Dr. Engel will release all lab results to [facility]. He states understanding of this. QOL's not required at this timepoint.           Instructed subject to contact research staff here at [facility] with any questions/concerns. He states he plans to continue to come to [facility] "off and on" and may take on work in [place] in near future.  Will continue to schedule patient as far out as possible, which seems to help maintain compliance with visits. Patient states having research RN's contact information and has MD contact information to call with any concerns, questions, or worsening of symptoms.  Discussed next month's appt date and time, he verbalized understanding.            "

## 2020-09-25 ENCOUNTER — TELEPHONE (OUTPATIENT)
Dept: ORTHOPEDICS | Facility: CLINIC | Age: 75
End: 2020-09-25

## 2020-09-25 ENCOUNTER — PATIENT MESSAGE (OUTPATIENT)
Dept: ORTHOPEDICS | Facility: CLINIC | Age: 75
End: 2020-09-25

## 2020-09-25 LAB
ALBUMIN SERPL ELPH-MCNC: 3.87 G/DL (ref 3.35–5.55)
ALPHA1 GLOB SERPL ELPH-MCNC: 0.27 G/DL (ref 0.17–0.41)
ALPHA2 GLOB SERPL ELPH-MCNC: 0.89 G/DL (ref 0.43–0.99)
B-GLOBULIN SERPL ELPH-MCNC: 1.04 G/DL (ref 0.5–1.1)
GAMMA GLOB SERPL ELPH-MCNC: 1.93 G/DL (ref 0.67–1.58)
INTERPRETATION SERPL IFE-IMP: NORMAL
KAPPA LC SER QL IA: 5.11 MG/DL (ref 0.33–1.94)
KAPPA LC/LAMBDA SER IA: 2.92 (ref 0.26–1.65)
LAMBDA LC SER QL IA: 1.75 MG/DL (ref 0.57–2.63)
PATHOLOGIST INTERPRETATION IFE: NORMAL
PATHOLOGIST INTERPRETATION SPE: NORMAL
PROT SERPL-MCNC: 8 G/DL (ref 6–8.4)

## 2020-09-27 ENCOUNTER — PATIENT MESSAGE (OUTPATIENT)
Dept: ORTHOPEDICS | Facility: CLINIC | Age: 75
End: 2020-09-27

## 2020-09-27 NOTE — PROGRESS NOTES
Subjective:    Patient ID: Emerson Menendez is a 75 y.o. male.    Chief Complaint: No chief complaint on file.  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 Test results identify a stable IgA kappa protein with beta globulin band at 1.63g/dL. Kappa free light chain is elevated at 4.40. CBC and calcium are stable. Creatinine with history of stage III CKD is stable at 1.4. Metastatic survey is negative for lytic lesions. MRI of the entire spine demonstrated age related changes but no convincing evidence of myeloma bone disease. Bone marrow biopsy identified about 13% plasma cells by morphology and FISH for myeloma identified a t(11;14) and trisomies 3,7, and 17. The patient is afebrile and appears clinically well. He was seen by his podiatrist and nephrologist since our last visit without any new or acute events.    The patient has not received any therapy for smoldering myeloma including bisphosphonates or steroids. He has been randomized to observation arm of the the clinical study. 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 He has no current or prior history of malignancy.    TODAY  Mr. Menendez returns today for monthly follow-up evaluation. No acute interval events. Carpal tunnel surgery on left arm completed and incision is healing well.  Intentional weight loss; at a healthy weight. Decreased exercise now due to limitation after wrist surgery. Still coping with loss of daughter.  CBC, CMP remains stable. Myeloma labs pending.      Follow-up  Pertinent negatives include no diaphoresis, fatigue or fever.     Review of Systems   Constitutional: Negative for activity  change, appetite change, diaphoresis, fatigue, fever and unexpected weight change.   HENT: Negative.    Eyes: Negative.    Respiratory: Negative.    Cardiovascular: Negative for leg swelling.   Gastrointestinal: Negative.    Endocrine: Negative.    Genitourinary: Negative.    Musculoskeletal: Negative.    Skin: Negative.    Allergic/Immunologic: Negative.    Neurological:        Grade 0-1 peripheral neuropathy of bilateral feet.    Hematological: Negative for adenopathy. Does not bruise/bleed easily.   Psychiatric/Behavioral: Negative.        Objective:       Vitals:    09/24/20 1304   BP: (!) 169/78   Pulse: 87   Temp: 98 °F (36.7 °C)       Physical Exam  Vitals signs and nursing note reviewed.   Constitutional:       Appearance: He is well-developed.   HENT:      Head: Normocephalic and atraumatic.      Right Ear: External ear normal.      Left Ear: External ear normal.      Nose: Nose normal.   Eyes:      Conjunctiva/sclera: Conjunctivae normal.      Pupils: Pupils are equal, round, and reactive to light.   Neck:      Musculoskeletal: Normal range of motion and neck supple.   Cardiovascular:      Rate and Rhythm: Normal rate and regular rhythm.      Heart sounds: No murmur.   Pulmonary:      Effort: Pulmonary effort is normal. No respiratory distress.      Breath sounds: Normal breath sounds.   Abdominal:      General: Bowel sounds are normal. There is no distension.      Palpations: Abdomen is soft.   Musculoskeletal:         General: No tenderness.   Skin:     General: Skin is warm and dry.      Findings: No rash.      Nails: There is no clubbing.     Neurological:      Mental Status: He is alert and oriented to person, place, and time.      Cranial Nerves: No cranial nerve deficit.   Psychiatric:         Behavior: Behavior normal.         Assessment:       No diagnosis found.    Plan:       The patient has a diagnosis of smoldering myeloma. There is no indication for immediate chemotherapy. We are monitoring  renal function closely- baseline creatinine of -1.5..  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 Total protein remains normal, M protein has been stable.Plan to complete labs when Emerson returns to [place] and he will let us know of this date.  Plan for return in 1month.  Endocrinology and Nephrology to monitor diabetes and renal failure respectively.

## 2020-09-28 ENCOUNTER — TELEPHONE (OUTPATIENT)
Dept: ORTHOPEDICS | Facility: CLINIC | Age: 75
End: 2020-09-28

## 2020-09-30 PROCEDURE — 99457 RPM TX MGMT 1ST 20 MIN: CPT | Mod: S$GLB,,, | Performed by: INTERNAL MEDICINE

## 2020-09-30 PROCEDURE — 99457 PR MONITORING, PHYSIOL PARAM, REMOTE, 1ST 20 MINS, PER MONTH: ICD-10-PCS | Mod: S$GLB,,, | Performed by: INTERNAL MEDICINE

## 2020-10-01 RX ORDER — OMEPRAZOLE 40 MG/1
40 CAPSULE, DELAYED RELEASE ORAL DAILY
Qty: 90 CAPSULE | Refills: 0 | Status: SHIPPED | OUTPATIENT
Start: 2020-10-01 | End: 2021-01-21

## 2020-10-01 NOTE — TELEPHONE ENCOUNTER
Care Due:                  Date            Visit Type   Department     Provider  --------------------------------------------------------------------------------                                PATRICK SEARS      [facility] INTERNAL  Last Visit: 04-      VISIT        MEDICINE       GEOVANNA ALCAZAR  Next Visit: None Scheduled  None         None Found                                                            Last  Test          Frequency    Reason                     Performed    Due Date  --------------------------------------------------------------------------------    HBA1C.......  6 months...  glimepiride, insulin.....  06-   12-    HDL.........  12 months..  pravastatin..............  09- 09-    LDL.........  12 months..  pravastatin..............  09- 09-    Total         12 months..  pravastatin..............  09- 09-  Cholesterol.    Triglyceride  12 months..  pravastatin..............  09- 09-  s...........    Powered by [employer]. Reference number: [id number]. 10/01/2020 2:56:15 PM   CDT

## 2020-10-05 ENCOUNTER — PATIENT MESSAGE (OUTPATIENT)
Dept: ADMINISTRATIVE | Facility: HOSPITAL | Age: 75
End: 2020-10-05

## 2020-10-19 DIAGNOSIS — I10 ESSENTIAL HYPERTENSION: ICD-10-CM

## 2020-10-19 DIAGNOSIS — E55.9 VITAMIN D DEFICIENCY: ICD-10-CM

## 2020-10-19 DIAGNOSIS — N18.30 CHRONIC KIDNEY DISEASE, STAGE III (MODERATE): Chronic | ICD-10-CM

## 2020-10-19 RX ORDER — TADALAFIL 20 MG/1
20 TABLET ORAL DAILY PRN
Qty: 30 TABLET | Refills: 3 | Status: SHIPPED | OUTPATIENT
Start: 2020-10-19 | End: 2021-05-27 | Stop reason: SDUPTHER

## 2020-10-19 NOTE — TELEPHONE ENCOUNTER
No new care gaps identified.  Powered by [employer]. Reference number: [id number]. 10/19/2020 2:20:22 PM   CDT

## 2020-10-20 ENCOUNTER — OFFICE VISIT (OUTPATIENT)
Dept: INTERNAL MEDICINE | Facility: CLINIC | Age: 75
End: 2020-10-20
Payer: MEDICARE

## 2020-10-20 VITALS
WEIGHT: 189.81 LBS | OXYGEN SATURATION: 98 % | HEART RATE: 98 BPM | SYSTOLIC BLOOD PRESSURE: 128 MMHG | DIASTOLIC BLOOD PRESSURE: 70 MMHG | HEIGHT: 70 IN | BODY MASS INDEX: 27.17 KG/M2

## 2020-10-20 DIAGNOSIS — B02.23 POST-HERPETIC POLYNEUROPATHY: Primary | ICD-10-CM

## 2020-10-20 PROCEDURE — 1125F AMNT PAIN NOTED PAIN PRSNT: CPT | Mod: HCNC,GC,S$GLB, | Performed by: STUDENT IN AN ORGANIZED HEALTH CARE EDUCATION/TRAINING PROGRAM

## 2020-10-20 PROCEDURE — 3008F BODY MASS INDEX DOCD: CPT | Mod: HCNC,CPTII,GC,S$GLB | Performed by: STUDENT IN AN ORGANIZED HEALTH CARE EDUCATION/TRAINING PROGRAM

## 2020-10-20 PROCEDURE — 99213 PR OFFICE/OUTPT VISIT, EST, LEVL III, 20-29 MIN: ICD-10-PCS | Mod: HCNC,GC,S$GLB, | Performed by: STUDENT IN AN ORGANIZED HEALTH CARE EDUCATION/TRAINING PROGRAM

## 2020-10-20 PROCEDURE — 3074F SYST BP LT 130 MM HG: CPT | Mod: HCNC,CPTII,GC,S$GLB | Performed by: STUDENT IN AN ORGANIZED HEALTH CARE EDUCATION/TRAINING PROGRAM

## 2020-10-20 PROCEDURE — 99213 OFFICE O/P EST LOW 20 MIN: CPT | Mod: HCNC,GC,S$GLB, | Performed by: STUDENT IN AN ORGANIZED HEALTH CARE EDUCATION/TRAINING PROGRAM

## 2020-10-20 PROCEDURE — 3008F PR BODY MASS INDEX (BMI) DOCUMENTED: ICD-10-PCS | Mod: HCNC,CPTII,GC,S$GLB | Performed by: STUDENT IN AN ORGANIZED HEALTH CARE EDUCATION/TRAINING PROGRAM

## 2020-10-20 PROCEDURE — 3078F DIAST BP <80 MM HG: CPT | Mod: HCNC,CPTII,GC,S$GLB | Performed by: STUDENT IN AN ORGANIZED HEALTH CARE EDUCATION/TRAINING PROGRAM

## 2020-10-20 PROCEDURE — 1159F PR MEDICATION LIST DOCUMENTED IN MEDICAL RECORD: ICD-10-PCS | Mod: HCNC,GC,S$GLB, | Performed by: STUDENT IN AN ORGANIZED HEALTH CARE EDUCATION/TRAINING PROGRAM

## 2020-10-20 PROCEDURE — 99999 PR PBB SHADOW E&M-EST. PATIENT-LVL III: ICD-10-PCS | Mod: PBBFAC,HCNC,GC, | Performed by: STUDENT IN AN ORGANIZED HEALTH CARE EDUCATION/TRAINING PROGRAM

## 2020-10-20 PROCEDURE — 1101F PT FALLS ASSESS-DOCD LE1/YR: CPT | Mod: HCNC,CPTII,GC,S$GLB | Performed by: STUDENT IN AN ORGANIZED HEALTH CARE EDUCATION/TRAINING PROGRAM

## 2020-10-20 PROCEDURE — 1101F PR PT FALLS ASSESS DOC 0-1 FALLS W/OUT INJ PAST YR: ICD-10-PCS | Mod: HCNC,CPTII,GC,S$GLB | Performed by: STUDENT IN AN ORGANIZED HEALTH CARE EDUCATION/TRAINING PROGRAM

## 2020-10-20 PROCEDURE — 1125F PR PAIN SEVERITY QUANTIFIED, PAIN PRESENT: ICD-10-PCS | Mod: HCNC,GC,S$GLB, | Performed by: STUDENT IN AN ORGANIZED HEALTH CARE EDUCATION/TRAINING PROGRAM

## 2020-10-20 PROCEDURE — 3074F PR MOST RECENT SYSTOLIC BLOOD PRESSURE < 130 MM HG: ICD-10-PCS | Mod: HCNC,CPTII,GC,S$GLB | Performed by: STUDENT IN AN ORGANIZED HEALTH CARE EDUCATION/TRAINING PROGRAM

## 2020-10-20 PROCEDURE — 3078F PR MOST RECENT DIASTOLIC BLOOD PRESSURE < 80 MM HG: ICD-10-PCS | Mod: HCNC,CPTII,GC,S$GLB | Performed by: STUDENT IN AN ORGANIZED HEALTH CARE EDUCATION/TRAINING PROGRAM

## 2020-10-20 PROCEDURE — 1159F MED LIST DOCD IN RCRD: CPT | Mod: HCNC,GC,S$GLB, | Performed by: STUDENT IN AN ORGANIZED HEALTH CARE EDUCATION/TRAINING PROGRAM

## 2020-10-20 PROCEDURE — 99999 PR PBB SHADOW E&M-EST. PATIENT-LVL III: CPT | Mod: PBBFAC,HCNC,GC, | Performed by: STUDENT IN AN ORGANIZED HEALTH CARE EDUCATION/TRAINING PROGRAM

## 2020-10-20 RX ORDER — ERGOCALCIFEROL 1.25 MG/1
50000 CAPSULE ORAL
Qty: 4 CAPSULE | Refills: 3 | Status: SHIPPED | OUTPATIENT
Start: 2020-10-20 | End: 2021-01-21 | Stop reason: SDUPTHER

## 2020-10-20 NOTE — PATIENT INSTRUCTIONS
Capsaicin Cream for pain   Calamine Lotion for itching   Gabapentin 100mg - 300mg TID as needed for pain; can be sedating. Take larger doses at night.

## 2020-10-21 ENCOUNTER — RESEARCH ENCOUNTER (OUTPATIENT)
Dept: HEMATOLOGY/ONCOLOGY | Facility: CLINIC | Age: 75
End: 2020-10-21

## 2020-10-21 ENCOUNTER — PATIENT MESSAGE (OUTPATIENT)
Dept: INTERNAL MEDICINE | Facility: CLINIC | Age: 75
End: 2020-10-21

## 2020-10-21 DIAGNOSIS — E13.9 DIABETES MELLITUS DUE TO ABNORMAL INSULIN: ICD-10-CM

## 2020-10-21 PROBLEM — B02.23 POST-HERPETIC POLYNEUROPATHY: Status: ACTIVE | Noted: 2020-10-21

## 2020-10-21 RX ORDER — BLOOD SUGAR DIAGNOSTIC
STRIP MISCELLANEOUS
Qty: 400 STRIP | Refills: 3 | Status: SHIPPED | OUTPATIENT
Start: 2020-10-21 | End: 2021-01-13 | Stop reason: SDUPTHER

## 2020-10-21 NOTE — PROGRESS NOTES
Clinic Note  10/21/2020      Subjective:       Patient ID: Emerson Menendez is a 75 y.o. male being seen for an established visit.    Chief Complaint: Rash      Patient is a 75 y.o. man with smoldering MM, HTN, and CKD 3 presenting to clinic for an urgent care follow up. Patient was recently diagnosed with shingles and completed his course of antivirals. His only complaint today is itching and pain associated with the rash which he states are scabbing over now. His ROS is otherwise unremarkable.       Review of Systems   Constitutional: Negative for chills, fatigue and fever.   HENT: Negative for ear pain, sore throat, tinnitus and trouble swallowing.    Eyes: Negative for photophobia, pain and visual disturbance.   Respiratory: Negative for cough, chest tightness, shortness of breath and wheezing.    Cardiovascular: Negative for chest pain and palpitations.   Gastrointestinal: Negative for diarrhea, nausea and vomiting.   Genitourinary: Negative for difficulty urinating, dysuria, frequency, hematuria and testicular pain.   Musculoskeletal: Positive for back pain. Negative for arthralgias and myalgias.   Skin: Positive for rash and wound. Negative for pallor.   Neurological: Negative for dizziness, syncope, weakness, light-headedness, numbness and headaches.   Psychiatric/Behavioral: Negative for agitation, behavioral problems and confusion.       Patient's Medications   New Prescriptions    No medications on file   Previous Medications    ASPIRIN (ECOTRIN) 81 MG EC TABLET    Take 1 tablet (81 mg total) by mouth once daily.    BLOOD GLUCOSE CONTROL, NORMAL (METER-CHECK) SOLN    One meter.  Use as directed. Meter of insurance choice    BLOOD SUGAR DIAGNOSTIC (ACCU-CHEK ELIE PLUS TEST STRP) STR    Accu check elie plus TEST FOUR TIMES DAILY. Test strtips of insurance choice to go with meter and lancets    [facility] sugars 4 times daily    BLOOD-GLUCOSE METER  "(ACCU-CHEK OMAYRA) MIS    USE AS DIRECTED. Accucheck elie plus    CITALOPRAM (CELEXA) 20 MG TABLET    Take 1.5 tablets (30 mg total) by mouth once daily.    DIAZEPAM (VALIUM) 5 MG TABLET    TAKE 1 TABLET(5 MG) BY MOUTH EVERY 6 HOURS AS NEEDED    ERGOCALCIFEROL (ERGOCALCIFEROL) 50,000 UNIT CAP    Take 1 capsule (50,000 Units total) by mouth every 7 days.    GABAPENTIN (NEURONTIN) 100 MG CAPSULE    TAKE 1 CAPSULE EVERY MORNING, 1 CAPSULE IN THE AFTERNOON, AND 1 TO 3 CAPSULE(S) AT BEDTIME AS NEEDED FOR FOOT PAIN    GLIMEPIRIDE (AMARYL) 2 MG TABLET    Take 1 tablet (2 mg total) by mouth once daily.    GLUCAGON (HUMAN RECOMBINANT) INJ 1MG/ML KIT    Inject 1 mL (1 mg total) into the muscle as needed.    GLUCOSE 4 GM CHEWABLE TABLET    Take 8 g by mouth as needed for Low blood sugar.    HYDROCODONE-ACETAMINOPHEN (NORCO) 5-325 MG PER TABLET    Take 1 tablet by mouth every 4 to 6 hours as needed for Pain.    IBUPROFEN (ADVIL,MOTRIN) 800 MG TABLET    Take 1 tablet (800 mg total) by mouth 3 (three) times daily.    INSULIN ASPART U-100 (NOVOLOG FLEXPEN U-100 INSULIN) 100 UNIT/ML (3 ML) INPN PEN    Inject 4 Units into the skin 2 (two) times daily with meals.    LANCETS MISC    Use twice daily    LATANOPROST 0.005 % OPHTHALMIC SOLUTION    Place 1 drop into both eyes every evening.    LOSARTAN (COZAAR) 25 MG TABLET    Take 2 tablets (50 mg total) by mouth once daily.    MELOXICAM (MOBIC) 15 MG TABLET    TAKE 1 TABLET(15 MG) BY MOUTH EVERY DAY    OMEPRAZOLE (PRILOSEC) 40 MG CAPSULE    Take 1 capsule (40 mg total) by mouth once daily.    PEN NEEDLE, DIABETIC (BD ULTRA-FINE SHORT PEN NEEDLE) 31 GAUGE X 5/16" NDLE    USE TO INJECT INSULIN ONE TIME DAILY    PRAVASTATIN (PRAVACHOL) 40 MG TABLET    Take 1 tablet (40 mg total) by mouth once daily.    SILDENAFIL (REVATIO) 20 MG TAB    2-3 tablets daily as needed for erectile dysfunction    TADALAFIL (CIALIS) 20 MG TAB    Take 1 tablet (20 mg total) by mouth daily as needed.    TADALAFIL " (CIALIS) 5 MG TABLET    TAKE 1 TABLET(5 MG) BY MOUTH DAILY AS NEEDED FOR ERECTILE DYSFUNCTION    TAMSULOSIN (FLOMAX) 0.4 MG CAP    Take 1 capsule (0.4 mg total) by mouth once daily.    TIZANIDINE (ZANAFLEX) 4 MG TABLET    1/2-1 tablet every 8 hours as needed for muscle spasm    TRAMADOL (ULTRAM) 50 MG TABLET    Take 1 tablet (50 mg total) by mouth every 8 (eight) hours as needed for Pain (moderate to severe pain).    TRUEPLUS LANCETS 33 GAUGE MISC        VALACYCLOVIR (VALTREX) 1000 MG TABLET    Take 1 tablet (1,000 mg total) by mouth every 12 (twelve) hours. 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    Type 2 diabetes mellitus with hyperglycemia, with long-term current use of insulin 05/26/2017    Overweight (BMI 25.0-29.9) 05/26/2017    Left leg weakness 02/10/2017    OAB (overactive bladder) 02/02/2017    Disorder of ejaculation 01/09/2017    Primary open angle glaucoma of both eyes, moderate stage 09/07/2016    Status post cataract extraction and insertion of intraocular lens of right eye 09/07/2016    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    Chronic kidney disease, stage III (moderate) 12/05/2015    Anemia 12/05/2015    Anxiety 03/16/2015    Depression 03/16/2015    Cervical spondylosis without myelopathy 05/19/2014    Laceration of thumb, right 12/31/2013    Fracture of thumb, right open 12/31/2013    Open nondisplaced fracture of distal phalanx of  "right thumb - s/p I&D, closure and splinting 12/17/13 12/17/2013    Type 2 diabetes mellitus with diabetic polyneuropathy, with long-term current use of insulin 12/09/2013    BPH with urinary obstruction 11/13/2013    ED (erectile dysfunction) of organic origin 09/13/2013    Retrograde ejaculation 09/13/2013    No diabetic retinopathy in both eyes - Both Eyes 09/05/2013    Nuclear sclerosis - Left Eye 09/05/2013    Essential hypertension 09/24/2012    Pure hypercholesterolemia 09/24/2012    Microalbuminuria due to type 2 diabetes mellitus 09/24/2012    GERD (gastroesophageal reflux disease) 09/24/2012    Osteoarthritis of cervical spine 09/24/2012    Osteoarthritis of knee 09/24/2012    Hypertrophy of breast 03/12/2012    Lump or mass in breast 03/12/2012    Mastodynia 03/12/2012    Unspecified hyperplasia of prostate without urinary obstruction and other lower urinary tract symptoms (LUTS) 02/15/2012           Objective:      /70 (BP Location: Left arm, Patient Position: Sitting, BP Method: Large (Manual))   Pulse 98   Ht 5' 10" (1.778 m)   Wt 86.1 kg (189 lb 13.1 oz)   SpO2 98%   BMI 27.24 kg/m²   Estimated body mass index is 27.24 kg/m² as calculated from the following:    Height as of this encounter: 5' 10" (1.778 m).    Weight as of this encounter: 86.1 kg (189 lb 13.1 oz).    Physical Exam  Vitals signs reviewed.   Constitutional:       General: He is not in acute distress.     Appearance: He is well-developed. He is not diaphoretic.   HENT:      Head: Normocephalic and atraumatic.      Nose: Nose normal.   Eyes:      General: No scleral icterus.     Conjunctiva/sclera: Conjunctivae normal.   Neck:      Musculoskeletal: Normal range of motion and neck supple.   Cardiovascular:      Rate and Rhythm: Normal rate and regular rhythm.      Heart sounds: Normal heart sounds.   Pulmonary:      Effort: Pulmonary effort is normal. No respiratory distress.      Breath sounds: Normal breath " sounds. No wheezing.   Abdominal:      General: Bowel sounds are normal. There is no distension.      Palpations: Abdomen is soft.      Tenderness: There is no abdominal tenderness. There is no rebound.   Musculoskeletal: Normal range of motion.         General: No tenderness or deformity.   Skin:     General: Skin is warm and dry.      Coloration: Skin is not pale.      Findings: Rash present. No erythema.   Neurological:      Mental Status: He is alert and oriented to person, place, and time.   Psychiatric:         Behavior: Behavior normal.         Thought Content: Thought content normal.         Judgment: Judgment normal.         Assessment & Plan:   Emerson was seen today for rash.    Diagnoses and all orders for this visit:    Post-herpetic polyneuropathy          Patient finished course of valacyclovir. Lesions now crusting over. Patient now complaining of pain and pruritis at sites of lesion. Patient instructions as follows:  - Capsaicin Cream for pain   - Calamine Lotion for itching   - Gabapentin 100mg - 300mg TID as needed for pain; can be sedating. Take larger doses at night.      Patient seen and plan of care discussed with Dr. Ayaka Benedict MD/MS  [facility]   Internal Medicine, PGY-3

## 2020-10-21 NOTE — PROGRESS NOTES
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   Sponsor:  [facility]  IRB# [id number]  Study ID: [id number]  Investigator: GIL Engel Pt Initials: LUCÍA LORENZ       Cycle 59 Day 27 Arm B: Observation    Patient contacted via telephone for assessment as this research RN will be off campus for appt tomorrow. Subject states he is recovering from shingles that started about 2 weeks ago and for which he completed course of Valcyclovir and is applying Capsaicin cream and calamine lotion and is taking neurontin for pain. He states the shingles are located 'On my back and side" and states the cream provides relief.   He reports he continues to follow CDC guidelines regarding COVID 19, no known exposure and no symptoms noted. 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 Overall he reports that he is feeling "more and more depressed about my daughter; I spend every minute trying not to think about her but that's all I do is think about her. The holidays are going to be hard, I dread that."  He states he will be in town until Tuesday morning and wishes to speak with the psychiatrist. He denies suicidal ideations.   Contacted Dr Engel regarding subject's mood and desire for appt with psychiatrist. Per Dr Engel she will reach out to Dr AGUDELO to arrange to see subject.    Will update note once subject is assessed and labs etc evaluated.          Instructed subject to contact research staff here at [facility] with any questions/concerns that may arise over next day or so or until he is able to see Dr Engel. Patient states having research RN's contact information and has MD contact information to call with any concerns, questions, or worsening of symptoms.  Discussed next month's appt date and time, he verbalized understanding.              "

## 2020-10-22 ENCOUNTER — LAB VISIT (OUTPATIENT)
Dept: LAB | Facility: HOSPITAL | Age: 75
End: 2020-10-22
Payer: MEDICARE

## 2020-10-22 ENCOUNTER — OFFICE VISIT (OUTPATIENT)
Dept: HEMATOLOGY/ONCOLOGY | Facility: CLINIC | Age: 75
End: 2020-10-22
Payer: MEDICARE

## 2020-10-22 VITALS
SYSTOLIC BLOOD PRESSURE: 126 MMHG | WEIGHT: 191.81 LBS | BODY MASS INDEX: 27.46 KG/M2 | DIASTOLIC BLOOD PRESSURE: 64 MMHG | RESPIRATION RATE: 17 BRPM | HEIGHT: 70 IN | TEMPERATURE: 99 F | HEART RATE: 75 BPM | OXYGEN SATURATION: 99 %

## 2020-10-22 DIAGNOSIS — Z00.6 RESEARCH EXAM: ICD-10-CM

## 2020-10-22 DIAGNOSIS — D47.2 SMOLDERING MULTIPLE MYELOMA: Primary | ICD-10-CM

## 2020-10-22 DIAGNOSIS — D47.2 SMOLDERING MULTIPLE MYELOMA: ICD-10-CM

## 2020-10-22 DIAGNOSIS — E11.42 TYPE 2 DIABETES MELLITUS WITH DIABETIC POLYNEUROPATHY, WITH LONG-TERM CURRENT USE OF INSULIN: ICD-10-CM

## 2020-10-22 DIAGNOSIS — Z79.4 TYPE 2 DIABETES MELLITUS WITH DIABETIC POLYNEUROPATHY, WITH LONG-TERM CURRENT USE OF INSULIN: ICD-10-CM

## 2020-10-22 LAB
ALBUMIN SERPL BCP-MCNC: 3.4 G/DL (ref 3.5–5.2)
ALP SERPL-CCNC: 50 U/L (ref 55–135)
ALT SERPL W/O P-5'-P-CCNC: 20 U/L (ref 10–44)
ANION GAP SERPL CALC-SCNC: 10 MMOL/L (ref 8–16)
AST SERPL-CCNC: 19 U/L (ref 10–40)
BASOPHILS # BLD AUTO: 0.02 K/UL (ref 0–0.2)
BASOPHILS NFR BLD: 0.4 % (ref 0–1.9)
BILIRUB SERPL-MCNC: 0.4 MG/DL (ref 0.1–1)
BUN SERPL-MCNC: 24 MG/DL (ref 8–23)
CALCIUM SERPL-MCNC: 9.2 MG/DL (ref 8.7–10.5)
CHLORIDE SERPL-SCNC: 101 MMOL/L (ref 95–110)
CO2 SERPL-SCNC: 23 MMOL/L (ref 23–29)
CREAT SERPL-MCNC: 1.7 MG/DL (ref 0.5–1.4)
DIFFERENTIAL METHOD: ABNORMAL
EOSINOPHIL # BLD AUTO: 0.2 K/UL (ref 0–0.5)
EOSINOPHIL NFR BLD: 4.6 % (ref 0–8)
ERYTHROCYTE [DISTWIDTH] IN BLOOD BY AUTOMATED COUNT: 13.6 % (ref 11.5–14.5)
EST. GFR  (AFRICAN AMERICAN): 44.9 ML/MIN/1.73 M^2
EST. GFR  (NON AFRICAN AMERICAN): 38.9 ML/MIN/1.73 M^2
GLUCOSE SERPL-MCNC: 172 MG/DL (ref 70–110)
HCT VFR BLD AUTO: 33.8 % (ref 40–54)
HGB BLD-MCNC: 11 G/DL (ref 14–18)
IGA SERPL-MCNC: 1418 MG/DL (ref 40–350)
IGG SERPL-MCNC: 890 MG/DL (ref 650–1600)
IGM SERPL-MCNC: 49 MG/DL (ref 50–300)
IMM GRANULOCYTES # BLD AUTO: 0.02 K/UL (ref 0–0.04)
IMM GRANULOCYTES NFR BLD AUTO: 0.4 % (ref 0–0.5)
LDH SERPL L TO P-CCNC: 120 U/L (ref 110–260)
LYMPHOCYTES # BLD AUTO: 1.9 K/UL (ref 1–4.8)
LYMPHOCYTES NFR BLD: 35.5 % (ref 18–48)
MAGNESIUM SERPL-MCNC: 1.7 MG/DL (ref 1.6–2.6)
MCH RBC QN AUTO: 30.6 PG (ref 27–31)
MCHC RBC AUTO-ENTMCNC: 32.5 G/DL (ref 32–36)
MCV RBC AUTO: 94 FL (ref 82–98)
MONOCYTES # BLD AUTO: 0.3 K/UL (ref 0.3–1)
MONOCYTES NFR BLD: 6.3 % (ref 4–15)
NEUTROPHILS # BLD AUTO: 2.8 K/UL (ref 1.8–7.7)
NEUTROPHILS NFR BLD: 52.8 % (ref 38–73)
NRBC BLD-RTO: 0 /100 WBC
PHOSPHATE SERPL-MCNC: 3.1 MG/DL (ref 2.7–4.5)
PLATELET # BLD AUTO: 284 K/UL (ref 150–350)
PMV BLD AUTO: 8.9 FL (ref 9.2–12.9)
POTASSIUM SERPL-SCNC: 4.7 MMOL/L (ref 3.5–5.1)
PROT SERPL-MCNC: 8.7 G/DL (ref 6–8.4)
RBC # BLD AUTO: 3.59 M/UL (ref 4.6–6.2)
SODIUM SERPL-SCNC: 134 MMOL/L (ref 136–145)
WBC # BLD AUTO: 5.24 K/UL (ref 3.9–12.7)

## 2020-10-22 PROCEDURE — 99999 PR PBB SHADOW E&M-EST. PATIENT-LVL V: CPT | Mod: PBBFAC,HCNC,, | Performed by: INTERNAL MEDICINE

## 2020-10-22 PROCEDURE — 85025 COMPLETE CBC W/AUTO DIFF WBC: CPT | Mod: HCNC,Q1

## 2020-10-22 PROCEDURE — 99215 OFFICE O/P EST HI 40 MIN: CPT | Mod: Q1,HCNC,S$GLB, | Performed by: INTERNAL MEDICINE

## 2020-10-22 PROCEDURE — 99999 PR PBB SHADOW E&M-EST. PATIENT-LVL V: ICD-10-PCS | Mod: PBBFAC,HCNC,, | Performed by: INTERNAL MEDICINE

## 2020-10-22 PROCEDURE — 84165 PROTEIN E-PHORESIS SERUM: CPT | Mod: 26,Q1,HCNC, | Performed by: PATHOLOGY

## 2020-10-22 PROCEDURE — 83520 IMMUNOASSAY QUANT NOS NONAB: CPT | Mod: 59,HCNC,Q1

## 2020-10-22 PROCEDURE — 86334 IMMUNOFIX E-PHORESIS SERUM: CPT | Mod: 26,Q1,HCNC, | Performed by: PATHOLOGY

## 2020-10-22 PROCEDURE — 36415 COLL VENOUS BLD VENIPUNCTURE: CPT | Mod: HCNC

## 2020-10-22 PROCEDURE — 86334 IMMUNOFIX E-PHORESIS SERUM: CPT | Mod: HCNC

## 2020-10-22 PROCEDURE — 80053 COMPREHEN METABOLIC PANEL: CPT | Mod: HCNC

## 2020-10-22 PROCEDURE — 84100 ASSAY OF PHOSPHORUS: CPT | Mod: HCNC,Q1

## 2020-10-22 PROCEDURE — 83615 LACTATE (LD) (LDH) ENZYME: CPT | Mod: HCNC

## 2020-10-22 PROCEDURE — 99215 PR OFFICE/OUTPT VISIT, EST, LEVL V, 40-54 MIN: ICD-10-PCS | Mod: Q1,HCNC,S$GLB, | Performed by: INTERNAL MEDICINE

## 2020-10-22 PROCEDURE — 83735 ASSAY OF MAGNESIUM: CPT | Mod: HCNC

## 2020-10-22 PROCEDURE — 86334 PATHOLOGIST INTERPRETATION IFE: ICD-10-PCS | Mod: 26,Q1,HCNC, | Performed by: PATHOLOGY

## 2020-10-22 PROCEDURE — 84165 PATHOLOGIST INTERPRETATION SPE: ICD-10-PCS | Mod: 26,Q1,HCNC, | Performed by: PATHOLOGY

## 2020-10-22 PROCEDURE — 84165 PROTEIN E-PHORESIS SERUM: CPT | Mod: HCNC

## 2020-10-22 PROCEDURE — 82784 ASSAY IGA/IGD/IGG/IGM EACH: CPT | Mod: 59,HCNC

## 2020-10-23 LAB
ALBUMIN SERPL ELPH-MCNC: 3.94 G/DL (ref 3.35–5.55)
ALPHA1 GLOB SERPL ELPH-MCNC: 0.33 G/DL (ref 0.17–0.41)
ALPHA2 GLOB SERPL ELPH-MCNC: 0.91 G/DL (ref 0.43–0.99)
B-GLOBULIN SERPL ELPH-MCNC: 1.12 G/DL (ref 0.5–1.1)
GAMMA GLOB SERPL ELPH-MCNC: 1.89 G/DL (ref 0.67–1.58)
INTERPRETATION SERPL IFE-IMP: NORMAL
KAPPA LC SER QL IA: 9.24 MG/DL (ref 0.33–1.94)
KAPPA LC/LAMBDA SER IA: 4.18 (ref 0.26–1.65)
LAMBDA LC SER QL IA: 2.21 MG/DL (ref 0.57–2.63)
PATHOLOGIST INTERPRETATION IFE: NORMAL
PATHOLOGIST INTERPRETATION SPE: NORMAL
PROT SERPL-MCNC: 8.2 G/DL (ref 6–8.4)

## 2020-10-23 NOTE — PROGRESS NOTES
Subjective:    Patient ID: Emerson Menendez is a 75 y.o. male.    Chief Complaint: No chief complaint on file.  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 Test results identify a stable IgA kappa protein with beta globulin band at 1.63g/dL. Kappa free light chain is elevated at 4.40. CBC and calcium are stable. Creatinine with history of stage III CKD is stable at 1.4. Metastatic survey is negative for lytic lesions. MRI of the entire spine demonstrated age related changes but no convincing evidence of myeloma bone disease. Bone marrow biopsy identified about 13% plasma cells by morphology and FISH for myeloma identified a t(11;14) and trisomies 3,7, and 17. The patient is afebrile and appears clinically well. He was seen by his podiatrist and nephrologist since our last visit without any new or acute events.    The patient has not received any therapy for smoldering myeloma including bisphosphonates or steroids. He has been randomized to observation arm of the the clinical study. 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 He has no current or prior history of malignancy.    TODAY  Mr. Menendez returns today for monthly follow-up evaluation. No acute interval events. Is working to get a better glucose monitoring system; source of significant stress for him. Carpal tunnel surgery on left arm completed and incision is healing well; in brace today. Still coping with loss of daughter; mood is becoming low as holidays approach.  CBC, CMP remains stable. Myeloma labs pending.      Follow-up  Pertinent negatives include no diaphoresis, fatigue or fever.     Review of Systems    Constitutional: Negative for activity change, appetite change, diaphoresis, fatigue, fever and unexpected weight change.   HENT: Negative.    Eyes: Negative.    Respiratory: Negative.    Cardiovascular: Negative for leg swelling.   Gastrointestinal: Negative.    Endocrine: Negative.    Genitourinary: Negative.    Musculoskeletal: Negative.    Skin: Negative.    Allergic/Immunologic: Negative.    Neurological:        Grade 0-1 peripheral neuropathy of bilateral feet.    Hematological: Negative for adenopathy. Does not bruise/bleed easily.   Psychiatric/Behavioral: Negative.        Objective:       Vitals:    10/22/20 0917   BP: 126/64   Pulse: 75   Resp: 17   Temp: 98.6 °F (37 °C)       Physical Exam  Vitals signs and nursing note reviewed.   Constitutional:       Appearance: He is well-developed.   HENT:      Head: Normocephalic and atraumatic.      Right Ear: External ear normal.      Left Ear: External ear normal.      Nose: Nose normal.   Eyes:      Conjunctiva/sclera: Conjunctivae normal.      Pupils: Pupils are equal, round, and reactive to light.   Neck:      Musculoskeletal: Normal range of motion and neck supple.   Cardiovascular:      Rate and Rhythm: Normal rate and regular rhythm.      Heart sounds: No murmur.   Pulmonary:      Effort: Pulmonary effort is normal. No respiratory distress.      Breath sounds: Normal breath sounds.   Abdominal:      General: Bowel sounds are normal. There is no distension.      Palpations: Abdomen is soft.   Musculoskeletal:         General: No tenderness.   Skin:     General: Skin is warm and dry.      Findings: No rash.      Nails: There is no clubbing.     Neurological:      Mental Status: He is alert and oriented to person, place, and time.      Cranial Nerves: No cranial nerve deficit.   Psychiatric:         Behavior: Behavior normal.         Assessment:       No diagnosis found.    Plan:       The patient has a diagnosis of smoldering myeloma. There is no indication  for immediate chemotherapy. We are monitoring renal function closely- baseline creatinine of -1.5..  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 Total protein remains normal, M protein has been stable.Plan to complete labs when Emerson returns to [place] and he will let us know of this date.  Plan for return in 1month.  Endocrinology and Nephrology to monitor diabetes and renal failure respectively. Patient would like a follow-up visit with Dr. Perkins.

## 2020-10-27 ENCOUNTER — CLINICAL SUPPORT (OUTPATIENT)
Dept: ENDOCRINOLOGY | Facility: CLINIC | Age: 75
End: 2020-10-27
Payer: MEDICARE

## 2020-10-27 DIAGNOSIS — Z79.4 TYPE 2 DIABETES MELLITUS WITH DIABETIC POLYNEUROPATHY, WITH LONG-TERM CURRENT USE OF INSULIN: Chronic | ICD-10-CM

## 2020-10-27 DIAGNOSIS — E11.42 TYPE 2 DIABETES MELLITUS WITH DIABETIC POLYNEUROPATHY, WITH LONG-TERM CURRENT USE OF INSULIN: Chronic | ICD-10-CM

## 2020-10-27 PROCEDURE — 95250 PR GLUCOSE MONITORING,72 HRS,SUB-Q SENSOR: ICD-10-PCS | Mod: HCNC,S$GLB,, | Performed by: INTERNAL MEDICINE

## 2020-10-27 PROCEDURE — 95250 CONT GLUC MNTR PHYS/QHP EQP: CPT | Mod: HCNC,S$GLB,, | Performed by: INTERNAL MEDICINE

## 2020-10-28 NOTE — PROGRESS NOTES
DIABETES EDUCATOR NOTE   Return of the Freestyle Leah Pro Sensor and Patient Log.    Patient returned to clinic today to return Glucose Sensor and signed patient log form used in [facility] procedure.    The CGMS Sensor will be scanned and downloaded. All reports will be imported into the patient's electronic medical record.    Endocrine Provider will complete data interpretation and make recommendations; will forward recommendations to the ordering provider for follow up with patient.

## 2020-11-01 NOTE — PROGRESS NOTES
I have reviewed the notes, assessments, and/or procedures performed by Dr. Benedict, I concur with his documentation of Emerson Menendez.

## 2020-11-04 ENCOUNTER — TELEPHONE (OUTPATIENT)
Dept: INFUSION THERAPY | Facility: HOSPITAL | Age: 75
End: 2020-11-04

## 2020-11-04 ENCOUNTER — TELEPHONE (OUTPATIENT)
Dept: PSYCHIATRY | Facility: CLINIC | Age: 75
End: 2020-11-04
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
## 2020-11-04 NOTE — TELEPHONE ENCOUNTER
Called patient due to lack of check in at appointed time for video visit. Patient forgot visit. Requests to be rescheduled for next available session. Patient reminded of check in procedures for video visits.  TC Perkins, PhD

## 2020-11-05 ENCOUNTER — OFFICE VISIT (OUTPATIENT)
Dept: PSYCHIATRY | Facility: CLINIC | Age: 75
End: 2020-11-05
Payer: MEDICARE

## 2020-11-05 DIAGNOSIS — F43.21 GRIEF: ICD-10-CM

## 2020-11-05 DIAGNOSIS — F43.23 ADJUSTMENT DISORDER WITH MIXED ANXIETY AND DEPRESSED MOOD: Primary | ICD-10-CM

## 2020-11-05 PROCEDURE — 90834 PR PSYCHOTHERAPY W/PATIENT, 45 MIN: ICD-10-PCS | Mod: HCNC,95,, | Performed by: PSYCHOLOGIST

## 2020-11-05 PROCEDURE — 90834 PSYTX W PT 45 MINUTES: CPT | Mod: HCNC,95,, | Performed by: PSYCHOLOGIST
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
## 2020-11-06 ENCOUNTER — PATIENT MESSAGE (OUTPATIENT)
Dept: ADMINISTRATIVE | Facility: OTHER | Age: 75
End: 2020-11-06

## 2020-11-06 ENCOUNTER — PATIENT MESSAGE (OUTPATIENT)
Dept: OPHTHALMOLOGY | Facility: CLINIC | Age: 75
End: 2020-11-06

## 2020-11-09 ENCOUNTER — PATIENT OUTREACH (OUTPATIENT)
Dept: ADMINISTRATIVE | Facility: OTHER | Age: 75
End: 2020-11-09

## 2020-11-09 DIAGNOSIS — R52 PAIN: Primary | ICD-10-CM

## 2020-11-09 DIAGNOSIS — Z12.12 SCREENING FOR COLORECTAL CANCER: Primary | ICD-10-CM

## 2020-11-09 DIAGNOSIS — Z12.11 SCREENING FOR COLORECTAL CANCER: Primary | ICD-10-CM

## 2020-11-10 NOTE — PROGRESS NOTES
Health Maintenance Due   Topic Date Due    Shingles Vaccine (1 of 2) 07/22/1996    Colorectal Cancer Screening  03/22/2019    Influenza Vaccine (1) 08/01/2020    Lipid Panel  09/17/2020     Updates were requested from care everywhere.  Chart was reviewed for overdue Proactive [facility] Encounters ([facility]) topics (CRS, Breast Cancer Screening, Eye exam)  Health Maintenance has been updated.  LINKS immunization registry triggered.  Immunizations were reconciled.  Fit kit ordered

## 2020-11-11 ENCOUNTER — OFFICE VISIT (OUTPATIENT)
Dept: OPHTHALMOLOGY | Facility: CLINIC | Age: 75
End: 2020-11-11
Payer: MEDICARE

## 2020-11-11 ENCOUNTER — OFFICE VISIT (OUTPATIENT)
Dept: ORTHOPEDICS | Facility: CLINIC | Age: 75
End: 2020-11-11
Payer: MEDICARE

## 2020-11-11 ENCOUNTER — PATIENT MESSAGE (OUTPATIENT)
Dept: INTERNAL MEDICINE | Facility: CLINIC | Age: 75
End: 2020-11-11

## 2020-11-11 ENCOUNTER — HOSPITAL ENCOUNTER (OUTPATIENT)
Dept: RADIOLOGY | Facility: OTHER | Age: 75
Discharge: HOME | End: 2020-11-11
Attending: PHYSICIAN ASSISTANT
Payer: MEDICARE

## 2020-11-11 VITALS
HEART RATE: 90 BPM | HEIGHT: 70 IN | SYSTOLIC BLOOD PRESSURE: 156 MMHG | DIASTOLIC BLOOD PRESSURE: 81 MMHG | BODY MASS INDEX: 27.35 KG/M2 | WEIGHT: 191 LBS

## 2020-11-11 DIAGNOSIS — Z96.1 STATUS POST CATARACT EXTRACTION AND INSERTION OF INTRAOCULAR LENS OF RIGHT EYE: ICD-10-CM

## 2020-11-11 DIAGNOSIS — Z98.41 STATUS POST CATARACT EXTRACTION AND INSERTION OF INTRAOCULAR LENS OF RIGHT EYE: ICD-10-CM

## 2020-11-11 DIAGNOSIS — M17.0 PRIMARY OSTEOARTHRITIS OF BOTH KNEES: Primary | ICD-10-CM

## 2020-11-11 DIAGNOSIS — M25.532 LEFT WRIST PAIN: ICD-10-CM

## 2020-11-11 DIAGNOSIS — H25.12 NUCLEAR SCLEROSIS OF LEFT EYE: ICD-10-CM

## 2020-11-11 DIAGNOSIS — M25.632 DECREASED RANGE OF MOTION OF LEFT WRIST: Primary | ICD-10-CM

## 2020-11-11 DIAGNOSIS — R52 PAIN: ICD-10-CM

## 2020-11-11 DIAGNOSIS — H40.1132 PRIMARY OPEN ANGLE GLAUCOMA OF BOTH EYES, MODERATE STAGE: Primary | ICD-10-CM

## 2020-11-11 PROCEDURE — 99999 PR PBB SHADOW E&M-EST. PATIENT-LVL IV: CPT | Mod: PBBFAC,HCNC,, | Performed by: OPHTHALMOLOGY

## 2020-11-11 PROCEDURE — 73130 XR HAND COMPLETE 3 VIEW LEFT: ICD-10-PCS | Mod: 26,HCNC,LT, | Performed by: RADIOLOGY

## 2020-11-11 PROCEDURE — 73130 X-RAY EXAM OF HAND: CPT | Mod: 26,HCNC,LT, | Performed by: RADIOLOGY

## 2020-11-11 PROCEDURE — 99499 UNLISTED E&M SERVICE: CPT | Mod: S$GLB,,, | Performed by: OPHTHALMOLOGY

## 2020-11-11 PROCEDURE — 92012 INTRM OPH EXAM EST PATIENT: CPT | Mod: HCNC,S$GLB,, | Performed by: OPHTHALMOLOGY

## 2020-11-11 PROCEDURE — 99499 RISK ADDL DX/OHS AUDIT: ICD-10-PCS | Mod: S$GLB,,, | Performed by: OPHTHALMOLOGY

## 2020-11-11 PROCEDURE — 92012 PR EYE EXAM, EST PATIENT,INTERMED: ICD-10-PCS | Mod: HCNC,S$GLB,, | Performed by: OPHTHALMOLOGY

## 2020-11-11 PROCEDURE — 99999 PR PBB SHADOW E&M-EST. PATIENT-LVL V: CPT | Mod: PBBFAC,HCNC,, | Performed by: PHYSICIAN ASSISTANT

## 2020-11-11 PROCEDURE — 99999 PR PBB SHADOW E&M-EST. PATIENT-LVL IV: ICD-10-PCS | Mod: PBBFAC,HCNC,, | Performed by: OPHTHALMOLOGY

## 2020-11-11 PROCEDURE — 99999 PR PBB SHADOW E&M-EST. PATIENT-LVL V: ICD-10-PCS | Mod: PBBFAC,HCNC,, | Performed by: PHYSICIAN ASSISTANT

## 2020-11-11 PROCEDURE — 73130 X-RAY EXAM OF HAND: CPT | Mod: TC,HCNC,FY,LT
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
## 2020-11-17 ENCOUNTER — HOSPITAL ENCOUNTER (OUTPATIENT)
Dept: RADIOLOGY | Facility: HOSPITAL | Age: 75
Discharge: HOME | End: 2020-11-17
Attending: PHYSICIAN ASSISTANT
Payer: MEDICARE

## 2020-11-17 ENCOUNTER — IMMUNIZATION (OUTPATIENT)
Dept: PHARMACY | Facility: CLINIC | Age: 75
End: 2020-11-17
Payer: MEDICARE

## 2020-11-17 DIAGNOSIS — M25.632 DECREASED RANGE OF MOTION OF LEFT WRIST: ICD-10-CM

## 2020-11-17 DIAGNOSIS — M25.532 LEFT WRIST PAIN: ICD-10-CM

## 2020-11-17 PROCEDURE — 73221 MRI WRIST WITHOUT CONTRAST LEFT: ICD-10-PCS | Mod: 26,HCNC,LT, | Performed by: RADIOLOGY

## 2020-11-17 PROCEDURE — 73221 MRI JOINT UPR EXTREM W/O DYE: CPT | Mod: TC,HCNC,LT

## 2020-11-17 PROCEDURE — 73221 MRI JOINT UPR EXTREM W/O DYE: CPT | Mod: 26,HCNC,LT, | Performed by: RADIOLOGY

## 2020-11-17 NOTE — TELEPHONE ENCOUNTER
Pt called, states he picked up the handicap forms and lost the,. Wants to know if you will redo them. Pt states will call back if he finds them

## 2020-11-18 ENCOUNTER — TELEPHONE (OUTPATIENT)
Dept: ORTHOPEDICS | Facility: CLINIC | Age: 75
End: 2020-11-18

## 2020-11-18 ENCOUNTER — PATIENT OUTREACH (OUTPATIENT)
Dept: OTHER | Facility: OTHER | Age: 75
End: 2020-11-18

## 2020-11-18 NOTE — TELEPHONE ENCOUNTER
Left patient a voicemail reminder of their scheduled appointment on 11/19/20. If the patient has any questions we can be reached at [phone].

## 2020-11-18 NOTE — PROGRESS NOTES
"Digital Medicine: Health  Follow-Up    The history is provided by the patient.             Reason for review: Blood glucose at goal        Topics Covered on Call: Diet    Additional Follow-up details: Patient stated that he has been using a secondary glucometer to input his readings. He was contacted by tech support to see if he wanted to try a new meter and he agreed. The patient believes that the elevated readings he was receiving were inaccurate. He plans to check his readings from his new [employer] glucometer to the readings of his Accucheck monitor once he receives the [employer] device.     Patient stated that he has been eating healthier and exercising more.     He is worried about his upcoming A1C drawing. The patient mentioned that he will be disappointed if his A1C has gone up when he gets it drawn tomorrow. Encouraged the patient that even if his A1C has come up a little, we can get it back down like we did before. The patient was receptive. Will follow up with patient in a few weeks.             Diet-Change    Patient reports eating or drinking the following: Patient stated that he has been eating healthier. While at home in [place], his brother prepares a lot of their food. Yesterday, while in [place] he stopped at [employer] and picked up some pizza and ice cream. He does snack at night, but most of his snacks are sugar free.     Yesterday, he missed lunch, which caused his lower reading of 97 mg/dL before dinner. He is realizing that he does not "have the luxury" of missing meals. He understands that missing meals can cause his readings to become low.       Physical Activity-Change      Additional physical activity details: The patient exercises in his apartment gym.       Medication Adherence-Medication adherence was assessed.          Additional monitoring needed.  Continue current diet/physical activity routine.       Addressed patient questions and patient has my contact information if needed " prior to next outreach. Patient verbalizes understanding.      Explained the importance of self-monitoring and medication adherence. Encouraged the patient to communicate with their health  for lifestyle modifications to help improve or maintain a healthy lifestyle.                   Topic    Lipid (Cholesterol) Test            Last 6 Patient Entered Readings                                          Most Recent A1c: 6.9% on 6/18/2020  (Goal: 7%)     Recent Readings 11/18/2020 11/17/2020 11/17/2020 11/16/2020 11/16/2020    Blood Glucose (mg/dL) 142 97 118 146 194

## 2020-11-19 ENCOUNTER — OFFICE VISIT (OUTPATIENT)
Dept: HEMATOLOGY/ONCOLOGY | Facility: CLINIC | Age: 75
End: 2020-11-19
Payer: MEDICARE

## 2020-11-19 ENCOUNTER — RESEARCH ENCOUNTER (OUTPATIENT)
Dept: HEMATOLOGY/ONCOLOGY | Facility: CLINIC | Age: 75
End: 2020-11-19

## 2020-11-19 ENCOUNTER — OFFICE VISIT (OUTPATIENT)
Dept: ORTHOPEDICS | Facility: CLINIC | Age: 75
End: 2020-11-19
Payer: MEDICARE

## 2020-11-19 ENCOUNTER — LAB VISIT (OUTPATIENT)
Dept: LAB | Facility: HOSPITAL | Age: 75
End: 2020-11-19
Payer: MEDICARE

## 2020-11-19 VITALS
RESPIRATION RATE: 16 BRPM | OXYGEN SATURATION: 96 % | SYSTOLIC BLOOD PRESSURE: 157 MMHG | DIASTOLIC BLOOD PRESSURE: 82 MMHG | BODY MASS INDEX: 27.57 KG/M2 | HEIGHT: 70 IN | HEART RATE: 89 BPM | TEMPERATURE: 98 F | WEIGHT: 192.56 LBS

## 2020-11-19 VITALS
HEIGHT: 70 IN | DIASTOLIC BLOOD PRESSURE: 91 MMHG | BODY MASS INDEX: 27.35 KG/M2 | SYSTOLIC BLOOD PRESSURE: 159 MMHG | WEIGHT: 191 LBS | HEART RATE: 98 BPM

## 2020-11-19 DIAGNOSIS — E11.42 TYPE 2 DIABETES MELLITUS WITH DIABETIC POLYNEUROPATHY, WITH LONG-TERM CURRENT USE OF INSULIN: ICD-10-CM

## 2020-11-19 DIAGNOSIS — E11.22 CKD STAGE 3 DUE TO TYPE 2 DIABETES MELLITUS: ICD-10-CM

## 2020-11-19 DIAGNOSIS — Z00.6 RESEARCH EXAM: ICD-10-CM

## 2020-11-19 DIAGNOSIS — D47.2 SMOLDERING MULTIPLE MYELOMA: ICD-10-CM

## 2020-11-19 DIAGNOSIS — N18.30 CKD STAGE 3 DUE TO TYPE 2 DIABETES MELLITUS: ICD-10-CM

## 2020-11-19 DIAGNOSIS — M25.532 LEFT WRIST PAIN: Primary | ICD-10-CM

## 2020-11-19 DIAGNOSIS — D47.2 SMOLDERING MULTIPLE MYELOMA: Primary | ICD-10-CM

## 2020-11-19 DIAGNOSIS — Z79.4 TYPE 2 DIABETES MELLITUS WITH DIABETIC POLYNEUROPATHY, WITH LONG-TERM CURRENT USE OF INSULIN: ICD-10-CM

## 2020-11-19 LAB
ALBUMIN SERPL BCP-MCNC: 3.8 G/DL (ref 3.5–5.2)
ALP SERPL-CCNC: 47 U/L (ref 55–135)
ALT SERPL W/O P-5'-P-CCNC: 19 U/L (ref 10–44)
ANION GAP SERPL CALC-SCNC: 12 MMOL/L (ref 8–16)
AST SERPL-CCNC: 26 U/L (ref 10–40)
BASOPHILS # BLD AUTO: 0.01 K/UL (ref 0–0.2)
BASOPHILS NFR BLD: 0.2 % (ref 0–1.9)
BILIRUB SERPL-MCNC: 0.5 MG/DL (ref 0.1–1)
BUN SERPL-MCNC: 13 MG/DL (ref 8–23)
CALCIUM SERPL-MCNC: 9.5 MG/DL (ref 8.7–10.5)
CHLORIDE SERPL-SCNC: 101 MMOL/L (ref 95–110)
CO2 SERPL-SCNC: 23 MMOL/L (ref 23–29)
CREAT SERPL-MCNC: 1.7 MG/DL (ref 0.5–1.4)
DIFFERENTIAL METHOD: ABNORMAL
EOSINOPHIL # BLD AUTO: 0.1 K/UL (ref 0–0.5)
EOSINOPHIL NFR BLD: 1 % (ref 0–8)
ERYTHROCYTE [DISTWIDTH] IN BLOOD BY AUTOMATED COUNT: 13.5 % (ref 11.5–14.5)
EST. GFR  (AFRICAN AMERICAN): 44.9 ML/MIN/1.73 M^2
EST. GFR  (NON AFRICAN AMERICAN): 38.9 ML/MIN/1.73 M^2
GLUCOSE SERPL-MCNC: 241 MG/DL (ref 70–110)
HCT VFR BLD AUTO: 37.1 % (ref 40–54)
HGB BLD-MCNC: 11.8 G/DL (ref 14–18)
IGA SERPL-MCNC: 1592 MG/DL (ref 40–350)
IGG SERPL-MCNC: 899 MG/DL (ref 650–1600)
IGM SERPL-MCNC: 53 MG/DL (ref 50–300)
IMM GRANULOCYTES # BLD AUTO: 0.08 K/UL (ref 0–0.04)
IMM GRANULOCYTES NFR BLD AUTO: 1.4 % (ref 0–0.5)
LDH SERPL L TO P-CCNC: 134 U/L (ref 110–260)
LYMPHOCYTES # BLD AUTO: 0.7 K/UL (ref 1–4.8)
LYMPHOCYTES NFR BLD: 12.2 % (ref 18–48)
MAGNESIUM SERPL-MCNC: 1.8 MG/DL (ref 1.6–2.6)
MCH RBC QN AUTO: 30 PG (ref 27–31)
MCHC RBC AUTO-ENTMCNC: 31.8 G/DL (ref 32–36)
MCV RBC AUTO: 94 FL (ref 82–98)
MONOCYTES # BLD AUTO: 0.1 K/UL (ref 0.3–1)
MONOCYTES NFR BLD: 2.1 % (ref 4–15)
NEUTROPHILS # BLD AUTO: 4.8 K/UL (ref 1.8–7.7)
NEUTROPHILS NFR BLD: 83.1 % (ref 38–73)
NRBC BLD-RTO: 0 /100 WBC
PHOSPHATE SERPL-MCNC: 2.3 MG/DL (ref 2.7–4.5)
PLATELET # BLD AUTO: 253 K/UL (ref 150–350)
PMV BLD AUTO: 9 FL (ref 9.2–12.9)
POTASSIUM SERPL-SCNC: 5.1 MMOL/L (ref 3.5–5.1)
PROT SERPL-MCNC: 9.1 G/DL (ref 6–8.4)
RBC # BLD AUTO: 3.93 M/UL (ref 4.6–6.2)
SODIUM SERPL-SCNC: 136 MMOL/L (ref 136–145)
WBC # BLD AUTO: 5.74 K/UL (ref 3.9–12.7)

## 2020-11-19 PROCEDURE — 99999 PR PBB SHADOW E&M-EST. PATIENT-LVL III: ICD-10-PCS | Mod: PBBFAC,HCNC,, | Performed by: ORTHOPAEDIC SURGERY

## 2020-11-19 PROCEDURE — 83615 LACTATE (LD) (LDH) ENZYME: CPT | Mod: HCNC

## 2020-11-19 PROCEDURE — 84165 PROTEIN E-PHORESIS SERUM: CPT | Mod: 26,HCNC,, | Performed by: PATHOLOGY

## 2020-11-19 PROCEDURE — 99999 PR PBB SHADOW E&M-EST. PATIENT-LVL III: CPT | Mod: PBBFAC,HCNC,, | Performed by: ORTHOPAEDIC SURGERY

## 2020-11-19 PROCEDURE — 86334 IMMUNOFIX E-PHORESIS SERUM: CPT | Mod: 26,HCNC,, | Performed by: PATHOLOGY

## 2020-11-19 PROCEDURE — 36415 COLL VENOUS BLD VENIPUNCTURE: CPT | Mod: HCNC

## 2020-11-19 PROCEDURE — 99999 PR PBB SHADOW E&M-EST. PATIENT-LVL V: CPT | Mod: PBBFAC,HCNC,, | Performed by: INTERNAL MEDICINE

## 2020-11-19 PROCEDURE — 3077F SYST BP >= 140 MM HG: CPT | Mod: HCNC,CPTII,S$GLB, | Performed by: ORTHOPAEDIC SURGERY

## 2020-11-19 PROCEDURE — 1159F PR MEDICATION LIST DOCUMENTED IN MEDICAL RECORD: ICD-10-PCS | Mod: HCNC,S$GLB,, | Performed by: ORTHOPAEDIC SURGERY

## 2020-11-19 PROCEDURE — 80053 COMPREHEN METABOLIC PANEL: CPT | Mod: HCNC,Q1

## 2020-11-19 PROCEDURE — 99999 PR PBB SHADOW E&M-EST. PATIENT-LVL V: ICD-10-PCS | Mod: PBBFAC,HCNC,, | Performed by: INTERNAL MEDICINE

## 2020-11-19 PROCEDURE — 84100 ASSAY OF PHOSPHORUS: CPT | Mod: HCNC,Q1

## 2020-11-19 PROCEDURE — 99215 PR OFFICE/OUTPT VISIT, EST, LEVL V, 40-54 MIN: ICD-10-PCS | Mod: Q1,HCNC,S$GLB, | Performed by: INTERNAL MEDICINE

## 2020-11-19 PROCEDURE — 86334 PATHOLOGIST INTERPRETATION IFE: ICD-10-PCS | Mod: 26,HCNC,, | Performed by: PATHOLOGY

## 2020-11-19 PROCEDURE — 84165 PROTEIN E-PHORESIS SERUM: CPT | Mod: HCNC

## 2020-11-19 PROCEDURE — 83735 ASSAY OF MAGNESIUM: CPT | Mod: HCNC

## 2020-11-19 PROCEDURE — 99215 OFFICE O/P EST HI 40 MIN: CPT | Mod: Q1,HCNC,S$GLB, | Performed by: INTERNAL MEDICINE

## 2020-11-19 PROCEDURE — 3288F FALL RISK ASSESSMENT DOCD: CPT | Mod: HCNC,CPTII,S$GLB, | Performed by: ORTHOPAEDIC SURGERY

## 2020-11-19 PROCEDURE — 3078F PR MOST RECENT DIASTOLIC BLOOD PRESSURE < 80 MM HG: ICD-10-PCS | Mod: HCNC,CPTII,S$GLB, | Performed by: ORTHOPAEDIC SURGERY

## 2020-11-19 PROCEDURE — 85025 COMPLETE CBC W/AUTO DIFF WBC: CPT | Mod: HCNC

## 2020-11-19 PROCEDURE — 99214 PR OFFICE/OUTPT VISIT, EST, LEVL IV, 30-39 MIN: ICD-10-PCS | Mod: 25,HCNC,S$GLB, | Performed by: ORTHOPAEDIC SURGERY

## 2020-11-19 PROCEDURE — 1100F PR PT FALLS ASSESS DOC 2+ FALLS/FALL W/INJURY/YR: ICD-10-PCS | Mod: HCNC,CPTII,S$GLB, | Performed by: ORTHOPAEDIC SURGERY

## 2020-11-19 PROCEDURE — 3008F BODY MASS INDEX DOCD: CPT | Mod: HCNC,CPTII,S$GLB, | Performed by: ORTHOPAEDIC SURGERY

## 2020-11-19 PROCEDURE — 1159F MED LIST DOCD IN RCRD: CPT | Mod: HCNC,S$GLB,, | Performed by: ORTHOPAEDIC SURGERY

## 2020-11-19 PROCEDURE — 1125F AMNT PAIN NOTED PAIN PRSNT: CPT | Mod: HCNC,S$GLB,, | Performed by: ORTHOPAEDIC SURGERY

## 2020-11-19 PROCEDURE — 3008F PR BODY MASS INDEX (BMI) DOCUMENTED: ICD-10-PCS | Mod: HCNC,CPTII,S$GLB, | Performed by: ORTHOPAEDIC SURGERY

## 2020-11-19 PROCEDURE — 20606 INTERMEDIATE JOINT ASPIRATION/INJECTION: L RADIOCARPAL: ICD-10-PCS | Mod: HCNC,LT,S$GLB, | Performed by: ORTHOPAEDIC SURGERY

## 2020-11-19 PROCEDURE — 99214 OFFICE O/P EST MOD 30 MIN: CPT | Mod: 25,HCNC,S$GLB, | Performed by: ORTHOPAEDIC SURGERY

## 2020-11-19 PROCEDURE — 3288F PR FALLS RISK ASSESSMENT DOCUMENTED: ICD-10-PCS | Mod: HCNC,CPTII,S$GLB, | Performed by: ORTHOPAEDIC SURGERY

## 2020-11-19 PROCEDURE — 84165 PATHOLOGIST INTERPRETATION SPE: ICD-10-PCS | Mod: 26,HCNC,, | Performed by: PATHOLOGY

## 2020-11-19 PROCEDURE — 83520 IMMUNOASSAY QUANT NOS NONAB: CPT | Mod: 59,HCNC,Q1

## 2020-11-19 PROCEDURE — 1100F PTFALLS ASSESS-DOCD GE2>/YR: CPT | Mod: HCNC,CPTII,S$GLB, | Performed by: ORTHOPAEDIC SURGERY

## 2020-11-19 PROCEDURE — 3077F PR MOST RECENT SYSTOLIC BLOOD PRESSURE >= 140 MM HG: ICD-10-PCS | Mod: HCNC,CPTII,S$GLB, | Performed by: ORTHOPAEDIC SURGERY

## 2020-11-19 PROCEDURE — 20606 DRAIN/INJ JOINT/BURSA W/US: CPT | Mod: HCNC,LT,S$GLB, | Performed by: ORTHOPAEDIC SURGERY

## 2020-11-19 PROCEDURE — 86334 IMMUNOFIX E-PHORESIS SERUM: CPT | Mod: HCNC,Q1

## 2020-11-19 PROCEDURE — 1125F PR PAIN SEVERITY QUANTIFIED, PAIN PRESENT: ICD-10-PCS | Mod: HCNC,S$GLB,, | Performed by: ORTHOPAEDIC SURGERY

## 2020-11-19 PROCEDURE — 3078F DIAST BP <80 MM HG: CPT | Mod: HCNC,CPTII,S$GLB, | Performed by: ORTHOPAEDIC SURGERY

## 2020-11-19 PROCEDURE — 82784 ASSAY IGA/IGD/IGG/IGM EACH: CPT | Mod: 59,HCNC,Q1

## 2020-11-19 RX ORDER — IBUPROFEN 800 MG/1
800 TABLET ORAL EVERY 6 HOURS PRN
Qty: 45 TABLET | Refills: 0 | Status: SHIPPED | OUTPATIENT
Start: 2020-11-19 | End: 2020-12-02

## 2020-11-19 RX ADMIN — DEXAMETHASONE SODIUM PHOSPHATE 4 MG: 4 INJECTION, SOLUTION INTRA-ARTICULAR; INTRALESIONAL; INTRAMUSCULAR; INTRAVENOUS; SOFT TISSUE at 08:11

## 2020-11-19 NOTE — PROGRESS NOTES
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   Sponsor:  [facility]  IRB# [id number]  Study ID: [id number]  Investigator: GIL Engel Pt Initials: LUCÍA LORENZ       Cycle 60 Day 28 Arm B: Observation    Patient presents to clinic today for above cycle evaluation.  He presents with a brace on his left arm and is coming from having had injections to the area today.  He denies any other CRAB symptoms.  He states continued willingness to participate in above-mentioned study.      Review of Baseline AE's:   1.Hypertension, Grade 2 diastolic: BP today is 157/82 today. Subject denies headache/dizziness; no symptoms noted.   AE ongoing  2. Lower Back Pain and Neck Pain, grade 1: Patient has no complaints today/ over last month.  As previously stated, patient is not suspected to have bony myeloma involvement per Dr. Engel as these are pre-existing issues present at baseline.  AE ongoing.  3. Pain in extremity (knee), grade 1. As above, he experienced a recent fall with injury.  Able to walk on it today without issue.  Plans to get an injection to the knees tomorrow to help alleviate symptoms.  Presents with knee stabilizing braces intact.  Still has plans to see ortho soon to discuss potential knee replacement.     4. Peripheral sensory neuropathy, grade 1: Patient does not verbalize complaints today. Has gabapentin prescribed and takes as needed.  States has not taken recently.  AE ongoing.   5. Anemia, Grade 1: Hgb stable at 11.8  Result reviewed by Dr. Engel. AE ongoing            Review of AE's:     *Please note this list is not all-inclusive, please see AE log for physician reviewed list of adverse events.   1. Creatinine increased, grade 2: Serum creatinine increased to 1.7 mg/dl today and calculated GFR 47.13. (GFR 44.9 per Epic/Lab calculation) This is has increased from previous levels, but  "reviewed per Dr. Engel and is "about his baseline" and NCS today.  As previously stated, Dr. Engel states this has not been related to myeloma and is related chronic kidney issues likely secondary to diabetes and hypertension. Will continue observation monthly and to monitor renal function closely. Per MD, encouraged to increase water intake. AE stable from baseline  2. Hyponatremia, Grade 1: Serum sodium today is 136 mmol/L; AE intermittent. Will monitor    Per study chair, "the definition of progressive disease for this protocol is developing CRAB criteria that requires treatment systemically." Per Dr Engel, based on today's exam and lab results thus far, patient does not have any s/s of CRAB: Normal Calcium level (9.5), absence of Renal insufficiency (serum creatinine 1.7, and GFR 47.13 per [facility]) --patient with a history of kidney dysfunction secondary to diabetes; Dr. Engel aware of these values and not concerned for myeloma involvement), absence of significant Anemia (hemoglobin 11.8, stable; will await myeloma labs for clarity relationship to myeloma) and absence of lytic Bone lesions (per baseline metastatic survey as well as MRI--see MD note for further comment). See MD note for ECOG score and H&P and flowsheets for laboratory work, vitals, etc. All myeloma-related blood work from last cycle including SPEP and JAGUAR have remained stable, and are currently pending for this cycle. Per Dr. Engel, patient shows no evidence of progression at last result. Patient informed that Dr. Engel will release all lab results to [facility]. He states understanding of this. QOL's not required again until end of treatment/observation.           Encouraged patient to reach out/contact staff if feels need to discuss any issues. He states understanding.  Patient was informed to review all scheduled appointments in [facility] and notify us if any conflicts.  He states he had previous plans to move to [place] full time; however " he states today that his rent has recently been adjusted and he will continue to keep a residence here in [place] for the time being therefore he will still be able to keep his monthly appointments in [place] with Dr. Engel. He states he wishes only to see Dr Engel and will not show for appt if scheduled with any other provider. Deviation for today's visit will be logged in [facility].  Will continue to schedule patient as far out as possible, which seems to help maintain compliance with visits. Patient states having research RN's contact information and has MD contact information to call with any concerns, questions, or worsening of symptoms.  Discussed next month's appt date and time, he verbalized understanding.  Also added on HgbA1c test to next cycle's lab appt per patient request.

## 2020-11-19 NOTE — PROGRESS NOTES
I have personally taken the history and examined the patient. I agree with the Hand Surgery PA's note. The plan will be : Injection left wrist   We discussed in length about SL ligamanet injuries- long term  We discussed his wrist scope that demonstrated significant arthritis already  We discussed PRC with graft jacket  At this time- visualizing the swelling of the joint, the pain and TTP- will start with injection, bracing Ibuprofen 800 mg
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
## 2020-11-19 NOTE — PROCEDURES
Intermediate Joint Aspiration/Injection: L radiocarpal    Date/Time: 11/19/2020 8:30 AM  Performed by: Kindra Castillo MD  Authorized by: Kindra Castillo MD     Consent Done?:  Yes (Verbal)  Indications:  Arthritis  Timeout: Prior to procedure the correct patient, procedure, and site was verified      Location:  Wrist  Site:  L radiocarpal  Prep: Patient was prepped and draped in usual sterile fashion    Ultrasonic Guidance for needle placement: Yes  Images are saved and documented.  Approach:  Dorsal  Medications:  4 mg dexamethasone 4 mg/mL
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
## 2020-11-20 ENCOUNTER — PATIENT MESSAGE (OUTPATIENT)
Dept: INTERNAL MEDICINE | Facility: CLINIC | Age: 75
End: 2020-11-20

## 2020-11-20 ENCOUNTER — PATIENT MESSAGE (OUTPATIENT)
Dept: PODIATRY | Facility: CLINIC | Age: 75
End: 2020-11-20

## 2020-11-20 LAB
ALBUMIN SERPL ELPH-MCNC: 4.05 G/DL (ref 3.35–5.55)
ALPHA1 GLOB SERPL ELPH-MCNC: 0.41 G/DL (ref 0.17–0.41)
ALPHA2 GLOB SERPL ELPH-MCNC: 0.99 G/DL (ref 0.43–0.99)
B-GLOBULIN SERPL ELPH-MCNC: 0.97 G/DL (ref 0.5–1.1)
GAMMA GLOB SERPL ELPH-MCNC: 2.08 G/DL (ref 0.67–1.58)
INTERPRETATION SERPL IFE-IMP: NORMAL
KAPPA LC SER QL IA: 5.71 MG/DL (ref 0.33–1.94)
KAPPA LC/LAMBDA SER IA: 3.36 (ref 0.26–1.65)
LAMBDA LC SER QL IA: 1.7 MG/DL (ref 0.57–2.63)
PROT SERPL-MCNC: 8.5 G/DL (ref 6–8.4)

## 2020-11-20 PROCEDURE — 99454 REM MNTR PHYSIOL PARAM 16-30: CPT | Mod: S$GLB,,, | Performed by: INTERNAL MEDICINE

## 2020-11-20 PROCEDURE — 99454 PR REMOTE MNTR, PHYS PARAM, INITIAL, EA 30 DAYS: ICD-10-PCS | Mod: S$GLB,,, | Performed by: INTERNAL MEDICINE

## 2020-11-23 LAB
PATHOLOGIST INTERPRETATION IFE: NORMAL
PATHOLOGIST INTERPRETATION SPE: NORMAL

## 2020-11-30 RX ORDER — DEXAMETHASONE SODIUM PHOSPHATE 4 MG/ML
4 INJECTION, SOLUTION INTRA-ARTICULAR; INTRALESIONAL; INTRAMUSCULAR; INTRAVENOUS; SOFT TISSUE
Status: DISCONTINUED | OUTPATIENT
Start: 2020-11-19 | End: 2020-11-30 | Stop reason: HOSPADM

## 2020-12-02 ENCOUNTER — PATIENT MESSAGE (OUTPATIENT)
Dept: INTERNAL MEDICINE | Facility: CLINIC | Age: 75
End: 2020-12-02

## 2020-12-07 ENCOUNTER — PATIENT MESSAGE (OUTPATIENT)
Dept: INTERNAL MEDICINE | Facility: CLINIC | Age: 75
End: 2020-12-07

## 2020-12-07 ENCOUNTER — PATIENT MESSAGE (OUTPATIENT)
Dept: ORTHOPEDICS | Facility: CLINIC | Age: 75
End: 2020-12-07

## 2020-12-07 DIAGNOSIS — E13.9 DIABETES MELLITUS DUE TO ABNORMAL INSULIN: Primary | ICD-10-CM

## 2020-12-08 ENCOUNTER — PATIENT MESSAGE (OUTPATIENT)
Dept: ORTHOPEDICS | Facility: CLINIC | Age: 75
End: 2020-12-08

## 2020-12-08 ENCOUNTER — TELEPHONE (OUTPATIENT)
Dept: ORTHOPEDICS | Facility: CLINIC | Age: 75
End: 2020-12-08

## 2020-12-08 NOTE — TELEPHONE ENCOUNTER
Lm on vm asking pt to return clinics phone call at earliest convenience to schedule appt for hand pain.

## 2020-12-16 ENCOUNTER — PATIENT OUTREACH (OUTPATIENT)
Dept: OTHER | Facility: OTHER | Age: 75
End: 2020-12-16

## 2020-12-17 ENCOUNTER — PATIENT MESSAGE (OUTPATIENT)
Dept: PODIATRY | Facility: CLINIC | Age: 75
End: 2020-12-17

## 2020-12-17 ENCOUNTER — PATIENT MESSAGE (OUTPATIENT)
Dept: ORTHOPEDICS | Facility: CLINIC | Age: 75
End: 2020-12-17

## 2020-12-17 ENCOUNTER — PATIENT MESSAGE (OUTPATIENT)
Dept: INTERNAL MEDICINE | Facility: CLINIC | Age: 75
End: 2020-12-17

## 2020-12-17 DIAGNOSIS — G47.33 OBSTRUCTIVE SLEEP APNEA: Primary | ICD-10-CM

## 2020-12-17 NOTE — PROGRESS NOTES
Digital Medicine: Health  Follow-Up    The history is provided by the patient.             Reason for review: Blood glucose not at goal        Topics Covered on Call: device use and medication    Additional Follow-up details: The patient stated that his issue is that he is having so many problems with his meters. The patient has been trying the [employer] glucometer again, and comparing the readings against his Accucheck meters. He said that he is manually inputting his most recent readings because he still does not trust the readings from the [employer] glucometer.     The patient has a total of three different glucometers. He has two Accucheck meters and one i[employer] meter. He said that he is getting different readings from all three meters that are 50-60 points apart. I did inform the patient that he will not get the same reading with any glucometer. We discussed his use of a CGM. The patient stated that while he was using the CGM, he was told that he was not eligable within the first week because he does not inject insulin 2 or more times per day.     I then reviewed the patient's medication list with him to verify that he is still injecting novolog twice per day with meals. The patient was a bit confused, and sounded apprehensive when asked if he is taking his insulin. He was a bit unsure about his medication regimen.     The patient has a doctor's appointment next week, where his A1C will be drawn. The patient's clinician will follow up with him after his appointment to review his medication and new A1C reading for clarity.                   Diet-Change    Patient reports eating or drinking the following: The patient stated that he has remained extremely conscious about what he eats, and has been eating very healthily.     The patient stated that he has started to eat breakfast in the morning, instead of skipping breakfast as he normally does. He has noticed that now, if he skips breakfast, his readings will  drop before lunch. The patient believes this is abnormal because he did not have any issues with skipping breakfast before having issues with his glucometers.     I tried to explain to the patient that his body may have changed and readjusted, and might now need to have breakfast in the morning to provide low readings. The patient was not receptive.         Physical Activity-Not assessed    Medication Adherence-Medication adherence was asssessed.        Patient has some confusion about taking his medication. Details explained in HPI.   Substance, Sleep, Stress-Not assessed      Additional monitoring needed.  Continue current diet/physical activity routine.  Provided patient education.       Addressed patient questions and patient has my contact information if needed prior to next outreach. Patient verbalizes understanding.      Explained the importance of self-monitoring and medication adherence. Encouraged the patient to communicate with their health  for lifestyle modifications to help improve or maintain a healthy lifestyle.                   Topic    Lipid (Cholesterol) Test     Hemoglobin A1C              Last 6 Patient Entered Readings                                          Most Recent A1c: 6.9% on 6/18/2020  (Goal: 7%)     Recent Readings 12/13/2020 12/11/2020 12/10/2020 12/9/2020 12/6/2020    Blood Glucose (mg/dL) 200 143 180 153 146

## 2020-12-18 ENCOUNTER — PATIENT OUTREACH (OUTPATIENT)
Dept: ADMINISTRATIVE | Facility: OTHER | Age: 75
End: 2020-12-18

## 2020-12-18 NOTE — TELEPHONE ENCOUNTER
Called patient and schedule an appointment with Dr. Meier for evaluation of toenail problem and toe pain.

## 2020-12-19 NOTE — PROGRESS NOTES
Health Maintenance Due   Topic Date Due    Shingles Vaccine (1 of 2) 07/22/1996    Colorectal Cancer Screening  03/22/2019    Lipid Panel  09/17/2020    Hemoglobin A1c  12/18/2020     Updates were requested from care everywhere.  Chart was reviewed for overdue Proactive [facility] Encounters ([facility]) topics (CRS, Breast Cancer Screening, Eye exam)  Health Maintenance has been updated.  LINKS immunization registry triggered.  Immunizations were reconciled.

## 2020-12-21 ENCOUNTER — TELEPHONE (OUTPATIENT)
Dept: ORTHOPEDICS | Facility: CLINIC | Age: 75
End: 2020-12-21

## 2020-12-21 ENCOUNTER — TELEPHONE (OUTPATIENT)
Dept: PODIATRY | Facility: CLINIC | Age: 75
End: 2020-12-21

## 2020-12-21 DIAGNOSIS — E55.9 VITAMIN D DEFICIENCY: ICD-10-CM

## 2020-12-21 DIAGNOSIS — I10 ESSENTIAL HYPERTENSION: ICD-10-CM

## 2020-12-21 DIAGNOSIS — N18.30 CHRONIC KIDNEY DISEASE, STAGE III (MODERATE): Chronic | ICD-10-CM

## 2020-12-21 DIAGNOSIS — M79.671 RIGHT FOOT PAIN: Primary | ICD-10-CM

## 2020-12-21 RX ORDER — LOSARTAN POTASSIUM 25 MG/1
50 TABLET ORAL DAILY
Qty: 90 TABLET | Refills: 0 | Status: SHIPPED | OUTPATIENT
Start: 2020-12-21 | End: 2021-01-21 | Stop reason: CLARIF

## 2020-12-21 NOTE — TELEPHONE ENCOUNTER
Care Due:                  Date            Visit Type   Department     Provider  --------------------------------------------------------------------------------                                PATRICK SEARS      [facility] INTERNAL  Last Visit: 04-      VISIT        MEDICINE       GEOVANNA ALCAZAR  Next Visit: None Scheduled  None         None Found                                                            Last  Test          Frequency    Reason                     Performed    Due Date  --------------------------------------------------------------------------------    HBA1C.......  6 months...  glimepiride, insulin.....  06-   12-    Lipid Panel.  12 months..  pravastatin..............  09- 09-    Powered by [employer]. Reference number: [id number]. 12/21/2020 1:11:43 PM   CST

## 2020-12-21 NOTE — TELEPHONE ENCOUNTER
----- Message from Jo Meier DPM sent at 12/21/2020  3:27 PM CST -----  I have placed an rx for right foot xray. Please see if pt can get before his appointment tomorrow. The appointment note states concern for stress fracture

## 2020-12-21 NOTE — TELEPHONE ENCOUNTER
Called pt back and left message to call me back to sched appt for tomorrow before he comes in for his appt to do an xray because we don't have an xray tech right now at [place]. Ask pt to call me back so I can help him to sched appt with xray

## 2020-12-22 ENCOUNTER — HOSPITAL ENCOUNTER (OUTPATIENT)
Dept: RADIOLOGY | Facility: OTHER | Age: 75
Discharge: HOME | End: 2020-12-22
Attending: PODIATRIST
Payer: MEDICARE

## 2020-12-22 ENCOUNTER — PATIENT MESSAGE (OUTPATIENT)
Dept: PODIATRY | Facility: CLINIC | Age: 75
End: 2020-12-22

## 2020-12-22 ENCOUNTER — PATIENT MESSAGE (OUTPATIENT)
Dept: ORTHOPEDICS | Facility: CLINIC | Age: 75
End: 2020-12-22

## 2020-12-22 ENCOUNTER — OFFICE VISIT (OUTPATIENT)
Dept: PODIATRY | Facility: CLINIC | Age: 75
End: 2020-12-22
Payer: MEDICARE

## 2020-12-22 ENCOUNTER — TELEPHONE (OUTPATIENT)
Dept: PODIATRY | Facility: CLINIC | Age: 75
End: 2020-12-22

## 2020-12-22 ENCOUNTER — OFFICE VISIT (OUTPATIENT)
Dept: ORTHOPEDICS | Facility: CLINIC | Age: 75
End: 2020-12-22
Payer: MEDICARE

## 2020-12-22 VITALS
SYSTOLIC BLOOD PRESSURE: 152 MMHG | HEIGHT: 70 IN | DIASTOLIC BLOOD PRESSURE: 84 MMHG | HEART RATE: 87 BPM | BODY MASS INDEX: 27.63 KG/M2

## 2020-12-22 VITALS
SYSTOLIC BLOOD PRESSURE: 150 MMHG | WEIGHT: 192 LBS | BODY MASS INDEX: 27.49 KG/M2 | HEIGHT: 70 IN | DIASTOLIC BLOOD PRESSURE: 79 MMHG | HEART RATE: 93 BPM

## 2020-12-22 DIAGNOSIS — M25.532 LEFT WRIST PAIN: ICD-10-CM

## 2020-12-22 DIAGNOSIS — M79.671 RIGHT FOOT PAIN: Primary | ICD-10-CM

## 2020-12-22 DIAGNOSIS — M79.671 RIGHT FOOT PAIN: ICD-10-CM

## 2020-12-22 DIAGNOSIS — M19.032: Primary | ICD-10-CM

## 2020-12-22 DIAGNOSIS — L60.0 INGROWN NAIL: ICD-10-CM

## 2020-12-22 DIAGNOSIS — E11.65 TYPE 2 DIABETES MELLITUS WITH HYPERGLYCEMIA, WITH LONG-TERM CURRENT USE OF INSULIN: ICD-10-CM

## 2020-12-22 DIAGNOSIS — Z01.818 PRE-OPERATIVE CLEARANCE: ICD-10-CM

## 2020-12-22 DIAGNOSIS — Z79.4 TYPE 2 DIABETES MELLITUS WITH HYPERGLYCEMIA, WITH LONG-TERM CURRENT USE OF INSULIN: ICD-10-CM

## 2020-12-22 DIAGNOSIS — M25.632 DECREASED RANGE OF MOTION OF LEFT WRIST: ICD-10-CM

## 2020-12-22 PROCEDURE — 99999 PR PBB SHADOW E&M-EST. PATIENT-LVL IV: ICD-10-PCS | Mod: PBBFAC,HCNC,, | Performed by: ORTHOPAEDIC SURGERY

## 2020-12-22 PROCEDURE — 1159F PR MEDICATION LIST DOCUMENTED IN MEDICAL RECORD: ICD-10-PCS | Mod: HCNC,S$GLB,, | Performed by: ORTHOPAEDIC SURGERY

## 2020-12-22 PROCEDURE — 99213 PR OFFICE/OUTPT VISIT, EST, LEVL III, 20-29 MIN: ICD-10-PCS | Mod: HCNC,S$GLB,, | Performed by: PODIATRIST

## 2020-12-22 PROCEDURE — 73630 XR FOOT COMPLETE 3 VIEW RIGHT: ICD-10-PCS | Mod: 26,HCNC,RT, | Performed by: RADIOLOGY

## 2020-12-22 PROCEDURE — 3079F PR MOST RECENT DIASTOLIC BLOOD PRESSURE 80-89 MM HG: ICD-10-PCS | Mod: HCNC,CPTII,S$GLB, | Performed by: PODIATRIST

## 2020-12-22 PROCEDURE — 99999 PR PBB SHADOW E&M-EST. PATIENT-LVL III: ICD-10-PCS | Mod: PBBFAC,HCNC,, | Performed by: PODIATRIST

## 2020-12-22 PROCEDURE — 3077F SYST BP >= 140 MM HG: CPT | Mod: HCNC,CPTII,S$GLB, | Performed by: PODIATRIST

## 2020-12-22 PROCEDURE — 1125F PR PAIN SEVERITY QUANTIFIED, PAIN PRESENT: ICD-10-PCS | Mod: HCNC,S$GLB,, | Performed by: ORTHOPAEDIC SURGERY

## 2020-12-22 PROCEDURE — 3078F DIAST BP <80 MM HG: CPT | Mod: HCNC,CPTII,S$GLB, | Performed by: ORTHOPAEDIC SURGERY

## 2020-12-22 PROCEDURE — 99999 PR PBB SHADOW E&M-EST. PATIENT-LVL III: CPT | Mod: PBBFAC,HCNC,, | Performed by: PODIATRIST

## 2020-12-22 PROCEDURE — 1159F MED LIST DOCD IN RCRD: CPT | Mod: HCNC,S$GLB,, | Performed by: ORTHOPAEDIC SURGERY

## 2020-12-22 PROCEDURE — 3077F SYST BP >= 140 MM HG: CPT | Mod: HCNC,CPTII,S$GLB, | Performed by: ORTHOPAEDIC SURGERY

## 2020-12-22 PROCEDURE — 73630 X-RAY EXAM OF FOOT: CPT | Mod: 26,HCNC,RT, | Performed by: RADIOLOGY

## 2020-12-22 PROCEDURE — 3008F BODY MASS INDEX DOCD: CPT | Mod: HCNC,CPTII,S$GLB, | Performed by: PODIATRIST

## 2020-12-22 PROCEDURE — 3077F PR MOST RECENT SYSTOLIC BLOOD PRESSURE >= 140 MM HG: ICD-10-PCS | Mod: HCNC,CPTII,S$GLB, | Performed by: ORTHOPAEDIC SURGERY

## 2020-12-22 PROCEDURE — 3008F PR BODY MASS INDEX (BMI) DOCUMENTED: ICD-10-PCS | Mod: HCNC,CPTII,S$GLB, | Performed by: ORTHOPAEDIC SURGERY

## 2020-12-22 PROCEDURE — 1125F AMNT PAIN NOTED PAIN PRSNT: CPT | Mod: HCNC,S$GLB,, | Performed by: PODIATRIST

## 2020-12-22 PROCEDURE — 3044F PR MOST RECENT HEMOGLOBIN A1C LEVEL <7.0%: ICD-10-PCS | Mod: HCNC,CPTII,S$GLB, | Performed by: PODIATRIST

## 2020-12-22 PROCEDURE — 73630 X-RAY EXAM OF FOOT: CPT | Mod: TC,HCNC,FY,RT

## 2020-12-22 PROCEDURE — 3078F PR MOST RECENT DIASTOLIC BLOOD PRESSURE < 80 MM HG: ICD-10-PCS | Mod: HCNC,CPTII,S$GLB, | Performed by: ORTHOPAEDIC SURGERY

## 2020-12-22 PROCEDURE — 3008F BODY MASS INDEX DOCD: CPT | Mod: HCNC,CPTII,S$GLB, | Performed by: ORTHOPAEDIC SURGERY

## 2020-12-22 PROCEDURE — 3008F PR BODY MASS INDEX (BMI) DOCUMENTED: ICD-10-PCS | Mod: HCNC,CPTII,S$GLB, | Performed by: PODIATRIST

## 2020-12-22 PROCEDURE — 1125F PR PAIN SEVERITY QUANTIFIED, PAIN PRESENT: ICD-10-PCS | Mod: HCNC,S$GLB,, | Performed by: PODIATRIST

## 2020-12-22 PROCEDURE — 3079F DIAST BP 80-89 MM HG: CPT | Mod: HCNC,CPTII,S$GLB, | Performed by: PODIATRIST

## 2020-12-22 PROCEDURE — 3072F LOW RISK FOR RETINOPATHY: CPT | Mod: HCNC,S$GLB,, | Performed by: ORTHOPAEDIC SURGERY

## 2020-12-22 PROCEDURE — 1125F AMNT PAIN NOTED PAIN PRSNT: CPT | Mod: HCNC,S$GLB,, | Performed by: ORTHOPAEDIC SURGERY

## 2020-12-22 PROCEDURE — 3072F PR LOW RISK FOR RETINOPATHY: ICD-10-PCS | Mod: HCNC,S$GLB,, | Performed by: ORTHOPAEDIC SURGERY

## 2020-12-22 PROCEDURE — 99213 OFFICE O/P EST LOW 20 MIN: CPT | Mod: HCNC,S$GLB,, | Performed by: PODIATRIST

## 2020-12-22 PROCEDURE — 99214 PR OFFICE/OUTPT VISIT, EST, LEVL IV, 30-39 MIN: ICD-10-PCS | Mod: HCNC,S$GLB,, | Performed by: ORTHOPAEDIC SURGERY

## 2020-12-22 PROCEDURE — 99214 OFFICE O/P EST MOD 30 MIN: CPT | Mod: HCNC,S$GLB,, | Performed by: ORTHOPAEDIC SURGERY

## 2020-12-22 PROCEDURE — 99999 PR PBB SHADOW E&M-EST. PATIENT-LVL IV: CPT | Mod: PBBFAC,HCNC,, | Performed by: ORTHOPAEDIC SURGERY

## 2020-12-22 PROCEDURE — 3044F HG A1C LEVEL LT 7.0%: CPT | Mod: HCNC,CPTII,S$GLB, | Performed by: PODIATRIST

## 2020-12-22 PROCEDURE — 1159F MED LIST DOCD IN RCRD: CPT | Mod: HCNC,S$GLB,, | Performed by: PODIATRIST

## 2020-12-22 PROCEDURE — 1159F PR MEDICATION LIST DOCUMENTED IN MEDICAL RECORD: ICD-10-PCS | Mod: HCNC,S$GLB,, | Performed by: PODIATRIST

## 2020-12-22 PROCEDURE — 3077F PR MOST RECENT SYSTOLIC BLOOD PRESSURE >= 140 MM HG: ICD-10-PCS | Mod: HCNC,CPTII,S$GLB, | Performed by: PODIATRIST

## 2020-12-22 RX ORDER — TRAMADOL HYDROCHLORIDE 50 MG/1
50 TABLET ORAL EVERY 6 HOURS PRN
Qty: 25 TABLET | Refills: 0 | Status: SHIPPED | OUTPATIENT
Start: 2020-12-22 | End: 2021-01-11 | Stop reason: SDUPTHER
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
## 2020-12-22 NOTE — TELEPHONE ENCOUNTER
----- Message from Karthik Linares sent at 12/22/2020  8:08 AM CST -----  Pt asking for a callback regarding to an x-ray on this morning please contact pt '          Contact info [phone]

## 2020-12-23 ENCOUNTER — PATIENT OUTREACH (OUTPATIENT)
Dept: OTHER | Facility: OTHER | Age: 75
End: 2020-12-23

## 2020-12-23 ENCOUNTER — LAB VISIT (OUTPATIENT)
Dept: LAB | Facility: HOSPITAL | Age: 75
End: 2020-12-23
Attending: INTERNAL MEDICINE
Payer: MEDICARE

## 2020-12-23 ENCOUNTER — RESEARCH ENCOUNTER (OUTPATIENT)
Dept: HEMATOLOGY/ONCOLOGY | Facility: CLINIC | Age: 75
End: 2020-12-23

## 2020-12-23 ENCOUNTER — PATIENT MESSAGE (OUTPATIENT)
Dept: PHARMACY | Facility: CLINIC | Age: 75
End: 2020-12-23

## 2020-12-23 ENCOUNTER — OFFICE VISIT (OUTPATIENT)
Dept: HEMATOLOGY/ONCOLOGY | Facility: CLINIC | Age: 75
End: 2020-12-23
Payer: MEDICARE

## 2020-12-23 ENCOUNTER — IMMUNIZATION (OUTPATIENT)
Dept: PHARMACY | Facility: CLINIC | Age: 75
End: 2020-12-23
Payer: MEDICARE

## 2020-12-23 ENCOUNTER — TELEPHONE (OUTPATIENT)
Dept: ADMINISTRATIVE | Facility: OTHER | Age: 75
End: 2020-12-23

## 2020-12-23 VITALS
RESPIRATION RATE: 12 BRPM | TEMPERATURE: 98 F | OXYGEN SATURATION: 96 % | SYSTOLIC BLOOD PRESSURE: 154 MMHG | BODY MASS INDEX: 27.9 KG/M2 | WEIGHT: 194.88 LBS | HEART RATE: 80 BPM | HEIGHT: 70 IN | DIASTOLIC BLOOD PRESSURE: 76 MMHG

## 2020-12-23 DIAGNOSIS — E11.42 TYPE 2 DIABETES MELLITUS WITH DIABETIC POLYNEUROPATHY, WITH LONG-TERM CURRENT USE OF INSULIN: Chronic | ICD-10-CM

## 2020-12-23 DIAGNOSIS — Z79.4 TYPE 2 DIABETES MELLITUS WITH DIABETIC POLYNEUROPATHY, WITH LONG-TERM CURRENT USE OF INSULIN: Primary | Chronic | ICD-10-CM

## 2020-12-23 DIAGNOSIS — E55.9 VITAMIN D DEFICIENCY: ICD-10-CM

## 2020-12-23 DIAGNOSIS — E13.9 DIABETES MELLITUS DUE TO ABNORMAL INSULIN: ICD-10-CM

## 2020-12-23 DIAGNOSIS — E78.5 HYPERLIPIDEMIA, UNSPECIFIED HYPERLIPIDEMIA TYPE: Primary | ICD-10-CM

## 2020-12-23 DIAGNOSIS — E11.42 TYPE 2 DIABETES MELLITUS WITH DIABETIC POLYNEUROPATHY, WITH LONG-TERM CURRENT USE OF INSULIN: Primary | Chronic | ICD-10-CM

## 2020-12-23 DIAGNOSIS — D47.2 SMOLDERING MULTIPLE MYELOMA: ICD-10-CM

## 2020-12-23 DIAGNOSIS — M19.132 POST-TRAUMATIC OSTEOARTHRITIS OF LEFT WRIST: Primary | ICD-10-CM

## 2020-12-23 DIAGNOSIS — Z00.6 RESEARCH EXAM: ICD-10-CM

## 2020-12-23 DIAGNOSIS — D47.2 SMOLDERING MULTIPLE MYELOMA: Primary | ICD-10-CM

## 2020-12-23 DIAGNOSIS — Z79.4 TYPE 2 DIABETES MELLITUS WITH DIABETIC POLYNEUROPATHY, WITH LONG-TERM CURRENT USE OF INSULIN: Chronic | ICD-10-CM

## 2020-12-23 LAB
25(OH)D3+25(OH)D2 SERPL-MCNC: 19 NG/ML (ref 30–96)
ALBUMIN SERPL BCP-MCNC: 3.6 G/DL (ref 3.5–5.2)
ALP SERPL-CCNC: 50 U/L (ref 55–135)
ALT SERPL W/O P-5'-P-CCNC: 19 U/L (ref 10–44)
ANION GAP SERPL CALC-SCNC: 10 MMOL/L (ref 8–16)
AST SERPL-CCNC: 21 U/L (ref 10–40)
BASOPHILS # BLD AUTO: 0.03 K/UL (ref 0–0.2)
BASOPHILS NFR BLD: 0.7 % (ref 0–1.9)
BILIRUB SERPL-MCNC: 0.3 MG/DL (ref 0.1–1)
BUN SERPL-MCNC: 14 MG/DL (ref 8–23)
CALCIUM SERPL-MCNC: 8.9 MG/DL (ref 8.7–10.5)
CHLORIDE SERPL-SCNC: 101 MMOL/L (ref 95–110)
CHOLEST SERPL-MCNC: 169 MG/DL (ref 120–199)
CHOLEST/HDLC SERPL: 7.7 {RATIO} (ref 2–5)
CO2 SERPL-SCNC: 25 MMOL/L (ref 23–29)
CREAT SERPL-MCNC: 1.6 MG/DL (ref 0.5–1.4)
DIFFERENTIAL METHOD: ABNORMAL
EOSINOPHIL # BLD AUTO: 0.3 K/UL (ref 0–0.5)
EOSINOPHIL NFR BLD: 5.6 % (ref 0–8)
ERYTHROCYTE [DISTWIDTH] IN BLOOD BY AUTOMATED COUNT: 13.2 % (ref 11.5–14.5)
EST. GFR  (AFRICAN AMERICAN): 48.3 ML/MIN/1.73 M^2
EST. GFR  (NON AFRICAN AMERICAN): 41.8 ML/MIN/1.73 M^2
ESTIMATED AVG GLUCOSE: 166 MG/DL (ref 68–131)
GLUCOSE SERPL-MCNC: 194 MG/DL (ref 70–110)
HBA1C MFR BLD HPLC: 7.4 % (ref 4–5.6)
HCT VFR BLD AUTO: 34.8 % (ref 40–54)
HDLC SERPL-MCNC: 22 MG/DL (ref 40–75)
HDLC SERPL: 13 % (ref 20–50)
HGB BLD-MCNC: 10.8 G/DL (ref 14–18)
IGA SERPL-MCNC: 1457 MG/DL (ref 40–350)
IGG SERPL-MCNC: 887 MG/DL (ref 650–1600)
IGM SERPL-MCNC: 43 MG/DL (ref 50–300)
IMM GRANULOCYTES # BLD AUTO: 0.01 K/UL (ref 0–0.04)
IMM GRANULOCYTES NFR BLD AUTO: 0.2 % (ref 0–0.5)
LDH SERPL L TO P-CCNC: 100 U/L (ref 110–260)
LDLC SERPL CALC-MCNC: ABNORMAL MG/DL (ref 63–159)
LYMPHOCYTES # BLD AUTO: 1.6 K/UL (ref 1–4.8)
LYMPHOCYTES NFR BLD: 36 % (ref 18–48)
MAGNESIUM SERPL-MCNC: 1.7 MG/DL (ref 1.6–2.6)
MCH RBC QN AUTO: 28.6 PG (ref 27–31)
MCHC RBC AUTO-ENTMCNC: 31 G/DL (ref 32–36)
MCV RBC AUTO: 92 FL (ref 82–98)
MONOCYTES # BLD AUTO: 0.3 K/UL (ref 0.3–1)
MONOCYTES NFR BLD: 6.3 % (ref 4–15)
NEUTROPHILS # BLD AUTO: 2.3 K/UL (ref 1.8–7.7)
NEUTROPHILS NFR BLD: 51.2 % (ref 38–73)
NONHDLC SERPL-MCNC: 147 MG/DL
NRBC BLD-RTO: 0 /100 WBC
PHOSPHATE SERPL-MCNC: 3.4 MG/DL (ref 2.7–4.5)
PLATELET # BLD AUTO: 233 K/UL (ref 150–350)
PMV BLD AUTO: 9.1 FL (ref 9.2–12.9)
POTASSIUM SERPL-SCNC: 4.4 MMOL/L (ref 3.5–5.1)
PROT SERPL-MCNC: 8.1 G/DL (ref 6–8.4)
RBC # BLD AUTO: 3.77 M/UL (ref 4.6–6.2)
SODIUM SERPL-SCNC: 136 MMOL/L (ref 136–145)
TRIGL SERPL-MCNC: 523 MG/DL (ref 30–150)
TSH SERPL DL<=0.005 MIU/L-ACNC: 1.73 UIU/ML (ref 0.4–4)
WBC # BLD AUTO: 4.44 K/UL (ref 3.9–12.7)

## 2020-12-23 PROCEDURE — 84165 PATHOLOGIST INTERPRETATION SPE: ICD-10-PCS | Mod: 26,Q1,HCNC, | Performed by: PATHOLOGY

## 2020-12-23 PROCEDURE — 82784 ASSAY IGA/IGD/IGG/IGM EACH: CPT | Mod: 59,HCNC

## 2020-12-23 PROCEDURE — 83520 IMMUNOASSAY QUANT NOS NONAB: CPT | Mod: HCNC,Q1

## 2020-12-23 PROCEDURE — 85025 COMPLETE CBC W/AUTO DIFF WBC: CPT | Mod: HCNC,Q1

## 2020-12-23 PROCEDURE — 80053 COMPREHEN METABOLIC PANEL: CPT | Mod: HCNC,Q1

## 2020-12-23 PROCEDURE — 86334 IMMUNOFIX E-PHORESIS SERUM: CPT | Mod: HCNC,Q1

## 2020-12-23 PROCEDURE — 84165 PROTEIN E-PHORESIS SERUM: CPT | Mod: 26,Q1,HCNC, | Performed by: PATHOLOGY

## 2020-12-23 PROCEDURE — 80061 LIPID PANEL: CPT | Mod: HCNC,Q1

## 2020-12-23 PROCEDURE — 99215 OFFICE O/P EST HI 40 MIN: CPT | Mod: Q1,HCNC,S$GLB, | Performed by: INTERNAL MEDICINE

## 2020-12-23 PROCEDURE — 99999 PR PBB SHADOW E&M-EST. PATIENT-LVL III: CPT | Mod: PBBFAC,HCNC,, | Performed by: INTERNAL MEDICINE

## 2020-12-23 PROCEDURE — 83036 HEMOGLOBIN GLYCOSYLATED A1C: CPT | Mod: HCNC

## 2020-12-23 PROCEDURE — 83735 ASSAY OF MAGNESIUM: CPT | Mod: HCNC,Q1

## 2020-12-23 PROCEDURE — 84100 ASSAY OF PHOSPHORUS: CPT | Mod: HCNC

## 2020-12-23 PROCEDURE — 82306 VITAMIN D 25 HYDROXY: CPT | Mod: HCNC

## 2020-12-23 PROCEDURE — 84443 ASSAY THYROID STIM HORMONE: CPT | Mod: HCNC,Q1

## 2020-12-23 PROCEDURE — 36415 COLL VENOUS BLD VENIPUNCTURE: CPT | Mod: HCNC

## 2020-12-23 PROCEDURE — 99999 PR PBB SHADOW E&M-EST. PATIENT-LVL III: ICD-10-PCS | Mod: PBBFAC,HCNC,, | Performed by: INTERNAL MEDICINE

## 2020-12-23 PROCEDURE — 86334 PATHOLOGIST INTERPRETATION IFE: ICD-10-PCS | Mod: 26,Q1,HCNC, | Performed by: PATHOLOGY

## 2020-12-23 PROCEDURE — 99215 PR OFFICE/OUTPT VISIT, EST, LEVL V, 40-54 MIN: ICD-10-PCS | Mod: Q1,HCNC,S$GLB, | Performed by: INTERNAL MEDICINE

## 2020-12-23 PROCEDURE — 86334 IMMUNOFIX E-PHORESIS SERUM: CPT | Mod: 26,Q1,HCNC, | Performed by: PATHOLOGY

## 2020-12-23 PROCEDURE — 84165 PROTEIN E-PHORESIS SERUM: CPT | Mod: HCNC,Q1

## 2020-12-23 PROCEDURE — 83615 LACTATE (LD) (LDH) ENZYME: CPT | Mod: HCNC

## 2020-12-23 NOTE — PROGRESS NOTES
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   Sponsor:  [facility]  IRB# [id number]  Study ID: [id number]  Investigator: GIL Engel Pt Initials: LUCÍA LORENZ       Cycle 61 Day 28 Arm B: Observation    Patient presents to clinic today for above cycle evaluation.  He presents with a brace on his left arm as well as braces on both knees for chronic joint issues.  He denies any other CRAB symptoms.  He states continued willingness to participate in above-mentioned study.      Review of Baseline AE's:   1.Hypertension, Grade 2 diastolic: BP today is 154/76 today. Subject denies headache/dizziness; no symptoms noted.   AE ongoing  2. Lower Back Pain and Neck Pain, grade 1: Patient has no complaints today/ over last month.  As previously stated, patient is not suspected to have bony myeloma involvement per Dr. Engel as these are pre-existing issues present at baseline.  AE ongoing.  3. Pain in extremity (knee), grade 1. As above, he experienced a recent fall with injury.  Able to walk on it today without issue.  Plans to get an injection to the knees tomorrow to help alleviate symptoms.  Presents with knee stabilizing braces intact.  Still has plans to see ortho soon to discuss potential knee replacement.     4. Peripheral sensory neuropathy, grade 1: Patient does not verbalize complaints today. Has gabapentin prescribed and takes as needed.  States has not taken recently.  AE ongoing.   5. Anemia, Grade 1: Hgb stable at 11.8  Result reviewed by Dr. Engel. AE ongoing            Review of AE's:     *Please note this list is not all-inclusive, please see AE log for physician reviewed list of adverse events.   1. Creatinine increased, grade 2: Serum creatinine increased to 1.6 mg/dl today and calculated GFR 50.65. (GFR 48.3 per Epic/Lab calculation) This is has increased from previous levels, but  "reviewed per Dr. Engel and is "about his baseline" and NCS today.  As previously stated, Dr. Engel states this has not been related to myeloma and is related chronic kidney issues likely secondary to diabetes and hypertension. Will continue observation monthly and to monitor renal function closely. Per MD, encouraged to increase water intake. AE stable from baseline  2. Hyponatremia, Grade 1: Serum sodium today is 136 mmol/L; AE intermittent. Will monitor    Per study chair, "the definition of progressive disease for this protocol is developing CRAB criteria that requires treatment systemically." Per Dr Engel, based on today's exam and lab results thus far, patient does not have any s/s of CRAB: Normal Calcium level (8.9), absence of Renal insufficiency (serum creatinine 1.6, and GFR 50.65 per [facility]) --patient with a history of kidney dysfunction secondary to diabetes; Dr. Engel aware of these values and not concerned for myeloma involvement), absence of significant Anemia (hemoglobin 10.8, stable; will await myeloma labs for clarity relationship to myeloma) and absence of lytic Bone lesions (per baseline metastatic survey as well as MRI--see MD note for further comment). See MD note for ECOG score and H&P and flowsheets for laboratory work, vitals, etc. All myeloma-related blood work from last cycle including SPEP and JAGUAR have remained stable, and are currently pending for this cycle. Per Dr. Engel, patient shows no evidence of progression at last result. Patient informed that Dr. Engel will release all lab results to [facility]. He states understanding of this. QOL's not required again until end of treatment/observation.           Encouraged patient to reach out/contact staff if feels need to discuss any issues. He states understanding.  Patient was informed to review all scheduled appointments in [facility] and notify us if any conflicts.  He states he had previous plans to move to [place] full time; however " he states today that his rent has recently been adjusted and he will continue to keep a residence here in [place] for the time being therefore he will still be able to keep his monthly appointments in [place] with Dr. Engel. He states he wishes only to see Dr Engel and will not show for appt if scheduled with any other provider.  Will continue to schedule patient as far out as possible, which seems to help maintain compliance with visits. Patient states having research RN's contact information and has MD contact information to call with any concerns, questions, or worsening of symptoms.  Discussed next month's appt date and time, he verbalized understanding.

## 2020-12-23 NOTE — PROGRESS NOTES
Subjective:    Patient ID: Emerson Menendez is a 75 y.o. male.    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 Test results identify a stable IgA kappa protein with beta globulin band at 1.63g/dL. Kappa free light chain is elevated at 4.40. CBC and calcium are stable. Creatinine with history of stage III CKD is stable at 1.4. Metastatic survey is negative for lytic lesions. MRI of the entire spine demonstrated age related changes but no convincing evidence of myeloma bone disease. Bone marrow biopsy identified about 13% plasma cells by morphology and FISH for myeloma identified a t(11;14) and trisomies 3,7, and 17. The patient is afebrile and appears clinically well. He was seen by his podiatrist and nephrologist since our last visit without any new or acute events.    The patient has not received any therapy for smoldering myeloma including bisphosphonates or steroids. He has been randomized to observation arm of the the clinical study. 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 He has no current or prior history of malignancy.    TODAY  Mr. Menendez returns today for monthly follow-up evaluation. Acute interval events continued pain at left wrist. Due to have surgery 1/5/2021 to remove 3 bones. Also notes it may be time for right knee surgery due to increased pain. Still coping with loss of daughter; mood is  low during the holidays and he is spending Temple in [place] with his fiance. Has had at least 1 visit with Dr. Perkins since last October's  visit.  CBC, CMP  remains stable. Myeloma labs pending.      Follow-up  Pertinent negatives include no diaphoresis, fatigue or fever.   Foot Pain  Pertinent negatives include no diaphoresis, fatigue or fever.   Arm Pain       Review of Systems   Constitutional: Negative for activity change, appetite change, diaphoresis, fatigue, fever and unexpected weight change.   HENT: Negative.    Eyes: Negative.    Respiratory: Negative.    Cardiovascular: Negative for leg swelling.   Gastrointestinal: Negative.    Endocrine: Negative.    Genitourinary: Negative.    Musculoskeletal: Negative.    Skin: Negative.    Allergic/Immunologic: Negative.    Neurological:        Grade 0-1 peripheral neuropathy of bilateral feet.    Hematological: Negative for adenopathy. Does not bruise/bleed easily.   Psychiatric/Behavioral: Negative.        Objective:       Vitals:    12/23/20 0857   BP: (!) 154/76   Pulse: 80   Resp: 12   Temp: 97.5 °F (36.4 °C)       Physical Exam  Vitals signs and nursing note reviewed.   Constitutional:       Appearance: He is well-developed.   HENT:      Head: Normocephalic and atraumatic.      Right Ear: External ear normal.      Left Ear: External ear normal.      Nose: Nose normal.   Eyes:      Conjunctiva/sclera: Conjunctivae normal.      Pupils: Pupils are equal, round, and reactive to light.   Neck:      Musculoskeletal: Normal range of motion and neck supple.   Cardiovascular:      Rate and Rhythm: Normal rate and regular rhythm.      Heart sounds: No murmur.   Pulmonary:      Effort: Pulmonary effort is normal. No respiratory distress.      Breath sounds: Normal breath sounds.   Abdominal:      General: Bowel sounds are normal. There is no distension.      Palpations: Abdomen is soft.   Musculoskeletal:         General: No tenderness.   Skin:     General: Skin is warm and dry.      Findings: No rash.      Nails: There is no clubbing.     Neurological:      Mental Status: He is alert and oriented to person, place, and time.       Cranial Nerves: No cranial nerve deficit.   Psychiatric:         Behavior: Behavior normal.         Assessment:       No diagnosis found.    Plan:       The patient has a diagnosis of smoldering myeloma. There is no indication for immediate chemotherapy. We are monitoring renal function closely- baseline creatinine of -1.5..  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 M protein has been stable and repeat is pending. Plan for return in 1month.  Endocrinology and Nephrology to monitor diabetes and renal failure respectively. Patient would like to continue to follow with Dr. Perkins as needed.

## 2020-12-23 NOTE — TELEPHONE ENCOUNTER
Left voice message for patient to return call to schedule appointment from referral to Sleep Medicine.  Tory GONZALEZ [phone]

## 2020-12-24 LAB
ALBUMIN SERPL ELPH-MCNC: 3.89 G/DL (ref 3.35–5.55)
ALPHA1 GLOB SERPL ELPH-MCNC: 0.34 G/DL (ref 0.17–0.41)
ALPHA2 GLOB SERPL ELPH-MCNC: 0.82 G/DL (ref 0.43–0.99)
B-GLOBULIN SERPL ELPH-MCNC: 1 G/DL (ref 0.5–1.1)
GAMMA GLOB SERPL ELPH-MCNC: 1.85 G/DL (ref 0.67–1.58)
INTERPRETATION SERPL IFE-IMP: NORMAL
KAPPA LC SER QL IA: 7.16 MG/DL (ref 0.33–1.94)
KAPPA LC/LAMBDA SER IA: 3.65 (ref 0.26–1.65)
LAMBDA LC SER QL IA: 1.96 MG/DL (ref 0.57–2.63)
PATHOLOGIST INTERPRETATION IFE: NORMAL
PATHOLOGIST INTERPRETATION SPE: NORMAL
PROT SERPL-MCNC: 7.9 G/DL (ref 6–8.4)

## 2020-12-28 ENCOUNTER — PATIENT MESSAGE (OUTPATIENT)
Dept: INTERNAL MEDICINE | Facility: CLINIC | Age: 75
End: 2020-12-28

## 2020-12-30 DIAGNOSIS — D47.2 SMOLDERING MULTIPLE MYELOMA: Primary | ICD-10-CM

## 2020-12-30 DIAGNOSIS — Z00.6 RESEARCH EXAM: ICD-10-CM

## 2021-01-03 ENCOUNTER — LAB VISIT (OUTPATIENT)
Dept: SPORTS MEDICINE | Facility: CLINIC | Age: 76
End: 2021-01-03
Payer: MEDICARE

## 2021-01-03 DIAGNOSIS — Z01.818 PRE-OPERATIVE CLEARANCE: ICD-10-CM

## 2021-01-03 LAB — SARS-COV-2 RNA RESP QL NAA+PROBE: NOT DETECTED

## 2021-01-03 PROCEDURE — U0003 INFECTIOUS AGENT DETECTION BY NUCLEIC ACID (DNA OR RNA); SEVERE ACUTE RESPIRATORY SYNDROME CORONAVIRUS 2 (SARS-COV-2) (CORONAVIRUS DISEASE [COVID-19]), AMPLIFIED PROBE TECHNIQUE, MAKING USE OF HIGH THROUGHPUT TECHNOLOGIES AS DESCRIBED BY CMS-2020-01-R: HCPCS | Mod: HCNC,Q1

## 2021-01-04 ENCOUNTER — HOSPITAL ENCOUNTER (OUTPATIENT)
Dept: PREADMISSION TESTING | Facility: OTHER | Age: 76
Discharge: HOME | End: 2021-01-04
Attending: ORTHOPAEDIC SURGERY
Payer: MEDICARE

## 2021-01-04 ENCOUNTER — OFFICE VISIT (OUTPATIENT)
Dept: SLEEP MEDICINE | Facility: CLINIC | Age: 76
End: 2021-01-04
Payer: MEDICARE

## 2021-01-04 ENCOUNTER — ANESTHESIA EVENT (OUTPATIENT)
Dept: SURGERY | Facility: OTHER | Age: 76
End: 2021-01-04
Payer: MEDICARE

## 2021-01-04 ENCOUNTER — PATIENT MESSAGE (OUTPATIENT)
Dept: ADMINISTRATIVE | Facility: HOSPITAL | Age: 76
End: 2021-01-04

## 2021-01-04 ENCOUNTER — TELEPHONE (OUTPATIENT)
Dept: INTERNAL MEDICINE | Facility: CLINIC | Age: 76
End: 2021-01-04

## 2021-01-04 VITALS
HEART RATE: 100 BPM | SYSTOLIC BLOOD PRESSURE: 142 MMHG | BODY MASS INDEX: 26.48 KG/M2 | DIASTOLIC BLOOD PRESSURE: 82 MMHG | WEIGHT: 185 LBS | HEIGHT: 70 IN

## 2021-01-04 VITALS
BODY MASS INDEX: 26.48 KG/M2 | DIASTOLIC BLOOD PRESSURE: 88 MMHG | HEART RATE: 94 BPM | SYSTOLIC BLOOD PRESSURE: 170 MMHG | OXYGEN SATURATION: 97 % | HEIGHT: 70 IN | WEIGHT: 185 LBS | TEMPERATURE: 97 F

## 2021-01-04 DIAGNOSIS — G47.33 OBSTRUCTIVE SLEEP APNEA: ICD-10-CM

## 2021-01-04 DIAGNOSIS — E13.9 DIABETES MELLITUS DUE TO ABNORMAL INSULIN: Primary | ICD-10-CM

## 2021-01-04 DIAGNOSIS — G47.33 OSA (OBSTRUCTIVE SLEEP APNEA): Primary | ICD-10-CM

## 2021-01-04 PROCEDURE — 3072F LOW RISK FOR RETINOPATHY: CPT | Mod: HCNC,S$GLB,, | Performed by: INTERNAL MEDICINE

## 2021-01-04 PROCEDURE — 99999 PR PBB SHADOW E&M-EST. PATIENT-LVL III: CPT | Mod: PBBFAC,HCNC,, | Performed by: INTERNAL MEDICINE

## 2021-01-04 PROCEDURE — 3288F FALL RISK ASSESSMENT DOCD: CPT | Mod: HCNC,CPTII,S$GLB, | Performed by: INTERNAL MEDICINE

## 2021-01-04 PROCEDURE — 1125F PR PAIN SEVERITY QUANTIFIED, PAIN PRESENT: ICD-10-PCS | Mod: HCNC,S$GLB,, | Performed by: INTERNAL MEDICINE

## 2021-01-04 PROCEDURE — 99204 PR OFFICE/OUTPT VISIT, NEW, LEVL IV, 45-59 MIN: ICD-10-PCS | Mod: HCNC,S$GLB,, | Performed by: INTERNAL MEDICINE

## 2021-01-04 PROCEDURE — 3079F DIAST BP 80-89 MM HG: CPT | Mod: HCNC,CPTII,S$GLB, | Performed by: INTERNAL MEDICINE

## 2021-01-04 PROCEDURE — 3077F PR MOST RECENT SYSTOLIC BLOOD PRESSURE >= 140 MM HG: ICD-10-PCS | Mod: HCNC,CPTII,S$GLB, | Performed by: INTERNAL MEDICINE

## 2021-01-04 PROCEDURE — 3077F SYST BP >= 140 MM HG: CPT | Mod: HCNC,CPTII,S$GLB, | Performed by: INTERNAL MEDICINE

## 2021-01-04 PROCEDURE — 1159F MED LIST DOCD IN RCRD: CPT | Mod: HCNC,S$GLB,, | Performed by: INTERNAL MEDICINE

## 2021-01-04 PROCEDURE — 99204 OFFICE O/P NEW MOD 45 MIN: CPT | Mod: HCNC,S$GLB,, | Performed by: INTERNAL MEDICINE

## 2021-01-04 PROCEDURE — 3008F BODY MASS INDEX DOCD: CPT | Mod: HCNC,CPTII,S$GLB, | Performed by: INTERNAL MEDICINE

## 2021-01-04 PROCEDURE — 1101F PT FALLS ASSESS-DOCD LE1/YR: CPT | Mod: HCNC,CPTII,S$GLB, | Performed by: INTERNAL MEDICINE

## 2021-01-04 PROCEDURE — 1159F PR MEDICATION LIST DOCUMENTED IN MEDICAL RECORD: ICD-10-PCS | Mod: HCNC,S$GLB,, | Performed by: INTERNAL MEDICINE

## 2021-01-04 PROCEDURE — 3288F PR FALLS RISK ASSESSMENT DOCUMENTED: ICD-10-PCS | Mod: HCNC,CPTII,S$GLB, | Performed by: INTERNAL MEDICINE

## 2021-01-04 PROCEDURE — 3079F PR MOST RECENT DIASTOLIC BLOOD PRESSURE 80-89 MM HG: ICD-10-PCS | Mod: HCNC,CPTII,S$GLB, | Performed by: INTERNAL MEDICINE

## 2021-01-04 PROCEDURE — 1125F AMNT PAIN NOTED PAIN PRSNT: CPT | Mod: HCNC,S$GLB,, | Performed by: INTERNAL MEDICINE

## 2021-01-04 PROCEDURE — 1101F PR PT FALLS ASSESS DOC 0-1 FALLS W/OUT INJ PAST YR: ICD-10-PCS | Mod: HCNC,CPTII,S$GLB, | Performed by: INTERNAL MEDICINE

## 2021-01-04 PROCEDURE — 3072F PR LOW RISK FOR RETINOPATHY: ICD-10-PCS | Mod: HCNC,S$GLB,, | Performed by: INTERNAL MEDICINE

## 2021-01-04 PROCEDURE — 99999 PR PBB SHADOW E&M-EST. PATIENT-LVL III: ICD-10-PCS | Mod: PBBFAC,HCNC,, | Performed by: INTERNAL MEDICINE

## 2021-01-04 PROCEDURE — 3008F PR BODY MASS INDEX (BMI) DOCUMENTED: ICD-10-PCS | Mod: HCNC,CPTII,S$GLB, | Performed by: INTERNAL MEDICINE

## 2021-01-04 RX ORDER — ACETAMINOPHEN 500 MG
1000 TABLET ORAL
Status: CANCELLED | OUTPATIENT
Start: 2021-01-04 | End: 2021-01-04

## 2021-01-04 RX ORDER — LIDOCAINE HYDROCHLORIDE 10 MG/ML
0.5 INJECTION, SOLUTION EPIDURAL; INFILTRATION; INTRACAUDAL; PERINEURAL ONCE
Status: CANCELLED | OUTPATIENT
Start: 2021-01-04 | End: 2021-01-04

## 2021-01-04 RX ORDER — SODIUM CHLORIDE, SODIUM LACTATE, POTASSIUM CHLORIDE, CALCIUM CHLORIDE 600; 310; 30; 20 MG/100ML; MG/100ML; MG/100ML; MG/100ML
INJECTION, SOLUTION INTRAVENOUS CONTINUOUS
Status: CANCELLED | OUTPATIENT
Start: 2021-01-04

## 2021-01-05 ENCOUNTER — TELEPHONE (OUTPATIENT)
Dept: ORTHOPEDICS | Facility: CLINIC | Age: 76
End: 2021-01-05

## 2021-01-05 ENCOUNTER — PATIENT MESSAGE (OUTPATIENT)
Dept: SURGERY | Facility: OTHER | Age: 76
End: 2021-01-05

## 2021-01-05 RX ORDER — OXYCODONE AND ACETAMINOPHEN 5; 325 MG/1; MG/1
1 TABLET ORAL
Qty: 42 TABLET | Refills: 0 | Status: SHIPPED | OUTPATIENT
Start: 2021-01-05 | End: 2021-01-11 | Stop reason: SDUPTHER

## 2021-01-06 ENCOUNTER — ANESTHESIA (OUTPATIENT)
Dept: SURGERY | Facility: OTHER | Age: 76
End: 2021-01-06
Payer: MEDICARE

## 2021-01-06 ENCOUNTER — HOSPITAL ENCOUNTER (OUTPATIENT)
Facility: OTHER | Age: 76
Discharge: HOME | End: 2021-01-06
Attending: ORTHOPAEDIC SURGERY | Admitting: ORTHOPAEDIC SURGERY
Payer: MEDICARE

## 2021-01-06 VITALS
HEART RATE: 85 BPM | RESPIRATION RATE: 16 BRPM | SYSTOLIC BLOOD PRESSURE: 137 MMHG | BODY MASS INDEX: 26.48 KG/M2 | HEIGHT: 70 IN | WEIGHT: 185 LBS | OXYGEN SATURATION: 95 % | DIASTOLIC BLOOD PRESSURE: 87 MMHG | TEMPERATURE: 98 F

## 2021-01-06 DIAGNOSIS — M25.332 SCAPHOLUNATE DISSOCIATION OF LEFT WRIST: Primary | ICD-10-CM

## 2021-01-06 DIAGNOSIS — G56.00 CARPAL TUNNEL SYNDROME: ICD-10-CM

## 2021-01-06 LAB — POCT GLUCOSE: 122 MG/DL (ref 70–110)

## 2021-01-06 PROCEDURE — 71000015 HC POSTOP RECOV 1ST HR: Mod: HCNC | Performed by: ORTHOPAEDIC SURGERY

## 2021-01-06 PROCEDURE — 25000003 PHARM REV CODE 250: Mod: HCNC | Performed by: STUDENT IN AN ORGANIZED HEALTH CARE EDUCATION/TRAINING PROGRAM

## 2021-01-06 PROCEDURE — 87070 CULTURE OTHR SPECIMN AEROBIC: CPT | Mod: HCNC

## 2021-01-06 PROCEDURE — 37000009 HC ANESTHESIA EA ADD 15 MINS: Mod: HCNC | Performed by: ORTHOPAEDIC SURGERY

## 2021-01-06 PROCEDURE — 25332 REVISE WRIST JOINT: CPT | Mod: HCNC,LT,, | Performed by: ORTHOPAEDIC SURGERY

## 2021-01-06 PROCEDURE — 87102 FUNGUS ISOLATION CULTURE: CPT | Mod: HCNC

## 2021-01-06 PROCEDURE — 25000003 PHARM REV CODE 250: Mod: HCNC | Performed by: ORTHOPAEDIC SURGERY

## 2021-01-06 PROCEDURE — 36000709 HC OR TIME LEV III EA ADD 15 MIN: Mod: HCNC | Performed by: ORTHOPAEDIC SURGERY

## 2021-01-06 PROCEDURE — 87075 CULTR BACTERIA EXCEPT BLOOD: CPT | Mod: HCNC

## 2021-01-06 PROCEDURE — 25332 PR ARTHROPLASTY WRIST JT: ICD-10-PCS | Mod: HCNC,LT,, | Performed by: ORTHOPAEDIC SURGERY

## 2021-01-06 PROCEDURE — 76942 ECHO GUIDE FOR BIOPSY: CPT | Mod: HCNC | Performed by: ANESTHESIOLOGY

## 2021-01-06 PROCEDURE — 63600175 PHARM REV CODE 636 W HCPCS: Mod: HCNC | Performed by: NURSE ANESTHETIST, CERTIFIED REGISTERED

## 2021-01-06 PROCEDURE — 82962 GLUCOSE BLOOD TEST: CPT | Mod: HCNC | Performed by: ORTHOPAEDIC SURGERY

## 2021-01-06 PROCEDURE — 63600175 PHARM REV CODE 636 W HCPCS: Mod: HCNC | Performed by: ANESTHESIOLOGY

## 2021-01-06 PROCEDURE — 36000708 HC OR TIME LEV III 1ST 15 MIN: Mod: HCNC | Performed by: ORTHOPAEDIC SURGERY

## 2021-01-06 PROCEDURE — 87205 SMEAR GRAM STAIN: CPT | Mod: HCNC

## 2021-01-06 PROCEDURE — 27800903 OPTIME MED/SURG SUP & DEVICES OTHER IMPLANTS: Mod: HCNC | Performed by: ORTHOPAEDIC SURGERY

## 2021-01-06 PROCEDURE — 25000003 PHARM REV CODE 250: Mod: HCNC | Performed by: ANESTHESIOLOGY

## 2021-01-06 PROCEDURE — 37000008 HC ANESTHESIA 1ST 15 MINUTES: Mod: HCNC | Performed by: ORTHOPAEDIC SURGERY

## 2021-01-06 PROCEDURE — 87116 MYCOBACTERIA CULTURE: CPT | Mod: HCNC

## 2021-01-06 PROCEDURE — 87206 SMEAR FLUORESCENT/ACID STAI: CPT | Mod: HCNC

## 2021-01-06 RX ORDER — SODIUM CHLORIDE 0.9 % (FLUSH) 0.9 %
10 SYRINGE (ML) INJECTION
Status: DISCONTINUED | OUTPATIENT
Start: 2021-01-06 | End: 2021-01-06 | Stop reason: HOSPADM

## 2021-01-06 RX ORDER — CLINDAMYCIN PHOSPHATE 900 MG/50ML
900 INJECTION, SOLUTION INTRAVENOUS
Status: COMPLETED | OUTPATIENT
Start: 2021-01-06 | End: 2021-01-06

## 2021-01-06 RX ORDER — MUPIROCIN 20 MG/G
OINTMENT TOPICAL
Status: DISPENSED | OUTPATIENT
Start: 2021-01-06

## 2021-01-06 RX ORDER — HYDROMORPHONE HYDROCHLORIDE 2 MG/ML
0.4 INJECTION, SOLUTION INTRAMUSCULAR; INTRAVENOUS; SUBCUTANEOUS EVERY 5 MIN PRN
Status: DISCONTINUED | OUTPATIENT
Start: 2021-01-06 | End: 2021-01-06 | Stop reason: HOSPADM

## 2021-01-06 RX ORDER — MIDAZOLAM HYDROCHLORIDE 1 MG/ML
2 INJECTION INTRAMUSCULAR; INTRAVENOUS
Status: DISCONTINUED | OUTPATIENT
Start: 2021-01-06 | End: 2021-01-06 | Stop reason: HOSPADM

## 2021-01-06 RX ORDER — BACITRACIN ZINC 500 UNIT/G
OINTMENT (GRAM) TOPICAL
Status: DISCONTINUED | OUTPATIENT
Start: 2021-01-06 | End: 2021-01-06 | Stop reason: HOSPADM

## 2021-01-06 RX ORDER — ROPIVACAINE HYDROCHLORIDE 5 MG/ML
INJECTION, SOLUTION EPIDURAL; INFILTRATION; PERINEURAL
Status: COMPLETED | OUTPATIENT
Start: 2021-01-06 | End: 2021-01-06

## 2021-01-06 RX ORDER — MEPERIDINE HYDROCHLORIDE 25 MG/ML
12.5 INJECTION INTRAMUSCULAR; INTRAVENOUS; SUBCUTANEOUS ONCE AS NEEDED
Status: DISCONTINUED | OUTPATIENT
Start: 2021-01-06 | End: 2021-01-06 | Stop reason: HOSPADM

## 2021-01-06 RX ORDER — MIDAZOLAM HYDROCHLORIDE 1 MG/ML
INJECTION INTRAMUSCULAR; INTRAVENOUS
Status: DISCONTINUED | OUTPATIENT
Start: 2021-01-06 | End: 2021-01-06

## 2021-01-06 RX ORDER — ACETAMINOPHEN 500 MG
1000 TABLET ORAL
Status: COMPLETED | OUTPATIENT
Start: 2021-01-06 | End: 2021-01-06

## 2021-01-06 RX ORDER — LIDOCAINE HYDROCHLORIDE 10 MG/ML
0.5 INJECTION, SOLUTION EPIDURAL; INFILTRATION; INTRACAUDAL; PERINEURAL ONCE
Status: DISCONTINUED | OUTPATIENT
Start: 2021-01-06 | End: 2021-01-06 | Stop reason: HOSPADM

## 2021-01-06 RX ORDER — FENTANYL CITRATE 50 UG/ML
INJECTION, SOLUTION INTRAMUSCULAR; INTRAVENOUS
Status: DISCONTINUED | OUTPATIENT
Start: 2021-01-06 | End: 2021-01-06

## 2021-01-06 RX ORDER — OXYCODONE HYDROCHLORIDE 5 MG/1
5 TABLET ORAL
Status: DISCONTINUED | OUTPATIENT
Start: 2021-01-06 | End: 2021-01-06 | Stop reason: HOSPADM

## 2021-01-06 RX ORDER — ONDANSETRON 2 MG/ML
4 INJECTION INTRAMUSCULAR; INTRAVENOUS DAILY PRN
Status: DISCONTINUED | OUTPATIENT
Start: 2021-01-06 | End: 2021-01-06 | Stop reason: HOSPADM

## 2021-01-06 RX ORDER — PROPOFOL 10 MG/ML
VIAL (ML) INTRAVENOUS
Status: DISCONTINUED | OUTPATIENT
Start: 2021-01-06 | End: 2021-01-06

## 2021-01-06 RX ORDER — HYDROCODONE BITARTRATE AND ACETAMINOPHEN 5; 325 MG/1; MG/1
1 TABLET ORAL EVERY 4 HOURS PRN
Status: CANCELLED | OUTPATIENT
Start: 2021-01-06

## 2021-01-06 RX ORDER — MUPIROCIN 20 MG/G
OINTMENT TOPICAL 2 TIMES DAILY
Status: CANCELLED | OUTPATIENT
Start: 2021-01-06 | End: 2021-01-11

## 2021-01-06 RX ORDER — FENTANYL CITRATE 50 UG/ML
100 INJECTION, SOLUTION INTRAMUSCULAR; INTRAVENOUS EVERY 5 MIN PRN
Status: DISCONTINUED | OUTPATIENT
Start: 2021-01-06 | End: 2021-01-06 | Stop reason: HOSPADM

## 2021-01-06 RX ORDER — SODIUM CHLORIDE, SODIUM LACTATE, POTASSIUM CHLORIDE, CALCIUM CHLORIDE 600; 310; 30; 20 MG/100ML; MG/100ML; MG/100ML; MG/100ML
INJECTION, SOLUTION INTRAVENOUS CONTINUOUS
Status: DISCONTINUED | OUTPATIENT
Start: 2021-01-06 | End: 2021-01-06 | Stop reason: HOSPADM

## 2021-01-06 RX ORDER — PROPOFOL 10 MG/ML
VIAL (ML) INTRAVENOUS CONTINUOUS PRN
Status: DISCONTINUED | OUTPATIENT
Start: 2021-01-06 | End: 2021-01-06

## 2021-01-06 RX ORDER — SODIUM CHLORIDE 0.9 % (FLUSH) 0.9 %
3 SYRINGE (ML) INJECTION
Status: DISCONTINUED | OUTPATIENT
Start: 2021-01-06 | End: 2021-01-06 | Stop reason: HOSPADM

## 2021-01-06 RX ADMIN — FENTANYL CITRATE 25 MCG: 50 INJECTION, SOLUTION INTRAMUSCULAR; INTRAVENOUS at 12:01

## 2021-01-06 RX ADMIN — CLINDAMYCIN PHOSPHATE 900 MG: 18 INJECTION, SOLUTION INTRAVENOUS at 11:01

## 2021-01-06 RX ADMIN — MUPIROCIN: 20 OINTMENT TOPICAL at 08:01

## 2021-01-06 RX ADMIN — FENTANYL CITRATE 100 MCG: 50 INJECTION, SOLUTION INTRAMUSCULAR; INTRAVENOUS at 10:01

## 2021-01-06 RX ADMIN — PROPOFOL 40 MG: 10 INJECTION, EMULSION INTRAVENOUS at 11:01

## 2021-01-06 RX ADMIN — PROPOFOL 100 MCG/KG/MIN: 10 INJECTION, EMULSION INTRAVENOUS at 11:01

## 2021-01-06 RX ADMIN — ROPIVACAINE HYDROCHLORIDE 30 ML: 5 INJECTION, SOLUTION EPIDURAL; INFILTRATION; PERINEURAL at 10:01

## 2021-01-06 RX ADMIN — ACETAMINOPHEN 1000 MG: 500 TABLET, FILM COATED ORAL at 08:01

## 2021-01-06 RX ADMIN — FENTANYL CITRATE 50 MCG: 50 INJECTION, SOLUTION INTRAMUSCULAR; INTRAVENOUS at 11:01

## 2021-01-06 RX ADMIN — MIDAZOLAM HYDROCHLORIDE 2 MG: 1 INJECTION, SOLUTION INTRAMUSCULAR; INTRAVENOUS at 10:01

## 2021-01-06 RX ADMIN — SODIUM CHLORIDE, SODIUM LACTATE, POTASSIUM CHLORIDE, AND CALCIUM CHLORIDE: 600; 310; 30; 20 INJECTION, SOLUTION INTRAVENOUS at 09:01

## 2021-01-07 ENCOUNTER — TELEPHONE (OUTPATIENT)
Dept: ORTHOPEDICS | Facility: CLINIC | Age: 76
End: 2021-01-07

## 2021-01-07 ENCOUNTER — HOSPITAL ENCOUNTER (EMERGENCY)
Facility: HOSPITAL | Age: 76
Discharge: HOME | End: 2021-01-07
Attending: EMERGENCY MEDICINE
Payer: MEDICARE

## 2021-01-07 ENCOUNTER — PATIENT MESSAGE (OUTPATIENT)
Dept: ORTHOPEDICS | Facility: CLINIC | Age: 76
End: 2021-01-07

## 2021-01-07 VITALS
SYSTOLIC BLOOD PRESSURE: 195 MMHG | WEIGHT: 185 LBS | HEIGHT: 70 IN | OXYGEN SATURATION: 97 % | HEART RATE: 97 BPM | RESPIRATION RATE: 18 BRPM | DIASTOLIC BLOOD PRESSURE: 90 MMHG | BODY MASS INDEX: 26.48 KG/M2 | TEMPERATURE: 99 F

## 2021-01-07 DIAGNOSIS — M25.532 LEFT WRIST PAIN: ICD-10-CM

## 2021-01-07 DIAGNOSIS — G89.18 POST-OPERATIVE PAIN: Primary | ICD-10-CM

## 2021-01-07 PROCEDURE — 63600175 PHARM REV CODE 636 W HCPCS: Mod: HCNC | Performed by: PHYSICIAN ASSISTANT

## 2021-01-07 PROCEDURE — 99285 EMERGENCY DEPT VISIT HI MDM: CPT | Mod: HCNC,,, | Performed by: EMERGENCY MEDICINE

## 2021-01-07 PROCEDURE — 96374 THER/PROPH/DIAG INJ IV PUSH: CPT | Mod: HCNC

## 2021-01-07 PROCEDURE — 99284 EMERGENCY DEPT VISIT MOD MDM: CPT | Mod: 25,HCNC

## 2021-01-07 PROCEDURE — 99285 PR EMERGENCY DEPT VISIT,LEVEL V: ICD-10-PCS | Mod: HCNC,,, | Performed by: EMERGENCY MEDICINE

## 2021-01-07 PROCEDURE — 96376 TX/PRO/DX INJ SAME DRUG ADON: CPT | Mod: HCNC

## 2021-01-07 RX ORDER — MORPHINE SULFATE 4 MG/ML
4 INJECTION, SOLUTION INTRAMUSCULAR; INTRAVENOUS
Status: COMPLETED | OUTPATIENT
Start: 2021-01-07 | End: 2021-01-07

## 2021-01-07 RX ADMIN — MORPHINE SULFATE 4 MG: 4 INJECTION INTRAVENOUS at 07:01

## 2021-01-07 RX ADMIN — MORPHINE SULFATE 4 MG: 4 INJECTION INTRAVENOUS at 05:01

## 2021-01-08 ENCOUNTER — TELEPHONE (OUTPATIENT)
Dept: INTERNAL MEDICINE | Facility: CLINIC | Age: 76
End: 2021-01-08

## 2021-01-11 LAB
BACTERIA FLD AEROBE CULT: NO GROWTH
BACTERIA SPEC ANAEROBE CULT: NORMAL
GRAM STN SPEC: NORMAL
GRAM STN SPEC: NORMAL

## 2021-01-11 RX ORDER — OXYCODONE AND ACETAMINOPHEN 5; 325 MG/1; MG/1
1 TABLET ORAL
Qty: 42 TABLET | Refills: 0 | Status: SHIPPED | OUTPATIENT
Start: 2021-01-11 | End: 2022-04-25

## 2021-01-11 RX ORDER — TRAMADOL HYDROCHLORIDE 50 MG/1
50 TABLET ORAL EVERY 6 HOURS PRN
Qty: 35 TABLET | Refills: 0 | Status: SHIPPED | OUTPATIENT
Start: 2021-01-11 | End: 2021-02-17 | Stop reason: SDUPTHER

## 2021-01-11 RX ORDER — PRAVASTATIN SODIUM 40 MG/1
40 TABLET ORAL DAILY
Qty: 90 TABLET | Refills: 3 | Status: SHIPPED | OUTPATIENT
Start: 2021-01-11 | End: 2021-02-19 | Stop reason: SDUPTHER

## 2021-01-12 ENCOUNTER — PATIENT MESSAGE (OUTPATIENT)
Dept: INTERNAL MEDICINE | Facility: CLINIC | Age: 76
End: 2021-01-12

## 2021-01-12 RX ORDER — TRAMADOL HYDROCHLORIDE 50 MG/1
50 TABLET ORAL EVERY 6 HOURS PRN
Qty: 35 TABLET | Refills: 0 | Status: CANCELLED | OUTPATIENT
Start: 2021-01-12

## 2021-01-12 RX ORDER — INSULIN GLARGINE 100 [IU]/ML
20 INJECTION, SOLUTION SUBCUTANEOUS NIGHTLY
Qty: 6 ML | Refills: 3 | Status: SHIPPED | OUTPATIENT
Start: 2021-01-12 | End: 2021-01-19 | Stop reason: SDUPTHER

## 2021-01-13 ENCOUNTER — PATIENT MESSAGE (OUTPATIENT)
Dept: INTERNAL MEDICINE | Facility: CLINIC | Age: 76
End: 2021-01-13

## 2021-01-13 ENCOUNTER — PATIENT MESSAGE (OUTPATIENT)
Dept: ORTHOPEDICS | Facility: CLINIC | Age: 76
End: 2021-01-13

## 2021-01-13 ENCOUNTER — PATIENT MESSAGE (OUTPATIENT)
Dept: ADMINISTRATIVE | Facility: OTHER | Age: 76
End: 2021-01-13

## 2021-01-13 DIAGNOSIS — Z79.4 TYPE 2 DIABETES MELLITUS WITH DIABETIC POLYNEUROPATHY, WITH LONG-TERM CURRENT USE OF INSULIN: Primary | ICD-10-CM

## 2021-01-13 DIAGNOSIS — E11.42 TYPE 2 DIABETES MELLITUS WITH DIABETIC POLYNEUROPATHY, WITH LONG-TERM CURRENT USE OF INSULIN: Primary | ICD-10-CM

## 2021-01-14 ENCOUNTER — DOCUMENTATION ONLY (OUTPATIENT)
Dept: ORTHOPEDICS | Facility: CLINIC | Age: 76
End: 2021-01-14

## 2021-01-14 ENCOUNTER — LAB VISIT (OUTPATIENT)
Dept: LAB | Facility: OTHER | Age: 76
End: 2021-01-14
Payer: MEDICARE

## 2021-01-14 ENCOUNTER — OFFICE VISIT (OUTPATIENT)
Dept: HEMATOLOGY/ONCOLOGY | Facility: CLINIC | Age: 76
End: 2021-01-14
Payer: MEDICARE

## 2021-01-14 ENCOUNTER — RESEARCH ENCOUNTER (OUTPATIENT)
Dept: HEMATOLOGY/ONCOLOGY | Facility: CLINIC | Age: 76
End: 2021-01-14

## 2021-01-14 ENCOUNTER — TELEPHONE (OUTPATIENT)
Dept: ORTHOPEDICS | Facility: CLINIC | Age: 76
End: 2021-01-14

## 2021-01-14 ENCOUNTER — OFFICE VISIT (OUTPATIENT)
Dept: ORTHOPEDICS | Facility: CLINIC | Age: 76
End: 2021-01-14
Payer: MEDICARE

## 2021-01-14 VITALS
SYSTOLIC BLOOD PRESSURE: 142 MMHG | WEIGHT: 185 LBS | BODY MASS INDEX: 26.48 KG/M2 | OXYGEN SATURATION: 100 % | HEART RATE: 100 BPM | DIASTOLIC BLOOD PRESSURE: 76 MMHG | RESPIRATION RATE: 16 BRPM | HEIGHT: 70 IN

## 2021-01-14 DIAGNOSIS — N18.30 STAGE 3 CHRONIC KIDNEY DISEASE: ICD-10-CM

## 2021-01-14 DIAGNOSIS — D47.2 SMOLDERING MULTIPLE MYELOMA: ICD-10-CM

## 2021-01-14 DIAGNOSIS — N18.30 CKD STAGE 3 DUE TO TYPE 2 DIABETES MELLITUS: ICD-10-CM

## 2021-01-14 DIAGNOSIS — Z79.4 TYPE 2 DIABETES MELLITUS WITH DIABETIC POLYNEUROPATHY, WITH LONG-TERM CURRENT USE OF INSULIN: ICD-10-CM

## 2021-01-14 DIAGNOSIS — Z98.890 POST-OPERATIVE STATE: Primary | ICD-10-CM

## 2021-01-14 DIAGNOSIS — E78.5 HYPERLIPIDEMIA, UNSPECIFIED HYPERLIPIDEMIA TYPE: ICD-10-CM

## 2021-01-14 DIAGNOSIS — D47.2 SMOLDERING MULTIPLE MYELOMA: Primary | ICD-10-CM

## 2021-01-14 DIAGNOSIS — I10 ESSENTIAL HYPERTENSION: ICD-10-CM

## 2021-01-14 DIAGNOSIS — Z00.6 RESEARCH EXAM: ICD-10-CM

## 2021-01-14 DIAGNOSIS — E55.9 VITAMIN D DEFICIENCY: ICD-10-CM

## 2021-01-14 DIAGNOSIS — E11.42 TYPE 2 DIABETES MELLITUS WITH DIABETIC POLYNEUROPATHY, WITH LONG-TERM CURRENT USE OF INSULIN: ICD-10-CM

## 2021-01-14 DIAGNOSIS — N18.30 CHRONIC KIDNEY DISEASE, STAGE III (MODERATE): Chronic | ICD-10-CM

## 2021-01-14 DIAGNOSIS — E11.22 CKD STAGE 3 DUE TO TYPE 2 DIABETES MELLITUS: ICD-10-CM

## 2021-01-14 LAB
25(OH)D3+25(OH)D2 SERPL-MCNC: 24 NG/ML (ref 30–96)
ALBUMIN SERPL BCP-MCNC: 3.6 G/DL (ref 3.5–5.2)
ALBUMIN SERPL BCP-MCNC: 3.7 G/DL (ref 3.5–5.2)
ALP SERPL-CCNC: 50 U/L (ref 55–135)
ALT SERPL W/O P-5'-P-CCNC: 22 U/L (ref 10–44)
ANION GAP SERPL CALC-SCNC: 12 MMOL/L (ref 8–16)
ANION GAP SERPL CALC-SCNC: 12 MMOL/L (ref 8–16)
AST SERPL-CCNC: 20 U/L (ref 10–40)
BASOPHILS # BLD AUTO: 0.04 K/UL (ref 0–0.2)
BASOPHILS NFR BLD: 1 % (ref 0–1.9)
BILIRUB SERPL-MCNC: 0.4 MG/DL (ref 0.1–1)
BUN SERPL-MCNC: 18 MG/DL (ref 8–23)
BUN SERPL-MCNC: 18 MG/DL (ref 8–23)
CALCIUM SERPL-MCNC: 9.7 MG/DL (ref 8.7–10.5)
CALCIUM SERPL-MCNC: 9.7 MG/DL (ref 8.7–10.5)
CHLORIDE SERPL-SCNC: 101 MMOL/L (ref 95–110)
CHLORIDE SERPL-SCNC: 101 MMOL/L (ref 95–110)
CHOLEST SERPL-MCNC: 137 MG/DL (ref 120–199)
CHOLEST/HDLC SERPL: 4.9 {RATIO} (ref 2–5)
CO2 SERPL-SCNC: 23 MMOL/L (ref 23–29)
CO2 SERPL-SCNC: 23 MMOL/L (ref 23–29)
CREAT SERPL-MCNC: 1.7 MG/DL (ref 0.5–1.4)
CREAT SERPL-MCNC: 1.7 MG/DL (ref 0.5–1.4)
DIFFERENTIAL METHOD: ABNORMAL
EOSINOPHIL # BLD AUTO: 0.3 K/UL (ref 0–0.5)
EOSINOPHIL NFR BLD: 7.6 % (ref 0–8)
ERYTHROCYTE [DISTWIDTH] IN BLOOD BY AUTOMATED COUNT: 13.1 % (ref 11.5–14.5)
EST. GFR  (AFRICAN AMERICAN): 45 ML/MIN/1.73 M^2
EST. GFR  (AFRICAN AMERICAN): 45 ML/MIN/1.73 M^2
EST. GFR  (NON AFRICAN AMERICAN): 39 ML/MIN/1.73 M^2
EST. GFR  (NON AFRICAN AMERICAN): 39 ML/MIN/1.73 M^2
GLUCOSE SERPL-MCNC: 164 MG/DL (ref 70–110)
GLUCOSE SERPL-MCNC: 164 MG/DL (ref 70–110)
HCT VFR BLD AUTO: 35 % (ref 40–54)
HDLC SERPL-MCNC: 28 MG/DL (ref 40–75)
HDLC SERPL: 20.4 % (ref 20–50)
HGB BLD-MCNC: 11.1 G/DL (ref 14–18)
IGA SERPL-MCNC: 1638 MG/DL (ref 40–350)
IGG SERPL-MCNC: 939 MG/DL (ref 650–1600)
IGM SERPL-MCNC: 44 MG/DL (ref 50–300)
IMM GRANULOCYTES # BLD AUTO: 0.01 K/UL (ref 0–0.04)
IMM GRANULOCYTES NFR BLD AUTO: 0.3 % (ref 0–0.5)
LDH SERPL L TO P-CCNC: 178 U/L (ref 110–260)
LDLC SERPL CALC-MCNC: 59.4 MG/DL (ref 63–159)
LYMPHOCYTES # BLD AUTO: 1.6 K/UL (ref 1–4.8)
LYMPHOCYTES NFR BLD: 41.5 % (ref 18–48)
MAGNESIUM SERPL-MCNC: 1.8 MG/DL (ref 1.6–2.6)
MCH RBC QN AUTO: 28.3 PG (ref 27–31)
MCHC RBC AUTO-ENTMCNC: 31.7 G/DL (ref 32–36)
MCV RBC AUTO: 89 FL (ref 82–98)
MONOCYTES # BLD AUTO: 0.4 K/UL (ref 0.3–1)
MONOCYTES NFR BLD: 9.1 % (ref 4–15)
NEUTROPHILS # BLD AUTO: 1.6 K/UL (ref 1.8–7.7)
NEUTROPHILS NFR BLD: 40.5 % (ref 38–73)
NONHDLC SERPL-MCNC: 109 MG/DL
NRBC BLD-RTO: 0 /100 WBC
PHOSPHATE SERPL-MCNC: 3.7 MG/DL (ref 2.7–4.5)
PHOSPHATE SERPL-MCNC: 3.7 MG/DL (ref 2.7–4.5)
PLATELET # BLD AUTO: 294 K/UL (ref 150–350)
PMV BLD AUTO: 8.7 FL (ref 9.2–12.9)
POTASSIUM SERPL-SCNC: 4.6 MMOL/L (ref 3.5–5.1)
POTASSIUM SERPL-SCNC: 4.6 MMOL/L (ref 3.5–5.1)
PROT SERPL-MCNC: 8.7 G/DL (ref 6–8.4)
PTH-INTACT SERPL-MCNC: 60 PG/ML (ref 9–77)
RBC # BLD AUTO: 3.92 M/UL (ref 4.6–6.2)
SODIUM SERPL-SCNC: 136 MMOL/L (ref 136–145)
SODIUM SERPL-SCNC: 136 MMOL/L (ref 136–145)
TRIGL SERPL-MCNC: 248 MG/DL (ref 30–150)
WBC # BLD AUTO: 3.95 K/UL (ref 3.9–12.7)

## 2021-01-14 PROCEDURE — 1125F AMNT PAIN NOTED PAIN PRSNT: CPT | Mod: HCNC,S$GLB,, | Performed by: INTERNAL MEDICINE

## 2021-01-14 PROCEDURE — 86334 PATHOLOGIST INTERPRETATION IFE: ICD-10-PCS | Mod: 26,HCNC,, | Performed by: PATHOLOGY

## 2021-01-14 PROCEDURE — 80061 LIPID PANEL: CPT | Mod: HCNC

## 2021-01-14 PROCEDURE — 99024 PR POST-OP FOLLOW-UP VISIT: ICD-10-PCS | Mod: HCNC,S$GLB,, | Performed by: PHYSICIAN ASSISTANT

## 2021-01-14 PROCEDURE — 86334 IMMUNOFIX E-PHORESIS SERUM: CPT | Mod: 26,HCNC,, | Performed by: PATHOLOGY

## 2021-01-14 PROCEDURE — 84100 ASSAY OF PHOSPHORUS: CPT | Mod: HCNC,Q1,91

## 2021-01-14 PROCEDURE — 82784 ASSAY IGA/IGD/IGG/IGM EACH: CPT | Mod: 59,HCNC

## 2021-01-14 PROCEDURE — 83735 ASSAY OF MAGNESIUM: CPT | Mod: HCNC

## 2021-01-14 PROCEDURE — 84165 PROTEIN E-PHORESIS SERUM: CPT | Mod: 26,HCNC,, | Performed by: PATHOLOGY

## 2021-01-14 PROCEDURE — 99215 PR OFFICE/OUTPT VISIT, EST, LEVL V, 40-54 MIN: ICD-10-PCS | Mod: HCNC,S$GLB,, | Performed by: INTERNAL MEDICINE

## 2021-01-14 PROCEDURE — 83615 LACTATE (LD) (LDH) ENZYME: CPT | Mod: HCNC

## 2021-01-14 PROCEDURE — 83970 ASSAY OF PARATHORMONE: CPT | Mod: HCNC

## 2021-01-14 PROCEDURE — 1159F MED LIST DOCD IN RCRD: CPT | Mod: HCNC,S$GLB,, | Performed by: INTERNAL MEDICINE

## 2021-01-14 PROCEDURE — 3008F BODY MASS INDEX DOCD: CPT | Mod: HCNC,CPTII,S$GLB, | Performed by: INTERNAL MEDICINE

## 2021-01-14 PROCEDURE — 3072F PR LOW RISK FOR RETINOPATHY: ICD-10-PCS | Mod: HCNC,S$GLB,, | Performed by: PHYSICIAN ASSISTANT

## 2021-01-14 PROCEDURE — 99024 POSTOP FOLLOW-UP VISIT: CPT | Mod: HCNC,S$GLB,, | Performed by: PHYSICIAN ASSISTANT

## 2021-01-14 PROCEDURE — 3077F SYST BP >= 140 MM HG: CPT | Mod: HCNC,CPTII,S$GLB, | Performed by: INTERNAL MEDICINE

## 2021-01-14 PROCEDURE — 80053 COMPREHEN METABOLIC PANEL: CPT | Mod: HCNC,Q1

## 2021-01-14 PROCEDURE — 3051F HG A1C>EQUAL 7.0%<8.0%: CPT | Mod: HCNC,CPTII,S$GLB, | Performed by: INTERNAL MEDICINE

## 2021-01-14 PROCEDURE — 83520 IMMUNOASSAY QUANT NOS NONAB: CPT | Mod: HCNC

## 2021-01-14 PROCEDURE — 3051F PR MOST RECENT HEMOGLOBIN A1C LEVEL 7.0 - < 8.0%: ICD-10-PCS | Mod: HCNC,CPTII,S$GLB, | Performed by: INTERNAL MEDICINE

## 2021-01-14 PROCEDURE — 84165 PATHOLOGIST INTERPRETATION SPE: ICD-10-PCS | Mod: 26,HCNC,, | Performed by: PATHOLOGY

## 2021-01-14 PROCEDURE — 1101F PR PT FALLS ASSESS DOC 0-1 FALLS W/OUT INJ PAST YR: ICD-10-PCS | Mod: HCNC,CPTII,S$GLB, | Performed by: INTERNAL MEDICINE

## 2021-01-14 PROCEDURE — 84165 PROTEIN E-PHORESIS SERUM: CPT | Mod: HCNC,Q1

## 2021-01-14 PROCEDURE — 3288F PR FALLS RISK ASSESSMENT DOCUMENTED: ICD-10-PCS | Mod: HCNC,CPTII,S$GLB, | Performed by: INTERNAL MEDICINE

## 2021-01-14 PROCEDURE — 80069 RENAL FUNCTION PANEL: CPT | Mod: HCNC

## 2021-01-14 PROCEDURE — 86334 IMMUNOFIX E-PHORESIS SERUM: CPT | Mod: HCNC,Q1

## 2021-01-14 PROCEDURE — 99215 OFFICE O/P EST HI 40 MIN: CPT | Mod: HCNC,S$GLB,, | Performed by: INTERNAL MEDICINE

## 2021-01-14 PROCEDURE — 99999 PR PBB SHADOW E&M-EST. PATIENT-LVL IV: ICD-10-PCS | Mod: PBBFAC,HCNC,, | Performed by: INTERNAL MEDICINE

## 2021-01-14 PROCEDURE — 3072F PR LOW RISK FOR RETINOPATHY: ICD-10-PCS | Mod: HCNC,S$GLB,, | Performed by: INTERNAL MEDICINE

## 2021-01-14 PROCEDURE — 3077F PR MOST RECENT SYSTOLIC BLOOD PRESSURE >= 140 MM HG: ICD-10-PCS | Mod: HCNC,CPTII,S$GLB, | Performed by: INTERNAL MEDICINE

## 2021-01-14 PROCEDURE — 3078F DIAST BP <80 MM HG: CPT | Mod: HCNC,CPTII,S$GLB, | Performed by: INTERNAL MEDICINE

## 2021-01-14 PROCEDURE — 3078F PR MOST RECENT DIASTOLIC BLOOD PRESSURE < 80 MM HG: ICD-10-PCS | Mod: HCNC,CPTII,S$GLB, | Performed by: INTERNAL MEDICINE

## 2021-01-14 PROCEDURE — 3008F PR BODY MASS INDEX (BMI) DOCUMENTED: ICD-10-PCS | Mod: HCNC,CPTII,S$GLB, | Performed by: INTERNAL MEDICINE

## 2021-01-14 PROCEDURE — 82306 VITAMIN D 25 HYDROXY: CPT | Mod: HCNC

## 2021-01-14 PROCEDURE — 99999 PR PBB SHADOW E&M-EST. PATIENT-LVL I: CPT | Mod: PBBFAC,HCNC,, | Performed by: PHYSICIAN ASSISTANT

## 2021-01-14 PROCEDURE — 3072F LOW RISK FOR RETINOPATHY: CPT | Mod: HCNC,S$GLB,, | Performed by: INTERNAL MEDICINE

## 2021-01-14 PROCEDURE — 1101F PT FALLS ASSESS-DOCD LE1/YR: CPT | Mod: HCNC,CPTII,S$GLB, | Performed by: INTERNAL MEDICINE

## 2021-01-14 PROCEDURE — 3288F FALL RISK ASSESSMENT DOCD: CPT | Mod: HCNC,CPTII,S$GLB, | Performed by: INTERNAL MEDICINE

## 2021-01-14 PROCEDURE — 3072F LOW RISK FOR RETINOPATHY: CPT | Mod: HCNC,S$GLB,, | Performed by: PHYSICIAN ASSISTANT

## 2021-01-14 PROCEDURE — 99999 PR PBB SHADOW E&M-EST. PATIENT-LVL IV: CPT | Mod: PBBFAC,HCNC,, | Performed by: INTERNAL MEDICINE

## 2021-01-14 PROCEDURE — 1125F PR PAIN SEVERITY QUANTIFIED, PAIN PRESENT: ICD-10-PCS | Mod: HCNC,S$GLB,, | Performed by: INTERNAL MEDICINE

## 2021-01-14 PROCEDURE — 36415 COLL VENOUS BLD VENIPUNCTURE: CPT | Mod: HCNC

## 2021-01-14 PROCEDURE — 99999 PR PBB SHADOW E&M-EST. PATIENT-LVL I: ICD-10-PCS | Mod: PBBFAC,HCNC,, | Performed by: PHYSICIAN ASSISTANT

## 2021-01-14 PROCEDURE — 85025 COMPLETE CBC W/AUTO DIFF WBC: CPT | Mod: HCNC

## 2021-01-14 PROCEDURE — 1159F PR MEDICATION LIST DOCUMENTED IN MEDICAL RECORD: ICD-10-PCS | Mod: HCNC,S$GLB,, | Performed by: INTERNAL MEDICINE

## 2021-01-15 LAB
ALBUMIN SERPL ELPH-MCNC: 3.65 G/DL (ref 3.35–5.55)
ALPHA1 GLOB SERPL ELPH-MCNC: 0.39 G/DL (ref 0.17–0.41)
ALPHA2 GLOB SERPL ELPH-MCNC: 1.05 G/DL (ref 0.43–0.99)
B-GLOBULIN SERPL ELPH-MCNC: 1.13 G/DL (ref 0.5–1.1)
GAMMA GLOB SERPL ELPH-MCNC: 1.98 G/DL (ref 0.67–1.58)
INTERPRETATION SERPL IFE-IMP: NORMAL
KAPPA LC SER QL IA: 11.11 MG/DL (ref 0.33–1.94)
KAPPA LC/LAMBDA SER IA: 4.81 (ref 0.26–1.65)
LAMBDA LC SER QL IA: 2.31 MG/DL (ref 0.57–2.63)
PATHOLOGIST INTERPRETATION IFE: NORMAL
PATHOLOGIST INTERPRETATION SPE: NORMAL
PROT SERPL-MCNC: 8.2 G/DL (ref 6–8.4)

## 2021-01-19 ENCOUNTER — PATIENT OUTREACH (OUTPATIENT)
Dept: ADMINISTRATIVE | Facility: OTHER | Age: 76
End: 2021-01-19

## 2021-01-20 ENCOUNTER — OFFICE VISIT (OUTPATIENT)
Dept: ORTHOPEDICS | Facility: CLINIC | Age: 76
End: 2021-01-20
Payer: MEDICARE

## 2021-01-20 ENCOUNTER — DOCUMENTATION ONLY (OUTPATIENT)
Dept: ORTHOPEDICS | Facility: CLINIC | Age: 76
End: 2021-01-20

## 2021-01-20 VITALS
HEIGHT: 70 IN | SYSTOLIC BLOOD PRESSURE: 125 MMHG | DIASTOLIC BLOOD PRESSURE: 69 MMHG | HEART RATE: 111 BPM | WEIGHT: 185 LBS | BODY MASS INDEX: 26.48 KG/M2

## 2021-01-20 DIAGNOSIS — M19.132 POST-TRAUMATIC OSTEOARTHRITIS OF LEFT WRIST: Primary | ICD-10-CM

## 2021-01-20 PROCEDURE — 1125F PR PAIN SEVERITY QUANTIFIED, PAIN PRESENT: ICD-10-PCS | Mod: HCNC,S$GLB,, | Performed by: PHYSICIAN ASSISTANT

## 2021-01-20 PROCEDURE — 3288F PR FALLS RISK ASSESSMENT DOCUMENTED: ICD-10-PCS | Mod: HCNC,CPTII,S$GLB, | Performed by: PHYSICIAN ASSISTANT

## 2021-01-20 PROCEDURE — 3288F FALL RISK ASSESSMENT DOCD: CPT | Mod: HCNC,CPTII,S$GLB, | Performed by: PHYSICIAN ASSISTANT

## 2021-01-20 PROCEDURE — 29075 APPL CST ELBW FNGR SHORT ARM: CPT | Mod: HCNC,58,LT,S$GLB | Performed by: PHYSICIAN ASSISTANT

## 2021-01-20 PROCEDURE — 3008F PR BODY MASS INDEX (BMI) DOCUMENTED: ICD-10-PCS | Mod: HCNC,CPTII,S$GLB, | Performed by: PHYSICIAN ASSISTANT

## 2021-01-20 PROCEDURE — 99999 PR PBB SHADOW E&M-EST. PATIENT-LVL III: CPT | Mod: PBBFAC,HCNC,, | Performed by: PHYSICIAN ASSISTANT

## 2021-01-20 PROCEDURE — 29075 PR APPLY FOREARM CAST: ICD-10-PCS | Mod: HCNC,58,LT,S$GLB | Performed by: PHYSICIAN ASSISTANT

## 2021-01-20 PROCEDURE — 99024 PR POST-OP FOLLOW-UP VISIT: ICD-10-PCS | Mod: HCNC,S$GLB,, | Performed by: PHYSICIAN ASSISTANT

## 2021-01-20 PROCEDURE — 3072F PR LOW RISK FOR RETINOPATHY: ICD-10-PCS | Mod: HCNC,S$GLB,, | Performed by: PHYSICIAN ASSISTANT

## 2021-01-20 PROCEDURE — 1100F PTFALLS ASSESS-DOCD GE2>/YR: CPT | Mod: HCNC,CPTII,S$GLB, | Performed by: PHYSICIAN ASSISTANT

## 2021-01-20 PROCEDURE — 3008F BODY MASS INDEX DOCD: CPT | Mod: HCNC,CPTII,S$GLB, | Performed by: PHYSICIAN ASSISTANT

## 2021-01-20 PROCEDURE — 3072F LOW RISK FOR RETINOPATHY: CPT | Mod: HCNC,S$GLB,, | Performed by: PHYSICIAN ASSISTANT

## 2021-01-20 PROCEDURE — 99999 PR PBB SHADOW E&M-EST. PATIENT-LVL III: ICD-10-PCS | Mod: PBBFAC,HCNC,, | Performed by: PHYSICIAN ASSISTANT

## 2021-01-20 PROCEDURE — 1125F AMNT PAIN NOTED PAIN PRSNT: CPT | Mod: HCNC,S$GLB,, | Performed by: PHYSICIAN ASSISTANT

## 2021-01-20 PROCEDURE — 1100F PR PT FALLS ASSESS DOC 2+ FALLS/FALL W/INJURY/YR: ICD-10-PCS | Mod: HCNC,CPTII,S$GLB, | Performed by: PHYSICIAN ASSISTANT

## 2021-01-20 PROCEDURE — 99024 POSTOP FOLLOW-UP VISIT: CPT | Mod: HCNC,S$GLB,, | Performed by: PHYSICIAN ASSISTANT

## 2021-01-21 ENCOUNTER — TELEPHONE (OUTPATIENT)
Dept: SLEEP MEDICINE | Facility: OTHER | Age: 76
End: 2021-01-21

## 2021-01-21 ENCOUNTER — OFFICE VISIT (OUTPATIENT)
Dept: NEPHROLOGY | Facility: CLINIC | Age: 76
End: 2021-01-21
Payer: MEDICARE

## 2021-01-21 VITALS
HEART RATE: 117 BPM | SYSTOLIC BLOOD PRESSURE: 130 MMHG | DIASTOLIC BLOOD PRESSURE: 68 MMHG | WEIGHT: 185.19 LBS | BODY MASS INDEX: 26.57 KG/M2 | OXYGEN SATURATION: 99 %

## 2021-01-21 DIAGNOSIS — I10 ESSENTIAL HYPERTENSION: ICD-10-CM

## 2021-01-21 DIAGNOSIS — N18.31 STAGE 3A CHRONIC KIDNEY DISEASE: Primary | Chronic | ICD-10-CM

## 2021-01-21 DIAGNOSIS — E55.9 VITAMIN D DEFICIENCY: ICD-10-CM

## 2021-01-21 PROCEDURE — 99999 PR PBB SHADOW E&M-EST. PATIENT-LVL V: CPT | Mod: PBBFAC,HCNC,GC, | Performed by: STUDENT IN AN ORGANIZED HEALTH CARE EDUCATION/TRAINING PROGRAM

## 2021-01-21 PROCEDURE — 99999 PR PBB SHADOW E&M-EST. PATIENT-LVL V: ICD-10-PCS | Mod: PBBFAC,HCNC,GC, | Performed by: STUDENT IN AN ORGANIZED HEALTH CARE EDUCATION/TRAINING PROGRAM

## 2021-01-21 RX ORDER — ERGOCALCIFEROL 1.25 MG/1
50000 CAPSULE ORAL
Qty: 4 CAPSULE | Refills: 3 | Status: SHIPPED | OUTPATIENT
Start: 2021-01-21 | End: 2021-02-19 | Stop reason: SDUPTHER

## 2021-01-21 RX ORDER — LOSARTAN POTASSIUM 50 MG/1
50 TABLET ORAL DAILY
Qty: 90 TABLET | Refills: 6 | Status: SHIPPED | OUTPATIENT
Start: 2021-01-21 | End: 2021-02-19 | Stop reason: SDUPTHER

## 2021-01-22 ENCOUNTER — TELEPHONE (OUTPATIENT)
Dept: SLEEP MEDICINE | Facility: OTHER | Age: 76
End: 2021-01-22

## 2021-01-27 PROCEDURE — 99454 REM MNTR PHYSIOL PARAM 16-30: CPT | Mod: S$GLB,,, | Performed by: INTERNAL MEDICINE

## 2021-01-27 PROCEDURE — 99454 PR REMOTE MNTR, PHYS PARAM, INITIAL, EA 30 DAYS: ICD-10-PCS | Mod: S$GLB,,, | Performed by: INTERNAL MEDICINE

## 2021-01-28 ENCOUNTER — TELEPHONE (OUTPATIENT)
Dept: ORTHOPEDICS | Facility: CLINIC | Age: 76
End: 2021-01-28

## 2021-01-30 ENCOUNTER — PATIENT MESSAGE (OUTPATIENT)
Dept: INTERNAL MEDICINE | Facility: CLINIC | Age: 76
End: 2021-01-30

## 2021-01-31 ENCOUNTER — PATIENT MESSAGE (OUTPATIENT)
Dept: INTERNAL MEDICINE | Facility: CLINIC | Age: 76
End: 2021-01-31

## 2021-01-31 DIAGNOSIS — E11.42 TYPE 2 DIABETES MELLITUS WITH DIABETIC POLYNEUROPATHY, WITH LONG-TERM CURRENT USE OF INSULIN: Chronic | ICD-10-CM

## 2021-01-31 DIAGNOSIS — Z79.4 TYPE 2 DIABETES MELLITUS WITH DIABETIC POLYNEUROPATHY, WITH LONG-TERM CURRENT USE OF INSULIN: Chronic | ICD-10-CM

## 2021-01-31 RX ORDER — INSULIN GLARGINE 100 [IU]/ML
INJECTION, SOLUTION SUBCUTANEOUS
Qty: 15 ML | Refills: 3 | Status: SHIPPED | OUTPATIENT
Start: 2021-01-31 | End: 2021-02-19 | Stop reason: SDUPTHER

## 2021-02-02 ENCOUNTER — PATIENT MESSAGE (OUTPATIENT)
Dept: HEMATOLOGY/ONCOLOGY | Facility: CLINIC | Age: 76
End: 2021-02-02

## 2021-02-02 ENCOUNTER — TELEPHONE (OUTPATIENT)
Dept: ORTHOPEDICS | Facility: CLINIC | Age: 76
End: 2021-02-02

## 2021-02-02 ENCOUNTER — TELEPHONE (OUTPATIENT)
Dept: SLEEP MEDICINE | Facility: OTHER | Age: 76
End: 2021-02-02

## 2021-02-02 LAB — FUNGUS SPEC CULT: NORMAL

## 2021-02-03 ENCOUNTER — OFFICE VISIT (OUTPATIENT)
Dept: ORTHOPEDICS | Facility: CLINIC | Age: 76
End: 2021-02-03
Payer: MEDICARE

## 2021-02-03 ENCOUNTER — HOSPITAL ENCOUNTER (OUTPATIENT)
Dept: RADIOLOGY | Facility: OTHER | Age: 76
Discharge: HOME | End: 2021-02-03
Attending: PHYSICIAN ASSISTANT
Payer: MEDICARE

## 2021-02-03 VITALS
HEIGHT: 70 IN | WEIGHT: 185 LBS | DIASTOLIC BLOOD PRESSURE: 93 MMHG | HEART RATE: 92 BPM | SYSTOLIC BLOOD PRESSURE: 162 MMHG | BODY MASS INDEX: 26.48 KG/M2

## 2021-02-03 DIAGNOSIS — M19.032 OSTEOARTHRITIS OF LEFT WRIST, UNSPECIFIED OSTEOARTHRITIS TYPE: Primary | ICD-10-CM

## 2021-02-03 DIAGNOSIS — M19.132 POST-TRAUMATIC OSTEOARTHRITIS OF LEFT WRIST: ICD-10-CM

## 2021-02-03 DIAGNOSIS — Z47.89 ORTHOPEDIC AFTERCARE: ICD-10-CM

## 2021-02-03 DIAGNOSIS — Z98.890 POST-OPERATIVE STATE: Primary | ICD-10-CM

## 2021-02-03 PROCEDURE — 1125F AMNT PAIN NOTED PAIN PRSNT: CPT | Mod: S$GLB,,, | Performed by: PHYSICIAN ASSISTANT

## 2021-02-03 PROCEDURE — 99024 PR POST-OP FOLLOW-UP VISIT: ICD-10-PCS | Mod: S$GLB,,, | Performed by: PHYSICIAN ASSISTANT

## 2021-02-03 PROCEDURE — 3072F PR LOW RISK FOR RETINOPATHY: ICD-10-PCS | Mod: S$GLB,,, | Performed by: PHYSICIAN ASSISTANT

## 2021-02-03 PROCEDURE — 3072F LOW RISK FOR RETINOPATHY: CPT | Mod: S$GLB,,, | Performed by: PHYSICIAN ASSISTANT

## 2021-02-03 PROCEDURE — 97760 PR ORTHOTIC MGMT&TRAINJ INITIAL ENC EA 15 MINS: ICD-10-PCS | Mod: GP,S$GLB,, | Performed by: PHYSICIAN ASSISTANT

## 2021-02-03 PROCEDURE — 73110 X-RAY EXAM OF WRIST: CPT | Mod: 26,LT,, | Performed by: RADIOLOGY

## 2021-02-03 PROCEDURE — 73110 XR WRIST COMPLETE 3 VIEWS LEFT: ICD-10-PCS | Mod: 26,LT,, | Performed by: RADIOLOGY

## 2021-02-03 PROCEDURE — 99999 PR PBB SHADOW E&M-EST. PATIENT-LVL III: CPT | Mod: PBBFAC,,, | Performed by: PHYSICIAN ASSISTANT

## 2021-02-03 PROCEDURE — 99999 PR PBB SHADOW E&M-EST. PATIENT-LVL III: ICD-10-PCS | Mod: PBBFAC,,, | Performed by: PHYSICIAN ASSISTANT

## 2021-02-03 PROCEDURE — 3288F PR FALLS RISK ASSESSMENT DOCUMENTED: ICD-10-PCS | Mod: CPTII,S$GLB,, | Performed by: PHYSICIAN ASSISTANT

## 2021-02-03 PROCEDURE — 1101F PR PT FALLS ASSESS DOC 0-1 FALLS W/OUT INJ PAST YR: ICD-10-PCS | Mod: CPTII,S$GLB,, | Performed by: PHYSICIAN ASSISTANT

## 2021-02-03 PROCEDURE — 97760 ORTHOTIC MGMT&TRAING 1ST ENC: CPT | Mod: GP,S$GLB,, | Performed by: PHYSICIAN ASSISTANT

## 2021-02-03 PROCEDURE — 3008F PR BODY MASS INDEX (BMI) DOCUMENTED: ICD-10-PCS | Mod: CPTII,S$GLB,, | Performed by: PHYSICIAN ASSISTANT

## 2021-02-03 PROCEDURE — 1125F PR PAIN SEVERITY QUANTIFIED, PAIN PRESENT: ICD-10-PCS | Mod: S$GLB,,, | Performed by: PHYSICIAN ASSISTANT

## 2021-02-03 PROCEDURE — 3008F BODY MASS INDEX DOCD: CPT | Mod: CPTII,S$GLB,, | Performed by: PHYSICIAN ASSISTANT

## 2021-02-03 PROCEDURE — 1101F PT FALLS ASSESS-DOCD LE1/YR: CPT | Mod: CPTII,S$GLB,, | Performed by: PHYSICIAN ASSISTANT

## 2021-02-03 PROCEDURE — 73110 X-RAY EXAM OF WRIST: CPT | Mod: TC,FY,LT

## 2021-02-03 PROCEDURE — 3288F FALL RISK ASSESSMENT DOCD: CPT | Mod: CPTII,S$GLB,, | Performed by: PHYSICIAN ASSISTANT

## 2021-02-03 PROCEDURE — 99024 POSTOP FOLLOW-UP VISIT: CPT | Mod: S$GLB,,, | Performed by: PHYSICIAN ASSISTANT

## 2021-02-05 ENCOUNTER — TELEPHONE (OUTPATIENT)
Dept: ORTHOPEDICS | Facility: CLINIC | Age: 76
End: 2021-02-05

## 2021-02-10 DIAGNOSIS — G47.33 OSA (OBSTRUCTIVE SLEEP APNEA): Primary | ICD-10-CM

## 2021-02-11 DIAGNOSIS — D47.2 SMOLDERING MULTIPLE MYELOMA: ICD-10-CM

## 2021-02-11 DIAGNOSIS — E13.9 DIABETES MELLITUS DUE TO ABNORMAL INSULIN: ICD-10-CM

## 2021-02-11 DIAGNOSIS — E11.65 TYPE 2 DIABETES MELLITUS WITH HYPERGLYCEMIA, UNSPECIFIED WHETHER LONG TERM INSULIN USE: ICD-10-CM

## 2021-02-11 RX ORDER — INSULIN PUMP SYRINGE, 3 ML
EACH MISCELLANEOUS
Qty: 1 EACH | Refills: 0 | Status: SHIPPED | OUTPATIENT
Start: 2021-02-11 | End: 2021-02-19 | Stop reason: SDUPTHER

## 2021-02-11 RX ORDER — DEXTROSE 4 G
TABLET,CHEWABLE ORAL
Qty: 1 EACH | Refills: 0 | Status: SHIPPED | OUTPATIENT
Start: 2021-02-11 | End: 2023-12-22 | Stop reason: SDUPTHER

## 2021-02-11 RX ORDER — CITALOPRAM 20 MG/1
TABLET, FILM COATED ORAL
Qty: 135 TABLET | Refills: 0 | Status: SHIPPED | OUTPATIENT
Start: 2021-02-11 | End: 2021-04-12 | Stop reason: SDUPTHER

## 2021-02-11 RX ORDER — GLIMEPIRIDE 2 MG/1
2 TABLET ORAL DAILY
Qty: 90 TABLET | Refills: 4 | Status: SHIPPED | OUTPATIENT
Start: 2021-02-11 | End: 2021-12-13 | Stop reason: SDUPTHER

## 2021-02-11 RX ORDER — BLOOD SUGAR DIAGNOSTIC
STRIP MISCELLANEOUS
Qty: 400 STRIP | Refills: 3 | Status: SHIPPED | OUTPATIENT
Start: 2021-02-11 | End: 2021-02-19 | Stop reason: SDUPTHER

## 2021-02-11 RX ORDER — GABAPENTIN 100 MG/1
CAPSULE ORAL
Qty: 450 CAPSULE | Refills: 3 | Status: SHIPPED | OUTPATIENT
Start: 2021-02-11 | End: 2022-05-04

## 2021-02-15 ENCOUNTER — RESEARCH ENCOUNTER (OUTPATIENT)
Dept: HEMATOLOGY/ONCOLOGY | Facility: CLINIC | Age: 76
End: 2021-02-15

## 2021-02-15 ENCOUNTER — PATIENT MESSAGE (OUTPATIENT)
Dept: HEMATOLOGY/ONCOLOGY | Facility: CLINIC | Age: 76
End: 2021-02-15

## 2021-02-15 ENCOUNTER — TELEPHONE (OUTPATIENT)
Dept: HEMATOLOGY/ONCOLOGY | Facility: CLINIC | Age: 76
End: 2021-02-15

## 2021-02-18 RX ORDER — TRAMADOL HYDROCHLORIDE 50 MG/1
50 TABLET ORAL EVERY 6 HOURS PRN
Qty: 35 TABLET | Refills: 0 | Status: SHIPPED | OUTPATIENT
Start: 2021-02-18 | End: 2021-03-30 | Stop reason: SDUPTHER

## 2021-02-19 DIAGNOSIS — D47.2 SMOLDERING MULTIPLE MYELOMA: ICD-10-CM

## 2021-02-19 DIAGNOSIS — E55.9 VITAMIN D DEFICIENCY: ICD-10-CM

## 2021-02-19 DIAGNOSIS — E11.42 TYPE 2 DIABETES MELLITUS WITH DIABETIC POLYNEUROPATHY, WITH LONG-TERM CURRENT USE OF INSULIN: Chronic | ICD-10-CM

## 2021-02-19 DIAGNOSIS — E13.9 DIABETES MELLITUS DUE TO ABNORMAL INSULIN: ICD-10-CM

## 2021-02-19 DIAGNOSIS — N18.31 STAGE 3A CHRONIC KIDNEY DISEASE: Chronic | ICD-10-CM

## 2021-02-19 DIAGNOSIS — Z79.4 TYPE 2 DIABETES MELLITUS WITH DIABETIC POLYNEUROPATHY, WITH LONG-TERM CURRENT USE OF INSULIN: Chronic | ICD-10-CM

## 2021-02-19 DIAGNOSIS — I10 ESSENTIAL HYPERTENSION: ICD-10-CM

## 2021-02-19 DIAGNOSIS — H40.1192 PRIMARY OPEN-ANGLE GLAUCOMA, MODERATE STAGE: ICD-10-CM

## 2021-02-19 RX ORDER — ERGOCALCIFEROL 1.25 MG/1
50000 CAPSULE ORAL
Qty: 4 CAPSULE | Refills: 3 | Status: SHIPPED | OUTPATIENT
Start: 2021-02-19 | End: 2021-08-11 | Stop reason: SDUPTHER

## 2021-02-19 RX ORDER — INSULIN GLARGINE 100 [IU]/ML
INJECTION, SOLUTION SUBCUTANEOUS
COMMUNITY
End: 2021-07-02 | Stop reason: SDUPTHER

## 2021-02-19 RX ORDER — LATANOPROST 50 UG/ML
1 SOLUTION/ DROPS OPHTHALMIC NIGHTLY
Qty: 7.5 ML | Refills: 3 | Status: SHIPPED | OUTPATIENT
Start: 2021-02-19 | End: 2022-03-30 | Stop reason: SDUPTHER

## 2021-02-19 RX ORDER — TAMSULOSIN HYDROCHLORIDE 0.4 MG/1
1 CAPSULE ORAL DAILY
Qty: 90 CAPSULE | Refills: 3 | Status: SHIPPED | OUTPATIENT
Start: 2021-02-19 | End: 2021-12-13 | Stop reason: SDUPTHER

## 2021-02-19 RX ORDER — ISOPROPYL ALCOHOL 70 ML/100ML
1 SWAB TOPICAL
COMMUNITY
End: 2021-02-19 | Stop reason: SDUPTHER

## 2021-02-19 RX ORDER — LANCETS
EACH MISCELLANEOUS
Refills: 0 | Status: CANCELLED | OUTPATIENT
Start: 2021-02-19

## 2021-02-21 RX ORDER — BLOOD SUGAR DIAGNOSTIC
STRIP MISCELLANEOUS
Qty: 400 STRIP | Refills: 3 | Status: SHIPPED | OUTPATIENT
Start: 2021-02-21

## 2021-02-21 RX ORDER — LOSARTAN POTASSIUM 50 MG/1
50 TABLET ORAL DAILY
Qty: 90 TABLET | Refills: 6 | Status: SHIPPED | OUTPATIENT
Start: 2021-02-21 | End: 2021-05-03 | Stop reason: SDUPTHER

## 2021-02-21 RX ORDER — INSULIN PUMP SYRINGE, 3 ML
EACH MISCELLANEOUS
Qty: 1 EACH | Refills: 0 | Status: SHIPPED | OUTPATIENT
Start: 2021-02-21

## 2021-02-21 RX ORDER — PRAVASTATIN SODIUM 40 MG/1
40 TABLET ORAL DAILY
Qty: 90 TABLET | Refills: 3 | Status: SHIPPED | OUTPATIENT
Start: 2021-02-21 | End: 2021-12-13 | Stop reason: SDUPTHER

## 2021-02-21 RX ORDER — OMEPRAZOLE 40 MG/1
40 CAPSULE, DELAYED RELEASE ORAL DAILY
Qty: 90 CAPSULE | Refills: 4 | Status: SHIPPED | OUTPATIENT
Start: 2021-02-21 | End: 2021-12-13 | Stop reason: SDUPTHER

## 2021-02-21 RX ORDER — INSULIN GLARGINE 100 [IU]/ML
INJECTION, SOLUTION SUBCUTANEOUS
Qty: 30 ML | Refills: 3 | Status: SHIPPED | OUTPATIENT
Start: 2021-02-21 | End: 2021-07-02 | Stop reason: SDUPTHER

## 2021-02-21 RX ORDER — ISOPROPYL ALCOHOL 70 ML/100ML
1 SWAB TOPICAL
COMMUNITY
Start: 2021-02-21

## 2021-02-21 RX ORDER — DEXTROSE 4 G
TABLET,CHEWABLE ORAL
Qty: 1 EACH | Refills: 0 | Status: SHIPPED | OUTPATIENT
Start: 2021-02-21

## 2021-02-28 ENCOUNTER — PATIENT MESSAGE (OUTPATIENT)
Dept: INTERNAL MEDICINE | Facility: CLINIC | Age: 76
End: 2021-02-28

## 2021-02-28 PROCEDURE — 99457 PR MONITORING, PHYSIOL PARAM, REMOTE, 1ST 20 MINS, PER MONTH: ICD-10-PCS | Mod: S$GLB,,, | Performed by: INTERNAL MEDICINE

## 2021-02-28 PROCEDURE — 99457 RPM TX MGMT 1ST 20 MIN: CPT | Mod: S$GLB,,, | Performed by: INTERNAL MEDICINE

## 2021-03-01 ENCOUNTER — PATIENT MESSAGE (OUTPATIENT)
Dept: HEMATOLOGY/ONCOLOGY | Facility: CLINIC | Age: 76
End: 2021-03-01

## 2021-03-01 ENCOUNTER — CLINICAL SUPPORT (OUTPATIENT)
Dept: URGENT CARE | Facility: CLINIC | Age: 76
End: 2021-03-01
Payer: MEDICARE

## 2021-03-01 ENCOUNTER — TELEPHONE (OUTPATIENT)
Dept: ORTHOPEDICS | Facility: CLINIC | Age: 76
End: 2021-03-01

## 2021-03-01 DIAGNOSIS — Z11.9 ENCOUNTER FOR SCREENING EXAMINATION FOR INFECTIOUS DISEASE: Primary | ICD-10-CM

## 2021-03-01 LAB
CTP QC/QA: YES
SARS-COV-2 RDRP RESP QL NAA+PROBE: NEGATIVE

## 2021-03-01 PROCEDURE — 99211 OFF/OP EST MAY X REQ PHY/QHP: CPT | Mod: S$GLB,,, | Performed by: FAMILY MEDICINE

## 2021-03-01 PROCEDURE — U0002: ICD-10-PCS | Mod: QW,S$GLB,, | Performed by: FAMILY MEDICINE

## 2021-03-01 PROCEDURE — 99211 PR OFFICE/OUTPT VISIT, EST, LEVL I: ICD-10-PCS | Mod: S$GLB,,, | Performed by: FAMILY MEDICINE

## 2021-03-01 PROCEDURE — U0002 COVID-19 LAB TEST NON-CDC: HCPCS | Mod: QW,S$GLB,, | Performed by: FAMILY MEDICINE

## 2021-03-02 RX ORDER — LANCETS
1 EACH MISCELLANEOUS 4 TIMES DAILY
Qty: 400 EACH | Refills: 4 | Status: SHIPPED | OUTPATIENT
Start: 2021-03-02

## 2021-03-03 ENCOUNTER — HOSPITAL ENCOUNTER (OUTPATIENT)
Dept: RADIOLOGY | Facility: OTHER | Age: 76
Discharge: HOME | End: 2021-03-03
Attending: PHYSICIAN ASSISTANT
Payer: MEDICARE

## 2021-03-03 ENCOUNTER — OFFICE VISIT (OUTPATIENT)
Dept: ORTHOPEDICS | Facility: CLINIC | Age: 76
End: 2021-03-03
Payer: MEDICARE

## 2021-03-03 VITALS
DIASTOLIC BLOOD PRESSURE: 89 MMHG | WEIGHT: 185 LBS | HEIGHT: 70 IN | BODY MASS INDEX: 26.48 KG/M2 | HEART RATE: 93 BPM | SYSTOLIC BLOOD PRESSURE: 153 MMHG

## 2021-03-03 DIAGNOSIS — Z47.89 ORTHOPEDIC AFTERCARE: ICD-10-CM

## 2021-03-03 DIAGNOSIS — Z98.890 POST-OPERATIVE STATE: ICD-10-CM

## 2021-03-03 DIAGNOSIS — M19.032 OSTEOARTHRITIS OF LEFT WRIST, UNSPECIFIED OSTEOARTHRITIS TYPE: Primary | ICD-10-CM

## 2021-03-03 PROCEDURE — 73110 X-RAY EXAM OF WRIST: CPT | Mod: 26,LT,, | Performed by: RADIOLOGY

## 2021-03-03 PROCEDURE — 1100F PR PT FALLS ASSESS DOC 2+ FALLS/FALL W/INJURY/YR: ICD-10-PCS | Mod: CPTII,S$GLB,, | Performed by: PHYSICIAN ASSISTANT

## 2021-03-03 PROCEDURE — 3008F BODY MASS INDEX DOCD: CPT | Mod: CPTII,S$GLB,, | Performed by: PHYSICIAN ASSISTANT

## 2021-03-03 PROCEDURE — 1125F PR PAIN SEVERITY QUANTIFIED, PAIN PRESENT: ICD-10-PCS | Mod: S$GLB,,, | Performed by: PHYSICIAN ASSISTANT

## 2021-03-03 PROCEDURE — 1100F PTFALLS ASSESS-DOCD GE2>/YR: CPT | Mod: CPTII,S$GLB,, | Performed by: PHYSICIAN ASSISTANT

## 2021-03-03 PROCEDURE — 1125F AMNT PAIN NOTED PAIN PRSNT: CPT | Mod: S$GLB,,, | Performed by: PHYSICIAN ASSISTANT

## 2021-03-03 PROCEDURE — 99024 PR POST-OP FOLLOW-UP VISIT: ICD-10-PCS | Mod: S$GLB,,, | Performed by: PHYSICIAN ASSISTANT

## 2021-03-03 PROCEDURE — 3072F LOW RISK FOR RETINOPATHY: CPT | Mod: S$GLB,,, | Performed by: PHYSICIAN ASSISTANT

## 2021-03-03 PROCEDURE — 3072F PR LOW RISK FOR RETINOPATHY: ICD-10-PCS | Mod: S$GLB,,, | Performed by: PHYSICIAN ASSISTANT

## 2021-03-03 PROCEDURE — 99999 PR PBB SHADOW E&M-EST. PATIENT-LVL III: CPT | Mod: PBBFAC,,, | Performed by: PHYSICIAN ASSISTANT

## 2021-03-03 PROCEDURE — 73110 XR WRIST COMPLETE 3 VIEWS LEFT: ICD-10-PCS | Mod: 26,LT,, | Performed by: RADIOLOGY

## 2021-03-03 PROCEDURE — 3008F PR BODY MASS INDEX (BMI) DOCUMENTED: ICD-10-PCS | Mod: CPTII,S$GLB,, | Performed by: PHYSICIAN ASSISTANT

## 2021-03-03 PROCEDURE — 3288F FALL RISK ASSESSMENT DOCD: CPT | Mod: CPTII,S$GLB,, | Performed by: PHYSICIAN ASSISTANT

## 2021-03-03 PROCEDURE — 3288F PR FALLS RISK ASSESSMENT DOCUMENTED: ICD-10-PCS | Mod: CPTII,S$GLB,, | Performed by: PHYSICIAN ASSISTANT

## 2021-03-03 PROCEDURE — 99024 POSTOP FOLLOW-UP VISIT: CPT | Mod: S$GLB,,, | Performed by: PHYSICIAN ASSISTANT

## 2021-03-03 PROCEDURE — 99999 PR PBB SHADOW E&M-EST. PATIENT-LVL III: ICD-10-PCS | Mod: PBBFAC,,, | Performed by: PHYSICIAN ASSISTANT

## 2021-03-03 PROCEDURE — 73110 X-RAY EXAM OF WRIST: CPT | Mod: TC,FY,LT

## 2021-03-05 ENCOUNTER — PATIENT MESSAGE (OUTPATIENT)
Dept: OPTOMETRY | Facility: CLINIC | Age: 76
End: 2021-03-05

## 2021-03-10 DIAGNOSIS — D47.2 SMOLDERING MULTIPLE MYELOMA: ICD-10-CM

## 2021-03-11 ENCOUNTER — LAB VISIT (OUTPATIENT)
Dept: LAB | Facility: HOSPITAL | Age: 76
End: 2021-03-11
Payer: MEDICARE

## 2021-03-11 ENCOUNTER — OFFICE VISIT (OUTPATIENT)
Dept: HEMATOLOGY/ONCOLOGY | Facility: CLINIC | Age: 76
End: 2021-03-11
Payer: MEDICARE

## 2021-03-11 ENCOUNTER — CLINICAL SUPPORT (OUTPATIENT)
Dept: URGENT CARE | Facility: CLINIC | Age: 76
End: 2021-03-11
Payer: MEDICARE

## 2021-03-11 VITALS
BODY MASS INDEX: 26.76 KG/M2 | WEIGHT: 186.94 LBS | RESPIRATION RATE: 16 BRPM | HEART RATE: 95 BPM | SYSTOLIC BLOOD PRESSURE: 133 MMHG | DIASTOLIC BLOOD PRESSURE: 76 MMHG | HEIGHT: 70 IN | TEMPERATURE: 97 F | OXYGEN SATURATION: 99 %

## 2021-03-11 DIAGNOSIS — Z03.818 ENCOUNTER FOR OBSERVATION FOR SUSPECTED EXPOSURE TO OTHER BIOLOGICAL AGENTS RULED OUT: Primary | ICD-10-CM

## 2021-03-11 DIAGNOSIS — D47.2 SMOLDERING MULTIPLE MYELOMA: ICD-10-CM

## 2021-03-11 DIAGNOSIS — N18.30 CKD STAGE 3 DUE TO TYPE 2 DIABETES MELLITUS: ICD-10-CM

## 2021-03-11 DIAGNOSIS — D47.2 SMOLDERING MULTIPLE MYELOMA: Primary | ICD-10-CM

## 2021-03-11 DIAGNOSIS — E11.42 TYPE 2 DIABETES MELLITUS WITH DIABETIC POLYNEUROPATHY, WITH LONG-TERM CURRENT USE OF INSULIN: ICD-10-CM

## 2021-03-11 DIAGNOSIS — Z00.6 RESEARCH EXAM: ICD-10-CM

## 2021-03-11 DIAGNOSIS — Z79.4 TYPE 2 DIABETES MELLITUS WITH DIABETIC POLYNEUROPATHY, WITH LONG-TERM CURRENT USE OF INSULIN: ICD-10-CM

## 2021-03-11 DIAGNOSIS — E11.22 CKD STAGE 3 DUE TO TYPE 2 DIABETES MELLITUS: ICD-10-CM

## 2021-03-11 LAB
ACID FAST MOD KINY STN SPEC: NORMAL
ALBUMIN SERPL BCP-MCNC: 3.6 G/DL (ref 3.5–5.2)
ALP SERPL-CCNC: 47 U/L (ref 55–135)
ALT SERPL W/O P-5'-P-CCNC: 17 U/L (ref 10–44)
ANION GAP SERPL CALC-SCNC: 10 MMOL/L (ref 8–16)
AST SERPL-CCNC: 17 U/L (ref 10–40)
BASOPHILS # BLD AUTO: 0.05 K/UL (ref 0–0.2)
BASOPHILS NFR BLD: 0.9 % (ref 0–1.9)
BILIRUB SERPL-MCNC: 0.7 MG/DL (ref 0.1–1)
BUN SERPL-MCNC: 14 MG/DL (ref 8–23)
CALCIUM SERPL-MCNC: 9.4 MG/DL (ref 8.7–10.5)
CHLORIDE SERPL-SCNC: 103 MMOL/L (ref 95–110)
CO2 SERPL-SCNC: 25 MMOL/L (ref 23–29)
CREAT SERPL-MCNC: 1.7 MG/DL (ref 0.5–1.4)
CTP QC/QA: YES
DIFFERENTIAL METHOD: ABNORMAL
EOSINOPHIL # BLD AUTO: 0.4 K/UL (ref 0–0.5)
EOSINOPHIL NFR BLD: 7.7 % (ref 0–8)
ERYTHROCYTE [DISTWIDTH] IN BLOOD BY AUTOMATED COUNT: 14 % (ref 11.5–14.5)
EST. GFR  (AFRICAN AMERICAN): 44.9 ML/MIN/1.73 M^2
EST. GFR  (NON AFRICAN AMERICAN): 38.9 ML/MIN/1.73 M^2
GLUCOSE SERPL-MCNC: 154 MG/DL (ref 70–110)
HCT VFR BLD AUTO: 36.3 % (ref 40–54)
HGB BLD-MCNC: 11.6 G/DL (ref 14–18)
IMM GRANULOCYTES # BLD AUTO: 0.01 K/UL (ref 0–0.04)
IMM GRANULOCYTES NFR BLD AUTO: 0.2 % (ref 0–0.5)
LDH SERPL L TO P-CCNC: 111 U/L (ref 110–260)
LYMPHOCYTES # BLD AUTO: 2.2 K/UL (ref 1–4.8)
LYMPHOCYTES NFR BLD: 37.9 % (ref 18–48)
MAGNESIUM SERPL-MCNC: 1.8 MG/DL (ref 1.6–2.6)
MCH RBC QN AUTO: 28.5 PG (ref 27–31)
MCHC RBC AUTO-ENTMCNC: 32 G/DL (ref 32–36)
MCV RBC AUTO: 89 FL (ref 82–98)
MONOCYTES # BLD AUTO: 0.4 K/UL (ref 0.3–1)
MONOCYTES NFR BLD: 6.5 % (ref 4–15)
MYCOBACTERIUM SPEC QL CULT: NORMAL
NEUTROPHILS # BLD AUTO: 2.7 K/UL (ref 1.8–7.7)
NEUTROPHILS NFR BLD: 46.8 % (ref 38–73)
NRBC BLD-RTO: 0 /100 WBC
PHOSPHATE SERPL-MCNC: 2.8 MG/DL (ref 2.7–4.5)
PLATELET # BLD AUTO: 237 K/UL (ref 150–350)
PMV BLD AUTO: 9.3 FL (ref 9.2–12.9)
POTASSIUM SERPL-SCNC: 5 MMOL/L (ref 3.5–5.1)
PROT SERPL-MCNC: 8.6 G/DL (ref 6–8.4)
RBC # BLD AUTO: 4.07 M/UL (ref 4.6–6.2)
SARS-COV-2 RDRP RESP QL NAA+PROBE: NEGATIVE
SODIUM SERPL-SCNC: 138 MMOL/L (ref 136–145)
WBC # BLD AUTO: 5.68 K/UL (ref 3.9–12.7)

## 2021-03-11 PROCEDURE — 83735 ASSAY OF MAGNESIUM: CPT | Performed by: INTERNAL MEDICINE

## 2021-03-11 PROCEDURE — 80053 COMPREHEN METABOLIC PANEL: CPT | Performed by: INTERNAL MEDICINE

## 2021-03-11 PROCEDURE — 86334 IMMUNOFIX E-PHORESIS SERUM: CPT | Mod: 26,,, | Performed by: PATHOLOGY

## 2021-03-11 PROCEDURE — 3075F PR MOST RECENT SYSTOLIC BLOOD PRESS GE 130-139MM HG: ICD-10-PCS | Mod: CPTII,S$GLB,, | Performed by: INTERNAL MEDICINE

## 2021-03-11 PROCEDURE — 1101F PR PT FALLS ASSESS DOC 0-1 FALLS W/OUT INJ PAST YR: ICD-10-PCS | Mod: CPTII,S$GLB,, | Performed by: INTERNAL MEDICINE

## 2021-03-11 PROCEDURE — 83520 IMMUNOASSAY QUANT NOS NONAB: CPT | Performed by: INTERNAL MEDICINE

## 2021-03-11 PROCEDURE — 99999 PR PBB SHADOW E&M-EST. PATIENT-LVL III: ICD-10-PCS | Mod: PBBFAC,,, | Performed by: INTERNAL MEDICINE

## 2021-03-11 PROCEDURE — 3008F PR BODY MASS INDEX (BMI) DOCUMENTED: ICD-10-PCS | Mod: CPTII,S$GLB,, | Performed by: INTERNAL MEDICINE

## 2021-03-11 PROCEDURE — 3051F PR MOST RECENT HEMOGLOBIN A1C LEVEL 7.0 - < 8.0%: ICD-10-PCS | Mod: CPTII,S$GLB,, | Performed by: INTERNAL MEDICINE

## 2021-03-11 PROCEDURE — 3072F PR LOW RISK FOR RETINOPATHY: ICD-10-PCS | Mod: S$GLB,,, | Performed by: INTERNAL MEDICINE

## 2021-03-11 PROCEDURE — U0002: ICD-10-PCS | Mod: QW,S$GLB,, | Performed by: NURSE PRACTITIONER

## 2021-03-11 PROCEDURE — 84100 ASSAY OF PHOSPHORUS: CPT | Performed by: INTERNAL MEDICINE

## 2021-03-11 PROCEDURE — 1159F MED LIST DOCD IN RCRD: CPT | Mod: S$GLB,,, | Performed by: INTERNAL MEDICINE

## 2021-03-11 PROCEDURE — 1101F PT FALLS ASSESS-DOCD LE1/YR: CPT | Mod: CPTII,S$GLB,, | Performed by: INTERNAL MEDICINE

## 2021-03-11 PROCEDURE — 1126F PR PAIN SEVERITY QUANTIFIED, NO PAIN PRESENT: ICD-10-PCS | Mod: S$GLB,,, | Performed by: INTERNAL MEDICINE

## 2021-03-11 PROCEDURE — U0002 COVID-19 LAB TEST NON-CDC: HCPCS | Mod: QW,S$GLB,, | Performed by: NURSE PRACTITIONER

## 2021-03-11 PROCEDURE — 3078F DIAST BP <80 MM HG: CPT | Mod: CPTII,S$GLB,, | Performed by: INTERNAL MEDICINE

## 2021-03-11 PROCEDURE — 99215 OFFICE O/P EST HI 40 MIN: CPT | Mod: S$GLB,,, | Performed by: INTERNAL MEDICINE

## 2021-03-11 PROCEDURE — 3072F LOW RISK FOR RETINOPATHY: CPT | Mod: S$GLB,,, | Performed by: INTERNAL MEDICINE

## 2021-03-11 PROCEDURE — 84165 PATHOLOGIST INTERPRETATION SPE: ICD-10-PCS | Mod: 26,,, | Performed by: PATHOLOGY

## 2021-03-11 PROCEDURE — 3078F PR MOST RECENT DIASTOLIC BLOOD PRESSURE < 80 MM HG: ICD-10-PCS | Mod: CPTII,S$GLB,, | Performed by: INTERNAL MEDICINE

## 2021-03-11 PROCEDURE — 85025 COMPLETE CBC W/AUTO DIFF WBC: CPT | Performed by: INTERNAL MEDICINE

## 2021-03-11 PROCEDURE — 3288F PR FALLS RISK ASSESSMENT DOCUMENTED: ICD-10-PCS | Mod: CPTII,S$GLB,, | Performed by: INTERNAL MEDICINE

## 2021-03-11 PROCEDURE — 3008F BODY MASS INDEX DOCD: CPT | Mod: CPTII,S$GLB,, | Performed by: INTERNAL MEDICINE

## 2021-03-11 PROCEDURE — 83615 LACTATE (LD) (LDH) ENZYME: CPT | Performed by: INTERNAL MEDICINE

## 2021-03-11 PROCEDURE — 3288F FALL RISK ASSESSMENT DOCD: CPT | Mod: CPTII,S$GLB,, | Performed by: INTERNAL MEDICINE

## 2021-03-11 PROCEDURE — 1126F AMNT PAIN NOTED NONE PRSNT: CPT | Mod: S$GLB,,, | Performed by: INTERNAL MEDICINE

## 2021-03-11 PROCEDURE — 84165 PROTEIN E-PHORESIS SERUM: CPT | Performed by: INTERNAL MEDICINE

## 2021-03-11 PROCEDURE — 99215 PR OFFICE/OUTPT VISIT, EST, LEVL V, 40-54 MIN: ICD-10-PCS | Mod: S$GLB,,, | Performed by: INTERNAL MEDICINE

## 2021-03-11 PROCEDURE — 36415 COLL VENOUS BLD VENIPUNCTURE: CPT | Performed by: INTERNAL MEDICINE

## 2021-03-11 PROCEDURE — 99999 PR PBB SHADOW E&M-EST. PATIENT-LVL III: CPT | Mod: PBBFAC,,, | Performed by: INTERNAL MEDICINE

## 2021-03-11 PROCEDURE — 84165 PROTEIN E-PHORESIS SERUM: CPT | Mod: 26,,, | Performed by: PATHOLOGY

## 2021-03-11 PROCEDURE — 82784 ASSAY IGA/IGD/IGG/IGM EACH: CPT | Mod: 59 | Performed by: INTERNAL MEDICINE

## 2021-03-11 PROCEDURE — 86334 PATHOLOGIST INTERPRETATION IFE: ICD-10-PCS | Mod: 26,,, | Performed by: PATHOLOGY

## 2021-03-11 PROCEDURE — 86334 IMMUNOFIX E-PHORESIS SERUM: CPT | Performed by: INTERNAL MEDICINE

## 2021-03-11 PROCEDURE — 99211 OFF/OP EST MAY X REQ PHY/QHP: CPT | Mod: S$GLB,CS,, | Performed by: NURSE PRACTITIONER

## 2021-03-11 PROCEDURE — 99211 PR OFFICE/OUTPT VISIT, EST, LEVL I: ICD-10-PCS | Mod: S$GLB,CS,, | Performed by: NURSE PRACTITIONER

## 2021-03-11 PROCEDURE — 3075F SYST BP GE 130 - 139MM HG: CPT | Mod: CPTII,S$GLB,, | Performed by: INTERNAL MEDICINE

## 2021-03-11 PROCEDURE — 3051F HG A1C>EQUAL 7.0%<8.0%: CPT | Mod: CPTII,S$GLB,, | Performed by: INTERNAL MEDICINE

## 2021-03-11 PROCEDURE — 1159F PR MEDICATION LIST DOCUMENTED IN MEDICAL RECORD: ICD-10-PCS | Mod: S$GLB,,, | Performed by: INTERNAL MEDICINE

## 2021-03-11 RX ORDER — DIAZEPAM 5 MG/1
TABLET ORAL
Qty: 60 TABLET | Refills: 0 | Status: SHIPPED | OUTPATIENT
Start: 2021-03-11 | End: 2021-05-03 | Stop reason: SDUPTHER

## 2021-03-12 LAB
ALBUMIN SERPL ELPH-MCNC: 3.9 G/DL (ref 3.35–5.55)
ALPHA1 GLOB SERPL ELPH-MCNC: 0.27 G/DL (ref 0.17–0.41)
ALPHA2 GLOB SERPL ELPH-MCNC: 0.86 G/DL (ref 0.43–0.99)
B-GLOBULIN SERPL ELPH-MCNC: 2.65 G/DL (ref 0.5–1.1)
GAMMA GLOB SERPL ELPH-MCNC: 0.52 G/DL (ref 0.67–1.58)
IGA SERPL-MCNC: 1681 MG/DL (ref 40–350)
IGG SERPL-MCNC: 998 MG/DL (ref 650–1600)
IGM SERPL-MCNC: 45 MG/DL (ref 50–300)
INTERPRETATION SERPL IFE-IMP: NORMAL
KAPPA LC SER QL IA: 7.86 MG/DL (ref 0.33–1.94)
KAPPA LC/LAMBDA SER IA: 4.09 (ref 0.26–1.65)
LAMBDA LC SER QL IA: 1.92 MG/DL (ref 0.57–2.63)
PATHOLOGIST INTERPRETATION IFE: NORMAL
PATHOLOGIST INTERPRETATION SPE: NORMAL
PROT SERPL-MCNC: 8.2 G/DL (ref 6–8.4)

## 2021-03-24 ENCOUNTER — PATIENT MESSAGE (OUTPATIENT)
Dept: ORTHOPEDICS | Facility: CLINIC | Age: 76
End: 2021-03-24

## 2021-03-26 DIAGNOSIS — Z47.89 ORTHOPEDIC AFTERCARE: ICD-10-CM

## 2021-03-26 DIAGNOSIS — M19.132 POST-TRAUMATIC OSTEOARTHRITIS OF LEFT WRIST: Primary | ICD-10-CM

## 2021-03-30 ENCOUNTER — OFFICE VISIT (OUTPATIENT)
Dept: ORTHOPEDICS | Facility: CLINIC | Age: 76
End: 2021-03-30
Payer: MEDICARE

## 2021-03-30 ENCOUNTER — HOSPITAL ENCOUNTER (OUTPATIENT)
Dept: RADIOLOGY | Facility: HOSPITAL | Age: 76
Discharge: HOME | End: 2021-03-30
Attending: INTERNAL MEDICINE
Payer: MEDICARE

## 2021-03-30 ENCOUNTER — OFFICE VISIT (OUTPATIENT)
Dept: INTERNAL MEDICINE | Facility: CLINIC | Age: 76
End: 2021-03-30
Payer: MEDICARE

## 2021-03-30 ENCOUNTER — PATIENT OUTREACH (OUTPATIENT)
Dept: ADMINISTRATIVE | Facility: OTHER | Age: 76
End: 2021-03-30

## 2021-03-30 VITALS
HEIGHT: 70 IN | SYSTOLIC BLOOD PRESSURE: 122 MMHG | HEART RATE: 98 BPM | BODY MASS INDEX: 27.38 KG/M2 | DIASTOLIC BLOOD PRESSURE: 76 MMHG | WEIGHT: 191.25 LBS | OXYGEN SATURATION: 98 %

## 2021-03-30 VITALS
HEART RATE: 89 BPM | HEIGHT: 70 IN | WEIGHT: 186 LBS | BODY MASS INDEX: 26.63 KG/M2 | SYSTOLIC BLOOD PRESSURE: 168 MMHG | DIASTOLIC BLOOD PRESSURE: 78 MMHG

## 2021-03-30 DIAGNOSIS — M79.671 RIGHT FOOT PAIN: ICD-10-CM

## 2021-03-30 DIAGNOSIS — G56.02 CARPAL TUNNEL SYNDROME ON LEFT: ICD-10-CM

## 2021-03-30 DIAGNOSIS — M17.0 PRIMARY OSTEOARTHRITIS OF BOTH KNEES: Primary | ICD-10-CM

## 2021-03-30 DIAGNOSIS — Z47.89 ORTHOPEDIC AFTERCARE: ICD-10-CM

## 2021-03-30 DIAGNOSIS — Z00.00 ROUTINE GENERAL MEDICAL EXAMINATION AT A HEALTH CARE FACILITY: ICD-10-CM

## 2021-03-30 DIAGNOSIS — M19.132 POST-TRAUMATIC OSTEOARTHRITIS OF LEFT WRIST: Primary | ICD-10-CM

## 2021-03-30 DIAGNOSIS — E13.9 DIABETES MELLITUS DUE TO ABNORMAL INSULIN: ICD-10-CM

## 2021-03-30 PROCEDURE — 3008F BODY MASS INDEX DOCD: CPT | Mod: CPTII,S$GLB,, | Performed by: PHYSICIAN ASSISTANT

## 2021-03-30 PROCEDURE — 99999 PR PBB SHADOW E&M-EST. PATIENT-LVL V: CPT | Mod: PBBFAC,,, | Performed by: INTERNAL MEDICINE

## 2021-03-30 PROCEDURE — 99397 PER PM REEVAL EST PAT 65+ YR: CPT | Mod: S$GLB,,, | Performed by: INTERNAL MEDICINE

## 2021-03-30 PROCEDURE — 99999 PR PBB SHADOW E&M-EST. PATIENT-LVL III: ICD-10-PCS | Mod: PBBFAC,,, | Performed by: PHYSICIAN ASSISTANT

## 2021-03-30 PROCEDURE — 3078F DIAST BP <80 MM HG: CPT | Mod: CPTII,S$GLB,, | Performed by: INTERNAL MEDICINE

## 2021-03-30 PROCEDURE — 3072F LOW RISK FOR RETINOPATHY: CPT | Mod: S$GLB,,, | Performed by: PHYSICIAN ASSISTANT

## 2021-03-30 PROCEDURE — 1100F PTFALLS ASSESS-DOCD GE2>/YR: CPT | Mod: CPTII,S$GLB,, | Performed by: INTERNAL MEDICINE

## 2021-03-30 PROCEDURE — 3074F SYST BP LT 130 MM HG: CPT | Mod: CPTII,S$GLB,, | Performed by: INTERNAL MEDICINE

## 2021-03-30 PROCEDURE — 73630 X-RAY EXAM OF FOOT: CPT | Mod: TC,RT

## 2021-03-30 PROCEDURE — 99999 PR PBB SHADOW E&M-EST. PATIENT-LVL III: CPT | Mod: PBBFAC,,, | Performed by: PHYSICIAN ASSISTANT

## 2021-03-30 PROCEDURE — 3008F PR BODY MASS INDEX (BMI) DOCUMENTED: ICD-10-PCS | Mod: CPTII,S$GLB,, | Performed by: INTERNAL MEDICINE

## 2021-03-30 PROCEDURE — 3051F PR MOST RECENT HEMOGLOBIN A1C LEVEL 7.0 - < 8.0%: ICD-10-PCS | Mod: CPTII,S$GLB,, | Performed by: INTERNAL MEDICINE

## 2021-03-30 PROCEDURE — 1125F AMNT PAIN NOTED PAIN PRSNT: CPT | Mod: S$GLB,,, | Performed by: PHYSICIAN ASSISTANT

## 2021-03-30 PROCEDURE — 99397 PR PREVENTIVE VISIT,EST,65 & OVER: ICD-10-PCS | Mod: S$GLB,,, | Performed by: INTERNAL MEDICINE

## 2021-03-30 PROCEDURE — 99499 UNLISTED E&M SERVICE: CPT | Mod: S$GLB,,, | Performed by: INTERNAL MEDICINE

## 2021-03-30 PROCEDURE — 1125F AMNT PAIN NOTED PAIN PRSNT: CPT | Mod: S$GLB,,, | Performed by: INTERNAL MEDICINE

## 2021-03-30 PROCEDURE — 3288F FALL RISK ASSESSMENT DOCD: CPT | Mod: CPTII,S$GLB,, | Performed by: INTERNAL MEDICINE

## 2021-03-30 PROCEDURE — 99024 PR POST-OP FOLLOW-UP VISIT: ICD-10-PCS | Mod: S$GLB,,, | Performed by: PHYSICIAN ASSISTANT

## 2021-03-30 PROCEDURE — 1125F PR PAIN SEVERITY QUANTIFIED, PAIN PRESENT: ICD-10-PCS | Mod: S$GLB,,, | Performed by: INTERNAL MEDICINE

## 2021-03-30 PROCEDURE — 1100F PR PT FALLS ASSESS DOC 2+ FALLS/FALL W/INJURY/YR: ICD-10-PCS | Mod: CPTII,S$GLB,, | Performed by: INTERNAL MEDICINE

## 2021-03-30 PROCEDURE — 99024 POSTOP FOLLOW-UP VISIT: CPT | Mod: S$GLB,,, | Performed by: PHYSICIAN ASSISTANT

## 2021-03-30 PROCEDURE — 3078F PR MOST RECENT DIASTOLIC BLOOD PRESSURE < 80 MM HG: ICD-10-PCS | Mod: CPTII,S$GLB,, | Performed by: INTERNAL MEDICINE

## 2021-03-30 PROCEDURE — 3074F PR MOST RECENT SYSTOLIC BLOOD PRESSURE < 130 MM HG: ICD-10-PCS | Mod: CPTII,S$GLB,, | Performed by: INTERNAL MEDICINE

## 2021-03-30 PROCEDURE — 3072F LOW RISK FOR RETINOPATHY: CPT | Mod: S$GLB,,, | Performed by: INTERNAL MEDICINE

## 2021-03-30 PROCEDURE — 3051F HG A1C>EQUAL 7.0%<8.0%: CPT | Mod: CPTII,S$GLB,, | Performed by: INTERNAL MEDICINE

## 2021-03-30 PROCEDURE — 3008F PR BODY MASS INDEX (BMI) DOCUMENTED: ICD-10-PCS | Mod: CPTII,S$GLB,, | Performed by: PHYSICIAN ASSISTANT

## 2021-03-30 PROCEDURE — 3008F BODY MASS INDEX DOCD: CPT | Mod: CPTII,S$GLB,, | Performed by: INTERNAL MEDICINE

## 2021-03-30 PROCEDURE — 3072F PR LOW RISK FOR RETINOPATHY: ICD-10-PCS | Mod: S$GLB,,, | Performed by: INTERNAL MEDICINE

## 2021-03-30 PROCEDURE — 73630 X-RAY EXAM OF FOOT: CPT | Mod: 26,RT,, | Performed by: RADIOLOGY

## 2021-03-30 PROCEDURE — 1125F PR PAIN SEVERITY QUANTIFIED, PAIN PRESENT: ICD-10-PCS | Mod: S$GLB,,, | Performed by: PHYSICIAN ASSISTANT

## 2021-03-30 PROCEDURE — 73630 XR FOOT COMPLETE 3 VIEW RIGHT: ICD-10-PCS | Mod: 26,RT,, | Performed by: RADIOLOGY

## 2021-03-30 PROCEDURE — 3072F PR LOW RISK FOR RETINOPATHY: ICD-10-PCS | Mod: S$GLB,,, | Performed by: PHYSICIAN ASSISTANT

## 2021-03-30 PROCEDURE — 99999 PR PBB SHADOW E&M-EST. PATIENT-LVL V: ICD-10-PCS | Mod: PBBFAC,,, | Performed by: INTERNAL MEDICINE

## 2021-03-30 PROCEDURE — 99499 RISK ADDL DX/OHS AUDIT: ICD-10-PCS | Mod: S$GLB,,, | Performed by: INTERNAL MEDICINE

## 2021-03-30 PROCEDURE — 3288F PR FALLS RISK ASSESSMENT DOCUMENTED: ICD-10-PCS | Mod: CPTII,S$GLB,, | Performed by: INTERNAL MEDICINE

## 2021-03-30 RX ORDER — TRAMADOL HYDROCHLORIDE 50 MG/1
50 TABLET ORAL EVERY 8 HOURS PRN
Qty: 60 TABLET | Refills: 3 | Status: SHIPPED | OUTPATIENT
Start: 2021-03-30 | End: 2021-05-11 | Stop reason: SDUPTHER

## 2021-04-05 ENCOUNTER — PATIENT MESSAGE (OUTPATIENT)
Dept: ADMINISTRATIVE | Facility: HOSPITAL | Age: 76
End: 2021-04-05

## 2021-04-08 ENCOUNTER — CLINICAL SUPPORT (OUTPATIENT)
Dept: REHABILITATION | Facility: HOSPITAL | Age: 76
End: 2021-04-08
Payer: MEDICARE

## 2021-04-08 DIAGNOSIS — M25.532 ACUTE PAIN OF LEFT WRIST: ICD-10-CM

## 2021-04-08 PROCEDURE — 97166 OT EVAL MOD COMPLEX 45 MIN: CPT | Mod: PO

## 2021-04-08 PROCEDURE — 97110 THERAPEUTIC EXERCISES: CPT | Mod: PO

## 2021-04-09 ENCOUNTER — TELEPHONE (OUTPATIENT)
Dept: INTERNAL MEDICINE | Facility: CLINIC | Age: 76
End: 2021-04-09

## 2021-04-12 ENCOUNTER — OFFICE VISIT (OUTPATIENT)
Dept: HEMATOLOGY/ONCOLOGY | Facility: CLINIC | Age: 76
End: 2021-04-12
Payer: MEDICARE

## 2021-04-12 ENCOUNTER — LAB VISIT (OUTPATIENT)
Dept: LAB | Facility: HOSPITAL | Age: 76
End: 2021-04-12
Payer: MEDICARE

## 2021-04-12 ENCOUNTER — RESEARCH ENCOUNTER (OUTPATIENT)
Dept: HEMATOLOGY/ONCOLOGY | Facility: CLINIC | Age: 76
End: 2021-04-12

## 2021-04-12 VITALS
TEMPERATURE: 98 F | BODY MASS INDEX: 27.34 KG/M2 | SYSTOLIC BLOOD PRESSURE: 171 MMHG | HEART RATE: 96 BPM | HEIGHT: 70 IN | OXYGEN SATURATION: 97 % | DIASTOLIC BLOOD PRESSURE: 86 MMHG | WEIGHT: 190.94 LBS | RESPIRATION RATE: 16 BRPM

## 2021-04-12 DIAGNOSIS — Z00.6 RESEARCH EXAM: ICD-10-CM

## 2021-04-12 DIAGNOSIS — D47.2 SMOLDERING MULTIPLE MYELOMA: ICD-10-CM

## 2021-04-12 DIAGNOSIS — E13.9 DIABETES MELLITUS DUE TO ABNORMAL INSULIN: ICD-10-CM

## 2021-04-12 LAB
ALBUMIN SERPL BCP-MCNC: 3.5 G/DL (ref 3.5–5.2)
ALP SERPL-CCNC: 52 U/L (ref 55–135)
ALT SERPL W/O P-5'-P-CCNC: 13 U/L (ref 10–44)
ANION GAP SERPL CALC-SCNC: 16 MMOL/L (ref 8–16)
ANISOCYTOSIS BLD QL SMEAR: SLIGHT
AST SERPL-CCNC: 12 U/L (ref 10–40)
BASOPHILS # BLD AUTO: 0.03 K/UL (ref 0–0.2)
BASOPHILS NFR BLD: 0.6 % (ref 0–1.9)
BILIRUB SERPL-MCNC: 0.4 MG/DL (ref 0.1–1)
BUN SERPL-MCNC: 20 MG/DL (ref 8–23)
CALCIUM SERPL-MCNC: 9.6 MG/DL (ref 8.7–10.5)
CHLORIDE SERPL-SCNC: 102 MMOL/L (ref 95–110)
CO2 SERPL-SCNC: 19 MMOL/L (ref 23–29)
CREAT SERPL-MCNC: 1.7 MG/DL (ref 0.5–1.4)
DACRYOCYTES BLD QL SMEAR: ABNORMAL
DIFFERENTIAL METHOD: ABNORMAL
EOSINOPHIL # BLD AUTO: 0.3 K/UL (ref 0–0.5)
EOSINOPHIL NFR BLD: 5.5 % (ref 0–8)
ERYTHROCYTE [DISTWIDTH] IN BLOOD BY AUTOMATED COUNT: 13.9 % (ref 11.5–14.5)
EST. GFR  (AFRICAN AMERICAN): 44.9 ML/MIN/1.73 M^2
EST. GFR  (NON AFRICAN AMERICAN): 38.9 ML/MIN/1.73 M^2
ESTIMATED AVG GLUCOSE: 169 MG/DL (ref 68–131)
GLUCOSE SERPL-MCNC: 185 MG/DL (ref 70–110)
HBA1C MFR BLD: 7.5 % (ref 4–5.6)
HCT VFR BLD AUTO: 38.7 % (ref 40–54)
HGB BLD-MCNC: 12.3 G/DL (ref 14–18)
HYPOCHROMIA BLD QL SMEAR: ABNORMAL
IGA SERPL-MCNC: 1678 MG/DL (ref 40–350)
IGG SERPL-MCNC: 989 MG/DL (ref 650–1600)
IGM SERPL-MCNC: 37 MG/DL (ref 50–300)
IMM GRANULOCYTES # BLD AUTO: 0 K/UL (ref 0–0.04)
IMM GRANULOCYTES NFR BLD AUTO: 0 % (ref 0–0.5)
LDH SERPL L TO P-CCNC: 116 U/L (ref 110–260)
LYMPHOCYTES # BLD AUTO: 2.2 K/UL (ref 1–4.8)
LYMPHOCYTES NFR BLD: 45.4 % (ref 18–48)
MAGNESIUM SERPL-MCNC: 1.6 MG/DL (ref 1.6–2.6)
MCH RBC QN AUTO: 29 PG (ref 27–31)
MCHC RBC AUTO-ENTMCNC: 31.8 G/DL (ref 32–36)
MCV RBC AUTO: 91 FL (ref 82–98)
MONOCYTES # BLD AUTO: 0.3 K/UL (ref 0.3–1)
MONOCYTES NFR BLD: 5.7 % (ref 4–15)
NEUTROPHILS # BLD AUTO: 2.1 K/UL (ref 1.8–7.7)
NEUTROPHILS NFR BLD: 42.8 % (ref 38–73)
NRBC BLD-RTO: 0 /100 WBC
OVALOCYTES BLD QL SMEAR: ABNORMAL
PHOSPHATE SERPL-MCNC: 2.6 MG/DL (ref 2.7–4.5)
PLATELET # BLD AUTO: 278 K/UL (ref 150–450)
PMV BLD AUTO: 9.5 FL (ref 9.2–12.9)
POIKILOCYTOSIS BLD QL SMEAR: SLIGHT
POLYCHROMASIA BLD QL SMEAR: ABNORMAL
POTASSIUM SERPL-SCNC: 4.8 MMOL/L (ref 3.5–5.1)
PROT SERPL-MCNC: 9 G/DL (ref 6–8.4)
RBC # BLD AUTO: 4.24 M/UL (ref 4.6–6.2)
SODIUM SERPL-SCNC: 137 MMOL/L (ref 136–145)
WBC # BLD AUTO: 4.87 K/UL (ref 3.9–12.7)

## 2021-04-12 PROCEDURE — 86334 PATHOLOGIST INTERPRETATION IFE: ICD-10-PCS | Mod: 26,,, | Performed by: PATHOLOGY

## 2021-04-12 PROCEDURE — 3072F PR LOW RISK FOR RETINOPATHY: ICD-10-PCS | Mod: S$GLB,,, | Performed by: INTERNAL MEDICINE

## 2021-04-12 PROCEDURE — 1125F AMNT PAIN NOTED PAIN PRSNT: CPT | Mod: S$GLB,,, | Performed by: INTERNAL MEDICINE

## 2021-04-12 PROCEDURE — 1101F PR PT FALLS ASSESS DOC 0-1 FALLS W/OUT INJ PAST YR: ICD-10-PCS | Mod: CPTII,S$GLB,, | Performed by: INTERNAL MEDICINE

## 2021-04-12 PROCEDURE — 3288F FALL RISK ASSESSMENT DOCD: CPT | Mod: CPTII,S$GLB,, | Performed by: INTERNAL MEDICINE

## 2021-04-12 PROCEDURE — 80053 COMPREHEN METABOLIC PANEL: CPT | Mod: Q1 | Performed by: INTERNAL MEDICINE

## 2021-04-12 PROCEDURE — 85025 COMPLETE CBC W/AUTO DIFF WBC: CPT | Performed by: INTERNAL MEDICINE

## 2021-04-12 PROCEDURE — 84165 PROTEIN E-PHORESIS SERUM: CPT | Mod: 26,,, | Performed by: PATHOLOGY

## 2021-04-12 PROCEDURE — 99215 OFFICE O/P EST HI 40 MIN: CPT | Mod: S$GLB,,, | Performed by: INTERNAL MEDICINE

## 2021-04-12 PROCEDURE — 99215 PR OFFICE/OUTPT VISIT, EST, LEVL V, 40-54 MIN: ICD-10-PCS | Mod: S$GLB,,, | Performed by: INTERNAL MEDICINE

## 2021-04-12 PROCEDURE — 1159F MED LIST DOCD IN RCRD: CPT | Mod: S$GLB,,, | Performed by: INTERNAL MEDICINE

## 2021-04-12 PROCEDURE — 3008F BODY MASS INDEX DOCD: CPT | Mod: CPTII,S$GLB,, | Performed by: INTERNAL MEDICINE

## 2021-04-12 PROCEDURE — 83615 LACTATE (LD) (LDH) ENZYME: CPT | Performed by: INTERNAL MEDICINE

## 2021-04-12 PROCEDURE — 3008F PR BODY MASS INDEX (BMI) DOCUMENTED: ICD-10-PCS | Mod: CPTII,S$GLB,, | Performed by: INTERNAL MEDICINE

## 2021-04-12 PROCEDURE — 83520 IMMUNOASSAY QUANT NOS NONAB: CPT | Mod: Q1 | Performed by: INTERNAL MEDICINE

## 2021-04-12 PROCEDURE — 99999 PR PBB SHADOW E&M-EST. PATIENT-LVL V: CPT | Mod: PBBFAC,,, | Performed by: INTERNAL MEDICINE

## 2021-04-12 PROCEDURE — 36415 COLL VENOUS BLD VENIPUNCTURE: CPT | Performed by: INTERNAL MEDICINE

## 2021-04-12 PROCEDURE — 84100 ASSAY OF PHOSPHORUS: CPT | Performed by: INTERNAL MEDICINE

## 2021-04-12 PROCEDURE — 86334 IMMUNOFIX E-PHORESIS SERUM: CPT | Performed by: INTERNAL MEDICINE

## 2021-04-12 PROCEDURE — 83735 ASSAY OF MAGNESIUM: CPT | Performed by: INTERNAL MEDICINE

## 2021-04-12 PROCEDURE — 1159F PR MEDICATION LIST DOCUMENTED IN MEDICAL RECORD: ICD-10-PCS | Mod: S$GLB,,, | Performed by: INTERNAL MEDICINE

## 2021-04-12 PROCEDURE — 3288F PR FALLS RISK ASSESSMENT DOCUMENTED: ICD-10-PCS | Mod: CPTII,S$GLB,, | Performed by: INTERNAL MEDICINE

## 2021-04-12 PROCEDURE — 83036 HEMOGLOBIN GLYCOSYLATED A1C: CPT | Mod: Q1 | Performed by: INTERNAL MEDICINE

## 2021-04-12 PROCEDURE — 86334 IMMUNOFIX E-PHORESIS SERUM: CPT | Mod: 26,,, | Performed by: PATHOLOGY

## 2021-04-12 PROCEDURE — 82784 ASSAY IGA/IGD/IGG/IGM EACH: CPT | Mod: 59,Q1 | Performed by: INTERNAL MEDICINE

## 2021-04-12 PROCEDURE — 99999 PR PBB SHADOW E&M-EST. PATIENT-LVL V: ICD-10-PCS | Mod: PBBFAC,,, | Performed by: INTERNAL MEDICINE

## 2021-04-12 PROCEDURE — 3072F LOW RISK FOR RETINOPATHY: CPT | Mod: S$GLB,,, | Performed by: INTERNAL MEDICINE

## 2021-04-12 PROCEDURE — 1125F PR PAIN SEVERITY QUANTIFIED, PAIN PRESENT: ICD-10-PCS | Mod: S$GLB,,, | Performed by: INTERNAL MEDICINE

## 2021-04-12 PROCEDURE — 84165 PATHOLOGIST INTERPRETATION SPE: ICD-10-PCS | Mod: 26,,, | Performed by: PATHOLOGY

## 2021-04-12 PROCEDURE — 1101F PT FALLS ASSESS-DOCD LE1/YR: CPT | Mod: CPTII,S$GLB,, | Performed by: INTERNAL MEDICINE

## 2021-04-12 PROCEDURE — 84165 PROTEIN E-PHORESIS SERUM: CPT | Performed by: INTERNAL MEDICINE

## 2021-04-12 RX ORDER — CITALOPRAM 20 MG/1
TABLET, FILM COATED ORAL
Qty: 135 TABLET | Refills: 0 | Status: SHIPPED | OUTPATIENT
Start: 2021-04-12 | End: 2021-11-14 | Stop reason: SDUPTHER

## 2021-04-13 LAB
ALBUMIN SERPL ELPH-MCNC: 4.51 G/DL (ref 3.35–5.55)
ALPHA1 GLOB SERPL ELPH-MCNC: 0.29 G/DL (ref 0.17–0.41)
ALPHA2 GLOB SERPL ELPH-MCNC: 0.95 G/DL (ref 0.43–0.99)
B-GLOBULIN SERPL ELPH-MCNC: 1.07 G/DL (ref 0.5–1.1)
GAMMA GLOB SERPL ELPH-MCNC: 2.38 G/DL (ref 0.67–1.58)
INTERPRETATION SERPL IFE-IMP: NORMAL
KAPPA LC SER QL IA: 8.04 MG/DL (ref 0.33–1.94)
KAPPA LC/LAMBDA SER IA: 3.92 (ref 0.26–1.65)
LAMBDA LC SER QL IA: 2.05 MG/DL (ref 0.57–2.63)
PROT SERPL-MCNC: 9.2 G/DL (ref 6–8.4)

## 2021-04-14 ENCOUNTER — OFFICE VISIT (OUTPATIENT)
Dept: OPHTHALMOLOGY | Facility: CLINIC | Age: 76
End: 2021-04-14
Payer: MEDICARE

## 2021-04-14 DIAGNOSIS — H40.1132 PRIMARY OPEN ANGLE GLAUCOMA OF BOTH EYES, MODERATE STAGE: Primary | ICD-10-CM

## 2021-04-14 DIAGNOSIS — H40.039 ANATOMICAL NARROW ANGLE: ICD-10-CM

## 2021-04-14 DIAGNOSIS — Z98.41 STATUS POST CATARACT EXTRACTION AND INSERTION OF INTRAOCULAR LENS OF RIGHT EYE: ICD-10-CM

## 2021-04-14 DIAGNOSIS — H25.12 NUCLEAR SCLEROSIS OF LEFT EYE: ICD-10-CM

## 2021-04-14 DIAGNOSIS — Z96.1 STATUS POST CATARACT EXTRACTION AND INSERTION OF INTRAOCULAR LENS OF RIGHT EYE: ICD-10-CM

## 2021-04-14 LAB
PATHOLOGIST INTERPRETATION IFE: NORMAL
PATHOLOGIST INTERPRETATION SPE: NORMAL

## 2021-04-14 PROCEDURE — 99499 UNLISTED E&M SERVICE: CPT | Mod: S$GLB,,, | Performed by: OPHTHALMOLOGY

## 2021-04-14 PROCEDURE — 1159F MED LIST DOCD IN RCRD: CPT | Mod: S$GLB,,, | Performed by: OPHTHALMOLOGY

## 2021-04-14 PROCEDURE — 99214 PR OFFICE/OUTPT VISIT, EST, LEVL IV, 30-39 MIN: ICD-10-PCS | Mod: S$GLB,,, | Performed by: OPHTHALMOLOGY

## 2021-04-14 PROCEDURE — 99214 OFFICE O/P EST MOD 30 MIN: CPT | Mod: S$GLB,,, | Performed by: OPHTHALMOLOGY

## 2021-04-14 PROCEDURE — 99999 PR PBB SHADOW E&M-EST. PATIENT-LVL II: CPT | Mod: PBBFAC,,, | Performed by: OPHTHALMOLOGY

## 2021-04-14 PROCEDURE — 99999 PR PBB SHADOW E&M-EST. PATIENT-LVL II: ICD-10-PCS | Mod: PBBFAC,,, | Performed by: OPHTHALMOLOGY

## 2021-04-14 PROCEDURE — 3288F FALL RISK ASSESSMENT DOCD: CPT | Mod: CPTII,S$GLB,, | Performed by: OPHTHALMOLOGY

## 2021-04-14 PROCEDURE — 99499 RISK ADDL DX/OHS AUDIT: ICD-10-PCS | Mod: S$GLB,,, | Performed by: OPHTHALMOLOGY

## 2021-04-14 PROCEDURE — 1159F PR MEDICATION LIST DOCUMENTED IN MEDICAL RECORD: ICD-10-PCS | Mod: S$GLB,,, | Performed by: OPHTHALMOLOGY

## 2021-04-14 PROCEDURE — 1101F PR PT FALLS ASSESS DOC 0-1 FALLS W/OUT INJ PAST YR: ICD-10-PCS | Mod: CPTII,S$GLB,, | Performed by: OPHTHALMOLOGY

## 2021-04-14 PROCEDURE — 1101F PT FALLS ASSESS-DOCD LE1/YR: CPT | Mod: CPTII,S$GLB,, | Performed by: OPHTHALMOLOGY

## 2021-04-14 PROCEDURE — 3288F PR FALLS RISK ASSESSMENT DOCUMENTED: ICD-10-PCS | Mod: CPTII,S$GLB,, | Performed by: OPHTHALMOLOGY

## 2021-04-21 ENCOUNTER — OFFICE VISIT (OUTPATIENT)
Dept: OPHTHALMOLOGY | Facility: CLINIC | Age: 76
End: 2021-04-21
Payer: MEDICARE

## 2021-04-21 ENCOUNTER — TELEPHONE (OUTPATIENT)
Dept: OPHTHALMOLOGY | Facility: CLINIC | Age: 76
End: 2021-04-21

## 2021-04-21 DIAGNOSIS — Z98.41 STATUS POST CATARACT EXTRACTION AND INSERTION OF INTRAOCULAR LENS OF RIGHT EYE: ICD-10-CM

## 2021-04-21 DIAGNOSIS — Z96.1 STATUS POST CATARACT EXTRACTION AND INSERTION OF INTRAOCULAR LENS OF RIGHT EYE: ICD-10-CM

## 2021-04-21 DIAGNOSIS — Z01.818 PRE-OP TESTING: ICD-10-CM

## 2021-04-21 DIAGNOSIS — H40.1132 PRIMARY OPEN ANGLE GLAUCOMA OF BOTH EYES, MODERATE STAGE: ICD-10-CM

## 2021-04-21 DIAGNOSIS — H25.12 NUCLEAR SCLEROSIS OF LEFT EYE: Primary | ICD-10-CM

## 2021-04-21 DIAGNOSIS — Z01.818 PREOP TESTING: ICD-10-CM

## 2021-04-21 PROCEDURE — 99499 UNLISTED E&M SERVICE: CPT | Mod: S$GLB,,, | Performed by: OPHTHALMOLOGY

## 2021-04-21 PROCEDURE — 99499 NO LOS: ICD-10-PCS | Mod: S$GLB,,, | Performed by: OPHTHALMOLOGY

## 2021-04-21 PROCEDURE — 99999 PR PBB SHADOW E&M-EST. PATIENT-LVL I: ICD-10-PCS | Mod: PBBFAC,,, | Performed by: OPHTHALMOLOGY

## 2021-04-21 PROCEDURE — 99999 PR PBB SHADOW E&M-EST. PATIENT-LVL I: CPT | Mod: PBBFAC,,, | Performed by: OPHTHALMOLOGY

## 2021-04-21 PROCEDURE — 92136 OPHTHALMIC BIOMETRY: CPT | Mod: LT,S$GLB,, | Performed by: OPHTHALMOLOGY

## 2021-04-21 PROCEDURE — 92136 IOL MASTER - OS - LEFT EYE: ICD-10-PCS | Mod: LT,S$GLB,, | Performed by: OPHTHALMOLOGY

## 2021-05-03 DIAGNOSIS — I10 ESSENTIAL HYPERTENSION: ICD-10-CM

## 2021-05-03 DIAGNOSIS — D47.2 SMOLDERING MULTIPLE MYELOMA: ICD-10-CM

## 2021-05-03 DIAGNOSIS — N18.31 STAGE 3A CHRONIC KIDNEY DISEASE: Chronic | ICD-10-CM

## 2021-05-03 DIAGNOSIS — E55.9 VITAMIN D DEFICIENCY: ICD-10-CM

## 2021-05-03 RX ORDER — LOSARTAN POTASSIUM 50 MG/1
50 TABLET ORAL DAILY
Qty: 90 TABLET | Refills: 6 | Status: SHIPPED | OUTPATIENT
Start: 2021-05-03 | End: 2021-08-11 | Stop reason: SDUPTHER

## 2021-05-03 RX ORDER — DIAZEPAM 5 MG/1
5 TABLET ORAL EVERY 12 HOURS PRN
Qty: 60 TABLET | Refills: 0 | Status: SHIPPED | OUTPATIENT
Start: 2021-05-03 | End: 2021-09-09 | Stop reason: SDUPTHER

## 2021-05-04 ENCOUNTER — CLINICAL SUPPORT (OUTPATIENT)
Dept: REHABILITATION | Facility: HOSPITAL | Age: 76
End: 2021-05-04
Payer: MEDICARE

## 2021-05-04 DIAGNOSIS — M25.532 ACUTE PAIN OF LEFT WRIST: ICD-10-CM

## 2021-05-04 PROCEDURE — 97530 THERAPEUTIC ACTIVITIES: CPT | Mod: PO

## 2021-05-04 PROCEDURE — 97022 WHIRLPOOL THERAPY: CPT | Mod: PO

## 2021-05-10 ENCOUNTER — PATIENT OUTREACH (OUTPATIENT)
Dept: ADMINISTRATIVE | Facility: OTHER | Age: 76
End: 2021-05-10

## 2021-05-11 ENCOUNTER — CLINICAL SUPPORT (OUTPATIENT)
Dept: REHABILITATION | Facility: HOSPITAL | Age: 76
End: 2021-05-11
Payer: MEDICARE

## 2021-05-11 DIAGNOSIS — M25.532 ACUTE PAIN OF LEFT WRIST: ICD-10-CM

## 2021-05-11 PROCEDURE — 97022 WHIRLPOOL THERAPY: CPT | Mod: PO

## 2021-05-11 PROCEDURE — 97530 THERAPEUTIC ACTIVITIES: CPT | Mod: PO

## 2021-05-11 RX ORDER — TRAMADOL HYDROCHLORIDE 50 MG/1
50 TABLET ORAL EVERY 8 HOURS PRN
Qty: 60 TABLET | Refills: 0 | Status: SHIPPED | OUTPATIENT
Start: 2021-05-11 | End: 2021-08-11 | Stop reason: SDUPTHER

## 2021-05-12 ENCOUNTER — DOCUMENTATION ONLY (OUTPATIENT)
Dept: ORTHOPEDICS | Facility: CLINIC | Age: 76
End: 2021-05-12

## 2021-05-13 ENCOUNTER — CLINICAL SUPPORT (OUTPATIENT)
Dept: REHABILITATION | Facility: HOSPITAL | Age: 76
End: 2021-05-13
Payer: MEDICARE

## 2021-05-13 DIAGNOSIS — M25.532 ACUTE PAIN OF LEFT WRIST: ICD-10-CM

## 2021-05-13 PROCEDURE — 97150 GROUP THERAPEUTIC PROCEDURES: CPT | Mod: PO

## 2021-05-13 PROCEDURE — 97530 THERAPEUTIC ACTIVITIES: CPT | Mod: PO

## 2021-05-13 PROCEDURE — 97022 WHIRLPOOL THERAPY: CPT | Mod: PO

## 2021-05-14 RX ORDER — LIDOCAINE HYDROCHLORIDE 40 MG/ML
1 INJECTION, SOLUTION RETROBULBAR
Status: CANCELLED | OUTPATIENT
Start: 2021-05-14

## 2021-05-17 ENCOUNTER — LAB VISIT (OUTPATIENT)
Dept: LAB | Facility: HOSPITAL | Age: 76
End: 2021-05-17
Attending: INTERNAL MEDICINE
Payer: MEDICARE

## 2021-05-17 ENCOUNTER — RESEARCH ENCOUNTER (OUTPATIENT)
Dept: HEMATOLOGY/ONCOLOGY | Facility: CLINIC | Age: 76
End: 2021-05-17

## 2021-05-17 ENCOUNTER — OFFICE VISIT (OUTPATIENT)
Dept: HEMATOLOGY/ONCOLOGY | Facility: CLINIC | Age: 76
End: 2021-05-17
Payer: MEDICARE

## 2021-05-17 ENCOUNTER — LAB VISIT (OUTPATIENT)
Dept: INTERNAL MEDICINE | Facility: CLINIC | Age: 76
End: 2021-05-17
Payer: MEDICARE

## 2021-05-17 VITALS
TEMPERATURE: 98 F | RESPIRATION RATE: 20 BRPM | OXYGEN SATURATION: 98 % | DIASTOLIC BLOOD PRESSURE: 67 MMHG | BODY MASS INDEX: 27.58 KG/M2 | HEART RATE: 93 BPM | HEIGHT: 70 IN | WEIGHT: 192.69 LBS | SYSTOLIC BLOOD PRESSURE: 131 MMHG

## 2021-05-17 DIAGNOSIS — D47.2 SMOLDERING MULTIPLE MYELOMA: Primary | ICD-10-CM

## 2021-05-17 DIAGNOSIS — Z01.818 PRE-OP TESTING: ICD-10-CM

## 2021-05-17 DIAGNOSIS — D47.2 SMOLDERING MULTIPLE MYELOMA: ICD-10-CM

## 2021-05-17 LAB
ALBUMIN SERPL BCP-MCNC: 3.8 G/DL (ref 3.5–5.2)
ALP SERPL-CCNC: 53 U/L (ref 55–135)
ALT SERPL W/O P-5'-P-CCNC: 16 U/L (ref 10–44)
ANION GAP SERPL CALC-SCNC: 11 MMOL/L (ref 8–16)
AST SERPL-CCNC: 17 U/L (ref 10–40)
BASOPHILS # BLD AUTO: 0.04 K/UL (ref 0–0.2)
BASOPHILS NFR BLD: 0.8 % (ref 0–1.9)
BILIRUB SERPL-MCNC: 0.4 MG/DL (ref 0.1–1)
BUN SERPL-MCNC: 23 MG/DL (ref 8–23)
CALCIUM SERPL-MCNC: 9.7 MG/DL (ref 8.7–10.5)
CHLORIDE SERPL-SCNC: 103 MMOL/L (ref 95–110)
CO2 SERPL-SCNC: 24 MMOL/L (ref 23–29)
CREAT SERPL-MCNC: 2 MG/DL (ref 0.5–1.4)
DIFFERENTIAL METHOD: ABNORMAL
EOSINOPHIL # BLD AUTO: 0.2 K/UL (ref 0–0.5)
EOSINOPHIL NFR BLD: 4.1 % (ref 0–8)
ERYTHROCYTE [DISTWIDTH] IN BLOOD BY AUTOMATED COUNT: 13.8 % (ref 11.5–14.5)
EST. GFR  (AFRICAN AMERICAN): 36.9 ML/MIN/1.73 M^2
EST. GFR  (NON AFRICAN AMERICAN): 31.9 ML/MIN/1.73 M^2
GLUCOSE SERPL-MCNC: 116 MG/DL (ref 70–110)
HCT VFR BLD AUTO: 36.8 % (ref 40–54)
HGB BLD-MCNC: 11.5 G/DL (ref 14–18)
IGA SERPL-MCNC: 1650 MG/DL (ref 40–350)
IGG SERPL-MCNC: 999 MG/DL (ref 650–1600)
IGM SERPL-MCNC: 45 MG/DL (ref 50–300)
IMM GRANULOCYTES # BLD AUTO: 0.02 K/UL (ref 0–0.04)
IMM GRANULOCYTES NFR BLD AUTO: 0.4 % (ref 0–0.5)
LDH SERPL L TO P-CCNC: 97 U/L (ref 110–260)
LYMPHOCYTES # BLD AUTO: 1.4 K/UL (ref 1–4.8)
LYMPHOCYTES NFR BLD: 26.9 % (ref 18–48)
MAGNESIUM SERPL-MCNC: 1.8 MG/DL (ref 1.6–2.6)
MCH RBC QN AUTO: 27.9 PG (ref 27–31)
MCHC RBC AUTO-ENTMCNC: 31.3 G/DL (ref 32–36)
MCV RBC AUTO: 89 FL (ref 82–98)
MONOCYTES # BLD AUTO: 0.4 K/UL (ref 0.3–1)
MONOCYTES NFR BLD: 7.9 % (ref 4–15)
NEUTROPHILS # BLD AUTO: 3.1 K/UL (ref 1.8–7.7)
NEUTROPHILS NFR BLD: 59.9 % (ref 38–73)
NRBC BLD-RTO: 0 /100 WBC
PHOSPHATE SERPL-MCNC: 3.5 MG/DL (ref 2.7–4.5)
PLATELET # BLD AUTO: 263 K/UL (ref 150–450)
PMV BLD AUTO: 9.1 FL (ref 9.2–12.9)
POTASSIUM SERPL-SCNC: 5.3 MMOL/L (ref 3.5–5.1)
PROT SERPL-MCNC: 8.7 G/DL (ref 6–8.4)
RBC # BLD AUTO: 4.12 M/UL (ref 4.6–6.2)
SODIUM SERPL-SCNC: 138 MMOL/L (ref 136–145)
WBC # BLD AUTO: 5.17 K/UL (ref 3.9–12.7)

## 2021-05-17 PROCEDURE — 84165 PROTEIN E-PHORESIS SERUM: CPT | Performed by: INTERNAL MEDICINE

## 2021-05-17 PROCEDURE — 99215 OFFICE O/P EST HI 40 MIN: CPT | Mod: S$GLB,,, | Performed by: INTERNAL MEDICINE

## 2021-05-17 PROCEDURE — U0005 INFEC AGEN DETEC AMPLI PROBE: HCPCS | Performed by: OPHTHALMOLOGY

## 2021-05-17 PROCEDURE — 3072F PR LOW RISK FOR RETINOPATHY: ICD-10-PCS | Mod: S$GLB,,, | Performed by: INTERNAL MEDICINE

## 2021-05-17 PROCEDURE — 83520 IMMUNOASSAY QUANT NOS NONAB: CPT | Mod: 59 | Performed by: INTERNAL MEDICINE

## 2021-05-17 PROCEDURE — 1159F MED LIST DOCD IN RCRD: CPT | Mod: S$GLB,,, | Performed by: INTERNAL MEDICINE

## 2021-05-17 PROCEDURE — 1159F PR MEDICATION LIST DOCUMENTED IN MEDICAL RECORD: ICD-10-PCS | Mod: S$GLB,,, | Performed by: INTERNAL MEDICINE

## 2021-05-17 PROCEDURE — 84165 PATHOLOGIST INTERPRETATION SPE: ICD-10-PCS | Mod: 26,,, | Performed by: PATHOLOGY

## 2021-05-17 PROCEDURE — 84100 ASSAY OF PHOSPHORUS: CPT | Performed by: INTERNAL MEDICINE

## 2021-05-17 PROCEDURE — 86334 IMMUNOFIX E-PHORESIS SERUM: CPT | Performed by: INTERNAL MEDICINE

## 2021-05-17 PROCEDURE — 1126F PR PAIN SEVERITY QUANTIFIED, NO PAIN PRESENT: ICD-10-PCS | Mod: S$GLB,,, | Performed by: INTERNAL MEDICINE

## 2021-05-17 PROCEDURE — 3008F BODY MASS INDEX DOCD: CPT | Mod: CPTII,S$GLB,, | Performed by: INTERNAL MEDICINE

## 2021-05-17 PROCEDURE — 99999 PR PBB SHADOW E&M-EST. PATIENT-LVL III: ICD-10-PCS | Mod: PBBFAC,,, | Performed by: INTERNAL MEDICINE

## 2021-05-17 PROCEDURE — U0003 INFECTIOUS AGENT DETECTION BY NUCLEIC ACID (DNA OR RNA); SEVERE ACUTE RESPIRATORY SYNDROME CORONAVIRUS 2 (SARS-COV-2) (CORONAVIRUS DISEASE [COVID-19]), AMPLIFIED PROBE TECHNIQUE, MAKING USE OF HIGH THROUGHPUT TECHNOLOGIES AS DESCRIBED BY CMS-2020-01-R: HCPCS | Performed by: OPHTHALMOLOGY

## 2021-05-17 PROCEDURE — 86334 PATHOLOGIST INTERPRETATION IFE: ICD-10-PCS | Mod: 26,,, | Performed by: PATHOLOGY

## 2021-05-17 PROCEDURE — 3288F FALL RISK ASSESSMENT DOCD: CPT | Mod: CPTII,S$GLB,, | Performed by: INTERNAL MEDICINE

## 2021-05-17 PROCEDURE — 99999 PR PBB SHADOW E&M-EST. PATIENT-LVL III: CPT | Mod: PBBFAC,,, | Performed by: INTERNAL MEDICINE

## 2021-05-17 PROCEDURE — 85025 COMPLETE CBC W/AUTO DIFF WBC: CPT | Performed by: INTERNAL MEDICINE

## 2021-05-17 PROCEDURE — 1126F AMNT PAIN NOTED NONE PRSNT: CPT | Mod: S$GLB,,, | Performed by: INTERNAL MEDICINE

## 2021-05-17 PROCEDURE — 3008F PR BODY MASS INDEX (BMI) DOCUMENTED: ICD-10-PCS | Mod: CPTII,S$GLB,, | Performed by: INTERNAL MEDICINE

## 2021-05-17 PROCEDURE — 82784 ASSAY IGA/IGD/IGG/IGM EACH: CPT | Mod: 59 | Performed by: INTERNAL MEDICINE

## 2021-05-17 PROCEDURE — 83615 LACTATE (LD) (LDH) ENZYME: CPT | Performed by: INTERNAL MEDICINE

## 2021-05-17 PROCEDURE — 83735 ASSAY OF MAGNESIUM: CPT | Performed by: INTERNAL MEDICINE

## 2021-05-17 PROCEDURE — 86334 IMMUNOFIX E-PHORESIS SERUM: CPT | Mod: 26,,, | Performed by: PATHOLOGY

## 2021-05-17 PROCEDURE — 1101F PR PT FALLS ASSESS DOC 0-1 FALLS W/OUT INJ PAST YR: ICD-10-PCS | Mod: CPTII,S$GLB,, | Performed by: INTERNAL MEDICINE

## 2021-05-17 PROCEDURE — 1101F PT FALLS ASSESS-DOCD LE1/YR: CPT | Mod: CPTII,S$GLB,, | Performed by: INTERNAL MEDICINE

## 2021-05-17 PROCEDURE — 99215 PR OFFICE/OUTPT VISIT, EST, LEVL V, 40-54 MIN: ICD-10-PCS | Mod: S$GLB,,, | Performed by: INTERNAL MEDICINE

## 2021-05-17 PROCEDURE — 3072F LOW RISK FOR RETINOPATHY: CPT | Mod: S$GLB,,, | Performed by: INTERNAL MEDICINE

## 2021-05-17 PROCEDURE — 84165 PROTEIN E-PHORESIS SERUM: CPT | Mod: 26,,, | Performed by: PATHOLOGY

## 2021-05-17 PROCEDURE — 36415 COLL VENOUS BLD VENIPUNCTURE: CPT | Performed by: INTERNAL MEDICINE

## 2021-05-17 PROCEDURE — 3288F PR FALLS RISK ASSESSMENT DOCUMENTED: ICD-10-PCS | Mod: CPTII,S$GLB,, | Performed by: INTERNAL MEDICINE

## 2021-05-17 PROCEDURE — 80053 COMPREHEN METABOLIC PANEL: CPT | Performed by: INTERNAL MEDICINE

## 2021-05-17 RX ORDER — BLOOD GLUCOSE CONTROL HIGH,LOW
EACH MISCELLANEOUS
COMMUNITY
Start: 2021-02-12

## 2021-05-18 ENCOUNTER — CLINICAL SUPPORT (OUTPATIENT)
Dept: REHABILITATION | Facility: HOSPITAL | Age: 76
End: 2021-05-18
Payer: MEDICARE

## 2021-05-18 ENCOUNTER — TELEPHONE (OUTPATIENT)
Dept: OPHTHALMOLOGY | Facility: CLINIC | Age: 76
End: 2021-05-18

## 2021-05-18 DIAGNOSIS — M25.532 ACUTE PAIN OF LEFT WRIST: ICD-10-CM

## 2021-05-18 LAB
ALBUMIN SERPL ELPH-MCNC: 4.04 G/DL (ref 3.35–5.55)
ALPHA1 GLOB SERPL ELPH-MCNC: 0.31 G/DL (ref 0.17–0.41)
ALPHA2 GLOB SERPL ELPH-MCNC: 0.88 G/DL (ref 0.43–0.99)
B-GLOBULIN SERPL ELPH-MCNC: 0.97 G/DL (ref 0.5–1.1)
GAMMA GLOB SERPL ELPH-MCNC: 2.19 G/DL (ref 0.67–1.58)
INTERPRETATION SERPL IFE-IMP: NORMAL
KAPPA LC SER QL IA: 9.1 MG/DL (ref 0.33–1.94)
KAPPA LC/LAMBDA SER IA: 4.44 (ref 0.26–1.65)
LAMBDA LC SER QL IA: 2.05 MG/DL (ref 0.57–2.63)
PROT SERPL-MCNC: 8.4 G/DL (ref 6–8.4)
SARS-COV-2 RNA RESP QL NAA+PROBE: NOT DETECTED

## 2021-05-18 PROCEDURE — 97530 THERAPEUTIC ACTIVITIES: CPT | Mod: PO

## 2021-05-18 PROCEDURE — 97022 WHIRLPOOL THERAPY: CPT | Mod: PO

## 2021-05-20 ENCOUNTER — HOSPITAL ENCOUNTER (OUTPATIENT)
Facility: HOSPITAL | Age: 76
Discharge: HOME | End: 2021-05-20
Attending: OPHTHALMOLOGY | Admitting: OPHTHALMOLOGY
Payer: MEDICARE

## 2021-05-20 ENCOUNTER — ANESTHESIA (OUTPATIENT)
Dept: SURGERY | Facility: HOSPITAL | Age: 76
End: 2021-05-20
Payer: MEDICARE

## 2021-05-20 ENCOUNTER — ANESTHESIA EVENT (OUTPATIENT)
Dept: SURGERY | Facility: HOSPITAL | Age: 76
End: 2021-05-20
Payer: MEDICARE

## 2021-05-20 VITALS
TEMPERATURE: 98 F | RESPIRATION RATE: 19 BRPM | HEART RATE: 79 BPM | DIASTOLIC BLOOD PRESSURE: 76 MMHG | OXYGEN SATURATION: 98 % | SYSTOLIC BLOOD PRESSURE: 140 MMHG

## 2021-05-20 DIAGNOSIS — H25.10 SENILE NUCLEAR SCLEROSIS: ICD-10-CM

## 2021-05-20 DIAGNOSIS — H25.12 NUCLEAR SCLEROSIS OF LEFT EYE: Primary | ICD-10-CM

## 2021-05-20 LAB
PATHOLOGIST INTERPRETATION IFE: NORMAL
PATHOLOGIST INTERPRETATION SPE: NORMAL
POCT GLUCOSE: 108 MG/DL (ref 70–110)
POCT GLUCOSE: 139 MG/DL (ref 70–110)

## 2021-05-20 PROCEDURE — D9220A PRA ANESTHESIA: ICD-10-PCS | Mod: CRNA,,, | Performed by: NURSE ANESTHETIST, CERTIFIED REGISTERED

## 2021-05-20 PROCEDURE — D9220A PRA ANESTHESIA: Mod: CRNA,,, | Performed by: NURSE ANESTHETIST, CERTIFIED REGISTERED

## 2021-05-20 PROCEDURE — D9220A PRA ANESTHESIA: Mod: ANES,,, | Performed by: ANESTHESIOLOGY

## 2021-05-20 PROCEDURE — 82962 GLUCOSE BLOOD TEST: CPT | Performed by: OPHTHALMOLOGY

## 2021-05-20 PROCEDURE — V2632 POST CHMBR INTRAOCULAR LENS: HCPCS | Performed by: OPHTHALMOLOGY

## 2021-05-20 PROCEDURE — 25000003 PHARM REV CODE 250: Performed by: NURSE ANESTHETIST, CERTIFIED REGISTERED

## 2021-05-20 PROCEDURE — 63600175 PHARM REV CODE 636 W HCPCS: Performed by: OPHTHALMOLOGY

## 2021-05-20 PROCEDURE — 36000707: Performed by: OPHTHALMOLOGY

## 2021-05-20 PROCEDURE — 36000706: Performed by: OPHTHALMOLOGY

## 2021-05-20 PROCEDURE — 66982 PR REMOVAL, CATARACT, W/INSRT INTRAOC LENS, W/O ENDO CYCLO, CMPLX: ICD-10-PCS | Mod: LT,,, | Performed by: OPHTHALMOLOGY

## 2021-05-20 PROCEDURE — D9220A PRA ANESTHESIA: ICD-10-PCS | Mod: ANES,,, | Performed by: ANESTHESIOLOGY

## 2021-05-20 PROCEDURE — 71000044 HC DOSC ROUTINE RECOVERY FIRST HOUR: Performed by: OPHTHALMOLOGY

## 2021-05-20 PROCEDURE — 25000003 PHARM REV CODE 250

## 2021-05-20 PROCEDURE — 63600175 PHARM REV CODE 636 W HCPCS: Performed by: NURSE ANESTHETIST, CERTIFIED REGISTERED

## 2021-05-20 PROCEDURE — 37000009 HC ANESTHESIA EA ADD 15 MINS: Performed by: OPHTHALMOLOGY

## 2021-05-20 PROCEDURE — 66982 XCAPSL CTRC RMVL CPLX WO ECP: CPT | Mod: LT,,, | Performed by: OPHTHALMOLOGY

## 2021-05-20 PROCEDURE — 37000008 HC ANESTHESIA 1ST 15 MINUTES: Performed by: OPHTHALMOLOGY

## 2021-05-20 PROCEDURE — 25000003 PHARM REV CODE 250: Performed by: OPHTHALMOLOGY

## 2021-05-20 PROCEDURE — 71000015 HC POSTOP RECOV 1ST HR: Performed by: OPHTHALMOLOGY

## 2021-05-20 PROCEDURE — 82962 GLUCOSE BLOOD TEST: CPT | Mod: 91 | Performed by: OPHTHALMOLOGY

## 2021-05-20 DEVICE — LENS 20.0: Type: IMPLANTABLE DEVICE | Site: EYE | Status: FUNCTIONAL

## 2021-05-20 RX ORDER — FENTANYL CITRATE 50 UG/ML
INJECTION, SOLUTION INTRAMUSCULAR; INTRAVENOUS
Status: DISCONTINUED | OUTPATIENT
Start: 2021-05-20 | End: 2021-05-20

## 2021-05-20 RX ORDER — PHENYLEPHRINE HYDROCHLORIDE 25 MG/ML
1 SOLUTION/ DROPS OPHTHALMIC
Status: COMPLETED | OUTPATIENT
Start: 2021-05-20 | End: 2021-05-20

## 2021-05-20 RX ORDER — ACETAMINOPHEN 325 MG/1
650 TABLET ORAL EVERY 4 HOURS PRN
Status: CANCELLED | OUTPATIENT
Start: 2021-05-20

## 2021-05-20 RX ORDER — CYCLOPENTOLATE HYDROCHLORIDE 10 MG/ML
1 SOLUTION/ DROPS OPHTHALMIC
Status: COMPLETED | OUTPATIENT
Start: 2021-05-20 | End: 2021-05-20

## 2021-05-20 RX ORDER — LIDOCAINE HYDROCHLORIDE 40 MG/ML
INJECTION, SOLUTION RETROBULBAR
Status: DISCONTINUED
Start: 2021-05-20 | End: 2021-05-20 | Stop reason: HOSPADM

## 2021-05-20 RX ORDER — ACETAMINOPHEN 325 MG/1
650 TABLET ORAL EVERY 4 HOURS PRN
Status: DISCONTINUED | OUTPATIENT
Start: 2021-05-20 | End: 2021-05-20 | Stop reason: HOSPADM

## 2021-05-20 RX ORDER — TROPICAMIDE 10 MG/ML
1 SOLUTION/ DROPS OPHTHALMIC
Status: COMPLETED | OUTPATIENT
Start: 2021-05-20 | End: 2021-05-20

## 2021-05-20 RX ORDER — VASOPRESSIN 20 [USP'U]/ML
INJECTION, SOLUTION INTRAMUSCULAR; SUBCUTANEOUS
Status: DISCONTINUED | OUTPATIENT
Start: 2021-05-20 | End: 2021-05-20

## 2021-05-20 RX ORDER — LIDOCAINE HYDROCHLORIDE 20 MG/ML
INJECTION INTRAVENOUS
Status: DISCONTINUED | OUTPATIENT
Start: 2021-05-20 | End: 2021-05-20

## 2021-05-20 RX ORDER — LIDOCAINE HYDROCHLORIDE 40 MG/ML
1 INJECTION, SOLUTION RETROBULBAR
Status: ACTIVE | OUTPATIENT
Start: 2021-05-20 | End: 2021-05-20

## 2021-05-20 RX ORDER — PREDNISOLONE ACETATE 10 MG/ML
SUSPENSION/ DROPS OPHTHALMIC
Status: DISCONTINUED
Start: 2021-05-20 | End: 2021-05-20 | Stop reason: HOSPADM

## 2021-05-20 RX ORDER — LIDOCAINE HYDROCHLORIDE 40 MG/ML
INJECTION, SOLUTION RETROBULBAR
Status: DISCONTINUED | OUTPATIENT
Start: 2021-05-20 | End: 2021-05-20 | Stop reason: HOSPADM

## 2021-05-20 RX ORDER — MOXIFLOXACIN 5 MG/ML
SOLUTION/ DROPS OPHTHALMIC
Status: DISCONTINUED | OUTPATIENT
Start: 2021-05-20 | End: 2021-05-20 | Stop reason: HOSPADM

## 2021-05-20 RX ORDER — LIDOCAINE HYDROCHLORIDE 10 MG/ML
INJECTION, SOLUTION EPIDURAL; INFILTRATION; INTRACAUDAL; PERINEURAL
Status: DISCONTINUED | OUTPATIENT
Start: 2021-05-20 | End: 2021-05-20 | Stop reason: HOSPADM

## 2021-05-20 RX ORDER — LIDOCAINE HYDROCHLORIDE 10 MG/ML
INJECTION, SOLUTION EPIDURAL; INFILTRATION; INTRACAUDAL; PERINEURAL
Status: DISCONTINUED
Start: 2021-05-20 | End: 2021-05-20 | Stop reason: HOSPADM

## 2021-05-20 RX ORDER — PROPOFOL 10 MG/ML
VIAL (ML) INTRAVENOUS
Status: DISCONTINUED | OUTPATIENT
Start: 2021-05-20 | End: 2021-05-20

## 2021-05-20 RX ORDER — MOXIFLOXACIN 5 MG/ML
SOLUTION/ DROPS OPHTHALMIC
Status: DISCONTINUED
Start: 2021-05-20 | End: 2021-05-20 | Stop reason: HOSPADM

## 2021-05-20 RX ORDER — SODIUM CHLORIDE 9 MG/ML
INJECTION, SOLUTION INTRAVENOUS CONTINUOUS
Status: DISCONTINUED | OUTPATIENT
Start: 2021-05-20 | End: 2021-05-20 | Stop reason: HOSPADM

## 2021-05-20 RX ORDER — PHENYLEPHRINE HYDROCHLORIDE 10 MG/ML
INJECTION INTRAVENOUS
Status: DISCONTINUED | OUTPATIENT
Start: 2021-05-20 | End: 2021-05-20

## 2021-05-20 RX ORDER — PREDNISOLONE ACETATE 10 MG/ML
SUSPENSION/ DROPS OPHTHALMIC
Status: DISCONTINUED | OUTPATIENT
Start: 2021-05-20 | End: 2021-05-20 | Stop reason: HOSPADM

## 2021-05-20 RX ADMIN — FENTANYL CITRATE 100 MCG: 50 INJECTION, SOLUTION INTRAMUSCULAR; INTRAVENOUS at 08:05

## 2021-05-20 RX ADMIN — ACETAMINOPHEN 650 MG: 325 TABLET ORAL at 10:05

## 2021-05-20 RX ADMIN — TROPICAMIDE 1 DROP: 10 SOLUTION/ DROPS OPHTHALMIC at 07:05

## 2021-05-20 RX ADMIN — PHENYLEPHRINE HYDROCHLORIDE 1 DROP: 25 SOLUTION/ DROPS OPHTHALMIC at 07:05

## 2021-05-20 RX ADMIN — CYCLOPENTOLATE HYDROCHLORIDE 1 DROP: 10 SOLUTION/ DROPS OPHTHALMIC at 07:05

## 2021-05-20 RX ADMIN — LIDOCAINE HYDROCHLORIDE 100 MG: 20 INJECTION, SOLUTION INTRAVENOUS at 08:05

## 2021-05-20 RX ADMIN — PHENYLEPHRINE HYDROCHLORIDE 100 MCG: 10 INJECTION INTRAVENOUS at 08:05

## 2021-05-20 RX ADMIN — PROPOFOL 150 MG: 10 INJECTION, EMULSION INTRAVENOUS at 08:05

## 2021-05-20 RX ADMIN — SODIUM CHLORIDE: 9 INJECTION, SOLUTION INTRAVENOUS at 07:05

## 2021-05-20 RX ADMIN — PHENYLEPHRINE HYDROCHLORIDE 200 MCG: 10 INJECTION INTRAVENOUS at 08:05

## 2021-05-20 RX ADMIN — VASOPRESSIN 1 UNITS: 20 INJECTION, SOLUTION INTRAMUSCULAR; SUBCUTANEOUS at 08:05

## 2021-05-20 RX ADMIN — GLYCOPYRROLATE 0.2 MG: 0.2 INJECTION, SOLUTION INTRAMUSCULAR; INTRAVITREAL at 08:05

## 2021-05-21 ENCOUNTER — OFFICE VISIT (OUTPATIENT)
Dept: OPHTHALMOLOGY | Facility: CLINIC | Age: 76
End: 2021-05-21
Payer: MEDICARE

## 2021-05-21 DIAGNOSIS — Z96.1 STATUS POST CATARACT EXTRACTION AND INSERTION OF INTRAOCULAR LENS, UNSPECIFIED LATERALITY: Primary | ICD-10-CM

## 2021-05-21 DIAGNOSIS — H40.1132 PRIMARY OPEN ANGLE GLAUCOMA OF BOTH EYES, MODERATE STAGE: ICD-10-CM

## 2021-05-21 DIAGNOSIS — Z98.49 STATUS POST CATARACT EXTRACTION AND INSERTION OF INTRAOCULAR LENS, UNSPECIFIED LATERALITY: Primary | ICD-10-CM

## 2021-05-21 PROCEDURE — 99499 RISK ADDL DX/OHS AUDIT: ICD-10-PCS | Mod: S$GLB,,, | Performed by: OPHTHALMOLOGY

## 2021-05-21 PROCEDURE — 99499 UNLISTED E&M SERVICE: CPT | Mod: S$GLB,,, | Performed by: OPHTHALMOLOGY

## 2021-05-21 PROCEDURE — 99024 PR POST-OP FOLLOW-UP VISIT: ICD-10-PCS | Mod: S$GLB,,, | Performed by: OPHTHALMOLOGY

## 2021-05-21 PROCEDURE — 99024 POSTOP FOLLOW-UP VISIT: CPT | Mod: S$GLB,,, | Performed by: OPHTHALMOLOGY

## 2021-05-21 PROCEDURE — 99999 PR PBB SHADOW E&M-EST. PATIENT-LVL II: ICD-10-PCS | Mod: PBBFAC,,, | Performed by: OPHTHALMOLOGY

## 2021-05-21 PROCEDURE — 99999 PR PBB SHADOW E&M-EST. PATIENT-LVL II: CPT | Mod: PBBFAC,,, | Performed by: OPHTHALMOLOGY

## 2021-05-26 ENCOUNTER — PATIENT MESSAGE (OUTPATIENT)
Dept: OPHTHALMOLOGY | Facility: CLINIC | Age: 76
End: 2021-05-26

## 2021-05-27 ENCOUNTER — CLINICAL SUPPORT (OUTPATIENT)
Dept: REHABILITATION | Facility: HOSPITAL | Age: 76
End: 2021-05-27
Payer: MEDICARE

## 2021-05-27 DIAGNOSIS — M25.532 ACUTE PAIN OF LEFT WRIST: ICD-10-CM

## 2021-05-27 PROCEDURE — 97530 THERAPEUTIC ACTIVITIES: CPT | Mod: PO

## 2021-05-27 PROCEDURE — 97022 WHIRLPOOL THERAPY: CPT | Mod: PO

## 2021-05-27 RX ORDER — TADALAFIL 20 MG/1
20 TABLET ORAL DAILY PRN
Qty: 30 TABLET | Refills: 3 | Status: SHIPPED | OUTPATIENT
Start: 2021-05-27 | End: 2021-08-11 | Stop reason: SDUPTHER

## 2021-05-28 ENCOUNTER — OFFICE VISIT (OUTPATIENT)
Dept: OPHTHALMOLOGY | Facility: CLINIC | Age: 76
End: 2021-05-28
Payer: MEDICARE

## 2021-05-28 DIAGNOSIS — Z96.1 STATUS POST CATARACT EXTRACTION AND INSERTION OF INTRAOCULAR LENS, UNSPECIFIED LATERALITY: Primary | ICD-10-CM

## 2021-05-28 DIAGNOSIS — H40.039 ANATOMICAL NARROW ANGLE: ICD-10-CM

## 2021-05-28 DIAGNOSIS — H40.1132 PRIMARY OPEN ANGLE GLAUCOMA OF BOTH EYES, MODERATE STAGE: Primary | ICD-10-CM

## 2021-05-28 DIAGNOSIS — H40.1132 PRIMARY OPEN ANGLE GLAUCOMA OF BOTH EYES, MODERATE STAGE: ICD-10-CM

## 2021-05-28 DIAGNOSIS — Z98.49 STATUS POST CATARACT EXTRACTION AND INSERTION OF INTRAOCULAR LENS, UNSPECIFIED LATERALITY: Primary | ICD-10-CM

## 2021-05-28 PROCEDURE — 1159F MED LIST DOCD IN RCRD: CPT | Mod: S$GLB,,, | Performed by: OPHTHALMOLOGY

## 2021-05-28 PROCEDURE — 1101F PR PT FALLS ASSESS DOC 0-1 FALLS W/OUT INJ PAST YR: ICD-10-PCS | Mod: CPTII,S$GLB,, | Performed by: OPHTHALMOLOGY

## 2021-05-28 PROCEDURE — 1101F PT FALLS ASSESS-DOCD LE1/YR: CPT | Mod: CPTII,S$GLB,, | Performed by: OPHTHALMOLOGY

## 2021-05-28 PROCEDURE — 99024 POSTOP FOLLOW-UP VISIT: CPT | Mod: S$GLB,,, | Performed by: OPHTHALMOLOGY

## 2021-05-28 PROCEDURE — 3288F FALL RISK ASSESSMENT DOCD: CPT | Mod: CPTII,S$GLB,, | Performed by: OPHTHALMOLOGY

## 2021-05-28 PROCEDURE — 1126F PR PAIN SEVERITY QUANTIFIED, NO PAIN PRESENT: ICD-10-PCS | Mod: S$GLB,,, | Performed by: OPHTHALMOLOGY

## 2021-05-28 PROCEDURE — 99999 PR PBB SHADOW E&M-EST. PATIENT-LVL II: CPT | Mod: PBBFAC,,, | Performed by: OPHTHALMOLOGY

## 2021-05-28 PROCEDURE — 99499 UNLISTED E&M SERVICE: CPT | Mod: S$GLB,,, | Performed by: OPHTHALMOLOGY

## 2021-05-28 PROCEDURE — 99024 PR POST-OP FOLLOW-UP VISIT: ICD-10-PCS | Mod: S$GLB,,, | Performed by: OPHTHALMOLOGY

## 2021-05-28 PROCEDURE — 1159F PR MEDICATION LIST DOCUMENTED IN MEDICAL RECORD: ICD-10-PCS | Mod: S$GLB,,, | Performed by: OPHTHALMOLOGY

## 2021-05-28 PROCEDURE — 1126F AMNT PAIN NOTED NONE PRSNT: CPT | Mod: S$GLB,,, | Performed by: OPHTHALMOLOGY

## 2021-05-28 PROCEDURE — 99999 PR PBB SHADOW E&M-EST. PATIENT-LVL II: ICD-10-PCS | Mod: PBBFAC,,, | Performed by: OPHTHALMOLOGY

## 2021-05-28 PROCEDURE — 3288F PR FALLS RISK ASSESSMENT DOCUMENTED: ICD-10-PCS | Mod: CPTII,S$GLB,, | Performed by: OPHTHALMOLOGY

## 2021-05-28 PROCEDURE — 99499 RISK ADDL DX/OHS AUDIT: ICD-10-PCS | Mod: S$GLB,,, | Performed by: OPHTHALMOLOGY

## 2021-05-28 RX ORDER — PREDNISOLONE ACETATE 10 MG/ML
1 SUSPENSION/ DROPS OPHTHALMIC 2 TIMES DAILY
Qty: 10 ML | Refills: 1 | Status: SHIPPED | OUTPATIENT
Start: 2021-05-28 | End: 2022-05-31 | Stop reason: ALTCHOICE

## 2021-05-28 RX ORDER — PREDNISOLONE ACETATE 10 MG/ML
1 SUSPENSION/ DROPS OPHTHALMIC 2 TIMES DAILY
COMMUNITY
End: 2021-05-28 | Stop reason: SDUPTHER

## 2021-06-10 ENCOUNTER — RESEARCH ENCOUNTER (OUTPATIENT)
Dept: HEMATOLOGY/ONCOLOGY | Facility: CLINIC | Age: 76
End: 2021-06-10

## 2021-06-15 ENCOUNTER — LAB VISIT (OUTPATIENT)
Dept: LAB | Facility: OTHER | Age: 76
End: 2021-06-15
Attending: INTERNAL MEDICINE
Payer: MEDICARE

## 2021-06-15 DIAGNOSIS — D47.2 SMOLDERING MULTIPLE MYELOMA: ICD-10-CM

## 2021-06-15 DIAGNOSIS — Z00.6 RESEARCH EXAM: ICD-10-CM

## 2021-06-15 LAB
ALBUMIN SERPL BCP-MCNC: 3.6 G/DL (ref 3.5–5.2)
ALP SERPL-CCNC: 58 U/L (ref 55–135)
ALT SERPL W/O P-5'-P-CCNC: 18 U/L (ref 10–44)
ANION GAP SERPL CALC-SCNC: 11 MMOL/L (ref 8–16)
AST SERPL-CCNC: 19 U/L (ref 10–40)
BASOPHILS # BLD AUTO: 0.04 K/UL (ref 0–0.2)
BASOPHILS NFR BLD: 0.8 % (ref 0–1.9)
BILIRUB SERPL-MCNC: 0.5 MG/DL (ref 0.1–1)
BUN SERPL-MCNC: 18 MG/DL (ref 8–23)
CALCIUM SERPL-MCNC: 9.2 MG/DL (ref 8.7–10.5)
CHLORIDE SERPL-SCNC: 102 MMOL/L (ref 95–110)
CO2 SERPL-SCNC: 23 MMOL/L (ref 23–29)
CREAT SERPL-MCNC: 1.9 MG/DL (ref 0.5–1.4)
DIFFERENTIAL METHOD: ABNORMAL
EOSINOPHIL # BLD AUTO: 0.3 K/UL (ref 0–0.5)
EOSINOPHIL NFR BLD: 5.9 % (ref 0–8)
ERYTHROCYTE [DISTWIDTH] IN BLOOD BY AUTOMATED COUNT: 13.3 % (ref 11.5–14.5)
EST. GFR  (AFRICAN AMERICAN): 39 ML/MIN/1.73 M^2
EST. GFR  (NON AFRICAN AMERICAN): 34 ML/MIN/1.73 M^2
GLUCOSE SERPL-MCNC: 188 MG/DL (ref 70–110)
HCT VFR BLD AUTO: 35.2 % (ref 40–54)
HGB BLD-MCNC: 11.6 G/DL (ref 14–18)
IGA SERPL-MCNC: 1627 MG/DL (ref 40–350)
IGG SERPL-MCNC: 1053 MG/DL (ref 650–1600)
IGM SERPL-MCNC: 47 MG/DL (ref 50–300)
IMM GRANULOCYTES # BLD AUTO: 0.01 K/UL (ref 0–0.04)
IMM GRANULOCYTES NFR BLD AUTO: 0.2 % (ref 0–0.5)
LDH SERPL L TO P-CCNC: 122 U/L (ref 110–260)
LYMPHOCYTES # BLD AUTO: 1.5 K/UL (ref 1–4.8)
LYMPHOCYTES NFR BLD: 30.7 % (ref 18–48)
MAGNESIUM SERPL-MCNC: 1.7 MG/DL (ref 1.6–2.6)
MCH RBC QN AUTO: 29.4 PG (ref 27–31)
MCHC RBC AUTO-ENTMCNC: 33 G/DL (ref 32–36)
MCV RBC AUTO: 89 FL (ref 82–98)
MONOCYTES # BLD AUTO: 0.4 K/UL (ref 0.3–1)
MONOCYTES NFR BLD: 8.5 % (ref 4–15)
NEUTROPHILS # BLD AUTO: 2.6 K/UL (ref 1.8–7.7)
NEUTROPHILS NFR BLD: 53.9 % (ref 38–73)
NRBC BLD-RTO: 0 /100 WBC
PHOSPHATE SERPL-MCNC: 3.4 MG/DL (ref 2.7–4.5)
PLATELET # BLD AUTO: 259 K/UL (ref 150–450)
PMV BLD AUTO: 9.2 FL (ref 9.2–12.9)
POTASSIUM SERPL-SCNC: 4.4 MMOL/L (ref 3.5–5.1)
PROT SERPL-MCNC: 8.5 G/DL (ref 6–8.4)
RBC # BLD AUTO: 3.94 M/UL (ref 4.6–6.2)
SODIUM SERPL-SCNC: 136 MMOL/L (ref 136–145)
WBC # BLD AUTO: 4.73 K/UL (ref 3.9–12.7)

## 2021-06-15 PROCEDURE — 80053 COMPREHEN METABOLIC PANEL: CPT | Mod: Q1 | Performed by: INTERNAL MEDICINE

## 2021-06-15 PROCEDURE — 86334 IMMUNOFIX E-PHORESIS SERUM: CPT | Mod: Q1 | Performed by: INTERNAL MEDICINE

## 2021-06-15 PROCEDURE — 84165 PROTEIN E-PHORESIS SERUM: CPT | Mod: Q1 | Performed by: INTERNAL MEDICINE

## 2021-06-15 PROCEDURE — 82784 ASSAY IGA/IGD/IGG/IGM EACH: CPT | Performed by: INTERNAL MEDICINE

## 2021-06-15 PROCEDURE — 84100 ASSAY OF PHOSPHORUS: CPT | Mod: Q1 | Performed by: INTERNAL MEDICINE

## 2021-06-15 PROCEDURE — 84165 PROTEIN E-PHORESIS SERUM: CPT | Mod: 26,,, | Performed by: PATHOLOGY

## 2021-06-15 PROCEDURE — 85025 COMPLETE CBC W/AUTO DIFF WBC: CPT | Mod: Q1 | Performed by: INTERNAL MEDICINE

## 2021-06-15 PROCEDURE — 86334 IMMUNOFIX E-PHORESIS SERUM: CPT | Mod: 26,,, | Performed by: PATHOLOGY

## 2021-06-15 PROCEDURE — 86334 PATHOLOGIST INTERPRETATION IFE: ICD-10-PCS | Mod: 26,,, | Performed by: PATHOLOGY

## 2021-06-15 PROCEDURE — 83615 LACTATE (LD) (LDH) ENZYME: CPT | Mod: Q1 | Performed by: INTERNAL MEDICINE

## 2021-06-15 PROCEDURE — 83735 ASSAY OF MAGNESIUM: CPT | Performed by: INTERNAL MEDICINE

## 2021-06-15 PROCEDURE — 83520 IMMUNOASSAY QUANT NOS NONAB: CPT | Mod: 59,Q1 | Performed by: INTERNAL MEDICINE

## 2021-06-15 PROCEDURE — 36415 COLL VENOUS BLD VENIPUNCTURE: CPT | Performed by: INTERNAL MEDICINE

## 2021-06-15 PROCEDURE — 84165 PATHOLOGIST INTERPRETATION SPE: ICD-10-PCS | Mod: 26,,, | Performed by: PATHOLOGY

## 2021-06-16 LAB
ALBUMIN SERPL ELPH-MCNC: 3.98 G/DL (ref 3.35–5.55)
ALPHA1 GLOB SERPL ELPH-MCNC: 0.28 G/DL (ref 0.17–0.41)
ALPHA2 GLOB SERPL ELPH-MCNC: 0.85 G/DL (ref 0.43–0.99)
B-GLOBULIN SERPL ELPH-MCNC: 2.61 G/DL (ref 0.5–1.1)
GAMMA GLOB SERPL ELPH-MCNC: 0.57 G/DL (ref 0.67–1.58)
INTERPRETATION SERPL IFE-IMP: NORMAL
KAPPA LC SER QL IA: 9.31 MG/DL (ref 0.33–1.94)
KAPPA LC/LAMBDA SER IA: 4.45 (ref 0.26–1.65)
LAMBDA LC SER QL IA: 2.09 MG/DL (ref 0.57–2.63)
PATHOLOGIST INTERPRETATION IFE: NORMAL
PATHOLOGIST INTERPRETATION SPE: NORMAL
PROT SERPL-MCNC: 8.3 G/DL (ref 6–8.4)

## 2021-06-27 ENCOUNTER — PATIENT MESSAGE (OUTPATIENT)
Dept: INTERNAL MEDICINE | Facility: CLINIC | Age: 76
End: 2021-06-27

## 2021-06-27 ENCOUNTER — PATIENT MESSAGE (OUTPATIENT)
Dept: OPHTHALMOLOGY | Facility: CLINIC | Age: 76
End: 2021-06-27

## 2021-06-29 ENCOUNTER — TELEPHONE (OUTPATIENT)
Dept: SPORTS MEDICINE | Facility: CLINIC | Age: 76
End: 2021-06-29

## 2021-06-29 ENCOUNTER — PATIENT MESSAGE (OUTPATIENT)
Dept: SPORTS MEDICINE | Facility: CLINIC | Age: 76
End: 2021-06-29

## 2021-06-29 ENCOUNTER — PATIENT OUTREACH (OUTPATIENT)
Dept: ADMINISTRATIVE | Facility: OTHER | Age: 76
End: 2021-06-29

## 2021-06-30 ENCOUNTER — HOSPITAL ENCOUNTER (OUTPATIENT)
Dept: RADIOLOGY | Facility: HOSPITAL | Age: 76
Discharge: HOME | End: 2021-06-30
Attending: PHYSICIAN ASSISTANT
Payer: MEDICARE

## 2021-06-30 ENCOUNTER — OFFICE VISIT (OUTPATIENT)
Dept: SPORTS MEDICINE | Facility: CLINIC | Age: 76
End: 2021-06-30
Payer: MEDICARE

## 2021-06-30 VITALS — HEIGHT: 70 IN | BODY MASS INDEX: 27.2 KG/M2 | WEIGHT: 190 LBS

## 2021-06-30 DIAGNOSIS — M17.12 PRIMARY OSTEOARTHRITIS OF LEFT KNEE: Primary | ICD-10-CM

## 2021-06-30 DIAGNOSIS — M25.562 LEFT KNEE PAIN, UNSPECIFIED CHRONICITY: ICD-10-CM

## 2021-06-30 PROCEDURE — 1159F MED LIST DOCD IN RCRD: CPT | Mod: S$GLB,,, | Performed by: PHYSICIAN ASSISTANT

## 2021-06-30 PROCEDURE — 73564 X-RAY EXAM KNEE 4 OR MORE: CPT | Mod: 26,50,, | Performed by: RADIOLOGY

## 2021-06-30 PROCEDURE — 99213 OFFICE O/P EST LOW 20 MIN: CPT | Mod: 25,S$GLB,, | Performed by: PHYSICIAN ASSISTANT

## 2021-06-30 PROCEDURE — 3288F FALL RISK ASSESSMENT DOCD: CPT | Mod: CPTII,S$GLB,, | Performed by: PHYSICIAN ASSISTANT

## 2021-06-30 PROCEDURE — 1125F PR PAIN SEVERITY QUANTIFIED, PAIN PRESENT: ICD-10-PCS | Mod: S$GLB,,, | Performed by: PHYSICIAN ASSISTANT

## 2021-06-30 PROCEDURE — 1125F AMNT PAIN NOTED PAIN PRSNT: CPT | Mod: S$GLB,,, | Performed by: PHYSICIAN ASSISTANT

## 2021-06-30 PROCEDURE — 1101F PR PT FALLS ASSESS DOC 0-1 FALLS W/OUT INJ PAST YR: ICD-10-PCS | Mod: CPTII,S$GLB,, | Performed by: PHYSICIAN ASSISTANT

## 2021-06-30 PROCEDURE — 3008F PR BODY MASS INDEX (BMI) DOCUMENTED: ICD-10-PCS | Mod: CPTII,S$GLB,, | Performed by: PHYSICIAN ASSISTANT

## 2021-06-30 PROCEDURE — 3288F PR FALLS RISK ASSESSMENT DOCUMENTED: ICD-10-PCS | Mod: CPTII,S$GLB,, | Performed by: PHYSICIAN ASSISTANT

## 2021-06-30 PROCEDURE — 3008F BODY MASS INDEX DOCD: CPT | Mod: CPTII,S$GLB,, | Performed by: PHYSICIAN ASSISTANT

## 2021-06-30 PROCEDURE — 99999 PR PBB SHADOW E&M-EST. PATIENT-LVL IV: CPT | Mod: PBBFAC,,, | Performed by: PHYSICIAN ASSISTANT

## 2021-06-30 PROCEDURE — 99213 PR OFFICE/OUTPT VISIT, EST, LEVL III, 20-29 MIN: ICD-10-PCS | Mod: 25,S$GLB,, | Performed by: PHYSICIAN ASSISTANT

## 2021-06-30 PROCEDURE — 20610 DRAIN/INJ JOINT/BURSA W/O US: CPT | Mod: LT,S$GLB,, | Performed by: PHYSICIAN ASSISTANT

## 2021-06-30 PROCEDURE — 3072F PR LOW RISK FOR RETINOPATHY: ICD-10-PCS | Mod: S$GLB,,, | Performed by: PHYSICIAN ASSISTANT

## 2021-06-30 PROCEDURE — 99999 PR PBB SHADOW E&M-EST. PATIENT-LVL IV: ICD-10-PCS | Mod: PBBFAC,,, | Performed by: PHYSICIAN ASSISTANT

## 2021-06-30 PROCEDURE — 73564 XR KNEE ORTHO BILAT WITH FLEXION: ICD-10-PCS | Mod: 26,50,, | Performed by: RADIOLOGY

## 2021-06-30 PROCEDURE — 73564 X-RAY EXAM KNEE 4 OR MORE: CPT | Mod: TC,50

## 2021-06-30 PROCEDURE — 1101F PT FALLS ASSESS-DOCD LE1/YR: CPT | Mod: CPTII,S$GLB,, | Performed by: PHYSICIAN ASSISTANT

## 2021-06-30 PROCEDURE — 1159F PR MEDICATION LIST DOCUMENTED IN MEDICAL RECORD: ICD-10-PCS | Mod: S$GLB,,, | Performed by: PHYSICIAN ASSISTANT

## 2021-06-30 PROCEDURE — 3072F LOW RISK FOR RETINOPATHY: CPT | Mod: S$GLB,,, | Performed by: PHYSICIAN ASSISTANT

## 2021-06-30 PROCEDURE — 20610 PR DRAIN/INJECT LARGE JOINT/BURSA: ICD-10-PCS | Mod: LT,S$GLB,, | Performed by: PHYSICIAN ASSISTANT

## 2021-06-30 RX ORDER — TRIAMCINOLONE ACETONIDE 40 MG/ML
40 INJECTION, SUSPENSION INTRA-ARTICULAR; INTRAMUSCULAR
Status: COMPLETED | OUTPATIENT
Start: 2021-06-30 | End: 2021-06-30

## 2021-06-30 RX ADMIN — TRIAMCINOLONE ACETONIDE 40 MG: 40 INJECTION, SUSPENSION INTRA-ARTICULAR; INTRAMUSCULAR at 05:06

## 2021-07-02 ENCOUNTER — PATIENT MESSAGE (OUTPATIENT)
Dept: INTERNAL MEDICINE | Facility: CLINIC | Age: 76
End: 2021-07-02

## 2021-07-03 ENCOUNTER — PATIENT MESSAGE (OUTPATIENT)
Dept: INTERNAL MEDICINE | Facility: CLINIC | Age: 76
End: 2021-07-03

## 2021-07-03 DIAGNOSIS — E11.42 TYPE 2 DIABETES MELLITUS WITH DIABETIC POLYNEUROPATHY, WITH LONG-TERM CURRENT USE OF INSULIN: Chronic | ICD-10-CM

## 2021-07-03 DIAGNOSIS — Z79.4 TYPE 2 DIABETES MELLITUS WITH DIABETIC POLYNEUROPATHY, WITH LONG-TERM CURRENT USE OF INSULIN: Chronic | ICD-10-CM

## 2021-07-04 RX ORDER — INSULIN GLARGINE 100 [IU]/ML
INJECTION, SOLUTION SUBCUTANEOUS
Qty: 1 BOX | Refills: 4 | Status: SHIPPED | OUTPATIENT
Start: 2021-07-04 | End: 2021-11-17 | Stop reason: SDUPTHER

## 2021-07-05 ENCOUNTER — PATIENT MESSAGE (OUTPATIENT)
Dept: INTERNAL MEDICINE | Facility: CLINIC | Age: 76
End: 2021-07-05

## 2021-07-06 ENCOUNTER — PATIENT MESSAGE (OUTPATIENT)
Dept: ADMINISTRATIVE | Facility: HOSPITAL | Age: 76
End: 2021-07-06

## 2021-07-08 ENCOUNTER — RESEARCH ENCOUNTER (OUTPATIENT)
Dept: HEMATOLOGY/ONCOLOGY | Facility: CLINIC | Age: 76
End: 2021-07-08

## 2021-07-08 ENCOUNTER — TELEPHONE (OUTPATIENT)
Dept: SLEEP MEDICINE | Facility: CLINIC | Age: 76
End: 2021-07-08

## 2021-07-08 ENCOUNTER — LAB VISIT (OUTPATIENT)
Dept: LAB | Facility: HOSPITAL | Age: 76
End: 2021-07-08
Attending: INTERNAL MEDICINE
Payer: MEDICARE

## 2021-07-08 ENCOUNTER — PATIENT MESSAGE (OUTPATIENT)
Dept: OPHTHALMOLOGY | Facility: CLINIC | Age: 76
End: 2021-07-08

## 2021-07-08 ENCOUNTER — OFFICE VISIT (OUTPATIENT)
Dept: HEMATOLOGY/ONCOLOGY | Facility: CLINIC | Age: 76
End: 2021-07-08
Payer: MEDICARE

## 2021-07-08 VITALS
HEIGHT: 70 IN | SYSTOLIC BLOOD PRESSURE: 137 MMHG | RESPIRATION RATE: 17 BRPM | DIASTOLIC BLOOD PRESSURE: 69 MMHG | WEIGHT: 196.19 LBS | TEMPERATURE: 97 F | HEART RATE: 90 BPM | OXYGEN SATURATION: 98 % | BODY MASS INDEX: 28.09 KG/M2

## 2021-07-08 DIAGNOSIS — Z79.4 TYPE 2 DIABETES MELLITUS WITH DIABETIC POLYNEUROPATHY, WITH LONG-TERM CURRENT USE OF INSULIN: ICD-10-CM

## 2021-07-08 DIAGNOSIS — D47.2 SMOLDERING MULTIPLE MYELOMA: ICD-10-CM

## 2021-07-08 DIAGNOSIS — E11.42 TYPE 2 DIABETES MELLITUS WITH DIABETIC POLYNEUROPATHY, WITH LONG-TERM CURRENT USE OF INSULIN: ICD-10-CM

## 2021-07-08 DIAGNOSIS — N18.30 CKD STAGE 3 DUE TO TYPE 2 DIABETES MELLITUS: ICD-10-CM

## 2021-07-08 DIAGNOSIS — Z00.6 RESEARCH EXAM: ICD-10-CM

## 2021-07-08 DIAGNOSIS — E11.22 CKD STAGE 3 DUE TO TYPE 2 DIABETES MELLITUS: ICD-10-CM

## 2021-07-08 DIAGNOSIS — D47.2 SMOLDERING MULTIPLE MYELOMA: Primary | ICD-10-CM

## 2021-07-08 LAB
ALBUMIN SERPL BCP-MCNC: 3.5 G/DL (ref 3.5–5.2)
ALP SERPL-CCNC: 51 U/L (ref 55–135)
ALT SERPL W/O P-5'-P-CCNC: 21 U/L (ref 10–44)
ANION GAP SERPL CALC-SCNC: 11 MMOL/L (ref 8–16)
AST SERPL-CCNC: 17 U/L (ref 10–40)
BASOPHILS # BLD AUTO: 0.04 K/UL (ref 0–0.2)
BASOPHILS NFR BLD: 0.8 % (ref 0–1.9)
BILIRUB SERPL-MCNC: 0.4 MG/DL (ref 0.1–1)
BUN SERPL-MCNC: 17 MG/DL (ref 8–23)
CALCIUM SERPL-MCNC: 10 MG/DL (ref 8.7–10.5)
CHLORIDE SERPL-SCNC: 104 MMOL/L (ref 95–110)
CO2 SERPL-SCNC: 23 MMOL/L (ref 23–29)
CREAT SERPL-MCNC: 1.6 MG/DL (ref 0.5–1.4)
DIFFERENTIAL METHOD: ABNORMAL
EOSINOPHIL # BLD AUTO: 0.2 K/UL (ref 0–0.5)
EOSINOPHIL NFR BLD: 4 % (ref 0–8)
ERYTHROCYTE [DISTWIDTH] IN BLOOD BY AUTOMATED COUNT: 14.2 % (ref 11.5–14.5)
EST. GFR  (AFRICAN AMERICAN): 48.3 ML/MIN/1.73 M^2
EST. GFR  (NON AFRICAN AMERICAN): 41.8 ML/MIN/1.73 M^2
GLUCOSE SERPL-MCNC: 128 MG/DL (ref 70–110)
HCT VFR BLD AUTO: 33.3 % (ref 40–54)
HGB BLD-MCNC: 10.6 G/DL (ref 14–18)
IGA SERPL-MCNC: 1511 MG/DL (ref 40–350)
IGG SERPL-MCNC: 977 MG/DL (ref 650–1600)
IGM SERPL-MCNC: 45 MG/DL (ref 50–300)
IMM GRANULOCYTES # BLD AUTO: 0.01 K/UL (ref 0–0.04)
IMM GRANULOCYTES NFR BLD AUTO: 0.2 % (ref 0–0.5)
LDH SERPL L TO P-CCNC: 103 U/L (ref 110–260)
LYMPHOCYTES # BLD AUTO: 1.8 K/UL (ref 1–4.8)
LYMPHOCYTES NFR BLD: 33.6 % (ref 18–48)
MAGNESIUM SERPL-MCNC: 1.6 MG/DL (ref 1.6–2.6)
MCH RBC QN AUTO: 28.2 PG (ref 27–31)
MCHC RBC AUTO-ENTMCNC: 31.8 G/DL (ref 32–36)
MCV RBC AUTO: 89 FL (ref 82–98)
MONOCYTES # BLD AUTO: 0.4 K/UL (ref 0.3–1)
MONOCYTES NFR BLD: 6.6 % (ref 4–15)
NEUTROPHILS # BLD AUTO: 2.9 K/UL (ref 1.8–7.7)
NEUTROPHILS NFR BLD: 54.8 % (ref 38–73)
NRBC BLD-RTO: 0 /100 WBC
PHOSPHATE SERPL-MCNC: 3 MG/DL (ref 2.7–4.5)
PLATELET # BLD AUTO: 275 K/UL (ref 150–450)
PMV BLD AUTO: 9.2 FL (ref 9.2–12.9)
POTASSIUM SERPL-SCNC: 4.3 MMOL/L (ref 3.5–5.1)
PROT SERPL-MCNC: 8 G/DL (ref 6–8.4)
RBC # BLD AUTO: 3.76 M/UL (ref 4.6–6.2)
SODIUM SERPL-SCNC: 138 MMOL/L (ref 136–145)
WBC # BLD AUTO: 5.29 K/UL (ref 3.9–12.7)

## 2021-07-08 PROCEDURE — 3288F FALL RISK ASSESSMENT DOCD: CPT | Mod: CPTII,S$GLB,, | Performed by: INTERNAL MEDICINE

## 2021-07-08 PROCEDURE — 83735 ASSAY OF MAGNESIUM: CPT | Performed by: INTERNAL MEDICINE

## 2021-07-08 PROCEDURE — 1126F AMNT PAIN NOTED NONE PRSNT: CPT | Mod: S$GLB,,, | Performed by: INTERNAL MEDICINE

## 2021-07-08 PROCEDURE — 3008F BODY MASS INDEX DOCD: CPT | Mod: CPTII,S$GLB,, | Performed by: INTERNAL MEDICINE

## 2021-07-08 PROCEDURE — 1101F PR PT FALLS ASSESS DOC 0-1 FALLS W/OUT INJ PAST YR: ICD-10-PCS | Mod: CPTII,S$GLB,, | Performed by: INTERNAL MEDICINE

## 2021-07-08 PROCEDURE — 99215 OFFICE O/P EST HI 40 MIN: CPT | Mod: S$GLB,,, | Performed by: INTERNAL MEDICINE

## 2021-07-08 PROCEDURE — 83520 IMMUNOASSAY QUANT NOS NONAB: CPT | Mod: Q1 | Performed by: INTERNAL MEDICINE

## 2021-07-08 PROCEDURE — 36415 COLL VENOUS BLD VENIPUNCTURE: CPT | Performed by: INTERNAL MEDICINE

## 2021-07-08 PROCEDURE — 84165 PATHOLOGIST INTERPRETATION SPE: ICD-10-PCS | Mod: 26,,, | Performed by: PATHOLOGY

## 2021-07-08 PROCEDURE — 1126F PR PAIN SEVERITY QUANTIFIED, NO PAIN PRESENT: ICD-10-PCS | Mod: S$GLB,,, | Performed by: INTERNAL MEDICINE

## 2021-07-08 PROCEDURE — 99999 PR PBB SHADOW E&M-EST. PATIENT-LVL III: CPT | Mod: PBBFAC,,, | Performed by: INTERNAL MEDICINE

## 2021-07-08 PROCEDURE — 1101F PT FALLS ASSESS-DOCD LE1/YR: CPT | Mod: CPTII,S$GLB,, | Performed by: INTERNAL MEDICINE

## 2021-07-08 PROCEDURE — 84165 PROTEIN E-PHORESIS SERUM: CPT | Mod: 26,,, | Performed by: PATHOLOGY

## 2021-07-08 PROCEDURE — 3051F HG A1C>EQUAL 7.0%<8.0%: CPT | Mod: CPTII,S$GLB,, | Performed by: INTERNAL MEDICINE

## 2021-07-08 PROCEDURE — 86334 IMMUNOFIX E-PHORESIS SERUM: CPT | Mod: 26,,, | Performed by: PATHOLOGY

## 2021-07-08 PROCEDURE — 86334 PATHOLOGIST INTERPRETATION IFE: ICD-10-PCS | Mod: 26,,, | Performed by: PATHOLOGY

## 2021-07-08 PROCEDURE — 85025 COMPLETE CBC W/AUTO DIFF WBC: CPT | Mod: Q1 | Performed by: INTERNAL MEDICINE

## 2021-07-08 PROCEDURE — 99215 PR OFFICE/OUTPT VISIT, EST, LEVL V, 40-54 MIN: ICD-10-PCS | Mod: S$GLB,,, | Performed by: INTERNAL MEDICINE

## 2021-07-08 PROCEDURE — 84100 ASSAY OF PHOSPHORUS: CPT | Mod: Q1 | Performed by: INTERNAL MEDICINE

## 2021-07-08 PROCEDURE — 80053 COMPREHEN METABOLIC PANEL: CPT | Performed by: INTERNAL MEDICINE

## 2021-07-08 PROCEDURE — 3072F LOW RISK FOR RETINOPATHY: CPT | Mod: S$GLB,,, | Performed by: INTERNAL MEDICINE

## 2021-07-08 PROCEDURE — 86334 IMMUNOFIX E-PHORESIS SERUM: CPT | Mod: Q1 | Performed by: INTERNAL MEDICINE

## 2021-07-08 PROCEDURE — 3008F PR BODY MASS INDEX (BMI) DOCUMENTED: ICD-10-PCS | Mod: CPTII,S$GLB,, | Performed by: INTERNAL MEDICINE

## 2021-07-08 PROCEDURE — 3072F PR LOW RISK FOR RETINOPATHY: ICD-10-PCS | Mod: S$GLB,,, | Performed by: INTERNAL MEDICINE

## 2021-07-08 PROCEDURE — 3051F PR MOST RECENT HEMOGLOBIN A1C LEVEL 7.0 - < 8.0%: ICD-10-PCS | Mod: CPTII,S$GLB,, | Performed by: INTERNAL MEDICINE

## 2021-07-08 PROCEDURE — 3288F PR FALLS RISK ASSESSMENT DOCUMENTED: ICD-10-PCS | Mod: CPTII,S$GLB,, | Performed by: INTERNAL MEDICINE

## 2021-07-08 PROCEDURE — 82784 ASSAY IGA/IGD/IGG/IGM EACH: CPT | Mod: 59 | Performed by: INTERNAL MEDICINE

## 2021-07-08 PROCEDURE — 84165 PROTEIN E-PHORESIS SERUM: CPT | Performed by: INTERNAL MEDICINE

## 2021-07-08 PROCEDURE — 1159F MED LIST DOCD IN RCRD: CPT | Mod: S$GLB,,, | Performed by: INTERNAL MEDICINE

## 2021-07-08 PROCEDURE — 83615 LACTATE (LD) (LDH) ENZYME: CPT | Performed by: INTERNAL MEDICINE

## 2021-07-08 PROCEDURE — 1159F PR MEDICATION LIST DOCUMENTED IN MEDICAL RECORD: ICD-10-PCS | Mod: S$GLB,,, | Performed by: INTERNAL MEDICINE

## 2021-07-08 PROCEDURE — 99999 PR PBB SHADOW E&M-EST. PATIENT-LVL III: ICD-10-PCS | Mod: PBBFAC,,, | Performed by: INTERNAL MEDICINE

## 2021-07-09 ENCOUNTER — TELEPHONE (OUTPATIENT)
Dept: NEPHROLOGY | Facility: CLINIC | Age: 76
End: 2021-07-09

## 2021-07-09 ENCOUNTER — OFFICE VISIT (OUTPATIENT)
Dept: OPHTHALMOLOGY | Facility: CLINIC | Age: 76
End: 2021-07-09
Payer: MEDICARE

## 2021-07-09 DIAGNOSIS — H40.1132 PRIMARY OPEN ANGLE GLAUCOMA OF BOTH EYES, MODERATE STAGE: ICD-10-CM

## 2021-07-09 DIAGNOSIS — Z96.1 STATUS POST CATARACT EXTRACTION AND INSERTION OF INTRAOCULAR LENS, UNSPECIFIED LATERALITY: Primary | ICD-10-CM

## 2021-07-09 DIAGNOSIS — N18.30 STAGE 3 CHRONIC KIDNEY DISEASE, UNSPECIFIED WHETHER STAGE 3A OR 3B CKD: Primary | ICD-10-CM

## 2021-07-09 DIAGNOSIS — Z98.49 STATUS POST CATARACT EXTRACTION AND INSERTION OF INTRAOCULAR LENS, UNSPECIFIED LATERALITY: Primary | ICD-10-CM

## 2021-07-09 LAB
ALBUMIN SERPL ELPH-MCNC: 3.73 G/DL (ref 3.35–5.55)
ALPHA1 GLOB SERPL ELPH-MCNC: 0.29 G/DL (ref 0.17–0.41)
ALPHA2 GLOB SERPL ELPH-MCNC: 0.87 G/DL (ref 0.43–0.99)
B-GLOBULIN SERPL ELPH-MCNC: 2.38 G/DL (ref 0.5–1.1)
GAMMA GLOB SERPL ELPH-MCNC: 0.54 G/DL (ref 0.67–1.58)
INTERPRETATION SERPL IFE-IMP: NORMAL
KAPPA LC SER QL IA: 6 MG/DL (ref 0.33–1.94)
KAPPA LC/LAMBDA SER IA: 3.39 (ref 0.26–1.65)
LAMBDA LC SER QL IA: 1.77 MG/DL (ref 0.57–2.63)
PATHOLOGIST INTERPRETATION IFE: NORMAL
PATHOLOGIST INTERPRETATION SPE: NORMAL
PROT SERPL-MCNC: 7.8 G/DL (ref 6–8.4)

## 2021-07-09 PROCEDURE — 99999 PR PBB SHADOW E&M-EST. PATIENT-LVL II: CPT | Mod: PBBFAC,,, | Performed by: OPHTHALMOLOGY

## 2021-07-09 PROCEDURE — 99999 PR PBB SHADOW E&M-EST. PATIENT-LVL II: ICD-10-PCS | Mod: PBBFAC,,, | Performed by: OPHTHALMOLOGY

## 2021-07-09 PROCEDURE — 3288F PR FALLS RISK ASSESSMENT DOCUMENTED: ICD-10-PCS | Mod: CPTII,S$GLB,, | Performed by: OPHTHALMOLOGY

## 2021-07-09 PROCEDURE — 1126F AMNT PAIN NOTED NONE PRSNT: CPT | Mod: S$GLB,,, | Performed by: OPHTHALMOLOGY

## 2021-07-09 PROCEDURE — 99024 PR POST-OP FOLLOW-UP VISIT: ICD-10-PCS | Mod: S$GLB,,, | Performed by: OPHTHALMOLOGY

## 2021-07-09 PROCEDURE — 3288F FALL RISK ASSESSMENT DOCD: CPT | Mod: CPTII,S$GLB,, | Performed by: OPHTHALMOLOGY

## 2021-07-09 PROCEDURE — 1101F PR PT FALLS ASSESS DOC 0-1 FALLS W/OUT INJ PAST YR: ICD-10-PCS | Mod: CPTII,S$GLB,, | Performed by: OPHTHALMOLOGY

## 2021-07-09 PROCEDURE — 99024 POSTOP FOLLOW-UP VISIT: CPT | Mod: S$GLB,,, | Performed by: OPHTHALMOLOGY

## 2021-07-09 PROCEDURE — 1126F PR PAIN SEVERITY QUANTIFIED, NO PAIN PRESENT: ICD-10-PCS | Mod: S$GLB,,, | Performed by: OPHTHALMOLOGY

## 2021-07-09 PROCEDURE — 1101F PT FALLS ASSESS-DOCD LE1/YR: CPT | Mod: CPTII,S$GLB,, | Performed by: OPHTHALMOLOGY

## 2021-07-12 ENCOUNTER — PATIENT OUTREACH (OUTPATIENT)
Dept: ADMINISTRATIVE | Facility: HOSPITAL | Age: 76
End: 2021-07-12

## 2021-07-26 ENCOUNTER — LAB VISIT (OUTPATIENT)
Dept: LAB | Facility: OTHER | Age: 76
End: 2021-07-26
Attending: INTERNAL MEDICINE
Payer: MEDICARE

## 2021-07-26 DIAGNOSIS — N18.30 STAGE 3 CHRONIC KIDNEY DISEASE, UNSPECIFIED WHETHER STAGE 3A OR 3B CKD: ICD-10-CM

## 2021-07-26 LAB
ANION GAP SERPL CALC-SCNC: 11 MMOL/L (ref 8–16)
BASOPHILS # BLD AUTO: 0.04 K/UL (ref 0–0.2)
BASOPHILS NFR BLD: 1 % (ref 0–1.9)
BUN SERPL-MCNC: 20 MG/DL (ref 8–23)
CALCIUM SERPL-MCNC: 10.1 MG/DL (ref 8.7–10.5)
CHLORIDE SERPL-SCNC: 104 MMOL/L (ref 95–110)
CO2 SERPL-SCNC: 22 MMOL/L (ref 23–29)
CREAT SERPL-MCNC: 1.9 MG/DL (ref 0.5–1.4)
DIFFERENTIAL METHOD: ABNORMAL
EOSINOPHIL # BLD AUTO: 0.2 K/UL (ref 0–0.5)
EOSINOPHIL NFR BLD: 5.5 % (ref 0–8)
ERYTHROCYTE [DISTWIDTH] IN BLOOD BY AUTOMATED COUNT: 14.2 % (ref 11.5–14.5)
EST. GFR  (AFRICAN AMERICAN): 39 ML/MIN/1.73 M^2
EST. GFR  (NON AFRICAN AMERICAN): 34 ML/MIN/1.73 M^2
GLUCOSE SERPL-MCNC: 125 MG/DL (ref 70–110)
HCT VFR BLD AUTO: 33 % (ref 40–54)
HGB BLD-MCNC: 10.5 G/DL (ref 14–18)
IMM GRANULOCYTES # BLD AUTO: 0.01 K/UL (ref 0–0.04)
IMM GRANULOCYTES NFR BLD AUTO: 0.3 % (ref 0–0.5)
LYMPHOCYTES # BLD AUTO: 1.4 K/UL (ref 1–4.8)
LYMPHOCYTES NFR BLD: 35.6 % (ref 18–48)
MCH RBC QN AUTO: 28.8 PG (ref 27–31)
MCHC RBC AUTO-ENTMCNC: 31.8 G/DL (ref 32–36)
MCV RBC AUTO: 90 FL (ref 82–98)
MONOCYTES # BLD AUTO: 0.3 K/UL (ref 0.3–1)
MONOCYTES NFR BLD: 8.5 % (ref 4–15)
NEUTROPHILS # BLD AUTO: 2 K/UL (ref 1.8–7.7)
NEUTROPHILS NFR BLD: 49.1 % (ref 38–73)
NRBC BLD-RTO: 0 /100 WBC
PLATELET # BLD AUTO: 248 K/UL (ref 150–450)
PMV BLD AUTO: 9.2 FL (ref 9.2–12.9)
POTASSIUM SERPL-SCNC: 4.3 MMOL/L (ref 3.5–5.1)
RBC # BLD AUTO: 3.65 M/UL (ref 4.6–6.2)
SODIUM SERPL-SCNC: 137 MMOL/L (ref 136–145)
WBC # BLD AUTO: 3.99 K/UL (ref 3.9–12.7)

## 2021-07-26 PROCEDURE — 80048 BASIC METABOLIC PNL TOTAL CA: CPT | Performed by: INTERNAL MEDICINE

## 2021-07-26 PROCEDURE — 85025 COMPLETE CBC W/AUTO DIFF WBC: CPT | Performed by: INTERNAL MEDICINE

## 2021-07-26 PROCEDURE — 36415 COLL VENOUS BLD VENIPUNCTURE: CPT | Performed by: INTERNAL MEDICINE

## 2021-07-27 ENCOUNTER — PATIENT MESSAGE (OUTPATIENT)
Dept: HEMATOLOGY/ONCOLOGY | Facility: CLINIC | Age: 76
End: 2021-07-27

## 2021-07-29 ENCOUNTER — OFFICE VISIT (OUTPATIENT)
Dept: NEPHROLOGY | Facility: CLINIC | Age: 76
End: 2021-07-29
Payer: MEDICARE

## 2021-07-29 VITALS
HEART RATE: 98 BPM | HEIGHT: 70 IN | BODY MASS INDEX: 27.87 KG/M2 | WEIGHT: 194.69 LBS | SYSTOLIC BLOOD PRESSURE: 100 MMHG | OXYGEN SATURATION: 98 % | DIASTOLIC BLOOD PRESSURE: 60 MMHG

## 2021-07-29 DIAGNOSIS — E55.9 VITAMIN D DEFICIENCY: ICD-10-CM

## 2021-07-29 DIAGNOSIS — I10 ESSENTIAL HYPERTENSION: ICD-10-CM

## 2021-07-29 DIAGNOSIS — N18.32 STAGE 3B CHRONIC KIDNEY DISEASE: Primary | Chronic | ICD-10-CM

## 2021-07-29 PROCEDURE — 99999 PR PBB SHADOW E&M-EST. PATIENT-LVL III: CPT | Mod: PBBFAC,GC,, | Performed by: INTERNAL MEDICINE

## 2021-07-29 PROCEDURE — 99999 PR PBB SHADOW E&M-EST. PATIENT-LVL III: ICD-10-PCS | Mod: PBBFAC,GC,, | Performed by: INTERNAL MEDICINE

## 2021-07-29 RX ORDER — DAPAGLIFLOZIN 5 MG/1
5 TABLET, FILM COATED ORAL DAILY
Qty: 30 TABLET | Refills: 2 | Status: SHIPPED | OUTPATIENT
Start: 2021-07-29 | End: 2021-10-27

## 2021-08-05 ENCOUNTER — OFFICE VISIT (OUTPATIENT)
Dept: HEMATOLOGY/ONCOLOGY | Facility: CLINIC | Age: 76
End: 2021-08-05
Payer: MEDICARE

## 2021-08-05 ENCOUNTER — LAB VISIT (OUTPATIENT)
Dept: LAB | Facility: HOSPITAL | Age: 76
End: 2021-08-05
Attending: INTERNAL MEDICINE
Payer: MEDICARE

## 2021-08-05 ENCOUNTER — RESEARCH ENCOUNTER (OUTPATIENT)
Dept: HEMATOLOGY/ONCOLOGY | Facility: CLINIC | Age: 76
End: 2021-08-05

## 2021-08-05 VITALS
OXYGEN SATURATION: 100 % | DIASTOLIC BLOOD PRESSURE: 74 MMHG | BODY MASS INDEX: 28.41 KG/M2 | HEIGHT: 70 IN | HEART RATE: 88 BPM | RESPIRATION RATE: 16 BRPM | WEIGHT: 198.44 LBS | SYSTOLIC BLOOD PRESSURE: 154 MMHG | TEMPERATURE: 98 F

## 2021-08-05 DIAGNOSIS — E11.22 CKD STAGE 3 DUE TO TYPE 2 DIABETES MELLITUS: ICD-10-CM

## 2021-08-05 DIAGNOSIS — Z00.6 RESEARCH EXAM: ICD-10-CM

## 2021-08-05 DIAGNOSIS — D47.2 SMOLDERING MULTIPLE MYELOMA: ICD-10-CM

## 2021-08-05 DIAGNOSIS — N18.30 CKD STAGE 3 DUE TO TYPE 2 DIABETES MELLITUS: ICD-10-CM

## 2021-08-05 DIAGNOSIS — Z79.4 TYPE 2 DIABETES MELLITUS WITH DIABETIC POLYNEUROPATHY, WITH LONG-TERM CURRENT USE OF INSULIN: ICD-10-CM

## 2021-08-05 DIAGNOSIS — E11.42 TYPE 2 DIABETES MELLITUS WITH DIABETIC POLYNEUROPATHY, WITH LONG-TERM CURRENT USE OF INSULIN: ICD-10-CM

## 2021-08-05 DIAGNOSIS — D47.2 SMOLDERING MULTIPLE MYELOMA: Primary | ICD-10-CM

## 2021-08-05 LAB
ALBUMIN SERPL BCP-MCNC: 3.7 G/DL (ref 3.5–5.2)
ALP SERPL-CCNC: 55 U/L (ref 55–135)
ALT SERPL W/O P-5'-P-CCNC: 15 U/L (ref 10–44)
ANION GAP SERPL CALC-SCNC: 10 MMOL/L (ref 8–16)
AST SERPL-CCNC: 18 U/L (ref 10–40)
BASOPHILS # BLD AUTO: 0.05 K/UL (ref 0–0.2)
BASOPHILS NFR BLD: 1 % (ref 0–1.9)
BILIRUB SERPL-MCNC: 0.3 MG/DL (ref 0.1–1)
BUN SERPL-MCNC: 20 MG/DL (ref 8–23)
CALCIUM SERPL-MCNC: 9.8 MG/DL (ref 8.7–10.5)
CHLORIDE SERPL-SCNC: 104 MMOL/L (ref 95–110)
CO2 SERPL-SCNC: 22 MMOL/L (ref 23–29)
CREAT SERPL-MCNC: 1.9 MG/DL (ref 0.5–1.4)
DIFFERENTIAL METHOD: ABNORMAL
EOSINOPHIL # BLD AUTO: 0.2 K/UL (ref 0–0.5)
EOSINOPHIL NFR BLD: 4.5 % (ref 0–8)
ERYTHROCYTE [DISTWIDTH] IN BLOOD BY AUTOMATED COUNT: 13.8 % (ref 11.5–14.5)
EST. GFR  (AFRICAN AMERICAN): 39 ML/MIN/1.73 M^2
EST. GFR  (NON AFRICAN AMERICAN): 33.7 ML/MIN/1.73 M^2
GLUCOSE SERPL-MCNC: 168 MG/DL (ref 70–110)
HCT VFR BLD AUTO: 32.9 % (ref 40–54)
HGB BLD-MCNC: 10.5 G/DL (ref 14–18)
IGA SERPL-MCNC: 1532 MG/DL (ref 40–350)
IGG SERPL-MCNC: 967 MG/DL (ref 650–1600)
IGM SERPL-MCNC: 42 MG/DL (ref 50–300)
IMM GRANULOCYTES # BLD AUTO: 0.01 K/UL (ref 0–0.04)
IMM GRANULOCYTES NFR BLD AUTO: 0.2 % (ref 0–0.5)
LDH SERPL L TO P-CCNC: 119 U/L (ref 110–260)
LYMPHOCYTES # BLD AUTO: 1.8 K/UL (ref 1–4.8)
LYMPHOCYTES NFR BLD: 37.2 % (ref 18–48)
MAGNESIUM SERPL-MCNC: 1.8 MG/DL (ref 1.6–2.6)
MCH RBC QN AUTO: 28.7 PG (ref 27–31)
MCHC RBC AUTO-ENTMCNC: 31.9 G/DL (ref 32–36)
MCV RBC AUTO: 90 FL (ref 82–98)
MONOCYTES # BLD AUTO: 0.4 K/UL (ref 0.3–1)
MONOCYTES NFR BLD: 7.4 % (ref 4–15)
NEUTROPHILS # BLD AUTO: 2.4 K/UL (ref 1.8–7.7)
NEUTROPHILS NFR BLD: 49.7 % (ref 38–73)
NRBC BLD-RTO: 0 /100 WBC
PHOSPHATE SERPL-MCNC: 3.1 MG/DL (ref 2.7–4.5)
PLATELET # BLD AUTO: 303 K/UL (ref 150–450)
PMV BLD AUTO: 9.2 FL (ref 9.2–12.9)
POTASSIUM SERPL-SCNC: 4.7 MMOL/L (ref 3.5–5.1)
PROT SERPL-MCNC: 8.5 G/DL (ref 6–8.4)
RBC # BLD AUTO: 3.66 M/UL (ref 4.6–6.2)
SODIUM SERPL-SCNC: 136 MMOL/L (ref 136–145)
WBC # BLD AUTO: 4.89 K/UL (ref 3.9–12.7)

## 2021-08-05 PROCEDURE — 84100 ASSAY OF PHOSPHORUS: CPT | Performed by: INTERNAL MEDICINE

## 2021-08-05 PROCEDURE — 99215 PR OFFICE/OUTPT VISIT, EST, LEVL V, 40-54 MIN: ICD-10-PCS | Mod: S$GLB,,, | Performed by: INTERNAL MEDICINE

## 2021-08-05 PROCEDURE — 86334 IMMUNOFIX E-PHORESIS SERUM: CPT | Mod: Q1 | Performed by: INTERNAL MEDICINE

## 2021-08-05 PROCEDURE — 3078F PR MOST RECENT DIASTOLIC BLOOD PRESSURE < 80 MM HG: ICD-10-PCS | Mod: CPTII,S$GLB,, | Performed by: INTERNAL MEDICINE

## 2021-08-05 PROCEDURE — 3077F PR MOST RECENT SYSTOLIC BLOOD PRESSURE >= 140 MM HG: ICD-10-PCS | Mod: CPTII,S$GLB,, | Performed by: INTERNAL MEDICINE

## 2021-08-05 PROCEDURE — 1159F MED LIST DOCD IN RCRD: CPT | Mod: CPTII,S$GLB,, | Performed by: INTERNAL MEDICINE

## 2021-08-05 PROCEDURE — 99999 PR PBB SHADOW E&M-EST. PATIENT-LVL V: CPT | Mod: PBBFAC,,, | Performed by: INTERNAL MEDICINE

## 2021-08-05 PROCEDURE — 83615 LACTATE (LD) (LDH) ENZYME: CPT | Performed by: INTERNAL MEDICINE

## 2021-08-05 PROCEDURE — 80053 COMPREHEN METABOLIC PANEL: CPT | Performed by: INTERNAL MEDICINE

## 2021-08-05 PROCEDURE — 3051F PR MOST RECENT HEMOGLOBIN A1C LEVEL 7.0 - < 8.0%: ICD-10-PCS | Mod: CPTII,S$GLB,, | Performed by: INTERNAL MEDICINE

## 2021-08-05 PROCEDURE — 82784 ASSAY IGA/IGD/IGG/IGM EACH: CPT | Mod: 59 | Performed by: INTERNAL MEDICINE

## 2021-08-05 PROCEDURE — 1100F PTFALLS ASSESS-DOCD GE2>/YR: CPT | Mod: CPTII,S$GLB,, | Performed by: INTERNAL MEDICINE

## 2021-08-05 PROCEDURE — 3078F DIAST BP <80 MM HG: CPT | Mod: CPTII,S$GLB,, | Performed by: INTERNAL MEDICINE

## 2021-08-05 PROCEDURE — 85025 COMPLETE CBC W/AUTO DIFF WBC: CPT | Performed by: INTERNAL MEDICINE

## 2021-08-05 PROCEDURE — 3288F PR FALLS RISK ASSESSMENT DOCUMENTED: ICD-10-PCS | Mod: CPTII,S$GLB,, | Performed by: INTERNAL MEDICINE

## 2021-08-05 PROCEDURE — 3072F PR LOW RISK FOR RETINOPATHY: ICD-10-PCS | Mod: CPTII,S$GLB,, | Performed by: INTERNAL MEDICINE

## 2021-08-05 PROCEDURE — 1160F PR REVIEW ALL MEDS BY PRESCRIBER/CLIN PHARMACIST DOCUMENTED: ICD-10-PCS | Mod: CPTII,S$GLB,, | Performed by: INTERNAL MEDICINE

## 2021-08-05 PROCEDURE — 99215 OFFICE O/P EST HI 40 MIN: CPT | Mod: S$GLB,,, | Performed by: INTERNAL MEDICINE

## 2021-08-05 PROCEDURE — 1125F PR PAIN SEVERITY QUANTIFIED, PAIN PRESENT: ICD-10-PCS | Mod: CPTII,S$GLB,, | Performed by: INTERNAL MEDICINE

## 2021-08-05 PROCEDURE — 3051F HG A1C>EQUAL 7.0%<8.0%: CPT | Mod: CPTII,S$GLB,, | Performed by: INTERNAL MEDICINE

## 2021-08-05 PROCEDURE — 1160F RVW MEDS BY RX/DR IN RCRD: CPT | Mod: CPTII,S$GLB,, | Performed by: INTERNAL MEDICINE

## 2021-08-05 PROCEDURE — 1100F PR PT FALLS ASSESS DOC 2+ FALLS/FALL W/INJURY/YR: ICD-10-PCS | Mod: CPTII,S$GLB,, | Performed by: INTERNAL MEDICINE

## 2021-08-05 PROCEDURE — 86334 PATHOLOGIST INTERPRETATION IFE: ICD-10-PCS | Mod: 26,,, | Performed by: PATHOLOGY

## 2021-08-05 PROCEDURE — 3072F LOW RISK FOR RETINOPATHY: CPT | Mod: CPTII,S$GLB,, | Performed by: INTERNAL MEDICINE

## 2021-08-05 PROCEDURE — 1159F PR MEDICATION LIST DOCUMENTED IN MEDICAL RECORD: ICD-10-PCS | Mod: CPTII,S$GLB,, | Performed by: INTERNAL MEDICINE

## 2021-08-05 PROCEDURE — 3288F FALL RISK ASSESSMENT DOCD: CPT | Mod: CPTII,S$GLB,, | Performed by: INTERNAL MEDICINE

## 2021-08-05 PROCEDURE — 83520 IMMUNOASSAY QUANT NOS NONAB: CPT | Performed by: INTERNAL MEDICINE

## 2021-08-05 PROCEDURE — 84165 PROTEIN E-PHORESIS SERUM: CPT | Mod: Q1 | Performed by: INTERNAL MEDICINE

## 2021-08-05 PROCEDURE — 3077F SYST BP >= 140 MM HG: CPT | Mod: CPTII,S$GLB,, | Performed by: INTERNAL MEDICINE

## 2021-08-05 PROCEDURE — 99999 PR PBB SHADOW E&M-EST. PATIENT-LVL V: ICD-10-PCS | Mod: PBBFAC,,, | Performed by: INTERNAL MEDICINE

## 2021-08-05 PROCEDURE — 1125F AMNT PAIN NOTED PAIN PRSNT: CPT | Mod: CPTII,S$GLB,, | Performed by: INTERNAL MEDICINE

## 2021-08-05 PROCEDURE — 84165 PATHOLOGIST INTERPRETATION SPE: ICD-10-PCS | Mod: 26,,, | Performed by: PATHOLOGY

## 2021-08-05 PROCEDURE — 36415 COLL VENOUS BLD VENIPUNCTURE: CPT | Performed by: INTERNAL MEDICINE

## 2021-08-05 PROCEDURE — 84165 PROTEIN E-PHORESIS SERUM: CPT | Mod: 26,,, | Performed by: PATHOLOGY

## 2021-08-05 PROCEDURE — 83735 ASSAY OF MAGNESIUM: CPT | Performed by: INTERNAL MEDICINE

## 2021-08-05 PROCEDURE — 86334 IMMUNOFIX E-PHORESIS SERUM: CPT | Mod: 26,,, | Performed by: PATHOLOGY

## 2021-08-06 LAB
ALBUMIN SERPL ELPH-MCNC: 3.8 G/DL (ref 3.35–5.55)
ALPHA1 GLOB SERPL ELPH-MCNC: 0.33 G/DL (ref 0.17–0.41)
ALPHA2 GLOB SERPL ELPH-MCNC: 0.92 G/DL (ref 0.43–0.99)
B-GLOBULIN SERPL ELPH-MCNC: 2.51 G/DL (ref 0.5–1.1)
GAMMA GLOB SERPL ELPH-MCNC: 0.53 G/DL (ref 0.67–1.58)
INTERPRETATION SERPL IFE-IMP: NORMAL
KAPPA LC SER QL IA: 9.19 MG/DL (ref 0.33–1.94)
KAPPA LC/LAMBDA SER IA: 4.64 (ref 0.26–1.65)
LAMBDA LC SER QL IA: 1.98 MG/DL (ref 0.57–2.63)
PATHOLOGIST INTERPRETATION IFE: NORMAL
PATHOLOGIST INTERPRETATION SPE: NORMAL
PROT SERPL-MCNC: 8.1 G/DL (ref 6–8.4)

## 2021-08-11 ENCOUNTER — OFFICE VISIT (OUTPATIENT)
Dept: INTERNAL MEDICINE | Facility: CLINIC | Age: 76
End: 2021-08-11
Payer: MEDICARE

## 2021-08-11 VITALS
BODY MASS INDEX: 28.37 KG/M2 | HEART RATE: 89 BPM | SYSTOLIC BLOOD PRESSURE: 130 MMHG | HEIGHT: 70 IN | WEIGHT: 198.19 LBS | OXYGEN SATURATION: 95 % | DIASTOLIC BLOOD PRESSURE: 70 MMHG

## 2021-08-11 DIAGNOSIS — H61.20 IMPACTED CERUMEN, UNSPECIFIED LATERALITY: ICD-10-CM

## 2021-08-11 DIAGNOSIS — Z00.00 ROUTINE GENERAL MEDICAL EXAMINATION AT A HEALTH CARE FACILITY: Primary | ICD-10-CM

## 2021-08-11 DIAGNOSIS — M54.2 CHRONIC NECK PAIN: ICD-10-CM

## 2021-08-11 DIAGNOSIS — N18.30 CHRONIC KIDNEY DISEASE, STAGE III (MODERATE): Chronic | ICD-10-CM

## 2021-08-11 DIAGNOSIS — G89.29 CHRONIC NECK PAIN: ICD-10-CM

## 2021-08-11 DIAGNOSIS — M17.0 PRIMARY OSTEOARTHRITIS OF BOTH KNEES: ICD-10-CM

## 2021-08-11 DIAGNOSIS — E13.9 DIABETES MELLITUS DUE TO ABNORMAL INSULIN: ICD-10-CM

## 2021-08-11 DIAGNOSIS — I10 ESSENTIAL HYPERTENSION: ICD-10-CM

## 2021-08-11 DIAGNOSIS — E55.9 VITAMIN D DEFICIENCY: ICD-10-CM

## 2021-08-11 DIAGNOSIS — D47.2 SMOLDERING MULTIPLE MYELOMA: ICD-10-CM

## 2021-08-11 DIAGNOSIS — E11.49 OTHER DIABETIC NEUROLOGICAL COMPLICATION ASSOCIATED WITH TYPE 2 DIABETES MELLITUS: ICD-10-CM

## 2021-08-11 DIAGNOSIS — N18.31 STAGE 3A CHRONIC KIDNEY DISEASE: Chronic | ICD-10-CM

## 2021-08-11 PROCEDURE — 99999 PR PBB SHADOW E&M-EST. PATIENT-LVL V: ICD-10-PCS | Mod: PBBFAC,,, | Performed by: INTERNAL MEDICINE

## 2021-08-11 PROCEDURE — 1125F AMNT PAIN NOTED PAIN PRSNT: CPT | Mod: CPTII,S$GLB,, | Performed by: INTERNAL MEDICINE

## 2021-08-11 PROCEDURE — 3072F LOW RISK FOR RETINOPATHY: CPT | Mod: CPTII,S$GLB,, | Performed by: INTERNAL MEDICINE

## 2021-08-11 PROCEDURE — 99499 UNLISTED E&M SERVICE: CPT | Mod: HCNC,S$GLB,, | Performed by: INTERNAL MEDICINE

## 2021-08-11 PROCEDURE — 3051F HG A1C>EQUAL 7.0%<8.0%: CPT | Mod: CPTII,S$GLB,, | Performed by: INTERNAL MEDICINE

## 2021-08-11 PROCEDURE — 99999 PR PBB SHADOW E&M-EST. PATIENT-LVL V: CPT | Mod: PBBFAC,,, | Performed by: INTERNAL MEDICINE

## 2021-08-11 PROCEDURE — 99397 PR PREVENTIVE VISIT,EST,65 & OVER: ICD-10-PCS | Mod: S$GLB,,, | Performed by: INTERNAL MEDICINE

## 2021-08-11 PROCEDURE — 1125F PR PAIN SEVERITY QUANTIFIED, PAIN PRESENT: ICD-10-PCS | Mod: CPTII,S$GLB,, | Performed by: INTERNAL MEDICINE

## 2021-08-11 PROCEDURE — 3075F PR MOST RECENT SYSTOLIC BLOOD PRESS GE 130-139MM HG: ICD-10-PCS | Mod: CPTII,S$GLB,, | Performed by: INTERNAL MEDICINE

## 2021-08-11 PROCEDURE — 3072F PR LOW RISK FOR RETINOPATHY: ICD-10-PCS | Mod: CPTII,S$GLB,, | Performed by: INTERNAL MEDICINE

## 2021-08-11 PROCEDURE — 1159F PR MEDICATION LIST DOCUMENTED IN MEDICAL RECORD: ICD-10-PCS | Mod: CPTII,S$GLB,, | Performed by: INTERNAL MEDICINE

## 2021-08-11 PROCEDURE — 3078F DIAST BP <80 MM HG: CPT | Mod: CPTII,S$GLB,, | Performed by: INTERNAL MEDICINE

## 2021-08-11 PROCEDURE — 1159F MED LIST DOCD IN RCRD: CPT | Mod: CPTII,S$GLB,, | Performed by: INTERNAL MEDICINE

## 2021-08-11 PROCEDURE — 99499 RISK ADDL DX/OHS AUDIT: ICD-10-PCS | Mod: HCNC,S$GLB,, | Performed by: INTERNAL MEDICINE

## 2021-08-11 PROCEDURE — 1101F PT FALLS ASSESS-DOCD LE1/YR: CPT | Mod: CPTII,S$GLB,, | Performed by: INTERNAL MEDICINE

## 2021-08-11 PROCEDURE — 3075F SYST BP GE 130 - 139MM HG: CPT | Mod: CPTII,S$GLB,, | Performed by: INTERNAL MEDICINE

## 2021-08-11 PROCEDURE — 3288F PR FALLS RISK ASSESSMENT DOCUMENTED: ICD-10-PCS | Mod: CPTII,S$GLB,, | Performed by: INTERNAL MEDICINE

## 2021-08-11 PROCEDURE — 3288F FALL RISK ASSESSMENT DOCD: CPT | Mod: CPTII,S$GLB,, | Performed by: INTERNAL MEDICINE

## 2021-08-11 PROCEDURE — 1101F PR PT FALLS ASSESS DOC 0-1 FALLS W/OUT INJ PAST YR: ICD-10-PCS | Mod: CPTII,S$GLB,, | Performed by: INTERNAL MEDICINE

## 2021-08-11 PROCEDURE — 3051F PR MOST RECENT HEMOGLOBIN A1C LEVEL 7.0 - < 8.0%: ICD-10-PCS | Mod: CPTII,S$GLB,, | Performed by: INTERNAL MEDICINE

## 2021-08-11 PROCEDURE — 3078F PR MOST RECENT DIASTOLIC BLOOD PRESSURE < 80 MM HG: ICD-10-PCS | Mod: CPTII,S$GLB,, | Performed by: INTERNAL MEDICINE

## 2021-08-11 PROCEDURE — 99397 PER PM REEVAL EST PAT 65+ YR: CPT | Mod: S$GLB,,, | Performed by: INTERNAL MEDICINE

## 2021-08-11 RX ORDER — TADALAFIL 20 MG/1
20 TABLET ORAL DAILY PRN
Qty: 90 TABLET | Refills: 3 | Status: SHIPPED | OUTPATIENT
Start: 2021-08-11 | End: 2021-10-27

## 2021-08-11 RX ORDER — LOSARTAN POTASSIUM 50 MG/1
50 TABLET ORAL DAILY
Qty: 90 TABLET | Refills: 6 | Status: SHIPPED | OUTPATIENT
Start: 2021-08-11 | End: 2021-12-13 | Stop reason: SDUPTHER

## 2021-08-11 RX ORDER — ERGOCALCIFEROL 1.25 MG/1
50000 CAPSULE ORAL
Qty: 4 CAPSULE | Refills: 3 | Status: SHIPPED | OUTPATIENT
Start: 2021-08-11 | End: 2021-12-13 | Stop reason: SDUPTHER

## 2021-08-11 RX ORDER — ERGOCALCIFEROL 1.25 MG/1
50000 CAPSULE ORAL
Qty: 4 CAPSULE | Refills: 3 | Status: CANCELLED | OUTPATIENT
Start: 2021-08-11

## 2021-08-11 RX ORDER — TRAMADOL HYDROCHLORIDE 50 MG/1
50 TABLET ORAL EVERY 8 HOURS PRN
Qty: 60 TABLET | Refills: 0 | Status: SHIPPED | OUTPATIENT
Start: 2021-08-11 | End: 2021-09-20 | Stop reason: SDUPTHER

## 2021-08-11 RX ORDER — TADALAFIL 20 MG/1
20 TABLET ORAL DAILY PRN
Qty: 90 TABLET | Refills: 3 | Status: CANCELLED | OUTPATIENT
Start: 2021-08-11 | End: 2022-08-11

## 2021-08-15 ENCOUNTER — PATIENT OUTREACH (OUTPATIENT)
Dept: ADMINISTRATIVE | Facility: OTHER | Age: 76
End: 2021-08-15

## 2021-08-17 ENCOUNTER — OFFICE VISIT (OUTPATIENT)
Dept: ORTHOPEDICS | Facility: CLINIC | Age: 76
End: 2021-08-17
Payer: MEDICARE

## 2021-08-17 VITALS — WEIGHT: 196.44 LBS | HEIGHT: 70 IN | BODY MASS INDEX: 28.12 KG/M2

## 2021-08-17 DIAGNOSIS — M17.0 PRIMARY OSTEOARTHRITIS OF BOTH KNEES: Primary | ICD-10-CM

## 2021-08-17 PROCEDURE — 3072F LOW RISK FOR RETINOPATHY: CPT | Mod: CPTII,S$GLB,, | Performed by: ORTHOPAEDIC SURGERY

## 2021-08-17 PROCEDURE — 99999 PR PBB SHADOW E&M-EST. PATIENT-LVL IV: ICD-10-PCS | Mod: PBBFAC,,, | Performed by: ORTHOPAEDIC SURGERY

## 2021-08-17 PROCEDURE — 1125F PR PAIN SEVERITY QUANTIFIED, PAIN PRESENT: ICD-10-PCS | Mod: CPTII,S$GLB,, | Performed by: ORTHOPAEDIC SURGERY

## 2021-08-17 PROCEDURE — 3051F HG A1C>EQUAL 7.0%<8.0%: CPT | Mod: CPTII,S$GLB,, | Performed by: ORTHOPAEDIC SURGERY

## 2021-08-17 PROCEDURE — 3072F PR LOW RISK FOR RETINOPATHY: ICD-10-PCS | Mod: CPTII,S$GLB,, | Performed by: ORTHOPAEDIC SURGERY

## 2021-08-17 PROCEDURE — 3288F PR FALLS RISK ASSESSMENT DOCUMENTED: ICD-10-PCS | Mod: CPTII,S$GLB,, | Performed by: ORTHOPAEDIC SURGERY

## 2021-08-17 PROCEDURE — 3051F PR MOST RECENT HEMOGLOBIN A1C LEVEL 7.0 - < 8.0%: ICD-10-PCS | Mod: CPTII,S$GLB,, | Performed by: ORTHOPAEDIC SURGERY

## 2021-08-17 PROCEDURE — 1159F MED LIST DOCD IN RCRD: CPT | Mod: CPTII,S$GLB,, | Performed by: ORTHOPAEDIC SURGERY

## 2021-08-17 PROCEDURE — 99999 PR PBB SHADOW E&M-EST. PATIENT-LVL IV: CPT | Mod: PBBFAC,,, | Performed by: ORTHOPAEDIC SURGERY

## 2021-08-17 PROCEDURE — 1101F PR PT FALLS ASSESS DOC 0-1 FALLS W/OUT INJ PAST YR: ICD-10-PCS | Mod: CPTII,S$GLB,, | Performed by: ORTHOPAEDIC SURGERY

## 2021-08-17 PROCEDURE — 99214 PR OFFICE/OUTPT VISIT, EST, LEVL IV, 30-39 MIN: ICD-10-PCS | Mod: S$GLB,,, | Performed by: ORTHOPAEDIC SURGERY

## 2021-08-17 PROCEDURE — 99214 OFFICE O/P EST MOD 30 MIN: CPT | Mod: S$GLB,,, | Performed by: ORTHOPAEDIC SURGERY

## 2021-08-17 PROCEDURE — 1101F PT FALLS ASSESS-DOCD LE1/YR: CPT | Mod: CPTII,S$GLB,, | Performed by: ORTHOPAEDIC SURGERY

## 2021-08-17 PROCEDURE — 1125F AMNT PAIN NOTED PAIN PRSNT: CPT | Mod: CPTII,S$GLB,, | Performed by: ORTHOPAEDIC SURGERY

## 2021-08-17 PROCEDURE — 1159F PR MEDICATION LIST DOCUMENTED IN MEDICAL RECORD: ICD-10-PCS | Mod: CPTII,S$GLB,, | Performed by: ORTHOPAEDIC SURGERY

## 2021-08-17 PROCEDURE — 3288F FALL RISK ASSESSMENT DOCD: CPT | Mod: CPTII,S$GLB,, | Performed by: ORTHOPAEDIC SURGERY

## 2021-08-19 ENCOUNTER — PATIENT MESSAGE (OUTPATIENT)
Dept: INTERNAL MEDICINE | Facility: CLINIC | Age: 76
End: 2021-08-19

## 2021-09-01 ENCOUNTER — PATIENT MESSAGE (OUTPATIENT)
Dept: INTERNAL MEDICINE | Facility: CLINIC | Age: 76
End: 2021-09-01

## 2021-09-01 ENCOUNTER — TELEPHONE (OUTPATIENT)
Dept: HEMATOLOGY/ONCOLOGY | Facility: CLINIC | Age: 76
End: 2021-09-01

## 2021-09-08 ENCOUNTER — PATIENT MESSAGE (OUTPATIENT)
Dept: HEMATOLOGY/ONCOLOGY | Facility: CLINIC | Age: 76
End: 2021-09-08

## 2021-09-09 ENCOUNTER — PATIENT MESSAGE (OUTPATIENT)
Dept: ORTHOPEDICS | Facility: CLINIC | Age: 76
End: 2021-09-09

## 2021-09-09 DIAGNOSIS — D47.2 SMOLDERING MULTIPLE MYELOMA: ICD-10-CM

## 2021-09-09 RX ORDER — DIAZEPAM 5 MG/1
5 TABLET ORAL EVERY 12 HOURS PRN
Qty: 60 TABLET | Refills: 0 | Status: SHIPPED | OUTPATIENT
Start: 2021-09-09 | End: 2021-12-03 | Stop reason: SDUPTHER

## 2021-09-12 ENCOUNTER — PATIENT MESSAGE (OUTPATIENT)
Dept: ORTHOPEDICS | Facility: CLINIC | Age: 76
End: 2021-09-12

## 2021-09-13 ENCOUNTER — RESEARCH ENCOUNTER (OUTPATIENT)
Dept: HEMATOLOGY/ONCOLOGY | Facility: CLINIC | Age: 76
End: 2021-09-13

## 2021-09-13 ENCOUNTER — TELEPHONE (OUTPATIENT)
Dept: ORTHOPEDICS | Facility: CLINIC | Age: 76
End: 2021-09-13

## 2021-09-20 ENCOUNTER — OFFICE VISIT (OUTPATIENT)
Dept: ORTHOPEDICS | Facility: CLINIC | Age: 76
End: 2021-09-20
Payer: MEDICARE

## 2021-09-20 VITALS — WEIGHT: 193.31 LBS | BODY MASS INDEX: 27.67 KG/M2 | HEIGHT: 70 IN

## 2021-09-20 DIAGNOSIS — M17.0 PRIMARY OSTEOARTHRITIS OF BOTH KNEES: Primary | ICD-10-CM

## 2021-09-20 PROCEDURE — 1159F PR MEDICATION LIST DOCUMENTED IN MEDICAL RECORD: ICD-10-PCS | Mod: HCNC,CPTII,S$GLB, | Performed by: PHYSICIAN ASSISTANT

## 2021-09-20 PROCEDURE — 3051F HG A1C>EQUAL 7.0%<8.0%: CPT | Mod: HCNC,CPTII,S$GLB, | Performed by: PHYSICIAN ASSISTANT

## 2021-09-20 PROCEDURE — 1160F PR REVIEW ALL MEDS BY PRESCRIBER/CLIN PHARMACIST DOCUMENTED: ICD-10-PCS | Mod: HCNC,CPTII,S$GLB, | Performed by: PHYSICIAN ASSISTANT

## 2021-09-20 PROCEDURE — 99499 NO LOS: ICD-10-PCS | Mod: HCNC,S$GLB,, | Performed by: PHYSICIAN ASSISTANT

## 2021-09-20 PROCEDURE — 99999 PR PBB SHADOW E&M-EST. PATIENT-LVL IV: ICD-10-PCS | Mod: PBBFAC,HCNC,, | Performed by: PHYSICIAN ASSISTANT

## 2021-09-20 PROCEDURE — 20610 DRAIN/INJ JOINT/BURSA W/O US: CPT | Mod: HCNC,50,S$GLB, | Performed by: PHYSICIAN ASSISTANT

## 2021-09-20 PROCEDURE — 3072F LOW RISK FOR RETINOPATHY: CPT | Mod: HCNC,CPTII,S$GLB, | Performed by: PHYSICIAN ASSISTANT

## 2021-09-20 PROCEDURE — 1125F PR PAIN SEVERITY QUANTIFIED, PAIN PRESENT: ICD-10-PCS | Mod: HCNC,CPTII,S$GLB, | Performed by: PHYSICIAN ASSISTANT

## 2021-09-20 PROCEDURE — 3066F NEPHROPATHY DOC TX: CPT | Mod: HCNC,CPTII,S$GLB, | Performed by: PHYSICIAN ASSISTANT

## 2021-09-20 PROCEDURE — 1160F RVW MEDS BY RX/DR IN RCRD: CPT | Mod: HCNC,CPTII,S$GLB, | Performed by: PHYSICIAN ASSISTANT

## 2021-09-20 PROCEDURE — 1159F MED LIST DOCD IN RCRD: CPT | Mod: HCNC,CPTII,S$GLB, | Performed by: PHYSICIAN ASSISTANT

## 2021-09-20 PROCEDURE — 3072F PR LOW RISK FOR RETINOPATHY: ICD-10-PCS | Mod: HCNC,CPTII,S$GLB, | Performed by: PHYSICIAN ASSISTANT

## 2021-09-20 PROCEDURE — 3051F PR MOST RECENT HEMOGLOBIN A1C LEVEL 7.0 - < 8.0%: ICD-10-PCS | Mod: HCNC,CPTII,S$GLB, | Performed by: PHYSICIAN ASSISTANT

## 2021-09-20 PROCEDURE — 99999 PR PBB SHADOW E&M-EST. PATIENT-LVL IV: CPT | Mod: PBBFAC,HCNC,, | Performed by: PHYSICIAN ASSISTANT

## 2021-09-20 PROCEDURE — 4010F PR ACE/ARB THEARPY RXD/TAKEN: ICD-10-PCS | Mod: HCNC,CPTII,S$GLB, | Performed by: PHYSICIAN ASSISTANT

## 2021-09-20 PROCEDURE — 3066F PR DOCUMENTATION OF TREATMENT FOR NEPHROPATHY: ICD-10-PCS | Mod: HCNC,CPTII,S$GLB, | Performed by: PHYSICIAN ASSISTANT

## 2021-09-20 PROCEDURE — 1125F AMNT PAIN NOTED PAIN PRSNT: CPT | Mod: HCNC,CPTII,S$GLB, | Performed by: PHYSICIAN ASSISTANT

## 2021-09-20 PROCEDURE — 99499 UNLISTED E&M SERVICE: CPT | Mod: HCNC,S$GLB,, | Performed by: PHYSICIAN ASSISTANT

## 2021-09-20 PROCEDURE — 4010F ACE/ARB THERAPY RXD/TAKEN: CPT | Mod: HCNC,CPTII,S$GLB, | Performed by: PHYSICIAN ASSISTANT

## 2021-09-20 PROCEDURE — 20610 PR DRAIN/INJECT LARGE JOINT/BURSA: ICD-10-PCS | Mod: HCNC,50,S$GLB, | Performed by: PHYSICIAN ASSISTANT

## 2021-09-21 RX ORDER — TRAMADOL HYDROCHLORIDE 50 MG/1
50 TABLET ORAL EVERY 8 HOURS PRN
Qty: 60 TABLET | Refills: 0 | Status: SHIPPED | OUTPATIENT
Start: 2021-09-21 | End: 2022-07-26

## 2021-09-22 ENCOUNTER — TELEPHONE (OUTPATIENT)
Dept: HEMATOLOGY/ONCOLOGY | Facility: CLINIC | Age: 76
End: 2021-09-22

## 2021-09-22 ENCOUNTER — LAB VISIT (OUTPATIENT)
Dept: LAB | Facility: HOSPITAL | Age: 76
End: 2021-09-22
Payer: MEDICARE

## 2021-09-22 DIAGNOSIS — D47.2 SMOLDERING MULTIPLE MYELOMA: ICD-10-CM

## 2021-09-22 DIAGNOSIS — Z00.6 RESEARCH EXAM: ICD-10-CM

## 2021-09-22 LAB
ALBUMIN SERPL BCP-MCNC: 3.7 G/DL (ref 3.5–5.2)
ALP SERPL-CCNC: 55 U/L (ref 55–135)
ALT SERPL W/O P-5'-P-CCNC: 16 U/L (ref 10–44)
ANION GAP SERPL CALC-SCNC: 13 MMOL/L (ref 8–16)
AST SERPL-CCNC: 18 U/L (ref 10–40)
BASOPHILS # BLD AUTO: 0.04 K/UL (ref 0–0.2)
BASOPHILS NFR BLD: 0.8 % (ref 0–1.9)
BILIRUB SERPL-MCNC: 0.4 MG/DL (ref 0.1–1)
BUN SERPL-MCNC: 29 MG/DL (ref 8–23)
CALCIUM SERPL-MCNC: 10.3 MG/DL (ref 8.7–10.5)
CHLORIDE SERPL-SCNC: 104 MMOL/L (ref 95–110)
CO2 SERPL-SCNC: 20 MMOL/L (ref 23–29)
CREAT SERPL-MCNC: 2.3 MG/DL (ref 0.5–1.4)
DIFFERENTIAL METHOD: ABNORMAL
EOSINOPHIL # BLD AUTO: 0.3 K/UL (ref 0–0.5)
EOSINOPHIL NFR BLD: 5.3 % (ref 0–8)
ERYTHROCYTE [DISTWIDTH] IN BLOOD BY AUTOMATED COUNT: 13.6 % (ref 11.5–14.5)
EST. GFR  (AFRICAN AMERICAN): 31 ML/MIN/1.73 M^2
EST. GFR  (NON AFRICAN AMERICAN): 26.8 ML/MIN/1.73 M^2
GLUCOSE SERPL-MCNC: 145 MG/DL (ref 70–110)
HCT VFR BLD AUTO: 35.2 % (ref 40–54)
HGB BLD-MCNC: 11.2 G/DL (ref 14–18)
IGA SERPL-MCNC: 1784 MG/DL (ref 40–350)
IGG SERPL-MCNC: 1198 MG/DL (ref 650–1600)
IGM SERPL-MCNC: 54 MG/DL (ref 50–300)
IMM GRANULOCYTES # BLD AUTO: 0.01 K/UL (ref 0–0.04)
IMM GRANULOCYTES NFR BLD AUTO: 0.2 % (ref 0–0.5)
LDH SERPL L TO P-CCNC: 114 U/L (ref 110–260)
LYMPHOCYTES # BLD AUTO: 2.1 K/UL (ref 1–4.8)
LYMPHOCYTES NFR BLD: 42.1 % (ref 18–48)
MAGNESIUM SERPL-MCNC: 1.8 MG/DL (ref 1.6–2.6)
MCH RBC QN AUTO: 28.4 PG (ref 27–31)
MCHC RBC AUTO-ENTMCNC: 31.8 G/DL (ref 32–36)
MCV RBC AUTO: 89 FL (ref 82–98)
MONOCYTES # BLD AUTO: 0.3 K/UL (ref 0.3–1)
MONOCYTES NFR BLD: 6.9 % (ref 4–15)
NEUTROPHILS # BLD AUTO: 2.2 K/UL (ref 1.8–7.7)
NEUTROPHILS NFR BLD: 44.7 % (ref 38–73)
NRBC BLD-RTO: 0 /100 WBC
PHOSPHATE SERPL-MCNC: 3.7 MG/DL (ref 2.7–4.5)
PLATELET # BLD AUTO: 286 K/UL (ref 150–450)
PMV BLD AUTO: 9.2 FL (ref 9.2–12.9)
POTASSIUM SERPL-SCNC: 5.1 MMOL/L (ref 3.5–5.1)
PROT SERPL-MCNC: 9 G/DL (ref 6–8.4)
RBC # BLD AUTO: 3.95 M/UL (ref 4.6–6.2)
SODIUM SERPL-SCNC: 137 MMOL/L (ref 136–145)
WBC # BLD AUTO: 4.92 K/UL (ref 3.9–12.7)

## 2021-09-22 PROCEDURE — 86334 IMMUNOFIX E-PHORESIS SERUM: CPT | Mod: HCNC,Q1 | Performed by: INTERNAL MEDICINE

## 2021-09-22 PROCEDURE — 82784 ASSAY IGA/IGD/IGG/IGM EACH: CPT | Mod: HCNC | Performed by: INTERNAL MEDICINE

## 2021-09-22 PROCEDURE — 36415 COLL VENOUS BLD VENIPUNCTURE: CPT | Mod: HCNC | Performed by: INTERNAL MEDICINE

## 2021-09-22 PROCEDURE — 84100 ASSAY OF PHOSPHORUS: CPT | Mod: HCNC | Performed by: INTERNAL MEDICINE

## 2021-09-22 PROCEDURE — 83615 LACTATE (LD) (LDH) ENZYME: CPT | Mod: HCNC,Q1 | Performed by: INTERNAL MEDICINE

## 2021-09-22 PROCEDURE — 86334 PATHOLOGIST INTERPRETATION IFE: ICD-10-PCS | Mod: 26,HCNC,, | Performed by: PATHOLOGY

## 2021-09-22 PROCEDURE — 80053 COMPREHEN METABOLIC PANEL: CPT | Mod: HCNC,Q1 | Performed by: INTERNAL MEDICINE

## 2021-09-22 PROCEDURE — 83735 ASSAY OF MAGNESIUM: CPT | Mod: HCNC | Performed by: INTERNAL MEDICINE

## 2021-09-22 PROCEDURE — 84165 PATHOLOGIST INTERPRETATION SPE: ICD-10-PCS | Mod: 26,HCNC,, | Performed by: PATHOLOGY

## 2021-09-22 PROCEDURE — 84165 PROTEIN E-PHORESIS SERUM: CPT | Mod: HCNC,Q1 | Performed by: INTERNAL MEDICINE

## 2021-09-22 PROCEDURE — 85025 COMPLETE CBC W/AUTO DIFF WBC: CPT | Mod: HCNC | Performed by: INTERNAL MEDICINE

## 2021-09-22 PROCEDURE — 84165 PROTEIN E-PHORESIS SERUM: CPT | Mod: 26,HCNC,, | Performed by: PATHOLOGY

## 2021-09-22 PROCEDURE — 83520 IMMUNOASSAY QUANT NOS NONAB: CPT | Mod: HCNC,Q1 | Performed by: INTERNAL MEDICINE

## 2021-09-22 PROCEDURE — 86334 IMMUNOFIX E-PHORESIS SERUM: CPT | Mod: 26,HCNC,, | Performed by: PATHOLOGY

## 2021-09-23 ENCOUNTER — TELEPHONE (OUTPATIENT)
Dept: HEMATOLOGY/ONCOLOGY | Facility: CLINIC | Age: 76
End: 2021-09-23

## 2021-09-23 LAB
ALBUMIN SERPL ELPH-MCNC: 3.83 G/DL (ref 3.35–5.55)
ALPHA1 GLOB SERPL ELPH-MCNC: 0.31 G/DL (ref 0.17–0.41)
ALPHA2 GLOB SERPL ELPH-MCNC: 0.96 G/DL (ref 0.43–0.99)
B-GLOBULIN SERPL ELPH-MCNC: 2.75 G/DL (ref 0.5–1.1)
GAMMA GLOB SERPL ELPH-MCNC: 0.65 G/DL (ref 0.67–1.58)
INTERPRETATION SERPL IFE-IMP: NORMAL
KAPPA LC SER QL IA: 11.56 MG/DL (ref 0.33–1.94)
KAPPA LC/LAMBDA SER IA: 5.45 (ref 0.26–1.65)
LAMBDA LC SER QL IA: 2.12 MG/DL (ref 0.57–2.63)
PROT SERPL-MCNC: 8.5 G/DL (ref 6–8.4)

## 2021-09-27 ENCOUNTER — OFFICE VISIT (OUTPATIENT)
Dept: HEMATOLOGY/ONCOLOGY | Facility: CLINIC | Age: 76
End: 2021-09-27
Payer: MEDICARE

## 2021-09-27 ENCOUNTER — RESEARCH ENCOUNTER (OUTPATIENT)
Dept: HEMATOLOGY/ONCOLOGY | Facility: CLINIC | Age: 76
End: 2021-09-27

## 2021-09-27 VITALS
RESPIRATION RATE: 17 BRPM | BODY MASS INDEX: 28.72 KG/M2 | OXYGEN SATURATION: 98 % | TEMPERATURE: 99 F | DIASTOLIC BLOOD PRESSURE: 61 MMHG | SYSTOLIC BLOOD PRESSURE: 129 MMHG | WEIGHT: 200.63 LBS | HEIGHT: 70 IN | HEART RATE: 105 BPM

## 2021-09-27 DIAGNOSIS — E11.22 CKD STAGE 3 DUE TO TYPE 2 DIABETES MELLITUS: ICD-10-CM

## 2021-09-27 DIAGNOSIS — D47.2 SMOLDERING MULTIPLE MYELOMA: Primary | ICD-10-CM

## 2021-09-27 DIAGNOSIS — N18.30 CKD STAGE 3 DUE TO TYPE 2 DIABETES MELLITUS: ICD-10-CM

## 2021-09-27 LAB
PATHOLOGIST INTERPRETATION IFE: NORMAL
PATHOLOGIST INTERPRETATION SPE: NORMAL

## 2021-09-27 PROCEDURE — 3072F PR LOW RISK FOR RETINOPATHY: ICD-10-PCS | Mod: HCNC,CPTII,S$GLB, | Performed by: INTERNAL MEDICINE

## 2021-09-27 PROCEDURE — 1159F PR MEDICATION LIST DOCUMENTED IN MEDICAL RECORD: ICD-10-PCS | Mod: HCNC,CPTII,S$GLB, | Performed by: INTERNAL MEDICINE

## 2021-09-27 PROCEDURE — 1160F RVW MEDS BY RX/DR IN RCRD: CPT | Mod: HCNC,CPTII,S$GLB, | Performed by: INTERNAL MEDICINE

## 2021-09-27 PROCEDURE — 3078F DIAST BP <80 MM HG: CPT | Mod: HCNC,CPTII,S$GLB, | Performed by: INTERNAL MEDICINE

## 2021-09-27 PROCEDURE — 1159F MED LIST DOCD IN RCRD: CPT | Mod: HCNC,CPTII,S$GLB, | Performed by: INTERNAL MEDICINE

## 2021-09-27 PROCEDURE — 99215 OFFICE O/P EST HI 40 MIN: CPT | Mod: HCNC,S$GLB,, | Performed by: INTERNAL MEDICINE

## 2021-09-27 PROCEDURE — 99215 PR OFFICE/OUTPT VISIT, EST, LEVL V, 40-54 MIN: ICD-10-PCS | Mod: HCNC,S$GLB,, | Performed by: INTERNAL MEDICINE

## 2021-09-27 PROCEDURE — 1126F AMNT PAIN NOTED NONE PRSNT: CPT | Mod: HCNC,CPTII,S$GLB, | Performed by: INTERNAL MEDICINE

## 2021-09-27 PROCEDURE — 3066F PR DOCUMENTATION OF TREATMENT FOR NEPHROPATHY: ICD-10-PCS | Mod: HCNC,CPTII,S$GLB, | Performed by: INTERNAL MEDICINE

## 2021-09-27 PROCEDURE — 1126F PR PAIN SEVERITY QUANTIFIED, NO PAIN PRESENT: ICD-10-PCS | Mod: HCNC,CPTII,S$GLB, | Performed by: INTERNAL MEDICINE

## 2021-09-27 PROCEDURE — 99999 PR PBB SHADOW E&M-EST. PATIENT-LVL III: ICD-10-PCS | Mod: PBBFAC,HCNC,, | Performed by: INTERNAL MEDICINE

## 2021-09-27 PROCEDURE — 3072F LOW RISK FOR RETINOPATHY: CPT | Mod: HCNC,CPTII,S$GLB, | Performed by: INTERNAL MEDICINE

## 2021-09-27 PROCEDURE — 3066F NEPHROPATHY DOC TX: CPT | Mod: HCNC,CPTII,S$GLB, | Performed by: INTERNAL MEDICINE

## 2021-09-27 PROCEDURE — 3051F HG A1C>EQUAL 7.0%<8.0%: CPT | Mod: HCNC,CPTII,S$GLB, | Performed by: INTERNAL MEDICINE

## 2021-09-27 PROCEDURE — 99999 PR PBB SHADOW E&M-EST. PATIENT-LVL III: CPT | Mod: PBBFAC,HCNC,, | Performed by: INTERNAL MEDICINE

## 2021-09-27 PROCEDURE — 4010F ACE/ARB THERAPY RXD/TAKEN: CPT | Mod: HCNC,CPTII,S$GLB, | Performed by: INTERNAL MEDICINE

## 2021-09-27 PROCEDURE — 4010F PR ACE/ARB THEARPY RXD/TAKEN: ICD-10-PCS | Mod: HCNC,CPTII,S$GLB, | Performed by: INTERNAL MEDICINE

## 2021-09-27 PROCEDURE — 3074F SYST BP LT 130 MM HG: CPT | Mod: HCNC,CPTII,S$GLB, | Performed by: INTERNAL MEDICINE

## 2021-09-27 PROCEDURE — 3074F PR MOST RECENT SYSTOLIC BLOOD PRESSURE < 130 MM HG: ICD-10-PCS | Mod: HCNC,CPTII,S$GLB, | Performed by: INTERNAL MEDICINE

## 2021-09-27 PROCEDURE — 1160F PR REVIEW ALL MEDS BY PRESCRIBER/CLIN PHARMACIST DOCUMENTED: ICD-10-PCS | Mod: HCNC,CPTII,S$GLB, | Performed by: INTERNAL MEDICINE

## 2021-09-27 PROCEDURE — 3078F PR MOST RECENT DIASTOLIC BLOOD PRESSURE < 80 MM HG: ICD-10-PCS | Mod: HCNC,CPTII,S$GLB, | Performed by: INTERNAL MEDICINE

## 2021-09-27 PROCEDURE — 3051F PR MOST RECENT HEMOGLOBIN A1C LEVEL 7.0 - < 8.0%: ICD-10-PCS | Mod: HCNC,CPTII,S$GLB, | Performed by: INTERNAL MEDICINE

## 2021-09-28 ENCOUNTER — TELEPHONE (OUTPATIENT)
Dept: NEPHROLOGY | Facility: CLINIC | Age: 76
End: 2021-09-28

## 2021-09-28 DIAGNOSIS — N18.31 STAGE 3A CHRONIC KIDNEY DISEASE: Primary | ICD-10-CM

## 2021-10-04 ENCOUNTER — PATIENT MESSAGE (OUTPATIENT)
Dept: ADMINISTRATIVE | Facility: HOSPITAL | Age: 76
End: 2021-10-04

## 2021-10-07 ENCOUNTER — TELEPHONE (OUTPATIENT)
Dept: INTERNAL MEDICINE | Facility: CLINIC | Age: 76
End: 2021-10-07

## 2021-10-07 DIAGNOSIS — Z79.4 TYPE 2 DIABETES MELLITUS WITH OTHER CIRCULATORY COMPLICATION, WITH LONG-TERM CURRENT USE OF INSULIN: Primary | ICD-10-CM

## 2021-10-07 DIAGNOSIS — E11.59 TYPE 2 DIABETES MELLITUS WITH OTHER CIRCULATORY COMPLICATION, WITH LONG-TERM CURRENT USE OF INSULIN: Primary | ICD-10-CM

## 2021-10-07 RX ORDER — FLASH GLUCOSE SENSOR
KIT MISCELLANEOUS
Qty: 6 KIT | Refills: 4 | Status: SHIPPED | OUTPATIENT
Start: 2021-10-07 | End: 2021-12-13 | Stop reason: SDUPTHER

## 2021-10-07 RX ORDER — FLASH GLUCOSE SCANNING READER
EACH MISCELLANEOUS
Qty: 6 EACH | Refills: 4 | Status: SHIPPED | OUTPATIENT
Start: 2021-10-07 | End: 2021-12-13 | Stop reason: SDUPTHER

## 2021-10-11 ENCOUNTER — OFFICE VISIT (OUTPATIENT)
Dept: OPHTHALMOLOGY | Facility: CLINIC | Age: 76
End: 2021-10-11
Payer: MEDICARE

## 2021-10-11 ENCOUNTER — CLINICAL SUPPORT (OUTPATIENT)
Dept: OPHTHALMOLOGY | Facility: CLINIC | Age: 76
End: 2021-10-11
Payer: MEDICARE

## 2021-10-11 DIAGNOSIS — H40.1132 PRIMARY OPEN ANGLE GLAUCOMA OF BOTH EYES, MODERATE STAGE: Primary | ICD-10-CM

## 2021-10-11 DIAGNOSIS — Z96.1 STATUS POST CATARACT EXTRACTION AND INSERTION OF INTRAOCULAR LENS, UNSPECIFIED LATERALITY: ICD-10-CM

## 2021-10-11 DIAGNOSIS — Z98.49 STATUS POST CATARACT EXTRACTION AND INSERTION OF INTRAOCULAR LENS, UNSPECIFIED LATERALITY: ICD-10-CM

## 2021-10-11 DIAGNOSIS — E08.3211 MILD NONPROLIFERATIVE DIABETIC RETINOPATHY OF RIGHT EYE WITH MACULAR EDEMA ASSOCIATED WITH DIABETES MELLITUS DUE TO UNDERLYING CONDITION: ICD-10-CM

## 2021-10-11 PROCEDURE — 92083 EXTENDED VISUAL FIELD XM: CPT | Mod: HCNC,S$GLB,, | Performed by: OPHTHALMOLOGY

## 2021-10-11 PROCEDURE — 99214 OFFICE O/P EST MOD 30 MIN: CPT | Mod: HCNC,S$GLB,, | Performed by: OPHTHALMOLOGY

## 2021-10-11 PROCEDURE — 92133 POSTERIOR SEGMENT OCT OPTIC NERVE(OCULAR COHERENCE TOMOGRAPHY) - OU - BOTH EYES: ICD-10-PCS | Mod: HCNC,S$GLB,, | Performed by: OPHTHALMOLOGY

## 2021-10-11 PROCEDURE — 92133 CPTRZD OPH DX IMG PST SGM ON: CPT | Mod: HCNC,S$GLB,, | Performed by: OPHTHALMOLOGY

## 2021-10-11 PROCEDURE — 92083 HUMPHREY VISUAL FIELD - OU - BOTH EYES: ICD-10-PCS | Mod: HCNC,S$GLB,, | Performed by: OPHTHALMOLOGY

## 2021-10-11 PROCEDURE — 1160F PR REVIEW ALL MEDS BY PRESCRIBER/CLIN PHARMACIST DOCUMENTED: ICD-10-PCS | Mod: HCNC,CPTII,S$GLB, | Performed by: OPHTHALMOLOGY

## 2021-10-11 PROCEDURE — 1101F PT FALLS ASSESS-DOCD LE1/YR: CPT | Mod: HCNC,CPTII,S$GLB, | Performed by: OPHTHALMOLOGY

## 2021-10-11 PROCEDURE — 1160F RVW MEDS BY RX/DR IN RCRD: CPT | Mod: HCNC,CPTII,S$GLB, | Performed by: OPHTHALMOLOGY

## 2021-10-11 PROCEDURE — 99999 PR PBB SHADOW E&M-EST. PATIENT-LVL III: ICD-10-PCS | Mod: PBBFAC,HCNC,, | Performed by: OPHTHALMOLOGY

## 2021-10-11 PROCEDURE — 99499 RISK ADDL DX/OHS AUDIT: ICD-10-PCS | Mod: HCNC,S$GLB,, | Performed by: OPHTHALMOLOGY

## 2021-10-11 PROCEDURE — 3066F PR DOCUMENTATION OF TREATMENT FOR NEPHROPATHY: ICD-10-PCS | Mod: HCNC,CPTII,S$GLB, | Performed by: OPHTHALMOLOGY

## 2021-10-11 PROCEDURE — 4010F PR ACE/ARB THEARPY RXD/TAKEN: ICD-10-PCS | Mod: HCNC,CPTII,S$GLB, | Performed by: OPHTHALMOLOGY

## 2021-10-11 PROCEDURE — 4010F ACE/ARB THERAPY RXD/TAKEN: CPT | Mod: HCNC,CPTII,S$GLB, | Performed by: OPHTHALMOLOGY

## 2021-10-11 PROCEDURE — 3288F PR FALLS RISK ASSESSMENT DOCUMENTED: ICD-10-PCS | Mod: HCNC,CPTII,S$GLB, | Performed by: OPHTHALMOLOGY

## 2021-10-11 PROCEDURE — 99214 PR OFFICE/OUTPT VISIT, EST, LEVL IV, 30-39 MIN: ICD-10-PCS | Mod: HCNC,S$GLB,, | Performed by: OPHTHALMOLOGY

## 2021-10-11 PROCEDURE — 1159F PR MEDICATION LIST DOCUMENTED IN MEDICAL RECORD: ICD-10-PCS | Mod: HCNC,CPTII,S$GLB, | Performed by: OPHTHALMOLOGY

## 2021-10-11 PROCEDURE — 3066F NEPHROPATHY DOC TX: CPT | Mod: HCNC,CPTII,S$GLB, | Performed by: OPHTHALMOLOGY

## 2021-10-11 PROCEDURE — 3051F PR MOST RECENT HEMOGLOBIN A1C LEVEL 7.0 - < 8.0%: ICD-10-PCS | Mod: HCNC,CPTII,S$GLB, | Performed by: OPHTHALMOLOGY

## 2021-10-11 PROCEDURE — 3051F HG A1C>EQUAL 7.0%<8.0%: CPT | Mod: HCNC,CPTII,S$GLB, | Performed by: OPHTHALMOLOGY

## 2021-10-11 PROCEDURE — 99999 PR PBB SHADOW E&M-EST. PATIENT-LVL III: CPT | Mod: PBBFAC,HCNC,, | Performed by: OPHTHALMOLOGY

## 2021-10-11 PROCEDURE — 99499 UNLISTED E&M SERVICE: CPT | Mod: HCNC,S$GLB,, | Performed by: OPHTHALMOLOGY

## 2021-10-11 PROCEDURE — 1159F MED LIST DOCD IN RCRD: CPT | Mod: HCNC,CPTII,S$GLB, | Performed by: OPHTHALMOLOGY

## 2021-10-11 PROCEDURE — 1126F AMNT PAIN NOTED NONE PRSNT: CPT | Mod: HCNC,CPTII,S$GLB, | Performed by: OPHTHALMOLOGY

## 2021-10-11 PROCEDURE — 1126F PR PAIN SEVERITY QUANTIFIED, NO PAIN PRESENT: ICD-10-PCS | Mod: HCNC,CPTII,S$GLB, | Performed by: OPHTHALMOLOGY

## 2021-10-11 PROCEDURE — 3288F FALL RISK ASSESSMENT DOCD: CPT | Mod: HCNC,CPTII,S$GLB, | Performed by: OPHTHALMOLOGY

## 2021-10-11 PROCEDURE — 1101F PR PT FALLS ASSESS DOC 0-1 FALLS W/OUT INJ PAST YR: ICD-10-PCS | Mod: HCNC,CPTII,S$GLB, | Performed by: OPHTHALMOLOGY

## 2021-10-19 ENCOUNTER — OFFICE VISIT (OUTPATIENT)
Dept: OPHTHALMOLOGY | Facility: CLINIC | Age: 76
End: 2021-10-19
Payer: MEDICARE

## 2021-10-19 DIAGNOSIS — H40.1132 PRIMARY OPEN ANGLE GLAUCOMA OF BOTH EYES, MODERATE STAGE: ICD-10-CM

## 2021-10-19 DIAGNOSIS — E11.3292 MILD NONPROLIFERATIVE DIABETIC RETINOPATHY OF LEFT EYE WITHOUT MACULAR EDEMA ASSOCIATED WITH TYPE 2 DIABETES MELLITUS: ICD-10-CM

## 2021-10-19 DIAGNOSIS — H43.813 VITREOUS DEGENERATION OF BOTH EYES: ICD-10-CM

## 2021-10-19 DIAGNOSIS — E08.3211 MILD NONPROLIFERATIVE DIABETIC RETINOPATHY OF RIGHT EYE WITH MACULAR EDEMA ASSOCIATED WITH DIABETES MELLITUS DUE TO UNDERLYING CONDITION: Primary | ICD-10-CM

## 2021-10-19 DIAGNOSIS — H35.033 HYPERTENSIVE RETINOPATHY OF BOTH EYES: ICD-10-CM

## 2021-10-19 DIAGNOSIS — Z96.1 PSEUDOPHAKIA OF BOTH EYES: ICD-10-CM

## 2021-10-19 PROCEDURE — 2023F PR DILATED RETINAL EXAM W/O EVID OF RETINOPATHY: ICD-10-PCS | Mod: HCNC,CPTII,S$GLB, | Performed by: OPHTHALMOLOGY

## 2021-10-19 PROCEDURE — 92134 CPTRZ OPH DX IMG PST SGM RTA: CPT | Mod: HCNC,S$GLB,, | Performed by: OPHTHALMOLOGY

## 2021-10-19 PROCEDURE — 3066F NEPHROPATHY DOC TX: CPT | Mod: HCNC,CPTII,S$GLB, | Performed by: OPHTHALMOLOGY

## 2021-10-19 PROCEDURE — 1159F PR MEDICATION LIST DOCUMENTED IN MEDICAL RECORD: ICD-10-PCS | Mod: HCNC,CPTII,S$GLB, | Performed by: OPHTHALMOLOGY

## 2021-10-19 PROCEDURE — 4010F PR ACE/ARB THEARPY RXD/TAKEN: ICD-10-PCS | Mod: HCNC,CPTII,S$GLB, | Performed by: OPHTHALMOLOGY

## 2021-10-19 PROCEDURE — 1125F AMNT PAIN NOTED PAIN PRSNT: CPT | Mod: HCNC,CPTII,S$GLB, | Performed by: OPHTHALMOLOGY

## 2021-10-19 PROCEDURE — 1101F PR PT FALLS ASSESS DOC 0-1 FALLS W/OUT INJ PAST YR: ICD-10-PCS | Mod: HCNC,CPTII,S$GLB, | Performed by: OPHTHALMOLOGY

## 2021-10-19 PROCEDURE — 99214 OFFICE O/P EST MOD 30 MIN: CPT | Mod: 25,HCNC,S$GLB, | Performed by: OPHTHALMOLOGY

## 2021-10-19 PROCEDURE — 1101F PT FALLS ASSESS-DOCD LE1/YR: CPT | Mod: HCNC,CPTII,S$GLB, | Performed by: OPHTHALMOLOGY

## 2021-10-19 PROCEDURE — 3288F PR FALLS RISK ASSESSMENT DOCUMENTED: ICD-10-PCS | Mod: HCNC,CPTII,S$GLB, | Performed by: OPHTHALMOLOGY

## 2021-10-19 PROCEDURE — 1160F PR REVIEW ALL MEDS BY PRESCRIBER/CLIN PHARMACIST DOCUMENTED: ICD-10-PCS | Mod: HCNC,CPTII,S$GLB, | Performed by: OPHTHALMOLOGY

## 2021-10-19 PROCEDURE — 3051F HG A1C>EQUAL 7.0%<8.0%: CPT | Mod: HCNC,CPTII,S$GLB, | Performed by: OPHTHALMOLOGY

## 2021-10-19 PROCEDURE — 2023F DILAT RTA XM W/O RTNOPTHY: CPT | Mod: HCNC,CPTII,S$GLB, | Performed by: OPHTHALMOLOGY

## 2021-10-19 PROCEDURE — 92134 POSTERIOR SEGMENT OCT RETINA (OCULAR COHERENCE TOMOGRAPHY)-BOTH EYES: ICD-10-PCS | Mod: HCNC,S$GLB,, | Performed by: OPHTHALMOLOGY

## 2021-10-19 PROCEDURE — 1125F PR PAIN SEVERITY QUANTIFIED, PAIN PRESENT: ICD-10-PCS | Mod: HCNC,CPTII,S$GLB, | Performed by: OPHTHALMOLOGY

## 2021-10-19 PROCEDURE — 1160F RVW MEDS BY RX/DR IN RCRD: CPT | Mod: HCNC,CPTII,S$GLB, | Performed by: OPHTHALMOLOGY

## 2021-10-19 PROCEDURE — 1159F MED LIST DOCD IN RCRD: CPT | Mod: HCNC,CPTII,S$GLB, | Performed by: OPHTHALMOLOGY

## 2021-10-19 PROCEDURE — 99999 PR PBB SHADOW E&M-EST. PATIENT-LVL IV: ICD-10-PCS | Mod: PBBFAC,HCNC,, | Performed by: OPHTHALMOLOGY

## 2021-10-19 PROCEDURE — 99214 PR OFFICE/OUTPT VISIT, EST, LEVL IV, 30-39 MIN: ICD-10-PCS | Mod: 25,HCNC,S$GLB, | Performed by: OPHTHALMOLOGY

## 2021-10-19 PROCEDURE — 3288F FALL RISK ASSESSMENT DOCD: CPT | Mod: HCNC,CPTII,S$GLB, | Performed by: OPHTHALMOLOGY

## 2021-10-19 PROCEDURE — 3066F PR DOCUMENTATION OF TREATMENT FOR NEPHROPATHY: ICD-10-PCS | Mod: HCNC,CPTII,S$GLB, | Performed by: OPHTHALMOLOGY

## 2021-10-19 PROCEDURE — 3051F PR MOST RECENT HEMOGLOBIN A1C LEVEL 7.0 - < 8.0%: ICD-10-PCS | Mod: HCNC,CPTII,S$GLB, | Performed by: OPHTHALMOLOGY

## 2021-10-19 PROCEDURE — 67028 PR INJECT INTRAVITREAL PHARMCOLOGIC: ICD-10-PCS | Mod: HCNC,RT,S$GLB, | Performed by: OPHTHALMOLOGY

## 2021-10-19 PROCEDURE — 99999 PR PBB SHADOW E&M-EST. PATIENT-LVL IV: CPT | Mod: PBBFAC,HCNC,, | Performed by: OPHTHALMOLOGY

## 2021-10-19 PROCEDURE — 67028 INJECTION EYE DRUG: CPT | Mod: HCNC,RT,S$GLB, | Performed by: OPHTHALMOLOGY

## 2021-10-19 PROCEDURE — 4010F ACE/ARB THERAPY RXD/TAKEN: CPT | Mod: HCNC,CPTII,S$GLB, | Performed by: OPHTHALMOLOGY

## 2021-10-19 RX ADMIN — Medication 1.25 MG: at 12:10

## 2021-10-24 ENCOUNTER — PATIENT MESSAGE (OUTPATIENT)
Dept: ADMINISTRATIVE | Facility: HOSPITAL | Age: 76
End: 2021-10-24
Payer: MEDICARE

## 2021-10-24 ENCOUNTER — PATIENT MESSAGE (OUTPATIENT)
Dept: INTERNAL MEDICINE | Facility: CLINIC | Age: 76
End: 2021-10-24
Payer: MEDICARE

## 2021-10-25 ENCOUNTER — OFFICE VISIT (OUTPATIENT)
Dept: OTOLARYNGOLOGY | Facility: CLINIC | Age: 76
End: 2021-10-25
Payer: MEDICARE

## 2021-10-25 VITALS — DIASTOLIC BLOOD PRESSURE: 87 MMHG | SYSTOLIC BLOOD PRESSURE: 149 MMHG | HEART RATE: 101 BPM

## 2021-10-25 DIAGNOSIS — H61.21 IMPACTED CERUMEN OF RIGHT EAR: ICD-10-CM

## 2021-10-25 DIAGNOSIS — H61.22 EXCESSIVE CERUMEN IN LEFT EAR CANAL: Primary | ICD-10-CM

## 2021-10-25 PROCEDURE — 69210 REMOVE IMPACTED EAR WAX UNI: CPT | Mod: HCNC,S$GLB,, | Performed by: NURSE PRACTITIONER

## 2021-10-25 PROCEDURE — 3288F FALL RISK ASSESSMENT DOCD: CPT | Mod: HCNC,CPTII,S$GLB, | Performed by: NURSE PRACTITIONER

## 2021-10-25 PROCEDURE — 3077F SYST BP >= 140 MM HG: CPT | Mod: HCNC,CPTII,S$GLB, | Performed by: NURSE PRACTITIONER

## 2021-10-25 PROCEDURE — 99999 PR PBB SHADOW E&M-EST. PATIENT-LVL IV: CPT | Mod: PBBFAC,HCNC,, | Performed by: NURSE PRACTITIONER

## 2021-10-25 PROCEDURE — 69210 EAR CERUMEN REMOVAL: ICD-10-PCS | Mod: HCNC,S$GLB,, | Performed by: NURSE PRACTITIONER

## 2021-10-25 PROCEDURE — 3079F PR MOST RECENT DIASTOLIC BLOOD PRESSURE 80-89 MM HG: ICD-10-PCS | Mod: HCNC,CPTII,S$GLB, | Performed by: NURSE PRACTITIONER

## 2021-10-25 PROCEDURE — 3077F PR MOST RECENT SYSTOLIC BLOOD PRESSURE >= 140 MM HG: ICD-10-PCS | Mod: HCNC,CPTII,S$GLB, | Performed by: NURSE PRACTITIONER

## 2021-10-25 PROCEDURE — 99499 NO LOS: ICD-10-PCS | Mod: HCNC,S$GLB,, | Performed by: NURSE PRACTITIONER

## 2021-10-25 PROCEDURE — 1101F PT FALLS ASSESS-DOCD LE1/YR: CPT | Mod: HCNC,CPTII,S$GLB, | Performed by: NURSE PRACTITIONER

## 2021-10-25 PROCEDURE — 3072F LOW RISK FOR RETINOPATHY: CPT | Mod: HCNC,CPTII,S$GLB, | Performed by: NURSE PRACTITIONER

## 2021-10-25 PROCEDURE — 3066F NEPHROPATHY DOC TX: CPT | Mod: HCNC,CPTII,S$GLB, | Performed by: NURSE PRACTITIONER

## 2021-10-25 PROCEDURE — 4010F ACE/ARB THERAPY RXD/TAKEN: CPT | Mod: HCNC,CPTII,S$GLB, | Performed by: NURSE PRACTITIONER

## 2021-10-25 PROCEDURE — 1159F MED LIST DOCD IN RCRD: CPT | Mod: HCNC,CPTII,S$GLB, | Performed by: NURSE PRACTITIONER

## 2021-10-25 PROCEDURE — 3066F PR DOCUMENTATION OF TREATMENT FOR NEPHROPATHY: ICD-10-PCS | Mod: HCNC,CPTII,S$GLB, | Performed by: NURSE PRACTITIONER

## 2021-10-25 PROCEDURE — 3051F HG A1C>EQUAL 7.0%<8.0%: CPT | Mod: HCNC,CPTII,S$GLB, | Performed by: NURSE PRACTITIONER

## 2021-10-25 PROCEDURE — 4010F PR ACE/ARB THEARPY RXD/TAKEN: ICD-10-PCS | Mod: HCNC,CPTII,S$GLB, | Performed by: NURSE PRACTITIONER

## 2021-10-25 PROCEDURE — 99999 PR PBB SHADOW E&M-EST. PATIENT-LVL IV: ICD-10-PCS | Mod: PBBFAC,HCNC,, | Performed by: NURSE PRACTITIONER

## 2021-10-25 PROCEDURE — 3079F DIAST BP 80-89 MM HG: CPT | Mod: HCNC,CPTII,S$GLB, | Performed by: NURSE PRACTITIONER

## 2021-10-25 PROCEDURE — 1160F RVW MEDS BY RX/DR IN RCRD: CPT | Mod: HCNC,CPTII,S$GLB, | Performed by: NURSE PRACTITIONER

## 2021-10-25 PROCEDURE — 3051F PR MOST RECENT HEMOGLOBIN A1C LEVEL 7.0 - < 8.0%: ICD-10-PCS | Mod: HCNC,CPTII,S$GLB, | Performed by: NURSE PRACTITIONER

## 2021-10-25 PROCEDURE — 1126F AMNT PAIN NOTED NONE PRSNT: CPT | Mod: HCNC,CPTII,S$GLB, | Performed by: NURSE PRACTITIONER

## 2021-10-25 PROCEDURE — 1160F PR REVIEW ALL MEDS BY PRESCRIBER/CLIN PHARMACIST DOCUMENTED: ICD-10-PCS | Mod: HCNC,CPTII,S$GLB, | Performed by: NURSE PRACTITIONER

## 2021-10-25 PROCEDURE — 1101F PR PT FALLS ASSESS DOC 0-1 FALLS W/OUT INJ PAST YR: ICD-10-PCS | Mod: HCNC,CPTII,S$GLB, | Performed by: NURSE PRACTITIONER

## 2021-10-25 PROCEDURE — 1159F PR MEDICATION LIST DOCUMENTED IN MEDICAL RECORD: ICD-10-PCS | Mod: HCNC,CPTII,S$GLB, | Performed by: NURSE PRACTITIONER

## 2021-10-25 PROCEDURE — 3288F PR FALLS RISK ASSESSMENT DOCUMENTED: ICD-10-PCS | Mod: HCNC,CPTII,S$GLB, | Performed by: NURSE PRACTITIONER

## 2021-10-25 PROCEDURE — 3072F PR LOW RISK FOR RETINOPATHY: ICD-10-PCS | Mod: HCNC,CPTII,S$GLB, | Performed by: NURSE PRACTITIONER

## 2021-10-25 PROCEDURE — 99499 UNLISTED E&M SERVICE: CPT | Mod: HCNC,S$GLB,, | Performed by: NURSE PRACTITIONER

## 2021-10-25 PROCEDURE — 1126F PR PAIN SEVERITY QUANTIFIED, NO PAIN PRESENT: ICD-10-PCS | Mod: HCNC,CPTII,S$GLB, | Performed by: NURSE PRACTITIONER

## 2021-10-27 ENCOUNTER — TELEPHONE (OUTPATIENT)
Dept: INTERNAL MEDICINE | Facility: CLINIC | Age: 76
End: 2021-10-27

## 2021-10-27 ENCOUNTER — PATIENT MESSAGE (OUTPATIENT)
Dept: INTERNAL MEDICINE | Facility: CLINIC | Age: 76
End: 2021-10-27

## 2021-10-27 ENCOUNTER — PATIENT MESSAGE (OUTPATIENT)
Dept: INTERNAL MEDICINE | Facility: CLINIC | Age: 76
End: 2021-10-27
Payer: MEDICARE

## 2021-10-27 DIAGNOSIS — N52.9 ERECTILE DYSFUNCTION, UNSPECIFIED ERECTILE DYSFUNCTION TYPE: Primary | ICD-10-CM

## 2021-10-27 RX ORDER — SILDENAFIL 100 MG/1
100 TABLET, FILM COATED ORAL DAILY PRN
Qty: 30 TABLET | Refills: 2 | Status: SHIPPED | OUTPATIENT
Start: 2021-10-27 | End: 2021-11-03 | Stop reason: SDUPTHER

## 2021-10-29 ENCOUNTER — TELEPHONE (OUTPATIENT)
Dept: HEMATOLOGY/ONCOLOGY | Facility: CLINIC | Age: 76
End: 2021-10-29
Payer: MEDICARE

## 2021-10-29 DIAGNOSIS — N52.9 ERECTILE DYSFUNCTION, UNSPECIFIED ERECTILE DYSFUNCTION TYPE: ICD-10-CM

## 2021-10-29 DIAGNOSIS — E11.42 TYPE 2 DIABETES MELLITUS WITH DIABETIC POLYNEUROPATHY, WITH LONG-TERM CURRENT USE OF INSULIN: Chronic | ICD-10-CM

## 2021-10-29 DIAGNOSIS — Z79.4 TYPE 2 DIABETES MELLITUS WITH DIABETIC POLYNEUROPATHY, WITH LONG-TERM CURRENT USE OF INSULIN: Chronic | ICD-10-CM

## 2021-10-29 RX ORDER — SILDENAFIL 100 MG/1
TABLET, FILM COATED ORAL
Refills: 0 | OUTPATIENT
Start: 2021-10-29

## 2021-11-01 ENCOUNTER — TELEPHONE (OUTPATIENT)
Dept: HEMATOLOGY/ONCOLOGY | Facility: CLINIC | Age: 76
End: 2021-11-01
Payer: MEDICARE

## 2021-11-01 RX ORDER — PEN NEEDLE, DIABETIC 30 GX3/16"
NEEDLE, DISPOSABLE MISCELLANEOUS
Qty: 777 EACH | Refills: 0 | OUTPATIENT
Start: 2021-11-01

## 2021-11-02 ENCOUNTER — RESEARCH ENCOUNTER (OUTPATIENT)
Dept: HEMATOLOGY/ONCOLOGY | Facility: CLINIC | Age: 76
End: 2021-11-02
Payer: MEDICARE

## 2021-11-02 RX ORDER — INSULIN GLARGINE 100 [IU]/ML
INJECTION, SOLUTION SUBCUTANEOUS
Qty: 15 ML | Refills: 0 | OUTPATIENT
Start: 2021-11-02

## 2021-11-03 DIAGNOSIS — N52.9 ERECTILE DYSFUNCTION, UNSPECIFIED ERECTILE DYSFUNCTION TYPE: ICD-10-CM

## 2021-11-03 RX ORDER — SILDENAFIL 100 MG/1
100 TABLET, FILM COATED ORAL DAILY PRN
Qty: 30 TABLET | Refills: 2 | Status: SHIPPED | OUTPATIENT
Start: 2021-11-03 | End: 2021-11-14 | Stop reason: SDUPTHER

## 2021-11-04 ENCOUNTER — OFFICE VISIT (OUTPATIENT)
Dept: PODIATRY | Facility: CLINIC | Age: 76
End: 2021-11-04
Payer: MEDICARE

## 2021-11-04 ENCOUNTER — LAB VISIT (OUTPATIENT)
Dept: LAB | Facility: HOSPITAL | Age: 76
End: 2021-11-04
Attending: PODIATRIST
Payer: MEDICARE

## 2021-11-04 VITALS
WEIGHT: 201.25 LBS | SYSTOLIC BLOOD PRESSURE: 163 MMHG | TEMPERATURE: 99 F | BODY MASS INDEX: 28.88 KG/M2 | HEART RATE: 85 BPM | DIASTOLIC BLOOD PRESSURE: 79 MMHG

## 2021-11-04 DIAGNOSIS — M79.671 ACUTE FOOT PAIN, RIGHT: ICD-10-CM

## 2021-11-04 DIAGNOSIS — E11.42 TYPE 2 DIABETES MELLITUS WITH DIABETIC POLYNEUROPATHY, WITH LONG-TERM CURRENT USE OF INSULIN: ICD-10-CM

## 2021-11-04 DIAGNOSIS — Z79.4 TYPE 2 DIABETES MELLITUS WITH DIABETIC POLYNEUROPATHY, WITH LONG-TERM CURRENT USE OF INSULIN: ICD-10-CM

## 2021-11-04 DIAGNOSIS — M79.671 ACUTE FOOT PAIN, RIGHT: Primary | ICD-10-CM

## 2021-11-04 LAB — URATE SERPL-MCNC: 7.5 MG/DL (ref 3.4–7)

## 2021-11-04 PROCEDURE — 1125F PR PAIN SEVERITY QUANTIFIED, PAIN PRESENT: ICD-10-PCS | Mod: HCNC,CPTII,S$GLB, | Performed by: PODIATRIST

## 2021-11-04 PROCEDURE — 99999 PR PBB SHADOW E&M-EST. PATIENT-LVL IV: CPT | Mod: PBBFAC,HCNC,, | Performed by: PODIATRIST

## 2021-11-04 PROCEDURE — 3066F PR DOCUMENTATION OF TREATMENT FOR NEPHROPATHY: ICD-10-PCS | Mod: HCNC,CPTII,S$GLB, | Performed by: PODIATRIST

## 2021-11-04 PROCEDURE — 99999 PR PBB SHADOW E&M-EST. PATIENT-LVL IV: ICD-10-PCS | Mod: PBBFAC,HCNC,, | Performed by: PODIATRIST

## 2021-11-04 PROCEDURE — 3077F SYST BP >= 140 MM HG: CPT | Mod: HCNC,CPTII,S$GLB, | Performed by: PODIATRIST

## 2021-11-04 PROCEDURE — 1159F MED LIST DOCD IN RCRD: CPT | Mod: HCNC,CPTII,S$GLB, | Performed by: PODIATRIST

## 2021-11-04 PROCEDURE — 3078F PR MOST RECENT DIASTOLIC BLOOD PRESSURE < 80 MM HG: ICD-10-PCS | Mod: HCNC,CPTII,S$GLB, | Performed by: PODIATRIST

## 2021-11-04 PROCEDURE — 3051F PR MOST RECENT HEMOGLOBIN A1C LEVEL 7.0 - < 8.0%: ICD-10-PCS | Mod: HCNC,CPTII,S$GLB, | Performed by: PODIATRIST

## 2021-11-04 PROCEDURE — 36415 COLL VENOUS BLD VENIPUNCTURE: CPT | Mod: HCNC,PN | Performed by: PODIATRIST

## 2021-11-04 PROCEDURE — 84550 ASSAY OF BLOOD/URIC ACID: CPT | Mod: HCNC | Performed by: PODIATRIST

## 2021-11-04 PROCEDURE — 1125F AMNT PAIN NOTED PAIN PRSNT: CPT | Mod: HCNC,CPTII,S$GLB, | Performed by: PODIATRIST

## 2021-11-04 PROCEDURE — 4010F PR ACE/ARB THEARPY RXD/TAKEN: ICD-10-PCS | Mod: HCNC,CPTII,S$GLB, | Performed by: PODIATRIST

## 2021-11-04 PROCEDURE — 99214 OFFICE O/P EST MOD 30 MIN: CPT | Mod: HCNC,S$GLB,, | Performed by: PODIATRIST

## 2021-11-04 PROCEDURE — 3051F HG A1C>EQUAL 7.0%<8.0%: CPT | Mod: HCNC,CPTII,S$GLB, | Performed by: PODIATRIST

## 2021-11-04 PROCEDURE — 99214 PR OFFICE/OUTPT VISIT, EST, LEVL IV, 30-39 MIN: ICD-10-PCS | Mod: HCNC,S$GLB,, | Performed by: PODIATRIST

## 2021-11-04 PROCEDURE — 3077F PR MOST RECENT SYSTOLIC BLOOD PRESSURE >= 140 MM HG: ICD-10-PCS | Mod: HCNC,CPTII,S$GLB, | Performed by: PODIATRIST

## 2021-11-04 PROCEDURE — 3078F DIAST BP <80 MM HG: CPT | Mod: HCNC,CPTII,S$GLB, | Performed by: PODIATRIST

## 2021-11-04 PROCEDURE — 3066F NEPHROPATHY DOC TX: CPT | Mod: HCNC,CPTII,S$GLB, | Performed by: PODIATRIST

## 2021-11-04 PROCEDURE — 3072F PR LOW RISK FOR RETINOPATHY: ICD-10-PCS | Mod: HCNC,CPTII,S$GLB, | Performed by: PODIATRIST

## 2021-11-04 PROCEDURE — 3072F LOW RISK FOR RETINOPATHY: CPT | Mod: HCNC,CPTII,S$GLB, | Performed by: PODIATRIST

## 2021-11-04 PROCEDURE — 1159F PR MEDICATION LIST DOCUMENTED IN MEDICAL RECORD: ICD-10-PCS | Mod: HCNC,CPTII,S$GLB, | Performed by: PODIATRIST

## 2021-11-04 PROCEDURE — 4010F ACE/ARB THERAPY RXD/TAKEN: CPT | Mod: HCNC,CPTII,S$GLB, | Performed by: PODIATRIST

## 2021-11-05 ENCOUNTER — PATIENT MESSAGE (OUTPATIENT)
Dept: DIABETES | Facility: CLINIC | Age: 76
End: 2021-11-05
Payer: MEDICARE

## 2021-11-05 ENCOUNTER — PATIENT MESSAGE (OUTPATIENT)
Dept: INTERNAL MEDICINE | Facility: CLINIC | Age: 76
End: 2021-11-05
Payer: MEDICARE

## 2021-11-05 ENCOUNTER — PATIENT MESSAGE (OUTPATIENT)
Dept: HEMATOLOGY/ONCOLOGY | Facility: CLINIC | Age: 76
End: 2021-11-05
Payer: MEDICARE

## 2021-11-05 RX ORDER — PREDNISONE 10 MG/1
TABLET ORAL
Qty: 10 TABLET | Refills: 0 | Status: SHIPPED | OUTPATIENT
Start: 2021-11-05 | End: 2021-12-13

## 2021-11-09 ENCOUNTER — OFFICE VISIT (OUTPATIENT)
Dept: HEMATOLOGY/ONCOLOGY | Facility: CLINIC | Age: 76
End: 2021-11-09
Payer: MEDICARE

## 2021-11-09 ENCOUNTER — LAB VISIT (OUTPATIENT)
Dept: LAB | Facility: HOSPITAL | Age: 76
End: 2021-11-09
Attending: INTERNAL MEDICINE
Payer: MEDICARE

## 2021-11-09 ENCOUNTER — RESEARCH ENCOUNTER (OUTPATIENT)
Dept: HEMATOLOGY/ONCOLOGY | Facility: CLINIC | Age: 76
End: 2021-11-09
Payer: MEDICARE

## 2021-11-09 VITALS
OXYGEN SATURATION: 98 % | RESPIRATION RATE: 16 BRPM | WEIGHT: 198.44 LBS | HEIGHT: 70 IN | SYSTOLIC BLOOD PRESSURE: 154 MMHG | DIASTOLIC BLOOD PRESSURE: 84 MMHG | HEART RATE: 80 BPM | BODY MASS INDEX: 28.41 KG/M2

## 2021-11-09 DIAGNOSIS — E11.22 CKD STAGE 3 DUE TO TYPE 2 DIABETES MELLITUS: ICD-10-CM

## 2021-11-09 DIAGNOSIS — E11.42 TYPE 2 DIABETES MELLITUS WITH DIABETIC POLYNEUROPATHY, WITH LONG-TERM CURRENT USE OF INSULIN: ICD-10-CM

## 2021-11-09 DIAGNOSIS — D47.2 SMOLDERING MULTIPLE MYELOMA: Primary | ICD-10-CM

## 2021-11-09 DIAGNOSIS — Z79.4 TYPE 2 DIABETES MELLITUS WITH DIABETIC POLYNEUROPATHY, WITH LONG-TERM CURRENT USE OF INSULIN: ICD-10-CM

## 2021-11-09 DIAGNOSIS — Z00.6 RESEARCH EXAM: ICD-10-CM

## 2021-11-09 DIAGNOSIS — N18.30 CKD STAGE 3 DUE TO TYPE 2 DIABETES MELLITUS: ICD-10-CM

## 2021-11-09 DIAGNOSIS — D47.2 SMOLDERING MULTIPLE MYELOMA: ICD-10-CM

## 2021-11-09 LAB
ALBUMIN SERPL BCP-MCNC: 3.8 G/DL (ref 3.5–5.2)
ALP SERPL-CCNC: 59 U/L (ref 55–135)
ALT SERPL W/O P-5'-P-CCNC: 16 U/L (ref 10–44)
ANION GAP SERPL CALC-SCNC: 10 MMOL/L (ref 8–16)
AST SERPL-CCNC: 14 U/L (ref 10–40)
BASOPHILS # BLD AUTO: 0.03 K/UL (ref 0–0.2)
BASOPHILS NFR BLD: 0.4 % (ref 0–1.9)
BILIRUB SERPL-MCNC: 0.4 MG/DL (ref 0.1–1)
BUN SERPL-MCNC: 27 MG/DL (ref 8–23)
CALCIUM SERPL-MCNC: 10 MG/DL (ref 8.7–10.5)
CHLORIDE SERPL-SCNC: 102 MMOL/L (ref 95–110)
CO2 SERPL-SCNC: 25 MMOL/L (ref 23–29)
CREAT SERPL-MCNC: 2 MG/DL (ref 0.5–1.4)
DIFFERENTIAL METHOD: ABNORMAL
EOSINOPHIL # BLD AUTO: 0.1 K/UL (ref 0–0.5)
EOSINOPHIL NFR BLD: 1 % (ref 0–8)
ERYTHROCYTE [DISTWIDTH] IN BLOOD BY AUTOMATED COUNT: 13.8 % (ref 11.5–14.5)
EST. GFR  (AFRICAN AMERICAN): 36.7 ML/MIN/1.73 M^2
EST. GFR  (NON AFRICAN AMERICAN): 31.7 ML/MIN/1.73 M^2
GLUCOSE SERPL-MCNC: 331 MG/DL (ref 70–110)
HCT VFR BLD AUTO: 35.8 % (ref 40–54)
HGB BLD-MCNC: 11.3 G/DL (ref 14–18)
IGA SERPL-MCNC: 1702 MG/DL (ref 40–350)
IGG SERPL-MCNC: 1199 MG/DL (ref 650–1600)
IGM SERPL-MCNC: 41 MG/DL (ref 50–300)
IMM GRANULOCYTES # BLD AUTO: 0.02 K/UL (ref 0–0.04)
IMM GRANULOCYTES NFR BLD AUTO: 0.3 % (ref 0–0.5)
LDH SERPL L TO P-CCNC: 102 U/L (ref 110–260)
LYMPHOCYTES # BLD AUTO: 1.6 K/UL (ref 1–4.8)
LYMPHOCYTES NFR BLD: 21.7 % (ref 18–48)
MAGNESIUM SERPL-MCNC: 2 MG/DL (ref 1.6–2.6)
MCH RBC QN AUTO: 28.8 PG (ref 27–31)
MCHC RBC AUTO-ENTMCNC: 31.6 G/DL (ref 32–36)
MCV RBC AUTO: 91 FL (ref 82–98)
MONOCYTES # BLD AUTO: 0.3 K/UL (ref 0.3–1)
MONOCYTES NFR BLD: 4.5 % (ref 4–15)
NEUTROPHILS # BLD AUTO: 5.2 K/UL (ref 1.8–7.7)
NEUTROPHILS NFR BLD: 72.1 % (ref 38–73)
NRBC BLD-RTO: 0 /100 WBC
PHOSPHATE SERPL-MCNC: 2.7 MG/DL (ref 2.7–4.5)
PLATELET # BLD AUTO: 288 K/UL (ref 150–450)
PMV BLD AUTO: 9.2 FL (ref 9.2–12.9)
POTASSIUM SERPL-SCNC: 4.8 MMOL/L (ref 3.5–5.1)
PROT SERPL-MCNC: 9.2 G/DL (ref 6–8.4)
RBC # BLD AUTO: 3.93 M/UL (ref 4.6–6.2)
SODIUM SERPL-SCNC: 137 MMOL/L (ref 136–145)
WBC # BLD AUTO: 7.15 K/UL (ref 3.9–12.7)

## 2021-11-09 PROCEDURE — 1160F PR REVIEW ALL MEDS BY PRESCRIBER/CLIN PHARMACIST DOCUMENTED: ICD-10-PCS | Mod: HCNC,CPTII,S$GLB, | Performed by: INTERNAL MEDICINE

## 2021-11-09 PROCEDURE — 3051F PR MOST RECENT HEMOGLOBIN A1C LEVEL 7.0 - < 8.0%: ICD-10-PCS | Mod: HCNC,CPTII,S$GLB, | Performed by: INTERNAL MEDICINE

## 2021-11-09 PROCEDURE — 3066F PR DOCUMENTATION OF TREATMENT FOR NEPHROPATHY: ICD-10-PCS | Mod: HCNC,CPTII,S$GLB, | Performed by: INTERNAL MEDICINE

## 2021-11-09 PROCEDURE — 1101F PR PT FALLS ASSESS DOC 0-1 FALLS W/OUT INJ PAST YR: ICD-10-PCS | Mod: HCNC,CPTII,S$GLB, | Performed by: INTERNAL MEDICINE

## 2021-11-09 PROCEDURE — 1126F PR PAIN SEVERITY QUANTIFIED, NO PAIN PRESENT: ICD-10-PCS | Mod: HCNC,CPTII,S$GLB, | Performed by: INTERNAL MEDICINE

## 2021-11-09 PROCEDURE — 1101F PT FALLS ASSESS-DOCD LE1/YR: CPT | Mod: HCNC,CPTII,S$GLB, | Performed by: INTERNAL MEDICINE

## 2021-11-09 PROCEDURE — 3072F PR LOW RISK FOR RETINOPATHY: ICD-10-PCS | Mod: HCNC,CPTII,S$GLB, | Performed by: INTERNAL MEDICINE

## 2021-11-09 PROCEDURE — 3079F DIAST BP 80-89 MM HG: CPT | Mod: HCNC,CPTII,S$GLB, | Performed by: INTERNAL MEDICINE

## 2021-11-09 PROCEDURE — 99999 PR PBB SHADOW E&M-EST. PATIENT-LVL V: CPT | Mod: PBBFAC,HCNC,, | Performed by: INTERNAL MEDICINE

## 2021-11-09 PROCEDURE — 99999 PR PBB SHADOW E&M-EST. PATIENT-LVL V: ICD-10-PCS | Mod: PBBFAC,HCNC,, | Performed by: INTERNAL MEDICINE

## 2021-11-09 PROCEDURE — 4010F ACE/ARB THERAPY RXD/TAKEN: CPT | Mod: HCNC,CPTII,S$GLB, | Performed by: INTERNAL MEDICINE

## 2021-11-09 PROCEDURE — 85025 COMPLETE CBC W/AUTO DIFF WBC: CPT | Mod: HCNC | Performed by: INTERNAL MEDICINE

## 2021-11-09 PROCEDURE — 3072F LOW RISK FOR RETINOPATHY: CPT | Mod: HCNC,CPTII,S$GLB, | Performed by: INTERNAL MEDICINE

## 2021-11-09 PROCEDURE — 4010F PR ACE/ARB THEARPY RXD/TAKEN: ICD-10-PCS | Mod: HCNC,CPTII,S$GLB, | Performed by: INTERNAL MEDICINE

## 2021-11-09 PROCEDURE — 3079F PR MOST RECENT DIASTOLIC BLOOD PRESSURE 80-89 MM HG: ICD-10-PCS | Mod: HCNC,CPTII,S$GLB, | Performed by: INTERNAL MEDICINE

## 2021-11-09 PROCEDURE — 84165 PATHOLOGIST INTERPRETATION SPE: ICD-10-PCS | Mod: 26,HCNC,, | Performed by: PATHOLOGY

## 2021-11-09 PROCEDURE — 82784 ASSAY IGA/IGD/IGG/IGM EACH: CPT | Mod: HCNC | Performed by: INTERNAL MEDICINE

## 2021-11-09 PROCEDURE — 99215 OFFICE O/P EST HI 40 MIN: CPT | Mod: HCNC,S$GLB,, | Performed by: INTERNAL MEDICINE

## 2021-11-09 PROCEDURE — 83520 IMMUNOASSAY QUANT NOS NONAB: CPT | Mod: HCNC,Q1 | Performed by: INTERNAL MEDICINE

## 2021-11-09 PROCEDURE — 3077F PR MOST RECENT SYSTOLIC BLOOD PRESSURE >= 140 MM HG: ICD-10-PCS | Mod: HCNC,CPTII,S$GLB, | Performed by: INTERNAL MEDICINE

## 2021-11-09 PROCEDURE — 36415 COLL VENOUS BLD VENIPUNCTURE: CPT | Mod: HCNC | Performed by: INTERNAL MEDICINE

## 2021-11-09 PROCEDURE — 84165 PROTEIN E-PHORESIS SERUM: CPT | Mod: HCNC | Performed by: INTERNAL MEDICINE

## 2021-11-09 PROCEDURE — 83735 ASSAY OF MAGNESIUM: CPT | Mod: HCNC,Q1 | Performed by: INTERNAL MEDICINE

## 2021-11-09 PROCEDURE — 1159F MED LIST DOCD IN RCRD: CPT | Mod: HCNC,CPTII,S$GLB, | Performed by: INTERNAL MEDICINE

## 2021-11-09 PROCEDURE — 3051F HG A1C>EQUAL 7.0%<8.0%: CPT | Mod: HCNC,CPTII,S$GLB, | Performed by: INTERNAL MEDICINE

## 2021-11-09 PROCEDURE — 83615 LACTATE (LD) (LDH) ENZYME: CPT | Mod: HCNC,Q1 | Performed by: INTERNAL MEDICINE

## 2021-11-09 PROCEDURE — 86334 IMMUNOFIX E-PHORESIS SERUM: CPT | Mod: 26,HCNC,, | Performed by: PATHOLOGY

## 2021-11-09 PROCEDURE — 1126F AMNT PAIN NOTED NONE PRSNT: CPT | Mod: HCNC,CPTII,S$GLB, | Performed by: INTERNAL MEDICINE

## 2021-11-09 PROCEDURE — 1160F RVW MEDS BY RX/DR IN RCRD: CPT | Mod: HCNC,CPTII,S$GLB, | Performed by: INTERNAL MEDICINE

## 2021-11-09 PROCEDURE — 86334 IMMUNOFIX E-PHORESIS SERUM: CPT | Mod: HCNC | Performed by: INTERNAL MEDICINE

## 2021-11-09 PROCEDURE — 86334 PATHOLOGIST INTERPRETATION IFE: ICD-10-PCS | Mod: 26,HCNC,, | Performed by: PATHOLOGY

## 2021-11-09 PROCEDURE — 80053 COMPREHEN METABOLIC PANEL: CPT | Mod: HCNC | Performed by: INTERNAL MEDICINE

## 2021-11-09 PROCEDURE — 3066F NEPHROPATHY DOC TX: CPT | Mod: HCNC,CPTII,S$GLB, | Performed by: INTERNAL MEDICINE

## 2021-11-09 PROCEDURE — 84100 ASSAY OF PHOSPHORUS: CPT | Mod: HCNC | Performed by: INTERNAL MEDICINE

## 2021-11-09 PROCEDURE — 3288F FALL RISK ASSESSMENT DOCD: CPT | Mod: HCNC,CPTII,S$GLB, | Performed by: INTERNAL MEDICINE

## 2021-11-09 PROCEDURE — 3077F SYST BP >= 140 MM HG: CPT | Mod: HCNC,CPTII,S$GLB, | Performed by: INTERNAL MEDICINE

## 2021-11-09 PROCEDURE — 1159F PR MEDICATION LIST DOCUMENTED IN MEDICAL RECORD: ICD-10-PCS | Mod: HCNC,CPTII,S$GLB, | Performed by: INTERNAL MEDICINE

## 2021-11-09 PROCEDURE — 99215 PR OFFICE/OUTPT VISIT, EST, LEVL V, 40-54 MIN: ICD-10-PCS | Mod: HCNC,S$GLB,, | Performed by: INTERNAL MEDICINE

## 2021-11-09 PROCEDURE — 84165 PROTEIN E-PHORESIS SERUM: CPT | Mod: 26,HCNC,, | Performed by: PATHOLOGY

## 2021-11-09 PROCEDURE — 3288F PR FALLS RISK ASSESSMENT DOCUMENTED: ICD-10-PCS | Mod: HCNC,CPTII,S$GLB, | Performed by: INTERNAL MEDICINE

## 2021-11-10 LAB
ALBUMIN SERPL ELPH-MCNC: 3.99 G/DL (ref 3.35–5.55)
ALPHA1 GLOB SERPL ELPH-MCNC: 0.32 G/DL (ref 0.17–0.41)
ALPHA2 GLOB SERPL ELPH-MCNC: 1 G/DL (ref 0.43–0.99)
B-GLOBULIN SERPL ELPH-MCNC: 2.69 G/DL (ref 0.5–1.1)
GAMMA GLOB SERPL ELPH-MCNC: 0.7 G/DL (ref 0.67–1.58)
INTERPRETATION SERPL IFE-IMP: NORMAL
KAPPA LC SER QL IA: 9.21 MG/DL (ref 0.33–1.94)
KAPPA LC/LAMBDA SER IA: 5.12 (ref 0.26–1.65)
LAMBDA LC SER QL IA: 1.8 MG/DL (ref 0.57–2.63)
PATHOLOGIST INTERPRETATION IFE: NORMAL
PROT SERPL-MCNC: 8.7 G/DL (ref 6–8.4)

## 2021-11-11 LAB — PATHOLOGIST INTERPRETATION SPE: NORMAL

## 2021-11-12 ENCOUNTER — LAB VISIT (OUTPATIENT)
Dept: LAB | Facility: OTHER | Age: 76
End: 2021-11-12
Attending: INTERNAL MEDICINE
Payer: MEDICARE

## 2021-11-12 DIAGNOSIS — E11.42 TYPE 2 DIABETES MELLITUS WITH DIABETIC POLYNEUROPATHY, WITH LONG-TERM CURRENT USE OF INSULIN: Chronic | ICD-10-CM

## 2021-11-12 DIAGNOSIS — Z79.4 TYPE 2 DIABETES MELLITUS WITH DIABETIC POLYNEUROPATHY, WITH LONG-TERM CURRENT USE OF INSULIN: Chronic | ICD-10-CM

## 2021-11-12 LAB
ESTIMATED AVG GLUCOSE: 174 MG/DL (ref 68–131)
HBA1C MFR BLD: 7.7 % (ref 4–5.6)

## 2021-11-12 PROCEDURE — 36415 COLL VENOUS BLD VENIPUNCTURE: CPT | Mod: HCNC | Performed by: INTERNAL MEDICINE

## 2021-11-12 PROCEDURE — 83036 HEMOGLOBIN GLYCOSYLATED A1C: CPT | Mod: HCNC | Performed by: INTERNAL MEDICINE

## 2021-11-13 ENCOUNTER — TELEPHONE (OUTPATIENT)
Dept: INTERNAL MEDICINE | Facility: CLINIC | Age: 76
End: 2021-11-13
Payer: MEDICARE

## 2021-11-13 DIAGNOSIS — Z79.4 TYPE 2 DIABETES MELLITUS WITH OTHER CIRCULATORY COMPLICATION, WITH LONG-TERM CURRENT USE OF INSULIN: Primary | ICD-10-CM

## 2021-11-13 DIAGNOSIS — E11.59 TYPE 2 DIABETES MELLITUS WITH OTHER CIRCULATORY COMPLICATION, WITH LONG-TERM CURRENT USE OF INSULIN: Primary | ICD-10-CM

## 2021-11-14 ENCOUNTER — PATIENT MESSAGE (OUTPATIENT)
Dept: INTERNAL MEDICINE | Facility: CLINIC | Age: 76
End: 2021-11-14
Payer: MEDICARE

## 2021-11-14 ENCOUNTER — PATIENT MESSAGE (OUTPATIENT)
Dept: OPHTHALMOLOGY | Facility: CLINIC | Age: 76
End: 2021-11-14
Payer: MEDICARE

## 2021-11-14 DIAGNOSIS — D47.2 SMOLDERING MULTIPLE MYELOMA: ICD-10-CM

## 2021-11-14 DIAGNOSIS — N52.9 ERECTILE DYSFUNCTION, UNSPECIFIED ERECTILE DYSFUNCTION TYPE: ICD-10-CM

## 2021-11-14 RX ORDER — SILDENAFIL 100 MG/1
100 TABLET, FILM COATED ORAL DAILY PRN
Qty: 30 TABLET | Refills: 2 | Status: SHIPPED | OUTPATIENT
Start: 2021-11-14 | End: 2022-04-25 | Stop reason: SDUPTHER

## 2021-11-14 RX ORDER — CITALOPRAM 20 MG/1
20 TABLET, FILM COATED ORAL DAILY
Qty: 90 TABLET | Refills: 0 | Status: SHIPPED | OUTPATIENT
Start: 2021-11-14 | End: 2021-12-13 | Stop reason: SDUPTHER

## 2021-11-26 DIAGNOSIS — N18.32 STAGE 3B CHRONIC KIDNEY DISEASE: Primary | ICD-10-CM

## 2021-11-30 ENCOUNTER — CLINICAL SUPPORT (OUTPATIENT)
Dept: REHABILITATION | Facility: OTHER | Age: 76
End: 2021-11-30
Attending: INTERNAL MEDICINE
Payer: MEDICARE

## 2021-11-30 ENCOUNTER — PROCEDURE VISIT (OUTPATIENT)
Dept: OPHTHALMOLOGY | Facility: CLINIC | Age: 76
End: 2021-11-30
Payer: MEDICARE

## 2021-11-30 DIAGNOSIS — E11.3292 MILD NONPROLIFERATIVE DIABETIC RETINOPATHY OF LEFT EYE WITHOUT MACULAR EDEMA ASSOCIATED WITH TYPE 2 DIABETES MELLITUS: ICD-10-CM

## 2021-11-30 DIAGNOSIS — R29.3 POOR POSTURE: ICD-10-CM

## 2021-11-30 DIAGNOSIS — R29.898 DECREASED ROM OF NECK: ICD-10-CM

## 2021-11-30 DIAGNOSIS — H40.1132 PRIMARY OPEN ANGLE GLAUCOMA OF BOTH EYES, MODERATE STAGE: ICD-10-CM

## 2021-11-30 DIAGNOSIS — H35.033 HYPERTENSIVE RETINOPATHY OF BOTH EYES: ICD-10-CM

## 2021-11-30 DIAGNOSIS — M62.81 WEAKNESS OF TRUNK MUSCULATURE: ICD-10-CM

## 2021-11-30 DIAGNOSIS — E08.3211 MILD NONPROLIFERATIVE DIABETIC RETINOPATHY OF RIGHT EYE WITH MACULAR EDEMA ASSOCIATED WITH DIABETES MELLITUS DUE TO UNDERLYING CONDITION: Primary | ICD-10-CM

## 2021-11-30 DIAGNOSIS — R68.89 DECREASED FUNCTIONAL ACTIVITY TOLERANCE: ICD-10-CM

## 2021-11-30 PROCEDURE — 97110 THERAPEUTIC EXERCISES: CPT | Mod: HCNC

## 2021-11-30 PROCEDURE — 92014 PR EYE EXAM, EST PATIENT,COMPREHESV: ICD-10-PCS | Mod: 25,HCNC,S$GLB, | Performed by: OPHTHALMOLOGY

## 2021-11-30 PROCEDURE — 67028 INJECTION EYE DRUG: CPT | Mod: HCNC,RT,S$GLB, | Performed by: OPHTHALMOLOGY

## 2021-11-30 PROCEDURE — 67028 PR INJECT INTRAVITREAL PHARMCOLOGIC: ICD-10-PCS | Mod: HCNC,RT,S$GLB, | Performed by: OPHTHALMOLOGY

## 2021-11-30 PROCEDURE — 92134 CPTRZ OPH DX IMG PST SGM RTA: CPT | Mod: HCNC,S$GLB,, | Performed by: OPHTHALMOLOGY

## 2021-11-30 PROCEDURE — 92134 OCT, RETINA - OU - BOTH EYES: ICD-10-PCS | Mod: HCNC,S$GLB,, | Performed by: OPHTHALMOLOGY

## 2021-11-30 PROCEDURE — 97162 PT EVAL MOD COMPLEX 30 MIN: CPT | Mod: HCNC

## 2021-11-30 PROCEDURE — 92014 COMPRE OPH EXAM EST PT 1/>: CPT | Mod: 25,HCNC,S$GLB, | Performed by: OPHTHALMOLOGY

## 2021-11-30 RX ADMIN — Medication 1.25 MG: at 02:11

## 2021-12-02 ENCOUNTER — PATIENT MESSAGE (OUTPATIENT)
Dept: RESEARCH | Facility: HOSPITAL | Age: 76
End: 2021-12-02
Payer: MEDICARE

## 2021-12-02 ENCOUNTER — PATIENT OUTREACH (OUTPATIENT)
Dept: ADMINISTRATIVE | Facility: HOSPITAL | Age: 76
End: 2021-12-02
Payer: MEDICARE

## 2021-12-02 DIAGNOSIS — Z12.12 ENCOUNTER FOR COLORECTAL CANCER SCREENING: Primary | ICD-10-CM

## 2021-12-02 DIAGNOSIS — Z12.11 ENCOUNTER FOR COLORECTAL CANCER SCREENING: Primary | ICD-10-CM

## 2021-12-03 ENCOUNTER — PATIENT MESSAGE (OUTPATIENT)
Dept: INTERNAL MEDICINE | Facility: CLINIC | Age: 76
End: 2021-12-03
Payer: MEDICARE

## 2021-12-03 DIAGNOSIS — D47.2 SMOLDERING MULTIPLE MYELOMA: ICD-10-CM

## 2021-12-03 RX ORDER — DIAZEPAM 5 MG/1
5 TABLET ORAL EVERY 12 HOURS PRN
Qty: 60 TABLET | Refills: 0 | Status: SHIPPED | OUTPATIENT
Start: 2021-12-03 | End: 2022-01-27 | Stop reason: SDUPTHER

## 2021-12-07 ENCOUNTER — RESEARCH ENCOUNTER (OUTPATIENT)
Dept: HEMATOLOGY/ONCOLOGY | Facility: CLINIC | Age: 76
End: 2021-12-07
Payer: MEDICARE

## 2021-12-07 ENCOUNTER — TELEPHONE (OUTPATIENT)
Dept: REHABILITATION | Facility: OTHER | Age: 76
End: 2021-12-07
Payer: MEDICARE

## 2021-12-07 ENCOUNTER — LAB VISIT (OUTPATIENT)
Dept: LAB | Facility: HOSPITAL | Age: 76
End: 2021-12-07
Payer: MEDICARE

## 2021-12-07 ENCOUNTER — OFFICE VISIT (OUTPATIENT)
Dept: HEMATOLOGY/ONCOLOGY | Facility: CLINIC | Age: 76
End: 2021-12-07
Payer: MEDICARE

## 2021-12-07 VITALS
OXYGEN SATURATION: 98 % | HEART RATE: 87 BPM | HEIGHT: 70 IN | DIASTOLIC BLOOD PRESSURE: 70 MMHG | WEIGHT: 199.19 LBS | BODY MASS INDEX: 28.52 KG/M2 | TEMPERATURE: 98 F | RESPIRATION RATE: 16 BRPM | SYSTOLIC BLOOD PRESSURE: 136 MMHG

## 2021-12-07 DIAGNOSIS — E11.22 CKD STAGE 3 DUE TO TYPE 2 DIABETES MELLITUS: ICD-10-CM

## 2021-12-07 DIAGNOSIS — D47.2 SMOLDERING MULTIPLE MYELOMA: ICD-10-CM

## 2021-12-07 DIAGNOSIS — Z79.4 TYPE 2 DIABETES MELLITUS WITH DIABETIC POLYNEUROPATHY, WITH LONG-TERM CURRENT USE OF INSULIN: ICD-10-CM

## 2021-12-07 DIAGNOSIS — D47.2 SMOLDERING MULTIPLE MYELOMA: Primary | ICD-10-CM

## 2021-12-07 DIAGNOSIS — Z00.6 RESEARCH EXAM: ICD-10-CM

## 2021-12-07 DIAGNOSIS — N18.30 CKD STAGE 3 DUE TO TYPE 2 DIABETES MELLITUS: ICD-10-CM

## 2021-12-07 DIAGNOSIS — E11.42 TYPE 2 DIABETES MELLITUS WITH DIABETIC POLYNEUROPATHY, WITH LONG-TERM CURRENT USE OF INSULIN: ICD-10-CM

## 2021-12-07 LAB
ALBUMIN SERPL BCP-MCNC: 3.7 G/DL (ref 3.5–5.2)
ALP SERPL-CCNC: 61 U/L (ref 55–135)
ALT SERPL W/O P-5'-P-CCNC: 22 U/L (ref 10–44)
ANION GAP SERPL CALC-SCNC: 15 MMOL/L (ref 8–16)
AST SERPL-CCNC: 24 U/L (ref 10–40)
BASOPHILS # BLD AUTO: 0.03 K/UL (ref 0–0.2)
BASOPHILS NFR BLD: 0.7 % (ref 0–1.9)
BILIRUB SERPL-MCNC: 0.5 MG/DL (ref 0.1–1)
BUN SERPL-MCNC: 16 MG/DL (ref 8–23)
CALCIUM SERPL-MCNC: 9.9 MG/DL (ref 8.7–10.5)
CHLORIDE SERPL-SCNC: 101 MMOL/L (ref 95–110)
CO2 SERPL-SCNC: 20 MMOL/L (ref 23–29)
CREAT SERPL-MCNC: 1.8 MG/DL (ref 0.5–1.4)
DIFFERENTIAL METHOD: ABNORMAL
EOSINOPHIL # BLD AUTO: 0.2 K/UL (ref 0–0.5)
EOSINOPHIL NFR BLD: 5.5 % (ref 0–8)
ERYTHROCYTE [DISTWIDTH] IN BLOOD BY AUTOMATED COUNT: 14.1 % (ref 11.5–14.5)
EST. GFR  (AFRICAN AMERICAN): 41.6 ML/MIN/1.73 M^2
EST. GFR  (NON AFRICAN AMERICAN): 36 ML/MIN/1.73 M^2
GLUCOSE SERPL-MCNC: 235 MG/DL (ref 70–110)
HCT VFR BLD AUTO: 36.8 % (ref 40–54)
HGB BLD-MCNC: 11.5 G/DL (ref 14–18)
IGA SERPL-MCNC: 1798 MG/DL (ref 40–350)
IGG SERPL-MCNC: 1110 MG/DL (ref 650–1600)
IGM SERPL-MCNC: 47 MG/DL (ref 50–300)
IMM GRANULOCYTES # BLD AUTO: 0 K/UL (ref 0–0.04)
IMM GRANULOCYTES NFR BLD AUTO: 0 % (ref 0–0.5)
LDH SERPL L TO P-CCNC: 114 U/L (ref 110–260)
LYMPHOCYTES # BLD AUTO: 1.7 K/UL (ref 1–4.8)
LYMPHOCYTES NFR BLD: 38.4 % (ref 18–48)
MAGNESIUM SERPL-MCNC: 1.6 MG/DL (ref 1.6–2.6)
MCH RBC QN AUTO: 28 PG (ref 27–31)
MCHC RBC AUTO-ENTMCNC: 31.3 G/DL (ref 32–36)
MCV RBC AUTO: 90 FL (ref 82–98)
MONOCYTES # BLD AUTO: 0.3 K/UL (ref 0.3–1)
MONOCYTES NFR BLD: 7.3 % (ref 4–15)
NEUTROPHILS # BLD AUTO: 2.1 K/UL (ref 1.8–7.7)
NEUTROPHILS NFR BLD: 48.1 % (ref 38–73)
NRBC BLD-RTO: 0 /100 WBC
PHOSPHATE SERPL-MCNC: 2.7 MG/DL (ref 2.7–4.5)
PLATELET # BLD AUTO: 275 K/UL (ref 150–450)
PMV BLD AUTO: 8.9 FL (ref 9.2–12.9)
POTASSIUM SERPL-SCNC: 4.7 MMOL/L (ref 3.5–5.1)
PROT SERPL-MCNC: 9.3 G/DL (ref 6–8.4)
RBC # BLD AUTO: 4.1 M/UL (ref 4.6–6.2)
SODIUM SERPL-SCNC: 136 MMOL/L (ref 136–145)
WBC # BLD AUTO: 4.37 K/UL (ref 3.9–12.7)

## 2021-12-07 PROCEDURE — 85025 COMPLETE CBC W/AUTO DIFF WBC: CPT | Mod: HCNC,Q1 | Performed by: INTERNAL MEDICINE

## 2021-12-07 PROCEDURE — 84165 PROTEIN E-PHORESIS SERUM: CPT | Mod: HCNC | Performed by: INTERNAL MEDICINE

## 2021-12-07 PROCEDURE — 84165 PROTEIN E-PHORESIS SERUM: CPT | Mod: 26,Q1,HCNC, | Performed by: PATHOLOGY

## 2021-12-07 PROCEDURE — 83520 IMMUNOASSAY QUANT NOS NONAB: CPT | Mod: HCNC | Performed by: INTERNAL MEDICINE

## 2021-12-07 PROCEDURE — 36415 COLL VENOUS BLD VENIPUNCTURE: CPT | Mod: HCNC | Performed by: INTERNAL MEDICINE

## 2021-12-07 PROCEDURE — 86334 PATHOLOGIST INTERPRETATION IFE: ICD-10-PCS | Mod: 26,Q1,HCNC, | Performed by: PATHOLOGY

## 2021-12-07 PROCEDURE — 3072F LOW RISK FOR RETINOPATHY: CPT | Mod: HCNC,CPTII,S$GLB, | Performed by: INTERNAL MEDICINE

## 2021-12-07 PROCEDURE — 86334 IMMUNOFIX E-PHORESIS SERUM: CPT | Mod: HCNC,Q1 | Performed by: INTERNAL MEDICINE

## 2021-12-07 PROCEDURE — 80053 COMPREHEN METABOLIC PANEL: CPT | Mod: HCNC | Performed by: INTERNAL MEDICINE

## 2021-12-07 PROCEDURE — 82784 ASSAY IGA/IGD/IGG/IGM EACH: CPT | Mod: 59,HCNC | Performed by: INTERNAL MEDICINE

## 2021-12-07 PROCEDURE — 99215 PR OFFICE/OUTPT VISIT, EST, LEVL V, 40-54 MIN: ICD-10-PCS | Mod: HCNC,S$GLB,, | Performed by: INTERNAL MEDICINE

## 2021-12-07 PROCEDURE — 99999 PR PBB SHADOW E&M-EST. PATIENT-LVL V: CPT | Mod: PBBFAC,HCNC,, | Performed by: INTERNAL MEDICINE

## 2021-12-07 PROCEDURE — 4010F PR ACE/ARB THEARPY RXD/TAKEN: ICD-10-PCS | Mod: HCNC,CPTII,S$GLB, | Performed by: INTERNAL MEDICINE

## 2021-12-07 PROCEDURE — 84165 PATHOLOGIST INTERPRETATION SPE: ICD-10-PCS | Mod: 26,Q1,HCNC, | Performed by: PATHOLOGY

## 2021-12-07 PROCEDURE — 84100 ASSAY OF PHOSPHORUS: CPT | Mod: HCNC | Performed by: INTERNAL MEDICINE

## 2021-12-07 PROCEDURE — 99215 OFFICE O/P EST HI 40 MIN: CPT | Mod: HCNC,S$GLB,, | Performed by: INTERNAL MEDICINE

## 2021-12-07 PROCEDURE — 3066F PR DOCUMENTATION OF TREATMENT FOR NEPHROPATHY: ICD-10-PCS | Mod: HCNC,CPTII,S$GLB, | Performed by: INTERNAL MEDICINE

## 2021-12-07 PROCEDURE — 3072F PR LOW RISK FOR RETINOPATHY: ICD-10-PCS | Mod: HCNC,CPTII,S$GLB, | Performed by: INTERNAL MEDICINE

## 2021-12-07 PROCEDURE — 83735 ASSAY OF MAGNESIUM: CPT | Mod: HCNC | Performed by: INTERNAL MEDICINE

## 2021-12-07 PROCEDURE — 99999 PR PBB SHADOW E&M-EST. PATIENT-LVL V: ICD-10-PCS | Mod: PBBFAC,HCNC,, | Performed by: INTERNAL MEDICINE

## 2021-12-07 PROCEDURE — 3066F NEPHROPATHY DOC TX: CPT | Mod: HCNC,CPTII,S$GLB, | Performed by: INTERNAL MEDICINE

## 2021-12-07 PROCEDURE — 83615 LACTATE (LD) (LDH) ENZYME: CPT | Mod: HCNC | Performed by: INTERNAL MEDICINE

## 2021-12-07 PROCEDURE — 86334 IMMUNOFIX E-PHORESIS SERUM: CPT | Mod: 26,Q1,HCNC, | Performed by: PATHOLOGY

## 2021-12-07 PROCEDURE — 4010F ACE/ARB THERAPY RXD/TAKEN: CPT | Mod: HCNC,CPTII,S$GLB, | Performed by: INTERNAL MEDICINE

## 2021-12-08 LAB
ALBUMIN SERPL ELPH-MCNC: 3.92 G/DL (ref 3.35–5.55)
ALPHA1 GLOB SERPL ELPH-MCNC: 0.36 G/DL (ref 0.17–0.41)
ALPHA2 GLOB SERPL ELPH-MCNC: 0.99 G/DL (ref 0.43–0.99)
B-GLOBULIN SERPL ELPH-MCNC: 2.79 G/DL (ref 0.5–1.1)
GAMMA GLOB SERPL ELPH-MCNC: 0.64 G/DL (ref 0.67–1.58)
INTERPRETATION SERPL IFE-IMP: NORMAL
KAPPA LC SER QL IA: 6.91 MG/DL (ref 0.33–1.94)
KAPPA LC/LAMBDA SER IA: 3.29 (ref 0.26–1.65)
LAMBDA LC SER QL IA: 2.1 MG/DL (ref 0.57–2.63)
PROT SERPL-MCNC: 8.7 G/DL (ref 6–8.4)

## 2021-12-09 LAB
PATHOLOGIST INTERPRETATION IFE: NORMAL
PATHOLOGIST INTERPRETATION SPE: NORMAL

## 2021-12-13 ENCOUNTER — OFFICE VISIT (OUTPATIENT)
Dept: INTERNAL MEDICINE | Facility: CLINIC | Age: 76
End: 2021-12-13
Payer: MEDICARE

## 2021-12-13 ENCOUNTER — LAB VISIT (OUTPATIENT)
Dept: LAB | Facility: HOSPITAL | Age: 76
End: 2021-12-13
Attending: INTERNAL MEDICINE
Payer: MEDICARE

## 2021-12-13 ENCOUNTER — IMMUNIZATION (OUTPATIENT)
Dept: INTERNAL MEDICINE | Facility: CLINIC | Age: 76
End: 2021-12-13
Payer: MEDICARE

## 2021-12-13 VITALS
SYSTOLIC BLOOD PRESSURE: 134 MMHG | BODY MASS INDEX: 28.47 KG/M2 | DIASTOLIC BLOOD PRESSURE: 68 MMHG | OXYGEN SATURATION: 98 % | HEIGHT: 70 IN | HEART RATE: 92 BPM | WEIGHT: 198.88 LBS

## 2021-12-13 DIAGNOSIS — M17.9 OSTEOARTHRITIS OF KNEE, UNSPECIFIED LATERALITY, UNSPECIFIED OSTEOARTHRITIS TYPE: ICD-10-CM

## 2021-12-13 DIAGNOSIS — N18.31 STAGE 3A CHRONIC KIDNEY DISEASE: ICD-10-CM

## 2021-12-13 DIAGNOSIS — K21.9 GASTROESOPHAGEAL REFLUX DISEASE WITHOUT ESOPHAGITIS: ICD-10-CM

## 2021-12-13 DIAGNOSIS — M47.812 OSTEOARTHRITIS OF CERVICAL SPINE, UNSPECIFIED SPINAL OSTEOARTHRITIS COMPLICATION STATUS: ICD-10-CM

## 2021-12-13 DIAGNOSIS — E55.9 VITAMIN D DEFICIENCY: ICD-10-CM

## 2021-12-13 DIAGNOSIS — Z12.11 SCREENING FOR MALIGNANT NEOPLASM OF COLON: ICD-10-CM

## 2021-12-13 DIAGNOSIS — Z12.5 SCREENING PSA (PROSTATE SPECIFIC ANTIGEN): Primary | ICD-10-CM

## 2021-12-13 DIAGNOSIS — N18.31 STAGE 3A CHRONIC KIDNEY DISEASE: Chronic | ICD-10-CM

## 2021-12-13 DIAGNOSIS — I10 ESSENTIAL HYPERTENSION: ICD-10-CM

## 2021-12-13 DIAGNOSIS — N18.32 STAGE 3B CHRONIC KIDNEY DISEASE: Chronic | ICD-10-CM

## 2021-12-13 DIAGNOSIS — E11.42 TYPE 2 DIABETES MELLITUS WITH DIABETIC POLYNEUROPATHY, WITH LONG-TERM CURRENT USE OF INSULIN: ICD-10-CM

## 2021-12-13 DIAGNOSIS — Z79.4 TYPE 2 DIABETES MELLITUS WITH OTHER CIRCULATORY COMPLICATION, WITH LONG-TERM CURRENT USE OF INSULIN: ICD-10-CM

## 2021-12-13 DIAGNOSIS — Z79.4 TYPE 2 DIABETES MELLITUS WITH DIABETIC POLYNEUROPATHY, WITH LONG-TERM CURRENT USE OF INSULIN: ICD-10-CM

## 2021-12-13 DIAGNOSIS — D47.2 SMOLDERING MULTIPLE MYELOMA: ICD-10-CM

## 2021-12-13 DIAGNOSIS — E11.59 TYPE 2 DIABETES MELLITUS WITH OTHER CIRCULATORY COMPLICATION, WITH LONG-TERM CURRENT USE OF INSULIN: ICD-10-CM

## 2021-12-13 LAB
25(OH)D3+25(OH)D2 SERPL-MCNC: 29 NG/ML (ref 30–96)
ALBUMIN SERPL BCP-MCNC: 3.6 G/DL (ref 3.5–5.2)
ANION GAP SERPL CALC-SCNC: 13 MMOL/L (ref 8–16)
BASOPHILS # BLD AUTO: 0.04 K/UL (ref 0–0.2)
BASOPHILS NFR BLD: 0.8 % (ref 0–1.9)
BUN SERPL-MCNC: 21 MG/DL (ref 8–23)
CALCIUM SERPL-MCNC: 9.9 MG/DL (ref 8.7–10.5)
CHLORIDE SERPL-SCNC: 102 MMOL/L (ref 95–110)
CO2 SERPL-SCNC: 19 MMOL/L (ref 23–29)
CREAT SERPL-MCNC: 1.9 MG/DL (ref 0.5–1.4)
DIFFERENTIAL METHOD: ABNORMAL
EOSINOPHIL # BLD AUTO: 0.3 K/UL (ref 0–0.5)
EOSINOPHIL NFR BLD: 6.7 % (ref 0–8)
ERYTHROCYTE [DISTWIDTH] IN BLOOD BY AUTOMATED COUNT: 13.8 % (ref 11.5–14.5)
EST. GFR  (AFRICAN AMERICAN): 39 ML/MIN/1.73 M^2
EST. GFR  (NON AFRICAN AMERICAN): 33.7 ML/MIN/1.73 M^2
GLUCOSE SERPL-MCNC: 195 MG/DL (ref 70–110)
HCT VFR BLD AUTO: 36.2 % (ref 40–54)
HGB BLD-MCNC: 11.7 G/DL (ref 14–18)
IMM GRANULOCYTES # BLD AUTO: 0.01 K/UL (ref 0–0.04)
IMM GRANULOCYTES NFR BLD AUTO: 0.2 % (ref 0–0.5)
LYMPHOCYTES # BLD AUTO: 1.7 K/UL (ref 1–4.8)
LYMPHOCYTES NFR BLD: 34.9 % (ref 18–48)
MCH RBC QN AUTO: 29.1 PG (ref 27–31)
MCHC RBC AUTO-ENTMCNC: 32.3 G/DL (ref 32–36)
MCV RBC AUTO: 90 FL (ref 82–98)
MONOCYTES # BLD AUTO: 0.4 K/UL (ref 0.3–1)
MONOCYTES NFR BLD: 8.1 % (ref 4–15)
NEUTROPHILS # BLD AUTO: 2.4 K/UL (ref 1.8–7.7)
NEUTROPHILS NFR BLD: 49.3 % (ref 38–73)
NRBC BLD-RTO: 0 /100 WBC
PHOSPHATE SERPL-MCNC: 3.3 MG/DL (ref 2.7–4.5)
PHOSPHATE SERPL-MCNC: 3.3 MG/DL (ref 2.7–4.5)
PLATELET # BLD AUTO: 298 K/UL (ref 150–450)
PMV BLD AUTO: 9.5 FL (ref 9.2–12.9)
POTASSIUM SERPL-SCNC: 4.4 MMOL/L (ref 3.5–5.1)
PTH-INTACT SERPL-MCNC: 57.3 PG/ML (ref 9–77)
RBC # BLD AUTO: 4.02 M/UL (ref 4.6–6.2)
SODIUM SERPL-SCNC: 134 MMOL/L (ref 136–145)
WBC # BLD AUTO: 4.95 K/UL (ref 3.9–12.7)

## 2021-12-13 PROCEDURE — G0008 ADMIN INFLUENZA VIRUS VAC: HCPCS | Mod: HCNC,S$GLB,, | Performed by: INTERNAL MEDICINE

## 2021-12-13 PROCEDURE — 99999 PR PBB SHADOW E&M-EST. PATIENT-LVL IV: ICD-10-PCS | Mod: PBBFAC,HCNC,, | Performed by: INTERNAL MEDICINE

## 2021-12-13 PROCEDURE — 85025 COMPLETE CBC W/AUTO DIFF WBC: CPT | Mod: HCNC | Performed by: INTERNAL MEDICINE

## 2021-12-13 PROCEDURE — 82306 VITAMIN D 25 HYDROXY: CPT | Mod: HCNC | Performed by: INTERNAL MEDICINE

## 2021-12-13 PROCEDURE — 3072F LOW RISK FOR RETINOPATHY: CPT | Mod: HCNC,CPTII,S$GLB, | Performed by: INTERNAL MEDICINE

## 2021-12-13 PROCEDURE — 90694 VACC AIIV4 NO PRSRV 0.5ML IM: CPT | Mod: HCNC,S$GLB,, | Performed by: INTERNAL MEDICINE

## 2021-12-13 PROCEDURE — 99214 PR OFFICE/OUTPT VISIT, EST, LEVL IV, 30-39 MIN: ICD-10-PCS | Mod: HCNC,S$GLB,, | Performed by: INTERNAL MEDICINE

## 2021-12-13 PROCEDURE — 80069 RENAL FUNCTION PANEL: CPT | Mod: HCNC | Performed by: INTERNAL MEDICINE

## 2021-12-13 PROCEDURE — 4010F PR ACE/ARB THEARPY RXD/TAKEN: ICD-10-PCS | Mod: HCNC,CPTII,S$GLB, | Performed by: INTERNAL MEDICINE

## 2021-12-13 PROCEDURE — 99214 OFFICE O/P EST MOD 30 MIN: CPT | Mod: HCNC,S$GLB,, | Performed by: INTERNAL MEDICINE

## 2021-12-13 PROCEDURE — 3072F PR LOW RISK FOR RETINOPATHY: ICD-10-PCS | Mod: HCNC,CPTII,S$GLB, | Performed by: INTERNAL MEDICINE

## 2021-12-13 PROCEDURE — G0008 FLU VACCINE - QUADRIVALENT - ADJUVANTED: ICD-10-PCS | Mod: HCNC,S$GLB,, | Performed by: INTERNAL MEDICINE

## 2021-12-13 PROCEDURE — 3066F NEPHROPATHY DOC TX: CPT | Mod: HCNC,CPTII,S$GLB, | Performed by: INTERNAL MEDICINE

## 2021-12-13 PROCEDURE — 83970 ASSAY OF PARATHORMONE: CPT | Mod: HCNC | Performed by: INTERNAL MEDICINE

## 2021-12-13 PROCEDURE — 99999 PR PBB SHADOW E&M-EST. PATIENT-LVL IV: CPT | Mod: PBBFAC,HCNC,, | Performed by: INTERNAL MEDICINE

## 2021-12-13 PROCEDURE — 3066F PR DOCUMENTATION OF TREATMENT FOR NEPHROPATHY: ICD-10-PCS | Mod: HCNC,CPTII,S$GLB, | Performed by: INTERNAL MEDICINE

## 2021-12-13 PROCEDURE — 36415 COLL VENOUS BLD VENIPUNCTURE: CPT | Mod: HCNC | Performed by: INTERNAL MEDICINE

## 2021-12-13 PROCEDURE — 4010F ACE/ARB THERAPY RXD/TAKEN: CPT | Mod: HCNC,CPTII,S$GLB, | Performed by: INTERNAL MEDICINE

## 2021-12-13 PROCEDURE — 99499 RISK ADDL DX/OHS AUDIT: ICD-10-PCS | Mod: HCNC,S$GLB,, | Performed by: INTERNAL MEDICINE

## 2021-12-13 PROCEDURE — 90694 FLU VACCINE - QUADRIVALENT - ADJUVANTED: ICD-10-PCS | Mod: HCNC,S$GLB,, | Performed by: INTERNAL MEDICINE

## 2021-12-13 PROCEDURE — 99499 UNLISTED E&M SERVICE: CPT | Mod: HCNC,S$GLB,, | Performed by: INTERNAL MEDICINE

## 2021-12-13 RX ORDER — FLASH GLUCOSE SCANNING READER
EACH MISCELLANEOUS
Qty: 6 EACH | Refills: 4 | OUTPATIENT
Start: 2021-12-13 | End: 2021-12-14 | Stop reason: SDUPTHER

## 2021-12-13 RX ORDER — OMEPRAZOLE 40 MG/1
40 CAPSULE, DELAYED RELEASE ORAL DAILY
Qty: 90 CAPSULE | Refills: 4 | Status: SHIPPED | OUTPATIENT
Start: 2021-12-13 | End: 2023-02-15 | Stop reason: SDUPTHER

## 2021-12-13 RX ORDER — PRAVASTATIN SODIUM 40 MG/1
40 TABLET ORAL DAILY
Qty: 90 TABLET | Refills: 3 | Status: SHIPPED | OUTPATIENT
Start: 2021-12-13 | End: 2022-12-12

## 2021-12-13 RX ORDER — CITALOPRAM 40 MG/1
40 TABLET, FILM COATED ORAL DAILY
Qty: 90 TABLET | Refills: 4 | Status: SHIPPED | OUTPATIENT
Start: 2021-12-13 | End: 2023-01-01 | Stop reason: SDUPTHER

## 2021-12-13 RX ORDER — LOSARTAN POTASSIUM 50 MG/1
50 TABLET ORAL DAILY
Qty: 90 TABLET | Refills: 6 | Status: SHIPPED | OUTPATIENT
Start: 2021-12-13 | End: 2023-02-15 | Stop reason: SDUPTHER

## 2021-12-13 RX ORDER — FLASH GLUCOSE SENSOR
KIT MISCELLANEOUS
Qty: 6 KIT | Refills: 4 | OUTPATIENT
Start: 2021-12-13 | End: 2021-12-14 | Stop reason: SDUPTHER

## 2021-12-13 RX ORDER — TAMSULOSIN HYDROCHLORIDE 0.4 MG/1
1 CAPSULE ORAL NIGHTLY
Qty: 90 CAPSULE | Refills: 4 | Status: SHIPPED | OUTPATIENT
Start: 2021-12-13 | End: 2022-05-10 | Stop reason: SDUPTHER

## 2021-12-13 RX ORDER — HYALURONATE SODIUM, STABILIZED 88 MG/4 ML
88 SYRINGE (ML) INTRAARTICULAR
COMMUNITY
Start: 2021-09-20

## 2021-12-13 RX ORDER — INSULIN GLARGINE 100 [IU]/ML
INJECTION, SOLUTION SUBCUTANEOUS
Qty: 15 ML | Refills: 4
Start: 2021-12-13 | End: 2022-02-20 | Stop reason: SDUPTHER

## 2021-12-13 RX ORDER — ERGOCALCIFEROL 1.25 MG/1
50000 CAPSULE ORAL
Qty: 12 CAPSULE | Refills: 3 | Status: SHIPPED | OUTPATIENT
Start: 2021-12-13 | End: 2022-04-25 | Stop reason: SDUPTHER

## 2021-12-13 RX ORDER — CITALOPRAM 40 MG/1
40 TABLET, FILM COATED ORAL DAILY
Qty: 30 TABLET | Refills: 4 | Status: SHIPPED | OUTPATIENT
Start: 2021-12-13 | End: 2022-12-13

## 2021-12-13 RX ORDER — GLIMEPIRIDE 2 MG/1
2 TABLET ORAL DAILY
Qty: 90 TABLET | Refills: 4 | Status: SHIPPED | OUTPATIENT
Start: 2021-12-13 | End: 2023-02-15 | Stop reason: SDUPTHER

## 2021-12-14 RX ORDER — FLASH GLUCOSE SCANNING READER
EACH MISCELLANEOUS
Qty: 6 EACH | Refills: 4 | Status: SHIPPED | OUTPATIENT
Start: 2021-12-14 | End: 2023-12-24 | Stop reason: SDUPTHER

## 2021-12-14 RX ORDER — FLASH GLUCOSE SENSOR
KIT MISCELLANEOUS
Qty: 6 KIT | Refills: 4 | Status: SHIPPED | OUTPATIENT
Start: 2021-12-14 | End: 2023-12-24 | Stop reason: SDUPTHER

## 2021-12-16 ENCOUNTER — CLINICAL SUPPORT (OUTPATIENT)
Dept: REHABILITATION | Facility: OTHER | Age: 76
End: 2021-12-16
Attending: INTERNAL MEDICINE
Payer: MEDICARE

## 2021-12-16 DIAGNOSIS — R68.89 DECREASED FUNCTIONAL ACTIVITY TOLERANCE: ICD-10-CM

## 2021-12-16 DIAGNOSIS — M62.81 WEAKNESS OF TRUNK MUSCULATURE: ICD-10-CM

## 2021-12-16 DIAGNOSIS — R29.898 DECREASED ROM OF NECK: Primary | ICD-10-CM

## 2021-12-16 DIAGNOSIS — R29.3 POOR POSTURE: ICD-10-CM

## 2021-12-16 PROCEDURE — 97110 THERAPEUTIC EXERCISES: CPT | Mod: HCNC

## 2021-12-27 ENCOUNTER — PATIENT OUTREACH (OUTPATIENT)
Dept: ADMINISTRATIVE | Facility: OTHER | Age: 76
End: 2021-12-27
Payer: MEDICARE

## 2021-12-28 ENCOUNTER — CLINICAL SUPPORT (OUTPATIENT)
Dept: REHABILITATION | Facility: OTHER | Age: 76
End: 2021-12-28
Attending: INTERNAL MEDICINE
Payer: MEDICARE

## 2021-12-28 DIAGNOSIS — R29.3 POOR POSTURE: ICD-10-CM

## 2021-12-28 DIAGNOSIS — M62.81 WEAKNESS OF TRUNK MUSCULATURE: ICD-10-CM

## 2021-12-28 DIAGNOSIS — R68.89 DECREASED FUNCTIONAL ACTIVITY TOLERANCE: ICD-10-CM

## 2021-12-28 DIAGNOSIS — R29.898 DECREASED ROM OF NECK: Primary | ICD-10-CM

## 2021-12-28 PROCEDURE — 97110 THERAPEUTIC EXERCISES: CPT | Mod: HCNC,CQ

## 2022-01-04 ENCOUNTER — PROCEDURE VISIT (OUTPATIENT)
Dept: OPHTHALMOLOGY | Facility: CLINIC | Age: 77
End: 2022-01-04
Payer: MEDICARE

## 2022-01-04 ENCOUNTER — NUTRITION (OUTPATIENT)
Dept: NUTRITION | Facility: CLINIC | Age: 77
End: 2022-01-04
Payer: MEDICARE

## 2022-01-04 VITALS — WEIGHT: 202.38 LBS | BODY MASS INDEX: 28.97 KG/M2 | HEIGHT: 70 IN

## 2022-01-04 DIAGNOSIS — E08.3211 MILD NONPROLIFERATIVE DIABETIC RETINOPATHY OF RIGHT EYE WITH MACULAR EDEMA ASSOCIATED WITH DIABETES MELLITUS DUE TO UNDERLYING CONDITION: Primary | ICD-10-CM

## 2022-01-04 DIAGNOSIS — H35.033 HYPERTENSIVE RETINOPATHY OF BOTH EYES: ICD-10-CM

## 2022-01-04 DIAGNOSIS — H40.1132 PRIMARY OPEN ANGLE GLAUCOMA OF BOTH EYES, MODERATE STAGE: ICD-10-CM

## 2022-01-04 DIAGNOSIS — Z71.3 DIETARY COUNSELING: ICD-10-CM

## 2022-01-04 DIAGNOSIS — E11.59 TYPE 2 DIABETES MELLITUS WITH OTHER CIRCULATORY COMPLICATION, WITH LONG-TERM CURRENT USE OF INSULIN: Primary | ICD-10-CM

## 2022-01-04 DIAGNOSIS — E11.3292 MILD NONPROLIFERATIVE DIABETIC RETINOPATHY OF LEFT EYE WITHOUT MACULAR EDEMA ASSOCIATED WITH TYPE 2 DIABETES MELLITUS: ICD-10-CM

## 2022-01-04 DIAGNOSIS — Z79.4 TYPE 2 DIABETES MELLITUS WITH OTHER CIRCULATORY COMPLICATION, WITH LONG-TERM CURRENT USE OF INSULIN: Primary | ICD-10-CM

## 2022-01-04 PROCEDURE — 92134 OCT, RETINA - OU - BOTH EYES: ICD-10-PCS | Mod: S$GLB,,, | Performed by: OPHTHALMOLOGY

## 2022-01-04 PROCEDURE — 92134 CPTRZ OPH DX IMG PST SGM RTA: CPT | Mod: S$GLB,,, | Performed by: OPHTHALMOLOGY

## 2022-01-04 PROCEDURE — 3051F HG A1C>EQUAL 7.0%<8.0%: CPT | Mod: CPTII,S$GLB,, | Performed by: OPHTHALMOLOGY

## 2022-01-04 PROCEDURE — 99999 PR PBB SHADOW E&M-EST. PATIENT-LVL IV: CPT | Mod: PBBFAC,,,

## 2022-01-04 PROCEDURE — 3051F PR MOST RECENT HEMOGLOBIN A1C LEVEL 7.0 - < 8.0%: ICD-10-PCS | Mod: CPTII,S$GLB,, | Performed by: OPHTHALMOLOGY

## 2022-01-04 PROCEDURE — 99214 PR OFFICE/OUTPT VISIT, EST, LEVL IV, 30-39 MIN: ICD-10-PCS | Mod: 25,S$GLB,, | Performed by: OPHTHALMOLOGY

## 2022-01-04 PROCEDURE — 99999 PR PBB SHADOW E&M-EST. PATIENT-LVL IV: ICD-10-PCS | Mod: PBBFAC,,,

## 2022-01-04 PROCEDURE — 97802 PR MED NUTR THER, 1ST, INDIV, EA 15 MIN: ICD-10-PCS | Mod: S$GLB,,, | Performed by: DIETITIAN, REGISTERED

## 2022-01-04 PROCEDURE — 67028 INJECTION EYE DRUG: CPT | Mod: RT,S$GLB,, | Performed by: OPHTHALMOLOGY

## 2022-01-04 PROCEDURE — 99214 OFFICE O/P EST MOD 30 MIN: CPT | Mod: 25,S$GLB,, | Performed by: OPHTHALMOLOGY

## 2022-01-04 PROCEDURE — 67028 PR INJECT INTRAVITREAL PHARMCOLOGIC: ICD-10-PCS | Mod: RT,S$GLB,, | Performed by: OPHTHALMOLOGY

## 2022-01-04 PROCEDURE — 97802 MEDICAL NUTRITION INDIV IN: CPT | Mod: S$GLB,,, | Performed by: DIETITIAN, REGISTERED

## 2022-01-04 NOTE — PROGRESS NOTES
HPI     1 mo OCT/ Poss Ozurdex     Pt states still seeing obstruction in OD the same no change. After last   injection eye became very red and did not clear up until after 1 week.      - flashes improvement in floaters     No pain    Gtts: Latanoprost QHS OU          At's PRN after inj    Last edited by Andriy Phillips MD on 1/4/2022  3:12 PM. (History)           A/P    ICD-10-CM ICD-9-CM   1. 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 Mild nonproliferative diabetic retinopathy of left eye without macular edema associated with type 2 diabetes mellitus  E11.3292 250.50     362.04   3. Hypertensive retinopathy of both eyes  H35.033 362.11   4. Primary open angle glaucoma of both eyes, moderate stage  H40.1132 365.11     365.72       1. 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 Mild nonproliferative diabetic retinopathy of left eye without macular edema associated with type 2 diabetes mellitus  PCP is Dr. Sagastume  11/12/2021  7.7  A1C   OD- VA 20/50 (stable), still worse vision and blurred per patient few DBH and MA, mod DME involving fovea basically static and unchanged  S/p ELKIN 11/30/21 #2/3    Plan: Given that patient feels VA worsened and DME significant involving fovea despite two Avastin and unchanged macular edema, recommend switch to ODX    Based on todays exam, diagnostic studies, and review of records, the determination was made for treatment today.    Schedule Ozurdex Injection Right Eye today Patient chooses to proceed with injection R/B/A discussed including but not limited to: glaucoma, infection, retinal detachment and cataract     Patient identified.  Timeout performed.    Risks, benefits, and alternatives to treatment were discussed in detail with the patient,  including bleeding/infection (endophthalmitis)/glaucoma/cataract, etc.  The patient voiced understanding and wished to proceed with the procedure.  See separate consent form.    Ozurdex Injection Procedure Note:  Diagnosis: macular edema Right Eye    Topical Proparacaine drop placed then topical 5% Betadine.  Subconjunctival injection of 2% lidocaine placed without complication.  Sterile gloves used, and sterile lid speculum placed.  5% Betadine at injection site again prior to injection.  Ozurdex 0.7 mg implant injected at  inferotemporally 3.5-4mm posterior to the limbus.  Complications: None  Va at least CF at 5 feet post injection.  Retina, ONH, IOP normal after injection.    Followup as below.  Patient should return immediately PRN.  Retinal Detachment and Endophthalmitis precautions given.        OS- VA 20/40 (stable) rare dot heme, no DME  Plan: Observation    Recommend good blood pressure control, tight blood glucose control, and good cholesterol control       3. Hypertensive retinopathy of both eyes  AV nicking, BP has been stable   Plan: Observation  Recommend good blood pressure control, tight blood glucose control, and good cholesterol control       4. Primary open angle glaucoma of both eyes, moderate stage  F/b Dr. Wheatley  IOP 17 OU  Currently on Latan qHS OU  Continue latan qHS OU and f/u with Dr. Wheatley       RTC 1 month DFE/OCTm OD         I saw and examined the patient and reviewed in detail the findings documented. The final examination findings, image interpretations, and plan as documented in the record represent my personal judgment and conclusions.    Andriy Phillips MD  Vitreoretinal Surgery   [facility]

## 2022-01-04 NOTE — PROGRESS NOTES
"Referring Physician:Roberta Sagastume MD     Reason for visit:  Chief Complaint   Patient presents with    Diabetes    Nutrition Counseling      Initial Visit    :1945     Allergies Reviewed  Meds Reviewed    Anthropometrics  Weight:91.8 kg (202 lb 6.1 oz)  Height:5' 10" (1.778 m)  BMI:Body mass index is 29.04 kg/m².   IBW:  75.5 kg  +/-10%    Meds:  Outpatient Medications Prior to Visit   Medication Sig Dispense Refill    ACCU-CHEK STEFANO CONTROL SOLN Soln       alcohol swabs PadM Apply 1 each topically as needed.      aspirin (ECOTRIN) 81 MG EC tablet Take 1 tablet (81 mg total) by mouth once daily. 100 tablet 3    blood glucose control, normal (METER-CHECK) Soln One meter.  Use as directed. Meter of insurance choice 1 each 0    blood sugar diagnostic (ACCU-CHEK STEFANO PLUS TEST STRP) Strp Accu check stefano plus TEST FOUR TIMES DAILY. Test strtips of insurance choice to go with meter and lancets 400 strip 3    blood sugar diagnostic Strp [facility] - check blood sugars 4 times daily 400 strip 4    blood-glucose meter (ACCU-CHEK STEFANO PLUS METER) Misc Use as direted 1 each 0    blood-glucose meter (ACCU-CHEK OMAYRA) Misc USE AS DIRECTED. Accucheck stefano plus 1 each 0    citalopram (CELEXA) 40 MG tablet Take 1 tablet (40 mg total) by mouth once daily. 90 tablet 4    citalopram (CELEXA) 40 MG tablet Take 1 tablet (40 mg total) by mouth once daily. 30 tablet 4    diazePAM (VALIUM) 5 MG tablet Take 1 tablet (5 mg total) by mouth every 12 (twelve) hours as needed for Anxiety. 60 tablet 0    ergocalciferol (ERGOCALCIFEROL) 50,000 unit Cap Take 1 capsule (50,000 Units total) by mouth every 7 days. 12 capsule 3    flash glucose scanning reader (FREESTYLE CHRISTIANO 14 DAY READER) Misc Use as directed 6 each 4    flash glucose sensor (FREESTYLE CHRISTIANO 14 DAY SENSOR) Kit Use as directed 6 kit 4    gabapentin (NEURONTIN) 100 MG capsule TAKE 1 CAPSULE EVERY MORNING, 1 CAPSULE IN THE AFTERNOON, AND 1 TO 3 " "CAPSULE(S) AT BEDTIME AS NEEDED FOR FOOT PAIN 450 capsule 3    glimepiride (AMARYL) 2 MG tablet Take 1 tablet (2 mg total) by mouth once daily. 90 tablet 4    glucose 4 GM chewable tablet Take 8 g by mouth as needed for Low blood sugar.      insulin (LANTUS SOLOSTAR U-100 INSULIN) glargine 100 units/mL (3mL) SubQ pen Inject 15 units under the skin in the morning and 15 units under the skin in the evening. 15 mL 4    insulin aspart U-100 (NOVOLOG FLEXPEN U-100 INSULIN) 100 unit/mL (3 mL) InPn pen Inject 4 Units into the skin 2 (two) times daily with meals. (Patient not taking: Reported on 12/7/2021) 3 mL 3    lancets (ACCU-CHEK SOFTCLIX LANCETS) Misc 1 lancet by Misc.(Non-Drug; Combo Route) route 4 (four) times daily. 400 each 4    latanoprost 0.005 % ophthalmic solution Place 1 drop into both eyes every evening. 7.5 mL 3    losartan (COZAAR) 50 MG tablet Take 1 tablet (50 mg total) by mouth once daily. 90 tablet 6    MONOVISC 88 mg/4 mL Syrg       omeprazole (PRILOSEC) 40 MG capsule Take 1 capsule (40 mg total) by mouth once daily. 90 capsule 4    oxyCODONE-acetaminophen (PERCOCET) 5-325 mg per tablet Take 1 tablet by mouth every 4 to 6 hours as needed for Pain (moderate to severe). 42 tablet 0    pen needle, diabetic (BD ULTRA-FINE SHORT PEN NEEDLE) 31 gauge x 5/16" Ndle USE TO INJECT INSULIN ONE TIME DAILY 100 each 3    pravastatin (PRAVACHOL) 40 MG tablet Take 1 tablet (40 mg total) by mouth once daily. 90 tablet 3    prednisoLONE acetate (PRED FORTE) 1 % DrpS Place 1 drop into the left eye 2 (two) times daily. 10 mL 1    sildenafiL (VIAGRA) 100 MG tablet Take 1 tablet (100 mg total) by mouth daily as needed for Erectile Dysfunction. 30 tablet 2    tamsulosin (FLOMAX) 0.4 mg Cap Take 1 capsule (0.4 mg total) by mouth nightly. 90 capsule 4    traMADoL (ULTRAM) 50 mg tablet Take 1 tablet (50 mg total) by mouth every 8 (eight) hours as needed for Pain. 60 tablet 0    TRUEPLUS LANCETS 33 gauge Misc   "      Facility-Administered Medications Prior to Visit   Medication Dose Route Frequency Provider Last Rate Last Admin    mupirocin 2 % ointment   Nasal On Call Procedure Mohit Hummel MD   Given at 01/06/21 0847       Food/Drug Interactions Noted:  n/a    Vitamins/Supplements/Herbs:  calcium    Labs:   HgbA1c  7.7     Nutrition Prescription:   2265 Kcals/day( 30 kcal/kg IBW),   60 g protein( 0.8 g/kg IBW)     Support System:  Pt and his wife live in [place]; pt comes to [facility] for work (is a ) and stays for 2-3 weeks at a time.  Pt states his wife is very supportive, and when he's at home his diet regimen is very good.  He will bring food back with him, and then will do his own grocery shopping when staying in [facility].    Diet Hx:   Has see diabetes education in the past; is interested in a diet regimen refresher today.  Reads food labels, watching for portion sizes of foods with 20 gm carb or less on sugar-free cookie/candy labels.  Pt states he is still waiting on his Rabixo 14 Day Rogers; he thinks he will have better blood glucose control once he has it.  Pt states he has a weakness for sweets, and his greatest challenge is middle of the night/late night snacking:  Sugar-free cookies, diet soft drink, Yoplait yogurt, [employer] Bell individual ice cream cups. Daytime snacks:  Peanut butter crackers; sugar-free cookies.   Pt states he thought honey would be good for him; unaware of carb content.    Breakfast: 2 slices raisin toast with butter & honey.  Coffee with Splenda and sugar-free flavored creamer  Lunch:  Subway teriyaki chicken with slice of american cheese on wheat or chop salad, and states he will get a cookie instead of chips with the meal.  Or brings sandwiches from home.  Diet drink.  Dinner:   Last night:  Gumbo & rice from his last home visit.  Or may have peanut butter and honey sandwiches on wheat bread.  Has a crock pot in his [facility] apartment, and will fix roast or  chicken with carrots and potatoes for himself.    Current activity level and/or physical limitations:   Pt actively employed; did not discuss exercise activity    Motivation to make changes/anticipated barriers and/or expected adherence:  No barriers to adherence identified    Nutrition-Focus Physical Findings:  Pt wearing face covering per COVID19 protocol; pt appears well nourished    Assessment:   Pt attentive and asked relevant questions about foods recommended and to avoid; healthy snacking; reading food labels.  All questions answered, and he verbalized understanding of information.    Nutrition Diagnosis: Food- and nutrition-related knowledge deficit RT lack of prior exposure to information AEB dx diabetes      Recommendations:   Carbohydrate controlled diet.  Handout provided and reviewed:  Diabetes Management Guide    Strategies Implemented:  Avoid honey, concentrated sweets.      Consultation Time:30 minutes. (time spent with pt:  45 minutes)  Communicated with referring healthcare provider:  Consult note available in pt's Epic chart per MD discretion  Follow Up:   Pt provided with dietitian contact number and advised to call with questions or make future appointment if further intervention needed.

## 2022-01-10 ENCOUNTER — RESEARCH ENCOUNTER (OUTPATIENT)
Dept: HEMATOLOGY/ONCOLOGY | Facility: CLINIC | Age: 77
End: 2022-01-10
Payer: MEDICARE

## 2022-01-10 ENCOUNTER — LAB VISIT (OUTPATIENT)
Dept: LAB | Facility: HOSPITAL | Age: 77
End: 2022-01-10
Payer: MEDICARE

## 2022-01-10 ENCOUNTER — OFFICE VISIT (OUTPATIENT)
Dept: HEMATOLOGY/ONCOLOGY | Facility: CLINIC | Age: 77
End: 2022-01-10
Payer: MEDICARE

## 2022-01-10 VITALS
RESPIRATION RATE: 16 BRPM | SYSTOLIC BLOOD PRESSURE: 133 MMHG | HEIGHT: 70 IN | HEART RATE: 86 BPM | BODY MASS INDEX: 29.22 KG/M2 | WEIGHT: 204.13 LBS | DIASTOLIC BLOOD PRESSURE: 73 MMHG | OXYGEN SATURATION: 99 %

## 2022-01-10 DIAGNOSIS — D47.2 SMOLDERING MULTIPLE MYELOMA: Primary | ICD-10-CM

## 2022-01-10 DIAGNOSIS — Z00.6 RESEARCH EXAM: ICD-10-CM

## 2022-01-10 DIAGNOSIS — N18.30 CKD STAGE 3 DUE TO TYPE 2 DIABETES MELLITUS: ICD-10-CM

## 2022-01-10 DIAGNOSIS — E11.22 CKD STAGE 3 DUE TO TYPE 2 DIABETES MELLITUS: ICD-10-CM

## 2022-01-10 DIAGNOSIS — E11.42 TYPE 2 DIABETES MELLITUS WITH DIABETIC POLYNEUROPATHY, WITH LONG-TERM CURRENT USE OF INSULIN: ICD-10-CM

## 2022-01-10 DIAGNOSIS — Z79.4 TYPE 2 DIABETES MELLITUS WITH DIABETIC POLYNEUROPATHY, WITH LONG-TERM CURRENT USE OF INSULIN: ICD-10-CM

## 2022-01-10 DIAGNOSIS — D47.2 SMOLDERING MULTIPLE MYELOMA: ICD-10-CM

## 2022-01-10 LAB
ALBUMIN SERPL BCP-MCNC: 3.7 G/DL (ref 3.5–5.2)
ALP SERPL-CCNC: 58 U/L (ref 55–135)
ALT SERPL W/O P-5'-P-CCNC: 15 U/L (ref 10–44)
ANION GAP SERPL CALC-SCNC: 12 MMOL/L (ref 8–16)
AST SERPL-CCNC: 13 U/L (ref 10–40)
BASOPHILS # BLD AUTO: 0.05 K/UL (ref 0–0.2)
BASOPHILS NFR BLD: 1 % (ref 0–1.9)
BILIRUB SERPL-MCNC: 0.5 MG/DL (ref 0.1–1)
BUN SERPL-MCNC: 21 MG/DL (ref 8–23)
CALCIUM SERPL-MCNC: 9.8 MG/DL (ref 8.7–10.5)
CHLORIDE SERPL-SCNC: 101 MMOL/L (ref 95–110)
CO2 SERPL-SCNC: 22 MMOL/L (ref 23–29)
CREAT SERPL-MCNC: 2.4 MG/DL (ref 0.5–1.4)
DIFFERENTIAL METHOD: ABNORMAL
EOSINOPHIL # BLD AUTO: 0.4 K/UL (ref 0–0.5)
EOSINOPHIL NFR BLD: 7.3 % (ref 0–8)
ERYTHROCYTE [DISTWIDTH] IN BLOOD BY AUTOMATED COUNT: 13.8 % (ref 11.5–14.5)
EST. GFR  (AFRICAN AMERICAN): 29.4 ML/MIN/1.73 M^2
EST. GFR  (NON AFRICAN AMERICAN): 25.4 ML/MIN/1.73 M^2
GLUCOSE SERPL-MCNC: 216 MG/DL (ref 70–110)
HCT VFR BLD AUTO: 35.7 % (ref 40–54)
HGB BLD-MCNC: 11.4 G/DL (ref 14–18)
IGA SERPL-MCNC: 1786 MG/DL (ref 40–350)
IGG SERPL-MCNC: 1101 MG/DL (ref 650–1600)
IGM SERPL-MCNC: 45 MG/DL (ref 50–300)
IMM GRANULOCYTES # BLD AUTO: 0.01 K/UL (ref 0–0.04)
IMM GRANULOCYTES NFR BLD AUTO: 0.2 % (ref 0–0.5)
LDH SERPL L TO P-CCNC: 97 U/L (ref 110–260)
LYMPHOCYTES # BLD AUTO: 2.1 K/UL (ref 1–4.8)
LYMPHOCYTES NFR BLD: 40 % (ref 18–48)
MAGNESIUM SERPL-MCNC: 2 MG/DL (ref 1.6–2.6)
MCH RBC QN AUTO: 28.3 PG (ref 27–31)
MCHC RBC AUTO-ENTMCNC: 31.9 G/DL (ref 32–36)
MCV RBC AUTO: 89 FL (ref 82–98)
MONOCYTES # BLD AUTO: 0.4 K/UL (ref 0.3–1)
MONOCYTES NFR BLD: 6.7 % (ref 4–15)
NEUTROPHILS # BLD AUTO: 2.3 K/UL (ref 1.8–7.7)
NEUTROPHILS NFR BLD: 44.8 % (ref 38–73)
NRBC BLD-RTO: 0 /100 WBC
PHOSPHATE SERPL-MCNC: 3.2 MG/DL (ref 2.7–4.5)
PLATELET # BLD AUTO: 258 K/UL (ref 150–450)
PMV BLD AUTO: 8.9 FL (ref 9.2–12.9)
POTASSIUM SERPL-SCNC: 4.3 MMOL/L (ref 3.5–5.1)
PROT SERPL-MCNC: 8.8 G/DL (ref 6–8.4)
RBC # BLD AUTO: 4.03 M/UL (ref 4.6–6.2)
SODIUM SERPL-SCNC: 135 MMOL/L (ref 136–145)
WBC # BLD AUTO: 5.22 K/UL (ref 3.9–12.7)

## 2022-01-10 PROCEDURE — 1159F MED LIST DOCD IN RCRD: CPT | Mod: HCNC,CPTII,S$GLB, | Performed by: INTERNAL MEDICINE

## 2022-01-10 PROCEDURE — 1126F AMNT PAIN NOTED NONE PRSNT: CPT | Mod: HCNC,CPTII,S$GLB, | Performed by: INTERNAL MEDICINE

## 2022-01-10 PROCEDURE — 99999 PR PBB SHADOW E&M-EST. PATIENT-LVL V: CPT | Mod: PBBFAC,HCNC,, | Performed by: INTERNAL MEDICINE

## 2022-01-10 PROCEDURE — 84165 PATHOLOGIST INTERPRETATION SPE: ICD-10-PCS | Mod: 26,HCNC,, | Performed by: PATHOLOGY

## 2022-01-10 PROCEDURE — 3075F PR MOST RECENT SYSTOLIC BLOOD PRESS GE 130-139MM HG: ICD-10-PCS | Mod: HCNC,CPTII,S$GLB, | Performed by: INTERNAL MEDICINE

## 2022-01-10 PROCEDURE — 3075F SYST BP GE 130 - 139MM HG: CPT | Mod: HCNC,CPTII,S$GLB, | Performed by: INTERNAL MEDICINE

## 2022-01-10 PROCEDURE — 84100 ASSAY OF PHOSPHORUS: CPT | Mod: HCNC | Performed by: INTERNAL MEDICINE

## 2022-01-10 PROCEDURE — 3288F FALL RISK ASSESSMENT DOCD: CPT | Mod: HCNC,CPTII,S$GLB, | Performed by: INTERNAL MEDICINE

## 2022-01-10 PROCEDURE — 86334 IMMUNOFIX E-PHORESIS SERUM: CPT | Mod: HCNC,Q1 | Performed by: INTERNAL MEDICINE

## 2022-01-10 PROCEDURE — 86334 PATHOLOGIST INTERPRETATION IFE: ICD-10-PCS | Mod: 26,HCNC,, | Performed by: PATHOLOGY

## 2022-01-10 PROCEDURE — 3072F LOW RISK FOR RETINOPATHY: CPT | Mod: HCNC,CPTII,S$GLB, | Performed by: INTERNAL MEDICINE

## 2022-01-10 PROCEDURE — 82784 ASSAY IGA/IGD/IGG/IGM EACH: CPT | Mod: HCNC | Performed by: INTERNAL MEDICINE

## 2022-01-10 PROCEDURE — 80053 COMPREHEN METABOLIC PANEL: CPT | Mod: HCNC,Q1 | Performed by: INTERNAL MEDICINE

## 2022-01-10 PROCEDURE — 3078F DIAST BP <80 MM HG: CPT | Mod: HCNC,CPTII,S$GLB, | Performed by: INTERNAL MEDICINE

## 2022-01-10 PROCEDURE — 3051F HG A1C>EQUAL 7.0%<8.0%: CPT | Mod: HCNC,CPTII,S$GLB, | Performed by: INTERNAL MEDICINE

## 2022-01-10 PROCEDURE — 83735 ASSAY OF MAGNESIUM: CPT | Mod: HCNC | Performed by: INTERNAL MEDICINE

## 2022-01-10 PROCEDURE — 85025 COMPLETE CBC W/AUTO DIFF WBC: CPT | Mod: HCNC | Performed by: INTERNAL MEDICINE

## 2022-01-10 PROCEDURE — 1159F PR MEDICATION LIST DOCUMENTED IN MEDICAL RECORD: ICD-10-PCS | Mod: HCNC,CPTII,S$GLB, | Performed by: INTERNAL MEDICINE

## 2022-01-10 PROCEDURE — 83520 IMMUNOASSAY QUANT NOS NONAB: CPT | Mod: HCNC | Performed by: INTERNAL MEDICINE

## 2022-01-10 PROCEDURE — 84165 PROTEIN E-PHORESIS SERUM: CPT | Mod: HCNC | Performed by: INTERNAL MEDICINE

## 2022-01-10 PROCEDURE — 1160F RVW MEDS BY RX/DR IN RCRD: CPT | Mod: HCNC,CPTII,S$GLB, | Performed by: INTERNAL MEDICINE

## 2022-01-10 PROCEDURE — 1160F PR REVIEW ALL MEDS BY PRESCRIBER/CLIN PHARMACIST DOCUMENTED: ICD-10-PCS | Mod: HCNC,CPTII,S$GLB, | Performed by: INTERNAL MEDICINE

## 2022-01-10 PROCEDURE — 86334 IMMUNOFIX E-PHORESIS SERUM: CPT | Mod: 26,HCNC,, | Performed by: PATHOLOGY

## 2022-01-10 PROCEDURE — 1101F PT FALLS ASSESS-DOCD LE1/YR: CPT | Mod: HCNC,CPTII,S$GLB, | Performed by: INTERNAL MEDICINE

## 2022-01-10 PROCEDURE — 3288F PR FALLS RISK ASSESSMENT DOCUMENTED: ICD-10-PCS | Mod: HCNC,CPTII,S$GLB, | Performed by: INTERNAL MEDICINE

## 2022-01-10 PROCEDURE — 84165 PROTEIN E-PHORESIS SERUM: CPT | Mod: 26,HCNC,, | Performed by: PATHOLOGY

## 2022-01-10 PROCEDURE — 99215 OFFICE O/P EST HI 40 MIN: CPT | Mod: HCNC,S$GLB,, | Performed by: INTERNAL MEDICINE

## 2022-01-10 PROCEDURE — 83615 LACTATE (LD) (LDH) ENZYME: CPT | Mod: HCNC | Performed by: INTERNAL MEDICINE

## 2022-01-10 PROCEDURE — 1126F PR PAIN SEVERITY QUANTIFIED, NO PAIN PRESENT: ICD-10-PCS | Mod: HCNC,CPTII,S$GLB, | Performed by: INTERNAL MEDICINE

## 2022-01-10 PROCEDURE — 3051F PR MOST RECENT HEMOGLOBIN A1C LEVEL 7.0 - < 8.0%: ICD-10-PCS | Mod: HCNC,CPTII,S$GLB, | Performed by: INTERNAL MEDICINE

## 2022-01-10 PROCEDURE — 3078F PR MOST RECENT DIASTOLIC BLOOD PRESSURE < 80 MM HG: ICD-10-PCS | Mod: HCNC,CPTII,S$GLB, | Performed by: INTERNAL MEDICINE

## 2022-01-10 PROCEDURE — 36415 COLL VENOUS BLD VENIPUNCTURE: CPT | Mod: HCNC | Performed by: INTERNAL MEDICINE

## 2022-01-10 PROCEDURE — 99999 PR PBB SHADOW E&M-EST. PATIENT-LVL V: ICD-10-PCS | Mod: PBBFAC,HCNC,, | Performed by: INTERNAL MEDICINE

## 2022-01-10 PROCEDURE — 1101F PR PT FALLS ASSESS DOC 0-1 FALLS W/OUT INJ PAST YR: ICD-10-PCS | Mod: HCNC,CPTII,S$GLB, | Performed by: INTERNAL MEDICINE

## 2022-01-10 PROCEDURE — 99215 PR OFFICE/OUTPT VISIT, EST, LEVL V, 40-54 MIN: ICD-10-PCS | Mod: HCNC,S$GLB,, | Performed by: INTERNAL MEDICINE

## 2022-01-10 PROCEDURE — 3072F PR LOW RISK FOR RETINOPATHY: ICD-10-PCS | Mod: HCNC,CPTII,S$GLB, | Performed by: INTERNAL MEDICINE

## 2022-01-10 NOTE — PROGRESS NOTES
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   Sponsor:  [facility]  IRB# [id number]  Study ID: [id number]  Investigator: GIL Engel Pt Initials: LUCÍA LORENZ       Cycle 73 Day 28 Arm B: Observation     Patient presents to clinic today for above cycle evaluation. 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 He reports he had another injection to his right eye via eye MD and that he tolerated the procedure without incident this time. He states he has minimal vision problems "better than what it was." Refer to records in [facility] which confirm visit to opthomologist for symptomatic diabetic retinopathy with hypertension; injection #3 of Avastin to right eye given to alleviate symptoms. He presents with knee braces intact; denies any issues with mobility or balance.. Reports he met with dietician last week and was given information regarding foods/meals to help regulate his blood sugars. States checking blood glucose levels and is monitored per endocrinology; reports HgAlc remains slightly elevated. States is maintaining COVID precautions; wears mask while indoors "always" and outdoors if in crowd. Denies any COVID symptoms. Denies any new symptoms.       Review of Baseline AE's:   1.Hypertension, Grade 2 diastolic: BP today is 133/73 today; Grade 1 systolic. Subject states compliance with Losartin. Subject denies headache/dizziness; no symptoms noted.   AE ongoing  2. Lower Back Pain and Neck Pain, grade 1: Stable.  Patient has no complaints as of late and as previously stated, patient is not suspected to have bony myeloma involvement per Dr. Engel as these are pre-existing issues present at baseline.  AE ongoing.  3. Pain in extremity " "(knees), grade 1. Patient states his knees "are not really bothering me right now" but does plan to have surgery next year to replace.     4. Peripheral sensory neuropathy, grade 1: Patient does not verbalize complaints today. Has gabapentin prescribed and takes as needed.  States has not taken recently.  AE ongoing.   5. Anemia, Grade 1: Hgb stable at 11.4  Result reviewed by Dr. Engel. AE ongoing            Review of AE's:     *Please note this list is not all-inclusive, please see AE log for physician reviewed list of adverse events.   1. Creatinine increased, grade 2: Serum creatinine from labs today is 2.4; calculated GFR 35.0 ml/min. Reviewed per Dr. Engel and no new orders.Per Dr Engel not been related to myeloma: rather, eating habits over holidays and decreased hydration practice are reasons for rise in numbers; are related to chronic kidney issues likely secondary to diabetes and hypertension vs plasma cell dyscrasia.  Will continue observation monthly and to monitor renal functionclosely. Per MD, encouraged to increase water/gatorade intake. AE worse vs last month but per Dr Engel not clinically significant.   2. Hyponatremia, Grade 1: Serum sodium today is 136 mmol/L; AE intermittent. Will monitor  3. Hyperkalemia, Grade 1: serum K today is 4.7 mmol/L; wnl; Dr Engel has reviewed labs; no additional orders noted.   4. Hyperuricemia, Grade 1: see interval history above. Will monitor. Per Dr Engel this is not related to SM; rather CKD, hypertension.   5. Hyperglycemia. Grade 1: blood glucose today is elevated at 216; see interval history above; no new orders per Dr Engel; subject followed closely by endocrinology.  Had Hg A1c done mid November; 7.7; Dr Engel aware; no orders.       Per study chair, "the definition of progressive disease for this protocol is developing CRAB criteria that requires treatment systemically." Per Dr Engel, based on today's exam and lab results, patient does not have any s/s of CRAB: " Normal Calcium level (9.7), absence of Renal insufficiency (serum creatinine elevated today but per Dr Engel not related to myeloma; --patient with a history of kidney dysfunction secondary to diabetes; Dr. Engel aware of these values and not concerned for myeloma involvement), absence of significant Anemia (hemoglobin 11.2, stable; will await myeloma labs for clarity relationship to myeloma) and absence of lytic Bone lesions (per baseline metastatic survey as well as MRI--see MD note for further comment). See MD note for ECOG score and H&P and flowsheets for laboratory work, vitals, etc. All myeloma-related blood work from last cycle including SPEP and JAGUAR have remained stable, and are currently pending for this cycle. Per Dr. Engel, patient shows no evidence of progression at last result. Patient informed that Dr. Engel will release all lab results to [facility]. He states understanding of this. QOL's not required again until end of treatment/observation.           Subject maintains he wishes only to see Dr Engel and will not show for appts if scheduled with any other provider. Will continue to schedule patient as far out as possible, which seems to help maintain compliance with visits. Next appt to be scheduled for one month from today.  Informed patient will contact him a few days prior to next appt to remind him so that he can plan accordingly. Patient states having research RN's contact information and has MD contact information to call with any concerns, questions, or worsening of symptoms. Will follow up with subject once myeloma labs are resulted.
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
## 2022-01-11 LAB
INTERPRETATION SERPL IFE-IMP: NORMAL
KAPPA LC SER QL IA: 8.49 MG/DL (ref 0.33–1.94)
KAPPA LC/LAMBDA SER IA: 3.55 (ref 0.26–1.65)
LAMBDA LC SER QL IA: 2.39 MG/DL (ref 0.57–2.63)

## 2022-01-12 ENCOUNTER — PATIENT MESSAGE (OUTPATIENT)
Dept: INTERNAL MEDICINE | Facility: CLINIC | Age: 77
End: 2022-01-12
Payer: MEDICARE

## 2022-01-12 DIAGNOSIS — Z79.4 TYPE 2 DIABETES MELLITUS WITH OTHER CIRCULATORY COMPLICATION, WITH LONG-TERM CURRENT USE OF INSULIN: Primary | ICD-10-CM

## 2022-01-12 DIAGNOSIS — E11.59 TYPE 2 DIABETES MELLITUS WITH OTHER CIRCULATORY COMPLICATION, WITH LONG-TERM CURRENT USE OF INSULIN: Primary | ICD-10-CM

## 2022-01-12 LAB
ALBUMIN SERPL ELPH-MCNC: 4.02 G/DL (ref 3.35–5.55)
ALPHA1 GLOB SERPL ELPH-MCNC: 0.27 G/DL (ref 0.17–0.41)
ALPHA2 GLOB SERPL ELPH-MCNC: 0.88 G/DL (ref 0.43–0.99)
B-GLOBULIN SERPL ELPH-MCNC: 2.77 G/DL (ref 0.5–1.1)
GAMMA GLOB SERPL ELPH-MCNC: 0.66 G/DL (ref 0.67–1.58)
PROT SERPL-MCNC: 8.6 G/DL (ref 6–8.4)

## 2022-01-13 LAB
PATHOLOGIST INTERPRETATION IFE: NORMAL
PATHOLOGIST INTERPRETATION SPE: NORMAL

## 2022-01-19 ENCOUNTER — TELEPHONE (OUTPATIENT)
Dept: ORTHOPEDICS | Facility: CLINIC | Age: 77
End: 2022-01-19
Payer: MEDICARE

## 2022-01-19 NOTE — TELEPHONE ENCOUNTER
Left VM for pt. Confirmed appt location & time c Dr. Terrazas 01/25/22. Requested a call back to the [facility] ([facility] at [phone] with any questions, concerns or need for a different appointment time.

## 2022-01-20 ENCOUNTER — TELEPHONE (OUTPATIENT)
Dept: ORTHOPEDICS | Facility: CLINIC | Age: 77
End: 2022-01-20
Payer: MEDICARE

## 2022-01-20 DIAGNOSIS — M25.532 LEFT WRIST PAIN: Primary | ICD-10-CM

## 2022-01-20 NOTE — TELEPHONE ENCOUNTER
Informed pt that XRs are needed prior to appt. Scheduled L wrist XR 01/21/22 at [facility]. Advised pt that XR is located on 1st floor of [facility] building. Confirmed 01/25/22 appt location & time c Dr. Terrazas. Pt expressed understanding & was thankful.

## 2022-01-24 ENCOUNTER — PATIENT MESSAGE (OUTPATIENT)
Dept: ORTHOPEDICS | Facility: CLINIC | Age: 77
End: 2022-01-24
Payer: MEDICARE

## 2022-01-25 ENCOUNTER — PATIENT MESSAGE (OUTPATIENT)
Dept: INTERNAL MEDICINE | Facility: CLINIC | Age: 77
End: 2022-01-25
Payer: MEDICARE

## 2022-01-25 ENCOUNTER — HOSPITAL ENCOUNTER (OUTPATIENT)
Dept: RADIOLOGY | Facility: OTHER | Age: 77
Discharge: HOME | End: 2022-01-25
Attending: ORTHOPAEDIC SURGERY
Payer: MEDICARE

## 2022-01-25 ENCOUNTER — PATIENT MESSAGE (OUTPATIENT)
Dept: OPHTHALMOLOGY | Facility: CLINIC | Age: 77
End: 2022-01-25
Payer: MEDICARE

## 2022-01-25 ENCOUNTER — OFFICE VISIT (OUTPATIENT)
Dept: ORTHOPEDICS | Facility: CLINIC | Age: 77
End: 2022-01-25
Payer: MEDICARE

## 2022-01-25 VITALS — HEIGHT: 70 IN | WEIGHT: 204 LBS | BODY MASS INDEX: 29.2 KG/M2

## 2022-01-25 DIAGNOSIS — M25.532 LEFT WRIST PAIN: ICD-10-CM

## 2022-01-25 DIAGNOSIS — M19.132 POST-TRAUMATIC OSTEOARTHRITIS OF LEFT WRIST: Primary | ICD-10-CM

## 2022-01-25 PROCEDURE — 1125F PR PAIN SEVERITY QUANTIFIED, PAIN PRESENT: ICD-10-PCS | Mod: HCNC,CPTII,S$GLB, | Performed by: ORTHOPAEDIC SURGERY

## 2022-01-25 PROCEDURE — 1125F AMNT PAIN NOTED PAIN PRSNT: CPT | Mod: HCNC,CPTII,S$GLB, | Performed by: ORTHOPAEDIC SURGERY

## 2022-01-25 PROCEDURE — 1101F PR PT FALLS ASSESS DOC 0-1 FALLS W/OUT INJ PAST YR: ICD-10-PCS | Mod: HCNC,CPTII,S$GLB, | Performed by: ORTHOPAEDIC SURGERY

## 2022-01-25 PROCEDURE — 1159F MED LIST DOCD IN RCRD: CPT | Mod: HCNC,CPTII,S$GLB, | Performed by: ORTHOPAEDIC SURGERY

## 2022-01-25 PROCEDURE — 73110 XR WRIST COMPLETE 3 VIEWS LEFT: ICD-10-PCS | Mod: 26,HCNC,LT, | Performed by: RADIOLOGY

## 2022-01-25 PROCEDURE — 3288F FALL RISK ASSESSMENT DOCD: CPT | Mod: HCNC,CPTII,S$GLB, | Performed by: ORTHOPAEDIC SURGERY

## 2022-01-25 PROCEDURE — 20605 INTERMEDIATE JOINT ASPIRATION/INJECTION: L RADIOCARPAL: ICD-10-PCS | Mod: HCNC,LT,S$GLB, | Performed by: ORTHOPAEDIC SURGERY

## 2022-01-25 PROCEDURE — 99214 PR OFFICE/OUTPT VISIT, EST, LEVL IV, 30-39 MIN: ICD-10-PCS | Mod: 25,HCNC,S$GLB, | Performed by: ORTHOPAEDIC SURGERY

## 2022-01-25 PROCEDURE — 1159F PR MEDICATION LIST DOCUMENTED IN MEDICAL RECORD: ICD-10-PCS | Mod: HCNC,CPTII,S$GLB, | Performed by: ORTHOPAEDIC SURGERY

## 2022-01-25 PROCEDURE — 20605 DRAIN/INJ JOINT/BURSA W/O US: CPT | Mod: HCNC,LT,S$GLB, | Performed by: ORTHOPAEDIC SURGERY

## 2022-01-25 PROCEDURE — 99999 PR PBB SHADOW E&M-EST. PATIENT-LVL III: CPT | Mod: PBBFAC,HCNC,, | Performed by: ORTHOPAEDIC SURGERY

## 2022-01-25 PROCEDURE — 73110 X-RAY EXAM OF WRIST: CPT | Mod: TC,HCNC,FY,LT

## 2022-01-25 PROCEDURE — 99999 PR PBB SHADOW E&M-EST. PATIENT-LVL III: ICD-10-PCS | Mod: PBBFAC,HCNC,, | Performed by: ORTHOPAEDIC SURGERY

## 2022-01-25 PROCEDURE — 3288F PR FALLS RISK ASSESSMENT DOCUMENTED: ICD-10-PCS | Mod: HCNC,CPTII,S$GLB, | Performed by: ORTHOPAEDIC SURGERY

## 2022-01-25 PROCEDURE — 3072F LOW RISK FOR RETINOPATHY: CPT | Mod: HCNC,CPTII,S$GLB, | Performed by: ORTHOPAEDIC SURGERY

## 2022-01-25 PROCEDURE — 1101F PT FALLS ASSESS-DOCD LE1/YR: CPT | Mod: HCNC,CPTII,S$GLB, | Performed by: ORTHOPAEDIC SURGERY

## 2022-01-25 PROCEDURE — 73110 X-RAY EXAM OF WRIST: CPT | Mod: 26,HCNC,LT, | Performed by: RADIOLOGY

## 2022-01-25 PROCEDURE — 3072F PR LOW RISK FOR RETINOPATHY: ICD-10-PCS | Mod: HCNC,CPTII,S$GLB, | Performed by: ORTHOPAEDIC SURGERY

## 2022-01-25 PROCEDURE — 99214 OFFICE O/P EST MOD 30 MIN: CPT | Mod: 25,HCNC,S$GLB, | Performed by: ORTHOPAEDIC SURGERY

## 2022-01-25 RX ORDER — DEXAMETHASONE SODIUM PHOSPHATE 4 MG/ML
4 INJECTION, SOLUTION INTRA-ARTICULAR; INTRALESIONAL; INTRAMUSCULAR; INTRAVENOUS; SOFT TISSUE
Status: DISCONTINUED | OUTPATIENT
Start: 2022-01-25 | End: 2022-01-25 | Stop reason: HOSPADM

## 2022-01-25 RX ADMIN — DEXAMETHASONE SODIUM PHOSPHATE 4 MG: 4 INJECTION, SOLUTION INTRA-ARTICULAR; INTRALESIONAL; INTRAMUSCULAR; INTRAVENOUS; SOFT TISSUE at 08:01

## 2022-01-25 NOTE — PROCEDURES
Intermediate Joint Aspiration/Injection: L radiocarpal    Date/Time: 1/25/2022 8:30 AM  Performed by: Kindra Castillo MD  Authorized by: Kindra Castillo MD     Consent Done?:  Yes (Verbal)  Indications:  Arthritis  Timeout: Prior to procedure the correct patient, procedure, and site was verified      Location:  Wrist  Site:  L radiocarpal  Prep: Patient was prepped and draped in usual sterile fashion    Needle size:  25 G  Medications:  4 mg dexamethasone 4 mg/mL

## 2022-01-25 NOTE — PROGRESS NOTES
Subjective:      Patient ID: Emerson Menendez is a 76 y.o. male.    Chief Complaint: Pain of the Left Wrist      HPI  Emerson Menendez is a  76 y.o. male presenting today for evaluation of the left wrist. He is status post Left wrist proximal row carpectomy with interposition arthroplasty by Dr. Castillo 1/6/2021. He reports improvement in pain initially following surgery then reports he reached a plateau.  He initially attended some therapy in [place] then later returned to [place] and attended some therapy sessions here however reports he did not feel like he was getting a lot out of therapy and discontinued. He reports pain over the dorsal wrist increased with use. Recently he has been using a wrist brace with some relief. He also has a hx of left wrist arthroscopy with TFCC debridement and carpal tunnel release 8/3/2020.    Review of patient's allergies indicates:   Allergen Reactions    Penicillins      Knees locked up         Current Outpatient Medications   Medication Sig Dispense Refill    ACCU-CHEK ELIE CONTROL SOLN Soln       alcohol swabs PadM Apply 1 each topically as needed.      aspirin (ECOTRIN) 81 MG EC tablet Take 1 tablet (81 mg total) by mouth once daily. 100 tablet 3    blood glucose control, normal (METER-CHECK) Soln One meter.  Use as directed. Meter of insurance choice 1 each 0    blood sugar diagnostic (ACCU-CHEK ELIE PLUS TEST STRP) Str Accu check elie plus TEST FOUR TIMES DAILY. Test strtips of insurance choice to go with meter and lancets 400 strip 3    blood sugar diagnostic Strp [facility] - check blood sugars 4 times daily 400 strip 4    blood-glucose meter (ACCU-CHEK ELIE PLUS METER) Misc Use as direted 1 each 0    blood-glucose meter (ACCU-CHEK OMAYRA) Misc USE AS DIRECTED. Accucheck elie plus 1 each 0    citalopram (CELEXA) 40 MG tablet Take 1 tablet (40 mg total) by mouth once daily. 90 tablet 4    citalopram (CELEXA) 40 MG tablet Take 1 tablet  "(40 mg total) by mouth once daily. 30 tablet 4    diazePAM (VALIUM) 5 MG tablet Take 1 tablet (5 mg total) by mouth every 12 (twelve) hours as needed for Anxiety. 60 tablet 0    ergocalciferol (ERGOCALCIFEROL) 50,000 unit Cap Take 1 capsule (50,000 Units total) by mouth every 7 days. 12 capsule 3    flash glucose scanning reader (FREESTYLE CHRISTIANO 14 DAY READER) Misc Use as directed 6 each 4    flash glucose sensor (FREESTYLE CHRISTIANO 14 DAY SENSOR) Kit Use as directed 6 kit 4    gabapentin (NEURONTIN) 100 MG capsule TAKE 1 CAPSULE EVERY MORNING, 1 CAPSULE IN THE AFTERNOON, AND 1 TO 3 CAPSULE(S) AT BEDTIME AS NEEDED FOR FOOT PAIN 450 capsule 3    glimepiride (AMARYL) 2 MG tablet Take 1 tablet (2 mg total) by mouth once daily. 90 tablet 4    glucose 4 GM chewable tablet Take 8 g by mouth as needed for Low blood sugar.      insulin (LANTUS SOLOSTAR U-100 INSULIN) glargine 100 units/mL (3mL) SubQ pen Inject 15 units under the skin in the morning and 15 units under the skin in the evening. 15 mL 4    lancets (ACCU-CHEK SOFTCLIX LANCETS) Misc 1 lancet by Misc.(Non-Drug; Combo Route) route 4 (four) times daily. 400 each 4    latanoprost 0.005 % ophthalmic solution Place 1 drop into both eyes every evening. 7.5 mL 3    losartan (COZAAR) 50 MG tablet Take 1 tablet (50 mg total) by mouth once daily. 90 tablet 6    MONOVISC 88 mg/4 mL Syrg       omeprazole (PRILOSEC) 40 MG capsule Take 1 capsule (40 mg total) by mouth once daily. 90 capsule 4    oxyCODONE-acetaminophen (PERCOCET) 5-325 mg per tablet Take 1 tablet by mouth every 4 to 6 hours as needed for Pain (moderate to severe). 42 tablet 0    pen needle, diabetic (BD ULTRA-FINE SHORT PEN NEEDLE) 31 gauge x 5/16" Ndle USE TO INJECT INSULIN ONE TIME DAILY 100 each 3    pravastatin (PRAVACHOL) 40 MG tablet Take 1 tablet (40 mg total) by mouth once daily. 90 tablet 3    prednisoLONE acetate (PRED FORTE) 1 % DrpS Place 1 drop into the left eye 2 (two) times daily. " 10 mL 1    sildenafiL (VIAGRA) 100 MG tablet Take 1 tablet (100 mg total) by mouth daily as needed for Erectile Dysfunction. 30 tablet 2    tamsulosin (FLOMAX) 0.4 mg Cap Take 1 capsule (0.4 mg total) by mouth nightly. 90 capsule 4    traMADoL (ULTRAM) 50 mg tablet Take 1 tablet (50 mg total) by mouth every 8 (eight) hours as needed for Pain. 60 tablet 0    TRUEPLUS LANCETS 33 gauge Misc       insulin aspart U-100 (NOVOLOG FLEXPEN U-100 INSULIN) 100 unit/mL (3 mL) InPn pen Inject 4 Units into the skin 2 (two) times daily with meals. (Patient not taking: Reported on 12/7/2021) 3 mL 3     No current facility-administered medications for this visit.     Facility-Administered Medications Ordered in Other Visits   Medication Dose Route Frequency Provider Last Rate Last Admin    mupirocin 2 % ointment   Nasal On Call Procedure Mohit Hummel MD   Given at 01/06/21 0847       Past Medical History:   Diagnosis Date    Anxiety 3/16/2015    Asymptomatic multiple myeloma     BPH (benign prostatic hypertrophy) 9/24/2012    Carpal tunnel syndrome     Depression 3/16/2015    GERD (gastroesophageal reflux disease) 9/24/2012    Glaucoma     Hx of psychiatric care     Celexa, Valium    Hyperlipidemia     Hypertension     Microalbuminuria 9/24/2012    Osteoarthritis of cervical spine 9/24/2012    Osteoarthritis of knee 9/24/2012    Psychiatric problem     Type II or unspecified type diabetes mellitus without mention of complication, not stated as uncontrolled 9/24/2012       Past Surgical History:   Procedure Laterality Date    ARTHROSCOPY OF WRIST Left 8/3/2020    Procedure: ARTHROSCOPY, WRIST LEFT;  Surgeon: Kindra Castillo MD;  Location: [facility] OR;  Service: Orthopedics;  Laterality: Left;  REGIONAL/[facility]    CARPAL TUNNEL RELEASE Left 8/3/2020    Procedure: RELEASE, CARPAL TUNNEL LEFT;  Surgeon: Kindra Castillo MD;  Location: [facility] OR;  Service: Orthopedics;  Laterality: Left;  REGIONAL/[facility]     "CATARACT EXTRACTION  2012    Right eye    INTRAOCULAR PROSTHESES INSERTION Left 5/20/2021    Procedure: INSERTION, IOL PROSTHESIS;  Surgeon: Jose L Wheatley MD;  Location: [address];  Service: Ophthalmology;  Laterality: Left;    PHACOEMULSIFICATION OF CATARACT Left 5/20/2021    Procedure: PHACOEMULSIFICATION, CATARACT;  Surgeon: Jose L Wheatley MD;  [facility]: [facility] OR [address] [place] [address];  Service: Ophthalmology;  Laterality: Left;    PROSTATE SURGERY      SURGICAL REMOVAL OF CARPAL BONE Left 1/6/2021    Procedure: OSTECTOMY, CARPAL BONE, LEFT;  Surgeon: Kindra Castillo MD;  Location: [facility];  Service: Orthopedics;  Laterality: Left;  REGIONAL/[facility]       Review of Systems:  Constitutional: Negative for chills and fever.   Respiratory: Negative for cough and shortness of breath.    Gastrointestinal: Negative for nausea and vomiting.   Skin: Negative for rash.   Neurological: Negative for dizziness and headaches.   Psychiatric/Behavioral: Negative for depression.   MSK as in HPI       OBJECTIVE:     PHYSICAL EXAM:  Ht 5' 10" (1.778 m)   Wt 92.5 kg (204 lb)   BMI 29.27 kg/m²     GEN:  NAD, well-developed, well-groomed.  NEURO: Awake, alert, and oriented. Normal attention and concentration.    PSYCH: Normal mood and affect. Behavior is normal.  HEENT: No cervical lymphadenopathy noted.  CARDIOVASCULAR: Radial pulses 2+ bilaterally. No LE edema noted.  PULMONARY: Breath sounds normal. No respiratory distress.  SKIN: Intact, no rashes.      MSK:   LUE:  Good active ROM of the wrist and fingers. ttp dorsal wrist. AIN/PIN/Radial/Median/Ulnar Nerves assessed in isolation without deficit. Radial & Ulnar arteries palpated 2+. Capillary Refill <3s.      RADIOGRAPHS:  Xray left wrist 1/25/22   FINDINGS:  Patient has had resection of portions of the carpal bones degenerative changes seen particularly along the radial aspect of the wrist overall appearance is similar to previous exams  Comments: I have personally " reviewed the imaging and I agree with the above radiologist's report.    ASSESSMENT/PLAN:       ICD-10-CM ICD-9-CM   1. Post-traumatic osteoarthritis of left wrist  M19.132 715.23   2. Left wrist pain  M25.532 719.43       Orders Placed This Encounter    Intermediate Joint Aspiration/Injection: L radiocarpal       Plan:   Treatment options discussed plan for left wrist injection today   Follow up as needed        The patient indicates understanding of these issues and agrees to the plan.    Rani Alfaro PA-C  [facility]   [facility] [place], [place]

## 2022-01-25 NOTE — PROGRESS NOTES
I have personally taken the history and examined the patient. I agree with the Hand Surgery PA's note. The plan will be injection left wrist and OT  Pt had really great OT in [place] and was disappointed with therapy at [facility] for his hand. On the flip side he was very happy with his neck therapy in [facility]  ROM is great post PRC  Pt ain throughout wrist- was better originally after surgery

## 2022-01-27 ENCOUNTER — PATIENT MESSAGE (OUTPATIENT)
Dept: INTERNAL MEDICINE | Facility: CLINIC | Age: 77
End: 2022-01-27
Payer: MEDICARE

## 2022-01-27 DIAGNOSIS — H61.20 IMPACTED CERUMEN, UNSPECIFIED LATERALITY: Primary | ICD-10-CM

## 2022-01-27 DIAGNOSIS — D47.2 SMOLDERING MULTIPLE MYELOMA: ICD-10-CM

## 2022-01-27 RX ORDER — DIAZEPAM 5 MG/1
5 TABLET ORAL EVERY 12 HOURS PRN
Qty: 60 TABLET | Refills: 0 | Status: SHIPPED | OUTPATIENT
Start: 2022-01-27 | End: 2022-04-25 | Stop reason: SDUPTHER

## 2022-01-28 NOTE — TELEPHONE ENCOUNTER
Care Due:                  Date            Visit Type   Department     Provider  --------------------------------------------------------------------------------                                             [facility] INTERNAL  Last Visit: 12-      None         NATALI ALCAZAR                                           [facility] INTERNAL  Next Visit: 04-      None         NATALI ALCAZAR                                                            Last  Test          Frequency    Reason                     Performed    Due Date  --------------------------------------------------------------------------------    Lipid Panel.  12 months..  pravastatin..............  Not Found    Overdue    Powered by [employer]. Reference number: [id number].   1/27/2022 6:07:54 PM CST

## 2022-02-01 ENCOUNTER — PATIENT MESSAGE (OUTPATIENT)
Dept: OTHER | Facility: OTHER | Age: 77
End: 2022-02-01
Payer: MEDICARE

## 2022-02-07 ENCOUNTER — OFFICE VISIT (OUTPATIENT)
Dept: HEMATOLOGY/ONCOLOGY | Facility: CLINIC | Age: 77
End: 2022-02-07
Payer: MEDICARE

## 2022-02-07 ENCOUNTER — LAB VISIT (OUTPATIENT)
Dept: LAB | Facility: HOSPITAL | Age: 77
End: 2022-02-07
Attending: INTERNAL MEDICINE
Payer: MEDICARE

## 2022-02-07 ENCOUNTER — RESEARCH ENCOUNTER (OUTPATIENT)
Dept: HEMATOLOGY/ONCOLOGY | Facility: CLINIC | Age: 77
End: 2022-02-07

## 2022-02-07 VITALS
RESPIRATION RATE: 20 BRPM | HEART RATE: 86 BPM | HEIGHT: 70 IN | OXYGEN SATURATION: 98 % | SYSTOLIC BLOOD PRESSURE: 154 MMHG | BODY MASS INDEX: 29.07 KG/M2 | WEIGHT: 203.06 LBS | TEMPERATURE: 98 F | DIASTOLIC BLOOD PRESSURE: 78 MMHG

## 2022-02-07 DIAGNOSIS — Z00.6 RESEARCH EXAM: ICD-10-CM

## 2022-02-07 DIAGNOSIS — E11.22 CKD STAGE 3 DUE TO TYPE 2 DIABETES MELLITUS: ICD-10-CM

## 2022-02-07 DIAGNOSIS — D47.2 SMOLDERING MULTIPLE MYELOMA: ICD-10-CM

## 2022-02-07 DIAGNOSIS — Z79.4 TYPE 2 DIABETES MELLITUS WITH DIABETIC POLYNEUROPATHY, WITH LONG-TERM CURRENT USE OF INSULIN: ICD-10-CM

## 2022-02-07 DIAGNOSIS — N18.30 CKD STAGE 3 DUE TO TYPE 2 DIABETES MELLITUS: ICD-10-CM

## 2022-02-07 DIAGNOSIS — D47.2 SMOLDERING MULTIPLE MYELOMA: Primary | ICD-10-CM

## 2022-02-07 DIAGNOSIS — E11.42 TYPE 2 DIABETES MELLITUS WITH DIABETIC POLYNEUROPATHY, WITH LONG-TERM CURRENT USE OF INSULIN: ICD-10-CM

## 2022-02-07 LAB
ALBUMIN SERPL BCP-MCNC: 3.5 G/DL (ref 3.5–5.2)
ALP SERPL-CCNC: 55 U/L (ref 55–135)
ALT SERPL W/O P-5'-P-CCNC: 14 U/L (ref 10–44)
ANION GAP SERPL CALC-SCNC: 11 MMOL/L (ref 8–16)
AST SERPL-CCNC: 13 U/L (ref 10–40)
BASOPHILS # BLD AUTO: 0.04 K/UL (ref 0–0.2)
BASOPHILS NFR BLD: 0.8 % (ref 0–1.9)
BILIRUB SERPL-MCNC: 0.5 MG/DL (ref 0.1–1)
BUN SERPL-MCNC: 15 MG/DL (ref 8–23)
CALCIUM SERPL-MCNC: 9.5 MG/DL (ref 8.7–10.5)
CHLORIDE SERPL-SCNC: 102 MMOL/L (ref 95–110)
CO2 SERPL-SCNC: 23 MMOL/L (ref 23–29)
CREAT SERPL-MCNC: 1.7 MG/DL (ref 0.5–1.4)
DIFFERENTIAL METHOD: ABNORMAL
EOSINOPHIL # BLD AUTO: 0.3 K/UL (ref 0–0.5)
EOSINOPHIL NFR BLD: 5.7 % (ref 0–8)
ERYTHROCYTE [DISTWIDTH] IN BLOOD BY AUTOMATED COUNT: 14.3 % (ref 11.5–14.5)
EST. GFR  (AFRICAN AMERICAN): 44.6 ML/MIN/1.73 M^2
EST. GFR  (NON AFRICAN AMERICAN): 38.6 ML/MIN/1.73 M^2
GLUCOSE SERPL-MCNC: 170 MG/DL (ref 70–110)
HCT VFR BLD AUTO: 33.8 % (ref 40–54)
HGB BLD-MCNC: 11 G/DL (ref 14–18)
IGA SERPL-MCNC: 1630 MG/DL (ref 40–350)
IGG SERPL-MCNC: 1036 MG/DL (ref 650–1600)
IGM SERPL-MCNC: 41 MG/DL (ref 50–300)
IMM GRANULOCYTES # BLD AUTO: 0.01 K/UL (ref 0–0.04)
IMM GRANULOCYTES NFR BLD AUTO: 0.2 % (ref 0–0.5)
LDH SERPL L TO P-CCNC: 117 U/L (ref 110–260)
LYMPHOCYTES # BLD AUTO: 1.6 K/UL (ref 1–4.8)
LYMPHOCYTES NFR BLD: 33.3 % (ref 18–48)
MAGNESIUM SERPL-MCNC: 1.6 MG/DL (ref 1.6–2.6)
MCH RBC QN AUTO: 29 PG (ref 27–31)
MCHC RBC AUTO-ENTMCNC: 32.5 G/DL (ref 32–36)
MCV RBC AUTO: 89 FL (ref 82–98)
MONOCYTES # BLD AUTO: 0.3 K/UL (ref 0.3–1)
MONOCYTES NFR BLD: 6.9 % (ref 4–15)
NEUTROPHILS # BLD AUTO: 2.5 K/UL (ref 1.8–7.7)
NEUTROPHILS NFR BLD: 53.1 % (ref 38–73)
NRBC BLD-RTO: 0 /100 WBC
PHOSPHATE SERPL-MCNC: 3.2 MG/DL (ref 2.7–4.5)
PLATELET # BLD AUTO: 247 K/UL (ref 150–450)
PMV BLD AUTO: 9.3 FL (ref 9.2–12.9)
POTASSIUM SERPL-SCNC: 4.2 MMOL/L (ref 3.5–5.1)
PROT SERPL-MCNC: 8.3 G/DL (ref 6–8.4)
RBC # BLD AUTO: 3.79 M/UL (ref 4.6–6.2)
SODIUM SERPL-SCNC: 136 MMOL/L (ref 136–145)
WBC # BLD AUTO: 4.75 K/UL (ref 3.9–12.7)

## 2022-02-07 PROCEDURE — 83520 IMMUNOASSAY QUANT NOS NONAB: CPT | Mod: 59,HCNC,Q1 | Performed by: INTERNAL MEDICINE

## 2022-02-07 PROCEDURE — 83615 LACTATE (LD) (LDH) ENZYME: CPT | Mod: HCNC | Performed by: INTERNAL MEDICINE

## 2022-02-07 PROCEDURE — 99999 PR PBB SHADOW E&M-EST. PATIENT-LVL V: CPT | Mod: PBBFAC,HCNC,, | Performed by: INTERNAL MEDICINE

## 2022-02-07 PROCEDURE — 84165 PROTEIN E-PHORESIS SERUM: CPT | Mod: HCNC | Performed by: INTERNAL MEDICINE

## 2022-02-07 PROCEDURE — 86334 IMMUNOFIX E-PHORESIS SERUM: CPT | Mod: 26,Q1,HCNC, | Performed by: PATHOLOGY

## 2022-02-07 PROCEDURE — 99999 PR PBB SHADOW E&M-EST. PATIENT-LVL V: ICD-10-PCS | Mod: PBBFAC,HCNC,, | Performed by: INTERNAL MEDICINE

## 2022-02-07 PROCEDURE — 36415 COLL VENOUS BLD VENIPUNCTURE: CPT | Mod: HCNC | Performed by: INTERNAL MEDICINE

## 2022-02-07 PROCEDURE — 83735 ASSAY OF MAGNESIUM: CPT | Mod: HCNC,Q1 | Performed by: INTERNAL MEDICINE

## 2022-02-07 PROCEDURE — 84100 ASSAY OF PHOSPHORUS: CPT | Mod: HCNC | Performed by: INTERNAL MEDICINE

## 2022-02-07 PROCEDURE — 99215 PR OFFICE/OUTPT VISIT, EST, LEVL V, 40-54 MIN: ICD-10-PCS | Mod: Q1,HCNC,S$GLB, | Performed by: INTERNAL MEDICINE

## 2022-02-07 PROCEDURE — 99215 OFFICE O/P EST HI 40 MIN: CPT | Mod: Q1,HCNC,S$GLB, | Performed by: INTERNAL MEDICINE

## 2022-02-07 PROCEDURE — 85025 COMPLETE CBC W/AUTO DIFF WBC: CPT | Mod: HCNC,Q1 | Performed by: INTERNAL MEDICINE

## 2022-02-07 PROCEDURE — 80053 COMPREHEN METABOLIC PANEL: CPT | Mod: HCNC | Performed by: INTERNAL MEDICINE

## 2022-02-07 PROCEDURE — 84165 PROTEIN E-PHORESIS SERUM: CPT | Mod: 26,Q1,HCNC, | Performed by: PATHOLOGY

## 2022-02-07 PROCEDURE — 86334 IMMUNOFIX E-PHORESIS SERUM: CPT | Mod: HCNC,Q1 | Performed by: INTERNAL MEDICINE

## 2022-02-07 PROCEDURE — 84165 PATHOLOGIST INTERPRETATION SPE: ICD-10-PCS | Mod: 26,Q1,HCNC, | Performed by: PATHOLOGY

## 2022-02-07 PROCEDURE — 86334 PATHOLOGIST INTERPRETATION IFE: ICD-10-PCS | Mod: 26,Q1,HCNC, | Performed by: PATHOLOGY

## 2022-02-07 PROCEDURE — 82784 ASSAY IGA/IGD/IGG/IGM EACH: CPT | Mod: HCNC | Performed by: INTERNAL MEDICINE

## 2022-02-07 NOTE — PROGRESS NOTES
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   Sponsor:  [facility]  IRB# [id number]  Study ID: [id number]  Investigator: GIL Engel Pt Initials: LUCÍA LORENZ       Cycle 74 Day 28 Arm B: Observation     Patient presents to clinic today for above cycle evaluation. He is unaccompanied and states he continues to do well. He states he is "headed back to [place] this weekend" and will stay there through [place]. He reports he had an appt with orthopedics for c/o continued pain to left wrist post release last January. He states the wrist was injected and that he currently feels no pain. He is wearing a flexible cloth brace for stability to wrist and reports brace does help with preventing pain.  Refer to records in [facility] which confirm visit to orthopedist/injection procedure. He also presents with Leah brand blood sugar device; intact to right medial upper arm. He states he receive this late January and "it's been a Godsend." He reports the test strip system and device are more accurate than any method he has used in the past and feels comfortable/confident with device. He also wears knee braces; states no change to this AE; denies any issues with mobility or balance.States is maintaining COVID precautions; wears mask while indoors "always" and outdoors if in crowd. Denies any COVID symptoms. Denies any new symptoms. Reports no missed appts with system MD's over last month.       Review of Baseline AE's:   1.Hypertension, Grade 2 diastolic: BP today is 154/78 today; Grade 2 systolic. Subject states compliance with [place]. Subject denies headache/dizziness; no symptoms noted.   AE ongoing and tends to fluctuate no worse than Grade 2.   2. Lower Back Pain and Neck Pain, grade 1: Stable.  Patient has no complaints as of late and as previously stated, patient is not suspected to have bony myeloma " "involvement per Dr. Engel as these are pre-existing issues present at baseline.  AE ongoing.  3. Pain in extremity (knees), grade 1. Patient continues to deny additional pain. See interval history above.      4. Peripheral sensory neuropathy, grade 1: Patient does not verbalize complaints today. Has gabapentin prescribed and takes as needed.  States has not taken recently.  AE ongoing.   5. Anemia, Grade 1: Hgb stable at 11.0  Result reviewed by Dr. Engel. AE ongoing            Review of AE's:     *Please note this list is not all-inclusive, please see AE log for physician reviewed list of adverse events.   1. Creatinine increased, grade 2: Serum creatinine from labs today is 1.7; calculated GFR 83 ml/min based on CG calculation. Reviewed per Dr. Engel and no new orders.Per Dr Engel not been related to myeloma: rather, eating habits over holidays and decreased hydration practice are reasons for rise in numbers; are related to chronic kidney issues likely secondary to diabetes and hypertension vs plasma cell dyscrasia.  Will continue observation monthly and to monitor renal functionclosely. Per MD, encouraged to increase water/gatorade intake. AE worse vs last month but per Dr Engel not clinically significant.   2. Hyponatremia, Grade 1: Serum sodium today is 136 mmol/L; AE intermittent. Will monitor  3. Hyperkalemia, Grade 1: serum K today is 4.2 mmol/L; wnl; Dr Engel has reviewed labs; no additional orders noted.   4. Hyperuricemia, Grade 1: this was not evaluated today; usually ordered per nephrology.   5. Hyperglycemia. Grade 1: blood glucose today is elevated at 170. Subject states he ate very early morning hours; yogurt, then has toast so lab is nonfasting. No new orders per Dr Engel; subject followed closely by endocrinology.  Had Hg A1c done mid November; 7.7; Dr Engel aware; no orders.       Per study chair, "the definition of progressive disease for this protocol is developing CRAB criteria that requires treatment " "systemically." Per Dr Engel, based on today's exam and lab results, patient does not have any s/s of CRAB: Normal Calcium level (9.7), absence of Renal insufficiency (serum creatinine elevated today but per Dr Engel not related to myeloma; --patient with a history of kidney dysfunction secondary to diabetes; Dr. Engel aware of these values and not concerned for myeloma involvement), absence of significant Anemia (hemoglobin 11.2, stable; will await myeloma labs for clarity relationship to myeloma) and absence of lytic Bone lesions (per baseline metastatic survey as well as MRI--see MD note for further comment). See MD note for ECOG score and H&P and flowsheets for laboratory work, vitals, etc. All myeloma-related blood work from last cycle including SPEP and JAGUAR have remained stable, and are currently pending for this cycle. Per Dr. Engel, patient shows no evidence of progression at last result. Patient informed that Dr. Engel will release all lab results to [facility]. He states understanding of this. QOL's not required again until end of treatment/observation.           Subject maintains he wishes only to see Dr Engel and will not show for appts if scheduled with any other provider. Will continue to schedule patient as far out as possible, which seems to help maintain compliance with visits. Next appt to be scheduled for one month from today.  Informed patient will contact him a few days prior to next appt to remind him so that he can plan accordingly. Patient states having research RN's contact information and has MD contact information to call with any concerns, questions, or worsening of symptoms. Will follow up with subject once myeloma labs are resulted.                   "

## 2022-02-07 NOTE — PROGRESS NOTES
Subjective:    Patient ID: Emerson Menendez is a 76 y.o. male.    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 Test results identify a stable IgA kappa protein with beta globulin band at 1.63g/dL. Kappa free light chain is elevated at 4.40. CBC and calcium are stable. Creatinine with history of stage III CKD is stable at 1.4. Metastatic survey is negative for lytic lesions. MRI of the entire spine demonstrated age related changes but no convincing evidence of myeloma bone disease. Bone marrow biopsy identified about 13% plasma cells by morphology and FISH for myeloma identified a t(11;14) and trisomies 3,7, and 17. The patient is afebrile and appears clinically well. He was seen by his podiatrist and nephrologist since our last visit without any new or acute events.    The patient has not received any therapy for smoldering myeloma including bisphosphonates or steroids. He has been randomized to observation arm of the the clinical study. 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 He has no current or prior history of malignancy.    TODAY Cycle 74, Day 28  No acute interval medical events  Finally got remote glucose monitor  Creatinine improved  CBC stable  SMM serology pending    Cough  Pertinent negatives include no fever.   Hand Pain     Foot Pain  Associated symptoms include coughing. Pertinent negatives include no diaphoresis, fatigue or fever.   Arm Pain     Follow-up  Associated symptoms include coughing. Pertinent negatives include no diaphoresis, fatigue or fever.     Review of  Systems   Constitutional: Negative for activity change, appetite change, diaphoresis, fatigue, fever and unexpected weight change.   HENT: Negative.    Eyes: Negative.    Respiratory: Positive for cough.    Cardiovascular: Negative for leg swelling.   Gastrointestinal: Negative.    Endocrine: Negative.    Genitourinary: Negative.    Musculoskeletal: Negative.    Skin: Negative.    Allergic/Immunologic: Negative.    Neurological:        Grade 0-1 peripheral neuropathy of bilateral feet.    Hematological: Negative for adenopathy. Does not bruise/bleed easily.   Psychiatric/Behavioral: Negative.        Objective:       Vitals:    02/07/22 0902   BP: (!) 154/78   Pulse: 86   Resp: 20   Temp: 97.9 °F (36.6 °C)       Physical Exam  Vitals and nursing note reviewed.   Constitutional:       Appearance: He is well-developed.   HENT:      Head: Normocephalic and atraumatic.      Right Ear: External ear normal.      Left Ear: External ear normal.      Nose: Nose normal.   Eyes:      Conjunctiva/sclera: Conjunctivae normal.      Pupils: Pupils are equal, round, and reactive to light.   Cardiovascular:      Rate and Rhythm: Normal rate and regular rhythm.      Heart sounds: No murmur heard.      Pulmonary:      Effort: Pulmonary effort is normal. No respiratory distress.      Breath sounds: Normal breath sounds.   Abdominal:      General: Bowel sounds are normal. There is no distension.      Palpations: Abdomen is soft.   Musculoskeletal:         General: No tenderness.      Cervical back: Normal range of motion and neck supple.   Skin:     General: Skin is warm and dry.      Findings: No rash.      Nails: There is no clubbing.   Neurological:      Mental Status: He is alert and oriented to person, place, and time.      Cranial Nerves: No cranial nerve deficit.   Psychiatric:         Behavior: Behavior normal.         Assessment:       No diagnosis found.    Plan:       The patient has a diagnosis of smoldering myeloma. There is  no indication for immediate chemotherapy. We are monitoring renal function closely- baseline creatinine of -1.5 is elevated by current labs to 1.7. 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 M protein has been stable and repeat is pending. Plan for return in 1month.  Endocrinology and Nephrology to monitor diabetes and renal failure respectively. Patient would like to continue to follow with Dr. Perkins as needed.

## 2022-02-08 ENCOUNTER — PROCEDURE VISIT (OUTPATIENT)
Dept: OPHTHALMOLOGY | Facility: CLINIC | Age: 77
End: 2022-02-08
Payer: MEDICARE

## 2022-02-08 ENCOUNTER — CLINICAL SUPPORT (OUTPATIENT)
Dept: DIABETES | Facility: CLINIC | Age: 77
End: 2022-02-08
Payer: MEDICARE

## 2022-02-08 DIAGNOSIS — Z79.4 TYPE 2 DIABETES MELLITUS WITH OTHER CIRCULATORY COMPLICATION, WITH LONG-TERM CURRENT USE OF INSULIN: ICD-10-CM

## 2022-02-08 DIAGNOSIS — H35.033 HYPERTENSIVE RETINOPATHY OF BOTH EYES: ICD-10-CM

## 2022-02-08 DIAGNOSIS — H40.1132 PRIMARY OPEN ANGLE GLAUCOMA OF BOTH EYES, MODERATE STAGE: ICD-10-CM

## 2022-02-08 DIAGNOSIS — E08.3211 MILD NONPROLIFERATIVE DIABETIC RETINOPATHY OF RIGHT EYE WITH MACULAR EDEMA ASSOCIATED WITH DIABETES MELLITUS DUE TO UNDERLYING CONDITION: Primary | ICD-10-CM

## 2022-02-08 DIAGNOSIS — E11.59 TYPE 2 DIABETES MELLITUS WITH OTHER CIRCULATORY COMPLICATION, WITH LONG-TERM CURRENT USE OF INSULIN: ICD-10-CM

## 2022-02-08 DIAGNOSIS — E11.3292 MILD NONPROLIFERATIVE DIABETIC RETINOPATHY OF LEFT EYE WITHOUT MACULAR EDEMA ASSOCIATED WITH TYPE 2 DIABETES MELLITUS: ICD-10-CM

## 2022-02-08 LAB
ALBUMIN SERPL ELPH-MCNC: 3.93 G/DL (ref 3.35–5.55)
ALPHA1 GLOB SERPL ELPH-MCNC: 0.29 G/DL (ref 0.17–0.41)
ALPHA2 GLOB SERPL ELPH-MCNC: 0.85 G/DL (ref 0.43–0.99)
B-GLOBULIN SERPL ELPH-MCNC: 2.47 G/DL (ref 0.5–1.1)
GAMMA GLOB SERPL ELPH-MCNC: 0.56 G/DL (ref 0.67–1.58)
INTERPRETATION SERPL IFE-IMP: NORMAL
KAPPA LC SER QL IA: 6.53 MG/DL (ref 0.33–1.94)
KAPPA LC/LAMBDA SER IA: 3.17 (ref 0.26–1.65)
LAMBDA LC SER QL IA: 2.06 MG/DL (ref 0.57–2.63)
PATHOLOGIST INTERPRETATION IFE: NORMAL
PATHOLOGIST INTERPRETATION SPE: NORMAL
PROT SERPL-MCNC: 8.1 G/DL (ref 6–8.4)

## 2022-02-08 PROCEDURE — 99214 PR OFFICE/OUTPT VISIT, EST, LEVL IV, 30-39 MIN: ICD-10-PCS | Mod: HCNC,S$GLB,, | Performed by: OPHTHALMOLOGY

## 2022-02-08 PROCEDURE — G0108 DIAB MANAGE TRN  PER INDIV: HCPCS | Mod: HCNC,S$GLB,, | Performed by: INTERNAL MEDICINE

## 2022-02-08 PROCEDURE — 99999 PR PBB SHADOW E&M-EST. PATIENT-LVL I: CPT | Mod: PBBFAC,HCNC,,

## 2022-02-08 PROCEDURE — 99999 PR PBB SHADOW E&M-EST. PATIENT-LVL I: ICD-10-PCS | Mod: PBBFAC,HCNC,,

## 2022-02-08 PROCEDURE — G0108 PR DIAB MANAGE TRN  PER INDIV: ICD-10-PCS | Mod: HCNC,S$GLB,, | Performed by: INTERNAL MEDICINE

## 2022-02-08 PROCEDURE — 3051F PR MOST RECENT HEMOGLOBIN A1C LEVEL 7.0 - < 8.0%: ICD-10-PCS | Mod: HCNC,CPTII,S$GLB, | Performed by: OPHTHALMOLOGY

## 2022-02-08 PROCEDURE — 3051F HG A1C>EQUAL 7.0%<8.0%: CPT | Mod: HCNC,CPTII,S$GLB, | Performed by: OPHTHALMOLOGY

## 2022-02-08 PROCEDURE — 99214 OFFICE O/P EST MOD 30 MIN: CPT | Mod: HCNC,S$GLB,, | Performed by: OPHTHALMOLOGY

## 2022-02-08 RX ORDER — BRIMONIDINE TARTRATE 2 MG/ML
1 SOLUTION/ DROPS OPHTHALMIC 3 TIMES DAILY
Qty: 5 ML | Refills: 1 | Status: SHIPPED | OUTPATIENT
Start: 2022-02-08 | End: 2022-03-28 | Stop reason: SDUPTHER

## 2022-02-08 NOTE — PROGRESS NOTES
HPI     1 mo DFE/OCT of Mac    Patient states vision is about same. No changes. No pain        Gtts: Latanoprost QHS OU          At's PRN after inj    Last edited by Chico Tse MA on 2/8/2022  3:01 PM. (History)            A/P    ICD-10-CM ICD-9-CM   1. 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 Mild nonproliferative diabetic retinopathy of left eye without macular edema associated with type 2 diabetes mellitus  E11.3292 250.50     362.04   3. Hypertensive retinopathy of both eyes  H35.033 362.11   4. Primary open angle glaucoma of both eyes, moderate stage  H40.1132 365.11     365.72       1. 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 Mild nonproliferative diabetic retinopathy of left eye without macular edema associated with type 2 diabetes mellitus  PCP is Dr. Sagastume  11/12/2021  7.7  A1C     OD- S/p ELKIN 11/30/21 x2  S/p IVO 1/4/22  VA 20/60 (was 20/50), tr improvement in IRF after ODX, slight incr in IOP so we will add brim TID    Plan: observe today for DME no injection, start BRIM TID as IOP incr     OS- VA 20/30 (was 20/40) rare dot heme, no DME  Plan: Observation    Recommend good blood pressure control, tight blood glucose control, and good cholesterol control       3. Hypertensive retinopathy of both eyes  AV nicking, BP has been stable   Plan: Observation  Recommend good blood pressure control, tight blood glucose control, and good cholesterol control       4. Primary open angle glaucoma of both eyes, moderate stage  F/b Dr. Wheatley  IOP 25/16  Currently on Latan qHS OU  Continue latan qhs OU, add brim TID OD possible steroid response    RTC 6 weeks DFE/OCTm OD         I saw and examined the patient and reviewed in detail the findings documented. The final  examination findings, image interpretations, and plan as documented in the record represent my personal judgment and conclusions.    Andriy Phillips MD  Vitreoretinal Surgery   [facility]

## 2022-02-08 NOTE — PROGRESS NOTES
Diabetes Care Specialist Progress Note  Author: Emma Ramirez RN  Date: 2/8/2022    Program Intake  Reason for Diabetes Program Visit:: Intervention  Type of Intervention:: Individual  Individual: Device Training  Device Training: Personal CGM (Henrry)  Permission to speak with others about care:: no    Lab Results   Component Value Date    HGBA1C 7.7 (H) 11/12/2021     DIABETES EDUCATION  FREESTYLE HENRRY 2 CGM TRAINING    Pt lost his henrry  henrry 2 rona to pts cell phone will use cell phone for scanning.      Patient referred to clinic today for Freestyle Henrry 2 continuous glucose sensor system training. Patient arrived with their reader charged and 1 sensor for application. Provided personal FreeStyle Henrry 2 training - reviewed the following with patient:     §  Settings:                          * Low alert: 80 mg/dl                           * High alert: 180 mg/dl                                                 * Signal Loss: ON    · NO fingersticks required but if feeling s/s that do not match with CGM reading or in event of hypoglycemia confirm with fingerstick   · Do not take high doses of Vitamin C (more than 500 mg per day), can cause falsely raised readings  · Sensor is 14-day wear-- Sensor should be changed out every 14 days. Rotating senor placement sites with back of each arm.  · FDA approved for back of the arm wear  · Site rotation  · Once sensor is placed- wait 60 minutes- warm up period before able to scan  · During the first 12-hour after placing a new sensor - patient should perform fingerstick   · Scan minimum every 8 hours for continual graph. Encouraged pt to scan more often - before each meal and before bed.   · If you have an Xray, MRI, a CT scan, or a diathermy treatment, you must remove your sensor prior to the procedure. If this occurs, notify your healthcare provider.  · NanoGram X-ray machine- ask to be wanded  · The sensor is water-resistant and can be worn while  bathing, showering, or swimming as long as you do not take it deeper than 3 feet or keep it underwater for more than 30 minutes at a time.  · [place] will sound for low alert, high alert and signal loss. Once receive an alarm, you need to acknowledge the alarm and scan your sensor to visualize the blood sugar    Pt successfully set up reader and placed sensor on back of upper left arm. Sensor code: g01 . Reviewed additional adhesive options if having any difficulty with sensor staying on. Target BG range set for  mg/dL.             Patient verbalized understanding of instruction and written materials.  Pt was able to return back demonstration of instructions today. Patient understood key points, needs reinforcement and further instruction.     Diabetes Self-Management Care Plan:    Today's Diabetes Self-Management Care Plan was developed with Emerson's input. Emerson has agreed to work toward the following goal(s) to improve his/her overall diabetes control.      There are no recently modified care plans to display for this patient.      Follow Up Plan     Follow up in about 3 months (around 5/8/2022).    Today's care plan and follow up schedule was discussed with patient.  Emerson verbalized understanding of the care plan, goals, and agrees to follow up plan.        The patient was encouraged to communicate with his/her health care provider/physician and care team regarding his/her condition(s) and treatment.  I provided the patient with my contact information today and encouraged to contact me via phone or [facility] as needed.     Length of Visit   Total Time: 60 Minutes

## 2022-02-10 NOTE — PATIENT INSTRUCTIONS
1) follow up in six months with blood work as ordered  2) avoid NSAIDs   Picato Counseling:  I discussed with the patient the risks of Picato including but not limited to erythema, scaling, itching, weeping, crusting, and pain.

## 2022-02-12 ENCOUNTER — PATIENT MESSAGE (OUTPATIENT)
Dept: ORTHOPEDICS | Facility: CLINIC | Age: 77
End: 2022-02-12
Payer: MEDICARE

## 2022-02-17 ENCOUNTER — PATIENT OUTREACH (OUTPATIENT)
Dept: DIABETES | Facility: CLINIC | Age: 77
End: 2022-02-17
Payer: MEDICARE

## 2022-02-17 DIAGNOSIS — Z79.4 TYPE 2 DIABETES MELLITUS WITH OTHER CIRCULATORY COMPLICATION, WITH LONG-TERM CURRENT USE OF INSULIN: Primary | ICD-10-CM

## 2022-02-17 DIAGNOSIS — E11.59 TYPE 2 DIABETES MELLITUS WITH OTHER CIRCULATORY COMPLICATION, WITH LONG-TERM CURRENT USE OF INSULIN: Primary | ICD-10-CM

## 2022-02-19 ENCOUNTER — PATIENT MESSAGE (OUTPATIENT)
Dept: INTERNAL MEDICINE | Facility: CLINIC | Age: 77
End: 2022-02-19
Payer: MEDICARE

## 2022-02-19 DIAGNOSIS — E11.42 TYPE 2 DIABETES MELLITUS WITH DIABETIC POLYNEUROPATHY, WITH LONG-TERM CURRENT USE OF INSULIN: ICD-10-CM

## 2022-02-19 DIAGNOSIS — Z79.4 TYPE 2 DIABETES MELLITUS WITH DIABETIC POLYNEUROPATHY, WITH LONG-TERM CURRENT USE OF INSULIN: ICD-10-CM

## 2022-02-20 RX ORDER — INSULIN GLARGINE 100 [IU]/ML
INJECTION, SOLUTION SUBCUTANEOUS
Qty: 15 ML | Refills: 4 | Status: SHIPPED | OUTPATIENT
Start: 2022-02-20 | End: 2022-07-18

## 2022-02-21 ENCOUNTER — PATIENT MESSAGE (OUTPATIENT)
Dept: INTERNAL MEDICINE | Facility: CLINIC | Age: 77
End: 2022-02-21
Payer: MEDICARE

## 2022-02-22 DIAGNOSIS — D84.9 IMMUNOSUPPRESSED STATUS: ICD-10-CM

## 2022-03-04 ENCOUNTER — TELEPHONE (OUTPATIENT)
Dept: HEMATOLOGY/ONCOLOGY | Facility: CLINIC | Age: 77
End: 2022-03-04
Payer: MEDICARE

## 2022-03-11 ENCOUNTER — TELEPHONE (OUTPATIENT)
Dept: HEMATOLOGY/ONCOLOGY | Facility: CLINIC | Age: 77
End: 2022-03-11
Payer: MEDICARE

## 2022-03-14 ENCOUNTER — RESEARCH ENCOUNTER (OUTPATIENT)
Dept: HEMATOLOGY/ONCOLOGY | Facility: CLINIC | Age: 77
End: 2022-03-14

## 2022-03-14 ENCOUNTER — OFFICE VISIT (OUTPATIENT)
Dept: HEMATOLOGY/ONCOLOGY | Facility: CLINIC | Age: 77
End: 2022-03-14
Payer: MEDICARE

## 2022-03-14 ENCOUNTER — LAB VISIT (OUTPATIENT)
Dept: LAB | Facility: HOSPITAL | Age: 77
End: 2022-03-14
Payer: MEDICARE

## 2022-03-14 VITALS
HEIGHT: 69 IN | TEMPERATURE: 98 F | HEART RATE: 90 BPM | OXYGEN SATURATION: 98 % | WEIGHT: 199.75 LBS | DIASTOLIC BLOOD PRESSURE: 70 MMHG | RESPIRATION RATE: 16 BRPM | BODY MASS INDEX: 29.59 KG/M2 | SYSTOLIC BLOOD PRESSURE: 125 MMHG

## 2022-03-14 DIAGNOSIS — Z00.6 RESEARCH EXAM: ICD-10-CM

## 2022-03-14 DIAGNOSIS — D47.2 SMOLDERING MULTIPLE MYELOMA: Primary | ICD-10-CM

## 2022-03-14 DIAGNOSIS — D47.2 SMOLDERING MULTIPLE MYELOMA: ICD-10-CM

## 2022-03-14 PROBLEM — D80.4 IGM DEFICIENCY: Status: RESOLVED | Noted: 2017-09-13 | Resolved: 2022-03-14

## 2022-03-14 LAB
ALBUMIN SERPL BCP-MCNC: 3.6 G/DL (ref 3.5–5.2)
ALP SERPL-CCNC: 60 U/L (ref 55–135)
ALT SERPL W/O P-5'-P-CCNC: 18 U/L (ref 10–44)
ANION GAP SERPL CALC-SCNC: 11 MMOL/L (ref 8–16)
AST SERPL-CCNC: 16 U/L (ref 10–40)
BASOPHILS # BLD AUTO: 0.03 K/UL (ref 0–0.2)
BASOPHILS NFR BLD: 0.6 % (ref 0–1.9)
BILIRUB SERPL-MCNC: 0.4 MG/DL (ref 0.1–1)
BUN SERPL-MCNC: 22 MG/DL (ref 8–23)
CALCIUM SERPL-MCNC: 9.7 MG/DL (ref 8.7–10.5)
CHLORIDE SERPL-SCNC: 105 MMOL/L (ref 95–110)
CO2 SERPL-SCNC: 23 MMOL/L (ref 23–29)
CREAT SERPL-MCNC: 1.8 MG/DL (ref 0.5–1.4)
DIFFERENTIAL METHOD: ABNORMAL
EOSINOPHIL # BLD AUTO: 0.4 K/UL (ref 0–0.5)
EOSINOPHIL NFR BLD: 6.5 % (ref 0–8)
ERYTHROCYTE [DISTWIDTH] IN BLOOD BY AUTOMATED COUNT: 14 % (ref 11.5–14.5)
EST. GFR  (AFRICAN AMERICAN): 41.6 ML/MIN/1.73 M^2
EST. GFR  (NON AFRICAN AMERICAN): 36 ML/MIN/1.73 M^2
GLUCOSE SERPL-MCNC: 131 MG/DL (ref 70–110)
HCT VFR BLD AUTO: 35 % (ref 40–54)
HGB BLD-MCNC: 10.9 G/DL (ref 14–18)
IGA SERPL-MCNC: 1611 MG/DL (ref 40–350)
IGG SERPL-MCNC: 1099 MG/DL (ref 650–1600)
IGM SERPL-MCNC: 43 MG/DL (ref 50–300)
IMM GRANULOCYTES # BLD AUTO: 0.01 K/UL (ref 0–0.04)
IMM GRANULOCYTES NFR BLD AUTO: 0.2 % (ref 0–0.5)
LDH SERPL L TO P-CCNC: 119 U/L (ref 110–260)
LYMPHOCYTES # BLD AUTO: 2.6 K/UL (ref 1–4.8)
LYMPHOCYTES NFR BLD: 48 % (ref 18–48)
MAGNESIUM SERPL-MCNC: 1.8 MG/DL (ref 1.6–2.6)
MCH RBC QN AUTO: 28 PG (ref 27–31)
MCHC RBC AUTO-ENTMCNC: 31.1 G/DL (ref 32–36)
MCV RBC AUTO: 90 FL (ref 82–98)
MONOCYTES # BLD AUTO: 0.3 K/UL (ref 0.3–1)
MONOCYTES NFR BLD: 5.4 % (ref 4–15)
NEUTROPHILS # BLD AUTO: 2.1 K/UL (ref 1.8–7.7)
NEUTROPHILS NFR BLD: 39.3 % (ref 38–73)
NRBC BLD-RTO: 0 /100 WBC
PHOSPHATE SERPL-MCNC: 3.5 MG/DL (ref 2.7–4.5)
PLATELET # BLD AUTO: 206 K/UL (ref 150–450)
PMV BLD AUTO: 9.2 FL (ref 9.2–12.9)
POTASSIUM SERPL-SCNC: 4 MMOL/L (ref 3.5–5.1)
PROT SERPL-MCNC: 8.6 G/DL (ref 6–8.4)
RBC # BLD AUTO: 3.89 M/UL (ref 4.6–6.2)
SODIUM SERPL-SCNC: 139 MMOL/L (ref 136–145)
WBC # BLD AUTO: 5.35 K/UL (ref 3.9–12.7)

## 2022-03-14 PROCEDURE — 80053 COMPREHEN METABOLIC PANEL: CPT | Mod: Q1 | Performed by: INTERNAL MEDICINE

## 2022-03-14 PROCEDURE — 1101F PT FALLS ASSESS-DOCD LE1/YR: CPT | Mod: CPTII,S$GLB,, | Performed by: INTERNAL MEDICINE

## 2022-03-14 PROCEDURE — 99215 PR OFFICE/OUTPT VISIT, EST, LEVL V, 40-54 MIN: ICD-10-PCS | Mod: S$GLB,,, | Performed by: INTERNAL MEDICINE

## 2022-03-14 PROCEDURE — 3072F LOW RISK FOR RETINOPATHY: CPT | Mod: CPTII,S$GLB,, | Performed by: INTERNAL MEDICINE

## 2022-03-14 PROCEDURE — 83615 LACTATE (LD) (LDH) ENZYME: CPT | Performed by: INTERNAL MEDICINE

## 2022-03-14 PROCEDURE — 3078F PR MOST RECENT DIASTOLIC BLOOD PRESSURE < 80 MM HG: ICD-10-PCS | Mod: CPTII,S$GLB,, | Performed by: INTERNAL MEDICINE

## 2022-03-14 PROCEDURE — 1125F AMNT PAIN NOTED PAIN PRSNT: CPT | Mod: CPTII,S$GLB,, | Performed by: INTERNAL MEDICINE

## 2022-03-14 PROCEDURE — 1159F PR MEDICATION LIST DOCUMENTED IN MEDICAL RECORD: ICD-10-PCS | Mod: CPTII,S$GLB,, | Performed by: INTERNAL MEDICINE

## 2022-03-14 PROCEDURE — 84165 PROTEIN E-PHORESIS SERUM: CPT | Mod: 26,,, | Performed by: PATHOLOGY

## 2022-03-14 PROCEDURE — 99999 PR PBB SHADOW E&M-EST. PATIENT-LVL V: CPT | Mod: PBBFAC,,, | Performed by: INTERNAL MEDICINE

## 2022-03-14 PROCEDURE — 3288F PR FALLS RISK ASSESSMENT DOCUMENTED: ICD-10-PCS | Mod: CPTII,S$GLB,, | Performed by: INTERNAL MEDICINE

## 2022-03-14 PROCEDURE — 3074F SYST BP LT 130 MM HG: CPT | Mod: CPTII,S$GLB,, | Performed by: INTERNAL MEDICINE

## 2022-03-14 PROCEDURE — 1101F PR PT FALLS ASSESS DOC 0-1 FALLS W/OUT INJ PAST YR: ICD-10-PCS | Mod: CPTII,S$GLB,, | Performed by: INTERNAL MEDICINE

## 2022-03-14 PROCEDURE — 36415 COLL VENOUS BLD VENIPUNCTURE: CPT | Performed by: INTERNAL MEDICINE

## 2022-03-14 PROCEDURE — 99215 OFFICE O/P EST HI 40 MIN: CPT | Mod: S$GLB,,, | Performed by: INTERNAL MEDICINE

## 2022-03-14 PROCEDURE — 86334 IMMUNOFIX E-PHORESIS SERUM: CPT | Mod: Q1 | Performed by: INTERNAL MEDICINE

## 2022-03-14 PROCEDURE — 3074F PR MOST RECENT SYSTOLIC BLOOD PRESSURE < 130 MM HG: ICD-10-PCS | Mod: CPTII,S$GLB,, | Performed by: INTERNAL MEDICINE

## 2022-03-14 PROCEDURE — 3288F FALL RISK ASSESSMENT DOCD: CPT | Mod: CPTII,S$GLB,, | Performed by: INTERNAL MEDICINE

## 2022-03-14 PROCEDURE — 84165 PATHOLOGIST INTERPRETATION SPE: ICD-10-PCS | Mod: 26,,, | Performed by: PATHOLOGY

## 2022-03-14 PROCEDURE — 83520 IMMUNOASSAY QUANT NOS NONAB: CPT | Performed by: INTERNAL MEDICINE

## 2022-03-14 PROCEDURE — 1125F PR PAIN SEVERITY QUANTIFIED, PAIN PRESENT: ICD-10-PCS | Mod: CPTII,S$GLB,, | Performed by: INTERNAL MEDICINE

## 2022-03-14 PROCEDURE — 1159F MED LIST DOCD IN RCRD: CPT | Mod: CPTII,S$GLB,, | Performed by: INTERNAL MEDICINE

## 2022-03-14 PROCEDURE — 3078F DIAST BP <80 MM HG: CPT | Mod: CPTII,S$GLB,, | Performed by: INTERNAL MEDICINE

## 2022-03-14 PROCEDURE — 86334 IMMUNOFIX E-PHORESIS SERUM: CPT | Mod: 26,,, | Performed by: PATHOLOGY

## 2022-03-14 PROCEDURE — 86334 PATHOLOGIST INTERPRETATION IFE: ICD-10-PCS | Mod: 26,,, | Performed by: PATHOLOGY

## 2022-03-14 PROCEDURE — 82784 ASSAY IGA/IGD/IGG/IGM EACH: CPT | Performed by: INTERNAL MEDICINE

## 2022-03-14 PROCEDURE — 84165 PROTEIN E-PHORESIS SERUM: CPT | Performed by: INTERNAL MEDICINE

## 2022-03-14 PROCEDURE — 85025 COMPLETE CBC W/AUTO DIFF WBC: CPT | Performed by: INTERNAL MEDICINE

## 2022-03-14 PROCEDURE — 84100 ASSAY OF PHOSPHORUS: CPT | Performed by: INTERNAL MEDICINE

## 2022-03-14 PROCEDURE — 99999 PR PBB SHADOW E&M-EST. PATIENT-LVL V: ICD-10-PCS | Mod: PBBFAC,,, | Performed by: INTERNAL MEDICINE

## 2022-03-14 PROCEDURE — 3072F PR LOW RISK FOR RETINOPATHY: ICD-10-PCS | Mod: CPTII,S$GLB,, | Performed by: INTERNAL MEDICINE

## 2022-03-14 PROCEDURE — 83735 ASSAY OF MAGNESIUM: CPT | Performed by: INTERNAL MEDICINE

## 2022-03-14 NOTE — PROGRESS NOTES
Subjective:    Patient ID: Emerson Menendez is a 76 y.o. male.    Chief Complaint:  E3A06 Obs arm. 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 Test results identify a stable IgA kappa protein with beta globulin band at 1.63g/dL. Kappa free light chain is elevated at 4.40. CBC and calcium are stable. Creatinine with history of stage III CKD is stable at 1.4. Metastatic survey is negative for lytic lesions. MRI of the entire spine demonstrated age related changes but no convincing evidence of myeloma bone disease. Bone marrow biopsy identified about 13% plasma cells by morphology and FISH for myeloma identified a t(11;14) and trisomies 3,7, and 17. The patient is afebrile and appears clinically well. He was seen by his podiatrist and nephrologist since our last visit without any new or acute events.    The patient has not received any therapy for smoldering myeloma including bisphosphonates or steroids. He has been randomized to observation arm of the the clinical study. 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 He has no current or prior history of malignancy.    TODAY Cycle 77, Day 28  No acute interval medical events  Finding new glucose monitor very helpful in managing diet and insulin  CBC stable  CMP and serology pending at time of visit  Labs from last month remain stable    Cough  Pertinent negatives include no fever.   Hand Pain     Foot Pain  Associated symptoms include coughing. Pertinent negatives include no diaphoresis, fatigue or fever.   Arm Pain     Follow-up  Associated symptoms include  coughing. Pertinent negatives include no diaphoresis, fatigue or fever.     Review of Systems   Constitutional: Negative for activity change, appetite change, diaphoresis, fatigue, fever and unexpected weight change.   HENT: Negative.    Eyes: Negative.    Respiratory: Positive for cough.    Cardiovascular: Negative for leg swelling.   Gastrointestinal: Negative.    Endocrine: Negative.    Genitourinary: Negative.    Musculoskeletal: Negative.    Skin: Negative.    Allergic/Immunologic: Negative.    Neurological:        Grade 0-1 peripheral neuropathy of bilateral feet.    Hematological: Negative for adenopathy. Does not bruise/bleed easily.   Psychiatric/Behavioral: Negative.        Objective:       Vitals:    03/14/22 0736   BP: 125/70   Pulse: 90   Resp: 16   Temp: 97.8 °F (36.6 °C)       Physical Exam  Vitals and nursing note reviewed.   Constitutional:       Appearance: He is well-developed.   HENT:      Head: Normocephalic and atraumatic.      Right Ear: External ear normal.      Left Ear: External ear normal.      Nose: Nose normal.   Eyes:      Conjunctiva/sclera: Conjunctivae normal.      Pupils: Pupils are equal, round, and reactive to light.   Cardiovascular:      Rate and Rhythm: Normal rate and regular rhythm.      Heart sounds: No murmur heard.  Pulmonary:      Effort: Pulmonary effort is normal. No respiratory distress.      Breath sounds: Normal breath sounds.   Abdominal:      General: Bowel sounds are normal. There is no distension.      Palpations: Abdomen is soft.   Musculoskeletal:         General: No tenderness.      Cervical back: Normal range of motion and neck supple.   Skin:     General: Skin is warm and dry.      Findings: No rash.      Nails: There is no clubbing.   Neurological:      Mental Status: He is alert and oriented to person, place, and time.      Cranial Nerves: No cranial nerve deficit.   Psychiatric:         Behavior: Behavior normal.         Assessment:       No diagnosis  found.    Plan:       The patient has a diagnosis of smoldering myeloma. There is no indication for immediate chemotherapy. We are monitoring renal function closely- baseline creatinine of -1.5. 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 M protein has been stable and repeat is pending. Plan for return in 1month.  Endocrinology and Nephrology to monitor diabetes and renal failure respectively. Patient would like to continue to follow with Dr. Perkins as needed.

## 2022-03-14 NOTE — PROGRESS NOTES
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   Sponsor:  [facility]  IRB# 2011.053.[id number]  Study ID: [id number]  Investigator: GIL Engel Pt Initials: LUCÍA LORENZ       Cycle 75 Day 28 Arm B: Observation     Patient presents to clinic today for above cycle evaluation; due to car trouble last Monday, this appt was delayed by one week. A deviation has been filed in [employer] for this; as subject is obs arm and all labs stable, no new AE's at time of cancellation last week this deviation does not affect patient safety.  He is unaccompanied and states he continues to do well. He states he has been "back and forth" to [place] over last month handling work and leisure issues. He reports no new AE's and his demeanor is clinic is calm. 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 He reports continued success with management of his diabetes which he says is 'better controlled now that I know what's going on with this henrry I don't second guess myself." He reports stable sugar readings throughout last month.  He has his knee braces on today; states no change to this AE; denies any issues with mobility or balance.States is maintaining current COVID precautions; wears mask in crowded indoor areas; denies any COVID symptoms.        Review of Baseline AE's:   1.Hypertension, Grade 2 diastolic: BP today is 125/70 today; subject states compliance with [place]. Subject denies headache/dizziness; no symptoms noted.   AE ongoing and tends to fluctuate no worse than Grade 2.   2. Lower Back Pain and Neck Pain, grade 1: Stable.  Patient has no complaints as of late and as previously stated, patient is not suspected to have bony myeloma involvement per Dr. Engel as these are pre-existing issues present at baseline.  AE ongoing.  3. Pain in extremity (knees), grade 1. " "Patient continues to report baseline pain level,at around 5 on 1-10 scale. Will continue to monitor.       4. Peripheral sensory neuropathy, grade 1: Patient does not verbalize complaints today. Has gabapentin prescribed and takes as needed.  States has not taken recently.  AE ongoing.   5. Anemia, Grade 1: Hgb stable at 10.9 Result reviewed by Dr. Engel. AE ongoing and stable.            Review of AE's:     *Please note this list is not all-inclusive, please see AE log for physician reviewed list of adverse events.   1. Creatinine increased, grade 2: Serum creatinine from labs today is 1.8; calculated GFR 45 ml/min based on CG calculation. Reviewed per Dr. Engel and no new orders.Per Dr Engel not been related to myeloma: rather, CKD;  likely secondary to diabetes and hypertension vs plasma cell dyscrasia.  Will continue observation monthly and to monitor renal functionclosely. Per MD, reiterated adequate hydration with increase in water/gatorade intake. 2. Hyponatremia, Grade 1: Serum sodium today is 136 mmol/L; AE intermittent. Will monitor  3. Hyperkalemia, Grade 1: serum K today is 3.9 mmol/L; wnl; Dr Engel has reviewed labs; no additional orders noted.   4. Hyperuricemia, Grade 1: this was not evaluated today; usually ordered per nephrology.   5. Hyperglycemia. Grade 1: blood glucose today is slightly elevated at 131; see interval history above; subject states able to maintain better compliance with Leah device/monitoring. No new orders per Dr Engel; subject followed closely by endocrinology.  Had Hg A1c done mid November; 7.7; Dr Engel aware; no orders.       Per study chair, "the definition of progressive disease for this protocol is developing CRAB criteria that requires treatment systemically." Per Dr Engel, based on today's exam and lab results, patient does not have any s/s of CRAB: Normal Calcium level (9.7), absence of Renal insufficiency (serum creatinine elevated today but per Dr Engel not related to " myeloma; --patient with a history of kidney dysfunction secondary to diabetes; Dr. Engel aware of these values and not concerned for myeloma involvement), absence of significant Anemia (hemoglobin 11.2, stable; will await myeloma labs for clarity relationship to myeloma) and absence of lytic Bone lesions (per baseline metastatic survey as well as MRI--see MD note for further comment). See MD note for ECOG score and H&P and flowsheets for laboratory work, vitals, etc. All myeloma-related blood work from last cycle including SPEP and JAGUAR have remained stable, and are currently pending for this cycle. Per Dr. Engel, patient shows no evidence of progression at last result. Patient informed that Dr. Engel will release all lab results to [facility]. He states understanding of this. QOL's not required again until end of treatment/observation.           Subject maintains he wishes only to see Dr Engel and will not show for appts if scheduled with any other provider. Will continue to schedule patient as far out as possible, which seems to help maintain compliance with visits. Normally subject deviates no greater than one week per cycle; wishes to remain on study per Dr Engel. Next appt was moved back one week to [facility] q 28 day cycles for data/study compliance.  Informed patient will contact him a few days prior to next appt to remind him so that he can plan accordingly. Patient states having research RN's contact information and has MD contact information to call with any concerns, questions, or worsening of symptoms. Will follow up with subject once myeloma labs are resulted.

## 2022-03-15 LAB
ALBUMIN SERPL ELPH-MCNC: 3.85 G/DL (ref 3.35–5.55)
ALPHA1 GLOB SERPL ELPH-MCNC: 0.29 G/DL (ref 0.17–0.41)
ALPHA2 GLOB SERPL ELPH-MCNC: 0.9 G/DL (ref 0.43–0.99)
B-GLOBULIN SERPL ELPH-MCNC: 2.64 G/DL (ref 0.5–1.1)
GAMMA GLOB SERPL ELPH-MCNC: 0.61 G/DL (ref 0.67–1.58)
INTERPRETATION SERPL IFE-IMP: NORMAL
KAPPA LC SER QL IA: 10.93 MG/DL (ref 0.33–1.94)
KAPPA LC/LAMBDA SER IA: 2.41 (ref 0.26–1.65)
LAMBDA LC SER QL IA: 4.53 MG/DL (ref 0.57–2.63)
PATHOLOGIST INTERPRETATION IFE: NORMAL
PATHOLOGIST INTERPRETATION SPE: NORMAL
PROT SERPL-MCNC: 8.3 G/DL (ref 6–8.4)

## 2022-03-24 ENCOUNTER — OFFICE VISIT (OUTPATIENT)
Dept: OPHTHALMOLOGY | Facility: CLINIC | Age: 77
End: 2022-03-24
Payer: MEDICARE

## 2022-03-24 DIAGNOSIS — E08.3211 MILD NONPROLIFERATIVE DIABETIC RETINOPATHY OF RIGHT EYE WITH MACULAR EDEMA ASSOCIATED WITH DIABETES MELLITUS DUE TO UNDERLYING CONDITION: Primary | ICD-10-CM

## 2022-03-24 DIAGNOSIS — E11.3292 MILD NONPROLIFERATIVE DIABETIC RETINOPATHY OF LEFT EYE WITHOUT MACULAR EDEMA ASSOCIATED WITH TYPE 2 DIABETES MELLITUS: ICD-10-CM

## 2022-03-24 DIAGNOSIS — H35.033 HYPERTENSIVE RETINOPATHY OF BOTH EYES: ICD-10-CM

## 2022-03-24 DIAGNOSIS — H40.1132 PRIMARY OPEN ANGLE GLAUCOMA OF BOTH EYES, MODERATE STAGE: ICD-10-CM

## 2022-03-24 PROCEDURE — 1159F PR MEDICATION LIST DOCUMENTED IN MEDICAL RECORD: ICD-10-PCS | Mod: CPTII,S$GLB,, | Performed by: OPHTHALMOLOGY

## 2022-03-24 PROCEDURE — 3051F HG A1C>EQUAL 7.0%<8.0%: CPT | Mod: CPTII,S$GLB,, | Performed by: OPHTHALMOLOGY

## 2022-03-24 PROCEDURE — 1159F MED LIST DOCD IN RCRD: CPT | Mod: CPTII,S$GLB,, | Performed by: OPHTHALMOLOGY

## 2022-03-24 PROCEDURE — 92134 OCT, RETINA - OU - BOTH EYES: ICD-10-PCS | Mod: S$GLB,,, | Performed by: OPHTHALMOLOGY

## 2022-03-24 PROCEDURE — 1160F PR REVIEW ALL MEDS BY PRESCRIBER/CLIN PHARMACIST DOCUMENTED: ICD-10-PCS | Mod: CPTII,S$GLB,, | Performed by: OPHTHALMOLOGY

## 2022-03-24 PROCEDURE — 67028 PR INJECT INTRAVITREAL PHARMCOLOGIC: ICD-10-PCS | Mod: RT,S$GLB,, | Performed by: OPHTHALMOLOGY

## 2022-03-24 PROCEDURE — 3288F FALL RISK ASSESSMENT DOCD: CPT | Mod: CPTII,S$GLB,, | Performed by: OPHTHALMOLOGY

## 2022-03-24 PROCEDURE — 99214 PR OFFICE/OUTPT VISIT, EST, LEVL IV, 30-39 MIN: ICD-10-PCS | Mod: 25,S$GLB,, | Performed by: OPHTHALMOLOGY

## 2022-03-24 PROCEDURE — 1160F RVW MEDS BY RX/DR IN RCRD: CPT | Mod: CPTII,S$GLB,, | Performed by: OPHTHALMOLOGY

## 2022-03-24 PROCEDURE — 99999 PR PBB SHADOW E&M-EST. PATIENT-LVL IV: ICD-10-PCS | Mod: PBBFAC,,, | Performed by: OPHTHALMOLOGY

## 2022-03-24 PROCEDURE — 67028 INJECTION EYE DRUG: CPT | Mod: RT,S$GLB,, | Performed by: OPHTHALMOLOGY

## 2022-03-24 PROCEDURE — 1101F PT FALLS ASSESS-DOCD LE1/YR: CPT | Mod: CPTII,S$GLB,, | Performed by: OPHTHALMOLOGY

## 2022-03-24 PROCEDURE — 99214 OFFICE O/P EST MOD 30 MIN: CPT | Mod: 25,S$GLB,, | Performed by: OPHTHALMOLOGY

## 2022-03-24 PROCEDURE — 3288F PR FALLS RISK ASSESSMENT DOCUMENTED: ICD-10-PCS | Mod: CPTII,S$GLB,, | Performed by: OPHTHALMOLOGY

## 2022-03-24 PROCEDURE — 1101F PR PT FALLS ASSESS DOC 0-1 FALLS W/OUT INJ PAST YR: ICD-10-PCS | Mod: CPTII,S$GLB,, | Performed by: OPHTHALMOLOGY

## 2022-03-24 PROCEDURE — 92134 CPTRZ OPH DX IMG PST SGM RTA: CPT | Mod: S$GLB,,, | Performed by: OPHTHALMOLOGY

## 2022-03-24 PROCEDURE — 99999 PR PBB SHADOW E&M-EST. PATIENT-LVL IV: CPT | Mod: PBBFAC,,, | Performed by: OPHTHALMOLOGY

## 2022-03-24 PROCEDURE — 3051F PR MOST RECENT HEMOGLOBIN A1C LEVEL 7.0 - < 8.0%: ICD-10-PCS | Mod: CPTII,S$GLB,, | Performed by: OPHTHALMOLOGY

## 2022-03-24 RX ADMIN — Medication 1.25 MG: at 01:03

## 2022-03-24 NOTE — PATIENT INSTRUCTIONS

## 2022-03-24 NOTE — PROGRESS NOTES
HPI     6wk DFE/OCT   DLS- 02/08/2022 Dr. Phillips    Patient states OD vision has been gradually getting worse. Looks as if he   is looking through a cloud or a haze. He also believes this has been   declining since last visit no sudden onset. Consistently blurry no changes   in vision throughout the day.   Denies pain  (-)Flashes (+)Floaters comes and goes no change   (-)Photophobia  (-)Glare    EYEMEDS:   Brimonidine TID OD   Latanoprost QHS OS      Last edited by Karon Kong on 3/24/2022  1:13 PM. (History)           A/P    ICD-10-CM ICD-9-CM   1. 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 Mild nonproliferative diabetic retinopathy of left eye without macular edema associated with type 2 diabetes mellitus  E11.3292 250.50     362.04   3. Hypertensive retinopathy of both eyes  H35.033 362.11   4. Primary open angle glaucoma of both eyes, moderate stage  H40.1132 365.11     365.72       1. 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 Mild nonproliferative diabetic retinopathy of left eye without macular edema associated with type 2 diabetes mellitus  PCP is Dr. Sagastume  11/12/2021  7.7  A1C     OD-   S/p ELKIN 11/30/21 x2  S/p IVO 1/4/22  VA 20/50 (was 20/60), persistent IRF after ODX, slight incr in IOP so we added brim TID, now 23 (was 25) still elevated from prior baseline     Plan: given persistent IRF despite ODX, will give another Avastin today and place authorization for Eylea given prior platueaing of effect for Avastin for chronic DME. Possible steroid response so ODX limited    Plan: Based on todays exam, diagnostic studies, and review of records, the determination was made for treatment today.  Schedule Avastin Injection today Right Eye Patient chooses to proceed with  injection R/B/A discussed include infection retinal detachment and stroke    Patient identified.  Timeout performed.    Risks, benefits, and alternatives to treatment were discussed in detail with the patient, including bleeding/infection (endophthalmitis)/etc.  The patient voiced understanding and wished to proceed with the procedure.  See separate consent form.    Injection Procedure Note:  Diagnosis: macular edema Right Eye    Topical Proparacaine drop placed then topical 5% Betadine and then subconjunctival lidocaine 2% injection  Sterile gloves used, and sterile lid speculum placed.  5% Betadine placed at injection site again prior to injection.  Avastin 1.25mg in 0.05cc Injected inferotemporally 3.5-4mm posterior to the limbus.  Complications: None  Va at least CF at 5 feet post injection.  Retina, ONH, IOP normal after injection.    Followup as below.  Patient should return immediately PRN.  Retinal Detachment and Endophthalmitis precautions given.       OS- no injections  VA 20/30 (stable) rare dot heme, no DME  Plan: Observation    Recommend good blood pressure control, tight blood glucose control, and good cholesterol control       3. Hypertensive retinopathy of both eyes  AV nicking, BP  stable   Plan: Observation  Recommend good blood pressure control, tight blood glucose control, and good cholesterol control       4. Primary open angle glaucoma of both eyes, moderate stage  F/b Dr. Wheatley last seen 10/2021  IOP 23/16 (was 25/16) - poss steroid response OD  Currently on Latan qHS OU, brim TID OD    Plan: Continue latan qhs OU, brim TID OD, needs April 2022 f/u set up as pt was supposed to be in 6 mo per Dr Wheatley's last note      RTC Alan - 4 weeks DFE/OCTm OD, switch to Eylea given limited response to Avastin  LANDEN Wheatley 3-4 weeks IOP, OCTrnfl        I saw and examined the patient and reviewed in detail the findings documented. The final examination findings, image interpretations, and plan as  documented in the record represent my personal judgment and conclusions.    Andriy Phillips MD  Vitreoretinal Surgery   [facility]

## 2022-03-28 ENCOUNTER — PATIENT MESSAGE (OUTPATIENT)
Dept: OPHTHALMOLOGY | Facility: CLINIC | Age: 77
End: 2022-03-28
Payer: MEDICARE

## 2022-03-28 DIAGNOSIS — H40.1132 PRIMARY OPEN ANGLE GLAUCOMA OF BOTH EYES, MODERATE STAGE: Primary | ICD-10-CM

## 2022-03-28 RX ORDER — BRIMONIDINE TARTRATE 2 MG/ML
1 SOLUTION/ DROPS OPHTHALMIC 3 TIMES DAILY
Qty: 15 ML | Refills: 6 | Status: SHIPPED | OUTPATIENT
Start: 2022-03-28 | End: 2022-05-31 | Stop reason: ALTCHOICE

## 2022-03-30 DIAGNOSIS — H40.1192 PRIMARY OPEN-ANGLE GLAUCOMA, MODERATE STAGE: ICD-10-CM

## 2022-03-30 RX ORDER — LATANOPROST 50 UG/ML
1 SOLUTION/ DROPS OPHTHALMIC NIGHTLY
Qty: 7.5 ML | Refills: 3 | Status: SHIPPED | OUTPATIENT
Start: 2022-03-30 | End: 2022-05-31 | Stop reason: ALTCHOICE

## 2022-04-04 ENCOUNTER — OFFICE VISIT (OUTPATIENT)
Dept: HEMATOLOGY/ONCOLOGY | Facility: CLINIC | Age: 77
End: 2022-04-04
Payer: MEDICARE

## 2022-04-04 ENCOUNTER — LAB VISIT (OUTPATIENT)
Dept: LAB | Facility: HOSPITAL | Age: 77
End: 2022-04-04
Attending: INTERNAL MEDICINE
Payer: MEDICARE

## 2022-04-04 ENCOUNTER — RESEARCH ENCOUNTER (OUTPATIENT)
Dept: HEMATOLOGY/ONCOLOGY | Facility: CLINIC | Age: 77
End: 2022-04-04

## 2022-04-04 VITALS
HEART RATE: 98 BPM | HEIGHT: 69 IN | OXYGEN SATURATION: 99 % | DIASTOLIC BLOOD PRESSURE: 72 MMHG | TEMPERATURE: 87 F | SYSTOLIC BLOOD PRESSURE: 154 MMHG | BODY MASS INDEX: 29.52 KG/M2 | RESPIRATION RATE: 18 BRPM | WEIGHT: 199.31 LBS

## 2022-04-04 DIAGNOSIS — Z00.6 RESEARCH EXAM: ICD-10-CM

## 2022-04-04 DIAGNOSIS — D47.2 SMOLDERING MULTIPLE MYELOMA: ICD-10-CM

## 2022-04-04 DIAGNOSIS — D47.2 SMOLDERING MULTIPLE MYELOMA: Primary | ICD-10-CM

## 2022-04-04 LAB
ALBUMIN SERPL BCP-MCNC: 3.6 G/DL (ref 3.5–5.2)
ALP SERPL-CCNC: 63 U/L (ref 55–135)
ALT SERPL W/O P-5'-P-CCNC: 14 U/L (ref 10–44)
ANION GAP SERPL CALC-SCNC: 9 MMOL/L (ref 8–16)
AST SERPL-CCNC: 12 U/L (ref 10–40)
BASOPHILS # BLD AUTO: 0.04 K/UL (ref 0–0.2)
BASOPHILS NFR BLD: 0.9 % (ref 0–1.9)
BILIRUB SERPL-MCNC: 0.7 MG/DL (ref 0.1–1)
BUN SERPL-MCNC: 20 MG/DL (ref 8–23)
CALCIUM SERPL-MCNC: 9.9 MG/DL (ref 8.7–10.5)
CHLORIDE SERPL-SCNC: 101 MMOL/L (ref 95–110)
CO2 SERPL-SCNC: 25 MMOL/L (ref 23–29)
CREAT SERPL-MCNC: 1.9 MG/DL (ref 0.5–1.4)
DIFFERENTIAL METHOD: ABNORMAL
EOSINOPHIL # BLD AUTO: 0.3 K/UL (ref 0–0.5)
EOSINOPHIL NFR BLD: 6.8 % (ref 0–8)
ERYTHROCYTE [DISTWIDTH] IN BLOOD BY AUTOMATED COUNT: 13.8 % (ref 11.5–14.5)
EST. GFR  (AFRICAN AMERICAN): 39 ML/MIN/1.73 M^2
EST. GFR  (NON AFRICAN AMERICAN): 33.7 ML/MIN/1.73 M^2
GLUCOSE SERPL-MCNC: 270 MG/DL (ref 70–110)
HCT VFR BLD AUTO: 34.1 % (ref 40–54)
HGB BLD-MCNC: 10.9 G/DL (ref 14–18)
IGA SERPL-MCNC: 1687 MG/DL (ref 40–350)
IGG SERPL-MCNC: 1123 MG/DL (ref 650–1600)
IGM SERPL-MCNC: 31 MG/DL (ref 50–300)
IMM GRANULOCYTES # BLD AUTO: 0 K/UL (ref 0–0.04)
IMM GRANULOCYTES NFR BLD AUTO: 0 % (ref 0–0.5)
LDH SERPL L TO P-CCNC: 98 U/L (ref 110–260)
LYMPHOCYTES # BLD AUTO: 1.8 K/UL (ref 1–4.8)
LYMPHOCYTES NFR BLD: 39 % (ref 18–48)
MCH RBC QN AUTO: 28.5 PG (ref 27–31)
MCHC RBC AUTO-ENTMCNC: 32 G/DL (ref 32–36)
MCV RBC AUTO: 89 FL (ref 82–98)
MONOCYTES # BLD AUTO: 0.3 K/UL (ref 0.3–1)
MONOCYTES NFR BLD: 6.2 % (ref 4–15)
NEUTROPHILS # BLD AUTO: 2.2 K/UL (ref 1.8–7.7)
NEUTROPHILS NFR BLD: 47.1 % (ref 38–73)
NRBC BLD-RTO: 0 /100 WBC
PLATELET # BLD AUTO: 249 K/UL (ref 150–450)
PMV BLD AUTO: 9 FL (ref 9.2–12.9)
POTASSIUM SERPL-SCNC: 5.4 MMOL/L (ref 3.5–5.1)
PROT SERPL-MCNC: 8.6 G/DL (ref 6–8.4)
RBC # BLD AUTO: 3.83 M/UL (ref 4.6–6.2)
SODIUM SERPL-SCNC: 135 MMOL/L (ref 136–145)
WBC # BLD AUTO: 4.69 K/UL (ref 3.9–12.7)

## 2022-04-04 PROCEDURE — 1126F AMNT PAIN NOTED NONE PRSNT: CPT | Mod: CPTII,S$GLB,, | Performed by: INTERNAL MEDICINE

## 2022-04-04 PROCEDURE — 99215 OFFICE O/P EST HI 40 MIN: CPT | Mod: S$GLB,,, | Performed by: INTERNAL MEDICINE

## 2022-04-04 PROCEDURE — 3072F PR LOW RISK FOR RETINOPATHY: ICD-10-PCS | Mod: CPTII,S$GLB,, | Performed by: INTERNAL MEDICINE

## 2022-04-04 PROCEDURE — 99999 PR PBB SHADOW E&M-EST. PATIENT-LVL III: CPT | Mod: PBBFAC,,, | Performed by: INTERNAL MEDICINE

## 2022-04-04 PROCEDURE — 86334 IMMUNOFIX E-PHORESIS SERUM: CPT | Performed by: INTERNAL MEDICINE

## 2022-04-04 PROCEDURE — 82784 ASSAY IGA/IGD/IGG/IGM EACH: CPT | Mod: 59,Q1 | Performed by: INTERNAL MEDICINE

## 2022-04-04 PROCEDURE — 3078F PR MOST RECENT DIASTOLIC BLOOD PRESSURE < 80 MM HG: ICD-10-PCS | Mod: CPTII,S$GLB,, | Performed by: INTERNAL MEDICINE

## 2022-04-04 PROCEDURE — 1160F PR REVIEW ALL MEDS BY PRESCRIBER/CLIN PHARMACIST DOCUMENTED: ICD-10-PCS | Mod: CPTII,S$GLB,, | Performed by: INTERNAL MEDICINE

## 2022-04-04 PROCEDURE — 36415 COLL VENOUS BLD VENIPUNCTURE: CPT | Performed by: INTERNAL MEDICINE

## 2022-04-04 PROCEDURE — 1126F PR PAIN SEVERITY QUANTIFIED, NO PAIN PRESENT: ICD-10-PCS | Mod: CPTII,S$GLB,, | Performed by: INTERNAL MEDICINE

## 2022-04-04 PROCEDURE — 3077F PR MOST RECENT SYSTOLIC BLOOD PRESSURE >= 140 MM HG: ICD-10-PCS | Mod: CPTII,S$GLB,, | Performed by: INTERNAL MEDICINE

## 2022-04-04 PROCEDURE — 86334 IMMUNOFIX E-PHORESIS SERUM: CPT | Mod: 26,,, | Performed by: PATHOLOGY

## 2022-04-04 PROCEDURE — 3078F DIAST BP <80 MM HG: CPT | Mod: CPTII,S$GLB,, | Performed by: INTERNAL MEDICINE

## 2022-04-04 PROCEDURE — 3072F LOW RISK FOR RETINOPATHY: CPT | Mod: CPTII,S$GLB,, | Performed by: INTERNAL MEDICINE

## 2022-04-04 PROCEDURE — 83615 LACTATE (LD) (LDH) ENZYME: CPT | Performed by: INTERNAL MEDICINE

## 2022-04-04 PROCEDURE — 83520 IMMUNOASSAY QUANT NOS NONAB: CPT | Performed by: INTERNAL MEDICINE

## 2022-04-04 PROCEDURE — 3077F SYST BP >= 140 MM HG: CPT | Mod: CPTII,S$GLB,, | Performed by: INTERNAL MEDICINE

## 2022-04-04 PROCEDURE — 80053 COMPREHEN METABOLIC PANEL: CPT | Performed by: INTERNAL MEDICINE

## 2022-04-04 PROCEDURE — 84165 PROTEIN E-PHORESIS SERUM: CPT | Performed by: INTERNAL MEDICINE

## 2022-04-04 PROCEDURE — 86334 PATHOLOGIST INTERPRETATION IFE: ICD-10-PCS | Mod: 26,,, | Performed by: PATHOLOGY

## 2022-04-04 PROCEDURE — 84165 PROTEIN E-PHORESIS SERUM: CPT | Mod: 26,,, | Performed by: PATHOLOGY

## 2022-04-04 PROCEDURE — 1160F RVW MEDS BY RX/DR IN RCRD: CPT | Mod: CPTII,S$GLB,, | Performed by: INTERNAL MEDICINE

## 2022-04-04 PROCEDURE — 84165 PATHOLOGIST INTERPRETATION SPE: ICD-10-PCS | Mod: 26,,, | Performed by: PATHOLOGY

## 2022-04-04 PROCEDURE — 85025 COMPLETE CBC W/AUTO DIFF WBC: CPT | Mod: Q1 | Performed by: INTERNAL MEDICINE

## 2022-04-04 PROCEDURE — 99215 PR OFFICE/OUTPT VISIT, EST, LEVL V, 40-54 MIN: ICD-10-PCS | Mod: S$GLB,,, | Performed by: INTERNAL MEDICINE

## 2022-04-04 PROCEDURE — 1159F MED LIST DOCD IN RCRD: CPT | Mod: CPTII,S$GLB,, | Performed by: INTERNAL MEDICINE

## 2022-04-04 PROCEDURE — 99999 PR PBB SHADOW E&M-EST. PATIENT-LVL III: ICD-10-PCS | Mod: PBBFAC,,, | Performed by: INTERNAL MEDICINE

## 2022-04-04 PROCEDURE — 1159F PR MEDICATION LIST DOCUMENTED IN MEDICAL RECORD: ICD-10-PCS | Mod: CPTII,S$GLB,, | Performed by: INTERNAL MEDICINE

## 2022-04-04 RX ORDER — DAPAGLIFLOZIN 5 MG/1
5 TABLET, FILM COATED ORAL DAILY
COMMUNITY
Start: 2021-11-03 | End: 2022-04-17 | Stop reason: SDUPTHER

## 2022-04-04 NOTE — PROGRESS NOTES
Subjective:    Patient ID: Emerson Menendez is a 76 y.o. male.    Chief Complaint: No chief complaint on file.  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 Test results identify a stable IgA kappa protein with beta globulin band at 1.63g/dL. Kappa free light chain is elevated at 4.40. CBC and calcium are stable. Creatinine with history of stage III CKD is stable at 1.4. Metastatic survey is negative for lytic lesions. MRI of the entire spine demonstrated age related changes but no convincing evidence of myeloma bone disease. Bone marrow biopsy identified about 13% plasma cells by morphology and FISH for myeloma identified a t(11;14) and trisomies 3,7, and 17. The patient is afebrile and appears clinically well. He was seen by his podiatrist and nephrologist since our last visit without any new or acute events.    The patient has not received any therapy for smoldering myeloma including bisphosphonates or steroids. He has been randomized to observation arm of the the clinical study. 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 He has no current or prior history of malignancy.    TODAY Cycle 76, Day 22  No acute interval medical events  Reports persistent bilateral knee pain  Reports ongoing issues with right eye pain and vision, still doing avastin injections and eye drops  CBC stable  CMP- discussed hydration to improve electrolytes and renal function  Early MM labs are stable      Cough  Pertinent negatives include no fever.   Hand Pain     Foot Pain  Associated symptoms include coughing. Pertinent negatives include no diaphoresis, fatigue or fever.   Arm Pain      Follow-up  Associated symptoms include coughing. Pertinent negatives include no diaphoresis, fatigue or fever.     Review of Systems   Constitutional: Negative for activity change, appetite change, diaphoresis, fatigue, fever and unexpected weight change.   HENT: Negative.    Eyes: Negative.    Respiratory: Positive for cough.    Cardiovascular: Negative for leg swelling.   Gastrointestinal: Negative.    Endocrine: Negative.    Genitourinary: Negative.    Musculoskeletal: Negative.    Skin: Negative.    Allergic/Immunologic: Negative.    Neurological:        Grade 0-1 peripheral neuropathy of bilateral feet.    Hematological: Negative for adenopathy. Does not bruise/bleed easily.   Psychiatric/Behavioral: Negative.        Objective:       Vitals:    04/04/22 0948   BP: (!) 154/72   Pulse: 98   Resp: 18   Temp: (!) 87 °F (30.6 °C)       Physical Exam  Vitals and nursing note reviewed.   Constitutional:       Appearance: He is well-developed.   HENT:      Head: Normocephalic and atraumatic.      Right Ear: External ear normal.      Left Ear: External ear normal.      Nose: Nose normal.   Eyes:      Conjunctiva/sclera: Conjunctivae normal.      Pupils: Pupils are equal, round, and reactive to light.   Cardiovascular:      Rate and Rhythm: Normal rate and regular rhythm.      Heart sounds: No murmur heard.  Pulmonary:      Effort: Pulmonary effort is normal. No respiratory distress.      Breath sounds: Normal breath sounds.   Abdominal:      General: Bowel sounds are normal. There is no distension.      Palpations: Abdomen is soft.   Musculoskeletal:         General: No tenderness.      Cervical back: Normal range of motion and neck supple.   Skin:     General: Skin is warm and dry.      Findings: No rash.      Nails: There is no clubbing.   Neurological:      Mental Status: He is alert and oriented to person, place, and time.      Cranial Nerves: No cranial nerve deficit.   Psychiatric:         Behavior: Behavior  normal.         Assessment:       No diagnosis found.    Plan:       The patient has a diagnosis of smoldering myeloma. There is no indication for immediate chemotherapy. We are monitoring renal function closely- baseline creatinine of -1.5. 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 M protein has been stable and repeat is pending. Plan for return in 1month.  Endocrinology and Nephrology to monitor diabetes and renal failure respectively. Patient would like to continue to follow with Dr. Perkins as needed.

## 2022-04-05 LAB
ALBUMIN SERPL ELPH-MCNC: 3.95 G/DL (ref 3.35–5.55)
ALPHA1 GLOB SERPL ELPH-MCNC: 0.32 G/DL (ref 0.17–0.41)
ALPHA2 GLOB SERPL ELPH-MCNC: 0.9 G/DL (ref 0.43–0.99)
B-GLOBULIN SERPL ELPH-MCNC: 2.65 G/DL (ref 0.5–1.1)
GAMMA GLOB SERPL ELPH-MCNC: 0.58 G/DL (ref 0.67–1.58)
INTERPRETATION SERPL IFE-IMP: NORMAL
KAPPA LC SER QL IA: 7.47 MG/DL (ref 0.33–1.94)
KAPPA LC/LAMBDA SER IA: 3.18 (ref 0.26–1.65)
LAMBDA LC SER QL IA: 2.35 MG/DL (ref 0.57–2.63)
PATHOLOGIST INTERPRETATION IFE: NORMAL
PATHOLOGIST INTERPRETATION SPE: NORMAL
PROT SERPL-MCNC: 8.4 G/DL (ref 6–8.4)

## 2022-04-07 ENCOUNTER — PATIENT OUTREACH (OUTPATIENT)
Dept: ADMINISTRATIVE | Facility: OTHER | Age: 77
End: 2022-04-07
Payer: MEDICARE

## 2022-04-07 NOTE — PROGRESS NOTES
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   Sponsor:  [facility]  IRB# [id number]  Study ID: [id number]  Investigator: GIL Engel Pt Initials: LUCÍA LORENZ       Cycle 76 Day 28 Arm B: Observation     Patient presents to clinic today for above cycle evaluation; he is one week early; states he had to come in to town from [place] and not able to be here next week; requesting to be seen today. A deviation has been filed in [employer] for this;  subject is obs arm and all labs today are stable, no new AE's at this time; deviation does not affect patient safety.  He is unaccompanied and states he continues to do well. He reports no new AE's and his demeanor is mildly anxious due to switching of appt this morning. He reports je has been to "eye doctor" and continues with injections; no new events/sequelae from this. He has his knee braces on today; states no change to this AE; denies any issues with mobility or balance.States is maintaining current COVID precautions; wears mask in crowded indoor areas; denies any COVID symptoms.        Review of Baseline AE's:   1.Hypertension, Grade 2 systolic and diastolic: BP today is elevated; 154/72; subject states compliance with Losartin. Subject denies headache/dizziness; no symptoms noted.   AE ongoing and tends to fluctuate no worse than Grade 2.   2. Lower Back Pain and Neck Pain, grade 1: Stable.  Patient has no complaints as of late and as previously stated, patient is not suspected to have bony myeloma involvement per Dr. Engel as these are pre-existing issues present at baseline.  AE ongoing.  3. Pain in extremity (knees), grade 1. Patient continues to report baseline pain level,at around 5 on 1-10 scale. Will continue to monitor.       4. Peripheral sensory neuropathy, grade 1: Patient does not verbalize complaints today. Has gabapentin prescribed and " "takes as needed.  States has not taken recently.  AE ongoing.   5. Anemia, Grade 1: Hgb stable at 10.9 Result reviewed by Dr. Engel. AE ongoing and stable.            Review of AE's:     *Please note this list is not all-inclusive, please see AE log for physician reviewed list of adverse events.   1. Creatinine increased, grade 2: Serum creatinine from labs today is 1.9; calculated GFR 43 ml/min based on CG calculation. Reviewed per Dr. Engel and no new orders.Per Dr Engel not been related to myeloma: rather, CKD;  likely secondary to diabetes and hypertension vs plasma cell dyscrasia.  Will continue observation monthly and to monitor renal functionclosely. Per MD, reiterated adequate hydration with increase in water/gatorade intake. 2. Hyponatremia, Grade 1: Serum sodium today is 136 mmol/L; AE intermittent. Will monitor  3. Hyperkalemia, Grade 1: serum K today is 5.4 mmol/L; Dr Engel has reviewed labs; no additional orders noted.   4. Hyperuricemia, Grade 1: this was not evaluated today; usually ordered per nephrology.   5. Hyperglycemia. Grade 1: blood glucose today is 270; lab is nonfasting. Subject states able to maintain better compliance with Leah device/monitoring. No new orders per Dr Engel; subject followed closely by endocrinology.  Had Hg A1c done mid November; 7.7; Dr Engel aware; no orders.       Per study chair, "the definition of progressive disease for this protocol is developing CRAB criteria that requires treatment systemically." Per Dr Engel, based on today's exam and lab results, patient does not have any s/s of CRAB: Normal Calcium level (9.7), absence of Renal insufficiency (serum creatinine elevated today but per Dr Engel not related to myeloma; --patient with a history of kidney dysfunction secondary to diabetes; Dr. Engel aware of these values and not concerned for myeloma involvement), absence of significant Anemia (hemoglobin 11.2, stable; will await myeloma labs for clarity relationship to " myeloma) and absence of lytic Bone lesions (per baseline metastatic survey as well as MRI--see MD note for further comment). See MD note for ECOG score and H&P and flowsheets for laboratory work, vitals, etc. All myeloma-related blood work from last cycle including SPEP and JAGUAR have remained stable, and are currently pending for this cycle. Per Dr. Engel, patient shows no evidence of progression at last result. Patient informed that Dr. Engel will release all lab results to [facility]. He states understanding of this. QOL's not required again until end of treatment/observation.           Subject maintains he wishes only to see Dr Engel and will not show for appts if scheduled with any other provider. Will continue to schedule patient as far out as possible, which seems to help maintain compliance with visits. Normally subject deviates no greater than one week per cycle; wishes to remain on study per Dr Engel. Next appt was moved forwarded one week to [facility] q 28 day cycles for data/study compliance.  Informed patient will contact him a few days prior to next appt to remind him so that he can plan accordingly. Patient states having research RN's contact information and has MD contact information to call with any concerns, questions, or worsening of symptoms. Will follow up with subject once myeloma labs are resulted.

## 2022-04-08 NOTE — PROGRESS NOTES
Requested updates within Care Everywhere.  Patient's chart was reviewed for overdue MIRNA topics.  Open case request for colonoscopy placed 12/30/21  Health maintenance:updated  Immunizations:reconciled   Legacy:   Media:  Orders placed:  Tasked appts:  Labs Linked:  Upcoming appt:

## 2022-04-11 ENCOUNTER — OFFICE VISIT (OUTPATIENT)
Dept: OPHTHALMOLOGY | Facility: CLINIC | Age: 77
End: 2022-04-11
Payer: MEDICARE

## 2022-04-11 DIAGNOSIS — H40.1132 PRIMARY OPEN ANGLE GLAUCOMA OF BOTH EYES, MODERATE STAGE: ICD-10-CM

## 2022-04-11 PROCEDURE — 3288F PR FALLS RISK ASSESSMENT DOCUMENTED: ICD-10-PCS | Mod: CPTII,S$GLB,, | Performed by: OPHTHALMOLOGY

## 2022-04-11 PROCEDURE — 92133 CPTRZD OPH DX IMG PST SGM ON: CPT | Mod: S$GLB,,, | Performed by: OPHTHALMOLOGY

## 2022-04-11 PROCEDURE — 99999 PR PBB SHADOW E&M-EST. PATIENT-LVL IV: ICD-10-PCS | Mod: PBBFAC,,, | Performed by: OPHTHALMOLOGY

## 2022-04-11 PROCEDURE — 1159F MED LIST DOCD IN RCRD: CPT | Mod: CPTII,S$GLB,, | Performed by: OPHTHALMOLOGY

## 2022-04-11 PROCEDURE — 1160F RVW MEDS BY RX/DR IN RCRD: CPT | Mod: CPTII,S$GLB,, | Performed by: OPHTHALMOLOGY

## 2022-04-11 PROCEDURE — 1101F PR PT FALLS ASSESS DOC 0-1 FALLS W/OUT INJ PAST YR: ICD-10-PCS | Mod: CPTII,S$GLB,, | Performed by: OPHTHALMOLOGY

## 2022-04-11 PROCEDURE — 99499 UNLISTED E&M SERVICE: CPT | Mod: S$GLB,,, | Performed by: OPHTHALMOLOGY

## 2022-04-11 PROCEDURE — 1101F PT FALLS ASSESS-DOCD LE1/YR: CPT | Mod: CPTII,S$GLB,, | Performed by: OPHTHALMOLOGY

## 2022-04-11 PROCEDURE — 99999 PR PBB SHADOW E&M-EST. PATIENT-LVL IV: CPT | Mod: PBBFAC,,, | Performed by: OPHTHALMOLOGY

## 2022-04-11 PROCEDURE — 92133 POSTERIOR SEGMENT OCT OPTIC NERVE(OCULAR COHERENCE TOMOGRAPHY) - OU - BOTH EYES: ICD-10-PCS | Mod: S$GLB,,, | Performed by: OPHTHALMOLOGY

## 2022-04-11 PROCEDURE — 1160F PR REVIEW ALL MEDS BY PRESCRIBER/CLIN PHARMACIST DOCUMENTED: ICD-10-PCS | Mod: CPTII,S$GLB,, | Performed by: OPHTHALMOLOGY

## 2022-04-11 PROCEDURE — 3288F FALL RISK ASSESSMENT DOCD: CPT | Mod: CPTII,S$GLB,, | Performed by: OPHTHALMOLOGY

## 2022-04-11 PROCEDURE — 99214 OFFICE O/P EST MOD 30 MIN: CPT | Mod: S$GLB,,, | Performed by: OPHTHALMOLOGY

## 2022-04-11 PROCEDURE — 1126F AMNT PAIN NOTED NONE PRSNT: CPT | Mod: CPTII,S$GLB,, | Performed by: OPHTHALMOLOGY

## 2022-04-11 PROCEDURE — 1126F PR PAIN SEVERITY QUANTIFIED, NO PAIN PRESENT: ICD-10-PCS | Mod: CPTII,S$GLB,, | Performed by: OPHTHALMOLOGY

## 2022-04-11 PROCEDURE — 99214 PR OFFICE/OUTPT VISIT, EST, LEVL IV, 30-39 MIN: ICD-10-PCS | Mod: S$GLB,,, | Performed by: OPHTHALMOLOGY

## 2022-04-11 PROCEDURE — 99499 RISK ADDL DX/OHS AUDIT: ICD-10-PCS | Mod: S$GLB,,, | Performed by: OPHTHALMOLOGY

## 2022-04-11 PROCEDURE — 1159F PR MEDICATION LIST DOCUMENTED IN MEDICAL RECORD: ICD-10-PCS | Mod: CPTII,S$GLB,, | Performed by: OPHTHALMOLOGY

## 2022-04-11 NOTE — PROGRESS NOTES
Assessment /Plan     For exam results, see Encounter Report.    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 [facility]  [facility] -->   [place] & [place]    ==> 48 yo Daughter  @ 2020  Only child  Grieving  Aneurysm / coma x 3 days 12 years ago --> RF --> Dialysis x 12 years      POAG  pre - Tx - High 20's OS 2013 -->   No Family hx glc or blindness    CCT  508 // 510    Mid teens < 19 ==> achieved & discussed      Both eyes -->adjust --> stop PGA & Alphagan  Cosopt BID --> start and discussed SE, use and expectations with Q & A --> denies asthma / RRR    SP SLT OD 2020  SP SLT OS 2020    --> consider Glc Sx as discussed      PC IOL OD  SN60WF 19.0 GK  Quiet  Clear and intact     Mild DR with CSME OD --> discussed --> retina  Control --> better    HTN ret  Control       Left eye PC IOL / Trypan Blue 2021  Doing well  Happy with vision    IOL choice:       AL       OS     SN60WF 119.0        20.0  -0.47         --> has SN60WF 19.0 OD    Narrow angles OS  + Flomax     ==> Patient wants LMA      Plan  Getting  in [place] --> excited  RTC Retina --> Mild CSME OD --> keep fu 2 weeks for included IOP check  RTC 4 month IOP, adherence  RTC sooner PRN with good understanding

## 2022-04-17 ENCOUNTER — PATIENT MESSAGE (OUTPATIENT)
Dept: INTERNAL MEDICINE | Facility: CLINIC | Age: 77
End: 2022-04-17
Payer: MEDICARE

## 2022-04-17 RX ORDER — DAPAGLIFLOZIN 5 MG/1
5 TABLET, FILM COATED ORAL DAILY
Qty: 30 TABLET | Refills: 11 | Status: SHIPPED | OUTPATIENT
Start: 2022-04-17 | End: 2022-09-20 | Stop reason: SDUPTHER

## 2022-04-25 ENCOUNTER — OFFICE VISIT (OUTPATIENT)
Dept: INTERNAL MEDICINE | Facility: CLINIC | Age: 77
End: 2022-04-25
Payer: MEDICARE

## 2022-04-25 ENCOUNTER — HOSPITAL ENCOUNTER (OUTPATIENT)
Dept: RADIOLOGY | Facility: HOSPITAL | Age: 77
Discharge: HOME | End: 2022-04-25
Attending: INTERNAL MEDICINE
Payer: MEDICARE

## 2022-04-25 VITALS
SYSTOLIC BLOOD PRESSURE: 130 MMHG | DIASTOLIC BLOOD PRESSURE: 70 MMHG | HEIGHT: 70 IN | BODY MASS INDEX: 28.55 KG/M2 | HEART RATE: 79 BPM | OXYGEN SATURATION: 99 % | WEIGHT: 199.44 LBS

## 2022-04-25 DIAGNOSIS — M79.642 PAIN IN BOTH HANDS: Primary | ICD-10-CM

## 2022-04-25 DIAGNOSIS — M79.641 PAIN IN BOTH HANDS: Primary | ICD-10-CM

## 2022-04-25 DIAGNOSIS — E55.9 VITAMIN D DEFICIENCY: ICD-10-CM

## 2022-04-25 DIAGNOSIS — Z79.4 TYPE 2 DIABETES MELLITUS WITH DIABETIC POLYNEUROPATHY, WITH LONG-TERM CURRENT USE OF INSULIN: Chronic | ICD-10-CM

## 2022-04-25 DIAGNOSIS — Z79.4 TYPE 2 DIABETES MELLITUS WITH OTHER CIRCULATORY COMPLICATION, WITH LONG-TERM CURRENT USE OF INSULIN: ICD-10-CM

## 2022-04-25 DIAGNOSIS — E11.59 TYPE 2 DIABETES MELLITUS WITH OTHER CIRCULATORY COMPLICATION, WITH LONG-TERM CURRENT USE OF INSULIN: ICD-10-CM

## 2022-04-25 DIAGNOSIS — Z12.5 SCREENING PSA (PROSTATE SPECIFIC ANTIGEN): ICD-10-CM

## 2022-04-25 DIAGNOSIS — M79.642 PAIN IN BOTH HANDS: ICD-10-CM

## 2022-04-25 DIAGNOSIS — E11.42 TYPE 2 DIABETES MELLITUS WITH DIABETIC POLYNEUROPATHY, WITH LONG-TERM CURRENT USE OF INSULIN: Chronic | ICD-10-CM

## 2022-04-25 DIAGNOSIS — N40.1 BPH WITH URINARY OBSTRUCTION: ICD-10-CM

## 2022-04-25 DIAGNOSIS — N52.9 ERECTILE DYSFUNCTION, UNSPECIFIED ERECTILE DYSFUNCTION TYPE: ICD-10-CM

## 2022-04-25 DIAGNOSIS — D47.2 SMOLDERING MULTIPLE MYELOMA: ICD-10-CM

## 2022-04-25 DIAGNOSIS — M79.641 PAIN IN BOTH HANDS: ICD-10-CM

## 2022-04-25 DIAGNOSIS — E55.9 VITAMIN D DEFICIENCY DISEASE: ICD-10-CM

## 2022-04-25 DIAGNOSIS — Z12.11 SCREENING FOR MALIGNANT NEOPLASM OF COLON: ICD-10-CM

## 2022-04-25 DIAGNOSIS — N18.31 STAGE 3A CHRONIC KIDNEY DISEASE: Chronic | ICD-10-CM

## 2022-04-25 DIAGNOSIS — I10 ESSENTIAL HYPERTENSION: ICD-10-CM

## 2022-04-25 DIAGNOSIS — N13.8 BPH WITH URINARY OBSTRUCTION: ICD-10-CM

## 2022-04-25 PROCEDURE — 1101F PT FALLS ASSESS-DOCD LE1/YR: CPT | Mod: CPTII,S$GLB,, | Performed by: INTERNAL MEDICINE

## 2022-04-25 PROCEDURE — 73130 X-RAY EXAM OF HAND: CPT | Mod: TC,50

## 2022-04-25 PROCEDURE — 1101F PR PT FALLS ASSESS DOC 0-1 FALLS W/OUT INJ PAST YR: ICD-10-PCS | Mod: CPTII,S$GLB,, | Performed by: INTERNAL MEDICINE

## 2022-04-25 PROCEDURE — 3072F LOW RISK FOR RETINOPATHY: CPT | Mod: CPTII,S$GLB,, | Performed by: INTERNAL MEDICINE

## 2022-04-25 PROCEDURE — 73130 XR HAND COMPLETE 3 VIEWS BILATERAL: ICD-10-PCS | Mod: 26,50,, | Performed by: RADIOLOGY

## 2022-04-25 PROCEDURE — 3078F DIAST BP <80 MM HG: CPT | Mod: CPTII,S$GLB,, | Performed by: INTERNAL MEDICINE

## 2022-04-25 PROCEDURE — 73130 X-RAY EXAM OF HAND: CPT | Mod: 26,50,, | Performed by: RADIOLOGY

## 2022-04-25 PROCEDURE — 99214 PR OFFICE/OUTPT VISIT, EST, LEVL IV, 30-39 MIN: ICD-10-PCS | Mod: S$GLB,,, | Performed by: INTERNAL MEDICINE

## 2022-04-25 PROCEDURE — 3288F PR FALLS RISK ASSESSMENT DOCUMENTED: ICD-10-PCS | Mod: CPTII,S$GLB,, | Performed by: INTERNAL MEDICINE

## 2022-04-25 PROCEDURE — 1125F AMNT PAIN NOTED PAIN PRSNT: CPT | Mod: CPTII,S$GLB,, | Performed by: INTERNAL MEDICINE

## 2022-04-25 PROCEDURE — 3288F FALL RISK ASSESSMENT DOCD: CPT | Mod: CPTII,S$GLB,, | Performed by: INTERNAL MEDICINE

## 2022-04-25 PROCEDURE — 99999 PR PBB SHADOW E&M-EST. PATIENT-LVL IV: CPT | Mod: PBBFAC,,, | Performed by: INTERNAL MEDICINE

## 2022-04-25 PROCEDURE — 3075F SYST BP GE 130 - 139MM HG: CPT | Mod: CPTII,S$GLB,, | Performed by: INTERNAL MEDICINE

## 2022-04-25 PROCEDURE — 99499 RISK ADDL DX/OHS AUDIT: ICD-10-PCS | Mod: S$GLB,,, | Performed by: INTERNAL MEDICINE

## 2022-04-25 PROCEDURE — 3051F PR MOST RECENT HEMOGLOBIN A1C LEVEL 7.0 - < 8.0%: ICD-10-PCS | Mod: CPTII,S$GLB,, | Performed by: INTERNAL MEDICINE

## 2022-04-25 PROCEDURE — 3051F HG A1C>EQUAL 7.0%<8.0%: CPT | Mod: CPTII,S$GLB,, | Performed by: INTERNAL MEDICINE

## 2022-04-25 PROCEDURE — 3075F PR MOST RECENT SYSTOLIC BLOOD PRESS GE 130-139MM HG: ICD-10-PCS | Mod: CPTII,S$GLB,, | Performed by: INTERNAL MEDICINE

## 2022-04-25 PROCEDURE — 99499 UNLISTED E&M SERVICE: CPT | Mod: S$GLB,,, | Performed by: INTERNAL MEDICINE

## 2022-04-25 PROCEDURE — 99214 OFFICE O/P EST MOD 30 MIN: CPT | Mod: S$GLB,,, | Performed by: INTERNAL MEDICINE

## 2022-04-25 PROCEDURE — 3072F PR LOW RISK FOR RETINOPATHY: ICD-10-PCS | Mod: CPTII,S$GLB,, | Performed by: INTERNAL MEDICINE

## 2022-04-25 PROCEDURE — 99999 PR PBB SHADOW E&M-EST. PATIENT-LVL IV: ICD-10-PCS | Mod: PBBFAC,,, | Performed by: INTERNAL MEDICINE

## 2022-04-25 PROCEDURE — 3078F PR MOST RECENT DIASTOLIC BLOOD PRESSURE < 80 MM HG: ICD-10-PCS | Mod: CPTII,S$GLB,, | Performed by: INTERNAL MEDICINE

## 2022-04-25 PROCEDURE — 1125F PR PAIN SEVERITY QUANTIFIED, PAIN PRESENT: ICD-10-PCS | Mod: CPTII,S$GLB,, | Performed by: INTERNAL MEDICINE

## 2022-04-25 RX ORDER — ERGOCALCIFEROL 1.25 MG/1
50000 CAPSULE ORAL
Qty: 12 CAPSULE | Refills: 3 | Status: SHIPPED | OUTPATIENT
Start: 2022-04-25 | End: 2023-02-15 | Stop reason: SDUPTHER

## 2022-04-25 RX ORDER — INSULIN ASPART 100 [IU]/ML
4 INJECTION, SOLUTION INTRAVENOUS; SUBCUTANEOUS 2 TIMES DAILY WITH MEALS
Qty: 3 ML | Refills: 3 | Status: SHIPPED | OUTPATIENT
Start: 2022-04-25 | End: 2022-07-28 | Stop reason: SDUPTHER

## 2022-04-25 RX ORDER — SILDENAFIL 100 MG/1
100 TABLET, FILM COATED ORAL DAILY PRN
Qty: 30 TABLET | Refills: 2 | Status: SHIPPED | OUTPATIENT
Start: 2022-04-25 | End: 2023-02-06 | Stop reason: SDUPTHER

## 2022-04-25 RX ORDER — DIAZEPAM 5 MG/1
5 TABLET ORAL EVERY 12 HOURS PRN
Qty: 60 TABLET | Refills: 0 | Status: SHIPPED | OUTPATIENT
Start: 2022-04-25 | End: 2022-08-24 | Stop reason: SDUPTHER

## 2022-04-25 RX ORDER — INSULIN ASPART 100 [IU]/ML
4 INJECTION, SOLUTION INTRAVENOUS; SUBCUTANEOUS 2 TIMES DAILY WITH MEALS
Qty: 3 ML | Refills: 3 | Status: SHIPPED | OUTPATIENT
Start: 2022-04-25 | End: 2022-04-25 | Stop reason: SDUPTHER
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
## 2022-04-26 ENCOUNTER — PROCEDURE VISIT (OUTPATIENT)
Dept: OPHTHALMOLOGY | Facility: CLINIC | Age: 77
End: 2022-04-26
Payer: MEDICARE

## 2022-04-26 DIAGNOSIS — E08.3211 MILD NONPROLIFERATIVE DIABETIC RETINOPATHY OF RIGHT EYE WITH MACULAR EDEMA ASSOCIATED WITH DIABETES MELLITUS DUE TO UNDERLYING CONDITION: ICD-10-CM

## 2022-04-26 DIAGNOSIS — H40.1132 PRIMARY OPEN ANGLE GLAUCOMA OF BOTH EYES, MODERATE STAGE: ICD-10-CM

## 2022-04-26 DIAGNOSIS — E11.3292 MILD NONPROLIFERATIVE DIABETIC RETINOPATHY OF LEFT EYE WITHOUT MACULAR EDEMA ASSOCIATED WITH TYPE 2 DIABETES MELLITUS: Primary | ICD-10-CM

## 2022-04-26 PROCEDURE — 99214 PR OFFICE/OUTPT VISIT, EST, LEVL IV, 30-39 MIN: ICD-10-PCS | Mod: 25,S$GLB,, | Performed by: OPHTHALMOLOGY

## 2022-04-26 PROCEDURE — 3051F PR MOST RECENT HEMOGLOBIN A1C LEVEL 7.0 - < 8.0%: ICD-10-PCS | Mod: CPTII,S$GLB,, | Performed by: OPHTHALMOLOGY

## 2022-04-26 PROCEDURE — 3051F HG A1C>EQUAL 7.0%<8.0%: CPT | Mod: CPTII,S$GLB,, | Performed by: OPHTHALMOLOGY

## 2022-04-26 PROCEDURE — 67028 PR INJECT INTRAVITREAL PHARMCOLOGIC: ICD-10-PCS | Mod: RT,S$GLB,, | Performed by: OPHTHALMOLOGY

## 2022-04-26 PROCEDURE — 92134 CPTRZ OPH DX IMG PST SGM RTA: CPT | Mod: S$GLB,,, | Performed by: OPHTHALMOLOGY

## 2022-04-26 PROCEDURE — 92134 OCT, RETINA - OU - BOTH EYES: ICD-10-PCS | Mod: S$GLB,,, | Performed by: OPHTHALMOLOGY

## 2022-04-26 PROCEDURE — 67028 INJECTION EYE DRUG: CPT | Mod: RT,S$GLB,, | Performed by: OPHTHALMOLOGY

## 2022-04-26 PROCEDURE — 99214 OFFICE O/P EST MOD 30 MIN: CPT | Mod: 25,S$GLB,, | Performed by: OPHTHALMOLOGY

## 2022-04-26 NOTE — PROGRESS NOTES
HPI     DLS- 04/11/2022 Dr Wheatley     Patient presents today for DFE and OCT OD with possible Eyelea OD   Patient complains of floaters as well as photophobia in OD as well as   blurred vision       EYEMEDS:   Dorzolamide Timolol BID OU       Last edited by Sandra Allen on 4/26/2022  9:05 AM. (History)           A/P    ICD-10-CM ICD-9-CM   1. 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 Mild nonproliferative diabetic retinopathy of left eye without macular edema associated with type 2 diabetes mellitus  E11.3292 250.50     362.04   3. Hypertensive retinopathy of both eyes  H35.033 362.11   4. Primary open angle glaucoma of both eyes, moderate stage  H40.1132 365.11     365.72       1. 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 Mild nonproliferative diabetic retinopathy of left eye without macular edema associated with type 2 diabetes mellitus  PCP is Dr. Sagastume  11/12/2021  7.7  A1C     OD-   S/p ELKIN 3/24/22  S/p IVO 1/4/22  VA 20/200 (was 20/50), worse IRF after ELKIN last month, has had ODX and had persistent IRF in past with IOP incr from prior baseline now better controlled    Plan: needs SKYLAR OD today given limited effect Avastin, plan series of 3, may have to supplement with ODX and pressure monitoring in future if worsening    Plan: Based on todays exam, diagnostic studies, and review of records, the determination was made for treatment today.  Schedule Eylea Injection today Right Eye Patient chooses to proceed with injection R/B/A discussed include infection retinal detachment and stroke    Patient identified.  Timeout performed.    Risks, benefits, and alternatives to treatment were discussed in detail with the patient, including bleeding/infection  (endophthalmitis)/etc.  The patient voiced understanding and wished to proceed with the procedure.  See separate consent form.    Injection Procedure Note:  Diagnosis: macular edema Right Eye    Topical Proparacaine drop placed then topical 5% Betadine, then subcojunctival lidocaine 2% injection  Sterile gloves used, and sterile lid speculum placed.  5% Betadine placed at injection site again prior to injection.  Eylea 2mg in 0.05cc Injected inferotemporally 3.5-4mm posterior to the limbus.  Complications: None  Va at least CF at 5 feet post injection.  Retina, ONH, IOP normal after injection.    Followup as below.  Patient should return immediately PRN.  Retinal Detachment and Endophthalmitis precautions given      Followup as below.  Patient should return immediately PRN.  Retinal Detachment and Endophthalmitis precautions given.       OS- no injections  VA 20/30 stable  rare dot heme, no DME  Plan: Observation    Recommend good blood pressure control, tight blood glucose control, and good cholesterol control       3. Hypertensive retinopathy of both eyes  AV nicking, BP  stable   Plan: Observation  Recommend good blood pressure control, tight blood glucose control, and good cholesterol control       4. Primary open angle glaucoma of both eyes, moderate stage  F/b Dr. Wheatley last seen 10/2021  IOP 12/10- poss steroid response OD  Currently on cosopt BID OU    Plan:  Continue present management      RTC Alan - 4 weeks DFE/OCTm OD, Eylea OD  RTC Dioni as scheduled        I saw and examined the patient and reviewed in detail the findings documented. The final examination findings, image interpretations, and plan as documented in the record represent my personal judgment and conclusions.    Andriy Phillips MD  Vitreoretinal Surgery   [facility]

## 2022-04-29 ENCOUNTER — TELEPHONE (OUTPATIENT)
Dept: HEMATOLOGY/ONCOLOGY | Facility: CLINIC | Age: 77
End: 2022-04-29
Payer: MEDICARE

## 2022-05-02 ENCOUNTER — OFFICE VISIT (OUTPATIENT)
Dept: HEMATOLOGY/ONCOLOGY | Facility: CLINIC | Age: 77
End: 2022-05-02
Payer: MEDICARE

## 2022-05-02 ENCOUNTER — LAB VISIT (OUTPATIENT)
Dept: LAB | Facility: HOSPITAL | Age: 77
End: 2022-05-02
Payer: MEDICARE

## 2022-05-02 ENCOUNTER — RESEARCH ENCOUNTER (OUTPATIENT)
Dept: HEMATOLOGY/ONCOLOGY | Facility: CLINIC | Age: 77
End: 2022-05-02

## 2022-05-02 VITALS
WEIGHT: 199.63 LBS | RESPIRATION RATE: 16 BRPM | OXYGEN SATURATION: 97 % | TEMPERATURE: 99 F | DIASTOLIC BLOOD PRESSURE: 74 MMHG | HEIGHT: 69 IN | SYSTOLIC BLOOD PRESSURE: 151 MMHG | HEART RATE: 81 BPM | BODY MASS INDEX: 29.57 KG/M2

## 2022-05-02 DIAGNOSIS — E55.9 VITAMIN D DEFICIENCY DISEASE: ICD-10-CM

## 2022-05-02 DIAGNOSIS — Z12.5 SCREENING PSA (PROSTATE SPECIFIC ANTIGEN): ICD-10-CM

## 2022-05-02 DIAGNOSIS — I10 ESSENTIAL HYPERTENSION: ICD-10-CM

## 2022-05-02 DIAGNOSIS — D47.2 SMOLDERING MULTIPLE MYELOMA: Primary | ICD-10-CM

## 2022-05-02 DIAGNOSIS — E11.59 TYPE 2 DIABETES MELLITUS WITH OTHER CIRCULATORY COMPLICATION, WITH LONG-TERM CURRENT USE OF INSULIN: ICD-10-CM

## 2022-05-02 DIAGNOSIS — Z00.6 RESEARCH EXAM: ICD-10-CM

## 2022-05-02 DIAGNOSIS — Z79.4 TYPE 2 DIABETES MELLITUS WITH OTHER CIRCULATORY COMPLICATION, WITH LONG-TERM CURRENT USE OF INSULIN: ICD-10-CM

## 2022-05-02 DIAGNOSIS — D47.2 SMOLDERING MULTIPLE MYELOMA: ICD-10-CM

## 2022-05-02 LAB
25(OH)D3+25(OH)D2 SERPL-MCNC: 27 NG/ML (ref 30–96)
ALBUMIN SERPL BCP-MCNC: 3.5 G/DL (ref 3.5–5.2)
ALP SERPL-CCNC: 57 U/L (ref 55–135)
ALT SERPL W/O P-5'-P-CCNC: 16 U/L (ref 10–44)
ANION GAP SERPL CALC-SCNC: 12 MMOL/L (ref 8–16)
AST SERPL-CCNC: 16 U/L (ref 10–40)
BASOPHILS # BLD AUTO: 0.03 K/UL (ref 0–0.2)
BASOPHILS NFR BLD: 0.7 % (ref 0–1.9)
BILIRUB SERPL-MCNC: 0.4 MG/DL (ref 0.1–1)
BUN SERPL-MCNC: 24 MG/DL (ref 8–23)
CALCIUM SERPL-MCNC: 9.5 MG/DL (ref 8.7–10.5)
CHLORIDE SERPL-SCNC: 100 MMOL/L (ref 95–110)
CO2 SERPL-SCNC: 23 MMOL/L (ref 23–29)
COMPLEXED PSA SERPL-MCNC: 1.5 NG/ML (ref 0–4)
CREAT SERPL-MCNC: 2 MG/DL (ref 0.5–1.4)
DIFFERENTIAL METHOD: ABNORMAL
EOSINOPHIL # BLD AUTO: 0.3 K/UL (ref 0–0.5)
EOSINOPHIL NFR BLD: 7.3 % (ref 0–8)
ERYTHROCYTE [DISTWIDTH] IN BLOOD BY AUTOMATED COUNT: 14 % (ref 11.5–14.5)
EST. GFR  (AFRICAN AMERICAN): 36.7 ML/MIN/1.73 M^2
EST. GFR  (NON AFRICAN AMERICAN): 31.7 ML/MIN/1.73 M^2
ESTIMATED AVG GLUCOSE: 197 MG/DL (ref 68–131)
GLUCOSE SERPL-MCNC: 294 MG/DL (ref 70–110)
HBA1C MFR BLD: 8.5 % (ref 4–5.6)
HCT VFR BLD AUTO: 34.1 % (ref 40–54)
HGB BLD-MCNC: 11 G/DL (ref 14–18)
IGA SERPL-MCNC: 1603 MG/DL (ref 40–350)
IGG SERPL-MCNC: 1053 MG/DL (ref 650–1600)
IGM SERPL-MCNC: 32 MG/DL (ref 50–300)
IMM GRANULOCYTES # BLD AUTO: 0 K/UL (ref 0–0.04)
IMM GRANULOCYTES NFR BLD AUTO: 0 % (ref 0–0.5)
LDH SERPL L TO P-CCNC: 112 U/L (ref 110–260)
LYMPHOCYTES # BLD AUTO: 1.4 K/UL (ref 1–4.8)
LYMPHOCYTES NFR BLD: 32.3 % (ref 18–48)
MAGNESIUM SERPL-MCNC: 1.6 MG/DL (ref 1.6–2.6)
MCH RBC QN AUTO: 28.6 PG (ref 27–31)
MCHC RBC AUTO-ENTMCNC: 32.3 G/DL (ref 32–36)
MCV RBC AUTO: 89 FL (ref 82–98)
MONOCYTES # BLD AUTO: 0.3 K/UL (ref 0.3–1)
MONOCYTES NFR BLD: 6.4 % (ref 4–15)
NEUTROPHILS # BLD AUTO: 2.3 K/UL (ref 1.8–7.7)
NEUTROPHILS NFR BLD: 53.3 % (ref 38–73)
NRBC BLD-RTO: 0 /100 WBC
PHOSPHATE SERPL-MCNC: 3.1 MG/DL (ref 2.7–4.5)
PLATELET # BLD AUTO: 267 K/UL (ref 150–450)
PMV BLD AUTO: 9.4 FL (ref 9.2–12.9)
POTASSIUM SERPL-SCNC: 4.6 MMOL/L (ref 3.5–5.1)
PROT SERPL-MCNC: 8.8 G/DL (ref 6–8.4)
RBC # BLD AUTO: 3.84 M/UL (ref 4.6–6.2)
SODIUM SERPL-SCNC: 135 MMOL/L (ref 136–145)
TSH SERPL DL<=0.005 MIU/L-ACNC: 2.44 UIU/ML (ref 0.4–4)
WBC # BLD AUTO: 4.39 K/UL (ref 3.9–12.7)

## 2022-05-02 PROCEDURE — 82306 VITAMIN D 25 HYDROXY: CPT | Mod: Q1 | Performed by: INTERNAL MEDICINE

## 2022-05-02 PROCEDURE — 82784 ASSAY IGA/IGD/IGG/IGM EACH: CPT | Mod: 59 | Performed by: INTERNAL MEDICINE

## 2022-05-02 PROCEDURE — 84443 ASSAY THYROID STIM HORMONE: CPT | Performed by: INTERNAL MEDICINE

## 2022-05-02 PROCEDURE — 36415 COLL VENOUS BLD VENIPUNCTURE: CPT | Performed by: INTERNAL MEDICINE

## 2022-05-02 PROCEDURE — 86334 IMMUNOFIX E-PHORESIS SERUM: CPT | Mod: 26,Q1,, | Performed by: PATHOLOGY

## 2022-05-02 PROCEDURE — 84165 PATHOLOGIST INTERPRETATION SPE: ICD-10-PCS | Mod: 26,Q1,, | Performed by: PATHOLOGY

## 2022-05-02 PROCEDURE — 86334 IMMUNOFIX E-PHORESIS SERUM: CPT | Performed by: INTERNAL MEDICINE

## 2022-05-02 PROCEDURE — 83036 HEMOGLOBIN GLYCOSYLATED A1C: CPT | Performed by: INTERNAL MEDICINE

## 2022-05-02 PROCEDURE — 80053 COMPREHEN METABOLIC PANEL: CPT | Mod: Q1 | Performed by: INTERNAL MEDICINE

## 2022-05-02 PROCEDURE — 99999 PR PBB SHADOW E&M-EST. PATIENT-LVL V: CPT | Mod: PBBFAC,,, | Performed by: INTERNAL MEDICINE

## 2022-05-02 PROCEDURE — 83615 LACTATE (LD) (LDH) ENZYME: CPT | Mod: Q1 | Performed by: INTERNAL MEDICINE

## 2022-05-02 PROCEDURE — 99215 OFFICE O/P EST HI 40 MIN: CPT | Mod: Q1,S$GLB,, | Performed by: INTERNAL MEDICINE

## 2022-05-02 PROCEDURE — 84153 ASSAY OF PSA TOTAL: CPT | Performed by: INTERNAL MEDICINE

## 2022-05-02 PROCEDURE — 84165 PROTEIN E-PHORESIS SERUM: CPT | Mod: Q1 | Performed by: INTERNAL MEDICINE

## 2022-05-02 PROCEDURE — 99215 PR OFFICE/OUTPT VISIT, EST, LEVL V, 40-54 MIN: ICD-10-PCS | Mod: Q1,S$GLB,, | Performed by: INTERNAL MEDICINE

## 2022-05-02 PROCEDURE — 99999 PR PBB SHADOW E&M-EST. PATIENT-LVL V: ICD-10-PCS | Mod: PBBFAC,,, | Performed by: INTERNAL MEDICINE

## 2022-05-02 PROCEDURE — 86334 PATHOLOGIST INTERPRETATION IFE: ICD-10-PCS | Mod: 26,Q1,, | Performed by: PATHOLOGY

## 2022-05-02 PROCEDURE — 84165 PROTEIN E-PHORESIS SERUM: CPT | Mod: 26,Q1,, | Performed by: PATHOLOGY

## 2022-05-02 PROCEDURE — 84100 ASSAY OF PHOSPHORUS: CPT | Performed by: INTERNAL MEDICINE

## 2022-05-02 PROCEDURE — 85025 COMPLETE CBC W/AUTO DIFF WBC: CPT | Mod: Q1 | Performed by: INTERNAL MEDICINE

## 2022-05-02 PROCEDURE — 83735 ASSAY OF MAGNESIUM: CPT | Performed by: INTERNAL MEDICINE

## 2022-05-02 PROCEDURE — 83520 IMMUNOASSAY QUANT NOS NONAB: CPT | Mod: Q1 | Performed by: INTERNAL MEDICINE

## 2022-05-02 NOTE — PROGRESS NOTES
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   Sponsor:  [facility]  IRB# [id number]  Study ID: [id number]  Investigator: GIL Engel  Pt Initials: LUCÍA LORENZ       Cycle 77 Day 28 Arm B: Observation     Patient presents to clinic today for above cycle evaluation; he is unaccompanied and states he continues to do well. He reports no new AE's and his demeanor is calm, states just celebrated his mother's 98th birthday. He reports he has been back  to "eye doctor" and continues with injections; right eye "blurry, sometimes double vision but they are working on that to fix it." He wears his knee braces on today; states no change to this AE; denies any issues with mobility or balance.States is maintaining current COVID precautions; wears mask in crowded indoor areas; denies any COVID symptoms. 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        Review of Baseline AE's:   1.Hypertension, Grade 2 systolic and diastolic: BP today is elevated; 151/74; subject states compliance with Losartin. Subject denies headache/dizziness; no symptoms noted.   AE ongoing and tends to fluctuate no worse than Grade 2.   2. Lower Back Pain and Neck Pain, grade 1: Stable.  Patient has no complaints as of late and as previously stated, patient is not suspected to have bony myeloma involvement per Dr. Engel as these are pre-existing issues present at baseline.  AE ongoing.  3. Pain in extremity (knees), grade 1. Patient continues to report baseline pain level,at around 5 on 1-10 scale. Will continue to monitor.       4. Peripheral sensory neuropathy, grade 1: Patient does not verbalize complaints today. Has gabapentin prescribed and takes as needed.  States has not taken recently.  AE ongoing.   5. Anemia, Grade 1: Hgb stable at 11.0 Result reviewed by Dr. Engel. AE ongoing and " "stable.            Review of AE's:     *Please note this list is not all-inclusive, please see AE log for physician reviewed list of adverse events.   1. Creatinine increased, grade 2: Serum creatinine from labs today is 2.0; calculated GFR 41 ml/min based on CG calculation. Reviewed per Dr. Engel and no new orders.Per Dr Engel not been related to myeloma: rather, CKD;  likely secondary to diabetes and hypertension vs plasma cell dyscrasia.  Will continue observation monthly and to monitor renal functionclosely. Per MD, reiterated adequate hydration with increase in water/gatorade intake..   Hyponatremia, Grade 1: Serum sodium today is 135 mmol/L; AE intermittent. Will monitor  3. Hyperkalemia, Grade 1: serum K today is 4.6, wnl;; Dr Engel has reviewed labs; no additional orders noted. AE intermittent; will continue to monitor.   4. Hyperuricemia, Grade 1: this was not evaluated today; usually ordered per nephrology.   5. Hyperglycemia. Grade 1: blood glucose today is 294; lab is nonfasting. Subject states able to maintain better compliance with Leah device/monitoring. No new orders per Dr Engel; subject followed closely by endocrinology.  Had Hg A1c done mid November; 7.7; Dr Engel aware; no orders.  6. Diplopia, right eye: see interval history above; per Dr Engel this is not related to SMM; rather is associated with diabetes. Subject maintains compliance with opthomoolgy; will continue to monitor.            Per study chair, "the definition of progressive disease for this protocol is developing CRAB criteria that requires treatment systemically." Per Dr Engel, based on today's exam and lab results, patient does not have any s/s of CRAB: Normal Calcium level (9.7), absence of Renal insufficiency (serum creatinine elevated today but per Dr Engel not related to myeloma; --patient with a history of kidney dysfunction secondary to diabetes; Dr. Engel aware of these values and not concerned for myeloma involvement), absence of " significant Anemia (hemoglobin 11.2, stable; will await myeloma labs for clarity relationship to myeloma) and absence of lytic Bone lesions (per baseline metastatic survey as well as MRI--see MD note for further comment). See MD note for ECOG score and H&P and flowsheets for laboratory work, vitals, etc. All myeloma-related blood work from last cycle including SPEP and JAGUAR have remained stable, and are currently pending for this cycle. Per Dr. Engel, patient shows no evidence of progression at last result. Patient informed that Dr. Engel will release all lab results to [facility]. He states understanding of this. QOL's not required again until end of treatment/observation.           Subject maintains he wishes only to see Dr Engel and will not show for appts if scheduled with any other provider. Will continue to schedule patient as far out as possible, which seems to help maintain compliance with visits. Normally subject deviates no greater than one week per cycle; wishes to remain on study per Dr Engel. Next appt was moved forwarded one week to [facility] 28 day cycles for data/study compliance.  Informed patient will contact him a few days prior to next appt to remind him so that he can plan accordingly. Patient states having research RN's contact information and has MD contact information to call with any concerns, questions, or worsening of symptoms. Will follow up with subject once myeloma labs are resulted.

## 2022-05-02 NOTE — PROGRESS NOTES
Subjective:    Patient ID: Emerson Menendez is a 76 y.o. male.    Chief Complaint: Knee Pain (LT/), Joint Pain, Hand Pain (Both hands/), and [id number]. 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 Test results identify a stable IgA kappa protein with beta globulin band at 1.63g/dL. Kappa free light chain is elevated at 4.40. CBC and calcium are stable. Creatinine with history of stage III CKD is stable at 1.4. Metastatic survey is negative for lytic lesions. MRI of the entire spine demonstrated age related changes but no convincing evidence of myeloma bone disease. Bone marrow biopsy identified about 13% plasma cells by morphology and FISH for myeloma identified a t(11;14) and trisomies 3,7, and 17. The patient is afebrile and appears clinically well. He was seen by his podiatrist and nephrologist since our last visit without any new or acute events.    The patient has not received any therapy for smoldering myeloma including bisphosphonates or steroids. He has been randomized to observation arm of the the clinical study. 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 He has no current or prior history of malignancy.    TODAY Cycle 77, observation  No acute interval medical events; continue monthly Avastin injection to right eye  Wears eye patch while driving to avoid double vision  Reports persistent bilateral knee pain  CBC stable  MM labs from last visit stable      Cough  Pertinent negatives include no fever.   Hand Pain     Foot Pain  Associated symptoms include coughing. Pertinent negatives include no diaphoresis, fatigue or fever.   Arm Pain      Follow-up  Associated symptoms include coughing. Pertinent negatives include no diaphoresis, fatigue or fever.   Knee Pain     Joint Pain  Associated symptoms include coughing. Pertinent negatives include no diaphoresis, fatigue or fever.     Review of Systems   Constitutional: Negative for activity change, appetite change, diaphoresis, fatigue, fever and unexpected weight change.   HENT: Negative.    Eyes: Negative.    Respiratory: Positive for cough.    Cardiovascular: Negative for leg swelling.   Gastrointestinal: Negative.    Endocrine: Negative.    Genitourinary: Negative.    Musculoskeletal: Negative.    Skin: Negative.    Allergic/Immunologic: Negative.    Neurological:        Grade 0-1 peripheral neuropathy of bilateral feet.    Hematological: Negative for adenopathy. Does not bruise/bleed easily.   Psychiatric/Behavioral: Negative.        Objective:       Vitals:    05/02/22 0820   BP: (!) 151/74   Pulse: 81   Resp: 16   Temp: 98.5 °F (36.9 °C)       Physical Exam  Vitals and nursing note reviewed.   Constitutional:       Appearance: He is well-developed.   HENT:      Head: Normocephalic and atraumatic.      Right Ear: External ear normal.      Left Ear: External ear normal.      Nose: Nose normal.   Eyes:      Conjunctiva/sclera: Conjunctivae normal.      Pupils: Pupils are equal, round, and reactive to light.   Cardiovascular:      Rate and Rhythm: Normal rate and regular rhythm.      Heart sounds: No murmur heard.  Pulmonary:      Effort: Pulmonary effort is normal. No respiratory distress.      Breath sounds: Normal breath sounds.   Abdominal:      General: Bowel sounds are normal. There is no distension.      Palpations: Abdomen is soft.   Musculoskeletal:         General: No tenderness.      Cervical back: Normal range of motion and neck supple.   Skin:     General: Skin is warm and dry.      Findings: No rash.      Nails: There is no clubbing.   Neurological:      Mental Status: He is alert and  oriented to person, place, and time.      Cranial Nerves: No cranial nerve deficit.   Psychiatric:         Behavior: Behavior normal.         Assessment:       No diagnosis found.    Plan:       The patient has a diagnosis of smoldering myeloma. There is no indication for immediate chemotherapy. We are monitoring renal function closely- baseline creatinine of -1.5. 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 M protein has been stable and repeat is pending. Plan for return in 1month.  Endocrinology and Nephrology to monitor diabetes and renal failure respectively. Patient would like to continue to follow with Dr. Perkins as needed.

## 2022-05-03 DIAGNOSIS — H40.1192 PRIMARY OPEN-ANGLE GLAUCOMA, MODERATE STAGE: ICD-10-CM

## 2022-05-03 LAB
ALBUMIN SERPL ELPH-MCNC: 3.86 G/DL (ref 3.35–5.55)
ALPHA1 GLOB SERPL ELPH-MCNC: 0.28 G/DL (ref 0.17–0.41)
ALPHA2 GLOB SERPL ELPH-MCNC: 0.86 G/DL (ref 0.43–0.99)
B-GLOBULIN SERPL ELPH-MCNC: 2.62 G/DL (ref 0.5–1.1)
GAMMA GLOB SERPL ELPH-MCNC: 0.57 G/DL (ref 0.67–1.58)
INTERPRETATION SERPL IFE-IMP: NORMAL
KAPPA LC SER QL IA: 7.35 MG/DL (ref 0.33–1.94)
KAPPA LC/LAMBDA SER IA: 3.25 (ref 0.26–1.65)
LAMBDA LC SER QL IA: 2.26 MG/DL (ref 0.57–2.63)
PATHOLOGIST INTERPRETATION IFE: NORMAL
PATHOLOGIST INTERPRETATION SPE: NORMAL
PROT SERPL-MCNC: 8.2 G/DL (ref 6–8.4)

## 2022-05-03 NOTE — TELEPHONE ENCOUNTER
Care Due:                  Date            Visit Type   Department     Provider  --------------------------------------------------------------------------------                                EP -                              PRIMARY      [facility] INTERNAL  Last Visit: 04-      CARE ([facility])   MEDICINE       GEOVANNA ALCAZAR                              EP                               PRIMARY      [facility] INTERNAL  Next Visit: 09-      CARE ([facility])   MEDICINE       GEOVANNA ALCAZAR                                                            Last  Test          Frequency    Reason                     Performed    Due Date  --------------------------------------------------------------------------------    Lipid Panel.  12 months..  pravastatin..............  01- 01-    [facility] Embedded Care Gaps. Reference number: [id number]. 5/03/2022   3:30:27 PM CDT

## 2022-05-04 RX ORDER — GABAPENTIN 100 MG/1
CAPSULE ORAL
Qty: 450 CAPSULE | Refills: 0 | Status: SHIPPED | OUTPATIENT
Start: 2022-05-04 | End: 2022-09-20 | Stop reason: SDUPTHER

## 2022-05-04 RX ORDER — LATANOPROST 50 UG/ML
SOLUTION/ DROPS OPHTHALMIC
OUTPATIENT
Start: 2022-05-04

## 2022-05-05 ENCOUNTER — OFFICE VISIT (OUTPATIENT)
Dept: OTOLARYNGOLOGY | Facility: CLINIC | Age: 77
End: 2022-05-05
Payer: MEDICARE

## 2022-05-05 DIAGNOSIS — H61.23 BILATERAL IMPACTED CERUMEN: Primary | ICD-10-CM

## 2022-05-05 PROCEDURE — 1126F AMNT PAIN NOTED NONE PRSNT: CPT | Mod: CPTII,S$GLB,, | Performed by: NURSE PRACTITIONER

## 2022-05-05 PROCEDURE — 69210 EAR CERUMEN REMOVAL: ICD-10-PCS | Mod: S$GLB,,, | Performed by: NURSE PRACTITIONER

## 2022-05-05 PROCEDURE — 1101F PR PT FALLS ASSESS DOC 0-1 FALLS W/OUT INJ PAST YR: ICD-10-PCS | Mod: CPTII,S$GLB,, | Performed by: NURSE PRACTITIONER

## 2022-05-05 PROCEDURE — 1160F PR REVIEW ALL MEDS BY PRESCRIBER/CLIN PHARMACIST DOCUMENTED: ICD-10-PCS | Mod: CPTII,S$GLB,, | Performed by: NURSE PRACTITIONER

## 2022-05-05 PROCEDURE — 3072F PR LOW RISK FOR RETINOPATHY: ICD-10-PCS | Mod: CPTII,S$GLB,, | Performed by: NURSE PRACTITIONER

## 2022-05-05 PROCEDURE — 3288F FALL RISK ASSESSMENT DOCD: CPT | Mod: CPTII,S$GLB,, | Performed by: NURSE PRACTITIONER

## 2022-05-05 PROCEDURE — 99999 PR PBB SHADOW E&M-EST. PATIENT-LVL III: CPT | Mod: PBBFAC,,, | Performed by: NURSE PRACTITIONER

## 2022-05-05 PROCEDURE — 1159F MED LIST DOCD IN RCRD: CPT | Mod: CPTII,S$GLB,, | Performed by: NURSE PRACTITIONER

## 2022-05-05 PROCEDURE — 1126F PR PAIN SEVERITY QUANTIFIED, NO PAIN PRESENT: ICD-10-PCS | Mod: CPTII,S$GLB,, | Performed by: NURSE PRACTITIONER

## 2022-05-05 PROCEDURE — 3072F LOW RISK FOR RETINOPATHY: CPT | Mod: CPTII,S$GLB,, | Performed by: NURSE PRACTITIONER

## 2022-05-05 PROCEDURE — 99499 UNLISTED E&M SERVICE: CPT | Mod: S$GLB,,, | Performed by: NURSE PRACTITIONER

## 2022-05-05 PROCEDURE — 99499 NO LOS: ICD-10-PCS | Mod: S$GLB,,, | Performed by: NURSE PRACTITIONER

## 2022-05-05 PROCEDURE — 3052F HG A1C>EQUAL 8.0%<EQUAL 9.0%: CPT | Mod: CPTII,S$GLB,, | Performed by: NURSE PRACTITIONER

## 2022-05-05 PROCEDURE — 1159F PR MEDICATION LIST DOCUMENTED IN MEDICAL RECORD: ICD-10-PCS | Mod: CPTII,S$GLB,, | Performed by: NURSE PRACTITIONER

## 2022-05-05 PROCEDURE — 3052F PR MOST RECENT HEMOGLOBIN A1C LEVEL 8.0 - < 9.0%: ICD-10-PCS | Mod: CPTII,S$GLB,, | Performed by: NURSE PRACTITIONER

## 2022-05-05 PROCEDURE — 99999 PR PBB SHADOW E&M-EST. PATIENT-LVL III: ICD-10-PCS | Mod: PBBFAC,,, | Performed by: NURSE PRACTITIONER

## 2022-05-05 PROCEDURE — 69210 REMOVE IMPACTED EAR WAX UNI: CPT | Mod: S$GLB,,, | Performed by: NURSE PRACTITIONER

## 2022-05-05 PROCEDURE — 1101F PT FALLS ASSESS-DOCD LE1/YR: CPT | Mod: CPTII,S$GLB,, | Performed by: NURSE PRACTITIONER

## 2022-05-05 PROCEDURE — 1160F RVW MEDS BY RX/DR IN RCRD: CPT | Mod: CPTII,S$GLB,, | Performed by: NURSE PRACTITIONER

## 2022-05-05 PROCEDURE — 3288F PR FALLS RISK ASSESSMENT DOCUMENTED: ICD-10-PCS | Mod: CPTII,S$GLB,, | Performed by: NURSE PRACTITIONER

## 2022-05-05 NOTE — PROCEDURES
Ear Cerumen Removal    Date/Time: 5/5/2022 8:00 AM  Performed by: Emmett Comer DNP, FNP-C  Authorized by: Emmett Comer DNP, FNP-C     Consent Done?:  Yes (Verbal)    Local anesthetic:  None  Location details:  Both ears  Procedure type: curette    Cerumen  Removal Results:  Cerumen completely removed  Patient tolerance:  Patient tolerated the procedure well with no immediate complications     Procedure Note:    The patient was brought to the minor procedure room and placed under the operating microscope of the left ear canal which was cleaned of ceruminous debris. Using a combination of suction, curettes and cup forceps the patient's cerumen impaction was removed. The tympanic membrane was evaluated and was unremarkable. The patient tolerated the procedure well. There were no complications.    Procedure Note:    Patient was brought to the minor procedure room and using the operating microscope of the right ear canal which was cleaned of ceruminous debris. There was a significant cerumen impaction.  Using a combination of suction, curettes and cup forceps the patient's cerumen impaction was removed. Tympanic membrane intact. Pt tolerated well. There were no complications.

## 2022-05-09 ENCOUNTER — PATIENT OUTREACH (OUTPATIENT)
Dept: ADMINISTRATIVE | Facility: OTHER | Age: 77
End: 2022-05-09
Payer: MEDICARE

## 2022-05-09 NOTE — PROGRESS NOTES
Health Maintenance Due   Topic Date Due    Colorectal Cancer Screening  03/22/2019    Diabetes Urine Screening  04/11/2019    Pneumococcal Vaccines (Age 65+) (3 - PPSV23 or PCV20) 06/30/2019    Shingles Vaccine (2 of 2) 02/17/2021    Lipid Panel  01/14/2022    COVID-19 Vaccine (4 - Booster for Pfizer series) 03/18/2022     Updates were requested from care everywhere.  Chart was reviewed for overdue Proactive [facility] Encounters ([facility]) topics (CRS, Breast Cancer Screening, Eye exam)  Health Maintenance has been updated.  LINKS immunization registry triggered.  Immunizations were reconciled.

## 2022-05-10 ENCOUNTER — OFFICE VISIT (OUTPATIENT)
Dept: UROLOGY | Facility: CLINIC | Age: 77
End: 2022-05-10
Payer: MEDICARE

## 2022-05-10 VITALS — HEIGHT: 69 IN | BODY MASS INDEX: 28.84 KG/M2 | WEIGHT: 194.69 LBS

## 2022-05-10 DIAGNOSIS — N13.8 BPH WITH URINARY OBSTRUCTION: ICD-10-CM

## 2022-05-10 DIAGNOSIS — N40.1 BPH WITH URINARY OBSTRUCTION: ICD-10-CM

## 2022-05-10 PROCEDURE — 1159F PR MEDICATION LIST DOCUMENTED IN MEDICAL RECORD: ICD-10-PCS | Mod: CPTII,S$GLB,, | Performed by: UROLOGY

## 2022-05-10 PROCEDURE — 99999 PR PBB SHADOW E&M-EST. PATIENT-LVL IV: ICD-10-PCS | Mod: PBBFAC,,, | Performed by: UROLOGY

## 2022-05-10 PROCEDURE — 3052F PR MOST RECENT HEMOGLOBIN A1C LEVEL 8.0 - < 9.0%: ICD-10-PCS | Mod: CPTII,S$GLB,, | Performed by: UROLOGY

## 2022-05-10 PROCEDURE — 1126F PR PAIN SEVERITY QUANTIFIED, NO PAIN PRESENT: ICD-10-PCS | Mod: CPTII,S$GLB,, | Performed by: UROLOGY

## 2022-05-10 PROCEDURE — 1126F AMNT PAIN NOTED NONE PRSNT: CPT | Mod: CPTII,S$GLB,, | Performed by: UROLOGY

## 2022-05-10 PROCEDURE — 3052F HG A1C>EQUAL 8.0%<EQUAL 9.0%: CPT | Mod: CPTII,S$GLB,, | Performed by: UROLOGY

## 2022-05-10 PROCEDURE — 3072F PR LOW RISK FOR RETINOPATHY: ICD-10-PCS | Mod: CPTII,S$GLB,, | Performed by: UROLOGY

## 2022-05-10 PROCEDURE — 99204 PR OFFICE/OUTPT VISIT, NEW, LEVL IV, 45-59 MIN: ICD-10-PCS | Mod: S$GLB,,, | Performed by: UROLOGY

## 2022-05-10 PROCEDURE — 3072F LOW RISK FOR RETINOPATHY: CPT | Mod: CPTII,S$GLB,, | Performed by: UROLOGY

## 2022-05-10 PROCEDURE — 99999 PR PBB SHADOW E&M-EST. PATIENT-LVL IV: CPT | Mod: PBBFAC,,, | Performed by: UROLOGY

## 2022-05-10 PROCEDURE — 99204 OFFICE O/P NEW MOD 45 MIN: CPT | Mod: S$GLB,,, | Performed by: UROLOGY

## 2022-05-10 PROCEDURE — 1159F MED LIST DOCD IN RCRD: CPT | Mod: CPTII,S$GLB,, | Performed by: UROLOGY

## 2022-05-10 RX ORDER — TAMSULOSIN HYDROCHLORIDE 0.4 MG/1
1 CAPSULE ORAL NIGHTLY
Qty: 90 CAPSULE | Refills: 4 | Status: SHIPPED | OUTPATIENT
Start: 2022-05-10 | End: 2024-01-25

## 2022-05-10 RX ORDER — TAMSULOSIN HYDROCHLORIDE 0.4 MG/1
1 CAPSULE ORAL NIGHTLY
Qty: 90 CAPSULE | Refills: 4 | Status: SHIPPED | OUTPATIENT
Start: 2022-05-10 | End: 2022-05-10 | Stop reason: SDUPTHER

## 2022-05-10 NOTE — PROGRESS NOTES
CC: nocturia 2 to 3 x    Emerson Menendez is a 76 y.o. man who is here for the evaluation of Urinary Frequency    A new pt referred by his PCP, Roberta Sagastume MD   C/o increased nocturia 2 to 3 x.  He tried flomax in the past and noted some improvement.  However, he is not sure when is the best time to take it.  Denies flank pain, dysuria, hematuria.      No family hx of prostate cancer.  Hx of DM, HTN.    He is an  and actually lives in [place], [place] and goes back and forth between there to [place].    Past Medical History:   Diagnosis Date    Anxiety 3/16/2015    Asymptomatic multiple myeloma     BPH (benign prostatic hypertrophy) 9/24/2012    Carpal tunnel syndrome     Depression 3/16/2015    GERD (gastroesophageal reflux disease) 9/24/2012    Glaucoma     Hx of psychiatric care     Celexa, Valium    Hyperlipidemia     Hypertension     Microalbuminuria 9/24/2012    Osteoarthritis of cervical spine 9/24/2012    Osteoarthritis of knee 9/24/2012    Psychiatric problem     Type II or unspecified type diabetes mellitus without mention of complication, not stated as uncontrolled 9/24/2012     Past Surgical History:   Procedure Laterality Date    ARTHROSCOPY OF WRIST Left 8/3/2020    Procedure: ARTHROSCOPY, WRIST LEFT;  Surgeon: Kindra Castillo MD;  Location: [facility] OR;  Service: Orthopedics;  Laterality: Left;  REGIONAL/MAC    CARPAL TUNNEL RELEASE Left 8/3/2020    Procedure: RELEASE, CARPAL TUNNEL LEFT;  Surgeon: Kindra Castillo MD;  Location: [facility] OR;  Service: Orthopedics;  Laterality: Left;  REGIONAL/MAC    CATARACT EXTRACTION  2012    Right eye    INTRAOCULAR PROSTHESES INSERTION Left 5/20/2021    Procedure: INSERTION, IOL PROSTHESIS;  Surgeon: Jose L Wheatley MD;  Location: [address];  Service: Ophthalmology;  Laterality: Left;    PHACOEMULSIFICATION OF CATARACT Left 5/20/2021    Procedure: PHACOEMULSIFICATION, CATARACT;  Surgeon: Jose L CLEVELAND  MD Dioni;  Location: [address];  Service: Ophthalmology;  Laterality: Left;    PROSTATE SURGERY      SURGICAL REMOVAL OF CARPAL BONE Left 1/6/2021    Procedure: OSTECTOMY, CARPAL BONE, LEFT;  Surgeon: Kindra Castillo MD;  Location: [facility];  Service: Orthopedics;  Laterality: Left;  REGIONAL/MAC     Social History     Tobacco Use    Smoking status: Never Smoker    Smokeless tobacco: Never Used   Substance Use Topics    Alcohol use: No    Drug use: No     Family History   Problem Relation Age of Onset    Hypertension Brother     Depression Brother     Kidney failure Daughter         due to medication    Depression Sister     Blindness Neg Hx     Cancer Neg Hx     Cataracts Neg Hx     Diabetes Neg Hx     Glaucoma Neg Hx     Macular degeneration Neg Hx     Retinal detachment Neg Hx     Strabismus Neg Hx     Stroke Neg Hx     Thyroid disease Neg Hx      Allergy:  Review of patient's allergies indicates:   Allergen Reactions    Penicillins      Knees locked up     Outpatient Encounter Medications as of 5/10/2022   Medication Sig Dispense Refill    ACCU-CHEK STEFANO CONTROL SOLN Soln       alcohol swabs PadM Apply 1 each topically as needed.      aspirin (ECOTRIN) 81 MG EC tablet Take 1 tablet (81 mg total) by mouth once daily. 100 tablet 3    blood glucose control, normal (METER-CHECK) Soln One meter.  Use as directed. Meter of insurance choice (Patient taking differently: One meter.  Use as directed. Meter of insurance choice) 1 each 0    blood sugar diagnostic (ACCU-CHEK STEFANO PLUS TEST STRP) Str Accu check stefano plus TEST FOUR TIMES DAILY. Test strtips of insurance choice to go with meter and lancets 400 strip 3    blood sugar diagnostic Strp [facility] - check blood sugars 4 times daily 400 strip 4    blood-glucose meter (ACCU-CHEK STEFANO PLUS METER) Misc Use as direted 1 each 0    blood-glucose meter (ACCU-CHEK OMAYRA) Misc USE AS DIRECTED. Accucheck stefano plus 1 each 0     brimonidine 0.2% (ALPHAGAN) 0.2 % Drop Place 1 drop into the right eye 3 (three) times daily. 15 mL 6    citalopram (CELEXA) 40 MG tablet Take 1 tablet (40 mg total) by mouth once daily. 90 tablet 4    citalopram (CELEXA) 40 MG tablet Take 1 tablet (40 mg total) by mouth once daily. 30 tablet 4    diazePAM (VALIUM) 5 MG tablet Take 1 tablet (5 mg total) by mouth every 12 (twelve) hours as needed for Anxiety. 60 tablet 0    ergocalciferol (ERGOCALCIFEROL) 50,000 unit Cap Take 1 capsule (50,000 Units total) by mouth every 7 days. 12 capsule 3    FARXIGA 5 mg Tab tablet Take 1 tablet (5 mg total) by mouth once daily. 30 tablet 11    flash glucose scanning reader (FREESTYLE CHRISTIANO 14 DAY READER) Misc Use as directed 6 each 4    flash glucose sensor (FREESTYLE CHRISTIANO 14 DAY SENSOR) Kit Use as directed 6 kit 4    gabapentin (NEURONTIN) 100 MG capsule TAKE 1 CAPSULE EVERY MORNING, 1 CAPSULE IN THE AFTERNOON, AND 1 TO 3 CAPSULE(S) AT BEDTIME AS NEEDED FOR FOOT PAIN 450 capsule 0    glimepiride (AMARYL) 2 MG tablet Take 1 tablet (2 mg total) by mouth once daily. 90 tablet 4    glucose 4 GM chewable tablet Take 8 g by mouth as needed for Low blood sugar.      insulin (LANTUS SOLOSTAR U-100 INSULIN) glargine 100 units/mL (3mL) SubQ pen Inject 15 units under the skin in the morning and 15 units under the skin in the evening. 15 mL 4    insulin aspart U-100 (NOVOLOG FLEXPEN U-100 INSULIN) 100 unit/mL (3 mL) InPn pen Inject 4 Units into the skin 2 (two) times daily with meals. 3 mL 3    lancets (ACCU-CHEK SOFTCLIX LANCETS) Misc 1 lancet by Misc.(Non-Drug; Combo Route) route 4 (four) times daily. 400 each 4    latanoprost 0.005 % ophthalmic solution Place 1 drop into both eyes every evening. 7.5 mL 3    losartan (COZAAR) 50 MG tablet Take 1 tablet (50 mg total) by mouth once daily. 90 tablet 6    MONOVISC 88 mg/4 mL Syrg       omeprazole (PRILOSEC) 40 MG capsule Take 1 capsule (40 mg total) by mouth once daily.  "90 capsule 4    pen needle, diabetic (BD ULTRA-FINE SHORT PEN NEEDLE) 31 gauge x 5/16" Ndle USE TO INJECT INSULIN ONE TIME DAILY 100 each 3    pravastatin (PRAVACHOL) 40 MG tablet Take 1 tablet (40 mg total) by mouth once daily. 90 tablet 3    prednisoLONE acetate (PRED FORTE) 1 % DrpS Place 1 drop into the left eye 2 (two) times daily. 10 mL 1    sildenafiL (VIAGRA) 100 MG tablet Take 1 tablet (100 mg total) by mouth daily as needed for Erectile Dysfunction. 30 tablet 2    traMADoL (ULTRAM) 50 mg tablet Take 1 tablet (50 mg total) by mouth every 8 (eight) hours as needed for Pain. 60 tablet 0    TRUEPLUS LANCETS 33 gauge Misc       [DISCONTINUED] tamsulosin (FLOMAX) 0.4 mg Cap Take 1 capsule (0.4 mg total) by mouth nightly. 90 capsule 4    tamsulosin (FLOMAX) 0.4 mg Cap Take 1 capsule (0.4 mg total) by mouth nightly. 90 capsule 4    [DISCONTINUED] tamsulosin (FLOMAX) 0.4 mg Cap Take 1 capsule (0.4 mg total) by mouth nightly. 90 capsule 4     Facility-Administered Encounter Medications as of 5/10/2022   Medication Dose Route Frequency Provider Last Rate Last Admin    mupirocin 2 % ointment   Nasal On Call Procedure Mohit Hummel MD   Given at 01/06/21 0847     Review of Systems   ROS  Physical Exam   There were no vitals filed for this visit.  Physical Exam  Constitutional:       General: He is not in acute distress.     Appearance: He is well-developed. He is not diaphoretic.   HENT:      Head: Normocephalic and atraumatic.      Right Ear: External ear normal.      Left Ear: External ear normal.      Nose: Nose normal.   Eyes:      Conjunctiva/sclera: Conjunctivae normal.      Pupils: Pupils are equal, round, and reactive to light.   Neck:      Thyroid: No thyromegaly.      Vascular: No JVD.      Trachea: No tracheal deviation.   Cardiovascular:      Rate and Rhythm: Normal rate and regular rhythm.      Heart sounds: Normal heart sounds. No murmur heard.    No friction rub. No gallop.   Pulmonary: "      Effort: Pulmonary effort is normal. No respiratory distress.      Breath sounds: Normal breath sounds. No wheezing.   Chest:      Chest wall: No tenderness.   Abdominal:      General: Bowel sounds are normal. There is no distension.      Palpations: Abdomen is soft. There is no mass.      Tenderness: There is no abdominal tenderness. There is no guarding or rebound.   Genitourinary:     Penis: Normal. No tenderness.       Rectum: Normal.      Comments: Prostate 25 grams with negative nodule or negative tenderness    Musculoskeletal:         General: No tenderness or deformity. Normal range of motion.      Cervical back: Normal range of motion and neck supple.   Lymphadenopathy:      Cervical: No cervical adenopathy.   Skin:     General: Skin is warm and dry.   Neurological:      Mental Status: He is alert and oriented to person, place, and time.   Psychiatric:         Behavior: Behavior normal.         Thought Content: Thought content normal.                 LABS:  Lab Results   Component Value Date    PSA 1.5 05/02/2022    PSA 1.1 10/15/2018    PSA 0.82 08/01/2016    PSA 0.72 04/17/2015    PSA 0.79 12/09/2013    PSA 0.63 02/16/2012    PSA 0.8 03/05/2008    PSADIAG 0.77 09/13/2013     No results found. However, due to the size of the patient record, not all encounters were searched. Please check Results Review for a complete set of results.  Lab Results   Component Value Date    CREATININE 2.0 (H) 05/02/2022    CREATININE 1.9 (H) 04/04/2022    CREATININE 1.8 (H) 03/14/2022     No results found. However, due to the size of the patient record, not all encounters were searched. Please check Results Review for a complete set of results.  Urine Culture, Routine   Date Value Ref Range Status   11/22/2013 No growth  Final     Hemoglobin A1C   Date Value Ref Range Status   05/02/2022 8.5 (H) 4.0 - 5.6 % Final     Comment:     ADA Screening Guidelines:  5.7-6.4%  Consistent with prediabetes  >or=6.5%  Consistent with  diabetes    High levels of fetal hemoglobin interfere with the HbA1C  assay. Heterozygous hemoglobin variants (HbS, HgC, etc)do  not significantly interfere with this assay.   However, presence of multiple variants may affect accuracy.     11/12/2021 7.7 (H) 4.0 - 5.6 % Final     Comment:     ADA Screening Guidelines:  5.7-6.4%  Consistent with prediabetes  >or=6.5%  Consistent with diabetes    High levels of fetal hemoglobin interfere with the HbA1C  assay. Heterozygous hemoglobin variants (HbS, HgC, etc)do  not significantly interfere with this assay.   However, presence of multiple variants may affect accuracy.         Radiology:    Assessment and Plan:  Emerson was seen today for urinary frequency.    Diagnoses and all orders for this visit:    BPH with urinary obstruction  -     Ambulatory referral/consult to Urology  -     tamsulosin (FLOMAX) 0.4 mg Cap; Take 1 capsule (0.4 mg total) by mouth nightly.    Other orders  -     Discontinue: tamsulosin (FLOMAX) 0.4 mg Cap; Take 1 capsule (0.4 mg total) by mouth nightly.    explained the nature of nocturia.  Recommend that he should taker care of diabetes and make sure that it is better controlled.    He needs to take flomax regularly  Recommend that he should take it in the evening 1 hour after dinner or a couple of hours before bed time.  He needs to drink plenty of water to improve his renal function.  However, I advised him not to drink fluid 4 hours before his bed time.  If he sees no improvement, he can come and see me.  I suspect that he may have nocturnal polyuria and will evaluate him voiding diary day vs night time.  Answered all his questions.    Follow-up:  Follow up if symptoms worsen or fail to improve.

## 2022-05-13 ENCOUNTER — TELEPHONE (OUTPATIENT)
Dept: ORTHOPEDICS | Facility: CLINIC | Age: 77
End: 2022-05-13
Payer: MEDICARE

## 2022-05-13 NOTE — TELEPHONE ENCOUNTER
I have attempted without success to contact this patient by phone. Left VM confirming appt date and time

## 2022-05-15 ENCOUNTER — PATIENT MESSAGE (OUTPATIENT)
Dept: INTERNAL MEDICINE | Facility: CLINIC | Age: 77
End: 2022-05-15
Payer: MEDICARE

## 2022-05-15 RX ORDER — PREDNISONE 10 MG/1
TABLET ORAL
Qty: 10 TABLET | Refills: 0 | Status: SHIPPED | OUTPATIENT
Start: 2022-05-15 | End: 2022-06-20

## 2022-05-17 ENCOUNTER — OFFICE VISIT (OUTPATIENT)
Dept: ORTHOPEDICS | Facility: CLINIC | Age: 77
End: 2022-05-17
Payer: MEDICARE

## 2022-05-17 VITALS
DIASTOLIC BLOOD PRESSURE: 75 MMHG | HEIGHT: 69 IN | HEART RATE: 94 BPM | SYSTOLIC BLOOD PRESSURE: 162 MMHG | BODY MASS INDEX: 28.73 KG/M2 | WEIGHT: 194 LBS

## 2022-05-17 DIAGNOSIS — M79.641 PAIN IN BOTH HANDS: ICD-10-CM

## 2022-05-17 DIAGNOSIS — M79.642 PAIN IN BOTH HANDS: ICD-10-CM

## 2022-05-17 PROCEDURE — 1101F PR PT FALLS ASSESS DOC 0-1 FALLS W/OUT INJ PAST YR: ICD-10-PCS | Mod: CPTII,S$GLB,, | Performed by: ORTHOPAEDIC SURGERY

## 2022-05-17 PROCEDURE — 3077F SYST BP >= 140 MM HG: CPT | Mod: CPTII,S$GLB,, | Performed by: ORTHOPAEDIC SURGERY

## 2022-05-17 PROCEDURE — 3077F PR MOST RECENT SYSTOLIC BLOOD PRESSURE >= 140 MM HG: ICD-10-PCS | Mod: CPTII,S$GLB,, | Performed by: ORTHOPAEDIC SURGERY

## 2022-05-17 PROCEDURE — 3052F HG A1C>EQUAL 8.0%<EQUAL 9.0%: CPT | Mod: CPTII,S$GLB,, | Performed by: ORTHOPAEDIC SURGERY

## 2022-05-17 PROCEDURE — 1125F AMNT PAIN NOTED PAIN PRSNT: CPT | Mod: CPTII,S$GLB,, | Performed by: ORTHOPAEDIC SURGERY

## 2022-05-17 PROCEDURE — 3078F PR MOST RECENT DIASTOLIC BLOOD PRESSURE < 80 MM HG: ICD-10-PCS | Mod: CPTII,S$GLB,, | Performed by: ORTHOPAEDIC SURGERY

## 2022-05-17 PROCEDURE — 3072F PR LOW RISK FOR RETINOPATHY: ICD-10-PCS | Mod: CPTII,S$GLB,, | Performed by: ORTHOPAEDIC SURGERY

## 2022-05-17 PROCEDURE — 3288F FALL RISK ASSESSMENT DOCD: CPT | Mod: CPTII,S$GLB,, | Performed by: ORTHOPAEDIC SURGERY

## 2022-05-17 PROCEDURE — 1101F PT FALLS ASSESS-DOCD LE1/YR: CPT | Mod: CPTII,S$GLB,, | Performed by: ORTHOPAEDIC SURGERY

## 2022-05-17 PROCEDURE — 3288F PR FALLS RISK ASSESSMENT DOCUMENTED: ICD-10-PCS | Mod: CPTII,S$GLB,, | Performed by: ORTHOPAEDIC SURGERY

## 2022-05-17 PROCEDURE — 3052F PR MOST RECENT HEMOGLOBIN A1C LEVEL 8.0 - < 9.0%: ICD-10-PCS | Mod: CPTII,S$GLB,, | Performed by: ORTHOPAEDIC SURGERY

## 2022-05-17 PROCEDURE — 3072F LOW RISK FOR RETINOPATHY: CPT | Mod: CPTII,S$GLB,, | Performed by: ORTHOPAEDIC SURGERY

## 2022-05-17 PROCEDURE — 99214 OFFICE O/P EST MOD 30 MIN: CPT | Mod: S$GLB,,, | Performed by: ORTHOPAEDIC SURGERY

## 2022-05-17 PROCEDURE — 99999 PR PBB SHADOW E&M-EST. PATIENT-LVL IV: ICD-10-PCS | Mod: PBBFAC,,, | Performed by: ORTHOPAEDIC SURGERY

## 2022-05-17 PROCEDURE — 99999 PR PBB SHADOW E&M-EST. PATIENT-LVL IV: CPT | Mod: PBBFAC,,, | Performed by: ORTHOPAEDIC SURGERY

## 2022-05-17 PROCEDURE — 3078F DIAST BP <80 MM HG: CPT | Mod: CPTII,S$GLB,, | Performed by: ORTHOPAEDIC SURGERY

## 2022-05-17 PROCEDURE — 99214 PR OFFICE/OUTPT VISIT, EST, LEVL IV, 30-39 MIN: ICD-10-PCS | Mod: S$GLB,,, | Performed by: ORTHOPAEDIC SURGERY

## 2022-05-17 PROCEDURE — 1125F PR PAIN SEVERITY QUANTIFIED, PAIN PRESENT: ICD-10-PCS | Mod: CPTII,S$GLB,, | Performed by: ORTHOPAEDIC SURGERY

## 2022-05-17 NOTE — PROGRESS NOTES
Patient ID: Emerson Menendez is a 76 y.o. male.     Chief Complaint: Pain of the Left Wrist        HPI  Emerson Menendez is a 76 y.o. male presenting today for follow up right long trigger finger. He reports pain and intermittent locking. He presents today to receive an injection, his glucose was elevated are due to a recent knee injection today his glucose is well controlled.      He is status post Left wrist proximal row carpectomy with interposition arthroplasty by Dr. Castillo 1/6/2021 and has a hx of left carpal tunnel release 2020.              Review of patient's allergies indicates:   Allergen Reactions    Penicillins         Knees locked up          Current Medications          Current Outpatient Medications   Medication Sig Dispense Refill    ACCU-CHEK ELIE CONTROL SOLN Soln          alcohol swabs PadM Apply 1 each topically as needed.        aspirin (ECOTRIN) 81 MG EC tablet Take 1 tablet (81 mg total) by mouth once daily. 100 tablet 3    blood glucose control, normal (METER-CHECK) Soln One meter.  Use as directed. Meter of insurance choice (Patient taking differently: One meter.  Use as directed. Meter of insurance choice) 1 each 0    blood sugar diagnostic (ACCU-CHEK ELIE PLUS TEST STRP) Str Accu check elie plus TEST FOUR TIMES DAILY. Test strtips of insurance choice to go with meter and lancets 400 strip 3    blood sugar diagnostic Strp [facility] - check blood sugars 4 times daily 400 strip 4    blood-glucose meter (ACCU-CHEK ELIE PLUS METER) Misc Use as direted 1 each 0    blood-glucose meter (ACCU-CHEK OMAYRA) Misc USE AS DIRECTED. Accucheck elie plus 1 each 0    brimonidine 0.2% (ALPHAGAN) 0.2 % Drop Place 1 drop into the right eye 3 (three) times daily. 15 mL 6    citalopram (CELEXA) 40 MG tablet Take 1 tablet (40 mg total) by mouth once daily. 90 tablet 4    citalopram (CELEXA) 40 MG tablet Take 1 tablet (40 mg total) by mouth once daily. 30 tablet 4    diazePAM  "(VALIUM) 5 MG tablet Take 1 tablet (5 mg total) by mouth every 12 (twelve) hours as needed for Anxiety. 60 tablet 0    ergocalciferol (ERGOCALCIFEROL) 50,000 unit Cap Take 1 capsule (50,000 Units total) by mouth every 7 days. 12 capsule 3    FARXIGA 5 mg Tab tablet Take 1 tablet (5 mg total) by mouth once daily. 30 tablet 11    flash glucose scanning reader (FREESTYLE CHRISTIANO 14 DAY READER) Misc Use as directed 6 each 4    flash glucose sensor (FREESTYLE CHRISTIANO 14 DAY SENSOR) Kit Use as directed 6 kit 4    gabapentin (NEURONTIN) 100 MG capsule TAKE 1 CAPSULE EVERY MORNING, 1 CAPSULE IN THE AFTERNOON, AND 1 TO 3 CAPSULE(S) AT BEDTIME AS NEEDED FOR FOOT PAIN 450 capsule 0    glimepiride (AMARYL) 2 MG tablet Take 1 tablet (2 mg total) by mouth once daily. 90 tablet 4    glucose 4 GM chewable tablet Take 8 g by mouth as needed for Low blood sugar.        insulin (LANTUS SOLOSTAR U-100 INSULIN) glargine 100 units/mL (3mL) SubQ pen Inject 15 units under the skin in the morning and 15 units under the skin in the evening. 15 mL 4    insulin aspart U-100 (NOVOLOG FLEXPEN U-100 INSULIN) 100 unit/mL (3 mL) InPn pen Inject 4 Units into the skin 2 (two) times daily with meals. 3 mL 3    lancets (ACCU-CHEK SOFTCLIX LANCETS) Misc 1 lancet by Misc.(Non-Drug; Combo Route) route 4 (four) times daily. 400 each 4    latanoprost 0.005 % ophthalmic solution Place 1 drop into both eyes every evening. 7.5 mL 3    losartan (COZAAR) 50 MG tablet Take 1 tablet (50 mg total) by mouth once daily. 90 tablet 6    MONOVISC 88 mg/4 mL Syrg          omeprazole (PRILOSEC) 40 MG capsule Take 1 capsule (40 mg total) by mouth once daily. 90 capsule 4    pen needle, diabetic (BD ULTRA-FINE SHORT PEN NEEDLE) 31 gauge x 5/16" Ndle USE TO INJECT INSULIN ONE TIME DAILY 100 each 3    pravastatin (PRAVACHOL) 40 MG tablet Take 1 tablet (40 mg total) by mouth once daily. 90 tablet 3    prednisoLONE acetate (PRED FORTE) 1 % DrpS Place 1 drop into " the left eye 2 (two) times daily. 10 mL 1    predniSONE (DELTASONE) 10 MG tablet 2 tablets daily for 3 days then one tablet daily for 4 days 10 tablet 0    sildenafiL (VIAGRA) 100 MG tablet Take 1 tablet (100 mg total) by mouth daily as needed for Erectile Dysfunction. 30 tablet 2    tamsulosin (FLOMAX) 0.4 mg Cap Take 1 capsule (0.4 mg total) by mouth nightly. 90 capsule 4    traMADoL (ULTRAM) 50 mg tablet Take 1 tablet (50 mg total) by mouth every 8 (eight) hours as needed for Pain. 60 tablet 0    TRUEPLUS LANCETS 33 gauge Misc                      Current Facility-Administered Medications   Medication Dose Route Frequency Provider Last Rate Last Admin    dexamethasone injection 4 mg  4 mg Other 1 time in Clinic/HOD Rani Alfaro PA-C        LIDOcaine (PF) 10 mg/ml (1%) injection 5 mg  0.5 mL Other Once Rani Alfaro PA-C                    Facility-Administered Medications Ordered in Other Visits   Medication Dose Route Frequency Provider Last Rate Last Admin    mupirocin 2 % ointment   Nasal On Call Procedure Mohit Hummel MD   Given at 01/06/21 0847                 Past Medical History:   Diagnosis Date    Anxiety 3/16/2015    Asymptomatic multiple myeloma      BPH (benign prostatic hypertrophy) 9/24/2012    Carpal tunnel syndrome      Depression 3/16/2015    GERD (gastroesophageal reflux disease) 9/24/2012    Glaucoma      Hx of psychiatric care       Celexa, Valium    Hyperlipidemia      Hypertension      Microalbuminuria 9/24/2012    Osteoarthritis of cervical spine 9/24/2012    Osteoarthritis of knee 9/24/2012    Psychiatric problem      Type II or unspecified type diabetes mellitus without mention of complication, not stated as uncontrolled 9/24/2012               Past Surgical History:   Procedure Laterality Date    ARTHROSCOPY OF WRIST Left 8/3/2020     Procedure: ARTHROSCOPY, WRIST LEFT;  Surgeon: Kindra Castillo MD;  Location: [facility] OR;  Service: Orthopedics;   "Laterality: Left;  REGIONAL/MAC    CARPAL TUNNEL RELEASE Left 8/3/2020     Procedure: RELEASE, CARPAL TUNNEL LEFT;  Surgeon: Kindra Castillo MD;  Location: [facility] OR;  Service: Orthopedics;  Laterality: Left;  REGIONAL/MAC    CATARACT EXTRACTION   2012     Right eye    INTRAOCULAR PROSTHESES INSERTION Left 5/20/2021     Procedure: INSERTION, IOL PROSTHESIS;  Surgeon: Jose L Wheatley MD;  Location: [address];  Service: Ophthalmology;  Laterality: Left;    PHACOEMULSIFICATION OF CATARACT Left 5/20/2021     Procedure: PHACOEMULSIFICATION, CATARACT;  Surgeon: Jose L Wheatley MD;  [facility];  Service: Ophthalmology;  Laterality: Left;    PROSTATE SURGERY        SURGICAL REMOVAL OF CARPAL BONE Left 1/6/2021     Procedure: OSTECTOMY, CARPAL BONE, LEFT;  Surgeon: Kindra Castillo MD;  Location: [facility];  Service: Orthopedics;  Laterality: Left;  REGIONAL/MAC         Review of Systems:  Constitutional: Negative for chills and fever.   Respiratory: Negative for cough and shortness of breath.    Gastrointestinal: Negative for nausea and vomiting.   Skin: Negative for rash.   Neurological: Negative for dizziness and headaches.   Psychiatric/Behavioral: Negative for depression.   MSK as in HPI         OBJECTIVE:      PHYSICAL EXAM:  BP (!) 152/85   Pulse 103   Ht 5' 9.02" (1.753 m)   Wt 88 kg (194 lb)   BMI 28.63 kg/m²      GEN:  NAD, well-developed, well-groomed.  NEURO: Awake, alert, and oriented. Normal attention and concentration.    PSYCH: Normal mood and affect. Behavior is normal.  HEENT: No cervical lymphadenopathy noted.  CARDIOVASCULAR: Radial pulses 2+ bilaterally. No LE edema noted.  PULMONARY: Breath sounds normal. No respiratory distress.  SKIN: Intact, no rashes.       MSK:   RUE:  Good active ROM of the wrist and fingers. ttp long A1 pulley with triggering. AIN/PIN/Radial/Median/Ulnar Nerves assessed in isolation without deficit. Radial & Ulnar arteries " palpated 2+. Capillary Refill <3s.        RADIOGRAPHS:  Xray bl hands 4/25/22   Impression:     1. Postoperative change of left proximal row carpectomy.  2. Bilateral hand osteoarthritis as detailed above.  3. 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  Comments: I have personally reviewed the imaging and I agree with the above radiologist's report.     ASSESSMENT/PLAN:          ICD-10-CM ICD-9-CM   1. Trigger middle finger of right hand  M65.331 727.03             Orders Placed This Encounter    LIDOcaine (PF) 10 mg/ml (1%) injection 5 mg    dexamethasone injection 4 mg      Plan:   Pt wishes to proceed with right long trigger finger injection today     Patient will come back Thursday for injection of his right ring finger trigger finger sugars today were 303 he showed me on his screen he states he just got a knee injection and that may be the why his sugars have increased so significantly but again he states is very painful he states all of his fingers really painful but this once locking very painful he wants an injection he will come back Thursday early for after his manage his sugars he will also  Voltaren gel for diffuse hand pain

## 2022-05-19 ENCOUNTER — OFFICE VISIT (OUTPATIENT)
Dept: ORTHOPEDICS | Facility: CLINIC | Age: 77
End: 2022-05-19
Payer: MEDICARE

## 2022-05-19 VITALS
SYSTOLIC BLOOD PRESSURE: 152 MMHG | BODY MASS INDEX: 28.73 KG/M2 | DIASTOLIC BLOOD PRESSURE: 85 MMHG | HEIGHT: 69 IN | HEART RATE: 103 BPM | WEIGHT: 194 LBS

## 2022-05-19 DIAGNOSIS — M65.331 TRIGGER MIDDLE FINGER OF RIGHT HAND: Primary | ICD-10-CM

## 2022-05-19 PROCEDURE — 3072F LOW RISK FOR RETINOPATHY: CPT | Mod: CPTII,S$GLB,, | Performed by: PHYSICIAN ASSISTANT

## 2022-05-19 PROCEDURE — 76942 ECHO GUIDE FOR BIOPSY: CPT | Mod: S$GLB,,, | Performed by: PHYSICIAN ASSISTANT

## 2022-05-19 PROCEDURE — 99213 PR OFFICE/OUTPT VISIT, EST, LEVL III, 20-29 MIN: ICD-10-PCS | Mod: 25,S$GLB,, | Performed by: PHYSICIAN ASSISTANT

## 2022-05-19 PROCEDURE — 3052F HG A1C>EQUAL 8.0%<EQUAL 9.0%: CPT | Mod: CPTII,S$GLB,, | Performed by: PHYSICIAN ASSISTANT

## 2022-05-19 PROCEDURE — 20550 PR INJECT TENDON SHEATH/LIGAMENT: ICD-10-PCS | Mod: RT,S$GLB,, | Performed by: PHYSICIAN ASSISTANT

## 2022-05-19 PROCEDURE — 99213 OFFICE O/P EST LOW 20 MIN: CPT | Mod: 25,S$GLB,, | Performed by: PHYSICIAN ASSISTANT

## 2022-05-19 PROCEDURE — 99999 PR PBB SHADOW E&M-EST. PATIENT-LVL IV: CPT | Mod: PBBFAC,,, | Performed by: PHYSICIAN ASSISTANT

## 2022-05-19 PROCEDURE — 3079F PR MOST RECENT DIASTOLIC BLOOD PRESSURE 80-89 MM HG: ICD-10-PCS | Mod: CPTII,S$GLB,, | Performed by: PHYSICIAN ASSISTANT

## 2022-05-19 PROCEDURE — 3288F FALL RISK ASSESSMENT DOCD: CPT | Mod: CPTII,S$GLB,, | Performed by: PHYSICIAN ASSISTANT

## 2022-05-19 PROCEDURE — 1101F PR PT FALLS ASSESS DOC 0-1 FALLS W/OUT INJ PAST YR: ICD-10-PCS | Mod: CPTII,S$GLB,, | Performed by: PHYSICIAN ASSISTANT

## 2022-05-19 PROCEDURE — 3072F PR LOW RISK FOR RETINOPATHY: ICD-10-PCS | Mod: CPTII,S$GLB,, | Performed by: PHYSICIAN ASSISTANT

## 2022-05-19 PROCEDURE — 1159F PR MEDICATION LIST DOCUMENTED IN MEDICAL RECORD: ICD-10-PCS | Mod: CPTII,S$GLB,, | Performed by: PHYSICIAN ASSISTANT

## 2022-05-19 PROCEDURE — 1125F AMNT PAIN NOTED PAIN PRSNT: CPT | Mod: CPTII,S$GLB,, | Performed by: PHYSICIAN ASSISTANT

## 2022-05-19 PROCEDURE — 3052F PR MOST RECENT HEMOGLOBIN A1C LEVEL 8.0 - < 9.0%: ICD-10-PCS | Mod: CPTII,S$GLB,, | Performed by: PHYSICIAN ASSISTANT

## 2022-05-19 PROCEDURE — 1159F MED LIST DOCD IN RCRD: CPT | Mod: CPTII,S$GLB,, | Performed by: PHYSICIAN ASSISTANT

## 2022-05-19 PROCEDURE — 1125F PR PAIN SEVERITY QUANTIFIED, PAIN PRESENT: ICD-10-PCS | Mod: CPTII,S$GLB,, | Performed by: PHYSICIAN ASSISTANT

## 2022-05-19 PROCEDURE — 3077F PR MOST RECENT SYSTOLIC BLOOD PRESSURE >= 140 MM HG: ICD-10-PCS | Mod: CPTII,S$GLB,, | Performed by: PHYSICIAN ASSISTANT

## 2022-05-19 PROCEDURE — 3288F PR FALLS RISK ASSESSMENT DOCUMENTED: ICD-10-PCS | Mod: CPTII,S$GLB,, | Performed by: PHYSICIAN ASSISTANT

## 2022-05-19 PROCEDURE — 3079F DIAST BP 80-89 MM HG: CPT | Mod: CPTII,S$GLB,, | Performed by: PHYSICIAN ASSISTANT

## 2022-05-19 PROCEDURE — 99999 PR PBB SHADOW E&M-EST. PATIENT-LVL IV: ICD-10-PCS | Mod: PBBFAC,,, | Performed by: PHYSICIAN ASSISTANT

## 2022-05-19 PROCEDURE — 20550 NJX 1 TENDON SHEATH/LIGAMENT: CPT | Mod: RT,S$GLB,, | Performed by: PHYSICIAN ASSISTANT

## 2022-05-19 PROCEDURE — 3077F SYST BP >= 140 MM HG: CPT | Mod: CPTII,S$GLB,, | Performed by: PHYSICIAN ASSISTANT

## 2022-05-19 PROCEDURE — 1101F PT FALLS ASSESS-DOCD LE1/YR: CPT | Mod: CPTII,S$GLB,, | Performed by: PHYSICIAN ASSISTANT

## 2022-05-19 PROCEDURE — 76942 PR U/S GUIDANCE FOR NEEDLE GUIDANCE: ICD-10-PCS | Mod: S$GLB,,, | Performed by: PHYSICIAN ASSISTANT

## 2022-05-19 RX ORDER — DEXAMETHASONE SODIUM PHOSPHATE 4 MG/ML
4 INJECTION, SOLUTION INTRA-ARTICULAR; INTRALESIONAL; INTRAMUSCULAR; INTRAVENOUS; SOFT TISSUE
Status: COMPLETED | OUTPATIENT
Start: 2022-05-19 | End: 2022-05-19

## 2022-05-19 RX ORDER — LIDOCAINE HYDROCHLORIDE 10 MG/ML
0.5 INJECTION, SOLUTION EPIDURAL; INFILTRATION; INTRACAUDAL; PERINEURAL ONCE
Status: COMPLETED | OUTPATIENT
Start: 2022-05-19 | End: 2022-05-19

## 2022-05-19 RX ADMIN — LIDOCAINE HYDROCHLORIDE 5 MG: 10 INJECTION, SOLUTION EPIDURAL; INFILTRATION; INTRACAUDAL; PERINEURAL at 09:05

## 2022-05-19 RX ADMIN — DEXAMETHASONE SODIUM PHOSPHATE 4 MG: 4 INJECTION, SOLUTION INTRA-ARTICULAR; INTRALESIONAL; INTRAMUSCULAR; INTRAVENOUS; SOFT TISSUE at 08:05

## 2022-05-19 NOTE — PROGRESS NOTES
Subjective:      Patient ID: Emerson Menendez is a 76 y.o. male.    Chief Complaint: Pain of the Left Wrist      HPI  Emerson Menendez is a 76 y.o. male presenting today for follow up right long trigger finger. He reports pain and intermittent locking. He presents today to receive an injection, his glucose was elevated at last visit due to a recent knee injection today his glucose is well controlled.     He is status post Left wrist proximal row carpectomy with interposition arthroplasty by Dr. Castillo 1/6/2021 and has a hx of left carpal tunnel release 2020.      Review of patient's allergies indicates:   Allergen Reactions    Penicillins      Knees locked up         Current Outpatient Medications   Medication Sig Dispense Refill    ACCU-CHEK ELIE CONTROL SOLN Soln       alcohol swabs PadM Apply 1 each topically as needed.      aspirin (ECOTRIN) 81 MG EC tablet Take 1 tablet (81 mg total) by mouth once daily. 100 tablet 3    blood glucose control, normal (METER-CHECK) Soln One meter.  Use as directed. Meter of insurance choice (Patient taking differently: One meter.  Use as directed. Meter of insurance choice) 1 each 0    blood sugar diagnostic (ACCU-CHEK ELIE PLUS TEST STRP) Str Accu check elie plus TEST FOUR TIMES DAILY. Test strtips of insurance choice to go with meter and lancets 400 strip 3    blood sugar diagnostic Strp [facility] - check blood sugars 4 times daily 400 strip 4    blood-glucose meter (ACCU-CHEK ELIE PLUS METER) Misc Use as direted 1 each 0    blood-glucose meter (ACCU-CHEK OMAYRA) Misc USE AS DIRECTED. Accucheck elie plus 1 each 0    brimonidine 0.2% (ALPHAGAN) 0.2 % Drop Place 1 drop into the right eye 3 (three) times daily. 15 mL 6    citalopram (CELEXA) 40 MG tablet Take 1 tablet (40 mg total) by mouth once daily. 90 tablet 4    citalopram (CELEXA) 40 MG tablet Take 1 tablet (40 mg total) by mouth once daily. 30 tablet 4    diazePAM (VALIUM) 5 MG tablet Take  "1 tablet (5 mg total) by mouth every 12 (twelve) hours as needed for Anxiety. 60 tablet 0    ergocalciferol (ERGOCALCIFEROL) 50,000 unit Cap Take 1 capsule (50,000 Units total) by mouth every 7 days. 12 capsule 3    FARXIGA 5 mg Tab tablet Take 1 tablet (5 mg total) by mouth once daily. 30 tablet 11    flash glucose scanning reader (FREESTYLE CHRISTIANO 14 DAY READER) Misc Use as directed 6 each 4    flash glucose sensor (FREESTYLE CHRISTIANO 14 DAY SENSOR) Kit Use as directed 6 kit 4    gabapentin (NEURONTIN) 100 MG capsule TAKE 1 CAPSULE EVERY MORNING, 1 CAPSULE IN THE AFTERNOON, AND 1 TO 3 CAPSULE(S) AT BEDTIME AS NEEDED FOR FOOT PAIN 450 capsule 0    glimepiride (AMARYL) 2 MG tablet Take 1 tablet (2 mg total) by mouth once daily. 90 tablet 4    glucose 4 GM chewable tablet Take 8 g by mouth as needed for Low blood sugar.      insulin (LANTUS SOLOSTAR U-100 INSULIN) glargine 100 units/mL (3mL) SubQ pen Inject 15 units under the skin in the morning and 15 units under the skin in the evening. 15 mL 4    insulin aspart U-100 (NOVOLOG FLEXPEN U-100 INSULIN) 100 unit/mL (3 mL) InPn pen Inject 4 Units into the skin 2 (two) times daily with meals. 3 mL 3    lancets (ACCU-CHEK SOFTCLIX LANCETS) Misc 1 lancet by Misc.(Non-Drug; Combo Route) route 4 (four) times daily. 400 each 4    latanoprost 0.005 % ophthalmic solution Place 1 drop into both eyes every evening. 7.5 mL 3    losartan (COZAAR) 50 MG tablet Take 1 tablet (50 mg total) by mouth once daily. 90 tablet 6    MONOVISC 88 mg/4 mL Syrg       omeprazole (PRILOSEC) 40 MG capsule Take 1 capsule (40 mg total) by mouth once daily. 90 capsule 4    pen needle, diabetic (BD ULTRA-FINE SHORT PEN NEEDLE) 31 gauge x 5/16" Ndle USE TO INJECT INSULIN ONE TIME DAILY 100 each 3    pravastatin (PRAVACHOL) 40 MG tablet Take 1 tablet (40 mg total) by mouth once daily. 90 tablet 3    prednisoLONE acetate (PRED FORTE) 1 % DrpS Place 1 drop into the left eye 2 (two) times daily. " 10 mL 1    predniSONE (DELTASONE) 10 MG tablet 2 tablets daily for 3 days then one tablet daily for 4 days 10 tablet 0    sildenafiL (VIAGRA) 100 MG tablet Take 1 tablet (100 mg total) by mouth daily as needed for Erectile Dysfunction. 30 tablet 2    tamsulosin (FLOMAX) 0.4 mg Cap Take 1 capsule (0.4 mg total) by mouth nightly. 90 capsule 4    traMADoL (ULTRAM) 50 mg tablet Take 1 tablet (50 mg total) by mouth every 8 (eight) hours as needed for Pain. 60 tablet 0    TRUEPLUS LANCETS 33 gauge Misc        Current Facility-Administered Medications   Medication Dose Route Frequency Provider Last Rate Last Admin    dexamethasone injection 4 mg  4 mg Other 1 time in Clinic/HOD Rani Alfaro PA-C        LIDOcaine (PF) 10 mg/ml (1%) injection 5 mg  0.5 mL Other Once Rani Alfaro PA-C         Facility-Administered Medications Ordered in Other Visits   Medication Dose Route Frequency Provider Last Rate Last Admin    mupirocin 2 % ointment   Nasal On Call Procedure Mohit Hummel MD   Given at 01/06/21 0847       Past Medical History:   Diagnosis Date    Anxiety 3/16/2015    Asymptomatic multiple myeloma     BPH (benign prostatic hypertrophy) 9/24/2012    Carpal tunnel syndrome     Depression 3/16/2015    GERD (gastroesophageal reflux disease) 9/24/2012    Glaucoma     Hx of psychiatric care     Celexa, Valium    Hyperlipidemia     Hypertension     Microalbuminuria 9/24/2012    Osteoarthritis of cervical spine 9/24/2012    Osteoarthritis of knee 9/24/2012    Psychiatric problem     Type II or unspecified type diabetes mellitus without mention of complication, not stated as uncontrolled 9/24/2012       Past Surgical History:   Procedure Laterality Date    ARTHROSCOPY OF WRIST Left 8/3/2020    Procedure: ARTHROSCOPY, WRIST LEFT;  Surgeon: Kindra Castillo MD;  Location: [facility];  Service: Orthopedics;  Laterality: Left;  REGIONAL/MAC    CARPAL TUNNEL RELEASE Left 8/3/2020    Procedure:  "RELEASE, CARPAL TUNNEL LEFT;  Surgeon: Kindra Castillo MD;  Location: [facility] OR;  Service: Orthopedics;  Laterality: Left;  REGIONAL/MAC    CATARACT EXTRACTION  2012    Right eye    INTRAOCULAR PROSTHESES INSERTION Left 5/20/2021    Procedure: INSERTION, IOL PROSTHESIS;  Surgeon: Jose L Wheatley MD;  Location: [facility] OR [address] [place] [address];  Service: Ophthalmology;  Laterality: Left;    PHACOEMULSIFICATION OF CATARACT Left 5/20/2021    Procedure: PHACOEMULSIFICATION, CATARACT;  Surgeon: Jose L Wheatley MD;  [facility];  Service: Ophthalmology;  Laterality: Left;    PROSTATE SURGERY      SURGICAL REMOVAL OF CARPAL BONE Left 1/6/2021    Procedure: OSTECTOMY, CARPAL BONE, LEFT;  Surgeon: Kindra Castillo MD;  Location: [facility];  Service: Orthopedics;  Laterality: Left;  REGIONAL/MAC       Review of Systems:  Constitutional: Negative for chills and fever.   Respiratory: Negative for cough and shortness of breath.    Gastrointestinal: Negative for nausea and vomiting.   Skin: Negative for rash.   Neurological: Negative for dizziness and headaches.   Psychiatric/Behavioral: Negative for depression.   MSK as in HPI       OBJECTIVE:     PHYSICAL EXAM:  BP (!) 152/85   Pulse 103   Ht 5' 9.02" (1.753 m)   Wt 88 kg (194 lb)   BMI 28.63 kg/m²     GEN:  NAD, well-developed, well-groomed.  NEURO: Awake, alert, and oriented. Normal attention and concentration.    PSYCH: Normal mood and affect. Behavior is normal.  HEENT: No cervical lymphadenopathy noted.  CARDIOVASCULAR: Radial pulses 2+ bilaterally. No LE edema noted.  PULMONARY: Breath sounds normal. No respiratory distress.  SKIN: Intact, no rashes.      MSK:   RUE:  Good active ROM of the wrist and fingers. ttp long A1 pulley with triggering. AIN/PIN/Radial/Median/Ulnar Nerves assessed in isolation without deficit. Radial & Ulnar arteries palpated 2+. Capillary Refill <3s.      RADIOGRAPHS:  Xray bl hands 4/25/22   Impression:     1. " Postoperative change of left proximal row carpectomy.  2. Bilateral hand osteoarthritis as detailed above.  3. 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  Comments: I have personally reviewed the imaging and I agree with the above radiologist's report.    ASSESSMENT/PLAN:       ICD-10-CM ICD-9-CM   1. Trigger middle finger of right hand  M65.331 727.03       Orders Placed This Encounter    LIDOcaine (PF) 10 mg/ml (1%) injection 5 mg    dexamethasone injection 4 mg     Plan:   Pt wishes to proceed with right long trigger finger injection today   RTC 6 wks if symptoms persist       PROCEDURE:  I have explained the risks, benefits, and alternatives of the procedure in detail.  The patient voices understanding and all questions have been answered.  The patient agrees to proceed as planned. US used. So after a sterile prep of the skin in the normal fashion the right long flexor tendon sheath is injected from the volar  approach using a 25 gauge needle with a combination of 0.5cc 1% plain lidocaine and 4 mg of dexamethasone.  The patient is cautioned and immediate relief of pain is secondary to the local anesthetic and will be temporary.  After the anesthetic wears off there may be a increase in pain that may last for a few hours or a few days and they should use ice to help alleviate this flair up of pain. Patient tolerated the procedure well.       The patient indicates understanding of these issues and agrees to the plan.    Rani Alfaro PA-C  [facility]   [facility]  [place], [place]

## 2022-05-25 ENCOUNTER — CLINICAL SUPPORT (OUTPATIENT)
Dept: REHABILITATION | Facility: OTHER | Age: 77
End: 2022-05-25
Attending: INTERNAL MEDICINE
Payer: MEDICARE

## 2022-05-25 DIAGNOSIS — R68.89 DECREASED FUNCTIONAL ACTIVITY TOLERANCE: ICD-10-CM

## 2022-05-25 DIAGNOSIS — R29.898 DECREASED ROM OF NECK: Primary | ICD-10-CM

## 2022-05-25 DIAGNOSIS — M62.81 WEAKNESS OF TRUNK MUSCULATURE: ICD-10-CM

## 2022-05-25 DIAGNOSIS — R29.3 POOR POSTURE: ICD-10-CM

## 2022-05-25 PROCEDURE — 97110 THERAPEUTIC EXERCISES: CPT
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
## 2022-05-30 ENCOUNTER — RESEARCH ENCOUNTER (OUTPATIENT)
Dept: RESEARCH | Facility: HOSPITAL | Age: 77
End: 2022-05-30
Payer: MEDICARE

## 2022-05-30 ENCOUNTER — OFFICE VISIT (OUTPATIENT)
Dept: HEMATOLOGY/ONCOLOGY | Facility: CLINIC | Age: 77
End: 2022-05-30
Payer: MEDICARE

## 2022-05-30 VITALS
RESPIRATION RATE: 16 BRPM | HEART RATE: 88 BPM | HEIGHT: 70 IN | WEIGHT: 196.88 LBS | OXYGEN SATURATION: 99 % | BODY MASS INDEX: 28.18 KG/M2 | DIASTOLIC BLOOD PRESSURE: 77 MMHG | SYSTOLIC BLOOD PRESSURE: 141 MMHG

## 2022-05-30 DIAGNOSIS — D47.2 SMOLDERING MULTIPLE MYELOMA: Primary | ICD-10-CM

## 2022-05-30 PROCEDURE — 99215 PR OFFICE/OUTPT VISIT, EST, LEVL V, 40-54 MIN: ICD-10-PCS | Mod: Q1,S$GLB,, | Performed by: INTERNAL MEDICINE

## 2022-05-30 PROCEDURE — 99999 PR PBB SHADOW E&M-EST. PATIENT-LVL V: ICD-10-PCS | Mod: PBBFAC,,, | Performed by: INTERNAL MEDICINE

## 2022-05-30 PROCEDURE — 99215 OFFICE O/P EST HI 40 MIN: CPT | Mod: Q1,S$GLB,, | Performed by: INTERNAL MEDICINE

## 2022-05-30 PROCEDURE — 99999 PR PBB SHADOW E&M-EST. PATIENT-LVL V: CPT | Mod: PBBFAC,,, | Performed by: INTERNAL MEDICINE

## 2022-05-30 NOTE — PROGRESS NOTES
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   Sponsor:  [facility]  IRB# [id number]  Study ID: [id number]  Investigator: GIL Engel Pt Initials: LUCÍA LORENZ        Cycle 78 Day 28 Arm B: Observation      Patient presents to clinic today for above cycle evaluation; he is unaccompanied and states he continues to do well. He reports no new AE's and his demeanor is calm, has no major complaints today. He states he received an injection to his left knee about three weeks ago and feels "great."  Pending knee surgery in near future.  Dr. Engel answered questions from patient regarding hemoglobin level; Dr. Engel stated this fluctuates and is most influenced by kidney health, but is affected in combination with SMM.  States is maintaining current COVID precautions; wears mask in crowded indoor areas; denies any COVID symptoms. 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        Review of Baseline AE's:   1.Hypertension, Grade 2 systolic and diastolic: BP today is elevated; 141/77; subject states compliance with [place]. Subject denies headache/dizziness; no symptoms noted.   AE ongoing and tends to fluctuate no worse than Grade 2.   2. Lower Back Pain and Neck Pain, grade 1: Stable.  Patient has no complaints as of late and as previously stated, patient is not suspected to have bony myeloma involvement per Dr. Engel as these are pre-existing issues present at baseline.  AE ongoing.  3. Pain in extremity (knees), grade 1. As above states knee feels "great" today.  Patient continues to report baseline pain level, at around 5 on 1-10 scale. Will continue to monitor.       4. Peripheral sensory neuropathy, grade 1: Patient does not verbalize complaints today. Has gabapentin prescribed and takes as needed.  States has not taken recently.  AE ongoing.   5. Anemia, Grade 1: Hgb " "stable at 10.7. Result reviewed by Dr. Engel, who answered patient questions as above. AE ongoing and stable.            Review of AE's:     *Please note this list is not all-inclusive, please see AE log for physician reviewed list of adverse events.   1. Creatinine increased, grade 2: Serum creatinine from labs today is 1.9; calculated GFR 42 ml/min based on CG calculation. Reviewed per Dr. Engel and no new orders.Per Dr Engel not been related to myeloma: rather, CKD;  likely secondary to diabetes and hypertension vs plasma cell dyscrasia.  Will continue observation monthly and to monitor renal functionclosely. Per MD, reiterated adequate hydration with increase in water/gatorade intake..  2. Hyponatremia, Grade 1: Serum sodium today is 131 mmol/L; AE intermittent. Will monitor  3. Hyperkalemia, Grade 1: serum K today is 5.4, Dr Engel has reviewed labs; no additional orders noted. AE intermittent; will continue to monitor.   4. Hyperuricemia, Grade 1: this was not evaluated today; usually ordered per nephrology.   5. Hyperglycemia. Grade 1: blood glucose today is 370; lab is nonfasting. Patient states he had recently received a steroid shot and that for numerous reasons did not get around to taking his insulin this morning before his labs.  He states he went to his vehicle after labs were drawn today and took his insulin.  Dr. Engel informed.  No new orders per Dr Engel; subject followed closely by endocrinology.  Had Hg A1c done mid November; 7.7; Dr Engel aware; no orders.  6. Diplopia, right eye: see interval history above; per Dr Engel this is not related to SMM; rather is associated with diabetes. Subject maintains compliance with opthomoolgy; will continue to monitor.       Per study chair, "the definition of progressive disease for this protocol is developing CRAB criteria that requires treatment systemically." Per Dr Engel, based on today's exam and lab results, patient does not have any s/s of CRAB: Normal Calcium " level (9.6), absence of Renal insufficiency (serum creatinine elevated today at 1.9 but per Dr Engel not related to myeloma; --patient with a history of kidney dysfunction secondary to diabetes; Dr. Engel aware of these values and not concerned for myeloma involvement), absence of significant Anemia (hemoglobin 10.7, stable; will await myeloma labs for clarity relationship to myeloma) and absence of lytic Bone lesions (per baseline metastatic survey as well as MRI--see MD note for further comment). See MD note for ECOG score and H&P and flowsheets for laboratory work, vitals, etc. All myeloma-related blood work from last cycle including SPEP and JAGUAR have remained stable, and are currently pending for this cycle. Per Dr. Engel, patient shows no evidence of progression at last result. Patient informed that Dr. Engel will release all lab results to [facility]. He states understanding of this. QOL's not required again until end of treatment/observation.           Subject maintains he wishes only to see Dr Engel and will not show for appts if scheduled with any other provider. Will continue to schedule patient as far out as possible, which seems to help maintain compliance with visits. Normally subject deviates no greater than one week per cycle; wishes to remain on study per Dr Engel. Next appt was moved forwarded one week to [facility] q 28 day cycles for data/study compliance.  Informed patient will contact him a few days prior to next appt to remind him so that he can plan accordingly. Patient states having research RN's contact information and has MD contact information to call with any concerns, questions, or worsening of symptoms. Will follow up with subject once myeloma labs are resulted.

## 2022-05-30 NOTE — PROGRESS NOTES
Subjective:    Patient ID: Emerson Menendez is a 76 y.o. male.    Chief Complaint: No chief complaint on file.  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 Test results identify a stable IgA kappa protein with beta globulin band at 1.63g/dL. Kappa free light chain is elevated at 4.40. CBC and calcium are stable. Creatinine with history of stage III CKD is stable at 1.4. Metastatic survey is negative for lytic lesions. MRI of the entire spine demonstrated age related changes but no convincing evidence of myeloma bone disease. Bone marrow biopsy identified about 13% plasma cells by morphology and FISH for myeloma identified a t(11;14) and trisomies 3,7, and 17. The patient is afebrile and appears clinically well. He was seen by his podiatrist and nephrologist since our last visit without any new or acute events.    The patient has not received any therapy for smoldering myeloma including bisphosphonates or steroids. He has been randomized to observation arm of the the clinical study. 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 He has no current or prior history of malignancy.    TODAY Cycle 78, observation  No acute interval medical events  Received injection to knees since last visit; currently pain free  Surgery planned in August  CBC stable; MM labs pending  Elevated BG- insulin self-administered after labs      Knee Pain     Joint Pain  Associated symptoms include coughing. Pertinent negatives include no diaphoresis, fatigue or fever.   Hand Pain     Cough  Pertinent negatives include no fever.   Foot Pain  Associated symptoms include coughing. Pertinent negatives  include no diaphoresis, fatigue or fever.   Arm Pain     Follow-up  Associated symptoms include coughing. Pertinent negatives include no diaphoresis, fatigue or fever.     Review of Systems   Constitutional: Negative for activity change, appetite change, diaphoresis, fatigue, fever and unexpected weight change.   HENT: Negative.    Eyes: Negative.    Respiratory: Positive for cough.    Cardiovascular: Negative for leg swelling.   Gastrointestinal: Negative.    Endocrine: Negative.    Genitourinary: Negative.    Musculoskeletal: Negative.    Skin: Negative.    Allergic/Immunologic: Negative.    Neurological:        Grade 0-1 peripheral neuropathy of bilateral feet.    Hematological: Negative for adenopathy. Does not bruise/bleed easily.   Psychiatric/Behavioral: Negative.        Objective:       Vitals:    05/30/22 1137   BP: (!) 141/77   Pulse: 88   Resp: 16       Physical Exam  Vitals and nursing note reviewed.   Constitutional:       Appearance: He is well-developed.   HENT:      Head: Normocephalic and atraumatic.      Right Ear: External ear normal.      Left Ear: External ear normal.      Nose: Nose normal.   Eyes:      Conjunctiva/sclera: Conjunctivae normal.      Pupils: Pupils are equal, round, and reactive to light.   Cardiovascular:      Rate and Rhythm: Normal rate and regular rhythm.      Heart sounds: No murmur heard.  Pulmonary:      Effort: Pulmonary effort is normal. No respiratory distress.      Breath sounds: Normal breath sounds.   Abdominal:      General: Bowel sounds are normal. There is no distension.      Palpations: Abdomen is soft.   Musculoskeletal:         General: No tenderness.      Cervical back: Normal range of motion and neck supple.   Skin:     General: Skin is warm and dry.      Findings: No rash.      Nails: There is no clubbing.   Neurological:      Mental Status: He is alert and oriented to person, place, and time.      Cranial Nerves: No cranial nerve deficit.   Psychiatric:          Behavior: Behavior normal.         Assessment:       1. Smoldering multiple myeloma        Plan:       The patient has a diagnosis of smoldering myeloma. There is no indication for immediate chemotherapy. We are monitoring renal function closely- baseline creatinine of -1.5. 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 M protein has been stable and repeat is pending. Plan for return in 1month.  Endocrinology and Nephrology to monitor diabetes and renal failure respectively. Patient would like to continue to follow with Dr. Perkins as needed.

## 2022-05-31 ENCOUNTER — PROCEDURE VISIT (OUTPATIENT)
Dept: OPHTHALMOLOGY | Facility: CLINIC | Age: 77
End: 2022-05-31
Payer: MEDICARE

## 2022-05-31 DIAGNOSIS — H40.1132 PRIMARY OPEN ANGLE GLAUCOMA OF BOTH EYES, MODERATE STAGE: ICD-10-CM

## 2022-05-31 DIAGNOSIS — E08.3211 MILD NONPROLIFERATIVE DIABETIC RETINOPATHY OF RIGHT EYE WITH MACULAR EDEMA ASSOCIATED WITH DIABETES MELLITUS DUE TO UNDERLYING CONDITION: Primary | ICD-10-CM

## 2022-05-31 DIAGNOSIS — E11.3292 MILD NONPROLIFERATIVE DIABETIC RETINOPATHY OF LEFT EYE WITHOUT MACULAR EDEMA ASSOCIATED WITH TYPE 2 DIABETES MELLITUS: ICD-10-CM

## 2022-05-31 DIAGNOSIS — H35.033 HYPERTENSIVE RETINOPATHY OF BOTH EYES: ICD-10-CM

## 2022-05-31 DIAGNOSIS — H04.123 DRY EYE SYNDROME OF BOTH EYES: ICD-10-CM

## 2022-05-31 PROCEDURE — 3052F PR MOST RECENT HEMOGLOBIN A1C LEVEL 8.0 - < 9.0%: ICD-10-PCS | Mod: CPTII,S$GLB,, | Performed by: OPHTHALMOLOGY

## 2022-05-31 PROCEDURE — 99214 OFFICE O/P EST MOD 30 MIN: CPT | Mod: 25,S$GLB,, | Performed by: OPHTHALMOLOGY

## 2022-05-31 PROCEDURE — 92134 CPTRZ OPH DX IMG PST SGM RTA: CPT | Mod: S$GLB,,, | Performed by: OPHTHALMOLOGY

## 2022-05-31 PROCEDURE — 67028 INJECTION EYE DRUG: CPT | Mod: RT,S$GLB,, | Performed by: OPHTHALMOLOGY

## 2022-05-31 PROCEDURE — 92134 OCT, RETINA - OU - BOTH EYES: ICD-10-PCS | Mod: S$GLB,,, | Performed by: OPHTHALMOLOGY

## 2022-05-31 PROCEDURE — 99214 PR OFFICE/OUTPT VISIT, EST, LEVL IV, 30-39 MIN: ICD-10-PCS | Mod: 25,S$GLB,, | Performed by: OPHTHALMOLOGY

## 2022-05-31 PROCEDURE — 3052F HG A1C>EQUAL 8.0%<EQUAL 9.0%: CPT | Mod: CPTII,S$GLB,, | Performed by: OPHTHALMOLOGY

## 2022-05-31 PROCEDURE — 67028 PR INJECT INTRAVITREAL PHARMCOLOGIC: ICD-10-PCS | Mod: RT,S$GLB,, | Performed by: OPHTHALMOLOGY

## 2022-05-31 RX ORDER — DORZOLAMIDE HYDROCHLORIDE AND TIMOLOL MALEATE 20; 5 MG/ML; MG/ML
1 SOLUTION/ DROPS OPHTHALMIC 2 TIMES DAILY
COMMUNITY
End: 2022-10-27 | Stop reason: SDUPTHER

## 2022-05-31 NOTE — PROGRESS NOTES
HPI     Diabetic Eye Exam      Additional comments: 2 month DFE and OCT              Comments     Last eye exam was 4/11/22 with Dr. Wheatley.  Patient states no vision changes since last exam.  Patient denies diplopia, headaches, flashes/floaters, and pain.    NPDR with mac edema OD  NPDR without mac edema OS  Hypertensive retinopathy OU  POAG OU  S/P ELKIN x 2 OD  S/P IVO OD    Cosopt BID OU          Last edited by Michaela Arora MA on 5/31/2022  9:16 AM. (History)             A/P  1. 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 Mild nonproliferative diabetic retinopathy of left eye without macular edema associated with type 2 diabetes mellitus    3. Primary open angle glaucoma of both eyes, moderate stage    4. Hypertensive retinopathy of both eyes    5. Dry eye syndrome of both eyes       1. 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 Mild nonproliferative diabetic retinopathy of left eye without macular edema associated with type 2 diabetes mellitus  PCP is Dr. Sagastume  05/02/2022  8.5  A1C     OD-   S/p SKYLAR 4/26/22  S/p ELKIN 3/24/22  S/p IVO 1/4/22  VA 20/200 (was 20/50), better IRF after switching to SKYLAR but worse vision due to corneal surface (very dry - OCT en face shows irreg tear film)     Plan: needs SKYLAR OD today #2/3, plan series of 3, may have to supplement with ODX and pressure monitoring in future if worsening    Plan: Based on todays exam, diagnostic studies, and review of records, the determination was made for treatment today.  Schedule Eylea Injection today Right Eye Patient chooses to proceed with injection R/B/A discussed include infection retinal detachment and stroke    Patient identified.  Timeout performed.    Risks, benefits, and alternatives to treatment were discussed in detail with the patient,  including bleeding/infection (endophthalmitis)/etc.  The patient voiced understanding and wished to proceed with the procedure.  See separate consent form.    Injection Procedure Note:  Diagnosis: macular edema Right Eye    Topical Proparacaine drop placed then topical 5% Betadine, then subcojunctival lidocaine 2% injection  Sterile gloves used, and sterile lid speculum placed.  5% Betadine placed at injection site again prior to injection.  Eylea 2mg in 0.05cc Injected inferotemporally 3.5-4mm posterior to the limbus.  Complications: None  Va at least CF at 5 feet post injection.  Retina, ONH, IOP normal after injection.    Followup as below.  Patient should return immediately PRN.  Retinal Detachment and Endophthalmitis precautions given      Followup as below.  Patient should return immediately PRN.  Retinal Detachment and Endophthalmitis precautions given.       OS- no injections  VA 20/40 (was 20/30)  rare dot heme, no DME  Plan: Observation    Recommend good blood pressure control, tight blood glucose control, and good cholesterol control       3. Hypertensive retinopathy of both eyes  AV nicking, BP  stable   Plan: Observation  Recommend good blood pressure control, tight blood glucose control, and good cholesterol control       4. Primary open angle glaucoma of both eyes, moderate stage  F/b Dr. Wheatley last seen 10/2021  IOP 14/12- poss steroid response OD  Currently on cosopt BID OU    Plan:  Continue present management    5. Dry eye syndrome of both eyes  Mod dry eye OD>OS   Start PF ATs QID OU      RTC Alan - 4 weeks DFE/OCTm OD, Eylea OD  RTC Dioni as scheduled        I saw and examined the patient and reviewed in detail the findings documented. The final examination findings, image interpretations, and plan as documented in the record represent my personal judgment and conclusions.    Andriy Phillips MD  Vitreoretinal Surgery   [facility]

## 2022-06-07 ENCOUNTER — DOCUMENTATION ONLY (OUTPATIENT)
Dept: REHABILITATION | Facility: OTHER | Age: 77
End: 2022-06-07
Payer: MEDICARE

## 2022-06-08 ENCOUNTER — PATIENT MESSAGE (OUTPATIENT)
Dept: INTERNAL MEDICINE | Facility: CLINIC | Age: 77
End: 2022-06-08
Payer: MEDICARE

## 2022-06-08 DIAGNOSIS — M54.2 CHRONIC NECK PAIN: Primary | ICD-10-CM

## 2022-06-08 DIAGNOSIS — G89.29 CHRONIC NECK PAIN: Primary | ICD-10-CM

## 2022-06-09 ENCOUNTER — TELEPHONE (OUTPATIENT)
Dept: REHABILITATION | Facility: OTHER | Age: 77
End: 2022-06-09
Payer: MEDICARE

## 2022-06-09 NOTE — TELEPHONE ENCOUNTER
"Patient no-showed their appointment today at the [facility]. I spoke with the patient by phone who advised that he is unable to attend his [facility] appts at this time due to severe c/o neck pain which he attributed to feeling a "pop" while performing the Cervical MedX Isometric testing last visit on 5/25/22.  Patient advised that he has contacted his PCP ( Dr. Sagastume) about this issue and is awaiting to be seen by her.  Patient states he will have to cancel the remaining appts that he has scheduled but would like to return to the program once he is able.   "

## 2022-06-10 ENCOUNTER — TELEPHONE (OUTPATIENT)
Dept: SPINE | Facility: CLINIC | Age: 77
End: 2022-06-10
Payer: MEDICARE

## 2022-06-15 ENCOUNTER — OFFICE VISIT (OUTPATIENT)
Dept: PAIN MEDICINE | Facility: CLINIC | Age: 77
End: 2022-06-15
Payer: MEDICARE

## 2022-06-15 VITALS
SYSTOLIC BLOOD PRESSURE: 148 MMHG | DIASTOLIC BLOOD PRESSURE: 82 MMHG | HEIGHT: 70 IN | RESPIRATION RATE: 18 BRPM | BODY MASS INDEX: 28.2 KG/M2 | WEIGHT: 197 LBS | HEART RATE: 94 BPM

## 2022-06-15 DIAGNOSIS — G89.29 CHRONIC NECK PAIN: ICD-10-CM

## 2022-06-15 DIAGNOSIS — M54.2 CHRONIC NECK PAIN: ICD-10-CM

## 2022-06-15 DIAGNOSIS — M54.2 CERVICAL PAIN (NECK): Primary | ICD-10-CM

## 2022-06-15 PROCEDURE — 3077F PR MOST RECENT SYSTOLIC BLOOD PRESSURE >= 140 MM HG: ICD-10-PCS | Mod: CPTII,S$GLB,, | Performed by: ANESTHESIOLOGY

## 2022-06-15 PROCEDURE — 3052F HG A1C>EQUAL 8.0%<EQUAL 9.0%: CPT | Mod: CPTII,S$GLB,, | Performed by: ANESTHESIOLOGY

## 2022-06-15 PROCEDURE — 3079F DIAST BP 80-89 MM HG: CPT | Mod: CPTII,S$GLB,, | Performed by: ANESTHESIOLOGY

## 2022-06-15 PROCEDURE — 3072F PR LOW RISK FOR RETINOPATHY: ICD-10-PCS | Mod: CPTII,S$GLB,, | Performed by: ANESTHESIOLOGY

## 2022-06-15 PROCEDURE — 99999 PR PBB SHADOW E&M-EST. PATIENT-LVL III: CPT | Mod: PBBFAC,,, | Performed by: ANESTHESIOLOGY

## 2022-06-15 PROCEDURE — 1101F PT FALLS ASSESS-DOCD LE1/YR: CPT | Mod: CPTII,S$GLB,, | Performed by: ANESTHESIOLOGY

## 2022-06-15 PROCEDURE — 3288F PR FALLS RISK ASSESSMENT DOCUMENTED: ICD-10-PCS | Mod: CPTII,S$GLB,, | Performed by: ANESTHESIOLOGY

## 2022-06-15 PROCEDURE — 1101F PR PT FALLS ASSESS DOC 0-1 FALLS W/OUT INJ PAST YR: ICD-10-PCS | Mod: CPTII,S$GLB,, | Performed by: ANESTHESIOLOGY

## 2022-06-15 PROCEDURE — 99204 OFFICE O/P NEW MOD 45 MIN: CPT | Mod: S$GLB,,, | Performed by: ANESTHESIOLOGY

## 2022-06-15 PROCEDURE — 1125F AMNT PAIN NOTED PAIN PRSNT: CPT | Mod: CPTII,S$GLB,, | Performed by: ANESTHESIOLOGY

## 2022-06-15 PROCEDURE — 99999 PR PBB SHADOW E&M-EST. PATIENT-LVL III: ICD-10-PCS | Mod: PBBFAC,,, | Performed by: ANESTHESIOLOGY

## 2022-06-15 PROCEDURE — 3077F SYST BP >= 140 MM HG: CPT | Mod: CPTII,S$GLB,, | Performed by: ANESTHESIOLOGY

## 2022-06-15 PROCEDURE — 99204 PR OFFICE/OUTPT VISIT, NEW, LEVL IV, 45-59 MIN: ICD-10-PCS | Mod: S$GLB,,, | Performed by: ANESTHESIOLOGY

## 2022-06-15 PROCEDURE — 3079F PR MOST RECENT DIASTOLIC BLOOD PRESSURE 80-89 MM HG: ICD-10-PCS | Mod: CPTII,S$GLB,, | Performed by: ANESTHESIOLOGY

## 2022-06-15 PROCEDURE — 3072F LOW RISK FOR RETINOPATHY: CPT | Mod: CPTII,S$GLB,, | Performed by: ANESTHESIOLOGY

## 2022-06-15 PROCEDURE — 3052F PR MOST RECENT HEMOGLOBIN A1C LEVEL 8.0 - < 9.0%: ICD-10-PCS | Mod: CPTII,S$GLB,, | Performed by: ANESTHESIOLOGY

## 2022-06-15 PROCEDURE — 3288F FALL RISK ASSESSMENT DOCD: CPT | Mod: CPTII,S$GLB,, | Performed by: ANESTHESIOLOGY

## 2022-06-15 PROCEDURE — 1125F PR PAIN SEVERITY QUANTIFIED, PAIN PRESENT: ICD-10-PCS | Mod: CPTII,S$GLB,, | Performed by: ANESTHESIOLOGY

## 2022-06-15 NOTE — PROGRESS NOTES
"PCP: Roberta Sagastume MD    REFERRING PHYSICIAN: Komal Mcdowell    CHIEF COMPLAINT: Neck pain    Original HISTORY OF PRESENT ILLNESS: Emerson Menendez presents to the clinic for the evaluation of the above pain. Patient reports that he had neck pain in the past with loss of ROM. [facility] where he made great progress and had full ROM back for many years. A few months ago, he started feeling like his neck was getting stiff again so his PCP referred him back to clinic. He was evaluated 3 weeks ago where he was performing a exercise that he states he "heard a loud pop with associated pain". He reports that the session was ended shortly after. He attempted to rest at home but the pain has gotten worse and now limits his daily activities.    Original Pain Description:  The pain is located in the neck and radiates to the occipital region of the head and down both trapezius muscles. The pain is described as aching, sharp, stabbing and tight band. Exacerbating factors: Bending, Extension and Flexing. Mitigating factors heat and ice. Symptoms interfere with daily activity and work. The patient feels like symptoms have been worsening. Patient denies significant motor weakness and loss of sensations.    Original PAIN SCORES:  Best: Pain is 3  Worst: Pain is 10  Usually: Pain is 8  Current: Pain is 8    INTERVAL HISTORY:     6 weeks of Conservative therapy (PT/Chiro/Home Exercises with Dates)  PT 11/30/21 - 8/22/22    Treatments / Medications: (Ice/Heat/NSAIDS/APAP/etc):  Ice  Heat   Tylenol     Report: Reviewed    Interventional Pain Procedures: (Previous injections)  None    Past Medical History:   Diagnosis Date    Anxiety 3/16/2015    Asymptomatic multiple myeloma     BPH (benign prostatic hypertrophy) 9/24/2012    Carpal tunnel syndrome     Depression 3/16/2015    Diabetic retinopathy     GERD (gastroesophageal reflux disease) 9/24/2012    Glaucoma     Hx of " psychiatric care     Celexa, Valium    Hyperlipidemia     Hypertension     Hypertensive retinopathy of both eyes     Microalbuminuria 2012    Osteoarthritis of cervical spine 2012    Osteoarthritis of knee 2012    Psychiatric problem     Type II or unspecified type diabetes mellitus without mention of complication, not stated as uncontrolled 2012     Past Surgical History:   Procedure Laterality Date    ARTHROSCOPY OF WRIST Left 8/3/2020    Procedure: ARTHROSCOPY, WRIST LEFT;  Surgeon: Kindra Castillo MD;  Location: [facility] OR;  Service: Orthopedics;  Laterality: Left;  REGIONAL/MAC    CARPAL TUNNEL RELEASE Left 8/3/2020    Procedure: RELEASE, CARPAL TUNNEL LEFT;  Surgeon: Kindra Castillo MD;  Location: [facility] OR;  Service: Orthopedics;  Laterality: Left;  REGIONAL/MAC    CATARACT EXTRACTION      Right eye    INTRAOCULAR PROSTHESES INSERTION Left 2021    Procedure: INSERTION, IOL PROSTHESIS;  Surgeon: Jose L Wheatley MD;  Location: [facility] OR [address] [place] [address];  Service: Ophthalmology;  Laterality: Left;    PHACOEMULSIFICATION OF CATARACT Left 2021    Procedure: PHACOEMULSIFICATION, CATARACT;  Surgeon: Jose L Wheatley MD;  Location: [facility] OR [address] [place] [address];  Service: Ophthalmology;  Laterality: Left;    PROSTATE SURGERY      SURGICAL REMOVAL OF CARPAL BONE Left 2021    Procedure: OSTECTOMY, CARPAL BONE, LEFT;  Surgeon: Kindra Castillo MD;  Location: [facility];  Service: Orthopedics;  Laterality: Left;  REGIONAL/MAC     Social History     Socioeconomic History    Marital status:     Number of children: 1   Tobacco Use    Smoking status: Never Smoker    Smokeless tobacco: Never Used   Substance and Sexual Activity    Alcohol use: No    Drug use: No    Sexual activity: Yes     Partners: Female     Birth control/protection: None   Social History Narrative     x2, 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     Family History   Problem Relation  Age of Onset    Hypertension Brother     Depression Brother     Kidney failure Daughter         due to medication    Depression Sister     Blindness Neg Hx     Cancer Neg Hx     Cataracts Neg Hx     Diabetes Neg Hx     Glaucoma Neg Hx     Macular degeneration Neg Hx     Retinal detachment Neg Hx     Strabismus Neg Hx     Stroke Neg Hx     Thyroid disease Neg Hx        Review of patient's allergies indicates:   Allergen Reactions    Penicillins      Knees locked up       Current Outpatient Medications   Medication Sig    ACCU-CHEK STEFANO CONTROL SOLN Soln     alcohol swabs PadM Apply 1 each topically as needed.    aspirin (ECOTRIN) 81 MG EC tablet Take 1 tablet (81 mg total) by mouth once daily.    blood glucose control, normal (METER-CHECK) Soln One meter.  Use as directed. Meter of insurance choice (Patient taking differently: One meter.  Use as directed. Meter of insurance choice)    blood sugar diagnostic (ACCU-CHEK STEFANO PLUS TEST STRP) Strp Accu check stefano plus TEST FOUR TIMES DAILY. Test strtips of insurance choice to go with meter and lancets    [facility] blood [facility]    blood-glucose meter (ACCU-CHEK STEFANO PLUS METER) Misc Use as direted    blood-glucose meter (ACCU-CHEK OMAYRA) Misc USE AS DIRECTED. Accucheck stefano plus    citalopram (CELEXA) 40 MG tablet Take 1 tablet (40 mg total) by mouth once daily.    citalopram (CELEXA) 40 MG tablet Take 1 tablet (40 mg total) by mouth once daily.    diazePAM (VALIUM) 5 MG tablet Take 1 tablet (5 mg total) by mouth every 12 (twelve) hours as needed for Anxiety.    dorzolamide-timolol 2-0.5% (COSOPT) 22.3-6.8 mg/mL ophthalmic solution Place 1 drop into both eyes 2 (two) times daily.    ergocalciferol (ERGOCALCIFEROL) 50,000 unit Cap Take 1 capsule (50,000 Units total) by mouth every 7 days.    FARXIGA 5 mg Tab tablet Take 1 tablet (5 mg total) by mouth once daily.    flash glucose  "scanning reader (FREESTYLE CHRISTIANO 14 DAY READER) Misc Use as directed    flash glucose sensor (FREESTYLE CHRISTIANO 14 DAY SENSOR) Kit Use as directed    gabapentin (NEURONTIN) 100 MG capsule TAKE 1 CAPSULE EVERY MORNING, 1 CAPSULE IN THE AFTERNOON, AND 1 TO 3 CAPSULE(S) AT BEDTIME AS NEEDED FOR FOOT PAIN    glimepiride (AMARYL) 2 MG tablet Take 1 tablet (2 mg total) by mouth once daily.    glucose 4 GM chewable tablet Take 8 g by mouth as needed for Low blood sugar.    insulin (LANTUS SOLOSTAR U-100 INSULIN) glargine 100 units/mL (3mL) SubQ pen Inject 15 units under the skin in the morning and 15 units under the skin in the evening.    insulin aspart U-100 (NOVOLOG FLEXPEN U-100 INSULIN) 100 unit/mL (3 mL) InPn pen Inject 4 Units into the skin 2 (two) times daily with meals.    lancets (ACCU-CHEK SOFTCLIX LANCETS) Misc 1 lancet by Misc.(Non-Drug; Combo Route) route 4 (four) times daily.    losartan (COZAAR) 50 MG tablet Take 1 tablet (50 mg total) by mouth once daily.    MONOVISC 88 mg/4 mL Syrg     omeprazole (PRILOSEC) 40 MG capsule Take 1 capsule (40 mg total) by mouth once daily.    pen needle, diabetic (BD ULTRA-FINE SHORT PEN NEEDLE) 31 gauge x 5/16" Ndle USE TO INJECT INSULIN ONE TIME DAILY    pravastatin (PRAVACHOL) 40 MG tablet Take 1 tablet (40 mg total) by mouth once daily.    predniSONE (DELTASONE) 10 MG tablet 2 tablets daily for 3 days then one tablet daily for 4 days    sildenafiL (VIAGRA) 100 MG tablet Take 1 tablet (100 mg total) by mouth daily as needed for Erectile Dysfunction.    tamsulosin (FLOMAX) 0.4 mg Cap Take 1 capsule (0.4 mg total) by mouth nightly.    traMADoL (ULTRAM) 50 mg tablet Take 1 tablet (50 mg total) by mouth every 8 (eight) hours as needed for Pain.    TRUEPLUS LANCETS 33 gauge [facility]      No current facility-administered medications for this visit.     Facility-Administered Medications Ordered in Other Visits   Medication    mupirocin 2 % ointment " "      ROS:  GENERAL: No fever. No chills. No fatigue. Denies weight loss. Denies weight gain.  HEENT: Reports Neck Pain. Denies headaches. Denies vision change. Denies eye pain. Denies double vision. Denies ear pain.   CV: Denies chest pain.   PULM: Denies of shortness of breath.  GI: Denies constipation. No diarrhea. No abdominal pain. Denies nausea. Denies vomiting. No blood in stool.  HEME: Denies bleeding problems.  : Denies urgency. No painful urination. No blood in urine.  MS: Denies joint stiffness. Denies joint swelling.  Denies back pain.  SKIN: Denies rash.   NEURO: Denies seizures. No weakness.  PSYCH:  Denies difficulty sleeping. No anxiety. Denies depression. No suicidal thoughts.       VITALS:   Vitals:    06/15/22 1314   BP: (!) 148/82   Pulse: 94   Resp: 18   Weight: 89.4 kg (197 lb)   Height: 5' 10" (1.778 m)   PainSc:   8   PainLoc: Neck         PHYSICAL EXAM:   GENERAL: Well appearing, in no acute distress, alert and oriented x3.  PSYCH:  Mood and affect appropriate.  SKIN: Skin color, texture, turgor normal, no rashes or lesions.  HEENT:  Normocephalic, atraumatic. Cranial nerves grossly intact.  NECK: Pain to palpation over the cervical paraspinous muscles. Pain to palpation over facets. Pain with neck flexion, extension, and lateral flexion. Very limited ROM.   PULM: No evidence of respiratory difficulty, symmetric chest rise.  GI:  Non-distended  BACK: Normal range of motion. No pain to palpation over the spinous processes. No pain to palpation over facet joints. There is no pain with palpation over the sacroiliac joints bilaterally. Straight leg raising in the supine position is negative to radicular pain.   EXTREMITIES: No deformities, edema, or skin discoloration.   MUSCULOSKELETAL: Shoulder, hip, and knee provocative maneuvers are negative. Bilateral upper and lower extremity strength is normal and symmetric. No atrophy is noted.  NEURO: Sensation is equal and appropriate bilaterally. " "Bilateral upper and lower extremity coordination and muscle stretch reflexes are physiologic and symmetric. Plantar response are downgoing.  Negative Hoffmans sign.   GAIT: normal.      LABS:  Comprehensive Metabolic Panel   Collected: 05/30/22 1031  Final result  Specimen: Blood      Sodium 131 Low  mmol/L Albumin 3.4 Low  g/dL   Potassium 5.4 High  mmol/L  Total Bilirubin 0.5 mg/dL    Chloride 98 mmol/L Alkaline Phosphatase 63 U/L   CO2 25 mmol/L AST 13 U/L   Glucose 370 High  mg/dL ALT 19 U/L   BUN 19 mg/dL Anion Gap 8 mmol/L   Creatinine 1.9 High  mg/dL eGFR if  39.0 Abnormal  mL/min/1.73 m^2   Calcium 9.6 mg/dL eGFR if non  33.7 Abnormal  mL/min/1.73 m^2               IMAGING:    No recent imaging     ASSESSMENT: 76 y.o. year old male with pain, consistent with cervical neck pain.    DISCUSSION: Mr. Menendez is a nice gentleman originally from [place] that works as a  in [place] that has history of Multiple Myeloma and DM. He is presenting with acute cervical neck pain after hearing "a loud pop" in his neck while doing exercises at [facility]. He presents with very limited ROM; especially with left and right rotation and extension. Pain reproduced over the facet joints and the trapezius muscles.     PLAN:  1. MRI C-spine w/o contrast ordered to further evaluate   2. RTC for planned trigger point injections once blood sugar is decreased next week; Ok to schedule with RUSTY  3. Continue conservative methods of ice/heat and Tylenol for now      Tamiko Kent MD  PGY2, CA1 Anesthesiology      06/15/2022      "

## 2022-06-16 ENCOUNTER — PATIENT MESSAGE (OUTPATIENT)
Dept: PAIN MEDICINE | Facility: CLINIC | Age: 77
End: 2022-06-16
Payer: MEDICARE

## 2022-06-20 ENCOUNTER — HOSPITAL ENCOUNTER (OUTPATIENT)
Dept: RADIOLOGY | Facility: OTHER | Age: 77
Discharge: HOME | End: 2022-06-20
Attending: NURSE PRACTITIONER
Payer: MEDICARE

## 2022-06-20 ENCOUNTER — OFFICE VISIT (OUTPATIENT)
Dept: PAIN MEDICINE | Facility: CLINIC | Age: 77
End: 2022-06-20
Payer: MEDICARE

## 2022-06-20 VITALS
OXYGEN SATURATION: 100 % | RESPIRATION RATE: 18 BRPM | TEMPERATURE: 99 F | DIASTOLIC BLOOD PRESSURE: 84 MMHG | SYSTOLIC BLOOD PRESSURE: 153 MMHG | HEIGHT: 70 IN | BODY MASS INDEX: 27.77 KG/M2 | WEIGHT: 194 LBS | HEART RATE: 85 BPM

## 2022-06-20 DIAGNOSIS — G89.29 CHRONIC NECK PAIN: Primary | ICD-10-CM

## 2022-06-20 DIAGNOSIS — M79.18 MYOFASCIAL PAIN: ICD-10-CM

## 2022-06-20 DIAGNOSIS — M47.812 CERVICAL SPONDYLOSIS: ICD-10-CM

## 2022-06-20 DIAGNOSIS — M54.2 CHRONIC NECK PAIN: Primary | ICD-10-CM

## 2022-06-20 PROCEDURE — 99214 PR OFFICE/OUTPT VISIT, EST, LEVL IV, 30-39 MIN: ICD-10-PCS | Mod: S$GLB,,, | Performed by: NURSE PRACTITIONER

## 2022-06-20 PROCEDURE — 3079F DIAST BP 80-89 MM HG: CPT | Mod: CPTII,S$GLB,, | Performed by: NURSE PRACTITIONER

## 2022-06-20 PROCEDURE — 1101F PT FALLS ASSESS-DOCD LE1/YR: CPT | Mod: CPTII,S$GLB,, | Performed by: NURSE PRACTITIONER

## 2022-06-20 PROCEDURE — 99214 OFFICE O/P EST MOD 30 MIN: CPT | Mod: S$GLB,,, | Performed by: NURSE PRACTITIONER

## 2022-06-20 PROCEDURE — 72052 XR CERVICAL SPINE 5 VIEW WITH FLEX AND EXT: ICD-10-PCS | Mod: 26,,, | Performed by: RADIOLOGY

## 2022-06-20 PROCEDURE — 1159F PR MEDICATION LIST DOCUMENTED IN MEDICAL RECORD: ICD-10-PCS | Mod: CPTII,S$GLB,, | Performed by: NURSE PRACTITIONER

## 2022-06-20 PROCEDURE — 3072F PR LOW RISK FOR RETINOPATHY: ICD-10-PCS | Mod: CPTII,S$GLB,, | Performed by: NURSE PRACTITIONER

## 2022-06-20 PROCEDURE — 3079F PR MOST RECENT DIASTOLIC BLOOD PRESSURE 80-89 MM HG: ICD-10-PCS | Mod: CPTII,S$GLB,, | Performed by: NURSE PRACTITIONER

## 2022-06-20 PROCEDURE — 3077F SYST BP >= 140 MM HG: CPT | Mod: CPTII,S$GLB,, | Performed by: NURSE PRACTITIONER

## 2022-06-20 PROCEDURE — 1101F PR PT FALLS ASSESS DOC 0-1 FALLS W/OUT INJ PAST YR: ICD-10-PCS | Mod: CPTII,S$GLB,, | Performed by: NURSE PRACTITIONER

## 2022-06-20 PROCEDURE — 1125F PR PAIN SEVERITY QUANTIFIED, PAIN PRESENT: ICD-10-PCS | Mod: CPTII,S$GLB,, | Performed by: NURSE PRACTITIONER

## 2022-06-20 PROCEDURE — 3077F PR MOST RECENT SYSTOLIC BLOOD PRESSURE >= 140 MM HG: ICD-10-PCS | Mod: CPTII,S$GLB,, | Performed by: NURSE PRACTITIONER

## 2022-06-20 PROCEDURE — 1125F AMNT PAIN NOTED PAIN PRSNT: CPT | Mod: CPTII,S$GLB,, | Performed by: NURSE PRACTITIONER

## 2022-06-20 PROCEDURE — 3052F HG A1C>EQUAL 8.0%<EQUAL 9.0%: CPT | Mod: CPTII,S$GLB,, | Performed by: NURSE PRACTITIONER

## 2022-06-20 PROCEDURE — 72052 X-RAY EXAM NECK SPINE 6/>VWS: CPT | Mod: TC,FY

## 2022-06-20 PROCEDURE — 3288F PR FALLS RISK ASSESSMENT DOCUMENTED: ICD-10-PCS | Mod: CPTII,S$GLB,, | Performed by: NURSE PRACTITIONER

## 2022-06-20 PROCEDURE — 72052 X-RAY EXAM NECK SPINE 6/>VWS: CPT | Mod: 26,,, | Performed by: RADIOLOGY

## 2022-06-20 PROCEDURE — 3288F FALL RISK ASSESSMENT DOCD: CPT | Mod: CPTII,S$GLB,, | Performed by: NURSE PRACTITIONER

## 2022-06-20 PROCEDURE — 99999 PR PBB SHADOW E&M-EST. PATIENT-LVL V: CPT | Mod: PBBFAC,,, | Performed by: NURSE PRACTITIONER

## 2022-06-20 PROCEDURE — 3052F PR MOST RECENT HEMOGLOBIN A1C LEVEL 8.0 - < 9.0%: ICD-10-PCS | Mod: CPTII,S$GLB,, | Performed by: NURSE PRACTITIONER

## 2022-06-20 PROCEDURE — 1160F PR REVIEW ALL MEDS BY PRESCRIBER/CLIN PHARMACIST DOCUMENTED: ICD-10-PCS | Mod: CPTII,S$GLB,, | Performed by: NURSE PRACTITIONER

## 2022-06-20 PROCEDURE — 99999 PR PBB SHADOW E&M-EST. PATIENT-LVL V: ICD-10-PCS | Mod: PBBFAC,,, | Performed by: NURSE PRACTITIONER

## 2022-06-20 PROCEDURE — 3072F LOW RISK FOR RETINOPATHY: CPT | Mod: CPTII,S$GLB,, | Performed by: NURSE PRACTITIONER

## 2022-06-20 PROCEDURE — 1160F RVW MEDS BY RX/DR IN RCRD: CPT | Mod: CPTII,S$GLB,, | Performed by: NURSE PRACTITIONER

## 2022-06-20 PROCEDURE — 1159F MED LIST DOCD IN RCRD: CPT | Mod: CPTII,S$GLB,, | Performed by: NURSE PRACTITIONER

## 2022-06-20 RX ORDER — CYCLOBENZAPRINE HCL 10 MG
10 TABLET ORAL 3 TIMES DAILY PRN
Qty: 90 TABLET | Refills: 0 | Status: SHIPPED | OUTPATIENT
Start: 2022-06-20 | End: 2022-07-20

## 2022-06-20 NOTE — PROGRESS NOTES
"PCP: Roberta Sagastume MD    REFERRING PHYSICIAN: No ref. provider found    CHIEF COMPLAINT: Neck pain      Interval History 6/20/2022:  The patient is here today for worsened neck pain. He was evaluated by Dr. Rea as a new patient last week. They discussed TPIs but his sugar was running high. He had steroid injections into this knee and finger in the past couple of months. He was also prescribed oral steroids but says he never took them because he is nervous about his sugar. His sugar has been running around 200 in the morning. He has a Dexcom monitor. He is scheduled for cervical MRI on 7/1/22. He is frustrated because he feels as though it is taking a long time to find out what is causing his pain. His pain started abruptly on 5/25/22. He says that he was on the cervical machine at [facility] and heard a pop to his neck. Since that time, he has had significant right sided neck pain. He had baseline aching pain prior to this time. He has no radiation of the pain. The patient denies myelopathic symptoms such as handwriting changes or difficulty with buttons/coins/keys. Denies perineal paresthesias, bowel/bladder dysfunction. He does say that he is considering a lawsuit. His pain today is 8/10.    Initial Encounter:  Original HISTORY OF PRESENT ILLNESS: Emerson Menendez presents to the clinic for the evaluation of the above pain. Patient reports that he had neck pain in the past with loss of ROM. [facility] he made great progress and had full ROM back for many years. A few months ago, he started feeling like his neck was getting stiff again so his PCP referred him back to clinic. He was evaluated 3 weeks ago where he was performing a exercise that he states he "heard a loud pop with associated pain". He reports that the session was ended shortly after. He attempted to rest at home but the pain has gotten worse and now limits his daily activities.    Original " Pain Description:  The pain is located in the neck and radiates to the occipital region of the head and down both trapezius muscles. The pain is described as aching, sharp, stabbing and tight band. Exacerbating factors: Bending, Extension and Flexing. Mitigating factors heat and ice. Symptoms interfere with daily activity and work. The patient feels like symptoms have been worsening. Patient denies significant motor weakness and loss of sensations.    Original PAIN SCORES:  Best: Pain is 3  Worst: Pain is 10  Usually: Pain is 8  Current: Pain is 8    INTERVAL HISTORY:     6 weeks of Conservative therapy (PT/Chiro/Home Exercises with Dates)  PT 11/30/21 - 8/22/22    Treatments / Medications: (Ice/Heat/NSAIDS/APAP/etc):  Ice  Heat   Tylenol     Report: Reviewed    Interventional Pain Procedures: (Previous injections)  None    Past Medical History:   Diagnosis Date    Anxiety 3/16/2015    Asymptomatic multiple myeloma     BPH (benign prostatic hypertrophy) 9/24/2012    Carpal tunnel syndrome     Depression 3/16/2015    Diabetic retinopathy     GERD (gastroesophageal reflux disease) 9/24/2012    Glaucoma     Hx of psychiatric care     Celexa, Valium    Hyperlipidemia     Hypertension     Hypertensive retinopathy of both eyes     Microalbuminuria 9/24/2012    Osteoarthritis of cervical spine 9/24/2012    Osteoarthritis of knee 9/24/2012    Psychiatric problem     Type II or unspecified type diabetes mellitus without mention of complication, not stated as uncontrolled 9/24/2012     Past Surgical History:   Procedure Laterality Date    ARTHROSCOPY OF WRIST Left 8/3/2020    Procedure: ARTHROSCOPY, WRIST LEFT;  Surgeon: Kindra Castillo MD;  Location: [facility] OR;  Service: Orthopedics;  Laterality: Left;  REGIONAL/[facility]    CARPAL TUNNEL RELEASE Left 8/3/2020    Procedure: RELEASE, CARPAL TUNNEL LEFT;  Surgeon: Kindra Castillo MD;  Location: [facility] OR;  Service: Orthopedics;  Laterality: Left;   REGIONAL/[facility]    CATARACT EXTRACTION  2012    Right eye    INTRAOCULAR PROSTHESES INSERTION Left 2021    Procedure: INSERTION, IOL PROSTHESIS;  Surgeon: Jose L Wheatley MD;  Location: [facility] OR [address] [place] [address];  Service: Ophthalmology;  Laterality: Left;    PHACOEMULSIFICATION OF CATARACT Left 2021    Procedure: PHACOEMULSIFICATION, CATARACT;  Surgeon: Jose L Wheatley MD;  Location: [facility] OR [address] [place] [address];  Service: Ophthalmology;  Laterality: Left;    PROSTATE SURGERY      SURGICAL REMOVAL OF CARPAL BONE Left 2021    Procedure: OSTECTOMY, CARPAL BONE, LEFT;  Surgeon: Kindra Castillo MD;  Location: [facility];  Service: Orthopedics;  Laterality: Left;  REGIONAL/[facility]     Social History     Socioeconomic History    Marital status:     Number of children: 1   Tobacco Use    Smoking status: Never Smoker    Smokeless tobacco: Never Used   Substance and Sexual Activity    Alcohol use: No    Drug use: No    Sexual activity: Yes     Partners: Female     Birth control/protection: None   Social History Narrative     x2, 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     Family History   Problem Relation Age of Onset    Hypertension Brother     Depression Brother     Kidney failure Daughter         due to medication    Depression Sister     Blindness Neg Hx     Cancer Neg Hx     Cataracts Neg Hx     Diabetes Neg Hx     Glaucoma Neg Hx     Macular degeneration Neg Hx     Retinal detachment Neg Hx     Strabismus Neg Hx     Stroke Neg Hx     Thyroid disease Neg Hx        Review of patient's allergies indicates:   Allergen Reactions    Penicillins      Knees locked up       Current Outpatient Medications   Medication Sig    ACCU-CHEK STEFANO CONTROL SOLN Soln     alcohol swabs PadM Apply 1 each topically as needed.    aspirin (ECOTRIN) 81 MG EC tablet Take 1 tablet (81 mg total) by mouth once daily.    blood glucose control, normal (METER-CHECK) Soln One meter.  Use as directed.  Meter of insurance choice (Patient taking differently: One meter.  Use as directed. Meter of insurance choice)    blood sugar diagnostic (ACCU-CHEK STEFANO PLUS TEST STRP) Strp Accu check stefano plus TEST FOUR TIMES DAILY. Test strtips of insurance choice to go with meter and lancets    blood sugar diagnostic Strp I [facility] - check blood sugars 4 times daily    blood-glucose meter (ACCU-CHEK STEFANO PLUS METER) Misc Use as direted    blood-glucose meter (ACCU-CHEK OMAYRA) Misc USE AS DIRECTED. Accucheck stefano plus    citalopram (CELEXA) 40 MG tablet Take 1 tablet (40 mg total) by mouth once daily.    citalopram (CELEXA) 40 MG tablet Take 1 tablet (40 mg total) by mouth once daily.    diazePAM (VALIUM) 5 MG tablet Take 1 tablet (5 mg total) by mouth every 12 (twelve) hours as needed for Anxiety.    dorzolamide-timolol 2-0.5% (COSOPT) 22.3-6.8 mg/mL ophthalmic solution Place 1 drop into both eyes 2 (two) times daily.    ergocalciferol (ERGOCALCIFEROL) 50,000 unit Cap Take 1 capsule (50,000 Units total) by mouth every 7 days.    FARXIGA 5 mg Tab tablet Take 1 tablet (5 mg total) by mouth once daily.    flash glucose scanning reader (FREESTYLE CHRISTIANO 14 DAY READER) Misc Use as directed    flash glucose sensor (FREESTYLE CHRISTIANO 14 DAY SENSOR) Kit Use as directed    gabapentin (NEURONTIN) 100 MG capsule TAKE 1 CAPSULE EVERY MORNING, 1 CAPSULE IN THE AFTERNOON, AND 1 TO 3 CAPSULE(S) AT BEDTIME AS NEEDED FOR FOOT PAIN    glimepiride (AMARYL) 2 MG tablet Take 1 tablet (2 mg total) by mouth once daily.    glucose 4 GM chewable tablet Take 8 g by mouth as needed for Low blood sugar.    insulin (LANTUS SOLOSTAR U-100 INSULIN) glargine 100 units/mL (3mL) SubQ pen Inject 15 units under the skin in the morning and 15 units under the skin in the evening.    insulin aspart U-100 (NOVOLOG FLEXPEN U-100 INSULIN) 100 unit/mL (3 mL) InPn pen Inject 4 Units into the skin 2 (two) times daily with meals.    lancets  "(ACCU-CHEK SOFTCLIX LANCETS) Misc 1 lancet by Misc.(Non-Drug; Combo Route) route 4 (four) times daily.    losartan (COZAAR) 50 MG tablet Take 1 tablet (50 mg total) by mouth once daily.    MONOVISC 88 mg/4 mL Syrg     omeprazole (PRILOSEC) 40 MG capsule Take 1 capsule (40 mg total) by mouth once daily.    pen needle, diabetic (BD ULTRA-FINE SHORT PEN NEEDLE) 31 gauge x 5/16" Ndle USE TO INJECT INSULIN ONE TIME DAILY    pravastatin (PRAVACHOL) 40 MG tablet Take 1 tablet (40 mg total) by mouth once daily.    sildenafiL (VIAGRA) 100 MG tablet Take 1 tablet (100 mg total) by mouth daily as needed for Erectile Dysfunction.    tamsulosin (FLOMAX) 0.4 mg Cap Take 1 capsule (0.4 mg total) by mouth nightly.    traMADoL (ULTRAM) 50 mg tablet Take 1 tablet (50 mg total) by mouth every 8 (eight) hours as needed for Pain.    TRUEPLUS LANCETS 33 gauge Misc     predniSONE (DELTASONE) 10 MG tablet 2 tablets daily for 3 days then one tablet daily for 4 days (Patient not taking: Reported on 6/20/2022)     No current facility-administered medications for this visit.     Facility-Administered Medications Ordered in Other Visits   Medication    mupirocin 2 % ointment       ROS:  GENERAL: No fever. No chills. No fatigue. Denies weight loss. Denies weight gain.  HEENT: Reports Neck Pain. Denies headaches. Denies vision change. Denies eye pain. Denies double vision. Denies ear pain.   CV: Denies chest pain.   PULM: Denies of shortness of breath.  GI: Denies constipation. No diarrhea. No abdominal pain. Denies nausea. Denies vomiting. No blood in stool.  HEME: Denies bleeding problems.  : Denies urgency. No painful urination. No blood in urine.  MS: Denies joint stiffness. Denies joint swelling.  Denies back pain.  SKIN: Denies rash.   NEURO: Denies seizures. No weakness.  PSYCH:  Denies difficulty sleeping. No anxiety. Denies depression. No suicidal thoughts.       VITALS:   Vitals:    06/20/22 1102   BP: (!) 153/84   Pulse: " "85   Resp: 18   Temp: 98.5 °F (36.9 °C)   SpO2: 100%   Weight: 88 kg (194 lb)   Height: 5' 10" (1.778 m)   PainSc:   8   PainLoc: Neck         PHYSICAL EXAM:   GENERAL: Well appearing, in no acute distress, alert and oriented x3.  PSYCH:  Mood and affect appropriate.  SKIN: Skin color, texture, turgor normal, no rashes or lesions.  HEENT:  Normocephalic, atraumatic. Cranial nerves grossly intact.  NECK: Pain to palpation over the cervical paraspinous muscles bilaterally, R>L. TTP over right trapezius and scalene muscles. Pain to palpation over facets joints bilaterally. Limited ROM with pain on neck flexion, extension, and lateral flexion. Negative Spurling. There is TTP over bilateral occipital protuberances.   PULM: No evidence of respiratory difficulty, symmetric chest rise.  EXTREMITIES: No deformities, edema, or skin discoloration.   MUSCULOSKELETAL: Bilateral tricep, bicep and deltoid strength is 5/5 and symmetric. Bilateral hand  strength is 5/5 and symmetric. No atrophy is noted.  NEURO: Sensation is equal and appropriate bilaterally. Bilateral upper extremity coordination and muscle stretch reflexes are physiologic and symmetric.  Negative Hoffmans sign bilaterally.   GAIT: Normal.      LABS:  Comprehensive Metabolic Panel   Collected: 22 1031  Final result  Specimen: Blood      Sodium 131 Low  mmol/L Albumin 3.4 Low  g/dL   Potassium 5.4 High  mmol/L  Total Bilirubin 0.5 mg/dL    Chloride 98 mmol/L Alkaline Phosphatase 63 U/L   CO2 25 mmol/L AST 13 U/L   Glucose 370 High  mg/dL ALT 19 U/L   BUN 19 mg/dL Anion Gap 8 mmol/L   Creatinine 1.9 High  mg/dL eGFR if  39.0 Abnormal  mL/min/1.73 m^2   Calcium 9.6 mg/dL eGFR if non  33.7 Abnormal  mL/min/1.73 m^2               IMAGING:    No recent imaging     ASSESSMENT: 76 y.o. year old male with neck pain, consistent with the followin. Chronic neck pain     2. Cervical spondylosis  X-Ray Cervical Spine 5 View W " "Flex Extxt         DISCUSSION: Mr. Menendez is a nice gentleman originally from [place] that works as a  in [employer] that has history of Multiple Myeloma and DM. He is presenting with acute cervical neck pain after hearing "a loud pop" in his neck while doing exercises at [facility]. He presents with very limited ROM; especially with left and right rotation and extension. Pain reproduced over the facet joints and the trapezius muscles. Pain likely is muscular in nature but he may have disc/nerve involvement.      PLAN:  1. Cervical XRAYs today to rule out fracture. MRI C-spine w/o contrast is schedule for next week- we will try to move this up if possible.  2. RTC after imaging to review. His sugars are still running high so will hold off on any steroid injections for now.  3. Start Flexeril 10 mg Q8h PRN muscle pain.  4. Continue conservative methods of ice/heat.      The above plan and management options were discussed at length with patient. Patient is in agreement with the above and verbalized understanding.     LISSY Saldaña  06/20/2022    I spent a total of 30 minutes on the day of the visit.  This includes face to face time and non-face to face time preparing to see the patient by reviewing previous labs/imaging, obtaining and/or reviewing separately obtained history, documenting clinical information in the electronic or other health record, independently interpreting results and communicating results to the patient/family/caregiver.    "

## 2022-06-29 ENCOUNTER — TELEPHONE (OUTPATIENT)
Dept: ORTHOPEDICS | Facility: CLINIC | Age: 77
End: 2022-06-29
Payer: MEDICARE

## 2022-06-30 ENCOUNTER — RESEARCH ENCOUNTER (OUTPATIENT)
Dept: HEMATOLOGY/ONCOLOGY | Facility: CLINIC | Age: 77
End: 2022-06-30

## 2022-06-30 ENCOUNTER — OFFICE VISIT (OUTPATIENT)
Dept: HEMATOLOGY/ONCOLOGY | Facility: CLINIC | Age: 77
End: 2022-06-30
Payer: MEDICARE

## 2022-06-30 ENCOUNTER — OFFICE VISIT (OUTPATIENT)
Dept: ORTHOPEDICS | Facility: CLINIC | Age: 77
End: 2022-06-30
Payer: MEDICARE

## 2022-06-30 ENCOUNTER — LAB VISIT (OUTPATIENT)
Dept: LAB | Facility: HOSPITAL | Age: 77
End: 2022-06-30
Payer: MEDICARE

## 2022-06-30 VITALS
DIASTOLIC BLOOD PRESSURE: 70 MMHG | HEIGHT: 70 IN | HEART RATE: 81 BPM | TEMPERATURE: 98 F | RESPIRATION RATE: 18 BRPM | WEIGHT: 197.88 LBS | OXYGEN SATURATION: 99 % | SYSTOLIC BLOOD PRESSURE: 116 MMHG | BODY MASS INDEX: 28.33 KG/M2

## 2022-06-30 VITALS
SYSTOLIC BLOOD PRESSURE: 157 MMHG | WEIGHT: 194 LBS | HEIGHT: 70 IN | DIASTOLIC BLOOD PRESSURE: 78 MMHG | BODY MASS INDEX: 27.77 KG/M2 | HEART RATE: 89 BPM

## 2022-06-30 DIAGNOSIS — M65.331 TRIGGER MIDDLE FINGER OF RIGHT HAND: Primary | ICD-10-CM

## 2022-06-30 DIAGNOSIS — Z79.4 TYPE 2 DIABETES MELLITUS WITH DIABETIC POLYNEUROPATHY, WITH LONG-TERM CURRENT USE OF INSULIN: ICD-10-CM

## 2022-06-30 DIAGNOSIS — N18.30 CKD STAGE 3 DUE TO TYPE 2 DIABETES MELLITUS: ICD-10-CM

## 2022-06-30 DIAGNOSIS — D47.2 SMOLDERING MULTIPLE MYELOMA: ICD-10-CM

## 2022-06-30 DIAGNOSIS — E11.42 TYPE 2 DIABETES MELLITUS WITH DIABETIC POLYNEUROPATHY, WITH LONG-TERM CURRENT USE OF INSULIN: ICD-10-CM

## 2022-06-30 DIAGNOSIS — E11.22 CKD STAGE 3 DUE TO TYPE 2 DIABETES MELLITUS: ICD-10-CM

## 2022-06-30 DIAGNOSIS — M79.641 PAIN IN BOTH HANDS: ICD-10-CM

## 2022-06-30 DIAGNOSIS — D47.2 SMOLDERING MULTIPLE MYELOMA: Primary | ICD-10-CM

## 2022-06-30 DIAGNOSIS — Z00.6 RESEARCH EXAM: ICD-10-CM

## 2022-06-30 DIAGNOSIS — Z09 SURGERY FOLLOW-UP: Primary | ICD-10-CM

## 2022-06-30 DIAGNOSIS — M79.642 PAIN IN BOTH HANDS: ICD-10-CM

## 2022-06-30 LAB
ALBUMIN SERPL BCP-MCNC: 3.6 G/DL (ref 3.5–5.2)
ALP SERPL-CCNC: 62 U/L (ref 55–135)
ALT SERPL W/O P-5'-P-CCNC: 15 U/L (ref 10–44)
ANION GAP SERPL CALC-SCNC: 6 MMOL/L (ref 8–16)
AST SERPL-CCNC: 14 U/L (ref 10–40)
BASOPHILS # BLD AUTO: 0.05 K/UL (ref 0–0.2)
BASOPHILS NFR BLD: 0.9 % (ref 0–1.9)
BILIRUB SERPL-MCNC: 0.4 MG/DL (ref 0.1–1)
BUN SERPL-MCNC: 28 MG/DL (ref 8–23)
CALCIUM SERPL-MCNC: 9.7 MG/DL (ref 8.7–10.5)
CHLORIDE SERPL-SCNC: 104 MMOL/L (ref 95–110)
CO2 SERPL-SCNC: 27 MMOL/L (ref 23–29)
CREAT SERPL-MCNC: 1.9 MG/DL (ref 0.5–1.4)
DIFFERENTIAL METHOD: ABNORMAL
EOSINOPHIL # BLD AUTO: 0.5 K/UL (ref 0–0.5)
EOSINOPHIL NFR BLD: 9 % (ref 0–8)
ERYTHROCYTE [DISTWIDTH] IN BLOOD BY AUTOMATED COUNT: 13.8 % (ref 11.5–14.5)
EST. GFR  (AFRICAN AMERICAN): 39 ML/MIN/1.73 M^2
EST. GFR  (NON AFRICAN AMERICAN): 33.7 ML/MIN/1.73 M^2
GLUCOSE SERPL-MCNC: 171 MG/DL (ref 70–110)
HCT VFR BLD AUTO: 33.6 % (ref 40–54)
HGB BLD-MCNC: 10.8 G/DL (ref 14–18)
IGA SERPL-MCNC: 1687 MG/DL (ref 40–350)
IGG SERPL-MCNC: 976 MG/DL (ref 650–1600)
IGM SERPL-MCNC: 30 MG/DL (ref 50–300)
IMM GRANULOCYTES # BLD AUTO: 0.01 K/UL (ref 0–0.04)
IMM GRANULOCYTES NFR BLD AUTO: 0.2 % (ref 0–0.5)
LDH SERPL L TO P-CCNC: 111 U/L (ref 110–260)
LYMPHOCYTES # BLD AUTO: 1.7 K/UL (ref 1–4.8)
LYMPHOCYTES NFR BLD: 29.8 % (ref 18–48)
MAGNESIUM SERPL-MCNC: 1.9 MG/DL (ref 1.6–2.6)
MCH RBC QN AUTO: 28.9 PG (ref 27–31)
MCHC RBC AUTO-ENTMCNC: 32.1 G/DL (ref 32–36)
MCV RBC AUTO: 90 FL (ref 82–98)
MONOCYTES # BLD AUTO: 0.4 K/UL (ref 0.3–1)
MONOCYTES NFR BLD: 6.5 % (ref 4–15)
NEUTROPHILS # BLD AUTO: 3 K/UL (ref 1.8–7.7)
NEUTROPHILS NFR BLD: 53.6 % (ref 38–73)
NRBC BLD-RTO: 0 /100 WBC
PHOSPHATE SERPL-MCNC: 3.1 MG/DL (ref 2.7–4.5)
PLATELET # BLD AUTO: 243 K/UL (ref 150–450)
PMV BLD AUTO: 9.4 FL (ref 9.2–12.9)
POTASSIUM SERPL-SCNC: 5.3 MMOL/L (ref 3.5–5.1)
PROT SERPL-MCNC: 8.3 G/DL (ref 6–8.4)
RBC # BLD AUTO: 3.74 M/UL (ref 4.6–6.2)
SODIUM SERPL-SCNC: 137 MMOL/L (ref 136–145)
WBC # BLD AUTO: 5.57 K/UL (ref 3.9–12.7)

## 2022-06-30 PROCEDURE — 84165 PROTEIN E-PHORESIS SERUM: CPT | Mod: 26,,, | Performed by: PATHOLOGY

## 2022-06-30 PROCEDURE — 1101F PT FALLS ASSESS-DOCD LE1/YR: CPT | Mod: CPTII,S$GLB,, | Performed by: ORTHOPAEDIC SURGERY

## 2022-06-30 PROCEDURE — 3288F PR FALLS RISK ASSESSMENT DOCUMENTED: ICD-10-PCS | Mod: CPTII,S$GLB,, | Performed by: ORTHOPAEDIC SURGERY

## 2022-06-30 PROCEDURE — 84165 PATHOLOGIST INTERPRETATION SPE: ICD-10-PCS | Mod: 26,,, | Performed by: PATHOLOGY

## 2022-06-30 PROCEDURE — 99215 PR OFFICE/OUTPT VISIT, EST, LEVL V, 40-54 MIN: ICD-10-PCS | Mod: Q1,S$GLB,, | Performed by: INTERNAL MEDICINE

## 2022-06-30 PROCEDURE — 3052F PR MOST RECENT HEMOGLOBIN A1C LEVEL 8.0 - < 9.0%: ICD-10-PCS | Mod: CPTII,S$GLB,, | Performed by: ORTHOPAEDIC SURGERY

## 2022-06-30 PROCEDURE — 3077F PR MOST RECENT SYSTOLIC BLOOD PRESSURE >= 140 MM HG: ICD-10-PCS | Mod: CPTII,S$GLB,, | Performed by: ORTHOPAEDIC SURGERY

## 2022-06-30 PROCEDURE — 3072F LOW RISK FOR RETINOPATHY: CPT | Mod: CPTII,S$GLB,, | Performed by: ORTHOPAEDIC SURGERY

## 2022-06-30 PROCEDURE — 3288F FALL RISK ASSESSMENT DOCD: CPT | Mod: CPTII,S$GLB,, | Performed by: ORTHOPAEDIC SURGERY

## 2022-06-30 PROCEDURE — 86334 IMMUNOFIX E-PHORESIS SERUM: CPT | Performed by: INTERNAL MEDICINE

## 2022-06-30 PROCEDURE — 85025 COMPLETE CBC W/AUTO DIFF WBC: CPT | Mod: Q1 | Performed by: INTERNAL MEDICINE

## 2022-06-30 PROCEDURE — 99214 OFFICE O/P EST MOD 30 MIN: CPT | Mod: S$GLB,,, | Performed by: ORTHOPAEDIC SURGERY

## 2022-06-30 PROCEDURE — 3052F HG A1C>EQUAL 8.0%<EQUAL 9.0%: CPT | Mod: CPTII,S$GLB,, | Performed by: ORTHOPAEDIC SURGERY

## 2022-06-30 PROCEDURE — 1125F PR PAIN SEVERITY QUANTIFIED, PAIN PRESENT: ICD-10-PCS | Mod: CPTII,S$GLB,, | Performed by: ORTHOPAEDIC SURGERY

## 2022-06-30 PROCEDURE — 1159F PR MEDICATION LIST DOCUMENTED IN MEDICAL RECORD: ICD-10-PCS | Mod: CPTII,S$GLB,, | Performed by: ORTHOPAEDIC SURGERY

## 2022-06-30 PROCEDURE — 36415 COLL VENOUS BLD VENIPUNCTURE: CPT | Performed by: INTERNAL MEDICINE

## 2022-06-30 PROCEDURE — 84100 ASSAY OF PHOSPHORUS: CPT | Mod: Q1 | Performed by: INTERNAL MEDICINE

## 2022-06-30 PROCEDURE — 99999 PR PBB SHADOW E&M-EST. PATIENT-LVL IV: CPT | Mod: PBBFAC,,, | Performed by: ORTHOPAEDIC SURGERY

## 2022-06-30 PROCEDURE — 3077F SYST BP >= 140 MM HG: CPT | Mod: CPTII,S$GLB,, | Performed by: ORTHOPAEDIC SURGERY

## 2022-06-30 PROCEDURE — 80053 COMPREHEN METABOLIC PANEL: CPT | Mod: Q1 | Performed by: INTERNAL MEDICINE

## 2022-06-30 PROCEDURE — 99215 OFFICE O/P EST HI 40 MIN: CPT | Mod: Q1,S$GLB,, | Performed by: INTERNAL MEDICINE

## 2022-06-30 PROCEDURE — 99999 PR PBB SHADOW E&M-EST. PATIENT-LVL IV: ICD-10-PCS | Mod: PBBFAC,,, | Performed by: ORTHOPAEDIC SURGERY

## 2022-06-30 PROCEDURE — 86334 IMMUNOFIX E-PHORESIS SERUM: CPT | Mod: 26,,, | Performed by: PATHOLOGY

## 2022-06-30 PROCEDURE — 1101F PR PT FALLS ASSESS DOC 0-1 FALLS W/OUT INJ PAST YR: ICD-10-PCS | Mod: CPTII,S$GLB,, | Performed by: ORTHOPAEDIC SURGERY

## 2022-06-30 PROCEDURE — 1125F AMNT PAIN NOTED PAIN PRSNT: CPT | Mod: CPTII,S$GLB,, | Performed by: ORTHOPAEDIC SURGERY

## 2022-06-30 PROCEDURE — 82784 ASSAY IGA/IGD/IGG/IGM EACH: CPT | Performed by: INTERNAL MEDICINE

## 2022-06-30 PROCEDURE — 84165 PROTEIN E-PHORESIS SERUM: CPT | Mod: Q1 | Performed by: INTERNAL MEDICINE

## 2022-06-30 PROCEDURE — 83615 LACTATE (LD) (LDH) ENZYME: CPT | Mod: Q1 | Performed by: INTERNAL MEDICINE

## 2022-06-30 PROCEDURE — 83735 ASSAY OF MAGNESIUM: CPT | Performed by: INTERNAL MEDICINE

## 2022-06-30 PROCEDURE — 83520 IMMUNOASSAY QUANT NOS NONAB: CPT | Mod: 59,Q1 | Performed by: INTERNAL MEDICINE

## 2022-06-30 PROCEDURE — 99214 PR OFFICE/OUTPT VISIT, EST, LEVL IV, 30-39 MIN: ICD-10-PCS | Mod: S$GLB,,, | Performed by: ORTHOPAEDIC SURGERY

## 2022-06-30 PROCEDURE — 3078F DIAST BP <80 MM HG: CPT | Mod: CPTII,S$GLB,, | Performed by: ORTHOPAEDIC SURGERY

## 2022-06-30 PROCEDURE — 1159F MED LIST DOCD IN RCRD: CPT | Mod: CPTII,S$GLB,, | Performed by: ORTHOPAEDIC SURGERY

## 2022-06-30 PROCEDURE — 3072F PR LOW RISK FOR RETINOPATHY: ICD-10-PCS | Mod: CPTII,S$GLB,, | Performed by: ORTHOPAEDIC SURGERY

## 2022-06-30 PROCEDURE — 99999 PR PBB SHADOW E&M-EST. PATIENT-LVL V: ICD-10-PCS | Mod: PBBFAC,,, | Performed by: INTERNAL MEDICINE

## 2022-06-30 PROCEDURE — 86334 PATHOLOGIST INTERPRETATION IFE: ICD-10-PCS | Mod: 26,,, | Performed by: PATHOLOGY

## 2022-06-30 PROCEDURE — 99999 PR PBB SHADOW E&M-EST. PATIENT-LVL V: CPT | Mod: PBBFAC,,, | Performed by: INTERNAL MEDICINE

## 2022-06-30 PROCEDURE — 3078F PR MOST RECENT DIASTOLIC BLOOD PRESSURE < 80 MM HG: ICD-10-PCS | Mod: CPTII,S$GLB,, | Performed by: ORTHOPAEDIC SURGERY

## 2022-06-30 NOTE — PROGRESS NOTES
I have personally taken the history and examined this patient. I agree with the resident's note as stated above.  Plan for trigger finger release he has failed conservative treatment specifically an injection he is interested in surgery the surgery was explained in detail to the patient risks and benefits were explained the patient in clinic consents were signed in clinic patient will be scheduled for surgery

## 2022-06-30 NOTE — PROGRESS NOTES
Subjective:      Patient ID: Emerson Menendez is a 76 y.o. male.    Chief Complaint: Pain of the Right Hand and Pain of the Left Wrist      HPI  Emerson Menendez is a 76 y.o. male presenting today for follow up right long trigger finger. He reports pain and intermittent locking. He presents today to receive an injection, his glucose was elevated at last visit due to a recent knee injection today his glucose is well controlled.     He is status post Left wrist proximal row carpectomy with interposition arthroplasty by Dr. Castillo 1/6/2021 and has a hx of left carpal tunnel release 2020.    Interval Update 6/30/22  Patient reports right long finger injection helped significantly for a month but that symptoms have returned. He has locking and pain again. He is interested in surgery.      Review of patient's allergies indicates:   Allergen Reactions    Penicillins      Knees locked up         Current Outpatient Medications   Medication Sig Dispense Refill    ACCU-CHEK ELIE CONTROL SOLN Soln       alcohol swabs PadM Apply 1 each topically as needed.      aspirin (ECOTRIN) 81 MG EC tablet Take 1 tablet (81 mg total) by mouth once daily. 100 tablet 3    blood glucose control, normal (METER-CHECK) Soln One meter.  Use as directed. Meter of insurance choice (Patient taking differently: One meter.  Use as directed. Meter of insurance choice) 1 each 0    blood sugar diagnostic (ACCU-CHEK ELIE PLUS TEST STRP) Strp Accu check elie plus TEST FOUR TIMES DAILY. Test strtips of insurance choice to go with meter and lancets 400 strip 3    blood sugar diagnostic Strp [facility] - check blood sugars 4 times daily 400 strip 4    blood-glucose meter (ACCU-CHEK ELIE PLUS METER) Misc Use as direted 1 each 0    blood-glucose meter (ACCU-CHEK OMAYRA) Misc USE AS DIRECTED. Accucheck elie plus 1 each 0    citalopram (CELEXA) 40 MG tablet Take 1 tablet (40 mg total) by mouth once daily. 90 tablet 4    citalopram  (CELEXA) 40 MG tablet Take 1 tablet (40 mg total) by mouth once daily. 30 tablet 4    cyclobenzaprine (FLEXERIL) 10 MG tablet Take 1 tablet (10 mg total) by mouth 3 (three) times daily as needed for Muscle spasms. 90 tablet 0    diazePAM (VALIUM) 5 MG tablet Take 1 tablet (5 mg total) by mouth every 12 (twelve) hours as needed for Anxiety. 60 tablet 0    dorzolamide-timolol 2-0.5% (COSOPT) 22.3-6.8 mg/mL ophthalmic solution Place 1 drop into both eyes 2 (two) times daily.      ergocalciferol (ERGOCALCIFEROL) 50,000 unit Cap Take 1 capsule (50,000 Units total) by mouth every 7 days. 12 capsule 3    FARXIGA 5 mg Tab tablet Take 1 tablet (5 mg total) by mouth once daily. 30 tablet 11    flash glucose scanning reader (FREESTYLE CHRISTIANO 14 DAY READER) Misc Use as directed 6 each 4    flash glucose sensor (FREESTYLE CHRISTIANO 14 DAY SENSOR) Kit Use as directed 6 kit 4    gabapentin (NEURONTIN) 100 MG capsule TAKE 1 CAPSULE EVERY MORNING, 1 CAPSULE IN THE AFTERNOON, AND 1 TO 3 CAPSULE(S) AT BEDTIME AS NEEDED FOR FOOT PAIN 450 capsule 0    glimepiride (AMARYL) 2 MG tablet Take 1 tablet (2 mg total) by mouth once daily. 90 tablet 4    glucose 4 GM chewable tablet Take 8 g by mouth as needed for Low blood sugar.      insulin (LANTUS SOLOSTAR U-100 INSULIN) glargine 100 units/mL (3mL) SubQ pen Inject 15 units under the skin in the morning and 15 units under the skin in the evening. 15 mL 4    insulin aspart U-100 (NOVOLOG FLEXPEN U-100 INSULIN) 100 unit/mL (3 mL) InPn pen Inject 4 Units into the skin 2 (two) times daily with meals. 3 mL 3    lancets (ACCU-CHEK SOFTCLIX LANCETS) Misc 1 lancet by Misc.(Non-Drug; Combo Route) route 4 (four) times daily. 400 each 4    losartan (COZAAR) 50 MG tablet Take 1 tablet (50 mg total) by mouth once daily. 90 tablet 6    MONOVISC 88 mg/4 mL Syrg       omeprazole (PRILOSEC) 40 MG capsule Take 1 capsule (40 mg total) by mouth once daily. 90 capsule 4    pen needle, diabetic (BD  "ULTRA-FINE SHORT PEN NEEDLE) 31 gauge x 5/16" Ndle USE TO INJECT INSULIN ONE TIME DAILY 100 each 3    pravastatin (PRAVACHOL) 40 MG tablet Take 1 tablet (40 mg total) by mouth once daily. 90 tablet 3    sildenafiL (VIAGRA) 100 MG tablet Take 1 tablet (100 mg total) by mouth daily as needed for Erectile Dysfunction. 30 tablet 2    tamsulosin (FLOMAX) 0.4 mg Cap Take 1 capsule (0.4 mg total) by mouth nightly. 90 capsule 4    traMADoL (ULTRAM) 50 mg tablet Take 1 tablet (50 mg total) by mouth every 8 (eight) hours as needed for Pain. 60 tablet 0    TRUEPLUS LANCETS 33 gauge Misc        No current facility-administered medications for this visit.     Facility-Administered Medications Ordered in Other Visits   Medication Dose Route Frequency Provider Last Rate Last Admin    mupirocin 2 % ointment   Nasal On Call Procedure Mohit Hummel MD   Given at 01/06/21 0847       Past Medical History:   Diagnosis Date    Anxiety 3/16/2015    Asymptomatic multiple myeloma     BPH (benign prostatic hypertrophy) 9/24/2012    Carpal tunnel syndrome     Depression 3/16/2015    Diabetic retinopathy     GERD (gastroesophageal reflux disease) 9/24/2012    Glaucoma     Hx of psychiatric care     Celexa, Valium    Hyperlipidemia     Hypertension     Hypertensive retinopathy of both eyes     Microalbuminuria 9/24/2012    Osteoarthritis of cervical spine 9/24/2012    Osteoarthritis of knee 9/24/2012    Psychiatric problem     Type II or unspecified type diabetes mellitus without mention of complication, not stated as uncontrolled 9/24/2012       Past Surgical History:   Procedure Laterality Date    ARTHROSCOPY OF WRIST Left 8/3/2020    Procedure: ARTHROSCOPY, WRIST LEFT;  Surgeon: Kindra Castillo MD;  Location: [facility] OR;  Service: Orthopedics;  Laterality: Left;  REGIONAL/[facility]    CARPAL TUNNEL RELEASE Left 8/3/2020    Procedure: RELEASE, CARPAL TUNNEL LEFT;  Surgeon: Kindra Castillo MD;  Location: " "[facility] OR;  Service: Orthopedics;  Laterality: Left;  REGIONAL/[facility]    CATARACT EXTRACTION  2012    Right eye    INTRAOCULAR PROSTHESES INSERTION Left 5/20/2021    Procedure: INSERTION, IOL PROSTHESIS;  Surgeon: Jose L Wheatley MD;  Location: [facility] OR [address] [place] [address];  Service: Ophthalmology;  Laterality: Left;    PHACOEMULSIFICATION OF CATARACT Left 5/20/2021    Procedure: PHACOEMULSIFICATION, CATARACT;  Surgeon: Jose L Wheatley MD;  Location: [facility] OR [address] [place] [address];  Service: Ophthalmology;  Laterality: Left;    PROSTATE SURGERY      SURGICAL REMOVAL OF CARPAL BONE Left 1/6/2021    Procedure: OSTECTOMY, CARPAL BONE, LEFT;  Surgeon: Kindra Castillo MD;  [place] [facility] [place];  Service: Orthopedics;  Laterality: Left;  REGIONAL/[facility]       Review of Systems:  Constitutional: Negative for chills and fever.   Respiratory: Negative for cough and shortness of breath.    Gastrointestinal: Negative for nausea and vomiting.   Skin: Negative for rash.   Neurological: Negative for dizziness and headaches.   Psychiatric/Behavioral: Negative for depression.   MSK as in HPI       OBJECTIVE:     PHYSICAL EXAM:  BP (!) 157/78   Pulse 89   Ht 5' 10" (1.778 m)   Wt 88 kg (194 lb)   BMI 27.84 kg/m²     GEN:  NAD, well-developed, well-groomed.  NEURO: Awake, alert, and oriented. Normal attention and concentration.    PSYCH: Normal mood and affect. Behavior is normal.  HEENT: No cervical lymphadenopathy noted.  CARDIOVASCULAR: Radial pulses 2+ bilaterally. No LE edema noted.  PULMONARY: Breath sounds normal. No respiratory distress.  SKIN: Intact, no rashes.      MSK:   RUE:  Good active ROM of the wrist and fingers. ttp long A1 pulley with triggering. AIN/PIN/Radial/Median/Ulnar Nerves assessed in isolation without deficit. Radial & Ulnar arteries palpated 2+. Capillary Refill <3s.      RADIOGRAPHS:  Xray bl hands 4/25/22   Impression:     1. Postoperative change of left proximal row carpectomy.  2. Bilateral hand osteoarthritis as " detailed above.  3. 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  Comments: I have personally reviewed the imaging and I agree with the above radiologist's report.    ASSESSMENT/PLAN:     No diagnosis found.       Plan:   Pt wishes to proceed with right long trigger finger injection today   RTC 6 wks if symptoms persist       PROCEDURE:  I have explained the risks, benefits, and alternatives of the procedure in detail.  The patient voices understanding and all questions have been answered.  The patient agrees to proceed as planned. US used. So after a sterile prep of the skin in the normal fashion the right long flexor tendon sheath is injected from the volar  approach using a 25 gauge needle with a combination of 0.5cc 1% plain lidocaine and 4 mg of dexamethasone.  The patient is cautioned and immediate relief of pain is secondary to the local anesthetic and will be temporary.  After the anesthetic wears off there may be a increase in pain that may last for a few hours or a few days and they should use ice to help alleviate this flair up of pain. Patient tolerated the procedure well.       Subjective:      Patient ID: Emerson Menendez is a 76 y.o. male.    Chief Complaint: Pain of the Right Hand and Pain of the Left Wrist      HPI  Emerson Menendez is a 76 y.o. male presenting today for follow up right long trigger finger. He reports pain and intermittent locking. He presents today to receive an injection, his glucose was elevated at last visit due to a recent knee injection today his glucose is well controlled.     He is status post Left wrist proximal row carpectomy with interposition arthroplasty by Dr. Castillo 1/6/2021 and has a hx of left carpal tunnel release 2020.      Review of patient's allergies indicates:   Allergen Reactions    Penicillins      Knees locked up          Current Outpatient Medications   Medication Sig Dispense Refill    ACCU-CHEK STEFANO CONTROL SOLN Soln       alcohol swabs PadM Apply 1 each topically as needed.      aspirin (ECOTRIN) 81 MG EC tablet Take 1 tablet (81 mg total) by mouth once daily. 100 tablet 3    blood glucose control, normal (METER-CHECK) Soln One meter.  Use as directed. Meter of insurance choice (Patient taking differently: One meter.  Use as directed. Meter of insurance choice) 1 each 0    blood sugar diagnostic (ACCU-CHEK STEFANO PLUS TEST STRP) Strp Accu check stefano plus TEST FOUR TIMES DAILY. Test strtips of insurance choice to go with meter and lancets 400 strip 3    blood sugar diagnostic Strp [facility] - check blood sugars 4 times daily 400 strip 4    blood-glucose meter (ACCU-CHEK STEFANO PLUS METER) Misc Use as direted 1 each 0    blood-glucose meter (ACCU-CHEK OMAYRA) Misc USE AS DIRECTED. Accucheck stefano plus 1 each 0    citalopram (CELEXA) 40 MG tablet Take 1 tablet (40 mg total) by mouth once daily. 90 tablet 4    citalopram (CELEXA) 40 MG tablet Take 1 tablet (40 mg total) by mouth once daily. 30 tablet 4    cyclobenzaprine (FLEXERIL) 10 MG tablet Take 1 tablet (10 mg total) by mouth 3 (three) times daily as needed for Muscle spasms. 90 tablet 0    diazePAM (VALIUM) 5 MG tablet Take 1 tablet (5 mg total) by mouth every 12 (twelve) hours as needed for Anxiety. 60 tablet 0    dorzolamide-timolol 2-0.5% (COSOPT) 22.3-6.8 mg/mL ophthalmic solution Place 1 drop into both eyes 2 (two) times daily.      ergocalciferol (ERGOCALCIFEROL) 50,000 unit Cap Take 1 capsule (50,000 Units total) by mouth every 7 days. 12 capsule 3    FARXIGA 5 mg Tab tablet Take 1 tablet (5 mg total) by mouth once daily. 30 tablet 11    flash glucose scanning reader (FREESTYLE CHRISTIANO 14 DAY READER) Misc Use as directed 6 each 4    flash glucose sensor (FREESTYLE CHRISTIANO 14 DAY SENSOR) Kit Use as directed 6 kit 4    gabapentin (NEURONTIN) 100 MG  "capsule TAKE 1 CAPSULE EVERY MORNING, 1 CAPSULE IN THE AFTERNOON, AND 1 TO 3 CAPSULE(S) AT BEDTIME AS NEEDED FOR FOOT PAIN 450 capsule 0    glimepiride (AMARYL) 2 MG tablet Take 1 tablet (2 mg total) by mouth once daily. 90 tablet 4    glucose 4 GM chewable tablet Take 8 g by mouth as needed for Low blood sugar.      insulin (LANTUS SOLOSTAR U-100 INSULIN) glargine 100 units/mL (3mL) SubQ pen Inject 15 units under the skin in the morning and 15 units under the skin in the evening. 15 mL 4    insulin aspart U-100 (NOVOLOG FLEXPEN U-100 INSULIN) 100 unit/mL (3 mL) InPn pen Inject 4 Units into the skin 2 (two) times daily with meals. 3 mL 3    lancets (ACCU-CHEK SOFTCLIX LANCETS) Misc 1 lancet by Misc.(Non-Drug; Combo Route) route 4 (four) times daily. 400 each 4    losartan (COZAAR) 50 MG tablet Take 1 tablet (50 mg total) by mouth once daily. 90 tablet 6    MONOVISC 88 mg/4 mL Syrg       omeprazole (PRILOSEC) 40 MG capsule Take 1 capsule (40 mg total) by mouth once daily. 90 capsule 4    pen needle, diabetic (BD ULTRA-FINE SHORT PEN NEEDLE) 31 gauge x 5/16" Ndle USE TO INJECT INSULIN ONE TIME DAILY 100 each 3    pravastatin (PRAVACHOL) 40 MG tablet Take 1 tablet (40 mg total) by mouth once daily. 90 tablet 3    sildenafiL (VIAGRA) 100 MG tablet Take 1 tablet (100 mg total) by mouth daily as needed for Erectile Dysfunction. 30 tablet 2    tamsulosin (FLOMAX) 0.4 mg Cap Take 1 capsule (0.4 mg total) by mouth nightly. 90 capsule 4    traMADoL (ULTRAM) 50 mg tablet Take 1 tablet (50 mg total) by mouth every 8 (eight) hours as needed for Pain. 60 tablet 0    TRUEPLUS LANCETS 33 gauge Misc        No current facility-administered medications for this visit.     Facility-Administered Medications Ordered in Other Visits   Medication Dose Route Frequency Provider Last Rate Last Admin    mupirocin 2 % ointment   Nasal On Call Procedure Mohit Hummel MD   Given at 01/06/21 0847       Past Medical History: "   Diagnosis Date    Anxiety 3/16/2015    Asymptomatic multiple myeloma     BPH (benign prostatic hypertrophy) 9/24/2012    Carpal tunnel syndrome     Depression 3/16/2015    Diabetic retinopathy     GERD (gastroesophageal reflux disease) 9/24/2012    Glaucoma     Hx of psychiatric care     Celexa, Valium    Hyperlipidemia     Hypertension     Hypertensive retinopathy of both eyes     Microalbuminuria 9/24/2012    Osteoarthritis of cervical spine 9/24/2012    Osteoarthritis of knee 9/24/2012    Psychiatric problem     Type II or unspecified type diabetes mellitus without mention of complication, not stated as uncontrolled 9/24/2012       Past Surgical History:   Procedure Laterality Date    ARTHROSCOPY OF WRIST Left 8/3/2020    Procedure: ARTHROSCOPY, WRIST LEFT;  Surgeon: Kindra Castillo MD;  Location: [facility];  Service: Orthopedics;  Laterality: Left;  REGIONAL/[facility]    CARPAL TUNNEL RELEASE Left 8/3/2020    Procedure: RELEASE, CARPAL TUNNEL LEFT;  Surgeon: Kindra Castillo MD;  Location: [facility];  Service: Orthopedics;  Laterality: Left;  REGIONAL/[facility]    CATARACT EXTRACTION  2012    Right eye    INTRAOCULAR PROSTHESES INSERTION Left 5/20/2021    Procedure: INSERTION, IOL PROSTHESIS;  Surgeon: Jose L Wheatley MD;  Location: [facility] OR [address] [place] [address];  Service: Ophthalmology;  Laterality: Left;    PHACOEMULSIFICATION OF CATARACT Left 5/20/2021    Procedure: PHACOEMULSIFICATION, CATARACT;  Surgeon: Jose L Wheatley MD;  Location: [facility] OR [address] [place] [address];  Service: Ophthalmology;  Laterality: Left;    PROSTATE SURGERY      SURGICAL REMOVAL OF CARPAL BONE Left 1/6/2021    Procedure: OSTECTOMY, CARPAL BONE, LEFT;  Surgeon: Kindra Castillo MD;  Location: [facility];  Service: Orthopedics;  Laterality: Left;  REGIONAL/MAC       Review of Systems:  Constitutional: Negative for chills and fever.   Respiratory: Negative for cough and shortness of breath.    Gastrointestinal: Negative for nausea  "and vomiting.   Skin: Negative for rash.   Neurological: Negative for dizziness and headaches.   Psychiatric/Behavioral: Negative for depression.   MSK as in HPI       OBJECTIVE:     PHYSICAL EXAM:  BP (!) 157/78   Pulse 89   Ht 5' 10" (1.778 m)   Wt 88 kg (194 lb)   BMI 27.84 kg/m²     GEN:  NAD, well-developed, well-groomed.  NEURO: Awake, alert, and oriented. Normal attention and concentration.    PSYCH: Normal mood and affect. Behavior is normal.  HEENT: No cervical lymphadenopathy noted.  CARDIOVASCULAR: Radial pulses 2+ bilaterally. No LE edema noted.  PULMONARY: Breath sounds normal. No respiratory distress.  SKIN: Intact, no rashes.      MSK:   RUE:  Good active ROM of the wrist and fingers. ttp long A1 pulley with triggering. AIN/PIN/Radial/Median/Ulnar Nerves assessed in isolation without deficit. Radial & Ulnar arteries palpated 2+. Capillary Refill <3s.      RADIOGRAPHS:  Xray bl hands 4/25/22   Impression:     1. Postoperative change of left proximal row carpectomy.  2. Bilateral hand osteoarthritis as detailed above.  3. 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  Comments: I have personally reviewed the imaging and I agree with the above radiologist's report.    ASSESSMENT/PLAN:     Right long trigger finger refractory to conservative management    Plan for right LF trigger finger release. Consents signed in clinic.    The risks, benefits and alternatives to surgery were discussed with the patient at great length.  These include pain, bleeding, infection, vessel/nerve damage, numbness, tingling, complex regional pain syndrome, recurrent infection need for further surgery, scarring, recurrence, wound healing complications requiring further procedure for soft tissue coverage including flap coverage, cartilage damage,  DVT, PE, arthritis and death.  Patient states an " understanding and wishes to proceed with surgery.   All questions were answered.  No guarantees were implied or stated.  Informed consent was obtained.

## 2022-06-30 NOTE — PROGRESS NOTES
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   Sponsor:  [facility]  IRB# [id number]  Study ID: [id number]  Investigator: GIL Engel Pt Initials: LUCÍA LORENZ        Cycle 79 Day 28 (Day 31; Could not schedule on Day 28 due to conflicts)  Arm B: Observation      Patient presents to clinic today for above cycle evaluation; he is accompanied by his wife and states he continues to do well. He is alert, oriented to person, place, and time; mood and affect are appropriate. He states his main concern today is fact that blood glucose readings have been trending "higher than usual" and that he has spoken with "the health " regarding his readings and has been advised to resume Novolog injections to his diabetes regimen, 4U pre-meals. States is maintaining current COVID precautions; wears mask in crowded indoor areas; denies any COVID symptoms. 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        Review of Baseline AE's:   1.Hypertension, Grade 2 systolic and diastolic: BP today is 116/70; subject states compliance with [place]in. Subject denies headache/dizziness; no symptoms noted.   AE ongoing and tends to fluctuate no worse than Grade 2.   2. Lower Back Pain and Neck Pain, grade 1: Stable.  Patient has no complaints as of late and as previously stated, patient is not suspected to have bony myeloma involvement per Dr. Engel as these are pre-existing issues present at baseline. I scheduled tomorrow for MRI of spine, Will update results when available.  AE ongoing.  3. Pain in extremity (knees), grade 1. Subject presents with knee braces; states no complaints today; s/p injections last month or so. Will continue to monitor.       4. Peripheral sensory neuropathy, grade 1: Patient does not verbalize complaints today. Has gabapentin prescribed " "and takes as needed.  States has not taken recently.  AE ongoing.   5. Anemia, Grade 1: Hgb stable at 10.8. Result reviewed by Dr. Engel, who answered patient questions as above. AE ongoing and stable.            Review of AE's:     *Please note this list is not all-inclusive, please see AE log for physician reviewed list of adverse events.   1. Creatinine increased, grade 2: Serum creatinine from labs today is 1.9; calculated GFR 43 ml/min based on CG calculation. Reviewed per Dr. Egnel and no new orders.Per Dr Engel not been related to myeloma: rather, CKD;  likely secondary to diabetes and hypertension vs plasma cell dyscrasia.  Will continue observation monthly and to monitor renal functionclosely. Per MD, reiterated adequate hydration with increase in water/gatorade intake..  2. Hyponatremia, Grade 1: Serum sodium today is 137 mmol/L; AE intermittent. Will monitor  3. Hyperkalemia, Grade 1: serum K today is 5.3, Dr Engel has reviewed labs; no additional orders noted. AE intermittent; will continue to monitor.   4. Hyperuricemia, Grade 1: this was not evaluated today; usually ordered per nephrology.   5. Hyperglycemia. Grade 1: blood glucose today is 171; lab is nonfasting. See interval history above regarding recent bg monitoring; goal to keep HgA1c at  7 or below. No new orders per Dr Engel; subject followed closely by endocrinology.  Had Hg A1c done mid November; 7.7; Dr Engel aware; no orders.  6. Diplopia, right eye: see interval history above; per Dr Engel this is not related to SMM; rather is associated with diabetes. Subject maintains compliance with opthomoolgy; will continue to monitor. He has appt in am with eye MD at [facility].      Per study chair, "the definition of progressive disease for this protocol is developing CRAB criteria that requires treatment systemically." Per Dr Engel, based on today's exam and lab results, patient does not have any s/s of CRAB: Normal Calcium level (9.6), absence of Renal " insufficiency (serum creatinine elevated today at 1.9 but per Dr Engel not related to myeloma; --patient with a history of kidney dysfunction secondary to diabetes; Dr. Engel aware of these values and not concerned for myeloma involvement), absence of significant Anemia (hemoglobin 10.7, stable; will await myeloma labs for clarity relationship to myeloma) and absence of lytic Bone lesions (per baseline metastatic survey as well as MRI--see MD note for further comment). See MD note for ECOG score and H&P and flowsheets for laboratory work, vitals, etc. All myeloma-related blood work from last cycle including SPEP and JAGUAR have remained stable, and are currently pending for this cycle. Per Dr. Engel, patient shows no evidence of progression at last result. Patient informed that Dr. Engel will release all lab results to [facility]. He states understanding of this. QOL's not required again until end of treatment/observation.           Subject maintains he wishes only to see Dr Engel and will not show for appts if scheduled with any other provider. For this reason subject's appts do not always align with study calendar; per Dr Engel as subject is not on active treatment will abide by subject's wishes to see her only.  Will continue to schedule patient as far out as possible, which seems to help maintain compliance with visits. Normally subject deviates no greater than one week per cycle; wishes to remain on study per Dr Engel. Next appt was moved forwarded one week to [facility] 28 day cycles for data/study compliance; appts generally fall within 28-32 days.   Informed patient will continue to contact him a few days prior to next appt to remind him so that he can plan accordingly. Patient states having research RN's contact information and has MD contact information to call with any concerns, questions, or worsening of symptoms. Will follow up with subject once myeloma labs are resulted.

## 2022-06-30 NOTE — PROGRESS NOTES
Subjective:    Patient ID: Emerson Menendez is a 76 y.o. male.    Chief Complaint: No chief complaint on file.  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 Test results identify a stable IgA kappa protein with beta globulin band at 1.63g/dL. Kappa free light chain is elevated at 4.40. CBC and calcium are stable. Creatinine with history of stage III CKD is stable at 1.4. Metastatic survey is negative for lytic lesions. MRI of the entire spine demonstrated age related changes but no convincing evidence of myeloma bone disease. Bone marrow biopsy identified about 13% plasma cells by morphology and FISH for myeloma identified a t(11;14) and trisomies 3,7, and 17. The patient is afebrile and appears clinically well. He was seen by his podiatrist and nephrologist since our last visit without any new or acute events.    The patient has not received any therapy for smoldering myeloma including bisphosphonates or steroids. He has been randomized to observation arm of the the clinical study. 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 He has no current or prior history of malignancy.    TODAY Cycle 79, observation  No acute interval medical events  Surgery planned in August for trigger finger  CBC stable; MM labs pending  Elevated BG- uncontrolled despite stable diet and weight, health  advising      Knee Pain     Joint Pain  Associated symptoms include coughing. Pertinent negatives include no diaphoresis, fatigue or fever.   Hand Pain     Cough  Pertinent negatives include no fever.   Foot Pain  Associated symptoms include coughing. Pertinent negatives include no  diaphoresis, fatigue or fever.   Arm Pain     Follow-up  Associated symptoms include coughing. Pertinent negatives include no diaphoresis, fatigue or fever.     Review of Systems   Constitutional: Negative for activity change, appetite change, diaphoresis, fatigue, fever and unexpected weight change.   HENT: Negative.    Eyes: Negative.    Respiratory: Positive for cough.    Cardiovascular: Negative for leg swelling.   Gastrointestinal: Negative.    Endocrine: Negative.    Genitourinary: Negative.    Musculoskeletal: Negative.    Skin: Negative.    Allergic/Immunologic: Negative.    Neurological:        Grade 0-1 peripheral neuropathy of bilateral feet.    Hematological: Negative for adenopathy. Does not bruise/bleed easily.   Psychiatric/Behavioral: Negative.        Objective:       Vitals:    06/30/22 1315   BP: 116/70   Pulse: 81   Resp: 18   Temp: 97.9 °F (36.6 °C)       Physical Exam  Vitals and nursing note reviewed.   Constitutional:       Appearance: He is well-developed.   HENT:      Head: Normocephalic and atraumatic.      Right Ear: External ear normal.      Left Ear: External ear normal.      Nose: Nose normal.   Eyes:      Conjunctiva/sclera: Conjunctivae normal.      Pupils: Pupils are equal, round, and reactive to light.   Cardiovascular:      Rate and Rhythm: Normal rate and regular rhythm.      Heart sounds: No murmur heard.  Pulmonary:      Effort: Pulmonary effort is normal. No respiratory distress.      Breath sounds: Normal breath sounds.   Abdominal:      General: Bowel sounds are normal. There is no distension.      Palpations: Abdomen is soft.   Musculoskeletal:         General: No tenderness.      Cervical back: Normal range of motion and neck supple.   Skin:     General: Skin is warm and dry.      Findings: No rash.      Nails: There is no clubbing.   Neurological:      Mental Status: He is alert and oriented to person, place, and time.      Cranial Nerves: No cranial nerve deficit.    Psychiatric:         Behavior: Behavior normal.         Assessment:       No diagnosis found.    Plan:       The patient has a diagnosis of smoldering myeloma. There is no indication for immediate chemotherapy. We are monitoring renal function closely- baseline creatinine of -1.5. 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 M protein has been stable and repeat is pending. Plan for return in 1month.  Endocrinology and Nephrology to monitor diabetes and renal failure respectively. Patient would like to continue to follow with Dr. Perkins as needed.

## 2022-07-01 ENCOUNTER — HOSPITAL ENCOUNTER (OUTPATIENT)
Dept: RADIOLOGY | Facility: OTHER | Age: 77
Discharge: HOME | End: 2022-07-01
Attending: STUDENT IN AN ORGANIZED HEALTH CARE EDUCATION/TRAINING PROGRAM
Payer: MEDICARE

## 2022-07-01 ENCOUNTER — PROCEDURE VISIT (OUTPATIENT)
Dept: OPHTHALMOLOGY | Facility: CLINIC | Age: 77
End: 2022-07-01
Payer: MEDICARE

## 2022-07-01 DIAGNOSIS — H40.1132 PRIMARY OPEN ANGLE GLAUCOMA OF BOTH EYES, MODERATE STAGE: ICD-10-CM

## 2022-07-01 DIAGNOSIS — E08.3211 MILD NONPROLIFERATIVE DIABETIC RETINOPATHY OF RIGHT EYE WITH MACULAR EDEMA ASSOCIATED WITH DIABETES MELLITUS DUE TO UNDERLYING CONDITION: Primary | ICD-10-CM

## 2022-07-01 DIAGNOSIS — E11.3292 MILD NONPROLIFERATIVE DIABETIC RETINOPATHY OF LEFT EYE WITHOUT MACULAR EDEMA ASSOCIATED WITH TYPE 2 DIABETES MELLITUS: ICD-10-CM

## 2022-07-01 DIAGNOSIS — M54.2 CERVICAL PAIN (NECK): ICD-10-CM

## 2022-07-01 LAB
ALBUMIN SERPL ELPH-MCNC: 3.76 G/DL (ref 3.35–5.55)
ALPHA1 GLOB SERPL ELPH-MCNC: 0.36 G/DL (ref 0.17–0.41)
ALPHA2 GLOB SERPL ELPH-MCNC: 0.92 G/DL (ref 0.43–0.99)
B-GLOBULIN SERPL ELPH-MCNC: 2.63 G/DL (ref 0.5–1.1)
GAMMA GLOB SERPL ELPH-MCNC: 0.52 G/DL (ref 0.67–1.58)
INTERPRETATION SERPL IFE-IMP: NORMAL
KAPPA LC SER QL IA: 6.68 MG/DL (ref 0.33–1.94)
KAPPA LC/LAMBDA SER IA: 2.96 (ref 0.26–1.65)
LAMBDA LC SER QL IA: 2.26 MG/DL (ref 0.57–2.63)
PROT SERPL-MCNC: 8.2 G/DL (ref 6–8.4)

## 2022-07-01 PROCEDURE — 72141 MRI NECK SPINE W/O DYE: CPT | Mod: 26,,, | Performed by: RADIOLOGY

## 2022-07-01 PROCEDURE — 99214 OFFICE O/P EST MOD 30 MIN: CPT | Mod: 25,S$GLB,, | Performed by: OPHTHALMOLOGY

## 2022-07-01 PROCEDURE — 99214 PR OFFICE/OUTPT VISIT, EST, LEVL IV, 30-39 MIN: ICD-10-PCS | Mod: 25,S$GLB,, | Performed by: OPHTHALMOLOGY

## 2022-07-01 PROCEDURE — 67028 INJECTION EYE DRUG: CPT | Mod: RT,S$GLB,, | Performed by: OPHTHALMOLOGY

## 2022-07-01 PROCEDURE — 72141 MRI NECK SPINE W/O DYE: CPT | Mod: TC

## 2022-07-01 PROCEDURE — 3052F HG A1C>EQUAL 8.0%<EQUAL 9.0%: CPT | Mod: CPTII,S$GLB,, | Performed by: OPHTHALMOLOGY

## 2022-07-01 PROCEDURE — 92134 CPTRZ OPH DX IMG PST SGM RTA: CPT | Mod: S$GLB,,, | Performed by: OPHTHALMOLOGY

## 2022-07-01 PROCEDURE — 3052F PR MOST RECENT HEMOGLOBIN A1C LEVEL 8.0 - < 9.0%: ICD-10-PCS | Mod: CPTII,S$GLB,, | Performed by: OPHTHALMOLOGY

## 2022-07-01 PROCEDURE — 92134 OCT, RETINA - OU - BOTH EYES: ICD-10-PCS | Mod: S$GLB,,, | Performed by: OPHTHALMOLOGY

## 2022-07-01 PROCEDURE — 67028 PR INJECT INTRAVITREAL PHARMCOLOGIC: ICD-10-PCS | Mod: RT,S$GLB,, | Performed by: OPHTHALMOLOGY

## 2022-07-01 PROCEDURE — 72141 MRI CERVICAL SPINE WITHOUT CONTRAST: ICD-10-PCS | Mod: 26,,, | Performed by: RADIOLOGY

## 2022-07-01 RX ORDER — DORZOLAMIDE HYDROCHLORIDE AND TIMOLOL MALEATE 20; 5 MG/ML; MG/ML
1 SOLUTION/ DROPS OPHTHALMIC 2 TIMES DAILY
Qty: 10 ML | Refills: 5 | Status: SHIPPED | OUTPATIENT
Start: 2022-07-01 | End: 2022-08-04 | Stop reason: SDUPTHER

## 2022-07-01 NOTE — PROGRESS NOTES
HPI     4 wk DFE/OCT and Eylea OD Inj.      DLS 05/31/2022 by Dr. RENATA Phillips MD      Patient states:   -- diplopia   --headaches,  -- flashes/floaters   --eye pain   --veils/curtains/shadows      POHx:   NPDR with mac edema OD  S/P Eylea OD ( 05/31/2022)    NPDR without mac edema OS    Hypertensive retinopathy OU    POAG OU  S/P ELKIN x 2 OD  S/P IVO OD    Cosopt BID OU    Last edited by Aura Oliver MA on 7/1/2022  9:17 AM. (History)             A/P  1. 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 Mild nonproliferative diabetic retinopathy of left eye without macular edema associated with type 2 diabetes mellitus    3. Primary open angle glaucoma of both eyes, moderate stage       1. 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 Mild nonproliferative diabetic retinopathy of left eye without macular edema associated with type 2 diabetes mellitus  PCP is Dr. Sagastume  05/02/2022  8.5  A1C     OD-   S/p SKYLAR 4/26/22  S/p ELKIN 3/24/22  S/p IVO 1/4/22  VA 20/100 (was 20/200), better IRF after switching to SKYLAR, needs monthly injections for control    Plan: needs SKYLAR OD today #3/3, plan series of 3, may have to supplement with ODX and pressure monitoring in future if worsening    Plan: Based on todays exam, diagnostic studies, and review of records, the determination was made for treatment today.  Schedule Eylea Injection today Right Eye Patient chooses to proceed with injection R/B/A discussed include infection retinal detachment and stroke    Patient identified.  Timeout performed.    Risks, benefits, and alternatives to treatment were discussed in detail with the patient, including bleeding/infection (endophthalmitis)/etc.  The patient voiced understanding and wished to proceed with the procedure.  See separate consent  form.    Injection Procedure Note:  Diagnosis: macular edema Right Eye    Topical Proparacaine drop placed then topical 5% Betadine, then subcojunctival lidocaine 2% injection  Sterile gloves used, and sterile lid speculum placed.  5% Betadine placed at injection site again prior to injection.  Eylea 2mg in 0.05cc Injected inferotemporally 3.5-4mm posterior to the limbus.  Complications: None  Va at least CF at 5 feet post injection.  Retina, ONH, IOP normal after injection.    Followup as below.  Patient should return immediately PRN.  Retinal Detachment and Endophthalmitis precautions given      Followup as below.  Patient should return immediately PRN.  Retinal Detachment and Endophthalmitis precautions given.       OS- no injections  VA 20/40 (was 20/30)  rare dot heme, no DME  Plan: Observation    Recommend good blood pressure control, tight blood glucose control, and good cholesterol control       3. Hypertensive retinopathy of both eyes  AV nicking, BP  stable   Plan: Observation  Recommend good blood pressure control, tight blood glucose control, and good cholesterol control       4. Primary open angle glaucoma of both eyes, moderate stage  F/b Dr. Wheatley last seen 10/2021  IOP 14/16- poss steroid response OD  Currently on cosopt BID OU    Plan:  Continue present management    5. Dry eye syndrome of both eyes  Mod dry eye OD>OS   Start PF ATs QID OU      RTC Alan - 4 weeks DFE/OCTm OD, Eylea OD  RTC Dioni as scheduled        I saw and examined the patient and reviewed in detail the findings documented. The final examination findings, image interpretations, and plan as documented in the record represent my personal judgment and conclusions.    Andriy Phillips MD  Vitreoretinal Surgery   [facility]

## 2022-07-03 ENCOUNTER — PATIENT MESSAGE (OUTPATIENT)
Dept: INTERNAL MEDICINE | Facility: CLINIC | Age: 77
End: 2022-07-03
Payer: MEDICARE

## 2022-07-03 DIAGNOSIS — R05.9 COUGH: Primary | ICD-10-CM

## 2022-07-05 LAB
PATHOLOGIST INTERPRETATION IFE: NORMAL
PATHOLOGIST INTERPRETATION SPE: NORMAL

## 2022-07-14 ENCOUNTER — OFFICE VISIT (OUTPATIENT)
Dept: PAIN MEDICINE | Facility: CLINIC | Age: 77
End: 2022-07-14
Payer: MEDICARE

## 2022-07-14 VITALS
OXYGEN SATURATION: 99 % | DIASTOLIC BLOOD PRESSURE: 88 MMHG | BODY MASS INDEX: 28.09 KG/M2 | HEART RATE: 97 BPM | RESPIRATION RATE: 18 BRPM | WEIGHT: 196.19 LBS | SYSTOLIC BLOOD PRESSURE: 142 MMHG | HEIGHT: 70 IN

## 2022-07-14 DIAGNOSIS — G89.29 CHRONIC NECK PAIN: ICD-10-CM

## 2022-07-14 DIAGNOSIS — M54.2 CHRONIC NECK PAIN: ICD-10-CM

## 2022-07-14 DIAGNOSIS — M79.18 MYOFASCIAL PAIN: Primary | ICD-10-CM

## 2022-07-14 PROCEDURE — 3079F PR MOST RECENT DIASTOLIC BLOOD PRESSURE 80-89 MM HG: ICD-10-PCS | Mod: CPTII,S$GLB,, | Performed by: NURSE PRACTITIONER

## 2022-07-14 PROCEDURE — 3077F PR MOST RECENT SYSTOLIC BLOOD PRESSURE >= 140 MM HG: ICD-10-PCS | Mod: CPTII,S$GLB,, | Performed by: NURSE PRACTITIONER

## 2022-07-14 PROCEDURE — 3072F PR LOW RISK FOR RETINOPATHY: ICD-10-PCS | Mod: CPTII,S$GLB,, | Performed by: NURSE PRACTITIONER

## 2022-07-14 PROCEDURE — 99213 PR OFFICE/OUTPT VISIT, EST, LEVL III, 20-29 MIN: ICD-10-PCS | Mod: 25,S$GLB,, | Performed by: NURSE PRACTITIONER

## 2022-07-14 PROCEDURE — 1159F PR MEDICATION LIST DOCUMENTED IN MEDICAL RECORD: ICD-10-PCS | Mod: CPTII,S$GLB,, | Performed by: NURSE PRACTITIONER

## 2022-07-14 PROCEDURE — 1125F AMNT PAIN NOTED PAIN PRSNT: CPT | Mod: CPTII,S$GLB,, | Performed by: NURSE PRACTITIONER

## 2022-07-14 PROCEDURE — 99213 OFFICE O/P EST LOW 20 MIN: CPT | Mod: 25,S$GLB,, | Performed by: NURSE PRACTITIONER

## 2022-07-14 PROCEDURE — 96372 THER/PROPH/DIAG INJ SC/IM: CPT | Mod: S$GLB,,, | Performed by: NURSE PRACTITIONER

## 2022-07-14 PROCEDURE — 3052F HG A1C>EQUAL 8.0%<EQUAL 9.0%: CPT | Mod: CPTII,S$GLB,, | Performed by: NURSE PRACTITIONER

## 2022-07-14 PROCEDURE — 3052F PR MOST RECENT HEMOGLOBIN A1C LEVEL 8.0 - < 9.0%: ICD-10-PCS | Mod: CPTII,S$GLB,, | Performed by: NURSE PRACTITIONER

## 2022-07-14 PROCEDURE — 3077F SYST BP >= 140 MM HG: CPT | Mod: CPTII,S$GLB,, | Performed by: NURSE PRACTITIONER

## 2022-07-14 PROCEDURE — 99999 PR PBB SHADOW E&M-EST. PATIENT-LVL V: ICD-10-PCS | Mod: PBBFAC,,, | Performed by: NURSE PRACTITIONER

## 2022-07-14 PROCEDURE — 1125F PR PAIN SEVERITY QUANTIFIED, PAIN PRESENT: ICD-10-PCS | Mod: CPTII,S$GLB,, | Performed by: NURSE PRACTITIONER

## 2022-07-14 PROCEDURE — 1160F RVW MEDS BY RX/DR IN RCRD: CPT | Mod: CPTII,S$GLB,, | Performed by: NURSE PRACTITIONER

## 2022-07-14 PROCEDURE — 96372 PR INJECTION,THERAP/PROPH/DIAG2ST, IM OR SUBCUT: ICD-10-PCS | Mod: S$GLB,,, | Performed by: NURSE PRACTITIONER

## 2022-07-14 PROCEDURE — 3288F PR FALLS RISK ASSESSMENT DOCUMENTED: ICD-10-PCS | Mod: CPTII,S$GLB,, | Performed by: NURSE PRACTITIONER

## 2022-07-14 PROCEDURE — 3079F DIAST BP 80-89 MM HG: CPT | Mod: CPTII,S$GLB,, | Performed by: NURSE PRACTITIONER

## 2022-07-14 PROCEDURE — 99999 PR PBB SHADOW E&M-EST. PATIENT-LVL V: CPT | Mod: PBBFAC,,, | Performed by: NURSE PRACTITIONER

## 2022-07-14 PROCEDURE — 1159F MED LIST DOCD IN RCRD: CPT | Mod: CPTII,S$GLB,, | Performed by: NURSE PRACTITIONER

## 2022-07-14 PROCEDURE — 3072F LOW RISK FOR RETINOPATHY: CPT | Mod: CPTII,S$GLB,, | Performed by: NURSE PRACTITIONER

## 2022-07-14 PROCEDURE — 1160F PR REVIEW ALL MEDS BY PRESCRIBER/CLIN PHARMACIST DOCUMENTED: ICD-10-PCS | Mod: CPTII,S$GLB,, | Performed by: NURSE PRACTITIONER

## 2022-07-14 PROCEDURE — 3288F FALL RISK ASSESSMENT DOCD: CPT | Mod: CPTII,S$GLB,, | Performed by: NURSE PRACTITIONER

## 2022-07-14 PROCEDURE — 1101F PR PT FALLS ASSESS DOC 0-1 FALLS W/OUT INJ PAST YR: ICD-10-PCS | Mod: CPTII,S$GLB,, | Performed by: NURSE PRACTITIONER

## 2022-07-14 PROCEDURE — 1101F PT FALLS ASSESS-DOCD LE1/YR: CPT | Mod: CPTII,S$GLB,, | Performed by: NURSE PRACTITIONER

## 2022-07-14 RX ORDER — TRIAMCINOLONE ACETONIDE 40 MG/ML
12 INJECTION, SUSPENSION INTRA-ARTICULAR; INTRAMUSCULAR
Status: COMPLETED | OUTPATIENT
Start: 2022-07-14 | End: 2022-07-14

## 2022-07-14 RX ORDER — BUPIVACAINE HYDROCHLORIDE 2.5 MG/ML
5 INJECTION, SOLUTION EPIDURAL; INFILTRATION; INTRACAUDAL
Status: COMPLETED | OUTPATIENT
Start: 2022-07-14 | End: 2022-07-14

## 2022-07-14 RX ADMIN — BUPIVACAINE HYDROCHLORIDE 12.5 MG: 2.5 INJECTION, SOLUTION EPIDURAL; INFILTRATION; INTRACAUDAL at 09:07

## 2022-07-14 RX ADMIN — TRIAMCINOLONE ACETONIDE 12 MG: 40 INJECTION, SUSPENSION INTRA-ARTICULAR; INTRAMUSCULAR at 09:07

## 2022-07-14 NOTE — PROGRESS NOTES
PCP: Roberta Sagastume MD    REFERRING PHYSICIAN: No ref. provider found    CHIEF COMPLAINT: Neck pain    Interval History 7/14/2022:  Emerson presents for follow up of neck pain and imaging review. His cervical MRI shows probably ligament sprain as well as DDD. He reports that overall his pain has been improving since previous encounter. His range of motion has also been improving. He says that his sugars have been running OK, usually mid 100's to 200 in the mornings. He did try Flexeril but it made him drowsy. He has had a few visits for massage therapy with benefit. He is scheduled for right long trigger finger release on 7/27/22 with Dr. Castillo. We did previous discuss TPIs and he would like to consider today. His pain today is 5/10.    Interval History 6/20/2022:  The patient is here today for worsened neck pain. He was evaluated by Dr. Rae as a new patient last week. They discussed TPIs but his sugar was running high. He had steroid injections into this knee and finger in the past couple of months. He was also prescribed oral steroids but says he never took them because he is nervous about his sugar. His sugar has been running around 200 in the morning. He has a Dexcom monitor. He is scheduled for cervical MRI on 7/1/22. He is frustrated because he feels as though it is taking a long time to find out what is causing his pain. His pain started abruptly on 5/25/22. He says that he was on the cervical machine at [facility] and heard a pop to his neck. Since that time, he has had significant right sided neck pain. He had baseline aching pain prior to this time. He has no radiation of the pain. The patient denies myelopathic symptoms such as handwriting changes or difficulty with buttons/coins/keys. Denies perineal paresthesias, bowel/bladder dysfunction. He does say that he is considering a lawsuit. His pain today is 8/10.    Initial Encounter:  Original HISTORY OF PRESENT ILLNESS: Emerson Menendez  "presents to the clinic for the evaluation of the above pain. Patient reports that he had neck pain in the past with loss of ROM. [facility] where he made great progress and had full ROM back for many years. A few months ago, he started feeling like his neck was getting stiff again so his PCP referred him back to clinic. He was evaluated 3 weeks ago where he was performing a exercise that he states he "heard a loud pop with associated pain". He reports that the session was ended shortly after. He attempted to rest at home but the pain has gotten worse and now limits his daily activities.    Original Pain Description:  The pain is located in the neck and radiates to the occipital region of the head and down both trapezius muscles. The pain is described as aching, sharp, stabbing and tight band. Exacerbating factors: Bending, Extension and Flexing. Mitigating factors heat and ice. Symptoms interfere with daily activity and work. The patient feels like symptoms have been worsening. Patient denies significant motor weakness and loss of sensations.    Original PAIN SCORES:  Best: Pain is 3  Worst: Pain is 10  Usually: Pain is 8  Current: Pain is 8    INTERVAL HISTORY:     6 weeks of Conservative therapy (PT/Chiro/Home Exercises with Dates)  PT 11/30/21 - 8/22/22    Treatments / Medications: (Ice/Heat/NSAIDS/APAP/etc):  Ice  Heat  Tylenol     Report: Reviewed    Interventional Pain Procedures: (Previous injections)  None    Past Medical History:   Diagnosis Date    Anxiety 3/16/2015    Asymptomatic multiple myeloma     BPH (benign prostatic hypertrophy) 9/24/2012    Carpal tunnel syndrome     Depression 3/16/2015    Diabetic retinopathy     GERD (gastroesophageal reflux disease) 9/24/2012    Glaucoma     Hx of psychiatric care     Celexa, Valium    Hyperlipidemia     Hypertension     Hypertensive retinopathy of both eyes     Microalbuminuria 9/24/2012    " Osteoarthritis of cervical spine 2012    Osteoarthritis of knee 2012    Psychiatric problem     Type II or unspecified type diabetes mellitus without mention of complication, not stated as uncontrolled 2012     Past Surgical History:   Procedure Laterality Date    ARTHROSCOPY OF WRIST Left 8/3/2020    Procedure: ARTHROSCOPY, WRIST LEFT;  Surgeon: Kindra Castillo MD;  Location: [facility];  Service: Orthopedics;  Laterality: Left;  REGIONAL/MAC    CARPAL TUNNEL RELEASE Left 8/3/2020    Procedure: RELEASE, CARPAL TUNNEL LEFT;  Surgeon: Kindra Castillo MD;  Location: [facility];  Service: Orthopedics;  Laterality: Left;  REGIONAL/MAC    CATARACT EXTRACTION      Right eye    INTRAOCULAR PROSTHESES INSERTION Left 2021    Procedure: INSERTION, IOL PROSTHESIS;  Surgeon: Jose L Wheatley MD;  Location: [address];  Service: Ophthalmology;  Laterality: Left;    PHACOEMULSIFICATION OF CATARACT Left 2021    Procedure: PHACOEMULSIFICATION, CATARACT;  Surgeon: Jose L Wheatley MD;  Location: [facility] OR [address] [place] [address];  Service: Ophthalmology;  Laterality: Left;    PROSTATE SURGERY      SURGICAL REMOVAL OF CARPAL BONE Left 2021    Procedure: OSTECTOMY, CARPAL BONE, LEFT;  Surgeon: Kindra Castillo MD;  Location: [facility];  Service: Orthopedics;  Laterality: Left;  REGIONAL/MAC     Social History     Socioeconomic History    Marital status:     Number of children: 1   Tobacco Use    Smoking status: Never Smoker    Smokeless tobacco: Never Used   Substance and Sexual Activity    Alcohol use: No    Drug use: No    Sexual activity: Yes     Partners: Female     Birth control/protection: None   Social History Narrative     x2, 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     Family History   Problem Relation Age of Onset    Hypertension Brother     Depression Brother     Kidney failure Daughter         due to medication    Depression Sister     Blindness  Neg Hx     Cancer Neg Hx     Cataracts Neg Hx     Diabetes Neg Hx     Glaucoma Neg Hx     Macular degeneration Neg Hx     Retinal detachment Neg Hx     Strabismus Neg Hx     Stroke Neg Hx     Thyroid disease Neg Hx        Review of patient's allergies indicates:   Allergen Reactions    Penicillins      Knees locked up       Current Outpatient Medications   Medication Sig    ACCU-CHEK STEFANO CONTROL SOLN Soln     alcohol swabs PadM Apply 1 each topically as needed.    aspirin (ECOTRIN) 81 MG EC tablet Take 1 tablet (81 mg total) by mouth once daily.    blood glucose control, normal (METER-CHECK) Soln One meter.  Use as directed. Meter of insurance choice (Patient taking differently: One meter.  Use as directed. Meter of insurance choice)    blood sugar diagnostic (ACCU-CHEK STEFANO PLUS TEST STRP) Strp Accu check stefano plus TEST FOUR TIMES DAILY. Test strtips of insurance choice to go with meter and lancets    blood sugar diagnostic Strp [facility] - check blood sugars 4 times daily    blood-glucose meter (ACCU-CHEK STEFAON PLUS METER) Mis Use as direted    blood-glucose meter (ACCU-CHEK OMAYRA) Misc USE AS DIRECTED. Accucheck stefano plus    citalopram (CELEXA) 40 MG tablet Take 1 tablet (40 mg total) by mouth once daily.    citalopram (CELEXA) 40 MG tablet Take 1 tablet (40 mg total) by mouth once daily.    cyclobenzaprine (FLEXERIL) 10 MG tablet Take 1 tablet (10 mg total) by mouth 3 (three) times daily as needed for Muscle spasms.    diazePAM (VALIUM) 5 MG tablet Take 1 tablet (5 mg total) by mouth every 12 (twelve) hours as needed for Anxiety.    dorzolamide-timolol 2-0.5% (COSOPT) 22.3-6.8 mg/mL ophthalmic solution Place 1 drop into both eyes 2 (two) times daily.    dorzolamide-timolol 2-0.5% (COSOPT) 22.3-6.8 mg/mL ophthalmic solution Place 1 drop into both eyes 2 (two) times daily.    ergocalciferol (ERGOCALCIFEROL) 50,000 unit Cap Take 1 capsule (50,000 Units total) by mouth every 7  "days.    FARXIGA 5 mg Tab tablet Take 1 tablet (5 mg total) by mouth once daily.    flash glucose scanning reader (FREESTYLE CHRISTIANO 14 DAY READER) Misc Use as directed    flash glucose sensor (FREESTYLE CHRISTIANO 14 DAY SENSOR) Kit Use as directed    gabapentin (NEURONTIN) 100 MG capsule TAKE 1 CAPSULE EVERY MORNING, 1 CAPSULE IN THE AFTERNOON, AND 1 TO 3 CAPSULE(S) AT BEDTIME AS NEEDED FOR FOOT PAIN    glimepiride (AMARYL) 2 MG tablet Take 1 tablet (2 mg total) by mouth once daily.    glucose 4 GM chewable tablet Take 8 g by mouth as needed for Low blood sugar.    insulin (LANTUS SOLOSTAR U-100 INSULIN) glargine 100 units/mL (3mL) SubQ pen Inject 15 units under the skin in the morning and 15 units under the skin in the evening.    insulin aspart U-100 (NOVOLOG FLEXPEN U-100 INSULIN) 100 unit/mL (3 mL) InPn pen Inject 4 Units into the skin 2 (two) times daily with meals.    lancets (ACCU-CHEK SOFTCLIX LANCETS) Misc 1 lancet by Misc.(Non-Drug; Combo Route) route 4 (four) times daily.    losartan (COZAAR) 50 MG tablet Take 1 tablet (50 mg total) by mouth once daily.    MONOVISC 88 mg/4 mL Syrg     omeprazole (PRILOSEC) 40 MG capsule Take 1 capsule (40 mg total) by mouth once daily.    pen needle, diabetic (BD ULTRA-FINE SHORT PEN NEEDLE) 31 gauge x 5/16" Ndle USE TO INJECT INSULIN ONE TIME DAILY    pravastatin (PRAVACHOL) 40 MG tablet Take 1 tablet (40 mg total) by mouth once daily.    sildenafiL (VIAGRA) 100 MG tablet Take 1 tablet (100 mg total) by mouth daily as needed for Erectile Dysfunction.    tamsulosin (FLOMAX) 0.4 mg Cap Take 1 capsule (0.4 mg total) by mouth nightly.    traMADoL (ULTRAM) 50 mg tablet Take 1 tablet (50 mg total) by mouth every 8 (eight) hours as needed for Pain.    TRUEPLUS LANCETS 33 gauge [facility]      No current facility-administered medications for this visit.     Facility-Administered Medications Ordered in Other Visits   Medication    mupirocin 2 % ointment " "      ROS:  GENERAL: No fever. No chills. No fatigue. Denies weight loss. Denies weight gain.  HEENT: Reports Neck Pain. Denies headaches. Denies vision change. Denies eye pain. Denies double vision. Denies ear pain.   CV: Denies chest pain.   PULM: Denies of shortness of breath.  GI: Denies constipation. No diarrhea. No abdominal pain. Denies nausea. Denies vomiting. No blood in stool.  HEME: Denies bleeding problems.  : Denies urgency. No painful urination. No blood in urine.  MS: Denies joint stiffness. Denies joint swelling.  Denies back pain.  SKIN: Denies rash.   NEURO: Denies seizures. No weakness.  PSYCH:  Denies difficulty sleeping. No anxiety. Denies depression. No suicidal thoughts.       VITALS:   Vitals:    07/14/22 0825   BP: (!) 142/88   Pulse: 97   Resp: 18   SpO2: 99%   Weight: 89 kg (196 lb 3.4 oz)   Height: 5' 10" (1.778 m)   PainSc:   5   PainLoc: Neck         PHYSICAL EXAM:   GENERAL: Well appearing, in no acute distress, alert and oriented x3.  PSYCH:  Mood and affect appropriate.  SKIN: Skin color, texture, turgor normal, no rashes or lesions.  HEENT:  Normocephalic, atraumatic. Cranial nerves grossly intact.  NECK: Pain to palpation over the cervical paraspinous muscles bilaterally. TTP over right trapezius and scalene muscles. Pain to palpation over facets joints bilaterally. Limited ROM with pain on neck flexion, extension, and lateral flexion. Negative Spurling.   PULM: No evidence of respiratory difficulty, symmetric chest rise.  EXTREMITIES: No deformities, edema, or skin discoloration.   MUSCULOSKELETAL: Bilateral tricep, bicep and deltoid strength is 5/5 and symmetric. Bilateral hand  strength is 5/5 and symmetric. No atrophy is noted.  NEURO: Sensation is equal and appropriate bilaterally. Bilateral upper extremity coordination and muscle stretch reflexes are physiologic and symmetric.  Negative Hoffmans sign bilaterally.   GAIT: Normal.      LABS:  Comprehensive Metabolic Panel "   Collected: 05/30/22 1031  Final result  Specimen: Blood      Sodium 131 Low  mmol/L Albumin 3.4 Low  g/dL   Potassium 5.4 High  mmol/L  Total Bilirubin 0.5 mg/dL    Chloride 98 mmol/L Alkaline Phosphatase 63 U/L   CO2 25 mmol/L AST 13 U/L   Glucose 370 High  mg/dL ALT 19 U/L   BUN 19 mg/dL Anion Gap 8 mmol/L   Creatinine 1.9 High  mg/dL eGFR if  39.0 Abnormal  mL/min/1.73 m^2   Calcium 9.6 mg/dL eGFR if non  33.7 Abnormal  mL/min/1.73 m^2               IMAGING:        Narrative & Impression  EXAMINATION:  MRI CERVICAL SPINE WITHOUT CONTRAST     CLINICAL HISTORY:  Neck trauma, ligament injury suspected (Age >= 16y); Cervicalgia     TECHNIQUE:  Multiplanar, multisequence MR images of the cervical spine were performed without the administration of contrast.     COMPARISON:  01/27/2016     FINDINGS:  Alignment: Normal.     Vertebrae: Normal marrow signal. No fracture.     Discs: Severe disc space narrowing at C3-4 and moderate disc space narrowing at C4-5.     Cord: Normal., Capacious canal     Skull base and craniocervical junction: Normal.     Degenerative findings:     The atlanto-occipital and bilateral lateral masses of C1-2 appear normal.  (Only seen on sagittal images)     C2-C3: No disc abnormality, no canal stenosis or foraminal narrowing.  Mild facet joint osseous hypertrophy.     C3-C4: Posterior disc osteophyte complex abutting the cord anteriorly causing no greater than mild canal stenosis.  There is mild bilateral foraminal narrowing.     C4-C5: No disc abnormality, right uncovertebral spur.  No canal stenosis.  There is severe right foraminal narrowing.     C5-C6: Posterior disc osteophyte complex and bilateral uncovertebral spur, no canal stenosis.     C6-C7: Posterior disc osteophyte complex, bilateral uncovertebral spur.  No canal stenosis.  There is mild bilateral foraminal narrowing.     C7-T1: Unremarkable     T1-T2: Unremarkable     Paraspinal muscles & soft  "tissues: Subtle soft tissue edema inter spinous ligament C4-5 C5-6 and C6-C7 possibly a mild sprain.     Impression:     Spondylosis of the cervical spine.     No severe canal stenosis.  Please see details of each levels above.     Mild inter spinous process ligament edema C4-5, C5-6 and C6-C7 could relate to a ligamentous sprain   Narrative & Impression  EXAMINATION:  XR CERVICAL SPINE 5 VIEW WITH FLEX AND EXT     CLINICAL HISTORY:  Spondylosis without myelopathy or radiculopathy, cervical region     TECHNIQUE:  Five views of the cervical spine plus flexion and extension views were performed.     COMPARISON:  05/19/2014     FINDINGS:  Vertebral body heights are maintained.     Disc space narrowing and endplate changes at every level     Oblique views show bony narrowing of the neural foramina C3 for and C5-6 on the left and C4-5 and C5-6 on the right.     Reversal of the normal cervical lordosis.  Otherwise, the AP alignment is anatomic.     No significant prevertebral soft tissue edema.     Impression:     Multilevel degenerative change with multilevel bony narrowing of the neural foramina.     ASSESSMENT: 76 y.o. year old male with neck pain, consistent with the following:    No diagnosis found.      DISCUSSION: Mr. Menendez is a nice gentleman originally from [place] that works as a  in [place] that has history of Multiple Myeloma and DM. He is presenting with acute cervical neck pain after hearing "a loud pop" in his neck while doing exercises at [facility]. He presents with very limited ROM; especially with left and right rotation and extension. Pain reproduced over the facet joints and the trapezius muscles. Pain likely is muscular in nature but he may have disc/nerve involvement.      PLAN:  1. Imaging reviewed.  2. TPIs today as per below with low dose steroids.  3. Continue conservative methods of ice/heat.      The above plan and management options were discussed at length with patient. " Patient is in agreement with the above and verbalized understanding.     Norma Mckoy, [facility]  07/14/2022    Trigger Point Injection:   The procedure was discussed with the patient including complications of nerve damage,  bleeding, infection, and failure of pain relief.   Trigger points were identified by palpation and marked. Alcohol prep of sites done. A mixture of 5ml 0.25% bupivacaine +12mg Kenalog was prepared.  A 27-gauge needle was advanced to the point of maximal tenderness, and medication was injected after negative aspiration. All sites done in the same manner. Patient tolerated the procedure well and without complications. Sites injected included: trapezius muscles bilaterally (4 sites total).

## 2022-07-17 DIAGNOSIS — E11.42 TYPE 2 DIABETES MELLITUS WITH DIABETIC POLYNEUROPATHY, WITH LONG-TERM CURRENT USE OF INSULIN: ICD-10-CM

## 2022-07-17 DIAGNOSIS — Z79.4 TYPE 2 DIABETES MELLITUS WITH DIABETIC POLYNEUROPATHY, WITH LONG-TERM CURRENT USE OF INSULIN: ICD-10-CM

## 2022-07-17 NOTE — TELEPHONE ENCOUNTER
Care Due:                  Date            Visit Type   Department     Provider  --------------------------------------------------------------------------------                                EP -                              PRIMARY      [facility] INTERNAL  Last Visit: 04-      CARE ([facility])   MEDICINE       GEOVANNA ALCAZAR                              EP                               PRIMARY      [facility] INTERNAL  Next Visit: 09-      CARE ([facility])   MEDICINE       GEOVANNA ALCAZAR                                                            Last  Test          Frequency    Reason                     Performed    Due Date  --------------------------------------------------------------------------------    Lipid Panel.  12 months..  pravastatin..............  01- 01-    [facility] Embedded Care Gaps. Reference number: [id number]. 7/17/2022   2:32:02 AM CDT

## 2022-07-18 ENCOUNTER — PATIENT MESSAGE (OUTPATIENT)
Dept: INTERNAL MEDICINE | Facility: CLINIC | Age: 77
End: 2022-07-18
Payer: MEDICARE

## 2022-07-18 RX ORDER — INSULIN GLARGINE 100 [IU]/ML
INJECTION, SOLUTION SUBCUTANEOUS
Qty: 30 ML | Refills: 5 | Status: SHIPPED | OUTPATIENT
Start: 2022-07-18 | End: 2022-09-23 | Stop reason: SDUPTHER

## 2022-07-18 NOTE — TELEPHONE ENCOUNTER
Refill Routing Note   Medication(s) are not appropriate for processing by [facility] for the following reason(s):      - Required laboratory values are abnormal  - Medication not active on medication list    [facility] action(s):  Defer          Medication reconciliation completed: No     Appointments  past 12m or future 3m with PCP    Date Provider   Last Visit   4/25/2022 Roberta Sagastume MD   Next Visit   9/20/2022 Roberta Sagastume MD   ED visits in past 90 days: 0        Note composed:12:52 PM 07/18/2022

## 2022-07-26 ENCOUNTER — TELEPHONE (OUTPATIENT)
Dept: ORTHOPEDICS | Facility: CLINIC | Age: 77
End: 2022-07-26
Payer: MEDICARE

## 2022-07-26 ENCOUNTER — ANESTHESIA EVENT (OUTPATIENT)
Dept: SURGERY | Facility: HOSPITAL | Age: 77
End: 2022-07-26
Payer: MEDICARE

## 2022-07-26 DIAGNOSIS — Z98.890 POST-OPERATIVE STATE: Primary | ICD-10-CM

## 2022-07-26 RX ORDER — TRAMADOL HYDROCHLORIDE 50 MG/1
50 TABLET ORAL EVERY 6 HOURS PRN
Qty: 10 TABLET | Refills: 0 | Status: SHIPPED | OUTPATIENT
Start: 2022-07-26 | End: 2022-09-20

## 2022-07-26 NOTE — PRE ADMISSION SCREENING
Pre admit phone call completed.    Instructions given to patient about NPO status as follows:     The evening before surgery do not eat anything after 9 p.m. ( this includes hard candy, chewing gum and mints).  You may only have WATER from 9 p.m. until you leave your home. DO NOT  DRINK ANY LIQUIDS ON THE WAY TO THE HOSPITAL.      Patient was also instructed on the below information:    Park in the Parking lot behind the hospital or in the [email or web address] across the street from the parking lot.  Parking is complimentary.  If you will be discharged the same day as your procedure, please arrange for a responsible adult to drive you home or  to accompany you if traveling by taxi.  YOU WILL NOT BE PERMITTED TO DRIVE OR TO LEAVE THE HOSPITAL ALONE AFTER SURGERY.  It is strongly recommended that you arrange for someone to remain with you for the first 24 hrs following your surgery.    Patient verbalized understanding of above instructions.

## 2022-07-26 NOTE — ANESTHESIA PREPROCEDURE EVALUATION
07/26/2022  Emerson Menendez is a 77 y.o., male.    Pre-operative evaluation for Procedure(s) (LRB):  RELEASE, TRIGGER FINGER,RIGHT,LONG (Right)    Emerson Menendez is a 77 y.o. male     Patient Active Problem List   Diagnosis    Essential hypertension    Pure hypercholesterolemia    Microalbuminuria due to type 2 diabetes mellitus    GERD (gastroesophageal reflux disease)    Osteoarthritis of cervical spine    Osteoarthritis of knee    Hypertrophy of breast    Lump or mass in breast    Mastodynia    Unspecified hyperplasia of prostate without urinary obstruction and other lower urinary tract symptoms (LUTS)    No diabetic retinopathy in both eyes - Both Eyes    ED (erectile dysfunction) of organic origin    Retrograde ejaculation    BPH with urinary obstruction    Type 2 diabetes mellitus with diabetic polyneuropathy, with long-term current use of insulin    Open nondisplaced fracture of distal phalanx of right thumb - s/p I&D, closure and splinting 12/17/13    Laceration of thumb, right    Fracture of thumb, right open    Cervical spondylosis without myelopathy    Anxiety    Depression    Chronic kidney disease, stage III (moderate)    Anemia    Smoldering multiple myeloma    Primary open angle glaucoma of both eyes, moderate stage    Disorder of ejaculation    OAB (overactive bladder)    Left leg weakness    Type 2 diabetes mellitus with hyperglycemia, with long-term current use of insulin    Overweight (BMI 25.0-29.9)    Foot pain, right    CKD stage 3 due to type 2 diabetes mellitus    Pain of both hip joints    Nocturia    Anatomical narrow angle    Boil, axilla    Adjustment disorder with mixed anxiety and depressed mood    Grief    Vitamin D deficiency    Carpal tunnel syndrome on left    Post-herpetic polyneuropathy    Carpal tunnel syndrome    MALINI  (obstructive sleep apnea)    Acute pain of left wrist    Status post cataract extraction and insertion of intraocular lens    Mild nonproliferative diabetic retinopathy of right eye with macular edema associated with diabetes mellitus due to underlying condition    Hypertensive retinopathy of both eyes    Mild nonproliferative diabetic retinopathy of left eye without macular edema associated with type 2 diabetes mellitus    Pseudophakia of both eyes    Vitreous degeneration of both eyes    Decreased ROM of neck    Poor posture    Decreased functional activity tolerance    Weakness of trunk musculature    Dry eye syndrome of both eyes       Review of patient's allergies indicates:   Allergen Reactions    Penicillins      Knees locked up       Current Facility-Administered Medications on File Prior to Encounter   Medication Dose Route Frequency Provider Last Rate Last Admin    mupirocin 2 % ointment   Nasal On Call Procedure Mohit Hummel MD   Given at 01/06/21 0847     Current Outpatient Medications on File Prior to Encounter   Medication Sig Dispense Refill    aspirin (ECOTRIN) 81 MG EC tablet Take 1 tablet (81 mg total) by mouth once daily. 100 tablet 3    citalopram (CELEXA) 40 MG tablet Take 1 tablet (40 mg total) by mouth once daily. 90 tablet 4    citalopram (CELEXA) 40 MG tablet Take 1 tablet (40 mg total) by mouth once daily. 30 tablet 4    diazePAM (VALIUM) 5 MG tablet Take 1 tablet (5 mg total) by mouth every 12 (twelve) hours as needed for Anxiety. 60 tablet 0    dorzolamide-timolol 2-0.5% (COSOPT) 22.3-6.8 mg/mL ophthalmic solution Place 1 drop into both eyes 2 (two) times daily.      ergocalciferol (ERGOCALCIFEROL) 50,000 unit Cap Take 1 capsule (50,000 Units total) by mouth every 7 days. 12 capsule 3    FARXIGA 5 mg Tab tablet Take 1 tablet (5 mg total) by mouth once daily. 30 tablet 11    gabapentin (NEURONTIN) 100 MG capsule TAKE 1 CAPSULE EVERY MORNING, 1 CAPSULE IN THE  "AFTERNOON, AND 1 TO 3 CAPSULE(S) AT BEDTIME AS NEEDED FOR FOOT PAIN 450 capsule 0    glimepiride (AMARYL) 2 MG tablet Take 1 tablet (2 mg total) by mouth once daily. 90 tablet 4    losartan (COZAAR) 50 MG tablet Take 1 tablet (50 mg total) by mouth once daily. 90 tablet 6    omeprazole (PRILOSEC) 40 MG capsule Take 1 capsule (40 mg total) by mouth once daily. 90 capsule 4    pravastatin (PRAVACHOL) 40 MG tablet Take 1 tablet (40 mg total) by mouth once daily. 90 tablet 3    tamsulosin (FLOMAX) 0.4 mg Cap Take 1 capsule (0.4 mg total) by mouth nightly. 90 capsule 4    ACCU-CHEK STEFANO CONTROL SOLN Soln       alcohol swabs PadM Apply 1 each topically as needed.      blood glucose control, normal (METER-CHECK) Soln One meter.  Use as directed. Meter of insurance choice (Patient taking differently: One meter.  Use as directed. Meter of insurance choice) 1 each 0    blood sugar diagnostic (ACCU-CHEK STEFANO PLUS TEST STRP) Strp Accu check stefano plus TEST FOUR TIMES DAILY. Test strtips of insurance choice to go with meter and lancets 400 strip 3    [facility] blood [facility] 4 times daily 400 strip 4    blood-glucose meter (ACCU-CHEK STEFANO PLUS METER) Misc Use as direted 1 each 0    blood-glucose meter (ACCU-CHEK OMAYRA) Misc USE AS DIRECTED. Accucheck stefano plus 1 each 0    flash glucose scanning reader (FREESTYLE CHRISTIANO 14 DAY READER) Misc Use as directed 6 each 4    flash glucose sensor (FREESTYLE CHRISTIANO 14 DAY SENSOR) Kit Use as directed 6 kit 4    glucose 4 GM chewable tablet Take 8 g by mouth as needed for Low blood sugar.      lancets (ACCU-CHEK SOFTCLIX LANCETS) Misc 1 lancet by Misc.(Non-Drug; Combo Route) route 4 (four) times daily. 400 each 4    MONOVISC 88 mg/4 mL Syrg       pen needle, diabetic (BD ULTRA-FINE SHORT PEN NEEDLE) 31 gauge x 5/16" Ndle USE TO INJECT INSULIN ONE TIME DAILY 100 each 3    sildenafiL (VIAGRA) 100 MG tablet Take 1 tablet (100 mg " total) by mouth daily as needed for Erectile Dysfunction. 30 tablet 2    TRUEPLUS LANCETS 33 gauge Misc          Past Surgical History:   Procedure Laterality Date    ARTHROSCOPY OF WRIST Left 8/3/2020    Procedure: ARTHROSCOPY, WRIST LEFT;  Surgeon: Kindra Castillo MD;  Location: [facility] OR;  Service: Orthopedics;  Laterality: Left;  REGIONAL/MAC    CARPAL TUNNEL RELEASE Left 8/3/2020    Procedure: RELEASE, CARPAL TUNNEL LEFT;  Surgeon: Kindra Castillo MD;  Location: [facility] OR;  Service: Orthopedics;  Laterality: Left;  REGIONAL/MAC    CATARACT EXTRACTION  2012    Right eye    INTRAOCULAR PROSTHESES INSERTION Left 5/20/2021    Procedure: INSERTION, IOL PROSTHESIS;  Surgeon: Jose L Wheatley MD;  Location: [facility] OR [address] [place] [address];  Service: Ophthalmology;  Laterality: Left;    PHACOEMULSIFICATION OF CATARACT Left 5/20/2021    Procedure: PHACOEMULSIFICATION, CATARACT;  Surgeon: Jose L Wheatley MD;  Location: [facility] OR [address] [place] [address];  Service: Ophthalmology;  Laterality: Left;    PROSTATE SURGERY      SURGICAL REMOVAL OF CARPAL BONE Left 1/6/2021    Procedure: OSTECTOMY, CARPAL BONE, LEFT;  Surgeon: Kindra Castillo MD;  Location: [facility] OR;  Service: Orthopedics;  Laterality: Left;  REGIONAL/MAC         CBC:  Lab Results   Component Value Date    WBC 5.57 06/30/2022    RBC 3.74 (L) 06/30/2022    HGB 10.8 (L) 06/30/2022    HCT 33.6 (L) 06/30/2022     06/30/2022    MCV 90 06/30/2022    MCH 28.9 06/30/2022    MCHC 32.1 06/30/2022       CMP:   Lab Results   Component Value Date     06/30/2022    K 5.3 (H) 06/30/2022     06/30/2022    CO2 27 06/30/2022    BUN 28 (H) 06/30/2022    CREATININE 1.9 (H) 06/30/2022     (H) 06/30/2022    MG 1.9 06/30/2022    PHOS 3.1 06/30/2022    CALCIUM 9.7 06/30/2022    ALBUMIN 3.6 06/30/2022    PROT 8.3 06/30/2022    ALKPHOS 62 06/30/2022    ALT 15 06/30/2022    AST 14 06/30/2022    BILITOT 0.4 06/30/2022       INR:  No results found for: PT, INR,  "PROTIME, APTT      Diagnostic Studies:      EKG:   No results found. However, due to the size of the patient record, not all encounters were searched. Please check Results Review for a complete set of results.     2D Echo:  No results found. However, due to the size of the patient record, not all encounters were searched. Please check Results Review for a complete set of results.    Stress Test:   No results found for this or any previous visit.      Pre-op Vitals   BP Pulse Resp Temp SpO2   07/29/22 0544 07/29/22 0544 07/29/22 0544 07/29/22 0544 07/29/22 0544   (!) 145/73 80 16 36.6 °C (97.8 °F) 99 %      Height Weight BMI (Calculated)     07/29/22 0544 07/26/22 0848 07/26/22 0848     5' 10" 190 lb 27.3           Pre-op Assessment    I have reviewed the Patient Summary Reports.     I have reviewed the Nursing Notes. I have reviewed the NPO Status.      Review of Systems  Anesthesia Hx:  No problems with previous Anesthesia    Social:  Non-Smoker, No Alcohol Use    Cardiovascular:   Exercise tolerance: good Hypertension    Pulmonary:   Sleep Apnea    Renal/:   Chronic Renal Disease, CRI    Hepatic/GI:   GERD    Musculoskeletal:   Arthritis     Neurological:   Neuromuscular Disease,    Endocrine:   Diabetes, poorly controlled, type 2    Psych:   Psychiatric History          Physical Exam  General: Well nourished and Cooperative    Airway:  Mallampati: III   Mouth Opening: Normal  TM Distance: Normal  Tongue: Normal    Dental:  Intact    Chest/Lungs:  Clear to auscultation    Heart:  Rate: Normal  Rhythm: Regular Rhythm        Anesthesia Plan  Type of Anesthesia, risks & benefits discussed:    Anesthesia Type: Gen Natural Airway  Intra-op Monitoring Plan: Standard ASA Monitors  Post Op Pain Control Plan: multimodal analgesia and IV/PO Opioids PRN  Induction:  IV  Informed Consent: Informed consent signed with the Patient and all parties understand the risks and agree with anesthesia plan.  All questions answered. "   ASA Score: 3    Ready For Surgery From Anesthesia Perspective.     .

## 2022-07-26 NOTE — TELEPHONE ENCOUNTER
Spoke c pt. Notified pt of need to r/s tomorrow's surgery due to MD Covid + case. Patient verbalized understanding and was thankful.   Pt elected to r/s to 07/29/22. Leslie notified to move scheduled procedure and confirmed, book updated and PO appt confirmed as is.

## 2022-07-28 ENCOUNTER — TELEPHONE (OUTPATIENT)
Dept: ORTHOPEDICS | Facility: CLINIC | Age: 77
End: 2022-07-28
Payer: MEDICARE

## 2022-07-28 NOTE — H&P
H&P  Orthopaedics    SUBJECTIVE:     History of Present Illness:  Emerson Menendez is a 76 y.o. male presenting today for follow up right long trigger finger. He reports pain and intermittent locking. He presents today to receive an injection, his glucose was elevated at last visit due to a recent knee injection today his glucose is well controlled.      He is status post Left wrist proximal row carpectomy with interposition arthroplasty by Dr. Castillo 1/6/2021 and has a hx of left carpal tunnel release 2020.     Interval Update 6/30/22  Patient reports right long finger injection helped significantly for a month but that symptoms have returned. He has locking and pain again. He is interested in surgery.      Review of patient's allergies indicates:   Allergen Reactions    Penicillins      Knees locked up       Past Medical History:   Diagnosis Date    Anxiety 3/16/2015    Asymptomatic multiple myeloma     BPH (benign prostatic hypertrophy) 9/24/2012    Carpal tunnel syndrome     Depression 3/16/2015    Diabetic retinopathy     GERD (gastroesophageal reflux disease) 9/24/2012    Glaucoma     Hx of psychiatric care     Celexa, Valium    Hyperlipidemia     Hypertension     Hypertensive retinopathy of both eyes     Microalbuminuria 9/24/2012    Osteoarthritis of cervical spine 9/24/2012    Osteoarthritis of knee 9/24/2012    Psychiatric problem     Type II or unspecified type diabetes mellitus without mention of complication, not stated as uncontrolled 9/24/2012     Past Surgical History:   Procedure Laterality Date    ARTHROSCOPY OF WRIST Left 8/3/2020    Procedure: ARTHROSCOPY, WRIST LEFT;  Surgeon: Kindra Castillo MD;  Location: [facility] OR;  Service: Orthopedics;  Laterality: Left;  REGIONAL/[facility]    CARPAL TUNNEL RELEASE Left 8/3/2020    Procedure: RELEASE, CARPAL TUNNEL LEFT;  Surgeon: Kindra Castillo MD;  Location: [facility] OR;  Service: Orthopedics;  Laterality: Left;  REGIONAL/MAC     CATARACT EXTRACTION  2012    Right eye    INTRAOCULAR PROSTHESES INSERTION Left 5/20/2021    Procedure: INSERTION, IOL PROSTHESIS;  Surgeon: Jose L Wheatley MD;  Location: [facility] OR [address] [place] [address];  Service: Ophthalmology;  Laterality: Left;    PHACOEMULSIFICATION OF CATARACT Left 5/20/2021    Procedure: PHACOEMULSIFICATION, CATARACT;  Surgeon: Jose L Wheatley MD;  Location: [facility] OR [address] [place] [address];  Service: Ophthalmology;  Laterality: Left;    PROSTATE SURGERY      SURGICAL REMOVAL OF CARPAL BONE Left 1/6/2021    Procedure: OSTECTOMY, CARPAL BONE, LEFT;  Surgeon: Kindra Castillo MD;  Location: [facility] OR;  Service: Orthopedics;  Laterality: Left;  REGIONAL/MAC     Family History   Problem Relation Age of Onset    Hypertension Brother     Depression Brother     Kidney failure Daughter         due to medication    Depression Sister     Blindness Neg Hx     Cancer Neg Hx     Cataracts Neg Hx     Diabetes Neg Hx     Glaucoma Neg Hx     Macular degeneration Neg Hx     Retinal detachment Neg Hx     Strabismus Neg Hx     Stroke Neg Hx     Thyroid disease Neg Hx      Social History     Tobacco Use    Smoking status: Never Smoker    Smokeless tobacco: Never Used   Substance Use Topics    Alcohol use: No    Drug use: No        Review of Systems:  Patient denies constitutional symptoms, cardiac symptoms, respiratory symptoms, GI symptoms.  The remainder of the musculoskeletal ROS is included in the HPI.      OBJECTIVE:     Physical Exam:  Gen:  No acute distress  CV:  Peripherally well-perfused.  Pulses 2+ bilaterally.  Lungs:  Normal respiratory effort.  Abdomen:  Soft, non-tender, non-distended  Head/Neck:  Normocephalic.  Atraumatic. No TTP, AROM and PROM intact without pain  Neuro:  CN intact without deficit, SILT throughout B/L Upper & Lower Extremities      MSK:  RUE:  Good active ROM of the wrist and fingers. ttp long A1 pulley with triggering. AIN/PIN/Radial/Median/Ulnar Nerves assessed in  isolation without deficit. Radial & Ulnar arteries palpated 2+. Capillary Refill <3s.      Diagnostic Results:  Xray bl hands 4/25/22   Impression:     1. Postoperative change of left proximal row carpectomy.  2. Bilateral hand osteoarthritis as detailed above.  3. 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  Comments: I have personally reviewed the imaging and I agree with the above radiologist's report.    ASSESSMENT/PLAN:     A/P: Emerson Menendez is a 77 y.o. with right long trigger finger refractory to conservative management       Plan:  - Right LF trigger finger release. Consents signed in clinic.       The risks, benefits and alternatives to surgery were discussed with the patient at great length.  These include pain, bleeding, infection, vessel/nerve damage, numbness, tingling, complex regional pain syndrome, recurrent infection need for further surgery, scarring, recurrence, wound healing complications requiring further procedure for soft tissue coverage including flap coverage, cartilage damage,  DVT, PE, arthritis and death.  Patient states an understanding and wishes to proceed with surgery.   All questions were answered.  No guarantees were implied or stated.  Informed consent was obtained.         Emmanuel Robles MD  Orthopaedic Surgery Resident

## 2022-07-28 NOTE — TELEPHONE ENCOUNTER
Spoke c pt. Informed pt of 5 AM arrival time for 7/29/2022 surgery at the [facility]. Reminded pt of NPO status. Pt expressed understanding & was thankful.

## 2022-07-29 ENCOUNTER — ANESTHESIA (OUTPATIENT)
Dept: SURGERY | Facility: HOSPITAL | Age: 77
End: 2022-07-29
Payer: MEDICARE

## 2022-07-29 ENCOUNTER — HOSPITAL ENCOUNTER (OUTPATIENT)
Facility: HOSPITAL | Age: 77
Discharge: HOME | End: 2022-07-29
Attending: ORTHOPAEDIC SURGERY | Admitting: ORTHOPAEDIC SURGERY
Payer: MEDICARE

## 2022-07-29 DIAGNOSIS — M65.30 TRIGGER FINGER OF RIGHT HAND: Primary | ICD-10-CM

## 2022-07-29 DIAGNOSIS — Z01.818 PRE-OP TESTING: ICD-10-CM

## 2022-07-29 LAB — POCT GLUCOSE: 116 MG/DL (ref 70–110)

## 2022-07-29 PROCEDURE — D9220A PRA ANESTHESIA: ICD-10-PCS | Mod: CRNA,,, | Performed by: NURSE ANESTHETIST, CERTIFIED REGISTERED

## 2022-07-29 PROCEDURE — 25000003 PHARM REV CODE 250: Performed by: NURSE ANESTHETIST, CERTIFIED REGISTERED

## 2022-07-29 PROCEDURE — 99900035 HC TECH TIME PER 15 MIN (STAT)

## 2022-07-29 PROCEDURE — 25000003 PHARM REV CODE 250: Performed by: STUDENT IN AN ORGANIZED HEALTH CARE EDUCATION/TRAINING PROGRAM

## 2022-07-29 PROCEDURE — 71000033 HC RECOVERY, INTIAL HOUR: Performed by: ORTHOPAEDIC SURGERY

## 2022-07-29 PROCEDURE — D9220A PRA ANESTHESIA: Mod: CRNA,,, | Performed by: NURSE ANESTHETIST, CERTIFIED REGISTERED

## 2022-07-29 PROCEDURE — 36000707: Performed by: ORTHOPAEDIC SURGERY

## 2022-07-29 PROCEDURE — D9220A PRA ANESTHESIA: ICD-10-PCS | Mod: ANES,,, | Performed by: ANESTHESIOLOGY

## 2022-07-29 PROCEDURE — 82962 GLUCOSE BLOOD TEST: CPT | Performed by: ORTHOPAEDIC SURGERY

## 2022-07-29 PROCEDURE — 26055 PR INCISE FINGER TENDON SHEATH: ICD-10-PCS | Mod: F7,,, | Performed by: ORTHOPAEDIC SURGERY

## 2022-07-29 PROCEDURE — 26055 INCISE FINGER TENDON SHEATH: CPT | Mod: F7,,, | Performed by: ORTHOPAEDIC SURGERY

## 2022-07-29 PROCEDURE — 25000003 PHARM REV CODE 250: Performed by: ORTHOPAEDIC SURGERY

## 2022-07-29 PROCEDURE — 37000009 HC ANESTHESIA EA ADD 15 MINS: Performed by: ORTHOPAEDIC SURGERY

## 2022-07-29 PROCEDURE — D9220A PRA ANESTHESIA: Mod: ANES,,, | Performed by: ANESTHESIOLOGY

## 2022-07-29 PROCEDURE — 36000706: Performed by: ORTHOPAEDIC SURGERY

## 2022-07-29 PROCEDURE — 94761 N-INVAS EAR/PLS OXIMETRY MLT: CPT

## 2022-07-29 PROCEDURE — 25000003 PHARM REV CODE 250: Performed by: ANESTHESIOLOGY

## 2022-07-29 PROCEDURE — 71000015 HC POSTOP RECOV 1ST HR: Performed by: ORTHOPAEDIC SURGERY

## 2022-07-29 PROCEDURE — 37000008 HC ANESTHESIA 1ST 15 MINUTES: Performed by: ORTHOPAEDIC SURGERY

## 2022-07-29 PROCEDURE — 63600175 PHARM REV CODE 636 W HCPCS: Performed by: NURSE ANESTHETIST, CERTIFIED REGISTERED

## 2022-07-29 RX ORDER — LIDOCAINE HYDROCHLORIDE 10 MG/ML
INJECTION, SOLUTION EPIDURAL; INFILTRATION; INTRACAUDAL; PERINEURAL
Status: DISCONTINUED | OUTPATIENT
Start: 2022-07-29 | End: 2022-07-29 | Stop reason: HOSPADM

## 2022-07-29 RX ORDER — BACITRACIN ZINC 500 UNIT/G
OINTMENT (GRAM) TOPICAL
Status: DISCONTINUED | OUTPATIENT
Start: 2022-07-29 | End: 2022-07-29 | Stop reason: HOSPADM

## 2022-07-29 RX ORDER — ACETAMINOPHEN 500 MG
1000 TABLET ORAL ONCE
Status: COMPLETED | OUTPATIENT
Start: 2022-07-29 | End: 2022-07-29

## 2022-07-29 RX ORDER — MUPIROCIN 20 MG/G
OINTMENT TOPICAL
Status: DISCONTINUED | OUTPATIENT
Start: 2022-07-29 | End: 2022-07-29 | Stop reason: HOSPADM

## 2022-07-29 RX ORDER — BUPIVACAINE HYDROCHLORIDE 2.5 MG/ML
INJECTION, SOLUTION EPIDURAL; INFILTRATION; INTRACAUDAL
Status: DISCONTINUED | OUTPATIENT
Start: 2022-07-29 | End: 2022-07-29 | Stop reason: HOSPADM

## 2022-07-29 RX ORDER — PROPOFOL 10 MG/ML
VIAL (ML) INTRAVENOUS CONTINUOUS PRN
Status: DISCONTINUED | OUTPATIENT
Start: 2022-07-29 | End: 2022-07-29

## 2022-07-29 RX ORDER — SODIUM CHLORIDE 9 MG/ML
INJECTION, SOLUTION INTRAVENOUS CONTINUOUS
Status: DISCONTINUED | OUTPATIENT
Start: 2022-07-29 | End: 2022-07-29 | Stop reason: HOSPADM

## 2022-07-29 RX ORDER — CEFAZOLIN SODIUM/D5W 2 G/100 ML
2 PLASTIC BAG, INJECTION (ML) INTRAVENOUS
Status: COMPLETED | OUTPATIENT
Start: 2022-07-29 | End: 2022-07-29

## 2022-07-29 RX ORDER — SODIUM CHLORIDE 0.9 % (FLUSH) 0.9 %
3 SYRINGE (ML) INJECTION
Status: DISCONTINUED | OUTPATIENT
Start: 2022-07-29 | End: 2022-07-29 | Stop reason: HOSPADM

## 2022-07-29 RX ORDER — PROPOFOL 10 MG/ML
VIAL (ML) INTRAVENOUS
Status: DISCONTINUED | OUTPATIENT
Start: 2022-07-29 | End: 2022-07-29

## 2022-07-29 RX ORDER — LIDOCAINE HYDROCHLORIDE 20 MG/ML
INJECTION INTRAVENOUS
Status: DISCONTINUED | OUTPATIENT
Start: 2022-07-29 | End: 2022-07-29

## 2022-07-29 RX ADMIN — MUPIROCIN: 20 OINTMENT TOPICAL at 05:07

## 2022-07-29 RX ADMIN — PROPOFOL 100 MCG/KG/MIN: 10 INJECTION, EMULSION INTRAVENOUS at 07:07

## 2022-07-29 RX ADMIN — PROPOFOL 20 MG: 10 INJECTION, EMULSION INTRAVENOUS at 07:07

## 2022-07-29 RX ADMIN — ACETAMINOPHEN 1000 MG: 500 TABLET ORAL at 05:07

## 2022-07-29 RX ADMIN — LIDOCAINE HYDROCHLORIDE 75 MG: 20 INJECTION, SOLUTION INTRAVENOUS at 07:07

## 2022-07-29 RX ADMIN — SODIUM CHLORIDE: 0.9 INJECTION, SOLUTION INTRAVENOUS at 05:07

## 2022-07-29 RX ADMIN — PROPOFOL 40 MG: 10 INJECTION, EMULSION INTRAVENOUS at 07:07

## 2022-07-29 RX ADMIN — Medication 2 G: at 07:07

## 2022-07-29 NOTE — ANESTHESIA POSTPROCEDURE EVALUATION
Anesthesia Post Evaluation    Patient: Emerson Menendez    Procedure(s) Performed: Procedure(s) (LRB):  RELEASE, TRIGGER FINGER,RIGHT,LONG (Right)    Final Anesthesia Type: general      Patient location during evaluation: PACU  Patient participation: Yes- Able to Participate  Level of consciousness: awake and alert  Post-procedure vital signs: reviewed and stable  Pain management: adequate  Airway patency: patent    PONV status at discharge: No PONV  Anesthetic complications: no      Cardiovascular status: blood pressure returned to baseline  Respiratory status: unassisted  Hydration status: euvolemic  Follow-up not needed.          Vitals Value Taken Time   /68 07/29/22 0747   Temp 36.5 °C (97.7 °F) 07/29/22 0730   Pulse 68 07/29/22 0755   Resp 15 07/29/22 0755   SpO2 100 % 07/29/22 0755   Vitals shown include unvalidated device data.      No case tracking events are documented in the log.      Pain/Caleb Score: Pain Rating Prior to Med Admin: 2 (7/29/2022  5:52 AM)  Caleb Score: 9 (7/29/2022  7:30 AM)

## 2022-07-29 NOTE — BRIEF OP NOTE
[facility] - Surgery (Hospital)  Brief Operative Note    Surgery Date: 7/29/2022     Surgeon(s) and Role:     * Kindra Castillo MD - Primary    Assisting Surgeon: None    Pre-op Diagnosis:  Trigger middle finger of right hand [M65.331]  Pain in both hands [M79.641, M79.642]    Post-op Diagnosis:  Post-Op Diagnosis Codes:     * Trigger middle finger of right hand [M65.331]     * Pain in both hands [M79.641, M79.642]    Procedure(s) (LRB):  RELEASE, TRIGGER FINGER,RIGHT,LONG (Right)    Anesthesia: Local MAC    Operative Findings: See op note    Estimated Blood Loss: * No values recorded between 7/29/2022  7:16 AM and 7/29/2022  7:30 AM *         Specimens:   Specimen (24h ago, onward)            None            Discharge Note    OUTCOME: Patient tolerated treatment/procedure well without complication and is now ready for discharge.    DISPOSITION: Home or Self Care    FINAL DIAGNOSIS:    Problem List Items Addressed This Visit    None     Visit Diagnoses     Trigger finger of right hand    -  Primary    Relevant Orders    Diet general    Call MD for:  temperature >100.4    Call MD for:  persistent nausea and vomiting    Call MD for:  severe uncontrolled pain    Call MD for:  difficulty breathing, headache or visual disturbances    Call MD for:  redness, tenderness, or signs of infection (pain, swelling, redness, odor or green/yellow discharge around incision site)    Call MD for:  hives    Call MD for:  persistent dizziness or light-headedness    Call MD for:  extreme fatigue    No driving, operating heavy equipment or signing legal documents while taking pain medication    Weight bearing restrictions (specify)    Leave dressing on - Keep it clean, dry, and intact until clinic visit    Pre-op testing        Relevant Orders    POCT glucose            FOLLOWUP: In clinic

## 2022-07-29 NOTE — PLAN OF CARE
Plan of care reviewed- no further questions. Call light within reach. Belongings in locker. Pre procedure complete

## 2022-07-29 NOTE — DISCHARGE INSTRUCTIONS
AFTER HAND SURGERY    DOS:  Keep affected wrist elevated above the level of your heart  Keep surgical dressing clean, dry, and intact until clinic visit  Check circulation frequently in fingers by pressing on the nail bed. Nail bed should turn white and then pink when released.  Exercise fingers to promote circulation.  Advance diet as tolerated  Resume home medications today.      DONT:  No driving for 24 hours or while taking narcotic pain medication      CALL PHYSICIAN FOR:  Obvious bleeding  Excessive swelling  Drainage (pus) from the wound  Pain unrelieved by pain medication.

## 2022-07-29 NOTE — TRANSFER OF CARE
"Anesthesia Transfer of Care Note    Patient: Emerson Menendez    Procedure(s) Performed: Procedure(s) (LRB):  RELEASE, TRIGGER FINGER,RIGHT,LONG (Right)    Patient location: PACU    Anesthesia Type: general    Transport from OR: Transported from OR on room air with adequate spontaneous ventilation    Post pain: adequate analgesia    Post assessment: no apparent anesthetic complications and tolerated procedure well    Post vital signs: stable    Level of consciousness: awake and sedated    Nausea/Vomiting: no nausea/vomiting    Complications: none    Transfer of care protocol was followed      Last vitals:   Visit Vitals  BP (!) 145/73 (BP Location: Left arm, Patient Position: Lying)   Pulse 80   Temp 36.6 °C (97.8 °F) (Oral)   Resp 16   Ht 5' 10" (1.778 m)   Wt 86.2 kg (190 lb)   SpO2 99%   BMI 27.26 kg/m²     "

## 2022-07-29 NOTE — PLAN OF CARE
Patient ready to be discharged. VSS and in no distress.    Instructions given, patient verbalizes understanding. Pain assessed and addressed. Tolerates liquids by mouth with no nausea and vomiting.     Bedside pharmacy

## 2022-07-31 NOTE — OP NOTE
[facility] ([facility])  Surgery Department  Operative Note    SUMMARY     Date of Procedure: 7/29/2022     Procedure: Procedure(s) (LRB):  RELEASE, TRIGGER FINGER,RIGHT,LONG (Right)     Surgeon(s) and Role:     * Kindra Castillo MD - Primary    Assisting Surgeon: None    Pre-Operative Diagnosis: Trigger middle finger of right hand [M65.331]  Pain in both hands [M79.641, M79.642]    Post-Operative Diagnosis: Post-Op Diagnosis Codes:     * Trigger middle finger of right hand [M65.331]     * Pain in both hands [M79.641, M79.642]    Anesthesia: Local MAC    Technical Procedures Used: surgery    Description of the Findings of the Procedure:TOURNIQUET TIME: 10 minutes.   PACKS AND DRAINS: None.   IMPLANTS: None.   SPECIMENS: None.   COMPLICATIONS: None.   INDICATIONS FOR PROCEDURE: Emerson Menendez is a 77 y.o.year-old who has failed   conservative treatment for  right  longfinger trigger finger; therefore,   operative intervention was deemed necessary. Risks and benefits were explained   to the patient in clinic. Consents were performed in clinic.   PROCEDURE IN DETAIL: After the correct site was marked with the patient's   participation in the Holding Area, the patient was brought to the Operating   Room, placed in supine position, underwent MAC anesthesia. A well-padded   nonsterile tourniquet was placed on the right forearm. Time-out was called for   correct site, procedure and patient to be indicated. Under sterile conditions,   an injection of lidocaine 1% without epinephrine was injected at the A1 pulley   space. The arm was prepped and draped in normal sterile fashion. Incision was   marked out in a longitudinal fashion along the pulley. The arm was   exsanguinated using Esmarch. Tourniquet was insufflated 250 mmHg and that is   where it remained for 10 minutes. The incision was made. Careful dissection   down was maintained to the A1 pulley. The neurovascular structures were   retracted out of the  way. The A1 pulley was identified. It was sharply   incised. It was completely incised proximally and distally. Portion of    pulley was also incised. The tendon was then retracted out of the tendon sheath   using a right angle. The finger was placed through range of motion, showed it   did not trigger. The area was irrigated with copious amounts of normal saline.   Nylon closed the skin. Sterile dressing was applied. Tourniquet was deflated.   Brisk cap refill ensued. The patient was transferred the Recovery Room in   stable condition.   POSTOPERATIVE PLAN FOR THIS PATIENT: The patient is to keep the dressing clean, dry and   intact. We will take the sutures out at two weeks.     of note patient had a flexion contracture of the PIP joint prior to surgery most likely will require hand therapy postoperatively just because this is such a significant trigger finger  Significant Surgical Tasks Conducted by the Assistant(s), if Applicable: none    Complications: No    Estimated Blood Loss (EBL): * No values recorded between 7/29/2022  7:16 AM and 7/29/2022  7:30 AM *           Implants: * No implants in log *    Specimens:   Specimen (24h ago, onward)            None                  Condition: Good    Disposition: PACU - hemodynamically stable.    Attestation: I performed the procedure.    Discharge Note    SUMMARY     Admit Date: 7/29/2022    Discharge Date and Time: 7/29/2022  8:45 AM    Hospital Course (synopsis of major diagnoses, care, treatment, and services provided during the course of the hospital stay): surgery     Final Diagnosis: Post-Op Diagnosis Codes:     * Trigger middle finger of right hand [M65.331]     * Pain in both hands [M79.641, M79.642]    Disposition: Home or Self Care    Follow Up/Patient Instructions:     Medications:  Reconciled Home Medications:      Medication List      CONTINUE taking these medications    ACCU-CHEK STEFANO CONTROL SOLN Soln  Generic drug: blood glucose control high,low      alcohol swabs Padm  Apply 1 each topically as needed.     aspirin 81 MG EC tablet  Commonly known as: ECOTRIN  Take 1 tablet (81 mg total) by mouth once daily.     blood glucose control, normal Soln  Commonly known as: METER-CHECK  One meter.  Use as directed. Meter of insurance choice     * blood sugar diagnostic Strp  I [facility] - check blood sugars 4 times daily     * ACCU-CHEK STEFANO PLUS TEST STRP Strp  Generic drug: blood sugar diagnostic  Accu check stefano plus TEST FOUR TIMES DAILY. Test strtips of insurance choice to go with meter and lancets     * blood-glucose meter Misc  Commonly known as: ACCU-CHEK OMAYRA  USE AS DIRECTED. Accucheck stefano plus     * blood-glucose meter Misc  Commonly known as: ACCU-CHEK STEFANO PLUS METER  Use as direted     * citalopram 40 MG tablet  Commonly known as: CeleXA  Take 1 tablet (40 mg total) by mouth once daily.     * citalopram 40 MG tablet  Commonly known as: CeleXA  Take 1 tablet (40 mg total) by mouth once daily.     diazePAM 5 MG tablet  Commonly known as: VALIUM  Take 1 tablet (5 mg total) by mouth every 12 (twelve) hours as needed for Anxiety.     * dorzolamide-timolol 2-0.5% 22.3-6.8 mg/mL ophthalmic solution  Commonly known as: COSOPT  Place 1 drop into both eyes 2 (two) times daily.     * dorzolamide-timolol 2-0.5% 22.3-6.8 mg/mL ophthalmic solution  Commonly known as: COSOPT  Place 1 drop into both eyes 2 (two) times daily.     FARXIGA 5 mg Tab tablet  Generic drug: dapagliflozin  Take 1 tablet (5 mg total) by mouth once daily.     FREESTYLE CHRISTIANO 14 DAY READER Misc  Generic drug: flash glucose scanning reader  Use as directed     FREESTYLE CHRISTIANO 14 DAY SENSOR Kit  Generic drug: flash glucose sensor  Use as directed     gabapentin 100 MG capsule  Commonly known as: NEURONTIN  TAKE 1 CAPSULE EVERY MORNING, 1 CAPSULE IN THE AFTERNOON, AND 1 TO 3 CAPSULE(S) AT BEDTIME AS NEEDED FOR FOOT PAIN     glimepiride 2 MG tablet  Commonly known as: AMARYL  Take 1 tablet (2  "mg total) by mouth once daily.     glucose 4 GM chewable tablet  Take 8 g by mouth as needed for Low blood sugar.     insulin aspart U-100 100 unit/mL (3 mL) Inpn pen  Commonly known as: NovoLOG Flexpen U-100 Insulin  Inject 4 Units into the skin 2 (two) times daily with meals.     insulin glargine 100 units/mL SubQ pen  Commonly known as: LANTUS SOLOSTAR U-100 INSULIN  ADMINISTER 15 UNITS UNDER THE SKIN IN THE MORNING AND IN THE EVENING     losartan 50 MG tablet  Commonly known as: COZAAR  Take 1 tablet (50 mg total) by mouth once daily.     MONOVISC 88 mg/4 mL Syrg  Generic drug: hyaluronate sodium, stabilized     omeprazole 40 MG capsule  Commonly known as: PRILOSEC  Take 1 capsule (40 mg total) by mouth once daily.     pen needle, diabetic 31 gauge x 5/16" Ndle  Commonly known as: BD ULTRA-FINE SHORT PEN NEEDLE  USE TO INJECT INSULIN ONE TIME DAILY     pravastatin 40 MG tablet  Commonly known as: PRAVACHOL  Take 1 tablet (40 mg total) by mouth once daily.     sildenafiL 100 MG tablet  Commonly known as: VIAGRA  Take 1 tablet (100 mg total) by mouth daily as needed for Erectile Dysfunction.     tamsulosin 0.4 mg Cap  Commonly known as: FLOMAX  Take 1 capsule (0.4 mg total) by mouth nightly.     traMADoL 50 mg tablet  Commonly known as: ULTRAM  Take 1 tablet (50 mg total) by mouth every 6 (six) hours as needed for Pain.     * TRUEPLUS LANCETS 33 gauge Misc  Generic drug: lancets     * lancets Misc  Commonly known as: ACCU-CHEK SOFTCLIX LANCETS  1 lancet by Misc.(Non-Drug; Combo Route) route 4 (four) times daily.     VITAMIN D2 50,000 unit Cap  Generic drug: ergocalciferol  Take 1 capsule (50,000 Units total) by mouth every 7 days.         * This list has 10 medication(s) that are the same as other medications prescribed for you. Read the directions carefully, and ask your doctor or other care provider to review them with you.              Discharge Procedure Orders   Diet general     Call MD for:  temperature " >100.4     Call MD for:  persistent nausea and vomiting     Call MD for:  severe uncontrolled pain     Call MD for:  difficulty breathing, headache or visual disturbances     Call MD for:  redness, tenderness, or signs of infection (pain, swelling, redness, odor or green/yellow discharge around incision site)     Call MD for:  hives     Call MD for:  persistent dizziness or light-headedness     Call MD for:  extreme fatigue     No driving, operating heavy equipment or signing legal documents while taking pain medication     Leave dressing on - Keep it clean, dry, and intact until clinic visit     Weight bearing restrictions (specify)   Order Comments: ROM AS TOLERATED to all digits; no lifting more than 5 lbs

## 2022-08-01 ENCOUNTER — OFFICE VISIT (OUTPATIENT)
Dept: HEMATOLOGY/ONCOLOGY | Facility: CLINIC | Age: 77
End: 2022-08-01
Payer: MEDICARE

## 2022-08-01 ENCOUNTER — LAB VISIT (OUTPATIENT)
Dept: LAB | Facility: HOSPITAL | Age: 77
End: 2022-08-01
Payer: MEDICARE

## 2022-08-01 ENCOUNTER — RESEARCH ENCOUNTER (OUTPATIENT)
Dept: HEMATOLOGY/ONCOLOGY | Facility: CLINIC | Age: 77
End: 2022-08-01

## 2022-08-01 VITALS
SYSTOLIC BLOOD PRESSURE: 143 MMHG | BODY MASS INDEX: 28.81 KG/M2 | HEIGHT: 70 IN | HEART RATE: 85 BPM | TEMPERATURE: 98 F | OXYGEN SATURATION: 96 % | DIASTOLIC BLOOD PRESSURE: 71 MMHG | WEIGHT: 201.25 LBS

## 2022-08-01 VITALS
HEART RATE: 70 BPM | RESPIRATION RATE: 19 BRPM | TEMPERATURE: 98 F | BODY MASS INDEX: 27.2 KG/M2 | HEIGHT: 70 IN | DIASTOLIC BLOOD PRESSURE: 75 MMHG | OXYGEN SATURATION: 100 % | WEIGHT: 190 LBS | SYSTOLIC BLOOD PRESSURE: 158 MMHG

## 2022-08-01 DIAGNOSIS — E11.42 TYPE 2 DIABETES MELLITUS WITH DIABETIC POLYNEUROPATHY, WITH LONG-TERM CURRENT USE OF INSULIN: ICD-10-CM

## 2022-08-01 DIAGNOSIS — D47.2 SMOLDERING MULTIPLE MYELOMA: ICD-10-CM

## 2022-08-01 DIAGNOSIS — E11.22 CKD STAGE 3 DUE TO TYPE 2 DIABETES MELLITUS: ICD-10-CM

## 2022-08-01 DIAGNOSIS — N18.30 CKD STAGE 3 DUE TO TYPE 2 DIABETES MELLITUS: ICD-10-CM

## 2022-08-01 DIAGNOSIS — Z79.4 TYPE 2 DIABETES MELLITUS WITH DIABETIC POLYNEUROPATHY, WITH LONG-TERM CURRENT USE OF INSULIN: ICD-10-CM

## 2022-08-01 DIAGNOSIS — D47.2 SMOLDERING MULTIPLE MYELOMA: Primary | ICD-10-CM

## 2022-08-01 DIAGNOSIS — Z00.6 RESEARCH EXAM: ICD-10-CM

## 2022-08-01 LAB
ALBUMIN SERPL BCP-MCNC: 3.4 G/DL (ref 3.5–5.2)
ALP SERPL-CCNC: 55 U/L (ref 55–135)
ALT SERPL W/O P-5'-P-CCNC: 19 U/L (ref 10–44)
ANION GAP SERPL CALC-SCNC: 9 MMOL/L (ref 8–16)
AST SERPL-CCNC: 19 U/L (ref 10–40)
BASOPHILS # BLD AUTO: 0.04 K/UL (ref 0–0.2)
BASOPHILS NFR BLD: 0.8 % (ref 0–1.9)
BILIRUB SERPL-MCNC: 0.4 MG/DL (ref 0.1–1)
BUN SERPL-MCNC: 17 MG/DL (ref 8–23)
CALCIUM SERPL-MCNC: 9.5 MG/DL (ref 8.7–10.5)
CHLORIDE SERPL-SCNC: 100 MMOL/L (ref 95–110)
CO2 SERPL-SCNC: 25 MMOL/L (ref 23–29)
CREAT SERPL-MCNC: 1.9 MG/DL (ref 0.5–1.4)
DIFFERENTIAL METHOD: ABNORMAL
EOSINOPHIL # BLD AUTO: 0.3 K/UL (ref 0–0.5)
EOSINOPHIL NFR BLD: 5 % (ref 0–8)
ERYTHROCYTE [DISTWIDTH] IN BLOOD BY AUTOMATED COUNT: 13.7 % (ref 11.5–14.5)
EST. GFR  (NO RACE VARIABLE): 36.1 ML/MIN/1.73 M^2
GLUCOSE SERPL-MCNC: 229 MG/DL (ref 70–110)
HCT VFR BLD AUTO: 34.8 % (ref 40–54)
HGB BLD-MCNC: 11 G/DL (ref 14–18)
IGA SERPL-MCNC: 1705 MG/DL (ref 40–350)
IGG SERPL-MCNC: 1027 MG/DL (ref 650–1600)
IGM SERPL-MCNC: 31 MG/DL (ref 50–300)
IMM GRANULOCYTES # BLD AUTO: 0.01 K/UL (ref 0–0.04)
IMM GRANULOCYTES NFR BLD AUTO: 0.2 % (ref 0–0.5)
LDH SERPL L TO P-CCNC: 109 U/L (ref 110–260)
LYMPHOCYTES # BLD AUTO: 2.1 K/UL (ref 1–4.8)
LYMPHOCYTES NFR BLD: 41.2 % (ref 18–48)
MAGNESIUM SERPL-MCNC: 2 MG/DL (ref 1.6–2.6)
MCH RBC QN AUTO: 28.4 PG (ref 27–31)
MCHC RBC AUTO-ENTMCNC: 31.6 G/DL (ref 32–36)
MCV RBC AUTO: 90 FL (ref 82–98)
MONOCYTES # BLD AUTO: 0.4 K/UL (ref 0.3–1)
MONOCYTES NFR BLD: 7.3 % (ref 4–15)
NEUTROPHILS # BLD AUTO: 2.4 K/UL (ref 1.8–7.7)
NEUTROPHILS NFR BLD: 45.5 % (ref 38–73)
NRBC BLD-RTO: 0 /100 WBC
PHOSPHATE SERPL-MCNC: 3.2 MG/DL (ref 2.7–4.5)
PLATELET # BLD AUTO: 314 K/UL (ref 150–450)
PMV BLD AUTO: 9.4 FL (ref 9.2–12.9)
POTASSIUM SERPL-SCNC: 4.8 MMOL/L (ref 3.5–5.1)
PROT SERPL-MCNC: 8.3 G/DL (ref 6–8.4)
RBC # BLD AUTO: 3.88 M/UL (ref 4.6–6.2)
SODIUM SERPL-SCNC: 134 MMOL/L (ref 136–145)
WBC # BLD AUTO: 5.2 K/UL (ref 3.9–12.7)

## 2022-08-01 PROCEDURE — 86334 PATHOLOGIST INTERPRETATION IFE: ICD-10-PCS | Mod: 26,Q1,, | Performed by: PATHOLOGY

## 2022-08-01 PROCEDURE — 86334 IMMUNOFIX E-PHORESIS SERUM: CPT | Performed by: INTERNAL MEDICINE

## 2022-08-01 PROCEDURE — 82784 ASSAY IGA/IGD/IGG/IGM EACH: CPT | Mod: Q1 | Performed by: INTERNAL MEDICINE

## 2022-08-01 PROCEDURE — 99215 PR OFFICE/OUTPT VISIT, EST, LEVL V, 40-54 MIN: ICD-10-PCS | Mod: Q1,S$GLB,, | Performed by: INTERNAL MEDICINE

## 2022-08-01 PROCEDURE — 84165 PROTEIN E-PHORESIS SERUM: CPT | Mod: 26,Q1,, | Performed by: PATHOLOGY

## 2022-08-01 PROCEDURE — 83735 ASSAY OF MAGNESIUM: CPT | Performed by: INTERNAL MEDICINE

## 2022-08-01 PROCEDURE — 99999 PR PBB SHADOW E&M-EST. PATIENT-LVL IV: CPT | Mod: PBBFAC,,, | Performed by: INTERNAL MEDICINE

## 2022-08-01 PROCEDURE — 83520 IMMUNOASSAY QUANT NOS NONAB: CPT | Mod: 59,Q1 | Performed by: INTERNAL MEDICINE

## 2022-08-01 PROCEDURE — 80053 COMPREHEN METABOLIC PANEL: CPT | Mod: Q1 | Performed by: INTERNAL MEDICINE

## 2022-08-01 PROCEDURE — 84100 ASSAY OF PHOSPHORUS: CPT | Performed by: INTERNAL MEDICINE

## 2022-08-01 PROCEDURE — 99215 OFFICE O/P EST HI 40 MIN: CPT | Mod: Q1,S$GLB,, | Performed by: INTERNAL MEDICINE

## 2022-08-01 PROCEDURE — 83615 LACTATE (LD) (LDH) ENZYME: CPT | Mod: Q1 | Performed by: INTERNAL MEDICINE

## 2022-08-01 PROCEDURE — 86334 IMMUNOFIX E-PHORESIS SERUM: CPT | Mod: 26,Q1,, | Performed by: PATHOLOGY

## 2022-08-01 PROCEDURE — 84165 PROTEIN E-PHORESIS SERUM: CPT | Mod: Q1 | Performed by: INTERNAL MEDICINE

## 2022-08-01 PROCEDURE — 36415 COLL VENOUS BLD VENIPUNCTURE: CPT | Performed by: INTERNAL MEDICINE

## 2022-08-01 PROCEDURE — 85025 COMPLETE CBC W/AUTO DIFF WBC: CPT | Performed by: INTERNAL MEDICINE

## 2022-08-01 PROCEDURE — 99999 PR PBB SHADOW E&M-EST. PATIENT-LVL IV: ICD-10-PCS | Mod: PBBFAC,,, | Performed by: INTERNAL MEDICINE

## 2022-08-01 PROCEDURE — 84165 PATHOLOGIST INTERPRETATION SPE: ICD-10-PCS | Mod: 26,Q1,, | Performed by: PATHOLOGY

## 2022-08-01 NOTE — PROGRESS NOTES
Subjective:    Patient ID: Emerson Menendez is a 77 y.o. male.    Chief Complaint: No chief complaint on file.  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 Test results identify a stable IgA kappa protein with beta globulin band at 1.63g/dL. Kappa free light chain is elevated at 4.40. CBC and calcium are stable. Creatinine with history of stage III CKD is stable at 1.4. Metastatic survey is negative for lytic lesions. MRI of the entire spine demonstrated age related changes but no convincing evidence of myeloma bone disease. Bone marrow biopsy identified about 13% plasma cells by morphology and FISH for myeloma identified a t(11;14) and trisomies 3,7, and 17. The patient is afebrile and appears clinically well. He was seen by his podiatrist and nephrologist since our last visit without any new or acute events.    The patient has not received any therapy for smoldering myeloma including bisphosphonates or steroids. He has been randomized to observation arm of the the clinical study. 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 He has no current or prior history of malignancy.    TODAY Cycle 80, observation  No acute interval medical events  Completed surgery for his trigger finger 7/29  CBC stable; MM labs pending  M protein at 1.92 last month, will follow-up pending study      Knee Pain     Joint Pain  Associated symptoms include coughing. Pertinent negatives include no diaphoresis, fatigue or fever.   Hand Pain     Cough  Pertinent negatives include no fever.   Foot Pain  Associated symptoms include coughing. Pertinent negatives include no diaphoresis, fatigue or  fever.   Arm Pain     Follow-up  Associated symptoms include coughing. Pertinent negatives include no diaphoresis, fatigue or fever.     Review of Systems   Constitutional: Negative for activity change, appetite change, diaphoresis, fatigue, fever and unexpected weight change.   HENT: Negative.    Eyes: Negative.    Respiratory: Positive for cough.    Cardiovascular: Negative for leg swelling.   Gastrointestinal: Negative.    Endocrine: Negative.    Genitourinary: Negative.    Musculoskeletal: Negative.    Skin: Negative.    Allergic/Immunologic: Negative.    Neurological:        Grade 0-1 peripheral neuropathy of bilateral feet.    Hematological: Negative for adenopathy. Does not bruise/bleed easily.   Psychiatric/Behavioral: Negative.        Objective:       Vitals:    08/01/22 0919   BP: (!) 143/71   Pulse: 85   Temp: 98.2 °F (36.8 °C)       Physical Exam  Vitals and nursing note reviewed.   Constitutional:       Appearance: He is well-developed.   HENT:      Head: Normocephalic and atraumatic.      Right Ear: External ear normal.      Left Ear: External ear normal.      Nose: Nose normal.   Eyes:      Conjunctiva/sclera: Conjunctivae normal.      Pupils: Pupils are equal, round, and reactive to light.   Cardiovascular:      Rate and Rhythm: Normal rate and regular rhythm.      Heart sounds: No murmur heard.  Pulmonary:      Effort: Pulmonary effort is normal. No respiratory distress.      Breath sounds: Normal breath sounds.   Abdominal:      General: Bowel sounds are normal. There is no distension.      Palpations: Abdomen is soft.   Musculoskeletal:         General: No tenderness.      Cervical back: Normal range of motion and neck supple.   Skin:     General: Skin is warm and dry.      Findings: No rash.      Nails: There is no clubbing.   Neurological:      Mental Status: He is alert and oriented to person, place, and time.      Cranial Nerves: No cranial nerve deficit.   Psychiatric:         Behavior:  Behavior normal.         Assessment:       No diagnosis found.    Plan:       The patient has a diagnosis of smoldering myeloma. There is no indication for immediate chemotherapy. We are monitoring renal function closely- baseline creatinine of around 1.5. 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 M protein has been stable and repeat is pending. Plan for return in 1month.  Endocrinology and Nephrology to monitor diabetes and renal failure respectively. Patient would like to continue to follow with Dr. Perkins as needed.    A total of 20 minutes was spent in pre-visit chart review, personal interpretation of labs and imaging, and medication review. Total visit time 30 minutes, >50 % counseling.

## 2022-08-01 NOTE — PROGRESS NOTES
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   Sponsor:  [facility]  IRB# [id number]  Study ID: [id number]  Investigator: GIL Engel Pt Initials: LUCÍA LORENZ        Cycle 80 Day 28 Arm B: Observation      Patient presents to clinic today for above cycle evaluation; he is un-accompanied and states he continues to do well. He is alert, oriented to person, place, and time; mood and affect are appropriate. He states he ahd trigger release of finger, right hand on Friday, 7/29/22; no issues sirisha/post operative. His right hand is bandaged loosely, able to move all fingers; states minimal pain to site (2 out of 10) He reveals sutured post op area on palm of hand; area dry, clean and sutures intact; no erythema or drainage noted. He states he has follow up with surgeon in 2 weeks. He states he is managing diabetes and glucose "better" has appt with endocrinologist in 3 weeks.  States is maintaining current COVID precautions; wears mask in crowded indoor areas; denies any COVID symptoms. 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        Review of Baseline AE's:   1.Hypertension, Grade 2 systolic and diastolic: BP today is 143/70; subject states compliance with [place]. Subject denies headache/dizziness; no symptoms noted.   AE ongoing and tends to fluctuate no worse than Grade 2.   2. Lower Back Pain and Neck Pain, grade 1: Stable.  Patient has no complaints as of late and as previously stated, patient is not suspected to have bony myeloma involvement per Dr. Engel as these are pre-existing issues present at baseline. I scheduled tomorrow for MRI of spine, Will update results when available.  AE ongoing.  3. Pain in extremity (knees), grade 1. Subject presents with knee braces; states no complaints today; s/p injections 2 mos ago.States is " trying to get surgery set up for replacement when activity with work slows down.  Will continue to monitor.       4. Peripheral sensory neuropathy, grade 1: Patient does not verbalize complaints today. Has gabapentin prescribed and takes as needed.  States has not taken recently.  AE ongoing.   5. Anemia, Grade 1: Hgb stable at 11.0. Result reviewed by Dr. Engel, who answered patient questions as above. AE ongoing and stable.            Review of AE's:     *Please note this list is not all-inclusive, please see AE log for physician reviewed list of adverse events.   1. Creatinine increased, grade 2: Serum creatinine from labs today is 1.9; calculated GFR 43 ml/min based on CG calculation. Reviewed per Dr. Engel and no new orders.Per Dr Engel not been related to myeloma: rather, CKD;  likely secondary to diabetes and hypertension vs plasma cell dyscrasia.  Will continue observation monthly and to monitor renal functionclosely. Per MD, reiterated adequate hydration with increase in water/gatorade intake..  2. Hyponatremia, Grade 1: Serum sodium today is 134 mmol/L; AE intermittent. Will monitor  3. Hyperkalemia, Grade 1: serum K today is 4.8, Dr Engel has reviewed labs; no additional orders noted. AE intermittent; will continue to monitor.   4. Hyperuricemia, Grade 1: this was not evaluated today; usually ordered per nephrology.   5. Hyperglycemia. Grade 1: blood glucose today is 229; lab is nonfasting. See interval history above regarding recent bg monitoring; goal to keep HgA1c at  7 or below. No new orders per Dr Engel; subject followed closely by endocrinology.  Had Hg A1c done mid November; 7.7; Dr Engel aware; no orders.  6. Diplopia, right eye: Subject has follow up with ophtamology later this month; no issues presently. Per Dr Engel this is not related to SMM; rather is associated with diabetes. Subject maintains compliance with opthomoolgy; will continue to monitor. He has appt in am with eye MD at [facility].      Per  "study chair, "the definition of progressive disease for this protocol is developing CRAB criteria that requires treatment systemically." Per Dr Engel, based on today's exam and lab results, patient does not have any s/s of CRAB: Normal Calcium level (9.6), absence of Renal insufficiency (serum creatinine elevated today at 1.9 but per Dr Engel not related to myeloma; --patient with a history of kidney dysfunction secondary to diabetes; Dr. Engel aware of these values and not concerned for myeloma involvement), absence of significant Anemia (hemoglobin 10.7, stable; will await myeloma labs for clarity relationship to myeloma) and absence of lytic Bone lesions (per baseline metastatic survey as well as MRI--see MD note for further comment). See MD note for ECOG score and H&P and flowsheets for laboratory work, vitals, etc. All myeloma-related blood work from last cycle including SPEP and JAGUAR have remained stable, and are currently pending for this cycle. Per Dr. Engel, patient shows no evidence of progression at last result. Patient informed that Dr. Engel will release all lab results to [facility]. He states understanding of this. QOL's not required again until end of treatment/observation.           Subject maintains he wishes only to see Dr Engel and will not show for appts if scheduled with any other provider. For this reason subject's appts do not always align with study calendar; per Dr Engel as subject is not on active treatment will abide by subject's wishes to see her only.  Will continue to schedule patient as far out as possible, which seems to help maintain compliance with visits. Normally subject deviates no greater than one week per cycle; wishes to remain on study per Dr Engel. Next appt was moved forwarded one week to [facility] 28 day cycles for data/study compliance; appts generally fall within 28-32 days.   Informed patient will continue to contact him a few days prior to next appt to remind him so that he " can plan accordingly. Patient states having research RN's contact information and has MD contact information to call with any concerns, questions, or worsening of symptoms. Will follow up with subject once myeloma labs are resulted.

## 2022-08-02 ENCOUNTER — OFFICE VISIT (OUTPATIENT)
Dept: OPHTHALMOLOGY | Facility: CLINIC | Age: 77
End: 2022-08-02
Payer: MEDICARE

## 2022-08-02 ENCOUNTER — PATIENT MESSAGE (OUTPATIENT)
Dept: ORTHOPEDICS | Facility: CLINIC | Age: 77
End: 2022-08-02
Payer: MEDICARE

## 2022-08-02 DIAGNOSIS — H40.1132 PRIMARY OPEN ANGLE GLAUCOMA OF BOTH EYES, MODERATE STAGE: Primary | ICD-10-CM

## 2022-08-02 DIAGNOSIS — E11.3292 MILD NONPROLIFERATIVE DIABETIC RETINOPATHY OF LEFT EYE WITHOUT MACULAR EDEMA ASSOCIATED WITH TYPE 2 DIABETES MELLITUS: ICD-10-CM

## 2022-08-02 DIAGNOSIS — H35.033 HYPERTENSIVE RETINOPATHY OF BOTH EYES: ICD-10-CM

## 2022-08-02 DIAGNOSIS — Z98.49 STATUS POST CATARACT EXTRACTION AND INSERTION OF INTRAOCULAR LENS, UNSPECIFIED LATERALITY: ICD-10-CM

## 2022-08-02 DIAGNOSIS — Z96.1 STATUS POST CATARACT EXTRACTION AND INSERTION OF INTRAOCULAR LENS, UNSPECIFIED LATERALITY: ICD-10-CM

## 2022-08-02 DIAGNOSIS — E08.3211 MILD NONPROLIFERATIVE DIABETIC RETINOPATHY OF RIGHT EYE WITH MACULAR EDEMA ASSOCIATED WITH DIABETES MELLITUS DUE TO UNDERLYING CONDITION: ICD-10-CM

## 2022-08-02 LAB
ALBUMIN SERPL ELPH-MCNC: 3.65 G/DL (ref 3.35–5.55)
ALPHA1 GLOB SERPL ELPH-MCNC: 0.3 G/DL (ref 0.17–0.41)
ALPHA2 GLOB SERPL ELPH-MCNC: 0.91 G/DL (ref 0.43–0.99)
B-GLOBULIN SERPL ELPH-MCNC: 2.59 G/DL (ref 0.5–1.1)
GAMMA GLOB SERPL ELPH-MCNC: 0.55 G/DL (ref 0.67–1.58)
INTERPRETATION SERPL IFE-IMP: NORMAL
KAPPA LC SER QL IA: 7.72 MG/DL (ref 0.33–1.94)
KAPPA LC/LAMBDA SER IA: 3.61 (ref 0.26–1.65)
LAMBDA LC SER QL IA: 2.14 MG/DL (ref 0.57–2.63)
PATHOLOGIST INTERPRETATION IFE: NORMAL
PATHOLOGIST INTERPRETATION SPE: NORMAL
PROT SERPL-MCNC: 8 G/DL (ref 6–8.4)

## 2022-08-02 PROCEDURE — 1159F MED LIST DOCD IN RCRD: CPT | Mod: CPTII,S$GLB,, | Performed by: OPHTHALMOLOGY

## 2022-08-02 PROCEDURE — 1101F PT FALLS ASSESS-DOCD LE1/YR: CPT | Mod: CPTII,S$GLB,, | Performed by: OPHTHALMOLOGY

## 2022-08-02 PROCEDURE — 1101F PR PT FALLS ASSESS DOC 0-1 FALLS W/OUT INJ PAST YR: ICD-10-PCS | Mod: CPTII,S$GLB,, | Performed by: OPHTHALMOLOGY

## 2022-08-02 PROCEDURE — 99999 PR PBB SHADOW E&M-EST. PATIENT-LVL IV: ICD-10-PCS | Mod: PBBFAC,,, | Performed by: OPHTHALMOLOGY

## 2022-08-02 PROCEDURE — 3288F FALL RISK ASSESSMENT DOCD: CPT | Mod: CPTII,S$GLB,, | Performed by: OPHTHALMOLOGY

## 2022-08-02 PROCEDURE — 99214 OFFICE O/P EST MOD 30 MIN: CPT | Mod: S$GLB,,, | Performed by: OPHTHALMOLOGY

## 2022-08-02 PROCEDURE — 1160F PR REVIEW ALL MEDS BY PRESCRIBER/CLIN PHARMACIST DOCUMENTED: ICD-10-PCS | Mod: CPTII,S$GLB,, | Performed by: OPHTHALMOLOGY

## 2022-08-02 PROCEDURE — 1159F PR MEDICATION LIST DOCUMENTED IN MEDICAL RECORD: ICD-10-PCS | Mod: CPTII,S$GLB,, | Performed by: OPHTHALMOLOGY

## 2022-08-02 PROCEDURE — 1126F PR PAIN SEVERITY QUANTIFIED, NO PAIN PRESENT: ICD-10-PCS | Mod: CPTII,S$GLB,, | Performed by: OPHTHALMOLOGY

## 2022-08-02 PROCEDURE — 1126F AMNT PAIN NOTED NONE PRSNT: CPT | Mod: CPTII,S$GLB,, | Performed by: OPHTHALMOLOGY

## 2022-08-02 PROCEDURE — 99499 RISK ADDL DX/OHS AUDIT: ICD-10-PCS | Mod: S$GLB,,, | Performed by: OPHTHALMOLOGY

## 2022-08-02 PROCEDURE — 99499 UNLISTED E&M SERVICE: CPT | Mod: S$GLB,,, | Performed by: OPHTHALMOLOGY

## 2022-08-02 PROCEDURE — 1160F RVW MEDS BY RX/DR IN RCRD: CPT | Mod: CPTII,S$GLB,, | Performed by: OPHTHALMOLOGY

## 2022-08-02 PROCEDURE — 3288F PR FALLS RISK ASSESSMENT DOCUMENTED: ICD-10-PCS | Mod: CPTII,S$GLB,, | Performed by: OPHTHALMOLOGY

## 2022-08-02 PROCEDURE — 99999 PR PBB SHADOW E&M-EST. PATIENT-LVL IV: CPT | Mod: PBBFAC,,, | Performed by: OPHTHALMOLOGY

## 2022-08-02 PROCEDURE — 99214 PR OFFICE/OUTPT VISIT, EST, LEVL IV, 30-39 MIN: ICD-10-PCS | Mod: S$GLB,,, | Performed by: OPHTHALMOLOGY

## 2022-08-02 NOTE — PROGRESS NOTES
Assessment /Plan     For exam results, see Encounter Report.    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 [place]  Occasion / [employer] authority -->   [employer]    ==> 48 yo Daughter  @ 2020  Only child  Grieving  Aneurysm / coma x 3 days 12 years ago --> RF --> Dialysis x 12 years      POAG  pre - Tx - High 20's OS 2013 -->   No Family hx glc or blindness    CCT  508 // 510    Mid teens < 18 ==> achieved & re-discussed      Both eyes --> CSM  Cosopt BID    PGA --> holding  Alphagan --> holding    SP SLT OD 2020  SP SLT OS 2020      Mild DR  CSME OD --> Dr Phillips  No CSME OS  Control --> better    HTN ret  Control    PC IOL OD  SN60WF 19.0 GK  Quiet  Clear and intact      Left eye PC IOL / Trypan Blue 2021  Quiet  Observe      IOL choice:       AL       OS     SN60WF 119.0        20.0  -0.47    Plan  RTC 4 - 6 month IOP, adherence --> HVF & OCT RNFL  RTC sooner PRN with good understanding

## 2022-08-04 ENCOUNTER — PROCEDURE VISIT (OUTPATIENT)
Dept: OPHTHALMOLOGY | Facility: CLINIC | Age: 77
End: 2022-08-04
Payer: MEDICARE

## 2022-08-04 DIAGNOSIS — H40.1132 PRIMARY OPEN ANGLE GLAUCOMA OF BOTH EYES, MODERATE STAGE: ICD-10-CM

## 2022-08-04 DIAGNOSIS — E08.3211 MILD NONPROLIFERATIVE DIABETIC RETINOPATHY OF RIGHT EYE WITH MACULAR EDEMA ASSOCIATED WITH DIABETES MELLITUS DUE TO UNDERLYING CONDITION: Primary | ICD-10-CM

## 2022-08-04 DIAGNOSIS — E11.3292 MILD NONPROLIFERATIVE DIABETIC RETINOPATHY OF LEFT EYE WITHOUT MACULAR EDEMA ASSOCIATED WITH TYPE 2 DIABETES MELLITUS: ICD-10-CM

## 2022-08-04 DIAGNOSIS — H35.033 HYPERTENSIVE RETINOPATHY OF BOTH EYES: ICD-10-CM

## 2022-08-04 PROCEDURE — 92134 OCT, RETINA - OU - BOTH EYES: ICD-10-PCS | Mod: S$GLB,,, | Performed by: OPHTHALMOLOGY

## 2022-08-04 PROCEDURE — 67028 INJECTION EYE DRUG: CPT | Mod: RT,S$GLB,, | Performed by: OPHTHALMOLOGY

## 2022-08-04 PROCEDURE — 99214 OFFICE O/P EST MOD 30 MIN: CPT | Mod: 25,S$GLB,, | Performed by: OPHTHALMOLOGY

## 2022-08-04 PROCEDURE — 67028 PR INJECT INTRAVITREAL PHARMCOLOGIC: ICD-10-PCS | Mod: RT,S$GLB,, | Performed by: OPHTHALMOLOGY

## 2022-08-04 PROCEDURE — 99214 PR OFFICE/OUTPT VISIT, EST, LEVL IV, 30-39 MIN: ICD-10-PCS | Mod: 25,S$GLB,, | Performed by: OPHTHALMOLOGY

## 2022-08-04 PROCEDURE — 92134 CPTRZ OPH DX IMG PST SGM RTA: CPT | Mod: S$GLB,,, | Performed by: OPHTHALMOLOGY

## 2022-08-04 RX ORDER — DORZOLAMIDE HYDROCHLORIDE AND TIMOLOL MALEATE 20; 5 MG/ML; MG/ML
1 SOLUTION/ DROPS OPHTHALMIC 2 TIMES DAILY
Qty: 10 ML | Refills: 5 | Status: SHIPPED | OUTPATIENT
Start: 2022-08-04 | End: 2023-02-09

## 2022-08-04 NOTE — PROGRESS NOTES
HPI     DLS: 7/1/2022 Dr. Phillips     77 y.o. male is here for 4 week DFE/OCTm OD, Eylea OD. Denies eye pain and   f/f. No discomfort. No noticeable VA changes since last visit. No problems   with glare.     Eye Med's: Cosopt BID OU         Last edited by CARLOS EDUARDO Hester on 8/4/2022  8:23 AM. (History)             A/P  1. 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 Mild nonproliferative diabetic retinopathy of left eye without macular edema associated with type 2 diabetes mellitus    3. Hypertensive retinopathy of both eyes    4. Primary open angle glaucoma of both eyes, moderate stage       1. 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 Mild nonproliferative diabetic retinopathy of left eye without macular edema associated with type 2 diabetes mellitus  PCP is Dr. Sagastume  05/02/2022  8.5  A1C     OD-   S/p SKYLAR 7/1/22  S/p ELKIN 3/24/22  S/p IVO 1/4/22  VA 20/80(was 20/100), better IRF after switching to SKYLAR, needs monthly injections for control    Plan: needs SKYLAR OD today for control of IRF    Plan: Based on todays exam, diagnostic studies, and review of records, the determination was made for treatment today.  Schedule Eylea Injection today Right Eye Patient chooses to proceed with injection R/B/A discussed include infection retinal detachment and stroke    Patient identified.  Timeout performed.    Risks, benefits, and alternatives to treatment were discussed in detail with the patient, including bleeding/infection (endophthalmitis)/etc.  The patient voiced understanding and wished to proceed with the procedure.  See separate consent form.    Injection Procedure Note:  Diagnosis: macular edema Right Eye    Topical Proparacaine drop placed then topical 5% Betadine, then subcojunctival lidocaine 2% injection  Sterile  gloves used, and sterile lid speculum placed.  5% Betadine placed at injection site again prior to injection.  Eylea 2mg in 0.05cc Injected inferotemporally 3.5-4mm posterior to the limbus.  Complications: None  Va at least CF at 5 feet post injection.  Retina, ONH, IOP normal after injection.    Followup as below.  Patient should return immediately PRN.  Retinal Detachment and Endophthalmitis precautions given    OS- no injections  VA 20/30 (stable)  rare dot heme, no DME  Plan: Observation    Recommend good blood pressure control, tight blood glucose control, and good cholesterol control       3. Hypertensive retinopathy of both eyes  AV nicking, BP  stable   Plan: Observation  Recommend good blood pressure control, tight blood glucose control, and good cholesterol control       4. Primary open angle glaucoma of both eyes, moderate stage  F/b Dr. Wheatley last seen 10/2021  IOP 15/11 poss steroid response OD  Currently on cosopt BID OU    Plan:  Continue present management    5. Dry eye syndrome of both eyes  Mod dry eye OD>OS   Start PF ATs QID OU      RTC Alan - 4-6 weeks DFE/OCTm OD, Eylea OD  RTC Dioni as scheduled        I saw and examined the patient and reviewed in detail the findings documented. The final examination findings, image interpretations, and plan as documented in the record represent my personal judgment and conclusions.    Andriy Phillips MD  Vitreoretinal Surgery   [facility]

## 2022-08-08 ENCOUNTER — TELEPHONE (OUTPATIENT)
Dept: ORTHOPEDICS | Facility: CLINIC | Age: 77
End: 2022-08-08
Payer: MEDICARE

## 2022-08-12 ENCOUNTER — TELEPHONE (OUTPATIENT)
Dept: ORTHOPEDICS | Facility: CLINIC | Age: 77
End: 2022-08-12
Payer: MEDICARE

## 2022-08-12 ENCOUNTER — PATIENT MESSAGE (OUTPATIENT)
Dept: INTERNAL MEDICINE | Facility: CLINIC | Age: 77
End: 2022-08-12
Payer: MEDICARE

## 2022-08-12 DIAGNOSIS — R05.9 COUGH: Primary | ICD-10-CM

## 2022-08-14 ENCOUNTER — PATIENT MESSAGE (OUTPATIENT)
Dept: INTERNAL MEDICINE | Facility: CLINIC | Age: 77
End: 2022-08-14
Payer: MEDICARE

## 2022-08-15 ENCOUNTER — TELEPHONE (OUTPATIENT)
Dept: PAIN MEDICINE | Facility: CLINIC | Age: 77
End: 2022-08-15
Payer: MEDICARE

## 2022-08-15 ENCOUNTER — HOSPITAL ENCOUNTER (OUTPATIENT)
Dept: RADIOLOGY | Facility: OTHER | Age: 77
Discharge: HOME | End: 2022-08-15
Attending: INTERNAL MEDICINE
Payer: MEDICARE

## 2022-08-15 ENCOUNTER — OFFICE VISIT (OUTPATIENT)
Dept: ORTHOPEDICS | Facility: CLINIC | Age: 77
End: 2022-08-15
Payer: MEDICARE

## 2022-08-15 VITALS — BODY MASS INDEX: 28.77 KG/M2 | WEIGHT: 201 LBS | HEIGHT: 70 IN

## 2022-08-15 DIAGNOSIS — Z98.890 POST-OPERATIVE STATE: Primary | ICD-10-CM

## 2022-08-15 DIAGNOSIS — R05.9 COUGH: ICD-10-CM

## 2022-08-15 PROCEDURE — 99024 PR POST-OP FOLLOW-UP VISIT: ICD-10-PCS | Mod: S$GLB,,, | Performed by: PHYSICIAN ASSISTANT

## 2022-08-15 PROCEDURE — 99999 PR PBB SHADOW E&M-EST. PATIENT-LVL III: CPT | Mod: PBBFAC,,, | Performed by: PHYSICIAN ASSISTANT

## 2022-08-15 PROCEDURE — 71046 X-RAY EXAM CHEST 2 VIEWS: CPT | Mod: 26,,, | Performed by: INTERNAL MEDICINE

## 2022-08-15 PROCEDURE — 1101F PR PT FALLS ASSESS DOC 0-1 FALLS W/OUT INJ PAST YR: ICD-10-PCS | Mod: CPTII,S$GLB,, | Performed by: PHYSICIAN ASSISTANT

## 2022-08-15 PROCEDURE — 1101F PT FALLS ASSESS-DOCD LE1/YR: CPT | Mod: CPTII,S$GLB,, | Performed by: PHYSICIAN ASSISTANT

## 2022-08-15 PROCEDURE — 3072F LOW RISK FOR RETINOPATHY: CPT | Mod: CPTII,S$GLB,, | Performed by: PHYSICIAN ASSISTANT

## 2022-08-15 PROCEDURE — 1159F PR MEDICATION LIST DOCUMENTED IN MEDICAL RECORD: ICD-10-PCS | Mod: CPTII,S$GLB,, | Performed by: PHYSICIAN ASSISTANT

## 2022-08-15 PROCEDURE — 3288F PR FALLS RISK ASSESSMENT DOCUMENTED: ICD-10-PCS | Mod: CPTII,S$GLB,, | Performed by: PHYSICIAN ASSISTANT

## 2022-08-15 PROCEDURE — 1125F AMNT PAIN NOTED PAIN PRSNT: CPT | Mod: CPTII,S$GLB,, | Performed by: PHYSICIAN ASSISTANT

## 2022-08-15 PROCEDURE — 3288F FALL RISK ASSESSMENT DOCD: CPT | Mod: CPTII,S$GLB,, | Performed by: PHYSICIAN ASSISTANT

## 2022-08-15 PROCEDURE — 99999 PR PBB SHADOW E&M-EST. PATIENT-LVL III: ICD-10-PCS | Mod: PBBFAC,,, | Performed by: PHYSICIAN ASSISTANT

## 2022-08-15 PROCEDURE — 71046 XR CHEST PA AND LATERAL: ICD-10-PCS | Mod: 26,,, | Performed by: INTERNAL MEDICINE

## 2022-08-15 PROCEDURE — 3072F PR LOW RISK FOR RETINOPATHY: ICD-10-PCS | Mod: CPTII,S$GLB,, | Performed by: PHYSICIAN ASSISTANT

## 2022-08-15 PROCEDURE — 1125F PR PAIN SEVERITY QUANTIFIED, PAIN PRESENT: ICD-10-PCS | Mod: CPTII,S$GLB,, | Performed by: PHYSICIAN ASSISTANT

## 2022-08-15 PROCEDURE — 99024 POSTOP FOLLOW-UP VISIT: CPT | Mod: S$GLB,,, | Performed by: PHYSICIAN ASSISTANT

## 2022-08-15 PROCEDURE — 1159F MED LIST DOCD IN RCRD: CPT | Mod: CPTII,S$GLB,, | Performed by: PHYSICIAN ASSISTANT

## 2022-08-15 PROCEDURE — 71046 X-RAY EXAM CHEST 2 VIEWS: CPT | Mod: TC,FY

## 2022-08-15 NOTE — PROGRESS NOTES
"Mr. Menendez is here today for a post-operative visit.  He is 17 days status post right long trigger finger release by Dr. Castillo on 7/29/22. He reports that he is doing well overall. He takes tylenol as needed for pain. He reports he has been using the hand to do some activities. He has gone to work. He removed the post operative dressing and has been wearing a copper/ compression glove. He denies fever, chills, and sweats since the time of the surgery.     Physical exam:    Vitals:    08/15/22 0810   Weight: 91.2 kg (201 lb)   Height: 5' 10" (1.778 m)   PainSc:   4     Vital signs are stable, patient is afebrile.  Patient is well dressed and well groomed, no acute distress.  Alert and oriented to person, place, and time.  Post op dressing taken down.  Incision is clean, dry and intact.  There is no erythema or exudate.  There is no sign of any infection. He is NVI. Sutures removed without difficulty. There is minimal maceration at the incision site. Good wrist, finger ROM.    Assessment: status post right long trigger finger release by Dr. Castillo on 7/29/22    Plan:  Emerson was seen today for post-op evaluation and pain.    Diagnoses and all orders for this visit:    Post-operative state      PO instruction reviewed and provided to patient  RTC 1 wk for incision check     "

## 2022-08-15 NOTE — TELEPHONE ENCOUNTER
This message is for patient in regards to his/her appointment 08/16/22 at 8:40a   With LISSY Connolly.      [facility] Policy: For the safety of all patients and staff members.     Patient Visitor policy: During this visit we're informing all patients that face mask are required.     If you have any questions or concerns please contact [phone]

## 2022-08-15 NOTE — TELEPHONE ENCOUNTER
His pulmonary apt is October 3 at 8 am    Due to the persistant cough, he needs a chest xray and book him to see Shanika Diaz this weekl

## 2022-08-16 ENCOUNTER — OFFICE VISIT (OUTPATIENT)
Dept: PAIN MEDICINE | Facility: CLINIC | Age: 77
End: 2022-08-16
Payer: MEDICARE

## 2022-08-16 ENCOUNTER — OFFICE VISIT (OUTPATIENT)
Dept: INTERNAL MEDICINE | Facility: CLINIC | Age: 77
End: 2022-08-16
Payer: MEDICARE

## 2022-08-16 VITALS
SYSTOLIC BLOOD PRESSURE: 132 MMHG | HEIGHT: 62 IN | BODY MASS INDEX: 36.37 KG/M2 | WEIGHT: 197.63 LBS | OXYGEN SATURATION: 98 % | TEMPERATURE: 98 F | HEART RATE: 105 BPM | DIASTOLIC BLOOD PRESSURE: 60 MMHG

## 2022-08-16 VITALS
SYSTOLIC BLOOD PRESSURE: 132 MMHG | HEIGHT: 70 IN | RESPIRATION RATE: 18 BRPM | BODY MASS INDEX: 30.93 KG/M2 | DIASTOLIC BLOOD PRESSURE: 74 MMHG | WEIGHT: 216.06 LBS | HEART RATE: 95 BPM | OXYGEN SATURATION: 99 %

## 2022-08-16 DIAGNOSIS — R09.82 POST-NASAL DRIP: ICD-10-CM

## 2022-08-16 DIAGNOSIS — M47.812 CERVICAL SPONDYLOSIS: ICD-10-CM

## 2022-08-16 DIAGNOSIS — M54.2 CHRONIC NECK PAIN: ICD-10-CM

## 2022-08-16 DIAGNOSIS — G89.29 CHRONIC NECK PAIN: ICD-10-CM

## 2022-08-16 DIAGNOSIS — R05.3 CHRONIC COUGH: ICD-10-CM

## 2022-08-16 DIAGNOSIS — J32.0 MAXILLARY SINUSITIS, UNSPECIFIED CHRONICITY: Primary | ICD-10-CM

## 2022-08-16 DIAGNOSIS — Z79.4 TYPE 2 DIABETES MELLITUS WITH DIABETIC POLYNEUROPATHY, WITH LONG-TERM CURRENT USE OF INSULIN: Chronic | ICD-10-CM

## 2022-08-16 DIAGNOSIS — E11.42 TYPE 2 DIABETES MELLITUS WITH DIABETIC POLYNEUROPATHY, WITH LONG-TERM CURRENT USE OF INSULIN: Chronic | ICD-10-CM

## 2022-08-16 DIAGNOSIS — M79.18 MYOFASCIAL PAIN: Primary | ICD-10-CM

## 2022-08-16 PROCEDURE — 3072F PR LOW RISK FOR RETINOPATHY: ICD-10-PCS | Mod: CPTII,S$GLB,, | Performed by: PHYSICIAN ASSISTANT

## 2022-08-16 PROCEDURE — 3072F LOW RISK FOR RETINOPATHY: CPT | Mod: CPTII,S$GLB,, | Performed by: NURSE PRACTITIONER

## 2022-08-16 PROCEDURE — 3072F PR LOW RISK FOR RETINOPATHY: ICD-10-PCS | Mod: CPTII,S$GLB,, | Performed by: NURSE PRACTITIONER

## 2022-08-16 PROCEDURE — 1159F PR MEDICATION LIST DOCUMENTED IN MEDICAL RECORD: ICD-10-PCS | Mod: CPTII,S$GLB,, | Performed by: PHYSICIAN ASSISTANT

## 2022-08-16 PROCEDURE — 1159F MED LIST DOCD IN RCRD: CPT | Mod: CPTII,S$GLB,, | Performed by: PHYSICIAN ASSISTANT

## 2022-08-16 PROCEDURE — 1126F AMNT PAIN NOTED NONE PRSNT: CPT | Mod: CPTII,S$GLB,, | Performed by: PHYSICIAN ASSISTANT

## 2022-08-16 PROCEDURE — 1160F PR REVIEW ALL MEDS BY PRESCRIBER/CLIN PHARMACIST DOCUMENTED: ICD-10-PCS | Mod: CPTII,S$GLB,, | Performed by: PHYSICIAN ASSISTANT

## 2022-08-16 PROCEDURE — 99213 PR OFFICE/OUTPT VISIT, EST, LEVL III, 20-29 MIN: ICD-10-PCS | Mod: S$GLB,,, | Performed by: NURSE PRACTITIONER

## 2022-08-16 PROCEDURE — 3078F PR MOST RECENT DIASTOLIC BLOOD PRESSURE < 80 MM HG: ICD-10-PCS | Mod: CPTII,S$GLB,, | Performed by: PHYSICIAN ASSISTANT

## 2022-08-16 PROCEDURE — 3075F PR MOST RECENT SYSTOLIC BLOOD PRESS GE 130-139MM HG: ICD-10-PCS | Mod: CPTII,S$GLB,, | Performed by: NURSE PRACTITIONER

## 2022-08-16 PROCEDURE — 1101F PT FALLS ASSESS-DOCD LE1/YR: CPT | Mod: CPTII,S$GLB,, | Performed by: NURSE PRACTITIONER

## 2022-08-16 PROCEDURE — 1125F AMNT PAIN NOTED PAIN PRSNT: CPT | Mod: CPTII,S$GLB,, | Performed by: NURSE PRACTITIONER

## 2022-08-16 PROCEDURE — 3078F DIAST BP <80 MM HG: CPT | Mod: CPTII,S$GLB,, | Performed by: NURSE PRACTITIONER

## 2022-08-16 PROCEDURE — 1126F PR PAIN SEVERITY QUANTIFIED, NO PAIN PRESENT: ICD-10-PCS | Mod: CPTII,S$GLB,, | Performed by: PHYSICIAN ASSISTANT

## 2022-08-16 PROCEDURE — 99214 OFFICE O/P EST MOD 30 MIN: CPT | Mod: S$GLB,,, | Performed by: PHYSICIAN ASSISTANT

## 2022-08-16 PROCEDURE — 3078F DIAST BP <80 MM HG: CPT | Mod: CPTII,S$GLB,, | Performed by: PHYSICIAN ASSISTANT

## 2022-08-16 PROCEDURE — 3075F SYST BP GE 130 - 139MM HG: CPT | Mod: CPTII,S$GLB,, | Performed by: NURSE PRACTITIONER

## 2022-08-16 PROCEDURE — 1125F PR PAIN SEVERITY QUANTIFIED, PAIN PRESENT: ICD-10-PCS | Mod: CPTII,S$GLB,, | Performed by: NURSE PRACTITIONER

## 2022-08-16 PROCEDURE — 99999 PR PBB SHADOW E&M-EST. PATIENT-LVL V: CPT | Mod: PBBFAC,,, | Performed by: NURSE PRACTITIONER

## 2022-08-16 PROCEDURE — 1160F RVW MEDS BY RX/DR IN RCRD: CPT | Mod: CPTII,S$GLB,, | Performed by: PHYSICIAN ASSISTANT

## 2022-08-16 PROCEDURE — 1101F PR PT FALLS ASSESS DOC 0-1 FALLS W/OUT INJ PAST YR: ICD-10-PCS | Mod: CPTII,S$GLB,, | Performed by: NURSE PRACTITIONER

## 2022-08-16 PROCEDURE — 99999 PR PBB SHADOW E&M-EST. PATIENT-LVL V: CPT | Mod: PBBFAC,,, | Performed by: PHYSICIAN ASSISTANT

## 2022-08-16 PROCEDURE — 99214 PR OFFICE/OUTPT VISIT, EST, LEVL IV, 30-39 MIN: ICD-10-PCS | Mod: S$GLB,,, | Performed by: PHYSICIAN ASSISTANT

## 2022-08-16 PROCEDURE — 3072F LOW RISK FOR RETINOPATHY: CPT | Mod: CPTII,S$GLB,, | Performed by: PHYSICIAN ASSISTANT

## 2022-08-16 PROCEDURE — 3075F PR MOST RECENT SYSTOLIC BLOOD PRESS GE 130-139MM HG: ICD-10-PCS | Mod: CPTII,S$GLB,, | Performed by: PHYSICIAN ASSISTANT

## 2022-08-16 PROCEDURE — 3078F PR MOST RECENT DIASTOLIC BLOOD PRESSURE < 80 MM HG: ICD-10-PCS | Mod: CPTII,S$GLB,, | Performed by: NURSE PRACTITIONER

## 2022-08-16 PROCEDURE — 3288F FALL RISK ASSESSMENT DOCD: CPT | Mod: CPTII,S$GLB,, | Performed by: NURSE PRACTITIONER

## 2022-08-16 PROCEDURE — 99999 PR PBB SHADOW E&M-EST. PATIENT-LVL V: ICD-10-PCS | Mod: PBBFAC,,, | Performed by: PHYSICIAN ASSISTANT

## 2022-08-16 PROCEDURE — 99213 OFFICE O/P EST LOW 20 MIN: CPT | Mod: S$GLB,,, | Performed by: NURSE PRACTITIONER

## 2022-08-16 PROCEDURE — 3288F PR FALLS RISK ASSESSMENT DOCUMENTED: ICD-10-PCS | Mod: CPTII,S$GLB,, | Performed by: NURSE PRACTITIONER

## 2022-08-16 PROCEDURE — 3075F SYST BP GE 130 - 139MM HG: CPT | Mod: CPTII,S$GLB,, | Performed by: PHYSICIAN ASSISTANT

## 2022-08-16 PROCEDURE — 99999 PR PBB SHADOW E&M-EST. PATIENT-LVL V: ICD-10-PCS | Mod: PBBFAC,,, | Performed by: NURSE PRACTITIONER

## 2022-08-16 RX ORDER — AZELASTINE 1 MG/ML
1 SPRAY, METERED NASAL 2 TIMES DAILY
Qty: 30 ML | Refills: 0 | Status: SHIPPED | OUTPATIENT
Start: 2022-08-16 | End: 2022-09-20 | Stop reason: SDUPTHER

## 2022-08-16 RX ORDER — LEVOCETIRIZINE DIHYDROCHLORIDE 5 MG/1
5 TABLET, FILM COATED ORAL NIGHTLY
Qty: 30 TABLET | Refills: 0 | Status: SHIPPED | OUTPATIENT
Start: 2022-08-16 | End: 2022-09-20 | Stop reason: SDUPTHER

## 2022-08-16 RX ORDER — DOXYCYCLINE 100 MG/1
100 CAPSULE ORAL EVERY 12 HOURS
Qty: 20 CAPSULE | Refills: 0 | Status: SHIPPED | OUTPATIENT
Start: 2022-08-16 | End: 2022-09-20

## 2022-08-16 RX ORDER — AZELASTINE 1 MG/ML
SPRAY, METERED NASAL
Qty: 90 ML | OUTPATIENT
Start: 2022-08-16

## 2022-08-16 RX ORDER — LEVOCETIRIZINE DIHYDROCHLORIDE 5 MG/1
TABLET, FILM COATED ORAL
Qty: 90 TABLET | OUTPATIENT
Start: 2022-08-16

## 2022-08-16 RX ORDER — INSULIN ASPART 100 [IU]/ML
4 INJECTION, SOLUTION INTRAVENOUS; SUBCUTANEOUS 2 TIMES DAILY WITH MEALS
Qty: 12 ML | Refills: 3 | Status: SHIPPED | OUTPATIENT
Start: 2022-08-16 | End: 2022-09-23 | Stop reason: SDUPTHER

## 2022-08-16 NOTE — PROGRESS NOTES
PCP: Roberta Sagastume MD    REFERRING PHYSICIAN: No ref. provider found    CHIEF COMPLAINT: Neck pain    Interval History 8/16/2022:  The patient returns for follow up of neck pain. He had TPIs at last OV with low dose steroids. He reports significant benefit with that for about 3 weeks. He has had some return of pain but it is not as severe. He had a slight increase in blood sugar but adjusted his insulin. Since last OV, he did have right long trigger finger release surgery by Dr. Castillo from which he recovered well. No new weakness or complaints. His pain today is 4/10.    Interval History 7/14/2022:  Emerson presents for follow up of neck pain and imaging review. His cervical MRI shows probably ligament sprain as well as DDD. He reports that overall his pain has been improving since previous encounter. His range of motion has also been improving. He says that his sugars have been running OK, usually mid 100's to 200 in the mornings. He did try Flexeril but it made him drowsy. He has had a few visits for massage therapy with benefit. He is scheduled for right long trigger finger release on 7/27/22 with Dr. Castillo. We did previous discuss TPIs and he would like to consider today. His pain today is 5/10.    Interval History 6/20/2022:  The patient is here today for worsened neck pain. He was evaluated by Dr. Rae as a new patient last week. They discussed TPIs but his sugar was running high. He had steroid injections into this knee and finger in the past couple of months. He was also prescribed oral steroids but says he never took them because he is nervous about his sugar. His sugar has been running around 200 in the morning. He has a Dexcom monitor. He is scheduled for cervical MRI on 7/1/22. He is frustrated because he feels as though it is taking a long time to find out what is causing his pain. His pain started abruptly on 5/25/22. He says that he was on the cervical machine at [facility] and heard  "a pop to his neck. Since that time, he has had significant right sided neck pain. He had baseline aching pain prior to this time. He has no radiation of the pain. The patient denies myelopathic symptoms such as handwriting changes or difficulty with buttons/coins/keys. Denies perineal paresthesias, bowel/bladder dysfunction. He does say that he is considering a lawsuit. His pain today is 8/10.    Initial Encounter:  Original HISTORY OF PRESENT ILLNESS: Emerson Menendez presents to the clinic for the evaluation of the above pain. Patient reports that he had neck pain in the past with loss of ROM. [facility] where he made great progress and had full ROM back for many years. A few months ago, he started feeling like his neck was getting stiff again so his PCP referred him back to clinic. He was evaluated 3 weeks ago where he was performing a exercise that he states he "heard a loud pop with associated pain". He reports that the session was ended shortly after. He attempted to rest at home but the pain has gotten worse and now limits his daily activities.    Original Pain Description:  The pain is located in the neck and radiates to the occipital region of the head and down both trapezius muscles. The pain is described as aching, sharp, stabbing and tight band. Exacerbating factors: Bending, Extension and Flexing. Mitigating factors heat and ice. Symptoms interfere with daily activity and work. The patient feels like symptoms have been worsening. Patient denies significant motor weakness and loss of sensations.    Original PAIN SCORES:  Best: Pain is 3  Worst: Pain is 10  Usually: Pain is 8  Current: Pain is 8    INTERVAL HISTORY:     6 weeks of Conservative therapy (PT/Chiro/Home Exercises with Dates)  PT 11/30/21 - 8/22/22    Treatments / Medications: (Ice/Heat/NSAIDS/APAP/etc):  Ice  Heat  Tylenol     Report: Reviewed    Interventional Pain Procedures: (Previous " injections)  None    Past Medical History:   Diagnosis Date    Anxiety 3/16/2015    Asymptomatic multiple myeloma     BPH (benign prostatic hypertrophy) 9/24/2012    Carpal tunnel syndrome     Depression 3/16/2015    Diabetic retinopathy     GERD (gastroesophageal reflux disease) 9/24/2012    Glaucoma     Hx of psychiatric care     Celexa, Valium    Hyperlipidemia     Hypertension     Hypertensive retinopathy of both eyes     Microalbuminuria 9/24/2012    Osteoarthritis of cervical spine 9/24/2012    Osteoarthritis of knee 9/24/2012    Psychiatric problem     Type II or unspecified type diabetes mellitus without mention of complication, not stated as uncontrolled 9/24/2012     Past Surgical History:   Procedure Laterality Date    ARTHROSCOPY OF WRIST Left 8/3/2020    Procedure: ARTHROSCOPY, WRIST LEFT;  Surgeon: Kindra Castillo MD;  Location: [facility] OR;  Service: Orthopedics;  Laterality: Left;  REGIONAL/MAC    CARPAL TUNNEL RELEASE Left 8/3/2020    Procedure: RELEASE, CARPAL TUNNEL LEFT;  Surgeon: Kindra Castillo MD;  Location: [facility] OR;  Service: Orthopedics;  Laterality: Left;  REGIONAL/MAC    CATARACT EXTRACTION  2012    Right eye    INTRAOCULAR PROSTHESES INSERTION Left 5/20/2021    Procedure: INSERTION, IOL PROSTHESIS;  Surgeon: Jose L Wheatley MD;  Location: [facility] OR [address] [place] [address];  Service: Ophthalmology;  Laterality: Left;    PHACOEMULSIFICATION OF CATARACT Left 5/20/2021    Procedure: PHACOEMULSIFICATION, CATARACT;  Surgeon: Jose L Wheatley MD;  Location: [facility] OR [address] [place] [address];  Service: Ophthalmology;  Laterality: Left;    PROSTATE SURGERY      SURGICAL REMOVAL OF CARPAL BONE Left 1/6/2021    Procedure: OSTECTOMY, CARPAL BONE, LEFT;  Surgeon: Kindra Castillo MD;  Location: [facility] OR;  Service: Orthopedics;  Laterality: Left;  REGIONAL/MAC    TRIGGER FINGER RELEASE Right 7/29/2022    Procedure: RELEASE, TRIGGER FINGER,RIGHT,LONG;  Surgeon: Kindra Castillo MD;   Location: [facility];  Service: Orthopedics;  Laterality: Right;     Social History     Socioeconomic History    Marital status:     Number of children: 1   Tobacco Use    Smoking status: Never Smoker    Smokeless tobacco: Never Used   Substance and Sexual Activity    Alcohol use: No    Drug use: No    Sexual activity: Yes     Partners: Female     Birth control/protection: None   Social History Narrative     x2, 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     Family History   Problem Relation Age of Onset    Hypertension Brother     Depression Brother     Kidney failure Daughter         due to medication    Depression Sister     Blindness Neg Hx     Cancer Neg Hx     Cataracts Neg Hx     Diabetes Neg Hx     Glaucoma Neg Hx     Macular degeneration Neg Hx     Retinal detachment Neg Hx     Strabismus Neg Hx     Stroke Neg Hx     Thyroid disease Neg Hx        Review of patient's allergies indicates:   Allergen Reactions    Penicillins      Knees locked up       Current Outpatient Medications   Medication Sig    ACCU-CHEK STEFANO CONTROL SOLN Soln     alcohol swabs PadM Apply 1 each topically as needed.    aspirin (ECOTRIN) 81 MG EC tablet Take 1 tablet (81 mg total) by mouth once daily.    blood glucose control, normal (METER-CHECK) Soln One meter.  Use as directed. Meter of insurance choice (Patient taking differently: One meter.  Use as directed. Meter of insurance choice)    blood sugar diagnostic (ACCU-CHEK STEFANO PLUS TEST STRP) Strp Accu check stefano plus TEST FOUR TIMES DAILY. Test strtips of insurance choice to go with meter and lancets    blood sugar diagnostic Strp [facility] - check blood sugars 4 times daily    blood-glucose meter (ACCU-CHEK STEFANO PLUS METER) Misc Use as direted    blood-glucose meter (ACCU-CHEK OMAYRA) Misc USE AS DIRECTED. Accucheck stefano plus    citalopram (CELEXA) 40 MG tablet Take 1 tablet (40 mg total) by mouth once daily.    citalopram  "(CELEXA) 40 MG tablet Take 1 tablet (40 mg total) by mouth once daily.    diazePAM (VALIUM) 5 MG tablet Take 1 tablet (5 mg total) by mouth every 12 (twelve) hours as needed for Anxiety.    dorzolamide-timolol 2-0.5% (COSOPT) 22.3-6.8 mg/mL ophthalmic solution Place 1 drop into both eyes 2 (two) times daily.    dorzolamide-timolol 2-0.5% (COSOPT) 22.3-6.8 mg/mL ophthalmic solution Place 1 drop into both eyes 2 (two) times daily.    ergocalciferol (ERGOCALCIFEROL) 50,000 unit Cap Take 1 capsule (50,000 Units total) by mouth every 7 days.    FARXIGA 5 mg Tab tablet Take 1 tablet (5 mg total) by mouth once daily.    flash glucose scanning reader (FREESTYLE CHRISTIANO 14 DAY READER) Misc Use as directed    flash glucose sensor (FREESTYLE CHRISTIANO 14 DAY SENSOR) Kit Use as directed    gabapentin (NEURONTIN) 100 MG capsule TAKE 1 CAPSULE EVERY MORNING, 1 CAPSULE IN THE AFTERNOON, AND 1 TO 3 CAPSULE(S) AT BEDTIME AS NEEDED FOR FOOT PAIN    glimepiride (AMARYL) 2 MG tablet Take 1 tablet (2 mg total) by mouth once daily.    glucose 4 GM chewable tablet Take 8 g by mouth as needed for Low blood sugar.    insulin (LANTUS SOLOSTAR U-100 INSULIN) glargine 100 units/mL SubQ pen ADMINISTER 15 UNITS UNDER THE SKIN IN THE MORNING AND IN THE EVENING    insulin aspart U-100 (NOVOLOG FLEXPEN U-100 INSULIN) 100 unit/mL (3 mL) InPn pen Inject 4 Units into the skin 2 (two) times daily with meals.    lancets (ACCU-CHEK SOFTCLIX LANCETS) Misc 1 lancet by Misc.(Non-Drug; Combo Route) route 4 (four) times daily.    losartan (COZAAR) 50 MG tablet Take 1 tablet (50 mg total) by mouth once daily.    MONOVISC 88 mg/4 mL Syrg     omeprazole (PRILOSEC) 40 MG capsule Take 1 capsule (40 mg total) by mouth once daily.    pen needle, diabetic (BD ULTRA-FINE SHORT PEN NEEDLE) 31 gauge x 5/16" Ndle USE TO INJECT INSULIN ONE TIME DAILY    pravastatin (PRAVACHOL) 40 MG tablet Take 1 tablet (40 mg total) by mouth once daily.    sildenafiL " "(VIAGRA) 100 MG tablet Take 1 tablet (100 mg total) by mouth daily as needed for Erectile Dysfunction.    tamsulosin (FLOMAX) 0.4 mg Cap Take 1 capsule (0.4 mg total) by mouth nightly.    traMADoL (ULTRAM) 50 mg tablet Take 1 tablet (50 mg total) by mouth every 6 (six) hours as needed for Pain.    TRUEPLUS LANCETS 33 gauge [facility] [place]      No current facility-administered medications for this visit.     Facility-Administered Medications Ordered in Other Visits   Medication    mupirocin 2 % ointment       ROS:  GENERAL: No fever. No chills. No fatigue. Denies weight loss. Denies weight gain.  HEENT: Reports Neck Pain. Denies headaches. Denies vision change. Denies eye pain. Denies double vision. Denies ear pain.   CV: Denies chest pain.   PULM: Denies of shortness of breath.  GI: Denies constipation. No diarrhea. No abdominal pain. Denies nausea. Denies vomiting. No blood in stool.  HEME: Denies bleeding problems.  : Denies urgency. No painful urination. No blood in urine.  MS: Denies joint stiffness. Denies joint swelling.  Denies back pain.  SKIN: Denies rash.   NEURO: Denies seizures. No weakness.  PSYCH:  Denies difficulty sleeping. No anxiety. Denies depression. No suicidal thoughts.       VITALS:   Vitals:    08/16/22 0851   Weight: 98 kg (216 lb 0.8 oz)   Height: 5' 10" (1.778 m)   PainSc:   4   PainLoc: Neck         PHYSICAL EXAM:   GENERAL: Well appearing, in no acute distress, alert and oriented x3.  PSYCH:  Mood and affect appropriate.  SKIN: Skin color, texture, turgor normal, no rashes or lesions.  HEENT:  Normocephalic, atraumatic. Cranial nerves grossly intact.  NECK: Pain to palpation over the cervical paraspinous muscles bilaterally. TTP over bilateral trapezius and scalene muscles. Pain to palpation over facets joints on the right. Limited ROM with pain on neck flexion, extension, and lateral flexion. Negative Spurling.   PULM: No evidence of respiratory difficulty, symmetric chest " rise.  EXTREMITIES: No deformities, edema, or skin discoloration.   MUSCULOSKELETAL: Bilateral tricep, bicep and deltoid strength is 5/5 and symmetric. Bilateral hand  strength is 5/5 and symmetric. No atrophy is noted.  NEURO: Sensation is equal and appropriate bilaterally. Bilateral upper extremity coordination and muscle stretch reflexes are physiologic and symmetric.  Negative Hoffmans sign bilaterally.   GAIT: Normal.      LABS:  Comprehensive Metabolic Panel   Collected: 05/30/22 1031  Final result  Specimen: Blood      Sodium 131 Low  mmol/L Albumin 3.4 Low  g/dL   Potassium 5.4 High  mmol/L  Total Bilirubin 0.5 mg/dL    Chloride 98 mmol/L Alkaline Phosphatase 63 U/L   CO2 25 mmol/L AST 13 U/L   Glucose 370 High  mg/dL ALT 19 U/L   BUN 19 mg/dL Anion Gap 8 mmol/L   Creatinine 1.9 High  mg/dL eGFR if  39.0 Abnormal  mL/min/1.73 m^2   Calcium 9.6 mg/dL eGFR if non  33.7 Abnormal  mL/min/1.73 m^2               IMAGING:        Narrative & Impression  EXAMINATION:  MRI CERVICAL SPINE WITHOUT CONTRAST     CLINICAL HISTORY:  Neck trauma, ligament injury suspected (Age >= 16y); Cervicalgia     TECHNIQUE:  Multiplanar, multisequence MR images of the cervical spine were performed without the administration of contrast.     COMPARISON:  01/27/2016     FINDINGS:  Alignment: Normal.     Vertebrae: Normal marrow signal. No fracture.     Discs: Severe disc space narrowing at C3-4 and moderate disc space narrowing at C4-5.     Cord: Normal., Capacious canal     Skull base and craniocervical junction: Normal.     Degenerative findings:     The atlanto-occipital and bilateral lateral masses of C1-2 appear normal.  (Only seen on sagittal images)     C2-C3: No disc abnormality, no canal stenosis or foraminal narrowing.  Mild facet joint osseous hypertrophy.     C3-C4: Posterior disc osteophyte complex abutting the cord anteriorly causing no greater than mild canal stenosis.  There is mild  bilateral foraminal narrowing.     C4-C5: No disc abnormality, right uncovertebral spur.  No canal stenosis.  There is severe right foraminal narrowing.     C5-C6: Posterior disc osteophyte complex and bilateral uncovertebral spur, no canal stenosis.     C6-C7: Posterior disc osteophyte complex, bilateral uncovertebral spur.  No canal stenosis.  There is mild bilateral foraminal narrowing.     C7-T1: Unremarkable     T1-T2: Unremarkable     Paraspinal muscles & soft tissues: Subtle soft tissue edema inter spinous ligament C4-5 C5-6 and C6-C7 possibly a mild sprain.     Impression:     Spondylosis of the cervical spine.     No severe canal stenosis.  Please see details of each levels above.     Mild inter spinous process ligament edema C4-5, C5-6 and C6-C7 could relate to a ligamentous sprain   Narrative & Impression  EXAMINATION:  XR CERVICAL SPINE 5 VIEW WITH FLEX AND EXT     CLINICAL HISTORY:  Spondylosis without myelopathy or radiculopathy, cervical region     TECHNIQUE:  Five views of the cervical spine plus flexion and extension views were performed.     COMPARISON:  2014     FINDINGS:  Vertebral body heights are maintained.     Disc space narrowing and endplate changes at every level     Oblique views show bony narrowing of the neural foramina C3 for and C5-6 on the left and C4-5 and C5-6 on the right.     Reversal of the normal cervical lordosis.  Otherwise, the AP alignment is anatomic.     No significant prevertebral soft tissue edema.     Impression:     Multilevel degenerative change with multilevel bony narrowing of the neural foramina.     ASSESSMENT: 77 y.o. year old male with neck pain, consistent with the followin. Myofascial pain     2. Cervical spondylosis     3. Chronic neck pain            PLAN:  1. Imaging reviewed.  2. TPIs from previous visit were helpful.   3. Continue conservative methods of ice/heat. Continue with massage therapy.   4. RTC in 2 months or sooner if  needed.      The above plan and management options were discussed at length with patient. Patient is in agreement with the above and verbalized understanding.     Norma Mckoy, LISSY  08/16/2022

## 2022-08-16 NOTE — PROGRESS NOTES
"Subjective:       Patient ID: Emerson Menendez is a 77 y.o. male.        Chief Complaint: Cough    Emerson Menendez is an established patient of Roberta Sagastume MD here today for urgent care visit.    Chronic cough -  Ongoing x 4 months  Initially didn't really pay attention to it but now notes he has to use cough drops all the time to help control the cough  With post nasal drip and when lying down the nasal drip "chokes me"  Post nasal drip is constant  No shortness of breath or chest pain  Once he coughs the mucus out, he feels much better, mucus is clear to yellow  No fever  Not on ACE-I  Benadryl seems to help somewhat  Feels reflux controlled with prilosec, no sx  Pulm appt scheduled 9/15/22  Normal CXR yesterday         Review of Systems   Constitutional: Negative for appetite change, chills, fatigue and fever.   HENT: Positive for postnasal drip. Negative for congestion and sore throat.    Eyes: Negative for visual disturbance.   Respiratory: Positive for cough. Negative for chest tightness and shortness of breath.    Cardiovascular: Negative for chest pain, palpitations and leg swelling.   Gastrointestinal: Negative for abdominal pain, blood in stool, constipation, diarrhea, nausea and vomiting.   Genitourinary: Negative for dysuria, frequency, hematuria and urgency.   Musculoskeletal: Negative for arthralgias and back pain.   Skin: Negative for rash.   Neurological: Negative for dizziness, syncope, weakness and headaches.   Psychiatric/Behavioral: Negative for dysphoric mood and sleep disturbance. The patient is not nervous/anxious.        Objective:      Physical Exam  Vitals and nursing note reviewed.   Constitutional:       Appearance: He is well-developed.   HENT:      Head: Normocephalic.      Right Ear: External ear normal. No middle ear effusion.      Left Ear: External ear normal.  No middle ear effusion.      Nose: Mucosal edema and rhinorrhea present.      Right Sinus: Maxillary sinus " tenderness present.      Left Sinus: Maxillary sinus tenderness present.      Mouth/Throat:      Comments: Clear PND in oropharynx   Eyes:      Pupils: Pupils are equal, round, and reactive to light.   Cardiovascular:      Rate and Rhythm: Normal rate and regular rhythm.      Heart sounds: Normal heart sounds. No murmur heard.    No friction rub. No gallop.   Pulmonary:      Effort: Pulmonary effort is normal. No respiratory distress.      Breath sounds: Normal breath sounds.   Abdominal:      Palpations: Abdomen is soft.      Tenderness: There is no abdominal tenderness.   Skin:     General: Skin is warm and dry.   Neurological:      Mental Status: He is alert.         Assessment:       1. Maxillary sinusitis, unspecified chronicity    2. Type 2 diabetes mellitus with diabetic polyneuropathy, with long-term current use of insulin    3. Post-nasal drip    4. Chronic cough        Plan:       Emerson was seen today for cough.    Diagnoses and all orders for this visit:    Maxillary sinusitis, unspecified chronicity        -     doxycycline (VIBRAMYCIN) 100 MG Cap; Take 1 capsule (100 mg total) by mouth every 12 (twelve) hours.    Type 2 diabetes mellitus with diabetic polyneuropathy, with long-term current use of insulin  -     REFILL :insulin aspart U-100 (NOVOLOG FLEXPEN U-100 INSULIN) 100 unit/mL (3 mL) InPn pen; Inject 4 Units into the skin 2 (two) times daily with meals.  Lab Results   Component Value Date    HGBA1C 8.5 (H) 05/02/2022    HGBA1C 7.7 (H) 11/12/2021    HGBA1C 7.5 (H) 04/12/2021     Post-nasal drip  -     azelastine (ASTELIN) 137 mcg (0.1 %) nasal spray; 1 spray (137 mcg total) by Nasal route 2 (two) times daily.  -     levocetirizine (XYZAL) 5 MG tablet; Take 1 tablet (5 mg total) by mouth every evening.    Chronic cough - he has upcoming pulm appointment    Pt has been given instructions populated from [employer] database and has verbalized understanding of the after visit summary and information  "contained wherein.    Follow up with a primary care provider. May go to ER for acute shortness of breath, lightheadedness, fever, or any other emergent complaints or changes in condition.    "This note will be shared with the patient"    Future Appointments   Date Time Provider Department Center   8/22/2022 10:30 AM Rani Alfaro PA-C [facility] HAND [facility]   8/29/2022  8:00 AM [facility] LAB BMT [facility] LABBMT [facility]   8/29/2022  9:00 AM Silvana Engel MD [facility] HC BMT [facility]   9/15/2022  8:45 AM Andriy Phillips MD [facility] OPHTHAL [facility]   9/20/2022  8:30 AM Roberta Sagastume MD [facility] IM [facility] PCW   10/3/2022  8:00 AM Ari Llanos MD [facility] PULMSVC [facility]   10/18/2022  8:40 AM Norma Mckoy, BLAIREP [facility] PAINMGT [facility]                 "

## 2022-08-24 DIAGNOSIS — D47.2 SMOLDERING MULTIPLE MYELOMA: ICD-10-CM

## 2022-08-25 RX ORDER — DIAZEPAM 5 MG/1
5 TABLET ORAL EVERY 12 HOURS PRN
Qty: 60 TABLET | Refills: 0 | Status: SHIPPED | OUTPATIENT
Start: 2022-08-25 | End: 2022-12-06 | Stop reason: SDUPTHER

## 2022-08-25 NOTE — TELEPHONE ENCOUNTER
Refill Routing Note   Medication(s) are not appropriate for processing by [facility] for the following reason(s):      - Outside of protocol    [facility] action(s):  Route Medication-related problems identified: Requires labs        Medication reconciliation completed: No     Appointments  past 12m or future 3m with PCP    Date Provider   Last Visit   4/25/2022 Roberta Sagastume MD   Next Visit   9/20/2022 Roberta Sagastume MD   ED visits in past 90 days: 0        Note composed:2:34 AM 08/25/2022

## 2022-08-25 NOTE — TELEPHONE ENCOUNTER
Care Due:                  Date            Visit Type   Department     Provider  --------------------------------------------------------------------------------                                EP -                              PRIMARY      [facility] INTERNAL  Last Visit: 04-      CARE ([facility])   MEDICINE       GEOVANNA ALCAZAR                              [facility]                              PRIMARY      [facility] INTERNAL  Next Visit: 09-      CARE ([facility])   MEDICINE       GEOVANNA ALCAZAR                                                            Last  Test          Frequency    Reason                     Performed    Due Date  --------------------------------------------------------------------------------    HBA1C.......  6 months...  FARXIGA, glimepiride,      05-   10-                             insulin..................    [employer] Embedded Care Gaps. Reference number: [id number]. 8/24/2022   8:45:25 PM CDT

## 2022-08-29 ENCOUNTER — RESEARCH ENCOUNTER (OUTPATIENT)
Dept: HEMATOLOGY/ONCOLOGY | Facility: CLINIC | Age: 77
End: 2022-08-29

## 2022-08-29 ENCOUNTER — LAB VISIT (OUTPATIENT)
Dept: LAB | Facility: HOSPITAL | Age: 77
End: 2022-08-29
Payer: MEDICARE

## 2022-08-29 ENCOUNTER — OFFICE VISIT (OUTPATIENT)
Dept: HEMATOLOGY/ONCOLOGY | Facility: CLINIC | Age: 77
End: 2022-08-29
Payer: MEDICARE

## 2022-08-29 VITALS
TEMPERATURE: 98 F | DIASTOLIC BLOOD PRESSURE: 71 MMHG | RESPIRATION RATE: 17 BRPM | OXYGEN SATURATION: 98 % | HEIGHT: 62 IN | BODY MASS INDEX: 37 KG/M2 | SYSTOLIC BLOOD PRESSURE: 136 MMHG | WEIGHT: 201.06 LBS | HEART RATE: 87 BPM

## 2022-08-29 DIAGNOSIS — E11.22 CKD STAGE 3 DUE TO TYPE 2 DIABETES MELLITUS: ICD-10-CM

## 2022-08-29 DIAGNOSIS — D47.2 SMOLDERING MULTIPLE MYELOMA: Primary | ICD-10-CM

## 2022-08-29 DIAGNOSIS — E11.42 TYPE 2 DIABETES MELLITUS WITH DIABETIC POLYNEUROPATHY, WITH LONG-TERM CURRENT USE OF INSULIN: ICD-10-CM

## 2022-08-29 DIAGNOSIS — D47.2 SMOLDERING MULTIPLE MYELOMA: ICD-10-CM

## 2022-08-29 DIAGNOSIS — Z79.4 TYPE 2 DIABETES MELLITUS WITH DIABETIC POLYNEUROPATHY, WITH LONG-TERM CURRENT USE OF INSULIN: ICD-10-CM

## 2022-08-29 DIAGNOSIS — Z00.6 RESEARCH EXAM: ICD-10-CM

## 2022-08-29 DIAGNOSIS — N18.30 CKD STAGE 3 DUE TO TYPE 2 DIABETES MELLITUS: ICD-10-CM

## 2022-08-29 LAB
ALBUMIN SERPL BCP-MCNC: 3.5 G/DL (ref 3.5–5.2)
ALP SERPL-CCNC: 56 U/L (ref 55–135)
ALT SERPL W/O P-5'-P-CCNC: 17 U/L (ref 10–44)
ANION GAP SERPL CALC-SCNC: 10 MMOL/L (ref 8–16)
AST SERPL-CCNC: 17 U/L (ref 10–40)
BASOPHILS # BLD AUTO: 0.04 K/UL (ref 0–0.2)
BASOPHILS NFR BLD: 0.9 % (ref 0–1.9)
BILIRUB SERPL-MCNC: 0.4 MG/DL (ref 0.1–1)
BUN SERPL-MCNC: 16 MG/DL (ref 8–23)
CALCIUM SERPL-MCNC: 9.6 MG/DL (ref 8.7–10.5)
CHLORIDE SERPL-SCNC: 105 MMOL/L (ref 95–110)
CO2 SERPL-SCNC: 25 MMOL/L (ref 23–29)
CREAT SERPL-MCNC: 1.7 MG/DL (ref 0.5–1.4)
DIFFERENTIAL METHOD: ABNORMAL
EOSINOPHIL # BLD AUTO: 0.3 K/UL (ref 0–0.5)
EOSINOPHIL NFR BLD: 6.1 % (ref 0–8)
ERYTHROCYTE [DISTWIDTH] IN BLOOD BY AUTOMATED COUNT: 14.4 % (ref 11.5–14.5)
EST. GFR  (NO RACE VARIABLE): 41.3 ML/MIN/1.73 M^2
ESTIMATED AVG GLUCOSE: 203 MG/DL (ref 68–131)
GLUCOSE SERPL-MCNC: 154 MG/DL (ref 70–110)
HBA1C MFR BLD: 8.7 % (ref 4–5.6)
HCT VFR BLD AUTO: 33.2 % (ref 40–54)
HGB BLD-MCNC: 10.7 G/DL (ref 14–18)
IGA SERPL-MCNC: 1680 MG/DL (ref 40–350)
IGG SERPL-MCNC: 1135 MG/DL (ref 650–1600)
IGM SERPL-MCNC: 28 MG/DL (ref 50–300)
IMM GRANULOCYTES # BLD AUTO: 0.01 K/UL (ref 0–0.04)
IMM GRANULOCYTES NFR BLD AUTO: 0.2 % (ref 0–0.5)
LDH SERPL L TO P-CCNC: 98 U/L (ref 110–260)
LYMPHOCYTES # BLD AUTO: 2 K/UL (ref 1–4.8)
LYMPHOCYTES NFR BLD: 46.7 % (ref 18–48)
MAGNESIUM SERPL-MCNC: 1.7 MG/DL (ref 1.6–2.6)
MCH RBC QN AUTO: 28.7 PG (ref 27–31)
MCHC RBC AUTO-ENTMCNC: 32.2 G/DL (ref 32–36)
MCV RBC AUTO: 89 FL (ref 82–98)
MONOCYTES # BLD AUTO: 0.3 K/UL (ref 0.3–1)
MONOCYTES NFR BLD: 7 % (ref 4–15)
NEUTROPHILS # BLD AUTO: 1.7 K/UL (ref 1.8–7.7)
NEUTROPHILS NFR BLD: 39.1 % (ref 38–73)
NRBC BLD-RTO: 0 /100 WBC
PHOSPHATE SERPL-MCNC: 3.5 MG/DL (ref 2.7–4.5)
PLATELET # BLD AUTO: 278 K/UL (ref 150–450)
PMV BLD AUTO: 8.9 FL (ref 9.2–12.9)
POTASSIUM SERPL-SCNC: 4.5 MMOL/L (ref 3.5–5.1)
PROT SERPL-MCNC: 8.4 G/DL (ref 6–8.4)
RBC # BLD AUTO: 3.73 M/UL (ref 4.6–6.2)
SODIUM SERPL-SCNC: 140 MMOL/L (ref 136–145)
WBC # BLD AUTO: 4.26 K/UL (ref 3.9–12.7)

## 2022-08-29 PROCEDURE — 83520 IMMUNOASSAY QUANT NOS NONAB: CPT | Mod: 59,Q1 | Performed by: INTERNAL MEDICINE

## 2022-08-29 PROCEDURE — 82784 ASSAY IGA/IGD/IGG/IGM EACH: CPT | Performed by: INTERNAL MEDICINE

## 2022-08-29 PROCEDURE — 1101F PR PT FALLS ASSESS DOC 0-1 FALLS W/OUT INJ PAST YR: ICD-10-PCS | Mod: CPTII,S$GLB,, | Performed by: INTERNAL MEDICINE

## 2022-08-29 PROCEDURE — 3075F SYST BP GE 130 - 139MM HG: CPT | Mod: CPTII,S$GLB,, | Performed by: INTERNAL MEDICINE

## 2022-08-29 PROCEDURE — 3075F PR MOST RECENT SYSTOLIC BLOOD PRESS GE 130-139MM HG: ICD-10-PCS | Mod: CPTII,S$GLB,, | Performed by: INTERNAL MEDICINE

## 2022-08-29 PROCEDURE — 99999 PR PBB SHADOW E&M-EST. PATIENT-LVL III: ICD-10-PCS | Mod: PBBFAC,,, | Performed by: INTERNAL MEDICINE

## 2022-08-29 PROCEDURE — 36415 COLL VENOUS BLD VENIPUNCTURE: CPT | Performed by: INTERNAL MEDICINE

## 2022-08-29 PROCEDURE — 99214 OFFICE O/P EST MOD 30 MIN: CPT | Mod: S$GLB,,, | Performed by: INTERNAL MEDICINE

## 2022-08-29 PROCEDURE — 86334 IMMUNOFIX E-PHORESIS SERUM: CPT | Mod: Q1 | Performed by: INTERNAL MEDICINE

## 2022-08-29 PROCEDURE — 1160F PR REVIEW ALL MEDS BY PRESCRIBER/CLIN PHARMACIST DOCUMENTED: ICD-10-PCS | Mod: CPTII,S$GLB,, | Performed by: INTERNAL MEDICINE

## 2022-08-29 PROCEDURE — 84165 PATHOLOGIST INTERPRETATION SPE: ICD-10-PCS | Mod: 26,,, | Performed by: PATHOLOGY

## 2022-08-29 PROCEDURE — 3288F FALL RISK ASSESSMENT DOCD: CPT | Mod: CPTII,S$GLB,, | Performed by: INTERNAL MEDICINE

## 2022-08-29 PROCEDURE — 1101F PT FALLS ASSESS-DOCD LE1/YR: CPT | Mod: CPTII,S$GLB,, | Performed by: INTERNAL MEDICINE

## 2022-08-29 PROCEDURE — 1126F PR PAIN SEVERITY QUANTIFIED, NO PAIN PRESENT: ICD-10-PCS | Mod: CPTII,S$GLB,, | Performed by: INTERNAL MEDICINE

## 2022-08-29 PROCEDURE — 83615 LACTATE (LD) (LDH) ENZYME: CPT | Performed by: INTERNAL MEDICINE

## 2022-08-29 PROCEDURE — 84165 PROTEIN E-PHORESIS SERUM: CPT | Mod: 26,,, | Performed by: PATHOLOGY

## 2022-08-29 PROCEDURE — 1159F PR MEDICATION LIST DOCUMENTED IN MEDICAL RECORD: ICD-10-PCS | Mod: CPTII,S$GLB,, | Performed by: INTERNAL MEDICINE

## 2022-08-29 PROCEDURE — 3078F DIAST BP <80 MM HG: CPT | Mod: CPTII,S$GLB,, | Performed by: INTERNAL MEDICINE

## 2022-08-29 PROCEDURE — 3072F LOW RISK FOR RETINOPATHY: CPT | Mod: CPTII,S$GLB,, | Performed by: INTERNAL MEDICINE

## 2022-08-29 PROCEDURE — 1160F RVW MEDS BY RX/DR IN RCRD: CPT | Mod: CPTII,S$GLB,, | Performed by: INTERNAL MEDICINE

## 2022-08-29 PROCEDURE — 1126F AMNT PAIN NOTED NONE PRSNT: CPT | Mod: CPTII,S$GLB,, | Performed by: INTERNAL MEDICINE

## 2022-08-29 PROCEDURE — 99214 PR OFFICE/OUTPT VISIT, EST, LEVL IV, 30-39 MIN: ICD-10-PCS | Mod: S$GLB,,, | Performed by: INTERNAL MEDICINE

## 2022-08-29 PROCEDURE — 84100 ASSAY OF PHOSPHORUS: CPT | Performed by: INTERNAL MEDICINE

## 2022-08-29 PROCEDURE — 3288F PR FALLS RISK ASSESSMENT DOCUMENTED: ICD-10-PCS | Mod: CPTII,S$GLB,, | Performed by: INTERNAL MEDICINE

## 2022-08-29 PROCEDURE — 86334 PATHOLOGIST INTERPRETATION IFE: ICD-10-PCS | Mod: 26,,, | Performed by: PATHOLOGY

## 2022-08-29 PROCEDURE — 86334 IMMUNOFIX E-PHORESIS SERUM: CPT | Mod: 26,,, | Performed by: PATHOLOGY

## 2022-08-29 PROCEDURE — 1159F MED LIST DOCD IN RCRD: CPT | Mod: CPTII,S$GLB,, | Performed by: INTERNAL MEDICINE

## 2022-08-29 PROCEDURE — 83735 ASSAY OF MAGNESIUM: CPT | Mod: Q1 | Performed by: INTERNAL MEDICINE

## 2022-08-29 PROCEDURE — 85025 COMPLETE CBC W/AUTO DIFF WBC: CPT | Performed by: INTERNAL MEDICINE

## 2022-08-29 PROCEDURE — 84165 PROTEIN E-PHORESIS SERUM: CPT | Performed by: INTERNAL MEDICINE

## 2022-08-29 PROCEDURE — 80053 COMPREHEN METABOLIC PANEL: CPT | Performed by: INTERNAL MEDICINE

## 2022-08-29 PROCEDURE — 99999 PR PBB SHADOW E&M-EST. PATIENT-LVL III: CPT | Mod: PBBFAC,,, | Performed by: INTERNAL MEDICINE

## 2022-08-29 PROCEDURE — 83036 HEMOGLOBIN GLYCOSYLATED A1C: CPT | Performed by: INTERNAL MEDICINE

## 2022-08-29 PROCEDURE — 3072F PR LOW RISK FOR RETINOPATHY: ICD-10-PCS | Mod: CPTII,S$GLB,, | Performed by: INTERNAL MEDICINE

## 2022-08-29 PROCEDURE — 3078F PR MOST RECENT DIASTOLIC BLOOD PRESSURE < 80 MM HG: ICD-10-PCS | Mod: CPTII,S$GLB,, | Performed by: INTERNAL MEDICINE

## 2022-08-29 NOTE — PROGRESS NOTES
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   Sponsor:  [facility]  IRB# 2011.053.[id number]  Study ID: [id number]  Investigator: GIL Engel Pt Initials: LUCÍA LORENZ        Cycle 81 Day 28 Arm B: Observation      Patient presents to clinic today for above cycle evaluation; he is un-accompanied and states he continues to do well. He is alert, oriented to person, place, and time; mood and affect are appropriate. Primary complaint is of blood sugars being higher than usual.  Requested to have Hgb A1C done at next blood draw, will add lab on per Dr. Engel.  States he is contemplating knee replacement surgery and whether to "do one or both at the same time."  States is maintaining current COVID precautions; wears mask in crowded indoor areas; denies any COVID symptoms. 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        Review of Baseline AE's:   1.Hypertension, Grade 2 systolic and diastolic: BP today is 136/71; subject states compliance with Losartan. Subject denies headache/dizziness; no symptoms noted.   AE ongoing and tends to fluctuate no worse than Grade 2.   2. Lower Back Pain and Neck Pain, grade 1: Stable.  Patient has no complaints as of late and as previously stated, patient is not suspected to have bony myeloma involvement per Dr. Engel as these are pre-existing issues present at baseline. I scheduled tomorrow for MRI of spine, Will update results when available.  AE ongoing.  3. Pain in extremity (knees), grade 1. Subject presents with knee braces; states no complaints today; s/p injections 2 mos ago.States is trying to get surgery set up for replacement when activity with work slows down.  Will continue to monitor.       4. Peripheral sensory neuropathy, grade 1: Patient does not verbalize complaints today. Has gabapentin " "prescribed and takes as needed.  States has not taken recently.  AE ongoing.   5. Anemia, Grade 1: Hgb 10.7 today. Result reviewed by Dr. Engel, who answered patient questions as above. AE ongoing and stable.            Review of AE's:     *Please note this list is not all-inclusive, please see AE log for physician reviewed list of adverse events.   1. Creatinine increased, grade 2: Serum creatinine from labs today is 1.7; calculated GFR 48 ml/min based on CG calculation. Reviewed per Dr. Engel and no new orders.Per Dr Engel not been related to myeloma: rather, CKD;  likely secondary to diabetes and hypertension vs plasma cell dyscrasia.  Will continue observation monthly and to monitor renal functionclosely. Per MD, reiterated adequate hydration with increase in water/gatorade intake..  2. Hyponatremia, Grade 1: Serum sodium today is 140 mmol/L; AE intermittent. Will monitor  3. Hyperkalemia, Grade 1: serum K today is 4.5, Dr Engel has reviewed labs; no additional orders noted. AE intermittent; will continue to monitor.   4. Hyperuricemia, Grade 1: this was not evaluated today; usually ordered per nephrology.   5. Hyperglycemia. Grade 1: blood glucose today is 154; lab is nonfasting. See above above regarding concerns over recently increased sugars; goal to keep HgA1c at  7 or below. Hgb A1C ordered per Dr Engel; subject followed closely by endocrinology.   6. Diplopia, right eye: Subject states ophtamology told him is eye issues are improving, though he states he feels as though it is not. Per Dr Engel this is not related to SMM; rather is associated with diabetes. Subject maintains compliance with opthomoolgy; will continue to monitor. He has appt in am with eye MD at [facility].      Per study chair, "the definition of progressive disease for this protocol is developing CRAB criteria that requires treatment systemically." Per Dr Engel, based on today's exam and lab results, patient does not have any s/s of CRAB: Normal " Calcium level (9.6), absence of Renal insufficiency (serum creatinine elevated today at 1.7but per Dr Engel not related to myeloma; --patient with a history of kidney dysfunction secondary to diabetes; Dr. Engel aware of these values and not concerned for myeloma involvement), absence of significant Anemia (hemoglobin 10.7, stable; will await myeloma labs for clarity relationship to myeloma) and absence of lytic Bone lesions (per baseline metastatic survey as well as MRI--see MD note for further comment). See MD note for ECOG score and H&P and flowsheets for laboratory work, vitals, etc. All myeloma-related blood work from last cycle including SPEP and JAGUAR have remained stable, and are currently pending for this cycle. Per Dr. Engel, patient shows no evidence of progression at last result. Patient informed that Dr. Engel will release all lab results to [facility]. He states understanding of this. QOL's not required again until end of treatment/observation.           Subject maintains he wishes only to see Dr Engel and will not show for appts if scheduled with any other provider. For this reason subject's appts do not always align with study calendar; per Dr Engel as subject is not on active treatment will abide by subject's wishes to see her only.  Will continue to schedule patient as far out as possible, which seems to help maintain compliance with visits. Normally subject deviates no greater than one week per cycle; wishes to remain on study per Dr Engel. Next appt was moved forwarded one week to [facility] 28 day cycles for data/study compliance; appts generally fall within 28-32 days.   Informed patient will continue to contact him a few days prior to next appt to remind him so that he can plan accordingly. Patient states having research RN's contact information and has MD contact information to call with any concerns, questions, or worsening of symptoms. Will follow up with subject once myeloma labs are resulted.     "  Addendum:  Added Hgb A1C as an add-on per patient request. Call patient with the result of 8.7.  He stated, "should I see someone?"  Patient states he is unsure who he sees in neurology and when he next sees them and that he believes he would like a new provider.  He was encouraged to reach out to Dr. Sagastume to further discuss and potentially place a referral for him to see a new provider to help manage his blood sugar.  He states understanding of this and was instructed to reach back out to us if he needs further assistance.  He states understanding.            "

## 2022-08-29 NOTE — PROGRESS NOTES
Subjective:    Patient ID: Emerson Menendez is a 77 y.o. male.    Chief Complaint: No chief complaint on file.  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 Test results identify a stable IgA kappa protein with beta globulin band at 1.63g/dL. Kappa free light chain is elevated at 4.40. CBC and calcium are stable. Creatinine with history of stage III CKD is stable at 1.4. Metastatic survey is negative for lytic lesions. MRI of the entire spine demonstrated age related changes but no convincing evidence of myeloma bone disease. Bone marrow biopsy identified about 13% plasma cells by morphology and FISH for myeloma identified a t(11;14) and trisomies 3,7, and 17. The patient is afebrile and appears clinically well. He was seen by his podiatrist and nephrologist since our last visit without any new or acute events.    The patient has not received any therapy for smoldering myeloma including bisphosphonates or steroids. He has been randomized to observation arm of the the clinical study. 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 He has no current or prior history of malignancy.    TODAY Cycle 81, observation  No acute interval medical events  CBC stable; CMP and MM labs pending  M protein at 1.92 last month, will follow-up pending study  Notes home glucose monitoring for past 2 months with elevated levels.  Now on sliding scale with improved readings (may be reason for Cr increase for past couple months)  Contemplating bilateral knee surgery      Knee Pain     Joint Pain  Associated symptoms include coughing. Pertinent negatives include no diaphoresis, fatigue or fever.    Hand Pain     Cough  Pertinent negatives include no fever.   Foot Pain  Associated symptoms include coughing. Pertinent negatives include no diaphoresis, fatigue or fever.   Arm Pain     Follow-up  Associated symptoms include coughing. Pertinent negatives include no diaphoresis, fatigue or fever.   Review of Systems   Constitutional:  Negative for activity change, appetite change, diaphoresis, fatigue, fever and unexpected weight change.   HENT: Negative.     Eyes: Negative.    Respiratory:  Positive for cough.    Cardiovascular:  Negative for leg swelling.   Gastrointestinal: Negative.    Endocrine: Negative.    Genitourinary: Negative.    Musculoskeletal: Negative.    Skin: Negative.    Allergic/Immunologic: Negative.    Neurological:         Grade 0-1 peripheral neuropathy of bilateral feet.    Hematological:  Negative for adenopathy. Does not bruise/bleed easily.   Psychiatric/Behavioral: Negative.       Objective:       Vitals:    08/29/22 0851   BP: 136/71   Pulse: 87   Resp: 17   Temp: 98.4 °F (36.9 °C)       Physical Exam  Vitals and nursing note reviewed.   Constitutional:       Appearance: He is well-developed.   HENT:      Head: Normocephalic and atraumatic.      Right Ear: External ear normal.      Left Ear: External ear normal.      Nose: Nose normal.   Eyes:      Conjunctiva/sclera: Conjunctivae normal.      Pupils: Pupils are equal, round, and reactive to light.   Cardiovascular:      Rate and Rhythm: Normal rate and regular rhythm.      Heart sounds: No murmur heard.  Pulmonary:      Effort: Pulmonary effort is normal. No respiratory distress.      Breath sounds: Normal breath sounds.   Abdominal:      General: Bowel sounds are normal. There is no distension.      Palpations: Abdomen is soft.   Musculoskeletal:         General: No tenderness.      Cervical back: Normal range of motion and neck supple.   Skin:     General: Skin is warm and dry.      Findings: No rash.      Nails: There is no  clubbing.   Neurological:      Mental Status: He is alert and oriented to person, place, and time.      Cranial Nerves: No cranial nerve deficit.   Psychiatric:         Behavior: Behavior normal.       Assessment:       No diagnosis found.    Plan:       The patient has a diagnosis of smoldering myeloma. There is no indication for immediate chemotherapy. We are monitoring renal function closely- baseline creatinine of around 1.5. 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 M protein has been stable and repeat is pending. Plan for return in 1month.  Endocrinology and Nephrology to monitor diabetes and renal failure respectively. Patient would like to continue to follow with Dr. Perkins as needed.    A total of 20 minutes was spent in pre-visit chart review, personal interpretation of labs and imaging, and medication review. Total visit time 30 minutes, >50 % counseling.

## 2022-08-30 LAB
ALBUMIN SERPL ELPH-MCNC: 3.76 G/DL (ref 3.35–5.55)
ALPHA1 GLOB SERPL ELPH-MCNC: 0.27 G/DL (ref 0.17–0.41)
ALPHA2 GLOB SERPL ELPH-MCNC: 0.86 G/DL (ref 0.43–0.99)
B-GLOBULIN SERPL ELPH-MCNC: 2.53 G/DL (ref 0.5–1.1)
GAMMA GLOB SERPL ELPH-MCNC: 0.58 G/DL (ref 0.67–1.58)
INTERPRETATION SERPL IFE-IMP: NORMAL
KAPPA LC SER QL IA: 6.93 MG/DL (ref 0.33–1.94)
KAPPA LC/LAMBDA SER IA: 3.45 (ref 0.26–1.65)
LAMBDA LC SER QL IA: 2.01 MG/DL (ref 0.57–2.63)
PROT SERPL-MCNC: 8 G/DL (ref 6–8.4)

## 2022-08-31 LAB — PATHOLOGIST INTERPRETATION IFE: NORMAL

## 2022-09-01 LAB — PATHOLOGIST INTERPRETATION SPE: NORMAL

## 2022-09-15 ENCOUNTER — TELEPHONE (OUTPATIENT)
Dept: OPHTHALMOLOGY | Facility: CLINIC | Age: 77
End: 2022-09-15

## 2022-09-15 ENCOUNTER — PROCEDURE VISIT (OUTPATIENT)
Dept: OPHTHALMOLOGY | Facility: CLINIC | Age: 77
End: 2022-09-15
Payer: MEDICARE

## 2022-09-15 DIAGNOSIS — H35.033 HYPERTENSIVE RETINOPATHY OF BOTH EYES: ICD-10-CM

## 2022-09-15 DIAGNOSIS — H26.491 POSTERIOR CAPSULAR OPACIFICATION, RIGHT: ICD-10-CM

## 2022-09-15 DIAGNOSIS — Z96.1 PSEUDOPHAKIA OF BOTH EYES: ICD-10-CM

## 2022-09-15 DIAGNOSIS — E11.3292 MILD NONPROLIFERATIVE DIABETIC RETINOPATHY OF LEFT EYE WITHOUT MACULAR EDEMA ASSOCIATED WITH TYPE 2 DIABETES MELLITUS: ICD-10-CM

## 2022-09-15 DIAGNOSIS — H40.1132 PRIMARY OPEN ANGLE GLAUCOMA OF BOTH EYES, MODERATE STAGE: ICD-10-CM

## 2022-09-15 DIAGNOSIS — E08.3211 MILD NONPROLIFERATIVE DIABETIC RETINOPATHY OF RIGHT EYE WITH MACULAR EDEMA ASSOCIATED WITH DIABETES MELLITUS DUE TO UNDERLYING CONDITION: Primary | ICD-10-CM

## 2022-09-15 PROCEDURE — 99214 OFFICE O/P EST MOD 30 MIN: CPT | Mod: S$GLB,,, | Performed by: OPHTHALMOLOGY

## 2022-09-15 PROCEDURE — 92134 CPTRZ OPH DX IMG PST SGM RTA: CPT | Mod: S$GLB,,, | Performed by: OPHTHALMOLOGY

## 2022-09-15 PROCEDURE — 99214 PR OFFICE/OUTPT VISIT, EST, LEVL IV, 30-39 MIN: ICD-10-PCS | Mod: S$GLB,,, | Performed by: OPHTHALMOLOGY

## 2022-09-15 PROCEDURE — 92134 OCT, RETINA - OU - BOTH EYES: ICD-10-PCS | Mod: S$GLB,,, | Performed by: OPHTHALMOLOGY

## 2022-09-15 NOTE — PROGRESS NOTES
HPI    DLS: 8/04/2022 Dr. Phillips     77 y.o. male is here for 4-6 weeks DFE/OCTm OD, Eylea OD. Denies eye pain   and f/f. Visual acuity is about the same since last visit. No noticeable   problems with glare.     Eye Med's: Cosopt BID OU   Last edited by CARLOS EDUARDO Hester on 9/15/2022  9:08 AM.             A/P  1. 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 Mild nonproliferative diabetic retinopathy of left eye without macular edema associated with type 2 diabetes mellitus    3. Hypertensive retinopathy of both eyes    4. Primary open angle glaucoma of both eyes, moderate stage    5. Pseudophakia of both eyes    6. Posterior capsular opacification, right         1. 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 Mild nonproliferative diabetic retinopathy of left eye without macular edema associated with type 2 diabetes mellitus  PCP is Dr. Sagastume  08/29/2022  8.7  A1C     OD-   S/p SKYLAR 8/4/22  S/p ELKIN 3/24/22  S/p IVO 1/4/22  VA 20/100(stable), stable IRF after switching to SKYLAR  Plan: given stable IRF and persistent mod decline in vision, likely PCO having factor more central now, will send to Dr Wheatley for recheck and YAG OD    OS- no injections  VA 20/40 (was 20/30)  rare dot heme, no DME  Plan: Observation    Recommend good blood pressure control, tight blood glucose control, and good cholesterol control     3. Hypertensive retinopathy of both eyes  AV nicking, BP  stable   Plan: Observation  Recommend good blood pressure control, tight blood glucose control, and good cholesterol control     4. Primary open angle glaucoma of both eyes, moderate stage  F/b Dr. Wheatley   IOP 15/15 poss steroid response OD  Currently on cosopt BID OU  Plan:  needs f/u with Dr Wheatley for IOP check and monitor glaucoma    5. Pseudophakia  of both eyes  6. Posterior capsular opacification, right  Good lens position OU  Incr in PCO OD likely affecting visual axis more  Plan: will refer for YAG OD       RTC Alan - 4-6 weeks DFE/OCTm OD, possible Eylea OD  RTC Dioni  2-3 weeks IOP check and YAG OD        I saw and examined the patient and reviewed in detail the findings documented. The final examination findings, image interpretations, and plan as documented in the record represent my personal judgment and conclusions.    Andriy Phillips MD  Vitreoretinal Surgery   [facility]

## 2022-09-15 NOTE — TELEPHONE ENCOUNTER
Contacted pt- schedule appointnment with Dr. Wheatley    ----- Message from Melinda Mckenzie sent at 9/15/2022  9:51 AM CDT -----  Please schedule appt with Dr. Wheatley for IOP check and Yag OD per . Thanks.

## 2022-09-20 ENCOUNTER — IMMUNIZATION (OUTPATIENT)
Dept: INTERNAL MEDICINE | Facility: CLINIC | Age: 77
End: 2022-09-20
Payer: MEDICARE

## 2022-09-20 ENCOUNTER — OFFICE VISIT (OUTPATIENT)
Dept: INTERNAL MEDICINE | Facility: CLINIC | Age: 77
End: 2022-09-20
Payer: MEDICARE

## 2022-09-20 VITALS
OXYGEN SATURATION: 97 % | DIASTOLIC BLOOD PRESSURE: 72 MMHG | HEART RATE: 94 BPM | SYSTOLIC BLOOD PRESSURE: 139 MMHG | BODY MASS INDEX: 36.9 KG/M2 | WEIGHT: 200.5 LBS | HEIGHT: 62 IN

## 2022-09-20 DIAGNOSIS — E11.42 TYPE 2 DIABETES MELLITUS WITH DIABETIC POLYNEUROPATHY, WITH LONG-TERM CURRENT USE OF INSULIN: ICD-10-CM

## 2022-09-20 DIAGNOSIS — Z00.00 ROUTINE GENERAL MEDICAL EXAMINATION AT A HEALTH CARE FACILITY: Primary | ICD-10-CM

## 2022-09-20 DIAGNOSIS — E66.01 SEVERE OBESITY (BMI 35.0-39.9) WITH COMORBIDITY: ICD-10-CM

## 2022-09-20 DIAGNOSIS — Z79.4 TYPE 2 DIABETES MELLITUS WITH DIABETIC POLYNEUROPATHY, WITH LONG-TERM CURRENT USE OF INSULIN: ICD-10-CM

## 2022-09-20 DIAGNOSIS — R09.82 POST-NASAL DRIP: ICD-10-CM

## 2022-09-20 PROCEDURE — 3072F LOW RISK FOR RETINOPATHY: CPT | Mod: CPTII,S$GLB,, | Performed by: INTERNAL MEDICINE

## 2022-09-20 PROCEDURE — 90694 FLU VACCINE - QUADRIVALENT - ADJUVANTED: ICD-10-PCS | Mod: S$GLB,,, | Performed by: INTERNAL MEDICINE

## 2022-09-20 PROCEDURE — 99499 RISK ADDL DX/OHS AUDIT: ICD-10-PCS | Mod: HCNC,S$GLB,, | Performed by: INTERNAL MEDICINE

## 2022-09-20 PROCEDURE — 99999 PR PBB SHADOW E&M-EST. PATIENT-LVL III: CPT | Mod: PBBFAC,,, | Performed by: INTERNAL MEDICINE

## 2022-09-20 PROCEDURE — 1159F PR MEDICATION LIST DOCUMENTED IN MEDICAL RECORD: ICD-10-PCS | Mod: CPTII,S$GLB,, | Performed by: INTERNAL MEDICINE

## 2022-09-20 PROCEDURE — 3075F PR MOST RECENT SYSTOLIC BLOOD PRESS GE 130-139MM HG: ICD-10-PCS | Mod: CPTII,S$GLB,, | Performed by: INTERNAL MEDICINE

## 2022-09-20 PROCEDURE — 1159F MED LIST DOCD IN RCRD: CPT | Mod: CPTII,S$GLB,, | Performed by: INTERNAL MEDICINE

## 2022-09-20 PROCEDURE — 3288F PR FALLS RISK ASSESSMENT DOCUMENTED: ICD-10-PCS | Mod: CPTII,S$GLB,, | Performed by: INTERNAL MEDICINE

## 2022-09-20 PROCEDURE — 3075F SYST BP GE 130 - 139MM HG: CPT | Mod: CPTII,S$GLB,, | Performed by: INTERNAL MEDICINE

## 2022-09-20 PROCEDURE — 3078F PR MOST RECENT DIASTOLIC BLOOD PRESSURE < 80 MM HG: ICD-10-PCS | Mod: CPTII,S$GLB,, | Performed by: INTERNAL MEDICINE

## 2022-09-20 PROCEDURE — 1126F AMNT PAIN NOTED NONE PRSNT: CPT | Mod: CPTII,S$GLB,, | Performed by: INTERNAL MEDICINE

## 2022-09-20 PROCEDURE — 99999 PR PBB SHADOW E&M-EST. PATIENT-LVL III: ICD-10-PCS | Mod: PBBFAC,,, | Performed by: INTERNAL MEDICINE

## 2022-09-20 PROCEDURE — G0008 ADMIN INFLUENZA VIRUS VAC: HCPCS | Mod: S$GLB,,, | Performed by: INTERNAL MEDICINE

## 2022-09-20 PROCEDURE — 99397 PR PREVENTIVE VISIT,EST,65 & OVER: ICD-10-PCS | Mod: 25,S$GLB,, | Performed by: INTERNAL MEDICINE

## 2022-09-20 PROCEDURE — 3072F PR LOW RISK FOR RETINOPATHY: ICD-10-PCS | Mod: CPTII,S$GLB,, | Performed by: INTERNAL MEDICINE

## 2022-09-20 PROCEDURE — 99397 PER PM REEVAL EST PAT 65+ YR: CPT | Mod: 25,S$GLB,, | Performed by: INTERNAL MEDICINE

## 2022-09-20 PROCEDURE — 1101F PR PT FALLS ASSESS DOC 0-1 FALLS W/OUT INJ PAST YR: ICD-10-PCS | Mod: CPTII,S$GLB,, | Performed by: INTERNAL MEDICINE

## 2022-09-20 PROCEDURE — G0008 FLU VACCINE - QUADRIVALENT - ADJUVANTED: ICD-10-PCS | Mod: S$GLB,,, | Performed by: INTERNAL MEDICINE

## 2022-09-20 PROCEDURE — 3288F FALL RISK ASSESSMENT DOCD: CPT | Mod: CPTII,S$GLB,, | Performed by: INTERNAL MEDICINE

## 2022-09-20 PROCEDURE — 90694 VACC AIIV4 NO PRSRV 0.5ML IM: CPT | Mod: S$GLB,,, | Performed by: INTERNAL MEDICINE

## 2022-09-20 PROCEDURE — 3078F DIAST BP <80 MM HG: CPT | Mod: CPTII,S$GLB,, | Performed by: INTERNAL MEDICINE

## 2022-09-20 PROCEDURE — 1126F PR PAIN SEVERITY QUANTIFIED, NO PAIN PRESENT: ICD-10-PCS | Mod: CPTII,S$GLB,, | Performed by: INTERNAL MEDICINE

## 2022-09-20 PROCEDURE — 1101F PT FALLS ASSESS-DOCD LE1/YR: CPT | Mod: CPTII,S$GLB,, | Performed by: INTERNAL MEDICINE

## 2022-09-20 PROCEDURE — 99499 UNLISTED E&M SERVICE: CPT | Mod: HCNC,S$GLB,, | Performed by: INTERNAL MEDICINE

## 2022-09-20 RX ORDER — SULFAMETHOXAZOLE AND TRIMETHOPRIM 800; 160 MG/1; MG/1
1 TABLET ORAL 2 TIMES DAILY
Qty: 20 TABLET | Refills: 0 | Status: SHIPPED | OUTPATIENT
Start: 2022-09-20 | End: 2022-09-30

## 2022-09-20 RX ORDER — GABAPENTIN 100 MG/1
CAPSULE ORAL
Qty: 450 CAPSULE | Refills: 4 | Status: SHIPPED | OUTPATIENT
Start: 2022-09-20

## 2022-09-20 RX ORDER — DAPAGLIFLOZIN 5 MG/1
5 TABLET, FILM COATED ORAL DAILY
Qty: 30 TABLET | Refills: 11 | Status: SHIPPED | OUTPATIENT
Start: 2022-09-20 | End: 2023-11-27 | Stop reason: SDUPTHER

## 2022-09-20 RX ORDER — LEVOCETIRIZINE DIHYDROCHLORIDE 5 MG/1
5 TABLET, FILM COATED ORAL NIGHTLY
Qty: 30 TABLET | Refills: 3 | Status: SHIPPED | OUTPATIENT
Start: 2022-09-20 | End: 2023-02-15

## 2022-09-20 RX ORDER — AZELASTINE 1 MG/ML
1 SPRAY, METERED NASAL 2 TIMES DAILY
Qty: 30 ML | Refills: 3 | Status: SHIPPED | OUTPATIENT
Start: 2022-09-20 | End: 2024-01-30

## 2022-09-20 NOTE — PROGRESS NOTES
CHIEF COMPLAINT:  Annual exam and   Follow up of multiple myeloma, diabetes, hypertension, reflux, hyperlipidemia    HISTORY OF PRESENT ILLNESS: This is a 77-year-old man who presents for follow up of above     He took a course of doxycycline for the cough in August which helped. The cough is better but is not resolved.       He had right 3rd trigger finger releast on 22 - finger is doing better    He continues to have knee pain.  He will eventually have to have knee replacements. He is trying to figure out the time to do this surgery. HE is wearing braces for his knees which is helping stability and his pain. I      He got   2021.  Life is going well.  He is residing in [place] and [place].  His wife has a job in [place].   life has been good for him. .        His daughter  of complications of lupus and ESRD 2020. She was on dialysis and was septic. She was at home in hospice. He continues to take citalopram to 40 mg 1 tablet daily and diazepam 5 mg 1/2 at bedtime - currently out. He is sleeping well with this dose.  His mother is currently doing well at age 98 ( will be 98 in 2022). His brother and sister help care for her         He continues to have right foot pain.   He is also taking gabapentin 100 mg one in am and 2 in the evening which helps his foot pain.       He is in digitial diabetes. Sugars have been better.  Sugars are now in the low 100's range.  HE has to check his blood sugar 3-4 times daily due to fluctuating blood sugars. HE has the free style henrry.  He is on novolog 4 units with meals, Lantus 12 units twice daily (adjusts according to sugars) and glimepriride 2 mg daily, Farxiga once dailyNO polydipsia or polyuria.       He has seen neprhology 21  and kidney function is stable.      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 He is in the observation arm and is being monitoring monthly. He sess Dr nEgel monthly.  No  "indication for chemotherapy at this time. HE gets monthly blood work.        He is taking losartan 50 mg 1 tablet daily to protect his kidney. No polydipsia or polyuria       His back and neck was doing ok . Pain is starting to return. He was in healthy back program.     Reflux is controlled on omeprazole 20 mg 2 tablets daily. HE has heartburn when he does not take the omeprazole.  No chest pain, shortness of breath, nausea, voimting, constipation, diarrhea, numbness, weakness.        He had laser vaporization and enucleation of the prostate 13. He denies any dysuria or hematuria now. HE saw Dr Walker.  HE is urinting well. He continues FLomax 0.4 mg nightly sporadically  HE gets up 2-3 times at night to urinate    He has hyperlipidemia, on pravastatin 40 mg daily. No joint pain or muscle pain from the pravastatin.        He has been taking aspirin 81 mg daily vitamin D supplement 50,000 units weekly         PAST MEDICAL HISTORY:   1. Diabetes mellitus.   2. Hyperlipidemia.   3. Reflux.   4. BPH.   5. Osteoarthritis of the knees.   6. Neck pain.   7. History of right lateral epicondylitis.   8. History of lumps removed from the breast as a teenager.   9. OA cervical spine   10. Multiple myeloma    SOCIAL HISTORY: Does not smoke, does not drink. 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 He works for the [employer].     FAMILY HISTORY: Mother is living at age 95 with a heart condition. She had a mild stroke. Father  in his late 40s of alcoholism. He has 5 sisters and 1 brother. one has a neurological disorder, one is anxious. ONe sister  after a fall. His daughter has  lupus, on dialysis, heart problems, and a brain aneurysm that was stented.       PHYSICAL EXAMINATION:        /72   Pulse 94   Ht 5' 2" (1.575 m)   Wt 90.9 kg (200 lb 8.1 oz)   SpO2 97%   BMI 36.67 kg/m²     GENERAL: He is alert, oriented, no apparent distress. Affect within normal limits.   Conjunctivae anicteric. PERRL. Tympanic " membranes clear. Oropharynx gumes healing.    NECK: Supple. No cervical lymphadenopathy, no thyroid enlargement.   Respiratory: Effort normal. Lungs are clear to auscultation.   HEART: Regular rate and rhythm without murmurs, gallops or rubs.   No lower extremity edema.    ABDOMEN: soft, non distended, non tender, bowel sounds present, no hepatosplenomgaly    Right axilla with draining boil.       ASSESSMENT AND PLAN:     Annual exam - discussed diet, exercise and safety issues.           1. Diabetes mellitus with hyperglycemia and microalbuminuria - in Digital diabetes.  . Watch for low blood sugars. Has Free style henrry   2. 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 Follow up with DR Engel  3. OA knee -s/p Orthovisc series injections 11/15/19. see ortho in [place]  4. Hypertension - controlled. Monitor BP at home  5. Hyperlipidemia. On pravastatin  6. BPH. S/p laser - Saw urology 8/18- has erectile dysfunction -stable.  See Dr Walker  7. Vitamin D deficiency - check level  9. Back pain/neck pain - managing-   10. Anxiety and depression- now on celexa 40 mg daily  11. Peripheral neuropathy -controlled on gabapentin as needed   12. CRI -stable - monitored closely by heme onc and nephrology. Last visti 1/21/21  13. Obestiy - work on diet, exercise and weigh tloss  14. Boils on arm pit- Bactrim DS twice daily  I will see him back in 4 months, sooner if problems arise        Prevnar 7/16  PSA 10/18. colonoscopy normal 3/2012 and due 2022.

## 2022-10-03 ENCOUNTER — RESEARCH ENCOUNTER (OUTPATIENT)
Dept: HEMATOLOGY/ONCOLOGY | Facility: CLINIC | Age: 77
End: 2022-10-03

## 2022-10-03 ENCOUNTER — OFFICE VISIT (OUTPATIENT)
Dept: PULMONOLOGY | Facility: CLINIC | Age: 77
End: 2022-10-03
Payer: MEDICARE

## 2022-10-03 ENCOUNTER — LAB VISIT (OUTPATIENT)
Dept: LAB | Facility: HOSPITAL | Age: 77
End: 2022-10-03
Attending: INTERNAL MEDICINE
Payer: MEDICARE

## 2022-10-03 ENCOUNTER — OFFICE VISIT (OUTPATIENT)
Dept: HEMATOLOGY/ONCOLOGY | Facility: CLINIC | Age: 77
End: 2022-10-03
Payer: MEDICARE

## 2022-10-03 VITALS
HEART RATE: 91 BPM | TEMPERATURE: 98 F | WEIGHT: 203.69 LBS | HEIGHT: 70 IN | RESPIRATION RATE: 16 BRPM | DIASTOLIC BLOOD PRESSURE: 79 MMHG | BODY MASS INDEX: 29.16 KG/M2 | OXYGEN SATURATION: 100 % | SYSTOLIC BLOOD PRESSURE: 163 MMHG

## 2022-10-03 VITALS
HEIGHT: 70 IN | WEIGHT: 204.13 LBS | OXYGEN SATURATION: 96 % | SYSTOLIC BLOOD PRESSURE: 138 MMHG | BODY MASS INDEX: 29.22 KG/M2 | DIASTOLIC BLOOD PRESSURE: 80 MMHG | HEART RATE: 103 BPM

## 2022-10-03 DIAGNOSIS — J30.2 SEASONAL ALLERGIES: ICD-10-CM

## 2022-10-03 DIAGNOSIS — K21.9 GASTROESOPHAGEAL REFLUX DISEASE WITHOUT ESOPHAGITIS: ICD-10-CM

## 2022-10-03 DIAGNOSIS — D47.2 SMOLDERING MULTIPLE MYELOMA: Primary | ICD-10-CM

## 2022-10-03 DIAGNOSIS — G47.33 OSA (OBSTRUCTIVE SLEEP APNEA): ICD-10-CM

## 2022-10-03 DIAGNOSIS — Z00.6 RESEARCH EXAM: ICD-10-CM

## 2022-10-03 DIAGNOSIS — D47.2 SMOLDERING MULTIPLE MYELOMA: ICD-10-CM

## 2022-10-03 DIAGNOSIS — R05.3 CHRONIC COUGH: Primary | ICD-10-CM

## 2022-10-03 LAB
ALBUMIN SERPL BCP-MCNC: 3.7 G/DL (ref 3.5–5.2)
ALP SERPL-CCNC: 61 U/L (ref 55–135)
ALT SERPL W/O P-5'-P-CCNC: 21 U/L (ref 10–44)
ANION GAP SERPL CALC-SCNC: 12 MMOL/L (ref 8–16)
AST SERPL-CCNC: 17 U/L (ref 10–40)
BASOPHILS # BLD AUTO: 0.05 K/UL (ref 0–0.2)
BASOPHILS NFR BLD: 0.9 % (ref 0–1.9)
BILIRUB SERPL-MCNC: 0.5 MG/DL (ref 0.1–1)
BUN SERPL-MCNC: 14 MG/DL (ref 8–23)
CALCIUM SERPL-MCNC: 9.4 MG/DL (ref 8.7–10.5)
CHLORIDE SERPL-SCNC: 103 MMOL/L (ref 95–110)
CO2 SERPL-SCNC: 21 MMOL/L (ref 23–29)
CREAT SERPL-MCNC: 2 MG/DL (ref 0.5–1.4)
DIFFERENTIAL METHOD: ABNORMAL
EOSINOPHIL # BLD AUTO: 0.3 K/UL (ref 0–0.5)
EOSINOPHIL NFR BLD: 5.3 % (ref 0–8)
ERYTHROCYTE [DISTWIDTH] IN BLOOD BY AUTOMATED COUNT: 14.6 % (ref 11.5–14.5)
EST. GFR  (NO RACE VARIABLE): 34 ML/MIN/1.73 M^2
GLUCOSE SERPL-MCNC: 275 MG/DL (ref 70–110)
HCT VFR BLD AUTO: 35.3 % (ref 40–54)
HGB BLD-MCNC: 11 G/DL (ref 14–18)
IGA SERPL-MCNC: 1793 MG/DL (ref 40–350)
IGG SERPL-MCNC: 1113 MG/DL (ref 650–1600)
IGM SERPL-MCNC: 40 MG/DL (ref 50–300)
IMM GRANULOCYTES # BLD AUTO: 0.01 K/UL (ref 0–0.04)
IMM GRANULOCYTES NFR BLD AUTO: 0.2 % (ref 0–0.5)
LDH SERPL L TO P-CCNC: 104 U/L (ref 110–260)
LYMPHOCYTES # BLD AUTO: 1.8 K/UL (ref 1–4.8)
LYMPHOCYTES NFR BLD: 33.4 % (ref 18–48)
MAGNESIUM SERPL-MCNC: 1.6 MG/DL (ref 1.6–2.6)
MCH RBC QN AUTO: 27.8 PG (ref 27–31)
MCHC RBC AUTO-ENTMCNC: 31.2 G/DL (ref 32–36)
MCV RBC AUTO: 89 FL (ref 82–98)
MONOCYTES # BLD AUTO: 0.3 K/UL (ref 0.3–1)
MONOCYTES NFR BLD: 6.2 % (ref 4–15)
NEUTROPHILS # BLD AUTO: 2.9 K/UL (ref 1.8–7.7)
NEUTROPHILS NFR BLD: 54 % (ref 38–73)
NRBC BLD-RTO: 0 /100 WBC
PHOSPHATE SERPL-MCNC: 2.3 MG/DL (ref 2.7–4.5)
PLATELET # BLD AUTO: 270 K/UL (ref 150–450)
PMV BLD AUTO: 9.4 FL (ref 9.2–12.9)
POTASSIUM SERPL-SCNC: 4.3 MMOL/L (ref 3.5–5.1)
PROT SERPL-MCNC: 8.6 G/DL (ref 6–8.4)
RBC # BLD AUTO: 3.96 M/UL (ref 4.6–6.2)
SODIUM SERPL-SCNC: 136 MMOL/L (ref 136–145)
WBC # BLD AUTO: 5.33 K/UL (ref 3.9–12.7)

## 2022-10-03 PROCEDURE — 1101F PR PT FALLS ASSESS DOC 0-1 FALLS W/OUT INJ PAST YR: ICD-10-PCS | Mod: CPTII,S$GLB,, | Performed by: INTERNAL MEDICINE

## 2022-10-03 PROCEDURE — 3075F PR MOST RECENT SYSTOLIC BLOOD PRESS GE 130-139MM HG: ICD-10-PCS | Mod: CPTII,S$GLB,, | Performed by: INTERNAL MEDICINE

## 2022-10-03 PROCEDURE — 86334 PATHOLOGIST INTERPRETATION IFE: ICD-10-PCS | Mod: 26,,, | Performed by: PATHOLOGY

## 2022-10-03 PROCEDURE — 3072F LOW RISK FOR RETINOPATHY: CPT | Mod: CPTII,S$GLB,, | Performed by: INTERNAL MEDICINE

## 2022-10-03 PROCEDURE — 82784 ASSAY IGA/IGD/IGG/IGM EACH: CPT | Mod: Q1 | Performed by: INTERNAL MEDICINE

## 2022-10-03 PROCEDURE — 3077F SYST BP >= 140 MM HG: CPT | Mod: CPTII,S$GLB,, | Performed by: INTERNAL MEDICINE

## 2022-10-03 PROCEDURE — 83615 LACTATE (LD) (LDH) ENZYME: CPT | Performed by: INTERNAL MEDICINE

## 2022-10-03 PROCEDURE — 3078F PR MOST RECENT DIASTOLIC BLOOD PRESSURE < 80 MM HG: ICD-10-PCS | Mod: CPTII,S$GLB,, | Performed by: INTERNAL MEDICINE

## 2022-10-03 PROCEDURE — 1159F PR MEDICATION LIST DOCUMENTED IN MEDICAL RECORD: ICD-10-PCS | Mod: CPTII,S$GLB,, | Performed by: INTERNAL MEDICINE

## 2022-10-03 PROCEDURE — 3075F SYST BP GE 130 - 139MM HG: CPT | Mod: CPTII,S$GLB,, | Performed by: INTERNAL MEDICINE

## 2022-10-03 PROCEDURE — 80053 COMPREHEN METABOLIC PANEL: CPT | Mod: Q1 | Performed by: INTERNAL MEDICINE

## 2022-10-03 PROCEDURE — 3079F DIAST BP 80-89 MM HG: CPT | Mod: CPTII,S$GLB,, | Performed by: INTERNAL MEDICINE

## 2022-10-03 PROCEDURE — 1160F PR REVIEW ALL MEDS BY PRESCRIBER/CLIN PHARMACIST DOCUMENTED: ICD-10-PCS | Mod: CPTII,S$GLB,, | Performed by: INTERNAL MEDICINE

## 2022-10-03 PROCEDURE — 99204 OFFICE O/P NEW MOD 45 MIN: CPT | Mod: S$GLB,,, | Performed by: INTERNAL MEDICINE

## 2022-10-03 PROCEDURE — 83520 IMMUNOASSAY QUANT NOS NONAB: CPT | Mod: 59 | Performed by: INTERNAL MEDICINE

## 2022-10-03 PROCEDURE — 3288F PR FALLS RISK ASSESSMENT DOCUMENTED: ICD-10-PCS | Mod: CPTII,S$GLB,, | Performed by: INTERNAL MEDICINE

## 2022-10-03 PROCEDURE — 99214 OFFICE O/P EST MOD 30 MIN: CPT | Mod: S$GLB,,, | Performed by: INTERNAL MEDICINE

## 2022-10-03 PROCEDURE — 85025 COMPLETE CBC W/AUTO DIFF WBC: CPT | Performed by: INTERNAL MEDICINE

## 2022-10-03 PROCEDURE — 1101F PT FALLS ASSESS-DOCD LE1/YR: CPT | Mod: CPTII,S$GLB,, | Performed by: INTERNAL MEDICINE

## 2022-10-03 PROCEDURE — 86334 IMMUNOFIX E-PHORESIS SERUM: CPT | Mod: Q1 | Performed by: INTERNAL MEDICINE

## 2022-10-03 PROCEDURE — 99999 PR PBB SHADOW E&M-EST. PATIENT-LVL V: CPT | Mod: PBBFAC,,, | Performed by: INTERNAL MEDICINE

## 2022-10-03 PROCEDURE — 1160F RVW MEDS BY RX/DR IN RCRD: CPT | Mod: CPTII,S$GLB,, | Performed by: INTERNAL MEDICINE

## 2022-10-03 PROCEDURE — 99204 PR OFFICE/OUTPT VISIT, NEW, LEVL IV, 45-59 MIN: ICD-10-PCS | Mod: S$GLB,,, | Performed by: INTERNAL MEDICINE

## 2022-10-03 PROCEDURE — 1126F PR PAIN SEVERITY QUANTIFIED, NO PAIN PRESENT: ICD-10-PCS | Mod: CPTII,S$GLB,, | Performed by: INTERNAL MEDICINE

## 2022-10-03 PROCEDURE — 3078F DIAST BP <80 MM HG: CPT | Mod: CPTII,S$GLB,, | Performed by: INTERNAL MEDICINE

## 2022-10-03 PROCEDURE — 3077F PR MOST RECENT SYSTOLIC BLOOD PRESSURE >= 140 MM HG: ICD-10-PCS | Mod: CPTII,S$GLB,, | Performed by: INTERNAL MEDICINE

## 2022-10-03 PROCEDURE — 99999 PR PBB SHADOW E&M-EST. PATIENT-LVL V: ICD-10-PCS | Mod: PBBFAC,,, | Performed by: INTERNAL MEDICINE

## 2022-10-03 PROCEDURE — 84165 PROTEIN E-PHORESIS SERUM: CPT | Mod: Q1 | Performed by: INTERNAL MEDICINE

## 2022-10-03 PROCEDURE — 1159F MED LIST DOCD IN RCRD: CPT | Mod: CPTII,S$GLB,, | Performed by: INTERNAL MEDICINE

## 2022-10-03 PROCEDURE — 3079F PR MOST RECENT DIASTOLIC BLOOD PRESSURE 80-89 MM HG: ICD-10-PCS | Mod: CPTII,S$GLB,, | Performed by: INTERNAL MEDICINE

## 2022-10-03 PROCEDURE — 3288F FALL RISK ASSESSMENT DOCD: CPT | Mod: CPTII,S$GLB,, | Performed by: INTERNAL MEDICINE

## 2022-10-03 PROCEDURE — 84100 ASSAY OF PHOSPHORUS: CPT | Mod: Q1 | Performed by: INTERNAL MEDICINE

## 2022-10-03 PROCEDURE — 1126F AMNT PAIN NOTED NONE PRSNT: CPT | Mod: CPTII,S$GLB,, | Performed by: INTERNAL MEDICINE

## 2022-10-03 PROCEDURE — 3072F PR LOW RISK FOR RETINOPATHY: ICD-10-PCS | Mod: CPTII,S$GLB,, | Performed by: INTERNAL MEDICINE

## 2022-10-03 PROCEDURE — 84165 PROTEIN E-PHORESIS SERUM: CPT | Mod: 26,,, | Performed by: PATHOLOGY

## 2022-10-03 PROCEDURE — 99214 PR OFFICE/OUTPT VISIT, EST, LEVL IV, 30-39 MIN: ICD-10-PCS | Mod: S$GLB,,, | Performed by: INTERNAL MEDICINE

## 2022-10-03 PROCEDURE — 84165 PATHOLOGIST INTERPRETATION SPE: ICD-10-PCS | Mod: 26,,, | Performed by: PATHOLOGY

## 2022-10-03 PROCEDURE — 36415 COLL VENOUS BLD VENIPUNCTURE: CPT | Performed by: INTERNAL MEDICINE

## 2022-10-03 PROCEDURE — 86334 IMMUNOFIX E-PHORESIS SERUM: CPT | Mod: 26,,, | Performed by: PATHOLOGY

## 2022-10-03 PROCEDURE — 83735 ASSAY OF MAGNESIUM: CPT | Performed by: INTERNAL MEDICINE

## 2022-10-03 RX ORDER — FLUTICASONE PROPIONATE 50 MCG
1 SPRAY, SUSPENSION (ML) NASAL DAILY
Qty: 18.2 ML | Refills: 6 | Status: SHIPPED | OUTPATIENT
Start: 2022-10-03

## 2022-10-03 RX ORDER — FEXOFENADINE HCL 60 MG
60 TABLET ORAL DAILY
Qty: 90 TABLET | Refills: 3 | Status: SHIPPED | OUTPATIENT
Start: 2022-10-03 | End: 2024-01-30

## 2022-10-03 NOTE — PROGRESS NOTES
"Subjective:       Patient ID: Emerson Menendez is a 77 y.o. male.    Chief Complaint: Cough    HPI  Mr Menendez has a past medical history of osteoarthritis, anxiety, depression, glaucoma, HTN, HLD, CKDIII, BPH, smoldering multiple myeloma, DMII, obesity, MALINI who presents to the pulmonary clinic today for evaluation of cough.  He reports a "stead drip from his nose at night that causes cough".  This has been present for 6 months.  He takes cough drops with mild relief.      Tobacco/vape: never smoker  Occupation: .  Exertional capacity: none, per phone, walks about 2-3 miles per day with work.  Prior lung disease: diagnosed with sarcoidosis in the past by biopsy of lung in [place] ~30 years ago. Has been asymptomatic for this entire time.   Sputum production: sometimes, whitish color.  Allergies/sinusitis: frequent and regular.  Worst in allergy season.  GERD/Aspiration: taking omeprazole with good effect.  Pets: none  Travel/TB: non exposures known  Respiratory regimen: none  Prior cancer: none  Prior hospitalization/intubation: never  Snoring/apnea: diagnosed in the past, but not wearing CPAP because he didn't notice any changes.    Today he is doing well.  He has no complaints but his cough that is at night and caused by "dripping from my nose into my throat".     Review of Systems   Constitutional:  Negative for fever, chills, weight loss, activity change, fatigue and night sweats.   HENT:  Positive for postnasal drip, rhinorrhea and congestion. Negative for sinus pressure and ear pain.    Eyes:  Negative for redness and itching.   Respiratory:  Positive for cough and asthma nighttime symptoms. Negative for apnea, snoring, hemoptysis, sputum production, choking, shortness of breath, wheezing, previous hospitialization due to pulmonary problems, dyspnea on extertion, use of rescue inhaler and somnolence.    Cardiovascular:  Negative for chest pain, palpitations and leg swelling. " "  Genitourinary: Negative.    Endocrine: endocrine negative    Musculoskeletal: Negative.    Gastrointestinal:  Positive for acid reflux. Negative for nausea, vomiting, abdominal pain and abdominal distention.   Neurological: Negative.    Psychiatric/Behavioral: Negative.       Objective:      Vitals:    10/03/22 0818   BP: 138/80   BP Location: Right arm   Patient Position: Sitting   Pulse: 103   SpO2: 96%   Weight: 92.6 kg (204 lb 2.3 oz)   Height: 5' 10" (1.778 m)       Physical Exam   Constitutional: He is oriented to person, place, and time. He appears well-developed and well-nourished. No distress. He is not obese.   HENT:   Head: Normocephalic.   Neck: No JVD present.   Cardiovascular: Normal rate, regular rhythm and normal heart sounds. Exam reveals no gallop and no friction rub.   No murmur heard.  Pulmonary/Chest: Normal expansion, symmetric chest wall expansion, effort normal and breath sounds normal.   Abdominal: Soft. Bowel sounds are normal. He exhibits no distension.   Musculoskeletal:         General: No edema. Normal range of motion.      Cervical back: Normal range of motion.   Neurological: He is alert and oriented to person, place, and time.   Skin: Skin is warm and dry. He is not diaphoretic.   Psychiatric: He has a normal mood and affect. His behavior is normal. Judgment and thought content normal.     Personal Diagnostic Review    Chest x-ray 8/15/22  No significant findings to correlate with cough history.  No pleural disease or parenchymal disease noted.  Calcification along aorta.    Eosinophils   0.5K/uL on 5/30/2022  0.6 K/uL on 5/6/2020    No flowsheet data found.      Assessment:       1. Cough    2. Seasonal allergies    3. Gastroesophageal reflux disease without esophagitis    4. MALINI (obstructive sleep apnea)    5. Chronic cough          Outpatient Encounter Medications as of 10/3/2022   Medication Sig Dispense Refill    ACCU-CHEK STEFANO CONTROL SOLN Soln       alcohol swabs PadM Apply " 1 each topically as needed.      aspirin (ECOTRIN) 81 MG EC tablet Take 1 tablet (81 mg total) by mouth once daily. 100 tablet 3    azelastine (ASTELIN) 137 mcg (0.1 %) nasal spray 1 spray (137 mcg total) by Nasal route 2 (two) times daily. 30 mL 3    blood glucose control, normal (METER-CHECK) Soln One meter.  Use as directed. Meter of insurance choice (Patient taking differently: One meter.  Use as directed. Meter of insurance choice) 1 each 0    blood sugar diagnostic (ACCU-CHEK STEFANO PLUS TEST STRP) Strp Accu check stefano plus TEST FOUR TIMES DAILY. Test strtips of insurance choice to go with meter and lancets 400 strip 3    blood sugar diagnostic Strp [facility] - check blood sugars 4 times daily 400 strip 4    blood-glucose meter (ACCU-CHEK STEFANO PLUS METER) Misc Use as direted 1 each 0    blood-glucose meter (ACCU-CHEK OMAYRA) Misc USE AS DIRECTED. Accucheck stefano plus 1 each 0    citalopram (CELEXA) 40 MG tablet Take 1 tablet (40 mg total) by mouth once daily. 90 tablet 4    citalopram (CELEXA) 40 MG tablet Take 1 tablet (40 mg total) by mouth once daily. 30 tablet 4    diazePAM (VALIUM) 5 MG tablet Take 1 tablet (5 mg total) by mouth every 12 (twelve) hours as needed for Anxiety. 60 tablet 0    dorzolamide-timolol 2-0.5% (COSOPT) 22.3-6.8 mg/mL ophthalmic solution Place 1 drop into both eyes 2 (two) times daily.      dorzolamide-timolol 2-0.5% (COSOPT) 22.3-6.8 mg/mL ophthalmic solution Place 1 drop into both eyes 2 (two) times daily. 10 mL 5    ergocalciferol (ERGOCALCIFEROL) 50,000 unit Cap Take 1 capsule (50,000 Units total) by mouth every 7 days. 12 capsule 3    FARXIGA 5 mg Tab tablet Take 1 tablet (5 mg total) by mouth once daily. 30 tablet 11    flash glucose scanning reader (FREESTYLE CHRISTIANO 14 DAY READER) Misc Use as directed 6 each 4    flash glucose sensor (FREESTYLE CHRISTIANO 14 DAY SENSOR) Kit Use as directed 6 kit 4    gabapentin (NEURONTIN) 100 MG capsule One capsule in am , one capsule in  "afternoon, 1-3 capsules in evening 450 capsule 4    glimepiride (AMARYL) 2 MG tablet Take 1 tablet (2 mg total) by mouth once daily. 90 tablet 4    glucose 4 GM chewable tablet Take 8 g by mouth as needed for Low blood sugar.      insulin (LANTUS SOLOSTAR U-100 INSULIN) glargine 100 units/mL SubQ pen ADMINISTER 14 UNITS UNDER THE SKIN IN THE MORNING AND 16 units IN THE EVENING 30 mL 5    insulin aspart U-100 (NOVOLOG FLEXPEN U-100 INSULIN) 100 unit/mL (3 mL) InPn pen Inject 4 Units into the skin 3 (three) times daily with meals. 12 mL 1    lancets (ACCU-CHEK SOFTCLIX LANCETS) Misc 1 lancet by Misc.(Non-Drug; Combo Route) route 4 (four) times daily. 400 each 4    levocetirizine (XYZAL) 5 MG tablet Take 1 tablet (5 mg total) by mouth every evening. 30 tablet 3    losartan (COZAAR) 50 MG tablet Take 1 tablet (50 mg total) by mouth once daily. 90 tablet 6    MONOVISC 88 mg/4 mL Syrg       omeprazole (PRILOSEC) 40 MG capsule Take 1 capsule (40 mg total) by mouth once daily. 90 capsule 4    pen needle, diabetic (BD ULTRA-FINE SHORT PEN NEEDLE) 31 gauge x 5/16" Ndle USE TO INJECT INSULIN ONE TIME DAILY 100 each 3    pravastatin (PRAVACHOL) 40 MG tablet Take 1 tablet (40 mg total) by mouth once daily. 90 tablet 3    sildenafiL (VIAGRA) 100 MG tablet Take 1 tablet (100 mg total) by mouth daily as needed for Erectile Dysfunction. 30 tablet 2    tamsulosin (FLOMAX) 0.4 mg Cap Take 1 capsule (0.4 mg total) by mouth nightly. 90 capsule 4    TRUEPLUS LANCETS 33 gauge Misc       fexofenadine (ALLEGRA) 60 MG tablet Take 1 tablet (60 mg total) by mouth once daily. 90 tablet 3    fluticasone propionate (FLONASE) 50 mcg/actuation nasal spray 1 spray (50 mcg total) by Each Nostril route once daily. 18.2 mL 6     Facility-Administered Encounter Medications as of 10/3/2022   Medication Dose Route Frequency Provider Last Rate Last Admin    mupirocin 2 % ointment   Nasal On Call Procedure Mohit Hummel MD   Given at 01/06/21 0847 "     No orders of the defined types were placed in this encounter.      Plan:       1) chronic cough  2) seasonal allergies  3) post-nasal drip.  4) GERD  5) MALINI not on therapy    - nasal rinse followed by flonase daily. Counseled to monitor CBG carefully and if it uptrends then to discontinue flonase.  - allegra 60mg Qday based on renal function.    - if symptoms persist, then will obtain PFTs.  - unable to tolerate CPAP in past, not interested in retrying at this time.    Return to clinic 6 months or sooner PRN.    Ari Llanos MD  [facility]

## 2022-10-03 NOTE — PROGRESS NOTES
Monday, October 3rd, 2022      Protocol: [id number] Randomized Phase III Trial of Lenalidomide vs Observation Alone in Patients with 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.   Sponsor:  [facility]  IRB# [id number]  Study ID: [id number]  Investigator: GIL Engel  Pt Initials: LUCÍA LORENZ        Cycle 82 Day 28 Arm B: Observation      Patient presents to clinic today for above cycle evaluation; he is un-accompanied and states he continues to do well. He is alert, oriented to person, place, and time; mood and affect are appropriate. He presents one week late due to scheduling issue.  States is maintaining current COVID precautions; wears mask in crowded indoor areas; denies any COVID symptoms.  Pt inquired about need for Covid booster today; Dr. Engel confirmed that he should receive a booster.  He states understanding, also states he received his flu shot this season as well.  Continues to work actively; maintaining several contracts here in the [employer] and less in the [facility].        Review of Baseline AE's:   1.Hypertension, Grade 3 systolic: BP today is 163/79; subject states compliance with Losartan and that he feels his BP being this elevated today is situational. Subject denies headache/dizziness; no symptoms noted. Dr. Engel aware.  AE ongoing and tends to fluctuate.   2. Lower Back Pain and Neck Pain, grade 1: Stable.  Patient has no complaints as of late and as previously stated, patient is not suspected to have bony myeloma involvement per Dr. Engel as these are pre-existing issues present at baseline. I scheduled tomorrow for MRI of spine, Will update results when available.  AE ongoing.  3. Pain in extremity (knees), grade 1. Subject presents with knee braces; states no complaints today; s/p injections 3 mos ago.States is trying to get surgery set up for replacement when activity with work slows down.  Will continue to monitor.       4. Peripheral sensory neuropathy, grade 1: Patient does not verbalize  "complaints today. Has gabapentin prescribed and takes as needed.  States has not taken recently.  AE ongoing.   5. Anemia, Grade 1: Hgb 11.0 today. Result reviewed by Dr. Engel, who answered patient questions as above. AE ongoing and stable.            Review of AE's:     *Please note this list is not all-inclusive, please see AE log for physician reviewed list of adverse events.   1. Creatinine increased, grade 2: Serum creatinine from labs today is 2.0; calculated GFR 41 ml/min based on CG calculation. Reviewed per Dr. Engel and no new orders.Per Dr Engel not been related to myeloma: rather, CKD;  likely secondary to diabetes and hypertension vs plasma cell dyscrasia.  Will continue observation monthly and to monitor renal functionclosely. Per MD, reiterated adequate hydration with increase in water/gatorade intake..  2. Hyponatremia, Grade 1: Serum sodium today is 136 mmol/L; AE intermittent. Will monitor  3. Hyperkalemia, Grade 1: serum K today is 4.3, Dr Engel has reviewed labs; no additional orders noted. AE intermittent; will continue to monitor.   4. Hyperuricemia, Grade 1: this was not evaluated today; usually ordered per nephrology.   5. Hyperglycemia. Grade 1: blood glucose today is 275; lab is nonfasting. He states he was short on time today and only took one of his diabetes medications.  See above above regarding concerns over recently increased sugars; last hgb A1C done on 8/29 was 8.7; subject followed closely by endocrinology.   6. Diplopia, right eye: Subject states ophtamology told him is eye issues are improving, though he states he feels as though it is not. Per Dr Engel this is not related to SMM; rather is associated with diabetes. Subject maintains compliance with opthomoolgy; will continue to monitor. He has appt in am with eye MD at [facility].      Per study chair, "the definition of progressive disease for this protocol is developing CRAB criteria that requires treatment systemically." Per Dr" Toro, based on today's exam and lab results, patient does not have any s/s of CRAB: Normal Calcium level (9.4), absence of Renal insufficiency (serum creatinine elevated today at 2.0 but per Dr Engel not related to myeloma; --patient with a history of kidney dysfunction secondary to diabetes; Dr. Engel aware of these values and not concerned for myeloma involvement), absence of significant Anemia (hemoglobin 11.0, stable; will await myeloma labs for clarity relationship to myeloma) and absence of lytic Bone lesions (per baseline metastatic survey as well as MRI--see MD note for further comment). See MD note for ECOG score and H&P and flowsheets for laboratory work, vitals, etc. All myeloma-related blood work from last cycle including SPEP and JAGUAR have remained stable, and are currently pending for this cycle. Per Dr. Engel, patient shows no evidence of progression at last result. Patient informed that Dr. Engel will release all lab results to [facility]. He states understanding of this. QOL's not required again until end of treatment/observation.           Subject maintains he wishes only to see Dr Engel and will not show for appts if scheduled with any other provider. For this reason subject's appts do not always align with study calendar; per Dr Engel as subject is not on active treatment will abide by subject's wishes to see her only.  Will continue to schedule patient as far out as possible, which seems to help maintain compliance with visits. Normally subject deviates no greater than one week per cycle; wishes to remain on study per Dr Engel. Next appt was moved forwarded one week to [facility] 28 day cycles for data/study compliance; appts generally fall within 28-32 days.   Informed patient will continue to contact him a few days prior to next appt to remind him so that he can plan accordingly. Patient states having research RN's contact information and has MD contact information to call with any concerns, questions,  or worsening of symptoms. Will follow up with subject once myeloma labs are resulted.

## 2022-10-03 NOTE — PROGRESS NOTES
Subjective:    Patient ID: Emerson Menendez is a 77 y.o. male.    Chief Complaint: f/u [id number] Cycle 82 D 28. 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 Test results identify a stable IgA kappa protein with beta globulin band at 1.63g/dL. Kappa free light chain is elevated at 4.40. CBC and calcium are stable. Creatinine with history of stage III CKD is stable at 1.4. Metastatic survey is negative for lytic lesions. MRI of the entire spine demonstrated age related changes but no convincing evidence of myeloma bone disease. Bone marrow biopsy identified about 13% plasma cells by morphology and FISH for myeloma identified a t(11;14) and trisomies 3,7, and 17. The patient is afebrile and appears clinically well. He was seen by his podiatrist and nephrologist since our last visit without any new or acute events.    The patient has not received any therapy for smoldering myeloma including bisphosphonates or steroids. He has been randomized to observation arm of the the clinical study. 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 He has no current or prior history of malignancy.    TODAY Cycle 82, observation  No acute interval medical events  CBC stable; CMP and MM labs pending  M protein at 1.88 last month, will follow-up pending study  Notes home glucose monitoring for past 2 months with elevated levels; actively working on improving BG control, reports several lows  Contemplating bilateral knee surgery still, wants to improve quality of life  Made it through the anniversary of his daughters passing.  Extremely busy work on  [facility]      Knee Pain     Joint Pain  Associated symptoms include coughing. Pertinent negatives include no diaphoresis, fatigue or fever.   Hand Pain     Cough  Pertinent negatives include no fever.   Foot Pain  Associated symptoms include coughing. Pertinent negatives include no diaphoresis, fatigue or fever.   Arm Pain     Follow-up  Associated symptoms include coughing. Pertinent negatives include no diaphoresis, fatigue or fever.   Review of Systems   Constitutional:  Negative for activity change, appetite change, diaphoresis, fatigue, fever and unexpected weight change.   HENT: Negative.     Eyes: Negative.    Respiratory:  Positive for cough.    Cardiovascular:  Negative for leg swelling.   Gastrointestinal: Negative.    Endocrine: Negative.    Genitourinary: Negative.    Musculoskeletal: Negative.    Skin: Negative.    Allergic/Immunologic: Negative.    Neurological:         Grade 0-1 peripheral neuropathy of bilateral feet.    Hematological:  Negative for adenopathy. Does not bruise/bleed easily.   Psychiatric/Behavioral: Negative.       Objective:       Vitals:    10/03/22 1008   BP: (!) 163/79   Pulse: 91   Resp: 16   Temp: 98 °F (36.7 °C)       Physical Exam  Vitals and nursing note reviewed.   Constitutional:       Appearance: He is well-developed.   HENT:      Head: Normocephalic and atraumatic.      Right Ear: External ear normal.      Left Ear: External ear normal.      Nose: Nose normal.   Eyes:      Conjunctiva/sclera: Conjunctivae normal.      Pupils: Pupils are equal, round, and reactive to light.   Cardiovascular:      Rate and Rhythm: Normal rate and regular rhythm.      Heart sounds: No murmur heard.  Pulmonary:      Effort: Pulmonary effort is normal. No respiratory distress.      Breath sounds: Normal breath sounds.   Abdominal:      General: Bowel sounds are normal. There is no distension.      Palpations: Abdomen is soft.   Musculoskeletal:         General: No tenderness.       Cervical back: Normal range of motion and neck supple.   Skin:     General: Skin is warm and dry.      Findings: No rash.      Nails: There is no clubbing.   Neurological:      Mental Status: He is alert and oriented to person, place, and time.      Cranial Nerves: No cranial nerve deficit.   Psychiatric:         Behavior: Behavior normal.       Assessment:       No diagnosis found.    Plan:       The patient has a diagnosis of smoldering myeloma. There is no indication for immediate chemotherapy. We are monitoring renal function closely- baseline creatinine of around 1.5. 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 M protein has been stable and repeat is pending. Plan for return in 1 month.  Endocrinology and Nephrology to monitor diabetes and renal failure respectively. Patient would like to continue to follow with Dr. Perkins as needed.    A total of 20 minutes was spent in pre-visit chart review, personal interpretation of labs and imaging, and medication review. Total visit time 30 minutes, >50 % counseling.

## 2022-10-04 LAB
ALBUMIN SERPL ELPH-MCNC: 3.81 G/DL (ref 3.35–5.55)
ALPHA1 GLOB SERPL ELPH-MCNC: 0.27 G/DL (ref 0.17–0.41)
ALPHA2 GLOB SERPL ELPH-MCNC: 0.84 G/DL (ref 0.43–0.99)
B-GLOBULIN SERPL ELPH-MCNC: 2.62 G/DL (ref 0.5–1.1)
GAMMA GLOB SERPL ELPH-MCNC: 0.57 G/DL (ref 0.67–1.58)
INTERPRETATION SERPL IFE-IMP: NORMAL
KAPPA LC SER QL IA: 6.74 MG/DL (ref 0.33–1.94)
KAPPA LC/LAMBDA SER IA: 3.39 (ref 0.26–1.65)
LAMBDA LC SER QL IA: 1.99 MG/DL (ref 0.57–2.63)
PROT SERPL-MCNC: 8.1 G/DL (ref 6–8.4)

## 2022-10-05 LAB
PATHOLOGIST INTERPRETATION IFE: NORMAL
PATHOLOGIST INTERPRETATION SPE: NORMAL

## 2022-10-11 ENCOUNTER — OFFICE VISIT (OUTPATIENT)
Dept: OPHTHALMOLOGY | Facility: CLINIC | Age: 77
End: 2022-10-11
Payer: MEDICARE

## 2022-10-11 DIAGNOSIS — Z98.49 STATUS POST CATARACT EXTRACTION AND INSERTION OF INTRAOCULAR LENS, UNSPECIFIED LATERALITY: Primary | ICD-10-CM

## 2022-10-11 DIAGNOSIS — E08.3211 MILD NONPROLIFERATIVE DIABETIC RETINOPATHY OF RIGHT EYE WITH MACULAR EDEMA ASSOCIATED WITH DIABETES MELLITUS DUE TO UNDERLYING CONDITION: ICD-10-CM

## 2022-10-11 DIAGNOSIS — H26.491 POSTERIOR CAPSULAR OPACIFICATION OF RIGHT EYE, OBSCURING VISION: Primary | ICD-10-CM

## 2022-10-11 DIAGNOSIS — E11.3292 MILD NONPROLIFERATIVE DIABETIC RETINOPATHY OF LEFT EYE WITHOUT MACULAR EDEMA ASSOCIATED WITH TYPE 2 DIABETES MELLITUS: ICD-10-CM

## 2022-10-11 DIAGNOSIS — H40.1132 PRIMARY OPEN ANGLE GLAUCOMA OF BOTH EYES, MODERATE STAGE: ICD-10-CM

## 2022-10-11 DIAGNOSIS — Z96.1 PSEUDOPHAKIA OF BOTH EYES: ICD-10-CM

## 2022-10-11 DIAGNOSIS — Z96.1 STATUS POST CATARACT EXTRACTION AND INSERTION OF INTRAOCULAR LENS, UNSPECIFIED LATERALITY: Primary | ICD-10-CM

## 2022-10-11 PROCEDURE — 1126F AMNT PAIN NOTED NONE PRSNT: CPT | Mod: CPTII,S$GLB,, | Performed by: OPHTHALMOLOGY

## 2022-10-11 PROCEDURE — 99214 OFFICE O/P EST MOD 30 MIN: CPT | Mod: 57,S$GLB,, | Performed by: OPHTHALMOLOGY

## 2022-10-11 PROCEDURE — 1101F PT FALLS ASSESS-DOCD LE1/YR: CPT | Mod: CPTII,S$GLB,, | Performed by: OPHTHALMOLOGY

## 2022-10-11 PROCEDURE — 1101F PR PT FALLS ASSESS DOC 0-1 FALLS W/OUT INJ PAST YR: ICD-10-PCS | Mod: CPTII,S$GLB,, | Performed by: OPHTHALMOLOGY

## 2022-10-11 PROCEDURE — 3288F FALL RISK ASSESSMENT DOCD: CPT | Mod: CPTII,S$GLB,, | Performed by: OPHTHALMOLOGY

## 2022-10-11 PROCEDURE — 3288F PR FALLS RISK ASSESSMENT DOCUMENTED: ICD-10-PCS | Mod: CPTII,S$GLB,, | Performed by: OPHTHALMOLOGY

## 2022-10-11 PROCEDURE — 66821 YAG CAPSULOTOMY - OD - RIGHT EYE: ICD-10-PCS | Mod: RT,S$GLB,, | Performed by: OPHTHALMOLOGY

## 2022-10-11 PROCEDURE — 99499 RISK ADDL DX/OHS AUDIT: ICD-10-PCS | Mod: HCNC,S$GLB,, | Performed by: OPHTHALMOLOGY

## 2022-10-11 PROCEDURE — 1159F PR MEDICATION LIST DOCUMENTED IN MEDICAL RECORD: ICD-10-PCS | Mod: CPTII,S$GLB,, | Performed by: OPHTHALMOLOGY

## 2022-10-11 PROCEDURE — 99499 UNLISTED E&M SERVICE: CPT | Mod: HCNC,S$GLB,, | Performed by: OPHTHALMOLOGY

## 2022-10-11 PROCEDURE — 99214 PR OFFICE/OUTPT VISIT, EST, LEVL IV, 30-39 MIN: ICD-10-PCS | Mod: 57,S$GLB,, | Performed by: OPHTHALMOLOGY

## 2022-10-11 PROCEDURE — 1160F PR REVIEW ALL MEDS BY PRESCRIBER/CLIN PHARMACIST DOCUMENTED: ICD-10-PCS | Mod: CPTII,S$GLB,, | Performed by: OPHTHALMOLOGY

## 2022-10-11 PROCEDURE — 99999 PR PBB SHADOW E&M-EST. PATIENT-LVL IV: CPT | Mod: PBBFAC,,, | Performed by: OPHTHALMOLOGY

## 2022-10-11 PROCEDURE — 99999 PR PBB SHADOW E&M-EST. PATIENT-LVL IV: ICD-10-PCS | Mod: PBBFAC,,, | Performed by: OPHTHALMOLOGY

## 2022-10-11 PROCEDURE — 1160F RVW MEDS BY RX/DR IN RCRD: CPT | Mod: CPTII,S$GLB,, | Performed by: OPHTHALMOLOGY

## 2022-10-11 PROCEDURE — 1159F MED LIST DOCD IN RCRD: CPT | Mod: CPTII,S$GLB,, | Performed by: OPHTHALMOLOGY

## 2022-10-11 PROCEDURE — 1126F PR PAIN SEVERITY QUANTIFIED, NO PAIN PRESENT: ICD-10-PCS | Mod: CPTII,S$GLB,, | Performed by: OPHTHALMOLOGY

## 2022-10-11 PROCEDURE — 66821 AFTER CATARACT LASER SURGERY: CPT | Mod: RT,S$GLB,, | Performed by: OPHTHALMOLOGY

## 2022-10-11 RX ORDER — AFLIBERCEPT 40 MG/ML
2 INJECTION, SOLUTION INTRAVITREAL
COMMUNITY
Start: 2022-08-04

## 2022-10-11 RX ORDER — PREDNISOLONE ACETATE 10 MG/ML
1 SUSPENSION/ DROPS OPHTHALMIC 4 TIMES DAILY
Qty: 5 ML | Refills: 0 | Status: SHIPPED | OUTPATIENT
Start: 2022-10-11 | End: 2022-10-15

## 2022-10-11 RX ORDER — PROPOFOL 10 MG/ML
INJECTION, EMULSION INTRAVENOUS
COMMUNITY
Start: 2022-07-29 | End: 2023-02-15

## 2022-10-11 RX ORDER — TRIAMCINOLONE ACETONIDE 40 MG/ML
40 INJECTION, SUSPENSION INTRA-ARTICULAR; INTRAMUSCULAR
COMMUNITY
Start: 2022-07-14

## 2022-10-11 NOTE — PROGRESS NOTES
Assessment /Plan     For exam results, see Encounter Report.    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 [place]  Entergy  [employer] system / [employer] -->   [place] [facility] [place]    ==> 46 yo Daughter  @ 2020  Only child  Grieving  Aneurysm / coma x 3 days 12 years ago --> RF --> Dialysis x 12 years      POAG  pre - Tx - High 20's OS 2013 -->   No Family hx glc or blindness    CCT  508 // 510    Mid teens < 19 ==> achieved & re-discussed      Both eyes --> CSM  Cosopt BID    PGA --> holding  Alphagan --> holding    SP SLT OD 2020  SP SLT OS 2020      Mild DR  CSME OD --> Dr Alan  No CSME OS  Control --> better    HTN ret  Control    PC IOL OD  SN60WF 19.0 GK  Quiet  VS PCO --> posterior distention of capsule --> increasing Glare and diff reading    Left eye PC IOL / Trypan Blue 2021  Quiet  Observe          Laser Capsulotomy  right eye    Laser Capsulotomy Surgery Consent:  Patient with visually significant capsular opacification negatively impacting visually based ADLs and QOL.  Discussed with patient options, risks and benefits, expectations of laser surgery with questions and answers to facilitate discussion. The patient  voiced good understanding and patient wishes to proceed with surgery.  The patient will likely benefit from laser surgery and patient signed consent for Right Eye.      The correct eye was identified by  patient prior to start of the procedure.    YAG Capsulotomy:    Total Energy:  136 mJ    Total # of Exposures: 57 Burst --> posterior distention of capsule     Patient tolerated procedure well       Emerson understands the information Dr. Wheatley provided at the time of visit.  The patient voices good understanding of these these instructions and agrees with the plan.  Retinal detachment precautions were discussed and patient is to return for increasing flashes, floaters and decreasing vision.  In addition, patient will return to clinic sooner as needed for pain, decreasing vision etc.    PF 1% 4x/day for 4-5 Days in the eye with laser treatment      RD precautions:  Discussed with patient symptoms of RD with increased flashes, floaters, decreasing vision.  Patient/Family to call and return immediately to clinic should the symptoms of RD occur.  patient voice good understanding.        IOL choice:       AL       OS     SN60WF 119.0        20.0  -0.47    Plan  RTC 2-4 weeks on laser day --> Possible touch up OD as discussed,  IOP, adherence  Then --> HVF & OCT RNFL  RTC sooner PRN with good understanding

## 2022-10-21 ENCOUNTER — TELEPHONE (OUTPATIENT)
Dept: PAIN MEDICINE | Facility: CLINIC | Age: 77
End: 2022-10-21
Payer: MEDICARE

## 2022-10-24 ENCOUNTER — PATIENT MESSAGE (OUTPATIENT)
Dept: PAIN MEDICINE | Facility: CLINIC | Age: 77
End: 2022-10-24

## 2022-10-24 ENCOUNTER — OFFICE VISIT (OUTPATIENT)
Dept: PAIN MEDICINE | Facility: CLINIC | Age: 77
End: 2022-10-24
Payer: MEDICARE

## 2022-10-24 VITALS
WEIGHT: 202.81 LBS | TEMPERATURE: 98 F | DIASTOLIC BLOOD PRESSURE: 85 MMHG | BODY MASS INDEX: 29.03 KG/M2 | HEART RATE: 96 BPM | SYSTOLIC BLOOD PRESSURE: 142 MMHG | RESPIRATION RATE: 18 BRPM | HEIGHT: 70 IN

## 2022-10-24 DIAGNOSIS — M47.812 CERVICAL SPONDYLOSIS: ICD-10-CM

## 2022-10-24 DIAGNOSIS — M79.18 MYOFASCIAL PAIN: Primary | ICD-10-CM

## 2022-10-24 DIAGNOSIS — M79.18 MYOFASCIAL PAIN: ICD-10-CM

## 2022-10-24 DIAGNOSIS — M47.812 CERVICAL SPONDYLOSIS: Primary | ICD-10-CM

## 2022-10-24 PROCEDURE — 99999 PR PBB SHADOW E&M-EST. PATIENT-LVL V: ICD-10-PCS | Mod: PBBFAC,,, | Performed by: NURSE PRACTITIONER

## 2022-10-24 PROCEDURE — 3072F PR LOW RISK FOR RETINOPATHY: ICD-10-PCS | Mod: CPTII,S$GLB,, | Performed by: NURSE PRACTITIONER

## 2022-10-24 PROCEDURE — 3079F DIAST BP 80-89 MM HG: CPT | Mod: CPTII,S$GLB,, | Performed by: NURSE PRACTITIONER

## 2022-10-24 PROCEDURE — 3288F FALL RISK ASSESSMENT DOCD: CPT | Mod: CPTII,S$GLB,, | Performed by: NURSE PRACTITIONER

## 2022-10-24 PROCEDURE — 3072F LOW RISK FOR RETINOPATHY: CPT | Mod: CPTII,S$GLB,, | Performed by: NURSE PRACTITIONER

## 2022-10-24 PROCEDURE — 1101F PT FALLS ASSESS-DOCD LE1/YR: CPT | Mod: CPTII,S$GLB,, | Performed by: NURSE PRACTITIONER

## 2022-10-24 PROCEDURE — 1159F MED LIST DOCD IN RCRD: CPT | Mod: CPTII,S$GLB,, | Performed by: NURSE PRACTITIONER

## 2022-10-24 PROCEDURE — 3079F PR MOST RECENT DIASTOLIC BLOOD PRESSURE 80-89 MM HG: ICD-10-PCS | Mod: CPTII,S$GLB,, | Performed by: NURSE PRACTITIONER

## 2022-10-24 PROCEDURE — 1160F PR REVIEW ALL MEDS BY PRESCRIBER/CLIN PHARMACIST DOCUMENTED: ICD-10-PCS | Mod: CPTII,S$GLB,, | Performed by: NURSE PRACTITIONER

## 2022-10-24 PROCEDURE — 3288F PR FALLS RISK ASSESSMENT DOCUMENTED: ICD-10-PCS | Mod: CPTII,S$GLB,, | Performed by: NURSE PRACTITIONER

## 2022-10-24 PROCEDURE — 1101F PR PT FALLS ASSESS DOC 0-1 FALLS W/OUT INJ PAST YR: ICD-10-PCS | Mod: CPTII,S$GLB,, | Performed by: NURSE PRACTITIONER

## 2022-10-24 PROCEDURE — 99213 PR OFFICE/OUTPT VISIT, EST, LEVL III, 20-29 MIN: ICD-10-PCS | Mod: S$GLB,,, | Performed by: NURSE PRACTITIONER

## 2022-10-24 PROCEDURE — 3077F PR MOST RECENT SYSTOLIC BLOOD PRESSURE >= 140 MM HG: ICD-10-PCS | Mod: CPTII,S$GLB,, | Performed by: NURSE PRACTITIONER

## 2022-10-24 PROCEDURE — 99999 PR PBB SHADOW E&M-EST. PATIENT-LVL V: CPT | Mod: PBBFAC,,, | Performed by: NURSE PRACTITIONER

## 2022-10-24 PROCEDURE — 99213 OFFICE O/P EST LOW 20 MIN: CPT | Mod: S$GLB,,, | Performed by: NURSE PRACTITIONER

## 2022-10-24 PROCEDURE — 1159F PR MEDICATION LIST DOCUMENTED IN MEDICAL RECORD: ICD-10-PCS | Mod: CPTII,S$GLB,, | Performed by: NURSE PRACTITIONER

## 2022-10-24 PROCEDURE — 1125F PR PAIN SEVERITY QUANTIFIED, PAIN PRESENT: ICD-10-PCS | Mod: CPTII,S$GLB,, | Performed by: NURSE PRACTITIONER

## 2022-10-24 PROCEDURE — 3077F SYST BP >= 140 MM HG: CPT | Mod: CPTII,S$GLB,, | Performed by: NURSE PRACTITIONER

## 2022-10-24 PROCEDURE — 1160F RVW MEDS BY RX/DR IN RCRD: CPT | Mod: CPTII,S$GLB,, | Performed by: NURSE PRACTITIONER

## 2022-10-24 PROCEDURE — 1125F AMNT PAIN NOTED PAIN PRSNT: CPT | Mod: CPTII,S$GLB,, | Performed by: NURSE PRACTITIONER

## 2022-10-24 NOTE — PROGRESS NOTES
PCP: Roberta Sagastume MD    REFERRING PHYSICIAN: No ref. provider found    CHIEF COMPLAINT: Neck pain    Interval History 10/24/2022:  Emerson returns today for follow up of chronic neck pain. His neck pain has mainly been tolerable. He has episodes where he feels like the muscles lock up on him. This typically resolves with time and stretching. He has been having some right hand pain still since surgery but does feel like it is improving. He has been working a lot recently and works with his hands. He works in electrical for some local colleges. His pain today is 5/10.    Interval History 8/16/2022:  The patient returns for follow up of neck pain. He had TPIs at last OV with low dose steroids. He reports significant benefit with that for about 3 weeks. He has had some return of pain but it is not as severe. He had a slight increase in blood sugar but adjusted his insulin. Since last OV, he did have right long trigger finger release surgery by Dr. Castillo from which he recovered well. No new weakness or complaints. His pain today is 4/10.    Interval History 7/14/2022:  Emerson presents for follow up of neck pain and imaging review. His cervical MRI shows probably ligament sprain as well as DDD. He reports that overall his pain has been improving since previous encounter. His range of motion has also been improving. He says that his sugars have been running OK, usually mid 100's to 200 in the mornings. He did try Flexeril but it made him drowsy. He has had a few visits for massage therapy with benefit. He is scheduled for right long trigger finger release on 7/27/22 with Dr. Castillo. We did previous discuss TPIs and he would like to consider today. His pain today is 5/10.    Interval History 6/20/2022:  The patient is here today for worsened neck pain. He was evaluated by Dr. Rae as a new patient last week. They discussed TPIs but his sugar was running high. He had steroid injections into this knee and  "finger in the past couple of months. He was also prescribed oral steroids but says he never took them because he is nervous about his sugar. His sugar has been running around 200 in the morning. He has a Dexcom monitor. He is scheduled for cervical MRI on 7/1/22. He is frustrated because he feels as though it is taking a long time to find out what is causing his pain. His pain started abruptly on 5/25/22. He says that he was on the cervical machine at [facility] and heard a pop to his neck. Since that time, he has had significant right sided neck pain. He had baseline aching pain prior to this time. He has no radiation of the pain. The patient denies myelopathic symptoms such as handwriting changes or difficulty with buttons/coins/keys. Denies perineal paresthesias, bowel/bladder dysfunction. He does say that he is considering a lawsuit. His pain today is 8/10.    Initial Encounter:  Original HISTORY OF PRESENT ILLNESS: Emerson Menendez presents to the clinic for the evaluation of the above pain. Patient reports that he had neck pain in the past with loss of ROM. [facility] where he made great progress and had full ROM back for many years. A few months ago, he started feeling like his neck was getting stiff again so his PCP referred him back to clinic. He was evaluated 3 weeks ago where he was performing a exercise that he states he "heard a loud pop with associated pain". He reports that the session was ended shortly after. He attempted to rest at home but the pain has gotten worse and now limits his daily activities.    Original Pain Description:  The pain is located in the neck and radiates to the occipital region of the head and down both trapezius muscles. The pain is described as aching, sharp, stabbing and tight band. Exacerbating factors: Bending, Extension and Flexing. Mitigating factors heat and ice. Symptoms interfere with daily activity and work. The patient " feels like symptoms have been worsening. Patient denies significant motor weakness and loss of sensations.    Original PAIN SCORES:  Best: Pain is 3  Worst: Pain is 10  Usually: Pain is 8  Current: Pain is 8    INTERVAL HISTORY:     6 weeks of Conservative therapy (PT/Chiro/Home Exercises with Dates)  PT 11/30/21 - 8/22/22    Treatments / Medications: (Ice/Heat/NSAIDS/APAP/etc):  Ice  Heat  Tylenol     Report: Reviewed    Interventional Pain Procedures: (Previous injections)  None    Past Medical History:   Diagnosis Date    Anxiety 3/16/2015    Asymptomatic multiple myeloma     BPH (benign prostatic hypertrophy) 9/24/2012    Carpal tunnel syndrome     Depression 3/16/2015    Diabetic retinopathy     GERD (gastroesophageal reflux disease) 9/24/2012    Glaucoma     Hx of psychiatric care     Celexa, Valium    Hyperlipidemia     Hypertension     Hypertensive retinopathy of both eyes     Microalbuminuria 9/24/2012    Osteoarthritis of cervical spine 9/24/2012    Osteoarthritis of knee 9/24/2012    Posterior capsular opacification, right 9/15/2022    Psychiatric problem     Type II or unspecified type diabetes mellitus without mention of complication, not stated as uncontrolled 9/24/2012     Past Surgical History:   Procedure Laterality Date    ARTHROSCOPY OF WRIST Left 8/3/2020    Procedure: ARTHROSCOPY, WRIST LEFT;  Surgeon: Kindra Castillo MD;  Location: [facility] OR;  Service: Orthopedics;  Laterality: Left;  REGIONAL/MAC    CARPAL TUNNEL RELEASE Left 8/3/2020    Procedure: RELEASE, CARPAL TUNNEL LEFT;  Surgeon: Kindra Castillo MD;  Location: [facility] OR;  Service: Orthopedics;  Laterality: Left;  REGIONAL/MAC    CATARACT EXTRACTION  2012    Right eye    INTRAOCULAR PROSTHESES INSERTION Left 5/20/2021    Procedure: INSERTION, IOL PROSTHESIS;  Surgeon: Jose L Wheatley MD;  Location: [address];  Service: Ophthalmology;  Laterality: Left;    PHACOEMULSIFICATION OF CATARACT Left 5/20/2021    Procedure:  PHACOEMULSIFICATION, CATARACT;  Surgeon: Jose L Wheatley MD;  [facility];  Service: Ophthalmology;  Laterality: Left;    PROSTATE SURGERY      SURGICAL REMOVAL OF CARPAL BONE Left 2021    Procedure: OSTECTOMY, CARPAL BONE, LEFT;  Surgeon: Kindra Castillo MD;  Location: [facility] OR;  Service: Orthopedics;  Laterality: Left;  REGIONAL/MAC    TRIGGER FINGER RELEASE Right 2022    Procedure: RELEASE, TRIGGER FINGER,RIGHT,LONG;  Surgeon: Kindra Castillo MD;  Location: [facility] OR;  Service: Orthopedics;  Laterality: Right;     Social History     Socioeconomic History    Marital status:     Number of children: 1   Tobacco Use    Smoking status: Never    Smokeless tobacco: Never   Substance and Sexual Activity    Alcohol use: No    Drug use: No    Sexual activity: Yes     Partners: Female     Birth control/protection: None   Social History Narrative     x2, 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     Family History   Problem Relation Age of Onset    Hypertension Brother     Depression Brother     Kidney failure Daughter         due to medication    Depression Sister     Blindness Neg Hx     Cancer Neg Hx     Cataracts Neg Hx     Diabetes Neg Hx     Glaucoma Neg Hx     Macular degeneration Neg Hx     Retinal detachment Neg Hx     Strabismus Neg Hx     Stroke Neg Hx     Thyroid disease Neg Hx        Review of patient's allergies indicates:   Allergen Reactions    Penicillins      Knees locked up       Current Outpatient Medications   Medication Sig    ACCU-CHEK STEFANO CONTROL SOLN Soln     alcohol swabs PadM Apply 1 each topically as needed.    aspirin (ECOTRIN) 81 MG EC tablet Take 1 tablet (81 mg total) by mouth once daily.    azelastine (ASTELIN) 137 mcg (0.1 %) nasal spray 1 spray (137 mcg total) by Nasal route 2 (two) times daily.    blood glucose control, normal (METER-CHECK) Soln One meter.  Use as directed. Meter of insurance choice (Patient taking differently: One  meter.  Use as directed. Meter of insurance choice)    blood sugar diagnostic (ACCU-CHEK STEFANO PLUS TEST STRP) Strp Accu check stefano plus TEST FOUR TIMES DAILY. Test strtips of insurance choice to go with meter and lancets    blood sugar diagnostic Strp I [facility] - check blood sugars 4 times daily    blood-glucose meter (ACCU-CHEK STEFANO PLUS METER) Misc Use as direted    blood-glucose meter (ACCU-CHEK OMAYRA) Misc USE AS DIRECTED. Accucheck stefano plus    citalopram (CELEXA) 40 MG tablet Take 1 tablet (40 mg total) by mouth once daily.    citalopram (CELEXA) 40 MG tablet Take 1 tablet (40 mg total) by mouth once daily.    diazePAM (VALIUM) 5 MG tablet Take 1 tablet (5 mg total) by mouth every 12 (twelve) hours as needed for Anxiety.    dorzolamide-timolol 2-0.5% (COSOPT) 22.3-6.8 mg/mL ophthalmic solution Place 1 drop into both eyes 2 (two) times daily.    dorzolamide-timolol 2-0.5% (COSOPT) 22.3-6.8 mg/mL ophthalmic solution Place 1 drop into both eyes 2 (two) times daily.    ergocalciferol (ERGOCALCIFEROL) 50,000 unit Cap Take 1 capsule (50,000 Units total) by mouth every 7 days.    EYLEA 2 mg/0.05 mL Syrg     FARXIGA 5 mg Tab tablet Take 1 tablet (5 mg total) by mouth once daily.    fexofenadine (ALLEGRA) 60 MG tablet Take 1 tablet (60 mg total) by mouth once daily.    flash glucose scanning reader (FREESTYLE CHRISTIANO 14 DAY READER) Misc Use as directed    flash glucose sensor (FREESTYLE CHRISTIANO 14 DAY SENSOR) Kit Use as directed    fluticasone propionate (FLONASE) 50 mcg/actuation nasal spray 1 spray (50 mcg total) by Each Nostril route once daily.    gabapentin (NEURONTIN) 100 MG capsule One capsule in am , one capsule in afternoon, 1-3 capsules in evening    glimepiride (AMARYL) 2 MG tablet Take 1 tablet (2 mg total) by mouth once daily.    glucose 4 GM chewable tablet Take 8 g by mouth as needed for Low blood sugar.    insulin (LANTUS SOLOSTAR U-100 INSULIN) glargine 100 units/mL SubQ pen ADMINISTER 14 UNITS  "UNDER THE SKIN IN THE MORNING AND 16 units IN THE EVENING    insulin aspart U-100 (NOVOLOG FLEXPEN U-100 INSULIN) 100 unit/mL (3 mL) InPn pen Inject 4 Units into the skin 3 (three) times daily with meals.    lancets (ACCU-CHEK SOFTCLIX LANCETS) Misc 1 lancet by Misc.(Non-Drug; Combo Route) route 4 (four) times daily.    levocetirizine (XYZAL) 5 MG tablet Take 1 tablet (5 mg total) by mouth every evening.    losartan (COZAAR) 50 MG tablet Take 1 tablet (50 mg total) by mouth once daily.    MONOVISC 88 mg/4 mL Syrg     omeprazole (PRILOSEC) 40 MG capsule Take 1 capsule (40 mg total) by mouth once daily.    pen needle, diabetic (BD ULTRA-FINE SHORT PEN NEEDLE) 31 gauge x 5/16" Ndle USE TO INJECT INSULIN ONE TIME DAILY    pravastatin (PRAVACHOL) 40 MG tablet Take 1 tablet (40 mg total) by mouth once daily.    propofoL (DIPRIVAN) 10 mg/mL infusion     sildenafiL (VIAGRA) 100 MG tablet Take 1 tablet (100 mg total) by mouth daily as needed for Erectile Dysfunction.    tamsulosin (FLOMAX) 0.4 mg Cap Take 1 capsule (0.4 mg total) by mouth nightly.    triamcinolone acetonide (KENALOG-40) 40 mg/mL injection     TRUEPLUS LANCETS 33 gauge Misc      No current facility-administered medications for this visit.     Facility-Administered Medications Ordered in Other Visits   Medication    mupirocin 2 % ointment       ROS:  GENERAL: No fever. No chills. No fatigue. Denies weight loss. Denies weight gain.  HEENT: Reports Neck Pain. Denies headaches. Denies vision change. Denies eye pain. Denies double vision. Denies ear pain.   CV: Denies chest pain.   PULM: Denies of shortness of breath.  GI: Denies constipation. No diarrhea. No abdominal pain. Denies nausea. Denies vomiting. No blood in stool.  HEME: Denies bleeding problems.  : Denies urgency. No painful urination. No blood in urine.  MS: Denies joint stiffness. Denies joint swelling.  Denies back pain.  SKIN: Denies rash.   NEURO: Denies seizures. No weakness.  PSYCH:  Denies " "difficulty sleeping. No anxiety. Denies depression. No suicidal thoughts.       VITALS:   Vitals:    10/24/22 0934   BP: (!) 142/85   Pulse: 96   Resp: 18   Temp: 98 °F (36.7 °C)   Weight: 92 kg (202 lb 13.2 oz)   Height: 5' 10" (1.778 m)   PainSc:   5   PainLoc: Hand         PHYSICAL EXAM:   GENERAL: Well appearing, in no acute distress, alert and oriented x3.  PSYCH:  Mood and affect appropriate.  SKIN: Skin color, texture, turgor normal, no rashes or lesions.  HEENT:  Normocephalic, atraumatic. Cranial nerves grossly intact.  NECK: Pain to palpation over the cervical paraspinous muscles bilaterally. TTP over bilateral trapezius muscles. Pain to palpation over facets joints bilaterally. Limited ROM with pain on neck flexion, extension, and lateral flexion. Negative Spurling.   PULM: No evidence of respiratory difficulty, symmetric chest rise.  EXTREMITIES: No deformities, edema, or skin discoloration.   MUSCULOSKELETAL: Bilateral tricep, bicep and deltoid strength is 5/5 and symmetric. Bilateral hand  strength is 5/5 and symmetric. No atrophy is noted.  NEURO: Sensation is equal and appropriate bilaterally. Bilateral upper extremity coordination and muscle stretch reflexes are physiologic and symmetric.  Negative Hoffmans sign bilaterally.   GAIT: Normal.      LABS:  Comprehensive Metabolic Panel   Collected: 05/30/22 1031  Final result  Specimen: Blood      Sodium 131 Low  mmol/L Albumin 3.4 Low  g/dL   Potassium 5.4 High  mmol/L  Total Bilirubin 0.5 mg/dL    Chloride 98 mmol/L Alkaline Phosphatase 63 U/L   CO2 25 mmol/L AST 13 U/L   Glucose 370 High  mg/dL ALT 19 U/L   BUN 19 mg/dL Anion Gap 8 mmol/L   Creatinine 1.9 High  mg/dL eGFR if  39.0 Abnormal  mL/min/1.73 m^2   Calcium 9.6 mg/dL eGFR if non  33.7 Abnormal  mL/min/1.73 m^2               IMAGING:        Narrative & Impression  EXAMINATION:  MRI CERVICAL SPINE WITHOUT CONTRAST     CLINICAL HISTORY:  Neck trauma, " ligament injury suspected (Age >= 16y); Cervicalgia     TECHNIQUE:  Multiplanar, multisequence MR images of the cervical spine were performed without the administration of contrast.     COMPARISON:  01/27/2016     FINDINGS:  Alignment: Normal.     Vertebrae: Normal marrow signal. No fracture.     Discs: Severe disc space narrowing at C3-4 and moderate disc space narrowing at C4-5.     Cord: Normal., Capacious canal     Skull base and craniocervical junction: Normal.     Degenerative findings:     The atlanto-occipital and bilateral lateral masses of C1-2 appear normal.  (Only seen on sagittal images)     C2-C3: No disc abnormality, no canal stenosis or foraminal narrowing.  Mild facet joint osseous hypertrophy.     C3-C4: Posterior disc osteophyte complex abutting the cord anteriorly causing no greater than mild canal stenosis.  There is mild bilateral foraminal narrowing.     C4-C5: No disc abnormality, right uncovertebral spur.  No canal stenosis.  There is severe right foraminal narrowing.     C5-C6: Posterior disc osteophyte complex and bilateral uncovertebral spur, no canal stenosis.     C6-C7: Posterior disc osteophyte complex, bilateral uncovertebral spur.  No canal stenosis.  There is mild bilateral foraminal narrowing.     C7-T1: Unremarkable     T1-T2: Unremarkable     Paraspinal muscles & soft tissues: Subtle soft tissue edema inter spinous ligament C4-5 C5-6 and C6-C7 possibly a mild sprain.     Impression:     Spondylosis of the cervical spine.     No severe canal stenosis.  Please see details of each levels above.     Mild inter spinous process ligament edema C4-5, C5-6 and C6-C7 could relate to a ligamentous sprain   Narrative & Impression  EXAMINATION:  XR CERVICAL SPINE 5 VIEW WITH FLEX AND EXT     CLINICAL HISTORY:  Spondylosis without myelopathy or radiculopathy, cervical region     TECHNIQUE:  Five views of the cervical spine plus flexion and extension views were performed.      COMPARISON:  2014     FINDINGS:  Vertebral body heights are maintained.     Disc space narrowing and endplate changes at every level     Oblique views show bony narrowing of the neural foramina C3 for and C5-6 on the left and C4-5 and C5-6 on the right.     Reversal of the normal cervical lordosis.  Otherwise, the AP alignment is anatomic.     No significant prevertebral soft tissue edema.     Impression:     Multilevel degenerative change with multilevel bony narrowing of the neural foramina.     ASSESSMENT: 77 y.o. year old male with neck pain, consistent with the followin. Myofascial pain        2. Cervical spondylosis                 PLAN:  Previous imaging reviewed.  Overall, neck pain is stable and does not require interventions at this time.  He continues to work on diabetes control. He reports that his most recent A1C was around 8.  Continue conservative methods of ice/heat. Continue with massage therapy as needed.   RTC in 2 months or sooner if needed.      The above plan and management options were discussed at length with patient. Patient is in agreement with the above and verbalized understanding.     LISSY Saldaña  10/24/2022

## 2022-10-27 ENCOUNTER — OFFICE VISIT (OUTPATIENT)
Dept: OPHTHALMOLOGY | Facility: CLINIC | Age: 77
End: 2022-10-27
Payer: MEDICARE

## 2022-10-27 DIAGNOSIS — E11.3292 MILD NONPROLIFERATIVE DIABETIC RETINOPATHY OF LEFT EYE WITHOUT MACULAR EDEMA ASSOCIATED WITH TYPE 2 DIABETES MELLITUS: ICD-10-CM

## 2022-10-27 DIAGNOSIS — E08.3211 MILD NONPROLIFERATIVE DIABETIC RETINOPATHY OF RIGHT EYE WITH MACULAR EDEMA ASSOCIATED WITH DIABETES MELLITUS DUE TO UNDERLYING CONDITION: Primary | ICD-10-CM

## 2022-10-27 DIAGNOSIS — H35.033 HYPERTENSIVE RETINOPATHY OF BOTH EYES: ICD-10-CM

## 2022-10-27 DIAGNOSIS — H40.1132 PRIMARY OPEN ANGLE GLAUCOMA OF BOTH EYES, MODERATE STAGE: ICD-10-CM

## 2022-10-27 DIAGNOSIS — Z96.1 PSEUDOPHAKIA OF BOTH EYES: ICD-10-CM

## 2022-10-27 PROCEDURE — 3288F PR FALLS RISK ASSESSMENT DOCUMENTED: ICD-10-PCS | Mod: CPTII,S$GLB,, | Performed by: OPHTHALMOLOGY

## 2022-10-27 PROCEDURE — 1101F PT FALLS ASSESS-DOCD LE1/YR: CPT | Mod: CPTII,S$GLB,, | Performed by: OPHTHALMOLOGY

## 2022-10-27 PROCEDURE — 1160F RVW MEDS BY RX/DR IN RCRD: CPT | Mod: CPTII,S$GLB,, | Performed by: OPHTHALMOLOGY

## 2022-10-27 PROCEDURE — 3288F FALL RISK ASSESSMENT DOCD: CPT | Mod: CPTII,S$GLB,, | Performed by: OPHTHALMOLOGY

## 2022-10-27 PROCEDURE — 99999 PR PBB SHADOW E&M-EST. PATIENT-LVL IV: ICD-10-PCS | Mod: PBBFAC,,, | Performed by: OPHTHALMOLOGY

## 2022-10-27 PROCEDURE — 99214 PR OFFICE/OUTPT VISIT, EST, LEVL IV, 30-39 MIN: ICD-10-PCS | Mod: 24,S$GLB,, | Performed by: OPHTHALMOLOGY

## 2022-10-27 PROCEDURE — 1126F PR PAIN SEVERITY QUANTIFIED, NO PAIN PRESENT: ICD-10-PCS | Mod: CPTII,S$GLB,, | Performed by: OPHTHALMOLOGY

## 2022-10-27 PROCEDURE — 99214 OFFICE O/P EST MOD 30 MIN: CPT | Mod: 24,S$GLB,, | Performed by: OPHTHALMOLOGY

## 2022-10-27 PROCEDURE — 92134 CPTRZ OPH DX IMG PST SGM RTA: CPT | Mod: S$GLB,,, | Performed by: OPHTHALMOLOGY

## 2022-10-27 PROCEDURE — 1101F PR PT FALLS ASSESS DOC 0-1 FALLS W/OUT INJ PAST YR: ICD-10-PCS | Mod: CPTII,S$GLB,, | Performed by: OPHTHALMOLOGY

## 2022-10-27 PROCEDURE — 92134 OCT, RETINA - OU - BOTH EYES: ICD-10-PCS | Mod: S$GLB,,, | Performed by: OPHTHALMOLOGY

## 2022-10-27 PROCEDURE — 99999 PR PBB SHADOW E&M-EST. PATIENT-LVL IV: CPT | Mod: PBBFAC,,, | Performed by: OPHTHALMOLOGY

## 2022-10-27 PROCEDURE — 1159F MED LIST DOCD IN RCRD: CPT | Mod: CPTII,S$GLB,, | Performed by: OPHTHALMOLOGY

## 2022-10-27 PROCEDURE — 1159F PR MEDICATION LIST DOCUMENTED IN MEDICAL RECORD: ICD-10-PCS | Mod: CPTII,S$GLB,, | Performed by: OPHTHALMOLOGY

## 2022-10-27 PROCEDURE — 1160F PR REVIEW ALL MEDS BY PRESCRIBER/CLIN PHARMACIST DOCUMENTED: ICD-10-PCS | Mod: CPTII,S$GLB,, | Performed by: OPHTHALMOLOGY

## 2022-10-27 PROCEDURE — 1126F AMNT PAIN NOTED NONE PRSNT: CPT | Mod: CPTII,S$GLB,, | Performed by: OPHTHALMOLOGY

## 2022-10-27 RX ORDER — DORZOLAMIDE HYDROCHLORIDE AND TIMOLOL MALEATE 20; 5 MG/ML; MG/ML
1 SOLUTION/ DROPS OPHTHALMIC 2 TIMES DAILY
Qty: 1 EACH | Refills: 12 | Status: SHIPPED | OUTPATIENT
Start: 2022-10-27 | End: 2024-01-26 | Stop reason: SDUPTHER

## 2022-10-27 NOTE — PROGRESS NOTES
HPI    4-6 WK DFE / OCT OD (include Ch. Nevus OD) +/- EYLEA OD    CC: Pt states Va stable since last visit. No flashes. +floaters,   occasionally, improved ?eye. No eye pain. No photophobia.     GTTS: COSOPT BID OU   Last edited by Meaghan Durán on 10/27/2022  9:47 AM.             A/P  1. 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 Mild nonproliferative diabetic retinopathy of left eye without macular edema associated with type 2 diabetes mellitus    3. Hypertensive retinopathy of both eyes    4. Primary open angle glaucoma of both eyes, moderate stage    5. Pseudophakia of both eyes           1. 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 Mild nonproliferative diabetic retinopathy of left eye without macular edema associated with type 2 diabetes mellitus  PCP is Dr. Sagastume  08/29/2022  8.7  A1C     OD-   S/p SKYLRA 8/4/22  S/p ELKIN 3/24/22  S/p IVO 1/4/22  VA 20/60(was 20/100 pre-YAG)), stable IRF after switching to SKYLAR  Plan: given stable IRF and improvement after Yag, we can observe for now ,limited response to injections , will treat if getting worse IRF     OS- no injections  VA 20/40 (was 20/30)  rare dot heme, no DME  Plan: Observation    Recommend good blood pressure control, tight blood glucose control, and good cholesterol control     3. Hypertensive retinopathy of both eyes  AV nicking, BP  stable   Plan: Observation  Recommend good blood pressure control, tight blood glucose control, and good cholesterol control     4. Primary open angle glaucoma of both eyes, moderate stage  F/b Dr. Wheatley   IOP 19/17 poss steroid response OD  Currently on cosopt BID OU  Plan:  needs f/u with Dr Wheatley for IOP check and monitor glaucoma    5. Pseudophakia of both eyes  Good lens position OU, s/p YAG OD w Dr Wheatley   Plan:  Observation for now       RTC Alan - 3 mo DFE/OCTm OU, poss SKYLAR OD  [facility]         I saw and examined the patient and reviewed in detail the findings documented. The final examination findings, image interpretations, and plan as documented in the record represent my personal judgment and conclusions.    Andriy Phillips MD  Vitreoretinal Surgery   [facility]

## 2022-11-04 NOTE — PROGRESS NOTES
"Nephrology Clinic Progress Note    Patient ID: Emerson Menendez is a 74 y.o. Black or  male who presents for follow up.    HPI:  Emerson Menendez is a 74 y.o. Black or  male with CKD stage 3(baseline sCr ~1.7), DM2, HTN and MGUS/MM comes for follow up.  he reports no urinary changes, he denies NSAIDs.   The patient denies any SOB, chest pain, palpitations, dysuria, problems voiding, N/V/D, taking NSAIDs or new antibiotics, recreational drugs, recent episode of dehydration, acute illness, hospitalization or exposure to IV radiocontrast.    Past Medical History:   Diagnosis Date    Anxiety 3/16/2015    BPH (benign prostatic hypertrophy) 9/24/2012    Depression 3/16/2015    GERD (gastroesophageal reflux disease) 9/24/2012    Microalbuminuria 9/24/2012    Osteoarthritis of cervical spine 9/24/2012    Osteoarthritis of knee 9/24/2012    Type II or unspecified type diabetes mellitus without mention of complication, not stated as uncontrolled 9/24/2012       Family History   Problem Relation Age of Onset    Hypertension Brother     Kidney failure Daughter         due to medication    Blindness Neg Hx     Cancer Neg Hx     Cataracts Neg Hx     Diabetes Neg Hx     Glaucoma Neg Hx     Macular degeneration Neg Hx     Retinal detachment Neg Hx     Strabismus Neg Hx     Stroke Neg Hx     Thyroid disease Neg Hx        Past Surgical History:   Procedure Laterality Date    CATARACT EXTRACTION  2012    Right eye    PROSTATE SURGERY           Current Outpatient Medications:     aspirin (ECOTRIN) 81 MG EC tablet, Take 1 tablet (81 mg total) by mouth once daily., Disp: 100 tablet, Rfl: 3    BD ULTRA-FINE SHORT PEN NEEDLE 31 gauge x 5/16" Ndle, USE TO INJECT INSULIN ONE TIME DAILY, Disp: 100 each, Rfl: 3    blood glucose control, normal (METER-CHECK) Jenny One meter. True test meter. Use as directed, Disp: 1 each, Rfl: 0    blood sugar diagnostic (ACCU-CHEK STEFANO PLUS TEST " Attempted to call [facility], voice mailbox is full, not accepting messages.   STRP) Strp, TEST FOUR TIMES DAILY, Disp: 400 strip, Rfl: 3    blood-glucose meter (ACCU-CHEK [place]MAYRA) [facility] [place] USE AS DIRECTED. Accucheck elie plus, Disp: 1 each, Rfl: 0    cholecalciferol, vitamin D3, (VITAMIN D) 2,000 unit Cap, Take by mouth. 1 Capsule Oral Every day.  over the counter, Disp: , Rfl:     citalopram (CELEXA) 20 MG tablet, Take 1.5 tablets (30 mg total) by mouth once daily., Disp: 135 tablet, Rfl: 4    diazePAM (VALIUM) 5 MG tablet, TAKE 1 TABLET(5 MG) BY MOUTH EVERY 6 HOURS AS NEEDED, Disp: 60 tablet, Rfl: 2    flash glucose scanning reader (iSuppli CHRISTIANO 14 DAY READER) Misc, use as directed, Disp: 1 each, Rfl: 0    gabapentin (NEURONTIN) 100 MG capsule, TAKE 1 CAPSULE EVERY MORNING, 1 CAPSULE IN THE AFTERNOON, AND 1 TO 3 CAPSULE(S) AT BEDTIME AS NEEDED FOR FOOT PAIN, Disp: 450 capsule, Rfl: 3    glimepiride (AMARYL) 2 MG tablet, TAKE 1 TABLET (2 MG TOTAL) BY MOUTH ONCE DAILY., Disp: 90 tablet, Rfl: 4    glucagon (human recombinant) inj 1mg/mL kit, Inject 1 mL (1 mg total) into the muscle as needed., Disp: 1 kit, Rfl: 0    insulin (LANTUS SOLOSTAR U-100 INSULIN) glargine 100 units/mL (3mL) SubQ pen, Inject 20 Units into the skin every evening., Disp: 1 Box, Rfl: 6    lancets Misc, Use twice daily, Disp: 200 each, Rfl: 4    latanoprost 0.005 % ophthalmic solution, Place 1 drop into both eyes every evening., Disp: 7.5 mL, Rfl: 3    losartan (COZAAR) 25 MG tablet, Take 1 tablet (25 mg total) by mouth once daily., Disp: 90 tablet, Rfl: 4    meloxicam (MOBIC) 15 MG tablet, TAKE 1 TABLET(15 MG) BY MOUTH EVERY DAY, Disp: 90 tablet, Rfl: 0    omeprazole (PRILOSEC) 20 MG capsule, Take 2 capsules (40 mg total) by mouth once daily., Disp: 180 capsule, Rfl: 3    pravastatin (PRAVACHOL) 40 MG tablet, Take 1 tablet (40 mg total) by mouth once daily., Disp: 90 tablet, Rfl: 4    tadalafil (CIALIS) 20 MG Tab, Take 1 tablet (20 mg total) by mouth daily as needed., Disp: 30 tablet, Rfl: 3    tadalafil  (CIALIS) 5 MG tablet, TAKE 1 TABLET(5 MG) BY MOUTH DAILY AS NEEDED FOR ERECTILE DYSFUNCTION, Disp: 90 tablet, Rfl: 2    tamsulosin (FLOMAX) 0.4 mg Cap, Take 1 capsule (0.4 mg total) by mouth once daily., Disp: 90 capsule, Rfl: 3    tizanidine (ZANAFLEX) 4 MG tablet, 1/2-1 tablet every 8 hours as needed for muscle spasm, Disp: 90 tablet, Rfl: 1    Patient's medical, family, surgical, and medication hx reviewed.    Review of Systems    Constitutional: Negative for chills, diaphoresis, fever and weight loss.   HENT: Negative for nosebleeds and tinnitus.    Eyes: Negative for blurred vision, double vision and photophobia.   Respiratory: Negative for cough and shortness of breath.    Cardiovascular: . Negative for chest pain, palpitations, swelling orthopnea and PND.   Gastrointestinal: Negative for abdominal pain, diarrhea, constipation nausea and vomiting.   Genitourinary: Negative for dysuria, flank pain, frequency, hematuria and urgency.   Musculoskeletal: Negative for back pain, falls, joint pain, myalgias and neck pain.   Skin: Negative.    Neurological: Negative for dizziness, tingling, tremors, sensory change, speech change, focal weakness, seizures, loss of consciousness, weakness and headaches.   Endo/Heme/Allergies: Negative for environmental allergies and polydipsia. Does not bruise/bleed easily.   Psychiatric/Behavioral: Negative for depression, hallucinations, memory loss, substance abuse and suicidal ideas. The patient is not nervous/anxious and does not have insomnia.        Objective:     Wt Readings from Last 3 Encounters:   12/17/19 86 kg (189 lb 11.3 oz)   12/13/19 84.2 kg (185 lb 10 oz)   12/11/19 84.9 kg (187 lb 2.7 oz)     Temp Readings from Last 3 Encounters:   12/11/19 97.6 °F (36.4 °C)   11/12/19 98.2 °F (36.8 °C)   10/15/19 97.9 °F (36.6 °C)     BP Readings from Last 3 Encounters:   12/13/19 120/70   12/11/19 (!) 153/78   11/15/19 137/70     Pulse Readings from Last 3 Encounters:   12/13/19  89   12/11/19 85   11/15/19 92        Physical Exam    Constitutional:  well-developed and well-nourished. No distress.   HENT:   Head: Normocephalic and atraumatic.   Neck: Normal range of motion. Neck supple.   Cardiovascular: Normal rate, regular rhythm, normal heart sounds and intact distal pulses.  Exam reveals no gallop and no friction rub.    No murmur heard.  Pulmonary/Chest: Effort normal and breath sounds normal. No respiratory distress. no wheezes, no rales, no tenderness.   Abdominal: Soft. Bowel sounds are normal, no distension. There is no tenderness. There is no rebound and no guarding.   Musculoskeletal: Normal range of motion. No edema or deformity.   Neurological: Awake alert and oriented x 4. Motor strength preserved 5/5. DTR symmetrical.  Skin: Skin is warm and dry. No rash noted. She is not diaphoretic. No erythema. No pallor.       Assessment:       - CKD stage 3  - MGUS  - HTN  - Diabetes Mellitus type 2    Plan:   1. CKD stage G3/A2: renal function stable for the past year. Underlying chronicity likely 2/2 probably from underlying diabetes, with possibly an HTN component, noted with MM/MGUS.    Baseline Creatine:  Lab Results   Component Value Date    CREATININE 1.5 (H) 12/10/2019       Urine Protein:   Prot/Creat Ratio, Ur   Date Value Ref Range Status   12/17/2019 0.30 (H) 0.00 - 0.20 Final   10/28/2019 0.72 (H) 0.00 - 0.20 Final   09/28/2017 0.11 0.00 - 0.20 Final       Acid-Base: within acceptable range.   Lab Results   Component Value Date     12/10/2019    K 4.2 12/10/2019    CO2 26 12/10/2019           2. HTN: 120/62mm/hg  On Losartan 25mg.     3. DM:  Last HbA1C 7.6.   Lab Results   Component Value Date    HGBA1C 7.6 (H) 12/10/2019        4. CKD-MBD: within acceptable ranges.     Lab Results   Component Value Date    PTH 51.0 12/17/2019    CALCIUM 9.4 12/10/2019    PHOS 2.7 12/10/2019           Plan/Rec:  - Scr and renal function stable. No need to adjust current therapy.  - RTC  6 months   - CBC,CMP, U/A, Upr/Cr ratio for next appointment     Follow up in with labs and Urine    Myron Ferguson MD  Nephrology Fellow  [facility]

## 2022-11-07 ENCOUNTER — RESEARCH ENCOUNTER (OUTPATIENT)
Dept: HEMATOLOGY/ONCOLOGY | Facility: CLINIC | Age: 77
End: 2022-11-07

## 2022-11-07 ENCOUNTER — OFFICE VISIT (OUTPATIENT)
Dept: HEMATOLOGY/ONCOLOGY | Facility: CLINIC | Age: 77
End: 2022-11-07
Payer: MEDICARE

## 2022-11-07 ENCOUNTER — PATIENT MESSAGE (OUTPATIENT)
Dept: INTERNAL MEDICINE | Facility: CLINIC | Age: 77
End: 2022-11-07
Payer: MEDICARE

## 2022-11-07 ENCOUNTER — LAB VISIT (OUTPATIENT)
Dept: LAB | Facility: HOSPITAL | Age: 77
End: 2022-11-07
Payer: MEDICARE

## 2022-11-07 VITALS
WEIGHT: 202.94 LBS | HEIGHT: 70 IN | SYSTOLIC BLOOD PRESSURE: 154 MMHG | RESPIRATION RATE: 16 BRPM | DIASTOLIC BLOOD PRESSURE: 86 MMHG | BODY MASS INDEX: 29.05 KG/M2 | OXYGEN SATURATION: 98 % | HEART RATE: 87 BPM | TEMPERATURE: 98 F

## 2022-11-07 DIAGNOSIS — E11.42 TYPE 2 DIABETES MELLITUS WITH DIABETIC POLYNEUROPATHY, WITH LONG-TERM CURRENT USE OF INSULIN: Chronic | ICD-10-CM

## 2022-11-07 DIAGNOSIS — N18.30 CKD STAGE 3 DUE TO TYPE 2 DIABETES MELLITUS: ICD-10-CM

## 2022-11-07 DIAGNOSIS — D47.2 SMOLDERING MULTIPLE MYELOMA: Primary | ICD-10-CM

## 2022-11-07 DIAGNOSIS — E11.22 CKD STAGE 3 DUE TO TYPE 2 DIABETES MELLITUS: ICD-10-CM

## 2022-11-07 DIAGNOSIS — Z79.4 TYPE 2 DIABETES MELLITUS WITH DIABETIC POLYNEUROPATHY, WITH LONG-TERM CURRENT USE OF INSULIN: Chronic | ICD-10-CM

## 2022-11-07 LAB
ESTIMATED AVG GLUCOSE: 183 MG/DL (ref 68–131)
HBA1C MFR BLD: 8 % (ref 4–5.6)

## 2022-11-07 PROCEDURE — 99999 PR PBB SHADOW E&M-EST. PATIENT-LVL V: ICD-10-PCS | Mod: PBBFAC,,, | Performed by: INTERNAL MEDICINE

## 2022-11-07 PROCEDURE — 36415 COLL VENOUS BLD VENIPUNCTURE: CPT | Performed by: INTERNAL MEDICINE

## 2022-11-07 PROCEDURE — 99214 PR OFFICE/OUTPT VISIT, EST, LEVL IV, 30-39 MIN: ICD-10-PCS | Mod: Q1,S$GLB,, | Performed by: INTERNAL MEDICINE

## 2022-11-07 PROCEDURE — 99999 PR PBB SHADOW E&M-EST. PATIENT-LVL V: CPT | Mod: PBBFAC,,, | Performed by: INTERNAL MEDICINE

## 2022-11-07 PROCEDURE — 83036 HEMOGLOBIN GLYCOSYLATED A1C: CPT | Mod: Q1 | Performed by: INTERNAL MEDICINE

## 2022-11-07 PROCEDURE — 99214 OFFICE O/P EST MOD 30 MIN: CPT | Mod: Q1,S$GLB,, | Performed by: INTERNAL MEDICINE

## 2022-11-07 NOTE — PROGRESS NOTES
Subjective:    Patient ID: Emerson Menendez is a 77 y.o. male.    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 Test results identify a stable IgA kappa protein with beta globulin band at 1.63g/dL. Kappa free light chain is elevated at 4.40. CBC and calcium are stable. Creatinine with history of stage III CKD is stable at 1.4. Metastatic survey is negative for lytic lesions. MRI of the entire spine demonstrated age related changes but no convincing evidence of myeloma bone disease. Bone marrow biopsy identified about 13% plasma cells by morphology and FISH for myeloma identified a t(11;14) and trisomies 3,7, and 17. The patient is afebrile and appears clinically well. He was seen by his podiatrist and nephrologist since our last visit without any new or acute events.    The patient has not received any therapy for smoldering myeloma including bisphosphonates or steroids. He has been randomized to observation arm of the the clinical study. 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 He has no current or prior history of malignancy.    TODAY Cycle 83, observation  No acute interval medical events  CBC stable; CMP and MM labs pending  M protein at 1.69 last month, will follow-up pending study  Notes home glucose monitoring for past 2 months with elevated levels; actively   Discussed creatinine of 2; improved BG control and hydration      Knee Pain     Joint Pain  Associated symptoms include coughing. Pertinent negatives include no diaphoresis, fatigue or fever.   Hand Pain      Cough  Pertinent negatives include no fever.   Foot Pain  Associated symptoms include coughing. Pertinent negatives include no diaphoresis, fatigue or fever.   Arm Pain     Follow-up  Associated symptoms include coughing. Pertinent negatives include no diaphoresis, fatigue or fever.   Review of Systems   Constitutional:  Negative for activity change, appetite change, diaphoresis, fatigue, fever and unexpected weight change.   HENT: Negative.     Eyes: Negative.    Respiratory:  Positive for cough.    Cardiovascular:  Negative for leg swelling.   Gastrointestinal: Negative.    Endocrine: Negative.    Genitourinary: Negative.    Musculoskeletal: Negative.    Skin: Negative.    Allergic/Immunologic: Negative.    Neurological:         Grade 0-1 peripheral neuropathy of bilateral feet.    Hematological:  Negative for adenopathy. Does not bruise/bleed easily.   Psychiatric/Behavioral: Negative.       Objective:       Vitals:    11/07/22 0803   BP: (!) 154/86   Pulse: 87   Resp: 16   Temp: 98.3 °F (36.8 °C)       Physical Exam  Vitals and nursing note reviewed.   Constitutional:       Appearance: He is well-developed.   HENT:      Head: Normocephalic and atraumatic.      Right Ear: External ear normal.      Left Ear: External ear normal.      Nose: Nose normal.   Eyes:      Conjunctiva/sclera: Conjunctivae normal.      Pupils: Pupils are equal, round, and reactive to light.   Cardiovascular:      Rate and Rhythm: Normal rate and regular rhythm.      Heart sounds: No murmur heard.  Pulmonary:      Effort: Pulmonary effort is normal. No respiratory distress.      Breath sounds: Normal breath sounds.   Abdominal:      General: Bowel sounds are normal. There is no distension.      Palpations: Abdomen is soft.   Musculoskeletal:         General: No tenderness.      Cervical back: Normal range of motion and neck supple.   Skin:     General: Skin is warm and dry.      Findings: No rash.      Nails: There is no clubbing.    Neurological:      Mental Status: He is alert and oriented to person, place, and time.      Cranial Nerves: No cranial nerve deficit.   Psychiatric:         Behavior: Behavior normal.       Assessment:       No diagnosis found.    Plan:       The patient has a diagnosis of smoldering myeloma. There is no indication for immediate chemotherapy. We are monitoring renal function closely- baseline creatinine of around 1.5. 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 M protein has been stable and repeat is pending. Plan for return in 1 month.  Endocrinology and Nephrology to monitor diabetes and renal failure respectively. Patient would like to continue to follow with Dr. Perkins as needed.    A total of 20 minutes was spent in pre-visit chart review, personal interpretation of labs and imaging, and medication review. Total visit time 30 minutes, >50 % counseling.

## 2022-11-08 ENCOUNTER — LAB VISIT (OUTPATIENT)
Dept: LAB | Facility: OTHER | Age: 77
End: 2022-11-08
Attending: INTERNAL MEDICINE
Payer: MEDICARE

## 2022-11-08 DIAGNOSIS — Z00.6 RESEARCH EXAM: ICD-10-CM

## 2022-11-08 DIAGNOSIS — Z79.4 TYPE 2 DIABETES MELLITUS WITH DIABETIC POLYNEUROPATHY, WITH LONG-TERM CURRENT USE OF INSULIN: Chronic | ICD-10-CM

## 2022-11-08 DIAGNOSIS — D47.2 SMOLDERING MULTIPLE MYELOMA: ICD-10-CM

## 2022-11-08 DIAGNOSIS — E11.42 TYPE 2 DIABETES MELLITUS WITH DIABETIC POLYNEUROPATHY, WITH LONG-TERM CURRENT USE OF INSULIN: Chronic | ICD-10-CM

## 2022-11-08 LAB
ALBUMIN SERPL BCP-MCNC: 3.7 G/DL (ref 3.5–5.2)
ALP SERPL-CCNC: 54 U/L (ref 55–135)
ALT SERPL W/O P-5'-P-CCNC: 21 U/L (ref 10–44)
ANION GAP SERPL CALC-SCNC: 10 MMOL/L (ref 8–16)
AST SERPL-CCNC: 26 U/L (ref 10–40)
BASOPHILS # BLD AUTO: 0.05 K/UL (ref 0–0.2)
BASOPHILS NFR BLD: 0.9 % (ref 0–1.9)
BILIRUB SERPL-MCNC: 0.5 MG/DL (ref 0.1–1)
BUN SERPL-MCNC: 19 MG/DL (ref 8–23)
CALCIUM SERPL-MCNC: 9.8 MG/DL (ref 8.7–10.5)
CHLORIDE SERPL-SCNC: 104 MMOL/L (ref 95–110)
CO2 SERPL-SCNC: 23 MMOL/L (ref 23–29)
CREAT SERPL-MCNC: 2.2 MG/DL (ref 0.5–1.4)
DIFFERENTIAL METHOD: ABNORMAL
EOSINOPHIL # BLD AUTO: 0.4 K/UL (ref 0–0.5)
EOSINOPHIL NFR BLD: 7.2 % (ref 0–8)
ERYTHROCYTE [DISTWIDTH] IN BLOOD BY AUTOMATED COUNT: 14.6 % (ref 11.5–14.5)
EST. GFR  (NO RACE VARIABLE): 30 ML/MIN/1.73 M^2
GLUCOSE SERPL-MCNC: 98 MG/DL (ref 70–110)
HCT VFR BLD AUTO: 36.6 % (ref 40–54)
HGB BLD-MCNC: 11.9 G/DL (ref 14–18)
IGA SERPL-MCNC: 1924 MG/DL (ref 40–350)
IGG SERPL-MCNC: 1066 MG/DL (ref 650–1600)
IGM SERPL-MCNC: 45 MG/DL (ref 50–300)
IMM GRANULOCYTES # BLD AUTO: 0.01 K/UL (ref 0–0.04)
IMM GRANULOCYTES NFR BLD AUTO: 0.2 % (ref 0–0.5)
LDH SERPL L TO P-CCNC: 137 U/L (ref 110–260)
LYMPHOCYTES # BLD AUTO: 2.2 K/UL (ref 1–4.8)
LYMPHOCYTES NFR BLD: 38.2 % (ref 18–48)
MAGNESIUM SERPL-MCNC: 1.7 MG/DL (ref 1.6–2.6)
MCH RBC QN AUTO: 28.3 PG (ref 27–31)
MCHC RBC AUTO-ENTMCNC: 32.5 G/DL (ref 32–36)
MCV RBC AUTO: 87 FL (ref 82–98)
MONOCYTES # BLD AUTO: 0.4 K/UL (ref 0.3–1)
MONOCYTES NFR BLD: 7.7 % (ref 4–15)
NEUTROPHILS # BLD AUTO: 2.6 K/UL (ref 1.8–7.7)
NEUTROPHILS NFR BLD: 45.8 % (ref 38–73)
NRBC BLD-RTO: 0 /100 WBC
PHOSPHATE SERPL-MCNC: 3.1 MG/DL (ref 2.7–4.5)
PLATELET # BLD AUTO: 283 K/UL (ref 150–450)
PMV BLD AUTO: 9 FL (ref 9.2–12.9)
POTASSIUM SERPL-SCNC: 4.9 MMOL/L (ref 3.5–5.1)
PROT SERPL-MCNC: 9.2 G/DL (ref 6–8.4)
RBC # BLD AUTO: 4.2 M/UL (ref 4.6–6.2)
SODIUM SERPL-SCNC: 137 MMOL/L (ref 136–145)
WBC # BLD AUTO: 5.73 K/UL (ref 3.9–12.7)

## 2022-11-08 PROCEDURE — 84100 ASSAY OF PHOSPHORUS: CPT | Mod: Q1 | Performed by: INTERNAL MEDICINE

## 2022-11-08 PROCEDURE — 84165 PROTEIN E-PHORESIS SERUM: CPT | Performed by: INTERNAL MEDICINE

## 2022-11-08 PROCEDURE — 84165 PATHOLOGIST INTERPRETATION SPE: ICD-10-PCS | Mod: 26,Q1,, | Performed by: PATHOLOGY

## 2022-11-08 PROCEDURE — 84165 PROTEIN E-PHORESIS SERUM: CPT | Mod: 26,Q1,, | Performed by: PATHOLOGY

## 2022-11-08 PROCEDURE — 83615 LACTATE (LD) (LDH) ENZYME: CPT | Mod: Q1 | Performed by: INTERNAL MEDICINE

## 2022-11-08 PROCEDURE — 83735 ASSAY OF MAGNESIUM: CPT | Mod: Q1 | Performed by: INTERNAL MEDICINE

## 2022-11-08 PROCEDURE — 36415 COLL VENOUS BLD VENIPUNCTURE: CPT | Performed by: INTERNAL MEDICINE

## 2022-11-08 PROCEDURE — 82784 ASSAY IGA/IGD/IGG/IGM EACH: CPT | Mod: 59,Q1 | Performed by: INTERNAL MEDICINE

## 2022-11-08 PROCEDURE — 86334 IMMUNOFIX E-PHORESIS SERUM: CPT | Mod: Q1 | Performed by: INTERNAL MEDICINE

## 2022-11-08 PROCEDURE — 85025 COMPLETE CBC W/AUTO DIFF WBC: CPT | Performed by: INTERNAL MEDICINE

## 2022-11-08 PROCEDURE — 86334 IMMUNOFIX E-PHORESIS SERUM: CPT | Mod: 26,Q1,, | Performed by: PATHOLOGY

## 2022-11-08 PROCEDURE — 86334 PATHOLOGIST INTERPRETATION IFE: ICD-10-PCS | Mod: 26,Q1,, | Performed by: PATHOLOGY

## 2022-11-08 PROCEDURE — 80053 COMPREHEN METABOLIC PANEL: CPT | Mod: Q1 | Performed by: INTERNAL MEDICINE

## 2022-11-08 PROCEDURE — 83521 IG LIGHT CHAINS FREE EACH: CPT | Performed by: INTERNAL MEDICINE

## 2022-11-08 RX ORDER — INSULIN ASPART 100 [IU]/ML
4 INJECTION, SOLUTION INTRAVENOUS; SUBCUTANEOUS
Qty: 12 ML | Refills: 1
Start: 2022-11-08 | End: 2022-11-16 | Stop reason: SDUPTHER

## 2022-11-08 RX ORDER — INSULIN ASPART 100 [IU]/ML
4 INJECTION, SOLUTION INTRAVENOUS; SUBCUTANEOUS
Qty: 12 ML | Refills: 1
Start: 2022-11-08 | End: 2023-02-09

## 2022-11-08 NOTE — TELEPHONE ENCOUNTER
Care Due:                  Date            Visit Type   Department     Provider  --------------------------------------------------------------------------------                                EP -                              PRIMARY      [facility] INTERNAL  Last Visit: 09-      CARE ([facility])   MEDICINE       GEOVANNA ALCAZAR                              EP                               PRIMARY      [facility] INTERNAL  Next Visit: 02-      CARE ([facility])   MEDICINE       GEOVANNA ALCAZAR                                                            Last  Test          Frequency    Reason                     Performed    Due Date  --------------------------------------------------------------------------------    Lipid Panel.  12 months..  pravastatin..............  01- 01-    [employer] Embedded Care Gaps. Reference number: [id number]. 11/08/2022   7:08:27 AM CST

## 2022-11-09 ENCOUNTER — PATIENT MESSAGE (OUTPATIENT)
Dept: INTERNAL MEDICINE | Facility: CLINIC | Age: 77
End: 2022-11-09
Payer: MEDICARE

## 2022-11-09 DIAGNOSIS — E11.42 TYPE 2 DIABETES MELLITUS WITH DIABETIC POLYNEUROPATHY, WITH LONG-TERM CURRENT USE OF INSULIN: Chronic | ICD-10-CM

## 2022-11-09 DIAGNOSIS — Z79.4 TYPE 2 DIABETES MELLITUS WITH DIABETIC POLYNEUROPATHY, WITH LONG-TERM CURRENT USE OF INSULIN: Chronic | ICD-10-CM

## 2022-11-09 LAB
ALBUMIN SERPL ELPH-MCNC: 4.1 G/DL (ref 3.35–5.55)
ALPHA1 GLOB SERPL ELPH-MCNC: 0.28 G/DL (ref 0.17–0.41)
ALPHA2 GLOB SERPL ELPH-MCNC: 0.91 G/DL (ref 0.43–0.99)
B-GLOBULIN SERPL ELPH-MCNC: 2.79 G/DL (ref 0.5–1.1)
GAMMA GLOB SERPL ELPH-MCNC: 0.62 G/DL (ref 0.67–1.58)
INTERPRETATION SERPL IFE-IMP: NORMAL
KAPPA LC SER QL IA: 6.63 MG/DL (ref 0.33–1.94)
KAPPA LC/LAMBDA SER IA: 3.42 (ref 0.26–1.65)
LAMBDA LC SER QL IA: 1.94 MG/DL (ref 0.57–2.63)
PROT SERPL-MCNC: 8.7 G/DL (ref 6–8.4)

## 2022-11-10 LAB
PATHOLOGIST INTERPRETATION IFE: NORMAL
PATHOLOGIST INTERPRETATION SPE: NORMAL

## 2022-11-11 NOTE — PROGRESS NOTES
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   Sponsor:  [facility]  IRB# [id number]  Study ID: [id number]  Investigator: GIL Engel  Pt Initials: LUCÍA LORENZ        Cycle 83 Day 28 Arm B: Observation     Of note: this research RN was not present during visit; contacted subject per telephone following brief update per Dr Engel regarding visit:      Patient presented to clinic today for above cycle evaluation per Dr Engel; he was un-accompanied and stated he is "busy, busy, with a lot of projects here." He was alert, oriented to person, place, and time; mood and affect were appropriate. He presents one week late due to scheduling issue. Pt states to Dr Engel has not yet received COVID booster but intends to do so when he gets time. States he continues to maintain current COVID precautions; wears mask in crowded indoor areas; denies any COVID symptoms As per above, states continues to work actively; maintaining several contracts here in the [facility] and less in the [place] area.        Review of Baseline AE's:   1.Hypertension, Grade 2 systolic/diastolic: BP today is 154/86; subject states compliance with Losartan and confirms his BP being elevated today is situational, dealing with grandson in [place], local projects here in [facility]; states "it's a lot of stuff." Subject denies headache/dizziness; no symptoms noted. Dr. Engel aware.  AE ongoing and tends to fluctuate.   2. Lower Back Pain and Neck Pain, grade 1: Stable.  Patient has no complaints as of late and as previously stated, patient is not suspected to have bony myeloma involvement per Dr. Engel as these are pre-existing issues present at baseline. I scheduled tomorrow for MRI of spine, Will update results when available.  AE ongoing.  3. Pain in extremity (knees), grade 1. Subject presents with knee braces; states no complaints today; s/p " "injections 3 mos ago.States is trying to get surgery set up for replacement when activity with work slows down.  Will continue to monitor.       4. Peripheral sensory neuropathy, grade 1: Patient does not verbalize complaints today. Has gabapentin prescribed and takes as needed.  States has not taken recently.  AE ongoing.   5. Anemia, Grade 1: Hgb 11.9 today. Result reviewed by Dr. Engel, who answered patient questions as above. AE ongoing and stable.            Review of AE's:     *Please note this list is not all-inclusive, please see AE log for physician reviewed list of adverse events.   1. Creatinine increased, grade 2: Serum creatinine from labs today is 2.2; calculated GFR 37 ml/min based on CG calculation. Reviewed per Dr. Engel and no new orders.Per Dr Engel not been related to myeloma: rather, CKD;  likely secondary to diabetes and hypertension vs plasma cell dyscrasia.  Will continue observation monthly and to monitor renal functionclosely. Per MD, reiterated adequate hydration with increase in water/gatorade intake..  2. Hyponatremia, Grade 1: Serum sodium today is 136 mmol/L; AE intermittent. Will monitor  3. Hyperkalemia, Grade 1: serum K today is 4.9, Dr Engel has reviewed labs; no additional orders noted. AE intermittent; will continue to monitor.   4. Hyperuricemia, Grade 1: this was not evaluated today; usually ordered per nephrology.   5. Hyperglycemia. Grade 1: blood glucose today is 98; lab is nonfasting. HgA1C drawn today; resulted; 8.0. Subject to follow up with Dr Sagastume for further guidance.   6. Diplopia, right eye: Subject states ophtamology told him is eye issues are improving, though he states he feels as though it is not. Per Dr Engel this is not related to SMM; rather is associated with diabetes. Subject maintains compliance with opthomoolgy; will continue to monitor. He has appt in am with eye MD at [facility].      Per study chair, "the definition of progressive disease for this protocol " "is developing CRAB criteria that requires treatment systemically." Per Dr Engel, based on today's exam and lab results, patient does not have any s/s of CRAB: Normal Calcium level (9.4), absence of Renal insufficiency (serum creatinine elevated today at 2.0 but per Dr Engel not related to myeloma; --patient with a history of kidney dysfunction secondary to diabetes; Dr. Engel aware of these values and not concerned for myeloma involvement), absence of significant Anemia (hemoglobin 11.0, stable; will await myeloma labs for clarity relationship to myeloma) and absence of lytic Bone lesions (per baseline metastatic survey as well as MRI--see MD note for further comment). See MD note for ECOG score and H&P and flowsheets for laboratory work, vitals, etc. All myeloma-related blood work from last cycle including SPEP and JAGUAR have remained stable, and are currently pending for this cycle. Per Dr. Engel, patient shows no evidence of progression at last result. Patient informed that Dr. Engel will release all lab results to [facility]. He states understanding of this. QOL's not required again until end of treatment/observation.           Subject maintains he wishes only to see Dr Engel and will not show for appts if scheduled with any other provider. For this reason subject's appts do not always align with study calendar; per Dr Engel as subject is not on active treatment will abide by subject's wishes to see her only.  Will continue to schedule patient as far out as possible, which seems to help maintain compliance with visits. Normally subject deviates no greater than one week per cycle; wishes to remain on study per Dr Engel. Next appt was moved forwarded one week to [facility] 28 day cycles for data/study compliance; appts generally fall within 28-32 days.   Informed patient will continue to contact him a few days prior to next appt to remind him so that he can plan accordingly. Patient states having research RN's contact " information and has MD contact information to call with any concerns, questions, or worsening of symptoms. Will follow up with subject once myeloma labs are resulted.

## 2022-11-16 ENCOUNTER — PATIENT MESSAGE (OUTPATIENT)
Dept: INTERNAL MEDICINE | Facility: CLINIC | Age: 77
End: 2022-11-16
Payer: MEDICARE

## 2022-11-16 DIAGNOSIS — Z79.4 TYPE 2 DIABETES MELLITUS WITH DIABETIC POLYNEUROPATHY, WITH LONG-TERM CURRENT USE OF INSULIN: Chronic | ICD-10-CM

## 2022-11-16 DIAGNOSIS — E11.42 TYPE 2 DIABETES MELLITUS WITH DIABETIC POLYNEUROPATHY, WITH LONG-TERM CURRENT USE OF INSULIN: Chronic | ICD-10-CM

## 2022-11-16 RX ORDER — INSULIN ASPART 100 [IU]/ML
4 INJECTION, SOLUTION INTRAVENOUS; SUBCUTANEOUS
Qty: 12 ML | Refills: 1 | Status: SHIPPED | OUTPATIENT
Start: 2022-11-16 | End: 2023-07-03 | Stop reason: SDUPTHER

## 2022-11-16 RX ORDER — INSULIN ASPART 100 [IU]/ML
4 INJECTION, SOLUTION INTRAVENOUS; SUBCUTANEOUS
Qty: 12 ML | Refills: 1 | OUTPATIENT
Start: 2022-11-16 | End: 2023-11-16

## 2022-11-16 NOTE — TELEPHONE ENCOUNTER
Refill Decision Note   Emerson Menendez  is requesting a refill authorization.  Brief Assessment and Rationale for Refill:  Quick Discontinue     Medication Therapy Plan:       Medication Reconciliation Completed: No   Comments:          Note composed:3:59 PM 11/16/2022

## 2022-12-06 DIAGNOSIS — D47.2 SMOLDERING MULTIPLE MYELOMA: ICD-10-CM

## 2022-12-06 RX ORDER — DIAZEPAM 5 MG/1
5 TABLET ORAL EVERY 12 HOURS PRN
Qty: 60 TABLET | Refills: 0 | Status: SHIPPED | OUTPATIENT
Start: 2022-12-06 | End: 2023-02-06 | Stop reason: SDUPTHER

## 2022-12-07 ENCOUNTER — PATIENT MESSAGE (OUTPATIENT)
Dept: OTHER | Facility: OTHER | Age: 77
End: 2022-12-07
Payer: MEDICARE

## 2022-12-07 NOTE — TELEPHONE ENCOUNTER
No new care gaps identified.  [facility] Embedded Care Gaps. Reference number: [id number]. 12/06/2022   7:36:13 PM CST

## 2022-12-09 ENCOUNTER — PATIENT MESSAGE (OUTPATIENT)
Dept: PODIATRY | Facility: CLINIC | Age: 77
End: 2022-12-09
Payer: MEDICARE

## 2022-12-12 ENCOUNTER — PATIENT MESSAGE (OUTPATIENT)
Dept: INTERNAL MEDICINE | Facility: CLINIC | Age: 77
End: 2022-12-12
Payer: MEDICARE

## 2022-12-12 ENCOUNTER — TELEPHONE (OUTPATIENT)
Dept: PODIATRY | Facility: CLINIC | Age: 77
End: 2022-12-12
Payer: MEDICARE

## 2022-12-12 DIAGNOSIS — E11.59 TYPE 2 DIABETES MELLITUS WITH OTHER CIRCULATORY COMPLICATION, WITH LONG-TERM CURRENT USE OF INSULIN: ICD-10-CM

## 2022-12-12 DIAGNOSIS — Z79.4 TYPE 2 DIABETES MELLITUS WITH OTHER CIRCULATORY COMPLICATION, WITH LONG-TERM CURRENT USE OF INSULIN: ICD-10-CM

## 2022-12-15 ENCOUNTER — LAB VISIT (OUTPATIENT)
Dept: LAB | Facility: HOSPITAL | Age: 77
End: 2022-12-15
Payer: MEDICARE

## 2022-12-15 ENCOUNTER — OFFICE VISIT (OUTPATIENT)
Dept: HEMATOLOGY/ONCOLOGY | Facility: CLINIC | Age: 77
End: 2022-12-15
Payer: MEDICARE

## 2022-12-15 ENCOUNTER — RESEARCH ENCOUNTER (OUTPATIENT)
Dept: HEMATOLOGY/ONCOLOGY | Facility: CLINIC | Age: 77
End: 2022-12-15

## 2022-12-15 VITALS
HEIGHT: 70 IN | DIASTOLIC BLOOD PRESSURE: 79 MMHG | SYSTOLIC BLOOD PRESSURE: 170 MMHG | BODY MASS INDEX: 29.88 KG/M2 | RESPIRATION RATE: 17 BRPM | TEMPERATURE: 98 F | HEART RATE: 94 BPM | OXYGEN SATURATION: 96 % | WEIGHT: 208.69 LBS

## 2022-12-15 DIAGNOSIS — Z79.4 TYPE 2 DIABETES MELLITUS WITH DIABETIC POLYNEUROPATHY, WITH LONG-TERM CURRENT USE OF INSULIN: ICD-10-CM

## 2022-12-15 DIAGNOSIS — Z00.6 RESEARCH EXAM: ICD-10-CM

## 2022-12-15 DIAGNOSIS — E11.22 CKD STAGE 3 DUE TO TYPE 2 DIABETES MELLITUS: ICD-10-CM

## 2022-12-15 DIAGNOSIS — N18.30 CKD STAGE 3 DUE TO TYPE 2 DIABETES MELLITUS: ICD-10-CM

## 2022-12-15 DIAGNOSIS — E11.42 TYPE 2 DIABETES MELLITUS WITH DIABETIC POLYNEUROPATHY, WITH LONG-TERM CURRENT USE OF INSULIN: ICD-10-CM

## 2022-12-15 DIAGNOSIS — D47.2 SMOLDERING MULTIPLE MYELOMA: Primary | ICD-10-CM

## 2022-12-15 DIAGNOSIS — D47.2 SMOLDERING MULTIPLE MYELOMA: ICD-10-CM

## 2022-12-15 LAB
ALBUMIN SERPL BCP-MCNC: 3.6 G/DL (ref 3.5–5.2)
ALP SERPL-CCNC: 53 U/L (ref 55–135)
ALT SERPL W/O P-5'-P-CCNC: 19 U/L (ref 10–44)
ANION GAP SERPL CALC-SCNC: 11 MMOL/L (ref 8–16)
AST SERPL-CCNC: 20 U/L (ref 10–40)
BASOPHILS # BLD AUTO: 0.04 K/UL (ref 0–0.2)
BASOPHILS NFR BLD: 0.7 % (ref 0–1.9)
BILIRUB SERPL-MCNC: 0.5 MG/DL (ref 0.1–1)
BUN SERPL-MCNC: 17 MG/DL (ref 8–23)
CALCIUM SERPL-MCNC: 9.4 MG/DL (ref 8.7–10.5)
CHLORIDE SERPL-SCNC: 103 MMOL/L (ref 95–110)
CO2 SERPL-SCNC: 23 MMOL/L (ref 23–29)
CREAT SERPL-MCNC: 1.9 MG/DL (ref 0.5–1.4)
DIFFERENTIAL METHOD: ABNORMAL
EOSINOPHIL # BLD AUTO: 0.3 K/UL (ref 0–0.5)
EOSINOPHIL NFR BLD: 5.6 % (ref 0–8)
ERYTHROCYTE [DISTWIDTH] IN BLOOD BY AUTOMATED COUNT: 14.5 % (ref 11.5–14.5)
EST. GFR  (NO RACE VARIABLE): 36.1 ML/MIN/1.73 M^2
GLUCOSE SERPL-MCNC: 88 MG/DL (ref 70–110)
HCT VFR BLD AUTO: 34.7 % (ref 40–54)
HGB BLD-MCNC: 11 G/DL (ref 14–18)
IGA SERPL-MCNC: 1669 MG/DL (ref 40–350)
IGG SERPL-MCNC: 1069 MG/DL (ref 650–1600)
IGM SERPL-MCNC: 33 MG/DL (ref 50–300)
IMM GRANULOCYTES # BLD AUTO: 0.01 K/UL (ref 0–0.04)
IMM GRANULOCYTES NFR BLD AUTO: 0.2 % (ref 0–0.5)
LDH SERPL L TO P-CCNC: 112 U/L (ref 110–260)
LYMPHOCYTES # BLD AUTO: 2.4 K/UL (ref 1–4.8)
LYMPHOCYTES NFR BLD: 43.4 % (ref 18–48)
MAGNESIUM SERPL-MCNC: 1.6 MG/DL (ref 1.6–2.6)
MCH RBC QN AUTO: 28.1 PG (ref 27–31)
MCHC RBC AUTO-ENTMCNC: 31.7 G/DL (ref 32–36)
MCV RBC AUTO: 89 FL (ref 82–98)
MONOCYTES # BLD AUTO: 0.4 K/UL (ref 0.3–1)
MONOCYTES NFR BLD: 7 % (ref 4–15)
NEUTROPHILS # BLD AUTO: 2.4 K/UL (ref 1.8–7.7)
NEUTROPHILS NFR BLD: 43.1 % (ref 38–73)
NRBC BLD-RTO: 0 /100 WBC
PHOSPHATE SERPL-MCNC: 2.6 MG/DL (ref 2.7–4.5)
PLATELET # BLD AUTO: 235 K/UL (ref 150–450)
PMV BLD AUTO: 8.8 FL (ref 9.2–12.9)
POTASSIUM SERPL-SCNC: 4.8 MMOL/L (ref 3.5–5.1)
PROT SERPL-MCNC: 8.6 G/DL (ref 6–8.4)
RBC # BLD AUTO: 3.92 M/UL (ref 4.6–6.2)
SODIUM SERPL-SCNC: 137 MMOL/L (ref 136–145)
WBC # BLD AUTO: 5.55 K/UL (ref 3.9–12.7)

## 2022-12-15 PROCEDURE — 85025 COMPLETE CBC W/AUTO DIFF WBC: CPT | Performed by: INTERNAL MEDICINE

## 2022-12-15 PROCEDURE — 86334 PATHOLOGIST INTERPRETATION IFE: ICD-10-PCS | Mod: 26,Q1,, | Performed by: PATHOLOGY

## 2022-12-15 PROCEDURE — 84165 PROTEIN E-PHORESIS SERUM: CPT | Mod: 26,Q1,, | Performed by: PATHOLOGY

## 2022-12-15 PROCEDURE — 99214 PR OFFICE/OUTPT VISIT, EST, LEVL IV, 30-39 MIN: ICD-10-PCS | Mod: Q1,S$GLB,, | Performed by: INTERNAL MEDICINE

## 2022-12-15 PROCEDURE — 84165 PROTEIN E-PHORESIS SERUM: CPT | Performed by: INTERNAL MEDICINE

## 2022-12-15 PROCEDURE — 36415 COLL VENOUS BLD VENIPUNCTURE: CPT | Performed by: INTERNAL MEDICINE

## 2022-12-15 PROCEDURE — 84100 ASSAY OF PHOSPHORUS: CPT | Performed by: INTERNAL MEDICINE

## 2022-12-15 PROCEDURE — 83615 LACTATE (LD) (LDH) ENZYME: CPT | Mod: Q1 | Performed by: INTERNAL MEDICINE

## 2022-12-15 PROCEDURE — 84165 PATHOLOGIST INTERPRETATION SPE: ICD-10-PCS | Mod: 26,Q1,, | Performed by: PATHOLOGY

## 2022-12-15 PROCEDURE — 99999 PR PBB SHADOW E&M-EST. PATIENT-LVL II: ICD-10-PCS | Mod: PBBFAC,,, | Performed by: INTERNAL MEDICINE

## 2022-12-15 PROCEDURE — 82784 ASSAY IGA/IGD/IGG/IGM EACH: CPT | Mod: 59 | Performed by: INTERNAL MEDICINE

## 2022-12-15 PROCEDURE — 99999 PR PBB SHADOW E&M-EST. PATIENT-LVL II: CPT | Mod: PBBFAC,,, | Performed by: INTERNAL MEDICINE

## 2022-12-15 PROCEDURE — 86334 IMMUNOFIX E-PHORESIS SERUM: CPT | Performed by: INTERNAL MEDICINE

## 2022-12-15 PROCEDURE — 83521 IG LIGHT CHAINS FREE EACH: CPT | Performed by: INTERNAL MEDICINE

## 2022-12-15 PROCEDURE — 99214 OFFICE O/P EST MOD 30 MIN: CPT | Mod: Q1,S$GLB,, | Performed by: INTERNAL MEDICINE

## 2022-12-15 PROCEDURE — 83735 ASSAY OF MAGNESIUM: CPT | Mod: Q1 | Performed by: INTERNAL MEDICINE

## 2022-12-15 PROCEDURE — 86334 IMMUNOFIX E-PHORESIS SERUM: CPT | Mod: 26,Q1,, | Performed by: PATHOLOGY

## 2022-12-15 PROCEDURE — 80053 COMPREHEN METABOLIC PANEL: CPT | Mod: Q1 | Performed by: INTERNAL MEDICINE

## 2022-12-15 NOTE — PROGRESS NOTES
Subjective:    Patient ID: Emerson Menendez is a 77 y.o. male.    Chief Complaint: No chief complaint on file.    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 Test results identify a stable IgA kappa protein with beta globulin band at 1.63g/dL. Kappa free light chain is elevated at 4.40. CBC and calcium are stable. Creatinine with history of stage III CKD is stable at 1.4. Metastatic survey is negative for lytic lesions. MRI of the entire spine demonstrated age related changes but no convincing evidence of myeloma bone disease. Bone marrow biopsy identified about 13% plasma cells by morphology and FISH for myeloma identified a t(11;14) and trisomies 3,7, and 17. The patient is afebrile and appears clinically well. He was seen by his podiatrist and nephrologist since our last visit without any new or acute events.    The patient has not received any therapy for smoldering myeloma including bisphosphonates or steroids. He has been randomized to observation arm of the the clinical study. 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 He has no current or prior history of malignancy.    TODAY Cycle 84, observation  No acute interval medical events  CBC stable; creatinine improved on CMP, and MM labs pending  M protein at 1.45 last month, will follow-up pending study        Knee Pain     Joint Pain  Associated symptoms include coughing. Pertinent negatives include no diaphoresis, fatigue or fever.   Hand Pain     Cough  Pertinent negatives include no fever.   Foot Pain  Associated symptoms include coughing. Pertinent negatives include no diaphoresis, fatigue or fever.   Arm  Pain     Follow-up  Associated symptoms include coughing. Pertinent negatives include no diaphoresis, fatigue or fever.   Review of Systems   Constitutional:  Negative for activity change, appetite change, diaphoresis, fatigue, fever and unexpected weight change.   HENT: Negative.     Eyes: Negative.    Respiratory:  Positive for cough.    Cardiovascular:  Negative for leg swelling.   Gastrointestinal: Negative.    Endocrine: Negative.    Genitourinary: Negative.    Musculoskeletal: Negative.    Skin: Negative.    Allergic/Immunologic: Negative.    Neurological:         Grade 0-1 peripheral neuropathy of bilateral feet.    Hematological:  Negative for adenopathy. Does not bruise/bleed easily.   Psychiatric/Behavioral: Negative.       Objective:       Vitals:    12/15/22 1302   BP: (!) 170/79   Pulse: 94   Resp: 17   Temp: 97.8 °F (36.6 °C)       Physical Exam  Vitals and nursing note reviewed.   Constitutional:       Appearance: He is well-developed.   HENT:      Head: Normocephalic and atraumatic.      Right Ear: External ear normal.      Left Ear: External ear normal.      Nose: Nose normal.   Eyes:      Conjunctiva/sclera: Conjunctivae normal.      Pupils: Pupils are equal, round, and reactive to light.   Cardiovascular:      Rate and Rhythm: Normal rate and regular rhythm.      Heart sounds: No murmur heard.  Pulmonary:      Effort: Pulmonary effort is normal. No respiratory distress.      Breath sounds: Normal breath sounds.   Abdominal:      General: Bowel sounds are normal. There is no distension.      Palpations: Abdomen is soft.   Musculoskeletal:         General: No tenderness.      Cervical back: Normal range of motion and neck supple.   Skin:     General: Skin is warm and dry.      Findings: No rash.      Nails: There is no clubbing.   Neurological:      Mental Status: He is alert and oriented to person, place, and time.      Cranial Nerves: No cranial nerve deficit.   Psychiatric:         Behavior:  Behavior normal.       Assessment:       No diagnosis found.    Plan:       The patient has a diagnosis of smoldering myeloma. There is no indication for immediate chemotherapy. We are monitoring renal function closely- baseline creatinine of around 1.5. 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 M protein has been stable and repeat is pending. Plan for return in 1 month.  Endocrinology and Nephrology to monitor diabetes and renal failure respectively. Patient would like to continue to follow with Dr. Perkins as needed. Hopeful for knee replacements next year.    A total of 20 minutes was spent in pre-visit chart review, personal interpretation of labs and imaging, and medication review. Total visit time 30 minutes, >50 % counseling.

## 2022-12-16 LAB
ALBUMIN SERPL ELPH-MCNC: 3.94 G/DL (ref 3.35–5.55)
ALPHA1 GLOB SERPL ELPH-MCNC: 0.25 G/DL (ref 0.17–0.41)
ALPHA2 GLOB SERPL ELPH-MCNC: 0.8 G/DL (ref 0.43–0.99)
B-GLOBULIN SERPL ELPH-MCNC: 2.56 G/DL (ref 0.5–1.1)
GAMMA GLOB SERPL ELPH-MCNC: 0.55 G/DL (ref 0.67–1.58)
INTERPRETATION SERPL IFE-IMP: NORMAL
KAPPA LC SER QL IA: 6.84 MG/DL (ref 0.33–1.94)
KAPPA LC/LAMBDA SER IA: 3.35 (ref 0.26–1.65)
LAMBDA LC SER QL IA: 2.04 MG/DL (ref 0.57–2.63)
PROT SERPL-MCNC: 8.1 G/DL (ref 6–8.4)

## 2022-12-16 NOTE — PROGRESS NOTES
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   Sponsor:  [facility]  IRB# [id number]  Study ID: [id number]  Investigator: GIL Engel Pt Initials: LUCÍA LORENZ        Cycle 84 Day 28 Arm B: Observation      Patient presented to clinic today for above cycle evaluation per Dr Engel; he was un-accompanied and states "doing pretty good." He was alert, oriented to person, place, and time; mood and affect were appropriate. He reports overall no new complaints; states trying his best to regulate his blood sugars and had reordered supplies for Leah monitoring. Also states he had been more conscientious about staying hydrated; he was reassured per Dr Engel that his creatinine has improved per labs today. He continues to maintain current COVID precautions; wears mask in crowded indoor areas; denies any COVID symptoms As per above, states continues to work actively; maintaining several contracts here in the [facility] and less in the [facility].        Review of Baseline AE's:   1.Hypertension, Grade 2 systolic/diastolic: BP today is 170/79; subject states compliance with Losartan and confirms his BP being elevated today continues to be situational, hurried to visit today following busy morning at work. Subject denies headache/dizziness; no symptoms noted. Dr. Engel aware.  AE ongoing and tends to fluctuate.   2. Lower Back Pain and Neck Pain, grade 1: Stable.  Patient states he has been going to chiropractor recently and feels relief for this symptoms; reports no complaints today. Of note: patient is not suspected to have bony myeloma involvement per Dr. Engel as these are pre-existing issues present at baseline. AE ongoing but improved as of today's visit.   3. Pain in extremity (knees), grade 1. Subject presents with knee braces; states no complaints today; verbalizes needs surgery but does not have time " right now; will continue with injections as needed to sustain current quality of life/activity level. Emphasizes he does intend to get surgery set up for replacement when activity with work slows down; hopes for early 2023. Will continue to monitor.       4. Peripheral sensory neuropathy, grade 1: Patient does not verbalize complaints today. Has gabapentin prescribed and takes as needed.  States has not taken recently.  AE ongoing.   5. Anemia, Grade 1: Hgb 11.0 today. Result reviewed by Dr. Engel, noted. AE ongoing and stable.            Review of AE's:     *Please note this list is not all-inclusive, please see AE log for physician reviewed list of adverse events.   1. Creatinine increased, grade 2: Serum creatinine from labs today is 1.9; calculated GFR 44 ml/min based on CG calculation. Reviewed per Dr. Engel and no new orders.Per Dr Engel this AE is not been related to myeloma: rather, attributable to CKD;  likely secondary to diabetes and hypertension vs plasma cell dyscrasia.  Will continue observation monthly and to monitor renal functionclosely. Per MD, reiterated adequate hydration with increase in water/sugar free gatorade intake..  2. Hyponatremia, Grade 1: Serum sodium today is 136 mmol/L; AE intermittent. Will monitor  3. Hyperkalemia, Grade 1: serum K today is 4.9, Dr Engel has reviewed labs; no additional orders noted. AE intermittent; will continue to monitor.   4. Hyperuricemia, Grade 1: this was not evaluated today; usually ordered per nephrology.   5. Hyperglycemia. Grade 1: blood glucose today is 88; lab is nonfasting. Subject follows with Dr Sagastume for ongoing guidance/supervision of diabetes.    6. Diplopia, right eye: Subject states no issues right now; follows up with ophtamology regularly for issues. Per Dr Engel this is not related to SMM; rather is associated with diabetes. Will continue to monitor.   Hypophosphatemia, Grade 1: serum Phosphorous is 2.6 today; slightly below LLN; will  "monitor. Dr Engel aware; no orders as there are no symptoms.       Per study chair, "the definition of progressive disease for this protocol is developing CRAB criteria that requires treatment systemically." Per Dr Engel, based on today's exam and lab results, patient does not have any s/s of CRAB: Normal Calcium level (9.4), absence of Renal insufficiency (serum creatinine elevated today at 2.0 but per Dr Engel not related to myeloma; --patient with a history of kidney dysfunction secondary to diabetes; Dr. Engel aware of these values and not concerned for myeloma involvement), absence of significant Anemia (hemoglobin 11.0, stable; will await myeloma labs for clarity relationship to myeloma) and absence of lytic Bone lesions (per baseline metastatic survey as well as MRI--see MD note for further comment). See MD note for ECOG score and H&P and flowsheets for laboratory work, vitals, etc. All myeloma-related blood work from last cycle including SPEP and JAGUAR have remained stable, and are currently pending for this cycle. Per Dr. Engel, patient shows no evidence of progression at last result. Patient informed that Dr. Engel will release all lab results to [facility]. He states understanding of this. QOL's not required again until end of treatment/observation.           Subject maintains he wishes only to see Dr Engel and will not show for appts if scheduled with any other provider. For this reason subject's appts do not always align with study calendar; per Dr Engel as subject is not on active treatment will abide by subject's wishes to see her only.  Will continue to schedule patient as far out as possible, which seems to help maintain compliance with visits. Normally subject deviates no greater than one week per cycle; wishes to remain on study per Dr Engel. Next appt scheduled for 28 days from now; subjects states will maintain compliance; appts generally fall within 28-32 days.   Informed patient will continue to contact " him a few days prior to next appt to remind him so that he can plan accordingly. Patient states having research RN's contact information and has MD contact information to call with any concerns, questions, or worsening of symptoms. Will follow up with subject once myeloma labs are resulted.

## 2022-12-19 LAB
PATHOLOGIST INTERPRETATION IFE: NORMAL
PATHOLOGIST INTERPRETATION SPE: NORMAL

## 2023-01-01 ENCOUNTER — PATIENT MESSAGE (OUTPATIENT)
Dept: INTERNAL MEDICINE | Facility: CLINIC | Age: 78
End: 2023-01-01
Payer: MEDICARE

## 2023-01-01 DIAGNOSIS — D47.2 SMOLDERING MULTIPLE MYELOMA: ICD-10-CM

## 2023-01-01 RX ORDER — CITALOPRAM 40 MG/1
40 TABLET, FILM COATED ORAL DAILY
Qty: 90 TABLET | Refills: 4 | Status: SHIPPED | OUTPATIENT
Start: 2023-01-01 | End: 2023-02-23

## 2023-01-04 ENCOUNTER — CLINICAL SUPPORT (OUTPATIENT)
Dept: REHABILITATION | Facility: OTHER | Age: 78
End: 2023-01-04
Attending: NURSE PRACTITIONER
Payer: MEDICARE

## 2023-01-04 DIAGNOSIS — M79.18 MYOFASCIAL PAIN: ICD-10-CM

## 2023-01-04 DIAGNOSIS — M25.569 CHRONIC KNEE PAIN, UNSPECIFIED LATERALITY: Primary | ICD-10-CM

## 2023-01-04 DIAGNOSIS — G89.29 CHRONIC KNEE PAIN, UNSPECIFIED LATERALITY: Primary | ICD-10-CM

## 2023-01-04 DIAGNOSIS — G89.29 CHRONIC PAIN OF LEFT KNEE: Primary | ICD-10-CM

## 2023-01-04 DIAGNOSIS — M25.562 CHRONIC PAIN OF LEFT KNEE: Primary | ICD-10-CM

## 2023-01-04 DIAGNOSIS — G89.29 CHRONIC KNEE PAIN, UNSPECIFIED LATERALITY: ICD-10-CM

## 2023-01-04 DIAGNOSIS — M47.812 CERVICAL SPONDYLOSIS: ICD-10-CM

## 2023-01-04 DIAGNOSIS — M25.569 CHRONIC KNEE PAIN, UNSPECIFIED LATERALITY: ICD-10-CM

## 2023-01-04 PROCEDURE — 97110 THERAPEUTIC EXERCISES: CPT | Mod: HCNC

## 2023-01-04 PROCEDURE — 97162 PT EVAL MOD COMPLEX 30 MIN: CPT | Mod: HCNC

## 2023-01-04 NOTE — PLAN OF CARE
"[facility]   Physical Therapy Initial Evaluation     Date: 1/4/2023   Name: Emerson Menendez  Clinic Number: [id number]    Therapy Diagnosis:   Encounter Diagnoses   Name Primary?    Cervical spondylosis     Myofascial pain     Chronic knee pain, unspecified laterality     Chronic pain of left knee Yes     Physician: Norma Mckoy,*    Physician Orders: PT Eval and Treat  Medical Diagnosis from Referral: M25.569,G89.29 (ICD-10-CM) - Chronic knee pain, unspecified laterality  Evaluation Date: 1/4/2023  Authorization Period Expiration: 01/04/2024  Plan of Care Expiration: 4/4/2023  Progress Note Due: 2/4/2023  Visit # / Visits authorized: 1/ 1   FOTO: NT     Precautions: Standard and Diabetes    Time In: 100  Time Out: 200  Total Appointment Time (timed & untimed codes): 60 minutes      SUBJECTIVE   Date of onset: Over 3 years    History of current condition - Emerson reports: Pt is a 77 y.o. y.o male  presenting with c.o L knee pain. Pt states he's been having this pain for a long time but has been putting it off since he wanted to avoid surgery. At this point, pt states that his L knee is making it more difficult for him to perform his job as an .       Falls: None noted    Imaging: See chart    Prior Therapy: Yes, for neck  Social History: Lives in a condo lives alone  Occupation:   Prior Level of Function: No limitations  Current Level of Function: limited with stair navigation and prolonged walking    Pain:  Current 7/10, worst 10/10, best 4/10   Location: left knee    Description: Aching and Throbbing  Aggravating Factors: Walking and stair navigation  Easing Factors: ice    Patients goals: "I want to get strong so I can have an easier rehab if I get my knee replaced"     Medical History:   Past Medical History:   Diagnosis Date    Anxiety 3/16/2015    Asymptomatic multiple myeloma     BPH (benign prostatic hypertrophy) 9/24/2012    Carpal tunnel " syndrome     Depression 3/16/2015    Diabetic retinopathy     GERD (gastroesophageal reflux disease) 9/24/2012    Glaucoma     Hx of psychiatric care     Celexa, Valium    Hyperlipidemia     Hypertension     Hypertensive retinopathy of both eyes     Microalbuminuria 9/24/2012    Osteoarthritis of cervical spine 9/24/2012    Osteoarthritis of knee 9/24/2012    Posterior capsular opacification, right 9/15/2022    Psychiatric problem     Type II or unspecified type diabetes mellitus without mention of complication, not stated as uncontrolled 9/24/2012       Surgical History:   Emerson Menendez  has a past surgical history that includes Cataract extraction (2012); Prostate surgery; Carpal tunnel release (Left, 8/3/2020); Arthroscopy of wrist (Left, 8/3/2020); Surgical removal of carpal bone (Left, 1/6/2021); Phacoemulsification of cataract (Left, 5/20/2021); Intraocular prosthesis insertion (Left, 5/20/2021); and Trigger finger release (Right, 7/29/2022).    Medications:   Emerson has a current medication list which includes the following prescription(s): accu-chek elie control soln, alcohol swabs, aspirin, azelastine, blood glucose control, normal, accu-chek elie plus test strp, blood sugar diagnostic, blood-glucose meter, blood-glucose meter, citalopram, diazepam, dorzolamide-timolol 2-0.5%, dorzolamide-timolol 2-0.5%, ergocalciferol, eylea, farxiga, fexofenadine, freestyle henrry 14 day reader, freestyle henrry 14 day sensor, fluticasone propionate, gabapentin, glimepiride, glucose, insulin, insulin aspart u-100, insulin aspart u-100, lancets, levocetirizine, losartan, monovisc, omeprazole, pen needle, diabetic, pravastatin, propofol, sildenafil, tamsulosin, triamcinolone acetonide, and trueplus lancets, and the following Facility-Administered Medications: mupirocin.    Allergies:   Review of patient's allergies indicates:   Allergen Reactions    Penicillins      Knees locked up          OBJECTIVE       Observation:  Pt amb to clinic ind    Posture: FHP, rounded shoulders    Gait: Pt presents with antalgic gait, favoring R side      Range of Motion:     Knee Right active Right Passive Left Active Left passive Goal   Flexion 130 140 120 125 135 deg.   Extension 0 0 Missing 5 Missing 2 0 deg.       Lower Extremity Strength:    Right LE  Left LE  Goal   Knee extension: 5/5 Knee extension: 5/5 5/5   Knee flexion: 5/5 Knee flexion: 5/5 5/5   Hip flexion: 5/5 Hip flexion: 5/5 5/5   Hip extension:  4/5 Hip extension: 4/5 5/5   Hip abduction: 3+/5 Hip abduction: 3+/5 5/5   Hip adduction: 5/5 Hip adduction 5/5 5/5   Ankle dorsiflexion: 5/5 Ankle dorsiflexion: 5/5 5/5   Ankle plantarflexion: 5/5 Ankle plantarflexion: 5/5 5/5     Ligamentous Pathology   Test Right Left   Anterior Drawer Negative Negative   Lachman's Negative Negative   Lateral Collateral Stress Negative Negative   Medial Collateral Stress Negative Negative   Posterior Drawer Negative Negative   Sag Sign Negative Negative   Apley's Distraction Negative Negative     Function:    - Squat: Knee biased   - Single Leg Squat: NT  - Single Leg Step Down Test: NT      Joint Mobility: Hypomobility noted at L tibiofemoral joint in both ANTEROPOSTERIOR and POSTEROANTERIOR directions. Hypomobility noted at L patellofemoral joint superiorly    Palpation: TTP at inferior pole of patella    Sensation: intact    Flexibility: Decreased quad flexiblity    Edema: Edema noted around L tibiofemoral joint (circumference not measured)      Limitation/Restriction for FOTO Knee Survey    Therapist reviewed FOTO scores for Emerson Hugo Menendez on 1/4/2023.   FOTO documents entered into [employer] - see Media section.    Limitation Score: NT%         TREATMENT     Total Treatment time (time-based codes) separate from Evaluation: 30 minutes     THERAPEUTIC EXERCISES to develop  strength, endurance, ROM, and flexibility for 30 minutes including.    Intervention    Performed Today Sets/Reps/Resistance      Squats     Long Arc Quad     Side Lying Clamshells     Sit to stands       Plan for Next Visit: L LE strengthening and mobility     PATIENT EDUCATION AND HOME EXERCISES   Education provided:   Role of Physical Therapist  Physical Therapy Plan Of Care  HEP  Process required to justify need for knee replacement  Importance of maintaining muscle strength and knee mobility      Written Home Exercises Provided: yes.  Exercises were reviewed and Emerson was able to demonstrate them prior to the end of the session.  Emerson demonstrated good  understanding of the education provided.     See EMR under Patient Instructions for exercises provided 1/4/2023    ASSESSMENT     Emerson is a 77 y.o. male referred to outpatient Physical Therapy with a medical diagnosis of M25.569,G89.29 (ICD-10-CM) - Chronic knee pain, unspecified laterality. Patient presents with signs and symptoms consistent with a physical therapy diagnosis of L knee stiffness and weakness including the above listed objective test and measures. During today's session patient demonstrated decreased glute medius strength, loss of terminal knee extension, and fear avoidant movement patterns secondary to pain.    Patient prognosis is Fair.   Patientt will benefit from skilled outpatient Physical Therapy to address the deficits stated above and in the chart below, provide patient /family education, and to maximize patientt's level of independence.     Plan of care discussed with patient: Yes  Patient's spiritual, cultural and educational needs considered and patient is agreeable to the plan of care and goals as stated below:     Anticipated Barriers for therapy: chronicity of symptoms, pt compliance    Medical Necessity is demonstrated by the following  History  Co-morbidities and personal factors that may impact the plan of care Co-morbidities:   Anxiety    Asymptomatic multiple myeloma    BPH (benign prostatic hypertrophy)    Carpal tunnel syndrome    Depression     Diabetic retinopathy    GERD (gastroesophageal reflux disease)    Glaucoma    Hx of psychiatric care    Celexa, Valium   Hyperlipidemia    Hypertension    Hypertensive retinopathy of both eyes    Microalbuminuria    Osteoarthritis of cervical spine    Osteoarthritis of knee    Posterior capsular opacification, right    Psychiatric problem    Type II or unspecified type diabetes mellitus without mention of complication, not stated as uncontrolled        Personal Factors:   no deficits     moderate   Examination  Body Structures and Functions, activity limitations and participation restrictions that may impact the plan of care Body Regions:   lower extremities    Body Systems:    gross symmetry  ROM  strength  motor control    Participation Restrictions:   None    Activity limitations:   Learning and applying knowledge  no deficits    General Tasks and Commands  no deficits    Communication  no deficits    Mobility  walking    Self care  no deficits    Domestic Life  shopping  doing house work (cleaning house, washing dishes, laundry)    Interactions/Relationships  no deficits    Life Areas  no deficits    Community and Social Life  no deficits         moderate   Clinical Presentation evolving clinical presentation with changing clinical characteristics moderate   Decision Making/ Complexity Score: moderate     Goals:    SHORT TERM GOALS:  4 weeks 1/4/2023   Recent signs and systems trend is improving in order to progress towards LTG's.    Patient will be independent with HEP in order to further progress and return to maximal function.    Pain rating at Worst: 5/10 in order to progress towards increased independence with activity.    Patient will be able to correct postural deviations in sitting and standing, to decrease pain and promote postural awareness for injury prevention.       LONG TERM GOALS: 10 weeks 1/4/2023   Patient will return to normal ADL, recreational, and work related activities with less pain and  limitation.     Patient will improve AROM to stated goals in order to return to maximal functional potential.     Patient will improve Strength to stated goals of appropriate musculature in order to improve functional independence.     Pain Rating at Best: 1/10 to improve Quality of Life.     Patient will have met/partially met personal goal of: Returning to work with max pain 3/10 in order to demonstrate decrease in pain with activity and improvement with functional mobility.        PLAN   Plan of care Certification: 1/4/2023 to 4/4/2023.    Outpatient Physical Therapy 2 times weekly for 8 weeks to include the following interventions: Gait Training, Manual Therapy, Moist Heat/ Ice, Neuromuscular Re-ed, Therapeutic Activities, and Therapeutic Exercise.    Given pt's lack of compliance with previous PT sessions, pt to schedule 1 f/u appointment at which point if pt attends, PT to extend visits for full 8 weeks.    Hira Perdue, PT, DPT      I CERTIFY THE NEED FOR THESE SERVICES FURNISHED UNDER THIS PLAN OF TREATMENT AND WHILE UNDER MY CARE   Physician's comments:     Physician's Signature: ___________________________________________________

## 2023-01-04 NOTE — PROGRESS NOTES
"  [facility] - PHYSICAL THERAPY EVALUATION     Name: Emerson Menendez  Clinic Number: [id number]    Therapy Diagnosis:   Encounter Diagnoses   Name Primary?    Cervical spondylosis     Myofascial pain      Physician: Norma Mckoy,*    Physician Orders: {AMB PT KNEE ORDERS:15164} ***  Medical Diagnosis from Referral: ***  Evaluation Date: 1/4/2023  Authorization Period Expiration: ***  Plan of Care Expiration: ***  Reassessment Due: ***  Visit # / Visits authorized: ***/ ***    Time In: ***  Time Out: ***  Total Billable Time: *** minutes  Insurance: {Insurance type:65611}      Precautions: {IP WOUND PRECAUTIONS OHS:37296}    Pattern of pain determined: ***      Subjective   Date of onset: ***  History of current condition - Emerson reports: Pt is a 77 y.o. y.o male  presenting with c.o neck pain. Pt states that he was here previously for his neck and when he attempted the baseline isometric tests, he felt a "pop" which gave him pain for 6 months. He states, "they told me to push as hard as I possibly could and when I did I felt a pop."    Pt states that he then soon after sought neck and low back treatment from a chiropractor which gave him some relief.     Medical History:   Past Medical History:   Diagnosis Date    Anxiety 3/16/2015    Asymptomatic multiple myeloma     BPH (benign prostatic hypertrophy) 9/24/2012    Carpal tunnel syndrome     Depression 3/16/2015    Diabetic retinopathy     GERD (gastroesophageal reflux disease) 9/24/2012    Glaucoma     Hx of psychiatric care     Celexa, Valium    Hyperlipidemia     Hypertension     Hypertensive retinopathy of both eyes     Microalbuminuria 9/24/2012    Osteoarthritis of cervical spine 9/24/2012    Osteoarthritis of knee 9/24/2012    Posterior capsular opacification, right 9/15/2022    Psychiatric problem     Type II or unspecified type diabetes mellitus without mention of complication, not stated as uncontrolled 9/24/2012       Surgical " History:   Emerson Menendez  has a past surgical history that includes Cataract extraction (2012); Prostate surgery; Carpal tunnel release (Left, 8/3/2020); Arthroscopy of wrist (Left, 8/3/2020); Surgical removal of carpal bone (Left, 1/6/2021); Phacoemulsification of cataract (Left, 5/20/2021); Intraocular prosthesis insertion (Left, 5/20/2021); and Trigger finger release (Right, 7/29/2022).    Medications:   Emerson has a current medication list which includes the following prescription(s): accu-chek elie control soln, alcohol swabs, aspirin, azelastine, blood glucose control, normal, accu-chek elie plus test strp, blood sugar diagnostic, blood-glucose meter, blood-glucose meter, citalopram, diazepam, dorzolamide-timolol 2-0.5%, dorzolamide-timolol 2-0.5%, ergocalciferol, eylea, farxiga, fexofenadine, freestyle henrry 14 day reader, freestyle henrry 14 day sensor, fluticasone propionate, gabapentin, glimepiride, glucose, insulin, insulin aspart u-100, insulin aspart u-100, lancets, levocetirizine, losartan, monovisc, omeprazole, pen needle, diabetic, pravastatin, propofol, sildenafil, tamsulosin, triamcinolone acetonide, and trueplus lancets, and the following Facility-Administered Medications: mupirocin.    Allergies:   Review of patient's allergies indicates:   Allergen Reactions    Penicillins      Knees locked up        Imaging, {Mri/ctscan/bone scan:27185}: ***    Prior Therapy: ***  Prior Treatment: ***  Social History: *** {LIVES WITH:52576}  Occupation: ***  Leisure: ***      Prior Level of Function: ***  Current Level of Function: ***  DME owned/used: ***        Pain:  Current 10/10, worst 10/10, best 4/10   Location: left knee   Description: Aching and Throbbing  Aggravating Factors: Stair navigation, walking long distance, cold weather  Easing Factors: ice and voltaren gel  Disturbed Sleep: Yes        Pattern of pain questions:  1.  Where is your pain the worst? L knee  2.  Is your pain constant or  intermittent? ***  3.  Does bending forward make your typical pain worse? ***  4.  Since the start of your neck pain, has there been a change in your bowel or bladder? ***  5.  What can't you do now that you use to be able to do? ***      Pts goals: ***      Red Flag Screening:   Cough  Sneeze  Strain: {+ OR -:56993}  Bladder/ bowel: {+ OR -:15958}  Falls: {+ OR -:07025}  Night pain: {+ OR -:63697}  Unexplained weight loss: {+ OR -:60475}  General health: ***    OBJECTIVE   Postural examination/scapula alignment: {AMB OT NEURO POSTURAL EXAM:00349}  Joint integrity: {AMB OT NEURO JOINT INTEGRITY:19634}  Skin integrity: {AMB OT NEURO SKIN INTEGRITY:36620}  Edema: {AMB OT NEURO EDEMA:45425}  Sitting: ***  Standing: ***  Correction of posture: {Correction of Posture:81413}    Range of Motion - MOVEMENT LOSS    ROM Loss   Flexion {Movement Loss:63259}   Extension {Movement Loss:68849}   Side bending Right {Movement Loss:66273}   Side bending Left {Movement Loss:20447}   Rotation Right {Movement Loss:05273}   Rotation Left {Movement Loss:36893}   Protraction {Movement Loss:44581}   Retraction  {Movement Loss:95063}       Upper Extremity Strength  (R) UE  (L) UE    Shoulder flexion: {AMB PT VESTIBULAR STRENGTH:15135} Shoulder flexion: {AMB PT VESTIBULAR STRENGTH:60773}   Shoulder Abduction: {AMB PT VESTIBULAR STRENGTH:53326} Shoulder abduction: {AMB PT VESTIBULAR STRENGTH:84835}   Elbow flexion: {AMB PT VESTIBULAR STRENGTH:33884} Elbow flexion: {AMB PT VESTIBULAR STRENGTH:50035}   Elbow extension: {AMB PT VESTIBULAR STRENGTH:42019} Elbow extension: {AMB PT VESTIBULAR STRENGTH:05310}   Wrist flexion: {AMB PT VESTIBULAR STRENGTH:71570} Wrist flexion: {AMB PT VESTIBULAR STRENGTH:42183}   Wrist extension: {AMB PT VESTIBULAR STRENGTH:02847} Wrist extension: {AMB PT VESTIBULAR STRENGTH:06464}    {AMB PT VESTIBULAR STRENGTH:85840} : {AMB PT VESTIBULAR STRENGTH:24461}     NEUROLOGICAL SCREEN    Sensory deficit:  ***    Special Tests:   Test Name  Testing Result   Compression {+ OR -:28890}   Distraction {+ OR -:43098}   Neural Tension Test {+ OR -:43573}   Saddle Sensation {+ OR -:18498}     Reflexes:    Left Right   Biceps  {AMB PT KNEE DTR'S:70959} {AMB PT KNEE DTR'S:81761}   Brachioradialis  {AMB PT KNEE DTR'S:70531} {AMB PT KNEE DTR'S:62121}   Clonus {+ OR -:27511} {+ OR -:44614}   Babinski {+ OR -:06618} {+ OR -:54024}       REPEATED TEST MOVEMENTS:   Repeated Protraction in Sitting {Repeated Test Movements:18318}   Repeated Flexion in Sitting {Repeated Test Movements:49342}   Repeated Retraction in Sitting  {Repeated Test Movements:13600}   Repeated Retraction Extension in Sitting {Repeated Test Movements:46736}   Repeated Protraction in lying {Repeated Test Movements:12850}   Repeated Flexion in lying {Repeated Test Movements:25796}   Repeated Retraction in lying {Repeated Test Movements:50603}   Repeated Retraction Extension in lying {Repeated Test Movements:64574}       Baseline Isometric Testing on Med X equipment:  Testing administered by PT  Date of testing: ***  ROM *** deg   Max Peak Torque ***    Min Peak Torque ***    Flex/Ext Ratio ***   % below normative data ***       GAIT:  Assistive Device used: {AMB PT KNEE GAIT ASSISTIVE DEVICE:98684}  Level of Assistance: {AMB PT KNEE LEVEL OF ASSISTANCE:63416}  Patient displays the following gait deviations:  {AMB PT KNEE DISPLAYS:04946}.         Limitation/Restriction for FOTO *** Survey    Therapist reviewed FOTO scores for Emerson Menendez on 1/4/2023.   FOTO documents entered into EPIC - see Media section.    Limitation Score: ***%             Treatment   Treatment Time In: ***  Treatment Time Out: ***  Total Treatment time separate from Evaluation: *** minutes    Emerson received therapeutic exercises to develop/improve posture, lumbar/cervical ROM, strength and muscular endurance for *** minutes including the following exercises:   Med x dynamic exercise and  "baseline IM testing    Emerson received the following manual therapy techniques: {AMB PT PROGRESS MANUAL THERAPY:42660} were applied to the: *** for *** minutes.         Written Home Exercises Provided: {Blank single:11589::"yes","Patient instructed to cont prior HEP"}.  Exercises were reviewed and Emerson was able to demonstrate them prior to the end of the session.  Emerson demonstrated {Desc; good/fair/poor:80414} understanding of the education provided.     See EMR under {Blank single:08139::"Media","Patient Instructions"} for exercises provided {Blank single:34758::"1/4/2023","prior visit"}.      Education provided:   - Patient received education regarding proper posture and body mechanics.  Patient was given top [facility] Visit 1 handouts which discuss what to expect in therapy, the purpose and opportunity for health coaching, the program,  wellness when discharged from therapy, back education and care specifically for posture seated, standing, lifting correctly, components of exercise, importance of nutrition and hydration, and importance of sleep.   Information on lumbar rolls provided.  Patient received education regarding proper posture and body mechanics.  - Job roll tried, recommended, and purchase information was provided.    - Patient received a handout regarding anticipated muscular soreness following the isometric test and strategies for management were reviewed with patient including stretching, using ice and scheduled rest.   - Patient received education on the [facility] program, purpose of the isometric test, progression of neck strengthening as well as wellness approach and systemic strengthening.  Details of the program were discussed.  Reviewed that patient should feel support/pressure from med ex restraints but no pain or discomfort and patient expressed understanding.  Med x dynamic exercise and baseline IM test performed with instructions to guide the patient safely through the " isometric testing.  Patient informed to perform isometric test correctly, and safely, building best force they safely can and not pushing through pain.  Patient demonstrated understanding of information        Emerson received cold pack for *** minutes to ***.    Assessment   Emerson is a 77 y.o. male referred to [facility] with a medical diagnosis of ***. Pt presents with ***    Pain Pattern: ***       Pt prognosis is {REHAB PROGNOSIS OHS:07752}.   Pt will benefit from skilled outpatient Physical Therapy to address the deficits stated above and in the chart below, provide pt/family education, and to maximize pt's level of independence.     Plan of care discussed with patient: {YES:49869}  Pt's spiritual, cultural and educational needs considered and patient is agreeable to the plan of care and goals as stated below:     Anticipated Barriers for therapy: ***    PT Evaluation Completed? {YES:48808}    Medical necessity is demonstrated by the following problem list.    Pt presents with the following impairments:     History  Co-morbidities and personal factors that may impact the plan of care Co-morbidities:   {Co-morbidities:83496}    Personal Factors:   {Personal Factors:88926}     {Desc; low/moderate/high:682557}   Examination  Body Structures and Functions, activity limitations and participation restrictions that may impact the plan of care Body Regions:   {Body Regions:63641}    Body Systems:    {Body Systems:55954}    Participation Restrictions:   ***    Activity limitations:   Learning and applying knowledge  {Learning and applying knowledge:04111}    General Tasks and Commands  {Gen tasks and commands:70165}    Communication  {Communication:12515}    Mobility  {Mobility:96366}    Self care  {Self Care:78320}    Domestic Life  {Domestic Life:37294}    Interactions/Relationships  {Interactions/Relationships:00106}    Life Areas  {Life Areas:39762}    Community and Social Life  {Community/Social  Life:35642}         {Desc; low/moderate/high:264121}   Clinical Presentation {Clinical Presentation :08007} {Desc; low/moderate/high:868194}   Decision Making/ Complexity Score: {Desc; low/moderate/high:158475}       GOALS: Pt is in agreement with the following goals.    Short term goals: 6 weeks or 10 visits   1.  Pt will demonstratte increased cervical ROM as measured by med ex by *** degrees from initial test which results in improved  ROM of neck for ease with ADLs and driving  (approp and ongoing)  2. Pt will demonstrate independence with reducing or controlling symptoms with ther ex, movement, or position independently, able to reduce pain 1-2 points on pain scale using strategies taught in therapy  (approp and ongoing)  3. Pt will demonstrate increased MedX average isometric strength value by ***% with  when compared to the initial testing resulting in improved ability to perform bending, lifting, and carrying activities safely, confidently.  (approp and ongoing)        Long term goals: 10 weeks or 20 visits  1. Pt will demonstratte increased cervical ROM as measured by med ex by *** degrees from initial test which results in functional ROM of neck for ease with ADLs and driving  (approp and ongoing)  2. Pt will demonstrate increased MedX average isometric strength value  by ***% from initial test to improve ability to lift and carry, and sustain good posture while performing ADL's  (approp and ongoing)  3.Pt will demonstrate reduced pain and improved functional outcomes as reported on the {outcome measures:11449}   (approp and ongoing)  4. Pt will demonstrate independence with reducing or controlling symptoms with ther ex, movement, or position independently, able to reduce pain 2-4 points on pain scale using strategies taught in therapy  (approp and ongoing)  5. Pt will demonstrate independence with the HEP at discharge.   (approp and ongoing)  6.  ***(patient goal)***  (approp and ongoing)    Plan  "  Outpatient physical therapy 2x week for 10 weeks or 20 visits to include the following:   - Patient education  - Therapeutic exercise  - Manual therapy  - Performance testing   - Neuromuscular Re-education  - Therapeutic activity   - Modalities    Pt may be seen by PTA as part of the rehabilitation team.     Therapist: Hira Perdue, PT  1/4/2023      "I certify the need for these services furnished under this plan of treatment and while under my care."    ____________________________________  Physician/Referring Practitioner    _______________  Date of Signature        "

## 2023-01-09 ENCOUNTER — PATIENT MESSAGE (OUTPATIENT)
Dept: INTERNAL MEDICINE | Facility: CLINIC | Age: 78
End: 2023-01-09
Payer: MEDICARE

## 2023-01-09 DIAGNOSIS — M17.10 PRIMARY OSTEOARTHRITIS OF KNEE, UNSPECIFIED LATERALITY: Primary | ICD-10-CM

## 2023-01-11 ENCOUNTER — TELEPHONE (OUTPATIENT)
Dept: HEMATOLOGY/ONCOLOGY | Facility: CLINIC | Age: 78
End: 2023-01-11
Payer: MEDICARE

## 2023-01-12 ENCOUNTER — RESEARCH ENCOUNTER (OUTPATIENT)
Dept: HEMATOLOGY/ONCOLOGY | Facility: CLINIC | Age: 78
End: 2023-01-12

## 2023-01-12 ENCOUNTER — LAB VISIT (OUTPATIENT)
Dept: LAB | Facility: HOSPITAL | Age: 78
End: 2023-01-12
Attending: INTERNAL MEDICINE
Payer: MEDICARE

## 2023-01-12 ENCOUNTER — OFFICE VISIT (OUTPATIENT)
Dept: HEMATOLOGY/ONCOLOGY | Facility: CLINIC | Age: 78
End: 2023-01-12
Payer: MEDICARE

## 2023-01-12 VITALS
RESPIRATION RATE: 20 BRPM | BODY MASS INDEX: 30.17 KG/M2 | DIASTOLIC BLOOD PRESSURE: 87 MMHG | TEMPERATURE: 98 F | HEIGHT: 70 IN | OXYGEN SATURATION: 98 % | HEART RATE: 84 BPM | WEIGHT: 210.75 LBS | SYSTOLIC BLOOD PRESSURE: 177 MMHG

## 2023-01-12 DIAGNOSIS — E11.42 TYPE 2 DIABETES MELLITUS WITH DIABETIC POLYNEUROPATHY, WITH LONG-TERM CURRENT USE OF INSULIN: ICD-10-CM

## 2023-01-12 DIAGNOSIS — D47.2 SMOLDERING MULTIPLE MYELOMA: ICD-10-CM

## 2023-01-12 DIAGNOSIS — D47.2 SMOLDERING MULTIPLE MYELOMA: Primary | ICD-10-CM

## 2023-01-12 DIAGNOSIS — E11.22 CKD STAGE 3 DUE TO TYPE 2 DIABETES MELLITUS: ICD-10-CM

## 2023-01-12 DIAGNOSIS — Z00.6 RESEARCH EXAM: ICD-10-CM

## 2023-01-12 DIAGNOSIS — N18.30 CKD STAGE 3 DUE TO TYPE 2 DIABETES MELLITUS: ICD-10-CM

## 2023-01-12 DIAGNOSIS — Z79.4 TYPE 2 DIABETES MELLITUS WITH DIABETIC POLYNEUROPATHY, WITH LONG-TERM CURRENT USE OF INSULIN: ICD-10-CM

## 2023-01-12 LAB
ALBUMIN SERPL BCP-MCNC: 3.6 G/DL (ref 3.5–5.2)
ALBUMIN SERPL ELPH-MCNC: 3.98 G/DL (ref 3.35–5.55)
ALP SERPL-CCNC: 60 U/L (ref 55–135)
ALPHA1 GLOB SERPL ELPH-MCNC: 0.27 G/DL (ref 0.17–0.41)
ALPHA2 GLOB SERPL ELPH-MCNC: 0.85 G/DL (ref 0.43–0.99)
ALT SERPL W/O P-5'-P-CCNC: 15 U/L (ref 10–44)
ANION GAP SERPL CALC-SCNC: 10 MMOL/L (ref 8–16)
AST SERPL-CCNC: 18 U/L (ref 10–40)
B-GLOBULIN SERPL ELPH-MCNC: 2.66 G/DL (ref 0.5–1.1)
BASOPHILS # BLD AUTO: 0.05 K/UL (ref 0–0.2)
BASOPHILS NFR BLD: 1.1 % (ref 0–1.9)
BILIRUB SERPL-MCNC: 0.4 MG/DL (ref 0.1–1)
BUN SERPL-MCNC: 18 MG/DL (ref 8–23)
CALCIUM SERPL-MCNC: 9.4 MG/DL (ref 8.7–10.5)
CHLORIDE SERPL-SCNC: 102 MMOL/L (ref 95–110)
CO2 SERPL-SCNC: 24 MMOL/L (ref 23–29)
CREAT SERPL-MCNC: 1.9 MG/DL (ref 0.5–1.4)
DIFFERENTIAL METHOD: ABNORMAL
EOSINOPHIL # BLD AUTO: 0.2 K/UL (ref 0–0.5)
EOSINOPHIL NFR BLD: 4.7 % (ref 0–8)
ERYTHROCYTE [DISTWIDTH] IN BLOOD BY AUTOMATED COUNT: 14.6 % (ref 11.5–14.5)
EST. GFR  (NO RACE VARIABLE): 36.1 ML/MIN/1.73 M^2
GAMMA GLOB SERPL ELPH-MCNC: 0.54 G/DL (ref 0.67–1.58)
GLUCOSE SERPL-MCNC: 135 MG/DL (ref 70–110)
HCT VFR BLD AUTO: 35.6 % (ref 40–54)
HGB BLD-MCNC: 11.1 G/DL (ref 14–18)
IGA SERPL-MCNC: 1764 MG/DL (ref 40–350)
IGG SERPL-MCNC: 1019 MG/DL (ref 650–1600)
IGM SERPL-MCNC: 33 MG/DL (ref 50–300)
IMM GRANULOCYTES # BLD AUTO: 0.01 K/UL (ref 0–0.04)
IMM GRANULOCYTES NFR BLD AUTO: 0.2 % (ref 0–0.5)
INTERPRETATION SERPL IFE-IMP: NORMAL
KAPPA LC SER QL IA: 7.93 MG/DL (ref 0.33–1.94)
KAPPA LC/LAMBDA SER IA: 3.64 (ref 0.26–1.65)
LAMBDA LC SER QL IA: 2.18 MG/DL (ref 0.57–2.63)
LDH SERPL L TO P-CCNC: 113 U/L (ref 110–260)
LYMPHOCYTES # BLD AUTO: 1.7 K/UL (ref 1–4.8)
LYMPHOCYTES NFR BLD: 39 % (ref 18–48)
MAGNESIUM SERPL-MCNC: 1.9 MG/DL (ref 1.6–2.6)
MCH RBC QN AUTO: 27.7 PG (ref 27–31)
MCHC RBC AUTO-ENTMCNC: 31.2 G/DL (ref 32–36)
MCV RBC AUTO: 89 FL (ref 82–98)
MONOCYTES # BLD AUTO: 0.3 K/UL (ref 0.3–1)
MONOCYTES NFR BLD: 6.8 % (ref 4–15)
NEUTROPHILS # BLD AUTO: 2.1 K/UL (ref 1.8–7.7)
NEUTROPHILS NFR BLD: 48.2 % (ref 38–73)
NRBC BLD-RTO: 0 /100 WBC
PATHOLOGIST INTERPRETATION IFE: NORMAL
PATHOLOGIST INTERPRETATION SPE: NORMAL
PHOSPHATE SERPL-MCNC: 3.3 MG/DL (ref 2.7–4.5)
PLATELET # BLD AUTO: 282 K/UL (ref 150–450)
PMV BLD AUTO: 9.1 FL (ref 9.2–12.9)
POTASSIUM SERPL-SCNC: 4.7 MMOL/L (ref 3.5–5.1)
PROT SERPL-MCNC: 8.3 G/DL (ref 6–8.4)
PROT SERPL-MCNC: 8.7 G/DL (ref 6–8.4)
RBC # BLD AUTO: 4.01 M/UL (ref 4.6–6.2)
SODIUM SERPL-SCNC: 136 MMOL/L (ref 136–145)
WBC # BLD AUTO: 4.44 K/UL (ref 3.9–12.7)

## 2023-01-12 PROCEDURE — 83735 ASSAY OF MAGNESIUM: CPT | Mod: HCNC | Performed by: INTERNAL MEDICINE

## 2023-01-12 PROCEDURE — 86334 IMMUNOFIX E-PHORESIS SERUM: CPT | Mod: HCNC | Performed by: INTERNAL MEDICINE

## 2023-01-12 PROCEDURE — 85025 COMPLETE CBC W/AUTO DIFF WBC: CPT | Mod: HCNC,Q1 | Performed by: INTERNAL MEDICINE

## 2023-01-12 PROCEDURE — 84100 ASSAY OF PHOSPHORUS: CPT | Mod: HCNC | Performed by: INTERNAL MEDICINE

## 2023-01-12 PROCEDURE — 84165 PATHOLOGIST INTERPRETATION SPE: ICD-10-PCS | Mod: 26,HCNC,Q1, | Performed by: PATHOLOGY

## 2023-01-12 PROCEDURE — 99499 RISK ADDL DX/OHS AUDIT: ICD-10-PCS | Mod: HCNC,S$GLB,, | Performed by: INTERNAL MEDICINE

## 2023-01-12 PROCEDURE — 83521 IG LIGHT CHAINS FREE EACH: CPT | Mod: 59,HCNC,Q1 | Performed by: INTERNAL MEDICINE

## 2023-01-12 PROCEDURE — 99999 PR PBB SHADOW E&M-EST. PATIENT-LVL V: ICD-10-PCS | Mod: PBBFAC,HCNC,, | Performed by: INTERNAL MEDICINE

## 2023-01-12 PROCEDURE — 99999 PR PBB SHADOW E&M-EST. PATIENT-LVL V: CPT | Mod: PBBFAC,HCNC,, | Performed by: INTERNAL MEDICINE

## 2023-01-12 PROCEDURE — 84165 PROTEIN E-PHORESIS SERUM: CPT | Mod: 26,HCNC,Q1, | Performed by: PATHOLOGY

## 2023-01-12 PROCEDURE — 84165 PROTEIN E-PHORESIS SERUM: CPT | Mod: HCNC,Q1 | Performed by: INTERNAL MEDICINE

## 2023-01-12 PROCEDURE — 99214 PR OFFICE/OUTPT VISIT, EST, LEVL IV, 30-39 MIN: ICD-10-PCS | Mod: HCNC,Q1,S$GLB, | Performed by: INTERNAL MEDICINE

## 2023-01-12 PROCEDURE — 36415 COLL VENOUS BLD VENIPUNCTURE: CPT | Mod: HCNC | Performed by: INTERNAL MEDICINE

## 2023-01-12 PROCEDURE — 82784 ASSAY IGA/IGD/IGG/IGM EACH: CPT | Mod: HCNC,Q1 | Performed by: INTERNAL MEDICINE

## 2023-01-12 PROCEDURE — 99214 OFFICE O/P EST MOD 30 MIN: CPT | Mod: HCNC,Q1,S$GLB, | Performed by: INTERNAL MEDICINE

## 2023-01-12 PROCEDURE — 99499 UNLISTED E&M SERVICE: CPT | Mod: HCNC,S$GLB,, | Performed by: INTERNAL MEDICINE

## 2023-01-12 PROCEDURE — 83615 LACTATE (LD) (LDH) ENZYME: CPT | Mod: HCNC | Performed by: INTERNAL MEDICINE

## 2023-01-12 PROCEDURE — 80053 COMPREHEN METABOLIC PANEL: CPT | Mod: HCNC | Performed by: INTERNAL MEDICINE

## 2023-01-12 PROCEDURE — 86334 IMMUNOFIX E-PHORESIS SERUM: CPT | Mod: 26,HCNC,Q1, | Performed by: PATHOLOGY

## 2023-01-12 PROCEDURE — 86334 PATHOLOGIST INTERPRETATION IFE: ICD-10-PCS | Mod: 26,HCNC,Q1, | Performed by: PATHOLOGY

## 2023-01-12 NOTE — PROGRESS NOTES
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   Sponsor:  [facility]  IRB# [id number]  Study ID: [id number]  Investigator: GIL Engel Pt Initials: LUCÍA LORENZ        Cycle 85 Day 28 Arm B: Observation      Patient presented to clinic today for above cycle evaluation per Dr Engel; he was un-accompanied and states he is doing "great."  He was alert, oriented to person, place, and time; mood and affect were appropriate. He reports overall no new complaints; states he has doubled his efforts to control his blood sugar; flowchart of prior HGB A1C results was reviewed with the patient per his request.  Discussed that he is due for a follow up A1C next month; lab linked to scheduled lab appointment per Dr. Engel. Also states he had been more conscientious about staying hydrated; he was reassured per Dr Engel that his creatinine has improved per labs today. He continues to maintain current COVID precautions; wears mask in crowded indoor areas; denies any COVID symptoms As per above, states continues to work actively; maintaining several contracts here in the [facility] and less in the [facility].  He verbalizes continued willingness to participate in above-mentioned study.        Review of Baseline AE's:   1.Hypertension, Grade 2 systolic/diastolic: BP today is 177/87; subject states compliance with Losartan and confirms his BP being elevated today continues to be situational, states it is only elevated when he presents to clinic.  Subject denies headache/dizziness; no symptoms noted. Dr. Engel aware.  AE ongoing and tends to fluctuate.   2. Lower Back Pain and Neck Pain, grade 1: Stable.  Patient states he has been going to chiropractor recently and feels relief for this symptoms; reports no complaints today. Of note: patient is not suspected to have bony myeloma involvement per Dr. Engel as these are " pre-existing issues present at baseline. AE ongoing but improved as of today's visit.   3. Pain in extremity (knees), grade 1. Subject presents with knee brace to one knee; states he forgot to wear brace to left knee and pain is a 6/10 to that area today.  He mentions that he saw an orthopedic surgeon in [place] recently but that MD does not take humana; so he has decided to have knee replacement performed here at [facility].  States he needs to make arrangements with a surgeon soon.  Will continue to monitor.       4. Peripheral sensory neuropathy, grade 1: Patient does not verbalize complaints today. Has gabapentin prescribed and takes as needed.  States has not taken recently.  AE ongoing.   5. Anemia, Grade 1: Hgb 11.0 today. Result reviewed by Dr. Engel, noted. AE ongoing and stable.            Review of AE's:     *Please note this list is not all-inclusive, please see AE log for physician reviewed list of adverse events.   1. Creatinine increased, grade 1: Serum creatinine from labs today is 1.9; calculated GFR 45 ml/min based on CG calculation. Reviewed per Dr. Engel and no new orders.Per Dr Engel this AE is not been related to myeloma: rather, attributable to CKD;  likely secondary to diabetes and hypertension vs plasma cell dyscrasia.  Will continue observation monthly and to monitor renal functionclosely. Per MD, reiterated adequate hydration with increase in water/sugar free gatorade intake..  2. Hyponatremia, Grade 1: Serum sodium today is 137 mmol/L; AE intermittent. Will monitor  3. Hyperkalemia, Grade 1: serum K today is 4.8, Dr Engel has reviewed labs; no additional orders noted. AE intermittent; will continue to monitor.   4. Hyperuricemia, Grade 1: this was not evaluated today; usually ordered per nephrology.   5. Hyperglycemia. Grade 1: blood glucose today is 88; lab is nonfasting. Subject follows with Dr Sagastume for ongoing guidance/supervision of diabetes.    6. Diplopia, right eye: Subject states  "no issues right now; follows up with ophtamology regularly for issues. Per Dr Engel this is not related to SMM; rather is associated with diabetes. Will continue to monitor.   7. Hypophosphatemia, Grade 1: serum Phosphorous is 2.6 today; slightly below LLN; will monitor. Dr Engel aware; no orders as there are no symptoms.       Per study chair, "the definition of progressive disease for this protocol is developing CRAB criteria that requires treatment systemically." Per Dr Engel, based on today's exam and lab results, patient does not have any s/s of CRAB: Normal Calcium level (9.4), absence of Renal insufficiency (serum creatinine elevated today at 1.9 but per Dr Engel not related to myeloma; --patient with a history of kidney dysfunction secondary to diabetes; Dr. Engel aware of these values and not concerned for myeloma involvement), absence of significant Anemia (hemoglobin 11.0, stable; will await myeloma labs for clarity relationship to myeloma) and absence of lytic Bone lesions (per baseline metastatic survey as well as MRI--see MD note for further comment). See MD note for ECOG score and H&P and flowsheets for laboratory work, vitals, etc. All myeloma-related blood work from last cycle including SPEP and JAGUAR have remained stable, and are currently pending for this cycle. Per Dr. Engel, patient shows no evidence of progression at last result. Patient informed that Dr. Engel will release all lab results to [facility]. He states understanding of this. QOL's not required again until end of treatment/observation.           Subject maintains he wishes only to see Dr Engel and will not show for appts if scheduled with any other provider. For this reason subject's appts do not always align with study calendar; per Dr Engel as subject is not on active treatment will abide by subject's wishes to see her only.  Will continue to schedule patient as far out as possible, which seems to help maintain compliance with visits. Normally " subject deviates no greater than one week per cycle; wishes to remain on study per Dr Engel. Next appt scheduled for 28 days from now; subjects states will maintain compliance; appts generally fall within 28-32 days.   Informed patient will continue to contact him a few days prior to next appt to remind him so that he can plan accordingly. Patient states having research RN's contact information and has MD contact information to call with any concerns, questions, or worsening of symptoms. Will follow up with subject once myeloma labs are resulted.

## 2023-01-12 NOTE — TELEPHONE ENCOUNTER
Have we gotten any paperwork to sign for the free style henrry?  If not find out from patient who we contact for the paperwork

## 2023-01-12 NOTE — PROGRESS NOTES
Subjective:    Patient ID: Emerson Menendez is a 77 y.o. male.    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 Test results identify a stable IgA kappa protein with beta globulin band at 1.63g/dL. Kappa free light chain is elevated at 4.40. CBC and calcium are stable. Creatinine with history of stage III CKD is stable at 1.4. Metastatic survey is negative for lytic lesions. MRI of the entire spine demonstrated age related changes but no convincing evidence of myeloma bone disease. Bone marrow biopsy identified about 13% plasma cells by morphology and FISH for myeloma identified a t(11;14) and trisomies 3,7, and 17. The patient is afebrile and appears clinically well. He was seen by his podiatrist and nephrologist since our last visit without any new or acute events.    The patient has not received any therapy for smoldering myeloma including bisphosphonates or steroids. He has been randomized to observation arm of the the clinical study. The patient has a history of mild, less than grade 1 peripheral neuropathy of bilateral lower extremities that is not likely hernaedz to his plasma cell dyscrasia. He has no current or prior history of malignancy.    TODAY Cycle 85 E3A06, observation cohort  No acute interval medical events  Increased knee pain today without brace  BP elevated but normal at home  CBC stable; CMP stable and MM labs pending      Knee Pain     Joint Pain  Associated symptoms include coughing. Pertinent negatives include no diaphoresis, fatigue or fever.   Hand Pain     Cough  Pertinent negatives include no fever.   Foot Pain  Associated symptoms include coughing. Pertinent negatives include no diaphoresis,  fatigue or fever.   Arm Pain     Follow-up  Associated symptoms include coughing. Pertinent negatives include no diaphoresis, fatigue or fever.   Review of Systems   Constitutional:  Negative for activity change, appetite change, diaphoresis, fatigue, fever and unexpected weight change.   HENT: Negative.     Eyes: Negative.    Respiratory:  Positive for cough.    Cardiovascular:  Negative for leg swelling.   Gastrointestinal: Negative.    Endocrine: Negative.    Genitourinary: Negative.    Musculoskeletal: Negative.    Skin: Negative.    Allergic/Immunologic: Negative.    Neurological:         Grade 0-1 peripheral neuropathy of bilateral feet.    Hematological:  Negative for adenopathy. Does not bruise/bleed easily.   Psychiatric/Behavioral: Negative.       Objective:       Vitals:    01/12/23 0742   BP: (!) 177/87   Pulse: 84   Resp: 20   Temp: 97.7 °F (36.5 °C)       Physical Exam  Vitals and nursing note reviewed.   Constitutional:       Appearance: He is well-developed.   HENT:      Head: Normocephalic and atraumatic.      Right Ear: External ear normal.      Left Ear: External ear normal.      Nose: Nose normal.   Eyes:      Conjunctiva/sclera: Conjunctivae normal.      Pupils: Pupils are equal, round, and reactive to light.   Cardiovascular:      Rate and Rhythm: Normal rate and regular rhythm.      Heart sounds: No murmur heard.  Pulmonary:      Effort: Pulmonary effort is normal. No respiratory distress.      Breath sounds: Normal breath sounds.   Abdominal:      General: Bowel sounds are normal. There is no distension.      Palpations: Abdomen is soft.   Musculoskeletal:         General: No tenderness.      Cervical back: Normal range of motion and neck supple.   Skin:     General: Skin is warm and dry.      Findings: No rash.      Nails: There is no clubbing.   Neurological:      Mental Status: He is alert and oriented to person, place, and time.      Cranial Nerves: No cranial nerve deficit.    Psychiatric:         Behavior: Behavior normal.       Assessment:       No diagnosis found.    Plan:       The patient has a diagnosis of smoldering myeloma. There is no indication for immediate chemotherapy. We are monitoring renal function closely- baseline creatinine of around 1.5. 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 M protein has been stable and repeat is pending. Plan for return in 1 month.  Endocrinology and Nephrology to monitor diabetes and renal failure respectively. Patient would like to continue to follow with Dr. Perkins as needed. Hopeful for knee replacements next year.    A total of 20 minutes was spent in pre-visit chart review, personal interpretation of labs and imaging, and medication review. Total visit time 30 minutes, >50 % counseling.

## 2023-01-12 NOTE — TELEPHONE ENCOUNTER
Spoke with pt, pt will find out who will send paper over for the free style henrry. Also, pt would like a referral for a dr here in [place] for his knee replacement..

## 2023-01-31 ENCOUNTER — PATIENT MESSAGE (OUTPATIENT)
Dept: INFECTIOUS DISEASES | Facility: CLINIC | Age: 78
End: 2023-01-31
Payer: MEDICARE

## 2023-02-01 ENCOUNTER — PATIENT MESSAGE (OUTPATIENT)
Dept: INFECTIOUS DISEASES | Facility: CLINIC | Age: 78
End: 2023-02-01
Payer: MEDICARE

## 2023-02-01 ENCOUNTER — RESEARCH ENCOUNTER (OUTPATIENT)
Dept: RESEARCH | Facility: HOSPITAL | Age: 78
End: 2023-02-01
Payer: MEDICARE

## 2023-02-01 DIAGNOSIS — D47.2 SMOLDERING MULTIPLE MYELOMA: Primary | ICD-10-CM

## 2023-02-01 DIAGNOSIS — Z00.6 EXAMINATION OF PARTICIPANT IN CLINICAL TRIAL: ICD-10-CM

## 2023-02-01 NOTE — PROGRESS NOTES
Mr. Menendez was called today regarding his participation in (IRB #[id number] PI: Marilyn).   The Verbal Informed Consent was read and discussed by the consenter. The following was discussed:  Types of specimens to be collected  All medical information released to researchers will be stripped of identifiers and no patient information will be given to anyone outside of this research project.   Participating in a research study is not the same as getting regular medical care and will not improve the patient's health. The purpose of a research study is to gather information.  Being in this study does not interfere with your regular medical care.  The patient does not have to participate in this study. If they do not join, their care at [facility] will not be affected.  The person granting permission was provided adequate time to ask questions regarding the scope and purpose of the study.  Permission was obtained by telephone.   The above statements were read by the person obtaining permission to the person granting permission and witnessed by Sybil Nino. The witness information was documented on the verbal consent form as well.  This Verbal Informed Consent process was conducted prior to initiation of any study procedures.

## 2023-02-06 DIAGNOSIS — Z00.6 RESEARCH EXAM: ICD-10-CM

## 2023-02-06 DIAGNOSIS — D47.2 SMOLDERING MULTIPLE MYELOMA: Primary | ICD-10-CM

## 2023-02-06 DIAGNOSIS — D47.2 SMOLDERING MULTIPLE MYELOMA: ICD-10-CM

## 2023-02-06 DIAGNOSIS — N52.9 ERECTILE DYSFUNCTION, UNSPECIFIED ERECTILE DYSFUNCTION TYPE: ICD-10-CM

## 2023-02-06 RX ORDER — DIAZEPAM 5 MG/1
5 TABLET ORAL EVERY 12 HOURS PRN
Qty: 60 TABLET | Refills: 0 | Status: SHIPPED | OUTPATIENT
Start: 2023-02-06 | End: 2023-05-14 | Stop reason: SDUPTHER

## 2023-02-06 NOTE — TELEPHONE ENCOUNTER
No new care gaps identified.  [facility] Embedded Care Gaps. Reference number: [id number]. 2/06/2023   2:03:01 PM CST

## 2023-02-06 NOTE — TELEPHONE ENCOUNTER
Care Due:                  Date            Visit Type   Department     Provider  --------------------------------------------------------------------------------                                EP -                              PRIMARY      [facility] INTERNAL  Last Visit: 09-      CARE ([facility])   MEDICINE       GEOVANNA ALCAZAR                              [facility]                              PRIMARY      [facility] INTERNAL  Next Visit: 02-      CARE ([facility])   MEDICINE       GEOVANNA ALCAZAR                                                            Last  Test          Frequency    Reason                     Performed    Due Date  --------------------------------------------------------------------------------    HBA1C.......  6 months...  FARXIGA, glimepiride,      11- 05-                             insulin..................    Lipid Panel.  12 months..  pravastatin..............  Not Found    Overdue    [employer] Embedded Care Gaps. Reference number: [id number]. 2/06/2023   2:01:27 PM CST

## 2023-02-07 ENCOUNTER — PATIENT MESSAGE (OUTPATIENT)
Dept: INTERNAL MEDICINE | Facility: CLINIC | Age: 78
End: 2023-02-07
Payer: MEDICARE

## 2023-02-07 ENCOUNTER — TELEPHONE (OUTPATIENT)
Dept: ORTHOPEDICS | Facility: CLINIC | Age: 78
End: 2023-02-07
Payer: MEDICARE

## 2023-02-07 DIAGNOSIS — Z00.00 ENCOUNTER FOR MEDICARE ANNUAL WELLNESS EXAM: ICD-10-CM

## 2023-02-07 RX ORDER — SILDENAFIL 100 MG/1
100 TABLET, FILM COATED ORAL DAILY PRN
Qty: 30 TABLET | Refills: 2 | Status: SHIPPED | OUTPATIENT
Start: 2023-02-07 | End: 2023-02-15 | Stop reason: SDUPTHER

## 2023-02-07 NOTE — TELEPHONE ENCOUNTER
I called and spoke to the patient. The patient is coming in to discuss surgery. The patient is experiencing pain in both knees.        ----- Message from Sharan Pierce sent at 2/7/2023  8:54 AM CST -----  Regarding: 3/9 Clinic Patient  [place],     This patient is scheduled for an appointment on 3/9. Can you please call to find out which body part they are coming in for?    Thank you!    Sincerely,   Phillip Pierce MS, OTC  OR & Clinical Assistant to Dr. Jesse Tian III  Phone: [phone]  Fax: [phone]

## 2023-02-07 NOTE — TELEPHONE ENCOUNTER
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

## 2023-02-08 ENCOUNTER — PES CALL (OUTPATIENT)
Dept: ADMINISTRATIVE | Facility: CLINIC | Age: 78
End: 2023-02-08
Payer: MEDICARE

## 2023-02-09 ENCOUNTER — LAB VISIT (OUTPATIENT)
Dept: LAB | Facility: HOSPITAL | Age: 78
End: 2023-02-09
Attending: INTERNAL MEDICINE
Payer: MEDICARE

## 2023-02-09 ENCOUNTER — OFFICE VISIT (OUTPATIENT)
Dept: HEMATOLOGY/ONCOLOGY | Facility: CLINIC | Age: 78
End: 2023-02-09
Payer: MEDICARE

## 2023-02-09 ENCOUNTER — RESEARCH ENCOUNTER (OUTPATIENT)
Dept: HEMATOLOGY/ONCOLOGY | Facility: CLINIC | Age: 78
End: 2023-02-09

## 2023-02-09 VITALS
HEIGHT: 70 IN | DIASTOLIC BLOOD PRESSURE: 73 MMHG | HEART RATE: 94 BPM | OXYGEN SATURATION: 97 % | TEMPERATURE: 98 F | WEIGHT: 208.13 LBS | RESPIRATION RATE: 18 BRPM | BODY MASS INDEX: 29.8 KG/M2 | SYSTOLIC BLOOD PRESSURE: 148 MMHG

## 2023-02-09 DIAGNOSIS — D47.2 SMOLDERING MULTIPLE MYELOMA: ICD-10-CM

## 2023-02-09 DIAGNOSIS — D47.2 SMOLDERING MULTIPLE MYELOMA: Primary | ICD-10-CM

## 2023-02-09 DIAGNOSIS — Z00.6 EXAMINATION OF PARTICIPANT IN CLINICAL TRIAL: ICD-10-CM

## 2023-02-09 DIAGNOSIS — Z79.4 TYPE 2 DIABETES MELLITUS WITH DIABETIC POLYNEUROPATHY, WITH LONG-TERM CURRENT USE OF INSULIN: Chronic | ICD-10-CM

## 2023-02-09 DIAGNOSIS — Z79.4 TYPE 2 DIABETES MELLITUS WITH DIABETIC POLYNEUROPATHY, WITH LONG-TERM CURRENT USE OF INSULIN: ICD-10-CM

## 2023-02-09 DIAGNOSIS — E11.22 CKD STAGE 3 DUE TO TYPE 2 DIABETES MELLITUS: ICD-10-CM

## 2023-02-09 DIAGNOSIS — N18.30 CKD STAGE 3 DUE TO TYPE 2 DIABETES MELLITUS: ICD-10-CM

## 2023-02-09 DIAGNOSIS — E11.42 TYPE 2 DIABETES MELLITUS WITH DIABETIC POLYNEUROPATHY, WITH LONG-TERM CURRENT USE OF INSULIN: ICD-10-CM

## 2023-02-09 DIAGNOSIS — E11.42 TYPE 2 DIABETES MELLITUS WITH DIABETIC POLYNEUROPATHY, WITH LONG-TERM CURRENT USE OF INSULIN: Chronic | ICD-10-CM

## 2023-02-09 DIAGNOSIS — Z00.6 RESEARCH EXAM: ICD-10-CM

## 2023-02-09 DIAGNOSIS — Z00.00 ENCOUNTER FOR MEDICARE ANNUAL WELLNESS EXAM: ICD-10-CM

## 2023-02-09 LAB
ALBUMIN SERPL BCP-MCNC: 3.5 G/DL (ref 3.5–5.2)
ALP SERPL-CCNC: 57 U/L (ref 55–135)
ALT SERPL W/O P-5'-P-CCNC: 19 U/L (ref 10–44)
ANION GAP SERPL CALC-SCNC: 14 MMOL/L (ref 8–16)
AST SERPL-CCNC: 23 U/L (ref 10–40)
BASOPHILS # BLD AUTO: 0.05 K/UL (ref 0–0.2)
BASOPHILS NFR BLD: 0.9 % (ref 0–1.9)
BILIRUB SERPL-MCNC: 0.4 MG/DL (ref 0.1–1)
BUN SERPL-MCNC: 16 MG/DL (ref 8–23)
CALCIUM SERPL-MCNC: 9.1 MG/DL (ref 8.7–10.5)
CHLORIDE SERPL-SCNC: 102 MMOL/L (ref 95–110)
CO2 SERPL-SCNC: 20 MMOL/L (ref 23–29)
CREAT SERPL-MCNC: 1.9 MG/DL (ref 0.5–1.4)
DIFFERENTIAL METHOD: ABNORMAL
DRUG STUDY SPECIMEN TYPE: NORMAL
DRUG STUDY TEST NAME: NORMAL
DRUG STUDY TEST RESULT: NORMAL
EOSINOPHIL # BLD AUTO: 0.2 K/UL (ref 0–0.5)
EOSINOPHIL NFR BLD: 3.5 % (ref 0–8)
ERYTHROCYTE [DISTWIDTH] IN BLOOD BY AUTOMATED COUNT: 15 % (ref 11.5–14.5)
EST. GFR  (NO RACE VARIABLE): 36.1 ML/MIN/1.73 M^2
ESTIMATED AVG GLUCOSE: 177 MG/DL (ref 68–131)
GLUCOSE SERPL-MCNC: 187 MG/DL (ref 70–110)
HBA1C MFR BLD: 7.8 % (ref 4–5.6)
HCT VFR BLD AUTO: 35.4 % (ref 40–54)
HGB BLD-MCNC: 10.9 G/DL (ref 14–18)
IGA SERPL-MCNC: 1616 MG/DL (ref 40–350)
IGG SERPL-MCNC: 1008 MG/DL (ref 650–1600)
IGM SERPL-MCNC: 31 MG/DL (ref 50–300)
IMM GRANULOCYTES # BLD AUTO: 0.01 K/UL (ref 0–0.04)
IMM GRANULOCYTES NFR BLD AUTO: 0.2 % (ref 0–0.5)
LDH SERPL L TO P-CCNC: 148 U/L (ref 110–260)
LYMPHOCYTES # BLD AUTO: 1.8 K/UL (ref 1–4.8)
LYMPHOCYTES NFR BLD: 33.3 % (ref 18–48)
MAGNESIUM SERPL-MCNC: 1.8 MG/DL (ref 1.6–2.6)
MCH RBC QN AUTO: 27.8 PG (ref 27–31)
MCHC RBC AUTO-ENTMCNC: 30.8 G/DL (ref 32–36)
MCV RBC AUTO: 90 FL (ref 82–98)
MONOCYTES # BLD AUTO: 0.4 K/UL (ref 0.3–1)
MONOCYTES NFR BLD: 8 % (ref 4–15)
NEUTROPHILS # BLD AUTO: 3 K/UL (ref 1.8–7.7)
NEUTROPHILS NFR BLD: 54.1 % (ref 38–73)
NRBC BLD-RTO: 0 /100 WBC
PHOSPHATE SERPL-MCNC: 2.5 MG/DL (ref 2.7–4.5)
PLATELET # BLD AUTO: 274 K/UL (ref 150–450)
PMV BLD AUTO: 9.1 FL (ref 9.2–12.9)
POTASSIUM SERPL-SCNC: 4.4 MMOL/L (ref 3.5–5.1)
PROT SERPL-MCNC: 8.4 G/DL (ref 6–8.4)
RBC # BLD AUTO: 3.92 M/UL (ref 4.6–6.2)
SODIUM SERPL-SCNC: 136 MMOL/L (ref 136–145)
WBC # BLD AUTO: 5.49 K/UL (ref 3.9–12.7)

## 2023-02-09 PROCEDURE — 86334 IMMUNOFIX E-PHORESIS SERUM: CPT | Mod: HCNC | Performed by: INTERNAL MEDICINE

## 2023-02-09 PROCEDURE — 83036 HEMOGLOBIN GLYCOSYLATED A1C: CPT | Mod: HCNC | Performed by: INTERNAL MEDICINE

## 2023-02-09 PROCEDURE — 83735 ASSAY OF MAGNESIUM: CPT | Mod: HCNC | Performed by: INTERNAL MEDICINE

## 2023-02-09 PROCEDURE — 99999 PR PBB SHADOW E&M-EST. PATIENT-LVL IV: CPT | Mod: PBBFAC,,, | Performed by: INTERNAL MEDICINE

## 2023-02-09 PROCEDURE — 99214 OFFICE O/P EST MOD 30 MIN: CPT | Mod: Q1,HCNC,S$GLB, | Performed by: INTERNAL MEDICINE

## 2023-02-09 PROCEDURE — 84165 PROTEIN E-PHORESIS SERUM: CPT | Mod: HCNC | Performed by: INTERNAL MEDICINE

## 2023-02-09 PROCEDURE — 83521 IG LIGHT CHAINS FREE EACH: CPT | Mod: 59,HCNC | Performed by: INTERNAL MEDICINE

## 2023-02-09 PROCEDURE — 99000 SPECIMEN HANDLING OFFICE-LAB: CPT | Mod: HCNC | Performed by: INTERNAL MEDICINE

## 2023-02-09 PROCEDURE — 84165 PROTEIN E-PHORESIS SERUM: CPT | Mod: 26,Q1,HCNC, | Performed by: PATHOLOGY

## 2023-02-09 PROCEDURE — 82784 ASSAY IGA/IGD/IGG/IGM EACH: CPT | Mod: HCNC | Performed by: INTERNAL MEDICINE

## 2023-02-09 PROCEDURE — 99214 PR OFFICE/OUTPT VISIT, EST, LEVL IV, 30-39 MIN: ICD-10-PCS | Mod: Q1,HCNC,S$GLB, | Performed by: INTERNAL MEDICINE

## 2023-02-09 PROCEDURE — 80053 COMPREHEN METABOLIC PANEL: CPT | Mod: HCNC | Performed by: INTERNAL MEDICINE

## 2023-02-09 PROCEDURE — 86334 PATHOLOGIST INTERPRETATION IFE: ICD-10-PCS | Mod: 26,Q1,HCNC, | Performed by: PATHOLOGY

## 2023-02-09 PROCEDURE — 85025 COMPLETE CBC W/AUTO DIFF WBC: CPT | Mod: HCNC | Performed by: INTERNAL MEDICINE

## 2023-02-09 PROCEDURE — 99214 OFFICE O/P EST MOD 30 MIN: CPT | Mod: PBBFAC | Performed by: INTERNAL MEDICINE

## 2023-02-09 PROCEDURE — 84100 ASSAY OF PHOSPHORUS: CPT | Mod: HCNC | Performed by: INTERNAL MEDICINE

## 2023-02-09 PROCEDURE — 36415 COLL VENOUS BLD VENIPUNCTURE: CPT | Mod: HCNC | Performed by: INTERNAL MEDICINE

## 2023-02-09 PROCEDURE — 83615 LACTATE (LD) (LDH) ENZYME: CPT | Mod: HCNC | Performed by: INTERNAL MEDICINE

## 2023-02-09 PROCEDURE — 99999 PR PBB SHADOW E&M-EST. PATIENT-LVL IV: ICD-10-PCS | Mod: PBBFAC,,, | Performed by: INTERNAL MEDICINE

## 2023-02-09 PROCEDURE — 84165 PATHOLOGIST INTERPRETATION SPE: ICD-10-PCS | Mod: 26,Q1,HCNC, | Performed by: PATHOLOGY

## 2023-02-09 PROCEDURE — 86334 IMMUNOFIX E-PHORESIS SERUM: CPT | Mod: 26,Q1,HCNC, | Performed by: PATHOLOGY

## 2023-02-09 NOTE — PROGRESS NOTES
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   Sponsor:  [facility]  IRB# [id number]  Study ID: [id number]  Investigator: GIL Engel Pt Initials: LUCÍA LORENZ        Cycle 86 Day 28 Arm B: Observation      Patient presented to clinic today for above cycle evaluation per Dr Engel; he was un-accompanied and states he is "busy with jobs here and in [place]."  He was alert, oriented to person, place, and time; mood and affect were appropriate. He reports overall no new complaints; states he has appt with endocrinologist; not sure about sugars/control. Also states he had been more conscientious about staying hydrated; he was reassured per Dr Engel that his creatinine has improved per labs today. He continues to maintain current COVID precautions; wears mask in crowded indoor areas; denies any COVID symptoms As per above, states continues to work actively; maintaining several contracts here in the [facility] and less in the [place] area.  He verbalizes continued willingness to participate in above-mentioned study.        Review of Baseline AE's:   1.Hypertension, Grade 2 systolic/diastolic: BP today is 148/73; subject states compliance with Losartan. Will continue to monitor. Subject denies headache/dizziness; no symptoms noted. Dr. Engel aware.  AE ongoing and tends to fluctuate.   2. Lower Back Pain and Neck Pain, grade 1: Stable.  Patient states he has been going to chiropractor recently and feels relief for this symptoms; reports no complaints today. Of note: patient is not suspected to have bony myeloma involvement per Dr. Engel as these are pre-existing issues present at baseline. AE no change today's visit.   3. Pain in extremity (knees), grade 1. Subject presents with knee brace to both knees; states he is trying to schedule left knee surgery for next month; work schedule permitting. States " "pain is a 6/10 to that area today. Will continue to monitor.       4. Peripheral sensory neuropathy, grade 1: Patient does not verbalize complaints today. Has gabapentin prescribed and takes as needed.  States has not taken recently.  AE ongoing.   5. Anemia, Grade 1: Hgb 10.9 today. Result reviewed by Dr. Engel, noted. AE ongoing and stable.            Review of AE's:     *Please note this list is not all-inclusive, please see AE log for physician reviewed list of adverse events.   1. Creatinine increased, grade 1: Serum creatinine from labs today is 1.9; calculated GFR 44 ml/min based on CG calculation. Reviewed per Dr. Engel and no new orders.Per Dr Engel this AE is not been related to myeloma: rather, attributable to CKD;  likely secondary to diabetes and hypertension vs plasma cell dyscrasia.  Will continue observation monthly and to monitor renal functionclosely. Per MD, reiterated adequate hydration with increase in water/sugar free gatorade intake..  2. Hyponatremia, Grade 1: Serum sodium today is 136 mmol/L; AE intermittent. Will monitor  3. Hyperkalemia, Grade 1: serum K today is 4.4, Dr Engel has reviewed labs; no additional orders noted. AE intermittent; will continue to monitor.   4. Hyperuricemia, Grade 1: this was not evaluated today; usually ordered per nephrology.   5. Hyperglycemia. Grade 1: blood glucose today is 187; lab is nonfasting. Subject follows with Dr Sagastume for ongoing guidance/supervision of diabetes.    6. Diplopia, right eye: Subject states no issues right now; follows up with ophtamology regularly for issues. Per Dr Engel this is not related to SMM; rather is associated with diabetes. Will continue to monitor.   7. Hypophosphatemia, Grade 1: serum Phosphorous is 2.4 today; slightly below LLN; will monitor. Dr Engel aware; no orders as there are no symptoms.       Per study chair, "the definition of progressive disease for this protocol is developing CRAB criteria that requires treatment " "systemically." Per Dr Engel, based on today's exam and lab results, patient does not have any s/s of CRAB: Normal Calcium level (9.4), absence of Renal insufficiency (serum creatinine elevated today at 1.9 but per Dr Engel not related to myeloma; --patient with a history of kidney dysfunction secondary to diabetes; Dr. Engel aware of these values and not concerned for myeloma involvement), absence of significant Anemia (hemoglobin 11.0, stable; will await myeloma labs for clarity relationship to myeloma) and absence of lytic Bone lesions (per baseline metastatic survey as well as MRI--see MD note for further comment). See MD note for ECOG score and H&P and flowsheets for laboratory work, vitals, etc. All myeloma-related blood work from last cycle including SPEP and JAGUAR have remained stable, and are currently pending for this cycle. Per Dr. Engel, patient shows no evidence of progression at last result. Patient informed that Dr. Engel will release all lab results to [facility]. He states understanding of this. QOL's not required again until end of treatment/observation.           Subject maintains he wishes only to see Dr Engel and will not show for appts if scheduled with any other provider. For this reason subject's appts do not always align with study calendar; per Dr Engel as subject is not on active treatment will abide by subject's wishes to see her only.  Will continue to schedule patient as far out as possible, which seems to help maintain compliance with visits. Normally subject deviates no greater than one week per cycle; wishes to remain on study per Dr Engel. Next appt scheduled for 28 days from now; subjects states will maintain compliance; appts generally fall within 28-32 days.   Informed patient will continue to contact him a few days prior to next appt to remind him so that he can plan accordingly. Patient states having research RN's contact information and has MD contact information to call with any " concerns, questions, or worsening of symptoms. Will follow up with subject once myeloma labs are resulted.

## 2023-02-09 NOTE — PROGRESS NOTES
Subjective:    Patient ID: Emerson Menendez is a 77 y.o. male.    Chief Complaint: No chief complaint on file.    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 Test results identify a stable IgA kappa protein with beta globulin band at 1.63g/dL. Kappa free light chain is elevated at 4.40. CBC and calcium are stable. Creatinine with history of stage III CKD is stable at 1.4. Metastatic survey is negative for lytic lesions. MRI of the entire spine demonstrated age related changes but no convincing evidence of myeloma bone disease. Bone marrow biopsy identified about 13% plasma cells by morphology and FISH for myeloma identified a t(11;14) and trisomies 3,7, and 17. The patient is afebrile and appears clinically well. He was seen by his podiatrist and nephrologist since our last visit without any new or acute events.    The patient has not received any therapy for smoldering myeloma including bisphosphonates or steroids. He has been randomized to observation arm of the the clinical study. 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 He has no current or prior history of malignancy.    TODAY Cycle 86 E3A06, observation cohort  No acute interval medical events  Still working with orthopedics to schedule left knee replacement; wearing braces bilaterally  BP stable today  CBC stable; CMP stable and MM labs pending      Knee Pain     Joint Pain  Associated symptoms include coughing. Pertinent negatives include no diaphoresis, fatigue or fever.   Hand Pain     Cough  Pertinent negatives include no fever.   Foot Pain  Associated symptoms include coughing. Pertinent negatives  include no diaphoresis, fatigue or fever.   Arm Pain     Follow-up  Associated symptoms include coughing. Pertinent negatives include no diaphoresis, fatigue or fever.   Review of Systems   Constitutional:  Negative for activity change, appetite change, diaphoresis, fatigue, fever and unexpected weight change.   HENT: Negative.     Eyes: Negative.    Respiratory:  Positive for cough.    Cardiovascular:  Negative for leg swelling.   Gastrointestinal: Negative.    Endocrine: Negative.    Genitourinary: Negative.    Musculoskeletal: Negative.    Skin: Negative.    Allergic/Immunologic: Negative.    Neurological:         Grade 0-1 peripheral neuropathy of bilateral feet.    Hematological:  Negative for adenopathy. Does not bruise/bleed easily.   Psychiatric/Behavioral: Negative.       Objective:       Vitals:    02/09/23 0831   BP: (!) 148/73   Pulse: 94   Resp: 18   Temp: 98.4 °F (36.9 °C)       Physical Exam  Vitals and nursing note reviewed.   Constitutional:       Appearance: He is well-developed.   HENT:      Head: Normocephalic and atraumatic.      Right Ear: External ear normal.      Left Ear: External ear normal.      Nose: Nose normal.   Eyes:      Conjunctiva/sclera: Conjunctivae normal.      Pupils: Pupils are equal, round, and reactive to light.   Cardiovascular:      Rate and Rhythm: Normal rate and regular rhythm.      Heart sounds: No murmur heard.  Pulmonary:      Effort: Pulmonary effort is normal. No respiratory distress.      Breath sounds: Normal breath sounds.   Abdominal:      General: Bowel sounds are normal. There is no distension.      Palpations: Abdomen is soft.   Musculoskeletal:         General: No tenderness.      Cervical back: Normal range of motion and neck supple.   Skin:     General: Skin is warm and dry.      Findings: No rash.      Nails: There is no clubbing.   Neurological:      Mental Status: He is alert and oriented to person, place, and time.      Cranial Nerves: No cranial  nerve deficit.   Psychiatric:         Behavior: Behavior normal.       Assessment:       No diagnosis found.    Plan:       The patient has a diagnosis of smoldering myeloma. There is no indication for immediate chemotherapy. We are monitoring renal function closely- baseline creatinine of around 1.5. 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 M protein has been stable and repeat is pending. Plan for return in 1 month.  Endocrinology and Nephrology to monitor diabetes and renal failure respectively. Patient would like to continue to follow with Dr. Perkins as needed. Hopeful for knee replacements next year- [facility].    A total of 20 minutes was spent in pre-visit chart review, personal interpretation of labs and imaging, and medication review. Total visit time 30 minutes, >50 % counseling.

## 2023-02-10 ENCOUNTER — PATIENT MESSAGE (OUTPATIENT)
Dept: ORTHOPEDICS | Facility: CLINIC | Age: 78
End: 2023-02-10
Payer: MEDICARE

## 2023-02-10 DIAGNOSIS — M25.561 ACUTE PAIN OF BOTH KNEES: Primary | ICD-10-CM

## 2023-02-10 DIAGNOSIS — M25.562 ACUTE PAIN OF BOTH KNEES: Primary | ICD-10-CM

## 2023-02-10 LAB
ALBUMIN SERPL ELPH-MCNC: 3.93 G/DL (ref 3.35–5.55)
ALPHA1 GLOB SERPL ELPH-MCNC: 0.29 G/DL (ref 0.17–0.41)
ALPHA2 GLOB SERPL ELPH-MCNC: 0.85 G/DL (ref 0.43–0.99)
B-GLOBULIN SERPL ELPH-MCNC: 2.6 G/DL (ref 0.5–1.1)
GAMMA GLOB SERPL ELPH-MCNC: 0.53 G/DL (ref 0.67–1.58)
INTERPRETATION SERPL IFE-IMP: NORMAL
KAPPA LC SER QL IA: 7.78 MG/DL (ref 0.33–1.94)
KAPPA LC/LAMBDA SER IA: 3.47 (ref 0.26–1.65)
LAMBDA LC SER QL IA: 2.24 MG/DL (ref 0.57–2.63)
PROT SERPL-MCNC: 8.2 G/DL (ref 6–8.4)

## 2023-02-13 LAB
PATHOLOGIST INTERPRETATION IFE: NORMAL
PATHOLOGIST INTERPRETATION SPE: NORMAL

## 2023-02-14 ENCOUNTER — PATIENT MESSAGE (OUTPATIENT)
Dept: PAIN MEDICINE | Facility: CLINIC | Age: 78
End: 2023-02-14
Payer: MEDICARE

## 2023-02-15 ENCOUNTER — TELEPHONE (OUTPATIENT)
Dept: ORTHOPEDICS | Facility: CLINIC | Age: 78
End: 2023-02-15
Payer: MEDICARE

## 2023-02-15 ENCOUNTER — IMMUNIZATION (OUTPATIENT)
Dept: INTERNAL MEDICINE | Facility: CLINIC | Age: 78
End: 2023-02-15
Payer: MEDICARE

## 2023-02-15 ENCOUNTER — OFFICE VISIT (OUTPATIENT)
Dept: INTERNAL MEDICINE | Facility: CLINIC | Age: 78
End: 2023-02-15
Payer: MEDICARE

## 2023-02-15 VITALS
WEIGHT: 209.31 LBS | BODY MASS INDEX: 29.96 KG/M2 | HEIGHT: 70 IN | SYSTOLIC BLOOD PRESSURE: 135 MMHG | DIASTOLIC BLOOD PRESSURE: 78 MMHG | OXYGEN SATURATION: 98 % | HEART RATE: 66 BPM

## 2023-02-15 DIAGNOSIS — N13.8 BPH WITH URINARY OBSTRUCTION: ICD-10-CM

## 2023-02-15 DIAGNOSIS — K21.9 GASTROESOPHAGEAL REFLUX DISEASE WITHOUT ESOPHAGITIS: ICD-10-CM

## 2023-02-15 DIAGNOSIS — E66.01 SEVERE OBESITY (BMI 35.0-39.9) WITH COMORBIDITY: ICD-10-CM

## 2023-02-15 DIAGNOSIS — M17.9 OSTEOARTHRITIS OF KNEE, UNSPECIFIED LATERALITY, UNSPECIFIED OSTEOARTHRITIS TYPE: ICD-10-CM

## 2023-02-15 DIAGNOSIS — D80.4 SELECTIVE DEFICIENCY OF IMMUNOGLOBULIN M (IGM): ICD-10-CM

## 2023-02-15 DIAGNOSIS — E55.9 VITAMIN D DEFICIENCY: ICD-10-CM

## 2023-02-15 DIAGNOSIS — Z12.11 COLON CANCER SCREENING: Primary | ICD-10-CM

## 2023-02-15 DIAGNOSIS — N52.9 ERECTILE DYSFUNCTION, UNSPECIFIED ERECTILE DYSFUNCTION TYPE: ICD-10-CM

## 2023-02-15 DIAGNOSIS — I10 ESSENTIAL HYPERTENSION: ICD-10-CM

## 2023-02-15 DIAGNOSIS — C90.00 MULTIPLE MYELOMA NOT HAVING ACHIEVED REMISSION: ICD-10-CM

## 2023-02-15 DIAGNOSIS — N40.1 BPH WITH URINARY OBSTRUCTION: ICD-10-CM

## 2023-02-15 DIAGNOSIS — N18.31 STAGE 3A CHRONIC KIDNEY DISEASE: Chronic | ICD-10-CM

## 2023-02-15 DIAGNOSIS — Z23 NEED FOR VACCINATION: Primary | ICD-10-CM

## 2023-02-15 DIAGNOSIS — I70.0 AORTIC ATHEROSCLEROSIS: ICD-10-CM

## 2023-02-15 PROCEDURE — 91312 COVID-19, MRNA, LNP-S, BIVALENT BOOSTER, PF, 30 MCG/0.3 ML DOSE: CPT | Mod: HCNC,S$GLB,, | Performed by: INTERNAL MEDICINE

## 2023-02-15 PROCEDURE — 1159F PR MEDICATION LIST DOCUMENTED IN MEDICAL RECORD: ICD-10-PCS | Mod: HCNC,CPTII,S$GLB, | Performed by: INTERNAL MEDICINE

## 2023-02-15 PROCEDURE — 1125F PR PAIN SEVERITY QUANTIFIED, PAIN PRESENT: ICD-10-PCS | Mod: HCNC,CPTII,S$GLB, | Performed by: INTERNAL MEDICINE

## 2023-02-15 PROCEDURE — 99214 PR OFFICE/OUTPT VISIT, EST, LEVL IV, 30-39 MIN: ICD-10-PCS | Mod: HCNC,S$GLB,, | Performed by: INTERNAL MEDICINE

## 2023-02-15 PROCEDURE — 99214 OFFICE O/P EST MOD 30 MIN: CPT | Mod: HCNC,S$GLB,, | Performed by: INTERNAL MEDICINE

## 2023-02-15 PROCEDURE — 1101F PT FALLS ASSESS-DOCD LE1/YR: CPT | Mod: HCNC,CPTII,S$GLB, | Performed by: INTERNAL MEDICINE

## 2023-02-15 PROCEDURE — 3075F SYST BP GE 130 - 139MM HG: CPT | Mod: HCNC,CPTII,S$GLB, | Performed by: INTERNAL MEDICINE

## 2023-02-15 PROCEDURE — 1159F MED LIST DOCD IN RCRD: CPT | Mod: HCNC,CPTII,S$GLB, | Performed by: INTERNAL MEDICINE

## 2023-02-15 PROCEDURE — 3078F PR MOST RECENT DIASTOLIC BLOOD PRESSURE < 80 MM HG: ICD-10-PCS | Mod: HCNC,CPTII,S$GLB, | Performed by: INTERNAL MEDICINE

## 2023-02-15 PROCEDURE — 91312 COVID-19, MRNA, LNP-S, BIVALENT BOOSTER, PF, 30 MCG/0.3 ML DOSE: ICD-10-PCS | Mod: HCNC,S$GLB,, | Performed by: INTERNAL MEDICINE

## 2023-02-15 PROCEDURE — 99499 RISK ADDL DX/OHS AUDIT: ICD-10-PCS | Mod: HCNC,S$GLB,, | Performed by: INTERNAL MEDICINE

## 2023-02-15 PROCEDURE — 3288F FALL RISK ASSESSMENT DOCD: CPT | Mod: HCNC,CPTII,S$GLB, | Performed by: INTERNAL MEDICINE

## 2023-02-15 PROCEDURE — 99499 UNLISTED E&M SERVICE: CPT | Mod: HCNC,S$GLB,, | Performed by: INTERNAL MEDICINE

## 2023-02-15 PROCEDURE — 3078F DIAST BP <80 MM HG: CPT | Mod: HCNC,CPTII,S$GLB, | Performed by: INTERNAL MEDICINE

## 2023-02-15 PROCEDURE — 99999 PR PBB SHADOW E&M-EST. PATIENT-LVL III: ICD-10-PCS | Mod: PBBFAC,HCNC,, | Performed by: INTERNAL MEDICINE

## 2023-02-15 PROCEDURE — 99999 PR PBB SHADOW E&M-EST. PATIENT-LVL III: CPT | Mod: PBBFAC,HCNC,, | Performed by: INTERNAL MEDICINE

## 2023-02-15 PROCEDURE — 3075F PR MOST RECENT SYSTOLIC BLOOD PRESS GE 130-139MM HG: ICD-10-PCS | Mod: HCNC,CPTII,S$GLB, | Performed by: INTERNAL MEDICINE

## 2023-02-15 PROCEDURE — 3288F PR FALLS RISK ASSESSMENT DOCUMENTED: ICD-10-PCS | Mod: HCNC,CPTII,S$GLB, | Performed by: INTERNAL MEDICINE

## 2023-02-15 PROCEDURE — 1125F AMNT PAIN NOTED PAIN PRSNT: CPT | Mod: HCNC,CPTII,S$GLB, | Performed by: INTERNAL MEDICINE

## 2023-02-15 PROCEDURE — 0124A COVID-19, MRNA, LNP-S, BIVALENT BOOSTER, PF, 30 MCG/0.3 ML DOSE: CPT | Mod: HCNC,CV19,PBBFAC | Performed by: INTERNAL MEDICINE

## 2023-02-15 PROCEDURE — 1101F PR PT FALLS ASSESS DOC 0-1 FALLS W/OUT INJ PAST YR: ICD-10-PCS | Mod: HCNC,CPTII,S$GLB, | Performed by: INTERNAL MEDICINE

## 2023-02-15 RX ORDER — DOXYCYCLINE HYCLATE 100 MG
100 TABLET ORAL 2 TIMES DAILY
Qty: 20 TABLET | Refills: 3 | Status: SHIPPED | OUTPATIENT
Start: 2023-02-15

## 2023-02-15 RX ORDER — OMEPRAZOLE 40 MG/1
40 CAPSULE, DELAYED RELEASE ORAL DAILY
Qty: 90 CAPSULE | Refills: 4 | Status: SHIPPED | OUTPATIENT
Start: 2023-02-15

## 2023-02-15 RX ORDER — ERGOCALCIFEROL 1.25 MG/1
50000 CAPSULE ORAL
Qty: 12 CAPSULE | Refills: 3 | Status: SHIPPED | OUTPATIENT
Start: 2023-02-15 | End: 2023-11-27 | Stop reason: SDUPTHER

## 2023-02-15 RX ORDER — LOSARTAN POTASSIUM 50 MG/1
50 TABLET ORAL DAILY
Qty: 90 TABLET | Refills: 6 | Status: ON HOLD | OUTPATIENT
Start: 2023-02-15 | End: 2023-06-14 | Stop reason: HOSPADM

## 2023-02-15 RX ORDER — SILDENAFIL 100 MG/1
100 TABLET, FILM COATED ORAL DAILY PRN
Qty: 30 TABLET | Refills: 2 | Status: SHIPPED | OUTPATIENT
Start: 2023-02-15 | End: 2023-09-05 | Stop reason: SDUPTHER

## 2023-02-15 RX ORDER — SILDENAFIL 100 MG/1
100 TABLET, FILM COATED ORAL DAILY PRN
Qty: 30 TABLET | Refills: 2 | Status: SHIPPED | OUTPATIENT
Start: 2023-02-15 | End: 2023-02-15 | Stop reason: SDUPTHER

## 2023-02-15 RX ORDER — GLIMEPIRIDE 2 MG/1
2 TABLET ORAL DAILY
Qty: 90 TABLET | Refills: 4 | Status: SHIPPED | OUTPATIENT
Start: 2023-02-15 | End: 2024-03-21

## 2023-02-15 NOTE — TELEPHONE ENCOUNTER
I called the patient today regarding his appointment. We reviewed this information:        Medications: Tylenol (200mg, PRN ), Gabapentin (100mg, tid)    Injections: 2021 left knee iaCSI with Jason Gallo, 100% relief for about 3 weeks.     Physical Therapy: None    Bracing: Yes, [facility], the patient reported that they help with his pain and stability     Assistive Devices: None    Walkin mile    Limitations: General walking, difficulty going up/down steps, difficulty getting in/out of the car, difficulty rising from sitting , and difficulty standing for long periods of time          Occupation: Semi retired - the patient is an . 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     Social support: The patient stated that they live at home with alone. The patient stated that their wife would come into town from [place] to help take care of them if they were to have surgery.

## 2023-02-15 NOTE — PROGRESS NOTES
CHIEF COMPLAINT:  Follow up of multiple myeloma, diabetes, hypertension, reflux, hyperlipidemia    HISTORY OF PRESENT ILLNESS: This is a 77-year-old man who presents for follow up of above     Cough is better - now slight      He had right 3rd trigger finger releast on 22 - finger is doing better     He continues to have knee pain.  He will eventually have to have knee replacements. . HE is wearing braces for his knees which is helping stability and his pain. He has an apt to see ortho this spring.      He got   2021.  Life is going well.  He is residing in [facility].  His wife has a job in [place].   life has been good for him. .        His daughter  of complications of lupus and ESRD 2020. She was on dialysis and was septic. She was at home in hospice. He continues to take citalopram to 40 mg 1 tablet daily and diazepam 5 mg 1/2 at bedtime. He is sleeping well with this dose.  His mother is currently doing well at age 98 (98 in 2022). His brother and sister help care for her         He continues to have right foot pain.   He is also taking gabapentin 100 mg one in am and 2 in the evening which helps his foot pain.       He is in digitial diabetes. Sugars have been better.  Sugars are now in the low 100's range.  HE has to check his blood sugar 3-4 times daily due to fluctuating blood sugars. HE has the free style henrry.  He is on novolog 4 units with meals, Lantus 16 units twice daily (adjusts according to sugars) and glimepriride 2 mg daily, Farxiga once daily NO polydipsia or polyuria.       He has seen neprhology 21  and kidney function is stable.      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 He is in the observation arm and is being monitoring monthly. He sess Dr Engel monthly.  No indication for chemotherapy at this time. HE gets monthly blood work.        He is taking losartan 50 mg 1 tablet daily to protect his kidney. No  "polydipsia or polyuria       His back and neck was doing ok . Pain is starting to return. He was in healthy back program.     Reflux is controlled on omeprazole 20 mg 2 tablets daily. HE has heartburn when he does not take the omeprazole.  No chest pain, shortness of breath, nausea, voimting, constipation, diarrhea, numbness, weakness.        He had laser vaporization and enucleation of the prostate 13. He denies any dysuria or hematuria now. HE saw Dr Walker.  HE is urinting well. He continues FLomax 0.4 mg nightly sporadically  HE gets up 2-3 times at night to urinate    He has hyperlipidemia, on pravastatin 40 mg daily. No joint pain or muscle pain from the pravastatin.        He has been taking aspirin 81 mg daily vitamin D supplement 50,000 units weekly         PAST MEDICAL HISTORY:   1. Diabetes mellitus.   2. Hyperlipidemia.   3. Reflux.   4. BPH.   5. Osteoarthritis of the knees.   6. Neck pain.   7. History of right lateral epicondylitis.   8. History of lumps removed from the breast as a teenager.   9. OA cervical spine   10. Multiple myeloma    SOCIAL HISTORY: Does not smoke, does not drink. He owns an    company. He works for the [employer].     FAMILY HISTORY: Mother is living at age 95 with a heart condition. She had a mild stroke. Father  in his late 40s of alcoholism. He has 5 sisters and 1 brother. one has a neurological disorder, one is anxious. ONe sister  after a fall. His daughter has  lupus, on dialysis, heart problems, and a brain aneurysm that was stented.       PHYSICAL EXAMINATION:     /78 Comment: home readings  Pulse 66   Ht 5' 10" (1.778 m)   Wt 95 kg (209 lb 5.2 oz)   SpO2 98%   BMI 30.04 kg/m²        GENERAL: He is alert, oriented, no apparent distress. Affect within normal limits.   Conjunctivae anicteric. PERRL. Tympanic membranes clear. Oropharynx gumes healing.    NECK: Supple. No cervical lymphadenopathy, no thyroid enlargement. "   Respiratory: Effort normal. Lungs are clear to auscultation.   HEART: Regular rate and rhythm without murmurs, gallops or rubs.   No lower extremity edema.    ABDOMEN: soft, non distended, non tender, bowel sounds present, no hepatosplenomgaly      ASSESSMENT AND PLAN:           1. Diabetes mellitus with hyperglycemia and microalbuminuria - in Digital diabetes.  . Watch for low blood sugars. Has Free style henrry   2. 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 Follow up with DR Engel  3. OA knee -s/p Orthovisc series injections 11/15/19. see ortho   4. Hypertension - controlled. Monitor BP at home  5. Hyperlipidemia. On pravastatin  6. BPH. S/p laser - Saw urology 8/18- has erectile dysfunction -stable.  See Dr Walker  7. Vitamin D deficiency - check level  9. Back pain/neck pain - managing-   10. Anxiety and depression- now on celexa 40 mg daily  11. Peripheral neuropathy -controlled on gabapentin as needed   12. CRI -stable - monitored closely by heme onc and nephrology.  13. Obestiy - work on diet, exercise and weigh tloss  14. Recurrent boils in arm pit - doxycycline when flares    I will see him back in 4 months, sooner if problems arise        Prevnar 7/16  PSA 5/2022  . colonoscopy normal 3/2012 and due 2022.  Cologuard.

## 2023-02-16 ENCOUNTER — OFFICE VISIT (OUTPATIENT)
Dept: INTERNAL MEDICINE | Facility: CLINIC | Age: 78
End: 2023-02-16
Payer: MEDICARE

## 2023-02-16 ENCOUNTER — LAB VISIT (OUTPATIENT)
Dept: LAB | Facility: OTHER | Age: 78
End: 2023-02-16
Attending: NURSE PRACTITIONER
Payer: MEDICARE

## 2023-02-16 VITALS
OXYGEN SATURATION: 98 % | HEIGHT: 69 IN | BODY MASS INDEX: 31.13 KG/M2 | HEART RATE: 92 BPM | SYSTOLIC BLOOD PRESSURE: 138 MMHG | WEIGHT: 210.19 LBS | DIASTOLIC BLOOD PRESSURE: 60 MMHG

## 2023-02-16 DIAGNOSIS — N62 HYPERTROPHY OF BREAST: ICD-10-CM

## 2023-02-16 DIAGNOSIS — E66.3 OVERWEIGHT (BMI 25.0-29.9): Chronic | ICD-10-CM

## 2023-02-16 DIAGNOSIS — R29.898 DECREASED ROM OF NECK: ICD-10-CM

## 2023-02-16 DIAGNOSIS — N18.30 CKD STAGE 3 DUE TO TYPE 2 DIABETES MELLITUS: ICD-10-CM

## 2023-02-16 DIAGNOSIS — M25.532 ACUTE PAIN OF LEFT WRIST: ICD-10-CM

## 2023-02-16 DIAGNOSIS — I10 ESSENTIAL HYPERTENSION: ICD-10-CM

## 2023-02-16 DIAGNOSIS — E11.3292 MILD NONPROLIFERATIVE DIABETIC RETINOPATHY OF LEFT EYE WITHOUT MACULAR EDEMA ASSOCIATED WITH TYPE 2 DIABETES MELLITUS: ICD-10-CM

## 2023-02-16 DIAGNOSIS — H04.123 DRY EYE SYNDROME OF BOTH EYES: ICD-10-CM

## 2023-02-16 DIAGNOSIS — H40.1132 PRIMARY OPEN ANGLE GLAUCOMA OF BOTH EYES, MODERATE STAGE: ICD-10-CM

## 2023-02-16 DIAGNOSIS — M25.569 CHRONIC KNEE PAIN, UNSPECIFIED LATERALITY: ICD-10-CM

## 2023-02-16 DIAGNOSIS — R05.3 CHRONIC COUGH: ICD-10-CM

## 2023-02-16 DIAGNOSIS — E11.22 CKD STAGE 3 DUE TO TYPE 2 DIABETES MELLITUS: ICD-10-CM

## 2023-02-16 DIAGNOSIS — S62.501S: ICD-10-CM

## 2023-02-16 DIAGNOSIS — S61.011S LACERATION OF RIGHT THUMB WITHOUT FOREIGN BODY WITHOUT DAMAGE TO NAIL, SEQUELA: ICD-10-CM

## 2023-02-16 DIAGNOSIS — E66.01 SEVERE OBESITY (BMI 35.0-39.9) WITH COMORBIDITY: ICD-10-CM

## 2023-02-16 DIAGNOSIS — G56.00 CARPAL TUNNEL SYNDROME, UNSPECIFIED LATERALITY: ICD-10-CM

## 2023-02-16 DIAGNOSIS — N32.81 OAB (OVERACTIVE BLADDER): ICD-10-CM

## 2023-02-16 DIAGNOSIS — M79.671 FOOT PAIN, RIGHT: ICD-10-CM

## 2023-02-16 DIAGNOSIS — N63.0 MASS OF BREAST, UNSPECIFIED LATERALITY: ICD-10-CM

## 2023-02-16 DIAGNOSIS — N18.32 STAGE 3B CHRONIC KIDNEY DISEASE: Chronic | ICD-10-CM

## 2023-02-16 DIAGNOSIS — N40.1 BPH WITH URINARY OBSTRUCTION: ICD-10-CM

## 2023-02-16 DIAGNOSIS — E78.00 PURE HYPERCHOLESTEROLEMIA: ICD-10-CM

## 2023-02-16 DIAGNOSIS — M47.812 OSTEOARTHRITIS OF CERVICAL SPINE, UNSPECIFIED SPINAL OSTEOARTHRITIS COMPLICATION STATUS: ICD-10-CM

## 2023-02-16 DIAGNOSIS — F43.23 ADJUSTMENT DISORDER WITH MIXED ANXIETY AND DEPRESSED MOOD: ICD-10-CM

## 2023-02-16 DIAGNOSIS — E55.9 VITAMIN D DEFICIENCY: ICD-10-CM

## 2023-02-16 DIAGNOSIS — E08.3211 MILD NONPROLIFERATIVE DIABETIC RETINOPATHY OF RIGHT EYE WITH MACULAR EDEMA ASSOCIATED WITH DIABETES MELLITUS DUE TO UNDERLYING CONDITION: ICD-10-CM

## 2023-02-16 DIAGNOSIS — D64.9 ANEMIA, UNSPECIFIED TYPE: ICD-10-CM

## 2023-02-16 DIAGNOSIS — L02.429 FURUNCLE OF AXILLA, UNSPECIFIED LATERALITY: ICD-10-CM

## 2023-02-16 DIAGNOSIS — D80.4 SELECTIVE DEFICIENCY OF IMMUNOGLOBULIN M (IGM): ICD-10-CM

## 2023-02-16 DIAGNOSIS — G56.02 CARPAL TUNNEL SYNDROME ON LEFT: ICD-10-CM

## 2023-02-16 DIAGNOSIS — S62.524P: ICD-10-CM

## 2023-02-16 DIAGNOSIS — F43.21 GRIEF: ICD-10-CM

## 2023-02-16 DIAGNOSIS — H26.491 POSTERIOR CAPSULAR OPACIFICATION OF RIGHT EYE, OBSCURING VISION: ICD-10-CM

## 2023-02-16 DIAGNOSIS — H40.039 ANATOMICAL NARROW ANGLE: ICD-10-CM

## 2023-02-16 DIAGNOSIS — M47.812 CERVICAL SPONDYLOSIS WITHOUT MYELOPATHY: ICD-10-CM

## 2023-02-16 DIAGNOSIS — M17.9 OSTEOARTHRITIS OF KNEE, UNSPECIFIED LATERALITY, UNSPECIFIED OSTEOARTHRITIS TYPE: ICD-10-CM

## 2023-02-16 DIAGNOSIS — K21.9 GASTROESOPHAGEAL REFLUX DISEASE WITHOUT ESOPHAGITIS: ICD-10-CM

## 2023-02-16 DIAGNOSIS — Z00.00 ENCOUNTER FOR MEDICARE ANNUAL WELLNESS EXAM: ICD-10-CM

## 2023-02-16 DIAGNOSIS — Z79.4 TYPE 2 DIABETES MELLITUS WITH DIABETIC POLYNEUROPATHY, WITH LONG-TERM CURRENT USE OF INSULIN: Chronic | ICD-10-CM

## 2023-02-16 DIAGNOSIS — F41.9 ANXIETY: ICD-10-CM

## 2023-02-16 DIAGNOSIS — Z79.4 TYPE 2 DIABETES MELLITUS WITH HYPERGLYCEMIA, WITH LONG-TERM CURRENT USE OF INSULIN: Chronic | ICD-10-CM

## 2023-02-16 DIAGNOSIS — H91.90 HEARING LOSS, UNSPECIFIED HEARING LOSS TYPE, UNSPECIFIED LATERALITY: ICD-10-CM

## 2023-02-16 DIAGNOSIS — M25.551 PAIN OF BOTH HIP JOINTS: ICD-10-CM

## 2023-02-16 DIAGNOSIS — E11.29 MICROALBUMINURIA DUE TO TYPE 2 DIABETES MELLITUS: Chronic | ICD-10-CM

## 2023-02-16 DIAGNOSIS — N52.9 ED (ERECTILE DYSFUNCTION) OF ORGANIC ORIGIN: ICD-10-CM

## 2023-02-16 DIAGNOSIS — N13.8 BPH WITH URINARY OBSTRUCTION: ICD-10-CM

## 2023-02-16 DIAGNOSIS — M62.81 WEAKNESS OF TRUNK MUSCULATURE: ICD-10-CM

## 2023-02-16 DIAGNOSIS — C90.00 MULTIPLE MYELOMA NOT HAVING ACHIEVED REMISSION: ICD-10-CM

## 2023-02-16 DIAGNOSIS — B02.23 POST-HERPETIC POLYNEUROPATHY: ICD-10-CM

## 2023-02-16 DIAGNOSIS — H35.033 HYPERTENSIVE RETINOPATHY OF BOTH EYES: ICD-10-CM

## 2023-02-16 DIAGNOSIS — R68.89 DECREASED FUNCTIONAL ACTIVITY TOLERANCE: ICD-10-CM

## 2023-02-16 DIAGNOSIS — R80.9 MICROALBUMINURIA DUE TO TYPE 2 DIABETES MELLITUS: Chronic | ICD-10-CM

## 2023-02-16 DIAGNOSIS — G89.29 CHRONIC KNEE PAIN, UNSPECIFIED LATERALITY: ICD-10-CM

## 2023-02-16 DIAGNOSIS — H43.813 VITREOUS DEGENERATION OF BOTH EYES: ICD-10-CM

## 2023-02-16 DIAGNOSIS — Z96.1 PSEUDOPHAKIA OF BOTH EYES: ICD-10-CM

## 2023-02-16 DIAGNOSIS — N53.14 RETROGRADE EJACULATION: ICD-10-CM

## 2023-02-16 DIAGNOSIS — N53.19 DISORDER OF EJACULATION: ICD-10-CM

## 2023-02-16 DIAGNOSIS — E11.65 TYPE 2 DIABETES MELLITUS WITH HYPERGLYCEMIA, WITH LONG-TERM CURRENT USE OF INSULIN: Chronic | ICD-10-CM

## 2023-02-16 DIAGNOSIS — R35.1 NOCTURIA: ICD-10-CM

## 2023-02-16 DIAGNOSIS — N64.4 MASTODYNIA: ICD-10-CM

## 2023-02-16 DIAGNOSIS — D47.2 SMOLDERING MULTIPLE MYELOMA: ICD-10-CM

## 2023-02-16 DIAGNOSIS — R29.898 LEFT LEG WEAKNESS: ICD-10-CM

## 2023-02-16 DIAGNOSIS — E11.42 TYPE 2 DIABETES MELLITUS WITH DIABETIC POLYNEUROPATHY, WITH LONG-TERM CURRENT USE OF INSULIN: Chronic | ICD-10-CM

## 2023-02-16 DIAGNOSIS — M25.552 PAIN OF BOTH HIP JOINTS: ICD-10-CM

## 2023-02-16 DIAGNOSIS — I70.0 AORTIC ATHEROSCLEROSIS: ICD-10-CM

## 2023-02-16 DIAGNOSIS — Z00.00 ENCOUNTER FOR PREVENTIVE HEALTH EXAMINATION: Primary | ICD-10-CM

## 2023-02-16 LAB
CHOLEST SERPL-MCNC: 306 MG/DL (ref 120–199)
CHOLEST/HDLC SERPL: 13.9 {RATIO} (ref 2–5)
HDLC SERPL-MCNC: 22 MG/DL (ref 40–75)
HDLC SERPL: 7.2 % (ref 20–50)
LDLC SERPL CALC-MCNC: ABNORMAL MG/DL (ref 63–159)
NONHDLC SERPL-MCNC: 284 MG/DL
TRIGL SERPL-MCNC: 736 MG/DL (ref 30–150)

## 2023-02-16 PROCEDURE — 3078F DIAST BP <80 MM HG: CPT | Mod: HCNC,CPTII,S$GLB, | Performed by: NURSE PRACTITIONER

## 2023-02-16 PROCEDURE — 3075F PR MOST RECENT SYSTOLIC BLOOD PRESS GE 130-139MM HG: ICD-10-PCS | Mod: HCNC,CPTII,S$GLB, | Performed by: NURSE PRACTITIONER

## 2023-02-16 PROCEDURE — 3288F PR FALLS RISK ASSESSMENT DOCUMENTED: ICD-10-PCS | Mod: HCNC,CPTII,S$GLB, | Performed by: NURSE PRACTITIONER

## 2023-02-16 PROCEDURE — 1126F PR PAIN SEVERITY QUANTIFIED, NO PAIN PRESENT: ICD-10-PCS | Mod: HCNC,CPTII,S$GLB, | Performed by: NURSE PRACTITIONER

## 2023-02-16 PROCEDURE — 36415 COLL VENOUS BLD VENIPUNCTURE: CPT | Mod: HCNC | Performed by: NURSE PRACTITIONER

## 2023-02-16 PROCEDURE — 3288F FALL RISK ASSESSMENT DOCD: CPT | Mod: HCNC,CPTII,S$GLB, | Performed by: NURSE PRACTITIONER

## 2023-02-16 PROCEDURE — 99999 PR PBB SHADOW E&M-EST. PATIENT-LVL V: CPT | Mod: PBBFAC,HCNC,, | Performed by: NURSE PRACTITIONER

## 2023-02-16 PROCEDURE — 1170F FXNL STATUS ASSESSED: CPT | Mod: HCNC,CPTII,S$GLB, | Performed by: NURSE PRACTITIONER

## 2023-02-16 PROCEDURE — 1160F RVW MEDS BY RX/DR IN RCRD: CPT | Mod: HCNC,CPTII,S$GLB, | Performed by: NURSE PRACTITIONER

## 2023-02-16 PROCEDURE — 1159F PR MEDICATION LIST DOCUMENTED IN MEDICAL RECORD: ICD-10-PCS | Mod: HCNC,CPTII,S$GLB, | Performed by: NURSE PRACTITIONER

## 2023-02-16 PROCEDURE — 1126F AMNT PAIN NOTED NONE PRSNT: CPT | Mod: HCNC,CPTII,S$GLB, | Performed by: NURSE PRACTITIONER

## 2023-02-16 PROCEDURE — 3078F PR MOST RECENT DIASTOLIC BLOOD PRESSURE < 80 MM HG: ICD-10-PCS | Mod: HCNC,CPTII,S$GLB, | Performed by: NURSE PRACTITIONER

## 2023-02-16 PROCEDURE — G0439 PPPS, SUBSEQ VISIT: HCPCS | Mod: HCNC,S$GLB,, | Performed by: NURSE PRACTITIONER

## 2023-02-16 PROCEDURE — G0439 PR MEDICARE ANNUAL WELLNESS SUBSEQUENT VISIT: ICD-10-PCS | Mod: HCNC,S$GLB,, | Performed by: NURSE PRACTITIONER

## 2023-02-16 PROCEDURE — 99999 PR PBB SHADOW E&M-EST. PATIENT-LVL V: ICD-10-PCS | Mod: PBBFAC,HCNC,, | Performed by: NURSE PRACTITIONER

## 2023-02-16 PROCEDURE — 3075F SYST BP GE 130 - 139MM HG: CPT | Mod: HCNC,CPTII,S$GLB, | Performed by: NURSE PRACTITIONER

## 2023-02-16 PROCEDURE — 80061 LIPID PANEL: CPT | Mod: HCNC | Performed by: NURSE PRACTITIONER

## 2023-02-16 PROCEDURE — 1159F MED LIST DOCD IN RCRD: CPT | Mod: HCNC,CPTII,S$GLB, | Performed by: NURSE PRACTITIONER

## 2023-02-16 PROCEDURE — 1101F PR PT FALLS ASSESS DOC 0-1 FALLS W/OUT INJ PAST YR: ICD-10-PCS | Mod: HCNC,CPTII,S$GLB, | Performed by: NURSE PRACTITIONER

## 2023-02-16 PROCEDURE — 1101F PT FALLS ASSESS-DOCD LE1/YR: CPT | Mod: HCNC,CPTII,S$GLB, | Performed by: NURSE PRACTITIONER

## 2023-02-16 PROCEDURE — 1170F PR FUNCTIONAL STATUS ASSESSED: ICD-10-PCS | Mod: HCNC,CPTII,S$GLB, | Performed by: NURSE PRACTITIONER

## 2023-02-16 PROCEDURE — 1160F PR REVIEW ALL MEDS BY PRESCRIBER/CLIN PHARMACIST DOCUMENTED: ICD-10-PCS | Mod: HCNC,CPTII,S$GLB, | Performed by: NURSE PRACTITIONER

## 2023-02-16 NOTE — PROGRESS NOTES
"  Emerson Menendez presented for a  Medicare AWV and comprehensive Health Risk Assessment today. The following components were reviewed and updated:    Medical history  Family History  Social history  Allergies and Current Medications  Health Risk Assessment  Health Maintenance  Care Team         ** See Completed Assessments for Annual Wellness Visit within the encounter summary.**         The following assessments were completed:  Living Situation  CAGE  Depression Screening  Timed Get Up and Go  Whisper Test  Cognitive Function Screening  Nutrition Screening  ADL Screening  PAQ Screening          Vitals:    02/16/23 1147   BP: 138/60   BP Location: Left arm   Patient Position: Sitting   Pulse: 92   SpO2: 98%   Weight: 95.4 kg (210 lb 3.3 oz)   Height: 5' 9" (1.753 m)     Body mass index is 31.04 kg/m².    Physical Exam  Vitals reviewed.   Constitutional:       Appearance: He is well-developed. He is obese.   HENT:      Head: Normocephalic and atraumatic. Not macrocephalic and not microcephalic. No raccoon eyes, Darling's sign, abrasion, contusion, right periorbital erythema, left periorbital erythema or laceration. Hair is normal.      Right Ear: No decreased hearing noted. No laceration, drainage, swelling or tenderness. No middle ear effusion. No foreign body. No mastoid tenderness. No hemotympanum. Tympanic membrane is not injected, scarred, perforated, erythematous, retracted or bulging. Tympanic membrane has normal mobility.      Left Ear: No decreased hearing noted. No laceration, drainage, swelling or tenderness.  No middle ear effusion. No foreign body. No mastoid tenderness. No hemotympanum. Tympanic membrane is not injected, scarred, perforated, erythematous, retracted or bulging. Tympanic membrane has normal mobility.      Nose: Nose normal. No nasal deformity, laceration or mucosal edema.      Mouth/Throat:      Pharynx: Uvula midline.   Eyes:      General: Lids are normal. No scleral icterus.     " Conjunctiva/sclera: Conjunctivae normal.   Neck:      Thyroid: No thyroid mass or thyromegaly.      Trachea: Trachea normal.   Cardiovascular:      Rate and Rhythm: Normal rate and regular rhythm.   Pulmonary:      Effort: Pulmonary effort is normal. No respiratory distress.      Breath sounds: Normal breath sounds. No stridor.   Abdominal:      Palpations: Abdomen is soft.   Musculoskeletal:         General: Normal range of motion.      Cervical back: Normal range of motion and neck supple. No edema or erythema. No spinous process tenderness or muscular tenderness. Normal range of motion.   Lymphadenopathy:      Head:      Right side of head: No submental, submandibular, tonsillar, preauricular or posterior auricular adenopathy.      Left side of head: No submental, submandibular, tonsillar, preauricular, posterior auricular or occipital adenopathy.   Skin:     General: Skin is warm and dry.   Neurological:      Mental Status: He is alert and oriented to person, place, and time.   Psychiatric:         Behavior: Behavior normal.         Thought Content: Thought content normal.         Judgment: Judgment normal.           Diagnoses and health risks identified today and associated recommendations/orders:    1. Encounter for preventive health examination  Annual Health Risk Assessment (HRA) visit today.  Counseling and referral of health maintenance and preventative health measures performed.  Patient given annual wellness paperwork to take home.  Encouraged to return in 1 year for subsequent HRA visit.     2. Encounter for Medicare annual wellness exam  Chronic. Stable. Continue current treatment plan as previously prescribed by PCP.  - Ambulatory Referral/Consult to Enhanced Annual Wellness Visit (eAWV)    3. CKD stage 3 due to type 2 diabetes mellitus  Chronic. Stable. Continue current treatment plan as previously prescribed by PCP.  - Lipid Panel; Future    4. Adjustment disorder with mixed anxiety and depressed  mood  Chronic. Stable. Continue current treatment plan as previously prescribed by PCP.    5. Mild nonproliferative diabetic retinopathy of left eye without macular edema associated with type 2 diabetes mellitus  Chronic. Stable. Continue current treatment plan as previously prescribed by PCP.    6. Mild nonproliferative diabetic retinopathy of right eye with macular edema associated with diabetes mellitus due to underlying condition  Chronic. Stable. Continue current treatment plan as previously prescribed by PCP.    7. Severe obesity (BMI 35.0-39.9) with comorbidity  Chronic. Stable. Continue current treatment plan as previously prescribed by PCP.    8. Type 2 diabetes mellitus with hyperglycemia, with long-term current use of insulin  Chronic. Stable. Uncontrolled. Last Hgb A1c=7.8 from 1/9/23. Continue current treatment plan as previously prescribed by PCP.  - Microalbumin/Creatinine Ratio, Urine; Future    9. Type 2 diabetes mellitus with diabetic polyneuropathy, with long-term current use of insulin  Chronic. Stable. Continue current treatment plan as previously prescribed by PCP.    10. Microalbuminuria due to type 2 diabetes mellitus  Chronic. Stable. Continue current treatment plan as previously prescribed by PCP.    11. Overweight (BMI 25.0-29.9)  Chronic. Stable. Encouraged to increase exercise as tolerated and improve diet to heart healthy, low sodium diet. Continue current treatment plan as previously prescribed by PCP.    12. Vitamin D deficiency  Chronic. Stable. Continue current treatment plan as previously prescribed by PCP.    13. Gastroesophageal reflux disease without esophagitis  Chronic. Stable. Continue current treatment plan as previously prescribed by PCP.    14. Acute pain of left wrist  Chronic. Stable. Continue current treatment plan as previously prescribed by PCP.    15. Chronic knee pain, unspecified laterality  Chronic. Stable. Continue current treatment plan as previously prescribed  by PCP.    16. Hearing loss, unspecified hearing loss type, unspecified laterality  Chronic. Stable. Continue current treatment plan as previously prescribed by PCP.  - Ambulatory referral/consult to ENT; Future  - Ambulatory referral/consult to Audiology; Future    17. Smoldering multiple myeloma  Chronic. Stable. Continue current treatment plan as previously prescribed by PCP.    18. Multiple myeloma not having achieved remission  Chronic. Stable. Continue current treatment plan as previously prescribed by PCP.    19. Anemia, unspecified type  Chronic. Stable. Continue current treatment plan as previously prescribed by PCP.    20. Selective deficiency of immunoglobulin m (igm)  Chronic. Stable. Continue current treatment plan as previously prescribed by PCP.    21. Furuncle of axilla, unspecified laterality  Chronic. Stable. Continue current treatment plan as previously prescribed by PCP.    22. Retrograde ejaculation  Chronic. Stable. Continue current treatment plan as previously prescribed by PCP.    23. OAB (overactive bladder)  Chronic. Stable. Continue current treatment plan as previously prescribed by PCP.    24. Nocturia  Chronic. Stable. Continue current treatment plan as previously prescribed by PCP.    25. Mastodynia  Chronic. Stable. Continue current treatment plan as previously prescribed by PCP.    26. Mass of breast, unspecified laterality  Chronic. Stable. Continue current treatment plan as previously prescribed by PCP.    27. Hypertrophy of breast  Chronic. Stable. Continue current treatment plan as previously prescribed by PCP.    28. ED (erectile dysfunction) of organic origin  Chronic. Stable. Continue current treatment plan as previously prescribed by PCP.    29. Disorder of ejaculation  Chronic. Stable. Continue current treatment plan as previously prescribed by PCP.    30. Stage 3b chronic kidney disease  Chronic. Stable. Continue current treatment plan as previously prescribed by PCP.    31.  BPH with urinary obstruction  Chronic. Stable. Continue current treatment plan as previously prescribed by PCP.    32. Pure hypercholesterolemia  Chronic. Stable. Continue current treatment plan as previously prescribed by PCP.    33. Essential hypertension  Chronic. Stable. Controlled. Encouraged to increase exercise as tolerated (moderate-intensity aerobic activity and muscle-strengthening activities) improve diet to heart healthy, low sodium diet.  Continue current treatment plan as previously prescribed by PCP.    34. Aortic atherosclerosis  Chronic. Stable. Continue current treatment plan as previously prescribed by PCP.    35. Chronic cough  Chronic. Stable. Continue current treatment plan as previously prescribed by PCP.    36. Vitreous degeneration of both eyes  Chronic. Stable. Continue current treatment plan as previously prescribed by PCP.    37. Pseudophakia of both eyes  Chronic. Stable. Continue current treatment plan as previously prescribed by PCP.    38. Primary open angle glaucoma of both eyes, moderate stage  Chronic. Stable. Continue current treatment plan as previously prescribed by PCP.    39. Posterior capsular opacification of right eye, obscuring vision  Chronic. Stable. Continue current treatment plan as previously prescribed by PCP.    40. Hypertensive retinopathy of both eyes  Chronic. Stable. Continue current treatment plan as previously prescribed by PCP.    41. Dry eye syndrome of both eyes  Chronic. Stable. Continue current treatment plan as previously prescribed by PCP.    42. Anatomical narrow angle  Chronic. Stable. Continue current treatment plan as previously prescribed by PCP.    43. Grief  Chronic. Stable. Continue current treatment plan as previously prescribed by PCP.    44. Anxiety  Chronic. Stable. Continue current treatment plan as previously prescribed by PCP.    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 Stable. Continue current treatment plan as previously prescribed by  PCP.    46. Osteoarthritis of cervical spine, unspecified spinal osteoarthritis complication status  Chronic. Stable. Continue current treatment plan as previously prescribed by PCP.    47. Cervical spondylosis without myelopathy  Chronic. Stable. Continue current treatment plan as previously prescribed by PCP.    48. Carpal tunnel syndrome on left  Chronic. Stable. Continue current treatment plan as previously prescribed by PCP.    49. Carpal tunnel syndrome, unspecified laterality  Chronic. Stable. Continue current treatment plan as previously prescribed by PCP.    50. Decreased ROM of neck  Chronic. Stable. Continue current treatment plan as previously prescribed by PCP.    51. Foot pain, right  Chronic. Stable. Continue current treatment plan as previously prescribed by PCP.    52. Open nondisplaced fracture of phalanx of right thumb, unspecified phalanx, sequela  Chronic. Stable. Continue current treatment plan as previously prescribed by PCP.    53. Laceration of right thumb without foreign body without damage to nail, sequela  Chronic. Stable. Continue current treatment plan as previously prescribed by PCP.    54. Left leg weakness  Chronic. Stable. Continue current treatment plan as previously prescribed by PCP.    55. Open nondisplaced fracture of distal phalanx of right thumb with malunion, subsequent encounter  Chronic. Stable. Continue current treatment plan as previously prescribed by PCP.    56. Osteoarthritis of knee, unspecified laterality, unspecified osteoarthritis type  Chronic. Stable. Continue current treatment plan as previously prescribed by PCP.    57. Pain of both hip joints  Chronic. Stable. Continue current treatment plan as previously prescribed by PCP.    58. Weakness of trunk musculature  Chronic. Stable. Continue current treatment plan as previously prescribed by PCP.    59. Decreased functional activity tolerance  Chronic. Stable. Continue current treatment plan as previously  prescribed by PCP.        Provided Emerson with a 5-10 year written screening schedule and personal prevention plan. Recommendations were developed using the USPSTF age appropriate recommendations. Education, counseling, and referrals were provided as needed. After Visit Summary printed and given to patient which includes a list of additional screenings\tests needed.      I offered to discuss end of life issues, including information on how to make advance directives that the patient could use to name someone who would make medical decisions on their behalf if they became too ill to make themselves.    ___Patient declined  _X_Patient is interested, I provided paper work and offered to discuss.    Follow up in about 1 year (around 2/16/2024).    RENUKA Mann offered to discuss advanced care planning, including how to pick a person who would make decisions for you if you were unable to make them for yourself, called a health care power of , and what kind of decisions you might make such as use of life sustaining treatments such as ventilators and tube feeding when faced with a life limiting illness recorded on a living will that they will need to know. (How you want to be cared for as you near the end of your natural life)     X Patient is interested in learning more about how to make advanced directives.  I provided them paperwork and offered to discuss this with them.

## 2023-02-16 NOTE — PATIENT INSTRUCTIONS
Counseling and Referral of Other Preventative  (Italic type indicates deductible and co-insurance are waived)    Patient Name: Emerson Menendez  Today's Date: 2/16/2023    Health Maintenance       Date Due Completion Date    Diabetes Urine Screening 04/11/2019 4/11/2018    Lipid Panel 01/14/2022 1/14/2021    Eye Exam 11/30/2022 11/30/2021    Hemoglobin A1c 08/09/2023 2/9/2023    TETANUS VACCINE 12/17/2023 12/17/2013        No orders of the defined types were placed in this encounter.      The following information is provided to all patients.  This information is to help you find resources for any of the problems found today that may be affecting your health:                Living healthy guide: www.[facility].[place].gov      Understanding Diabetes: www.diabetes.org      Eating healthy: www.cdc.gov/healthyweight      [facility] home safety checklist: www.cdc.gov/steadi/patient.html      Agency on Aging: www.goea.[place].[facility]      Alcoholics anonymous (AA): www.aa.org      Physical Activity: www.otis.nih.gov/zh7qfcw      Tobacco use: www.quitwithusla.org

## 2023-02-17 ENCOUNTER — CLINICAL SUPPORT (OUTPATIENT)
Dept: AUDIOLOGY | Facility: CLINIC | Age: 78
End: 2023-02-17
Payer: MEDICARE

## 2023-02-17 ENCOUNTER — OFFICE VISIT (OUTPATIENT)
Dept: OTOLARYNGOLOGY | Facility: CLINIC | Age: 78
End: 2023-02-17
Payer: MEDICARE

## 2023-02-17 DIAGNOSIS — H90.3 SENSORINEURAL HEARING LOSS (SNHL), BILATERAL: Primary | ICD-10-CM

## 2023-02-17 DIAGNOSIS — H61.23 BILATERAL IMPACTED CERUMEN: ICD-10-CM

## 2023-02-17 DIAGNOSIS — H90.3 SENSORINEURAL HEARING LOSS, BILATERAL: Primary | ICD-10-CM

## 2023-02-17 PROCEDURE — 92557 COMPREHENSIVE HEARING TEST: CPT | Mod: HCNC,S$GLB,,

## 2023-02-17 PROCEDURE — 99213 PR OFFICE/OUTPT VISIT, EST, LEVL III, 20-29 MIN: ICD-10-PCS | Mod: 25,HCNC,S$GLB, | Performed by: OTOLARYNGOLOGY

## 2023-02-17 PROCEDURE — 92567 TYMPANOMETRY: CPT | Mod: HCNC,S$GLB,,

## 2023-02-17 PROCEDURE — 92557 PR COMPREHENSIVE HEARING TEST: ICD-10-PCS | Mod: HCNC,S$GLB,,

## 2023-02-17 PROCEDURE — 69210 REMOVE IMPACTED EAR WAX UNI: CPT | Mod: HCNC,S$GLB,, | Performed by: OTOLARYNGOLOGY

## 2023-02-17 PROCEDURE — 92567 PR TYMPA2METRY: ICD-10-PCS | Mod: HCNC,S$GLB,,

## 2023-02-17 PROCEDURE — 69210 EAR CERUMEN REMOVAL: ICD-10-PCS | Mod: HCNC,S$GLB,, | Performed by: OTOLARYNGOLOGY

## 2023-02-17 PROCEDURE — 99213 OFFICE O/P EST LOW 20 MIN: CPT | Mod: 25,HCNC,S$GLB, | Performed by: OTOLARYNGOLOGY

## 2023-02-17 NOTE — PROGRESS NOTES
Subjective:       Patient ID: Emerson Menendez is a 77 y.o. male.    Chief Complaint: No chief complaint on file.    Hearing Loss:   Chronicity:  New  Onset:  More than 1 month ago  Frequency:  Constantly  Hearing loss characteristics:  Trouble hearing TVNo ear congestion, no fever and no tinnitus.  Review of Systems   Constitutional:  Negative for chills and fever.   HENT:  Positive for hearing loss. Negative for sore throat, tinnitus and trouble swallowing.    Respiratory:  Negative for apnea and chest tightness.    Cardiovascular:  Negative for chest pain.       Objective:      Physical Exam  Vitals and nursing note reviewed.   Constitutional:       Appearance: Normal appearance.   HENT:      Head: Normocephalic and atraumatic.      Right Ear: Tympanic membrane, ear canal and external ear normal. There is no impacted cerumen.      Left Ear: Tympanic membrane, ear canal and external ear normal. There is no impacted cerumen.   Neurological:      Mental Status: He is alert.         Data Reviewed:      Audiogram tracings independently reviewed and discussed with patient shows sloping mild SNHL  Assessment:       Problem List Items Addressed This Visit    None  Visit Diagnoses       Sensorineural hearing loss, bilateral    -  Primary    Bilateral impacted cerumen                Plan:        1. SNHL   noise protection  Consideration of hearing aid evaluation  Recheck hearing in 3 years    2. IC removed AU

## 2023-02-17 NOTE — PROGRESS NOTES
Emerson Menendez was seen today in the clinic for an audiologic evaluation.  Patient's main complaint was concern for excessive cerumen  in the ear canals. Mr. Menendez reported he has been told he has very narrow ear canals. He reported a possible history of noise exposure (i.e. working in construction, but that he feels the noise is not hazardously loud). Mr. Menendez denied decreased hearing, tinnitus, otalgia, aural fullness, and true vertigo.    Tympanometry revealed Type A in the right ear and Type A in the left ear.     Audiogram results revealed a mild precipitously sloping to severe at 8000 Hz sensorineural hearing loss (SNHL) in the right ear and a mild to moderate sloping to severe SNHL in the left ear.    Speech reception thresholds were noted at 20 dBHL in the right ear and 25 dBHL in the left ear.    Speech discrimination scores were 96% in the right ear and 100% in the left ear.    Recommendations:  Otologic evaluation  Consider hearing aid consultation if communication difficulty perceived  Annual audiogram or sooner if change perceived  Hearing protection in noise

## 2023-02-17 NOTE — PROCEDURES
Ear Cerumen Removal    Date/Time: 2/17/2023 1:30 PM  Performed by: Lazaro Pandya MD  Authorized by: Lazaro Pandya MD     Consent Done?:  Yes (Verbal)  Location details:  Both ears  Procedure type: curette    Patient tolerance:  Patient tolerated the procedure well with no immediate complications

## 2023-02-19 ENCOUNTER — PATIENT MESSAGE (OUTPATIENT)
Dept: INTERNAL MEDICINE | Facility: CLINIC | Age: 78
End: 2023-02-19
Payer: MEDICARE

## 2023-03-02 NOTE — PROGRESS NOTES
Subjective:     HPI:   Emerson Menendez is a 77 y.o. male who presents for repeat eval B knee arthritis  Has seen Dr [facility], [facility], and Christophe Gallo for knee arthritis in the past.     I saw him 8/17/21 for B L>R knee pain, Dx'd OA, offered L TKA with oncology clearance but said that his wife lives in [place] and if he was going to do surgery would do it there. Gave AAOS HEP. He requested HA injections which were ordered. Had B knee monovisc injections with EL 9/20/21. Has not followed up since.   Saw PCP Dr Sagastume 2/15/23, re-referred to ortho    He says he was working with a Dr. Springereyer at [facility] in [place] to get a left total knee done soon but they no longer take his insurance.    It does not sound like his wife is willing or able to come down here postoperatively he says he would need to go to rehab.     Medications: Tylenol (200mg, PRN ), Gabapentin (100mg, tid), nsaids contraindicated due to CKD     Injections: 6/30/2021 left knee iaCSI with Jason Gallo, 100% relief for about 3 weeks. B monovisc 9/20/21     Physical Therapy: Ordered June 2021, given PAZ knee conditioning MD directed home program 8/21     Bracing: Yes, HKB, the patient reported that they help with his pain and stability      Assistive Devices: None     Walking:   up to 1 mile     Limitations: General walking, difficulty going up/down steps, difficulty getting in/out of the car, difficulty rising from sitting , and difficulty standing for long periods of time                           Occupation: Semi retired - the patient is an . 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      Social support: The patient stated that they live at home with alone. The patient stated that their wife would come into town from [place] to help take care of them if they were to have surgery.        ROS:  The updated medical history  is in the chart and has been reviewed. A review of systems is updated and there is no reported vision changes, ear/nose/mouth/throat complaints,  chest pain, shortness of breath, abdominal pain, urological complaints, fevers or chills, psychiatric complaints. Musculoskeletal and neurologcial symptoms are as documented. All other systems are negative.      Objective:   Exam:  There were no vitals filed for this visit.  There is no height or weight on file to calculate BMI.    Exam deferred         Imaging:    KNEE R ARTHRITIS    Indication:  Right knee pain  Exam Ordered: Radiographs of the right knee include a standing anteroposterior view, a standing posterioanterior view, a lateral view in full flexion, and a sunrise view  Details of Examination: Exam shows evidence of joint space narrowing, osteophyte formation, and subchondral sclerosis, all consistent with degenerative arthritis of the knee.  No other significant findings are noted.  Impression:  Degenerative Arthritis, Right Knee and KNEE L ARTHRITIS     Indication:  Left knee pain  Exam Ordered: Radiographs of the left knee include a standing anteroposterior view, a standing posterioanterior view, a lateral view in full flexion, and a sunrise view  Details of Examination: Exam shows evidence of joint space narrowing, osteophyte formation, and subchondral sclerosis, all consistent with degenerative arthritis of the knee.  No other significant findings are noted.  Impression:  Degenerative Arthritis, Left Knee    L knee KL g4 varus arthritis, severe, bone on bone  R KL g3 varus      Assessment:       ICD-10-CM ICD-9-CM   1. Arthritis of both knees  M17.0 716.96      Multiple myeloma  CKD 1.9/36 = baseline  DM 7.8 (8.0, 8.2)  BPH on flomax  Psych hx: dep/anxiety  MALINI  Anemia 10.9 = baseline    Wife - [place]     Plan:       I told him I would be happy to do his knee replacement but I think the logistically best thing for him would be to have her surgery in  [place] where his wife lives in would be able to help him postoperatively.  We discussed that it would be unlikely to be able to get skilled nursing facility approved through his Medicare advantage plan for a primary knee replacement here in [place].    I presume that [facility] will also take his insurance since we do here. Have sent message to our  to try to help coordinate getting him plugged into the [facility] system.     Will burn xrays on disc         No orders of the defined types were placed in this encounter.            Past Medical History:   Diagnosis Date    Anxiety 3/16/2015    Asymptomatic multiple myeloma     BPH (benign prostatic hypertrophy) 9/24/2012    Carpal tunnel syndrome     Depression 3/16/2015    Diabetic retinopathy     GERD (gastroesophageal reflux disease) 9/24/2012    Glaucoma     Hx of psychiatric care     Celexa, Valium    Hyperlipidemia     Hypertension     Hypertensive retinopathy of both eyes     Microalbuminuria 9/24/2012    Osteoarthritis of cervical spine 9/24/2012    Osteoarthritis of knee 9/24/2012    Posterior capsular opacification, right 9/15/2022    Psychiatric problem     Type II or unspecified type diabetes mellitus without mention of complication, not stated as uncontrolled 9/24/2012       Past Surgical History:   Procedure Laterality Date    ARTHROSCOPY OF WRIST Left 08/03/2020    Procedure: ARTHROSCOPY, WRIST LEFT;  Surgeon: Kindra Castillo MD;  Location: [facility] OR;  Service: Orthopedics;  Laterality: Left;  REGIONAL/MAC    CARPAL TUNNEL RELEASE Left 08/03/2020    Procedure: RELEASE, CARPAL TUNNEL LEFT;  Surgeon: Kindra Castillo MD;  Location: [facility] OR;  Service: Orthopedics;  Laterality: Left;  REGIONAL/MAC    CATARACT EXTRACTION  2012    Right eye    EYE SURGERY Bilateral     cataract removal    INTRAOCULAR PROSTHESES INSERTION Left 05/20/2021    Procedure: INSERTION, IOL PROSTHESIS;  Surgeon: Jose L Wheatley MD;   [facility];  Service: Ophthalmology;  Laterality: Left;    PHACOEMULSIFICATION OF CATARACT Left 2021    Procedure: PHACOEMULSIFICATION, CATARACT;  Surgeon: Jose L Wheatley MD;  Location: [address];  Service: Ophthalmology;  Laterality: Left;    PROSTATE SURGERY      SURGICAL REMOVAL OF CARPAL BONE Left 2021    Procedure: OSTECTOMY, CARPAL BONE, LEFT;  Surgeon: Kindra Castillo MD;  Location: [facility];  Service: Orthopedics;  Laterality: Left;  REGIONAL/MAC    TONSILLECTOMY      TRIGGER FINGER RELEASE Right 2022    Procedure: RELEASE, TRIGGER FINGER,RIGHT,LONG;  Surgeon: Kindra Castillo MD;  Location: [facility];  Service: Orthopedics;  Laterality: Right;       Family History   Problem Relation Age of Onset    Heart disease Mother     Alcohol abuse Father     Cirrhosis Father     Depression Sister     No Known Problems Sister     No Known Problems Sister     No Known Problems Sister     Hypertension Brother     Depression Brother     Kidney failure Daughter         due to medication    Cerebral aneurysm Daughter     Blindness Neg Hx     Cancer Neg Hx     Cataracts Neg Hx     Diabetes Neg Hx     Glaucoma Neg Hx     Macular degeneration Neg Hx     Retinal detachment Neg Hx     Strabismus Neg Hx     Stroke Neg Hx     Thyroid disease Neg Hx        Social History     Socioeconomic History    Marital status:     Number of children: 1   Tobacco Use    Smoking status: Never    Smokeless tobacco: Never   Substance and Sexual Activity    Alcohol use: Yes     Comment: occasionally    Drug use: No    Sexual activity: Yes     Partners: Female     Birth control/protection: None   Social History Narrative     x2, 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

## 2023-03-09 ENCOUNTER — HOSPITAL ENCOUNTER (OUTPATIENT)
Dept: RADIOLOGY | Facility: HOSPITAL | Age: 78
Discharge: HOME | End: 2023-03-09
Attending: ORTHOPAEDIC SURGERY
Payer: MEDICARE

## 2023-03-09 ENCOUNTER — OFFICE VISIT (OUTPATIENT)
Dept: ORTHOPEDICS | Facility: CLINIC | Age: 78
End: 2023-03-09
Payer: MEDICARE

## 2023-03-09 ENCOUNTER — RESEARCH ENCOUNTER (OUTPATIENT)
Dept: HEMATOLOGY/ONCOLOGY | Facility: CLINIC | Age: 78
End: 2023-03-09

## 2023-03-09 ENCOUNTER — OFFICE VISIT (OUTPATIENT)
Dept: HEMATOLOGY/ONCOLOGY | Facility: CLINIC | Age: 78
End: 2023-03-09
Payer: MEDICARE

## 2023-03-09 ENCOUNTER — HOSPITAL ENCOUNTER (OUTPATIENT)
Dept: CARDIOLOGY | Facility: CLINIC | Age: 78
Discharge: HOME | End: 2023-03-09
Payer: MEDICARE

## 2023-03-09 VITALS — WEIGHT: 205.5 LBS | HEIGHT: 70 IN | BODY MASS INDEX: 29.42 KG/M2

## 2023-03-09 VITALS
DIASTOLIC BLOOD PRESSURE: 69 MMHG | OXYGEN SATURATION: 97 % | WEIGHT: 208.44 LBS | RESPIRATION RATE: 20 BRPM | HEART RATE: 94 BPM | HEIGHT: 70 IN | SYSTOLIC BLOOD PRESSURE: 127 MMHG | TEMPERATURE: 99 F | BODY MASS INDEX: 29.84 KG/M2

## 2023-03-09 DIAGNOSIS — N18.30 CKD STAGE 3 DUE TO TYPE 2 DIABETES MELLITUS: ICD-10-CM

## 2023-03-09 DIAGNOSIS — M25.562 ACUTE PAIN OF BOTH KNEES: ICD-10-CM

## 2023-03-09 DIAGNOSIS — E11.42 TYPE 2 DIABETES MELLITUS WITH DIABETIC POLYNEUROPATHY, WITH LONG-TERM CURRENT USE OF INSULIN: ICD-10-CM

## 2023-03-09 DIAGNOSIS — Z00.6 RESEARCH EXAM: ICD-10-CM

## 2023-03-09 DIAGNOSIS — Z79.4 TYPE 2 DIABETES MELLITUS WITH DIABETIC POLYNEUROPATHY, WITH LONG-TERM CURRENT USE OF INSULIN: ICD-10-CM

## 2023-03-09 DIAGNOSIS — M17.10 PRIMARY OSTEOARTHRITIS OF KNEE, UNSPECIFIED LATERALITY: ICD-10-CM

## 2023-03-09 DIAGNOSIS — E11.22 CKD STAGE 3 DUE TO TYPE 2 DIABETES MELLITUS: ICD-10-CM

## 2023-03-09 DIAGNOSIS — I10 ESSENTIAL HYPERTENSION: ICD-10-CM

## 2023-03-09 DIAGNOSIS — M25.561 ACUTE PAIN OF BOTH KNEES: ICD-10-CM

## 2023-03-09 DIAGNOSIS — D47.2 SMOLDERING MULTIPLE MYELOMA: Primary | ICD-10-CM

## 2023-03-09 DIAGNOSIS — M17.0 ARTHRITIS OF BOTH KNEES: Primary | ICD-10-CM

## 2023-03-09 PROCEDURE — 73564 X-RAY EXAM KNEE 4 OR MORE: CPT | Mod: 26,50,HCNC, | Performed by: RADIOLOGY

## 2023-03-09 PROCEDURE — 93005 ELECTROCARDIOGRAM TRACING: CPT | Mod: HCNC,S$GLB,, | Performed by: INTERNAL MEDICINE

## 2023-03-09 PROCEDURE — 99213 PR OFFICE/OUTPT VISIT, EST, LEVL III, 20-29 MIN: ICD-10-PCS | Mod: HCNC,S$GLB,, | Performed by: ORTHOPAEDIC SURGERY

## 2023-03-09 PROCEDURE — 99999 PR PBB SHADOW E&M-EST. PATIENT-LVL IV: CPT | Mod: PBBFAC,HCNC,, | Performed by: ORTHOPAEDIC SURGERY

## 2023-03-09 PROCEDURE — 1159F PR MEDICATION LIST DOCUMENTED IN MEDICAL RECORD: ICD-10-PCS | Mod: HCNC,CPTII,S$GLB, | Performed by: ORTHOPAEDIC SURGERY

## 2023-03-09 PROCEDURE — 99999 PR PBB SHADOW E&M-EST. PATIENT-LVL V: CPT | Mod: PBBFAC,HCNC,, | Performed by: INTERNAL MEDICINE

## 2023-03-09 PROCEDURE — 1125F PR PAIN SEVERITY QUANTIFIED, PAIN PRESENT: ICD-10-PCS | Mod: HCNC,CPTII,S$GLB, | Performed by: ORTHOPAEDIC SURGERY

## 2023-03-09 PROCEDURE — 99999 PR PBB SHADOW E&M-EST. PATIENT-LVL IV: ICD-10-PCS | Mod: PBBFAC,HCNC,, | Performed by: ORTHOPAEDIC SURGERY

## 2023-03-09 PROCEDURE — 1125F AMNT PAIN NOTED PAIN PRSNT: CPT | Mod: HCNC,CPTII,S$GLB, | Performed by: ORTHOPAEDIC SURGERY

## 2023-03-09 PROCEDURE — 1159F MED LIST DOCD IN RCRD: CPT | Mod: HCNC,CPTII,S$GLB, | Performed by: ORTHOPAEDIC SURGERY

## 2023-03-09 PROCEDURE — 73564 XR KNEE ORTHO BILAT WITH FLEXION: ICD-10-PCS | Mod: 26,50,HCNC, | Performed by: RADIOLOGY

## 2023-03-09 PROCEDURE — 99214 OFFICE O/P EST MOD 30 MIN: CPT | Mod: Q1,HCNC,S$GLB, | Performed by: INTERNAL MEDICINE

## 2023-03-09 PROCEDURE — 93005 EKG 12-LEAD: ICD-10-PCS | Mod: HCNC,S$GLB,, | Performed by: INTERNAL MEDICINE

## 2023-03-09 PROCEDURE — 93010 EKG 12-LEAD: ICD-10-PCS | Mod: HCNC,S$GLB,, | Performed by: INTERNAL MEDICINE

## 2023-03-09 PROCEDURE — 99999 PR PBB SHADOW E&M-EST. PATIENT-LVL V: ICD-10-PCS | Mod: PBBFAC,HCNC,, | Performed by: INTERNAL MEDICINE

## 2023-03-09 PROCEDURE — 99213 OFFICE O/P EST LOW 20 MIN: CPT | Mod: HCNC,S$GLB,, | Performed by: ORTHOPAEDIC SURGERY

## 2023-03-09 PROCEDURE — 99214 PR OFFICE/OUTPT VISIT, EST, LEVL IV, 30-39 MIN: ICD-10-PCS | Mod: Q1,HCNC,S$GLB, | Performed by: INTERNAL MEDICINE

## 2023-03-09 PROCEDURE — 93010 ELECTROCARDIOGRAM REPORT: CPT | Mod: HCNC,S$GLB,, | Performed by: INTERNAL MEDICINE

## 2023-03-09 PROCEDURE — 73564 X-RAY EXAM KNEE 4 OR MORE: CPT | Mod: TC,50,HCNC

## 2023-03-09 RX ORDER — INFLUENZA A VIRUS A/VICTORIA/2570/2019 IVR-215 (H1N1) ANTIGEN (FORMALDEHYDE INACTIVATED), INFLUENZA A VIRUS A/DARWIN/6/2021 IVR-227 (H3N2) ANTIGEN (FORMALDEHYDE INACTIVATED), INFLUENZA B VIRUS B/AUSTRIA/1359417/2021 BVR-26 ANTIGEN (FORMALDEHYDE INACTIVATED), INFLUENZA B VIRUS B/PHUKET/3073/2013 BVR-1B ANTIGEN (FORMALDEHYDE INACTIVATED) 15; 15; 15; 15 UG/.5ML; UG/.5ML; UG/.5ML; UG/.5ML
INJECTION, SUSPENSION INTRAMUSCULAR
COMMUNITY
Start: 2022-09-20 | End: 2024-01-25

## 2023-03-09 NOTE — PROGRESS NOTES
Subjective:    Patient ID: Emerson Menendez is a 77 y.o. male.    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 Test results identify a stable IgA kappa protein with beta globulin band at 1.63g/dL. Kappa free light chain is elevated at 4.40. CBC and calcium are stable. Creatinine with history of stage III CKD is stable at 1.4. Metastatic survey is negative for lytic lesions. MRI of the entire spine demonstrated age related changes but no convincing evidence of myeloma bone disease. Bone marrow biopsy identified about 13% plasma cells by morphology and FISH for myeloma identified a t(11;14) and trisomies 3,7, and 17. The patient is afebrile and appears clinically well. He was seen by his podiatrist and nephrologist since our last visit without any new or acute events.    The patient has not received any therapy for smoldering myeloma including bisphosphonates or steroids. He has been randomized to observation arm of the the clinical study. 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 He has no current or prior history of malignancy.    TODAY Cycle 87 E3A06, observation cohort  No acute interval medical events; on wait list for knee replacement- may have procedure in [place]  CBC stable; CMP stable and MM labs pending      Knee Pain     Joint Pain  Associated symptoms include coughing. Pertinent negatives include no diaphoresis, fatigue or fever.   Hand Pain     Cough  Pertinent negatives include no fever.   Foot Pain  Associated symptoms include coughing. Pertinent negatives include no diaphoresis, fatigue or fever.    Arm Pain     Follow-up  Associated symptoms include coughing. Pertinent negatives include no diaphoresis, fatigue or fever.   Review of Systems   Constitutional:  Negative for activity change, appetite change, diaphoresis, fatigue, fever and unexpected weight change.   HENT: Negative.     Eyes: Negative.    Respiratory:  Positive for cough.    Cardiovascular:  Negative for leg swelling.   Gastrointestinal: Negative.    Endocrine: Negative.    Genitourinary: Negative.    Musculoskeletal: Negative.    Skin: Negative.    Allergic/Immunologic: Negative.    Neurological:         Grade 0-1 peripheral neuropathy of bilateral feet.    Hematological:  Negative for adenopathy. Does not bruise/bleed easily.   Psychiatric/Behavioral: Negative.       Objective:       Vitals:    03/09/23 1031   BP: 127/69   Pulse: 94   Resp: 20   Temp: 98.6 °F (37 °C)       Physical Exam  Vitals and nursing note reviewed.   Constitutional:       Appearance: He is well-developed.   HENT:      Head: Normocephalic and atraumatic.      Right Ear: External ear normal.      Left Ear: External ear normal.      Nose: Nose normal.   Eyes:      Conjunctiva/sclera: Conjunctivae normal.      Pupils: Pupils are equal, round, and reactive to light.   Cardiovascular:      Rate and Rhythm: Normal rate and regular rhythm.      Heart sounds: No murmur heard.  Pulmonary:      Effort: Pulmonary effort is normal. No respiratory distress.      Breath sounds: Normal breath sounds.   Abdominal:      General: Bowel sounds are normal. There is no distension.      Palpations: Abdomen is soft.   Musculoskeletal:         General: No tenderness.      Cervical back: Normal range of motion and neck supple.   Skin:     General: Skin is warm and dry.      Findings: No rash.      Nails: There is no clubbing.   Neurological:      Mental Status: He is alert and oriented to person, place, and time.      Cranial Nerves: No cranial nerve deficit.   Psychiatric:         Behavior:  Behavior normal.       Assessment:       No diagnosis found.    Plan:       The patient has a diagnosis of smoldering myeloma. There is no indication for immediate chemotherapy. We are monitoring renal function closely- baseline creatinine of around 1.5. 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 M protein has been stable and repeat is pending. Plan for return in 1 month.  Endocrinology and Nephrology to monitor diabetes and renal failure respectively. Patient would like to continue to follow with Dr. Perkins as needed. Hopeful for knee replacements next year- seeing [facility] orthopedics now.    A total of 20 minutes was spent in pre-visit chart review, personal interpretation of labs and imaging, and medication review. Total visit time 30 minutes, >50 % counseling.

## 2023-03-09 NOTE — PROGRESS NOTES
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   Sponsor:  [facility]  IRB# [id number]  Study ID: [id number]  Investigator: GIL Engel Pt Initials: LUCÍA LORENZ        Cycle 87 Day 28 Arm B: Observation     Patient presented to clinic today for above cycle evaluation per Dr Engel; he was un-accompanied and states he is coming from other appointments this morning.  He was alert, oriented to person, place, and time; mood and affect were appropriate. He reports overall no new complaints. 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  He verbalizes continued willingness to participate in above-mentioned study.        Review of Baseline AE's:   1.Hypertension, Grade 2 systolic/diastolic: BP today is 127/69 ; subject states compliance with Losartan. Will continue to monitor. Subject denies headache/dizziness; no symptoms noted. Dr. Engel aware.  AE ongoing and tends to fluctuate.   2. Lower Back Pain and Neck Pain, grade 1: Stable.  Patient states he has been going to chiropractor recently and feels relief for this symptoms; reports no complaints today. Of note: patient is not suspected to have bony myeloma involvement per Dr. Engel as these are pre-existing issues present at baseline. AE no change today's visit.   3. Pain in extremity (knees), grade 1. Subject presents with knee brace to both knees; reports that he is just coming from an appointment with Dr. Tian in orthopedics and is being put on the (lengthy) wait list for surgery.  States he will likley have the procedure done in [place].  States pain is a 6/10 to that area today. Will continue to monitor.       4. Peripheral sensory neuropathy, grade 1:  "Patient does not verbalize complaints today. Has gabapentin prescribed and takes as needed.  States has not taken recently.  AE ongoing.   5. Anemia, Grade 1: Hgb 10.7 today. Result reviewed by Dr. Engel, noted. AE ongoing and stable.            Review of AE's:     *Please note this list is not all-inclusive, please see AE log for physician reviewed list of adverse events.   1. Creatinine increased, grade 1: Serum creatinine from labs today is 2.2; calculated GFR 38 ml/min based on CG calculation. Reviewed per Dr. Engel and no new orders.Per Dr Engel this AE is not been related to myeloma: rather, attributable to CKD;  likely secondary to diabetes and hypertension vs plasma cell dyscrasia.  Will continue observation monthly and to monitor renal functionclosely. Per MD, reiterated adequate hydration with increase in water/sugar free gatorade intake..  2. Hyponatremia, Grade 1: Serum sodium today is 136 mmol/L; AE intermittent. Will monitor  3. Hyperkalemia, Grade 1: serum K today is 4.9, Dr Engel has reviewed labs; no additional orders noted. AE intermittent; will continue to monitor.   4. Hyperuricemia, Grade 1: this was not evaluated today; usually ordered per nephrology.   5. Hyperglycemia. Grade 1: blood glucose today is 155; lab is nonfasting. Subject follows with Dr Sagastume for ongoing guidance/supervision of diabetes.    6. Diplopia, right eye: Subject states no issues right now; follows up with ophtamology regularly for issues. Per Dr Engel this is not related to SMM; rather is associated with diabetes. Will continue to monitor.   7. Hypophosphatemia, Grade 1: serum Phosphorous is 3.4 today; AE intermittent. Dr Engel aware; no orders as there are no symptoms.       Per study chair, "the definition of progressive disease for this protocol is developing CRAB criteria that requires treatment systemically." Per Dr Engel, based on today's exam and lab results, patient does not have any s/s of CRAB: Normal Calcium level " (9.6), absence of Renal insufficiency (serum creatinine elevated today at 2.2 and eGFR per cockroft-gault is 38 mL/min, but per Dr Engel not related to myeloma; --patient with a history of kidney dysfunction secondary to diabetes; Dr. Engel aware of these values and not concerned for myeloma involvement), absence of significant Anemia (hemoglobin 10.7, stable; will await myeloma labs for clarity relationship to myeloma) and absence of lytic Bone lesions (per baseline metastatic survey as well as MRI--see MD note for further comment). See MD note for ECOG score and H&P and flowsheets for laboratory work, vitals, etc. All myeloma-related blood work from last cycle including SPEP and JAGUAR have remained stable, and are currently pending for this cycle. Per Dr. Engel, patient shows no evidence of progression at last result. Patient informed that Dr. Engel will release all lab results to [facility]. He states understanding of this. QOL's not required again until end of treatment/observation.           Subject maintains he wishes only to see Dr Engel and will not show for appts if scheduled with any other provider. For this reason subject's appts do not always align with study calendar; per Dr Engel as subject is not on active treatment will abide by subject's wishes to see her only.  Will continue to schedule patient as far out as possible, which seems to help maintain compliance with visits. Normally subject deviates no greater than one week per cycle; wishes to remain on study per Dr Engel. Next appt scheduled for 28 days from now; subjects states will maintain compliance; appts generally fall within 28-32 days.   Informed patient will continue to contact him a few days prior to next appt to remind him so that he can plan accordingly. Patient states having research RN's contact information and has MD contact information to call with any concerns, questions, or worsening of symptoms. Will follow up with subject once myeloma labs  are resulted.

## 2023-03-12 ENCOUNTER — PATIENT MESSAGE (OUTPATIENT)
Dept: INTERNAL MEDICINE | Facility: CLINIC | Age: 78
End: 2023-03-12
Payer: MEDICARE

## 2023-04-04 ENCOUNTER — TELEPHONE (OUTPATIENT)
Dept: HEMATOLOGY/ONCOLOGY | Facility: CLINIC | Age: 78
End: 2023-04-04
Payer: MEDICARE

## 2023-04-06 ENCOUNTER — OFFICE VISIT (OUTPATIENT)
Dept: HEMATOLOGY/ONCOLOGY | Facility: CLINIC | Age: 78
End: 2023-04-06
Payer: MEDICARE

## 2023-04-06 ENCOUNTER — RESEARCH ENCOUNTER (OUTPATIENT)
Dept: HEMATOLOGY/ONCOLOGY | Facility: CLINIC | Age: 78
End: 2023-04-06

## 2023-04-06 ENCOUNTER — LAB VISIT (OUTPATIENT)
Dept: LAB | Facility: HOSPITAL | Age: 78
End: 2023-04-06
Payer: MEDICARE

## 2023-04-06 VITALS
SYSTOLIC BLOOD PRESSURE: 153 MMHG | TEMPERATURE: 99 F | HEART RATE: 87 BPM | HEIGHT: 70 IN | DIASTOLIC BLOOD PRESSURE: 74 MMHG | RESPIRATION RATE: 22 BRPM | WEIGHT: 212.88 LBS | OXYGEN SATURATION: 100 % | BODY MASS INDEX: 30.48 KG/M2

## 2023-04-06 DIAGNOSIS — D47.2 SMOLDERING MULTIPLE MYELOMA: Primary | ICD-10-CM

## 2023-04-06 DIAGNOSIS — D47.2 SMOLDERING MULTIPLE MYELOMA: ICD-10-CM

## 2023-04-06 DIAGNOSIS — Z00.6 RESEARCH EXAM: ICD-10-CM

## 2023-04-06 LAB
ALBUMIN SERPL BCP-MCNC: 3.6 G/DL (ref 3.5–5.2)
ALP SERPL-CCNC: 57 U/L (ref 55–135)
ALT SERPL W/O P-5'-P-CCNC: 19 U/L (ref 10–44)
ANION GAP SERPL CALC-SCNC: 8 MMOL/L (ref 8–16)
AST SERPL-CCNC: 22 U/L (ref 10–40)
BASOPHILS # BLD AUTO: 0.04 K/UL (ref 0–0.2)
BASOPHILS NFR BLD: 0.9 % (ref 0–1.9)
BILIRUB SERPL-MCNC: 0.5 MG/DL (ref 0.1–1)
BUN SERPL-MCNC: 19 MG/DL (ref 8–23)
CALCIUM SERPL-MCNC: 9.4 MG/DL (ref 8.7–10.5)
CHLORIDE SERPL-SCNC: 106 MMOL/L (ref 95–110)
CO2 SERPL-SCNC: 23 MMOL/L (ref 23–29)
CREAT SERPL-MCNC: 2 MG/DL (ref 0.5–1.4)
DIFFERENTIAL METHOD: ABNORMAL
EOSINOPHIL # BLD AUTO: 0.2 K/UL (ref 0–0.5)
EOSINOPHIL NFR BLD: 4.9 % (ref 0–8)
ERYTHROCYTE [DISTWIDTH] IN BLOOD BY AUTOMATED COUNT: 14.9 % (ref 11.5–14.5)
EST. GFR  (NO RACE VARIABLE): 34 ML/MIN/1.73 M^2
GLUCOSE SERPL-MCNC: 72 MG/DL (ref 70–110)
HCT VFR BLD AUTO: 34.9 % (ref 40–54)
HGB BLD-MCNC: 11 G/DL (ref 14–18)
IGA SERPL-MCNC: 1623 MG/DL (ref 40–350)
IGG SERPL-MCNC: 1112 MG/DL (ref 650–1600)
IGM SERPL-MCNC: 34 MG/DL (ref 50–300)
IMM GRANULOCYTES # BLD AUTO: 0 K/UL (ref 0–0.04)
IMM GRANULOCYTES NFR BLD AUTO: 0 % (ref 0–0.5)
LDH SERPL L TO P-CCNC: 109 U/L (ref 110–260)
LYMPHOCYTES # BLD AUTO: 1.5 K/UL (ref 1–4.8)
LYMPHOCYTES NFR BLD: 33.7 % (ref 18–48)
MAGNESIUM SERPL-MCNC: 1.9 MG/DL (ref 1.6–2.6)
MCH RBC QN AUTO: 27.8 PG (ref 27–31)
MCHC RBC AUTO-ENTMCNC: 31.5 G/DL (ref 32–36)
MCV RBC AUTO: 88 FL (ref 82–98)
MONOCYTES # BLD AUTO: 0.4 K/UL (ref 0.3–1)
MONOCYTES NFR BLD: 8.1 % (ref 4–15)
NEUTROPHILS # BLD AUTO: 2.3 K/UL (ref 1.8–7.7)
NEUTROPHILS NFR BLD: 52.4 % (ref 38–73)
NRBC BLD-RTO: 0 /100 WBC
PHOSPHATE SERPL-MCNC: 3.1 MG/DL (ref 2.7–4.5)
PLATELET # BLD AUTO: 276 K/UL (ref 150–450)
PMV BLD AUTO: 9 FL (ref 9.2–12.9)
POTASSIUM SERPL-SCNC: 4.4 MMOL/L (ref 3.5–5.1)
PROT SERPL-MCNC: 8.6 G/DL (ref 6–8.4)
RBC # BLD AUTO: 3.95 M/UL (ref 4.6–6.2)
SODIUM SERPL-SCNC: 137 MMOL/L (ref 136–145)
WBC # BLD AUTO: 4.45 K/UL (ref 3.9–12.7)

## 2023-04-06 PROCEDURE — 86334 IMMUNOFIX E-PHORESIS SERUM: CPT | Mod: 26,HCNC,Q1, | Performed by: PATHOLOGY

## 2023-04-06 PROCEDURE — 86334 IMMUNOFIX E-PHORESIS SERUM: CPT | Mod: HCNC,Q1 | Performed by: INTERNAL MEDICINE

## 2023-04-06 PROCEDURE — 84165 PATHOLOGIST INTERPRETATION SPE: ICD-10-PCS | Mod: 26,HCNC,Q1, | Performed by: PATHOLOGY

## 2023-04-06 PROCEDURE — 99999 PR PBB SHADOW E&M-EST. PATIENT-LVL V: CPT | Mod: PBBFAC,HCNC,, | Performed by: INTERNAL MEDICINE

## 2023-04-06 PROCEDURE — 83615 LACTATE (LD) (LDH) ENZYME: CPT | Mod: HCNC,Q1 | Performed by: INTERNAL MEDICINE

## 2023-04-06 PROCEDURE — 80053 COMPREHEN METABOLIC PANEL: CPT | Mod: HCNC | Performed by: INTERNAL MEDICINE

## 2023-04-06 PROCEDURE — 83735 ASSAY OF MAGNESIUM: CPT | Mod: HCNC,Q1 | Performed by: INTERNAL MEDICINE

## 2023-04-06 PROCEDURE — 84165 PROTEIN E-PHORESIS SERUM: CPT | Mod: 26,HCNC,Q1, | Performed by: PATHOLOGY

## 2023-04-06 PROCEDURE — 84100 ASSAY OF PHOSPHORUS: CPT | Mod: HCNC,Q1 | Performed by: INTERNAL MEDICINE

## 2023-04-06 PROCEDURE — 84165 PROTEIN E-PHORESIS SERUM: CPT | Mod: HCNC,Q1 | Performed by: INTERNAL MEDICINE

## 2023-04-06 PROCEDURE — 85025 COMPLETE CBC W/AUTO DIFF WBC: CPT | Mod: HCNC | Performed by: INTERNAL MEDICINE

## 2023-04-06 PROCEDURE — 82784 ASSAY IGA/IGD/IGG/IGM EACH: CPT | Mod: HCNC,Q1 | Performed by: INTERNAL MEDICINE

## 2023-04-06 PROCEDURE — 36415 COLL VENOUS BLD VENIPUNCTURE: CPT | Mod: HCNC | Performed by: INTERNAL MEDICINE

## 2023-04-06 PROCEDURE — 83521 IG LIGHT CHAINS FREE EACH: CPT | Mod: 59,HCNC | Performed by: INTERNAL MEDICINE

## 2023-04-06 PROCEDURE — 99214 PR OFFICE/OUTPT VISIT, EST, LEVL IV, 30-39 MIN: ICD-10-PCS | Mod: Q1,HCNC,S$GLB, | Performed by: INTERNAL MEDICINE

## 2023-04-06 PROCEDURE — 99214 OFFICE O/P EST MOD 30 MIN: CPT | Mod: Q1,HCNC,S$GLB, | Performed by: INTERNAL MEDICINE

## 2023-04-06 PROCEDURE — 99999 PR PBB SHADOW E&M-EST. PATIENT-LVL V: ICD-10-PCS | Mod: PBBFAC,HCNC,, | Performed by: INTERNAL MEDICINE

## 2023-04-06 PROCEDURE — 86334 PATHOLOGIST INTERPRETATION IFE: ICD-10-PCS | Mod: 26,HCNC,Q1, | Performed by: PATHOLOGY

## 2023-04-06 NOTE — PROGRESS NOTES
Subjective:    Patient ID: Emerson Menendez is a 77 y.o. male.    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 Test results identify a stable IgA kappa protein with beta globulin band at 1.63g/dL. Kappa free light chain is elevated at 4.40. CBC and calcium are stable. Creatinine with history of stage III CKD is stable at 1.4. Metastatic survey is negative for lytic lesions. MRI of the entire spine demonstrated age related changes but no convincing evidence of myeloma bone disease. Bone marrow biopsy identified about 13% plasma cells by morphology and FISH for myeloma identified a t(11;14) and trisomies 3,7, and 17. The patient is afebrile and appears clinically well. He was seen by his podiatrist and nephrologist since our last visit without any new or acute events.    The patient has not received any therapy for smoldering myeloma including bisphosphonates or steroids. He has been randomized to observation arm of the the clinical study. 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 He has no current or prior history of malignancy.    TODAY Cycle 88 E3A06, observation cohort  No acute interval medical events; on wait list for knee replacement- may have procedure in [place]  CBC stable; CMP and MM labs pending  Mom's 99th birthday 4/29      Knee Pain     Joint Pain  Associated symptoms include coughing. Pertinent negatives include no diaphoresis, fatigue or fever.   Hand Pain     Cough  Pertinent negatives include no fever.   Foot Pain  Associated symptoms include coughing. Pertinent negatives include no diaphoresis,  fatigue or fever.   Arm Pain     Follow-up  Associated symptoms include coughing. Pertinent negatives include no diaphoresis, fatigue or fever.   Review of Systems   Constitutional:  Negative for activity change, appetite change, diaphoresis, fatigue, fever and unexpected weight change.   HENT: Negative.     Eyes: Negative.    Respiratory:  Positive for cough.    Cardiovascular:  Negative for leg swelling.   Gastrointestinal: Negative.    Endocrine: Negative.    Genitourinary: Negative.    Musculoskeletal: Negative.    Skin: Negative.    Allergic/Immunologic: Negative.    Neurological:         Grade 0-1 peripheral neuropathy of bilateral feet.    Hematological:  Negative for adenopathy. Does not bruise/bleed easily.   Psychiatric/Behavioral: Negative.       Objective:       Vitals:    04/06/23 0841   BP: (!) 153/74   Pulse: 87   Resp: (!) 22   Temp: 98.5 °F (36.9 °C)       Physical Exam  Vitals and nursing note reviewed.   Constitutional:       Appearance: He is well-developed.   HENT:      Head: Normocephalic and atraumatic.      Right Ear: External ear normal.      Left Ear: External ear normal.      Nose: Nose normal.   Eyes:      Conjunctiva/sclera: Conjunctivae normal.      Pupils: Pupils are equal, round, and reactive to light.   Cardiovascular:      Rate and Rhythm: Normal rate and regular rhythm.      Heart sounds: No murmur heard.  Pulmonary:      Effort: Pulmonary effort is normal. No respiratory distress.      Breath sounds: Normal breath sounds.   Abdominal:      General: Bowel sounds are normal. There is no distension.      Palpations: Abdomen is soft.   Musculoskeletal:         General: No tenderness.      Cervical back: Normal range of motion and neck supple.   Skin:     General: Skin is warm and dry.      Findings: No rash.      Nails: There is no clubbing.   Neurological:      Mental Status: He is alert and oriented to person, place, and time.      Cranial Nerves: No cranial nerve deficit.    Psychiatric:         Behavior: Behavior normal.       Assessment:       No diagnosis found.    Plan:       The patient has a diagnosis of smoldering myeloma. There is no indication for immediate chemotherapy. We are monitoring renal function closely- baseline creatinine of around 1.5. 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 M protein has been stable and repeat is pending. Plan for return in 1 month.  Endocrinology and Nephrology to monitor diabetes and renal failure respectively. Patient would like to continue to follow with Dr. Perkins as needed. Hopeful for knee replacements this year in [place].    A total of 20 minutes was spent in pre-visit chart review, personal interpretation of labs and imaging, and medication review. Total visit time 30 minutes, >50 % counseling.

## 2023-04-10 LAB
ALBUMIN SERPL ELPH-MCNC: 3.88 G/DL (ref 3.35–5.55)
ALPHA1 GLOB SERPL ELPH-MCNC: 0.29 G/DL (ref 0.17–0.41)
ALPHA2 GLOB SERPL ELPH-MCNC: 0.84 G/DL (ref 0.43–0.99)
B-GLOBULIN SERPL ELPH-MCNC: 2.57 G/DL (ref 0.5–1.1)
GAMMA GLOB SERPL ELPH-MCNC: 0.62 G/DL (ref 0.67–1.58)
INTERPRETATION SERPL IFE-IMP: NORMAL
KAPPA LC SER QL IA: 8.85 MG/DL (ref 0.33–1.94)
KAPPA LC/LAMBDA SER IA: 3.6 (ref 0.26–1.65)
LAMBDA LC SER QL IA: 2.46 MG/DL (ref 0.57–2.63)
PROT SERPL-MCNC: 8.2 G/DL (ref 6–8.4)

## 2023-04-10 NOTE — PROGRESS NOTES
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   Sponsor:  [facility]  IRB# [id number]  Study ID: [id number]  Investigator: GIL Engel Pt Initials: LUCÍA LORENZ        Cycle 88 Day 28 Arm B: Observation     Patient presented to clinic today for above cycle evaluation per Dr Engel; he was un-accompanied and states he is doing well overall with no complaints. He is preparing for his mother's upcoming birthday celebration back in [place] in early May. He was alert, oriented to person, place, and time; mood and affect were appropriate. He continues to maintain current COVID precautions; wears mask in crowded indoor areas; denies any COVID symptoms As per above, states continues to work actively; maintaining several contracts here in the [facility] and in the [place] area.  He verbalizes continued willingness to participate in above-mentioned study.        Review of Baseline AE's:   1.Hypertension, Grade 2 systolic/diastolic: BP today is 153/74; subject states compliance with Losartan. Will continue to monitor. Subject denies headache/dizziness; no symptoms noted. Dr. Engel aware.  AE ongoing and tends to fluctuate.   2. Lower Back Pain and Neck Pain, grade 1: Stable. Patient reports no complaints today; tends to be tranient and activity related.Of note: patient is not suspected to have bony myeloma involvement per Dr. Engel as these are pre-existing issues present at baseline. AE no change today's visit.   3. Pain in extremity (knees), grade 1. Subject presents with knee brace to both knees; reports that he has been seen and per orthopedic MD might be better to have surgery in [facility] since subject's main support system is located there and can assist post op. He maintains pain is a 6/10 today. Will continue to monitor.       4. Peripheral sensory neuropathy, grade 1: Patient does not verbalize  "complaints today. Has gabapentin prescribed and takes as needed.  States has not taken recently.  AE ongoing.   5. Anemia, Grade 1: Hgb 11.0 today. Result reviewed by Dr. Engel, noted. AE ongoing and stable.            Review of AE's:     *Please note this list is not all-inclusive, please see AE log for physician reviewed list of adverse events.   1. Creatinine increased, grade 1: Serum creatinine from labs today is 2.0; calculated GFR 43 ml/min based on CG calculation. Reviewed per Dr. Engel and no new orders.Per Dr Engel this AE is not been related to myeloma: rather, attributable to CKD;  likely secondary to diabetes and hypertension vs plasma cell dyscrasia.  Will continue observation monthly and to monitor renal functionclosely. Per MD, reiterated adequate hydration with increase in water/sugar free gatorade intake..  2. Hyponatremia, Grade 1: Serum sodium today is 137 mmol/L; AE intermittent. Will monitor  3. Hyperkalemia, Grade 1: serum K today is 4.4, Dr Engel has reviewed labs; no additional orders noted. AE intermittent; will continue to monitor.   4. Hyperuricemia, Grade 1: this was not evaluated today; usually ordered per nephrology.   5. Hyperglycemia. Grade 1: blood glucose today is 72; lab is nonfasting. Subject follows with Dr Sagastume for ongoing guidance/supervision of diabetes.    6. Diplopia, right eye: Subject states no issues right now; follows up with ophtamology regularly for issues. Per Dr Engel this is not related to SMM; rather is associated with diabetes. Will continue to monitor.   7. Hypophosphatemia, Grade 1: serum Phosphorous is 3.1 today; AE intermittent. Dr Engel aware; no orders as there are no symptoms.       Per study chair, "the definition of progressive disease for this protocol is developing CRAB criteria that requires treatment systemically." Per Dr Engel, based on today's exam and lab results, patient does not have any s/s of CRAB: Normal Calcium level (9.6), absence of Renal " insufficiency (serum creatinine elevated today at 2.2 and eGFR per cockroft-gault is 38 mL/min, but per Dr Engel not related to myeloma; --patient with a history of kidney dysfunction secondary to diabetes; Dr. Engel aware of these values and not concerned for myeloma involvement), absence of significant Anemia (hemoglobin 10.7, stable; will await myeloma labs for clarity relationship to myeloma) and absence of lytic Bone lesions (per baseline metastatic survey as well as MRI--see MD note for further comment). See MD note for ECOG score and H&P and flowsheets for laboratory work, vitals, etc. All myeloma-related blood work from last cycle including SPEP and JAGUAR have remained stable, and are currently pending for this cycle. Per Dr. Engel, patient shows no evidence of progression at last result. Patient informed that Dr. Engel will release all lab results to [facility]. He states understanding of this. QOL's not required again until end of treatment/observation.           Subject maintains he wishes only to see Dr Engel and will not show for appts if scheduled with any other provider. For this reason subject's appts do not always align with study calendar; per Dr Engel as subject is not on active treatment will abide by subject's wishes to see her only.  Will continue to schedule patient as far out as possible, which seems to help maintain compliance with visits. Normally subject deviates no greater than one week per cycle; wishes to remain on study per Dr Engel. Next appt scheduled for 28 days from now; subjects states will maintain compliance; appts generally fall within 28-32 days.   Informed patient will continue to contact him a few days prior to next appt to remind him so that he can plan accordingly. Patient states having research RN's contact information and has MD contact information to call with any concerns, questions, or worsening of symptoms. Will follow up with subject once myeloma labs are resulted.

## 2023-04-11 LAB
PATHOLOGIST INTERPRETATION IFE: NORMAL
PATHOLOGIST INTERPRETATION SPE: NORMAL

## 2023-04-30 ENCOUNTER — PATIENT MESSAGE (OUTPATIENT)
Dept: INTERNAL MEDICINE | Facility: CLINIC | Age: 78
End: 2023-04-30
Payer: MEDICARE

## 2023-04-30 ENCOUNTER — PATIENT MESSAGE (OUTPATIENT)
Dept: HEMATOLOGY/ONCOLOGY | Facility: CLINIC | Age: 78
End: 2023-04-30
Payer: MEDICARE

## 2023-05-01 NOTE — TELEPHONE ENCOUNTER
Dr Funez  Mr guajardo has multiple issues going on. I would like for you to evaluate him for pre op clearance for his knee replacement. Can your staff assist in scheduling an apt  Thanks  Roberta Sagastume M.D.

## 2023-05-05 NOTE — DISCHARGE INSTRUCTIONS
Your surgery has been scheduled for:_________6/12/2023_________________________________    You should report to:  ____[facility], located on the [place] side of the first floor of the           [facility] ([phone])  ____[facility], located on the [facility] side of the            Second floor of the [facility] ([phone])  ____3rd Floor [facility] located on the [facility] side of the [facility] [phone]  ____[facility]: located at [address] [place] [address]. Building [address].     __x__ [facility] located at [address] [place] [address] [phone]    Please Note   Tell your doctor if you take Aspirin, products containing Aspirin, herbal medications  or blood thinners, such as Coumadin, Ticlid, or Plavix.  (Consult your provider regarding holding or stopping before surgery).  Arrange for someone to drive you home following surgery.  You will not be allowed to leave the surgical facility alone or drive yourself home following sedation and anesthesia.    Before Surgery  Stop taking all herbal medications, vitamins, and supplements 7 days prior to surgery  No Motrin/Advil (Ibuprofen) 7 days before surgery  No Aleve (Naproxen) 7 days before surgery  Stop Taking Asprin, products containing Asprin __7___days before surgery  Stop taking blood thinners_na______days before surgery  No Goody's/BC  Powder 7 days before surgery  Refrain from drinking alcoholic beverages for 24hours before and after surgery  Stop or limit smoking ____7_____days before surgery  You may take Tylenol for pain    Night before Surgery  Do not eat or drink after midnight  Take a shower or bath (shower is recommended).  Bathe with Hibiclens soap or an antibacterial soap from the neck down.  If not supplied by your surgeon, hibiclens soap will need to be purchased over the counter in  pharmacy.  Rinse soap off thoroughly.  Shampoo your hair with your regular shampoo    The Day of Surgery  Take another bath or shower with hibiclens or any antibacterial soap, to reduce the chance of infection.  Take heart and blood pressure medications with a small sip of water, as advised by the perioperative team.  Do not take fluid pills  You may brush your teeth and rinse your mouth, but do not swall any additional water.   Do not apply perfumes, powder, body lotions or deodorant on the day of surgery.  Nail polish should be removed.  Do not wear makeup or moisturizer  Wear comfortable clothes, such as a button front shirt and loose fitting pants.  Leave all jewelry, including body piercings, and valuables at home.    Bring any devices you will neeed after surgery such as crutches or canes.  If you have sleep apnea, please bring your CPAP machine  In the event that your physical condition changes including the onset of a cold or respiratory illness, or if you have to delay or cancel your surgery, please notify your surgeon.     Anesthesia: Regional Anesthesia    Youre scheduled for surgery. During surgery, youll receive medicine called anesthesia to keep you comfortable and pain-free. Your surgeon has decided that youll receive regional anesthesia. This sheet tells you what to expect with this type of anesthesia.  What is regional anesthesia?  Regional anesthesia numbs one region of your body. The anesthesia may be given around nerves or into veins in your arms, neck, or legs (nerve block or Ave block). Or it may be sent into the spinal fluid (spinal anesthesia) or into the space just outside the spinal fluid (epidural anesthesia). You may also be given sedatives to help you relax.  Nerve block or [place] block  A small area of the body, such as an arm or leg, can be numbed using a nerve block or [place] block.  Nerve block. During a nerve block, your skin is numbed. A needle is then inserted near nerves that  serve the area to be numbed. Anesthetic is sent through the needle.  IV regional or Ave block. For this type of block, an IV line is put into a vein. The blood flow to the area to be numbed is blocked for a short time. Anesthetic is sent through the IV.  Spinal anesthesia  Spinal anesthesia numbs your body from about the waist down.  Anesthetic is injected into the spinal fluid. This is a substance that surrounds the spinal cord in your spinal column. The anesthetic blocks pain traveling from the body to the brain.  To receive the anesthetic, your skin is numbed at the injection site on your back.  A needle is then inserted into the spinal space. Anesthetic is sent into the spinal fluid through the needle.  Epidural anesthesia  Epidural anesthesia is most commonly used during childbirth and may also be used after surgical procedures of the chest, belly, and legs.  Anesthetic is injected into the epidural space. This is just outside the dural sac which contains the spinal fluid.  To receive the anesthetic, your skin is numbed at the injection site on your back.  A needle is then inserted into the epidural space. Anesthetic is sent into the epidural space through the needle.  A small flexible catheter may be attached to the needle and left in place. This allows for continuous injections or infusions of anesthetic.  Anesthesia tools and medicines that might be near you during your procedure  Local anesthetic. This medicine is given through a needle numbs one region of your body.  Electrocardiography leads (electrodes). These are used to record your heart rate and rhythm.  Blood pressure cuff. A cuff is placed on your arm to keep track of your blood pressure.  Pulse oximeter. This small clip is placed on the end of the finger. It measures your blood oxygen level.  Sedatives. These medicines may be given through an IV. They help to relax you and keep you comfortable. You may stay awake or sleep lightly.  Oxygen. You may  be given oxygen through a facemask.  Risks and possible complications  Regional anesthesia carries some risks. These include:  Nausea and vomiting  Headache  Backache  Decreased blood pressure  Allergic reaction to the anesthetic  Ongoing numbness (rare)  Irregular heartbeat (rare)  Cardiac arrest (rare)   Date Last Reviewed: 12/1/2016  © 7671-8373 [employer]. [address] [place] [address]. All rights reserved. This information is not intended as a substitute for professional medical care. Always follow your healthcare professional's instructions.

## 2023-05-08 ENCOUNTER — LAB VISIT (OUTPATIENT)
Dept: LAB | Facility: OTHER | Age: 78
End: 2023-05-08
Attending: INTERNAL MEDICINE
Payer: MEDICARE

## 2023-05-08 ENCOUNTER — RESEARCH ENCOUNTER (OUTPATIENT)
Dept: HEMATOLOGY/ONCOLOGY | Facility: CLINIC | Age: 78
End: 2023-05-08

## 2023-05-08 ENCOUNTER — OFFICE VISIT (OUTPATIENT)
Dept: HEMATOLOGY/ONCOLOGY | Facility: CLINIC | Age: 78
End: 2023-05-08
Payer: MEDICARE

## 2023-05-08 ENCOUNTER — TELEPHONE (OUTPATIENT)
Dept: INTERNAL MEDICINE | Facility: CLINIC | Age: 78
End: 2023-05-08
Payer: MEDICARE

## 2023-05-08 VITALS
TEMPERATURE: 98 F | HEIGHT: 70 IN | BODY MASS INDEX: 29.88 KG/M2 | RESPIRATION RATE: 18 BRPM | HEART RATE: 110 BPM | WEIGHT: 208.69 LBS | DIASTOLIC BLOOD PRESSURE: 78 MMHG | SYSTOLIC BLOOD PRESSURE: 144 MMHG | OXYGEN SATURATION: 96 %

## 2023-05-08 DIAGNOSIS — Z00.6 RESEARCH EXAM: ICD-10-CM

## 2023-05-08 DIAGNOSIS — D47.2 SMOLDERING MULTIPLE MYELOMA: Primary | ICD-10-CM

## 2023-05-08 DIAGNOSIS — E11.22 CKD STAGE 3 DUE TO TYPE 2 DIABETES MELLITUS: ICD-10-CM

## 2023-05-08 DIAGNOSIS — N18.30 CKD STAGE 3 DUE TO TYPE 2 DIABETES MELLITUS: ICD-10-CM

## 2023-05-08 DIAGNOSIS — D47.2 SMOLDERING MULTIPLE MYELOMA: ICD-10-CM

## 2023-05-08 LAB
ALBUMIN SERPL BCP-MCNC: 3.7 G/DL (ref 3.5–5.2)
ALP SERPL-CCNC: 54 U/L (ref 55–135)
ALT SERPL W/O P-5'-P-CCNC: 18 U/L (ref 10–44)
ANION GAP SERPL CALC-SCNC: 11 MMOL/L (ref 8–16)
AST SERPL-CCNC: 14 U/L (ref 10–40)
BASOPHILS # BLD AUTO: 0.03 K/UL (ref 0–0.2)
BASOPHILS NFR BLD: 0.6 % (ref 0–1.9)
BILIRUB SERPL-MCNC: 0.5 MG/DL (ref 0.1–1)
BUN SERPL-MCNC: 20 MG/DL (ref 8–23)
CALCIUM SERPL-MCNC: 9.4 MG/DL (ref 8.7–10.5)
CHLORIDE SERPL-SCNC: 102 MMOL/L (ref 95–110)
CO2 SERPL-SCNC: 20 MMOL/L (ref 23–29)
CREAT SERPL-MCNC: 2 MG/DL (ref 0.5–1.4)
DIFFERENTIAL METHOD: ABNORMAL
EOSINOPHIL # BLD AUTO: 0.2 K/UL (ref 0–0.5)
EOSINOPHIL NFR BLD: 4 % (ref 0–8)
ERYTHROCYTE [DISTWIDTH] IN BLOOD BY AUTOMATED COUNT: 14.7 % (ref 11.5–14.5)
EST. GFR  (NO RACE VARIABLE): 34 ML/MIN/1.73 M^2
GLUCOSE SERPL-MCNC: 242 MG/DL (ref 70–110)
HCT VFR BLD AUTO: 35.9 % (ref 40–54)
HGB BLD-MCNC: 11.5 G/DL (ref 14–18)
IGA SERPL-MCNC: 1690 MG/DL (ref 40–350)
IGG SERPL-MCNC: 1100 MG/DL (ref 650–1600)
IGM SERPL-MCNC: 39 MG/DL (ref 50–300)
IMM GRANULOCYTES # BLD AUTO: 0.01 K/UL (ref 0–0.04)
IMM GRANULOCYTES NFR BLD AUTO: 0.2 % (ref 0–0.5)
LDH SERPL L TO P-CCNC: 101 U/L (ref 110–260)
LYMPHOCYTES # BLD AUTO: 1.7 K/UL (ref 1–4.8)
LYMPHOCYTES NFR BLD: 32.3 % (ref 18–48)
MAGNESIUM SERPL-MCNC: 1.8 MG/DL (ref 1.6–2.6)
MCH RBC QN AUTO: 28.5 PG (ref 27–31)
MCHC RBC AUTO-ENTMCNC: 32 G/DL (ref 32–36)
MCV RBC AUTO: 89 FL (ref 82–98)
MONOCYTES # BLD AUTO: 0.4 K/UL (ref 0.3–1)
MONOCYTES NFR BLD: 7 % (ref 4–15)
NEUTROPHILS # BLD AUTO: 3 K/UL (ref 1.8–7.7)
NEUTROPHILS NFR BLD: 55.9 % (ref 38–73)
NRBC BLD-RTO: 0 /100 WBC
PHOSPHATE SERPL-MCNC: 2.8 MG/DL (ref 2.7–4.5)
PLATELET # BLD AUTO: 219 K/UL (ref 150–450)
PMV BLD AUTO: 8.9 FL (ref 9.2–12.9)
POTASSIUM SERPL-SCNC: 4.8 MMOL/L (ref 3.5–5.1)
PROT SERPL-MCNC: 8.7 G/DL (ref 6–8.4)
RBC # BLD AUTO: 4.04 M/UL (ref 4.6–6.2)
SODIUM SERPL-SCNC: 133 MMOL/L (ref 136–145)
WBC # BLD AUTO: 5.27 K/UL (ref 3.9–12.7)

## 2023-05-08 PROCEDURE — 83521 IG LIGHT CHAINS FREE EACH: CPT | Mod: HCNC | Performed by: INTERNAL MEDICINE

## 2023-05-08 PROCEDURE — 86334 IMMUNOFIX E-PHORESIS SERUM: CPT | Mod: 26,Q1,HCNC, | Performed by: PATHOLOGY

## 2023-05-08 PROCEDURE — 85025 COMPLETE CBC W/AUTO DIFF WBC: CPT | Mod: HCNC,Q1 | Performed by: INTERNAL MEDICINE

## 2023-05-08 PROCEDURE — 84165 PATHOLOGIST INTERPRETATION SPE: ICD-10-PCS | Mod: 26,Q1,, | Performed by: PATHOLOGY

## 2023-05-08 PROCEDURE — 84165 PROTEIN E-PHORESIS SERUM: CPT | Mod: HCNC | Performed by: INTERNAL MEDICINE

## 2023-05-08 PROCEDURE — 82784 ASSAY IGA/IGD/IGG/IGM EACH: CPT | Mod: 59,HCNC | Performed by: INTERNAL MEDICINE

## 2023-05-08 PROCEDURE — 99999 PR PBB SHADOW E&M-EST. PATIENT-LVL III: CPT | Mod: PBBFAC,HCNC,, | Performed by: INTERNAL MEDICINE

## 2023-05-08 PROCEDURE — 80053 COMPREHEN METABOLIC PANEL: CPT | Mod: HCNC | Performed by: INTERNAL MEDICINE

## 2023-05-08 PROCEDURE — 86334 IMMUNOFIX E-PHORESIS SERUM: CPT | Mod: HCNC,Q1 | Performed by: INTERNAL MEDICINE

## 2023-05-08 PROCEDURE — 36415 COLL VENOUS BLD VENIPUNCTURE: CPT | Mod: HCNC | Performed by: INTERNAL MEDICINE

## 2023-05-08 PROCEDURE — 99999 PR PBB SHADOW E&M-EST. PATIENT-LVL III: ICD-10-PCS | Mod: PBBFAC,HCNC,, | Performed by: INTERNAL MEDICINE

## 2023-05-08 PROCEDURE — 83735 ASSAY OF MAGNESIUM: CPT | Mod: HCNC | Performed by: INTERNAL MEDICINE

## 2023-05-08 PROCEDURE — 83615 LACTATE (LD) (LDH) ENZYME: CPT | Mod: HCNC,Q1 | Performed by: INTERNAL MEDICINE

## 2023-05-08 PROCEDURE — 84100 ASSAY OF PHOSPHORUS: CPT | Mod: HCNC,Q1 | Performed by: INTERNAL MEDICINE

## 2023-05-08 PROCEDURE — 86334 PATHOLOGIST INTERPRETATION IFE: ICD-10-PCS | Mod: 26,Q1,HCNC, | Performed by: PATHOLOGY

## 2023-05-08 PROCEDURE — 99214 PR OFFICE/OUTPT VISIT, EST, LEVL IV, 30-39 MIN: ICD-10-PCS | Mod: Q1,HCNC,S$GLB, | Performed by: INTERNAL MEDICINE

## 2023-05-08 PROCEDURE — 84165 PROTEIN E-PHORESIS SERUM: CPT | Mod: 26,Q1,, | Performed by: PATHOLOGY

## 2023-05-08 PROCEDURE — 99214 OFFICE O/P EST MOD 30 MIN: CPT | Mod: Q1,HCNC,S$GLB, | Performed by: INTERNAL MEDICINE

## 2023-05-08 RX ORDER — DIPHENHYDRAMINE HCL 50 MG
50 CAPSULE ORAL DAILY PRN
COMMUNITY

## 2023-05-08 NOTE — PROGRESS NOTES
Subjective:    Patient ID: Emerson Menendez is a 77 y.o. male.    Chief Complaint: No chief complaint on file.    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 Test results identify a stable IgA kappa protein with beta globulin band at 1.63g/dL. Kappa free light chain is elevated at 4.40. CBC and calcium are stable. Creatinine with history of stage III CKD is stable at 1.4. Metastatic survey is negative for lytic lesions. MRI of the entire spine demonstrated age related changes but no convincing evidence of myeloma bone disease. Bone marrow biopsy identified about 13% plasma cells by morphology and FISH for myeloma identified a t(11;14) and trisomies 3,7, and 17. The patient is afebrile and appears clinically well. He was seen by his podiatrist and nephrologist since our last visit without any new or acute events.    The patient has not received any therapy for smoldering myeloma including bisphosphonates or steroids. He has been randomized to observation arm of the the clinical study. 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 He has no current or prior history of malignancy.    TODAY Cycle 89 E3A06, observation cohort  No acute interval medical events; knee replacement surgery 6/12 at [facility]  CBC and CMP stable      Knee Pain     Joint Pain  Associated symptoms include coughing. Pertinent negatives include no diaphoresis, fatigue or fever.   Hand Pain     Cough  Pertinent negatives include no fever.   Foot Pain  Associated symptoms include coughing. Pertinent negatives include no diaphoresis, fatigue or fever.   Arm Pain      Follow-up  Associated symptoms include coughing. Pertinent negatives include no diaphoresis, fatigue or fever.   Review of Systems   Constitutional:  Negative for activity change, appetite change, diaphoresis, fatigue, fever and unexpected weight change.   HENT: Negative.     Eyes: Negative.    Respiratory:  Positive for cough.    Cardiovascular:  Negative for leg swelling.   Gastrointestinal: Negative.    Endocrine: Negative.    Genitourinary: Negative.    Musculoskeletal: Negative.    Skin: Negative.    Allergic/Immunologic: Negative.    Neurological:         Grade 0-1 peripheral neuropathy of bilateral feet.    Hematological:  Negative for adenopathy. Does not bruise/bleed easily.   Psychiatric/Behavioral: Negative.       Objective:       Vitals:    05/08/23 0949   BP: (!) 144/78   Pulse: 110   Resp: 18   Temp: 97.6 °F (36.4 °C)       Physical Exam  Vitals and nursing note reviewed.   Constitutional:       Appearance: He is well-developed.   HENT:      Head: Normocephalic and atraumatic.      Right Ear: External ear normal.      Left Ear: External ear normal.      Nose: Nose normal.   Eyes:      Conjunctiva/sclera: Conjunctivae normal.      Pupils: Pupils are equal, round, and reactive to light.   Cardiovascular:      Rate and Rhythm: Normal rate and regular rhythm.      Heart sounds: No murmur heard.  Pulmonary:      Effort: Pulmonary effort is normal. No respiratory distress.      Breath sounds: Normal breath sounds.   Abdominal:      General: Bowel sounds are normal. There is no distension.      Palpations: Abdomen is soft.   Musculoskeletal:         General: No tenderness.      Cervical back: Normal range of motion and neck supple.   Skin:     General: Skin is warm and dry.      Findings: No rash.      Nails: There is no clubbing.   Neurological:      Mental Status: He is alert and oriented to person, place, and time.      Cranial Nerves: No cranial nerve deficit.   Psychiatric:         Behavior: Behavior  normal.       Assessment:       1. Smoldering multiple myeloma    2. CKD stage 3 due to type 2 diabetes mellitus    3. Research exam        Plan:       The patient has a diagnosis of smoldering myeloma. There is no indication for immediate chemotherapy. We are monitoring renal function closely- baseline creatinine of around 1.5. 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 M protein has been stable and repeat is pending. Plan for return in 1 month.  Endocrinology and Nephrology to monitor diabetes and renal failure respectively. Patient would like to continue to follow with Dr. Perkins as needed.     Knee replacement surgery 6/12  No concerns from hematology for surgery.     A total of 20 minutes was spent in pre-visit chart review, personal interpretation of labs and imaging, and medication review. Total visit time 30 minutes, >50 % counseling.

## 2023-05-08 NOTE — PROGRESS NOTES
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   Sponsor:  [facility]  IRB# [id number]  Study ID: [id number]  Investigator: GIL Engel Pt Initials: LUCÍA LORENZ        Cycle 89 Day 28 Arm B: Observation     Patient presents to clinic today for above cycle evaluation per Dr Engel; he is un-accompanied and states he is doing well overall with no complaints. He informs Dr Engel that he has scheduled knee replacement surgery for Friday, June 12th. He is alert, oriented to person, place, and time; mood and affect were appropriate. He continues to maintain current COVID precautions; wearing mask here in BMT clinic and states does so in crowded indoor areas; denies any COVID symptoms As per above, states continues to work actively; maintaining several contracts here in the [facility], [place] and in the [place] area.  He verbalizes continued willingness to participate in above-mentioned study.        Review of Baseline AE's:   1.Hypertension, Grade 2 systolic/diastolic: BP today is 144/78; subject states compliance with Losartan. Will continue to monitor. Subject denies headache/dizziness; no symptoms noted. Dr. Engel aware.  AE stable/ongoing and tends to fluctuate.   2. Lower Back Pain and Neck Pain, grade 1: Stable. Patient reports no complaints today; tends to be tranient and activity related.Of note: patient is not suspected to have bony myeloma involvement per Dr. Engel as these are pre-existing issues present at baseline. AE no change today's visit.   3. Pain in extremity (knees), grade 1. Subject presents with knee brace to both knees; see interval history above; surgery to one knee replacement is 6/12/23; logistics of clearance from benign heme standpoint discussed with subject per Dr Engel. Subject states understanding. Surgeon will be Dr. Olguin; surgery will occur in [place]. Subject states he is "ready to take " "care of this."       4. Peripheral sensory neuropathy, grade 1: Patient does not verbalize complaints today. Has gabapentin prescribed and takes as needed.  States has not taken recently.  AE ongoing.   5. Anemia, Grade 1: Hgb 11.5 today. Result reviewed by Dr. Engel, noted. AE ongoing and stable.            Review of AE's:     *Please note this list is not all-inclusive, please see AE log for physician reviewed list of adverse events.   1. Creatinine increased, grade 1: Serum creatinine from labs today is stable at 2.0 mg/dl; calculated GFR 42 ml/min based on CG calculation. Reviewed per Dr. Engel and no new orders. Dr Engel maintains this AE is not related to myeloma: rather, attributable to CKD;  likely secondary to diabetes and hypertension vs plasma cell dyscrasia.  Will continue observation monthly and to monitor renal functionclosely. Per MD, reiterated adequate hydration with increase in water/sugar free gatorade intake..  2. Hyponatremia, Grade 1: Serum sodium today is 133 mmol/L; AE intermittent. Will monitor  3. Hyperkalemia, Grade 1: serum K today is 4.8, Dr Engel has reviewed labs; no additional orders noted. AE intermittent; will continue to monitor.   4. Hyperuricemia, Grade 1: this was not evaluated today; usually ordered per nephrology.   5. Hyperglycemia. Grade 1: blood glucose today is 242; lab is nonfasting. Subject follows with Dr Sagastume for ongoing guidance/supervision of diabetes.    6. Diplopia, right eye: Subject states no issues right now; follows up with ophtamology regularly for issues. Per Dr Engel this is not related to SMM; rather is associated with diabetes. Will continue to monitor.   7. Hypophosphatemia, Grade 1: serum Phosphorous is 2.8 today; AE intermittent. Dr Engel aware; no orders as there are no symptoms.       Per study chair, "the definition of progressive disease for this protocol is developing CRAB criteria that requires treatment systemically." Per Dr Engel, based on today's " exam and lab results, patient does not have any s/s of CRAB: Normal Calcium level (9.6), absence of Renal insufficiency (serum creatinine elevated today at 2.2 and eGFR per cockroft-gault is 38 mL/min, but per Dr Engel not related to myeloma; --patient with a history of kidney dysfunction secondary to diabetes; Dr. Engel aware of these values and not concerned for myeloma involvement), absence of significant Anemia (hemoglobin 10.7, stable; will await myeloma labs for clarity relationship to myeloma) and absence of lytic Bone lesions (per baseline metastatic survey as well as MRI--see MD note for further comment). See MD note for ECOG score and H&P and flowsheets for laboratory work, vitals, etc. All myeloma-related blood work from last cycle including SPEP and JAGUAR have remained stable, and are currently pending for this cycle. Per Dr. Engel, patient shows no evidence of progression at last result. Patient informed that Dr. Engel will release all lab results to [facility]. He states understanding of this. QOL's not required again until end of treatment/observation.           Subject maintains he wishes only to see Dr Engel and will not show for appts if scheduled with any other provider. For this reason subject's appts do not always align with study calendar; per Dr Engel as subject is not on active treatment will abide by subject's wishes to see her only.  Will continue to schedule patient as far out as possible, which helps to maintain compliance with visits. Normally subject deviates no greater than one week per cycle; wishes to remain on study per Dr Engel. Next appt scheduled for 25 days from now to align with subject's upcoming surgery and planned time spent in [place]; subject states will maintain compliance; appts generally fall within 28-32 days. Plan to discuss follow up post knee replacement at next visit; labs will more than likely be collected in [place] during subject's initial rehab period post  surgery. Discussed remote MD visits; subject states he has done this in past. Will notify appropriate E3A06 study personnel at time of follow up.   Informed patient will continue to contact him a few days prior to next appt to remind him so that he can plan accordingly. Patient states having research RN's contact information and has MD contact information to call with any concerns, questions, or worsening of symptoms. Will follow up with subject once myeloma labs are resulted.

## 2023-05-09 ENCOUNTER — CLINICAL SUPPORT (OUTPATIENT)
Dept: REHABILITATION | Facility: OTHER | Age: 78
End: 2023-05-09
Attending: INTERNAL MEDICINE
Payer: MEDICARE

## 2023-05-09 ENCOUNTER — OFFICE VISIT (OUTPATIENT)
Dept: INTERNAL MEDICINE | Facility: CLINIC | Age: 78
End: 2023-05-09
Payer: MEDICARE

## 2023-05-09 ENCOUNTER — HOSPITAL ENCOUNTER (OUTPATIENT)
Dept: PREADMISSION TESTING | Facility: HOSPITAL | Age: 78
Discharge: HOME | End: 2023-05-09
Attending: INTERNAL MEDICINE | Admitting: INTERNAL MEDICINE
Payer: MEDICARE

## 2023-05-09 ENCOUNTER — TELEPHONE (OUTPATIENT)
Dept: ADMINISTRATIVE | Facility: HOSPITAL | Age: 78
End: 2023-05-09
Payer: MEDICARE

## 2023-05-09 VITALS
HEART RATE: 102 BPM | OXYGEN SATURATION: 96 % | BODY MASS INDEX: 29.89 KG/M2 | HEIGHT: 70 IN | DIASTOLIC BLOOD PRESSURE: 74 MMHG | RESPIRATION RATE: 18 BRPM | TEMPERATURE: 97 F | SYSTOLIC BLOOD PRESSURE: 127 MMHG | WEIGHT: 208.81 LBS

## 2023-05-09 DIAGNOSIS — F41.9 ANXIETY: ICD-10-CM

## 2023-05-09 DIAGNOSIS — E08.21 DIABETES MELLITUS DUE TO UNDERLYING CONDITION WITH DIABETIC NEPHROPATHY, WITH LONG-TERM CURRENT USE OF INSULIN: ICD-10-CM

## 2023-05-09 DIAGNOSIS — R29.898 LEFT LEG WEAKNESS: Primary | ICD-10-CM

## 2023-05-09 DIAGNOSIS — R35.1 NOCTURIA: ICD-10-CM

## 2023-05-09 DIAGNOSIS — H40.1132 PRIMARY OPEN ANGLE GLAUCOMA OF BOTH EYES, MODERATE STAGE: ICD-10-CM

## 2023-05-09 DIAGNOSIS — K21.9 GASTROESOPHAGEAL REFLUX DISEASE, UNSPECIFIED WHETHER ESOPHAGITIS PRESENT: ICD-10-CM

## 2023-05-09 DIAGNOSIS — L02.429 FURUNCLE OF AXILLA, UNSPECIFIED LATERALITY: ICD-10-CM

## 2023-05-09 DIAGNOSIS — M47.812 OSTEOARTHRITIS OF CERVICAL SPINE, UNSPECIFIED SPINAL OSTEOARTHRITIS COMPLICATION STATUS: ICD-10-CM

## 2023-05-09 DIAGNOSIS — G56.03 BILATERAL CARPAL TUNNEL SYNDROME: ICD-10-CM

## 2023-05-09 DIAGNOSIS — D64.9 ANEMIA, UNSPECIFIED TYPE: ICD-10-CM

## 2023-05-09 DIAGNOSIS — N18.32 STAGE 3B CHRONIC KIDNEY DISEASE: Chronic | ICD-10-CM

## 2023-05-09 DIAGNOSIS — I10 ESSENTIAL HYPERTENSION: ICD-10-CM

## 2023-05-09 DIAGNOSIS — Z01.818 PREOP EXAMINATION: Primary | ICD-10-CM

## 2023-05-09 DIAGNOSIS — Z79.4 TYPE 2 DIABETES MELLITUS WITH HYPERGLYCEMIA, WITH LONG-TERM CURRENT USE OF INSULIN: Chronic | ICD-10-CM

## 2023-05-09 DIAGNOSIS — J30.2 SEASONAL ALLERGIES: ICD-10-CM

## 2023-05-09 DIAGNOSIS — Z79.4 DIABETES MELLITUS DUE TO UNDERLYING CONDITION WITH DIABETIC NEPHROPATHY, WITH LONG-TERM CURRENT USE OF INSULIN: ICD-10-CM

## 2023-05-09 DIAGNOSIS — E11.65 TYPE 2 DIABETES MELLITUS WITH HYPERGLYCEMIA, WITH LONG-TERM CURRENT USE OF INSULIN: Chronic | ICD-10-CM

## 2023-05-09 DIAGNOSIS — D47.2 SMOLDERING MULTIPLE MYELOMA: ICD-10-CM

## 2023-05-09 DIAGNOSIS — B02.23 POST-HERPETIC POLYNEUROPATHY: ICD-10-CM

## 2023-05-09 DIAGNOSIS — I70.0 AORTIC ATHEROSCLEROSIS: ICD-10-CM

## 2023-05-09 DIAGNOSIS — E66.3 OVERWEIGHT (BMI 25.0-29.9): Chronic | ICD-10-CM

## 2023-05-09 DIAGNOSIS — G47.33 OSA (OBSTRUCTIVE SLEEP APNEA): ICD-10-CM

## 2023-05-09 PROBLEM — E66.01 SEVERE OBESITY (BMI 35.0-39.9) WITH COMORBIDITY: Status: RESOLVED | Noted: 2022-09-20 | Resolved: 2023-05-09

## 2023-05-09 PROBLEM — G56.02 CARPAL TUNNEL SYNDROME ON LEFT: Status: RESOLVED | Noted: 2020-08-03 | Resolved: 2023-05-09

## 2023-05-09 PROBLEM — R05.3 CHRONIC COUGH: Status: RESOLVED | Noted: 2022-10-03 | Resolved: 2023-05-09

## 2023-05-09 PROBLEM — M25.532 ACUTE PAIN OF LEFT WRIST: Status: RESOLVED | Noted: 2021-04-08 | Resolved: 2023-05-09

## 2023-05-09 PROBLEM — M25.552 PAIN OF BOTH HIP JOINTS: Status: RESOLVED | Noted: 2018-07-26 | Resolved: 2023-05-09

## 2023-05-09 PROBLEM — M25.551 PAIN OF BOTH HIP JOINTS: Status: RESOLVED | Noted: 2018-07-26 | Resolved: 2023-05-09

## 2023-05-09 LAB
ALBUMIN SERPL ELPH-MCNC: 3.63 G/DL (ref 3.35–5.55)
ALPHA1 GLOB SERPL ELPH-MCNC: 0.26 G/DL (ref 0.17–0.41)
ALPHA2 GLOB SERPL ELPH-MCNC: 0.83 G/DL (ref 0.43–0.99)
B-GLOBULIN SERPL ELPH-MCNC: 1.02 G/DL (ref 0.5–1.1)
GAMMA GLOB SERPL ELPH-MCNC: 2.06 G/DL (ref 0.67–1.58)
INTERPRETATION SERPL IFE-IMP: NORMAL
KAPPA LC SER QL IA: 9.24 MG/DL (ref 0.33–1.94)
KAPPA LC/LAMBDA SER IA: 3.67 (ref 0.26–1.65)
LAMBDA LC SER QL IA: 2.52 MG/DL (ref 0.57–2.63)
PATHOLOGIST INTERPRETATION IFE: NORMAL
PROT SERPL-MCNC: 7.8 G/DL (ref 6–8.4)

## 2023-05-09 PROCEDURE — 3074F PR MOST RECENT SYSTOLIC BLOOD PRESSURE < 130 MM HG: ICD-10-PCS | Mod: CPTII,S$GLB,, | Performed by: HOSPITALIST

## 2023-05-09 PROCEDURE — 3074F SYST BP LT 130 MM HG: CPT | Mod: CPTII,S$GLB,, | Performed by: HOSPITALIST

## 2023-05-09 PROCEDURE — 99215 OFFICE O/P EST HI 40 MIN: CPT | Mod: S$GLB,,, | Performed by: HOSPITALIST

## 2023-05-09 PROCEDURE — 3078F DIAST BP <80 MM HG: CPT | Mod: CPTII,S$GLB,, | Performed by: HOSPITALIST

## 2023-05-09 PROCEDURE — 99999 PR PBB SHADOW E&M-EST. PATIENT-LVL V: ICD-10-PCS | Mod: PBBFAC,HCNC,, | Performed by: HOSPITALIST

## 2023-05-09 PROCEDURE — 1125F AMNT PAIN NOTED PAIN PRSNT: CPT | Mod: CPTII,S$GLB,, | Performed by: HOSPITALIST

## 2023-05-09 PROCEDURE — 3288F FALL RISK ASSESSMENT DOCD: CPT | Mod: CPTII,S$GLB,, | Performed by: HOSPITALIST

## 2023-05-09 PROCEDURE — 99999 PR PBB SHADOW E&M-EST. PATIENT-LVL V: CPT | Mod: PBBFAC,HCNC,, | Performed by: HOSPITALIST

## 2023-05-09 PROCEDURE — 3078F PR MOST RECENT DIASTOLIC BLOOD PRESSURE < 80 MM HG: ICD-10-PCS | Mod: CPTII,S$GLB,, | Performed by: HOSPITALIST

## 2023-05-09 PROCEDURE — 1159F PR MEDICATION LIST DOCUMENTED IN MEDICAL RECORD: ICD-10-PCS | Mod: CPTII,S$GLB,, | Performed by: HOSPITALIST

## 2023-05-09 PROCEDURE — 1101F PR PT FALLS ASSESS DOC 0-1 FALLS W/OUT INJ PAST YR: ICD-10-PCS | Mod: CPTII,S$GLB,, | Performed by: HOSPITALIST

## 2023-05-09 PROCEDURE — 97161 PT EVAL LOW COMPLEX 20 MIN: CPT | Mod: HCNC

## 2023-05-09 PROCEDURE — 97110 THERAPEUTIC EXERCISES: CPT | Mod: HCNC

## 2023-05-09 PROCEDURE — 1160F PR REVIEW ALL MEDS BY PRESCRIBER/CLIN PHARMACIST DOCUMENTED: ICD-10-PCS | Mod: CPTII,S$GLB,, | Performed by: HOSPITALIST

## 2023-05-09 PROCEDURE — 1159F MED LIST DOCD IN RCRD: CPT | Mod: CPTII,S$GLB,, | Performed by: HOSPITALIST

## 2023-05-09 PROCEDURE — 1101F PT FALLS ASSESS-DOCD LE1/YR: CPT | Mod: CPTII,S$GLB,, | Performed by: HOSPITALIST

## 2023-05-09 PROCEDURE — 3288F PR FALLS RISK ASSESSMENT DOCUMENTED: ICD-10-PCS | Mod: CPTII,S$GLB,, | Performed by: HOSPITALIST

## 2023-05-09 PROCEDURE — 99215 PR OFFICE/OUTPT VISIT, EST, LEVL V, 40-54 MIN: ICD-10-PCS | Mod: S$GLB,,, | Performed by: HOSPITALIST

## 2023-05-09 PROCEDURE — 1125F PR PAIN SEVERITY QUANTIFIED, PAIN PRESENT: ICD-10-PCS | Mod: CPTII,S$GLB,, | Performed by: HOSPITALIST

## 2023-05-09 PROCEDURE — 1160F RVW MEDS BY RX/DR IN RCRD: CPT | Mod: CPTII,S$GLB,, | Performed by: HOSPITALIST

## 2023-05-09 RX ORDER — DEXTROMETHORPHAN HYDROBROMIDE, GUAIFENESIN 5; 100 MG/5ML; MG/5ML
650 LIQUID ORAL
COMMUNITY

## 2023-05-09 NOTE — ASSESSMENT & PLAN NOTE
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

## 2023-05-09 NOTE — ASSESSMENT & PLAN NOTE
He gained weight from reduced activity due to knee problems which should improve after increased mobility after surgery    Weight related conditions     Known to have     HTN  Type 2 Diabetes   Hyperlipidemia   Sleep apnea   Acid reflux   Osteoarthritis    Not troubled with / Not known to have       Fatty liver       Encouraged weight loss

## 2023-05-09 NOTE — HPI
History of present illness- I had the pleasure of meeting this pleasant 77 y.o. gentleman in the pre op clinic prior to his elective Orthopedic surgery. The patient is new to me .   I have offered to have his wife on the phone during the consultation process.  I have called his wife Dilia and had her on the phone    I have obtained the history by speaking to the patient and by reviewing the electronic health records.    Events leading up to surgery / History of presenting illness -    He has been troubled with severe  Left knee   pain for 1 year  .   Pain increases with activity and decreases with resting.  He takes  2 or 3 arthritis strength  Tylenol once in 2 -3 days  Has not had any previous left knee surgeries   He attributes his arthritis of the knee to his job as an  climbing up and down the poles and also playing tennis in the past   He anticipates that he may require opposite knee replacement surgery also in future      Relevant health conditions of significance for the perioperative period/ History of presenting illness -    Subjectively describes health as good   He still works at 77 [employer]    He plans on continuing to work    Health conditions of significance for the perioperative period      - HTN  - Diabetes Mellitus   - BPH-   - CKD stage 3  - MALINI   - 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

## 2023-05-09 NOTE — ASSESSMENT & PLAN NOTE
His creatinine is about 2 as his baseline  To his understanding diabetes cause his kidney problem   He also has smoldering myeloma    He knows not to take anti-inflammatory medication for pain due to kidney problem  He seems to have room with improvement with hydration  I suggested hydrating    Stages of CKD discussed  Deleterious effects NSAID's , Beneficial effects of Hydration discussed   Tylenol as needed for pain     I  suggest monitoring renal function, in put and out put status sirisha-operatively. I  suggest avoiding nephrotoxic medication including NSAIDs, COX2 inhibitors, intravenous contrast agent,avoiding hypotension to prevent further renal impairment.     He is at risk of having increased creatinine compared to baseline after surgery which will hopefully settle back to his baseline with avoiding nephrotoxic medication like anti-inflammatory medication for his pain

## 2023-05-09 NOTE — ASSESSMENT & PLAN NOTE
On treatment with oral agent Farxiga and short and long-acting insulin    Hemoglobin A1c- Feb 2023- 7.8   He uses his insulin on a regular basis   He travels as a part of his work and he eats healthier foods  Capillary glucose check-he is on continuous glucose monitoring and has an alarm set for high or low sugars  Pre breakfast -130-140  Pre lunch -120  Pre [id number]  Bed time- 160-170    He thinks his problem with regards to elevated glucose is nighttime snacking and he is trying to avoid that by drinking Glucerna at nighttime to carb craving for snack  He has been having a positive effect with this intervention    Diabetes Complications     Microvascular      known to have   Diabetes affecting the eyes, Kidneys    tingling of  feet  No reported open areas on the feet   Feet care suggested     Macrovascular     No stroke/ TIA  Not known to have CAD  No suggestion of  lower extremity claudication      Diabetes Mellitus-I suggest monitoring the glucose in the perioperative period ( Before meals and bed time,if the patient is on oral feeds or every 6 hourly ,if the patient is NPO )  Blood glucose target in hospitalized patients is 140-180. Oral Hypoglycemic agents are generally avoided during the hospital stay . If glucose is consistently elevated ,I suggest using basal ,prandial Insulin regimen to control the glucose , as elevated glucose can be associated with adverse surgical out comes. Please consider involving Hospital Medicine or Endocrinology ,if any help is needed with Glucose control. Patient will be instructed based on the pre op clinic guidelines  about adjustment of diabetic treatment (If applicable )  considering the NPO status for Surgery      I had educated that uncontrolled DM can cause post op complications,risk of infection, wound healing problem,increased length of stay in hospital and its associated complications.I suggest exercise as much as possible and follow diabetic diet

## 2023-05-09 NOTE — ASSESSMENT & PLAN NOTE
As per his understanding he is on blood pressure medication to help with his kidneys   He is on losartan once a day in the morning time which I suggested holding on the morning of surgery    Home BP readings -120-130-135/70-80  Recent BP readings in the record-  Vitals - 1 value per visit 5/9/2023   SYSTOLIC 127   DIASTOLIC 74     Hypertension-  Blood pressure is acceptable .I suggest holding -losartan- on the morning of the surgery and can continue that  post operatively under blood pressure, electrolyte and renal function monitoring as long as they are acceptable.I suggest addressing pain control as uncontrolled pain can increased blood pressure     With increased mobility after knee replacement surgery his blood pressure will likely improve

## 2023-05-09 NOTE — ASSESSMENT & PLAN NOTE
He had shingles related pain in the upper abdomen which is no longer troubling him  He has been vaccinated for shingles

## 2023-05-09 NOTE — ASSESSMENT & PLAN NOTE
He finds omeprazole to be very helpful   He attributes his acid reflux to drinking coffee which she plans on cutting down  He drinks 3-4 cups of coffee in the morning  GERD Symptoms -acid taste to the throat    Does not sound Cardiac       GERD-  I suggest continuation of the Proton pump inhibitor in the perioperative period . I suggest aspiration precautions    Contributors   Nighttime snacking   Coffee

## 2023-05-09 NOTE — PROGRESS NOTES
[facility]  Progress Note    Patient Name: Emerson Menendez  MRN: [id number]  Date of Evaluation- 06/09/2023  PCP- Roberta Sagastume MD    Future cases for Emerson Menendez [[id number]]       Case ID Status Date Time Hair Procedure Provider Location    [id number] [facility] 6/12/2023  1:06  L TKA Kaleb Olguin MD [[id number]] [facility] MAIN OR            HPI:  History of present illness- I had the pleasure of meeting this pleasant 77 y.o. gentleman in the pre op clinic prior to his elective Orthopedic surgery. The patient is new to me .   I have offered to have his wife on the phone during the consultation process.  I have called his wife Dilia and had her on the phone    I have obtained the history by speaking to the patient and by reviewing the electronic health records.    Events leading up to surgery / History of presenting illness -    He has been troubled with severe  Left knee   pain for 1 year  .   Pain increases with activity and decreases with resting.  He takes  2 or 3 arthritis strength  Tylenol once in 2 -3 days  Has not had any previous left knee surgeries   He attributes his arthritis of the knee to his job as an  climbing up and down the poles and also playing tennis in the past   He anticipates that he may require opposite knee replacement surgery also in future      Relevant health conditions of significance for the perioperative period/ History of presenting illness -    Subjectively describes health as good   He still works at 77 [address]    He plans on continuing to work    Health conditions of significance for the perioperative period      - HTN  - Diabetes Mellitus   - BPH-   - CKD stage 3  - MALINI   - 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-   - GERD    He is a  ( [employer] ) and he travels as a part of his work ( [place], [place], [place] ) and his wife is in [place] and  he plans on having surgery at [place] so that she can help with the recovery  "process               Subjective/ Objective:     Chief complaint-Preoperative evaluation, Perioperative Medical management, complication reduction plan     Active cardiac conditions- none    Revised cardiac risk index predictors- insulin requiring diabetes mellitus and creatinine more than 2    Functional capacity -Examples of physical activity , travels a lot , stays active , can take 1 flight of stairs and cutting the grass with a mower----- He can undertake all the above activities without  chest pain,chest tightness, Shortness of breath ,dizziness,lightheadedness making his exercise tolerance more,   than 4 Mets.       Review of Systems   Constitutional:  Negative for chills and fever.          Weight gain from reduced activity   HENT:          Sleep apnea   Eyes:         No unusual vision changes   Respiratory:          No cough , phlegm    No Hemoptysis   Cardiovascular:         As noted   Gastrointestinal:         Bowels- Regular /fiber is helping him and he has been taking fiber for a long time  No overt GI/ blood losses   Endocrine:        Prednisone use > 20 mg daily for 3 weeks- None   Genitourinary:  Negative for dysuria.        Urinary hesitancy    Musculoskeletal:         As above      Skin:  Negative for rash.   Neurological:  Negative for syncope.        No unilateral weakness   Hematological:         Current use of Anticoagulants  None   Psychiatric/Behavioral:            No SI/HI   No vascular stenting          No anesthesia, bleeding, cardiac problems , PONV with previous surgeries/procedures.  Medications and Allergies reviewed in epic.     FH- No anesthesia,bleeding / venous thrombosis , e in family    Physical Exam  Blood pressure 127/74, pulse 102, temperature 97.1 °F (36.2 °C), temperature source Oral, resp. rate 18, height 5' 10" (1.778 m), weight 94.7 kg (208 lb 12.8 oz), SpO2 96 %.  Anxiety - came through traffic    Physical Exam  Constitutional- Vitals - Body mass index is 29.96 kg/m²., "   Vitals:    05/09/23 0805   BP: 127/74   Pulse: 102   Resp: 18   Temp: 97.1 °F (36.2 °C)     General appearance-Conscious,Coherent  Eyes- No conjunctival icterus,pupils  round   ENT-Oral cavity- moist  and lower partial denture    , Hearing grossly normal   Neck- No thyromegaly ,Trachea -central, No jugular venous distension,   No Carotid Bruit   Cardiovascular -Heart Sounds- Normal  and  no murmur   , No gallop rhythm   Respiratory - Normal Respiratory Effort, Normal breath sounds,  no wheeze , and  no forced expiratory wheeze    Peripheral pitting pedal edema-- none , no calf pain   Gastrointestinal -Soft abdomen, No palpable masses, Non Tender,Liver,Spleen not palpable. No-- free fluid and shifting dullness  Musculoskeletal- No finger Clubbing. Strength grossly normal   Lymphatic-No Palpable cervical, axillary,Inguinal lymphadenopathy   Psychiatric - normal effect,Orientation  Rt Dorsalis pedis pulses-palpable    Lt Dorsalis pedis pulses- palpable   Rt Posterior tibial pulses -palpable   Left posterior tibial pulses -palpable   Miscellaneous -  no renal bruit   Investigations  Lab and Imaging have been reviewed in epic.    2012    1.  1 - 39% stenosis of the right internal carotid artery with   homogeneous plaque .   2.  1 - 39% stenosis of the left internal carotid artery with     homogeneous plaque     Review of Medicine tests    EKG- I had independently reviewed the EKG from--3/9/2023  It was reported to be showing     Normal sinus rhythm   Normal ECG   When compared with ECG of 25-JAN-2016 12:11,   No significant change was found     Review of clinical lab tests:  Lab Results   Component Value Date    CREATININE 2.0 (H) 05/08/2023    HGB 11.5 (L) 05/08/2023     05/08/2023           Review of old records- Was done and information gathered regards to events leading to surgery and health conditions of significance in the perioperative period.        Preoperative cardiac risk assessment-  The patient does  not have any active cardiac conditions . Revised cardiac risk index predictors- 2---.Functional capacity is more than 4 Mets. He will be undergoing a Orthopedic procedure that carries a Moderate Risk risk     Risk of a major Cardiac event ( Defined as death, myocardial infarction, or cardiac arrest at 30 days after noncardiac surgery), based on RCRI score     10.1%       No further cardiac work up is indicated prior to proceeding with the surgery     Orders Placed This Encounter    Hemoglobin A1C    Protime-INR    Ambulatory referral/consult to Dermatology    Ambulatory referral/consult to Urology    Ambulatory referral/consult to Nephrology       American Society of Anesthesiologists Physical status classification ( ASA ) class: 3     Postoperative pulmonary complication risk assessment:         Melanie Respiratory failure index- percentage risk of respiratory failure: 0.5 %    Assessment/Plan:     Post-herpetic polyneuropathy  He had shingles related pain in the upper abdomen which is no longer troubling him  He has been vaccinated for shingles    Osteoarthritis of cervical spine  He gets massages and goes to chiropractor  I suggested care with neck manipulation    He has no problems with the dropping things or problems with small objects like keys coins or buttons  No balance problems    Carpal tunnel syndrome  He had carpal tunnel syndrome surgery on both sides and is doing good    Anxiety  He lost his only child, a daughter in 2020  She had lupus and was on dialysis  He is taking Lexapro and is doing well    He got  in 2021 and his wife is very supportive and is helping him with the bereavement process with his daughters passing    I suggest monitoring the sodium as SIADH from Lexapro   use and hypersecretion of ADH associated with surgery can reduce sodium in the perioperative period    Him still working will be helping with the bereavement process and with improvement of physical health with the knee  replacement surgery, his mental health will likely improve also    Primary open angle glaucoma of both eyes, moderate stage  He is using eyedrops  He feels pretty good with his vision  I suggested care with driving  He was getting eye injections Eylea and has not had them in about 6 months time    His diabetes has affected his eyes    Essential hypertension  As per his understanding he is on blood pressure medication to help with his kidneys   He is on losartan once a day in the morning time which I suggested holding on the morning of surgery    Home BP readings -120-130-135/70-80  Recent BP readings in the record-  Vitals - 1 value per visit 5/9/2023   SYSTOLIC 127   DIASTOLIC 74     Hypertension-  Blood pressure is acceptable .I suggest holding -losartan- on the morning of the surgery and can continue that  post operatively under blood pressure, electrolyte and renal function monitoring as long as they are acceptable.I suggest addressing pain control as uncontrolled pain can increased blood pressure     With increased mobility after knee replacement surgery his blood pressure will likely improve    Aortic atherosclerosis  He is on a statin which I suggested to continue through the surgery  He is on aspirin 81 mg by mouth once a day for preventative reason  He is not known to have a heart attack, stroke or mini-stroke or has  any suggestions of intermittent claudication  As per his understanding, he was suggested to hold aspirin 2 days prior to surgery  He is not known to have any arterial stenting    Nocturia  He had laser vaporization and enucleation of the prostate 2013  He is urinating multiple times at nighttime and is having problem emptying his bladder   He is on Flomax  Prior to most recent time he was not taking Flomax on a regular basis but lately he has been taking Flomax on a regular basis    It appears that he is not doing very well with his urination and bladder emptying which could become a problem  after surgery as he can have postoperative urinary retention  I will requested evaluation with his urologist Dr. Walker    Chronic kidney disease, stage III (moderate)  His creatinine is about 2 as his baseline  To his understanding diabetes cause his kidney problem   He also has smoldering myeloma    He knows not to take anti-inflammatory medication for pain due to kidney problem  He seems to have room with improvement with hydration  I suggested hydrating    Stages of CKD discussed  Deleterious effects NSAID's , Beneficial effects of Hydration discussed   Tylenol as needed for pain     I  suggest monitoring renal function, in put and out put status sirisha-operatively. I  suggest avoiding nephrotoxic medication including NSAIDs, COX2 inhibitors, intravenous contrast agent,avoiding hypotension to prevent further renal impairment.     He is at risk of having increased creatinine compared to baseline after surgery which will hopefully settle back to his baseline with avoiding nephrotoxic medication like anti-inflammatory medication for his pain      Boil, axilla  He has occasional Boils  under the axilla for which he uses doxycycline   He does not have any current flare-up   He plans on using doxycycline that he uses on an as needed basis to ensure that he does not have any problem around the time of the surgery  He uses doxycycline on an as needed basis    I suggested care with avoidance of any infections given his upcoming joint replacement surgery and also in future to avoid any infections that can lead to prosthetic joint infection    I have requested dermatology evaluation so that he can work on avoiding any future infections    He is mildly tender in the right axillary area where the lesions were and he plans on taking doxycycline which he finds to be very effective    Smoldering multiple myeloma  He is under hematology care and currently not on any active treatment for myeloma  His calcium is normal and creatinine  is stable      Anemia  His hemoglobin is mildly low at about 11 and has been in that range in the past also   He does not have any visible blood losses like blood in the bowel movement or black tarry stool or throwing up of any blood or coffee like vomit or urinary blood losses   It is likely that his anemia could be from renal impairment/myeloma   At that level of hemoglobin the hemoglobin may not be a problem around the time of surgery  Anemia:  I suggest monitoring his Hemoglobin perioperatively in view of his history of pre existing anemia.      Type 2 diabetes mellitus with hyperglycemia, with long-term current use of insulin    On treatment with oral agent Farxiga and short and long-acting insulin    Hemoglobin A1c- Feb 2023- 7.8   He uses his insulin on a regular basis   He travels as a part of his work and he eats healthier foods  Capillary glucose check-he is on continuous glucose monitoring and has an alarm set for high or low sugars  Pre breakfast -130-140  Pre lunch -120  Pre [id number]  Bed time- 160-170    He thinks his problem with regards to elevated glucose is nighttime snacking and he is trying to avoid that by drinking Glucerna at nighttime to carb craving for snack  He has been having a positive effect with this intervention    Diabetes Complications     Microvascular      known to have   Diabetes affecting the eyes, Kidneys    tingling of  feet  No reported open areas on the feet   Feet care suggested     Macrovascular     No stroke/ TIA  Not known to have CAD  No suggestion of  lower extremity claudication      Diabetes Mellitus-I suggest monitoring the glucose in the perioperative period ( Before meals and bed time,if the patient is on oral feeds or every 6 hourly ,if the patient is NPO )  Blood glucose target in hospitalized patients is 140-180. Oral Hypoglycemic agents are generally avoided during the hospital stay . If glucose is consistently elevated ,I suggest using basal ,prandial  Insulin regimen to control the glucose , as elevated glucose can be associated with adverse surgical out comes. Please consider involving Hospital Medicine or Endocrinology ,if any help is needed with Glucose control. Patient will be instructed based on the pre op clinic guidelines  about adjustment of diabetic treatment (If applicable )  considering the NPO status for Surgery      I had educated that uncontrolled DM can cause post op complications,risk of infection, wound healing problem,increased length of stay in hospital and its associated complications.I suggest exercise as much as possible and follow diabetic diet            Overweight (BMI 25.0-29.9)  He gained weight from reduced activity due to knee problems which should improve after increased mobility after surgery    Weight related conditions     Known to have     HTN  Type 2 Diabetes   Hyperlipidemia   Sleep apnea   Acid reflux   Osteoarthritis    Not troubled with / Not known to have       Fatty liver       Encouraged weight loss    GERD (gastroesophageal reflux disease)  He finds omeprazole to be very helpful   He attributes his acid reflux to drinking coffee which she plans on cutting down  He drinks 3-4 cups of coffee in the morning  GERD Symptoms -acid taste to the throat    Does not sound Cardiac       GERD-  I suggest continuation of the Proton pump inhibitor in the perioperative period . I suggest aspiration precautions    Contributors   Nighttime snacking   Coffee    MALINI (obstructive sleep apnea)  He was given CPAP but he did not like the machine he is no longer using CPAP  He does not have a CPAP anymore     Informed the risk of worsening sleep apnea in the perioperative period       I suggest follow up  and suggest caution with usage of medication that can cause respiratory suppression in the perioperative period  potential ramifications of untreated sleep apnea, which could include daytime sleepiness, hypertension, heart disease and stroke  were discussed    Avoidance of  supine sleep, weight gain , alcoholic beverages , care with , sedative , CNS depressant use indicated  since all of these can worsen MALINI         Seasonal allergies  Doing well from allergies        Preventive perioperative care    Thromboembolic prophylaxis:  His risk factors for thrombosis include surgical procedure and age.I suggest  thromboembolic prophylaxis ( mechanical/pharmacological, weighing the risk benefits of pharmacological agent use considering sirisha procedural bleeding )  during the perioperative period.I suggested being active in the post operative period.   The patient is a candidate for extended DVT prophylaxis      Postoperative pulmonary complication prophylaxis-Risk factors for post operative pulmonary complications include age over 65 years and ASA class >2- I suggest incentive spirometry use, early ambulation, and end tidal carbon dioxide monitoring  , oral care , head end of bed elevation      Renal complication prophylaxis-Risk factors for renal complications include pre-existing renal disease, diabetes mellitus, and hypertension . I suggest keeping him well hydrated and avoidance/ minimizing the use of  NSAID's,MUNIZ 2 Inhibitors ,IV contrast if possible in the perioperative period.     Surgical site Infection Prophylaxis-I  suggest appropriate antibiotic for Prophylaxis against Surgical site infections  No reported Staph infection  Skin antibacterial discussed       Delirium prophylaxis-Risk factors - Advanced Age - I suggest avoidance / minimizing the use of  Benzodiazepines ( unless the patient has been taking it on a regular basis ),Anticholinergic medication,Antihistamines ( like  Benadryl).I suggest minimizing the use of opioid medication and use of IV tylenol,if it is appropriate. I suggest using the lowest possible dose of opioids for the shortest duration possible in the perioperative period. I suggest to Keep shades/blinds open during the day, lights  off and shades closed at night to encourage normal sleep/wake cycle.I encourage the presence of the family member with the patient at all times, if at all possible as mental status changes can be picked up early by the family members and they help with reorientation. I encouraged the presence of family to help with orientation in the perioperative period. Benadryl avoidance suggested      In view of BPH the patient  is at risk of postoperative urinary retention.  I suggest avoidance / minimizing the of  Benzodiazepines,Anticholinergic medication,antihistamines ( Benadryl) , if possible in the perioperative period. I suggest using the minimum possible use of opioids for the minimum period of time in the perioperative period. Benadryl avoidance suggested      This visit was focused on Preoperative evaluation, Perioperative Medical management, complication reduction plans. I suggest that the patient follows up with primary care or relevant sub specialists for ongoing health care.    I appreciate the opportunity to be involved in this patients care. Please feel free to contact me if there were any questions about this consultation.    Patient is pending optimization    Patient/ care giver/ Family member was instructed to call and update me about any changes to health,  medication, office visits ,testing out side of the [facility] operative care center , hospitalizations between now and surgery      Remedios Funez MD  Internal Medicine  [facility]   Cell Phone- [phone]    History of COVID - Yes   COVID vaccination status -4    COVID screening     No fever   No cough   No SOB  No sore throat   No loss of taste or smell   No muscle aches   No nausea, vomiting , diarrhea     I have checked for over-the-counter medication   Plan for optimization     Will work with the urologist about his lower urinary tract infection symptoms as he is at risk of having postoperative urinary retention   I suggested  working on his diabetes control and I am checking his A1c   I have requested a dermatology evaluation to work on a strategy to prevent the boys under the arms although diabetes could be contributing to them-he he preferred seeing a dermatologist  I suggested holding Farxiga 3 days before surgery while watching the glucose-last day of use before surgery will be June 8th    I have spent -105----- minutes of time which includes, time spent to prepare to see the patient , obtaining history ,performing examination, counseling/Educating the patient , Documenting clinical information in the record    -  5/0/03- 7 02    Nephrology evaluation   --  5/10/2023- 1654    A1c improved at 7.5   INR- N  Corresponded with the urologist and the plan is   -  5/10/2023- 1703     Cystoscopy  --  6/8/2023- 1756    He had Laser vaporization of the prostate 6/6  Had Palom[place] catheter removed today   Voiding well   Symptoms of difficulty voiding improved a lot       He had Nephrology evaluation   Called to follow up spoke to him  He had Laser vaporization of the prostate 6/6  Had Palom[place] catheter removed today   Voiding well   Symptoms of difficulty voiding improved a lot     Could not see Dermatologist  Boils under the arms - doing good with them - longstanding problem - many years - never had blood infection - discussed that the artificial joints can get infected , if there is active infection any where in the body  He addresses his boils under the arms with doxycycline that he responds very well to  He is currently doing well from that standpoint with no pain or drainage     Called to follow up , spoke to to address any concerns with the up coming surgery or any questions on Medication instructions   Doing well   He used Doxycycline for the boils - for 3 days- 3 weeks ago   He was given Bactrim DS after his laser prostate procedure on June 6th and has taken 2 pills  so far  Cr cl 33    Checked for OTC medication since my evaluation     Hold  Farxiga 3 days prior to surgery for surgery on 6/12/2023- start holding from 6/9/2023     Fiber active ingredients   Methyl cellulose  Calcium  He prefers to stay on them     ----  6/9/2023- 1346     Corresponded with Urologist with regards to recent laser TURP and upcoming knee replacement surgery   It was felt that -If he has a problem with urination following his knee surgery, a fuller catheter can be placed.   He did not do well with a fuller catheter due to bladder spasms.   May consider to do in and out catheterization      He should be on prophylactic abx doxycycline until he gets his knee surgery done.     Stop Bactrim- Use Doxycycline   Will continue Doxycycline     Called and spoke to him  Stop Bactrim DS- only took 2 tablets -6/8, 6/6   Finish doxycyline  Urinating well   No urinary burning   No bladder spasms after catheter removal   No over active bladder problem    Called to follow up , spoke to to address any concerns with the up coming surgery or any questions on Medication instructions -  Doing well  No fever , chill     Most recent potassium normal   Staying hydrated

## 2023-05-09 NOTE — ASSESSMENT & PLAN NOTE
He was given CPAP but he did not like the machine he is no longer using CPAP  He does not have a CPAP anymore     Informed the risk of worsening sleep apnea in the perioperative period       I suggest follow up  and suggest caution with usage of medication that can cause respiratory suppression in the perioperative period  potential ramifications of untreated sleep apnea, which could include daytime sleepiness, hypertension, heart disease and stroke were discussed    Avoidance of  supine sleep, weight gain , alcoholic beverages , care with , sedative , CNS depressant use indicated  since all of these can worsen MALINI

## 2023-05-09 NOTE — PLAN OF CARE
"  [facility]  Physical Therapy Initial Evaluation    Date: 5/9/2023   Name: Emerson Menendez  Clinic Number: [id number]    Therapy Diagnosis:   Encounter Diagnosis   Name Primary?    Left leg weakness Yes     Physician: Norma Mckoy,*    Physician Orders: PT Eval and Treat   Medical Diagnosis from Referral: Chronic knee pain, unspecified laterality [M25.569, G89.29]  Evaluation Date: 5/9/2023  Authorization Period Expiration: 01/04/2024  Plan of Care Expiration: 07/12/2023  Visit # / Visits authorized: 1/ 1   Progress Note Due: 06/09/2023  FOTO: 1/ 1    Precautions: Standard    Time In: 11: 00 am  Time Out: 12:00 opm  Total Appointment Time (timed & untimed codes): 55 minutes    Subjective   Date of onset: several years   History of current condition - Emerson reports: long standing history of knee pain that has worsened over the years       BAHMAN: insidious   Any popping: yes  Any Locking: yes  Any buckling: yes  Pain radiates: localized   Pain constant or intermittent: constant   Any injections: yes with good relief several years ago      Pain:  Current 8/10, worst 10/10, best 4/10   Location: left knee   Description: Sharp  Aggravating Factors: steps, transitional movements   Easing Factors: rest     Prior Therapy: None for current compliant   Social History: lives with spouse-no steps   Occupation:    Prior Level of Function: independent with all ADL's with increased difficulty completing ADL's due to increased left knee pain  Current Level of Function: independent with all ADL's with increased difficulty completing ADL's due to increased left knee pain    Pts goals: "learn what to do before surgery"     Imaging:  Narrative & Impression  EXAMINATION:  XR KNEE ORTHO BILAT WITH FLEXION     CLINICAL HISTORY:  Pain in right knee     TECHNIQUE:  AP standing of both knees, PA flexion standing views of both knees, and Merchant views of both knees were performed.  " Lateral views of both knees were also performed.     COMPARISON:  06/30/2021     Interpretation: Mild moderate tricompartment DJD changes particularly left medial compartment patellofemoral joint.  Asymmetrical DJD change left tibia-fibular joint.  Small left suprapatellar joint effusion.     FINDINGS:  See results above.     Impression:     No fracture dislocation.        Medical History:   Past Medical History:   Diagnosis Date    Anxiety 03/16/2015    Asymptomatic multiple myeloma     BPH (benign prostatic hypertrophy) 09/24/2012    Carpal tunnel syndrome     Depression 03/16/2015    Diabetic retinopathy     GERD (gastroesophageal reflux disease) 09/24/2012    Glaucoma     Hx of psychiatric care     Celexa, Valium    Hyperlipidemia     Hypertension     Hypertensive retinopathy of both eyes     Microalbuminuria 09/24/2012    Osteoarthritis of cervical spine 09/24/2012    Osteoarthritis of knee 09/24/2012    Posterior capsular opacification, right 09/15/2022    Psychiatric problem     Type II or unspecified type diabetes mellitus without mention of complication, not stated as uncontrolled 09/24/2012       Surgical History:   Emerson Menendez  has a past surgical history that includes Cataract extraction (2012); Prostate surgery; Carpal tunnel release (Left, 08/03/2020); Arthroscopy of wrist (Left, 08/03/2020); Surgical removal of carpal bone (Left, 01/06/2021); Phacoemulsification of cataract (Left, 05/20/2021); Intraocular prosthesis insertion (Left, 05/20/2021); Trigger finger release (Right, 07/29/2022); Tonsillectomy; and Eye surgery (Bilateral).    Medications:   Emerson has a current medication list which includes the following prescription(s): accu-chek elie control soln, acetaminophen, alcohol swabs, aspirin, azelastine, blood glucose control, normal, accu-chek elie plus test strp, blood sugar diagnostic, blood-glucose meter, blood-glucose meter, citalopram, diazepam, diphenhydramine, dorzolamide-timolol  2-0.5%, doxycycline, ergocalciferol, eylea, farxiga, fexofenadine, freestyle henrry 14 day reader, freestyle henrry 14 day sensor, fluad quad 2022-23(65y up)(pf), fluticasone propionate, gabapentin, glimepiride, glucose, insulin, insulin aspart u-100, lancets, losartan, monovisc, omeprazole, pen needle, diabetic, pravastatin, psyllium seed (with sugar), sildenafil, tamsulosin, triamcinolone acetonide, and trueplus lancets, and the following Facility-Administered Medications: mupirocin.    Allergies:   Review of patient's allergies indicates:   Allergen Reactions    Penicillins      Knees locked up       Objective   Observation: Pt ambulates into clinic with independence and no AD       Posture Alignment: slouched posture;forward head    Sensation: intact to light touch    DTR: 2+    GAIT DEVIATIONS: Emerson displays occasional unsteady gait;decreased step length;antalgic gait    Range of Motion:   Knee Left active Left Passive   Flexion 121* 125*   Extension 4* 2*     Knee Right active Right Passive   Flexion WFL WFL   Extension WFL WFL       Lower Extremity Strength   Right LE  Left LE    Knee extension: 4+/5 Knee extension: 4/5   Knee flexion: 4+/5 Knee flexion: 4/5   Hip flexion: 4+/5 Hip flexion: 4/5   Hip extension:  4+/5 Hip extension: 4/5   Hip abduction: 4+/5 Hip abduction: 4/5   Hip adduction: 4+/5 Hip adduction 4/5   Ankle dorsiflexion: 5/5 Ankle dorsiflexion: 5/5   Ankle plantarflexion: 5/5 Ankle plantarflexion: 5/5       Single leg balance:  Left: 30 seconds  Right: 30 seconds    Joint Mobility:      Patellar sup./inf: hypomobile   Patellar med/lat: hypomobile     Flexibility: decreased soft tissue flexibility    Popliteal Angle: R = 30 degrees ; L = 33 degrees        TREATMENT   Treatment Time In: 11:35 am  Treatment Time Out: 12:00 pm  Total Treatment time separate from Evaluation: 25 minutes    Emerson received therapeutic exercises to develop strength, endurance, ROM, and flexibility for 25 minutes  including:  +Quad set  +SAQ  +Heel slide  +SLR  +LAQ  +education on rehab expectations post TKA    Home Exercises and Patient Education Provided    Education provided:   -HEP, POC  -Patient was educated on initial evaluation findings and expectations as well as future PT services, procedures, and expectations for optimal compliance with therapy     Written Home Exercises Provided: yes.  Exercises were reviewed and Emerson was able to demonstrate them prior to the end of the session.  Emerson demonstrated good  understanding of the education provided.     See EMR under Patient Instructions for exercises provided 5/9/2023.    Assessment   Emerson is a 77 y.o. male referred to outpatient Physical Therapy with a medical diagnosis of Chronic knee pain, unspecified laterality [M25.569, G89.29]. Pt presents with signs and symptoms consistent with diagnosis including weakness of BLE, decreased joint mobility in hypomobile patellofemoral joint, decreased soft tissue flexibility, TTP to knee complex, poor posture and decreased functional mobility tolerance. These deficits are limiting patient in full participation in ADL's and leisure activities such as lifting, grocery shopping, and prolonged standing and walking. Pt will benefit from skilled outpatient Physical Therapy to address the deficits stated above and in the chart below, provide pt/family education, and to maximize pt's level of independence.    Pt prognosis is Good.   Pt will benefit from skilled outpatient Physical Therapy to address the deficits stated above and in the chart below, provide pt/family education, and to maximize pt's level of independence.     Plan of care discussed with patient: Yes  Pt's spiritual, cultural and educational needs considered and patient is agreeable to the plan of care and goals as stated below:     Anticipated Barriers for therapy: chronicity of condition     Medical Necessity is demonstrated by the following  History  Co-morbidities and  personal factors that may impact the plan of care Co-morbidities:   None    Personal Factors:   no deficits     low   Examination  Body Structures and Functions, activity limitations and participation restrictions that may impact the plan of care Body Regions:   lower extremities    Body Systems:    gross symmetry  ROM  strength  gross coordinated movement  balance  gait  transfers  transitions  motor control  motor learning    Participation Restrictions:   independent with all ADL's with increased difficulty completing ADL's due to increased left knee pain    Activity limitations:   Learning and applying knowledge  no deficits    General Tasks and Commands  no deficits    Communication  no deficits    Mobility  fine hand use (grasping/picking up)    Self care  no deficits    Domestic Life  shopping  cooking  doing house work (cleaning house, washing dishes, laundry)  assisting others    Interactions/Relationships  no deficits    Life Areas  no deficits    Community and Social Life  community life  recreation and leisure         Complexity: low   Clinical Presentation stable and uncomplicated low   Decision Making/ Complexity Score: low       GOALS: Short Term Goals:  4 weeks  1.Report decreased in pain at worse less than  <   / =  8  /10  to increase tolerance for functional mobility.On going  2. Pt to improve lle range of motion by 25% to allow for improved functional mobility to allow for improvement in IADLs. .On going  3. Increased LLE MMT 1/2 grade to increase tolerance for ADL and work activities.On going  4. Pt to tolerate HEP to improve ROM and independence with ADL's.On going    Long Term Goals: 8 weeks  1.Report decreased in pain at worse less than  <   / =  3  /10  to increase tolerance for functional mobility. On going  2.Pt to improve range of motion by 75% to allow for improved functional mobility to allow for improvement in IADLs. On going  3.Increased LLE MMT 1 grade to increase tolerance for ADL  and work activities.On going  4. Pt to be Independent with HEP to improve ROM and independence with ADL's. On going    Plan   Plan of care Certification: 5/9/2023 to 07/12/2023.    Outpatient Physical Therapy 2 times weekly for 10 weeks to include the following interventions: Cervical/Lumbar Traction, Electrical Stimulation prn, Gait Training, Iontophoresis (with prn), Manual Therapy, Moist Heat/ Ice, Neuromuscular Re-ed, Patient Education, Self Care, Therapeutic Activities, and Therapeutic Exercise. Dry needling Progress HEP towards D/C. Recommend F/U with MD if symptoms worsen or do not resolve. Patient may be seen by a PTA for treatment to carry out their plan of care.  Face-to-face conferences will be held.     Lidia Degroot, PT      I CERTIFY THE NEED FOR THESE SERVICES FURNISHED UNDER THIS PLAN OF TREATMENT AND WHILE UNDER MY CARE   Physician's comments:     Physician's Signature: ___________________________________________________

## 2023-05-09 NOTE — ASSESSMENT & PLAN NOTE
He is using eyedrops  He feels pretty good with his vision  I suggested care with driving  He was getting eye injections Eylea and has not had them in about 6 months time    His diabetes has affected his eyes

## 2023-05-09 NOTE — ASSESSMENT & PLAN NOTE
He is under hematology care and currently not on any active treatment for myeloma  His calcium is normal and creatinine is stable

## 2023-05-09 NOTE — ANESTHESIA PAT ROS NOTE
05/09/2023  Emerson Menendez is a 77 y.o., male.      Pre-op Assessment     I have reviewed the Nursing Notes. I have reviewed the NPO Status.   I have reviewed the Medications.     Review of Systems  Anesthesia Hx:  No problems with previous Anesthesia   History of prior surgery of interest to airway management or planning:  Previous anesthesia: MAC, Nerve Block      for 7/29/22 RELEASE, TRIGGER FINGER,RIGHT,LONG (Right: Hand).  Procedure performed at an [facility].   Denies Family Hx of Anesthesia complications.    Denies Personal Hx of Anesthesia complications.                    Social:  Non-Smoker, No Alcohol Use       Hematology/Oncology:       -- Anemia:                  Current/Recent Cancer.            Oncology Comments: Smoldering Multiple myeloma     EENT/Dental:  chronic allergic rhinitis  Eyes: Visual Impairment   Has Bilateral and S/P Extraction - Bilateral Catarract        Eye Disease:    Glaucoma: Open Angle and Narrow angle       2012 CATARACT SURGERY    05/20/2021  Phacoemulsification of cataract (Left         Cardiovascular:  Exercise tolerance: poor   Hypertension, well controlled       Denies Angina.     hyperlipidemia     Functional Capacity 4 METS    Aortic Stenosis (AS), moderate                          Pulmonary:    Denies COPD.  Denies Asthma.   Denies Shortness of breath.  Sleep Apnea NO CPAP IN USE    DENIES SLEEP APNEA THOUGH DR HAS INFORMED HIM OF DX.               Renal/:    BPH 11/13/2013  CYSTOSCOPY (Perineum)   TURP, USING THULIUM LASER (Perineum)     Kidney Function/Disease, Chronic Kidney Disease (CKD) , CKD Stage III (GFR 30-59)   09/24/2012  Microalbuminuria         Hepatic/GI:     GERD, well controlled             Musculoskeletal:   Musculoskeletal General/Symptoms: joint stiffness, joint pain, neck pain, muscle pain, localized. Functional capacity is  ambulatory without assistance. 09/24/2012  Osteoarthritis of cervical spine    01/06/2021  Surgical removal of carpal bone (Left)   08/03/2020  Carpal tunnel release (Left)   08/03/2020  Arthroscopy of wrist (Left)      Joint Disease:  Arthritis, Osteoarthritis   PRIMARY OSTEOARTHRITIS LEFT KNEE         Cervical Spine Disorder, Cervical Disc Disease, Myelopathy CERVICAL SPONDYLOSIS    Neurological:  Denies TIA.  Denies CVA. Neuromuscular Disease,   Denies Seizures.    Osteoarthritis of cervical spine  Cervical spondylosis without myelopathy    Post-herpetic polyneuropathy  Carpal tunnel syndrome    Osteoarthritis  Peripheral Neuropathy                          Endocrine:   Denies Hypothyroidism.  Denies Hyperthyroidism.  Diabetes, Type 1 Diabetes  , Complications include Diabetic Nephropathy, Diabetic Retinopathy , controlled by diet, insulin.  , most recent HgA1c value was 7.8 on 2.9.2023.              Denies Obesity / BMI > 30  Dermatological:  Resolved shingles related pain in the upper abdomen      occasional Boils  under the axilla for which he uses doxycycline        Psych:  Psychiatric History anxiety depression Adjustment disorder with mixed anxiety and depressed mood           Physical Exam  General: Well nourished, Cooperative, Alert and Oriented    Airway:  Tongue: Normal  Neck ROM: Flexion Decreased, Left Lateral Motion Decreased, Right Lateral Motion Decreased, Extension Decreased, Extension Painful    Dental:  Dentures  FULL UPPER AND PARTIAL LOWER PLATES    Medical Clearance  Patient is pending optimization     Patient/ care giver/ Family member was instructed to call and update me about any changes to health,  medication, office visits ,testing out side of the [facility] , hospitalizations between now and surgery       Remedios Funez MD  Internal Medicine  [facility]   Cell Phone- [phone]

## 2023-05-09 NOTE — ASSESSMENT & PLAN NOTE
He had laser vaporization and enucleation of the prostate 2013  He is urinating multiple times at nighttime and is having problem emptying his bladder   He is on Flomax  Prior to most recent time he was not taking Flomax on a regular basis but lately he has been taking Flomax on a regular basis    It appears that he is not doing very well with his urination and bladder emptying which could become a problem after surgery as he can have postoperative urinary retention  I will requested evaluation with his urologist Dr. Walker

## 2023-05-09 NOTE — ASSESSMENT & PLAN NOTE
He gets massages and goes to chiropractor  I suggested care with neck manipulation    He has no problems with the dropping things or problems with small objects like keys coins or buttons  No balance problems

## 2023-05-09 NOTE — OUTPATIENT SUBJECTIVE & OBJECTIVE
"Outpatient Subjective & Objective     Chief complaint-Preoperative evaluation, Perioperative Medical management, complication reduction plan     Active cardiac conditions- none    Revised cardiac risk index predictors- insulin requiring diabetes mellitus and creatinine more than 2    Functional capacity -Examples of physical activity , travels a lot , stays active , can take 1 flight of stairs and cutting the grass with a mower----- He can undertake all the above activities without  chest pain,chest tightness, Shortness of breath ,dizziness,lightheadedness making his exercise tolerance more,   than 4 Mets.       Review of Systems   Constitutional:  Negative for chills and fever.          Weight gain from reduced activity   HENT:          Sleep apnea   Eyes:         No unusual vision changes   Respiratory:          No cough , phlegm    No Hemoptysis   Cardiovascular:         As noted   Gastrointestinal:         Bowels- Regular /fiber is helping him and he has been taking fiber for a long time  No overt GI/ blood losses   Endocrine:        Prednisone use > 20 mg daily for 3 weeks- None   Genitourinary:  Negative for dysuria.        Urinary hesitancy    Musculoskeletal:         As above      Skin:  Negative for rash.   Neurological:  Negative for syncope.        No unilateral weakness   Hematological:         Current use of Anticoagulants  None   Psychiatric/Behavioral:            No SI/HI   No vascular stenting          No anesthesia, bleeding, cardiac problems , PONV with previous surgeries/procedures.  Medications and Allergies reviewed in epic.     FH- No anesthesia,bleeding / venous thrombosis , e in family    Physical Exam  Blood pressure 127/74, pulse 102, temperature 97.1 °F (36.2 °C), temperature source Oral, resp. rate 18, height 5' 10" (1.778 m), weight 94.7 kg (208 lb 12.8 oz), SpO2 96 %.  Anxiety - came through traffic    Physical Exam  Constitutional- Vitals - Body mass index is 29.96 kg/m².,   Vitals: "    05/09/23 0805   BP: 127/74   Pulse: 102   Resp: 18   Temp: 97.1 °F (36.2 °C)     General appearance-Conscious,Coherent  Eyes- No conjunctival icterus,pupils  round   ENT-Oral cavity- moist  and lower partial denture    , Hearing grossly normal   Neck- No thyromegaly ,Trachea -central, No jugular venous distension,   No Carotid Bruit   Cardiovascular -Heart Sounds- Normal  and  no murmur   , No gallop rhythm   Respiratory - Normal Respiratory Effort, Normal breath sounds,  no wheeze , and  no forced expiratory wheeze    Peripheral pitting pedal edema-- none , no calf pain   Gastrointestinal -Soft abdomen, No palpable masses, Non Tender,Liver,Spleen not palpable. No-- free fluid and shifting dullness  Musculoskeletal- No finger Clubbing. Strength grossly normal   Lymphatic-No Palpable cervical, axillary,Inguinal lymphadenopathy   Psychiatric - normal effect,Orientation  Rt Dorsalis pedis pulses-palpable    Lt Dorsalis pedis pulses- palpable   Rt Posterior tibial pulses -palpable   Left posterior tibial pulses -palpable   Miscellaneous -  no renal bruit   Investigations  Lab and Imaging have been reviewed in epic.    2012    1.  1 - 39% stenosis of the right internal carotid artery with   homogeneous plaque .   2.  1 - 39% stenosis of the left internal carotid artery with     homogeneous plaque     Review of Medicine tests    EKG- I had independently reviewed the EKG from--3/9/2023  It was reported to be showing     Normal sinus rhythm   Normal ECG   When compared with ECG of 25-JAN-2016 12:11,   No significant change was found     Review of clinical lab tests:  Lab Results   Component Value Date    CREATININE 2.0 (H) 05/08/2023    HGB 11.5 (L) 05/08/2023     05/08/2023           Review of old records- Was done and information gathered regards to events leading to surgery and health conditions of significance in the perioperative period.    Outpatient Subjective & Objective

## 2023-05-09 NOTE — ASSESSMENT & PLAN NOTE
His hemoglobin is mildly low at about 11 and has been in that range in the past also   He does not have any visible blood losses like blood in the bowel movement or black tarry stool or throwing up of any blood or coffee like vomit or urinary blood losses   It is likely that his anemia could be from renal impairment/myeloma   At that level of hemoglobin the hemoglobin may not be a problem around the time of surgery  Anemia:  I suggest monitoring his Hemoglobin perioperatively in view of his history of pre existing anemia.

## 2023-05-09 NOTE — ASSESSMENT & PLAN NOTE
He has occasional Boils  under the axilla for which he uses doxycycline   He does not have any current flare-up   He plans on using doxycycline that he uses on an as needed basis to ensure that he does not have any problem around the time of the surgery  He uses doxycycline on an as needed basis    I suggested care with avoidance of any infections given his upcoming joint replacement surgery and also in future to avoid any infections that can lead to prosthetic joint infection    I have requested dermatology evaluation so that he can work on avoiding any future infections    He is mildly tender in the right axillary area where the lesions were and he plans on taking doxycycline which he finds to be very effective

## 2023-05-09 NOTE — ASSESSMENT & PLAN NOTE
He is on a statin which I suggested to continue through the surgery  He is on aspirin 81 mg by mouth once a day for preventative reason  He is not known to have a heart attack, stroke or mini-stroke or has  any suggestions of intermittent claudication  As per his understanding, he was suggested to hold aspirin 2 days prior to surgery  He is not known to have any arterial stenting

## 2023-05-10 DIAGNOSIS — R35.1 NOCTURIA: ICD-10-CM

## 2023-05-10 DIAGNOSIS — Z90.79 S/P TURP: ICD-10-CM

## 2023-05-10 DIAGNOSIS — N32.81 OAB (OVERACTIVE BLADDER): Primary | ICD-10-CM

## 2023-05-10 LAB — PATHOLOGIST INTERPRETATION SPE: NORMAL

## 2023-05-10 RX ORDER — LIDOCAINE HYDROCHLORIDE 20 MG/ML
JELLY TOPICAL ONCE
Status: CANCELLED | OUTPATIENT
Start: 2023-05-10 | End: 2023-05-10

## 2023-05-10 RX ORDER — DOXYCYCLINE HYCLATE 100 MG
100 TABLET ORAL ONCE
Status: CANCELLED | OUTPATIENT
Start: 2023-05-10 | End: 2023-05-10

## 2023-05-10 NOTE — PROGRESS NOTES
OAB (overactive bladder)  -     Cystoscopy; Future    Nocturia  -     Cystoscopy; Future    S/P TURP  -     Cystoscopy; Future    Other orders  -     LIDOcaine HCl 2% urojet  -     doxycycline tablet 100 mg       Please schedule him for cysto this Friday in Sud[facility] room.

## 2023-05-11 ENCOUNTER — PATIENT MESSAGE (OUTPATIENT)
Dept: UROLOGY | Facility: CLINIC | Age: 78
End: 2023-05-11
Payer: MEDICARE

## 2023-05-14 DIAGNOSIS — D47.2 SMOLDERING MULTIPLE MYELOMA: ICD-10-CM

## 2023-05-14 RX ORDER — DIAZEPAM 5 MG/1
5 TABLET ORAL EVERY 12 HOURS PRN
Qty: 60 TABLET | Refills: 0 | Status: SHIPPED | OUTPATIENT
Start: 2023-05-14 | End: 2023-09-05 | Stop reason: SDUPTHER

## 2023-05-14 NOTE — TELEPHONE ENCOUNTER
No care due was identified.  [facility] Embedded Care Due Messages. Reference number: [id number].   5/14/2023 11:55:04 AM CDT

## 2023-05-25 ENCOUNTER — TELEPHONE (OUTPATIENT)
Dept: UROLOGY | Facility: CLINIC | Age: 78
End: 2023-05-25
Payer: MEDICARE

## 2023-05-25 ENCOUNTER — TELEPHONE (OUTPATIENT)
Dept: INTERNAL MEDICINE | Facility: CLINIC | Age: 78
End: 2023-05-25
Payer: MEDICARE

## 2023-05-25 ENCOUNTER — PROCEDURE VISIT (OUTPATIENT)
Dept: UROLOGY | Facility: CLINIC | Age: 78
End: 2023-05-25
Payer: MEDICARE

## 2023-05-25 VITALS
SYSTOLIC BLOOD PRESSURE: 172 MMHG | HEART RATE: 98 BPM | WEIGHT: 210 LBS | HEIGHT: 70 IN | TEMPERATURE: 98 F | RESPIRATION RATE: 18 BRPM | BODY MASS INDEX: 30.06 KG/M2 | DIASTOLIC BLOOD PRESSURE: 83 MMHG

## 2023-05-25 DIAGNOSIS — N13.8 BPH WITH URINARY OBSTRUCTION: ICD-10-CM

## 2023-05-25 DIAGNOSIS — N32.81 OAB (OVERACTIVE BLADDER): Primary | ICD-10-CM

## 2023-05-25 DIAGNOSIS — N40.1 BPH WITH URINARY OBSTRUCTION: ICD-10-CM

## 2023-05-25 DIAGNOSIS — R35.1 NOCTURIA: ICD-10-CM

## 2023-05-25 DIAGNOSIS — Z90.79 S/P TURP: ICD-10-CM

## 2023-05-25 DIAGNOSIS — N32.81 OAB (OVERACTIVE BLADDER): ICD-10-CM

## 2023-05-25 PROCEDURE — 52000 CYSTOSCOPY: ICD-10-PCS | Mod: S$GLB,,, | Performed by: UROLOGY

## 2023-05-25 PROCEDURE — 52000 CYSTOURETHROSCOPY: CPT | Mod: S$GLB,,, | Performed by: UROLOGY

## 2023-05-25 RX ORDER — LIDOCAINE HYDROCHLORIDE 20 MG/ML
JELLY TOPICAL ONCE
Status: COMPLETED | OUTPATIENT
Start: 2023-05-25 | End: 2023-05-25

## 2023-05-25 RX ORDER — DOXYCYCLINE HYCLATE 100 MG
100 TABLET ORAL ONCE
Status: COMPLETED | OUTPATIENT
Start: 2023-05-25 | End: 2023-05-25

## 2023-05-25 RX ORDER — OXYBUTYNIN CHLORIDE 10 MG/1
10 TABLET, EXTENDED RELEASE ORAL DAILY
Qty: 30 TABLET | Refills: 11 | Status: SHIPPED | OUTPATIENT
Start: 2023-05-25 | End: 2024-01-25

## 2023-05-25 RX ADMIN — Medication 100 MG: at 10:05

## 2023-05-25 RX ADMIN — LIDOCAINE HYDROCHLORIDE: 20 JELLY TOPICAL at 09:05

## 2023-05-25 NOTE — TELEPHONE ENCOUNTER
Called the patient to offer surgery date of 6/6/23, patient accepted. Confirmed with the patient that he is to stop his aspirin medication now per Dr. Walker. Informed patient I will give him a call the day before with arrival time and pre-op instructions. Patient verbalized understanding.

## 2023-05-25 NOTE — TELEPHONE ENCOUNTER
Spoke to nephrology associate (Kat) in regards to getting patient in prior to his surgery on 6/12/23, she's sending a message to the Nephrology team and will relay my message and # as well.

## 2023-05-25 NOTE — PATIENT INSTRUCTIONS
What to Expect After a Cystoscopy  For the next 24-48 hours, you may feel a mild burning when you urinate. This burning is normal and expected. Drink plenty of water to dilute the urine to help relieve the burning sensation. You may also see a small amount of blood in your urine after the procedure.    Unless you are already taking antibiotics, you may be given an antibiotic after the test to prevent infection.    Signs and Symptoms to Report  Call the [facility] at [phone] if you develop any of the following:  Fever of 101 degrees or higher  Chills or persistent bleeding  Inability to urinate

## 2023-05-25 NOTE — TELEPHONE ENCOUNTER
OAB (overactive bladder)  -     Case Request Operating Room: TURP, USING LASER    BPH with urinary obstruction  -     Case Request Operating Room: TURP, USING LASER

## 2023-05-25 NOTE — PROCEDURES
Cystoscopy    Date/Time: 5/25/2023 9:30 AM  Performed by: Juan Walker MD  Authorized by: Juan Walker MD     Comments:      Procedure Date:  05/25/2023      Procedure:  Male Diagnostic Cystourethroscopy    Pre-op diagnosis: weak urine flow, OAB, nocturia x 3, s/p laser TURP in 1/2013  Post-op diagnosis: same  Anesthesia: Local  Surgeon:  Juan Walker MD    Findings:  Urethra:  Normal urethra.   Sphincter: competent.  Prostate: Estimated Length Prostatic Urethra: recurrent adenoma on the right side, obstructive crossing the midline.    Bladder neck: patent with no stricture  Bladder:  Normal bladder.  No tumor or stone seen.  Normal ureteral orifices bilaterally.   Moderate trabeculation with early sacculation.     Description of Procedure:                                                         Informed Consent:                                                            - Risks, benefits and alternatives of procedure discussed with               patient and informed consent obtained.       Patient Position:   - Supine. --- Bladder ---   Prep and Drape:   - Patient prepped and draped in usual sterile fashion using povidone     iodine (Betadine).   Instruments:   - 16 Fr flexible cystoscope with 0 degree lens.   Procedure Details:   - Cystoscope passed under vision into bladder.   - Bladder and urethra examined in their entirety with findings as     above.     Conclusion:  1. Recurrent adenoma of the prostate  2. OAB  Recommend laser TURP and manage his OAB with meds.  He has a left knee surgery scheduled on 6/12 and would like to get his laser TURP done prior to his knee surgery.  Nature and risks of operation explained.  Stop ASA now and will plan laser TURP next week.    Plan:  Patient was discharged home in a stable condition.  Medications: doxy  Follow up:  laser TURP

## 2023-05-29 ENCOUNTER — PATIENT MESSAGE (OUTPATIENT)
Dept: SURGERY | Facility: HOSPITAL | Age: 78
End: 2023-05-29
Payer: MEDICARE

## 2023-05-30 ENCOUNTER — LAB VISIT (OUTPATIENT)
Dept: LAB | Facility: OTHER | Age: 78
End: 2023-05-30
Attending: HOSPITALIST
Payer: MEDICARE

## 2023-05-30 DIAGNOSIS — N18.31 STAGE 3A CHRONIC KIDNEY DISEASE: ICD-10-CM

## 2023-05-30 DIAGNOSIS — N18.31 STAGE 3A CHRONIC KIDNEY DISEASE: Primary | ICD-10-CM

## 2023-05-30 LAB — PTH-INTACT SERPL-MCNC: 69.3 PG/ML (ref 9–77)

## 2023-05-30 PROCEDURE — 36415 COLL VENOUS BLD VENIPUNCTURE: CPT | Performed by: INTERNAL MEDICINE

## 2023-05-30 PROCEDURE — 83970 ASSAY OF PARATHORMONE: CPT | Performed by: INTERNAL MEDICINE

## 2023-06-01 ENCOUNTER — RESEARCH ENCOUNTER (OUTPATIENT)
Dept: HEMATOLOGY/ONCOLOGY | Facility: CLINIC | Age: 78
End: 2023-06-01

## 2023-06-01 ENCOUNTER — OFFICE VISIT (OUTPATIENT)
Dept: NEPHROLOGY | Facility: CLINIC | Age: 78
End: 2023-06-01
Payer: MEDICARE

## 2023-06-01 ENCOUNTER — LAB VISIT (OUTPATIENT)
Dept: LAB | Facility: HOSPITAL | Age: 78
End: 2023-06-01
Payer: MEDICARE

## 2023-06-01 ENCOUNTER — OFFICE VISIT (OUTPATIENT)
Dept: HEMATOLOGY/ONCOLOGY | Facility: CLINIC | Age: 78
End: 2023-06-01
Payer: MEDICARE

## 2023-06-01 VITALS
WEIGHT: 235.88 LBS | SYSTOLIC BLOOD PRESSURE: 124 MMHG | DIASTOLIC BLOOD PRESSURE: 79 MMHG | OXYGEN SATURATION: 98 % | BODY MASS INDEX: 33.85 KG/M2 | HEART RATE: 85 BPM

## 2023-06-01 VITALS
SYSTOLIC BLOOD PRESSURE: 156 MMHG | OXYGEN SATURATION: 97 % | HEART RATE: 84 BPM | DIASTOLIC BLOOD PRESSURE: 75 MMHG | HEIGHT: 70 IN | TEMPERATURE: 98 F | WEIGHT: 214.06 LBS | RESPIRATION RATE: 18 BRPM | BODY MASS INDEX: 30.65 KG/M2

## 2023-06-01 DIAGNOSIS — D47.2 SMOLDERING MULTIPLE MYELOMA: Primary | ICD-10-CM

## 2023-06-01 DIAGNOSIS — I10 ESSENTIAL HYPERTENSION: ICD-10-CM

## 2023-06-01 DIAGNOSIS — Z00.6 RESEARCH EXAM: ICD-10-CM

## 2023-06-01 DIAGNOSIS — D47.2 SMOLDERING MULTIPLE MYELOMA: ICD-10-CM

## 2023-06-01 DIAGNOSIS — N18.32 STAGE 3B CHRONIC KIDNEY DISEASE: Chronic | ICD-10-CM

## 2023-06-01 LAB
ALBUMIN SERPL BCP-MCNC: 3.4 G/DL (ref 3.5–5.2)
ALP SERPL-CCNC: 63 U/L (ref 55–135)
ALT SERPL W/O P-5'-P-CCNC: 18 U/L (ref 10–44)
ANION GAP SERPL CALC-SCNC: 12 MMOL/L (ref 8–16)
AST SERPL-CCNC: 13 U/L (ref 10–40)
BASOPHILS # BLD AUTO: 0.03 K/UL (ref 0–0.2)
BASOPHILS NFR BLD: 0.6 % (ref 0–1.9)
BILIRUB SERPL-MCNC: 0.5 MG/DL (ref 0.1–1)
BUN SERPL-MCNC: 28 MG/DL (ref 8–23)
CALCIUM SERPL-MCNC: 9.4 MG/DL (ref 8.7–10.5)
CHLORIDE SERPL-SCNC: 101 MMOL/L (ref 95–110)
CO2 SERPL-SCNC: 22 MMOL/L (ref 23–29)
CREAT SERPL-MCNC: 2.2 MG/DL (ref 0.5–1.4)
DIFFERENTIAL METHOD: ABNORMAL
EOSINOPHIL # BLD AUTO: 0.2 K/UL (ref 0–0.5)
EOSINOPHIL NFR BLD: 3.8 % (ref 0–8)
ERYTHROCYTE [DISTWIDTH] IN BLOOD BY AUTOMATED COUNT: 15.1 % (ref 11.5–14.5)
EST. GFR  (NO RACE VARIABLE): 30.3 ML/MIN/1.73 M^2
GLUCOSE SERPL-MCNC: 240 MG/DL (ref 70–110)
HCT VFR BLD AUTO: 33.1 % (ref 40–54)
HGB BLD-MCNC: 10.5 G/DL (ref 14–18)
IGA SERPL-MCNC: 1505 MG/DL (ref 40–350)
IGG SERPL-MCNC: 1058 MG/DL (ref 650–1600)
IGM SERPL-MCNC: 33 MG/DL (ref 50–300)
IMM GRANULOCYTES # BLD AUTO: 0.01 K/UL (ref 0–0.04)
IMM GRANULOCYTES NFR BLD AUTO: 0.2 % (ref 0–0.5)
LDH SERPL L TO P-CCNC: 116 U/L (ref 110–260)
LYMPHOCYTES # BLD AUTO: 1.3 K/UL (ref 1–4.8)
LYMPHOCYTES NFR BLD: 25.8 % (ref 18–48)
MAGNESIUM SERPL-MCNC: 1.7 MG/DL (ref 1.6–2.6)
MCH RBC QN AUTO: 28.3 PG (ref 27–31)
MCHC RBC AUTO-ENTMCNC: 31.7 G/DL (ref 32–36)
MCV RBC AUTO: 89 FL (ref 82–98)
MONOCYTES # BLD AUTO: 0.4 K/UL (ref 0.3–1)
MONOCYTES NFR BLD: 8.3 % (ref 4–15)
NEUTROPHILS # BLD AUTO: 3.1 K/UL (ref 1.8–7.7)
NEUTROPHILS NFR BLD: 61.3 % (ref 38–73)
NRBC BLD-RTO: 0 /100 WBC
PHOSPHATE SERPL-MCNC: 3.6 MG/DL (ref 2.7–4.5)
PLATELET # BLD AUTO: 224 K/UL (ref 150–450)
PMV BLD AUTO: 9.4 FL (ref 9.2–12.9)
POTASSIUM SERPL-SCNC: 4.5 MMOL/L (ref 3.5–5.1)
PROT SERPL-MCNC: 8.4 G/DL (ref 6–8.4)
RBC # BLD AUTO: 3.71 M/UL (ref 4.6–6.2)
SODIUM SERPL-SCNC: 135 MMOL/L (ref 136–145)
WBC # BLD AUTO: 5.03 K/UL (ref 3.9–12.7)

## 2023-06-01 PROCEDURE — 86334 IMMUNOFIX E-PHORESIS SERUM: CPT | Performed by: INTERNAL MEDICINE

## 2023-06-01 PROCEDURE — 1101F PR PT FALLS ASSESS DOC 0-1 FALLS W/OUT INJ PAST YR: ICD-10-PCS | Mod: CPTII,GC,S$GLB, | Performed by: INTERNAL MEDICINE

## 2023-06-01 PROCEDURE — 3288F FALL RISK ASSESSMENT DOCD: CPT | Mod: CPTII,GC,S$GLB, | Performed by: INTERNAL MEDICINE

## 2023-06-01 PROCEDURE — 83735 ASSAY OF MAGNESIUM: CPT | Performed by: INTERNAL MEDICINE

## 2023-06-01 PROCEDURE — 3074F PR MOST RECENT SYSTOLIC BLOOD PRESSURE < 130 MM HG: ICD-10-PCS | Mod: CPTII,GC,S$GLB, | Performed by: INTERNAL MEDICINE

## 2023-06-01 PROCEDURE — 99214 OFFICE O/P EST MOD 30 MIN: CPT | Mod: Q1,S$GLB,, | Performed by: INTERNAL MEDICINE

## 2023-06-01 PROCEDURE — 86334 PATHOLOGIST INTERPRETATION IFE: ICD-10-PCS | Mod: 26,Q1,, | Performed by: PATHOLOGY

## 2023-06-01 PROCEDURE — 84165 PATHOLOGIST INTERPRETATION SPE: ICD-10-PCS | Mod: 26,Q1,, | Performed by: PATHOLOGY

## 2023-06-01 PROCEDURE — 36415 COLL VENOUS BLD VENIPUNCTURE: CPT | Performed by: INTERNAL MEDICINE

## 2023-06-01 PROCEDURE — 99999 PR PBB SHADOW E&M-EST. PATIENT-LVL V: ICD-10-PCS | Mod: PBBFAC,GC,, | Performed by: INTERNAL MEDICINE

## 2023-06-01 PROCEDURE — 3078F DIAST BP <80 MM HG: CPT | Mod: CPTII,GC,S$GLB, | Performed by: INTERNAL MEDICINE

## 2023-06-01 PROCEDURE — 99214 OFFICE O/P EST MOD 30 MIN: CPT | Mod: GC,S$GLB,, | Performed by: INTERNAL MEDICINE

## 2023-06-01 PROCEDURE — 3288F PR FALLS RISK ASSESSMENT DOCUMENTED: ICD-10-PCS | Mod: CPTII,GC,S$GLB, | Performed by: INTERNAL MEDICINE

## 2023-06-01 PROCEDURE — 84100 ASSAY OF PHOSPHORUS: CPT | Performed by: INTERNAL MEDICINE

## 2023-06-01 PROCEDURE — 1101F PT FALLS ASSESS-DOCD LE1/YR: CPT | Mod: CPTII,GC,S$GLB, | Performed by: INTERNAL MEDICINE

## 2023-06-01 PROCEDURE — 83521 IG LIGHT CHAINS FREE EACH: CPT | Mod: 59 | Performed by: INTERNAL MEDICINE

## 2023-06-01 PROCEDURE — 85025 COMPLETE CBC W/AUTO DIFF WBC: CPT | Mod: Q1 | Performed by: INTERNAL MEDICINE

## 2023-06-01 PROCEDURE — 1125F AMNT PAIN NOTED PAIN PRSNT: CPT | Mod: CPTII,GC,S$GLB, | Performed by: INTERNAL MEDICINE

## 2023-06-01 PROCEDURE — 3074F SYST BP LT 130 MM HG: CPT | Mod: CPTII,GC,S$GLB, | Performed by: INTERNAL MEDICINE

## 2023-06-01 PROCEDURE — 1125F PR PAIN SEVERITY QUANTIFIED, PAIN PRESENT: ICD-10-PCS | Mod: CPTII,GC,S$GLB, | Performed by: INTERNAL MEDICINE

## 2023-06-01 PROCEDURE — 86334 IMMUNOFIX E-PHORESIS SERUM: CPT | Mod: 26,Q1,, | Performed by: PATHOLOGY

## 2023-06-01 PROCEDURE — 99214 PR OFFICE/OUTPT VISIT, EST, LEVL IV, 30-39 MIN: ICD-10-PCS | Mod: GC,S$GLB,, | Performed by: INTERNAL MEDICINE

## 2023-06-01 PROCEDURE — 84165 PROTEIN E-PHORESIS SERUM: CPT | Mod: 26,Q1,, | Performed by: PATHOLOGY

## 2023-06-01 PROCEDURE — 82784 ASSAY IGA/IGD/IGG/IGM EACH: CPT | Performed by: INTERNAL MEDICINE

## 2023-06-01 PROCEDURE — 80053 COMPREHEN METABOLIC PANEL: CPT | Mod: Q1 | Performed by: INTERNAL MEDICINE

## 2023-06-01 PROCEDURE — 99214 PR OFFICE/OUTPT VISIT, EST, LEVL IV, 30-39 MIN: ICD-10-PCS | Mod: Q1,S$GLB,, | Performed by: INTERNAL MEDICINE

## 2023-06-01 PROCEDURE — 99999 PR PBB SHADOW E&M-EST. PATIENT-LVL V: CPT | Mod: PBBFAC,GC,, | Performed by: INTERNAL MEDICINE

## 2023-06-01 PROCEDURE — 99999 PR PBB SHADOW E&M-EST. PATIENT-LVL V: ICD-10-PCS | Mod: PBBFAC,,, | Performed by: INTERNAL MEDICINE

## 2023-06-01 PROCEDURE — 3078F PR MOST RECENT DIASTOLIC BLOOD PRESSURE < 80 MM HG: ICD-10-PCS | Mod: CPTII,GC,S$GLB, | Performed by: INTERNAL MEDICINE

## 2023-06-01 PROCEDURE — 99999 PR PBB SHADOW E&M-EST. PATIENT-LVL V: CPT | Mod: PBBFAC,,, | Performed by: INTERNAL MEDICINE

## 2023-06-01 PROCEDURE — 83615 LACTATE (LD) (LDH) ENZYME: CPT | Performed by: INTERNAL MEDICINE

## 2023-06-01 PROCEDURE — 84165 PROTEIN E-PHORESIS SERUM: CPT | Performed by: INTERNAL MEDICINE

## 2023-06-01 NOTE — PROGRESS NOTES
Subjective:    Patient ID: Emerson Menendez is a 77 y.o. male.    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 Test results identify a stable IgA kappa protein with beta globulin band at 1.63g/dL. Kappa free light chain is elevated at 4.40. CBC and calcium are stable. Creatinine with history of stage III CKD is stable at 1.4. Metastatic survey is negative for lytic lesions. MRI of the entire spine demonstrated age related changes but no convincing evidence of myeloma bone disease. Bone marrow biopsy identified about 13% plasma cells by morphology and FISH for myeloma identified a t(11;14) and trisomies 3,7, and 17. The patient is afebrile and appears clinically well. He was seen by his podiatrist and nephrologist since our last visit without any new or acute events.    The patient has not received any therapy for smoldering myeloma including bisphosphonates or steroids. He has been randomized to observation arm of the the clinical study. 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 He has no current or prior history of malignancy.    TODAY Cycle 90 E3A06, observation cohort  No acute interval medical events; knee replacement surgery 6/12 at [facility]  CBC and CMP stable      Knee Pain     Joint Pain  Associated symptoms include coughing. Pertinent negatives include no diaphoresis, fatigue or fever.   Hand Pain     Cough  Pertinent negatives include no fever.   Foot Pain  Associated symptoms include coughing. Pertinent negatives include no diaphoresis, fatigue or fever.   Arm  Pain     Follow-up  Associated symptoms include coughing. Pertinent negatives include no diaphoresis, fatigue or fever.   Review of Systems   Constitutional:  Negative for activity change, appetite change, diaphoresis, fatigue, fever and unexpected weight change.   HENT: Negative.     Eyes: Negative.    Respiratory:  Positive for cough.    Cardiovascular:  Negative for leg swelling.   Gastrointestinal: Negative.    Endocrine: Negative.    Genitourinary: Negative.    Musculoskeletal: Negative.    Skin: Negative.    Allergic/Immunologic: Negative.    Neurological:         Grade 0-1 peripheral neuropathy of bilateral feet.    Hematological:  Negative for adenopathy. Does not bruise/bleed easily.   Psychiatric/Behavioral: Negative.       Objective:       Vitals:    06/01/23 0856   BP: (!) 156/75   Pulse: 84   Resp: 18   Temp: 98 °F (36.7 °C)       Physical Exam  Vitals and nursing note reviewed.   Constitutional:       Appearance: He is well-developed.   HENT:      Head: Normocephalic and atraumatic.      Right Ear: External ear normal.      Left Ear: External ear normal.      Nose: Nose normal.   Eyes:      Conjunctiva/sclera: Conjunctivae normal.      Pupils: Pupils are equal, round, and reactive to light.   Cardiovascular:      Rate and Rhythm: Normal rate and regular rhythm.      Heart sounds: No murmur heard.  Pulmonary:      Effort: Pulmonary effort is normal. No respiratory distress.      Breath sounds: Normal breath sounds.   Abdominal:      General: Bowel sounds are normal. There is no distension.      Palpations: Abdomen is soft.   Musculoskeletal:         General: No tenderness.      Cervical back: Normal range of motion and neck supple.   Skin:     General: Skin is warm and dry.      Findings: No rash.      Nails: There is no clubbing.   Neurological:      Mental Status: He is alert and oriented to person, place, and time.      Cranial Nerves: No cranial nerve deficit.   Psychiatric:         Behavior:  Behavior normal.       Assessment:       1. Smoldering multiple myeloma          Plan:       The patient has a diagnosis of smoldering myeloma. There is no indication for immediate chemotherapy. We are monitoring renal function closely- baseline creatinine of around 1.5. 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 M protein has been stable and repeat is pending. Plan for return in 1 month.  Endocrinology and Nephrology to monitor diabetes and renal failure respectively. Patient would like to continue to follow with Dr. Perkins as needed.     Knee replacement surgery 6/12  No concerns from hematology for surgery.   Patient is cleared from hematologic standpoint for upcoming orthopedic surgery.    A total of 20 minutes was spent in pre-visit chart review, personal interpretation of labs and imaging, and medication review. Total visit time 30 minutes, >50 % counseling.

## 2023-06-01 NOTE — PROGRESS NOTES
Nephrology Clinic Note   6/1/2023    Chief Complaint   Patient presents with    Chronic Kidney Disease      History of present illness:  Patient is a 77 y.o. male.   Presents to the clinic today for medical conditions listed below.  Problem Noted   Vitamin D Deficiency 6/25/2020   Chronic Kidney Disease, Stage III (Moderate) 12/5/2015    Emerson Menendez is a 75 y.o. Black or  male with CKD stage G3A2 (baseline sCr ~1.7-2.0), DM2, HTN and MGUS/MM comes for follow up. He reports no urinary changes, he denies NSAIDs. The patient denies any SOB, chest pain, palpitations, dysuria, problems voiding, N/V/D, taking NSAIDs or new antibiotics, recreational drugs, recent episode of dehydration, acute illness, hospitalization or exposure to IV radiocontrast.       Essential Hypertension 9/24/2012     Review of Systems   Constitutional:  Negative for chills, fever and malaise/fatigue.   Respiratory:  Negative for shortness of breath.    Cardiovascular:  Negative for chest pain and leg swelling.   Gastrointestinal:  Negative for diarrhea, nausea and vomiting.   Genitourinary:  Negative for dysuria, flank pain, frequency, hematuria and urgency.   Neurological:  Negative for headaches.     History:  Past Medical History:   Diagnosis Date    Anxiety 03/16/2015    Asymptomatic multiple myeloma     BPH (benign prostatic hypertrophy) 09/24/2012    Carpal tunnel syndrome     Depression 03/16/2015    Diabetic retinopathy     GERD (gastroesophageal reflux disease) 09/24/2012    Glaucoma     Hx of psychiatric care     Celexa, Valium    Hyperlipidemia     Hypertension     Hypertensive retinopathy of both eyes     Microalbuminuria 09/24/2012    Osteoarthritis of cervical spine 09/24/2012    Osteoarthritis of knee 09/24/2012    Posterior capsular opacification, right 09/15/2022    Psychiatric problem     Type II or unspecified type diabetes mellitus without mention of complication, not stated as uncontrolled 09/24/2012       Past Surgical History:   Procedure Laterality Date    ARTHROSCOPY OF WRIST Left 08/03/2020    Procedure: ARTHROSCOPY, WRIST LEFT;  Surgeon: Kindra Castillo MD;  Location: [facility] OR;  Service: Orthopedics;  Laterality: Left;  REGIONAL/MAC    CARPAL TUNNEL RELEASE Left 08/03/2020    Procedure: RELEASE, CARPAL TUNNEL LEFT;  Surgeon: Kindra Castillo MD;  Location: [facility] OR;  Service: Orthopedics;  Laterality: Left;  REGIONAL/MAC    CATARACT EXTRACTION  2012    Right eye    EYE SURGERY Bilateral     cataract removal    INTRAOCULAR PROSTHESES INSERTION Left 05/20/2021    Procedure: INSERTION, IOL PROSTHESIS;  Surgeon: Jose L Wheatley MD;  Location: [facility] OR [address] [place] [address];  Service: Ophthalmology;  Laterality: Left;    PHACOEMULSIFICATION OF CATARACT Left 05/20/2021    Procedure: PHACOEMULSIFICATION, CATARACT;  Surgeon: Jose L Wheatley MD;  Location: [facility] OR [address] [place] [address];  Service: Ophthalmology;  Laterality: Left;    PROSTATE SURGERY      SURGICAL REMOVAL OF CARPAL BONE Left 01/06/2021    Procedure: OSTECTOMY, CARPAL BONE, LEFT;  Surgeon: Kindra Castillo MD;  Location: [facility] OR;  Service: Orthopedics;  Laterality: Left;  REGIONAL/MAC    TONSILLECTOMY      TRIGGER FINGER RELEASE Right 07/29/2022    Procedure: RELEASE, TRIGGER FINGER,RIGHT,LONG;  Surgeon: Kindra Castillo MD;  Location: [facility] OR;  Service: Orthopedics;  Laterality: Right;        Current Outpatient Medications:     ACCU-CHEK STEFANO CONTROL SOLN Soln, , Disp: , Rfl:     acetaminophen (TYLENOL) 650 MG TbSR, Take 650 mg by mouth as needed. pain, Disp: , Rfl:     alcohol swabs PadM, Apply 1 each topically as needed., Disp: , Rfl:     aspirin (ECOTRIN) 81 MG EC tablet, Take 1 tablet (81 mg total) by mouth once daily., Disp: 100 tablet, Rfl: 3    azelastine (ASTELIN) 137 mcg (0.1 %) nasal spray, 1 spray (137 mcg total) by Nasal route 2 (two) times daily., Disp: 30 mL, Rfl: 3    blood glucose control, normal (METER-CHECK) Soln, One meter.  Use as  directed. Meter of insurance choice, Disp: 1 each, Rfl: 0    blood sugar diagnostic (ACCU-CHEK STEFANO PLUS TEST STRP) Strp, Accu check stefano plus TEST FOUR TIMES DAILY. Test strtips of insurance choice to go with meter and lancets, Disp: 400 strip, Rfl: 3    blood sugar diagnostic Strp, [facility] - check blood sugars 4 times daily, Disp: 400 strip, Rfl: 4    blood-glucose meter (ACCU-CHEK STEFANO PLUS METER) Misc, Use as direted, Disp: 1 each, Rfl: 0    blood-glucose meter (ACCU-CHEK OMYARA) [facility] USE AS DIRECTED. Accucheck stefano plus, Disp: 1 each, Rfl: 0    citalopram (CELEXA) 40 MG tablet, TAKE 1 TABLET (40 MG TOTAL) BY MOUTH ONCE DAILY., Disp: 90 tablet, Rfl: 3    diazePAM (VALIUM) 5 MG tablet, Take 1 tablet (5 mg total) by mouth every 12 (twelve) hours as needed for Anxiety., Disp: 60 tablet, Rfl: 0    diphenhydrAMINE (BENADRYL) 50 MG capsule, Take 50 mg by mouth daily as needed (AS NEEDED)., Disp: , Rfl:     dorzolamide-timolol 2-0.5% (COSOPT) 22.3-6.8 mg/mL ophthalmic solution, Place 1 drop into both eyes 2 (two) times daily., Disp: 1 each, Rfl: 12    doxycycline (VIBRA-TABS) 100 MG tablet, Take 1 tablet (100 mg total) by mouth 2 (two) times daily., Disp: 20 tablet, Rfl: 3    ergocalciferol (ERGOCALCIFEROL) 50,000 unit Cap, Take 1 capsule (50,000 Units total) by mouth every 7 days., Disp: 12 capsule, Rfl: 3    FARXIGA 5 mg Tab tablet, Take 1 tablet (5 mg total) by mouth once daily., Disp: 30 tablet, Rfl: 11    fexofenadine (ALLEGRA) 60 MG tablet, Take 1 tablet (60 mg total) by mouth once daily., Disp: 90 tablet, Rfl: 3    flash glucose scanning reader (FREESTYLE CHRISTIANO 14 DAY READER) Misc, Use as directed, Disp: 6 each, Rfl: 4    flash glucose sensor (FREESTYLE CHRISTIANO 14 DAY SENSOR) Kit, Use as directed, Disp: 6 kit, Rfl: 4    fluticasone propionate (FLONASE) 50 mcg/actuation nasal spray, 1 spray (50 mcg total) by Each Nostril route once daily., Disp: 18.2 mL, Rfl: 6    gabapentin (NEURONTIN) 100 MG capsule,  "One capsule in am , one capsule in afternoon, 1-3 capsules in evening (Patient taking differently: Take 100 mg by mouth 3 (three) times daily. One capsule in am , one capsule in afternoon, 1-3 capsules in evening), Disp: 450 capsule, Rfl: 4    glimepiride (AMARYL) 2 MG tablet, Take 1 tablet (2 mg total) by mouth once daily. (Patient taking differently: Take 2 mg by mouth daily with breakfast.), Disp: 90 tablet, Rfl: 4    insulin (LANTUS SOLOSTAR U-100 INSULIN) glargine 100 units/mL SubQ pen, Inject 16 Units into the skin 2 (two) times a day., Disp: 30 mL, Rfl: 2    insulin aspart U-100 (NOVOLOG FLEXPEN U-100 INSULIN) 100 unit/mL (3 mL) InPn pen, Inject 4 Units into the skin 3 (three) times daily with meals., Disp: 12 mL, Rfl: 1    lancets (ACCU-CHEK SOFTCLIX LANCETS) Misc, 1 lancet by Misc.(Non-Drug; Combo Route) route 4 (four) times daily., Disp: 400 each, Rfl: 4    losartan (COZAAR) 50 MG tablet, Take 1 tablet (50 mg total) by mouth once daily., Disp: 90 tablet, Rfl: 6    omeprazole (PRILOSEC) 40 MG capsule, Take 1 capsule (40 mg total) by mouth once daily. (Patient taking differently: Take 40 mg by mouth every morning.), Disp: 90 capsule, Rfl: 4    pen needle, diabetic (BD ULTRA-FINE SHORT PEN NEEDLE) 31 gauge x 5/16" Ndle, USE TO INJECT INSULIN ONE TIME DAILY, Disp: 100 each, Rfl: 3    pravastatin (PRAVACHOL) 40 MG tablet, TAKE 1 TABLET EVERY DAY (Patient taking differently: Take 20 mg by mouth once daily.), Disp: 90 tablet, Rfl: 3    psyllium seed, with sugar, (FIBER ORAL), Take by mouth once daily., Disp: , Rfl:     sildenafiL (VIAGRA) 100 MG tablet, Take 1 tablet (100 mg total) by mouth daily as needed for Erectile Dysfunction., Disp: 30 tablet, Rfl: 2    tamsulosin (FLOMAX) 0.4 mg Cap, Take 1 capsule (0.4 mg total) by mouth nightly., Disp: 90 capsule, Rfl: 4    TRUEPLUS LANCETS 33 gauge Misc, , Disp: , Rfl:     EYLEA 2 mg/0.05 mL Syrg, Place 2 mg into both eyes., Disp: , Rfl:     FLUAD QUAD 2022-23,65Y " UP,,PF, 60 mcg (15 mcg x 4)/0.5 mL Syrg, , Disp: , Rfl:     glucose 4 GM chewable tablet, Take 8 g by mouth as needed for Low blood sugar., Disp: , Rfl:     MONOVISC 88 mg/4 mL Syrg, Inject 88 mg as directed., Disp: , Rfl:     oxybutynin (DITROPAN-XL) 10 MG 24 hr tablet, Take 1 tablet (10 mg total) by mouth once daily. (Patient not taking: Reported on 6/1/2023), Disp: 30 tablet, Rfl: 11    triamcinolone acetonide (KENALOG-40) 40 mg/mL injection, 40 mg., Disp: , Rfl:   No current facility-administered medications for this visit.    Facility-Administered Medications Ordered in Other Visits:     mupirocin 2 % ointment, , Nasal, On Call Procedure, Mohit Hummel MD, Given at 01/06/21 0847  Review of patient's allergies indicates:   Allergen Reactions    Penicillins      Knees locked up      Social History     Tobacco Use    Smoking status: Never    Smokeless tobacco: Never   Substance Use Topics    Alcohol use: Yes     Comment: Two beers in a week      Family History   Problem Relation Age of Onset    Heart disease Mother     Alcohol abuse Father     Cirrhosis Father     Depression Sister     No Known Problems Sister     No Known Problems Sister     No Known Problems Sister     Hypertension Brother     Depression Brother     Kidney failure Daughter         due to medication    Cerebral aneurysm Daughter     Blindness Neg Hx     Cancer Neg Hx     Cataracts Neg Hx     Diabetes Neg Hx     Glaucoma Neg Hx     Macular degeneration Neg Hx     Retinal detachment Neg Hx     Strabismus Neg Hx     Stroke Neg Hx     Thyroid disease Neg Hx         Physical Exam :  Vitals:    06/01/23 1415   BP: 124/79   Pulse: 85     Physical Exam  Constitutional:       Appearance: Normal appearance.   HENT:      Head: Normocephalic and atraumatic.   Eyes:      Pupils: Pupils are equal, round, and reactive to light.   Cardiovascular:      Rate and Rhythm: Normal rate and regular rhythm.   Pulmonary:      Effort: Pulmonary effort is normal.       Breath sounds: Normal breath sounds.   Abdominal:      Palpations: Abdomen is soft.   Musculoskeletal:         General: No swelling.      Right lower leg: No edema.      Left lower leg: No edema.   Skin:     General: Skin is warm and dry.   Neurological:      Mental Status: He is alert.       Labs reviewed   Images Reviewed    Assessment:    1. Stage 3b chronic kidney disease    2. Essential hypertension        Plan:    Chronic kidney disease, stage III (moderate)  [id number]  Likely due to a combination of long term diabetes and HTN. Pt also noted to have hx of smoldering myeloma for which he follows with Heme/Onc and is currently not on any therapy   Cr mildly increased at 2.2 since last visit   UPCR at 0.8, increased from 0.5 on 2021, most recent UACR in 2/23 at 724 consistent with albuminuria   Continue Dapaglifozin 5mg PO daily   Retroperitoneal US ordered  Pt is scheduled to undergo knee replacement surgery later this month; recommend to stay well hydrated before surgery and avoid NSAIDs as much as possible in the post operative period and in general for pain  RTC in 3 months with repeat labs     Essential hypertension  Losartan 50mg PO daily   Appears well controlled   Recommend to measure BP at home 3 times a day and send us the numbers   No follow-ups on file.     Orders Placed This Encounter   Procedures    US Retroperitoneal Complete     There are no discontinued medications.   Future Appointments   Date Time Provider Department Center   6/6/2023  1:30 PM CATARINO Duff [facility] AUDIO [facility]   6/6/2023  2:00 PM Lazaro Pandya MD [facility] ENT [facility]   8/9/2023  9:00 AM Roberta Sagastume MD [facility] IM [facility] PCW       Jovon Betts

## 2023-06-01 NOTE — ASSESSMENT & PLAN NOTE
Losartan 50mg PO daily   Appears well controlled   Recommend to measure BP at home 3 times a day and send us the numbers

## 2023-06-01 NOTE — PROGRESS NOTES
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   Sponsor:  [facility]  IRB# [id number]  Study ID: [id number]  Investigator: GIL Engel Pt Initials: LUCÍA LORENZ        Cycle 90 Day 28 Arm B: Observation     Patient presents to clinic today for above cycle evaluation per Dr Engel; he is un-accompanied and states he is doing well; busy with work and "getting everything squared away before surgery."  He confirms with Dr Engel that he has scheduled knee replacement surgery for Friday, June 12th in [place]. He is alert, oriented to person, place, and time; mood and affect were appropriate. He continues to maintain current COVID precautions; wearing mask here in BMT clinic and states does so in crowded indoor areas; denies any COVID symptoms since last visit here. 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  He verbalizes continued willingness to participate in above-mentioned study.        Review of Baseline AE's:   1.Hypertension, Grade 2 systolic/diastolic: BP today is 156/75; subject states compliance with Losartan. Will continue to monitor. Subject denies headache/dizziness; no symptoms noted. Dr. Engel aware.  AE stable/ongoing and tends to fluctuate.   2. Lower Back Pain and Neck Pain, grade 1: Stable. Patient reports no complaints today; tends to be tranient and activity related.Of note: patient is not suspected to have bony myeloma involvement per Dr. Engel as these are pre-existing issues present at baseline. AE no change today's visit.   3. Pain in extremity (knees), grade 1. Subject presents with knee brace to both knees; see interval history above; surgery to left knee (replacement) is 6/12/23; logistics of clearance from benign heme standpoint discussed with subject per Dr Engel who states clearance from heme " "standpoint is in her note. Subject states understanding. Surgeon will be Dr. Olguin; surgery will occur in [place]. Subject states he is "ready to take care of this."       4. Peripheral sensory neuropathy, grade 1: Patient does not verbalize complaints today. Has gabapentin prescribed and takes as needed.  States has not taken recently.  AE ongoing.   5. Anemia, Grade 1: Hgb 10.5 today. Result reviewed by Dr. Engel, noted. AE ongoing and stable.            Review of AE's:     *Please note this list is not all-inclusive, please see AE log for physician reviewed list of adverse events.   1. Creatinine increased, grade 1: Serum creatinine from labs today is slightly elevated at 2.2 mg/dl; calculated GFR 39 ml/min based on CG calculation. Reviewed per Dr. Engel and no new orders. Dr Engel maintains this AE is not related to myeloma: rather, attributable to CKD;  likely secondary to diabetes and hypertension vs plasma cell dyscrasia.  Will continue observation monthly and to monitor renal functionclosely. Per MD, reiterated adequate hydration with increase in water/sugar free gatorade intake..  2. Hyponatremia, Grade 1: Serum sodium today is 135 mmol/L; AE intermittent. Will monitor  3. Hyperkalemia, Grade 1: serum K today is 4.5, Dr Engel has reviewed labs; no additional orders noted. AE intermittent; will continue to monitor.   4. Hyperuricemia, Grade 1: this was not evaluated today; usually ordered per nephrology.   5. Hyperglycemia. Grade 1: blood glucose today is 240; lab is nonfasting. Subject follows with Dr Sagastume for ongoing guidance/supervision of diabetes.    6. Diplopia, right eye: Subject states no issues right now; follows up with ophtamology regularly for issues. Per Dr Engel this is not related to SMM; rather is associated with diabetes. Will continue to monitor.   7. Hypophosphatemia, Grade 1: serum Phosphorous is 3.6 today; AE intermittent. Dr Engel aware; no orders as there are no symptoms.       Per " "study chair, "the definition of progressive disease for this protocol is developing CRAB criteria that requires treatment systemically." Per Dr Engel, based on today's exam and lab results, patient does not have any s/s of CRAB: Normal Calcium level (9.4), absence of Renal insufficiency (serum creatinine elevated today at 2.2 and eGFR per cockroft-gault is 39 mL/min, but per Dr Engel not related to myeloma; --patient with a history of kidney dysfunction secondary to diabetes; Dr. Engel aware of these values and not concerned for myeloma involvement), absence of significant Anemia (hemoglobin 10.5, stable; will await myeloma labs for clarity relationship to myeloma) and absence of lytic Bone lesions (per baseline metastatic survey as well as MRI--see MD note for further comment). See MD note for ECOG score and H&P and flowsheets for laboratory work, vitals, etc. All myeloma-related blood work from last cycle including SPEP and JAGUAR have remained stable, and are currently pending for this cycle. Per Dr. Engel, patient shows no evidence of progression at last result. Patient informed that Dr. Engel will release all lab results to [facility]. He states understanding of this. QOL's not required again until end of treatment/observation.           Subject maintains he wishes only to see Dr Engel and will not show for appts if scheduled with any other provider. For this reason subject's appts do not always align with study calendar; per Dr Engel as subject is not on active treatment will abide by subject's wishes to see her only.  Will continue to schedule patient as far out as possible, which helps to maintain compliance with visits. Normally subject deviates no greater than one week per cycle; wishes to remain on study per Dr Engel. Next appt will be scheduled for labs only in [place] with virtual visit or call from Dr Engel since subject will be convalescing post op left knee replacement. Will contact subject approximately 2 " weeks post op (surgery 6/12/23 so 6/25 roughly) Will notify appropriate E3A06 study personnel at time of follow up regarding deviation from study window allowances. Patient states having research RN's contact information and has MD contact information to call with any concerns, questions, or worsening of symptoms. Will follow up with subject once myeloma labs are resulted.

## 2023-06-01 NOTE — ASSESSMENT & PLAN NOTE
[id number]  Likely due to a combination of long term diabetes and HTN. Pt also noted to have hx of smoldering myeloma for which he follows with Heme/Onc and is currently not on any therapy   Cr mildly increased at 2.2 since last visit   UPCR at 0.8, increased from 0.5 on 2021, most recent UACR in 2/23 at 724 consistent with albuminuria   Continue Dapaglifozin 5mg PO daily   Retroperitoneal US ordered  Pt is scheduled to undergo knee replacement surgery later this month; recommend to stay well hydrated before surgery and avoid NSAIDs as much as possible in the post operative period and in general for pain  RTC in 3 months with repeat labs

## 2023-06-02 ENCOUNTER — HOSPITAL ENCOUNTER (OUTPATIENT)
Dept: RADIOLOGY | Facility: HOSPITAL | Age: 78
Discharge: HOME | End: 2023-06-02
Attending: INTERNAL MEDICINE
Payer: MEDICARE

## 2023-06-02 DIAGNOSIS — N18.32 STAGE 3B CHRONIC KIDNEY DISEASE: ICD-10-CM

## 2023-06-02 LAB
ALBUMIN SERPL ELPH-MCNC: 4.06 G/DL (ref 3.35–5.55)
ALPHA1 GLOB SERPL ELPH-MCNC: 0.28 G/DL (ref 0.17–0.41)
ALPHA2 GLOB SERPL ELPH-MCNC: 0.86 G/DL (ref 0.43–0.99)
B-GLOBULIN SERPL ELPH-MCNC: 2.61 G/DL (ref 0.5–1.1)
GAMMA GLOB SERPL ELPH-MCNC: 0.6 G/DL (ref 0.67–1.58)
INTERPRETATION SERPL IFE-IMP: NORMAL
KAPPA LC SER QL IA: 10.06 MG/DL (ref 0.33–1.94)
KAPPA LC/LAMBDA SER IA: 4.16 (ref 0.26–1.65)
LAMBDA LC SER QL IA: 2.42 MG/DL (ref 0.57–2.63)
PROT SERPL-MCNC: 8.4 G/DL (ref 6–8.4)

## 2023-06-02 PROCEDURE — 76770 US EXAM ABDO BACK WALL COMP: CPT | Mod: TC

## 2023-06-02 PROCEDURE — 76770 US RETROPERITONEAL COMPLETE: ICD-10-PCS | Mod: 26,,, | Performed by: RADIOLOGY

## 2023-06-02 PROCEDURE — 76770 US EXAM ABDO BACK WALL COMP: CPT | Mod: 26,,, | Performed by: RADIOLOGY

## 2023-06-05 LAB
PATHOLOGIST INTERPRETATION IFE: NORMAL
PATHOLOGIST INTERPRETATION SPE: NORMAL

## 2023-06-06 ENCOUNTER — TELEPHONE (OUTPATIENT)
Dept: UROLOGY | Facility: CLINIC | Age: 78
End: 2023-06-06
Payer: MEDICARE

## 2023-06-06 ENCOUNTER — ANESTHESIA EVENT (OUTPATIENT)
Dept: SURGERY | Facility: HOSPITAL | Age: 78
End: 2023-06-06
Payer: MEDICARE

## 2023-06-06 ENCOUNTER — HOSPITAL ENCOUNTER (OUTPATIENT)
Facility: HOSPITAL | Age: 78
Discharge: HOME | End: 2023-06-06
Attending: UROLOGY | Admitting: UROLOGY
Payer: MEDICARE

## 2023-06-06 ENCOUNTER — ANESTHESIA (OUTPATIENT)
Dept: SURGERY | Facility: HOSPITAL | Age: 78
End: 2023-06-06
Payer: MEDICARE

## 2023-06-06 VITALS
WEIGHT: 207 LBS | HEIGHT: 70 IN | HEART RATE: 71 BPM | TEMPERATURE: 98 F | DIASTOLIC BLOOD PRESSURE: 96 MMHG | RESPIRATION RATE: 20 BRPM | SYSTOLIC BLOOD PRESSURE: 189 MMHG | OXYGEN SATURATION: 97 % | BODY MASS INDEX: 29.63 KG/M2

## 2023-06-06 DIAGNOSIS — M17.0 ARTHRITIS OF BOTH KNEES: ICD-10-CM

## 2023-06-06 DIAGNOSIS — N13.8 BPH WITH URINARY OBSTRUCTION: Primary | ICD-10-CM

## 2023-06-06 DIAGNOSIS — N40.1 BPH WITH URINARY OBSTRUCTION: Primary | ICD-10-CM

## 2023-06-06 DIAGNOSIS — M17.12 ARTHRITIS OF LEFT KNEE: ICD-10-CM

## 2023-06-06 LAB — POCT GLUCOSE: 156 MG/DL (ref 70–110)

## 2023-06-06 PROCEDURE — 52648 LASER SURGERY OF PROSTATE: CPT | Mod: ,,, | Performed by: UROLOGY

## 2023-06-06 PROCEDURE — 37000009 HC ANESTHESIA EA ADD 15 MINS: Performed by: UROLOGY

## 2023-06-06 PROCEDURE — 82962 GLUCOSE BLOOD TEST: CPT | Performed by: UROLOGY

## 2023-06-06 PROCEDURE — 71000044 HC DOSC ROUTINE RECOVERY FIRST HOUR: Performed by: UROLOGY

## 2023-06-06 PROCEDURE — 25000003 PHARM REV CODE 250: Performed by: STUDENT IN AN ORGANIZED HEALTH CARE EDUCATION/TRAINING PROGRAM

## 2023-06-06 PROCEDURE — 37000008 HC ANESTHESIA 1ST 15 MINUTES: Performed by: UROLOGY

## 2023-06-06 PROCEDURE — 71000015 HC POSTOP RECOV 1ST HR: Performed by: UROLOGY

## 2023-06-06 PROCEDURE — 52648 PR LASER VAPORIZATION SURGERY PROSTATE, COMPLETE: ICD-10-PCS | Mod: ,,, | Performed by: UROLOGY

## 2023-06-06 PROCEDURE — 36000706: Performed by: UROLOGY

## 2023-06-06 PROCEDURE — D9220A PRA ANESTHESIA: Mod: ANES,,, | Performed by: ANESTHESIOLOGY

## 2023-06-06 PROCEDURE — 25000003 PHARM REV CODE 250: Performed by: NURSE ANESTHETIST, CERTIFIED REGISTERED

## 2023-06-06 PROCEDURE — 63600175 PHARM REV CODE 636 W HCPCS: Performed by: STUDENT IN AN ORGANIZED HEALTH CARE EDUCATION/TRAINING PROGRAM

## 2023-06-06 PROCEDURE — D9220A PRA ANESTHESIA: ICD-10-PCS | Mod: CRNA,,, | Performed by: NURSE ANESTHETIST, CERTIFIED REGISTERED

## 2023-06-06 PROCEDURE — D9220A PRA ANESTHESIA: ICD-10-PCS | Mod: ANES,,, | Performed by: ANESTHESIOLOGY

## 2023-06-06 PROCEDURE — 63600175 PHARM REV CODE 636 W HCPCS: Performed by: NURSE ANESTHETIST, CERTIFIED REGISTERED

## 2023-06-06 PROCEDURE — D9220A PRA ANESTHESIA: Mod: CRNA,,, | Performed by: NURSE ANESTHETIST, CERTIFIED REGISTERED

## 2023-06-06 PROCEDURE — 36000707: Performed by: UROLOGY

## 2023-06-06 PROCEDURE — 71000016 HC POSTOP RECOV ADDL HR: Performed by: UROLOGY

## 2023-06-06 PROCEDURE — 25000003 PHARM REV CODE 250: Performed by: UROLOGY

## 2023-06-06 RX ORDER — SULFAMETHOXAZOLE AND TRIMETHOPRIM 800; 160 MG/1; MG/1
1 TABLET ORAL 2 TIMES DAILY
Qty: 14 TABLET | Refills: 0 | Status: SHIPPED | OUTPATIENT
Start: 2023-06-06 | End: 2023-06-13

## 2023-06-06 RX ORDER — LIDOCAINE HYDROCHLORIDE 20 MG/ML
INJECTION, SOLUTION EPIDURAL; INFILTRATION; INTRACAUDAL; PERINEURAL
Status: DISCONTINUED | OUTPATIENT
Start: 2023-06-06 | End: 2023-06-06

## 2023-06-06 RX ORDER — FENTANYL CITRATE 50 UG/ML
INJECTION, SOLUTION INTRAMUSCULAR; INTRAVENOUS
Status: DISCONTINUED | OUTPATIENT
Start: 2023-06-06 | End: 2023-06-06

## 2023-06-06 RX ORDER — ONDANSETRON 2 MG/ML
INJECTION INTRAMUSCULAR; INTRAVENOUS
Status: DISCONTINUED | OUTPATIENT
Start: 2023-06-06 | End: 2023-06-06

## 2023-06-06 RX ORDER — PROPOFOL 10 MG/ML
VIAL (ML) INTRAVENOUS
Status: DISCONTINUED | OUTPATIENT
Start: 2023-06-06 | End: 2023-06-06

## 2023-06-06 RX ORDER — OXYBUTYNIN CHLORIDE 5 MG/1
5 TABLET ORAL 3 TIMES DAILY PRN
Qty: 21 TABLET | Refills: 0 | Status: SHIPPED | OUTPATIENT
Start: 2023-06-06 | End: 2023-06-13

## 2023-06-06 RX ORDER — PHENAZOPYRIDINE HYDROCHLORIDE 200 MG/1
200 TABLET, FILM COATED ORAL 3 TIMES DAILY PRN
Qty: 21 TABLET | Refills: 0 | Status: SHIPPED | OUTPATIENT
Start: 2023-06-06 | End: 2023-06-08 | Stop reason: SDUPTHER

## 2023-06-06 RX ORDER — OXYCODONE HYDROCHLORIDE 5 MG/1
5 TABLET ORAL EVERY 6 HOURS PRN
Qty: 5 TABLET | Refills: 0 | Status: ON HOLD | OUTPATIENT
Start: 2023-06-06 | End: 2023-06-13 | Stop reason: HOSPADM

## 2023-06-06 RX ORDER — PHENAZOPYRIDINE HYDROCHLORIDE 100 MG/1
200 TABLET, FILM COATED ORAL ONCE
Status: COMPLETED | OUTPATIENT
Start: 2023-06-06 | End: 2023-06-06

## 2023-06-06 RX ORDER — DEXAMETHASONE SODIUM PHOSPHATE 4 MG/ML
INJECTION, SOLUTION INTRA-ARTICULAR; INTRALESIONAL; INTRAMUSCULAR; INTRAVENOUS; SOFT TISSUE
Status: DISCONTINUED | OUTPATIENT
Start: 2023-06-06 | End: 2023-06-06

## 2023-06-06 RX ORDER — MUPIROCIN 20 MG/G
OINTMENT TOPICAL
Status: DISCONTINUED | OUTPATIENT
Start: 2023-06-06 | End: 2023-06-06 | Stop reason: HOSPADM

## 2023-06-06 RX ORDER — PHENAZOPYRIDINE HYDROCHLORIDE 100 MG/1
TABLET, FILM COATED ORAL
Status: DISCONTINUED
Start: 2023-06-06 | End: 2023-06-06 | Stop reason: HOSPADM

## 2023-06-06 RX ORDER — HYDROMORPHONE HYDROCHLORIDE 1 MG/ML
0.2 INJECTION, SOLUTION INTRAMUSCULAR; INTRAVENOUS; SUBCUTANEOUS EVERY 5 MIN PRN
Status: DISCONTINUED | OUTPATIENT
Start: 2023-06-06 | End: 2023-06-06 | Stop reason: HOSPADM

## 2023-06-06 RX ORDER — SODIUM CHLORIDE 0.9 % (FLUSH) 0.9 %
10 SYRINGE (ML) INJECTION
Status: DISCONTINUED | OUTPATIENT
Start: 2023-06-06 | End: 2023-06-06 | Stop reason: HOSPADM

## 2023-06-06 RX ORDER — LABETALOL HYDROCHLORIDE 5 MG/ML
INJECTION, SOLUTION INTRAVENOUS
Status: DISCONTINUED | OUTPATIENT
Start: 2023-06-06 | End: 2023-06-06

## 2023-06-06 RX ORDER — PROCHLORPERAZINE EDISYLATE 5 MG/ML
5 INJECTION INTRAMUSCULAR; INTRAVENOUS EVERY 30 MIN PRN
Status: DISCONTINUED | OUTPATIENT
Start: 2023-06-06 | End: 2023-06-06 | Stop reason: HOSPADM

## 2023-06-06 RX ORDER — OXYBUTYNIN CHLORIDE 5 MG/1
5 TABLET ORAL 3 TIMES DAILY
Status: DISCONTINUED | OUTPATIENT
Start: 2023-06-06 | End: 2023-06-06 | Stop reason: HOSPADM

## 2023-06-06 RX ORDER — LIDOCAINE HYDROCHLORIDE 20 MG/ML
JELLY TOPICAL ONCE
Status: SHIPPED | OUTPATIENT
Start: 2023-06-06

## 2023-06-06 RX ADMIN — ONDANSETRON 4 MG: 2 INJECTION INTRAMUSCULAR; INTRAVENOUS at 01:06

## 2023-06-06 RX ADMIN — LABETALOL HYDROCHLORIDE 10 MG: 5 INJECTION INTRAVENOUS at 01:06

## 2023-06-06 RX ADMIN — LIDOCAINE HYDROCHLORIDE 50 MG: 20 INJECTION, SOLUTION EPIDURAL; INFILTRATION; INTRACAUDAL; PERINEURAL at 01:06

## 2023-06-06 RX ADMIN — LABETALOL HYDROCHLORIDE 10 MG: 5 INJECTION INTRAVENOUS at 02:06

## 2023-06-06 RX ADMIN — PROPOFOL 120 MG: 10 INJECTION, EMULSION INTRAVENOUS at 01:06

## 2023-06-06 RX ADMIN — OXYBUTYNIN CHLORIDE 5 MG: 5 TABLET ORAL at 02:06

## 2023-06-06 RX ADMIN — FENTANYL CITRATE 25 MCG: 50 INJECTION, SOLUTION INTRAMUSCULAR; INTRAVENOUS at 01:06

## 2023-06-06 RX ADMIN — DEXAMETHASONE SODIUM PHOSPHATE 4 MG: 4 INJECTION, SOLUTION INTRAMUSCULAR; INTRAVENOUS at 01:06

## 2023-06-06 RX ADMIN — CEFAZOLIN 2 G: 2 INJECTION, POWDER, FOR SOLUTION INTRAMUSCULAR; INTRAVENOUS at 01:06

## 2023-06-06 RX ADMIN — SODIUM CHLORIDE: 0.9 INJECTION, SOLUTION INTRAVENOUS at 01:06

## 2023-06-06 RX ADMIN — FENTANYL CITRATE 50 MCG: 50 INJECTION, SOLUTION INTRAMUSCULAR; INTRAVENOUS at 01:06

## 2023-06-06 RX ADMIN — PHENAZOPYRIDINE HYDROCHLORIDE 200 MG: 100 TABLET ORAL at 02:06

## 2023-06-06 NOTE — H&P
Urology ([facility]) H&P  Staff: Juan Walker MD      HPI:  Emerson Menendez is a 77 y.o. male with BPH. He had a laser TURP in 2013 with return of his LUTS. Cysto showed some residual adenoma. He presents today for repeat laser TURP. He last took aspirin 7 days ago.      ROS:  Neg except per HPI    Past Medical History:   Diagnosis Date    Anxiety 03/16/2015    Asymptomatic multiple myeloma     BPH (benign prostatic hypertrophy) 09/24/2012    Carpal tunnel syndrome     Depression 03/16/2015    Diabetic retinopathy     GERD (gastroesophageal reflux disease) 09/24/2012    Glaucoma     Hx of psychiatric care     Celexa, Valium    Hyperlipidemia     Hypertension     Hypertensive retinopathy of both eyes     Microalbuminuria 09/24/2012    Osteoarthritis of cervical spine 09/24/2012    Osteoarthritis of knee 09/24/2012    Posterior capsular opacification, right 09/15/2022    Psychiatric problem     Type II or unspecified type diabetes mellitus without mention of complication, not stated as uncontrolled 09/24/2012       Past Surgical History:   Procedure Laterality Date    ARTHROSCOPY OF WRIST Left 08/03/2020    Procedure: ARTHROSCOPY, WRIST LEFT;  Surgeon: Kindra Castillo MD;  Location: [facility] OR;  Service: Orthopedics;  Laterality: Left;  REGIONAL/MAC    CARPAL TUNNEL RELEASE Left 08/03/2020    Procedure: RELEASE, CARPAL TUNNEL LEFT;  Surgeon: Kindra Castillo MD;  Location: [facility] OR;  Service: Orthopedics;  Laterality: Left;  REGIONAL/MAC    CATARACT EXTRACTION  2012    Right eye    EYE SURGERY Bilateral     cataract removal    INTRAOCULAR PROSTHESES INSERTION Left 05/20/2021    Procedure: INSERTION, IOL PROSTHESIS;  Surgeon: Jose L Wheatley MD;  Location: [facility] OR [address] [place] [address];  Service: Ophthalmology;  Laterality: Left;    PHACOEMULSIFICATION OF CATARACT Left 05/20/2021    Procedure: PHACOEMULSIFICATION, CATARACT;  Surgeon: Jose L Wheatley MD;  Location: [facility] OR [address] [place] [address];  Service: Ophthalmology;   Laterality: Left;    PROSTATE SURGERY      SURGICAL REMOVAL OF CARPAL BONE Left 2021    Procedure: OSTECTOMY, CARPAL BONE, LEFT;  Surgeon: Kindra Castillo MD;  Location: [facility] OR;  Service: Orthopedics;  Laterality: Left;  REGIONAL/MAC    TONSILLECTOMY      TRIGGER FINGER RELEASE Right 2022    Procedure: RELEASE, TRIGGER FINGER,RIGHT,LONG;  Surgeon: Kindra Castillo MD;  Location: [facility] OR;  Service: Orthopedics;  Laterality: Right;       Social History     Socioeconomic History    Marital status:     Number of children: 1   Tobacco Use    Smoking status: Never    Smokeless tobacco: Never   Substance and Sexual Activity    Alcohol use: Yes     Comment: Two beers in a week    Drug use: Never    Sexual activity: Yes     Partners: Female     Birth control/protection: None   Social History Narrative     x2, 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       Family History   Problem Relation Age of Onset    Heart disease Mother     Alcohol abuse Father     Cirrhosis Father     Depression Sister     No Known Problems Sister     No Known Problems Sister     No Known Problems Sister     Hypertension Brother     Depression Brother     Kidney failure Daughter         due to medication    Cerebral aneurysm Daughter     Blindness Neg Hx     Cancer Neg Hx     Cataracts Neg Hx     Diabetes Neg Hx     Glaucoma Neg Hx     Macular degeneration Neg Hx     Retinal detachment Neg Hx     Strabismus Neg Hx     Stroke Neg Hx     Thyroid disease Neg Hx        Review of patient's allergies indicates:   Allergen Reactions    Penicillins      Knees locked up       Current Facility-Administered Medications on File Prior to Encounter   Medication Dose Route Frequency Provider Last Rate Last Admin    mupirocin 2 % ointment   Nasal On Call Procedure Mohit Hummel MD   Given at 21 0847     Current Outpatient Medications on File Prior to Encounter   Medication Sig Dispense Refill    ANTIONETTE AGARWAL  CONTROL SOLN Soln       acetaminophen (TYLENOL) 650 MG TbSR Take 650 mg by mouth as needed. pain      aspirin (ECOTRIN) 81 MG EC tablet Take 1 tablet (81 mg total) by mouth once daily. 100 tablet 3    azelastine (ASTELIN) 137 mcg (0.1 %) nasal spray 1 spray (137 mcg total) by Nasal route 2 (two) times daily. 30 mL 3    blood glucose control, normal (METER-CHECK) Soln One meter.  Use as directed. Meter of insurance choice 1 each 0    blood sugar diagnostic (ACCU-CHEK STEFANO PLUS TEST STRP) Strp Accu check stefano plus TEST FOUR TIMES DAILY. Test strtips of insurance choice to go with meter and lancets 400 strip 3    blood sugar diagnostic Strp [facility] - check blood sugars 4 times daily 400 strip 4    blood-glucose meter (ACCU-CHEK STEFANO PLUS METER) Misc Use as direted 1 each 0    blood-glucose meter (ACCU-CHEK OMAYRA) Misc USE AS DIRECTED. Accucheck stefano plus 1 each 0    citalopram (CELEXA) 40 MG tablet TAKE 1 TABLET (40 MG TOTAL) BY MOUTH ONCE DAILY. 90 tablet 3    diazePAM (VALIUM) 5 MG tablet Take 1 tablet (5 mg total) by mouth every 12 (twelve) hours as needed for Anxiety. 60 tablet 0    diphenhydrAMINE (BENADRYL) 50 MG capsule Take 50 mg by mouth daily as needed (AS NEEDED).      dorzolamide-timolol 2-0.5% (COSOPT) 22.3-6.8 mg/mL ophthalmic solution Place 1 drop into both eyes 2 (two) times daily. 1 each 12    ergocalciferol (ERGOCALCIFEROL) 50,000 unit Cap Take 1 capsule (50,000 Units total) by mouth every 7 days. 12 capsule 3    EYLEA 2 mg/0.05 mL Syrg Place 2 mg into both eyes.      FARXIGA 5 mg Tab tablet Take 1 tablet (5 mg total) by mouth once daily. 30 tablet 11    fexofenadine (ALLEGRA) 60 MG tablet Take 1 tablet (60 mg total) by mouth once daily. 90 tablet 3    flash glucose scanning reader (FREESTYLE CHRISTIANO 14 DAY READER) Misc Use as directed 6 each 4    flash glucose sensor (FREESTYLE CHRISTIANO 14 DAY SENSOR) Kit Use as directed 6 kit 4    fluticasone propionate (FLONASE) 50 mcg/actuation nasal spray  "1 spray (50 mcg total) by Each Nostril route once daily. 18.2 mL 6    gabapentin (NEURONTIN) 100 MG capsule One capsule in am , one capsule in afternoon, 1-3 capsules in evening (Patient taking differently: Take 100 mg by mouth 3 (three) times daily. One capsule in am , one capsule in afternoon, 1-3 capsules in evening) 450 capsule 4    glimepiride (AMARYL) 2 MG tablet Take 1 tablet (2 mg total) by mouth once daily. (Patient taking differently: Take 2 mg by mouth daily with breakfast.) 90 tablet 4    glucose 4 GM chewable tablet Take 8 g by mouth as needed for Low blood sugar.      insulin (LANTUS SOLOSTAR U-100 INSULIN) glargine 100 units/mL SubQ pen Inject 16 Units into the skin 2 (two) times a day. 30 mL 2    insulin aspart U-100 (NOVOLOG FLEXPEN U-100 INSULIN) 100 unit/mL (3 mL) InPn pen Inject 4 Units into the skin 3 (three) times daily with meals. 12 mL 1    lancets (ACCU-CHEK SOFTCLIX LANCETS) Misc 1 lancet by Misc.(Non-Drug; Combo Route) route 4 (four) times daily. 400 each 4    losartan (COZAAR) 50 MG tablet Take 1 tablet (50 mg total) by mouth once daily. 90 tablet 6    omeprazole (PRILOSEC) 40 MG capsule Take 1 capsule (40 mg total) by mouth once daily. (Patient taking differently: Take 40 mg by mouth every morning.) 90 capsule 4    pen needle, diabetic (BD ULTRA-FINE SHORT PEN NEEDLE) 31 gauge x 5/16" Ndle USE TO INJECT INSULIN ONE TIME DAILY 100 each 3    pravastatin (PRAVACHOL) 40 MG tablet TAKE 1 TABLET EVERY DAY (Patient taking differently: Take 20 mg by mouth once daily.) 90 tablet 3    psyllium seed, with sugar, (FIBER ORAL) Take by mouth once daily.      sildenafiL (VIAGRA) 100 MG tablet Take 1 tablet (100 mg total) by mouth daily as needed for Erectile Dysfunction. 30 tablet 2    tamsulosin (FLOMAX) 0.4 mg Cap Take 1 capsule (0.4 mg total) by mouth nightly. 90 capsule 4    triamcinolone acetonide (KENALOG-40) 40 mg/mL injection 40 mg.      TRUEPLUS LANCETS 33 gauge Misc       alcohol swabs PadM " Apply 1 each topically as needed.      doxycycline (VIBRA-TABS) 100 MG tablet Take 1 tablet (100 mg total) by mouth 2 (two) times daily. 20 tablet 3    FLUAD QUAD 2022-23,65Y UP,,PF, 60 mcg (15 mcg x 4)/0.5 mL Syrg       MONOVISC 88 mg/4 mL Syrg Inject 88 mg as directed.      oxybutynin (DITROPAN-XL) 10 MG 24 hr tablet Take 1 tablet (10 mg total) by mouth once daily. (Patient not taking: Reported on 6/1/2023) 30 tablet 11       Anticoagulation:  Yes - ASA 81    Physical Exam:  General: No acute distress, well developed. AAOx3  Head: Normocephalic, Atraumatic  Eyes: Extra-occular movements intact, No discharge  Neck: supple, symmetrical, trachea midline  Lungs: normal respiratory effort, no respiratory distress, no wheezes  CV: regular rate, 2+ pulses  Abdomen: soft, non-tender, non-distended, no organomegaly  MSK: no edema, no deformities, normal ROM  Skin: skin color, texture, turgor normal.  Neurologic: no focal deficits, sensation intact    Labs:    Urine dipstick today - Negative for all tested components.    Lab Results   Component Value Date    WBC 5.03 06/01/2023    HGB 10.5 (L) 06/01/2023    HCT 33.1 (L) 06/01/2023    MCV 89 06/01/2023     06/01/2023           BMP  Lab Results   Component Value Date     (L) 06/01/2023    K 4.5 06/01/2023     06/01/2023    CO2 22 (L) 06/01/2023    BUN 28 (H) 06/01/2023    CREATININE 2.2 (H) 06/01/2023    CALCIUM 9.4 06/01/2023    ANIONGAP 12 06/01/2023    EGFRNORACEVR 30.3 (A) 06/01/2023       Lab Results   Component Value Date    PSA 1.5 05/02/2022    PSA 1.1 10/15/2018    PSA 0.82 08/01/2016         Assessment: Emerson Menendez is a 77 y.o. male with BPH presenting for repeat laser TURP.    Plan:     1. To OR today for laser TURP.  2. Consents signed   3. I have explained the risk, benefits, and alternatives of the procedure in detail. The patient voices understanding and all questions have been answered. The patient agrees to proceed as planned.      eLon Prince MD

## 2023-06-06 NOTE — TRANSFER OF CARE
"Anesthesia Transfer of Care Note    Patient: Emerson Menendez    Procedure(s) Performed: Procedure(s) (LRB):  TURP, USING LASER (N/A)  CYSTOSCOPY (N/A)    Patient location: PACU    Anesthesia Type: general    Transport from OR: Transported from OR on room air with adequate spontaneous ventilation    Post pain: adequate analgesia    Post assessment: no apparent anesthetic complications    Post vital signs: stable    Level of consciousness: awake    Nausea/Vomiting: no nausea/vomiting    Complications: none    Transfer of care protocol was followed      Last vitals:   Visit Vitals  BP (!) 175/90 (BP Location: Left arm, Patient Position: Lying)   Pulse 85   Temp 37.1 °C (98.7 °F) (Temporal)   Resp 16   Ht 5' 10" (1.778 m)   Wt 93.9 kg (207 lb)   SpO2 97%   BMI 29.70 kg/m²     "

## 2023-06-06 NOTE — ANESTHESIA PREPROCEDURE EVALUATION
06/06/2023  Emerson Menendez is a 77 y.o., male.  [facility]  Anesthesia Pre-Operative Evaluation       Patient Name: Emerson Menendez  YOB: 1945  MRN: [id number]  [facility]: [id number]      Code Status: Prior   Date of Procedure: 6/6/2023  Anesthesia: General Procedure: Procedure(s) (LRB):  TURP, USING LASER (N/A)  CYSTOSCOPY (N/A)  Pre-Operative Diagnosis: OAB (overactive bladder) [N32.81]  BPH with urinary obstruction [N40.1, N13.8]  Proceduralist: Surgeon(s) and Role:     * Juan Walker MD - Primary     * Leon Prince MD - Resident - Assisting     * Keith Díaz MD - Resident - Assisting        SUBJECTIVE:   Emerson Menendez is a 77 y.o. male who  has a past medical history of Anxiety (03/16/2015), Asymptomatic multiple myeloma, BPH (benign prostatic hypertrophy) (09/24/2012), Carpal tunnel syndrome, Depression (03/16/2015), Diabetic retinopathy, GERD (gastroesophageal reflux disease) (09/24/2012), Glaucoma, psychiatric care, Hyperlipidemia, Hypertension, Hypertensive retinopathy of both eyes, Microalbuminuria (09/24/2012), Osteoarthritis of cervical spine (09/24/2012), Osteoarthritis of knee (09/24/2012), Posterior capsular opacification, right (09/15/2022), Psychiatric problem, and Type II or unspecified type diabetes mellitus without mention of complication, not stated as uncontrolled (09/24/2012). No notes on file      he has a current medication list which includes the following long-term medication(s): glimepiride, insulin, insulin aspart u-100, losartan, accu-chek elie control soln, azelastine, blood glucose control, normal, blood-glucose meter, blood-glucose meter, citalopram, diazepam, dorzolamide-timolol 2-0.5%, fexofenadine, gabapentin, glucose, omeprazole, oxybutynin, pravastatin, sildenafil, and tamsulosin.   ALLERGIES:     Review of patient's  allergies indicates:   Allergen Reactions    Penicillins      Knees locked up     History:     Active Hospital Problems    Diagnosis  POA    *BPH with urinary obstruction [N40.1, N13.8]  Yes      Resolved Hospital Problems   No resolved problems to display.     Past Medical History:   Diagnosis Date    Anxiety 03/16/2015    Asymptomatic multiple myeloma     BPH (benign prostatic hypertrophy) 09/24/2012    Carpal tunnel syndrome     Depression 03/16/2015    Diabetic retinopathy     GERD (gastroesophageal reflux disease) 09/24/2012    Glaucoma     Hx of psychiatric care     Celexa, Valium    Hyperlipidemia     Hypertension     Hypertensive retinopathy of both eyes     Microalbuminuria 09/24/2012    Osteoarthritis of cervical spine 09/24/2012    Osteoarthritis of knee 09/24/2012    Posterior capsular opacification, right 09/15/2022    Psychiatric problem     Type II or unspecified type diabetes mellitus without mention of complication, not stated as uncontrolled 09/24/2012     Patient Active Problem List   Diagnosis    Essential hypertension    Pure hypercholesterolemia    Microalbuminuria due to type 2 diabetes mellitus    GERD (gastroesophageal reflux disease)    Osteoarthritis of cervical spine    Osteoarthritis of knee    Hypertrophy of breast    Unspecified hyperplasia of prostate without urinary obstruction and other lower urinary tract symptoms (LUTS)    ED (erectile dysfunction) of organic origin    Retrograde ejaculation    BPH with urinary obstruction    Type 2 diabetes mellitus with diabetic polyneuropathy, with long-term current use of insulin    Open nondisplaced fracture of distal phalanx of right thumb - s/p I&D, closure and splinting 12/17/13    Laceration of thumb, right    Cervical spondylosis without myelopathy    Anxiety    Recurrent major depressive disorder    Chronic kidney disease, stage III (moderate)    Anemia    Smoldering multiple myeloma    Primary  open angle glaucoma of both eyes, moderate stage    Disorder of ejaculation    OAB (overactive bladder)    Left leg weakness    Type 2 diabetes mellitus with hyperglycemia, with long-term current use of insulin    Overweight (BMI 25.0-29.9)    Selective deficiency of immunoglobulin m (igm)    Foot pain, right    CKD stage 3 due to type 2 diabetes mellitus    Nocturia    Anatomical narrow angle    Boil, axilla    Adjustment disorder with mixed anxiety and depressed mood    Grief    Vitamin D deficiency    Post-herpetic polyneuropathy    Carpal tunnel syndrome    MALINI (obstructive sleep apnea)    Mild nonproliferative diabetic retinopathy of right eye with macular edema associated with diabetes mellitus due to underlying condition    Hypertensive retinopathy of both eyes    Mild nonproliferative diabetic retinopathy of left eye without macular edema associated with type 2 diabetes mellitus    Vitreous degeneration of both eyes    Decreased ROM of neck    Poor posture    Decreased functional activity tolerance    Weakness of trunk musculature    Dry eye syndrome of both eyes    Pseudophakia of both eyes    Posterior capsular opacification of right eye, obscuring vision    Chronic knee pain    Multiple myeloma not having achieved remission    Aortic atherosclerosis    Seasonal allergies       Surgical History:    has a past surgical history that includes Cataract extraction (2012); Prostate surgery; Carpal tunnel release (Left, 08/03/2020); Arthroscopy of wrist (Left, 08/03/2020); Surgical removal of carpal bone (Left, 01/06/2021); Phacoemulsification of cataract (Left, 05/20/2021); Intraocular prosthesis insertion (Left, 05/20/2021); Trigger finger release (Right, 07/29/2022); Tonsillectomy; and Eye surgery (Bilateral).   Social History:     reports that he has never smoked. He has never used smokeless tobacco. He reports current alcohol use. He reports that he does not use drugs.      OBJECTIVE:     Vital Signs (Most Recent):      Last 3 sets of Vitals    Vitals - 1 value per visit 6/1/2023 6/1/2023   SYSTOLIC 156 124   DIASTOLIC 75 79   Pulse 84 85   Temp 98 -   Resp 18 -   SPO2 97 98   Weight (lb) 214.07 235.89   Weight (kg) 97.1 107   Height 70 -   BMI (Calculated) 30.7 33.8   VISIT REPORT - -   Pain Score  - -   Some recent data might be hidden      Vital Signs Range (Last 24H):          There is no height or weight on file to calculate BMI.   Wt Readings from Last 4 Encounters:   06/01/23 107 kg (235 lb 14.3 oz)   06/01/23 97.1 kg (214 lb 1.1 oz)   05/25/23 95.3 kg (210 lb)   05/23/23 90.7 kg (200 lb)     Significant Labs:  Lab Results   Component Value Date    WBC 5.03 06/01/2023    HGB 10.5 (L) 06/01/2023    HCT 33.1 (L) 06/01/2023     06/01/2023     (L) 06/01/2023    K 4.5 06/01/2023     06/01/2023    CREATININE 2.2 (H) 06/01/2023    BUN 28 (H) 06/01/2023    CO2 22 (L) 06/01/2023     (H) 06/01/2023    CALCIUM 9.4 06/01/2023    MG 1.7 06/01/2023    PHOS 3.6 06/01/2023    ALKPHOS 63 06/01/2023    ALT 18 06/01/2023    AST 13 06/01/2023    ALBUMIN 3.4 (L) 06/01/2023    INR 1.0 05/09/2023    HGBA1C 7.5 (H) 05/09/2023     No LMP for male patient.  No results found for this or any previous visit (from the past 72 hour(s)).    EKG:   Results for orders placed or performed during the hospital encounter of 03/09/23   EKG 12-lead    Collection Time: 03/09/23 11:22 AM    Narrative    Test Reason : I10,    Vent. Rate : 084 BPM     Atrial Rate : 084 BPM     P-R Int : 132 ms          QRS Dur : 078 ms      QT Int : 368 ms       P-R-T Axes : 067 043 011 degrees     QTc Int : 434 ms    Normal sinus rhythm  Normal ECG  When compared with ECG of 25-JAN-2016 12:11,  No significant change was found  Confirmed by Richar Curiel MD ([id number]) on 3/9/2023 12:28:54 PM    Referred By: GEOVANNA ALCAZAR           Confirmed By:Richar Curiel MD       ASSESSMENT/PLAN:         Pre-op  Assessment    I have reviewed the Patient Summary Reports.     I have reviewed the Nursing Notes. I have reviewed the NPO Status.   I have reviewed the Medications.     Review of Systems  Anesthesia Hx:  No problems with previous Anesthesia  History of prior surgery of interest to airway management or planning: Denies Family Hx of Anesthesia complications.   Denies Personal Hx of Anesthesia complications.   Social:  Alcohol Use, Non-Smoker    Hematology/Oncology:         -- Anemia: Current/Recent Cancer.   EENT/Dental:   denies chronic allergic rhinitis   Cardiovascular:   Exercise tolerance: good Denies Pacemaker. Hypertension, well controlled  Denies Valvular problems/Murmurs.  Denies MI.  Denies CAD.    Denies CABG/stent.  Denies Dysrhythmias.   Denies Angina.  Denies CHF.  Denies Orthopnea.  Denies PND. denies PVD hyperlipidemia  Denies HAYNES.    Pulmonary:   Denies Pneumonia Denies COPD.  Denies Asthma.  Denies Shortness of breath.  Denies Recent URI. Sleep Apnea    Renal/:   Chronic Renal Disease, CKD    Hepatic/GI:   GERD, well controlled Denies Liver Disease. Denies Hepatitis.    Musculoskeletal:   Arthritis   Spine Disorders: cervical    Neurological:   Denies TIA. Denies CVA. Neuromuscular Disease,  Denies Seizures.    Endocrine:   Diabetes, well controlled, type 2, using insulin Denies Hypothyroidism. Denies Hyperthyroidism.  Denies Obesity / BMI > 30      Physical Exam  General: Well nourished, Cooperative and Alert    Airway:  Mallampati: III / II  Mouth Opening: Normal  TM Distance: Normal  Tongue: Normal  Neck ROM: Normal ROM    Chest/Lungs:  Normal Respiratory Rate    Heart:  Rate: Normal  Rhythm: Regular Rhythm        Anesthesia Plan  Type of Anesthesia, risks & benefits discussed:    Anesthesia Type: Gen Natural Airway, Gen Supraglottic Airway, Gen ETT  Intra-op Monitoring Plan: Standard ASA Monitors  Post Op Pain Control Plan: IV/PO Opioids PRN  Induction:  IV  Informed Consent: Informed consent  signed with the Patient and all parties understand the risks and agree with anesthesia plan.  All questions answered.   ASA Score: 3  Day of Surgery Review of History & Physical: H&P Update referred to the surgeon/provider.    Ready For Surgery From Anesthesia Perspective.     .

## 2023-06-06 NOTE — PATIENT INSTRUCTIONS
Post Cystoscopy and Transurethral resection of Prostate  Patient will have VT on Friday with Dr. Walker  Do not strain to have a bowel movement  No strenuous exercise x 7 days  No driving while you are on narcotic pain medications or if your fuller  catheter is in place    You can expect:  To see blood in your urine.    Go to the ER if:  You are having severe abdominal pain  Inability to urinate if you do not have a catheter  Catheter stops draining if you have one in place.    Call the doctor if:  Temperature is greater than 101F  Persistent vomiting and inability to keep food down

## 2023-06-06 NOTE — BRIEF OP NOTE
[facility] - Surgery (1st Fl)  Brief Operative Note    Surgery Date: 6/6/2023     Surgeon(s) and Role:     * Juan Walker MD - Primary     * Leon Prince MD - Resident - Assisting     * Keith Díaz MD - Resident - Assisting        Pre-op Diagnosis:  OAB (overactive bladder) [N32.81]  BPH with urinary obstruction [N40.1, N13.8]    Post-op Diagnosis:  Post-Op Diagnosis Codes:     * OAB (overactive bladder) [N32.81]     * BPH with urinary obstruction [N40.1, N13.8]    Procedure(s) (LRB):  TURP, USING LASER (N/A)  CYSTOSCOPY (N/A)    Anesthesia: General    Operative Findings:   -Right lateral obstructing lobe present. Prior turp defect seen]  -Laser turp removed excess adenoma and open channel seen at the end of the case    Estimated Blood Loss: * No values recorded between 6/6/2023  1:32 PM and 6/6/2023  2:20 PM *         Specimens:   Specimen (24h ago, onward)      None              Discharge Note    OUTCOME: Patient tolerated treatment/procedure well without complication and is now ready for discharge.    DISPOSITION: Home or Self Care    FINAL DIAGNOSIS:  BPH with urinary obstruction    FOLLOWUP: In clinic    DISCHARGE INSTRUCTIONS:  No discharge procedures on file.

## 2023-06-06 NOTE — TELEPHONE ENCOUNTER
Called pt to confirm arrival time of 10 am for procedure on 6/6/23. Gave pt NPO instructions and gave pt opportunity to ask questions. Pt verbalized understanding.

## 2023-06-06 NOTE — ANESTHESIA PROCEDURE NOTES
Intubation    Date/Time: 6/6/2023 1:26 PM  Performed by: Percy Garcia CRNA  Authorized by: WESLEY Bui MD     Intubation:     Induction:  Intravenous    Intubated:  Postinduction    Mask Ventilation:  Not attempted    Attempts:  1    Attempted By:  CRNA    Difficult Airway Encountered?: No      Complications:  None    Airway Device:  Supraglottic airway/LMA    Airway Device Size:  5.0    Placement Verified By:  Capnometry    Complicating Factors:  None    Findings Post-Intubation:  BS equal bilateral

## 2023-06-06 NOTE — OP NOTE
[facility]  Operative Note    Date: 06/06/2023    Pre-Op Diagnosis: BPH    Post-Op Diagnosis: same    Procedure(s) Performed:   1.  Laser vaporization of the prostate    Specimen(s): none    Staff Surgeon: Juan Walker MD    Assistant Surgeon: Keith Díaz MD    Anesthesia: General endotracheal anesthesia    Indications: Emerson Menendez is a 77 y.o. male with BPH    Findings:   -Right lateral obstructing lobe present. Prior turp defect seen]  -Laser turp removed excess adenoma and open channel seen at the end of the case    Estimated Blood Loss: min    Drains: 20 Fr fuller catheter    Procedure in Detail:  After risks, benefits and possible complications of Laser TURP were discussed, the patient elected to undergo the procedure and informed consent was obtained. All questions were answered in the sirisha-operative area. The patient was transferred to the cystoscopy suite and placed on the fluoroscopy table in the supine position. Anesthesia was administered.  When the patient was adequately sedated he was placed in the dorsal lithotomy position and prepped and draped in the usual sterile fashion.  Time out was performed, sirisha-procedural antibiotics were confirmed.      A 22 Mongolian revolix laser scope was introduced into the bladder per urethra. Right lobar hyperplasia was seen. Formal cystoscopy was performed which revealed no tumors or lesions suspicious for malignancy, no bladder stones, no bladder diverticuli, and no trabeculations.  The right and left ureteral orifices were visualized in the normal anatomic position and clear efflux of urine seen bilaterally.     Our attention was first turned to enucleating the median lobe of the prostate. This was accomplished with a 800 micro Quanta laser fiber. Lateral incisions were made from the 2 o'clock position and brought down to the proximal verumontanum to the level of the prostatic capsule. The lateral lobe was then vaporized superficially to deep  in a stepwise fashion. This was repeated from the 10' o'clock position to just proximal to the veru. After all hyperplasia had been vaporized the bladder was drained. A good channel was seen. All bleeding was coagulated with the quanta laser and adequate hemostasis was obtained.      The patient tolerated the procedure well and was transferred to the recovery room in stable condition.      Follow up care:  The patient will follow up with Dr. Walker on Friday for VT.     Keith Díaz MD    I present in the OR with the resident for the time of the surgery.

## 2023-06-07 ENCOUNTER — PATIENT MESSAGE (OUTPATIENT)
Dept: UROLOGY | Facility: CLINIC | Age: 78
End: 2023-06-07
Payer: MEDICARE

## 2023-06-07 NOTE — ANESTHESIA POSTPROCEDURE EVALUATION
Anesthesia Post Evaluation    Patient: Emerson Menendez    Procedure(s) Performed: Procedure(s) (LRB):  TURP, USING LASER (N/A)  CYSTOSCOPY (N/A)    Final Anesthesia Type: general      Patient location during evaluation: PACU  Patient participation: Yes- Able to Participate  Level of consciousness: awake and alert  Post-procedure vital signs: reviewed and stable  Pain management: adequate  Airway patency: patent    PONV status at discharge: No PONV  Anesthetic complications: no      Cardiovascular status: blood pressure returned to baseline and hypertensive  Respiratory status: unassisted, spontaneous ventilation and room air  Hydration status: euvolemic            Vitals Value Taken Time   /96 06/06/23 1547   Temp 36.6 °C (97.8 °F) 06/06/23 1422   Pulse 71 06/06/23 1600   Resp 20 06/06/23 1545   SpO2 97 % 06/06/23 1600         No case tracking events are documented in the log.      Pain/Caleb Score: Caleb Score: 9 (6/6/2023  2:31 PM)

## 2023-06-07 NOTE — PROGRESS NOTES
I have reviewed the notes, assessments, and/or procedures performed by Fellow, I concur with her/his documentation of Emerson Menendez.     HTN  DM II  Smoldering Myeloma.  CKD stage III with stable function.   Hypertriglyceridemia will consider diet and starting fibrates       DORIS [email or web address]. MD. AIME. FACP.  , [employer] [facility] [employer] ([place]  Nephrology Consultant. [facility].   [facility]. 5th [place] [address].    email: [email or web address].  Tel: Office: [phone]

## 2023-06-08 ENCOUNTER — OFFICE VISIT (OUTPATIENT)
Dept: UROLOGY | Facility: CLINIC | Age: 78
End: 2023-06-08
Payer: MEDICARE

## 2023-06-08 ENCOUNTER — TELEPHONE (OUTPATIENT)
Dept: UROLOGY | Facility: CLINIC | Age: 78
End: 2023-06-08
Payer: MEDICARE

## 2023-06-08 DIAGNOSIS — Z90.79 S/P TURP: ICD-10-CM

## 2023-06-08 DIAGNOSIS — N32.89 BLADDER SPASM: ICD-10-CM

## 2023-06-08 DIAGNOSIS — R35.1 NOCTURIA: ICD-10-CM

## 2023-06-08 DIAGNOSIS — N32.81 OAB (OVERACTIVE BLADDER): Primary | ICD-10-CM

## 2023-06-08 DIAGNOSIS — R30.0 DYSURIA: ICD-10-CM

## 2023-06-08 PROCEDURE — 99499 NO LOS: ICD-10-PCS | Mod: S$GLB,,, | Performed by: UROLOGY

## 2023-06-08 PROCEDURE — 99499 UNLISTED E&M SERVICE: CPT | Mod: S$GLB,,, | Performed by: UROLOGY

## 2023-06-08 RX ORDER — OXYBUTYNIN CHLORIDE 10 MG/1
10 TABLET, EXTENDED RELEASE ORAL DAILY
Qty: 90 TABLET | Refills: 3 | Status: SHIPPED | OUTPATIENT
Start: 2023-06-08 | End: 2024-06-07

## 2023-06-08 RX ORDER — HYOSCYAMINE SULFATE 0.125 MG
125 TABLET ORAL EVERY 4 HOURS PRN
Qty: 30 TABLET | Refills: 0 | Status: SHIPPED | OUTPATIENT
Start: 2023-06-08 | End: 2023-06-15

## 2023-06-08 RX ORDER — DOXYCYCLINE 100 MG/1
100 CAPSULE ORAL EVERY 12 HOURS
Qty: 14 CAPSULE | Refills: 0 | Status: SHIPPED | OUTPATIENT
Start: 2023-06-08 | End: 2023-06-15

## 2023-06-08 RX ORDER — PHENAZOPYRIDINE HYDROCHLORIDE 200 MG/1
200 TABLET, FILM COATED ORAL 3 TIMES DAILY PRN
Qty: 30 TABLET | Refills: 0 | Status: SHIPPED | OUTPATIENT
Start: 2023-06-08 | End: 2023-06-18

## 2023-06-08 NOTE — PROGRESS NOTES
CC: s/p laser TURP, c/o bladder spasms    Emerson Menendez is a 77 y.o. man who is here for a  voiding trial after laser TURP.    His PCP, Roberta Sagastume MD   C/o increased nocturia 2 to 3 x.  He tried flomax in the past and noted some improvement.    He was evaluated with cysto on 5/25/23.  Procedure:  Male Diagnostic Cystourethroscopy   Pre-op diagnosis: weak urine flow, OAB, nocturia x 3, s/p laser TURP in 1/2013  Post-op diagnosis: same  Anesthesia: Local  Surgeon:  Juan Walker MD  Findings:  Urethra:  Normal urethra.   Sphincter: competent.  Prostate: Estimated Length Prostatic Urethra: recurrent adenoma on the right side, obstructive crossing the midline.    Bladder neck: patent with no stricture  Bladder:  Normal bladder.  No tumor or stone seen.  Normal ureteral orifices bilaterally.   Moderate trabeculation with early sacculation.   Conclusion:  1. Recurrent adenoma of the prostate  2. OAB  Recommend laser TURP and manage his OAB with meds.  He has a left knee surgery scheduled on 6/12 and would like to get his laser TURP done prior to his knee surgery.    Date: 06/06/2023  Pre-Op Diagnosis: BPH, s/p TURP  Post-Op Diagnosis: same     Procedure(s) Performed:   1.  Laser vaporization of the prostate  Findings:   -Right lateral obstructing lobe present. Prior turp defect seen]  -Laser turp removed excess adenoma and open channel seen at the end of the case   Drains: 20 Fr fuller catheter    He is here today for voiding trial.  He would like to get the fuller catheter removed before his knee surgery on next Monday.    No family hx of prostate cancer.  Hx of DM, HTN.  He is an  and actually lives in [place], [place] and goes back and forth between there to [place].    Past Medical History:   Diagnosis Date    Anxiety 03/16/2015    Asymptomatic multiple myeloma     BPH (benign prostatic hypertrophy) 09/24/2012    Carpal tunnel syndrome     Depression 03/16/2015    Diabetic retinopathy     GERD  (gastroesophageal reflux disease) 09/24/2012    Glaucoma     Hx of psychiatric care     Celexa, Valium    Hyperlipidemia     Hypertension     Hypertensive retinopathy of both eyes     Microalbuminuria 09/24/2012    Osteoarthritis of cervical spine 09/24/2012    Osteoarthritis of knee 09/24/2012    Posterior capsular opacification, right 09/15/2022    Psychiatric problem     Type II or unspecified type diabetes mellitus without mention of complication, not stated as uncontrolled 09/24/2012     Past Surgical History:   Procedure Laterality Date    ARTHROSCOPY OF WRIST Left 08/03/2020    Procedure: ARTHROSCOPY, WRIST LEFT;  Surgeon: Kindra Castillo MD;  Location: [facility] OR;  Service: Orthopedics;  Laterality: Left;  REGIONAL/MAC    CARPAL TUNNEL RELEASE Left 08/03/2020    Procedure: RELEASE, CARPAL TUNNEL LEFT;  Surgeon: Kindra Castillo MD;  Location: [facility] OR;  Service: Orthopedics;  Laterality: Left;  REGIONAL/MAC    CATARACT EXTRACTION  2012    Right eye    CYSTOSCOPY N/A 6/6/2023    Procedure: CYSTOSCOPY;  Surgeon: Juan Walker MD;  Location: [address];  Service: Urology;  Laterality: N/A;    EYE SURGERY Bilateral     cataract removal    INTRAOCULAR PROSTHESES INSERTION Left 05/20/2021    Procedure: INSERTION, IOL PROSTHESIS;  Surgeon: Jose L Wheatley MD;  Location: [facility] OR [address] [place] [address];  Service: Ophthalmology;  Laterality: Left;    PHACOEMULSIFICATION OF CATARACT Left 05/20/2021    Procedure: PHACOEMULSIFICATION, CATARACT;  Surgeon: Jose L Wheatley MD;  Location: [facility] OR [address] [place] [address];  Service: Ophthalmology;  Laterality: Left;    PROSTATE SURGERY      SURGICAL REMOVAL OF CARPAL BONE Left 01/06/2021    Procedure: OSTECTOMY, CARPAL BONE, LEFT;  Surgeon: Kindra Castillo MD;  Location: [facility] OR;  Service: Orthopedics;  Laterality: Left;  REGIONAL/MAC    TONSILLECTOMY      TRANSURETHRAL RESECTION OF PROSTATE (TURP) USING LASER N/A 6/6/2023    Procedure: TURP, USING LASER;  Surgeon: Juan ROMAN  MD Aaron;  [address];  Service: Urology;  Laterality: N/A;    TRIGGER FINGER RELEASE Right 07/29/2022    Procedure: RELEASE, TRIGGER FINGER,RIGHT,LONG;  Surgeon: Kindra Castillo MD;  Location: [facility];  Service: Orthopedics;  Laterality: Right;     Social History     Tobacco Use    Smoking status: Never    Smokeless tobacco: Never   Substance Use Topics    Alcohol use: Yes     Comment: Two beers in a week    Drug use: Never     Family History   Problem Relation Age of Onset    Heart disease Mother     Alcohol abuse Father     Cirrhosis Father     Depression Sister     No Known Problems Sister     No Known Problems Sister     No Known Problems Sister     Hypertension Brother     Depression Brother     Kidney failure Daughter         due to medication    Cerebral aneurysm Daughter     Blindness Neg Hx     Cancer Neg Hx     Cataracts Neg Hx     Diabetes Neg Hx     Glaucoma Neg Hx     Macular degeneration Neg Hx     Retinal detachment Neg Hx     Strabismus Neg Hx     Stroke Neg Hx     Thyroid disease Neg Hx      Allergy:  Review of patient's allergies indicates:   Allergen Reactions    Penicillins      Knees locked up     Outpatient Encounter Medications as of 6/8/2023   Medication Sig Dispense Refill    ACCU-CHEK STEFANO CONTROL SOLN Soln       acetaminophen (TYLENOL) 650 MG TbSR Take 650 mg by mouth as needed. pain      alcohol swabs PadM Apply 1 each topically as needed.      aspirin (ECOTRIN) 81 MG EC tablet Take 1 tablet (81 mg total) by mouth once daily. 100 tablet 3    azelastine (ASTELIN) 137 mcg (0.1 %) nasal spray 1 spray (137 mcg total) by Nasal route 2 (two) times daily. 30 mL 3    blood glucose control, normal (METER-CHECK) Soln One meter.  Use as directed. Meter of insurance choice 1 each 0    blood sugar diagnostic (ACCU-CHEK STEFANO PLUS TEST STRP) Strp Accu check stefano plus TEST FOUR TIMES DAILY. Test strtips of insurance choice to go with meter and lancets 400 strip 3    blood sugar  diagnostic Strp I health sytstem - check blood sugars 4 times daily 400 strip 4    blood-glucose meter (ACCU-CHEK STEFANO PLUS METER) Misc Use as direted 1 each 0    blood-glucose meter (ACCU-CHEK OMAYRA) Misc USE AS DIRECTED. Accucheck stefano plus 1 each 0    citalopram (CELEXA) 40 MG tablet TAKE 1 TABLET (40 MG TOTAL) BY MOUTH ONCE DAILY. 90 tablet 3    diazePAM (VALIUM) 5 MG tablet Take 1 tablet (5 mg total) by mouth every 12 (twelve) hours as needed for Anxiety. 60 tablet 0    diphenhydrAMINE (BENADRYL) 50 MG capsule Take 50 mg by mouth daily as needed (AS NEEDED).      dorzolamide-timolol 2-0.5% (COSOPT) 22.3-6.8 mg/mL ophthalmic solution Place 1 drop into both eyes 2 (two) times daily. 1 each 12    doxycycline (VIBRA-TABS) 100 MG tablet Take 1 tablet (100 mg total) by mouth 2 (two) times daily. 20 tablet 3    doxycycline (VIBRAMYCIN) 100 MG Cap Take 1 capsule (100 mg total) by mouth every 12 (twelve) hours. for 7 days 14 capsule 0    ergocalciferol (ERGOCALCIFEROL) 50,000 unit Cap Take 1 capsule (50,000 Units total) by mouth every 7 days. 12 capsule 3    EYLEA 2 mg/0.05 mL Syrg Place 2 mg into both eyes.      FARXIGA 5 mg Tab tablet Take 1 tablet (5 mg total) by mouth once daily. 30 tablet 11    fexofenadine (ALLEGRA) 60 MG tablet Take 1 tablet (60 mg total) by mouth once daily. 90 tablet 3    flash glucose scanning reader (FREESTYLE CHRISTIANO 14 DAY READER) Misc Use as directed 6 each 4    flash glucose sensor (FREESTYLE CHRISTIANO 14 DAY SENSOR) Kit Use as directed 6 kit 4    FLUAD QUAD 2022-23,65Y UP,,PF, 60 mcg (15 mcg x 4)/0.5 mL Syrg       fluticasone propionate (FLONASE) 50 mcg/actuation nasal spray 1 spray (50 mcg total) by Each Nostril route once daily. 18.2 mL 6    gabapentin (NEURONTIN) 100 MG capsule One capsule in am , one capsule in afternoon, 1-3 capsules in evening (Patient taking differently: Take 100 mg by mouth 3 (three) times daily. One capsule in am , one capsule in afternoon, 1-3 capsules in evening)  "450 capsule 4    glimepiride (AMARYL) 2 MG tablet Take 1 tablet (2 mg total) by mouth once daily. (Patient taking differently: Take 2 mg by mouth daily with breakfast.) 90 tablet 4    glucose 4 GM chewable tablet Take 8 g by mouth as needed for Low blood sugar.      hyoscyamine (ANASPAZ,LEVSIN) 0.125 mg Tab Take 1 tablet (125 mcg total) by mouth every 4 (four) hours as needed (bladder spasm). 30 tablet 0    insulin (LANTUS SOLOSTAR U-100 INSULIN) glargine 100 units/mL SubQ pen Inject 16 Units into the skin 2 (two) times a day. 30 mL 2    insulin aspart U-100 (NOVOLOG FLEXPEN U-100 INSULIN) 100 unit/mL (3 mL) InPn pen Inject 4 Units into the skin 3 (three) times daily with meals. 12 mL 1    lancets (ACCU-CHEK SOFTCLIX LANCETS) Misc 1 lancet by Misc.(Non-Drug; Combo Route) route 4 (four) times daily. 400 each 4    losartan (COZAAR) 50 MG tablet Take 1 tablet (50 mg total) by mouth once daily. 90 tablet 6    MONOVISC 88 mg/4 mL Syrg Inject 88 mg as directed.      omeprazole (PRILOSEC) 40 MG capsule Take 1 capsule (40 mg total) by mouth once daily. (Patient taking differently: Take 40 mg by mouth every morning.) 90 capsule 4    oxybutynin (DITROPAN) 5 MG Tab Take 1 tablet (5 mg total) by mouth 3 (three) times daily as needed. 21 tablet 0    oxybutynin (DITROPAN-XL) 10 MG 24 hr tablet Take 1 tablet (10 mg total) by mouth once daily. (Patient not taking: Reported on 6/1/2023) 30 tablet 11    oxybutynin (DITROPAN-XL) 10 MG 24 hr tablet Take 1 tablet (10 mg total) by mouth once daily. 90 tablet 3    oxyCODONE (ROXICODONE) 5 MG immediate release tablet Take 1 tablet (5 mg total) by mouth every 6 (six) hours as needed. 5 tablet 0    pen needle, diabetic (BD ULTRA-FINE SHORT PEN NEEDLE) 31 gauge x 5/16" Ndle USE TO INJECT INSULIN ONE TIME DAILY 100 each 3    phenazopyridine (PYRIDIUM) 200 MG tablet Take 1 tablet (200 mg total) by mouth 3 (three) times daily as needed (burning on urinaiton, bladdre pain). 30 tablet 0    " pravastatin (PRAVACHOL) 40 MG tablet TAKE 1 TABLET EVERY DAY (Patient taking differently: Take 20 mg by mouth once daily.) 90 tablet 3    psyllium seed, with sugar, (FIBER ORAL) Take by mouth once daily.      sildenafiL (VIAGRA) 100 MG tablet Take 1 tablet (100 mg total) by mouth daily as needed for Erectile Dysfunction. 30 tablet 2    sulfamethoxazole-trimethoprim 800-160mg (BACTRIM DS) 800-160 mg Tab Take 1 tablet by mouth 2 (two) times daily. for 7 days 14 tablet 0    tamsulosin (FLOMAX) 0.4 mg Cap Take 1 capsule (0.4 mg total) by mouth nightly. 90 capsule 4    triamcinolone acetonide (KENALOG-40) 40 mg/mL injection 40 mg.      TRUEPLUS LANCETS 33 gauge Misc       [DISCONTINUED] phenazopyridine (PYRIDIUM) 200 MG tablet Take 1 tablet (200 mg total) by mouth 3 (three) times daily as needed for Pain. 21 tablet 0     Facility-Administered Encounter Medications as of 6/8/2023   Medication Dose Route Frequency Provider Last Rate Last Admin    LIDOcaine HCl 2% urojet   Urethral Once Juan Walker MD        mupirocin 2 % ointment   Nasal On Call Procedure Mohit Hummel MD   Given at 01/06/21 0847     Review of Systems   ROS  Physical Exam   There were no vitals filed for this visit.  Physical Exam  Constitutional:       General: He is not in acute distress.     Appearance: He is well-developed. He is not diaphoretic.   HENT:      Head: Normocephalic and atraumatic.      Right Ear: External ear normal.      Left Ear: External ear normal.      Nose: Nose normal.   Eyes:      Conjunctiva/sclera: Conjunctivae normal.      Pupils: Pupils are equal, round, and reactive to light.   Neck:      Thyroid: No thyromegaly.      Vascular: No JVD.      Trachea: No tracheal deviation.   Cardiovascular:      Rate and Rhythm: Normal rate and regular rhythm.      Heart sounds: Normal heart sounds. No murmur heard.    No friction rub. No gallop.   Pulmonary:      Effort: Pulmonary effort is normal. No respiratory distress.       Breath sounds: Normal breath sounds. No wheezing.   Chest:      Chest wall: No tenderness.   Abdominal:      General: Bowel sounds are normal. There is no distension.      Palpations: Abdomen is soft. There is no mass.      Tenderness: There is no abdominal tenderness. There is no guarding or rebound.   Genitourinary:     Penis: Normal. No tenderness.       Rectum: Normal.      Comments: Prostate 25 grams with negative nodule or negative tenderness    Musculoskeletal:         General: No tenderness or deformity. Normal range of motion.      Cervical back: Normal range of motion and neck supple.   Lymphadenopathy:      Cervical: No cervical adenopathy.   Skin:     General: Skin is warm and dry.   Neurological:      Mental Status: He is alert and oriented to person, place, and time.   Psychiatric:         Behavior: Behavior normal.         Thought Content: Thought content normal.               LABS:  Lab Results   Component Value Date    PSA 1.5 05/02/2022    PSA 1.1 10/15/2018    PSA 0.82 08/01/2016    PSA 0.72 04/17/2015    PSA 0.79 12/09/2013    PSA 0.63 02/16/2012    PSA 0.8 03/05/2008    PSADIAG 0.77 09/13/2013     No results found. However, due to the size of the patient record, not all encounters were searched. Please check Results Review for a complete set of results.  Lab Results   Component Value Date    CREATININE 2.2 (H) 06/01/2023    CREATININE 2.0 (H) 05/23/2023    CREATININE 2.0 (H) 05/08/2023     No results found. However, due to the size of the patient record, not all encounters were searched. Please check Results Review for a complete set of results.  Urine Culture, Routine   Date Value Ref Range Status   11/22/2013 No growth  Final     Hemoglobin A1C   Date Value Ref Range Status   05/09/2023 7.5 (H) 4.0 - 5.6 % Final     Comment:     ADA Screening Guidelines:  5.7-6.4%  Consistent with prediabetes  >or=6.5%  Consistent with diabetes    High levels of fetal hemoglobin interfere with the  HbA1C  assay. Heterozygous hemoglobin variants (HbS, HgC, etc)do  not significantly interfere with this assay.   However, presence of multiple variants may affect accuracy.     02/09/2023 7.8 (H) 4.0 - 5.6 % Final     Comment:     ADA Screening Guidelines:  5.7-6.4%  Consistent with prediabetes  >or=6.5%  Consistent with diabetes    High levels of fetal hemoglobin interfere with the HbA1C  assay. Heterozygous hemoglobin variants (HbS, HgC, etc)do  not significantly interfere with this assay.   However, presence of multiple variants may affect accuracy.         Radiology:    Assessment and Plan:  Diagnoses and all orders for this visit:    OAB (overactive bladder)  -     oxybutynin (DITROPAN-XL) 10 MG 24 hr tablet; Take 1 tablet (10 mg total) by mouth once daily.    S/P TURP  -     doxycycline (VIBRAMYCIN) 100 MG Cap; Take 1 capsule (100 mg total) by mouth every 12 (twelve) hours. for 7 days    Nocturia    Dysuria  -     phenazopyridine (PYRIDIUM) 200 MG tablet; Take 1 tablet (200 mg total) by mouth 3 (three) times daily as needed (burning on urinaiton, bladdre pain).    Bladder spasm  -     hyoscyamine (ANASPAZ,LEVSIN) 0.125 mg Tab; Take 1 tablet (125 mcg total) by mouth every 4 (four) hours as needed (bladder spasm).    Voiding Trial is done.  Because pt was having a constant bladder spasms, the fuller catheter was removed.    Recommend to finish prophylactic abx.  Use pyridium and levsyn prn bladder spasma.  Continue oxybutynin xl 10 mg daily.      Follow-up:  Follow up in about 6 months (around 12/8/2023).

## 2023-06-13 PROBLEM — N17.9 AKI (ACUTE KIDNEY INJURY): Status: ACTIVE | Noted: 2023-06-13

## 2023-06-23 ENCOUNTER — TELEPHONE (OUTPATIENT)
Dept: HEMATOLOGY/ONCOLOGY | Facility: CLINIC | Age: 78
End: 2023-06-23
Payer: MEDICARE

## 2023-06-23 ENCOUNTER — PATIENT MESSAGE (OUTPATIENT)
Dept: HEMATOLOGY/ONCOLOGY | Facility: CLINIC | Age: 78
End: 2023-06-23
Payer: MEDICARE

## 2023-07-03 ENCOUNTER — PATIENT MESSAGE (OUTPATIENT)
Dept: INTERNAL MEDICINE | Facility: CLINIC | Age: 78
End: 2023-07-03
Payer: MEDICARE

## 2023-07-03 DIAGNOSIS — Z79.4 TYPE 2 DIABETES MELLITUS WITH DIABETIC POLYNEUROPATHY, WITH LONG-TERM CURRENT USE OF INSULIN: Chronic | ICD-10-CM

## 2023-07-03 DIAGNOSIS — E11.42 TYPE 2 DIABETES MELLITUS WITH DIABETIC POLYNEUROPATHY, WITH LONG-TERM CURRENT USE OF INSULIN: Chronic | ICD-10-CM

## 2023-07-03 RX ORDER — INSULIN ASPART 100 [IU]/ML
4 INJECTION, SOLUTION INTRAVENOUS; SUBCUTANEOUS
Qty: 12 ML | Refills: 1 | Status: SHIPPED | OUTPATIENT
Start: 2023-07-03 | End: 2024-01-02 | Stop reason: SDUPTHER

## 2023-07-11 ENCOUNTER — PATIENT MESSAGE (OUTPATIENT)
Dept: HEMATOLOGY/ONCOLOGY | Facility: CLINIC | Age: 78
End: 2023-07-11
Payer: MEDICARE

## 2023-07-21 ENCOUNTER — PATIENT MESSAGE (OUTPATIENT)
Dept: ADMINISTRATIVE | Facility: OTHER | Age: 78
End: 2023-07-21
Payer: MEDICARE

## 2023-07-26 ENCOUNTER — PATIENT MESSAGE (OUTPATIENT)
Dept: HEMATOLOGY/ONCOLOGY | Facility: CLINIC | Age: 78
End: 2023-07-26
Payer: MEDICARE

## 2023-08-01 ENCOUNTER — RESEARCH ENCOUNTER (OUTPATIENT)
Dept: HEMATOLOGY/ONCOLOGY | Facility: CLINIC | Age: 78
End: 2023-08-01
Payer: MEDICARE

## 2023-08-01 NOTE — PROGRESS NOTES
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   Sponsor:  [facility]  IRB# [id number]  Study ID: [id number]  Investigator: GIL Engel Pt Initials: LUCÍA LORENZ        Update regarding subject's current status Arm B: Observation     6/29/2023 Cycle 91 Day 28: Subject no showed for scheduled lab appt today. Appointment rescheduled for 7/3/23 via schedulers who confirm reminder via [facility] in [facility].   7/3/2023: Cycle 91 Day 32 Subject had labs done in [place] today; unable to travel to [facility] secondary to s/p total knee replacement early June. Subject had labs scheduled for 6/27/23 and 6/29/23; patient messaged on 6/23/23 as well as left voice mail to remind him of lab appt. Subject was no show for these appointments; contacted and rescheduled for 7/3/23. The lab results for that date were reported to Dr. Engel; no orders noted at that time.    7/11/2023 (Cycle 92 Day 8): Reached out to subject per Dr Engel to recommend virtual appointment and repeat labs; refer to documentation via patient messaging in Epic. Subject wished to defer virtual appt but agrees to follow up labs scheduled for 7/27/23.  7/27/2023 (Cycle 92 Day 24): Patient no showed for this appointment; rescheduled for 8/1/23.  8/1/2023 (Cycle 92 Day 29): No show for lab appt today. This RN called subject to remind him of appt; he states he is not able to attend appt today; requests reschedule for tomorrow. Labs rescheduled per subject's request; 10am [facility] lab in [place]. Subject states agreement with time. Dr Engel notified via inbasket message regarding lab appts and whether she wants to pursue virtual appt with subject. Will also contact Dr Engel following results of labs tomorrow for further instructions.     *Of note: Subject has not had MD appt since June 1, 2023; subject was agreeable to virtual visit at that time; will  continue to recommend getting this scheduled until patient is cleared to drive to [facility] for in person appointment.   Cycle #'s and days therefore based on lab appt dates.

## 2023-08-03 ENCOUNTER — RESEARCH ENCOUNTER (OUTPATIENT)
Dept: HEMATOLOGY/ONCOLOGY | Facility: CLINIC | Age: 78
End: 2023-08-03
Payer: MEDICARE

## 2023-08-03 NOTE — PROGRESS NOTES
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   Sponsor:  [facility]  IRB# [id number]  Study ID: [id number]  Investigator: GIL Engel Pt Initials: LUCÍA LORENZ        Cycle 93 Day 2    Subject had labs done yesterday in [place],. Dr Engel has reviewed preliminary results; myeloma labs still pending.     Subject states no new adverse events; denies any CRAB symptoms; states is attending physical therapy regularly and is more mobile.   No vital signs taken recently; states has been feeling "pretty good" overall.   Reviewed CBC and CMP results from 8/2/23 with subject; he states understanding; states he is monitoring his blood glucose and is in touch with diabetes team.   Anemia Grade 2: Hg 9.8 g/dl; stable from prior results. Will continue to monitor.   Hyponatremia, Grade 1: serum sodium 135 mmol/L; will continue to monitor.   CKD: serum creatinine 2.4; stable from past measurements. Not able to calculate GFR; no recent weight available for patient. Will continue to monitor; per MD past notes this is due to CKD not myeloma; subject is followed regularly here per nephrology.   Hyperglycemia, Grade 1: serum glucose 171; per subject he had not fasted prior to lab collected. States (as per interval history) he is followed per endocrine and internal medicine services regarding his home readings; has been in contact with Dr Sagastume regarding novolog refill.     Subject agrees to a virtual visit with Dr Engel and indicated time and date he is available. He requests review of virtual call process; assured him schedulers will contact him to review process. He states understanding.   He confirms all contact numbers to BMT clinic and after hours; states he hopes to back in [facility] with month or so.   No other questions or concerns voiced per subject.   Will contact him via patient messaging once " virtual visit date/time are set.

## 2023-08-18 NOTE — PROGRESS NOTES
Monitor: The problem is stable.  Assessment/Treatment:  Continue current treatment/monitoring regimen.   Subjective:       Patient ID: Emerson Menendez is a 71 y.o. male.    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 Test results identify a stable IgA kappa protein with beta globulin band at 1.63g/dL. Kappa free light chain is elevated at 4.40. CBC and calcium are stable. Creatinine with history of stage III CKD is stable at 1.4. Metastatic survey is negative for lytic lesions. MRI of the entire spine demonstrated age related changes but no convincing evidence of myeloma bone disease. Bone marrow biopsy identified about 13% plasma cells by morphology and FISH for myeloma identified a t(11;14) and trisomies 3,7, and 17. The patient is afebrile and appears clinically well. He was seen by his podiatrist and nephrologist since our last visit without any new or acute events.    The patient has not received any therapy for smoldering myeloma including bisphosphonates or steroids. He has been randomized to observation arm of the the clinical study. 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 He has no current or prior history of malignancy.    TODAY  Mr. Menendez returns today for monthly follow-up evaluation. He denies any interval events since last interview. Bilateral knee pain has resolved with new braces. He would like to start a regular exercise routine but recently had back pain after lifting a heavy suitcase. This is resolving after treatment with muscle relaxers and pain medication. This impacted micturation that has also resolved. He is managing his blood sugar better after diabetic nutrition education and cutting back on  carbohydrates. Peripheral neuropath is stable. Afebrile. ECOG PS is 0.    HPI  Review of Systems   Constitutional: Negative for activity change, appetite change, diaphoresis, fatigue, fever and unexpected weight change.   HENT: Negative.    Eyes: Negative.    Respiratory: Negative.    Cardiovascular: Negative for leg swelling.   Gastrointestinal: Negative.    Endocrine: Negative.    Genitourinary: Negative.    Musculoskeletal: Negative.    Skin: Negative.    Allergic/Immunologic: Negative.    Neurological:        Grade 0-1 peripheral neuropathy of bilateral feet.    Hematological: Negative for adenopathy. Does not bruise/bleed easily.   Psychiatric/Behavioral: Negative.        Objective:      Physical Exam   Constitutional: He is oriented to person, place, and time. He appears well-developed and well-nourished.   HENT:   Head: Normocephalic and atraumatic.   Right Ear: External ear normal.   Left Ear: External ear normal.   Nose: Nose normal.   Mouth/Throat: Oropharynx is clear and moist.   Eyes: Conjunctivae and EOM are normal. Pupils are equal, round, and reactive to light.   Neck: Normal range of motion. Neck supple.   Cardiovascular: Normal rate, regular rhythm and intact distal pulses.    No murmur heard.  Pulmonary/Chest: Effort normal and breath sounds normal. No respiratory distress.   Abdominal: Soft. Bowel sounds are normal. He exhibits no distension. There is no hepatosplenomegaly.   Musculoskeletal: He exhibits no edema or tenderness.   Neurological: He is alert and oriented to person, place, and time. No cranial nerve deficit.   Skin: Skin is warm and dry. No rash noted. No cyanosis. Nails show no clubbing.   Psychiatric: He has a normal mood and affect. His behavior is normal.   Nursing note and vitals reviewed.      Assessment:       1. Smoldering multiple myeloma    2. Benign nodular prostatic hyperplasia with lower urinary tract symptoms    3. Diabetic peripheral neuropathy associated with type 2  diabetes mellitus    4. Type 2 diabetes mellitus with other neurologic complication        Plan:       The patient has a diagnosis of smoldering myeloma. There is no indication for immediate chemotherapy. 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 CBC and CMP are stable.  Plan for return in 1 month. Follow-up pending myeloma labs.

## 2023-08-29 ENCOUNTER — OFFICE VISIT (OUTPATIENT)
Dept: HEMATOLOGY/ONCOLOGY | Facility: CLINIC | Age: 78
End: 2023-08-29
Payer: MEDICARE

## 2023-08-29 ENCOUNTER — RESEARCH ENCOUNTER (OUTPATIENT)
Dept: HEMATOLOGY/ONCOLOGY | Facility: CLINIC | Age: 78
End: 2023-08-29
Payer: MEDICARE

## 2023-08-29 DIAGNOSIS — E11.22 CKD STAGE 3 DUE TO TYPE 2 DIABETES MELLITUS: ICD-10-CM

## 2023-08-29 DIAGNOSIS — D47.2 SMOLDERING MULTIPLE MYELOMA: Primary | ICD-10-CM

## 2023-08-29 DIAGNOSIS — N18.30 CKD STAGE 3 DUE TO TYPE 2 DIABETES MELLITUS: ICD-10-CM

## 2023-08-29 PROCEDURE — 99214 OFFICE O/P EST MOD 30 MIN: CPT | Mod: HCNC,95,, | Performed by: INTERNAL MEDICINE

## 2023-08-29 PROCEDURE — 99214 PR OFFICE/OUTPT VISIT, EST, LEVL IV, 30-39 MIN: ICD-10-PCS | Mod: HCNC,95,, | Performed by: INTERNAL MEDICINE

## 2023-08-29 NOTE — PROGRESS NOTES
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   Sponsor:  [facility]  IRB# [id number]  Study ID: [id number]  Investigator: GIL Engel  Pt Initials: LUCÍA LORENZ        Cycle 93 Day 28    Subject had labs done yesterday in [place],. Dr Engel has reviewed preliminary results; myeloma labs still pending. Patient's chief complaint is post-operative pain related to his receny knee replacement.      Subject states no new adverse events; denies any CRAB symptoms; states is attending physical therapy regularly and is more mobile.   No vital signs taken recently; states has been feeling "pretty good" overall.   Reviewed CBC and CMP results from 8/2/23 with subject; he states understanding; states he is monitoring his blood glucose and is in touch with diabetes team.   Anemia Grade : Hg 10.0 g/dl; improved from prior results. Will continue to monitor.   Hyponatremia, Grade 1: serum sodium 132 mmol/L; will continue to monitor.   CKD: serum creatinine 1.8; improved from past measurements. Not able to calculate GFR; no recent weight available for patient. Will continue to monitor; per MD past notes this is due to CKD not myeloma; subject is followed regularly here per nephrology.   Hyperglycemia, Grade 1: serum glucose 151; per subject he had not fasted prior to lab collected. States (as per interval history) he is followed per endocrine and internal medicine services regarding his home readings; has been in contact with Dr Sagastume regarding novolog refill.     Research RN spoke with patient on the phone, no new AE's to report.  States he is doing well. Patient states he will be in town for next appointment in late September.  Will get with Dr. Engel on availability and schedule ASP; pt states he will "make himself available" for a clinic visit that week.  States he views all appointments on [facility] " account.      He confirms all contact numbers to BMT clinic and after hours; states he hopes to back in [facility] with month or so. No other questions or concerns voiced per subject.     Deviations for cycles 92-94 missed study procedures logged into [employer].

## 2023-08-29 NOTE — PROGRESS NOTES
The patient location is: home  The chief complaint leading to consultation is: smoldering myeloma    Visit type: audiovisual    Face to Face time with patient: 20  30 minutes of total time spent on the encounter, which includes face to face time and non-face to face time preparing to see the patient (eg, review of tests), Obtaining and/or reviewing separately obtained history, Documenting clinical information in the electronic or other health record, Independently interpreting results (not separately reported) and communicating results to the patient/family/caregiver, or Care coordination (not separately reported).         Each patient to whom he or she provides medical services by telemedicine is:  (1) informed of the relationship between the physician and patient and the respective role of any other health care provider with respect to management of the patient; and (2) notified that he or she may decline to receive medical services by telemedicine and may withdraw from such care at any time.    Notes:      Subjective:    Patient ID: Emerson Menendez is a 78 y.o. male.    Chief Complaint: No chief complaint on file.    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 Test results identify a stable IgA kappa protein with beta globulin band at 1.63g/dL. Kappa free light chain is elevated at 4.40. CBC and calcium are stable. Creatinine with history of stage III CKD is stable at 1.4. Metastatic survey is negative for lytic lesions. MRI of the entire spine demonstrated age related changes but no convincing evidence of myeloma bone disease. Bone marrow biopsy identified about 13% plasma cells by morphology and FISH for myeloma identified a t(11;14) and trisomies 3,7, and  17. The patient is afebrile and appears clinically well. He was seen by his podiatrist and nephrologist since our last visit without any new or acute events.    The patient has not received any therapy for smoldering myeloma including bisphosphonates or steroids. He has been randomized to observation arm of the the clinical study. 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 He has no current or prior history of malignancy.    TODAY Cycle 93 E3A06, observation cohort  Knee replacement surgery 6/12 at [facility]  Some post-operative anemia and THIEN now improving  FLC and SPEP also improving  Had significant post-operative pain  Mobility improving with PT- last session 8/31  Notes 10lb+ weight loss since surgery and improved BG control      Knee Pain     Joint Pain  Associated symptoms include coughing. Pertinent negatives include no abdominal pain, chest pain, diaphoresis, fatigue, fever, headaches or rash.   Hand Pain   Pertinent negatives include no chest pain.   Cough  Pertinent negatives include no chest pain, fever, headaches, rash or shortness of breath.   Foot Pain  Associated symptoms include coughing. Pertinent negatives include no abdominal pain, chest pain, diaphoresis, fatigue, fever, headaches or rash.   Arm Pain   Pertinent negatives include no chest pain.   Follow-up  Associated symptoms include coughing. Pertinent negatives include no abdominal pain, chest pain, diaphoresis, fatigue, fever, headaches or rash.     Review of Systems   Constitutional:  Negative for activity change, appetite change, diaphoresis, fatigue, fever and unexpected weight change.   HENT: Negative.  Negative for mouth sores.    Eyes: Negative.    Respiratory:  Positive for cough. Negative for shortness of breath.    Cardiovascular:  Negative for chest pain and leg swelling.   Gastrointestinal: Negative.   Negative for abdominal pain and diarrhea.   Endocrine: Negative.    Genitourinary: Negative.    Musculoskeletal: Negative.  Negative for back pain.   Skin: Negative.  Negative for rash.   Allergic/Immunologic: Negative.    Neurological:  Negative for headaches.        Grade 0-1 peripheral neuropathy of bilateral feet.    Hematological:  Negative for adenopathy. Does not bruise/bleed easily.   Psychiatric/Behavioral: Negative.  The patient is not nervous/anxious.        Objective:       There were no vitals filed for this visit.      Physical Exam  Vitals and nursing note reviewed.   Constitutional:       Appearance: He is well-developed.   HENT:      Head: Normocephalic and atraumatic.      Right Ear: External ear normal.      Left Ear: External ear normal.      Nose: Nose normal.   Eyes:      Conjunctiva/sclera: Conjunctivae normal.      Pupils: Pupils are equal, round, and reactive to light.   Cardiovascular:      Rate and Rhythm: Normal rate and regular rhythm.      Heart sounds: No murmur heard.  Pulmonary:      Effort: Pulmonary effort is normal. No respiratory distress.      Breath sounds: Normal breath sounds.   Abdominal:      General: Bowel sounds are normal. There is no distension.      Palpations: Abdomen is soft.   Musculoskeletal:         General: No tenderness.      Cervical back: Normal range of motion and neck supple.   Skin:     General: Skin is warm and dry.      Findings: No rash.      Nails: There is no clubbing.   Neurological:      Mental Status: He is alert and oriented to person, place, and time.      Cranial Nerves: No cranial nerve deficit.   Psychiatric:         Behavior: Behavior normal.         Assessment:       No diagnosis found.        Plan:       The patient has a diagnosis of smoldering myeloma. There is no indication for immediate chemotherapy. We are monitoring renal function closely- baseline creatinine of around 1.5. 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 M protein has been stable and repeat is pending. Plan for return in 1 month.  Endocrinology and Nephrology to monitor diabetes and renal failure respectively. Patient would like to continue to follow with Dr. Perkins as needed.     Knee replacement surgery 6/12- complete  Post-operative anemia and THIEN improving  Continue monthly observation    A total of 20 minutes was spent in pre-visit chart review, personal interpretation of labs and imaging, and medication review. Total visit time 30 minutes, >50 % counseling.

## 2023-09-05 DIAGNOSIS — N52.9 ERECTILE DYSFUNCTION, UNSPECIFIED ERECTILE DYSFUNCTION TYPE: ICD-10-CM

## 2023-09-05 DIAGNOSIS — D47.2 SMOLDERING MULTIPLE MYELOMA: ICD-10-CM

## 2023-09-05 RX ORDER — SILDENAFIL 100 MG/1
100 TABLET, FILM COATED ORAL DAILY PRN
Qty: 30 TABLET | Refills: 1 | Status: SHIPPED | OUTPATIENT
Start: 2023-09-05 | End: 2023-11-27 | Stop reason: SDUPTHER

## 2023-09-05 RX ORDER — DIAZEPAM 5 MG/1
5 TABLET ORAL EVERY 12 HOURS PRN
Qty: 60 TABLET | Refills: 0 | Status: SHIPPED | OUTPATIENT
Start: 2023-09-05 | End: 2023-10-11 | Stop reason: SDUPTHER

## 2023-09-05 NOTE — TELEPHONE ENCOUNTER
No care due was identified.  [facility] Embedded Care Due Messages. Reference number: [id number].   9/05/2023 12:50:35 PM CDT

## 2023-09-05 NOTE — TELEPHONE ENCOUNTER
No care due was identified.  [facility] Embedded Care Due Messages. Reference number: [id number].   9/05/2023 12:58:41 PM CDT

## 2023-09-06 NOTE — TELEPHONE ENCOUNTER
Refill Decision Note   Emerson Menendez  is requesting a refill authorization.  Brief Assessment and Rationale for Refill:  Approve     Medication Therapy Plan:         Comments:     Note composed:8:15 PM 09/05/2023

## 2023-09-06 NOTE — TELEPHONE ENCOUNTER
Refill Decision Note   Emerson Menendez  is requesting a refill authorization.  Brief Assessment and Rationale for Refill:  Approve     Medication Therapy Plan:         Comments:     Note composed:8:16 PM 09/05/2023

## 2023-09-18 PROBLEM — N17.9 AKI (ACUTE KIDNEY INJURY): Status: RESOLVED | Noted: 2023-06-13 | Resolved: 2023-09-18

## 2023-09-25 ENCOUNTER — RESEARCH ENCOUNTER (OUTPATIENT)
Dept: HEMATOLOGY/ONCOLOGY | Facility: CLINIC | Age: 78
End: 2023-09-25
Payer: MEDICARE

## 2023-09-25 ENCOUNTER — TELEPHONE (OUTPATIENT)
Dept: HEMATOLOGY/ONCOLOGY | Facility: CLINIC | Age: 78
End: 2023-09-25
Payer: MEDICARE

## 2023-09-26 ENCOUNTER — OFFICE VISIT (OUTPATIENT)
Dept: HEMATOLOGY/ONCOLOGY | Facility: CLINIC | Age: 78
End: 2023-09-26
Payer: MEDICARE

## 2023-09-26 DIAGNOSIS — D47.2 SMOLDERING MULTIPLE MYELOMA: Primary | ICD-10-CM

## 2023-09-26 DIAGNOSIS — E11.42 TYPE 2 DIABETES MELLITUS WITH DIABETIC POLYNEUROPATHY, WITH LONG-TERM CURRENT USE OF INSULIN: ICD-10-CM

## 2023-09-26 DIAGNOSIS — N18.30 CKD STAGE 3 DUE TO TYPE 2 DIABETES MELLITUS: ICD-10-CM

## 2023-09-26 DIAGNOSIS — Z79.4 TYPE 2 DIABETES MELLITUS WITH DIABETIC POLYNEUROPATHY, WITH LONG-TERM CURRENT USE OF INSULIN: ICD-10-CM

## 2023-09-26 DIAGNOSIS — E11.22 CKD STAGE 3 DUE TO TYPE 2 DIABETES MELLITUS: ICD-10-CM

## 2023-09-26 PROCEDURE — 99214 OFFICE O/P EST MOD 30 MIN: CPT | Mod: HCNC,95,, | Performed by: INTERNAL MEDICINE

## 2023-09-26 PROCEDURE — 99214 PR OFFICE/OUTPT VISIT, EST, LEVL IV, 30-39 MIN: ICD-10-PCS | Mod: HCNC,95,, | Performed by: INTERNAL MEDICINE

## 2023-09-26 NOTE — PROGRESS NOTES
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   Sponsor:  [facility]  IRB# [id number]  Study ID: [id number]  Investigator: GIL Engel Pt Initials: LUCÍA LORENZ        Telephone Encounter Re: upcoming visit/labs   This research RN contacted subject to remind him regarding appts tomorrow. Subject states he is "still in [place], I had a setback with my knee..I think I went back to work too soon."  Subject says the pain level of his knee is "too much for me to travel to [place]" he rates the pain at "about a 6." States knee became painful while standing "about a week ago" but is "doing better now" and "I've been elevating it when I rest"  He denies edema, decreased range of motion; states incisions healed, no history of fever. "I just got too active too soon."    States he would like to schedule virtual visit with Dr Engel vs traveling; willing to do labs in [place] as well.   Contacted Dr Engel's RN Luis; she states she can switch visit to virtual tomorrow; arrange to have labs done locally in [place].      He denies any other adverse events at this time; states has follow up appt with Dr Olguin next week to evaluate knee. Also states he is not going to PT; has home exercises to do and has restarted these when pain not "too bad."    He confirms all contact numbers to BMT clinic and after hours; states he hopes to back in [facility] with month or so. No other questions or concerns voiced per subject.     Subject confirms he is able to sign in via [facility] to virtual visit and confirms time tomorrow at 10:30 with Dr Engel.  Informed subject will have labs scheduled this week; he states understanding.                            "

## 2023-09-28 ENCOUNTER — TELEPHONE (OUTPATIENT)
Dept: HEMATOLOGY/ONCOLOGY | Facility: CLINIC | Age: 78
End: 2023-09-28
Payer: MEDICARE

## 2023-09-28 NOTE — PROGRESS NOTES
The patient location is: home  The chief complaint leading to consultation is: smoldering myeloma    Visit type: audiovisual    Face to Face time with patient: 20  30 minutes of total time spent on the encounter, which includes face to face time and non-face to face time preparing to see the patient (eg, review of tests), Obtaining and/or reviewing separately obtained history, Documenting clinical information in the electronic or other health record, Independently interpreting results (not separately reported) and communicating results to the patient/family/caregiver, or Care coordination (not separately reported).         Each patient to whom he or she provides medical services by telemedicine is:  (1) informed of the relationship between the physician and patient and the respective role of any other health care provider with respect to management of the patient; and (2) notified that he or she may decline to receive medical services by telemedicine and may withdraw from such care at any time.    Notes:      Subjective:    Patient ID: Emerson Menendez is a 78 y.o. male.    Chief Complaint: No chief complaint on file.    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 Test results identify a stable IgA kappa protein with beta globulin band at 1.63g/dL. Kappa free light chain is elevated at 4.40. CBC and calcium are stable. Creatinine with history of stage III CKD is stable at 1.4. Metastatic survey is negative for lytic lesions. MRI of the entire spine demonstrated age related changes but no convincing evidence of myeloma bone disease. Bone marrow biopsy identified about 13% plasma cells by morphology and FISH for myeloma identified a t(11;14) and trisomies 3,7, and  17. The patient is afebrile and appears clinically well. He was seen by his podiatrist and nephrologist since our last visit without any new or acute events.    The patient has not received any therapy for smoldering myeloma including bisphosphonates or steroids. He has been randomized to observation arm of the the clinical study. 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 He has no current or prior history of malignancy.    TODAY Cycle 94 E3A06, observation cohort  Knee replacement surgery 6/12 at [facility]; some increased pain recently and plans to contact surgeon to report  Post-operative anemia and THIEN significantly better   Notes 10lb+ weight loss since surgery and improved BG control      Knee Pain     Joint Pain  Associated symptoms include coughing. Pertinent negatives include no abdominal pain, chest pain, diaphoresis, fatigue, fever, headaches or rash.   Hand Pain   Pertinent negatives include no chest pain.   Cough  Pertinent negatives include no chest pain, fever, headaches, rash or shortness of breath.   Foot Pain  Associated symptoms include coughing. Pertinent negatives include no abdominal pain, chest pain, diaphoresis, fatigue, fever, headaches or rash.   Arm Pain   Pertinent negatives include no chest pain.   Follow-up  Associated symptoms include coughing. Pertinent negatives include no abdominal pain, chest pain, diaphoresis, fatigue, fever, headaches or rash.     Review of Systems   Constitutional:  Negative for activity change, appetite change, diaphoresis, fatigue, fever and unexpected weight change.   HENT: Negative.  Negative for mouth sores.    Eyes: Negative.    Respiratory:  Positive for cough. Negative for shortness of breath.    Cardiovascular:  Negative for chest pain and leg swelling.   Gastrointestinal: Negative.  Negative for abdominal pain and diarrhea.    Endocrine: Negative.    Genitourinary: Negative.  Negative for frequency.   Musculoskeletal: Negative.  Negative for back pain.   Skin: Negative.  Negative for rash.   Allergic/Immunologic: Negative.    Neurological:  Negative for headaches.        Grade 0-1 peripheral neuropathy of bilateral feet.    Hematological:  Negative for adenopathy. Does not bruise/bleed easily.   Psychiatric/Behavioral: Negative.  The patient is not nervous/anxious.        Objective:       There were no vitals filed for this visit.      Physical Exam  Vitals and nursing note reviewed.   Constitutional:       Appearance: He is well-developed.   HENT:      Head: Normocephalic and atraumatic.      Right Ear: External ear normal.      Left Ear: External ear normal.      Nose: Nose normal.   Eyes:      Conjunctiva/sclera: Conjunctivae normal.      Pupils: Pupils are equal, round, and reactive to light.   Cardiovascular:      Rate and Rhythm: Normal rate and regular rhythm.      Heart sounds: No murmur heard.  Pulmonary:      Effort: Pulmonary effort is normal. No respiratory distress.      Breath sounds: Normal breath sounds.   Abdominal:      General: Bowel sounds are normal. There is no distension.      Palpations: Abdomen is soft.   Musculoskeletal:         General: No tenderness.      Cervical back: Normal range of motion and neck supple.   Skin:     General: Skin is warm and dry.      Findings: No rash.      Nails: There is no clubbing.   Neurological:      Mental Status: He is alert and oriented to person, place, and time.      Cranial Nerves: No cranial nerve deficit.   Psychiatric:         Behavior: Behavior normal.         Assessment:       No diagnosis found.        Plan:       The patient has a diagnosis of smoldering myeloma. There is no indication for immediate chemotherapy. We are monitoring renal function closely- baseline creatinine of around 1.5. 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 M protein has been stable and repeat is pending. Plan for return in 1 month.  Endocrinology and Nephrology to monitor diabetes and renal failure respectively. Patient would like to continue to follow with Dr. Perkins as needed.     Knee replacement surgery 6/12- complete  Post-operative anemia and THIEN improving  Continue monthly observation  Repeating myeloma serology 9/27- results pending    A total of 20 minutes was spent in pre-visit chart review, personal interpretation of labs and imaging, and medication review. Total visit time 30 minutes, >50 % counseling.

## 2023-10-11 DIAGNOSIS — D47.2 SMOLDERING MULTIPLE MYELOMA: ICD-10-CM

## 2023-10-11 RX ORDER — DIAZEPAM 5 MG/1
5 TABLET ORAL EVERY 12 HOURS PRN
Qty: 60 TABLET | Refills: 0 | Status: SHIPPED | OUTPATIENT
Start: 2023-10-11 | End: 2024-01-22 | Stop reason: SDUPTHER

## 2023-10-12 NOTE — TELEPHONE ENCOUNTER
Care Due:                  Date            Visit Type   Department     Provider  --------------------------------------------------------------------------------                                EP -                              PRIMARY      [facility] INTERNAL  Last Visit: 02-      CARE ([facility])   MEDICINE       GEOVANNA ALCAZAR  Next Visit: None Scheduled  None         None Found                                                            Last  Test          Frequency    Reason                     Performed    Due Date  --------------------------------------------------------------------------------    HBA1C.......  6 months...  FARXIGA, glimepiride,      07- 12-                             insulin..................    [employer] Embedded Care Due Messages. Reference number: [id number].   10/11/2023 8:29:01 PM CDT

## 2023-10-18 ENCOUNTER — PATIENT MESSAGE (OUTPATIENT)
Dept: HEMATOLOGY/ONCOLOGY | Facility: CLINIC | Age: 78
End: 2023-10-18
Payer: MEDICARE

## 2023-10-20 ENCOUNTER — PATIENT MESSAGE (OUTPATIENT)
Dept: HEMATOLOGY/ONCOLOGY | Facility: CLINIC | Age: 78
End: 2023-10-20
Payer: MEDICARE

## 2023-10-20 ENCOUNTER — TELEPHONE (OUTPATIENT)
Dept: HEMATOLOGY/ONCOLOGY | Facility: CLINIC | Age: 78
End: 2023-10-20
Payer: MEDICARE

## 2023-10-23 ENCOUNTER — RESEARCH ENCOUNTER (OUTPATIENT)
Dept: HEMATOLOGY/ONCOLOGY | Facility: CLINIC | Age: 78
End: 2023-10-23
Payer: MEDICARE

## 2023-10-23 NOTE — PROGRESS NOTES
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   Sponsor:  [facility]  IRB# 2011.053.[id number]  Study ID: [id number]  Investigator: GIL Engel Pt Initials: LUCÍA LORENZ        Ongoing events for subject, Arm B: Observation     6/29/2023 Cycle 91 Day 28: Subject no showed for scheduled lab appt today. Appointment rescheduled for 7/3/23 via schedulers who confirm reminder via [facility] in [facility].   7/3/2023: Cycle 91 Day 32 Subject had labs done in [place] today; unable to travel to [facility] secondary to s/p total knee replacement early June. Subject had labs scheduled for 6/27/23 and 6/29/23; patient messaged on 6/23/23 as well as left voice mail to remind him of lab appt. Subject was no show for these appointments; contacted and rescheduled for 7/3/23. The lab results for that date were reported to Dr. Engel; no orders noted at that time.    7/11/2023 (Cycle 92 Day 8): Reached out to subject per Dr Engel to recommend virtual appointment and repeat labs; refer to documentation via patient messaging in Epic. Subject wished to defer virtual appt but agrees to follow up labs scheduled for 7/27/23.  7/27/2023 (Cycle 92 Day 24): Patient no showed for this appointment; rescheduled for 8/1/23.  8/1/2023 (Cycle 92 Day 29): No show for lab appt today. This RN called subject to remind him of appt; he states he is not able to attend appt today; requests reschedule for tomorrow. Labs rescheduled per subject's request; 10am [facility] lab in [place]. Subject states agreement with time. Dr Engel notified via [employer] message regarding lab appts and whether she wants to pursue virtual appt with subject. Will also contact Dr Engel following results of labs tomorrow for further instructions.   8/29/23 (Cycle 93 Day 28) Virtual visit with Dr Engel; refer to her noted dated this date.  9/26/23 (Cycle 94 Day 27) Virtual visit  with Dr Engel; refer to notes and labs 9/29/23.    *Of note: Subject remains in [place]; states intends to resume in person visits next month.     AE Assessment and Update today for Cycle 95, Day 28    This research RN telephoned subject to remind him of lab appt tomorrow in [place] 9:4 am [facility] location followed by virtual visit with Dr Engel 1pm.  Subject reports he is doing well; no new AE's and that knee rehab is going well and he is in less pain.  He denies any worsening of current AE's; not able to assess vital signs and awaiting labs for tomorrow.   Will update this encounter when labs resulted.

## 2023-10-24 ENCOUNTER — TELEPHONE (OUTPATIENT)
Dept: HEMATOLOGY/ONCOLOGY | Facility: CLINIC | Age: 78
End: 2023-10-24
Payer: MEDICARE

## 2023-10-24 NOTE — TELEPHONE ENCOUNTER
Spoke with pt in regards to appt for today. Pt wasn't aware of visit being an in person visit. Spoke with Dr. nEgel and she s\tated to move his appt to virtual on Thursday 10/26. Made pt aware it will be moved to Thursday virtually with Dr. Toro Carcamo,[facility]

## 2023-10-24 NOTE — TELEPHONE ENCOUNTER
"----- Message from Norma Hill sent at 10/24/2023  1:19 PM CDT -----  Consult/Advisory:      Name Of Caller:   Self      Contact Preference:    [phone]        Provider Name: Toro       Does patient feel the need to be seen today? Yes        What is the nature of the call?:  Pt assuming that today's @1pm  appt is a virtual, needs clarification.          Additional Notes:  "Thank you for all that you do for our patients"         "

## 2023-10-26 ENCOUNTER — OFFICE VISIT (OUTPATIENT)
Dept: HEMATOLOGY/ONCOLOGY | Facility: CLINIC | Age: 78
End: 2023-10-26
Payer: MEDICARE

## 2023-10-26 DIAGNOSIS — E11.42 TYPE 2 DIABETES MELLITUS WITH DIABETIC POLYNEUROPATHY, WITH LONG-TERM CURRENT USE OF INSULIN: ICD-10-CM

## 2023-10-26 DIAGNOSIS — D47.2 SMOLDERING MULTIPLE MYELOMA: Primary | ICD-10-CM

## 2023-10-26 DIAGNOSIS — E11.22 CKD STAGE 3 DUE TO TYPE 2 DIABETES MELLITUS: ICD-10-CM

## 2023-10-26 DIAGNOSIS — Z79.4 TYPE 2 DIABETES MELLITUS WITH DIABETIC POLYNEUROPATHY, WITH LONG-TERM CURRENT USE OF INSULIN: ICD-10-CM

## 2023-10-26 DIAGNOSIS — N18.30 CKD STAGE 3 DUE TO TYPE 2 DIABETES MELLITUS: ICD-10-CM

## 2023-10-26 PROCEDURE — 99215 OFFICE O/P EST HI 40 MIN: CPT | Mod: Q1,HCNC,95, | Performed by: INTERNAL MEDICINE

## 2023-10-26 PROCEDURE — 99215 PR OFFICE/OUTPT VISIT, EST, LEVL V, 40-54 MIN: ICD-10-PCS | Mod: Q1,HCNC,95, | Performed by: INTERNAL MEDICINE

## 2023-10-26 NOTE — PROGRESS NOTES
The patient location is: home  The chief complaint leading to consultation is: smoldering myeloma    Visit type: audiovisual    Face to Face time with patient: 20  40 minutes of total time spent on the encounter, which includes face to face time and non-face to face time preparing to see the patient (eg, review of tests), Obtaining and/or reviewing separately obtained history, Documenting clinical information in the electronic or other health record, Independently interpreting results (not separately reported) and communicating results to the patient/family/caregiver, or Care coordination (not separately reported).         Each patient to whom he or she provides medical services by telemedicine is:  (1) informed of the relationship between the physician and patient and the respective role of any other health care provider with respect to management of the patient; and (2) notified that he or she may decline to receive medical services by telemedicine and may withdraw from such care at any time.    Notes:      Subjective:    Patient ID: Emerson Menendez is a 78 y.o. male.    Chief Complaint: No chief complaint on file.    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 Test results identify a stable IgA kappa protein with beta globulin band at 1.63g/dL. Kappa free light chain is elevated at 4.40. CBC and calcium are stable. Creatinine with history of stage III CKD is stable at 1.4. Metastatic survey is negative for lytic lesions. MRI of the entire spine demonstrated age related changes but no convincing evidence of myeloma bone disease. Bone marrow biopsy identified about 13% plasma cells by morphology and FISH for myeloma identified a t(11;14) and trisomies 3,7, and  17. The patient is afebrile and appears clinically well. He was seen by his podiatrist and nephrologist since our last visit without any new or acute events.    The patient has not received any therapy for smoldering myeloma including bisphosphonates or steroids. He has been randomized to observation arm of the the clinical study. 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 He has no current or prior history of malignancy.    TODAY Cycle 95 E3A06, observation cohort  Knee replacement surgery 6/12 at [facility]; completed physical therapy, still having some edema. About to start exercise program with [employer] in [place].   CBC and CMP stable  SPEP and free light chains stable  Blood glucose still well controlled      Knee Pain     Joint Pain  Associated symptoms include coughing. Pertinent negatives include no abdominal pain, chest pain, diaphoresis, fatigue, fever, headaches or rash.   Hand Pain   Pertinent negatives include no chest pain.   Cough  Pertinent negatives include no chest pain, fever, headaches, rash or shortness of breath.   Foot Pain  Associated symptoms include coughing. Pertinent negatives include no abdominal pain, chest pain, diaphoresis, fatigue, fever, headaches or rash.   Arm Pain   Pertinent negatives include no chest pain.   Follow-up  Associated symptoms include coughing. Pertinent negatives include no abdominal pain, chest pain, diaphoresis, fatigue, fever, headaches or rash.     Review of Systems   Constitutional:  Negative for activity change, appetite change, diaphoresis, fatigue, fever and unexpected weight change.   HENT: Negative.  Negative for mouth sores.    Eyes: Negative.    Respiratory:  Positive for cough. Negative for shortness of breath.    Cardiovascular:  Negative for chest pain and leg swelling.   Gastrointestinal: Negative.  Negative for  abdominal pain and diarrhea.   Endocrine: Negative.    Genitourinary: Negative.  Negative for frequency.   Musculoskeletal: Negative.  Negative for back pain.   Skin: Negative.  Negative for rash.   Allergic/Immunologic: Negative.    Neurological:  Negative for headaches.        Grade 0-1 peripheral neuropathy of bilateral feet.    Hematological:  Negative for adenopathy. Does not bruise/bleed easily.   Psychiatric/Behavioral: Negative.  The patient is not nervous/anxious.        Objective:       There were no vitals filed for this visit.      Physical Exam  Vitals and nursing note reviewed.   Constitutional:       Appearance: He is well-developed.   HENT:      Head: Normocephalic and atraumatic.      Right Ear: External ear normal.      Left Ear: External ear normal.      Nose: Nose normal.   Eyes:      Conjunctiva/sclera: Conjunctivae normal.      Pupils: Pupils are equal, round, and reactive to light.   Cardiovascular:      Rate and Rhythm: Normal rate and regular rhythm.      Heart sounds: No murmur heard.  Pulmonary:      Effort: Pulmonary effort is normal. No respiratory distress.      Breath sounds: Normal breath sounds.   Abdominal:      General: Bowel sounds are normal. There is no distension.      Palpations: Abdomen is soft.   Musculoskeletal:         General: No tenderness.      Cervical back: Normal range of motion and neck supple.   Skin:     General: Skin is warm and dry.      Findings: No rash.      Nails: There is no clubbing.   Neurological:      Mental Status: He is alert and oriented to person, place, and time.      Cranial Nerves: No cranial nerve deficit.   Psychiatric:         Behavior: Behavior normal.         Assessment:       No diagnosis found.        Plan:       The patient has a diagnosis of smoldering myeloma. There is no indication for immediate chemotherapy. We are monitoring renal function closely- baseline creatinine of around 1.5. 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 M protein has been stable and repeat is pending. Plan for return in 1 month.  Endocrinology and Nephrology to monitor diabetes and renal failure respectively. Patient would like to continue to follow with Dr. Perkins as needed.     Knee replacement surgery 6/12- recovering well  Continue monthly observation  Repeating myeloma serology 10/24 stable    A total of 20 minutes was spent in pre-visit chart review, personal interpretation of labs and imaging, and medication review. Total visit time 30 minutes, >50 % counseling.

## 2023-10-30 ENCOUNTER — PATIENT MESSAGE (OUTPATIENT)
Dept: INTERNAL MEDICINE | Facility: CLINIC | Age: 78
End: 2023-10-30
Payer: MEDICARE

## 2023-10-30 DIAGNOSIS — E11.42 TYPE 2 DIABETES MELLITUS WITH DIABETIC POLYNEUROPATHY, WITH LONG-TERM CURRENT USE OF INSULIN: Chronic | ICD-10-CM

## 2023-10-30 DIAGNOSIS — Z79.4 TYPE 2 DIABETES MELLITUS WITH DIABETIC POLYNEUROPATHY, WITH LONG-TERM CURRENT USE OF INSULIN: Chronic | ICD-10-CM

## 2023-10-30 RX ORDER — INSULIN GLARGINE 100 [IU]/ML
16 INJECTION, SOLUTION SUBCUTANEOUS 2 TIMES DAILY
Qty: 30 ML | Refills: 2 | Status: SHIPPED | OUTPATIENT
Start: 2023-10-30 | End: 2024-01-08 | Stop reason: SDUPTHER

## 2023-11-02 ENCOUNTER — PATIENT MESSAGE (OUTPATIENT)
Dept: HEMATOLOGY/ONCOLOGY | Facility: CLINIC | Age: 78
End: 2023-11-02
Payer: MEDICARE

## 2023-11-14 PROBLEM — Z96.652 S/P TKR (TOTAL KNEE REPLACEMENT) USING CEMENT, LEFT: Status: ACTIVE | Noted: 2023-11-14

## 2023-11-20 ENCOUNTER — TELEPHONE (OUTPATIENT)
Dept: HEMATOLOGY/ONCOLOGY | Facility: CLINIC | Age: 78
End: 2023-11-20

## 2023-11-27 DIAGNOSIS — I10 ESSENTIAL HYPERTENSION: ICD-10-CM

## 2023-11-27 DIAGNOSIS — E55.9 VITAMIN D DEFICIENCY: ICD-10-CM

## 2023-11-27 DIAGNOSIS — Z79.4 TYPE 2 DIABETES MELLITUS WITH DIABETIC POLYNEUROPATHY, WITH LONG-TERM CURRENT USE OF INSULIN: Primary | Chronic | ICD-10-CM

## 2023-11-27 DIAGNOSIS — N52.9 ERECTILE DYSFUNCTION, UNSPECIFIED ERECTILE DYSFUNCTION TYPE: ICD-10-CM

## 2023-11-27 DIAGNOSIS — E11.42 TYPE 2 DIABETES MELLITUS WITH DIABETIC POLYNEUROPATHY, WITH LONG-TERM CURRENT USE OF INSULIN: Primary | Chronic | ICD-10-CM

## 2023-11-27 DIAGNOSIS — N18.31 STAGE 3A CHRONIC KIDNEY DISEASE: Chronic | ICD-10-CM

## 2023-11-27 RX ORDER — SILDENAFIL 100 MG/1
100 TABLET, FILM COATED ORAL DAILY PRN
Qty: 30 TABLET | Refills: 1 | Status: SHIPPED | OUTPATIENT
Start: 2023-11-27 | End: 2024-11-26

## 2023-11-27 NOTE — TELEPHONE ENCOUNTER
No care due was identified.  [facility] Embedded Care Due Messages. Reference number: [id number].   11/27/2023 1:02:47 PM CST

## 2023-11-27 NOTE — TELEPHONE ENCOUNTER
Care Due:                  Date            Visit Type   Department     Provider  --------------------------------------------------------------------------------                                EP -                              PRIMARY      [facility] INTERNAL  Last Visit: 02-      CARE ([facility])   MEDICINE       GEOVANNA ALCAZAR  Next Visit: None Scheduled  None         None Found                                                            Last  Test          Frequency    Reason                     Performed    Due Date  --------------------------------------------------------------------------------    Lipid Panel.  12 months..  pravastatin..............  02- 02-    [facility] Embedded Care Due Messages. Reference number: [id number].   11/27/2023 1:01:57 PM CST

## 2023-11-28 RX ORDER — ERGOCALCIFEROL 1.25 MG/1
50000 CAPSULE ORAL
Qty: 12 CAPSULE | Refills: 3 | Status: SHIPPED | OUTPATIENT
Start: 2023-11-28 | End: 2024-03-01 | Stop reason: ALTCHOICE

## 2023-11-28 RX ORDER — DAPAGLIFLOZIN 5 MG/1
5 TABLET, FILM COATED ORAL DAILY
Qty: 90 TABLET | Refills: 0 | Status: SHIPPED | OUTPATIENT
Start: 2023-11-28 | End: 2024-02-22

## 2023-11-28 RX ORDER — PEN NEEDLE, DIABETIC 30 GX3/16"
NEEDLE, DISPOSABLE MISCELLANEOUS
Qty: 500 EACH | Refills: 3 | Status: SHIPPED | OUTPATIENT
Start: 2023-11-28

## 2023-11-28 NOTE — TELEPHONE ENCOUNTER
Refill Routing Note   Medication(s) are not appropriate for processing by [facility] for the following reason(s):        Outside of protocol  Due for refill >6 months ago  No active prescription written by provider    [facility] action(s):  Defer  Route  Approve     Requires labs : Yes      Medication Therapy Plan: Labs (lipid panel) due 2.12.2024; Cr is stable; pen needles Expiration Date: 03/06/21 and there are no recent dispenses on file    Pharmacist review requested: Yes     Appointments  past 12m or future 3m with PCP    Date Provider   Last Visit   2/15/2023 Roberta Sagastume MD   Next Visit   11/27/2023 Roberta Sagastume MD   ED visits in past 90 days: 0        Note composed:10:57 AM 11/28/2023

## 2023-11-28 NOTE — TELEPHONE ENCOUNTER
Refill Routing Note   Medication(s) are not appropriate for processing by [facility] for the following reason(s):        Outside of protocol  Due for refill >6 months ago  Required labs abnormal:     [facility] action(s):  Defer  Route     Requires labs : Yes    Medication Therapy Plan:  Labs (lipid panel) due 2.12.2024    Pharmacist review requested: Yes     Appointments  past 12m or future 3m with PCP    Date Provider   Last Visit   2/15/2023 Roberta Sagastume MD   Next Visit   11/27/2023 Roberta Sagastume MD   ED visits in past 90 days: 0        Note composed:9:20 AM 11/28/2023

## 2023-12-11 RX ORDER — PRAVASTATIN SODIUM 40 MG/1
40 TABLET ORAL DAILY
Qty: 90 TABLET | Refills: 0 | Status: SHIPPED | OUTPATIENT
Start: 2023-12-11 | End: 2024-02-23

## 2023-12-11 NOTE — TELEPHONE ENCOUNTER
Care Due:                  Date            Visit Type   Department     Provider  --------------------------------------------------------------------------------                                EP -                              PRIMARY      [facility] INTERNAL  Last Visit: 02-      CARE ([facility])   MEDICINE       GEOVANNA ALCAZAR  Next Visit: None Scheduled  None         None Found                                                            Last  Test          Frequency    Reason                     Performed    Due Date  --------------------------------------------------------------------------------    HBA1C.......  6 months...  FARXIGA, glimepiride,      07- 12-                             insulin..................    [employer] Embedded Care Due Messages. Reference number: [id number].   12/11/2023 9:05:16 AM CST

## 2023-12-12 NOTE — TELEPHONE ENCOUNTER
Provider Staff:  Action required for this patient    Requires labs      Please see care gap opportunities below in Care Due Message.    Thanks!  [facility]     Appointments      Date Provider   Last Visit   2/15/2023 Roberta Sagastume MD   Next Visit   Visit date not found Roberta Sagastume MD     Refill Decision Note   Emerson Menendez  is requesting a refill authorization.  Brief Assessment and Rationale for Refill:  Approve     Medication Therapy Plan:         Comments:     Note composed:6:48 PM 12/11/2023

## 2023-12-18 NOTE — LETTER
April 11, 2019      Roberta Sagastume MD  [address]  [place] [address]           [facility]  [address]  [place] [address]  Phone: [phone]          Patient: Emerson Menendez   MR Number: [id number]   YOB: 1945   Date of Visit: 4/11/2019       Dear Dr. Roberta Sagastume:    Thank you for referring Emerson Menendez to me for evaluation. Attached you will find relevant portions of my assessment and plan of care.    If you have questions, please do not hesitate to call me. I look forward to following Emerson Menendez along with you.    Sincerely,    Concepcion Short, DPTC    Enclosure  CC:  No Recipients    If you would like to receive this communication electronically, please contact [email or web address] or [phone] to request more information on [employer] Link access.    For providers and/or their staff who would like to refer a patient to [facility], please contact us through our one-stop-shop provider referral line, [facility] , at [phone].    If you feel you have received this communication in error or would no longer like to receive these types of communications, please e-mail [email or web address]         
ADMIT

## 2023-12-21 ENCOUNTER — RESEARCH ENCOUNTER (OUTPATIENT)
Dept: HEMATOLOGY/ONCOLOGY | Facility: CLINIC | Age: 78
End: 2023-12-21
Payer: MEDICARE

## 2023-12-21 ENCOUNTER — PATIENT MESSAGE (OUTPATIENT)
Dept: INTERNAL MEDICINE | Facility: CLINIC | Age: 78
End: 2023-12-21
Payer: MEDICARE

## 2023-12-21 DIAGNOSIS — E55.9 VITAMIN D DEFICIENCY DISEASE: Primary | ICD-10-CM

## 2023-12-21 DIAGNOSIS — Z79.4 TYPE 2 DIABETES MELLITUS WITH OTHER CIRCULATORY COMPLICATION, WITH LONG-TERM CURRENT USE OF INSULIN: ICD-10-CM

## 2023-12-21 DIAGNOSIS — E11.59 TYPE 2 DIABETES MELLITUS WITH OTHER CIRCULATORY COMPLICATION, WITH LONG-TERM CURRENT USE OF INSULIN: ICD-10-CM

## 2023-12-21 NOTE — PROGRESS NOTES
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   Sponsor:  [facility]  IRB# 2011.053.N  Study ID: [id number]  Investigator: GIL Engel Pt Initials: LUCÍA LORENZ        Ongoing events for subject, Arm B: Observation     *Of note: Subject remains in [place]; states intends to resume in person visits next month.     6/29/2023 Cycle 91 Day 28: Subject no showed for scheduled lab appt today. Appointment rescheduled for 7/3/23 via schedulers who confirm reminder via [facility] in [facility].   7/3/2023: Cycle 91 Day 32 Subject had labs done in [place] today; unable to travel to [facility] secondary to s/p total knee replacement early June. Subject had labs scheduled for 6/27/23 and 6/29/23; patient messaged on 6/23/23 as well as left voice mail to remind him of lab appt. Subject was no show for these appointments; contacted and rescheduled for 7/3/23. The lab results for that date were reported to Dr. Engel; no orders noted at that time.    7/11/2023 (Cycle 92 Day 8): Reached out to subject per Dr Engel to recommend virtual appointment and repeat labs; refer to documentation via patient messaging in Epic. Subject wished to defer virtual appt but agrees to follow up labs scheduled for 7/27/23.  7/27/2023 (Cycle 92 Day 24): Patient no showed for this appointment; rescheduled for 8/1/23.  8/1/2023 (Cycle 92 Day 29): No show for lab appt today. This RN called subject to remind him of appt; he states he is not able to attend appt today; requests reschedule for tomorrow. Labs rescheduled per subject's request; 10am [facility] lab in [place]. Subject states agreement with time. Dr Engel notified via [employer] message regarding lab appts and whether she wants to pursue virtual appt with subject. Will also contact Dr Engel following results of labs tomorrow for further instructions.   8/29/23 (Cycle 93 Day 28)  "Virtual visit with Dr Engel; refer to her noted dated this date.  9/26/23 (Cycle 94 Day 27) Virtual visit with Dr Engel; refer to notes and labs 9/29/23.  10/26/23 (Cycle 95 Day 28) Virtual visit Dr Engel; refer to note.   11/20/23: telephone contact; subject requesting to cancel this month's visits for 11/24/23, Cycle 96 Day 28; *unable to follow through; missed visit; see EPIC note dated this day.*  Dr Engel informed and aware; will reschedule when her schedule permits.   12/21/23: subject has labs today and will have virtual appt with Dr Engel tomorrow morning; this research RN called subject to remind him of appts; see note;  interval history below for details of call. Presumably will have Cycle 97 Day 28 virtual visit tomorrow.         AE Assessment and Update today for Cycle 97, Day 27    This research RN telephoned subject to remind him of lab appt today in [place] 10:30 am. [facility] location followed by virtual visit with Dr Engel 10 am tomorrow.   Subject reports he is doing fine; no issues with knees; would like to schedule in person visit in January with Dr Engel; informed subject will reach out to Dr Engel's staff to see if availability; as of now no appts for January.   Subject reports no new AE's; denies any CRAB symptoms; is beginning to ease back into projects in [place] and [place]. He confirms his "other knee" is giving him "issues, "some pain if I stand up too long because I've been favoring the one I had surgery on."  He denies any worsening of current AE's; not able to assess vital signs and awaiting labs results for today and virtual visit with Dr Engel tomorrow.   Will update this encounter when labs resulted.     Reviewed and confirmed clinic contact information with subject; after hours as well; subject states understanding.                       "

## 2023-12-22 ENCOUNTER — OFFICE VISIT (OUTPATIENT)
Dept: HEMATOLOGY/ONCOLOGY | Facility: CLINIC | Age: 78
End: 2023-12-22
Payer: MEDICARE

## 2023-12-22 DIAGNOSIS — E11.42 TYPE 2 DIABETES MELLITUS WITH DIABETIC POLYNEUROPATHY, WITH LONG-TERM CURRENT USE OF INSULIN: ICD-10-CM

## 2023-12-22 DIAGNOSIS — E11.65 TYPE 2 DIABETES MELLITUS WITH HYPERGLYCEMIA, UNSPECIFIED WHETHER LONG TERM INSULIN USE: ICD-10-CM

## 2023-12-22 DIAGNOSIS — Z79.4 TYPE 2 DIABETES MELLITUS WITH DIABETIC POLYNEUROPATHY, WITH LONG-TERM CURRENT USE OF INSULIN: ICD-10-CM

## 2023-12-22 DIAGNOSIS — E11.22 CKD STAGE 3 DUE TO TYPE 2 DIABETES MELLITUS: ICD-10-CM

## 2023-12-22 DIAGNOSIS — D47.2 SMOLDERING MULTIPLE MYELOMA: Primary | ICD-10-CM

## 2023-12-22 DIAGNOSIS — N18.30 CKD STAGE 3 DUE TO TYPE 2 DIABETES MELLITUS: ICD-10-CM

## 2023-12-22 PROCEDURE — 99215 OFFICE O/P EST HI 40 MIN: CPT | Mod: HCNC,95,, | Performed by: INTERNAL MEDICINE

## 2023-12-22 PROCEDURE — 99215 PR OFFICE/OUTPT VISIT, EST, LEVL V, 40-54 MIN: ICD-10-PCS | Mod: HCNC,95,, | Performed by: INTERNAL MEDICINE

## 2023-12-22 RX ORDER — DEXTROSE 4 G
TABLET,CHEWABLE ORAL
Qty: 1 EACH | Refills: 0 | Status: SHIPPED | OUTPATIENT
Start: 2023-12-22

## 2023-12-22 NOTE — PROGRESS NOTES
The patient location is: home  The chief complaint leading to consultation is: smoldering myeloma    Visit type: audiovisual    Face to Face time with patient: 20  40 minutes of total time spent on the encounter, which includes face to face time and non-face to face time preparing to see the patient (eg, review of tests), Obtaining and/or reviewing separately obtained history, Documenting clinical information in the electronic or other health record, Independently interpreting results (not separately reported) and communicating results to the patient/family/caregiver, or Care coordination (not separately reported).         Each patient to whom he or she provides medical services by telemedicine is:  (1) informed of the relationship between the physician and patient and the respective role of any other health care provider with respect to management of the patient; and (2) notified that he or she may decline to receive medical services by telemedicine and may withdraw from such care at any time.    Notes:      Subjective:    Patient ID: Emerson Menendez is a 78 y.o. male.    Chief Complaint: No chief complaint on file.    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 Test results identify a stable IgA kappa protein with beta globulin band at 1.63g/dL. Kappa free light chain is elevated at 4.40. CBC and calcium are stable. Creatinine with history of stage III CKD is stable at 1.4. Metastatic survey is negative for lytic lesions. MRI of the entire spine demonstrated age related changes but no convincing evidence of myeloma bone disease. Bone marrow biopsy identified about 13% plasma cells by morphology and FISH for myeloma identified a t(11;14) and trisomies 3,7, and  17. The patient is afebrile and appears clinically well. He was seen by his podiatrist and nephrologist since our last visit without any new or acute events.    The patient has not received any therapy for smoldering myeloma including bisphosphonates or steroids. He has been randomized to observation arm of the the clinical study. 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 He has no current or prior history of malignancy.    TODAY Cycle 96 E3A06, observation cohort  CBC and CMP stable  SPEP and free light chains pending  Needs follow-up for DM2- endocrine, ophthalmology, and neprhology  Reports still having pain after knee replacement, recent ortho follow-up was normal. Still very active, climbing ladders and stairs  Low mood this time of year  Needs to improve hydration      Knee Pain     Joint Pain  Associated symptoms include coughing. Pertinent negatives include no abdominal pain, chest pain, diaphoresis, fatigue, fever, headaches or rash.   Hand Pain   Pertinent negatives include no chest pain.   Cough  Pertinent negatives include no chest pain, fever, headaches, rash or shortness of breath.   Foot Pain  Associated symptoms include coughing. Pertinent negatives include no abdominal pain, chest pain, diaphoresis, fatigue, fever, headaches or rash.   Arm Pain   Pertinent negatives include no chest pain.   Follow-up  Associated symptoms include coughing. Pertinent negatives include no abdominal pain, chest pain, diaphoresis, fatigue, fever, headaches or rash.     Review of Systems   Constitutional:  Negative for activity change, appetite change, diaphoresis, fatigue, fever and unexpected weight change.   HENT: Negative.  Negative for mouth sores.    Eyes: Negative.    Respiratory:  Positive for cough. Negative for shortness of breath.    Cardiovascular:  Negative for chest pain and leg swelling.    Gastrointestinal: Negative.  Negative for abdominal pain and diarrhea.   Endocrine: Negative.    Genitourinary: Negative.  Negative for frequency.   Musculoskeletal: Negative.  Negative for back pain.   Skin: Negative.  Negative for rash.   Allergic/Immunologic: Negative.    Neurological:  Negative for headaches.        Grade 0-1 peripheral neuropathy of bilateral feet.    Hematological:  Negative for adenopathy. Does not bruise/bleed easily.   Psychiatric/Behavioral: Negative.  The patient is not nervous/anxious.        Objective:       There were no vitals filed for this visit.      Physical Exam  Vitals and nursing note reviewed.   Constitutional:       Appearance: He is well-developed.   HENT:      Head: Normocephalic and atraumatic.      Right Ear: External ear normal.      Left Ear: External ear normal.      Nose: Nose normal.   Eyes:      Conjunctiva/sclera: Conjunctivae normal.      Pupils: Pupils are equal, round, and reactive to light.   Cardiovascular:      Rate and Rhythm: Normal rate and regular rhythm.      Heart sounds: No murmur heard.  Pulmonary:      Effort: Pulmonary effort is normal. No respiratory distress.      Breath sounds: Normal breath sounds.   Abdominal:      General: Bowel sounds are normal. There is no distension.      Palpations: Abdomen is soft.   Musculoskeletal:         General: No tenderness.      Cervical back: Normal range of motion and neck supple.   Skin:     General: Skin is warm and dry.      Findings: No rash.      Nails: There is no clubbing.   Neurological:      Mental Status: He is alert and oriented to person, place, and time.      Cranial Nerves: No cranial nerve deficit.   Psychiatric:         Behavior: Behavior normal.         Assessment:       No diagnosis found.        Plan:       The patient has a diagnosis of smoldering myeloma. There is no indication for immediate chemotherapy. We are monitoring renal function closely- baseline creatinine of around 1.5. 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 M protein has been stable and repeat is pending. Plan for return in 1 month.  Endocrinology and Nephrology to monitor diabetes and renal failure respectively. Patient would like to continue to follow with Dr. Perkins as needed.     Knee replacement surgery 6/12- still recovering well  Continue monthly observation  Repeating myeloma serology 12/22 stable with light chains and M protein pending  Plans to get in with PCP and additional sub-specialists after new year    A total of 20 minutes was spent in pre-visit chart review, personal interpretation of labs and imaging, and medication review. Total visit time 30 minutes, >50 % counseling.

## 2023-12-24 RX ORDER — FLASH GLUCOSE SENSOR
KIT MISCELLANEOUS
Qty: 6 KIT | Refills: 4 | OUTPATIENT
Start: 2023-12-24 | End: 2024-01-21 | Stop reason: SDUPTHER

## 2023-12-24 RX ORDER — FLASH GLUCOSE SCANNING READER
EACH MISCELLANEOUS
Qty: 6 EACH | Refills: 4 | OUTPATIENT
Start: 2023-12-24 | End: 2024-01-21 | Stop reason: SDUPTHER

## 2023-12-24 NOTE — TELEPHONE ENCOUNTER
Needs labs drawn for me. Please see when he is getting blood drawn again and link my labs to the blood drawn  NO labs scheduled yet. He usually gets monthly labs for heme onc

## 2024-01-01 ENCOUNTER — PATIENT MESSAGE (OUTPATIENT)
Dept: INTERNAL MEDICINE | Facility: CLINIC | Age: 79
End: 2024-01-01
Payer: MEDICARE

## 2024-01-01 DIAGNOSIS — Z79.4 TYPE 2 DIABETES MELLITUS WITH DIABETIC POLYNEUROPATHY, WITH LONG-TERM CURRENT USE OF INSULIN: Chronic | ICD-10-CM

## 2024-01-01 DIAGNOSIS — E11.42 TYPE 2 DIABETES MELLITUS WITH DIABETIC POLYNEUROPATHY, WITH LONG-TERM CURRENT USE OF INSULIN: Chronic | ICD-10-CM

## 2024-01-02 ENCOUNTER — PATIENT MESSAGE (OUTPATIENT)
Dept: INTERNAL MEDICINE | Facility: CLINIC | Age: 79
End: 2024-01-02
Payer: MEDICARE

## 2024-01-02 RX ORDER — INSULIN ASPART 100 [IU]/ML
4 INJECTION, SOLUTION INTRAVENOUS; SUBCUTANEOUS
Qty: 12 ML | Refills: 1 | Status: SHIPPED | OUTPATIENT
Start: 2024-01-02 | End: 2025-01-01

## 2024-01-02 NOTE — TELEPHONE ENCOUNTER
No care due was identified.  Health [facility] Embedded Care Due Messages. Reference number: [id number].   1/02/2024 8:25:55 AM CST

## 2024-01-08 ENCOUNTER — PATIENT MESSAGE (OUTPATIENT)
Dept: INTERNAL MEDICINE | Facility: CLINIC | Age: 79
End: 2024-01-08
Payer: MEDICARE

## 2024-01-08 DIAGNOSIS — E11.42 TYPE 2 DIABETES MELLITUS WITH DIABETIC POLYNEUROPATHY, WITH LONG-TERM CURRENT USE OF INSULIN: Chronic | ICD-10-CM

## 2024-01-08 DIAGNOSIS — Z79.4 TYPE 2 DIABETES MELLITUS WITH DIABETIC POLYNEUROPATHY, WITH LONG-TERM CURRENT USE OF INSULIN: Chronic | ICD-10-CM

## 2024-01-08 RX ORDER — INSULIN GLARGINE 100 [IU]/ML
16 INJECTION, SOLUTION SUBCUTANEOUS 2 TIMES DAILY
Qty: 30 ML | Refills: 2 | Status: SHIPPED | OUTPATIENT
Start: 2024-01-08

## 2024-01-08 RX ORDER — PEN NEEDLE, DIABETIC 30 GX3/16"
NEEDLE, DISPOSABLE MISCELLANEOUS
Qty: 100 EACH | Refills: 3 | Status: SHIPPED | OUTPATIENT
Start: 2024-01-08

## 2024-01-08 NOTE — TELEPHONE ENCOUNTER
No care due was identified.  Health [facility] Embedded Care Due Messages. Reference number: [id number].   1/08/2024 8:51:49 AM CST

## 2024-01-20 ENCOUNTER — PATIENT MESSAGE (OUTPATIENT)
Dept: HEMATOLOGY/ONCOLOGY | Facility: CLINIC | Age: 79
End: 2024-01-20
Payer: MEDICARE

## 2024-01-20 ENCOUNTER — PATIENT MESSAGE (OUTPATIENT)
Dept: INTERNAL MEDICINE | Facility: CLINIC | Age: 79
End: 2024-01-20
Payer: MEDICARE

## 2024-01-20 DIAGNOSIS — Z79.4 TYPE 2 DIABETES MELLITUS WITH OTHER CIRCULATORY COMPLICATION, WITH LONG-TERM CURRENT USE OF INSULIN: ICD-10-CM

## 2024-01-20 DIAGNOSIS — E11.59 TYPE 2 DIABETES MELLITUS WITH OTHER CIRCULATORY COMPLICATION, WITH LONG-TERM CURRENT USE OF INSULIN: ICD-10-CM

## 2024-01-21 RX ORDER — FLASH GLUCOSE SCANNING READER
EACH MISCELLANEOUS
Qty: 6 EACH | Refills: 4 | Status: SHIPPED | OUTPATIENT
Start: 2024-01-21 | End: 2024-01-25 | Stop reason: SDUPTHER

## 2024-01-21 RX ORDER — FLASH GLUCOSE SENSOR
KIT MISCELLANEOUS
Qty: 6 KIT | Refills: 4 | Status: SHIPPED | OUTPATIENT
Start: 2024-01-21 | End: 2024-01-25 | Stop reason: SDUPTHER

## 2024-01-22 DIAGNOSIS — D47.2 SMOLDERING MULTIPLE MYELOMA: ICD-10-CM

## 2024-01-22 RX ORDER — DIAZEPAM 5 MG/1
5 TABLET ORAL EVERY 12 HOURS PRN
Qty: 60 TABLET | Refills: 0 | Status: SHIPPED | OUTPATIENT
Start: 2024-01-22 | End: 2024-03-14 | Stop reason: SDUPTHER

## 2024-01-22 NOTE — TELEPHONE ENCOUNTER
No care due was identified.  Health [facility] Embedded Care Due Messages. Reference number: [id number].   1/22/2024 1:05:41 PM CST

## 2024-01-24 ENCOUNTER — TELEPHONE (OUTPATIENT)
Dept: ADMINISTRATIVE | Facility: CLINIC | Age: 79
End: 2024-01-24
Payer: MEDICARE

## 2024-01-25 ENCOUNTER — OFFICE VISIT (OUTPATIENT)
Dept: INTERNAL MEDICINE | Facility: CLINIC | Age: 79
End: 2024-01-25
Payer: MEDICARE

## 2024-01-25 ENCOUNTER — LAB VISIT (OUTPATIENT)
Dept: LAB | Facility: HOSPITAL | Age: 79
End: 2024-01-25
Attending: INTERNAL MEDICINE
Payer: MEDICARE

## 2024-01-25 VITALS
HEART RATE: 101 BPM | WEIGHT: 198.44 LBS | HEIGHT: 70 IN | DIASTOLIC BLOOD PRESSURE: 70 MMHG | BODY MASS INDEX: 28.41 KG/M2 | OXYGEN SATURATION: 97 % | SYSTOLIC BLOOD PRESSURE: 128 MMHG

## 2024-01-25 DIAGNOSIS — D47.2 SMOLDERING MULTIPLE MYELOMA: ICD-10-CM

## 2024-01-25 DIAGNOSIS — N52.9 ERECTILE DYSFUNCTION, UNSPECIFIED ERECTILE DYSFUNCTION TYPE: ICD-10-CM

## 2024-01-25 DIAGNOSIS — E78.49 OTHER HYPERLIPIDEMIA: ICD-10-CM

## 2024-01-25 DIAGNOSIS — F43.21 GRIEF: ICD-10-CM

## 2024-01-25 DIAGNOSIS — M47.812 OSTEOARTHRITIS OF CERVICAL SPINE, UNSPECIFIED SPINAL OSTEOARTHRITIS COMPLICATION STATUS: ICD-10-CM

## 2024-01-25 DIAGNOSIS — Z79.4 TYPE 2 DIABETES MELLITUS WITH DIABETIC POLYNEUROPATHY, WITH LONG-TERM CURRENT USE OF INSULIN: Chronic | ICD-10-CM

## 2024-01-25 DIAGNOSIS — K21.9 GASTROESOPHAGEAL REFLUX DISEASE WITHOUT ESOPHAGITIS: ICD-10-CM

## 2024-01-25 DIAGNOSIS — N18.32 STAGE 3B CHRONIC KIDNEY DISEASE: Chronic | ICD-10-CM

## 2024-01-25 DIAGNOSIS — F33.9 RECURRENT MAJOR DEPRESSIVE DISORDER, REMISSION STATUS UNSPECIFIED: ICD-10-CM

## 2024-01-25 DIAGNOSIS — E11.59 TYPE 2 DIABETES MELLITUS WITH OTHER CIRCULATORY COMPLICATION, WITH LONG-TERM CURRENT USE OF INSULIN: ICD-10-CM

## 2024-01-25 DIAGNOSIS — E11.22 CKD STAGE 3 DUE TO TYPE 2 DIABETES MELLITUS: ICD-10-CM

## 2024-01-25 DIAGNOSIS — N32.81 OAB (OVERACTIVE BLADDER): ICD-10-CM

## 2024-01-25 DIAGNOSIS — E11.42 TYPE 2 DIABETES MELLITUS WITH DIABETIC POLYNEUROPATHY, WITH LONG-TERM CURRENT USE OF INSULIN: Chronic | ICD-10-CM

## 2024-01-25 DIAGNOSIS — Z00.00 ENCOUNTER FOR PREVENTIVE HEALTH EXAMINATION: Primary | ICD-10-CM

## 2024-01-25 DIAGNOSIS — J30.2 SEASONAL ALLERGIES: ICD-10-CM

## 2024-01-25 DIAGNOSIS — E11.65 TYPE 2 DIABETES MELLITUS WITH HYPERGLYCEMIA, WITH LONG-TERM CURRENT USE OF INSULIN: Chronic | ICD-10-CM

## 2024-01-25 DIAGNOSIS — F41.9 ANXIETY: ICD-10-CM

## 2024-01-25 DIAGNOSIS — Z96.652 S/P TKR (TOTAL KNEE REPLACEMENT) USING CEMENT, LEFT: ICD-10-CM

## 2024-01-25 DIAGNOSIS — H91.90 HEARING LOSS, UNSPECIFIED HEARING LOSS TYPE, UNSPECIFIED LATERALITY: ICD-10-CM

## 2024-01-25 DIAGNOSIS — I10 ESSENTIAL HYPERTENSION: ICD-10-CM

## 2024-01-25 DIAGNOSIS — H35.033 HYPERTENSIVE RETINOPATHY OF BOTH EYES: ICD-10-CM

## 2024-01-25 DIAGNOSIS — E11.3292 MILD NONPROLIFERATIVE DIABETIC RETINOPATHY OF LEFT EYE WITHOUT MACULAR EDEMA ASSOCIATED WITH TYPE 2 DIABETES MELLITUS: ICD-10-CM

## 2024-01-25 DIAGNOSIS — H40.1132 PRIMARY OPEN ANGLE GLAUCOMA OF BOTH EYES, MODERATE STAGE: ICD-10-CM

## 2024-01-25 DIAGNOSIS — Z79.4 TYPE 2 DIABETES MELLITUS WITH OTHER CIRCULATORY COMPLICATION, WITH LONG-TERM CURRENT USE OF INSULIN: ICD-10-CM

## 2024-01-25 DIAGNOSIS — F43.23 ADJUSTMENT DISORDER WITH MIXED ANXIETY AND DEPRESSED MOOD: ICD-10-CM

## 2024-01-25 DIAGNOSIS — D80.4 SELECTIVE DEFICIENCY OF IMMUNOGLOBULIN M (IGM): ICD-10-CM

## 2024-01-25 DIAGNOSIS — E66.3 OVERWEIGHT (BMI 25.0-29.9): Chronic | ICD-10-CM

## 2024-01-25 DIAGNOSIS — N18.30 CKD STAGE 3 DUE TO TYPE 2 DIABETES MELLITUS: ICD-10-CM

## 2024-01-25 DIAGNOSIS — E08.3211 MILD NONPROLIFERATIVE DIABETIC RETINOPATHY OF RIGHT EYE WITH MACULAR EDEMA ASSOCIATED WITH DIABETES MELLITUS DUE TO UNDERLYING CONDITION: ICD-10-CM

## 2024-01-25 DIAGNOSIS — Z79.4 TYPE 2 DIABETES MELLITUS WITH HYPERGLYCEMIA, WITH LONG-TERM CURRENT USE OF INSULIN: Chronic | ICD-10-CM

## 2024-01-25 DIAGNOSIS — E55.9 VITAMIN D DEFICIENCY DISEASE: ICD-10-CM

## 2024-01-25 LAB
25(OH)D3+25(OH)D2 SERPL-MCNC: 29 NG/ML (ref 30–96)
CHOLEST SERPL-MCNC: 241 MG/DL (ref 120–199)
CHOLEST/HDLC SERPL: 10.5 {RATIO} (ref 2–5)
ESTIMATED AVG GLUCOSE: 183 MG/DL (ref 68–131)
HBA1C MFR BLD: 8 % (ref 4–5.6)
HDLC SERPL-MCNC: 23 MG/DL (ref 40–75)
HDLC SERPL: 9.5 % (ref 20–50)
LDLC SERPL CALC-MCNC: ABNORMAL MG/DL (ref 63–159)
NONHDLC SERPL-MCNC: 218 MG/DL
TRIGL SERPL-MCNC: 580 MG/DL (ref 30–150)
TSH SERPL DL<=0.005 MIU/L-ACNC: 1.32 UIU/ML (ref 0.4–4)

## 2024-01-25 PROCEDURE — 82306 VITAMIN D 25 HYDROXY: CPT | Mod: HCNC | Performed by: INTERNAL MEDICINE

## 2024-01-25 PROCEDURE — 83036 HEMOGLOBIN GLYCOSYLATED A1C: CPT | Mod: HCNC | Performed by: INTERNAL MEDICINE

## 2024-01-25 PROCEDURE — G0439 PPPS, SUBSEQ VISIT: HCPCS | Mod: HCNC,S$GLB,, | Performed by: PHYSICIAN ASSISTANT

## 2024-01-25 PROCEDURE — 3074F SYST BP LT 130 MM HG: CPT | Mod: HCNC,CPTII,S$GLB, | Performed by: PHYSICIAN ASSISTANT

## 2024-01-25 PROCEDURE — 1170F FXNL STATUS ASSESSED: CPT | Mod: HCNC,CPTII,S$GLB, | Performed by: PHYSICIAN ASSISTANT

## 2024-01-25 PROCEDURE — 80061 LIPID PANEL: CPT | Mod: HCNC | Performed by: INTERNAL MEDICINE

## 2024-01-25 PROCEDURE — 3288F FALL RISK ASSESSMENT DOCD: CPT | Mod: HCNC,CPTII,S$GLB, | Performed by: PHYSICIAN ASSISTANT

## 2024-01-25 PROCEDURE — 36415 COLL VENOUS BLD VENIPUNCTURE: CPT | Mod: HCNC | Performed by: INTERNAL MEDICINE

## 2024-01-25 PROCEDURE — 1159F MED LIST DOCD IN RCRD: CPT | Mod: HCNC,CPTII,S$GLB, | Performed by: PHYSICIAN ASSISTANT

## 2024-01-25 PROCEDURE — 1101F PT FALLS ASSESS-DOCD LE1/YR: CPT | Mod: HCNC,CPTII,S$GLB, | Performed by: PHYSICIAN ASSISTANT

## 2024-01-25 PROCEDURE — 99999 PR PBB SHADOW E&M-EST. PATIENT-LVL V: CPT | Mod: PBBFAC,HCNC,, | Performed by: PHYSICIAN ASSISTANT

## 2024-01-25 PROCEDURE — 84443 ASSAY THYROID STIM HORMONE: CPT | Mod: HCNC | Performed by: INTERNAL MEDICINE

## 2024-01-25 PROCEDURE — 3078F DIAST BP <80 MM HG: CPT | Mod: HCNC,CPTII,S$GLB, | Performed by: PHYSICIAN ASSISTANT

## 2024-01-25 RX ORDER — FLASH GLUCOSE SCANNING READER
EACH MISCELLANEOUS
Qty: 6 EACH | Refills: 4 | Status: SHIPPED | OUTPATIENT
Start: 2024-01-25 | End: 2024-01-25 | Stop reason: SDUPTHER

## 2024-01-25 RX ORDER — FLASH GLUCOSE SCANNING READER
EACH MISCELLANEOUS
Qty: 6 EACH | Refills: 4 | Status: SHIPPED | OUTPATIENT
Start: 2024-01-25

## 2024-01-25 RX ORDER — FLASH GLUCOSE SENSOR
KIT MISCELLANEOUS
Qty: 6 KIT | Refills: 4 | Status: SHIPPED | OUTPATIENT
Start: 2024-01-25 | End: 2024-01-25 | Stop reason: SDUPTHER

## 2024-01-25 RX ORDER — FLASH GLUCOSE SENSOR
KIT MISCELLANEOUS
Qty: 6 KIT | Refills: 4 | Status: SHIPPED | OUTPATIENT
Start: 2024-01-25 | End: 2024-04-24

## 2024-01-25 RX ORDER — SILDENAFIL 100 MG/1
100 TABLET, FILM COATED ORAL DAILY PRN
Qty: 30 TABLET | Refills: 1 | Status: SHIPPED | OUTPATIENT
Start: 2024-01-25 | End: 2025-01-24

## 2024-01-25 NOTE — PROGRESS NOTES
"Overdue to see PCP but f/u is scheduled.  He needs refill of freestyle henrry sent to pharmacy.  Due for lab work as well.  Defers any vaccines today.    Emerson Menendez presented for a  Medicare AWV and comprehensive Health Risk Assessment today. The following components were reviewed and updated:    Medical history  Family History  Social history  Allergies and Current Medications  Health Risk Assessment  Health Maintenance  Care Team     He deferred memory screening test today as feeling very anxious to leave due to bad weather.    ** See Completed Assessments for Annual Wellness Visit within the encounter summary.**         The following assessments were completed:  Living Situation  CAGE  Depression Screening  Timed Get Up and Go  Whisper Test  Cognitive Function Screening  Nutrition Screening  ADL Screening  PAQ Screening        Vitals:    01/25/24 0913 01/25/24 0932   BP: (!) 148/70 128/70   BP Location: Right arm    Patient Position: Sitting    BP Method: Large (Manual)    Pulse: 101    SpO2: 97%    Weight: 90 kg (198 lb 6.6 oz)    Height: 5' 10" (1.778 m)      Body mass index is 28.47 kg/m².  Physical Exam  Vitals and nursing note reviewed.   Constitutional:       Appearance: He is well-developed.   HENT:      Head: Normocephalic.      Right Ear: External ear normal.      Left Ear: External ear normal.   Eyes:      Pupils: Pupils are equal, round, and reactive to light.   Cardiovascular:      Rate and Rhythm: Normal rate and regular rhythm.      Heart sounds: Normal heart sounds. No murmur heard.     No friction rub. No gallop.   Pulmonary:      Effort: Pulmonary effort is normal. No respiratory distress.      Breath sounds: Normal breath sounds.   Abdominal:      Palpations: Abdomen is soft.      Tenderness: There is no abdominal tenderness.   Musculoskeletal:         General: No swelling.   Skin:     General: Skin is warm and dry.   Neurological:      General: No focal deficit present.      Mental Status: He " is alert.   Psychiatric:         Mood and Affect: Mood normal.               Diagnoses and health risks identified today and associated recommendations/orders:    1. Encounter for preventive health examination  Assessments completed.  Preventative health recommendations reviewed.     2. Primary open angle glaucoma of both eyes, moderate stage  - Ambulatory referral/consult to Ophthalmology; Future    3. Type 2 diabetes mellitus with other circulatory complication, with long-term current use of insulin  - flash glucose scanning reader (FREESTYLE CHRISTIANO 14 DAY READER) Misc; Use as directed  Dispense: 6 each; Refill: 4  - flash glucose sensor (FREESTYLE CHRISTIANO 14 DAY SENSOR) Kit; Use as directed  Dispense: 6 kit; Refill: 4    Due for lab work - ordered previously by PCP - will assist in scheduling    4. Hearing loss, unspecified hearing loss type, unspecified laterality  - Ambulatory referral/consult to ENT; Future  - Ambulatory referral/consult to Audiology; Future    5. Osteoarthritis of cervical spine, unspecified spinal osteoarthritis complication status  Stable, controlled on current medical therapy, followed by PCP.     6. Anxiety  Stable, controlled on current medical therapy, followed by PCP.     7. Recurrent major depressive disorder, remission status unspecified  Stable, controlled on current medical therapy, followed by PCP.     8. Adjustment disorder with mixed anxiety and depressed mood  Stable, controlled on current medical therapy, followed by PCP.     9. Grief  Continues to grieve the loss of his daughter, holidays are a hard time for him.  Good support from wife.    10. 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      11. Hypertensive retinopathy of both eyes  Stable, controlled on current medical therapy, followed by PCP,  due to see ophthalmology.      12. Mild nonproliferative diabetic retinopathy of left eye without macular edema associated with type 2 diabetes mellitus  Stable, controlled on current medical therapy, followed by PCP, due to see ophthalmology.      13. Seasonal allergies  Stable, controlled on current medical therapy, followed by PCP.     14. Essential hypertension  Stable, controlled on current medical therapy, followed by PCP.     15. Other hyperlipidemia  Stable, controlled on current medical therapy, followed by PCP.     16. Stage 3b chronic kidney disease  Has upcoming appointment with nephrology.    17. OAB (overactive bladder)  Stable, controlled on current medical therapy, followed by PCP.     18. CKD stage 3 due to type 2 diabetes mellitus  Has upcoming appointment with nephrology.      19. Selective deficiency of immunoglobulin m (igm)  Stable, controlled on current medical therapy, followed by PCP and hematology.    20. Smoldering multiple myeloma  Stable, controlled on current medical therapy, followed by PCP and hematology.    21. Type 2 diabetes mellitus with diabetic polyneuropathy, with long-term current use of insulin  Followed by digital diabetes, due for lab work.    22. Type 2 diabetes mellitus with hyperglycemia, with long-term current use of insulin  Followed by digital diabetes, due for lab work.    23. Overweight (BMI 25.0-29.9)  Stable, monitor.    24. Gastroesophageal reflux disease without esophagitis  Stable, controlled on current medical therapy, followed by PCP.     25. S/P TKR (total knee replacement) using cement, left  Has upcoming orthopedic appointment      Provided Emerson with a 5-10 year written screening schedule and personal prevention plan. Recommendations were developed using the USPSTF age appropriate recommendations. Education, counseling, and referrals were provided as needed. After Visit Summary printed and given to patient which includes a list of additional  screenings\tests needed.    Follow up if symptoms worsen or fail to improve.    Shanika Diaz PA-C  I offered to discuss advanced care planning, including how to pick a person who would make decisions for you if you were unable to make them for yourself, called a health care power of , and what kind of decisions you might make such as use of life sustaining treatments such as ventilators and tube feeding when faced with a life limiting illness recorded on a living will that they will need to know. (How you want to be cared for as you near the end of your natural life)     X Patient is interested in learning more about how to make advanced directives.  I provided them paperwork and offered to discuss this with them.

## 2024-01-25 NOTE — TELEPHONE ENCOUNTER
----- Message from Aliyah Pascal sent at 1/25/2024 10:48 AM CST -----  Contact: [phone]  Pt is checking on the status of the Freestlye Leah prescription. Per pt, when he went to the Pharmacy he was told that the Dr had pulled the prescription back. Please call with an update.    Pt is using   [facility] on [address] and [address].               Thank you

## 2024-01-25 NOTE — TELEPHONE ENCOUNTER
Medication that was sent to [facility] freestyle henrry needs to go to [facility] on [address] not [facility]

## 2024-01-25 NOTE — TELEPHONE ENCOUNTER
No care due was identified.  Health [facility] Embedded Care Due Messages. Reference number: [id number].   1/25/2024 10:05:41 AM CST

## 2024-01-25 NOTE — TELEPHONE ENCOUNTER
No care due was identified.  Health [facility] Embedded Care Due Messages. Reference number: [id number].   1/25/2024 11:59:34 AM CST

## 2024-01-25 NOTE — PATIENT INSTRUCTIONS
Counseling and Referral of Other Preventative  (Italic type indicates deductible and co-insurance are waived)    Patient Name: Emerson Menendez  Today's Date: 1/25/2024    Health Maintenance       Date Due Completion Date    RSV Vaccine (Age 60+ and Pregnant patients) (1 - 1-dose 60+ series) Never done ---    Influenza Vaccine (1) 09/01/2023 9/20/2022    Eye Exam 10/27/2023 10/27/2022    TETANUS VACCINE 12/17/2023 12/17/2013    Hemoglobin A1c 01/03/2024 7/3/2023    Diabetes Urine Screening 02/16/2024 2/16/2023    Lipid Panel 02/16/2024 2/16/2023        No orders of the defined types were placed in this encounter.      The following information is provided to all patients.  This information is to help you find resources for any of the problems found today that may be affecting your health:                  Living healthy guide: www.[facility].[place]      Understanding Diabetes: www.diabetes.org      Eating healthy: www.cdc.gov/healthyweight      [facility] home safety checklist: www.cdc.gov/steadi/patient.html      Agency on Aging: www.goea.[place]      Alcoholics anonymous (AA): www.aa.org      Physical Activity: www.otis.nih.gov/ts5pyqg      Tobacco use: www.quitwithusla.org

## 2024-01-26 DIAGNOSIS — H40.1132 PRIMARY OPEN ANGLE GLAUCOMA OF BOTH EYES, MODERATE STAGE: Primary | ICD-10-CM

## 2024-01-29 RX ORDER — DORZOLAMIDE HYDROCHLORIDE AND TIMOLOL MALEATE 20; 5 MG/ML; MG/ML
1 SOLUTION/ DROPS OPHTHALMIC 2 TIMES DAILY
Qty: 10 ML | Refills: 11 | Status: SHIPPED | OUTPATIENT
Start: 2024-01-29

## 2024-01-30 ENCOUNTER — OFFICE VISIT (OUTPATIENT)
Dept: HEMATOLOGY/ONCOLOGY | Facility: CLINIC | Age: 79
End: 2024-01-30
Payer: MEDICARE

## 2024-01-30 ENCOUNTER — LAB VISIT (OUTPATIENT)
Dept: LAB | Facility: HOSPITAL | Age: 79
End: 2024-01-30
Attending: INTERNAL MEDICINE
Payer: MEDICARE

## 2024-01-30 VITALS — BODY MASS INDEX: 29.35 KG/M2 | WEIGHT: 205 LBS | HEIGHT: 70 IN

## 2024-01-30 DIAGNOSIS — D47.2 SMOLDERING MULTIPLE MYELOMA: Primary | ICD-10-CM

## 2024-01-30 DIAGNOSIS — E11.42 TYPE 2 DIABETES MELLITUS WITH DIABETIC POLYNEUROPATHY, WITH LONG-TERM CURRENT USE OF INSULIN: ICD-10-CM

## 2024-01-30 DIAGNOSIS — Z00.6 RESEARCH EXAM: ICD-10-CM

## 2024-01-30 DIAGNOSIS — Z79.4 TYPE 2 DIABETES MELLITUS WITH DIABETIC POLYNEUROPATHY, WITH LONG-TERM CURRENT USE OF INSULIN: ICD-10-CM

## 2024-01-30 DIAGNOSIS — E11.22 CKD STAGE 3 DUE TO TYPE 2 DIABETES MELLITUS: ICD-10-CM

## 2024-01-30 DIAGNOSIS — N18.30 CKD STAGE 3 DUE TO TYPE 2 DIABETES MELLITUS: ICD-10-CM

## 2024-01-30 DIAGNOSIS — D47.2 SMOLDERING MULTIPLE MYELOMA: ICD-10-CM

## 2024-01-30 LAB
ALBUMIN SERPL BCP-MCNC: 3.4 G/DL (ref 3.5–5.2)
ALP SERPL-CCNC: 56 U/L (ref 55–135)
ALT SERPL W/O P-5'-P-CCNC: 20 U/L (ref 10–44)
ANION GAP SERPL CALC-SCNC: 9 MMOL/L (ref 8–16)
AST SERPL-CCNC: 18 U/L (ref 10–40)
BASOPHILS # BLD AUTO: 0.04 K/UL (ref 0–0.2)
BASOPHILS NFR BLD: 0.6 % (ref 0–1.9)
BILIRUB SERPL-MCNC: 0.7 MG/DL (ref 0.1–1)
BUN SERPL-MCNC: 17 MG/DL (ref 8–23)
CALCIUM SERPL-MCNC: 9.5 MG/DL (ref 8.7–10.5)
CHLORIDE SERPL-SCNC: 102 MMOL/L (ref 95–110)
CO2 SERPL-SCNC: 23 MMOL/L (ref 23–29)
CREAT SERPL-MCNC: 1.8 MG/DL (ref 0.5–1.4)
DIFFERENTIAL METHOD BLD: ABNORMAL
EOSINOPHIL # BLD AUTO: 0.3 K/UL (ref 0–0.5)
EOSINOPHIL NFR BLD: 3.8 % (ref 0–8)
ERYTHROCYTE [DISTWIDTH] IN BLOOD BY AUTOMATED COUNT: 14.6 % (ref 11.5–14.5)
EST. GFR  (NO RACE VARIABLE): 38.3 ML/MIN/1.73 M^2
GLUCOSE SERPL-MCNC: 150 MG/DL (ref 70–110)
HCT VFR BLD AUTO: 33.7 % (ref 40–54)
HGB BLD-MCNC: 10.8 G/DL (ref 14–18)
IGA SERPL-MCNC: 1711 MG/DL (ref 40–350)
IGG SERPL-MCNC: 909 MG/DL (ref 650–1600)
IGM SERPL-MCNC: 39 MG/DL (ref 50–300)
IMM GRANULOCYTES # BLD AUTO: 0.01 K/UL (ref 0–0.04)
IMM GRANULOCYTES NFR BLD AUTO: 0.2 % (ref 0–0.5)
LDH SERPL L TO P-CCNC: 130 U/L (ref 110–260)
LYMPHOCYTES # BLD AUTO: 1.7 K/UL (ref 1–4.8)
LYMPHOCYTES NFR BLD: 25.2 % (ref 18–48)
MAGNESIUM SERPL-MCNC: 1.5 MG/DL (ref 1.6–2.6)
MCH RBC QN AUTO: 28 PG (ref 27–31)
MCHC RBC AUTO-ENTMCNC: 32 G/DL (ref 32–36)
MCV RBC AUTO: 87 FL (ref 82–98)
MONOCYTES # BLD AUTO: 0.4 K/UL (ref 0.3–1)
MONOCYTES NFR BLD: 6.4 % (ref 4–15)
NEUTROPHILS # BLD AUTO: 4.2 K/UL (ref 1.8–7.7)
NEUTROPHILS NFR BLD: 63.8 % (ref 38–73)
NRBC BLD-RTO: 0 /100 WBC
PHOSPHATE SERPL-MCNC: 3.2 MG/DL (ref 2.7–4.5)
PLATELET # BLD AUTO: 263 K/UL (ref 150–450)
PMV BLD AUTO: 8.6 FL (ref 9.2–12.9)
POTASSIUM SERPL-SCNC: 4.5 MMOL/L (ref 3.5–5.1)
PROT SERPL-MCNC: 8.2 G/DL (ref 6–8.4)
RBC # BLD AUTO: 3.86 M/UL (ref 4.6–6.2)
SODIUM SERPL-SCNC: 134 MMOL/L (ref 136–145)
WBC # BLD AUTO: 6.6 K/UL (ref 3.9–12.7)

## 2024-01-30 PROCEDURE — 83735 ASSAY OF MAGNESIUM: CPT | Mod: HCNC | Performed by: INTERNAL MEDICINE

## 2024-01-30 PROCEDURE — 83615 LACTATE (LD) (LDH) ENZYME: CPT | Mod: HCNC,Q1 | Performed by: INTERNAL MEDICINE

## 2024-01-30 PROCEDURE — 84165 PROTEIN E-PHORESIS SERUM: CPT | Mod: HCNC | Performed by: INTERNAL MEDICINE

## 2024-01-30 PROCEDURE — 36415 COLL VENOUS BLD VENIPUNCTURE: CPT | Mod: HCNC | Performed by: INTERNAL MEDICINE

## 2024-01-30 PROCEDURE — 84100 ASSAY OF PHOSPHORUS: CPT | Mod: HCNC | Performed by: INTERNAL MEDICINE

## 2024-01-30 PROCEDURE — 82784 ASSAY IGA/IGD/IGG/IGM EACH: CPT | Mod: 59,HCNC | Performed by: INTERNAL MEDICINE

## 2024-01-30 PROCEDURE — 86334 IMMUNOFIX E-PHORESIS SERUM: CPT | Mod: 26,Q1,HCNC, | Performed by: PATHOLOGY

## 2024-01-30 PROCEDURE — 84165 PROTEIN E-PHORESIS SERUM: CPT | Mod: 26,Q1,HCNC, | Performed by: PATHOLOGY

## 2024-01-30 PROCEDURE — 86334 IMMUNOFIX E-PHORESIS SERUM: CPT | Mod: HCNC,Q1 | Performed by: INTERNAL MEDICINE

## 2024-01-30 PROCEDURE — 99999 PR PBB SHADOW E&M-EST. PATIENT-LVL V: CPT | Mod: PBBFAC,HCNC,, | Performed by: INTERNAL MEDICINE

## 2024-01-30 PROCEDURE — 85025 COMPLETE CBC W/AUTO DIFF WBC: CPT | Mod: HCNC | Performed by: INTERNAL MEDICINE

## 2024-01-30 PROCEDURE — 99214 OFFICE O/P EST MOD 30 MIN: CPT | Mod: Q1,HCNC,S$GLB, | Performed by: INTERNAL MEDICINE

## 2024-01-30 PROCEDURE — 80053 COMPREHEN METABOLIC PANEL: CPT | Mod: HCNC | Performed by: INTERNAL MEDICINE

## 2024-01-30 PROCEDURE — 83521 IG LIGHT CHAINS FREE EACH: CPT | Mod: 59,HCNC,Q1 | Performed by: INTERNAL MEDICINE

## 2024-01-30 NOTE — PROGRESS NOTES
Subjective:    Patient ID: Emerson Menendez is a 78 y.o. male.    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 Test results identify a stable IgA kappa protein with beta globulin band at 1.63g/dL. Kappa free light chain is elevated at 4.40. CBC and calcium are stable. Creatinine with history of stage III CKD is stable at 1.4. Metastatic survey is negative for lytic lesions. MRI of the entire spine demonstrated age related changes but no convincing evidence of myeloma bone disease. Bone marrow biopsy identified about 13% plasma cells by morphology and FISH for myeloma identified a t(11;14) and trisomies 3,7, and 17. The patient is afebrile and appears clinically well. He was seen by his podiatrist and nephrologist since our last visit without any new or acute events.    The patient has not received any therapy for smoldering myeloma including bisphosphonates or steroids. He has been randomized to observation arm of the the clinical study. 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 He has no current or prior history of malignancy.    TODAY Cycle 97 E3A06, observation cohort  CBC and CMP pending  SPEP and free light chains pending  Seeing ENT for hearing and has eye appt coming up  Reports elevated A1C- getting set up at [facility] today to send glucose readings to endocrine  Left knee still has pain at times after surgery, may need steroid injection in right for compensating      Knee Pain     Joint Pain  Associated symptoms include coughing. Pertinent negatives include no abdominal pain, chest pain,  diaphoresis, fatigue, fever, headaches or rash.   Hand Pain   Pertinent negatives include no chest pain.   Cough  Pertinent negatives include no chest pain, fever, headaches, rash or shortness of breath.   Foot Pain  Associated symptoms include coughing. Pertinent negatives include no abdominal pain, chest pain, diaphoresis, fatigue, fever, headaches or rash.   Arm Pain   Pertinent negatives include no chest pain.   Follow-up  Associated symptoms include coughing. Pertinent negatives include no abdominal pain, chest pain, diaphoresis, fatigue, fever, headaches or rash.     Review of Systems   Constitutional:  Negative for activity change, appetite change, diaphoresis, fatigue, fever and unexpected weight change.   HENT: Negative.  Negative for mouth sores.    Eyes: Negative.    Respiratory:  Positive for cough. Negative for shortness of breath.    Cardiovascular:  Negative for chest pain and leg swelling.   Gastrointestinal: Negative.  Negative for abdominal pain and diarrhea.   Endocrine: Negative.    Genitourinary: Negative.  Negative for frequency.   Musculoskeletal: Negative.  Negative for back pain.   Skin: Negative.  Negative for rash.   Allergic/Immunologic: Negative.    Neurological:  Negative for headaches.        Grade 0-1 peripheral neuropathy of bilateral feet.    Hematological:  Negative for adenopathy. Does not bruise/bleed easily.   Psychiatric/Behavioral: Negative.  The patient is not nervous/anxious.        Objective:       Vitals:    01/30/24 1329   BP: (!) (P) 143/68   Pulse: (P) 83   Resp: (P) 16   Temp: (P) 98.1 °F (36.7 °C)         Physical Exam  Vitals and nursing note reviewed.   Constitutional:       Appearance: He is well-developed.   HENT:      Head: Normocephalic and atraumatic.      Right Ear: External ear normal.      Left Ear: External ear normal.      Nose: Nose normal.   Eyes:      Conjunctiva/sclera: Conjunctivae normal.      Pupils: Pupils are equal, round, and reactive to light.    Cardiovascular:      Rate and Rhythm: Normal rate and regular rhythm.      Heart sounds: No murmur heard.  Pulmonary:      Effort: Pulmonary effort is normal. No respiratory distress.      Breath sounds: Normal breath sounds.   Abdominal:      General: Bowel sounds are normal. There is no distension.      Palpations: Abdomen is soft.   Musculoskeletal:         General: No tenderness.      Cervical back: Normal range of motion and neck supple.   Skin:     General: Skin is warm and dry.      Findings: No rash.      Nails: There is no clubbing.   Neurological:      Mental Status: He is alert and oriented to person, place, and time.      Cranial Nerves: No cranial nerve deficit.   Psychiatric:         Behavior: Behavior normal.         Assessment:       No diagnosis found.        Plan:       The patient has a diagnosis of smoldering myeloma. There is no indication for immediate chemotherapy. We are monitoring renal function closely- baseline creatinine of around 1.5. 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 M protein has been stable and repeat is pending. Plan for return in 1 month.  Endocrinology and Nephrology to monitor diabetes and renal failure respectively. Patient would like to continue to follow with Dr. Perkins as needed.     Knee replacement surgery 6/12- still recovering well, may be having some right knee pain compensating for the left  Elevated A1C- working on diet and exercise to improve.  Continue monthly observation  Repeating myeloma serology 1/30/24 pending      A total of 20 minutes was spent in pre-visit chart review, personal interpretation of labs and imaging, and medication review. Total visit time 30 minutes, >50 % counseling.

## 2024-01-31 ENCOUNTER — TELEPHONE (OUTPATIENT)
Dept: INTERNAL MEDICINE | Facility: CLINIC | Age: 79
End: 2024-01-31
Payer: MEDICARE

## 2024-01-31 LAB
ALBUMIN SERPL ELPH-MCNC: 3.76 G/DL (ref 3.35–5.55)
ALPHA1 GLOB SERPL ELPH-MCNC: 0.29 G/DL (ref 0.17–0.41)
ALPHA2 GLOB SERPL ELPH-MCNC: 0.85 G/DL (ref 0.43–0.99)
B-GLOBULIN SERPL ELPH-MCNC: 1.04 G/DL (ref 0.5–1.1)
GAMMA GLOB SERPL ELPH-MCNC: 2.06 G/DL (ref 0.67–1.58)
INTERPRETATION SERPL IFE-IMP: NORMAL
KAPPA LC SER QL IA: 7.54 MG/DL (ref 0.33–1.94)
KAPPA LC/LAMBDA SER IA: 3.38 (ref 0.26–1.65)
LAMBDA LC SER QL IA: 2.23 MG/DL (ref 0.57–2.63)
PATHOLOGIST INTERPRETATION IFE: NORMAL
PATHOLOGIST INTERPRETATION SPE: NORMAL
PROT SERPL-MCNC: 8 G/DL (ref 6–8.4)

## 2024-01-31 NOTE — TELEPHONE ENCOUNTER
Pt stated you called him in Free style henrry kit. He stated when he puts the reader on his arm it says not compatible. Please advise.

## 2024-01-31 NOTE — TELEPHONE ENCOUNTER
----- Message from Zina Miguel sent at 1/30/2024  3:44 PM CST -----  Regarding: Sensors  Pt stated that the sensors that were ordered are the wrong ones. Please assist

## 2024-02-01 ENCOUNTER — CLINICAL SUPPORT (OUTPATIENT)
Dept: OTOLARYNGOLOGY | Facility: CLINIC | Age: 79
End: 2024-02-01
Payer: MEDICARE

## 2024-02-01 ENCOUNTER — OFFICE VISIT (OUTPATIENT)
Dept: OTOLARYNGOLOGY | Facility: CLINIC | Age: 79
End: 2024-02-01
Payer: MEDICARE

## 2024-02-01 VITALS
DIASTOLIC BLOOD PRESSURE: 87 MMHG | HEART RATE: 100 BPM | BODY MASS INDEX: 29.4 KG/M2 | SYSTOLIC BLOOD PRESSURE: 151 MMHG | WEIGHT: 204.94 LBS

## 2024-02-01 DIAGNOSIS — H90.3 SENSORINEURAL HEARING LOSS (SNHL) OF BOTH EARS: Primary | ICD-10-CM

## 2024-02-01 DIAGNOSIS — H60.63 CHRONIC OTITIS EXTERNA OF BOTH EARS, UNSPECIFIED TYPE: ICD-10-CM

## 2024-02-01 DIAGNOSIS — H61.23 BILATERAL IMPACTED CERUMEN: ICD-10-CM

## 2024-02-01 PROCEDURE — 69210 REMOVE IMPACTED EAR WAX UNI: CPT | Mod: HCNC,S$GLB,, | Performed by: OTOLARYNGOLOGY

## 2024-02-01 PROCEDURE — 1159F MED LIST DOCD IN RCRD: CPT | Mod: HCNC,CPTII,S$GLB, | Performed by: OTOLARYNGOLOGY

## 2024-02-01 PROCEDURE — 92567 TYMPANOMETRY: CPT | Mod: HCNC,S$GLB,, | Performed by: PHYSICIAN ASSISTANT

## 2024-02-01 PROCEDURE — 1101F PT FALLS ASSESS-DOCD LE1/YR: CPT | Mod: HCNC,CPTII,S$GLB, | Performed by: OTOLARYNGOLOGY

## 2024-02-01 PROCEDURE — 3288F FALL RISK ASSESSMENT DOCD: CPT | Mod: HCNC,CPTII,S$GLB, | Performed by: OTOLARYNGOLOGY

## 2024-02-01 PROCEDURE — 1126F AMNT PAIN NOTED NONE PRSNT: CPT | Mod: HCNC,CPTII,S$GLB, | Performed by: OTOLARYNGOLOGY

## 2024-02-01 PROCEDURE — 3079F DIAST BP 80-89 MM HG: CPT | Mod: HCNC,CPTII,S$GLB, | Performed by: OTOLARYNGOLOGY

## 2024-02-01 PROCEDURE — 99214 OFFICE O/P EST MOD 30 MIN: CPT | Mod: 25,HCNC,S$GLB, | Performed by: OTOLARYNGOLOGY

## 2024-02-01 PROCEDURE — 99999 PR PBB SHADOW E&M-EST. PATIENT-LVL V: CPT | Mod: PBBFAC,HCNC,, | Performed by: OTOLARYNGOLOGY

## 2024-02-01 PROCEDURE — 92557 COMPREHENSIVE HEARING TEST: CPT | Mod: HCNC,S$GLB,, | Performed by: PHYSICIAN ASSISTANT

## 2024-02-01 PROCEDURE — 3077F SYST BP >= 140 MM HG: CPT | Mod: HCNC,CPTII,S$GLB, | Performed by: OTOLARYNGOLOGY

## 2024-02-01 RX ORDER — FLUOCINOLONE ACETONIDE 0.11 MG/ML
3 OIL AURICULAR (OTIC) 2 TIMES DAILY
Qty: 20 ML | Refills: 3 | Status: SHIPPED | OUTPATIENT
Start: 2024-02-01

## 2024-02-01 NOTE — PROGRESS NOTES
Emerson Menendez, a 78 y.o. male, was seen today in the clinic for an audiologic evaluation.  Patients main complaint was hearing loss.  Mr. 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    Audiogram results revealed a mild to severe bilateral sensorineural hearing loss from 250-8k Hz.  Speech reception thresholds were noted at 25 dB in the right ear and 30 dB in the left ear.  Speech discrimination scores were 92% in the right ear and 92% in the left ear.  Tympanometry revealed Type As in the right ear and Type As in the left ear. The results of the audiogram were reviewed with the patient and the benefits of amplification were discussed.     Recommendations:  Otologic evaluation  Annual audiogram  Hearing protection when in noise  Hearing aid consultation

## 2024-02-01 NOTE — PROGRESS NOTES
Chief Complaint   Patient presents with    Hearing Loss     Both ears       HPI:  Patient is a 78 y.o. male who has previously seen Dr. Pandya for CI and hearing loss.  He is here for annual audiogram.     No tinnitus.   Also gets CI frequently. Itchy ears frequently.    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       Since the last visit, the patient reports he feels his hearing has worsened somewhat as his wife has been complaining that he turns the TV too loud.    Active Ambulatory Problems     Diagnosis Date Noted    Essential hypertension 09/24/2012    Other hyperlipidemia 09/24/2012    GERD (gastroesophageal reflux disease) 09/24/2012    Osteoarthritis of cervical spine 09/24/2012    Osteoarthritis of knee 09/24/2012    Hypertrophy of breast 03/12/2012    Unspecified hyperplasia of prostate without urinary obstruction and other lower urinary tract symptoms (LUTS) 02/15/2012    ED (erectile dysfunction) of organic origin 09/13/2013    Retrograde ejaculation 09/13/2013    BPH with urinary obstruction 11/13/2013    Type 2 diabetes mellitus with diabetic polyneuropathy, with long-term current use of insulin 12/09/2013    Open nondisplaced fracture of distal phalanx of right thumb - s/p I&D, closure and splinting 12/17/13 12/17/2013    Laceration of thumb, right 12/31/2013    Cervical spondylosis without myelopathy 05/19/2014    Anxiety 03/16/2015    Recurrent major depressive disorder 03/16/2015    Chronic kidney disease, stage III (moderate) 12/05/2015    Anemia 12/05/2015    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    Primary open angle glaucoma of both eyes, moderate stage 09/07/2016    Disorder of ejaculation 01/09/2017    OAB (overactive bladder) 02/02/2017    Left leg weakness 02/10/2017    Type 2 diabetes mellitus with hyperglycemia, with long-term current use of insulin 05/26/2017    Overweight (BMI 25.0-29.9) 05/26/2017    Selective deficiency of immunoglobulin m (igm)     Foot pain, right  10/06/2017    CKD stage 3 due to type 2 diabetes mellitus 01/15/2018    Nocturia 08/07/2018    Anatomical narrow angle 09/13/2018    Boil, axilla 10/16/2018    Adjustment disorder with mixed anxiety and depressed mood 03/15/2020    Grief 04/22/2020    Vitamin D deficiency 06/25/2020    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    Carpal tunnel syndrome 01/06/2021    Mild nonproliferative diabetic retinopathy of right eye with macular edema associated with diabetes mellitus due to underlying condition 10/11/2021    Hypertensive retinopathy of both eyes 10/19/2021    Mild nonproliferative diabetic retinopathy of left eye without macular edema associated with type 2 diabetes mellitus 10/19/2021    Vitreous degeneration of both eyes 10/19/2021    Decreased ROM of neck 11/30/2021    Poor posture 11/30/2021    Decreased functional activity tolerance 11/30/2021    Weakness of trunk musculature 11/30/2021    Dry eye syndrome of both eyes 05/31/2022    Pseudophakia of both eyes 09/15/2022    Posterior capsular opacification of right eye, obscuring vision 10/11/2022    Chronic knee pain 01/04/2023    Multiple myeloma not having achieved remission 02/15/2023    Aortic atherosclerosis 02/15/2023    Seasonal allergies 05/09/2023    S/P TURP 06/08/2023    S/P TKR (total knee replacement) using cement, left 11/14/2023     Resolved Ambulatory Problems     Diagnosis Date Noted    Diabetic peripheral neuropathy associated with type 2 diabetes mellitus 09/24/2012    Microalbuminuria due to type 2 diabetes mellitus 09/24/2012    BPH (benign prostatic hypertrophy) 09/24/2012    Lump or mass in breast 03/12/2012    Mastodynia 03/12/2012    Nuclear sclerosis - Left Eye 09/05/2013    Fracture of thumb, right open 12/31/2013    Degeneration of cervical intervertebral disc 05/19/2014    Cervicalgia 05/19/2014    Pain of both hip joints 07/26/2018    Carpal tunnel syndrome on left 08/03/2020    MALINI (obstructive sleep apnea)     Acute pain of  left wrist 04/08/2021    Severe obesity (BMI 35.0-39.9) with comorbidity 09/20/2022    Chronic cough 10/03/2022    THIEN (acute kidney injury) 06/13/2023     Past Medical History:   Diagnosis Date    Asymptomatic multiple myeloma     BPH (benign prostatic hypertrophy) 09/24/2012    Depression 03/16/2015    Diabetic retinopathy     Glaucoma     Hx of psychiatric care     Hyperlipidemia     Hypertension     Microalbuminuria 09/24/2012    Posterior capsular opacification, right 09/15/2022    Psychiatric problem     Type II or unspecified type diabetes mellitus without mention of complication, not stated as uncontrolled 09/24/2012       Review of Systems  General: negative for chills, fever or weight loss  Psychological: negative for mood changes or depression  Ophthalmic: negative for blurry vision, photophobia or eye pain  ENT: see HPI  Respiratory: no cough, shortness of breath, or wheezing  Cardiovascular: no chest pain or dyspnea on exertion  Gastrointestinal: no abdominal pain, change in bowel habits, or black/ bloody stools  Musculoskeletal: negative for gait disturbance or muscular weakness  Neurological: no syncope or seizures; no ataxia  Dermatological: negative for pruritis, rash and jaundice  Hematologic/lymphatic: no easy bruising, no new adenopathy    Physical Exam     Vitals:    02/01/24 0944   BP: (!) 151/87   Pulse: 100         Constitutional:   He is oriented to person, place, and time. Vital signs are normal. He appears well-developed and well-nourished. He appears alert. He is cooperative.  Non-toxic appearance. Normal speech.      Head:  Normocephalic and atraumatic. Salivary glands normal.  Facial strength is normal.      Ears:    Right Ear: Tympanic membrane is not perforated, not erythematous and not retracted. No middle ear effusion.   Left Ear: Tympanic membrane is not perforated, not erythematous and not retracted.  No middle ear effusion.   Cerumen impaction bilaterally, small ear canals  bilaterally.    Nose:  Mucosal edema and septal deviation present. No rhinorrhea, nasal septal hematoma or polyps. No epistaxis. No turbinate masses and no turbinate hypertrophy (2+ size).  Right sinus exhibits no maxillary sinus tenderness and no frontal sinus tenderness. Left sinus exhibits no maxillary sinus tenderness and no frontal sinus tenderness.     Mouth/Throat  Oropharynx clear and moist without lesions or asymmetry, normal uvula midline, lips, teeth, and gums normal and oropharynx normal. Normal dentition. No uvula swelling or oral lesions. No oropharyngeal exudate, posterior oropharyngeal edema or posterior oropharyngeal erythema. Mirror exam not performed due to patient tolerance.  Mirror exam not performed due to patient tolerance.      Neck:  Neck normal without thyromegaly masses, asymmetry, normal tracheal structure, crepitus, and tenderness, thyroid normal, trachea normal, full range of motion with neck supple and no adenopathy. No JVD present. Carotid bruit is not present. Thyroid tenderness is present. No edema and no erythema present. No thyroid mass and no thyromegaly present.     He has no cervical adenopathy.     Cardiovascular:    Normal rate, regular rhythm, normal heart sounds and rate and rhythm, heart sounds, and pulses normal.              Pulmonary/Chest:   Effort and breath sounds normal.     Psychiatric:   He has a normal mood and affect. His speech is normal and behavior is normal.     Neurological:   He is alert and oriented to person, place, and time. He has neurological normal, alert and oriented. No cranial nerve deficit.     Skin:   No abrasions, lacerations, lesions, or rashes.         Ear Cerumen Removal    Date/Time: 2/1/2024 9:40 AM    Performed by: Michaela Gary MD  Authorized by: Michaela Gary MD    Consent Done?:  Yes (Verbal)    Local anesthetic:  None  Location details:  Both ears  Procedure type comment:  Suction  Cerumen  Removal Results:  Cerumen completely  removed  Patient tolerance:  Patient tolerated the procedure well with no immediate complications     Small ear canals bilaterally.  Dry skin and skin irritation bilateral ear canals, consistent with chronic otitis externa.        Audiogram: Interpreted by me and reviewed with the patient today.  Bilateral SNHL, mild sloping to severe in the high frequencies, tymps type A bilateral.       Assessment/Plan:      ICD-10-CM ICD-9-CM    1. Sensorineural hearing loss (SNHL) of both ears  H90.3 389.18 Ambulatory referral/consult to ENT      2. Bilateral impacted cerumen  H61.23 380.4       3. Chronic otitis externa of both ears, unspecified type  H60.63 380.23           I would recommend the use of an over the counter moisturizing drop such as baby oil, mineral oil, Vitamin E oil, etc on a weekly basis.    You should avoid Qtip use in the ears.    If the ear feels wet, use a hairdryer on a cool setting to dry the ears.    Fluocinolone Oil (DermOtic) drops may have been prescribed and can be used daily or weekly as needed for itching.      We reviewed the patient's recent audiogram and hearing loss in detail.  We also discussed that he is a excellent candidate for hearing aids, if and when he the patient is motivated.  He was given handouts with information and pricing of hearing aids, and will contact audiology when ready to proceed.  We also discussed the use hearing protection when exposed to loud noise, including lawn equipment.        Michaela Gary MD  [facility]

## 2024-02-01 NOTE — PATIENT INSTRUCTIONS
I would recommend the use of an over the counter moisturizing drop such as baby oil, mineral oil, Vitamin E oil, etc on a weekly basis.    You should avoid Qtip use in the ears.    If the ear feels wet, use a hairdryer on a cool setting to dry the ears.    Fluocinolone Oil (DermOtic) drops may have been prescribed and can be used daily or weekly as needed for itching.       We reviewed the patient's recent audiogram and hearing loss in detail.  We also discussed that he is a excellent candidate for hearing aids, if and when he the patient is motivated.  He was given handouts with information and pricing of hearing aids, and will contact audiology when ready to proceed.  We also discussed the use hearing protection when exposed to loud noise, including lawn equipment.

## 2024-02-01 NOTE — TELEPHONE ENCOUNTER
Pt picked up his Rx, however when it was attached to his arm, it is saying not compatible. Pt went back to the pharmacy and was told that this is what was ordered from pcp office Free style henrry 2 patches ordered.  Pt was directed to contact PCP office to get correct meter. Pt should have order for free style henrry patches instead of free style henrry 2.

## 2024-02-01 NOTE — TELEPHONE ENCOUNTER
My order says freestyle henrry 14 day sensor kit and scanner  I'm really confused - I don't see that I wrote an order for a free style henrry 2  Can you pend whatever the correct order is?

## 2024-02-22 DIAGNOSIS — Z79.4 TYPE 2 DIABETES MELLITUS WITH DIABETIC POLYNEUROPATHY, WITH LONG-TERM CURRENT USE OF INSULIN: Chronic | ICD-10-CM

## 2024-02-22 DIAGNOSIS — E11.42 TYPE 2 DIABETES MELLITUS WITH DIABETIC POLYNEUROPATHY, WITH LONG-TERM CURRENT USE OF INSULIN: Chronic | ICD-10-CM

## 2024-02-22 RX ORDER — DAPAGLIFLOZIN 5 MG/1
5 TABLET, FILM COATED ORAL
Qty: 90 TABLET | Refills: 1 | Status: SHIPPED | OUTPATIENT
Start: 2024-02-22 | End: 2024-02-26 | Stop reason: SDUPTHER

## 2024-02-22 NOTE — TELEPHONE ENCOUNTER
No care due was identified.  Health [facility] Embedded Care Due Messages. Reference number: [id number].   2/22/2024 12:01:12 PM CST

## 2024-02-22 NOTE — TELEPHONE ENCOUNTER
Refill Decision Note   Emerson Menendez  is requesting a refill authorization.  Brief Assessment and Rationale for Refill:  Approve     Medication Therapy Plan:  CKD pt. Dose appropiate      Comments:     Note composed:1:10 PM 02/22/2024

## 2024-02-23 RX ORDER — PRAVASTATIN SODIUM 40 MG/1
40 TABLET ORAL
Qty: 90 TABLET | Refills: 0 | Status: SHIPPED | OUTPATIENT
Start: 2024-02-23 | End: 2024-05-06

## 2024-02-23 NOTE — TELEPHONE ENCOUNTER
Refill Decision Note   Emerson Menendez  is requesting a refill authorization.  Brief Assessment and Rationale for Refill:  Approve     Medication Therapy Plan:         Comments:     Note composed:6:18 AM 02/23/2024

## 2024-02-23 NOTE — TELEPHONE ENCOUNTER
No care due was identified.  Health [facility] Embedded Care Due Messages. Reference number: [id number].   2/23/2024 1:20:03 AM CST

## 2024-03-05 NOTE — PROGRESS NOTES
SUBJECTIVE:   Sierra Henson is a 6 year old female who [id number] who helps to provide the history today.    Chief Complaint   Patient presents with   • Ear Problem   • Sore Throat     Symptoms started today      Started copmlainign of right ear pain and sore throat this morning.  Some painful swallowing but no difficulty swallowing.    No fevers, chills or sweats noted at home.     Not much rhinorrhea/congestion or cough.    Complained of abdominal pain after dinner this evening.  No nausea/vomiting or diarrhea.    No one else is sick at home.  Is in school but not aware of any particular illnesses going around there.     Review of Systems    Patient's medications, allergies, past medical, surgical, social and family histories were reviewed and updated as appropriate.    Patient Active Problem List   Diagnosis   • Iron deficiency anemia   • Recurrent otitis media       No past medical history on file.    Past Surgical History:   Procedure Laterality Date   • No past surgeries         ALLERGIES:  No Known Allergies    Current Outpatient Medications   Medication Sig Dispense Refill   • cefdinir (OMNICEF) 250 MG/5ML suspension Take 5 mLs by mouth daily for 10 days. 60 mL 0   • Pediatric Multiple Vitamins (MULTIVITAMIN CHILDRENS PO) Take 1 tablet by mouth daily.     • albuterol 108 (90 Base) MCG/ACT inhaler Inhale 2 puffs into the lungs every 4 hours as needed for Shortness of Breath or Wheezing. (Patient not taking: Reported on 3/4/2024) 1 each 12   • Spacer/Aero-Holding Chambers (AEROCHAMBER PLUS [facility] PEDIATRIC WITH MASK) Indications: Wheezing Use with albuterol (Patient not taking: Reported on 3/4/2024) 1 each 0     No current facility-administered medications for this visit.       Family History   Problem Relation Age of Onset   • Hyperlipidemia Mother    • Other Brother         Sagittal craniosynostosis   • Hyperlipidemia Maternal Grandfather    • Asthma Paternal Grandmother   Subjective:       Patient ID: Emerson Menendez is a 74 y.o. male.    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 Test results identify a stable IgA kappa protein with beta globulin band at 1.63g/dL. Kappa free light chain is elevated at 4.40. CBC and calcium are stable. Creatinine with history of stage III CKD is stable at 1.4. Metastatic survey is negative for lytic lesions. MRI of the entire spine demonstrated age related changes but no convincing evidence of myeloma bone disease. Bone marrow biopsy identified about 13% plasma cells by morphology and FISH for myeloma identified a t(11;14) and trisomies 3,7, and 17. The patient is afebrile and appears clinically well. He was seen by his podiatrist and nephrologist since our last visit without any new or acute events.    The patient has not received any therapy for smoldering myeloma including bisphosphonates or steroids. He has been randomized to observation arm of the the clinical study. 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 He has no current or prior history of malignancy.    TODAY  Mr. Menendez returns today for monthly follow-up evaluation. No clinical signs/symptoms of progression of his smoldering myeloma with CBC stable and rest of labs are also stable other than moderate increase in M protein to 1.93.      HPI  Review of Systems   Constitutional: Negative for activity change, appetite change, diaphoresis, fatigue, fever and unexpected weight change.   HENT: Negative.    Eyes: Negative.    Respiratory: Negative.    Cardiovascular: Negative for leg        Social History     Tobacco Use   • Smoking status: Never   • Smokeless tobacco: Never         OBJECTIVE:  Visit Vitals  /52 (BP Location: LUE - Left upper extremity, Patient Position: Sitting, Cuff Size: Pediatric)   Pulse 84   Temp 97.9 °F (36.6 °C) (Temporal)   Ht 3' 9\" (1.143 m)   Wt 18.3 kg (40 lb 4 oz)   SpO2 100%   BMI 13.97 kg/m²     Physical Exam    ASSESSMENT/PLAN:  There are no diagnoses linked to this encounter.      Parents educated on signs/symptoms requiring immediate medical attention. The {Patient and X:[id number]::\"patient\"} indicated understanding of the diagnosis and agreed with the plan of care. All questions answered.         Phuong Kemp, DO   swelling.   Gastrointestinal: Negative.    Endocrine: Negative.    Genitourinary: Negative.    Musculoskeletal: Negative.    Skin: Negative.    Allergic/Immunologic: Negative.    Neurological:        Grade 0-1 peripheral neuropathy of bilateral feet.    Hematological: Negative for adenopathy. Does not bruise/bleed easily.   Psychiatric/Behavioral: Negative.        Objective:       Vitals:    10/15/19 0845   BP: 131/69   Pulse: 96   Resp: 15   Temp: 97.9 °F (36.6 °C)       Physical Exam   Constitutional: He is oriented to person, place, and time. He appears well-developed and well-nourished.   HENT:   Head: Normocephalic and atraumatic.   Right Ear: External ear normal.   Left Ear: External ear normal.   Nose: Nose normal.   Mouth/Throat: Oropharynx is clear and moist.   Eyes: Pupils are equal, round, and reactive to light. Conjunctivae and EOM are normal.   Neck: Normal range of motion. Neck supple.   Cardiovascular: Normal rate, regular rhythm and intact distal pulses.   No murmur heard.  Pulmonary/Chest: Effort normal and breath sounds normal. No respiratory distress.   Abdominal: Soft. Bowel sounds are normal. He exhibits no distension. There is no hepatosplenomegaly.   Musculoskeletal: He exhibits no edema or tenderness.   Neurological: He is alert and oriented to person, place, and time. No cranial nerve deficit.   Skin: Skin is warm and dry. No rash noted. No cyanosis. Nails show no clubbing.   Psychiatric: He has a normal mood and affect. His behavior is normal.   Nursing note and vitals reviewed.      Assessment:       1. Smoldering multiple myeloma    2. CKD stage 3 due to type 2 diabetes mellitus    3. Type 2 diabetes mellitus with diabetic polyneuropathy, with long-term current use of insulin        Plan:       The patient has a diagnosis of smoldering myeloma. There is no indication for immediate chemotherapy. We are monitoring renal function closely- baseline creatinine of -1.5..  We will continue         Radial pulses are 2+ on the right side and 2+ on the left side.      Heart sounds: Normal heart sounds, S1 normal and S2 normal. No murmur heard.  Pulmonary:      Effort: Pulmonary effort is normal. No accessory muscle usage or respiratory distress.      Breath sounds: Normal breath sounds and air entry. No wheezing, rhonchi or rales.   Chest:      Chest wall: No deformity.   Abdominal:      General: Abdomen is flat. Bowel sounds are normal. There is no distension.      Palpations: Abdomen is soft. There is no hepatomegaly, splenomegaly or mass.      Tenderness: There is no abdominal tenderness.   Musculoskeletal:         General: Normal range of motion.   Lymphadenopathy:      Cervical: No cervical adenopathy.   Skin:     General: Skin is warm.      Capillary Refill: Capillary refill takes less than 2 seconds.      Findings: No rash.   Neurological:      Mental Status: She is alert and oriented for age.      Cranial Nerves: No cranial nerve deficit.      Sensory: No sensory deficit.      Coordination: Coordination normal.      Gait: Gait normal.   Psychiatric:         Speech: Speech normal.         Behavior: Behavior normal.         ASSESSMENT/PLAN:  Sierra was seen today for ear problem and sore throat.    Diagnoses and all orders for this visit:    Recurrent acute suppurative otitis media of right ear without spontaneous rupture of tympanic membrane  -     cefdinir (OMNICEF) 250 MG/5ML suspension; Take 5 mLs by mouth daily for 10 days.    Dysfunction of both eustachian tubes      Ear infection:  - Will start antibiotics for treatment of ear infection.  - May benefit from yogurt and probiotics for GI protection while on antibiotics.  - Advised symptomatic control with Tylenol/Motrin as needed for fever or pain  - Hydration with water/Pedialyte/Gatorade and warm drinks with honey  - Encourage steam showers/baths, nasal saline rinses/spray and humidifier at night for congestion. Discussed restarting Flonase  observation as per the Randomized Phase III Trial of Lenalidomide Versus Observation Alone in Patients with Asymptomatic High-Risk Smoldering Multiple Myeloma. Total protein remains normal, M protein has been stable; moderate increase today to 1.93. Free light chains are stable..  Plan for return in 1 month.  Endocrinology and Nephrology to monitor diabetes and renal failure respectively.        to help with eustachian tube dysfunction.  - Watch for fevers >101F, worsening cough, difficulty breathing, wheezing, sore throat, persistent ear pain, dehydration, difficulty swallowing.  - Follow up with ENT next week as previously scheduled.      Parents educated on signs/symptoms requiring immediate medical attention. The patient and mother indicated understanding of the diagnosis and agreed with the plan of care. All questions answered.         Phuong Kemp, DO

## 2024-03-11 ENCOUNTER — PATIENT MESSAGE (OUTPATIENT)
Dept: HEMATOLOGY/ONCOLOGY | Facility: CLINIC | Age: 79
End: 2024-03-11
Payer: MEDICARE

## 2024-03-14 DIAGNOSIS — D47.2 SMOLDERING MULTIPLE MYELOMA: ICD-10-CM

## 2024-03-14 RX ORDER — DIAZEPAM 5 MG/1
5 TABLET ORAL EVERY 12 HOURS PRN
Qty: 60 TABLET | Refills: 0 | Status: SHIPPED | OUTPATIENT
Start: 2024-03-14 | End: 2024-04-24 | Stop reason: SDUPTHER

## 2024-03-14 NOTE — TELEPHONE ENCOUNTER
Refill Routing Note   Medication(s) are not appropriate for processing by [facility] for the following reason(s):        Outside of protocol    [facility] action(s):  Route               Appointments  past 12m or future 3m with PCP    Date Provider   Last Visit   2/15/2023 Roberta Sagastume MD   Next Visit   4/24/2024 Roberta Sagastume MD   ED visits in past 90 days: 0        Note composed:1:10 PM 03/14/2024

## 2024-03-14 NOTE — TELEPHONE ENCOUNTER
No care due was identified.  [facility] Embedded Care Due Messages. Reference number: [id number].   3/14/2024 12:35:13 PM CDT

## 2024-03-15 ENCOUNTER — TELEPHONE (OUTPATIENT)
Dept: INTERNAL MEDICINE | Facility: CLINIC | Age: 79
End: 2024-03-15
Payer: MEDICARE

## 2024-03-15 NOTE — TELEPHONE ENCOUNTER
----- Message from Landon Esteves MD sent at 2/27/2024  3:15 PM CST -----  Silvana Palafox Matthew and Roberta,    I saw your patient in the Nephrology Clinic at [facility]. He has long standing T2DM and smoldering multiple myeloma. His renal function has slowly deteriorated over the years and currently he has an eGFR of 31 with microalbuminuria. His CKD could be from diabetic nephropathy but could also result from his smoldering IgA kappa myeloma (MGRS). He may have both lesions in his kidneys. If this is MGRS he would benefit from treatment.    I discussed this with him. The patient mentioned that he was scheduled for a kidney biopsy in the past that was later cancelled. He wants to know the cause of his CKD and therefore wants a kidney biopsy. I am going to schedule him for a percutaneous kidney biopsy in March. I will keep you informed regards the results.    Boubacar Esteves MD, DI[facility]

## 2024-03-21 RX ORDER — GLIMEPIRIDE 2 MG/1
2 TABLET ORAL
Qty: 90 TABLET | Refills: 2 | Status: SHIPPED | OUTPATIENT
Start: 2024-03-21

## 2024-03-21 NOTE — TELEPHONE ENCOUNTER
No care due was identified.  [facility] Embedded Care Due Messages. Reference number: [id number].   3/21/2024 2:27:57 AM CDT

## 2024-03-21 NOTE — TELEPHONE ENCOUNTER
Refill Routing Note   Medication(s) are not appropriate for processing by [facility] for the following reason(s):        Required labs abnormal    [facility] action(s):  Defer               Appointments  past 12m or future 3m with PCP    Date Provider   Last Visit   2/15/2023 Roberta Sagastume MD   Next Visit   4/24/2024 Roberta Sagastume MD   ED visits in past 90 days: 0        Note composed:4:29 AM 03/21/2024

## 2024-04-03 ENCOUNTER — PATIENT MESSAGE (OUTPATIENT)
Dept: NEPHROLOGY | Facility: CLINIC | Age: 79
End: 2024-04-03
Payer: MEDICARE

## 2024-04-06 ENCOUNTER — PATIENT MESSAGE (OUTPATIENT)
Dept: INTERNAL MEDICINE | Facility: CLINIC | Age: 79
End: 2024-04-06
Payer: MEDICARE

## 2024-04-07 ENCOUNTER — PATIENT MESSAGE (OUTPATIENT)
Dept: ADMINISTRATIVE | Facility: OTHER | Age: 79
End: 2024-04-07
Payer: MEDICARE

## 2024-04-08 NOTE — TELEPHONE ENCOUNTER
Called and spoke to pt. Pt states he had a kidney biopsy done, and the Dr he was seeing is no longer with [facility]. Pt was told by Dr Moody's office to f/u with Dr Esteves in [place]. Pt states he is in [place] right now because he had knee sx. Encouraged pt to send a message to Dr Esteves for biopsy results and also told him I would send message to Dr Sagastume for review.

## 2024-04-08 NOTE — TELEPHONE ENCOUNTER
Pt had kidney biopsy on 3/13/24. He is currently in [place].  He would like to see someone in Nephrology for further discussion regarding his kidney biopsy.  He saw Dr Esteves on 2/26/24    Thanks    Roberta Sagastume M.D.

## 2024-04-10 NOTE — TELEPHONE ENCOUNTER
Rica Mcqueen LPN  You2 days ago     FENG  Unfortunately, the docs here does not want to step on pt's current nephrologist jessy that's in [place] and give their thoughts then it's not what the nephrologist in [place] would agree with so I got in touch with the actual doctor who performed the biopsy in [place]. Dr. Moody stated that he will call pt .

## 2024-04-17 DIAGNOSIS — D47.2 SMOLDERING MULTIPLE MYELOMA: ICD-10-CM

## 2024-04-17 RX ORDER — CITALOPRAM 40 MG/1
40 TABLET, FILM COATED ORAL
Qty: 90 TABLET | Refills: 0 | Status: SHIPPED | OUTPATIENT
Start: 2024-04-17

## 2024-04-17 NOTE — TELEPHONE ENCOUNTER
No care due was identified.  [facility] Embedded Care Due Messages. Reference number: [id number].   4/17/2024 2:14:00 AM CDT

## 2024-04-17 NOTE — TELEPHONE ENCOUNTER
Refill Decision Note   Emerson Menendez  is requesting a refill authorization.  Brief Assessment and Rationale for Refill:  Approve     Medication Therapy Plan:  FOVS 4/24/24      Pharmacist review requested: Yes   Extended chart review required: Yes   Comments:     Note composed:3:06 PM 04/17/2024

## 2024-04-17 NOTE — TELEPHONE ENCOUNTER
Refill Routing Note   Medication(s) are not appropriate for processing by [facility] for the following reason(s):        Drug-disease interaction    [facility] action(s):  Defer        Medication Therapy Plan: Single dose of 40 mg exceeds recommended maximum of 20 mg by 100%   Frequency is required to check daily dose    Pharmacist review requested: Yes     Appointments  past 12m or future 3m with PCP    Date Provider   Last Visit   2/15/2023 Roberta Sagastume MD   Next Visit   4/24/2024 Roberta Sagastume MD   ED visits in past 90 days: 0        Note composed:1:48 PM 04/17/2024

## 2024-04-24 ENCOUNTER — LAB VISIT (OUTPATIENT)
Dept: LAB | Facility: HOSPITAL | Age: 79
End: 2024-04-24
Attending: INTERNAL MEDICINE
Payer: MEDICARE

## 2024-04-24 ENCOUNTER — RESEARCH ENCOUNTER (OUTPATIENT)
Dept: HEMATOLOGY/ONCOLOGY | Facility: CLINIC | Age: 79
End: 2024-04-24
Payer: MEDICARE

## 2024-04-24 ENCOUNTER — OFFICE VISIT (OUTPATIENT)
Dept: INTERNAL MEDICINE | Facility: CLINIC | Age: 79
End: 2024-04-24
Payer: MEDICARE

## 2024-04-24 VITALS
HEIGHT: 70 IN | OXYGEN SATURATION: 98 % | SYSTOLIC BLOOD PRESSURE: 126 MMHG | HEART RATE: 94 BPM | WEIGHT: 202 LBS | DIASTOLIC BLOOD PRESSURE: 66 MMHG | BODY MASS INDEX: 28.92 KG/M2

## 2024-04-24 DIAGNOSIS — D47.2 SMOLDERING MULTIPLE MYELOMA: ICD-10-CM

## 2024-04-24 DIAGNOSIS — I10 PRIMARY HYPERTENSION: ICD-10-CM

## 2024-04-24 DIAGNOSIS — N18.32 STAGE 3B CHRONIC KIDNEY DISEASE: Chronic | ICD-10-CM

## 2024-04-24 DIAGNOSIS — N18.30 CKD STAGE 3 DUE TO TYPE 2 DIABETES MELLITUS: ICD-10-CM

## 2024-04-24 DIAGNOSIS — E11.42 DIABETIC POLYNEUROPATHY ASSOCIATED WITH TYPE 2 DIABETES MELLITUS: ICD-10-CM

## 2024-04-24 DIAGNOSIS — E11.22 CKD STAGE 3 DUE TO TYPE 2 DIABETES MELLITUS: ICD-10-CM

## 2024-04-24 DIAGNOSIS — E55.9 VITAMIN D INSUFFICIENCY: ICD-10-CM

## 2024-04-24 DIAGNOSIS — M25.569 KNEE PAIN, UNSPECIFIED CHRONICITY, UNSPECIFIED LATERALITY: ICD-10-CM

## 2024-04-24 DIAGNOSIS — Z79.4 TYPE 2 DIABETES MELLITUS WITH OTHER CIRCULATORY COMPLICATION, WITH LONG-TERM CURRENT USE OF INSULIN: ICD-10-CM

## 2024-04-24 DIAGNOSIS — Z79.4 TYPE 2 DIABETES MELLITUS WITH OTHER CIRCULATORY COMPLICATION, WITH LONG-TERM CURRENT USE OF INSULIN: Primary | ICD-10-CM

## 2024-04-24 DIAGNOSIS — C90.00 MULTIPLE MYELOMA NOT HAVING ACHIEVED REMISSION: ICD-10-CM

## 2024-04-24 DIAGNOSIS — E78.49 OTHER HYPERLIPIDEMIA: ICD-10-CM

## 2024-04-24 DIAGNOSIS — I70.0 AORTIC ATHEROSCLEROSIS: ICD-10-CM

## 2024-04-24 DIAGNOSIS — K21.9 GASTROESOPHAGEAL REFLUX DISEASE WITHOUT ESOPHAGITIS: ICD-10-CM

## 2024-04-24 DIAGNOSIS — E11.59 TYPE 2 DIABETES MELLITUS WITH OTHER CIRCULATORY COMPLICATION, WITH LONG-TERM CURRENT USE OF INSULIN: Primary | ICD-10-CM

## 2024-04-24 DIAGNOSIS — Z00.6 RESEARCH EXAM: ICD-10-CM

## 2024-04-24 DIAGNOSIS — E11.59 TYPE 2 DIABETES MELLITUS WITH OTHER CIRCULATORY COMPLICATION, WITH LONG-TERM CURRENT USE OF INSULIN: ICD-10-CM

## 2024-04-24 DIAGNOSIS — M17.12 PRIMARY OSTEOARTHRITIS OF LEFT KNEE: ICD-10-CM

## 2024-04-24 LAB
ALBUMIN SERPL BCP-MCNC: 3.7 G/DL (ref 3.5–5.2)
ALP SERPL-CCNC: 55 U/L (ref 55–135)
ALT SERPL W/O P-5'-P-CCNC: 22 U/L (ref 10–44)
ANION GAP SERPL CALC-SCNC: 11 MMOL/L (ref 8–16)
AST SERPL-CCNC: 13 U/L (ref 10–40)
BASOPHILS # BLD AUTO: 0.04 K/UL (ref 0–0.2)
BASOPHILS NFR BLD: 1 % (ref 0–1.9)
BILIRUB SERPL-MCNC: 0.5 MG/DL (ref 0.1–1)
BUN SERPL-MCNC: 20 MG/DL (ref 8–23)
CALCIUM SERPL-MCNC: 9.7 MG/DL (ref 8.7–10.5)
CHLORIDE SERPL-SCNC: 102 MMOL/L (ref 95–110)
CHOLEST SERPL-MCNC: 188 MG/DL (ref 120–199)
CHOLEST/HDLC SERPL: 9 {RATIO} (ref 2–5)
CO2 SERPL-SCNC: 25 MMOL/L (ref 23–29)
CREAT SERPL-MCNC: 2.1 MG/DL (ref 0.5–1.4)
DIFFERENTIAL METHOD BLD: ABNORMAL
EOSINOPHIL # BLD AUTO: 0.2 K/UL (ref 0–0.5)
EOSINOPHIL NFR BLD: 5.5 % (ref 0–8)
ERYTHROCYTE [DISTWIDTH] IN BLOOD BY AUTOMATED COUNT: 15.3 % (ref 11.5–14.5)
EST. GFR  (NO RACE VARIABLE): 31.8 ML/MIN/1.73 M^2
ESTIMATED AVG GLUCOSE: 171 MG/DL (ref 68–131)
GLUCOSE SERPL-MCNC: 153 MG/DL (ref 70–110)
HBA1C MFR BLD: 7.6 % (ref 4–5.6)
HCT VFR BLD AUTO: 36.2 % (ref 40–54)
HDLC SERPL-MCNC: 21 MG/DL (ref 40–75)
HDLC SERPL: 11.2 % (ref 20–50)
HGB BLD-MCNC: 11.1 G/DL (ref 14–18)
IGA SERPL-MCNC: 1705 MG/DL (ref 40–350)
IGG SERPL-MCNC: 1072 MG/DL (ref 650–1600)
IGM SERPL-MCNC: 41 MG/DL (ref 50–300)
IMM GRANULOCYTES # BLD AUTO: 0 K/UL (ref 0–0.04)
IMM GRANULOCYTES NFR BLD AUTO: 0 % (ref 0–0.5)
LDH SERPL L TO P-CCNC: 116 U/L (ref 110–260)
LDLC SERPL CALC-MCNC: ABNORMAL MG/DL (ref 63–159)
LYMPHOCYTES # BLD AUTO: 1.3 K/UL (ref 1–4.8)
LYMPHOCYTES NFR BLD: 31.3 % (ref 18–48)
MAGNESIUM SERPL-MCNC: 2 MG/DL (ref 1.6–2.6)
MCH RBC QN AUTO: 28.3 PG (ref 27–31)
MCHC RBC AUTO-ENTMCNC: 30.7 G/DL (ref 32–36)
MCV RBC AUTO: 92 FL (ref 82–98)
MONOCYTES # BLD AUTO: 0.3 K/UL (ref 0.3–1)
MONOCYTES NFR BLD: 6.7 % (ref 4–15)
NEUTROPHILS # BLD AUTO: 2.3 K/UL (ref 1.8–7.7)
NEUTROPHILS NFR BLD: 55.5 % (ref 38–73)
NONHDLC SERPL-MCNC: 167 MG/DL
NRBC BLD-RTO: 0 /100 WBC
PHOSPHATE SERPL-MCNC: 3.2 MG/DL (ref 2.7–4.5)
PLATELET # BLD AUTO: 284 K/UL (ref 150–450)
PMV BLD AUTO: 8.9 FL (ref 9.2–12.9)
POTASSIUM SERPL-SCNC: 4.8 MMOL/L (ref 3.5–5.1)
PROT SERPL-MCNC: 8.7 G/DL (ref 6–8.4)
RBC # BLD AUTO: 3.92 M/UL (ref 4.6–6.2)
SODIUM SERPL-SCNC: 138 MMOL/L (ref 136–145)
TRIGL SERPL-MCNC: 504 MG/DL (ref 30–150)
WBC # BLD AUTO: 4.19 K/UL (ref 3.9–12.7)

## 2024-04-24 PROCEDURE — 99999 PR PBB SHADOW E&M-EST. PATIENT-LVL V: CPT | Mod: PBBFAC,HCNC,, | Performed by: INTERNAL MEDICINE

## 2024-04-24 PROCEDURE — 1125F AMNT PAIN NOTED PAIN PRSNT: CPT | Mod: HCNC,CPTII,S$GLB, | Performed by: INTERNAL MEDICINE

## 2024-04-24 PROCEDURE — 84100 ASSAY OF PHOSPHORUS: CPT | Mod: HCNC | Performed by: INTERNAL MEDICINE

## 2024-04-24 PROCEDURE — 83521 IG LIGHT CHAINS FREE EACH: CPT | Mod: 59,HCNC,Q1 | Performed by: INTERNAL MEDICINE

## 2024-04-24 PROCEDURE — 83615 LACTATE (LD) (LDH) ENZYME: CPT | Mod: HCNC,Q1 | Performed by: INTERNAL MEDICINE

## 2024-04-24 PROCEDURE — 84165 PROTEIN E-PHORESIS SERUM: CPT | Mod: 26,Q1,HCNC, | Performed by: PATHOLOGY

## 2024-04-24 PROCEDURE — 80061 LIPID PANEL: CPT | Mod: HCNC | Performed by: INTERNAL MEDICINE

## 2024-04-24 PROCEDURE — 83036 HEMOGLOBIN GLYCOSYLATED A1C: CPT | Mod: HCNC,Q1 | Performed by: INTERNAL MEDICINE

## 2024-04-24 PROCEDURE — 82784 ASSAY IGA/IGD/IGG/IGM EACH: CPT | Mod: 59,HCNC,Q1 | Performed by: INTERNAL MEDICINE

## 2024-04-24 PROCEDURE — 85025 COMPLETE CBC W/AUTO DIFF WBC: CPT | Mod: HCNC | Performed by: INTERNAL MEDICINE

## 2024-04-24 PROCEDURE — 99215 OFFICE O/P EST HI 40 MIN: CPT | Mod: HCNC,S$GLB,, | Performed by: INTERNAL MEDICINE

## 2024-04-24 PROCEDURE — 86334 IMMUNOFIX E-PHORESIS SERUM: CPT | Mod: HCNC | Performed by: INTERNAL MEDICINE

## 2024-04-24 PROCEDURE — 36415 COLL VENOUS BLD VENIPUNCTURE: CPT | Mod: HCNC | Performed by: INTERNAL MEDICINE

## 2024-04-24 PROCEDURE — 3074F SYST BP LT 130 MM HG: CPT | Mod: HCNC,CPTII,S$GLB, | Performed by: INTERNAL MEDICINE

## 2024-04-24 PROCEDURE — 86334 IMMUNOFIX E-PHORESIS SERUM: CPT | Mod: 26,Q1,HCNC, | Performed by: PATHOLOGY

## 2024-04-24 PROCEDURE — 84165 PROTEIN E-PHORESIS SERUM: CPT | Mod: HCNC | Performed by: INTERNAL MEDICINE

## 2024-04-24 PROCEDURE — 80053 COMPREHEN METABOLIC PANEL: CPT | Mod: HCNC | Performed by: INTERNAL MEDICINE

## 2024-04-24 PROCEDURE — 1101F PT FALLS ASSESS-DOCD LE1/YR: CPT | Mod: HCNC,CPTII,S$GLB, | Performed by: INTERNAL MEDICINE

## 2024-04-24 PROCEDURE — 3288F FALL RISK ASSESSMENT DOCD: CPT | Mod: HCNC,CPTII,S$GLB, | Performed by: INTERNAL MEDICINE

## 2024-04-24 PROCEDURE — 3078F DIAST BP <80 MM HG: CPT | Mod: HCNC,CPTII,S$GLB, | Performed by: INTERNAL MEDICINE

## 2024-04-24 PROCEDURE — 83735 ASSAY OF MAGNESIUM: CPT | Mod: HCNC | Performed by: INTERNAL MEDICINE

## 2024-04-24 PROCEDURE — 1159F MED LIST DOCD IN RCRD: CPT | Mod: HCNC,CPTII,S$GLB, | Performed by: INTERNAL MEDICINE

## 2024-04-24 RX ORDER — NIFEDIPINE 60 MG/1
60 TABLET, EXTENDED RELEASE ORAL DAILY
Qty: 90 TABLET | Refills: 4 | Status: SHIPPED | OUTPATIENT
Start: 2024-04-24 | End: 2025-04-24

## 2024-04-24 RX ORDER — DOXYCYCLINE HYCLATE 100 MG
100 TABLET ORAL 2 TIMES DAILY
Qty: 20 TABLET | Refills: 3 | Status: SHIPPED | OUTPATIENT
Start: 2024-04-24

## 2024-04-24 RX ORDER — ERGOCALCIFEROL 1.25 MG/1
50000 CAPSULE ORAL
Qty: 24 CAPSULE | Refills: 4 | Status: SHIPPED | OUTPATIENT
Start: 2024-04-25

## 2024-04-24 RX ORDER — DIAZEPAM 5 MG/1
5 TABLET ORAL EVERY 12 HOURS PRN
Qty: 60 TABLET | Refills: 0 | Status: SHIPPED | OUTPATIENT
Start: 2024-04-24

## 2024-04-24 RX ORDER — FLASH GLUCOSE SENSOR
KIT MISCELLANEOUS
Qty: 3 KIT | Refills: 11 | Status: SHIPPED | OUTPATIENT
Start: 2024-04-24

## 2024-04-24 NOTE — PROGRESS NOTES
CHIEF COMPLAINT:  Follow up of multiple myeloma, diabetes, hypertension, reflux, hyperlipidemia    HISTORY OF PRESENT ILLNESS: This is a 78-year-old man who presents for follow up of above     He had a kidney biopsy in [place] on 3/13/24.  Biopsy revealed that diabetic nephropathy as the cause of his CKD. The biopsy did not show a myeloma related glomerulopathy (MGRS).  He needs better blood sugar control.   Farxiga was increased from 5 mg to 10 mg.  He has only been taking 10 mg for the last week.    He does not drink a lot of water - he likes diet soft drinks       He had right 3rd trigger finger releast on 22 - finger is doing better     He had a left knee replacement 2023 in [place].  Left knee is doing better.  Right knee is bothering him.  He is not sure he wants to have surgery on the right knee. HE is wearing brace o his right knees which is helping stability and his pain.  He take tylenol 650 mg once daily which helps.    He got   2021.  Life is going well. His wife cara is with him today.  He is residing in [place] and [place].  His wife has a job in [place].   life has been good for him. .        His daughter  of complications of lupus and ESRD 2020. She was on dialysis and was septic. She was at home in hospice. He continues to take citalopram to 40 mg 1 tablet daily and diazepam 5 mg 1/2 at bedtime. He is sleeping well with this dose.  His mother is currently doing well at age 100 (100 in 2022). His brother and sister help care for her         He continues to have right foot pain.   He is also taking gabapentin 100 mg in the evening which helps his foot pain.       He is in digitial diabetes. Sugars have been better.  Sugars are now in the low 100's range.  He is using Free Style Leah 2.  He is on novolog 4 units with meals as needed, Lantus 12 units twice daily (adjusts according to sugars) and glimepriride 2 mg daily, Farxiga 10 mg  once daily NO polydipsia or polyuria.        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 He is in the observation arm and is being monitoring monthly. He sess Dr Engel monthly.  No indication for chemotherapy at this time. HE gets monthly blood work.        He is taking nifedipine ER 60 mg once daily and chlorthalidone 25 mg daily to protect his kidney. No polydipsia or polyuria       His back and neck was doing ok . Pain is starting to return. He was in healthy back program.     Reflux is controlled on omeprazole 40 mg 1 tablets daily. HE has heartburn when he does not take the omeprazole.  No chest pain, shortness of breath, nausea, voimting, constipation, diarrhea, numbness, weakness.        He had laser vaporization and enucleation of the prostate 13. He denies any dysuria or hematuria now. HE saw Dr Walker.  HE is urinting well. He continues FLomax 0.4 mg nightly sporadically  HE gets up 2-3 times at night to urinate    He has hyperlipidemia, on pravastatin 40 mg daily. No joint pain or muscle pain from the pravastatin.        He has been taking aspirin 81 mg daily    vitamin D supplement 50,000 units weekly         PAST MEDICAL HISTORY:   1. Diabetes mellitus.   2. Hyperlipidemia.   3. Reflux.   4. BPH.   5. Osteoarthritis of the knees.   6. Neck pain.   7. History of right lateral epicondylitis.   8. History of lumps removed from the breast as a teenager.   9. OA cervical spine   10. Multiple myeloma    SOCIAL HISTORY: Does not smoke, does not drink. 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 He works for the [employer].     FAMILY HISTORY: Mother is living at age 95 with a heart condition. She had a mild stroke. Father  in his late 40s of alcoholism. He has 5 sisters and 1 brother. one has a neurological disorder, one is anxious. ONe sister  after a fall. His daughter has  lupus, on dialysis, heart problems, and a brain aneurysm that was stented.       PHYSICAL EXAMINATION:           BP  "126/66 (BP Location: Right arm, Patient Position: Sitting, BP Method: Large (Manual))   Pulse 94   Ht 5' 10" (1.778 m)   Wt 91.6 kg (202 lb)   SpO2 98%   BMI 28.98 kg/m²     GENERAL: He is alert, oriented, no apparent distress. Affect within normal limits.   Conjunctivae anicteric. PERRL. Tympanic membranes clear. Oropharynx gumes healing.    NECK: Supple. No cervical lymphadenopathy, no thyroid enlargement.   Respiratory: Effort normal. Lungs are clear to auscultation.   HEART: Regular rate and rhythm without murmurs, gallops or rubs.   No lower extremity edema.    ABDOMEN: soft, non distended, non tender, bowel sounds present, no hepatosplenomgaly      ASSESSMENT AND PLAN:           1. Diabetes mellitus with hyperglycemia and microalbuminuria - in Digital diabetes.  . Watch for low blood sugars. Has Free style henrry 2.  Will need to decrease lantus as has better blood sugar control on Farxiga 10 mg daily   2. 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 Follow up with DR Engel  3. OA knee -s/p left knee replacement.  TO see ortho for injection in right knee.   4. Hypertension - controlled. Monitor BP at home. May need to decrease chlorthalidone as on higher dose of Farxiga now  5. Hyperlipidemia. On pravastatin  6. BPH. S/p laser   7. Vitamin D deficiency - check level  9. Back pain/neck pain - managing-   10. Anxiety and depression- now on celexa 40 mg daily  11. Peripheral neuropathy -controlled on gabapentin as needed   12. CRI -stable - monitored closely by heme onc and nephrology.  13. Obestiy - work on diet, exercise and weigh tloss  14. Recurrent boils in arm pit - doxycycline when flares     I will see him back in 4 months, sooner if problems arise        Prevnar 7/16  PSA 5/2022  . colonoscopy normal 3/2012 and due 2022.  Cologuard.     Spent greater than 40 minutes with the patient, greater than 50% in face to face counseling.    "

## 2024-04-24 NOTE — PROGRESS NOTES
Wednesday, April 24, 2024     Protocol: O1D10--O Randomized Phase III Trial of Lenalidomide vs Observation Alone in Patients with Asymptomatic High-Risk Smoldering Multiple Myeloma.   Sponsor:  [facility]  IRB# [id number]  Study ID: [id number]  Investigator: GIL Engel Pt Initials: LUCÍA LORENZ              AE Assessment and Update today for Cycle 101, Day 27    This research RN telephoned subject to get update from subject; he had labs scheduled today for nephrology and will see Dr Engel tomorrow. Labs available today reviewed with subject; he states Dr Sagastume also reveiwed what was available with him and voices no questions at this time.   Subject reports he is doing fine;     Update of ongoing events for subject, Arm B: Observation:     6/29/2023 Cycle 91 Day 28: Subject no showed for scheduled lab appt today. Appointment rescheduled for 7/3/23 via schedulers who confirm reminder via [facility] in [facility].   7/3/2023: Cycle 91 Day 32 Subject had labs done in [place] today; unable to travel to [facility] secondary to s/p total knee replacement early June. Subject had labs scheduled for 6/27/23 and 6/29/23; patient messaged on 6/23/23 as well as left voice mail to remind him of lab appt. Subject was no show for these appointments; contacted and rescheduled for 7/3/23. The lab results for that date were reported to Dr. Engel; no orders noted at that time.    7/11/2023 (Cycle 92 Day 8): Reached out to subject per Dr Engel to recommend virtual appointment and repeat labs; refer to documentation via patient messaging in Epic. Subject wished to defer virtual appt but agrees to follow up labs scheduled for 7/27/23.  7/27/2023 (Cycle 92 Day 24): Patient no showed for this appointment; rescheduled for 8/1/23.  8/1/2023 (Cycle 92 Day 29): No show for lab appt today. This RN called subject to remind him of appt; he states he is not able to attend appt today; requests reschedule for tomorrow. Labs rescheduled per subject's request; 10am [facility]  [facility] lab in [place]. Subject states agreement with time. Dr Engel notified via inbasket message regarding lab appts and whether she wants to pursue virtual appt with subject. Will also contact Dr Engel following results of labs tomorrow for further instructions.   8/29/23 (Cycle 93 Day 28) Virtual visit with Dr Engel; refer to her noted dated this date.  9/26/23 (Cycle 94 Day 27) Virtual visit with Dr Engel; refer to notes and labs 9/29/23.  10/26/23 (Cycle 95 Day 28) Virtual visit Dr Engel; refer to note.   11/20/23: telephone contact; subject requesting to cancel this month's visits for 11/24/23, Cycle 96 Day 28; *unable to follow through; missed visit; see EPIC note dated this day.*  Dr Engel informed and aware; will reschedule when her schedule permits.   12/21/23: subject has labs today and will have virtual appt with Dr Engel tomorrow morning; this research RN called subject to remind him of appts; see note;  interval history below for details of call. Presumably will have Cycle 97 Day 28 virtual visit tomorrow.   1/30/24: Had solo visit with Dr Engel Jan 30th (Cycle 98 Day 29) per Dr Engel no issues. Refer to her note from that date for vs/PE/labs.   Missed February (Cycle 99 Day 28) did not want to travel due to knee issues so voluntarily cancelled; no immediate follow up available to reschedule.   Missed March (Cycle 100 Day 28) conflict with another appt; he reports he had a kidney biopsy and that is why he cancelled that appt with Dr Engel. (March 13th)      Today, subject reports no new AE's; denies any CRAB symptoms; saw internal medicine MD this morning for follow up regarding management of diabetes. He denies any worsening of current AE's; does inquire about results from kidney biopsy done in March; states "no one called me back about results." Reassured subject will inform Dr Engel regarding wanting update kidney biopsy results. He states appreciation.     Awaiting myeloma labs; see all other results from " "today; had lipid panel and diabetes labs done today in addition to CBC,CMP, all myeloma labs.     Blood pressure today in Endocrine dept 126/66, AP 94; pain to l knee at "4" on 1-10 scale; per subject; see vital signs from visit today.    Will update this encounter following Dr Engel's visit tomorrow and will add myeloma labs once resulted.    Confirmed tomorrow's appt with subject; he states "I'll be there."      Reviewed and confirmed clinic contact information with subject; after hours as well; subject states understanding.                         "

## 2024-04-25 ENCOUNTER — OFFICE VISIT (OUTPATIENT)
Dept: ORTHOPEDICS | Facility: CLINIC | Age: 79
End: 2024-04-25
Payer: MEDICARE

## 2024-04-25 ENCOUNTER — OFFICE VISIT (OUTPATIENT)
Dept: HEMATOLOGY/ONCOLOGY | Facility: CLINIC | Age: 79
End: 2024-04-25
Payer: MEDICARE

## 2024-04-25 ENCOUNTER — OFFICE VISIT (OUTPATIENT)
Dept: PODIATRY | Facility: CLINIC | Age: 79
End: 2024-04-25
Payer: MEDICARE

## 2024-04-25 VITALS
HEIGHT: 70 IN | HEART RATE: 84 BPM | BODY MASS INDEX: 29.53 KG/M2 | OXYGEN SATURATION: 98 % | SYSTOLIC BLOOD PRESSURE: 158 MMHG | TEMPERATURE: 98 F | WEIGHT: 206.25 LBS | DIASTOLIC BLOOD PRESSURE: 72 MMHG

## 2024-04-25 VITALS
HEART RATE: 80 BPM | BODY MASS INDEX: 29.54 KG/M2 | TEMPERATURE: 98 F | WEIGHT: 206.38 LBS | HEIGHT: 70 IN | SYSTOLIC BLOOD PRESSURE: 140 MMHG | DIASTOLIC BLOOD PRESSURE: 78 MMHG

## 2024-04-25 VITALS
TEMPERATURE: 97 F | WEIGHT: 201.94 LBS | SYSTOLIC BLOOD PRESSURE: 146 MMHG | HEIGHT: 70 IN | DIASTOLIC BLOOD PRESSURE: 79 MMHG | HEART RATE: 82 BPM | RESPIRATION RATE: 18 BRPM | BODY MASS INDEX: 28.91 KG/M2

## 2024-04-25 DIAGNOSIS — M17.11 OSTEOARTHRITIS OF RIGHT KNEE, UNSPECIFIED OSTEOARTHRITIS TYPE: Primary | ICD-10-CM

## 2024-04-25 DIAGNOSIS — E11.22 CKD STAGE 3 DUE TO TYPE 2 DIABETES MELLITUS: ICD-10-CM

## 2024-04-25 DIAGNOSIS — E11.42 TYPE 2 DIABETES MELLITUS WITH PERIPHERAL NEUROPATHY: Primary | ICD-10-CM

## 2024-04-25 DIAGNOSIS — M25.569 KNEE PAIN, UNSPECIFIED CHRONICITY, UNSPECIFIED LATERALITY: ICD-10-CM

## 2024-04-25 DIAGNOSIS — D47.2 SMOLDERING MULTIPLE MYELOMA: Primary | ICD-10-CM

## 2024-04-25 DIAGNOSIS — E11.42 TYPE 2 DIABETES MELLITUS WITH DIABETIC POLYNEUROPATHY, WITH LONG-TERM CURRENT USE OF INSULIN: ICD-10-CM

## 2024-04-25 DIAGNOSIS — N18.30 CKD STAGE 3 DUE TO TYPE 2 DIABETES MELLITUS: ICD-10-CM

## 2024-04-25 DIAGNOSIS — Z79.4 TYPE 2 DIABETES MELLITUS WITH DIABETIC POLYNEUROPATHY, WITH LONG-TERM CURRENT USE OF INSULIN: ICD-10-CM

## 2024-04-25 LAB
ALBUMIN SERPL ELPH-MCNC: 4.17 G/DL (ref 3.35–5.55)
ALPHA1 GLOB SERPL ELPH-MCNC: 0.3 G/DL (ref 0.17–0.41)
ALPHA2 GLOB SERPL ELPH-MCNC: 0.8 G/DL (ref 0.43–0.99)
B-GLOBULIN SERPL ELPH-MCNC: 1.07 G/DL (ref 0.5–1.1)
GAMMA GLOB SERPL ELPH-MCNC: 2.17 G/DL (ref 0.67–1.58)
INTERPRETATION SERPL IFE-IMP: NORMAL
KAPPA LC SER QL IA: 7.7 MG/DL (ref 0.33–1.94)
KAPPA LC/LAMBDA SER IA: 2.93 (ref 0.26–1.65)
LAMBDA LC SER QL IA: 2.63 MG/DL (ref 0.57–2.63)
PROT SERPL-MCNC: 8.5 G/DL (ref 6–8.4)

## 2024-04-25 PROCEDURE — 20610 DRAIN/INJ JOINT/BURSA W/O US: CPT | Mod: HCNC,RT,S$GLB, | Performed by: PHYSICIAN ASSISTANT

## 2024-04-25 PROCEDURE — 3288F FALL RISK ASSESSMENT DOCD: CPT | Mod: CPTII,S$GLB,, | Performed by: STUDENT IN AN ORGANIZED HEALTH CARE EDUCATION/TRAINING PROGRAM

## 2024-04-25 PROCEDURE — 99213 OFFICE O/P EST LOW 20 MIN: CPT | Mod: 25,HCNC,S$GLB, | Performed by: PHYSICIAN ASSISTANT

## 2024-04-25 PROCEDURE — 3078F DIAST BP <80 MM HG: CPT | Mod: CPTII,S$GLB,, | Performed by: STUDENT IN AN ORGANIZED HEALTH CARE EDUCATION/TRAINING PROGRAM

## 2024-04-25 PROCEDURE — 99999 PR PBB SHADOW E&M-EST. PATIENT-LVL IV: CPT | Mod: PBBFAC,,, | Performed by: STUDENT IN AN ORGANIZED HEALTH CARE EDUCATION/TRAINING PROGRAM

## 2024-04-25 PROCEDURE — 99999 PR PBB SHADOW E&M-EST. PATIENT-LVL III: CPT | Mod: PBBFAC,HCNC,, | Performed by: PHYSICIAN ASSISTANT

## 2024-04-25 PROCEDURE — 11719 TRIM NAIL(S) ANY NUMBER: CPT | Mod: Q9,S$GLB,, | Performed by: STUDENT IN AN ORGANIZED HEALTH CARE EDUCATION/TRAINING PROGRAM

## 2024-04-25 PROCEDURE — 1159F MED LIST DOCD IN RCRD: CPT | Mod: CPTII,S$GLB,, | Performed by: STUDENT IN AN ORGANIZED HEALTH CARE EDUCATION/TRAINING PROGRAM

## 2024-04-25 PROCEDURE — 3078F DIAST BP <80 MM HG: CPT | Mod: HCNC,CPTII,S$GLB, | Performed by: PHYSICIAN ASSISTANT

## 2024-04-25 PROCEDURE — 99214 OFFICE O/P EST MOD 30 MIN: CPT | Mod: Q1,HCNC,S$GLB, | Performed by: INTERNAL MEDICINE

## 2024-04-25 PROCEDURE — 99213 OFFICE O/P EST LOW 20 MIN: CPT | Mod: 25,S$GLB,, | Performed by: STUDENT IN AN ORGANIZED HEALTH CARE EDUCATION/TRAINING PROGRAM

## 2024-04-25 PROCEDURE — 1126F AMNT PAIN NOTED NONE PRSNT: CPT | Mod: CPTII,S$GLB,, | Performed by: STUDENT IN AN ORGANIZED HEALTH CARE EDUCATION/TRAINING PROGRAM

## 2024-04-25 PROCEDURE — 1101F PT FALLS ASSESS-DOCD LE1/YR: CPT | Mod: CPTII,S$GLB,, | Performed by: STUDENT IN AN ORGANIZED HEALTH CARE EDUCATION/TRAINING PROGRAM

## 2024-04-25 PROCEDURE — 1125F AMNT PAIN NOTED PAIN PRSNT: CPT | Mod: HCNC,CPTII,S$GLB, | Performed by: PHYSICIAN ASSISTANT

## 2024-04-25 PROCEDURE — 99999 PR PBB SHADOW E&M-EST. PATIENT-LVL V: CPT | Mod: PBBFAC,HCNC,, | Performed by: INTERNAL MEDICINE

## 2024-04-25 PROCEDURE — 3077F SYST BP >= 140 MM HG: CPT | Mod: CPTII,S$GLB,, | Performed by: STUDENT IN AN ORGANIZED HEALTH CARE EDUCATION/TRAINING PROGRAM

## 2024-04-25 PROCEDURE — 3077F SYST BP >= 140 MM HG: CPT | Mod: HCNC,CPTII,S$GLB, | Performed by: PHYSICIAN ASSISTANT

## 2024-04-25 RX ORDER — AMITRIPTYLINE HYDROCHLORIDE 25 MG/1
25 TABLET, FILM COATED ORAL NIGHTLY
Qty: 30 TABLET | Refills: 11 | Status: SHIPPED | OUTPATIENT
Start: 2024-04-25 | End: 2024-06-07

## 2024-04-25 RX ADMIN — TRIAMCINOLONE ACETONIDE 40 MG: 40 INJECTION, SUSPENSION INTRA-ARTICULAR; INTRAMUSCULAR at 09:04

## 2024-04-25 NOTE — PROGRESS NOTES
Subjective:    Patient ID: Emerson Menendez is a 78 y.o. male.    Chief Complaint: No chief complaint on file.    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 Test results identify a stable IgA kappa protein with beta globulin band at 1.63g/dL. Kappa free light chain is elevated at 4.40. CBC and calcium are stable. Creatinine with history of stage III CKD is stable at 1.4. Metastatic survey is negative for lytic lesions. MRI of the entire spine demonstrated age related changes but no convincing evidence of myeloma bone disease. Bone marrow biopsy identified about 13% plasma cells by morphology and FISH for myeloma identified a t(11;14) and trisomies 3,7, and 17. The patient is afebrile and appears clinically well. He was seen by his podiatrist and nephrologist since our last visit without any new or acute events.    The patient has not received any therapy for smoldering myeloma including bisphosphonates or steroids. He has been randomized to observation arm of the the clinical study. 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 He has no current or prior history of malignancy.    TODAY Cycle 100 E3A06, observation cohort  CBC and CMP stable  FLC and SPEP pending  Renal biopsy in March 2024 had no evidence of light chain or M protein deposition  Considering right knee replacement at end of year      Knee Pain     Joint Pain  Associated symptoms include coughing. Pertinent negatives include no abdominal pain, chest pain, diaphoresis, fatigue, fever, headaches or rash.   Hand Pain   Pertinent negatives include no chest pain.   Cough  Pertinent negatives  include no chest pain, fever, headaches, rash or shortness of breath.   Foot Pain  Associated symptoms include coughing. Pertinent negatives include no abdominal pain, chest pain, diaphoresis, fatigue, fever, headaches or rash.   Arm Pain   Pertinent negatives include no chest pain.   Follow-up  Associated symptoms include coughing. Pertinent negatives include no abdominal pain, chest pain, diaphoresis, fatigue, fever, headaches or rash.     Review of Systems   Constitutional:  Negative for activity change, appetite change, diaphoresis, fatigue, fever and unexpected weight change.   HENT: Negative.  Negative for mouth sores.    Eyes: Negative.    Respiratory:  Positive for cough. Negative for shortness of breath.    Cardiovascular:  Negative for chest pain and leg swelling.   Gastrointestinal: Negative.  Negative for abdominal pain and diarrhea.   Endocrine: Negative.    Genitourinary: Negative.  Negative for frequency.   Musculoskeletal: Negative.  Negative for back pain.   Skin: Negative.  Negative for rash.   Allergic/Immunologic: Negative.    Neurological:  Negative for headaches.        Grade 0-1 peripheral neuropathy of bilateral feet.    Hematological:  Negative for adenopathy. Does not bruise/bleed easily.   Psychiatric/Behavioral: Negative.  The patient is not nervous/anxious.        Objective:       Vitals:    04/25/24 1321   BP: (!) 158/72   Pulse: 84   Temp: 97.7 °F (36.5 °C)         Physical Exam  Vitals and nursing note reviewed.   Constitutional:       Appearance: He is well-developed.   HENT:      Head: Normocephalic and atraumatic.      Right Ear: External ear normal.      Left Ear: External ear normal.      Nose: Nose normal.   Eyes:      Conjunctiva/sclera: Conjunctivae normal.      Pupils: Pupils are equal, round, and reactive to light.   Cardiovascular:      Rate and Rhythm: Normal rate and regular rhythm.      Heart sounds: No murmur heard.  Pulmonary:      Effort: Pulmonary effort is normal.  No respiratory distress.      Breath sounds: Normal breath sounds.   Abdominal:      General: Bowel sounds are normal. There is no distension.      Palpations: Abdomen is soft.   Musculoskeletal:         General: No tenderness.      Cervical back: Normal range of motion and neck supple.   Skin:     General: Skin is warm and dry.      Findings: No rash.      Nails: There is no clubbing.   Neurological:      Mental Status: He is alert and oriented to person, place, and time.      Cranial Nerves: No cranial nerve deficit.   Psychiatric:         Behavior: Behavior normal.         Assessment:       No diagnosis found.        Plan:       The patient has a diagnosis of smoldering myeloma. There is no indication for immediate chemotherapy. We are monitoring renal function closely- baseline creatinine of around 1.5. 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 M protein has been stable and repeat is pending. Plan for return in 1 month.  Endocrinology and Nephrology to monitor diabetes and renal failure respectively. Patient would like to continue to follow with Dr. Perkins as needed.     Elevated A1C- working on diet and exercise to improve. March 2024 renal biopsy was diabetic nephropathy  Continue monthly observation  Repeating myeloma serology 4/25/24 pending      A total of 20 minutes was spent in pre-visit chart review, personal interpretation of labs and imaging, and medication review. Total visit time 30 minutes, >50 % counseling.

## 2024-04-25 NOTE — PROCEDURES
"Routine Foot Care    Date/Time: 4/25/2024 3:45 PM    Performed by: Sharan Yañez DPM  Authorized by: Sharan Yañez DPM    Time out: Immediately prior to procedure a "time out" was called to verify the correct patient, procedure, equipment, support staff and site/side marked as required.    Consent Done?:  Yes (Verbal)  Hyperkeratotic Skin Lesions?: No      Nail Care Type:  Trim  Location(s): All  (Left 1st Toe, Left 3rd Toe, Left 2nd Toe, Left 4th Toe, Left 5th Toe, Right 1st Toe, Right 2nd Toe, Right 3rd Toe, Right 4th Toe and Right 5th Toe)  Patient tolerance:  Patient tolerated the procedure well with no immediate complications    "

## 2024-04-25 NOTE — PROGRESS NOTES
Subjective:     Patient    Emerson Menendez is a 78 y.o. male.    Problem    Last seen by Dr Meier in 2021 for right foot gout. Presents for difficulty managing toenails s/p knee surgery. Also with constant numbness, tingling, burning, cramping in both feet. On gabapentin but has difficulty keeping up with the number of pills, not much improvement with the gabapentin.      Primary Care Provider    Primary Care Provider: Roberta Sagastume MD   Last Seen: 4/24/2024     History    History obtained from patient and review of medical records.     Past Medical History:   Diagnosis Date    Anxiety 03/16/2015    Asymptomatic multiple myeloma     BPH (benign prostatic hypertrophy) 09/24/2012    Carpal tunnel syndrome     Depression 03/16/2015    Diabetic retinopathy     GERD (gastroesophageal reflux disease) 09/24/2012    Glaucoma     Hx of psychiatric care     Celexa, Valium    Hyperlipidemia     Hypertension     Hypertensive retinopathy of both eyes     Microalbuminuria 09/24/2012    Osteoarthritis of cervical spine 09/24/2012    Osteoarthritis of knee 09/24/2012    Posterior capsular opacification, right 09/15/2022    Psychiatric problem     Type II or unspecified type diabetes mellitus without mention of complication, not stated as uncontrolled 09/24/2012       Past Surgical History:   Procedure Laterality Date    ARTHROSCOPY OF WRIST Left 08/03/2020    Procedure: ARTHROSCOPY, WRIST LEFT;  Surgeon: Kindra Castillo MD;  Location: [facility] OR;  Service: Orthopedics;  Laterality: Left;  REGIONAL/MAC    CARPAL TUNNEL RELEASE Left 08/03/2020    Procedure: RELEASE, CARPAL TUNNEL LEFT;  Surgeon: Kindra Castillo MD;  Location: [facility] OR;  Service: Orthopedics;  Laterality: Left;  REGIONAL/MAC    CATARACT EXTRACTION  2012    Right eye    CYSTOSCOPY N/A 6/6/2023    Procedure: CYSTOSCOPY;  Surgeon: Juan Walker MD;  Location: [address];  Service: Urology;  Laterality: N/A;    EYE SURGERY Bilateral      cataract removal    INTRAOCULAR PROSTHESES INSERTION Left 05/20/2021    Procedure: INSERTION, IOL PROSTHESIS;  Surgeon: Jose L Wheatley MD;  [address];  Service: Ophthalmology;  Laterality: Left;    PHACOEMULSIFICATION OF CATARACT Left 05/20/2021    Procedure: PHACOEMULSIFICATION, CATARACT;  Surgeon: Jose L Wheatley MD;  [facility];  Service: Ophthalmology;  Laterality: Left;    PROSTATE SURGERY      RENAL BIOPSY Left 3/13/2024    Procedure: BIOPSY, KIDNEY;  Surgeon: Nikhil Salgado MD;  Location: [facility] INTERVENTIONAL NEPHROLOGY;  Service: Interventional Nephrology;  Laterality: Left;    SURGICAL REMOVAL OF CARPAL BONE Left 01/06/2021    Procedure: OSTECTOMY, CARPAL BONE, LEFT;  Surgeon: Kindra Castillo MD;  Location: [facility] [place] OR;  Service: Orthopedics;  Laterality: Left;  REGIONAL/MAC    TONSILLECTOMY      TOTAL KNEE ARTHROPLASTY Left 6/12/2023    Procedure: L TKA;  Surgeon: Kaleb Olguin MD;  Location: [facility] MAIN OR;  Service: Orthopedics;  Laterality: Left;    TRANSURETHRAL RESECTION OF PROSTATE (TURP) USING LASER N/A 6/6/2023    Procedure: TURP, USING LASER;  Surgeon: Juan Walker MD;  Location: [address];  Service: Urology;  Laterality: N/A;    TRIGGER FINGER RELEASE Right 07/29/2022    Procedure: RELEASE, TRIGGER FINGER,RIGHT,LONG;  Surgeon: Kindra Castillo MD;  Location: [facility] OR;  Service: Orthopedics;  Laterality: Right;        Objective:     Vitals  Wt Readings from Last 1 Encounters:   04/25/24 93.6 kg (206 lb 5.6 oz)     Temp Readings from Last 1 Encounters:   04/25/24 98.2 °F (36.8 °C) (Oral)     BP Readings from Last 1 Encounters:   04/25/24 (!) 140/78     Pulse Readings from Last 1 Encounters:   04/25/24 80       Dermatological Exam    Skin:  Pedal hair growth diminished and Pedal skin thin and shiny on right  Pedal hair growth diminished and Pedal skin thin and shiny on left     Nails:  10 nail(s) elongated    Vascular  Exam    Arteries:  Posterior tibial artery palpable on right  Dorsalis pedis artery palpable on right  Posterior tibial artery palpable on left  Dorsalis pedis artery palpable on left    Veins:  Superficial veins unremarkable on right  Superficial veins unremarkable on left    Swellin+ nonpitting on right  1+ nonpitting on left    Neurological Exam    [place] touch test:  6/6 sites sensed, light touch intact     Musculoskeletal Exam    Footwear:  Casual on right  Casual on left    Gait Exam:   Ambulatory Status: Ambulatory  Gait: Apropulsive  Assistive Devices: None    Foot Progression Angle:  Normal on right  Normal on left     Right Lower Extremity Additional Findings:  Right foot and ankle function, strength, and range of motion unremarkable except as noted above.     Left Lower Extremity Additional Findings:  Left foot and ankle function, strength, and range of motion unremarkable except as noted above.    Imaging and Other Tests    Imaging:  Independently reviewed and interpreted imaging, findings are as follows: N/A    Other Tests: The following A1c results were reviewed.   Hemoglobin A1C   Date Value Ref Range Status   2024 7.6 (H) 4.0 - 5.6 % Final     Comment:     ADA Screening Guidelines:  5.7-6.4%  Consistent with prediabetes  >or=6.5%  Consistent with diabetes    High levels of fetal hemoglobin interfere with the HbA1C  assay. Heterozygous hemoglobin variants (HbS, HgC, etc)do  not significantly interfere with this assay.   However, presence of multiple variants may affect accuracy.     2024 8.0 (H) 4.0 - 5.6 % Final     Comment:     ADA Screening Guidelines:  5.7-6.4%  Consistent with prediabetes  >or=6.5%  Consistent with diabetes    High levels of fetal hemoglobin interfere with the HbA1C  assay. Heterozygous hemoglobin variants (HbS, HgC, etc)do  not significantly interfere with this assay.   However, presence of multiple variants may affect accuracy.     2023 7.9 (H) 4.0 - 5.6  % Final     Comment:     ADA Screening Guidelines:  5.7-6.4%  Consistent with prediabetes  >or=6.5%  Consistent with diabetes    High levels of fetal hemoglobin interfere with the HbA1C  assay. Heterozygous hemoglobin variants (HbS, HgC, etc)do  not significantly interfere with this assay.   However, presence of multiple variants may affect accuracy.           Assessment:     Encounter Diagnosis   Name Primary?    Type 2 diabetes mellitus with peripheral neuropathy Yes        Plan:     I counseled the patient on his conditions, their implications and medical management.    Diabetic foot with peripheral neuropathy: chronic stable  -Diabetic foot exam performed.  -Performed shoe inspection and diabetic foot education. Reviewed importance of blood glucose control, proper nutrition, and foot hygiene to minimize risk of complications of diabetes. Recommended daily foot inspections, daily moisturizer to feet, avoiding sharp instruments to feet, appropriate footwear at all times when ambulating, and following up regularly for routine foot care.   -Diabetic foot risk: 2023 IWGDF very low ulcer risk.   -Next foot exam due April 2025.  -Stop gabapentin. Start amitriptyline.   -Trimmed nails x10, see procedure note.       Return to clinic in 1 month for nerve pain, call sooner PRN.

## 2024-04-26 LAB
PATHOLOGIST INTERPRETATION IFE: NORMAL
PATHOLOGIST INTERPRETATION SPE: NORMAL

## 2024-04-28 ENCOUNTER — PATIENT MESSAGE (OUTPATIENT)
Dept: INTERNAL MEDICINE | Facility: CLINIC | Age: 79
End: 2024-04-28
Payer: MEDICARE

## 2024-04-28 DIAGNOSIS — Z79.4 TYPE 2 DIABETES MELLITUS WITH OTHER CIRCULATORY COMPLICATION, WITH LONG-TERM CURRENT USE OF INSULIN: Primary | ICD-10-CM

## 2024-04-28 DIAGNOSIS — E11.59 TYPE 2 DIABETES MELLITUS WITH OTHER CIRCULATORY COMPLICATION, WITH LONG-TERM CURRENT USE OF INSULIN: Primary | ICD-10-CM

## 2024-04-28 RX ORDER — TRIAMCINOLONE ACETONIDE 40 MG/ML
40 INJECTION, SUSPENSION INTRA-ARTICULAR; INTRAMUSCULAR
Status: COMPLETED | OUTPATIENT
Start: 2024-04-28 | End: 2024-04-25

## 2024-04-28 NOTE — PROGRESS NOTES
Subjective     Patient ID: Emerson Menendez is a 78 y.o. male.    Chief Complaint: Follow-up (RIGHT KNEE)    HPI    Patient is a 78 year old male who presents to clinic with chief complaint of chronic right knee arthritis. Patient stated that he is interested yumiko a knee replacement in the future but not a least till the end of the year. He stated that he was seen by  in February who did a Ketorolac injection due to increased blood sugars. He stated that he had his A1C which has decreased and would now like an injection because that last one did not help at all.      Review of Systems   Constitutional: Negative for chills and fever.   Cardiovascular:  Negative for chest pain.   Respiratory:  Negative for cough and shortness of breath.    Skin:  Negative for color change, dry skin, itching, nail changes, poor wound healing and rash.   Musculoskeletal:         Right knee pain   Neurological:  Negative for dizziness.   Psychiatric/Behavioral:  Negative for altered mental status. The patient is not nervous/anxious.    All other systems reviewed and are negative.         Objective     General    Constitutional: He is oriented to person, place, and time. He appears well-developed and well-nourished. No distress.   HENT:   Head: Atraumatic.   Eyes: Conjunctivae are normal.   Cardiovascular:  Normal rate.            Pulmonary/Chest: Effort normal.   Neurological: He is alert and oriented to person, place, and time.   Psychiatric: He has a normal mood and affect. His behavior is normal.           Right Knee Exam     Tenderness   The patient is tender to palpation of the medial joint line.    Range of Motion   The patient has normal right knee ROM.    Tests   Ligament Examination   Lachman: normal (-1 to 2mm)   PCL-Posterior Drawer: normal (0 to 2mm)     MCL - Valgus: normal (0 to 2mm)  LCL - Varus: normal    Muscle Strength   Right Lower Extremity   Quadriceps:  4/5   Hamstrin/5       Physical  Exam  Constitutional:       General: He is not in acute distress.     Appearance: He is well-developed and well-nourished. He is not diaphoretic.   HENT:      Head: Atraumatic.   Eyes:      Conjunctiva/sclera: Conjunctivae normal.   Cardiovascular:      Rate and Rhythm: Normal rate.   Pulmonary:      Effort: Pulmonary effort is normal.   Neurological:      Mental Status: He is alert and oriented to person, place, and time.   Psychiatric:         Mood and Affect: Mood and affect normal.         Behavior: Behavior normal.          RADS: reviewed by myself:    R. Knee shows arthritis changes with decreased joint space        Assessment and Plan     Encounter Diagnosis   Name Primary?    Knee pain, unspecified chronicity, unspecified laterality          Discussed options with the patient including oral medication, injection and surgical consultation. Patient stated that he would like an injection today but will also set up a time to get a replacement. He would like one with . He stated that he will make the appointment himself.       PROCEDURE:  I have explained the risks, benefits, and alternatives of the procedure in detail.  The patient voices understanding and all questions have been answered.  The patient agrees to proceed as planned. So after I performed a sterile prep of the skin in the normal fashion the right knee is injected using a 22 gauge needle from the anterolateral approach with a combination of 4cc 1% plain lidocaine and 40 mg of kenalog.  The patient is cautioned and immediate relief of pain is secondary to the local anesthetic and will be temporary.  After the anesthetic wears off there may be a increase in pain that may last for a few hours or a few days and they should use ice to help alleviate this flair up of pain.       Lab Results   Component Value Date    HGBA1C 7.6 (H) 04/24/2024    Emerson Menendez was advised to monitor his blood sugars closely over the next several days. The  "steroid may cause a rise in them. If his glucose levels rise to a point he is uncomfortable or he is unable to control them he is to contact his PCP or go immediately to the emergency department.     Estimated body mass index is 28.98 kg/m² as calculated from the following:    Height as of this encounter: 5' 10" (1.778 m).    Weight as of this encounter: 91.6 kg (201 lb 15.1 oz).            "

## 2024-05-03 ENCOUNTER — TELEPHONE (OUTPATIENT)
Dept: PODIATRY | Facility: CLINIC | Age: 79
End: 2024-05-03
Payer: MEDICARE

## 2024-05-06 RX ORDER — PRAVASTATIN SODIUM 40 MG/1
40 TABLET ORAL
Qty: 90 TABLET | Refills: 3 | Status: SHIPPED | OUTPATIENT
Start: 2024-05-06

## 2024-05-06 NOTE — TELEPHONE ENCOUNTER
No care due was identified.  [facility] Embedded Care Due Messages. Reference number: [id number].   5/06/2024 4:32:21 AM CDT

## 2024-05-06 NOTE — TELEPHONE ENCOUNTER
Emerson Menendez  is requesting a refill authorization.  Brief Assessment and Rationale for Refill:  Approve     Medication Therapy Plan:         Comments:     Note composed:7:53 AM 05/06/2024

## 2024-05-07 ENCOUNTER — TELEPHONE (OUTPATIENT)
Dept: PODIATRY | Facility: CLINIC | Age: 79
End: 2024-05-07

## 2024-05-07 NOTE — TELEPHONE ENCOUNTER
Pt came for scheduled appt in clinic. BG was 71 in [facility]. Pt was moved to room. Pt wasn't seen due to just having an appt with Otilio last week. Pt left at 2:44pm with a BG of 164.

## 2024-05-09 ENCOUNTER — OFFICE VISIT (OUTPATIENT)
Dept: HEMATOLOGY/ONCOLOGY | Facility: CLINIC | Age: 79
End: 2024-05-09
Payer: MEDICARE

## 2024-05-09 VITALS
RESPIRATION RATE: 20 BRPM | BODY MASS INDEX: 28.75 KG/M2 | SYSTOLIC BLOOD PRESSURE: 174 MMHG | DIASTOLIC BLOOD PRESSURE: 84 MMHG | HEIGHT: 70 IN | WEIGHT: 200.81 LBS | HEART RATE: 98 BPM | OXYGEN SATURATION: 96 % | TEMPERATURE: 98 F

## 2024-05-09 DIAGNOSIS — Z79.4 TYPE 2 DIABETES MELLITUS WITH DIABETIC POLYNEUROPATHY, WITH LONG-TERM CURRENT USE OF INSULIN: ICD-10-CM

## 2024-05-09 DIAGNOSIS — D47.2 SMOLDERING MULTIPLE MYELOMA: Primary | ICD-10-CM

## 2024-05-09 DIAGNOSIS — E11.22 CKD STAGE 3 DUE TO TYPE 2 DIABETES MELLITUS: ICD-10-CM

## 2024-05-09 DIAGNOSIS — N18.30 CKD STAGE 3 DUE TO TYPE 2 DIABETES MELLITUS: ICD-10-CM

## 2024-05-09 DIAGNOSIS — E11.42 TYPE 2 DIABETES MELLITUS WITH DIABETIC POLYNEUROPATHY, WITH LONG-TERM CURRENT USE OF INSULIN: ICD-10-CM

## 2024-05-09 PROCEDURE — 99999 PR PBB SHADOW E&M-EST. PATIENT-LVL V: CPT | Mod: PBBFAC,HCNC,, | Performed by: INTERNAL MEDICINE

## 2024-05-09 PROCEDURE — 99214 OFFICE O/P EST MOD 30 MIN: CPT | Mod: Q1,HCNC,S$GLB, | Performed by: INTERNAL MEDICINE

## 2024-05-09 NOTE — PROGRESS NOTES
Subjective:    Patient ID: Emerson Menendez is a 78 y.o. male.    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 Test results identify a stable IgA kappa protein with beta globulin band at 1.63g/dL. Kappa free light chain is elevated at 4.40. CBC and calcium are stable. Creatinine with history of stage III CKD is stable at 1.4. Metastatic survey is negative for lytic lesions. MRI of the entire spine demonstrated age related changes but no convincing evidence of myeloma bone disease. Bone marrow biopsy identified about 13% plasma cells by morphology and FISH for myeloma identified a t(11;14) and trisomies 3,7, and 17. The patient is afebrile and appears clinically well. He was seen by his podiatrist and nephrologist since our last visit without any new or acute events.    The patient has not received any therapy for smoldering myeloma including bisphosphonates or steroids. He has been randomized to observation arm of the the clinical study. 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 He has no current or prior history of malignancy.    TODAY Cycle 100 E3A06, observation cohort  CBC and CMP stable  FLC and SPEP stable  Renal biopsy in March 2024 had no evidence of light chain or M protein deposition  Considering right knee replacement at end of year  Had knee injections after our last visit in April, now better mobility. Able to get up/down ladders.  Improved BG control      Knee Pain     Joint Pain  Associated symptoms include coughing. Pertinent negatives include no abdominal pain, chest pain, diaphoresis, fatigue,  fever, headaches or rash.   Hand Pain   Pertinent negatives include no chest pain.   Cough  Pertinent negatives include no chest pain, fever, headaches, rash or shortness of breath.   Foot Pain  Associated symptoms include coughing. Pertinent negatives include no abdominal pain, chest pain, diaphoresis, fatigue, fever, headaches or rash.   Arm Pain   Pertinent negatives include no chest pain.   Follow-up  Associated symptoms include coughing. Pertinent negatives include no abdominal pain, chest pain, diaphoresis, fatigue, fever, headaches or rash.     Review of Systems   Constitutional:  Negative for activity change, appetite change, diaphoresis, fatigue, fever and unexpected weight change.   HENT: Negative.  Negative for mouth sores.    Eyes: Negative.    Respiratory:  Positive for cough. Negative for shortness of breath.    Cardiovascular:  Negative for chest pain and leg swelling.   Gastrointestinal: Negative.  Negative for abdominal pain and diarrhea.   Endocrine: Negative.    Genitourinary: Negative.  Negative for frequency.   Musculoskeletal: Negative.  Negative for back pain.   Skin: Negative.  Negative for rash.   Allergic/Immunologic: Negative.    Neurological:  Negative for headaches.        Grade 0-1 peripheral neuropathy of bilateral feet.    Hematological:  Negative for adenopathy. Does not bruise/bleed easily.   Psychiatric/Behavioral: Negative.  The patient is not nervous/anxious.        Objective:       Vitals:    05/09/24 1049   BP: (!) 174/84   Pulse: 98   Resp: 20   Temp: 97.6 °F (36.4 °C)         Physical Exam  Vitals and nursing note reviewed.   Constitutional:       Appearance: He is well-developed.   HENT:      Head: Normocephalic and atraumatic.      Right Ear: External ear normal.      Left Ear: External ear normal.      Nose: Nose normal.   Eyes:      Conjunctiva/sclera: Conjunctivae normal.      Pupils: Pupils are equal, round, and reactive to light.   Cardiovascular:      Rate and Rhythm:  Normal rate and regular rhythm.      Heart sounds: No murmur heard.  Pulmonary:      Effort: Pulmonary effort is normal. No respiratory distress.      Breath sounds: Normal breath sounds.   Abdominal:      General: Bowel sounds are normal. There is no distension.      Palpations: Abdomen is soft.   Musculoskeletal:         General: No tenderness.      Cervical back: Normal range of motion and neck supple.   Skin:     General: Skin is warm and dry.      Findings: No rash.      Nails: There is no clubbing.   Neurological:      Mental Status: He is alert and oriented to person, place, and time.      Cranial Nerves: No cranial nerve deficit.   Psychiatric:         Behavior: Behavior normal.         Assessment:       No diagnosis found.        Plan:       The patient has a diagnosis of smoldering myeloma. There is no indication for immediate chemotherapy. We are monitoring renal function closely- baseline creatinine of around 1.5. 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 M protein has been stable and repeat is pending. Plan for return in 1 month.  Endocrinology and Nephrology to monitor diabetes and renal failure respectively. Patient would like to continue to follow with Dr. Perkins as needed.     Elevated A1C- working on diet and exercise to improve. March 2024 renal biopsy was diabetic nephropathy  Continue monthly observation  Repeating myeloma serology stable      A total of 20 minutes was spent in pre-visit chart review, personal interpretation of labs and imaging, and medication review. Total visit time 30 minutes, >50 % counseling.

## 2024-06-07 ENCOUNTER — OFFICE VISIT (OUTPATIENT)
Dept: PODIATRY | Facility: CLINIC | Age: 79
End: 2024-06-07
Payer: MEDICARE

## 2024-06-07 VITALS
BODY MASS INDEX: 28.75 KG/M2 | SYSTOLIC BLOOD PRESSURE: 150 MMHG | HEART RATE: 76 BPM | HEIGHT: 70 IN | DIASTOLIC BLOOD PRESSURE: 70 MMHG | TEMPERATURE: 98 F | WEIGHT: 200.81 LBS

## 2024-06-07 DIAGNOSIS — E11.42 TYPE 2 DIABETES MELLITUS WITH PERIPHERAL NEUROPATHY: Primary | ICD-10-CM

## 2024-06-07 PROCEDURE — 99999 PR PBB SHADOW E&M-EST. PATIENT-LVL IV: CPT | Mod: PBBFAC,,, | Performed by: STUDENT IN AN ORGANIZED HEALTH CARE EDUCATION/TRAINING PROGRAM

## 2024-06-07 PROCEDURE — 3078F DIAST BP <80 MM HG: CPT | Mod: CPTII,S$GLB,, | Performed by: STUDENT IN AN ORGANIZED HEALTH CARE EDUCATION/TRAINING PROGRAM

## 2024-06-07 PROCEDURE — 1126F AMNT PAIN NOTED NONE PRSNT: CPT | Mod: CPTII,S$GLB,, | Performed by: STUDENT IN AN ORGANIZED HEALTH CARE EDUCATION/TRAINING PROGRAM

## 2024-06-07 PROCEDURE — 99213 OFFICE O/P EST LOW 20 MIN: CPT | Mod: S$GLB,,, | Performed by: STUDENT IN AN ORGANIZED HEALTH CARE EDUCATION/TRAINING PROGRAM

## 2024-06-07 PROCEDURE — 1101F PT FALLS ASSESS-DOCD LE1/YR: CPT | Mod: CPTII,S$GLB,, | Performed by: STUDENT IN AN ORGANIZED HEALTH CARE EDUCATION/TRAINING PROGRAM

## 2024-06-07 PROCEDURE — 3288F FALL RISK ASSESSMENT DOCD: CPT | Mod: CPTII,S$GLB,, | Performed by: STUDENT IN AN ORGANIZED HEALTH CARE EDUCATION/TRAINING PROGRAM

## 2024-06-07 PROCEDURE — 1159F MED LIST DOCD IN RCRD: CPT | Mod: CPTII,S$GLB,, | Performed by: STUDENT IN AN ORGANIZED HEALTH CARE EDUCATION/TRAINING PROGRAM

## 2024-06-07 PROCEDURE — 3077F SYST BP >= 140 MM HG: CPT | Mod: CPTII,S$GLB,, | Performed by: STUDENT IN AN ORGANIZED HEALTH CARE EDUCATION/TRAINING PROGRAM

## 2024-06-07 RX ORDER — AMITRIPTYLINE HYDROCHLORIDE 50 MG/1
50 TABLET, FILM COATED ORAL NIGHTLY
Qty: 30 TABLET | Refills: 11 | Status: SHIPPED | OUTPATIENT
Start: 2024-06-07 | End: 2025-06-07

## 2024-06-07 NOTE — PROGRESS NOTES
Subjective:     Patient    Emerson Menendez is a 78 y.o. male.    Problem    04/25/24: Last seen by Dr Meier in 2021 for right foot gout. Presents for difficulty managing toenails s/p knee surgery. Also with constant numbness, tingling, burning, cramping in both feet. On gabapentin but has difficulty keeping up with the number of pills, not much improvement with the gabapentin.      06/07/24: Returns for neuropathy; switched form gabapentin to amitriptyline at last visit. Has had significant improvement in all neuropathy symptoms and also improved sleep, no side effects. Still having some neuropathy but not nearly as bad. Patient said this is a blessing and he may give me a hug next time if it gets any better.     Primary Care Provider    Primary Care Provider: Roberta Sagastume MD   Last Seen: 4/24/2024     History    History obtained from patient and review of medical records.     Past Medical History:   Diagnosis Date    Anxiety 03/16/2015    Asymptomatic multiple myeloma     BPH (benign prostatic hypertrophy) 09/24/2012    Carpal tunnel syndrome     Depression 03/16/2015    Diabetic retinopathy     GERD (gastroesophageal reflux disease) 09/24/2012    Glaucoma     Hx of psychiatric care     Celexa, Valium    Hyperlipidemia     Hypertension     Hypertensive retinopathy of both eyes     Microalbuminuria 09/24/2012    Osteoarthritis of cervical spine 09/24/2012    Osteoarthritis of knee 09/24/2012    Posterior capsular opacification, right 09/15/2022    Psychiatric problem     Type II or unspecified type diabetes mellitus without mention of complication, not stated as uncontrolled 09/24/2012       Past Surgical History:   Procedure Laterality Date    ARTHROSCOPY OF WRIST Left 08/03/2020    Procedure: ARTHROSCOPY, WRIST LEFT;  Surgeon: Kindra Castillo MD;  Location: [facility];  Service: Orthopedics;  Laterality: Left;  REGIONAL/[facility]    CARPAL TUNNEL RELEASE Left 08/03/2020    Procedure: RELEASE,  CARPAL TUNNEL LEFT;  Surgeon: Kindra Castillo MD;  Location: [facility] OR;  Service: Orthopedics;  Laterality: Left;  REGIONAL/[facility]    CATARACT EXTRACTION  2012    Right eye    CYSTOSCOPY N/A 6/6/2023    Procedure: CYSTOSCOPY;  Surgeon: Juan Walker MD;  Location: [address];  Service: Urology;  Laterality: N/A;    EYE SURGERY Bilateral     cataract removal    INTRAOCULAR PROSTHESES INSERTION Left 05/20/2021    Procedure: INSERTION, IOL PROSTHESIS;  Surgeon: Jose L Wheatley MD;  Location: [address];  Service: Ophthalmology;  Laterality: Left;    PHACOEMULSIFICATION OF CATARACT Left 05/20/2021    Procedure: PHACOEMULSIFICATION, CATARACT;  Surgeon: Jose L Wheatley MD;  Location: [address];  Service: Ophthalmology;  Laterality: Left;    PROSTATE SURGERY      RENAL BIOPSY Left 3/13/2024    Procedure: BIOPSY, KIDNEY;  Surgeon: Nikhil Salgado MD;  [facility]VENT[facility] NE[facility]OLOGY;  Service: Interventional Nephrology;  Laterality: Left;    SURGICAL REMOVAL OF CARPAL BONE Left 01/06/2021    Procedure: OSTECTOMY, CARPAL BONE, LEFT;  Surgeon: Kindra Castillo MD;  Location: [facility];  Service: Orthopedics;  Laterality: Left;  REGIONAL/MAC    TONSILLECTOMY      TOTAL KNEE ARTHROPLASTY Left 6/12/2023    Procedure: L TKA;  Surgeon: Kaleb Olguin MD;  Location: [facility] MAIN OR;  Service: Orthopedics;  Laterality: Left;    TRANSURETHRAL RESECTION OF PROSTATE (TURP) USING LASER N/A 6/6/2023    Procedure: TURP, USING LASER;  Surgeon: Juan Walker MD;  Location: [address];  Service: Urology;  Laterality: N/A;    TRIGGER FINGER RELEASE Right 07/29/2022    Procedure: RELEASE, TRIGGER FINGER,RIGHT,LONG;  Surgeon: Kindra Castillo MD;  Location: [facility];  Service: Orthopedics;  Laterality: Right;        Objective:     Vitals  Wt Readings from Last 1 Encounters:   06/07/24 91.1 kg (200 lb 13.4 oz)     Temp Readings from Last 1 Encounters:   06/07/24 97.9 °F (36.6 °C) (Oral)      BP Readings from Last 1 Encounters:   24 (!) 150/70     Pulse Readings from Last 1 Encounters:   24 76       Dermatological Exam    Skin:  Pedal hair growth diminished and Pedal skin thin and shiny on right  Pedal hair growth diminished and Pedal skin thin and shiny on left     Nails:  10 nail(s) elongated    Vascular Exam    Arteries:  Posterior tibial artery palpable on right  Dorsalis pedis artery palpable on right  Posterior tibial artery palpable on left  Dorsalis pedis artery palpable on left    Veins:  Superficial veins unremarkable on right  Superficial veins unremarkable on left    Swellin+ nonpitting on right  1+ nonpitting on left    Neurological Exam    [place] touch test:  6/6 sites sensed, light touch intact     Musculoskeletal Exam    Footwear:  Casual on right  Casual on left    Gait Exam:   Ambulatory Status: Ambulatory  Gait: Apropulsive  Assistive Devices: None    Foot Progression Angle:  Normal on right  Normal on left     Right Lower Extremity Additional Findings:  Right foot and ankle function, strength, and range of motion unremarkable except as noted above.     Left Lower Extremity Additional Findings:  Left foot and ankle function, strength, and range of motion unremarkable except as noted above.    Imaging and Other Tests    Imaging:  Independently reviewed and interpreted imaging, findings are as follows: N/A    Other Tests: The following A1c results were reviewed.   Hemoglobin A1C   Date Value Ref Range Status   2024 7.6 (H) 4.0 - 5.6 % Final     Comment:     ADA Screening Guidelines:  5.7-6.4%  Consistent with prediabetes  >or=6.5%  Consistent with diabetes    High levels of fetal hemoglobin interfere with the HbA1C  assay. Heterozygous hemoglobin variants (HbS, HgC, etc)do  not significantly interfere with this assay.   However, presence of multiple variants may affect accuracy.     2024 8.0 (H) 4.0 - 5.6 % Final     Comment:     ADA Screening  Guidelines:  5.7-6.4%  Consistent with prediabetes  >or=6.5%  Consistent with diabetes    High levels of fetal hemoglobin interfere with the HbA1C  assay. Heterozygous hemoglobin variants (HbS, HgC, etc)do  not significantly interfere with this assay.   However, presence of multiple variants may affect accuracy.     07/03/2023 7.9 (H) 4.0 - 5.6 % Final     Comment:     ADA Screening Guidelines:  5.7-6.4%  Consistent with prediabetes  >or=6.5%  Consistent with diabetes    High levels of fetal hemoglobin interfere with the HbA1C  assay. Heterozygous hemoglobin variants (HbS, HgC, etc)do  not significantly interfere with this assay.   However, presence of multiple variants may affect accuracy.           Assessment:     Encounter Diagnosis   Name Primary?    Type 2 diabetes mellitus with peripheral neuropathy Yes          Plan:     I counseled the patient on his conditions, their implications and medical management.    Diabetic foot with peripheral neuropathy: chronic stable   -Performed shoe inspection and diabetic foot education. Reviewed importance of blood glucose control, proper nutrition, and foot hygiene to minimize risk of complications of diabetes. Recommended daily foot inspections, daily moisturizer to feet, avoiding sharp instruments to feet, appropriate footwear at all times when ambulating, and following up regularly for routine foot care.   -Diabetic foot risk: 2023 IWGDF very low ulcer risk.   -Next foot exam due April 2025.  -Increase amitriptyline to 50 mg/night.   -Will return for routine foot care.       Return to clinic in 1-2 months for nerve pain, call sooner PRN.

## 2024-06-10 ENCOUNTER — PATIENT MESSAGE (OUTPATIENT)
Dept: INTERNAL MEDICINE | Facility: CLINIC | Age: 79
End: 2024-06-10
Payer: MEDICARE

## 2024-06-11 ENCOUNTER — LAB VISIT (OUTPATIENT)
Dept: LAB | Facility: HOSPITAL | Age: 79
End: 2024-06-11
Payer: MEDICARE

## 2024-06-11 ENCOUNTER — OFFICE VISIT (OUTPATIENT)
Dept: HEMATOLOGY/ONCOLOGY | Facility: CLINIC | Age: 79
End: 2024-06-11
Payer: MEDICARE

## 2024-06-11 VITALS
WEIGHT: 196.75 LBS | BODY MASS INDEX: 28.17 KG/M2 | DIASTOLIC BLOOD PRESSURE: 75 MMHG | SYSTOLIC BLOOD PRESSURE: 151 MMHG | RESPIRATION RATE: 16 BRPM | HEIGHT: 70 IN | TEMPERATURE: 98 F | OXYGEN SATURATION: 95 % | HEART RATE: 95 BPM

## 2024-06-11 DIAGNOSIS — Z00.6 RESEARCH EXAM: ICD-10-CM

## 2024-06-11 DIAGNOSIS — E11.42 TYPE 2 DIABETES MELLITUS WITH DIABETIC POLYNEUROPATHY, WITH LONG-TERM CURRENT USE OF INSULIN: ICD-10-CM

## 2024-06-11 DIAGNOSIS — E11.22 CKD STAGE 3 DUE TO TYPE 2 DIABETES MELLITUS: ICD-10-CM

## 2024-06-11 DIAGNOSIS — D47.2 SMOLDERING MULTIPLE MYELOMA: ICD-10-CM

## 2024-06-11 DIAGNOSIS — D47.2 SMOLDERING MULTIPLE MYELOMA: Primary | ICD-10-CM

## 2024-06-11 DIAGNOSIS — Z79.4 TYPE 2 DIABETES MELLITUS WITH DIABETIC POLYNEUROPATHY, WITH LONG-TERM CURRENT USE OF INSULIN: ICD-10-CM

## 2024-06-11 DIAGNOSIS — N18.30 CKD STAGE 3 DUE TO TYPE 2 DIABETES MELLITUS: ICD-10-CM

## 2024-06-11 LAB
ALBUMIN SERPL BCP-MCNC: 3.6 G/DL (ref 3.5–5.2)
ALP SERPL-CCNC: 60 U/L (ref 55–135)
ALT SERPL W/O P-5'-P-CCNC: 16 U/L (ref 10–44)
ANION GAP SERPL CALC-SCNC: 10 MMOL/L (ref 8–16)
AST SERPL-CCNC: 11 U/L (ref 10–40)
BASOPHILS # BLD AUTO: 0.04 K/UL (ref 0–0.2)
BASOPHILS NFR BLD: 0.7 % (ref 0–1.9)
BILIRUB SERPL-MCNC: 0.7 MG/DL (ref 0.1–1)
BUN SERPL-MCNC: 26 MG/DL (ref 8–23)
CALCIUM SERPL-MCNC: 10.2 MG/DL (ref 8.7–10.5)
CHLORIDE SERPL-SCNC: 102 MMOL/L (ref 95–110)
CO2 SERPL-SCNC: 26 MMOL/L (ref 23–29)
CREAT SERPL-MCNC: 2.2 MG/DL (ref 0.5–1.4)
DIFFERENTIAL METHOD BLD: ABNORMAL
EOSINOPHIL # BLD AUTO: 0.4 K/UL (ref 0–0.5)
EOSINOPHIL NFR BLD: 6.4 % (ref 0–8)
ERYTHROCYTE [DISTWIDTH] IN BLOOD BY AUTOMATED COUNT: 14.6 % (ref 11.5–14.5)
EST. GFR  (NO RACE VARIABLE): 30.1 ML/MIN/1.73 M^2
GLUCOSE SERPL-MCNC: 133 MG/DL (ref 70–110)
HCT VFR BLD AUTO: 36.1 % (ref 40–54)
HGB BLD-MCNC: 11.5 G/DL (ref 14–18)
IGA SERPL-MCNC: 1946 MG/DL (ref 40–350)
IGG SERPL-MCNC: 987 MG/DL (ref 650–1600)
IGM SERPL-MCNC: 43 MG/DL (ref 50–300)
IMM GRANULOCYTES # BLD AUTO: 0 K/UL (ref 0–0.04)
IMM GRANULOCYTES NFR BLD AUTO: 0 % (ref 0–0.5)
LDH SERPL L TO P-CCNC: 116 U/L (ref 110–260)
LYMPHOCYTES # BLD AUTO: 1.6 K/UL (ref 1–4.8)
LYMPHOCYTES NFR BLD: 29.8 % (ref 18–48)
MAGNESIUM SERPL-MCNC: 1.9 MG/DL (ref 1.6–2.6)
MCH RBC QN AUTO: 28.7 PG (ref 27–31)
MCHC RBC AUTO-ENTMCNC: 31.9 G/DL (ref 32–36)
MCV RBC AUTO: 90 FL (ref 82–98)
MONOCYTES # BLD AUTO: 0.4 K/UL (ref 0.3–1)
MONOCYTES NFR BLD: 8 % (ref 4–15)
NEUTROPHILS # BLD AUTO: 3 K/UL (ref 1.8–7.7)
NEUTROPHILS NFR BLD: 55.1 % (ref 38–73)
NRBC BLD-RTO: 0 /100 WBC
PHOSPHATE SERPL-MCNC: 3.2 MG/DL (ref 2.7–4.5)
PLATELET # BLD AUTO: 308 K/UL (ref 150–450)
PMV BLD AUTO: 8.9 FL (ref 9.2–12.9)
POTASSIUM SERPL-SCNC: 4.7 MMOL/L (ref 3.5–5.1)
PROT SERPL-MCNC: 9 G/DL (ref 6–8.4)
RBC # BLD AUTO: 4.01 M/UL (ref 4.6–6.2)
SODIUM SERPL-SCNC: 138 MMOL/L (ref 136–145)
WBC # BLD AUTO: 5.47 K/UL (ref 3.9–12.7)

## 2024-06-11 PROCEDURE — 83615 LACTATE (LD) (LDH) ENZYME: CPT | Mod: HCNC | Performed by: INTERNAL MEDICINE

## 2024-06-11 PROCEDURE — 83521 IG LIGHT CHAINS FREE EACH: CPT | Mod: HCNC | Performed by: INTERNAL MEDICINE

## 2024-06-11 PROCEDURE — 36415 COLL VENOUS BLD VENIPUNCTURE: CPT | Mod: HCNC | Performed by: INTERNAL MEDICINE

## 2024-06-11 PROCEDURE — 84100 ASSAY OF PHOSPHORUS: CPT | Mod: HCNC | Performed by: INTERNAL MEDICINE

## 2024-06-11 PROCEDURE — 86334 IMMUNOFIX E-PHORESIS SERUM: CPT | Mod: 26,HCNC,, | Performed by: PATHOLOGY

## 2024-06-11 PROCEDURE — 84165 PROTEIN E-PHORESIS SERUM: CPT | Mod: 26,HCNC,, | Performed by: PATHOLOGY

## 2024-06-11 PROCEDURE — 82784 ASSAY IGA/IGD/IGG/IGM EACH: CPT | Mod: 59,HCNC | Performed by: INTERNAL MEDICINE

## 2024-06-11 PROCEDURE — 86334 IMMUNOFIX E-PHORESIS SERUM: CPT | Mod: HCNC | Performed by: INTERNAL MEDICINE

## 2024-06-11 PROCEDURE — 83735 ASSAY OF MAGNESIUM: CPT | Mod: HCNC | Performed by: INTERNAL MEDICINE

## 2024-06-11 PROCEDURE — 80053 COMPREHEN METABOLIC PANEL: CPT | Mod: HCNC | Performed by: INTERNAL MEDICINE

## 2024-06-11 PROCEDURE — 85025 COMPLETE CBC W/AUTO DIFF WBC: CPT | Mod: HCNC | Performed by: INTERNAL MEDICINE

## 2024-06-11 PROCEDURE — 84165 PROTEIN E-PHORESIS SERUM: CPT | Mod: HCNC | Performed by: INTERNAL MEDICINE

## 2024-06-11 PROCEDURE — 99999 PR PBB SHADOW E&M-EST. PATIENT-LVL V: CPT | Mod: PBBFAC,HCNC,, | Performed by: INTERNAL MEDICINE

## 2024-06-11 NOTE — PROGRESS NOTES
Subjective:    Patient ID: Emerson Menendez is a 78 y.o. male.    Chief Complaint: No chief complaint on file.    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 Test results identify a stable IgA kappa protein with beta globulin band at 1.63g/dL. Kappa free light chain is elevated at 4.40. CBC and calcium are stable. Creatinine with history of stage III CKD is stable at 1.4. Metastatic survey is negative for lytic lesions. MRI of the entire spine demonstrated age related changes but no convincing evidence of myeloma bone disease. Bone marrow biopsy identified about 13% plasma cells by morphology and FISH for myeloma identified a t(11;14) and trisomies 3,7, and 17. The patient is afebrile and appears clinically well. He was seen by his podiatrist and nephrologist since our last visit without any new or acute events.    The patient has not received any therapy for smoldering myeloma including bisphosphonates or steroids. He has been randomized to observation arm of the the clinical study. 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 He has no current or prior history of malignancy.    TODAY Cycle 101 E3A06, observation cohort  CBC and CMP stable  FLC and SPEP pending  Renal biopsy in March 2024 had no evidence of light chain or M protein deposition; diagnosis of diabetic nephropathy  Having issue with BG control after diet changes. Having highs and lows. HgbA1C due with PCP next month.      Knee Pain     Joint Pain  Associated symptoms include coughing. Pertinent negatives include no abdominal pain, chest pain, diaphoresis, fatigue, fever, headaches or  rash.   Hand Pain   Pertinent negatives include no chest pain.   Cough  Pertinent negatives include no chest pain, fever, headaches, rash or shortness of breath.   Foot Pain  Associated symptoms include coughing. Pertinent negatives include no abdominal pain, chest pain, diaphoresis, fatigue, fever, headaches or rash.   Arm Pain   Pertinent negatives include no chest pain.   Follow-up  Associated symptoms include coughing. Pertinent negatives include no abdominal pain, chest pain, diaphoresis, fatigue, fever, headaches or rash.     Review of Systems   Constitutional:  Negative for activity change, appetite change, diaphoresis, fatigue, fever and unexpected weight change.   HENT: Negative.  Negative for mouth sores.    Eyes: Negative.    Respiratory:  Positive for cough. Negative for shortness of breath.    Cardiovascular:  Negative for chest pain and leg swelling.   Gastrointestinal: Negative.  Negative for abdominal pain and diarrhea.   Endocrine: Negative.    Genitourinary: Negative.  Negative for frequency.   Musculoskeletal: Negative.  Negative for back pain.   Skin: Negative.  Negative for rash.   Allergic/Immunologic: Negative.    Neurological:  Negative for headaches.        Grade 0-1 peripheral neuropathy of bilateral feet.    Hematological:  Negative for adenopathy. Does not bruise/bleed easily.   Psychiatric/Behavioral: Negative.  The patient is not nervous/anxious.        Objective:       Vitals:    06/11/24 1425   BP: (!) 151/75   Pulse: 95   Resp: 16   Temp: 97.6 °F (36.4 °C)         Physical Exam  Vitals and nursing note reviewed.   Constitutional:       Appearance: He is well-developed.   HENT:      Head: Normocephalic and atraumatic.      Right Ear: External ear normal.      Left Ear: External ear normal.      Nose: Nose normal.   Eyes:      Conjunctiva/sclera: Conjunctivae normal.      Pupils: Pupils are equal, round, and reactive to light.   Cardiovascular:      Rate and Rhythm: Normal rate and  regular rhythm.      Heart sounds: No murmur heard.  Pulmonary:      Effort: Pulmonary effort is normal. No respiratory distress.      Breath sounds: Normal breath sounds.   Abdominal:      General: Bowel sounds are normal. There is no distension.      Palpations: Abdomen is soft.   Musculoskeletal:         General: No tenderness.      Cervical back: Normal range of motion and neck supple.   Skin:     General: Skin is warm and dry.      Findings: No rash.      Nails: There is no clubbing.   Neurological:      Mental Status: He is alert and oriented to person, place, and time.      Cranial Nerves: No cranial nerve deficit.   Psychiatric:         Behavior: Behavior normal.         Assessment:       No diagnosis found.        Plan:       The patient has a diagnosis of smoldering myeloma. There is no indication for immediate chemotherapy. We are monitoring renal function closely- baseline creatinine of around 1.5. 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 M protein has been stable and repeat is pending. Plan for return in 1 month.  Endocrinology and Nephrology to monitor diabetes and renal failure respectively. Patient would like to continue to follow with Dr. Perkins as needed.     Elevated A1C- working on diet and exercise to improve. March 2024 renal biopsy was diabetic nephropathy  Continue monthly observation  Repeating myeloma serology pending.   Discussed hydration for creatinine of 2.2      A total of 20 minutes was spent in pre-visit chart review, personal interpretation of labs and imaging, and medication review. Total visit time 30 minutes, >50 % counseling.

## 2024-06-12 LAB
ALBUMIN SERPL ELPH-MCNC: 3.99 G/DL (ref 3.35–5.55)
ALPHA1 GLOB SERPL ELPH-MCNC: 0.34 G/DL (ref 0.17–0.41)
ALPHA2 GLOB SERPL ELPH-MCNC: 0.91 G/DL (ref 0.43–0.99)
B-GLOBULIN SERPL ELPH-MCNC: 1.21 G/DL (ref 0.5–1.1)
GAMMA GLOB SERPL ELPH-MCNC: 2.15 G/DL (ref 0.67–1.58)
INTERPRETATION SERPL IFE-IMP: NORMAL
KAPPA LC SER QL IA: 7.91 MG/DL (ref 0.33–1.94)
KAPPA LC/LAMBDA SER IA: 3.14 (ref 0.26–1.65)
LAMBDA LC SER QL IA: 2.52 MG/DL (ref 0.57–2.63)
PROT SERPL-MCNC: 8.6 G/DL (ref 6–8.4)

## 2024-06-14 LAB
PATHOLOGIST INTERPRETATION IFE: NORMAL
PATHOLOGIST INTERPRETATION SPE: NORMAL

## 2024-07-01 DIAGNOSIS — D47.2 SMOLDERING MULTIPLE MYELOMA: ICD-10-CM

## 2024-07-01 RX ORDER — CITALOPRAM 40 MG/1
40 TABLET, FILM COATED ORAL DAILY
Qty: 90 TABLET | Refills: 0 | Status: SHIPPED | OUTPATIENT
Start: 2024-07-01

## 2024-07-01 NOTE — TELEPHONE ENCOUNTER
No care due was identified.  [facility] Embedded Care Due Messages. Reference number: [id number].   7/01/2024 8:24:24 AM CDT

## 2024-07-02 DIAGNOSIS — D47.2 SMOLDERING MULTIPLE MYELOMA: ICD-10-CM

## 2024-07-02 DIAGNOSIS — N52.9 ERECTILE DYSFUNCTION, UNSPECIFIED ERECTILE DYSFUNCTION TYPE: ICD-10-CM

## 2024-07-02 RX ORDER — SILDENAFIL 100 MG/1
100 TABLET, FILM COATED ORAL DAILY PRN
Qty: 30 TABLET | Refills: 3 | Status: SHIPPED | OUTPATIENT
Start: 2024-07-02

## 2024-07-03 RX ORDER — DIAZEPAM 5 MG/1
5 TABLET ORAL EVERY 12 HOURS PRN
Qty: 60 TABLET | Refills: 0 | Status: SHIPPED | OUTPATIENT
Start: 2024-07-03

## 2024-07-03 NOTE — TELEPHONE ENCOUNTER
Refill Decision Note   Emerson Menendez  is requesting a refill authorization.  Brief Assessment and Rationale for Refill:  Approve     Medication Therapy Plan: ddi reviewed; ok to fill      Pharmacist review requested: Yes   Comments:     Note composed:9:50 PM 07/02/2024

## 2024-07-03 NOTE — TELEPHONE ENCOUNTER
Refill Routing Note   Medication(s) are not appropriate for processing by [facility] for the following reason(s):      Drug-drug interaction    [facility] action(s):  Defer Care Due:  None identified     Medication Therapy Plan: Duplicate Medication/Therapy: sildenafiL    Pharmacist review requested: Yes     Appointments  past 12m or future 3m with PCP    Date Provider   Last Visit   4/24/2024 Roberta Sagastume MD   Next Visit   8/16/2024 Roberta Sagastume MD   ED visits in past 90 days: 0        Note composed:9:35 PM 07/02/2024

## 2024-07-03 NOTE — TELEPHONE ENCOUNTER
No care due was identified.  [facility] Embedded Care Due Messages. Reference number: [id number].   7/02/2024 9:09:09 PM CDT

## 2024-07-03 NOTE — TELEPHONE ENCOUNTER
No care due was identified.  [facility] Embedded Care Due Messages. Reference number: [id number].   7/02/2024 9:11:01 PM CDT

## 2024-07-25 ENCOUNTER — TELEPHONE (OUTPATIENT)
Dept: OPHTHALMOLOGY | Facility: CLINIC | Age: 79
End: 2024-07-25
Payer: MEDICARE

## 2024-07-25 DIAGNOSIS — Z79.4 TYPE 2 DIABETES MELLITUS WITH DIABETIC POLYNEUROPATHY, WITH LONG-TERM CURRENT USE OF INSULIN: Chronic | ICD-10-CM

## 2024-07-25 DIAGNOSIS — E11.42 TYPE 2 DIABETES MELLITUS WITH DIABETIC POLYNEUROPATHY, WITH LONG-TERM CURRENT USE OF INSULIN: Chronic | ICD-10-CM

## 2024-07-25 NOTE — TELEPHONE ENCOUNTER
FW: Appointment Request   Jeannette Keller MA   Sent:   2:00 PM   To: GERDA Ashraf Staff      Emerson CORDERO Gemma   MRN: [id number] : 1945   Entered: [phone]        Message       ----- Message -----   From: Emerson Menendez   Sent: 2024   1:49 PM CDT   To: [facility] Clinical Support Staff   Subject: Appointment Request                                Appointment Request From: Emerson Menendez      With Provider: Andriy Phillips [[address]]      Preferred Date Range: 2024 - 2024      Preferred Times: Monday Morning      Reason for visit: Severe knee pain      Comments:   Coming in from vacation with very severe knee pain   Very hard to walk. Thanks.   === Sent to wrong person asked to call ortho

## 2024-07-25 NOTE — TELEPHONE ENCOUNTER
Care Due:                  Date            Visit Type   Department     Provider  --------------------------------------------------------------------------------                                EP -                              PRIMARY      [facility] INTERNAL  Last Visit: 04-      CARE ([facility])   MEDICINE       GEOVANNA ALCAZAR                              [facility]                              PRIMARY      [facility] INTERNAL  Next Visit: 08-      CARE ([facility])   MEDICINE       GEOVANNA ALCAZAR                                                            Last  Test          Frequency    Reason                     Performed    Due Date  --------------------------------------------------------------------------------    HBA1C.......  6 months...  glimepiride, insulin.....  04-   10-    Health system Embedded Care Due Messages. Reference number: [id number].   7/25/2024 4:26:54 PM CDT

## 2024-07-26 RX ORDER — INSULIN ASPART 100 [IU]/ML
INJECTION, SOLUTION INTRAVENOUS; SUBCUTANEOUS
Qty: 12 ML | Refills: 0 | Status: SHIPPED | OUTPATIENT
Start: 2024-07-26

## 2024-07-26 NOTE — TELEPHONE ENCOUNTER
Provider Staff:  Action required for this patient     Please see care gap opportunities below in Care Due Message.    Thanks!  [facility]     Appointments      Date Provider   Last Visit   4/24/2024 Roberta Sagastume MD   Next Visit   8/16/2024 Roberta Sagastume MD      Refill Decision Note   Emerson Menendez  is requesting a refill authorization.  Brief Assessment and Rationale for Refill:  Approve     Medication Therapy Plan:         Pharmacist review requested: Yes   Extended chart review required: Yes   Comments:     Note composed:1:14 AM 07/26/2024

## 2024-07-26 NOTE — TELEPHONE ENCOUNTER
Refill Routing Note   Medication(s) are not appropriate for processing by [facility] for the following reason(s):      Required labs abnormal:    CREATININE 2.2 (H) 06/11/2024       [facility] action(s):  Defer Care Due:  Labs due          Pharmacist review requested: Yes     Appointments  past 12m or future 3m with PCP    Date Provider   Last Visit   4/24/2024 Roberta Sagastume MD   Next Visit   8/16/2024 Roberta Sagastume MD   ED visits in past 90 days: 0        Note composed:7:28 PM 07/25/2024

## 2024-08-02 ENCOUNTER — PATIENT OUTREACH (OUTPATIENT)
Dept: ADMINISTRATIVE | Facility: HOSPITAL | Age: 79
End: 2024-08-02
Payer: MEDICARE

## 2024-08-02 NOTE — PROGRESS NOTES
Health Maintenance Due   Topic Date Due    RSV Vaccine (Age 60+ and Pregnant patients) (1 - 1-dose 60+ series) Never done    Eye Exam  10/27/2023    TETANUS VACCINE  12/17/2023    COVID-19 Vaccine (6 - 2023-24 season) 03/21/2024       Chart reviewed and updated. Reconciled immunizations.  Linda Fabian LPN   Clinical Care Coordinator  [facility] Care [facility] Wellness

## 2024-08-02 NOTE — PROGRESS NOTES
Health Maintenance Due   Topic Date Due    RSV Vaccine (Age 60+ and Pregnant patients) (1 - 1-dose 60+ series) Never done    Eye Exam  10/27/2023    TETANUS VACCINE  12/17/2023    COVID-19 Vaccine (6 - 2023-24 season) 03/21/2024     Linda Fabian LPN   Clinical Care Coordinator  [facility] Care [facility] Wellness

## 2024-08-05 ENCOUNTER — TELEPHONE (OUTPATIENT)
Dept: PODIATRY | Facility: CLINIC | Age: 79
End: 2024-08-05
Payer: MEDICARE

## 2024-08-07 ENCOUNTER — TELEPHONE (OUTPATIENT)
Dept: HEMATOLOGY/ONCOLOGY | Facility: CLINIC | Age: 79
End: 2024-08-07
Payer: MEDICARE

## 2024-08-14 ENCOUNTER — TELEPHONE (OUTPATIENT)
Dept: ORTHOPEDICS | Facility: CLINIC | Age: 79
End: 2024-08-14
Payer: MEDICARE

## 2024-08-16 ENCOUNTER — OFFICE VISIT (OUTPATIENT)
Dept: SPORTS MEDICINE | Facility: CLINIC | Age: 79
End: 2024-08-16
Payer: MEDICARE

## 2024-08-16 ENCOUNTER — HOSPITAL ENCOUNTER (OUTPATIENT)
Dept: RADIOLOGY | Facility: HOSPITAL | Age: 79
Discharge: HOME | End: 2024-08-16
Attending: INTERNAL MEDICINE
Payer: MEDICARE

## 2024-08-16 ENCOUNTER — OFFICE VISIT (OUTPATIENT)
Dept: INTERNAL MEDICINE | Facility: CLINIC | Age: 79
End: 2024-08-16
Payer: MEDICARE

## 2024-08-16 VITALS
WEIGHT: 194.44 LBS | SYSTOLIC BLOOD PRESSURE: 133 MMHG | BODY MASS INDEX: 27.84 KG/M2 | DIASTOLIC BLOOD PRESSURE: 73 MMHG | HEIGHT: 70 IN

## 2024-08-16 VITALS
WEIGHT: 193.31 LBS | SYSTOLIC BLOOD PRESSURE: 128 MMHG | OXYGEN SATURATION: 98 % | HEART RATE: 89 BPM | BODY MASS INDEX: 27.67 KG/M2 | HEIGHT: 70 IN | DIASTOLIC BLOOD PRESSURE: 70 MMHG

## 2024-08-16 DIAGNOSIS — Z96.652 S/P TOTAL KNEE ARTHROPLASTY, LEFT: ICD-10-CM

## 2024-08-16 DIAGNOSIS — Z79.4 TYPE 2 DIABETES MELLITUS WITH DIABETIC POLYNEUROPATHY, WITH LONG-TERM CURRENT USE OF INSULIN: ICD-10-CM

## 2024-08-16 DIAGNOSIS — N40.1 BPH WITH URINARY OBSTRUCTION: ICD-10-CM

## 2024-08-16 DIAGNOSIS — G89.29 CHRONIC PAIN OF RIGHT KNEE: Primary | ICD-10-CM

## 2024-08-16 DIAGNOSIS — R05.9 COUGH, UNSPECIFIED TYPE: ICD-10-CM

## 2024-08-16 DIAGNOSIS — M25.561 CHRONIC PAIN OF RIGHT KNEE: Primary | ICD-10-CM

## 2024-08-16 DIAGNOSIS — Z00.00 ROUTINE GENERAL MEDICAL EXAMINATION AT A HEALTH CARE FACILITY: ICD-10-CM

## 2024-08-16 DIAGNOSIS — M25.569 KNEE PAIN, UNSPECIFIED CHRONICITY, UNSPECIFIED LATERALITY: ICD-10-CM

## 2024-08-16 DIAGNOSIS — N32.81 OAB (OVERACTIVE BLADDER): ICD-10-CM

## 2024-08-16 DIAGNOSIS — E11.42 TYPE 2 DIABETES MELLITUS WITH DIABETIC POLYNEUROPATHY, WITH LONG-TERM CURRENT USE OF INSULIN: ICD-10-CM

## 2024-08-16 DIAGNOSIS — D47.2 SMOLDERING MULTIPLE MYELOMA: ICD-10-CM

## 2024-08-16 DIAGNOSIS — M17.11 PRIMARY OSTEOARTHRITIS OF RIGHT KNEE: ICD-10-CM

## 2024-08-16 DIAGNOSIS — N13.8 BPH WITH URINARY OBSTRUCTION: ICD-10-CM

## 2024-08-16 DIAGNOSIS — Z12.5 SPECIAL SCREENING FOR MALIGNANT NEOPLASM OF PROSTATE: ICD-10-CM

## 2024-08-16 DIAGNOSIS — R09.82 POST-NASAL DRIP: ICD-10-CM

## 2024-08-16 DIAGNOSIS — M25.569 KNEE PAIN, UNSPECIFIED CHRONICITY, UNSPECIFIED LATERALITY: Primary | ICD-10-CM

## 2024-08-16 PROCEDURE — 99999 PR PBB SHADOW E&M-EST. PATIENT-LVL V: CPT | Mod: PBBFAC,,, | Performed by: PHYSICIAN ASSISTANT

## 2024-08-16 PROCEDURE — 99999 PR PBB SHADOW E&M-EST. PATIENT-LVL IV: CPT | Mod: PBBFAC,,, | Performed by: INTERNAL MEDICINE

## 2024-08-16 PROCEDURE — 71250 CT THORAX DX C-: CPT | Mod: TC

## 2024-08-16 PROCEDURE — 73562 X-RAY EXAM OF KNEE 3: CPT | Mod: TC,RT

## 2024-08-16 PROCEDURE — 71046 X-RAY EXAM CHEST 2 VIEWS: CPT | Mod: TC

## 2024-08-16 PROCEDURE — 73562 X-RAY EXAM OF KNEE 3: CPT | Mod: 26,RT,, | Performed by: RADIOLOGY

## 2024-08-16 PROCEDURE — 71046 X-RAY EXAM CHEST 2 VIEWS: CPT | Mod: 26,,, | Performed by: RADIOLOGY

## 2024-08-16 PROCEDURE — 71250 CT THORAX DX C-: CPT | Mod: 26,,, | Performed by: RADIOLOGY

## 2024-08-16 PROCEDURE — 73560 X-RAY EXAM OF KNEE 1 OR 2: CPT | Mod: TC,LT

## 2024-08-16 PROCEDURE — 73560 X-RAY EXAM OF KNEE 1 OR 2: CPT | Mod: 26,59,LT, | Performed by: RADIOLOGY

## 2024-08-16 RX ORDER — OXYBUTYNIN CHLORIDE 10 MG/1
10 TABLET, EXTENDED RELEASE ORAL DAILY
Qty: 90 TABLET | Refills: 3 | Status: SHIPPED | OUTPATIENT
Start: 2024-08-16 | End: 2024-08-16 | Stop reason: SDUPTHER

## 2024-08-16 RX ORDER — OMEPRAZOLE 40 MG/1
40 CAPSULE, DELAYED RELEASE ORAL EVERY MORNING
Qty: 90 CAPSULE | Refills: 4 | Status: SHIPPED | OUTPATIENT
Start: 2024-08-16

## 2024-08-16 RX ORDER — DIAZEPAM 5 MG/1
5 TABLET ORAL EVERY 12 HOURS PRN
Qty: 60 TABLET | Refills: 0 | Status: SHIPPED | OUTPATIENT
Start: 2024-08-16

## 2024-08-16 RX ORDER — TAMSULOSIN HYDROCHLORIDE 0.4 MG/1
1 CAPSULE ORAL NIGHTLY
Qty: 90 CAPSULE | Refills: 4 | Status: SHIPPED | OUTPATIENT
Start: 2024-08-16 | End: 2025-08-16

## 2024-08-16 RX ORDER — OXYBUTYNIN CHLORIDE 10 MG/1
10 TABLET, EXTENDED RELEASE ORAL DAILY
Qty: 90 TABLET | Refills: 3 | Status: SHIPPED | OUTPATIENT
Start: 2024-08-16 | End: 2025-08-16

## 2024-08-16 RX ORDER — TRIAMCINOLONE ACETONIDE 40 MG/ML
40 INJECTION, SUSPENSION INTRA-ARTICULAR; INTRAMUSCULAR
Status: DISCONTINUED | OUTPATIENT
Start: 2024-08-16 | End: 2024-08-16 | Stop reason: HOSPADM

## 2024-08-16 RX ADMIN — TRIAMCINOLONE ACETONIDE 40 MG: 40 INJECTION, SUSPENSION INTRA-ARTICULAR; INTRAMUSCULAR at 11:08

## 2024-08-16 NOTE — PROGRESS NOTES
CHIEF COMPLAINT: Annual exam and  Follow up of multiple myeloma, diabetes, hypertension, reflux, hyperlipidemia    HISTORY OF PRESENT ILLNESS: This is a 79-year-old man who presents for follow up of above     His right knee has been very painful. He needs an injection in the right knee - last injection over 6 months ago. Left knee is doing fine.  Left knee was replaced 23 in [place]    He has a chronic cough - coughs up yellow and white mucous - going on a long time. He took a course of doxycycline in April for boils - no change in cough with this antibiotic. No fever, chills, shortness of breath.     Reflux is controlled with over the counter omeprazole 20 mg daily.      He had a kidney biopsy in [place] on 3/13/24.  Biopsy revealed that diabetic nephropathy as the cause of his CKD. The biopsy did not show a myeloma related glomerulopathy (MGRS).  He needs better blood sugar control.   Farxiga was increased from 5 mg to 10 mg.  He has only been taking 10 mg for the last week.     He does not drink a lot of water - he likes diet soft drinks    HE is now taking amitriptyline 50 mg nightly for peripheral neuropathy - burning sensation in his feet.        He had right 3rd trigger finger releast on 22 - finger is doing better     He had a left knee replacement 2023 in [place].  Left knee is doing better.  Right knee is bothering him.  He is not sure he wants to have surgery on the right knee. HE is wearing brace o his right knees which is helping stability and his pain.  He take tylenol 650 mg once daily which helps.     He got   2021.  Life is going well. His wife cara is on the p blanquita today.   He is residing in [place] and [place].  His wife has a job in [place].   life has been good for him. .        His daughter  of complications of lupus and ESRD 2020. She was on dialysis and was septic. She was at home in hospice. He continues to take  citalopram to 40 mg 1 tablet daily and diazepam 5 mg 1/2 at bedtime as needed. He is sleeping well with this dose.  His mother is currently doing well at age 100 (100 in April 2024). His brother and sister help care for her.       He is in digitial diabetes. Sugars have been better.  Sugars are now in the low 100's range.  He is using Free Style Leah 2.  He is on novolog 4 units with meals as needed, Lantus 10 units twice daily (adjusts according to sugars) and glimepriride 2 mg daily, Farxiga 10 mg once daily NO polydipsia or polyuria.        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 He is in the observation arm and is being monitoring monthly. He sess Dr Engel monthly.  No indication for chemotherapy at this time. HE gets monthly blood work.        He is taking nifedipine ER 60 mg once daily to protect his kidney. No polydipsia or polyuria       His back and neck was doing ok . Pain is starting to return. He was in healthy back program.      No chest pain, shortness of breath, nausea, voimting, constipation, diarrhea,      He had laser vaporization and enucleation of the prostate 11/13/13. He denies any dysuria or hematuria now. HE saw Dr Walker.  HE is urinting well. He continues FLomax 0.4 mg nightly and oxybutynin XL 10 gm daliy.  HE gets up 2-3 times at night to urinate    He has hyperlipidemia, on pravastatin 40 mg daily. No joint pain or muscle pain from the pravastatin.        He has been taking aspirin 81 mg daily     vitamin D supplement 50,000 units once weekly         PAST MEDICAL HISTORY:   1. Diabetes mellitus.   2. Hyperlipidemia.   3. Reflux.   4. BPH.   5. Osteoarthritis of the knees.   6. Neck pain.   7. History of right lateral epicondylitis.   8. History of lumps removed from the breast as a teenager.   9. OA cervical spine   10. Multiple myeloma    SOCIAL HISTORY: Does not smoke, does not drink. He owns an    company. He works for the [employer].     FAMILY HISTORY:  "Mother is living at age 95 with a heart condition. She had a mild stroke. Father  in his late 40s of alcoholism. He has 5 sisters and 1 brother. one has a neurological disorder, one is anxious. ONe sister  after a fall. His daughter has  lupus, on dialysis, heart problems, and a brain aneurysm that was stented.       PHYSICAL EXAMINATION:        /70   Pulse 89   Ht 5' 10" (1.778 m)   Wt 87.7 kg (193 lb 5.5 oz)   SpO2 98%   BMI 27.74 kg/m²     GENERAL: He is alert, oriented, no apparent distress. Affect within normal limits.   Conjunctivae anicteric. PERRL. Tympanic membranes clear. Oropharynx gumes healing.    NECK: Supple. No cervical lymphadenopathy, no thyroid enlargement.   Respiratory: Effort normal. Lungs are clear to auscultation.   HEART: Regular rate and rhythm without murmurs, gallops or rubs.   No lower extremity edema.    ABDOMEN: soft, non distended, non tender, bowel sounds present, no hepatosplenomgaly      ASSESSMENT AND PLAN:     Annual exam - discussed diet, exercise and safety issues.            1. Diabetes mellitus with hyperglycemia and microalbuminuria - in Digital diabetes.  . Watch for low blood sugars. Has Free style henrry 2.    2. 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 Follow up with DR Engel  3. OA knee -s/p left knee replacement.  TO see ortho for injection in right knee.   4. Hypertension - controlled.   5. Hyperlipidemia. On pravastatin  6. BPH. S/p laser   7. Vitamin D deficiency - check level  9. Back pain/neck pain - managing-   10. Anxiety and depression- now on celexa 40 mg daily  11. Peripheral neuropathy -controlled on gabapentin as needed   12. CRI -stable - monitored closely by heme onc and nephrology.  13. Obestiy - work on diet, exercise and weigh tloss  14. Recurrent boils in arm pit - doxycycline when flares  15. Cough - CXR today.  CT CHest  16. Reflux - get back on  omperazole 40 mg daliy - wonder if reflux is contributing to cough   "   I will see him back in 4 months, sooner if problems arise        Prevnar 7/16  PSA 5/2022  . colonoscopy normal 3/2012 and due 2022.  Cologuard.

## 2024-08-16 NOTE — PROGRESS NOTES
CC: Right knee pain    Patient is a 79-year-old male who presents today for evaluation of right knee pain.  He has a history of left total knee arthroplasty performed by Dr. Olguin in June 2023.  He said he has had chronic pain of the right knee for many years now.  This has been treated conservatively for quite awhile with cortisone and viscosupplementation injections.  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  He is interested in proceeding with total knee arthroplasty at some point, however is more interested in continue conservative treatment for now.  Pain in the right knee is rather diffuse, however localizes to the medial joint line.  Pain does not radiate.  This is described as an aching sensation with occasional bursts of sharp pain.  Pain is worse with prolonged walking and activity.  He endorses associated stiffness of the right knee.  He denies any significant instability, new/prominent mechanical symptoms, or recent effusions.  His last injection was approximately 4 months ago, and would like to try a repeat injection today to help with pain.  When it comes time for him to have his right knee replaced, he would like to have this done with Dr. Tian.    - mechanical symptoms, - instability    Is affecting ADLs.  Pain is 9/10 at it's worst.    REVIEW OF SYSTEMS:   Constitution: Negative. Negative for chills, fever and night sweats.   HENT: Negative for congestion and headaches.    Eyes: Negative for blurred vision, left vision loss and right vision loss.   Cardiovascular: Negative for chest pain and syncope.   Respiratory: Negative for cough and shortness of breath.    Endocrine: Negative for polydipsia, polyphagia and polyuria.   Hematologic/Lymphatic: Negative for bleeding problem. Does not bruise/bleed easily.   Skin: Negative for dry skin, itching and rash.   Musculoskeletal: Negative for falls. Positive for right knee pain and  muscle weakness.    Gastrointestinal: Negative for abdominal pain and bowel incontinence.   Genitourinary: Negative for bladder incontinence and nocturia.   Neurological: Negative for disturbances in coordination, loss of balance and seizures.   Psychiatric/Behavioral: Negative for depression. The patient does not have insomnia.    Allergic/Immunologic: Negative for hives and persistent infections.     PAST MEDICAL HISTORY:   Past Medical History:   Diagnosis Date    Anxiety 03/16/2015    Asymptomatic multiple myeloma     BPH (benign prostatic hypertrophy) 09/24/2012    Carpal tunnel syndrome     Depression 03/16/2015    Diabetic retinopathy     GERD (gastroesophageal reflux disease) 09/24/2012    Glaucoma     Hx of psychiatric care     Celexa, Valium    Hyperlipidemia     Hypertension     Hypertensive retinopathy of both eyes     Microalbuminuria 09/24/2012    Osteoarthritis of cervical spine 09/24/2012    Osteoarthritis of knee 09/24/2012    Posterior capsular opacification, right 09/15/2022    Psychiatric problem     Type II or unspecified type diabetes mellitus without mention of complication, not stated as uncontrolled 09/24/2012       PAST SURGICAL HISTORY:   Past Surgical History:   Procedure Laterality Date    ARTHROSCOPY OF WRIST Left 08/03/2020    Procedure: ARTHROSCOPY, WRIST LEFT;  Surgeon: Kindra Castillo MD;  Location: [facility];  Service: Orthopedics;  Laterality: Left;  REGIONAL/MAC    CARPAL TUNNEL RELEASE Left 08/03/2020    Procedure: RELEASE, CARPAL TUNNEL LEFT;  Surgeon: Kindra Castillo MD;  Location: [facility] OR;  Service: Orthopedics;  Laterality: Left;  REGIONAL/MAC    CATARACT EXTRACTION  2012    Right eye    CYSTOSCOPY N/A 6/6/2023    Procedure: CYSTOSCOPY;  Surgeon: Juan Walker MD;  Location: [address];  Service: Urology;  Laterality: N/A;    EYE SURGERY Bilateral     cataract removal    INTRAOCULAR PROSTHESES INSERTION Left 05/20/2021    Procedure: INSERTION, IOL PROSTHESIS;  Surgeon:  Jose L Wheatley MD;  Location: [address];  Service: Ophthalmology;  Laterality: Left;    PHACOEMULSIFICATION OF CATARACT Left 05/20/2021    Procedure: PHACOEMULSIFICATION, CATARACT;  Surgeon: Jose L Wheatley MD;  [facility];  Service: Ophthalmology;  Laterality: Left;    PROSTATE SURGERY      RENAL BIOPSY Left 3/13/2024    Procedure: BIOPSY, KIDNEY;  Surgeon: Nikhil Salgado MD;  Location: [facility] INTERVENTIONAL NEPHROLOGY;  Service: Interventional Nephrology;  Laterality: Left;    SURGICAL REMOVAL OF CARPAL BONE Left 01/06/2021    Procedure: OSTECTOMY, CARPAL BONE, LEFT;  Surgeon: Kindra Castillo MD;  Location: [facility] OR;  Service: Orthopedics;  Laterality: Left;  REGIONAL/MAC    TONSILLECTOMY      TOTAL KNEE ARTHROPLASTY Left 6/12/2023    Procedure: L TKA;  Surgeon: Kaleb Olguin MD;  Location: [facility] MAIN OR;  Service: Orthopedics;  Laterality: Left;    TRANSURETHRAL RESECTION OF PROSTATE (TURP) USING LASER N/A 6/6/2023    Procedure: TURP, USING LASER;  Surgeon: Juan Walker MD;  Location: [address];  Service: Urology;  Laterality: N/A;    TRIGGER FINGER RELEASE Right 07/29/2022    Procedure: RELEASE, TRIGGER FINGER,RIGHT,LONG;  Surgeon: Kindra Castillo MD;  Location: [facility];  Service: Orthopedics;  Laterality: Right;       FAMILY HISTORY:   Family History   Problem Relation Name Age of Onset    Heart disease Mother      Alcohol abuse Father      Cirrhosis Father      Depression Sister Nyla     No Known Problems Sister Osvaldo     No Known Problems Sister Vinny Morton     No Known Problems Sister Iris     Hypertension Brother Oral     Depression Brother Oral     Kidney failure Daughter          due to medication    Cerebral aneurysm Daughter      Blindness Neg Hx      Cancer Neg Hx      Cataracts Neg Hx      Diabetes Neg Hx      Glaucoma Neg Hx      Macular degeneration Neg Hx      Retinal detachment Neg Hx      Strabismus Neg Hx      Stroke Neg Hx       Thyroid disease Neg Hx         SOCIAL HISTORY:   Social History     Socioeconomic History    Marital status:     Number of children: 1   Tobacco Use    Smoking status: Never    Smokeless tobacco: Never   Substance and Sexual Activity    Alcohol use: Yes     Comment: Two beers in a week    Drug use: Never    Sexual activity: Yes     Partners: Female     Birth control/protection: None   Social History Narrative     x2, 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     Social Determinants of Health     Financial Resource Strain: Low Risk  (2024)    Overall Financial Resource Strain (CARDIA)     Difficulty of Paying Living Expenses: Not hard at all   Food Insecurity: No Food Insecurity (2024)    Hunger Vital Sign     Worried About Running Out of Food in the Last Year: Never true     Ran Out of Food in the Last Year: Never true   Transportation Needs: No Transportation Needs (2024)    PRAPARE - Transportation     Lack of Transportation (Medical): No     Lack of Transportation (Non-Medical): No   Physical Activity: Sufficiently Active (2024)    Exercise Vital Sign     Days of Exercise per Week: 5 days     Minutes of Exercise per Session: 60 min   Stress: Stress Concern Present (2024)    [employer] - Occupational Stress Questionnaire     Feeling of Stress : To some extent   Housing Stability: Low Risk  (2024)    Housing Stability Vital Sign     Unable to Pay for Housing in the Last Year: No     Number of Places Lived in the Last Year: 1     Unstable Housing in the Last Year: No       MEDICATIONS:     Current Outpatient Medications:     ACCU-CHEK STEFANO CONTROL KAMI Alvarado, , Disp: , Rfl:     acetaminophen (TYLENOL) 650 MG TbSR, Take 650 mg by mouth as needed. pain, Disp: , Rfl:     alcohol swabs PadM, Apply 1 each topically as needed., Disp: , Rfl:     amitriptyline (ELAVIL) 50 MG tablet, Take 1 tablet (50 mg total) by mouth every evening., Disp: 30  tablet, Rfl: 11    aspirin (ECOTRIN) 81 MG EC tablet, Take 1 tablet (81 mg total) by mouth once daily., Disp: 100 tablet, Rfl: 3    blood glucose control, normal (METER-CHECK) Soln, One meter.  Use as directed. Meter of insurance choice, Disp: 1 each, Rfl: 0    blood sugar diagnostic (ACCU-CHEK STEFANO PLUS TEST STRP) Strp, Accu check stefano plus TEST FOUR TIMES DAILY. Test strtips of insurance choice to go with meter and lancets, Disp: 400 strip, Rfl: 3    blood sugar diagnostic Strp, [facility] - check blood sugars 4 times daily, Disp: 400 strip, Rfl: 4    blood-glucose meter (ACCU-CHEK STEFANO PLUS METER) Misc, Use as direted, Disp: 1 each, Rfl: 0    blood-glucose meter (ACCU-CHEK OMAYRA) [facility], USE AS DIRECTED. Accucheck stefano plus, Disp: 1 each, Rfl: 0    citalopram (CELEXA) 40 MG tablet, Take 1 tablet (40 mg total) by mouth once daily., Disp: 90 tablet, Rfl: 0    dapagliflozin propanediol (FARXIGA) 10 mg tablet, Take 1 tablet (10 mg total) by mouth once daily., Disp: 30 tablet, Rfl: 11    diazePAM (VALIUM) 5 MG tablet, Take 1 tablet (5 mg total) by mouth every 12 (twelve) hours as needed for Anxiety., Disp: 60 tablet, Rfl: 0    dorzolamide-timolol 2-0.5% (COSOPT) 22.3-6.8 mg/mL ophthalmic solution, Place 1 drop into both eyes 2 (two) times daily., Disp: 10 mL, Rfl: 11    doxycycline (VIBRA-TABS) 100 MG tablet, Take 1 tablet (100 mg total) by mouth 2 (two) times daily., Disp: 20 tablet, Rfl: 3    ergocalciferol (ERGOCALCIFEROL) 50,000 unit Cap, Take 1 capsule (50,000 Units total) by mouth twice a week., Disp: 24 capsule, Rfl: 4    EYLEA 2 mg/0.05 mL Syrg, Place 2 mg into both eyes., Disp: , Rfl:     flash glucose scanning reader (XGIMI CHRISTIANO 14 DAY READER) Misc, Use as directed, Disp: 6 each, Rfl: 4    flash glucose sensor (FREESTYLE CHRISTIANO 2 SENSOR) Kit, Use as directed every 14 days, Disp: 3 kit, Rfl: 11    fluocinolone acetonide oiL (DERMOTIC OIL) 0.01 % Drop, Place 3 drops in ear(s) 2 (two) times daily.,  "Disp: 20 mL, Rfl: 3    fluticasone propionate (FLONASE) 50 mcg/actuation nasal spray, 1 spray (50 mcg total) by Each Nostril route once daily., Disp: 18.2 mL, Rfl: 6    glimepiride (AMARYL) 2 MG tablet, TAKE 1 TABLET ONE TIME DAILY, Disp: 90 tablet, Rfl: 2    glucose 4 GM chewable tablet, Take 8 g by mouth as needed for Low blood sugar., Disp: , Rfl:     insulin (LANTUS SOLOSTAR U-100 INSULIN) glargine 100 units/mL SubQ pen, Inject 16 Units into the skin 2 (two) times a day., Disp: 30 mL, Rfl: 2    lancets (ACCU-CHEK SOFTCLIX LANCETS) Misc, 1 lancet by Misc.(Non-Drug; Combo Route) route 4 (four) times daily., Disp: 400 each, Rfl: 4    MONOVISC 88 mg/4 mL Syrg, Inject 88 mg as directed., Disp: , Rfl:     NIFEdipine (PROCARDIA-XL) 60 MG (OSM) 24 hr tablet, Take 1 tablet (60 mg total) by mouth once daily., Disp: 90 tablet, Rfl: 4    NOVOLOG FLEXPEN U-100 INSULIN 100 unit/mL (3 mL) InPn pen, ADMINISTER 4 UNITS UNDER THE SKIN THREE TIMES DAILY WITH MEALS, Disp: 12 mL, Rfl: 0    omeprazole (PRILOSEC) 40 MG capsule, Take 1 capsule (40 mg total) by mouth every morning., Disp: 90 capsule, Rfl: 4    oxybutynin (DITROPAN-XL) 10 MG 24 hr tablet, Take 1 tablet (10 mg total) by mouth once daily., Disp: 90 tablet, Rfl: 3    pen needle, diabetic (BD ULTRA-FINE SHORT PEN NEEDLE) 31 gauge x 5/16" Ndle, Use to administer insulin under the skin five (5) times daily; discard pen needle after each use., Disp: 500 each, Rfl: 3    pen needle, diabetic (BD ULTRA-FINE SHORT PEN NEEDLE) 31 gauge x 5/16" Ndle, USE TO INJECT INSULIN ONE TIME DAILY, Disp: 100 each, Rfl: 3    pravastatin (PRAVACHOL) 40 MG tablet, TAKE 1 TABLET EVERY DAY, Disp: 90 tablet, Rfl: 3    psyllium seed, with sugar, (FIBER ORAL), Take by mouth once daily., Disp: , Rfl:     sildenafiL (VIAGRA) 100 MG tablet, Take 1 tablet (100 mg total) by mouth daily as needed for Erectile Dysfunction., Disp: 30 tablet, Rfl: 1    sildenafiL (VIAGRA) 100 MG tablet, Take 1 tablet (100 mg " "total) by mouth daily as needed for Erectile Dysfunction., Disp: 30 tablet, Rfl: 3    tamsulosin (FLOMAX) 0.4 mg Cap, Take 1 capsule (0.4 mg total) by mouth nightly., Disp: 90 capsule, Rfl: 4    triamcinolone acetonide (KENALOG-40) 40 mg/mL injection, 40 mg., Disp: , Rfl:     TRUEPLUS LANCETS 33 gauge Misc, , Disp: , Rfl:     azelastine (ASTELIN) 137 mcg (0.1 %) nasal spray, 1 spray (137 mcg total) by Nasal route 2 (two) times daily., Disp: 30 mL, Rfl: 3    fexofenadine (ALLEGRA) 60 MG tablet, Take 1 tablet (60 mg total) by mouth once daily., Disp: 90 tablet, Rfl: 3    Current Facility-Administered Medications:     LIDOcaine HCl 2% urojet, , Urethral, Once, Juan Walker MD    Facility-Administered Medications Ordered in Other Visits:     mupirocin 2 % ointment, , Nasal, On Call Procedure, Mohit Hummel MD, Given at 01/06/21 0847    ALLERGIES:   Review of patient's allergies indicates:   Allergen Reactions    Penicillins Other (See Comments)     Knees locked up       VITAL SIGNS:   /73   Ht 5' 10" (1.778 m)   Wt 88.2 kg (194 lb 7.1 oz)   BMI 27.90 kg/m²      PHYSICAL EXAMINATION:  General:  The patient is alert and oriented x 3.  Mood is pleasant.  Observation of ears, eyes and nose reveal no gross abnormalities.  No labored breathing observed.    RIGHT KNEE EXAMINATION     OBSERVATION / INSPECTION   Gait:   Nonantalgic   Alignment:  Neutral   Scars:   Well healed midline incision present to left anterior knee   Muscle atrophy: Mild  Effusion:  None   Warmth:  None   Discoloration:   none     TENDERNESS / CREPITUS (T / C):          T / C      T / C   Patella   - / -   Lateral joint line   - / -   Peripatellar medial  -  Medial joint line    + / -   Peripatellar lateral -  Medial plica   - / -   Patellar tendon -   Popliteal fossa   - / -   Quad tendon   -   Gastrocnemius   -   Prepatellar Bursa - / -   Quadricep   -   Tibial tubercle  -  Thigh/hamstring  -   Pes " anserine/HS -  Fibula    -   ITB   - / -  Tibia     -   Tib/fib joint  - / -  LCL    -     MFC   - / -   MCL: Proximal  -    LFC   - / -    Distal   -          ROM: (* = pain)  PASSIVE   ACTIVE    Left :   5 / 0 / 145   5 / 0 / 145     Right :    5 / 0 / 130*   5 / 0 / 130*    Patellofemoral examination:  See above noted areas of tenderness.   Patella position    Subluxation / dislocation: Centered           Sup. / Inf;   Normal   Crepitus (PF):    Absent   Patellar Mobility:       Medial-lateral:   Normal    Superior-inferior:  Normal    Inferior tilt   Normal    Patellar tendon:  Normal   Lateral tilt:    Normal   J-sign:     None   Patellofemoral grind:   No pain       MENISCAL SIGNS:     Pain on terminal extension:  -  Pain on terminal flexion:  -  Sofiyas maneuver:  + (for pain medially)  Squat     deferred    LIGAMENT EXAMINATION:  ACL / Lachman:  normal (-1 to 2mm)    PCL-Post.  drawer: normal 0 to 2mm  MCL- Valgus:  normal 0 to 2mm  LCL- Varus:  normal 0 to 2mm  Pivot shift: normal (Equal)   Dial Test: difference c/w other side   At 30° flexion: normal (< 5°)    At 90° flexion: normal (< 5°)   Reverse Pivot Shift:   normal (Equal)     STRENGTH: (* = with pain) PAINFUL SIDE   Quadricep   5/5   Hamstrin/5    EXTREMITY NEURO-VASCULAR EXAMINATION:   Sensation:  Grossly intact to light touch all dermatomal regions.   Motor Function:  Fully intact motor function at hip, knee, foot and ankle    DTRs;  quadriceps and  achilles 2+.  No clonus and downgoing Babinski.    Vascular status:  DP and PT pulses 2+, brisk capillary refill, symmetric.     OTHER FINDINGS:  none    X-rays (2024):    X-rays including standing, weight bearing AP and flexion bilateral knees, lateral and merchant views ordered and images reviewed by me show:   Status post left total knee arthroplasty.  Components appear in excellent position without any evidence of hardware failure, loosening, or perihardware fracture.  Right  knee exhibits moderate tibiofemoral and patellofemoral joint space narrowing.  Soft tissues appear normal.    Kellgren Saulo grade 3 on the right     ASSESSMENT:    Right knee pain  Primary osteoarthritis of right knee  S/p left total knee arthroplasty    PLAN:     I made the decision to obtain old records of the patient including previous notes and imaging. New imaging was ordered today of the extremity or extremities evaluated. I independently reviewed and interpreted the radiographs and/or MRIs today as well as prior imaging, if available.    We discussed at length different treatment options including conservative vs surgical management. These include anti-inflammatories, acetaminophen, rest, ice, heat, formal physical therapy including strengthening and stretching exercises, home exercise programs, dry needling, corticosteroid versus viscosupplementation injections, and finally surgical intervention.      Right knee corticosteroid injection performed today.  See procedure note for details.    Recommend he continue to rest, ice, and elevate the right knee and take over-the-counter extra-strength Tylenol and use topical analgesics as needed for pain.    Follow up as needed.  We will refer to Dr. Tian when he is ready to proceed with total knee arthroplasty.      All questions were answered, pt will contact us for questions or concerns in the interim.        Medical Dictation software was used during the dictation of portions or the entirety of this medical record.  Phonetic or grammatic errors may exist due to the use of this software. For clarification, refer to the author of the document.

## 2024-08-16 NOTE — PROCEDURES
Large Joint Aspiration/Injection: R knee    Date/Time: 8/16/2024 11:30 AM    Performed by: Denton Patel PA-C  Authorized by: Denton Patel PA-C    Consent Done?:  Yes (Verbal)  Indications:  Pain and arthritis  Site marked: the procedure site was marked    Timeout: prior to procedure the correct patient, procedure, and site was verified    Prep: patient was prepped and draped in usual sterile fashion    Local anesthesia used?: No      Details:  Needle Size:  22 G  Ultrasonic Guidance for needle placement?: No    Approach:  Superior  Location:  Knee  Site:  R knee  Medications:  40 mg triamcinolone acetonide 40 mg/mL  Patient tolerance:  Patient tolerated the procedure well with no immediate complications    Injection Procedure  A time out was performed, including verification of patient ID, procedure, site and side, availability of information and equipment, review of safety issues, and agreement with consent, the procedure site was marked.    After time out was performed, the patient was prepped aseptically with povidone-iodine swabsticks. A diagnostic and therapeutic injection of 1:3cc Kenalog/Marcaine was given under sterile technique using a 22g x 1.5 needle from the Superolateral  aspect of the right Knee Joint in the supine position.      Emerson Menendez had no adverse reactions to the medication. Pain decreased. He was instructed to apply ice to the joint for 20 minutes and avoid strenuous activities for 24-36 hours following the injection. He was warned of possible blood sugar and/or blood pressure changes during that time. Following that time, he can resume regular activities.    He was reminded to call the clinic immediately for any adverse side effects as explained in clinic today.

## 2024-10-09 DIAGNOSIS — E11.42 TYPE 2 DIABETES MELLITUS WITH DIABETIC POLYNEUROPATHY, WITH LONG-TERM CURRENT USE OF INSULIN: Chronic | ICD-10-CM

## 2024-10-09 DIAGNOSIS — Z79.4 TYPE 2 DIABETES MELLITUS WITH DIABETIC POLYNEUROPATHY, WITH LONG-TERM CURRENT USE OF INSULIN: Chronic | ICD-10-CM

## 2024-10-09 NOTE — TELEPHONE ENCOUNTER
No care due was identified.  [facility] Embedded Care Due Messages. Reference number: [id number].   10/09/2024 12:33:23 PM CDT

## 2024-10-10 DIAGNOSIS — D47.2 SMOLDERING MULTIPLE MYELOMA: ICD-10-CM

## 2024-10-10 RX ORDER — CITALOPRAM 40 MG/1
40 TABLET, FILM COATED ORAL
Qty: 90 TABLET | Refills: 3 | Status: SHIPPED | OUTPATIENT
Start: 2024-10-10

## 2024-10-10 RX ORDER — INSULIN ASPART 100 [IU]/ML
INJECTION, SOLUTION INTRAVENOUS; SUBCUTANEOUS
Qty: 12 ML | Refills: 1 | Status: SHIPPED | OUTPATIENT
Start: 2024-10-10

## 2024-10-10 NOTE — TELEPHONE ENCOUNTER
Refill Decision Note   Emerson Menendez  is requesting a refill authorization.  Brief Assessment and Rationale for Refill:  Approve     Medication Therapy Plan:  eGFR reviewed. Current dosage within recommendatons. Renal dose adjustment not required at this time.      Pharmacist review requested: Yes   Comments:     Note composed:6:32 AM 10/10/2024

## 2024-10-10 NOTE — TELEPHONE ENCOUNTER
Refill Routing Note   Medication(s) are not appropriate for processing by [facility] for the following reason(s):     DDI not previously overridden by current provider--after initial override, the Refill Center will be able to continue overrides      Required labs abnormal    [facility] action(s):  Defer           Pharmacist review requested: Yes     Appointments  past 12m or future 3m with PCP    Date Provider   Last Visit   8/16/2024 Roberta Sagastume MD   Next Visit   2/3/2025 Roberta Sagastume MD   ED visits in past 90 days: 0        Note composed:11:49 PM 10/09/2024

## 2024-10-10 NOTE — TELEPHONE ENCOUNTER
Refill Routing Note   Medication(s) are not appropriate for processing by [facility] for the following reason(s):        Drug-drug interaction    [facility] action(s):  Defer      Medication Therapy Plan: Anatomical narrow angle      Appointments  past 12m or future 3m with PCP    Date Provider   Last Visit   8/16/2024 Roberta Sagastume MD   Next Visit   2/3/2025 Roberta Sagastume MD   ED visits in past 90 days: 0        Note composed:11:47 AM 10/10/2024

## 2024-10-10 NOTE — TELEPHONE ENCOUNTER
No care due was identified.  [facility] Embedded Care Due Messages. Reference number: [id number].   10/10/2024 11:32:50 AM CDT

## 2024-10-11 NOTE — TELEPHONE ENCOUNTER
Refill Decision Note   Emerson Menendez  is requesting a refill authorization.  Brief Assessment and Rationale for Refill:  Approve     Medication Therapy Plan:        Pharmacist review requested: Yes   Comments:     Note composed:10:09 PM 10/10/2024

## 2024-10-21 DIAGNOSIS — Z79.4 TYPE 2 DIABETES MELLITUS WITH DIABETIC POLYNEUROPATHY, WITH LONG-TERM CURRENT USE OF INSULIN: Chronic | ICD-10-CM

## 2024-10-21 DIAGNOSIS — E11.42 TYPE 2 DIABETES MELLITUS WITH DIABETIC POLYNEUROPATHY, WITH LONG-TERM CURRENT USE OF INSULIN: Chronic | ICD-10-CM

## 2024-10-21 RX ORDER — PEN NEEDLE, DIABETIC 31 GX5/16"
NEEDLE, DISPOSABLE MISCELLANEOUS
Qty: 500 EACH | Refills: 3 | Status: SHIPPED | OUTPATIENT
Start: 2024-10-21

## 2024-10-21 NOTE — TELEPHONE ENCOUNTER
No care due was identified.  [facility] Embedded Care Due Messages. Reference number: [id number].   10/21/2024 2:31:23 PM CDT

## 2024-10-22 NOTE — TELEPHONE ENCOUNTER
Refill Decision Note   Emerson Menendez  is requesting a refill authorization.  Brief Assessment and Rationale for Refill:  Approve     Medication Therapy Plan:         Comments:     Note composed:9:42 PM 10/21/2024

## 2024-10-26 DIAGNOSIS — Z79.4 TYPE 2 DIABETES MELLITUS WITH DIABETIC POLYNEUROPATHY, WITH LONG-TERM CURRENT USE OF INSULIN: Chronic | ICD-10-CM

## 2024-10-26 DIAGNOSIS — E11.42 TYPE 2 DIABETES MELLITUS WITH DIABETIC POLYNEUROPATHY, WITH LONG-TERM CURRENT USE OF INSULIN: Chronic | ICD-10-CM

## 2024-10-27 RX ORDER — GLIMEPIRIDE 2 MG/1
2 TABLET ORAL
Qty: 90 TABLET | Refills: 1 | Status: SHIPPED | OUTPATIENT
Start: 2024-10-27

## 2024-10-27 RX ORDER — INSULIN GLARGINE 100 [IU]/ML
INJECTION, SOLUTION SUBCUTANEOUS
Qty: 30 ML | Refills: 2 | Status: SHIPPED | OUTPATIENT
Start: 2024-10-27

## 2024-10-28 ENCOUNTER — PATIENT MESSAGE (OUTPATIENT)
Dept: HEMATOLOGY/ONCOLOGY | Facility: CLINIC | Age: 79
End: 2024-10-28
Payer: MEDICARE

## 2024-11-08 PROBLEM — N18.4 CHRONIC KIDNEY DISEASE (CKD), STAGE IV (SEVERE): Status: ACTIVE | Noted: 2024-11-08

## 2024-11-14 ENCOUNTER — TELEPHONE (OUTPATIENT)
Dept: RESEARCH | Facility: HOSPITAL | Age: 79
End: 2024-11-14
Payer: MEDICARE

## 2024-12-01 ENCOUNTER — PATIENT MESSAGE (OUTPATIENT)
Dept: INTERNAL MEDICINE | Facility: CLINIC | Age: 79
End: 2024-12-01
Payer: MEDICARE

## 2024-12-02 RX ORDER — FLASH GLUCOSE SENSOR
KIT MISCELLANEOUS
Qty: 3 KIT | Refills: 11 | Status: SHIPPED | OUTPATIENT
Start: 2024-12-02

## 2024-12-02 NOTE — TELEPHONE ENCOUNTER
Care Due:                  Date            Visit Type   Department     Provider  --------------------------------------------------------------------------------                                EP -                              PRIMARY      [facility] INTERNAL  Last Visit: 08-      CARE ([facility])   MEDICINE       GEOVANNA ALCAZAR                              EP                               PRIMARY      [facility] INTERNAL  Next Visit: 02-      CARE ([facility])   MEDICINE       GEOVANNA ALCAZAR                                                            Last  Test          Frequency    Reason                     Performed    Due Date  --------------------------------------------------------------------------------    HBA1C.......  6 months...  LANTUS, NOVOLOG,           08- 02-                             glimepiride..............    [facility] Embedded Care Due Messages. Reference number: [id number].   12/02/2024 9:57:01 AM CST

## 2024-12-04 ENCOUNTER — PATIENT MESSAGE (OUTPATIENT)
Dept: RESEARCH | Facility: HOSPITAL | Age: 79
End: 2024-12-04
Payer: MEDICARE

## 2024-12-04 ENCOUNTER — PATIENT MESSAGE (OUTPATIENT)
Dept: ADMINISTRATIVE | Facility: HOSPITAL | Age: 79
End: 2024-12-04
Payer: MEDICARE

## 2024-12-05 ENCOUNTER — TELEPHONE (OUTPATIENT)
Dept: RESEARCH | Facility: HOSPITAL | Age: 79
End: 2024-12-05
Payer: MEDICARE

## 2024-12-05 NOTE — TELEPHONE ENCOUNTER
Protocol:E3A06  Protocol ID: [id number]    Spoke with patient regarding scheduling an appointment with Dr. Engel, patient stated he was working in [place] on Contract and since the Contract has not been re-instated he is back in [place]. Patient confirmed his appointment on 1/9/2025 with Dr. Engel. Advise patient, if anything arises to please feel free to reach out to us so that we can reschedule his appointment.       Jean Barlow

## 2024-12-09 DIAGNOSIS — D47.2 SMOLDERING MULTIPLE MYELOMA: ICD-10-CM

## 2024-12-09 RX ORDER — DIAZEPAM 5 MG/1
5 TABLET ORAL EVERY 12 HOURS PRN
Qty: 60 TABLET | Refills: 0 | Status: SHIPPED | OUTPATIENT
Start: 2024-12-09

## 2024-12-09 NOTE — TELEPHONE ENCOUNTER
No care due was identified.  [facility] Embedded Care Due Messages. Reference number: [id number].   12/09/2024 1:55:01 PM CST

## 2024-12-11 ENCOUNTER — TELEPHONE (OUTPATIENT)
Dept: INTERNAL MEDICINE | Facility: CLINIC | Age: 79
End: 2024-12-11
Payer: MEDICARE

## 2024-12-13 ENCOUNTER — OFFICE VISIT (OUTPATIENT)
Dept: OPTOMETRY | Facility: CLINIC | Age: 79
End: 2024-12-13
Payer: MEDICARE

## 2024-12-13 DIAGNOSIS — E11.3293 TYPE 2 DIABETES MELLITUS WITH MILD NONPROLIFERATIVE RETINOPATHY OF BOTH EYES, WITH LONG-TERM CURRENT USE OF INSULIN, MACULAR EDEMA PRESENCE UNSPECIFIED: ICD-10-CM

## 2024-12-13 DIAGNOSIS — Z79.4 TYPE 2 DIABETES MELLITUS WITH MILD NONPROLIFERATIVE RETINOPATHY OF BOTH EYES, WITH LONG-TERM CURRENT USE OF INSULIN, MACULAR EDEMA PRESENCE UNSPECIFIED: ICD-10-CM

## 2024-12-13 DIAGNOSIS — Z96.1 PSEUDOPHAKIA OF BOTH EYES: ICD-10-CM

## 2024-12-13 DIAGNOSIS — Z01.00 EYE EXAM, ROUTINE: Primary | ICD-10-CM

## 2024-12-13 DIAGNOSIS — H40.1132 PRIMARY OPEN ANGLE GLAUCOMA OF BOTH EYES, MODERATE STAGE: ICD-10-CM

## 2024-12-13 DIAGNOSIS — H35.033 HYPERTENSIVE RETINOPATHY OF BOTH EYES: ICD-10-CM

## 2024-12-13 PROCEDURE — 99999 PR PBB SHADOW E&M-EST. PATIENT-LVL IV: CPT | Mod: PBBFAC,HCNC,, | Performed by: OPTOMETRIST

## 2024-12-13 RX ORDER — DORZOLAMIDE HYDROCHLORIDE AND TIMOLOL MALEATE 20; 5 MG/ML; MG/ML
1 SOLUTION/ DROPS OPHTHALMIC 2 TIMES DAILY
Qty: 10 ML | Refills: 11 | Status: SHIPPED | OUTPATIENT
Start: 2024-12-13

## 2024-12-13 NOTE — PROGRESS NOTES
HPI    BONG: 10/27/22 Dr. Phillips  Last DFE: 10/27/22   Chief complaint (CC): 78 yo M presents for ocular health exam. Pt reports   vision worsening OU.   Glasses? yes  Contacts? no  H/o eye surgery, injections or laser:   Injections OD-    S/p SKYLAR 8/4/22   S/p ELKIN 3/24/22   S/p IVO 1/4/22   OS- no injections    S/p YAG OD    CE IOL OU   H/o eye injury: No  Known eye conditions?   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 no  Eye gtts? Dorlozmide-Timolol BID OU      (-) Flashes (+)  Floaters (-) Mucous   (+)  Tearing (+) Itching (-) Burning   (-) Headaches (-) Eye Pain/discomfort (-) Irritation   (-)  Redness (-) Double vision (-) Blurry vision    Diabetic? Yes  A1c? Lab Results       Component                Value               Date                       HGBA1C                   7.7 (H)             08/16/2024            Last edited by Mani Carter, OD on 12/13/2024  9:10 AM.            Assessment /Plan     For exam results, see Encounter Report.        Eye exam, routine   - Eyemed     Primary open angle glaucoma of both eyes, moderate stage  - IOP elevated OU today (20/22) not meeting target of midteens<18 set by Dr. Wheatley  - Pt LTFU since 11/22/22   - Pt reports moderate compliance with Cosopt   - Pt advised to re-initiate Cosopt, and drop schedule reviewed.    - Pt educated on risk of permanent vision loss with non compliance    - Pt reports he lives in [place] and has difficulty getting here for visits  - Advised pt to schedule f/u visits in [facility]   - Cosopt refilled today for use BID OU  - RTC next available with Dr. Wheatley or  in [place] for further care      Hypertensive retinopathy of both eyes  Type 2 diabetes mellitus with mild nonproliferative retinopathy of both eyes, with long-term current use of insulin, macular edema presence unspecified   - Pt educated on the importance of maintaining adequate glycemic and blood pressure control via diet, compliance with medications, exercise and timely follow up with PCP.  Mild diabetic retinopathy   - H/o intravitreal injections for macular edema with Dr. Phillips   - [facility] next available with Dr. Phillips or in [place] for further care     Pseudophakia of both eyes   - Doing well. Monitor.

## 2025-01-09 ENCOUNTER — TELEPHONE (OUTPATIENT)
Dept: HEMATOLOGY/ONCOLOGY | Facility: CLINIC | Age: 80
End: 2025-01-09
Payer: MEDICARE

## 2025-01-09 ENCOUNTER — OFFICE VISIT (OUTPATIENT)
Dept: HEMATOLOGY/ONCOLOGY | Facility: CLINIC | Age: 80
End: 2025-01-09
Payer: MEDICARE

## 2025-01-09 DIAGNOSIS — D47.2 SMOLDERING MULTIPLE MYELOMA: Primary | ICD-10-CM

## 2025-01-09 DIAGNOSIS — N18.30 CKD STAGE 3 DUE TO TYPE 2 DIABETES MELLITUS: ICD-10-CM

## 2025-01-09 DIAGNOSIS — E11.42 TYPE 2 DIABETES MELLITUS WITH DIABETIC POLYNEUROPATHY, WITH LONG-TERM CURRENT USE OF INSULIN: ICD-10-CM

## 2025-01-09 DIAGNOSIS — E11.22 CKD STAGE 3 DUE TO TYPE 2 DIABETES MELLITUS: ICD-10-CM

## 2025-01-09 DIAGNOSIS — Z79.4 TYPE 2 DIABETES MELLITUS WITH DIABETIC POLYNEUROPATHY, WITH LONG-TERM CURRENT USE OF INSULIN: ICD-10-CM

## 2025-01-09 NOTE — TELEPHONE ENCOUNTER
----- Message from [employer]en sent at 1/9/2025  9:30 AM CST -----  Type: General Call Back         Name of Caller:pt  Symptoms:follow up  Would the patient rather a call back or a response via [facility]? call  Best Call Back Number:[phone]   Additional Information: Pt states he thought his appt was virtual for today, and would like a call from Dr. Engel when available.Thank you.

## 2025-01-09 NOTE — PROGRESS NOTES
The patient location is: [place]  The chief complaint leading to consultation is: Smoldering myeloma    Visit type: audiovisual    Face to Face time with patient: 15  30 minutes of total time spent on the encounter, which includes face to face time and non-face to face time preparing to see the patient (eg, review of tests), Obtaining and/or reviewing separately obtained history, Documenting clinical information in the electronic or other health record, Independently interpreting results (not separately reported) and communicating results to the patient/family/caregiver, or Care coordination (not separately reported).         Each patient to whom he or she provides medical services by telemedicine is:  (1) informed of the relationship between the physician and patient and the respective role of any other health care provider with respect to management of the patient; and (2) notified that he or she may decline to receive medical services by telemedicine and may withdraw from such care at any time.    Notes:      Subjective:    Patient ID: Emerson Menendez is a 79 y.o. male.    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 Test results identify a stable IgA kappa protein with beta globulin band at 1.63g/dL. Kappa free light chain is elevated at 4.40. CBC and calcium are stable. Creatinine with history of stage III CKD is stable at 1.4. Metastatic survey is negative for lytic lesions. MRI of the entire spine demonstrated age related changes but no convincing evidence of myeloma bone disease. Bone marrow biopsy identified about 13% plasma cells by morphology and FISH for myeloma identified a t(11;14) and trisomies 3,7,  and 17. The patient is afebrile and appears clinically well. He was seen by his podiatrist and nephrologist since our last visit without any new or acute events.    The patient has not received any therapy for smoldering myeloma including bisphosphonates or steroids. He has been randomized to observation arm of the the clinical study. 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 He has no current or prior history of malignancy.    TODAY Cycle 108 E3A06, observation cohort  CBC stable. CMP has low NACl and increased creatinine.   FLC stable  Renal biopsy in March 2024 had no evidence of light chain or M protein deposition; diagnosis of diabetic nephropathy  Currently having symptoms of viral URI likely contributing to dehydration. Drinking sugar free gatorade. Discussed increasing water intake. Sleeping on heating blanket- discussed stopping to limit constitutional fluid loss  BP elevated, working with health . Increased nifedipine to 2 tabs daily.      Knee Pain     Joint Pain  Associated symptoms include coughing. Pertinent negatives include no abdominal pain, chest pain, diaphoresis, fatigue, fever, headaches or rash.   Hand Pain   Pertinent negatives include no chest pain.   Cough  Pertinent negatives include no chest pain, fever, headaches, rash or shortness of breath.   Foot Pain  Associated symptoms include coughing. Pertinent negatives include no abdominal pain, chest pain, diaphoresis, fatigue, fever, headaches or rash.   Arm Pain   Pertinent negatives include no chest pain.   Follow-up  Associated symptoms include coughing. Pertinent negatives include no abdominal pain, chest pain, diaphoresis, fatigue, fever, headaches or rash.     Review of Systems   Constitutional:  Negative for activity change, appetite change, diaphoresis, fatigue, fever and unexpected weight change.   HENT: Negative.   Negative for mouth sores.    Eyes: Negative.    Respiratory:  Positive for cough. Negative for shortness of breath.    Cardiovascular:  Negative for chest pain and leg swelling.   Gastrointestinal: Negative.  Negative for abdominal pain and diarrhea.   Endocrine: Negative.    Genitourinary: Negative.  Negative for frequency.   Musculoskeletal: Negative.  Negative for back pain.   Skin: Negative.  Negative for rash.   Allergic/Immunologic: Negative.    Neurological:  Negative for headaches.        Grade 0-1 peripheral neuropathy of bilateral feet.    Hematological:  Negative for adenopathy. Does not bruise/bleed easily.   Psychiatric/Behavioral: Negative.  The patient is not nervous/anxious.        Objective:       There were no vitals filed for this visit.    Prior exam below, no exam for telehealth visit 1/9/2025    Physical Exam  Vitals and nursing note reviewed.   Constitutional:       Appearance: He is well-developed.   HENT:      Head: Normocephalic and atraumatic.      Right Ear: External ear normal.      Left Ear: External ear normal.      Nose: Nose normal.   Eyes:      Conjunctiva/sclera: Conjunctivae normal.      Pupils: Pupils are equal, round, and reactive to light.   Cardiovascular:      Rate and Rhythm: Normal rate and regular rhythm.      Heart sounds: No murmur heard.  Pulmonary:      Effort: Pulmonary effort is normal. No respiratory distress.      Breath sounds: Normal breath sounds.   Abdominal:      General: Bowel sounds are normal. There is no distension.      Palpations: Abdomen is soft.   Musculoskeletal:         General: No tenderness.      Cervical back: Normal range of motion and neck supple.   Skin:     General: Skin is warm and dry.      Findings: No rash.      Nails: There is no clubbing.   Neurological:      Mental Status: He is alert and oriented to person, place, and time.      Cranial Nerves: No cranial nerve deficit.   Psychiatric:         Behavior: Behavior normal.          Assessment:       No diagnosis found.        Plan:       The patient has a diagnosis of smoldering myeloma. There is no indication for immediate chemotherapy. We are monitoring renal function closely- baseline creatinine of around 1.5. 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 M protein has been stable and repeat is pending. Plan for return in 1 month.  Endocrinology and Nephrology to monitor diabetes and renal failure respectively. Patient would like to continue to follow with Dr. Perkins as needed.     Elevated A1C- working on diet and exercise to improve. March 2024 renal biopsy was diabetic nephropathy  Continue monthly observation  Repeating myeloma serology light chains stable  Discussed hydration for creatinine of 2.7 and low NaCl      A total of 20 minutes was spent in pre-visit chart review, personal interpretation of labs and imaging, and medication review. Total visit time 35 minutes, >50 % counseling.    Visit today included increased complexity associated with the care of the episodic problem remission monitoring addressed and managing the longitudinal care of the patient due to the serious and/or complex managed problem(s) smoldering myeloma.

## 2025-01-13 DIAGNOSIS — Z00.00 ENCOUNTER FOR MEDICARE ANNUAL WELLNESS EXAM: ICD-10-CM

## 2025-01-16 ENCOUNTER — PATIENT MESSAGE (OUTPATIENT)
Dept: INTERNAL MEDICINE | Facility: CLINIC | Age: 80
End: 2025-01-16
Payer: MEDICARE

## 2025-01-16 RX ORDER — OMEPRAZOLE 40 MG/1
40 CAPSULE, DELAYED RELEASE ORAL EVERY MORNING
Qty: 90 CAPSULE | Refills: 4 | Status: SHIPPED | OUTPATIENT
Start: 2025-01-16

## 2025-01-16 RX ORDER — FLASH GLUCOSE SENSOR
KIT MISCELLANEOUS
Qty: 3 KIT | Refills: 11 | Status: SHIPPED | OUTPATIENT
Start: 2025-01-16

## 2025-01-16 NOTE — TELEPHONE ENCOUNTER
Care Due:                  Date            Visit Type   Department     Provider  --------------------------------------------------------------------------------                                EP -                              PRIMARY      [facility] INTERNAL  Last Visit: 08-      CARE ([facility])   MEDICINE       GEOVANNA ALCAZAR                              EP                               PRIMARY      [facility] INTERNAL  Next Visit: 02-      CARE ([facility])   MEDICINE       GEOVANNA ALCAZAR                                                            Last  Test          Frequency    Reason                     Performed    Due Date  --------------------------------------------------------------------------------    Vitamin D...  12 months..  ergocalciferol...........  01- 01-    [facility] [employer] Embedded Care Due Messages. Reference number: [id number].   1/16/2025 10:29:20 AM CST

## 2025-02-03 ENCOUNTER — LAB VISIT (OUTPATIENT)
Dept: LAB | Facility: HOSPITAL | Age: 80
End: 2025-02-03
Payer: MEDICARE

## 2025-02-03 ENCOUNTER — OFFICE VISIT (OUTPATIENT)
Dept: INTERNAL MEDICINE | Facility: CLINIC | Age: 80
End: 2025-02-03
Payer: MEDICARE

## 2025-02-03 VITALS
WEIGHT: 198 LBS | HEART RATE: 74 BPM | DIASTOLIC BLOOD PRESSURE: 62 MMHG | BODY MASS INDEX: 28.35 KG/M2 | SYSTOLIC BLOOD PRESSURE: 120 MMHG | HEIGHT: 70 IN | OXYGEN SATURATION: 96 %

## 2025-02-03 DIAGNOSIS — J47.9 BRONCHIECTASIS WITHOUT COMPLICATION: ICD-10-CM

## 2025-02-03 DIAGNOSIS — N18.4 CHRONIC KIDNEY DISEASE (CKD), STAGE IV (SEVERE): ICD-10-CM

## 2025-02-03 DIAGNOSIS — I10 ESSENTIAL HYPERTENSION: ICD-10-CM

## 2025-02-03 DIAGNOSIS — D47.2 SMOLDERING MULTIPLE MYELOMA: ICD-10-CM

## 2025-02-03 DIAGNOSIS — E08.3211 MILD NONPROLIFERATIVE DIABETIC RETINOPATHY OF RIGHT EYE WITH MACULAR EDEMA ASSOCIATED WITH DIABETES MELLITUS DUE TO UNDERLYING CONDITION: ICD-10-CM

## 2025-02-03 DIAGNOSIS — E11.42 TYPE 2 DIABETES MELLITUS WITH DIABETIC POLYNEUROPATHY, WITH LONG-TERM CURRENT USE OF INSULIN: Primary | ICD-10-CM

## 2025-02-03 DIAGNOSIS — Z79.4 TYPE 2 DIABETES MELLITUS WITH DIABETIC POLYNEUROPATHY, WITH LONG-TERM CURRENT USE OF INSULIN: Primary | ICD-10-CM

## 2025-02-03 DIAGNOSIS — C90.00 MULTIPLE MYELOMA NOT HAVING ACHIEVED REMISSION: ICD-10-CM

## 2025-02-03 DIAGNOSIS — D80.4 SELECTIVE DEFICIENCY OF IMMUNOGLOBULIN M (IGM): ICD-10-CM

## 2025-02-03 LAB
ANION GAP SERPL CALC-SCNC: 11 MMOL/L (ref 8–16)
BUN SERPL-MCNC: 25 MG/DL (ref 8–23)
CALCIUM SERPL-MCNC: 9.3 MG/DL (ref 8.7–10.5)
CHLORIDE SERPL-SCNC: 104 MMOL/L (ref 95–110)
CO2 SERPL-SCNC: 22 MMOL/L (ref 23–29)
CREAT SERPL-MCNC: 2.1 MG/DL (ref 0.5–1.4)
EST. GFR  (NO RACE VARIABLE): 31.6 ML/MIN/1.73 M^2
GLUCOSE SERPL-MCNC: 196 MG/DL (ref 70–110)
POTASSIUM SERPL-SCNC: 4.5 MMOL/L (ref 3.5–5.1)
SODIUM SERPL-SCNC: 137 MMOL/L (ref 136–145)

## 2025-02-03 PROCEDURE — 36415 COLL VENOUS BLD VENIPUNCTURE: CPT | Mod: HCNC | Performed by: INTERNAL MEDICINE

## 2025-02-03 PROCEDURE — 3288F FALL RISK ASSESSMENT DOCD: CPT | Mod: HCNC,CPTII,S$GLB, | Performed by: INTERNAL MEDICINE

## 2025-02-03 PROCEDURE — 1101F PT FALLS ASSESS-DOCD LE1/YR: CPT | Mod: HCNC,CPTII,S$GLB, | Performed by: INTERNAL MEDICINE

## 2025-02-03 PROCEDURE — 3074F SYST BP LT 130 MM HG: CPT | Mod: HCNC,CPTII,S$GLB, | Performed by: INTERNAL MEDICINE

## 2025-02-03 PROCEDURE — 1159F MED LIST DOCD IN RCRD: CPT | Mod: HCNC,CPTII,S$GLB, | Performed by: INTERNAL MEDICINE

## 2025-02-03 PROCEDURE — 99214 OFFICE O/P EST MOD 30 MIN: CPT | Mod: HCNC,S$GLB,, | Performed by: INTERNAL MEDICINE

## 2025-02-03 PROCEDURE — 80048 BASIC METABOLIC PNL TOTAL CA: CPT | Mod: HCNC,XB | Performed by: INTERNAL MEDICINE

## 2025-02-03 PROCEDURE — 3078F DIAST BP <80 MM HG: CPT | Mod: HCNC,CPTII,S$GLB, | Performed by: INTERNAL MEDICINE

## 2025-02-03 PROCEDURE — 99999 PR PBB SHADOW E&M-EST. PATIENT-LVL V: CPT | Mod: PBBFAC,HCNC,, | Performed by: INTERNAL MEDICINE

## 2025-02-03 RX ORDER — DOXYCYCLINE HYCLATE 100 MG
100 TABLET ORAL 2 TIMES DAILY
Qty: 20 TABLET | Refills: 3 | Status: SHIPPED | OUTPATIENT
Start: 2025-02-03

## 2025-02-03 RX ORDER — LATANOPROST 50 UG/ML
1 SOLUTION/ DROPS OPHTHALMIC NIGHTLY
COMMUNITY

## 2025-02-03 RX ORDER — MIRTAZAPINE 7.5 MG/1
7.5 TABLET, FILM COATED ORAL NIGHTLY
Qty: 90 TABLET | Refills: 1 | Status: SHIPPED | OUTPATIENT
Start: 2025-02-03 | End: 2026-02-03

## 2025-02-03 RX ORDER — DIAZEPAM 5 MG/1
5 TABLET ORAL EVERY 12 HOURS PRN
Qty: 60 TABLET | Refills: 0 | Status: SHIPPED | OUTPATIENT
Start: 2025-02-03

## 2025-02-03 NOTE — PROGRESS NOTES
CHIEF COMPLAINT:   Follow up of multiple myeloma, diabetes, hypertension, reflux, hyperlipidemia    HISTORY OF PRESENT ILLNESS: This is a 79-year-old man who presents for follow up of above     The holidays were difficult for him emotionally.  He has had some anxiety. He has not been sleeping. He tries to go to sleep at 10 pm. He has been very restless at night. He will take diazepam 5 mg at 8 pm which had been helping him sleep.  The last several weeks, the diazepam or benadryl or tylenol pm has not helped. He continues to take diazepam 5 mg at bedtime. He thinks he continues to take citalopram 40 mg once daily.    [facility] - he had [facility] done for a film around the cataract. Vision is better. Lantanoprost was added to the right eye. He continues to use Cosopt drops in both eyes. [facility]     He had an injection by DR Olguin in [place] in the right knee on 25 which helped is right knee pain. Left knee is doing fine.  Left knee was replaced 23 in [place]     He continues to have a cough - CT chest 24 unremarkable     Reflux is controlled with over the counter omeprazole 20 mg daily.      He had a kidney biopsy in [place] on 3/13/24.  Biopsy revealed that diabetic nephropathy as the cause of his CKD. The biopsy did not show a myeloma related glomerulopathy (MGRS).  He needs better blood sugar control.   He is taking Farxiga 10 mg daily     HE is taking amitriptyline 50 mg nightly for peripheral neuropathy - burning sensation in his feet. This medication helps.         He got   2021.  Life is going well.  He is residing in [place] [facility].  His wife has a job in [place].   life has been good for him. .        His daughter  of complications of lupus and ESRD 2020. She was on dialysis and was septic. She was at home in hospice.  His mother is currently doing well at age 100 (100  in April 2024). His brother and sister help care for her.       He is in digitial diabetes. Sugars have been better.  Sugars are now in the low 100's range.  He is using Free Style Leah 2.  He is on novolog 4 units with meals as needed, Lantus 10 units twice daily (adjusts according to sugars) and glimepriride 2 mg daily, Farxiga 10 mg once daily- he just restarted this medication 2 weeks ago. He had not been taking Farxiga for about 2 weeks.  NO polydipsia or polyuria.        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 He is in the observation arm and is being monitoring monthly. He sess Dr Engel monthly.  No indication for chemotherapy at this time. HE gets monthly blood work.        He is taking nifedipine ER 60 mg twice daily to protect his kidney. He is now taking chlorthalidone 25 mg 1/2 tablet daily for fluid  buildup and swelling is better. No polydipsia or polyuria       His back and neck was doing ok . Pain is starting to return. He was in healthy back program.      No chest pain, shortness of breath, nausea, voimting, constipation, diarrhea,      He had laser vaporization and enucleation of the prostate 11/13/13. He denies any dysuria or hematuria now. HE saw Dr Walker.  HE is urinting well. He continues FLomax 0.4 mg nightly and oxybutynin XL 10 gm daliy.  HE gets up 2-3 times at night to urinate    He has hyperlipidemia, on pravastatin 40 mg daily. No joint pain or muscle pain from the pravastatin.        He has been taking aspirin 81 mg daily     vitamin D supplement 50,000 units once weekly         PAST MEDICAL HISTORY:   1. Diabetes mellitus.   2. Hyperlipidemia.   3. Reflux.   4. BPH.   5. Osteoarthritis of the knees.   6. Neck pain.   7. History of right lateral epicondylitis.   8. History of lumps removed from the breast as a teenager.   9. OA cervical spine   10. Multiple myeloma    SOCIAL HISTORY: Does not smoke, does not drink. He owns an    company. He works for the [employer].     FAMILY HISTORY: Mother is living at age 95 with a heart condition. She had a mild stroke. Father  in his late 40s of alcoholism. He has 5 sisters and 1 brother. one has a neurological disorder, one is anxious. ONe sister  after a fall. His daughter has  lupus, on dialysis, heart problems, and a brain aneurysm that was stented.       PHYSICAL EXAMINATION:          GENERAL: He is alert, oriented, no apparent distress. Affect within normal limits.   Conjunctivae anicteric. PERRL. Tympanic membranes clear. Oropharynx gumes healing.    NECK: Supple. No cervical lymphadenopathy, no thyroid enlargement.   Respiratory: Effort normal. Lungs are clear to auscultation.   HEART: Regular rate and rhythm without murmurs, gallops or rubs.   No lower extremity edema.    ABDOMEN: soft, non distended, non tender, bowel sounds present, no hepatosplenomgaly      ASSESSMENT AND PLAN:        1. Diabetes mellitus with hyperglycemia and microalbuminuria - in Digital diabetes.  . Watch for low blood sugars. Has Free style henrry 2.  Labs today  2. 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 Follow up with DR Engel. Labs today  3. OA knee -s/p left knee replacement.  S/p injection in right knee  4. Hypertension - controlled.   5. Hyperlipidemia. On pravastatin  6. BPH. S/p laser   7. Vitamin D deficiency - check level  9. Back pain/neck pain - managing-   10. Anxiety and depression- make sure taking citalopram 40 mg daily. Add mirtazapine 7.5 mg at bedtime  11. Peripheral neuropathy -controlled on amitriptyline.  12. CRI stage V -stable - monitored closely by heme onc and nephrology.  13. Obestiy - work on diet, exercise and weigh tloss  14. Recurrent boils in arm pit - doxycycline when flares  15. Reflux - get back on  omperazole 40 mg daliy -   16, overweight - working at diet, exercise and weight management  17. Diabetic retinopathy- [facility].  [facility]  and had [facility] for protein film on lens  18. Chronic cough - s/p  CT 8/2024- has some bronchiectasis on CT scan - will continue to monitor     I will see him back in 4 months, sooner if problems arise      . colonoscopy normal 3/2012 and due 2022.  Cologuard has been ordered - needs to send in.

## 2025-02-10 ENCOUNTER — TELEPHONE (OUTPATIENT)
Dept: INTERNAL MEDICINE | Facility: CLINIC | Age: 80
End: 2025-02-10
Payer: MEDICARE

## 2025-02-22 RX ORDER — PRAVASTATIN SODIUM 40 MG/1
40 TABLET ORAL
Qty: 90 TABLET | Refills: 1 | Status: SHIPPED | OUTPATIENT
Start: 2025-02-22

## 2025-02-22 NOTE — TELEPHONE ENCOUNTER
No care due was identified.  [facility] Embedded Care Due Messages. Reference number: [id number].   2/22/2025 1:27:57 AM CST

## 2025-02-22 NOTE — TELEPHONE ENCOUNTER
Refill Decision Note   Emerson Menendez  is requesting a refill authorization.  Brief Assessment and Rationale for Refill:  Approve     Medication Therapy Plan:        Comments:     Note composed:9:23 AM 02/22/2025

## 2025-02-26 ENCOUNTER — PATIENT MESSAGE (OUTPATIENT)
Dept: INTERNAL MEDICINE | Facility: CLINIC | Age: 80
End: 2025-02-26
Payer: MEDICARE

## 2025-03-03 NOTE — TELEPHONE ENCOUNTER
Please schedule follow up appointment with PCP or if emergent then same-day any provider available.

## 2025-03-03 NOTE — TELEPHONE ENCOUNTER
I called and spoke with pt, and he said he was given a Rx for fluid, but do not remember the name.  He think he may have left it in [place].  It was his last Rx.  He said he is still having the swelling, which has gotten worse.  He is no longer having the shortness of breath.  Please advise.

## 2025-03-10 DIAGNOSIS — D47.2 SMOLDERING MULTIPLE MYELOMA: Primary | ICD-10-CM

## 2025-03-10 DIAGNOSIS — Z00.6 RESEARCH EXAM: ICD-10-CM

## 2025-03-17 PROBLEM — R06.00 DYSPNEA: Status: ACTIVE | Noted: 2025-03-17

## 2025-03-17 PROBLEM — K92.0 HEMATEMESIS: Status: ACTIVE | Noted: 2025-03-17

## 2025-03-17 PROBLEM — N18.9 CHRONIC KIDNEY DISEASE: Status: ACTIVE | Noted: 2024-11-08

## 2025-03-17 PROBLEM — R10.0 ACUTE ABDOMEN: Status: ACTIVE | Noted: 2025-03-17

## 2025-03-17 PROBLEM — E87.70 VOLUME OVERLOAD: Status: ACTIVE | Noted: 2025-03-17

## 2025-03-18 ENCOUNTER — PATIENT MESSAGE (OUTPATIENT)
Dept: RESEARCH | Facility: HOSPITAL | Age: 80
End: 2025-03-18
Payer: MEDICARE

## 2025-03-19 ENCOUNTER — PATIENT MESSAGE (OUTPATIENT)
Dept: INTERNAL MEDICINE | Facility: CLINIC | Age: 80
End: 2025-03-19
Payer: MEDICARE

## 2025-03-20 ENCOUNTER — OFFICE VISIT (OUTPATIENT)
Dept: INTERNAL MEDICINE | Facility: CLINIC | Age: 80
End: 2025-03-20
Payer: MEDICARE

## 2025-03-20 ENCOUNTER — TELEPHONE (OUTPATIENT)
Dept: INTERNAL MEDICINE | Facility: CLINIC | Age: 80
End: 2025-03-20
Payer: MEDICARE

## 2025-03-20 DIAGNOSIS — N18.30 CKD STAGE 3 DUE TO TYPE 2 DIABETES MELLITUS: ICD-10-CM

## 2025-03-20 DIAGNOSIS — E11.22 CKD STAGE 3 DUE TO TYPE 2 DIABETES MELLITUS: ICD-10-CM

## 2025-03-20 DIAGNOSIS — D64.9 ANEMIA, UNSPECIFIED TYPE: ICD-10-CM

## 2025-03-20 DIAGNOSIS — K25.9 GASTRIC EROSION, UNSPECIFIED CHRONICITY: Primary | ICD-10-CM

## 2025-03-20 DIAGNOSIS — I10 PRIMARY HYPERTENSION: ICD-10-CM

## 2025-03-20 DIAGNOSIS — N18.32 STAGE 3B CHRONIC KIDNEY DISEASE: ICD-10-CM

## 2025-03-20 DIAGNOSIS — E11.21 DIABETIC NEPHROPATHY ASSOCIATED WITH TYPE 2 DIABETES MELLITUS: ICD-10-CM

## 2025-03-20 RX ORDER — FUROSEMIDE 20 MG/1
20 TABLET ORAL DAILY PRN
Qty: 30 TABLET | Refills: 3 | Status: SHIPPED | OUTPATIENT
Start: 2025-03-20

## 2025-03-20 RX ORDER — ONDANSETRON 4 MG/1
4 TABLET, ORALLY DISINTEGRATING ORAL EVERY 8 HOURS PRN
Qty: 60 TABLET | Refills: 1 | Status: SHIPPED | OUTPATIENT
Start: 2025-03-20

## 2025-03-20 RX ORDER — FUROSEMIDE 20 MG/1
20 TABLET ORAL DAILY PRN
Qty: 60 TABLET | Refills: 11 | Status: SHIPPED | OUTPATIENT
Start: 2025-03-20 | End: 2025-03-20 | Stop reason: SDUPTHER

## 2025-03-20 RX ORDER — DAPAGLIFLOZIN 10 MG/1
10 TABLET, FILM COATED ORAL DAILY
Qty: 30 TABLET | Refills: 11 | Status: SHIPPED | OUTPATIENT
Start: 2025-03-20

## 2025-03-20 RX ORDER — PANTOPRAZOLE SODIUM 40 MG/1
40 TABLET, DELAYED RELEASE ORAL 2 TIMES DAILY
Qty: 60 TABLET | Refills: 3 | Status: SHIPPED | OUTPATIENT
Start: 2025-03-20 | End: 2026-03-20

## 2025-03-20 NOTE — PATIENT INSTRUCTIONS
Stop omeprazole  Take pantoprazole 40 mg twice daily- make sure  correct prescription  Ondansetron 4 mg three times daily as needed for nausea    Vitron C - one tablet daily for 30 days (over the counter iron)   Watch for constipation with the iron supplement    Change nifedipine ER to 60 mg 2 tablets in the evening.  May take furosemide 20 mg once daily as needed for swelling.supplement).       For sleep and mood  - mirtazapine 7.5 mg at bedtime.  Take in addition to citalopram 40 mg daily and amitriptyline 50 mg at bedtime.   Do not take with the diazepam

## 2025-03-20 NOTE — PROGRESS NOTES
The patient location is: home  The chief complaint leading to consultation is: follow up    Visit type: audiovisual    Face to Face time with patient: 35 minutes  37 minutes of total time spent on the encounter, which includes face to face time and non-face to face time preparing to see the patient (eg, review of tests), Obtaining and/or reviewing separately obtained history, Documenting clinical information in the electronic or other health record, Independently interpreting results (not separately reported) and communicating results to the patient/family/caregiver, or Care coordination (not separately reported).         Each patient to whom he or she provides medical services by telemedicine is:  (1) informed of the relationship between the physician and patient and the respective role of any other health care provider with respect to management of the patient; and (2) notified that he or she may decline to receive medical services by telemedicine and may withdraw from such care at any time.    Notes:   CHIEF COMPLAINT:   Follow up of multiple myeloma, diabetes, hypertension, reflux, hyperlipidemia    HISTORY OF PRESENT ILLNESS: This is a 79-year-old man who presents for follow up of above     He as in the hospital in [place] on 3/17/25 to 3/19/25.  He had had some abdominal pain for 2 weeks. He had some mild to moderate nausea with the pain. He vomited 2-3 times in the last 2 weeks. He had an episode of bloody voimitus the day of admit. EGD - mild erythematous mucosa with erosion in the cardia and fundus of the stomach, consistent with gastritis.  He was started on pantoprazole 40 mg daily, and NSAIDs were discontinued. He reports that he was not taking any NSAIDS at the time. He had been taking omeprazole 40 mg daily for years.     Since hospitalization, abdominal discomfort is the same- constant, 4/10. Nausea is the same - comes and goes. He is being careful what he eats.  Eating does not change the  abdominal discomfort.  Nausea is worse after eating. He does not have anti nausea medication.  No constipation or diarrhea. He takes fiber 2 in am and one in the evening. No melana.    He has had swelling in his legs and feet for at least a month.  BLood pressure has been good - 130-136/80's. NO chest pain or shortness of breath.      He is taking nifedipine ER 60 mg twice daily to protect his kidney. Nifedipine was increased 2024 from once daily to 60 mg twice daily.. No polydipsia or polyuria     He is taking amitriptyline 50 mg at bedtime diazepam 5 mg 1/2 tablet at bedtime. HE is sleeping better.  he continues to take citalopram 40 mg once daily.     [facility] for a film around the cataract. Vision is better. Lantanoprost was added to the right eye. He continues to use Cosopt drops in both eyes. [facility]     He had an injection by DR Olguin in [place] in the right knee on 25 which helped is right knee pain. Left knee is doing fine.  Left knee was replaced 23 in [place]     He continues to have a cough - CT chest 24 unremarkable     He had a kidney biopsy in [place] on 3/13/24.  Biopsy revealed that diabetic nephropathy as the cause of his CKD. The biopsy did not show a myeloma related glomerulopathy (MGRS).  He needs better blood sugar control.   He is taking Farxiga 10 mg daily     HE is taking amitriptyline 50 mg nightly for peripheral neuropathy - burning sensation in his feet. This medication helps.         He got   2021.  Life is going well.  He is residing in [facility].  His wife has a job in [place].   life has been good for him. .        His daughter  of complications of lupus and ESRD 2020. She was on dialysis and was septic. She was at home in hospice.  His mother is currently doing well at age 100 (100 in 2024). His brother and sister  help care for her.       He is in digitial diabetes. Sugars have been better.  Sugars are now in the low 100's range.  He is using Free Style Leah 2.  He is on novolog 4 units with meals as needed, Lantus 10 units twice daily (adjusts according to sugars) and glimepriride 2 mg twice daily, Farxiga 10 mg once daily. NO polydipsia or polyuria.        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 He is in the observation arm and is being monitoring monthly. He sess Dr Engel monthly.  No indication for chemotherapy at this time. HE gets monthly blood work.              His back and neck was doing ok . Pain is starting to return. He was in healthy back program.      No chest pain, shortness of breath, nausea, voimting, constipation, diarrhea,      He had laser vaporization and enucleation of the prostate 13. He denies any dysuria or hematuria now. HE saw Dr Walker.  HE is urinting well. He continues FLomax 0.4 mg nightly and oxybutynin XL 10 gm daliy.  HE gets up 2-3 times at night to urinate    He has hyperlipidemia, on pravastatin 40 mg daily. No joint pain or muscle pain from the pravastatin.        He has been taking aspirin 81 mg daily     vitamin D supplement 50,000 units once weekly         PAST MEDICAL HISTORY:   1. Diabetes mellitus.   2. Hyperlipidemia.   3. Reflux.   4. BPH.   5. Osteoarthritis of the knees.   6. Neck pain.   7. History of right lateral epicondylitis.   8. History of lumps removed from the breast as a teenager.   9. OA cervical spine   10. Multiple myeloma    SOCIAL HISTORY: Does not smoke, does not drink. 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     FAMILY HISTORY: Mother is living at age 95 with a heart condition. She had a mild stroke. Father  in his late 40s of alcoholism. He has 5 sisters and 1 brother. one has a neurological disorder, one is anxious. ONe sister  after a fall. His daughter has  lupus, on dialysis, heart problems, and a  brain aneurysm that was stented.       PHYSICAL EXAMINATION:          GENERAL: He is alert, oriented, no apparent distress. Affect within normal limits.   Conjunctivae anicteric.    NECK: Supple.   Respiratory: Effort normal.      ASSESSMENT AND PLAN:   Gastric erosions - increase pantoprazole 40 mg twice daily. CBC and CMP in 2 weeks.  Await biopsy results  Diabetes mellitus with hyperglycemia and microalbuminuria - in Digital diabetes.  . Watch for low blood sugars. Has Free style henrry 2.  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 Follow up with DR Engel. Labs today  OA knee -s/p left knee replacement.  S/p injection in right knee  Hypertension - Change nifedipine ER to 60 mg 2 tablets in the evening.  May take furosemide 20 mg once daily as needed for swelling.  Hyperlipidemia. On pravastatin  BPH. S/p laser   Vitamin D deficiency - check level  Back pain/neck pain - managing-   10. Anxiety and depression- make sure taking citalopram 40 mg daily. He is to see if he is taking mirtazapine 7.5 mg at bedtime  11. Peripheral neuropathy -controlled on amitriptyline.  12. CRI stage V -stable - monitored closely by heme onc and nephrology.  13. Obestiy - work on diet, exercise and weigh tloss  14. Recurrent boils in arm pit - doxycycline when flares  16, overweight - working at diet, exercise and weight management  17. Diabetic retinopathy- [facility]  [facility] and [facility] for protein film on lens  18. Chronic cough - s/p  CT 8/2024- has some bronchiectasis on CT scan - will continue to monitor     I will see him back in 2 weeks with CBC and CMP prior sooner if problems arise      . colonoscopy normal 3/2012 and due 2022.  Cologuard has been ordered - needs to send in.

## 2025-03-23 NOTE — TELEPHONE ENCOUNTER
Care Due:                  Date            Visit Type   Department     Provider  --------------------------------------------------------------------------------                                [facility] INTERNAL  Last Visit: 03-      FOLLOW UP    MEDICINE       GEOVANNA ALCAZAR                              EP -                              PRIMARY      [facility] INTERNAL  Next Visit: 04-      CARE ([facility])   MEDICINE       GEOVANNA ALCAZAR                                                            Last  Test          Frequency    Reason                     Performed    Due Date  --------------------------------------------------------------------------------    Vitamin D...  12 months..  ergocalciferol...........  01- 01-    Health [facility] Embedded Care Due Messages. Reference number: [id number].   3/23/2025 1:04:50 PM CDT

## 2025-03-24 RX ORDER — GLIMEPIRIDE 2 MG/1
2 TABLET ORAL
Qty: 90 TABLET | Refills: 1 | Status: SHIPPED | OUTPATIENT
Start: 2025-03-24

## 2025-03-24 NOTE — TELEPHONE ENCOUNTER
Refill Decision Note   Emerson Menendez  is requesting a refill authorization.  Brief Assessment and Rationale for Refill:  Approve     Medication Therapy Plan:         Comments:     Note composed:12:58 PM 03/24/2025

## 2025-04-01 ENCOUNTER — CLINICAL SUPPORT (OUTPATIENT)
Dept: OPHTHALMOLOGY | Facility: CLINIC | Age: 80
End: 2025-04-01
Payer: MEDICARE

## 2025-04-01 ENCOUNTER — OFFICE VISIT (OUTPATIENT)
Dept: OPHTHALMOLOGY | Facility: CLINIC | Age: 80
End: 2025-04-01
Payer: MEDICARE

## 2025-04-01 DIAGNOSIS — H40.1132 PRIMARY OPEN ANGLE GLAUCOMA OF BOTH EYES, MODERATE STAGE: Primary | ICD-10-CM

## 2025-04-01 DIAGNOSIS — H04.123 DRY EYE SYNDROME OF BOTH EYES: ICD-10-CM

## 2025-04-01 DIAGNOSIS — Z96.1 PSEUDOPHAKIA OF BOTH EYES: ICD-10-CM

## 2025-04-01 PROCEDURE — 1101F PT FALLS ASSESS-DOCD LE1/YR: CPT | Mod: HCNC,CPTII,S$GLB, | Performed by: OPHTHALMOLOGY

## 2025-04-01 PROCEDURE — 3288F FALL RISK ASSESSMENT DOCD: CPT | Mod: HCNC,CPTII,S$GLB, | Performed by: OPHTHALMOLOGY

## 2025-04-01 PROCEDURE — 1159F MED LIST DOCD IN RCRD: CPT | Mod: HCNC,CPTII,S$GLB, | Performed by: OPHTHALMOLOGY

## 2025-04-01 PROCEDURE — 92250 FUNDUS PHOTOGRAPHY W/I&R: CPT | Mod: HCNC,S$GLB,, | Performed by: OPHTHALMOLOGY

## 2025-04-01 PROCEDURE — 99999 PR PBB SHADOW E&M-EST. PATIENT-LVL IV: CPT | Mod: PBBFAC,HCNC,, | Performed by: OPHTHALMOLOGY

## 2025-04-01 PROCEDURE — 1160F RVW MEDS BY RX/DR IN RCRD: CPT | Mod: HCNC,CPTII,S$GLB, | Performed by: OPHTHALMOLOGY

## 2025-04-01 PROCEDURE — 92083 EXTENDED VISUAL FIELD XM: CPT | Mod: HCNC,S$GLB,, | Performed by: OPHTHALMOLOGY

## 2025-04-01 PROCEDURE — 2022F DILAT RTA XM EVC RTNOPTHY: CPT | Mod: HCNC,CPTII,S$GLB, | Performed by: OPHTHALMOLOGY

## 2025-04-01 PROCEDURE — 1126F AMNT PAIN NOTED NONE PRSNT: CPT | Mod: HCNC,CPTII,S$GLB, | Performed by: OPHTHALMOLOGY

## 2025-04-01 PROCEDURE — G2211 COMPLEX E/M VISIT ADD ON: HCPCS | Mod: HCNC,S$GLB,, | Performed by: OPHTHALMOLOGY

## 2025-04-01 PROCEDURE — 99214 OFFICE O/P EST MOD 30 MIN: CPT | Mod: HCNC,S$GLB,, | Performed by: OPHTHALMOLOGY

## 2025-04-01 RX ORDER — VALSARTAN 160 MG/1
160 TABLET ORAL
COMMUNITY
Start: 2025-03-19 | End: 2025-04-02

## 2025-04-01 NOTE — PROGRESS NOTES
Assessment /Plan     For exam results, see Encounter Report.    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  [employer] system / [employer] -->   [employer] [place]    ==> 48 yo Daughter  @ 2020  Only child  Grieving  Aneurysm / coma x 3 days 12 years ago --> RF --> Dialysis x 12 years      POAG  pre - Tx - High 20's OS 2013 -->   No Family hx glc or blindness    CCT  508 // 510    Mid teens < 18 ==> achieved & re-discussed      Both eyes --> tolerating well & Good adherence --> CSM  Cosopt BID  Xal q day    PGA --> holding  Alphagan --> holding    SP SLT OD 2020  SP SLT OS 2020      NIDDM  Mild DR  Resolved CSME OU  + ME OS --> observe  Control --> better    HTN ret  Control    PC IOL OD  SN60WF 19.0 GK  Quiet  OD SP YAG Cap     Left eye PC IOL / [place] Blue 2021  Quiet  Observe      RD precautions:  Discussed with patient symptoms of RD with increased flashes, floaters, decreasing vision.  Patient/Family to call and return immediately to clinic should the symptoms of RD occur.  patient voice good understanding.        IOL choice:       AL       OS     SN60WF 119.0        20.0  -0.47    2025            Plan  RTC 6 months Gonio, IOP & Adherence & OCT RNFL  RTC sooner PRN with good understanding

## 2025-04-01 NOTE — PROGRESS NOTES
oct/hvf ou done/ou/rel/fix/coop.good/patient chart checked for allergies/od.+1.50 +0.75 x9/os.+1.00 +1.25 x74.ej        Assessment /Plan     For exam results, see Encounter Report.    There are no diagnoses linked to this encounter.

## 2025-04-02 ENCOUNTER — OFFICE VISIT (OUTPATIENT)
Dept: INTERNAL MEDICINE | Facility: CLINIC | Age: 80
End: 2025-04-02
Payer: MEDICARE

## 2025-04-02 ENCOUNTER — LAB VISIT (OUTPATIENT)
Dept: LAB | Facility: HOSPITAL | Age: 80
End: 2025-04-02
Attending: INTERNAL MEDICINE
Payer: MEDICARE

## 2025-04-02 VITALS
SYSTOLIC BLOOD PRESSURE: 126 MMHG | HEART RATE: 88 BPM | DIASTOLIC BLOOD PRESSURE: 66 MMHG | BODY MASS INDEX: 28.35 KG/M2 | OXYGEN SATURATION: 98 % | WEIGHT: 198 LBS | HEIGHT: 70 IN

## 2025-04-02 DIAGNOSIS — K21.9 GASTROESOPHAGEAL REFLUX DISEASE WITHOUT ESOPHAGITIS: ICD-10-CM

## 2025-04-02 DIAGNOSIS — Z00.6 RESEARCH EXAM: ICD-10-CM

## 2025-04-02 DIAGNOSIS — N18.4 CRI (CHRONIC RENAL INSUFFICIENCY), STAGE 4 (SEVERE): ICD-10-CM

## 2025-04-02 DIAGNOSIS — E78.49 OTHER HYPERLIPIDEMIA: ICD-10-CM

## 2025-04-02 DIAGNOSIS — C90.00 MULTIPLE MYELOMA NOT HAVING ACHIEVED REMISSION: ICD-10-CM

## 2025-04-02 DIAGNOSIS — E61.1 IRON DEFICIENCY: ICD-10-CM

## 2025-04-02 DIAGNOSIS — K25.9 GASTRIC EROSION, UNSPECIFIED CHRONICITY: ICD-10-CM

## 2025-04-02 DIAGNOSIS — D64.9 ANEMIA, UNSPECIFIED TYPE: ICD-10-CM

## 2025-04-02 DIAGNOSIS — K92.2 GASTROINTESTINAL HEMORRHAGE, UNSPECIFIED GASTROINTESTINAL HEMORRHAGE TYPE: Primary | ICD-10-CM

## 2025-04-02 DIAGNOSIS — D47.2 SMOLDERING MULTIPLE MYELOMA: ICD-10-CM

## 2025-04-02 DIAGNOSIS — M17.12 PRIMARY OSTEOARTHRITIS OF LEFT KNEE: ICD-10-CM

## 2025-04-02 DIAGNOSIS — N52.9 ERECTILE DYSFUNCTION, UNSPECIFIED ERECTILE DYSFUNCTION TYPE: ICD-10-CM

## 2025-04-02 PROBLEM — K92.0 HEMATEMESIS: Status: RESOLVED | Noted: 2025-03-17 | Resolved: 2025-04-02

## 2025-04-02 LAB
ABSOLUTE EOSINOPHIL (OHS): 0.33 K/UL
ABSOLUTE MONOCYTE (OHS): 0.35 K/UL (ref 0.3–1)
ABSOLUTE NEUTROPHIL COUNT (OHS): 2.47 K/UL (ref 1.8–7.7)
ALBUMIN SERPL BCP-MCNC: 3.9 G/DL (ref 3.5–5.2)
ALP SERPL-CCNC: 55 UNIT/L (ref 40–150)
ALT SERPL W/O P-5'-P-CCNC: 15 UNIT/L (ref 10–44)
ANION GAP (OHS): 9 MMOL/L (ref 8–16)
AST SERPL-CCNC: 13 UNIT/L (ref 11–45)
BASOPHILS # BLD AUTO: 0.04 K/UL
BASOPHILS NFR BLD AUTO: 0.8 %
BILIRUB SERPL-MCNC: 0.5 MG/DL (ref 0.1–1)
BUN SERPL-MCNC: 48 MG/DL (ref 8–23)
CALCIUM SERPL-MCNC: 9.9 MG/DL (ref 8.7–10.5)
CHLORIDE SERPL-SCNC: 103 MMOL/L (ref 95–110)
CO2 SERPL-SCNC: 26 MMOL/L (ref 23–29)
CREAT SERPL-MCNC: 3.5 MG/DL (ref 0.5–1.4)
ERYTHROCYTE [DISTWIDTH] IN BLOOD BY AUTOMATED COUNT: 14.7 % (ref 11.5–14.5)
FERRITIN SERPL-MCNC: 297 NG/ML (ref 20–300)
GFR SERPLBLD CREATININE-BSD FMLA CKD-EPI: 17 ML/MIN/1.73/M2
GLUCOSE SERPL-MCNC: 181 MG/DL (ref 70–110)
HCT VFR BLD AUTO: 35.7 % (ref 40–54)
HGB BLD-MCNC: 10.9 GM/DL (ref 14–18)
IGA SERPL-MCNC: 1744 MG/DL (ref 40–350)
IGG SERPL-MCNC: 994 MG/DL (ref 650–1600)
IGM SERPL-MCNC: 39 MG/DL (ref 50–300)
IMM GRANULOCYTES # BLD AUTO: 0.01 K/UL (ref 0–0.04)
IMM GRANULOCYTES NFR BLD AUTO: 0.2 % (ref 0–0.5)
IRON SATN MFR SERPL: 15 % (ref 20–50)
IRON SATN MFR SERPL: 15 % (ref 20–50)
IRON SERPL-MCNC: 39 UG/DL (ref 45–160)
IRON SERPL-MCNC: 40 UG/DL (ref 45–160)
LDH SERPL-CCNC: 121 U/L (ref 110–260)
LYMPHOCYTES # BLD AUTO: 1.68 K/UL (ref 1–4.8)
MCH RBC QN AUTO: 28.3 PG (ref 27–31)
MCHC RBC AUTO-ENTMCNC: 30.5 G/DL (ref 32–36)
MCV RBC AUTO: 93 FL (ref 82–98)
NUCLEATED RBC (/100WBC) (OHS): 0 /100 WBC
PHOSPHATE SERPL-MCNC: 3.9 MG/DL (ref 2.7–4.5)
PLATELET # BLD AUTO: 279 K/UL (ref 150–450)
PMV BLD AUTO: 9.3 FL (ref 9.2–12.9)
POTASSIUM SERPL-SCNC: 4.9 MMOL/L (ref 3.5–5.1)
PROT SERPL-MCNC: 9 GM/DL (ref 6–8.4)
RBC # BLD AUTO: 3.85 M/UL (ref 4.6–6.2)
RELATIVE EOSINOPHIL (OHS): 6.8 %
RELATIVE LYMPHOCYTE (OHS): 34.4 % (ref 18–48)
RELATIVE MONOCYTE (OHS): 7.2 % (ref 4–15)
RELATIVE NEUTROPHIL (OHS): 50.6 % (ref 38–73)
SODIUM SERPL-SCNC: 138 MMOL/L (ref 136–145)
TIBC SERPL-MCNC: 258 UG/DL (ref 250–450)
TIBC SERPL-MCNC: 266 UG/DL (ref 250–450)
TRANSFERRIN SERPL-MCNC: 174 MG/DL (ref 200–375)
TRANSFERRIN SERPL-MCNC: 180 MG/DL (ref 200–375)
WBC # BLD AUTO: 4.88 K/UL (ref 3.9–12.7)

## 2025-04-02 PROCEDURE — 84165 PROTEIN E-PHORESIS SERUM: CPT | Mod: HCNC

## 2025-04-02 PROCEDURE — 84100 ASSAY OF PHOSPHORUS: CPT | Mod: HCNC

## 2025-04-02 PROCEDURE — 83540 ASSAY OF IRON: CPT | Mod: HCNC

## 2025-04-02 PROCEDURE — 82784 ASSAY IGA/IGD/IGG/IGM EACH: CPT | Mod: HCNC,59

## 2025-04-02 PROCEDURE — 83615 LACTATE (LD) (LDH) ENZYME: CPT | Mod: HCNC

## 2025-04-02 PROCEDURE — 82728 ASSAY OF FERRITIN: CPT | Mod: HCNC

## 2025-04-02 PROCEDURE — 83540 ASSAY OF IRON: CPT | Mod: 91,HCNC

## 2025-04-02 PROCEDURE — 80053 COMPREHEN METABOLIC PANEL: CPT | Mod: HCNC

## 2025-04-02 PROCEDURE — 36415 COLL VENOUS BLD VENIPUNCTURE: CPT | Mod: HCNC

## 2025-04-02 PROCEDURE — 99999 PR PBB SHADOW E&M-EST. PATIENT-LVL IV: CPT | Mod: PBBFAC,HCNC,, | Performed by: INTERNAL MEDICINE

## 2025-04-02 PROCEDURE — 86334 IMMUNOFIX E-PHORESIS SERUM: CPT | Mod: HCNC

## 2025-04-02 PROCEDURE — 85025 COMPLETE CBC W/AUTO DIFF WBC: CPT | Mod: HCNC

## 2025-04-02 PROCEDURE — 83521 IG LIGHT CHAINS FREE EACH: CPT | Mod: HCNC

## 2025-04-02 RX ORDER — CANAGLIFLOZIN 100 MG/1
100 TABLET, FILM COATED ORAL DAILY
Qty: 90 TABLET | Refills: 4 | Status: SHIPPED | OUTPATIENT
Start: 2025-04-02

## 2025-04-02 RX ORDER — SILDENAFIL 100 MG/1
100 TABLET, FILM COATED ORAL DAILY PRN
Qty: 30 TABLET | Refills: 11 | Status: SHIPPED | OUTPATIENT
Start: 2025-04-02

## 2025-04-02 NOTE — PROGRESS NOTES
CHIEF COMPLAINT:   Follow up of multiple myeloma, diabetes, hypertension, reflux, hyperlipidemia    HISTORY OF PRESENT ILLNESS: This is a 79-year-old man who presents for follow up of above     He is now taking pantoprazole 40 mg twice daily.  Stomach is much better. No abdominal pain, nausea, vomiting.  Appetite is good.   He takes fiber 2 tablets twice daily. BM are moving every other day. No melana.        He was in the hospital in [place] on 3/17/25 to 3/19/25.  He had had some abdominal pain for 2 weeks. He had some mild to moderate nausea with the pain. He vomited 2-3 times in the last 2 weeks. He had an episode of bloody voimitus the day of admit. EGD - mild erythematous mucosa with erosion in the cardia and fundus of the stomach, consistent with gastritis.  He was started on pantoprazole 40 mg daily, and NSAIDs were discontinued. He reports that he was not taking any NSAIDS at the time. He had been taking omeprazole 40 mg daily for years.         He is taking nifedipine ER 60 mg 2 tablets at night and Chlortalidone 25 mg 1 tablet every other day  and furosemide 20 mg once daily as needed for swelling. Swelling is better.  He will have some swelling at the end of the day when he is on his feet. Swelling is better in the morning. .   Nifedipine was increased 12/2024 from once daily to 60 mg twice daily.. No polydipsia or polyuria      He is taking amitriptyline 50 mg at bedtime diazepam 5 mg 1/2 tablet at bedtime. HE is sleeping better.  he continues to take citalopram 40 mg once daily. HE is taking amitriptyline 50 mg nightly for peripheral neuropathy - burning sensation in his feet. This medication helps.        He had an injection by DR Olguin in [place] in the right knee on 1/14/25 which helped is right knee pain. Left knee is doing fine.  Left knee was replaced 6/12/23 in [place]     He continues to have a cough - CT chest 8/16/24 unremarkable     He had a kidney biopsy in [place] on 3/13/24.   Biopsy revealed that diabetic nephropathy as the cause of his CKD. The biopsy did not show a myeloma related glomerulopathy (MGRS).  He needs better blood sugar control.   He is taking Farxiga 10 mg daily              He got   2021.  Life is going well.  He is residing in [facility].  His wife has a job in [place].   life has been good for him. .        His daughter  of complications of lupus and ESRD 2020. She was on dialysis and was septic. She was at home in hospice.  His mother is currently doing well at age 100 (100 in 2024). His brother and sister help care for her.       He is in digitial diabetes. Sugars have been better.  Sugars are now in the low 100's range.  He is using Free Style Leah 2.  He is on novolog 4 units with meals as needed, Lantus 10 units twice daily (adjusts according to sugars) and glimepriride 2 mg twice daily, Farxiga 10 mg once daily. NO polydipsia or polyuria.      Farxiga is not covered on his insurance.  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 He is in the observation arm and is being monitoring monthly. He sess Dr Engel monthly.  No indication for chemotherapy at this time. HE gets monthly blood work.              His back and neck was doing ok . Pain is starting to return. He was in healthy back program.      No chest pain, shortness of breath, nausea, voimting, constipation, diarrhea,      He had laser vaporization and enucleation of the prostate 13. He denies any dysuria or hematuria now. HE saw Dr Walker.  HE is urinting well. He continues FLomax 0.4 mg nightly and oxybutynin XL 10 gm daliy.  HE gets up 2-3 times at night to urinate    He has hyperlipidemia, on pravastatin 40 mg daily. No joint pain or muscle pain from the pravastatin.        He has been taking aspirin 81 mg daily     vitamin D supplement 50,000 units once weekly         PAST MEDICAL HISTORY:  "  1. Diabetes mellitus.   2. Hyperlipidemia.   3. Reflux.   4. BPH.   5. Osteoarthritis of the knees.   6. Neck pain.   7. History of right lateral epicondylitis.   8. History of lumps removed from the breast as a teenager.   9. OA cervical spine   10. Multiple myeloma    SOCIAL HISTORY: Does not smoke, does not drink. 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     FAMILY HISTORY: Mother is living at age 95 with a heart condition. She had a mild stroke. Father  in his late 40s of alcoholism. He has 5 sisters and 1 brother. one has a neurological disorder, one is anxious. ONe sister  after a fall. His daughter has  lupus, on dialysis, heart problems, and a brain aneurysm that was stented.       PHYSICAL EXAMINATION:    /66 (BP Location: Left arm, Patient Position: Sitting)   Pulse 88   Ht 5' 10" (1.778 m)   Wt 89.8 kg (197 lb 15.6 oz)   SpO2 98%   BMI 28.41 kg/m²      GENERAL: He is alert, oriented, no apparent distress. Affect within normal limits.   Conjunctivae anicteric. PERRL. Tympanic membranes clear. Oropharynx gumes healing.    NECK: Supple. No cervical lymphadenopathy, no thyroid enlargement.   Respiratory: Effort normal. Lungs are clear to auscultation.   HEART: Regular rate and rhythm without murmurs, gallops or rubs.   No lower extremity edema.    ABDOMEN: soft, non distended, non tender, bowel sounds present, no hepatosplenopathy       ASSESSMENT AND PLAN:   Gastric erosions -on pantoprazole 40 mg twice daily. Needs repeat EGD to ensure healing- will do mid  to ensure healing.  Ordered.   Diabetes mellitus with hyperglycemia and microalbuminuria - in Digital diabetes.  . Watch for low blood sugars. Has Free style henrry. Change Farxiga to Invokana 100 mg daily.   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 Follow up with DR Engel.   OA knee -s/p left knee replacement.  S/p injection in right knee  Hypertension - stable. labs  Hyperlipidemia. " On pravastatin  BPH. S/p laser   Vitamin D deficiency - check level  Back pain/neck pain - managing-   10. Anxiety and depression- stable  11. Peripheral neuropathy -controlled on amitriptyline.  12. CRI stage IV -stable - monitored closely by heme onc and nephrology.  13. Obestiy - work on diet, exercise and weigh tloss  14. Recurrent boils in arm pit - doxycycline when flares  16, overweight - working at diet, exercise and weight management  17. Diabetic retinopathy- [facility]  [facility] and [facility] for protein film on lens  18. Chronic cough - s/p  CT 8/2024- has some bronchiectasis on CT scan - will continue to monitor     I will see him back in 3 months  sooner if problems arise      . colonoscopy normal 3/2012 and due 2022.  Cologuard has been ordered - needs to send in.     Visit today included increased complexity associated with the care of the episodic problem gi bleed addressed and managing the longitudinal care of the patient due to the serious and/or complex managed problem(s) gastric erosions, mood disorder, CRI, obesity, boils, diabetes, multiple myeloma, OA knee, hypertension, hyperlipdemia.

## 2025-04-02 NOTE — TELEPHONE ENCOUNTER
No care due was identified.  Health [facility] Embedded Care Due Messages. Reference number: [id number].   4/02/2025 2:31:26 PM CDT

## 2025-04-02 NOTE — PATIENT INSTRUCTIONS
Take furosemide 20 mg one tablet daily AS NEEDED if you have swelling in the legs in the morning.    Chlorthalidone 25 mg - one tablet every other day  Nifedipine ER 60 mg 2 tablets in the evening.     Invokana 100 mg one tablet daily instead of Farxiga    Continue pantoprazole 40 mg twice daily for your stomach

## 2025-04-02 NOTE — TELEPHONE ENCOUNTER
Refill Decision Note   Emerson Menendez  is requesting a refill authorization.  Brief Assessment and Rationale for Refill:  Approve     Medication Therapy Plan:        Comments:     Note composed:3:04 PM 04/02/2025

## 2025-04-03 ENCOUNTER — TELEPHONE (OUTPATIENT)
Dept: INTERNAL MEDICINE | Facility: CLINIC | Age: 80
End: 2025-04-03
Payer: MEDICARE

## 2025-04-03 ENCOUNTER — RESULTS FOLLOW-UP (OUTPATIENT)
Dept: INTERNAL MEDICINE | Facility: CLINIC | Age: 80
End: 2025-04-03

## 2025-04-03 DIAGNOSIS — N18.4 CRI (CHRONIC RENAL INSUFFICIENCY), STAGE 4 (SEVERE): Primary | ICD-10-CM

## 2025-04-03 LAB
ALBUMIN, SPE (OHS): 4.32 G/DL (ref 3.35–5.55)
ALPHA 1 GLOB (OHS): 0.35 GM/DL (ref 0.17–0.41)
ALPHA 2 GLOB (OHS): 0.88 GM/DL (ref 0.43–0.99)
BETA GLOB (OHS): 1.14 GM/DL (ref 0.5–1.1)
GAMMA GLOBULIN (OHS): 2.02 GM/DL (ref 0.67–1.58)
KAPPA LC FREE SER-MCNC: 3.47 MG/L (ref 0.26–1.65)
KAPPA LC FREE/LAMBDA FREE SER: 9.83 MG/DL (ref 0.33–1.94)
LAMBDA LC FREE SERPL-MCNC: 2.83 MG/DL (ref 0.57–2.63)
PROT SERPL-MCNC: 8.7 GM/DL (ref 6–8.4)

## 2025-04-03 NOTE — TELEPHONE ENCOUNTER
----- Message from Roberta Sagastume MD sent at 4/3/2025 12:22 PM CDT -----  PLease notify pt  Your blood counts are stable.  Your kidney function is worse with a creatinine of 3.5 - STOP Furosemide and Chlorthalidone - these medications are causing your kidney function to .    I want to repeat kidney function in one week.  Are you going to be in [facility] or [place]?    Please book BMP at appropriate lab      Roberta Sagastume M.D.    ----- Message -----  From: Lab, Background User  Sent: 4/2/2025   6:43 PM CDT  To: Roberta Sagastume MD

## 2025-04-03 NOTE — TELEPHONE ENCOUNTER
Pt did not answer. I left a detailed message on his personal recorder. I will also send a portal message.

## 2025-04-04 ENCOUNTER — TELEPHONE (OUTPATIENT)
Dept: INTERNAL MEDICINE | Facility: CLINIC | Age: 80
End: 2025-04-04
Payer: MEDICARE

## 2025-04-04 LAB
PATHOLOGIST INTERPRETATION - IFE SERUM (OHS): NORMAL
PATHOLOGIST REVIEW - SPE (OHS): NORMAL

## 2025-04-04 NOTE — TELEPHONE ENCOUNTER
----- Message from Med Assistant Henry sent at 4/4/2025  1:08 PM CDT -----  Contact: [phone] Patient    ----- Message -----  From: Lisa Samaniego  Sent: 4/4/2025  11:06 AM CDT  To: Mitesh BRAUN Staff    Type: Returning a callWho left a message?  Mavis did the practice call? 04/03/2025Does patient know what this is regarding: results?Would the patient rather a call back or a response via [facility]? Call back Comments:

## 2025-04-17 ENCOUNTER — OFFICE VISIT (OUTPATIENT)
Dept: HEMATOLOGY/ONCOLOGY | Facility: CLINIC | Age: 80
End: 2025-04-17
Payer: MEDICARE

## 2025-04-17 DIAGNOSIS — E11.22 CKD STAGE 3 DUE TO TYPE 2 DIABETES MELLITUS: ICD-10-CM

## 2025-04-17 DIAGNOSIS — D47.2 SMOLDERING MULTIPLE MYELOMA: Primary | ICD-10-CM

## 2025-04-17 DIAGNOSIS — N18.30 CKD STAGE 3 DUE TO TYPE 2 DIABETES MELLITUS: ICD-10-CM

## 2025-04-17 NOTE — PROGRESS NOTES
The patient location is: [place]  The chief complaint leading to consultation is: Smoldering myeloma    Visit type: audiovisual    Face to Face time with patient: 15  30 minutes of total time spent on the encounter, which includes face to face time and non-face to face time preparing to see the patient (eg, review of tests), Obtaining and/or reviewing separately obtained history, Documenting clinical information in the electronic or other health record, Independently interpreting results (not separately reported) and communicating results to the patient/family/caregiver, or Care coordination (not separately reported).         Each patient to whom he or she provides medical services by telemedicine is:  (1) informed of the relationship between the physician and patient and the respective role of any other health care provider with respect to management of the patient; and (2) notified that he or she may decline to receive medical services by telemedicine and may withdraw from such care at any time.    Notes:      Subjective:    Patient ID: Emerson Menendez is a 79 y.o. male.    Chief Complaint: No chief complaint on file.    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 Test results identify a stable IgA kappa protein with beta globulin band at 1.63g/dL. Kappa free light chain is elevated at 4.40. CBC and calcium are stable. Creatinine with history of stage III CKD is stable at 1.4. Metastatic survey is negative for lytic lesions. MRI of the entire spine demonstrated age related changes but no convincing evidence of myeloma bone disease. Bone marrow biopsy identified about 13% plasma cells by morphology and FISH for myeloma identified a t(11;14) and trisomies 3,7,  and 17. The patient is afebrile and appears clinically well. He was seen by his podiatrist and nephrologist since our last visit without any new or acute events.    The patient has not received any therapy for smoldering myeloma including bisphosphonates or steroids. He has been randomized to observation arm of the the clinical study. 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 He has no current or prior history of malignancy.    TODAY Cycle 111 E3A06, observation cohort  CBC stable. CMP shows significant change in renal function - Neprhology visit at 3pm today  FLC changes reflect renal changes (result of, not the cause)  Renal biopsy in March 2024 had no evidence of light chain or M protein deposition; diagnosis of diabetic nephropathy  Interval admission for hematemasis reviewed.  BP normal today  He reports refractory lower extremity edema, some shortness of breath with conversation      Knee Pain     Joint Pain  Associated symptoms include coughing. Pertinent negatives include no abdominal pain, chest pain, diaphoresis, fatigue, fever, headaches or rash.   Hand Pain   Pertinent negatives include no chest pain.   Cough  Pertinent negatives include no chest pain, fever, headaches, rash or shortness of breath.   Foot Pain  Associated symptoms include coughing. Pertinent negatives include no abdominal pain, chest pain, diaphoresis, fatigue, fever, headaches or rash.   Arm Pain   Pertinent negatives include no chest pain.   Follow-up  Associated symptoms include coughing. Pertinent negatives include no abdominal pain, chest pain, diaphoresis, fatigue, fever, headaches or rash.     Review of Systems   Constitutional:  Negative for activity change, appetite change, diaphoresis, fatigue, fever and unexpected weight change.   HENT: Negative.  Negative for mouth sores.    Eyes: Negative.    Respiratory:   Positive for cough. Negative for shortness of breath.    Cardiovascular:  Negative for chest pain and leg swelling.   Gastrointestinal: Negative.  Negative for abdominal pain and diarrhea.   Endocrine: Negative.    Genitourinary: Negative.  Negative for frequency.   Musculoskeletal: Negative.  Negative for back pain.   Skin: Negative.  Negative for rash.   Allergic/Immunologic: Negative.    Neurological:  Negative for headaches.        Grade 0-1 peripheral neuropathy of bilateral feet.    Hematological:  Negative for adenopathy. Does not bruise/bleed easily.   Psychiatric/Behavioral: Negative.  The patient is not nervous/anxious.        Objective:       There were no vitals filed for this visit.    Prior exam below, no exam for telehealth visit 4/17/2025    Physical Exam  Vitals and nursing note reviewed.   Constitutional:       Appearance: He is well-developed.   HENT:      Head: Normocephalic and atraumatic.      Right Ear: External ear normal.      Left Ear: External ear normal.      Nose: Nose normal.   Eyes:      Conjunctiva/sclera: Conjunctivae normal.      Pupils: Pupils are equal, round, and reactive to light.   Cardiovascular:      Rate and Rhythm: Normal rate and regular rhythm.      Heart sounds: No murmur heard.  Pulmonary:      Effort: Pulmonary effort is normal. No respiratory distress.      Breath sounds: Normal breath sounds.   Abdominal:      General: Bowel sounds are normal. There is no distension.      Palpations: Abdomen is soft.   Musculoskeletal:         General: No tenderness.      Cervical back: Normal range of motion and neck supple.   Skin:     General: Skin is warm and dry.      Findings: No rash.      Nails: There is no clubbing.   Neurological:      Mental Status: He is alert and oriented to person, place, and time.      Cranial Nerves: No cranial nerve deficit.   Psychiatric:         Behavior: Behavior normal.         Assessment:       No diagnosis found.        Plan:       The patient  has a diagnosis of smoldering myeloma. There is no indication for immediate chemotherapy. We are monitoring renal function closely- baseline creatinine of around 1.5. 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 M protein has been stable and repeat is pending. Plan for return in 1 month.  Endocrinology and Nephrology to monitor diabetes and renal failure respectively. Patient would like to continue to follow with Dr. Perkins as needed.     March 2024 renal biopsy was diabetic nephropathy. Creatinine usually 1.5-1.7, is 3.5 on most recent labs. Has appt with Nephrology later today.  Continue monthly observation  Myeloma serology light chains show reactive renal changes  May visit is scheduled for follow-up      BMT Chart Routing      Follow up with physician    Follow up with RUSTY    Provider visit type    Infusion scheduling note    Injection scheduling note    Labs CBC, CMP, SPEP and free light chains   Scheduling:  Preferred lab:  Lab interval:  labs 1 week before return visit   Imaging    Pharmacy appointment    Other referrals                 Visit today included increased complexity associated with the care of the episodic problem remission monitoring addressed and managing the longitudinal care of the patient due to the serious and/or complex managed problem(s) smoldering myeloma.

## 2025-04-23 PROBLEM — E87.5 HYPERKALEMIA: Status: ACTIVE | Noted: 2025-04-23

## 2025-04-25 ENCOUNTER — PATIENT MESSAGE (OUTPATIENT)
Dept: INTERNAL MEDICINE | Facility: CLINIC | Age: 80
End: 2025-04-25
Payer: MEDICARE

## 2025-04-25 DIAGNOSIS — R31.9 HEMATURIA, UNSPECIFIED TYPE: Primary | ICD-10-CM

## 2025-05-13 ENCOUNTER — PATIENT MESSAGE (OUTPATIENT)
Dept: INTERNAL MEDICINE | Facility: CLINIC | Age: 80
End: 2025-05-13
Payer: MEDICARE

## 2025-05-19 ENCOUNTER — TELEPHONE (OUTPATIENT)
Dept: HEMATOLOGY/ONCOLOGY | Facility: CLINIC | Age: 80
End: 2025-05-19
Payer: MEDICARE

## 2025-05-19 DIAGNOSIS — D47.2 SMOLDERING MULTIPLE MYELOMA: Primary | ICD-10-CM

## 2025-05-20 DIAGNOSIS — D47.2 SMOLDERING MULTIPLE MYELOMA: ICD-10-CM

## 2025-05-20 RX ORDER — DIAZEPAM 5 MG/1
5 TABLET ORAL EVERY 12 HOURS PRN
Qty: 60 TABLET | Refills: 0 | Status: SHIPPED | OUTPATIENT
Start: 2025-05-20

## 2025-05-20 RX ORDER — ERGOCALCIFEROL 1.25 MG/1
50000 CAPSULE ORAL
Qty: 24 CAPSULE | Refills: 4 | Status: SHIPPED | OUTPATIENT
Start: 2025-05-22

## 2025-05-20 NOTE — TELEPHONE ENCOUNTER
No care due was identified.  [facility] Embedded Care Due Messages. Reference number: [id number].   5/20/2025 12:56:41 PM CDT

## 2025-05-29 ENCOUNTER — PATIENT MESSAGE (OUTPATIENT)
Dept: RESEARCH | Facility: HOSPITAL | Age: 80
End: 2025-05-29
Payer: MEDICARE

## 2025-06-13 NOTE — TELEPHONE ENCOUNTER
No care due was identified.  [facility] Embedded Care Due Messages. Reference number: [id number].   6/13/2025 1:34:23 AM CDT

## 2025-06-14 NOTE — TELEPHONE ENCOUNTER
Refill Routing Note   Medication(s) are not appropriate for processing by [facility] for the following reason(s):        Required labs abnormal    [facility] action(s):  Defer               Appointments  past 12m or future 3m with PCP    Date Provider   Last Visit   4/2/2025 Roberta Sagastume MD   Next Visit   6/16/2025 Roberta Sagastume MD   ED visits in past 90 days: 0        Note composed:9:52 PM 06/13/2025

## 2025-06-15 ENCOUNTER — PATIENT MESSAGE (OUTPATIENT)
Dept: INTERNAL MEDICINE | Facility: CLINIC | Age: 80
End: 2025-06-15
Payer: MEDICARE

## 2025-06-15 RX ORDER — GLIMEPIRIDE 2 MG/1
2 TABLET ORAL
Qty: 100 TABLET | Refills: 3 | Status: SHIPPED | OUTPATIENT
Start: 2025-06-15

## 2025-06-17 ENCOUNTER — TELEPHONE (OUTPATIENT)
Dept: ENDOSCOPY | Facility: HOSPITAL | Age: 80
End: 2025-06-17
Payer: MEDICARE

## 2025-06-17 VITALS — BODY MASS INDEX: 27.2 KG/M2 | WEIGHT: 190 LBS | HEIGHT: 70 IN

## 2025-06-17 DIAGNOSIS — K25.9 GASTRIC EROSION, UNSPECIFIED CHRONICITY: Primary | ICD-10-CM

## 2025-06-17 NOTE — TELEPHONE ENCOUNTER
Patient is scheduled for a Upper Endoscopy (EGD) on 6/23/25 with Dr. RENATA Taveras  [facility]see [facility] tab)

## 2025-06-17 NOTE — TELEPHONE ENCOUNTER
Returning patient phone call. Patient did not answer, I left a call back number on voicemail to get patient scheduled for his colonoscopy.         Copied from CRM #[id number]. Topic: General Inquiry - Return Call  >> Jun 17, 2025  3:15 PM Gerard wrote:  Type:  Patient Returning Call    Who Called:PT  Who Left Message for Patient: Linette Granados MA  Does the patient know what this is regarding?: yes  Would the patient rather a call back or a response via [facility]?  Call   Best Call Back Number: [phone] (M)

## 2025-06-17 NOTE — TELEPHONE ENCOUNTER
Returning patient phone call. Patient did not answer, I left a call back number on voicemail to get patient scheduled for his colonoscopy.

## 2025-06-23 ENCOUNTER — HOSPITAL ENCOUNTER (OUTPATIENT)
Facility: HOSPITAL | Age: 80
Discharge: HOME | End: 2025-06-23
Attending: INTERNAL MEDICINE | Admitting: INTERNAL MEDICINE
Payer: MEDICARE

## 2025-06-23 ENCOUNTER — ANESTHESIA EVENT (OUTPATIENT)
Dept: ENDOSCOPY | Facility: HOSPITAL | Age: 80
End: 2025-06-23
Payer: MEDICARE

## 2025-06-23 ENCOUNTER — ANESTHESIA (OUTPATIENT)
Dept: ENDOSCOPY | Facility: HOSPITAL | Age: 80
End: 2025-06-23
Payer: MEDICARE

## 2025-06-23 ENCOUNTER — TELEPHONE (OUTPATIENT)
Dept: ENDOSCOPY | Facility: HOSPITAL | Age: 80
End: 2025-06-23
Payer: MEDICARE

## 2025-06-23 ENCOUNTER — DOCUMENTATION ONLY (OUTPATIENT)
Dept: ENDOSCOPY | Facility: HOSPITAL | Age: 80
End: 2025-06-23
Payer: MEDICARE

## 2025-06-23 VITALS
HEIGHT: 70 IN | OXYGEN SATURATION: 99 % | TEMPERATURE: 98 F | HEART RATE: 76 BPM | RESPIRATION RATE: 17 BRPM | DIASTOLIC BLOOD PRESSURE: 77 MMHG | BODY MASS INDEX: 28.02 KG/M2 | WEIGHT: 195.75 LBS | SYSTOLIC BLOOD PRESSURE: 158 MMHG

## 2025-06-23 DIAGNOSIS — K27.9 PEPTIC ULCER DISEASE: Primary | ICD-10-CM

## 2025-06-23 DIAGNOSIS — I10 ESSENTIAL HYPERTENSION: ICD-10-CM

## 2025-06-23 DIAGNOSIS — K25.9 GASTRIC EROSION, UNSPECIFIED CHRONICITY: ICD-10-CM

## 2025-06-23 DIAGNOSIS — K29.70 GASTRITIS: ICD-10-CM

## 2025-06-23 DIAGNOSIS — K26.9 DUODENAL ULCER: Primary | ICD-10-CM

## 2025-06-23 LAB — POCT GLUCOSE: 170 MG/DL (ref 70–110)

## 2025-06-23 PROCEDURE — 37000008 HC ANESTHESIA 1ST 15 MINUTES: Mod: HCNC | Performed by: INTERNAL MEDICINE

## 2025-06-23 PROCEDURE — 27201012 HC FORCEPS, HOT/COLD, DISP: Mod: HCNC | Performed by: INTERNAL MEDICINE

## 2025-06-23 PROCEDURE — 43239 EGD BIOPSY SINGLE/MULTIPLE: CPT | Mod: HCNC | Performed by: INTERNAL MEDICINE

## 2025-06-23 PROCEDURE — 43239 EGD BIOPSY SINGLE/MULTIPLE: CPT | Mod: HCNC,,, | Performed by: INTERNAL MEDICINE

## 2025-06-23 PROCEDURE — 25000003 PHARM REV CODE 250: Mod: HCNC | Performed by: NURSE ANESTHETIST, CERTIFIED REGISTERED

## 2025-06-23 PROCEDURE — 88305 TISSUE EXAM BY PATHOLOGIST: CPT | Mod: TC,91,HCNC | Performed by: INTERNAL MEDICINE

## 2025-06-23 PROCEDURE — 63600175 PHARM REV CODE 636 W HCPCS: Mod: HCNC | Performed by: NURSE ANESTHETIST, CERTIFIED REGISTERED

## 2025-06-23 PROCEDURE — 37000009 HC ANESTHESIA EA ADD 15 MINS: Mod: HCNC | Performed by: INTERNAL MEDICINE

## 2025-06-23 PROCEDURE — 82962 GLUCOSE BLOOD TEST: CPT | Mod: HCNC | Performed by: INTERNAL MEDICINE

## 2025-06-23 RX ORDER — LIDOCAINE HYDROCHLORIDE 20 MG/ML
INJECTION INTRAVENOUS
Status: DISCONTINUED | OUTPATIENT
Start: 2025-06-23 | End: 2025-06-23

## 2025-06-23 RX ORDER — PROPOFOL 10 MG/ML
VIAL (ML) INTRAVENOUS
Status: DISCONTINUED | OUTPATIENT
Start: 2025-06-23 | End: 2025-06-23

## 2025-06-23 RX ORDER — SODIUM CHLORIDE 9 MG/ML
INJECTION, SOLUTION INTRAVENOUS CONTINUOUS
Status: DISCONTINUED | OUTPATIENT
Start: 2025-06-23 | End: 2025-06-23 | Stop reason: HOSPADM

## 2025-06-23 RX ORDER — ESOMEPRAZOLE MAGNESIUM 40 MG/1
40 CAPSULE, DELAYED RELEASE ORAL
Qty: 90 CAPSULE | Refills: 3 | Status: SHIPPED | OUTPATIENT
Start: 2025-06-23 | End: 2025-06-25 | Stop reason: SDUPTHER

## 2025-06-23 RX ORDER — PROPOFOL 10 MG/ML
VIAL (ML) INTRAVENOUS CONTINUOUS PRN
Status: DISCONTINUED | OUTPATIENT
Start: 2025-06-23 | End: 2025-06-23

## 2025-06-23 RX ADMIN — LIDOCAINE HYDROCHLORIDE 100 MG: 20 INJECTION INTRAVENOUS at 10:06

## 2025-06-23 RX ADMIN — SODIUM CHLORIDE: 0.9 INJECTION, SOLUTION INTRAVENOUS at 10:06

## 2025-06-23 RX ADMIN — PROPOFOL 200 MCG/KG/MIN: 10 INJECTION, EMULSION INTRAVENOUS at 10:06

## 2025-06-23 RX ADMIN — PROPOFOL 70 MG: 10 INJECTION, EMULSION INTRAVENOUS at 10:06

## 2025-06-23 NOTE — PROGRESS NOTES
"Referral to endoscopy schedulers placed.    Procedure: EGD  Referring MD: Roberta Sagastume M.D. - Internal Medicine.    Diagnosis: Peptic ulcer disease    Procedure Timin weeks (from his last done on 2025)    *If within 4 weeks selected, please chito as high priority*    *If greater than 12 weeks, please select "5-12 weeks" and delay sending until 3 months prior to requested date*     Location: Any Site    Additional Scheduling Information: No scheduling concerns    Prep Specifications:Standard prep    Is the patient taking a GLP-1 Agonist:no but please ask.    Have you attached a patient to this message: yes    "

## 2025-06-23 NOTE — ANESTHESIA PREPROCEDURE EVALUATION
06/23/2025  Emerson Menendez is a 79 y.o., male.      Pre-op Assessment    I have reviewed the NPO Status.      Review of Systems  Anesthesia Hx:  No problems with previous Anesthesia   History of prior surgery of interest to airway management or planning:  Previous anesthesia: General        Denies Family Hx of Anesthesia complications.    Denies Personal Hx of Anesthesia complications.                    Cardiovascular:     Hypertension                                          Renal/:  Chronic Renal Disease, CKD                Hepatic/GI:     GERD                Musculoskeletal:  Arthritis               Neurological:    Neuromuscular Disease,                                   Endocrine:  Diabetes           Psych:  Psychiatric History              Physical Exam  General: Well nourished, Cooperative, Alert and Oriented    Airway:  Mallampati: II   Mouth Opening: Normal  TM Distance: Normal  Tongue: Normal  Neck ROM: Normal ROM    Anesthesia Plan  Type of Anesthesia, risks & benefits discussed:    Anesthesia Type: Gen Natural Airway  Intra-op Monitoring Plan: Standard ASA Monitors  Post Op Pain Control Plan: multimodal analgesia  Induction:  IV  Informed Consent: Informed consent signed with the Patient and all parties understand the risks and agree with anesthesia plan.  All questions answered.   ASA Score: 3    Ready For Surgery From Anesthesia Perspective.   .

## 2025-06-23 NOTE — ANESTHESIA POSTPROCEDURE EVALUATION
Anesthesia Post Evaluation    Patient: Emerson Menendez    Procedure(s) Performed: Procedure(s) (LRB):  EGD (ESOPHAGOGASTRODUODENOSCOPY) (N/A)    Final Anesthesia Type: general      Patient location during evaluation: GI PACU  Patient participation: Yes- Able to Participate  Level of consciousness: awake and alert  Post-procedure vital signs: reviewed and stable  Pain management: adequate  Airway patency: patent    PONV status at discharge: No PONV  Anesthetic complications: no      Cardiovascular status: blood pressure returned to baseline  Respiratory status: unassisted  Hydration status: euvolemic  Follow-up not needed.          Vitals Value Taken Time   /77 06/23/25 11:24   Temp 36.7 °C (98.1 °F) 06/23/25 10:53   Pulse 76 06/23/25 11:24   Resp 17 06/23/25 11:24   SpO2 99 % 06/23/25 11:24         No case tracking events are documented in the log.      Pain/Caleb Score: Caleb Score: 8 (6/23/2025 10:54 AM)

## 2025-06-23 NOTE — PROVATION PATIENT INSTRUCTIONS
Discharge Summary/Instructions after an Endoscopic Procedure  Patient Name: Emerson Menendez  Patient MRN: [id number]  Patient YOB: 1945 Monday, June 23, 2025  Inderjit Taveras MD  Dear patient,  As a result of recent federal legislation (The Federal Cures Act), you may   receive lab or pathology results from your procedure in your [facility]   account before your physician is able to contact you. Your physician or   their representative will relay the results to you with their   recommendations at their soonest availability.  Thank you,  RESTRICTIONS:  During your procedure today, you received medications for sedation.  These   medications may affect your judgment, balance and coordination.  Therefore,   for 24 hours, you have the following restrictions:   - DO NOT drive a car, operate machinery, make legal/financial decisions,   sign important papers or drink alcohol.    ACTIVITY:  Today: no heavy lifting, straining or running due to procedural   sedation/anesthesia.  The following day: return to full activity including work.  DIET:  Eat and drink normally unless instructed otherwise.     TREATMENT FOR COMMON SIDE EFFECTS:  - Mild abdominal pain, nausea, belching, bloating or excessive gas:  rest,   eat lightly and use a heating pad.  - Sore Throat: treat with throat lozenges and/or gargle with warm salt   water.  - Because air was used during the procedure, expelling large amounts of air   from your rectum or belching is normal.  - If a bowel prep was taken, you may not have a bowel movement for 1-3 days.    This is normal.  SYMPTOMS TO WATCH FOR AND REPORT TO YOUR PHYSICIAN:  1. Abdominal pain or bloating, other than gas cramps.  2. Chest pain.  3. Back pain.  4. Signs of infection such as: chills or fever occurring within 24 hours   after the procedure.  5. Rectal bleeding, which would show as bright red, maroon, or black stools.   (A tablespoon of blood from the rectum is not serious, especially if    hemorrhoids are present.)  6. Vomiting.  7. Weakness or dizziness.  GO DIRECTLY TO THE NEAREST EMERGENCY ROOM IF YOU HAVE ANY OF THE FOLLOWING:      Difficulty breathing              Chills and/or fever over 101 F   Persistent vomiting and/or vomiting blood   Severe abdominal pain   Severe chest pain   Black, tarry stools   Bleeding- more than one tablespoon   Any other symptom or condition that you feel may need urgent attention  Your doctor recommends these additional instructions:  If any biopsies were taken, your doctors clinic will contact you in 1 to 2   weeks with any results.  - Discharge patient to home.   - Follow an antireflux regimen indefinitely.   - Await pathology results.   - Telephone endoscopist for pathology results in 3 weeks.   - Telephone primary care physician for study results tomorrow.   - Please avoid taking NSAIDs (nonsteroidal anti-inflammatory drugs), such as   ibuprofen (Advil, Motrin), naproxen (Aleve, Naprosyn), diclofenac   (Voltaren), meloxicam (Mobic), indomethacin (Indocin), celecoxib   (Celebrex), ketorolac (Toradol), etodolac (Lodine), piroxicam (Feldene),   sulindac (Clinoril), Goodys Powders, and Excedrin Migraine. However, it is   safe to continue taking aspirin if it is needed for cardiovascular   protection. Do NOT STOP your aspirin if your Aspirin has been prescribed   for heart or vascular health.  - Use Nexium (esomeprazole) 40 mg by mouth once daily indefinitely. Best   taken 45-60 minutes before your first protein meal of the day - Breakfast.   Rx sent.  - Repeat upper endoscopy in 8 weeks to check healing. Referral placed.  - Return to referring physician.   - The findings and recommendations were discussed with the patient.  For questions, problems or results please call your physician - Inderjit Taveras MD at Work:  [phone].  [facility], EMERGENCY ROOM PHONE NUMBER: [phone]  IF A COMPLICATION OR EMERGENCY SITUATION ARISES AND YOU ARE UNABLE  TO REACH   YOUR PHYSICIAN - GO DIRECTLY TO THE EMERGENCY ROOM.  Inderjit Taveras MD  6/23/2025 10:59:03 AM  This report has been verified and signed electronically.  Dear patient,  As a result of recent federal legislation (The Federal Cures Act), you may   receive lab or pathology results from your procedure in your [facility]   account before your physician is able to contact you. Your physician or   their representative will relay the results to you with their   recommendations at their soonest availability.  Thank you,  PROVATION

## 2025-06-23 NOTE — TELEPHONE ENCOUNTER
Patient is scheduled for a Upper Endoscopy (EGD) on 8/26/25 with Dr. RENATA Taveras  Referral for procedure from [facility]

## 2025-06-23 NOTE — TRANSFER OF CARE
"Anesthesia Transfer of Care Note    Patient: Emerson Menendez    Procedure(s) Performed: Procedure(s) (LRB):  EGD (ESOPHAGOGASTRODUODENOSCOPY) (N/A)    Patient location: PACU    Anesthesia Type: general    Transport from OR: Transported from OR on room air with adequate spontaneous ventilation    Post pain: adequate analgesia    Post assessment: no apparent anesthetic complications    Post vital signs: stable    Level of consciousness: awake    Nausea/Vomiting: no nausea/vomiting    Complications: none    Transfer of care protocol was followed    Last vitals: Visit Vitals  BP (!) 169/81 (BP Location: Left arm, Patient Position: Lying)   Pulse 91   Temp 36.8 °C (98.2 °F) (Tympanic)   Resp 16   Ht 5' 10" (1.778 m)   Wt 88.8 kg (195 lb 12.3 oz)   SpO2 100%   BMI 28.09 kg/m²     "
My signature below certifies that the above stated patient is homebound and upon completion of the Face-To-Face encounter, has the need for intermittent skilled nursing, physical therapy and/or speech or occupational therapy services in their home for their current diagnosis as outlined in their initial plan of care. These services will continue to be monitored by myself or another physician.

## 2025-06-23 NOTE — TELEPHONE ENCOUNTER
" [facility] Patients  Caller: Unspecified (Today, 10:51 AM)  Procedure: EGD  Referring MD: Roberta Sagastume M.D. - Internal Medicine.    Diagnosis: Peptic ulcer disease    Procedure Timin weeks (from his last done on 2025)    *If within 4 weeks selected, please chito as high priority*    *If greater than 12 weeks, please select "5-12 weeks" and delay sending until 3 months prior to requested date*    Location: Any Site    Additional Scheduling Information: No scheduling concerns    Prep Specifications:Standard prep    Is the patient taking a GLP-1 Agonist:no but please ask.    Have you attached a patient to this message: yes  "

## 2025-06-23 NOTE — H&P
[facility] 4th Floor  History & Physical    Subjective:      Chief Complaint/Reason for Admission:     Direct access EGD for GERD and history of gastritis    Emerson Menendez is a 79 y.o. male.    Past Medical History:   Diagnosis Date    Anxiety 03/16/2015    Asymptomatic multiple myeloma     BPH (benign prostatic hypertrophy) 09/24/2012    Carpal tunnel syndrome     Depression 03/16/2015    Diabetic retinopathy     GERD (gastroesophageal reflux disease) 09/24/2012    Glaucoma     Hx of psychiatric care     Celexa, Valium    Hyperlipidemia     Hypertension     Hypertensive retinopathy of both eyes     Microalbuminuria 09/24/2012    Osteoarthritis of cervical spine 09/24/2012    Osteoarthritis of knee 09/24/2012    Posterior capsular opacification, right 09/15/2022    Psychiatric problem     Type II or unspecified type diabetes mellitus without mention of complication, not stated as uncontrolled 09/24/2012     Past Surgical History:   Procedure Laterality Date    ARTHROSCOPY OF WRIST Left 08/03/2020    Procedure: ARTHROSCOPY, WRIST LEFT;  Surgeon: Kindra Castillo MD;  Location: [facility];  Service: Orthopedics;  Laterality: Left;  REGIONAL/MAC    CARPAL TUNNEL RELEASE Left 08/03/2020    Procedure: RELEASE, CARPAL TUNNEL LEFT;  Surgeon: Kindra Castillo MD;  Location: [facility] OR;  Service: Orthopedics;  Laterality: Left;  REGIONAL/MAC    CATARACT EXTRACTION  2012    Right eye    CYSTOSCOPY N/A 6/6/2023    Procedure: CYSTOSCOPY;  Surgeon: Juan Walker MD;  Location: [facility] OR [address] [place] [address];  Service: Urology;  Laterality: N/A;    EYE SURGERY Bilateral     cataract removal    INTRAOCULAR PROSTHESES INSERTION Left 05/20/2021    Procedure: INSERTION, IOL PROSTHESIS;  Surgeon: Jose L Wheatley MD;  Location: [facility] OR [address] [place] [address];  Service: Ophthalmology;  Laterality: Left;    PHACOEMULSIFICATION OF CATARACT Left 05/20/2021    Procedure: PHACOEMULSIFICATION, CATARACT;  Surgeon: Jose L Wheatley MD;   Location: [facility] OR 1ST FLR;  Service: Ophthalmology;  Laterality: Left;    PROSTATE SURGERY      RENAL BIOPSY Left 3/13/2024    Procedure: BIOPSY, KIDNEY;  Surgeon: Nikhil Salgado MD;  Location: [facility] INTERVENTIONAL NEPHROLOGY;  Service: Interventional Nephrology;  Laterality: Left;    SURGICAL REMOVAL OF CARPAL BONE Left 01/06/2021    Procedure: OSTECTOMY, CARPAL BONE, LEFT;  Surgeon: Kindra Castillo MD;  Location: [facility] OR;  Service: Orthopedics;  Laterality: Left;  REGIONAL/MAC    TONSILLECTOMY      TOTAL KNEE ARTHROPLASTY Left 6/12/2023    Procedure: L TKA;  Surgeon: Kaleb Olguin MD;  Location: [facility] MAIN OR;  Service: Orthopedics;  Laterality: Left;    TRANSURETHRAL RESECTION OF PROSTATE (TURP) USING LASER N/A 6/6/2023    Procedure: TURP, USING LASER;  Surgeon: Juan Walker MD;  Location: [address] FLR;  Service: Urology;  Laterality: N/A;    TRIGGER FINGER RELEASE Right 07/29/2022    Procedure: RELEASE, TRIGGER FINGER,RIGHT,LONG;  Surgeon: Kindra Castillo MD;  Location: [facility] OR;  Service: Orthopedics;  Laterality: Right;     Social History[1]    Facility-Administered Medications Prior to Admission   Medication    LIDOcaine HCl 2% urojet    phenylephrine HCL 2.5% ophthalmic solution 1 drop    proparacaine 0.5 % ophthalmic solution 1 drop    tropicamide 1% ophthalmic solution 1 drop     PTA Medications   Medication Sig    amitriptyline (ELAVIL) 50 MG tablet Take 1 tablet (50 mg total) by mouth every evening.    aspirin (ECOTRIN) 81 MG EC tablet Take 1 tablet (81 mg total) by mouth once daily.    citalopram (CELEXA) 40 MG tablet TAKE 1 TABLET(40 MG) BY MOUTH DAILY    doxycycline (VIBRA-TABS) 100 MG tablet Take 1 tablet (100 mg total) by mouth 2 (two) times daily.    ergocalciferol (VITAMIN D2) 50,000 unit Cap Take 1 capsule (50,000 Units total) by mouth twice a week.    glimepiride (AMARYL) 2 MG tablet TAKE 1 TABLET EVERY DAY    LANTUS SOLOSTAR U-100 INSULIN 100 unit/mL (3 mL) InPn pen  "ADMINISTER 16 UNITS UNDER THE SKIN TWICE DAILY (Patient taking differently: Inject 10 Units into the skin 2 (two) times a day.)    mirtazapine (REMERON) 7.5 MG Tab Take 1 tablet (7.5 mg total) by mouth every evening.    NIFEdipine (PROCARDIA-XL) 60 MG (OSM) 24 hr tablet TAKE 1 TABLET(60 MG) BY MOUTH TWICE DAILY    pantoprazole (PROTONIX) 40 MG tablet Take 1 tablet (40 mg total) by mouth 2 (two) times daily.    pravastatin (PRAVACHOL) 40 MG tablet TAKE 1 TABLET EVERY DAY    tamsulosin (FLOMAX) 0.4 mg Cap Take 1 capsule (0.4 mg total) by mouth 2 (two) times a day.    acetaminophen (TYLENOL) 650 MG TbSR Take 650 mg by mouth as needed. pain    canagliflozin (INVOKANA) 100 mg Tab tablet Take 1 tablet (100 mg total) by mouth once daily.    diazePAM (VALIUM) 5 MG tablet Take 1 tablet (5 mg total) by mouth every 12 (twelve) hours as needed for Anxiety.    diazePAM (VALIUM) 5 MG tablet Take 1 tablet (5 mg total) by mouth every 12 (twelve) hours as needed for Anxiety. (Patient not taking: Reported on 6/12/2025)    dorzolamide-timolol 2-0.5% (COSOPT) 22.3-6.8 mg/mL ophthalmic solution Place 1 drop into both eyes 2 (two) times daily.    DROPLET PEN NEEDLE 31 gauge x 5/16" Ndle USE TO ADMINISTER INSULIN UNDER THE SKIN FIVE TIMES DAILY. DISCARD PEN NEEDLE AFTER EACH USE. (Patient not taking: Reported on 6/12/2025)    flash glucose scanning reader (FREESTYLE CHRISTIANO 14 DAY READER) Misc Use as directed    flash glucose sensor (FREESTYLE CHRISTIANO 2 SENSOR) Kit Use as directed every 14 days (Patient not taking: Reported on 6/12/2025)    fluocinolone acetonide oiL (DERMOTIC OIL) 0.01 % Drop Place 3 drops in ear(s) 2 (two) times daily. (Patient not taking: Reported on 6/12/2025)    furosemide (LASIX) 20 MG tablet Take 20 mg by mouth. (Patient not taking: Reported on 6/1/2025)    glucose 4 GM chewable tablet Take 8 g by mouth as needed for Low blood sugar.    latanoprost 0.005 % ophthalmic solution Place 1 drop into the right eye every " evening.    NOVOLOG FLEXPEN U-100 INSULIN 100 unit/mL (3 mL) InPn pen ADMINISTER 4 UNITS UNDER THE SKIN THREE TIMES DAILY WITH MEALS    omeprazole (PRILOSEC) 40 MG capsule Take 40 mg by mouth. (Patient not taking: Reported on 6/12/2025)    ondansetron (ZOFRAN-ODT) 4 MG TbDL Take 1 tablet (4 mg total) by mouth every 8 (eight) hours as needed (nausea). (Patient not taking: Reported on 6/12/2025)    psyllium seed, with sugar, (FIBER ORAL) Take by mouth once daily.    sildenafiL (VIAGRA) 100 MG tablet Take 1 tablet (100 mg total) by mouth daily as needed for Erectile Dysfunction.     Review of patient's allergies indicates:   Allergen Reactions    Penicillins Other (See Comments)     Knees locked up        Review of Systems   Constitutional:  Negative for fever.       Objective:      Vital Signs (Most Recent)  Temp: 98.2 °F (36.8 °C) (06/23/25 1014)  Pulse: 91 (06/23/25 1014)  Resp: 16 (06/23/25 1014)  BP: (!) 169/81 (06/23/25 1014)  SpO2: 100 % (06/23/25 1014)    Vital Signs Range (Last 24H):  Temp:  [98.2 °F (36.8 °C)]   Pulse:  [91]   Resp:  [16]   BP: (169)/(81)   SpO2:  [100 %]     Physical Exam  Cardiovascular:      Rate and Rhythm: Normal rate.   Abdominal:      General: Abdomen is flat.   Neurological:      Mental Status: He is alert.           Assessment:      Direct access EGD for GERD and history of gastritis      Plan:        Direct access EGD for GERD and history of gastritis         [1]   Social History  Tobacco Use    Smoking status: Never    Smokeless tobacco: Never   Substance Use Topics    Alcohol use: Not Currently     Comment: Two beers in a week    Drug use: Never

## 2025-06-24 LAB
ESTROGEN SERPL-MCNC: NORMAL PG/ML
INSULIN SERPL-ACNC: NORMAL U[IU]/ML
LAB AP CLINICAL INFORMATION: NORMAL
LAB AP GROSS DESCRIPTION: NORMAL
LAB AP PERFORMING LOCATION(S): NORMAL
LAB AP REPORT FOOTNOTES: NORMAL

## 2025-06-24 RX ORDER — VALSARTAN 160 MG/1
TABLET ORAL
Qty: 90 TABLET | Refills: 0 | Status: SHIPPED | OUTPATIENT
Start: 2025-06-24 | End: 2025-06-24

## 2025-06-24 NOTE — TELEPHONE ENCOUNTER
Refill Routing Note   Medication(s) are not appropriate for processing by [facility] for the following reason(s):        Required labs abnormal  Required vitals abnormal  No active prescription written by provider    [facility] action(s):  Defer             Appointments  past 12m or future 3m with PCP    Date Provider   Last Visit   4/2/2025 Roberta Sagastume MD   Next Visit   Visit date not found Roberta Sagastume MD   ED visits in past 90 days: 0        Note composed:7:17 PM 06/23/2025

## 2025-06-24 NOTE — TELEPHONE ENCOUNTER
No care due was identified.  [facility] Embedded Care Due Messages. Reference number: [id number].   6/23/2025 7:16:41 PM CDT

## 2025-06-25 DIAGNOSIS — K26.9 DUODENAL ULCER: ICD-10-CM

## 2025-06-25 RX ORDER — ESOMEPRAZOLE MAGNESIUM 40 MG/1
40 CAPSULE, DELAYED RELEASE ORAL
Qty: 90 CAPSULE | Refills: 3 | Status: SHIPPED | OUTPATIENT
Start: 2025-06-25 | End: 2026-06-25

## 2025-06-29 DIAGNOSIS — E11.42 TYPE 2 DIABETES MELLITUS WITH DIABETIC POLYNEUROPATHY, WITH LONG-TERM CURRENT USE OF INSULIN: Chronic | ICD-10-CM

## 2025-06-29 DIAGNOSIS — Z79.4 TYPE 2 DIABETES MELLITUS WITH DIABETIC POLYNEUROPATHY, WITH LONG-TERM CURRENT USE OF INSULIN: Chronic | ICD-10-CM

## 2025-06-29 NOTE — TELEPHONE ENCOUNTER
No care due was identified.  [facility] Embedded Care Due Messages. Reference number: [id number].   6/29/2025 8:09:05 AM CDT

## 2025-06-30 RX ORDER — INSULIN ASPART 100 [IU]/ML
INJECTION, SOLUTION INTRAVENOUS; SUBCUTANEOUS
Qty: 15 ML | Refills: 0 | Status: SHIPPED | OUTPATIENT
Start: 2025-06-30

## 2025-06-30 NOTE — TELEPHONE ENCOUNTER
Refill Decision Note   Emerson Menendez  is requesting a refill authorization.  Brief Assessment and Rationale for Refill:  Approve     Medication Therapy Plan:         Comments:     Note composed:10:01 AM 06/30/2025

## 2025-07-06 ENCOUNTER — PATIENT MESSAGE (OUTPATIENT)
Dept: GASTROENTEROLOGY | Facility: CLINIC | Age: 80
End: 2025-07-06
Payer: MEDICARE

## 2025-07-06 ENCOUNTER — RESULTS FOLLOW-UP (OUTPATIENT)
Dept: GASTROENTEROLOGY | Facility: CLINIC | Age: 80
End: 2025-07-06

## 2025-07-13 ENCOUNTER — PATIENT MESSAGE (OUTPATIENT)
Dept: INTERNAL MEDICINE | Facility: CLINIC | Age: 80
End: 2025-07-13
Payer: MEDICARE

## 2025-07-15 ENCOUNTER — PATIENT MESSAGE (OUTPATIENT)
Dept: HEMATOLOGY/ONCOLOGY | Facility: CLINIC | Age: 80
End: 2025-07-15
Payer: MEDICARE

## 2025-07-15 DIAGNOSIS — D47.2 SMOLDERING MULTIPLE MYELOMA: Primary | ICD-10-CM

## 2025-07-21 ENCOUNTER — PATIENT MESSAGE (OUTPATIENT)
Dept: HEMATOLOGY/ONCOLOGY | Facility: CLINIC | Age: 80
End: 2025-07-21
Payer: MEDICARE

## 2025-07-24 ENCOUNTER — OFFICE VISIT (OUTPATIENT)
Dept: HEMATOLOGY/ONCOLOGY | Facility: CLINIC | Age: 80
End: 2025-07-24
Payer: MEDICARE

## 2025-07-24 DIAGNOSIS — D47.2 SMOLDERING MULTIPLE MYELOMA: Primary | ICD-10-CM

## 2025-07-24 RX ORDER — SYRINGE AND NEEDLE,INSULIN,1ML 30 G X1/2"
SYRINGE, EMPTY DISPOSABLE MISCELLANEOUS
COMMUNITY
Start: 2025-07-17

## 2025-07-24 NOTE — PROGRESS NOTES
The patient location is: [place]  The chief complaint leading to consultation is: smoldering myeloma    Visit type: audiovisual    Face to Face time with patient: 20  40 minutes of total time spent on the encounter, which includes face to face time and non-face to face time preparing to see the patient (eg, review of tests), Obtaining and/or reviewing separately obtained history, Documenting clinical information in the electronic or other health record, Independently interpreting results (not separately reported) and communicating results to the patient/family/caregiver, or Care coordination (not separately reported).         Each patient to whom he or she provides medical services by telemedicine is:  (1) informed of the relationship between the physician and patient and the respective role of any other health care provider with respect to management of the patient; and (2) notified that he or she may decline to receive medical services by telemedicine and may withdraw from such care at any time.    Notes: see below      Subjective:    Patient ID: Emerson Menendez is a 80 y.o. male.    Chief Complaint: No chief complaint on file.    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 Test results identify a stable IgA kappa protein with beta globulin band at 1.63g/dL. Kappa free light chain is elevated at 4.40. CBC and calcium are stable. Creatinine with history of stage III CKD is stable at 1.4. Metastatic survey is negative for lytic lesions. MRI of the entire spine demonstrated age related changes but no convincing evidence of myeloma bone disease. Bone marrow biopsy identified about 13% plasma cells by morphology and FISH for myeloma identified a t(11;14) and  trisomies 3,7, and 17. The patient is afebrile and appears clinically well. He was seen by his podiatrist and nephrologist since our last visit without any new or acute events.    The patient has not received any therapy for smoldering myeloma including bisphosphonates or steroids. He has been randomized to observation arm of the the clinical study. 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 He has no current or prior history of malignancy.    TODAY Cycle 115 E3A06, observation cohort  CBC stable. CMP shows significant change in renal function.  [facility] - unknown finding, but was prescribed course of antibiotics with upcoming repeat scope - ulcer vs Hpylori suspected. Was told to hold his diuretics at that time. Now completed antibiotics and having severe lower extremity edema. Instructed to restart diuretics today.  FLC and M protein remain stable.  Renal biopsy in March 2024 had no evidence of light chain or M protein deposition; diagnosis of diabetic nephropathy      Knee Pain     Joint Pain  Associated symptoms include coughing. Pertinent negatives include no abdominal pain, chest pain, diaphoresis, fatigue, fever, headaches or rash.   Hand Pain   Pertinent negatives include no chest pain.   Cough  Pertinent negatives include no chest pain, fever, headaches, rash or shortness of breath.   Foot Pain  Associated symptoms include coughing. Pertinent negatives include no abdominal pain, chest pain, diaphoresis, fatigue, fever, headaches or rash.   Arm Pain   Pertinent negatives include no chest pain.   Follow-up  Associated symptoms include coughing. Pertinent negatives include no abdominal pain, chest pain, diaphoresis, fatigue, fever, headaches or rash.     Review of Systems   Constitutional:  Negative for activity change, appetite change, diaphoresis, fatigue, fever and  unexpected weight change.   HENT: Negative.  Negative for mouth sores.    Eyes: Negative.    Respiratory:  Positive for cough. Negative for shortness of breath.    Cardiovascular:  Negative for chest pain and leg swelling.   Gastrointestinal: Negative.  Negative for abdominal pain and diarrhea.   Endocrine: Negative.    Genitourinary: Negative.  Negative for frequency.   Musculoskeletal: Negative.  Negative for back pain.   Skin: Negative.  Negative for rash.   Allergic/Immunologic: Negative.    Neurological:  Negative for headaches.        Grade 0-1 peripheral neuropathy of bilateral feet.    Hematological:  Negative for adenopathy. Does not bruise/bleed easily.   Psychiatric/Behavioral: Negative.  The patient is not nervous/anxious.        Objective:       There were no vitals filed for this visit.    Prior exam below, no exam for telehealth visit 7/24/2025    Physical Exam  Vitals and nursing note reviewed.   Constitutional:       Appearance: He is well-developed.   HENT:      Head: Normocephalic and atraumatic.      Right Ear: External ear normal.      Left Ear: External ear normal.      Nose: Nose normal.   Eyes:      Conjunctiva/sclera: Conjunctivae normal.      Pupils: Pupils are equal, round, and reactive to light.   Cardiovascular:      Rate and Rhythm: Normal rate and regular rhythm.      Heart sounds: No murmur heard.  Pulmonary:      Effort: Pulmonary effort is normal. No respiratory distress.      Breath sounds: Normal breath sounds.   Abdominal:      General: Bowel sounds are normal. There is no distension.      Palpations: Abdomen is soft.   Musculoskeletal:         General: No tenderness.      Cervical back: Normal range of motion and neck supple.   Skin:     General: Skin is warm and dry.      Findings: No rash.      Nails: There is no clubbing.   Neurological:      Mental Status: He is alert and oriented to person, place, and time.      Cranial Nerves: No cranial nerve deficit.   Psychiatric:          Behavior: Behavior normal.         Assessment:       No diagnosis found.        Plan:       The patient has a diagnosis of smoldering myeloma. There is no indication for immediate chemotherapy. We are monitoring renal function closely- baseline creatinine of around 1.5. 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 M protein has been stable and repeat is pending. Plan for return in 1 month.  Endocrinology and Nephrology to monitor diabetes and renal failure respectively. Patient would like to continue to follow with Dr. Perkins as needed.     March 2024 renal biopsy was diabetic nephropathy. Creatinine usually 2.6 on most recent labs; following with nephrology  Continue monthly observation  Myeloma serology light chains show reactive renal changes        BMT Chart Routing      Follow up with physician 3 months. virtual ok   Follow up with [facility]UST[facility]    Provider visit type Benign hem   Infusion scheduling note    Injection scheduling note    Labs CBC, CMP, free light chains and SPEP   Scheduling:  Preferred lab:  Lab interval:  monthly labs   Imaging    Pharmacy appointment    Other referrals                   Visit today included increased complexity associated with the care of the episodic problem remission monitoring addressed and managing the longitudinal care of the patient due to the serious and/or complex managed problem(s) smoldering myeloma.

## 2025-07-25 DIAGNOSIS — D47.2 SMOLDERING MULTIPLE MYELOMA: ICD-10-CM

## 2025-07-25 NOTE — TELEPHONE ENCOUNTER
Care Due:                  Date            Visit Type   Department     Provider  --------------------------------------------------------------------------------                                EP -                              PRIMARY      [facility] INTERNAL  Last Visit: 04-      CARE ([facility])   MEDICINE       Roberta Sagastume  Next Visit: None Scheduled  None         None Found                                                            Last  Test          Frequency    Reason                     Performed    Due Date  --------------------------------------------------------------------------------    HBA1C.......  6 months...  LANTUS, NOVOLOG,           02- 08-                             canagliflozin,                             glimepiride..............    [employer] Embedded Care Due Messages. Reference number: [id number].   7/25/2025 8:09:07 AM CDT

## 2025-07-26 NOTE — TELEPHONE ENCOUNTER
Refill Routing Note   Medication(s) are not appropriate for processing by [facility] for the following reason(s):        Clarification of medication (Rx) details; high dose alert    [facility] action(s):  Defer     Requires labs : Yes      Medication Therapy Plan: Single dose of 40 mg exceeds recommended maximum of 20 mg by 100%      Appointments  past 12m or future 3m with PCP    Date Provider   Last Visit   4/2/2025 Roberta Sagastume MD   Next Visit   Visit date not found Roberta Sagastume MD   ED visits in past 90 days: 0        Note composed:9:00 PM 07/25/2025

## 2025-07-27 RX ORDER — CITALOPRAM 40 MG/1
40 TABLET ORAL
Qty: 90 TABLET | Refills: 3 | Status: SHIPPED | OUTPATIENT
Start: 2025-07-27

## 2025-08-05 RX ORDER — MIRTAZAPINE 7.5 MG/1
7.5 TABLET, FILM COATED ORAL NIGHTLY
Qty: 90 TABLET | Refills: 2 | Status: SHIPPED | OUTPATIENT
Start: 2025-08-05 | End: 2026-05-02

## 2025-08-05 NOTE — TELEPHONE ENCOUNTER
No care due was identified.  [facility] Embedded Care Due Messages. Reference number: [id number].   8/05/2025 8:49:28 AM CDT

## 2025-08-06 NOTE — TELEPHONE ENCOUNTER
Refill Decision Note   Emerson Menendez  is requesting a refill authorization.  Brief Assessment and Rationale for Refill:  Approve     Medication Therapy Plan:        Comments:     Note composed:7:31 PM 08/05/2025

## 2025-08-06 NOTE — TELEPHONE ENCOUNTER
Refill Decision Note   Emerson Menendez  is requesting a refill authorization.  Brief Assessment and Rationale for Refill:  Approve     Medication Therapy Plan:        Comments:     Note composed:7:32 PM 08/05/2025

## 2025-08-26 ENCOUNTER — TELEPHONE (OUTPATIENT)
Dept: GASTROENTEROLOGY | Facility: CLINIC | Age: 80
End: 2025-08-26
Payer: MEDICARE

## 2025-08-26 ENCOUNTER — HOSPITAL ENCOUNTER (OUTPATIENT)
Facility: HOSPITAL | Age: 80
Discharge: HOME | End: 2025-08-26
Attending: INTERNAL MEDICINE | Admitting: INTERNAL MEDICINE
Payer: MEDICARE

## 2025-08-26 ENCOUNTER — ANESTHESIA EVENT (OUTPATIENT)
Dept: ENDOSCOPY | Facility: HOSPITAL | Age: 80
End: 2025-08-26
Payer: MEDICARE

## 2025-08-26 ENCOUNTER — ANESTHESIA (OUTPATIENT)
Dept: ENDOSCOPY | Facility: HOSPITAL | Age: 80
End: 2025-08-26
Payer: MEDICARE

## 2025-08-26 PROCEDURE — 63600175 PHARM REV CODE 636 W HCPCS: Mod: HCNC | Performed by: NURSE ANESTHETIST, CERTIFIED REGISTERED

## 2025-08-26 PROCEDURE — 25000003 PHARM REV CODE 250: Mod: HCNC | Performed by: NURSE ANESTHETIST, CERTIFIED REGISTERED

## 2025-08-26 RX ORDER — LIDOCAINE HYDROCHLORIDE 20 MG/ML
INJECTION INTRAVENOUS
Status: DISCONTINUED | OUTPATIENT
Start: 2025-08-26 | End: 2025-08-26

## 2025-08-26 RX ORDER — PROPOFOL 10 MG/ML
VIAL (ML) INTRAVENOUS
Status: DISCONTINUED | OUTPATIENT
Start: 2025-08-26 | End: 2025-08-26

## 2025-08-26 RX ADMIN — LIDOCAINE HYDROCHLORIDE 100 MG: 20 INJECTION INTRAVENOUS at 12:08

## 2025-08-26 RX ADMIN — PROPOFOL 30 MG: 10 INJECTION, EMULSION INTRAVENOUS at 12:08

## 2025-08-26 RX ADMIN — PROPOFOL 70 MG: 10 INJECTION, EMULSION INTRAVENOUS at 12:08

## 2025-08-26 RX ADMIN — PROPOFOL 20 MG: 10 INJECTION, EMULSION INTRAVENOUS at 12:08

## 2025-08-26 RX ADMIN — SODIUM CHLORIDE: 9 INJECTION, SOLUTION INTRAVENOUS at 11:08

## 2025-08-27 ENCOUNTER — PATIENT MESSAGE (OUTPATIENT)
Dept: INTERNAL MEDICINE | Facility: CLINIC | Age: 80
End: 2025-08-27
Payer: MEDICARE

## 2025-08-27 DIAGNOSIS — D47.2 SMOLDERING MULTIPLE MYELOMA: ICD-10-CM

## 2025-08-27 DIAGNOSIS — K26.9 DUODENAL ULCER: ICD-10-CM

## 2025-08-27 DIAGNOSIS — E11.42 TYPE 2 DIABETES MELLITUS WITH PERIPHERAL NEUROPATHY: ICD-10-CM

## 2025-08-27 RX ORDER — ESOMEPRAZOLE MAGNESIUM 40 MG/1
40 CAPSULE, DELAYED RELEASE ORAL
Qty: 90 CAPSULE | Refills: 3 | Status: CANCELLED | OUTPATIENT
Start: 2025-08-27 | End: 2026-08-27

## 2025-08-27 RX ORDER — DIAZEPAM 5 MG/1
5 TABLET ORAL EVERY 12 HOURS PRN
Qty: 60 TABLET | Refills: 0 | Status: SHIPPED | OUTPATIENT
Start: 2025-08-27

## 2025-08-27 RX ORDER — AMITRIPTYLINE HYDROCHLORIDE 50 MG/1
50 TABLET, FILM COATED ORAL NIGHTLY
Qty: 30 TABLET | Refills: 11 | Status: SHIPPED | OUTPATIENT
Start: 2025-08-27 | End: 2026-08-27

## 2025-08-27 RX ORDER — DOXYCYCLINE HYCLATE 100 MG
100 TABLET ORAL 2 TIMES DAILY
Qty: 20 TABLET | Refills: 3 | Status: SHIPPED | OUTPATIENT
Start: 2025-08-27

## 2025-08-27 RX ORDER — AMITRIPTYLINE HYDROCHLORIDE 50 MG/1
50 TABLET, FILM COATED ORAL NIGHTLY
Qty: 30 TABLET | Refills: 11 | Status: SHIPPED | OUTPATIENT
Start: 2025-08-27 | End: 2025-08-27 | Stop reason: SDUPTHER

## 2025-08-27 RX ORDER — DOXYCYCLINE HYCLATE 100 MG
100 TABLET ORAL 2 TIMES DAILY
Qty: 20 TABLET | Refills: 3 | Status: CANCELLED | OUTPATIENT
Start: 2025-08-27

## 2025-08-27 RX ORDER — FUROSEMIDE 20 MG/1
20 TABLET ORAL
Status: CANCELLED | OUTPATIENT
Start: 2025-08-27

## 2025-08-27 RX ORDER — PANTOPRAZOLE SODIUM 40 MG/1
40 TABLET, DELAYED RELEASE ORAL 2 TIMES DAILY
Qty: 180 TABLET | Refills: 1 | Status: SHIPPED | OUTPATIENT
Start: 2025-08-27 | End: 2025-08-27 | Stop reason: SDUPTHER

## 2025-08-27 RX ORDER — PANTOPRAZOLE SODIUM 40 MG/1
40 TABLET, DELAYED RELEASE ORAL 2 TIMES DAILY
Qty: 180 TABLET | Status: CANCELLED | OUTPATIENT
Start: 2025-08-27

## 2025-08-27 RX ORDER — PANTOPRAZOLE SODIUM 40 MG/1
40 TABLET, DELAYED RELEASE ORAL 2 TIMES DAILY
Qty: 180 TABLET | Refills: 1 | Status: SHIPPED | OUTPATIENT
Start: 2025-08-27

## 2025-08-27 RX ORDER — FUROSEMIDE 20 MG/1
40 TABLET ORAL DAILY
Qty: 180 TABLET | Refills: 3 | Status: SHIPPED | OUTPATIENT
Start: 2025-08-27

## 2025-08-27 RX ORDER — DIAZEPAM 5 MG/1
5 TABLET ORAL EVERY 12 HOURS PRN
Qty: 60 TABLET | Refills: 0 | Status: SHIPPED | OUTPATIENT
Start: 2025-08-27 | End: 2025-08-27 | Stop reason: SDUPTHER

## 2025-09-03 DIAGNOSIS — E11.42 TYPE 2 DIABETES MELLITUS WITH DIABETIC POLYNEUROPATHY, WITH LONG-TERM CURRENT USE OF INSULIN: Chronic | ICD-10-CM

## 2025-09-03 DIAGNOSIS — Z79.4 TYPE 2 DIABETES MELLITUS WITH DIABETIC POLYNEUROPATHY, WITH LONG-TERM CURRENT USE OF INSULIN: Chronic | ICD-10-CM

## 2025-09-03 DIAGNOSIS — E11.42 TYPE 2 DIABETES MELLITUS WITH PERIPHERAL NEUROPATHY: ICD-10-CM

## 2025-09-03 DIAGNOSIS — D47.2 SMOLDERING MULTIPLE MYELOMA: ICD-10-CM

## 2025-09-04 DIAGNOSIS — H40.1132 PRIMARY OPEN ANGLE GLAUCOMA OF BOTH EYES, MODERATE STAGE: ICD-10-CM

## 2025-09-04 RX ORDER — FUROSEMIDE 20 MG/1
TABLET ORAL
Qty: 180 TABLET | Refills: 0 | OUTPATIENT
Start: 2025-09-04

## 2025-09-04 RX ORDER — DORZOLAMIDE HYDROCHLORIDE AND TIMOLOL MALEATE 20; 5 MG/ML; MG/ML
1 SOLUTION/ DROPS OPHTHALMIC 2 TIMES DAILY
Qty: 10 ML | Refills: 6 | Status: SHIPPED | OUTPATIENT
Start: 2025-09-04

## 2025-09-04 RX ORDER — MIRTAZAPINE 7.5 MG/1
TABLET, FILM COATED ORAL
Qty: 90 TABLET | Refills: 0 | OUTPATIENT
Start: 2025-09-04

## 2025-09-04 RX ORDER — INSULIN GLARGINE 100 [IU]/ML
INJECTION, SOLUTION SUBCUTANEOUS
Qty: 33 ML | Refills: 0 | OUTPATIENT
Start: 2025-09-04

## 2025-09-04 RX ORDER — DIAZEPAM 5 MG/1
TABLET ORAL
Qty: 60 TABLET | Refills: 0 | OUTPATIENT
Start: 2025-09-04

## 2025-09-04 RX ORDER — ESOMEPRAZOLE MAGNESIUM 40 MG/1
40 CAPSULE, DELAYED RELEASE ORAL
Qty: 90 CAPSULE | Refills: 3 | Status: SHIPPED | OUTPATIENT
Start: 2025-09-04 | End: 2026-09-04

## 2025-09-04 RX ORDER — VALSARTAN 160 MG/1
TABLET ORAL
Qty: 90 TABLET | Refills: 0 | OUTPATIENT
Start: 2025-09-04

## 2025-09-04 RX ORDER — AMITRIPTYLINE HYDROCHLORIDE 50 MG/1
TABLET, FILM COATED ORAL
Qty: 90 TABLET | Refills: 0 | OUTPATIENT
Start: 2025-09-04

## 2025-09-04 RX ORDER — PANTOPRAZOLE SODIUM 40 MG/1
TABLET, DELAYED RELEASE ORAL
Qty: 180 TABLET | Refills: 0 | OUTPATIENT
Start: 2025-09-04

## 2025-09-04 RX ORDER — CITALOPRAM 40 MG/1
TABLET ORAL
Qty: 90 TABLET | Refills: 0 | OUTPATIENT
Start: 2025-09-04

## 2025-09-04 RX ORDER — INSULIN ASPART 100 [IU]/ML
INJECTION, SOLUTION INTRAVENOUS; SUBCUTANEOUS
Qty: 16 ML | Refills: 0 | OUTPATIENT
Start: 2025-09-04

## (undated) DEVICE — GAUZE DERMACEA LOW PLY 3X4YRDS

## (undated) DEVICE — SYR B-D DISP CONTROL 10CC100/C

## (undated) DEVICE — FORCEP STRAIGHT DISP

## (undated) DEVICE — APPLICATOR CHLORAPREP ORN 26ML

## (undated) DEVICE — SLING ARM X-LARGE FOAM STRAP

## (undated) DEVICE — GLOVE BIOGEL PI MICRO INDIC 7

## (undated) DEVICE — PACK UPPER EXTREMITY BAPTIST

## (undated) DEVICE — NDL SAFETY 22G X 1.5 ECLIPSE

## (undated) DEVICE — BANDAGE ACE NON LATEX 3IN

## (undated) DEVICE — SUT MCRYL PLUS 4-0 PS2 27IN

## (undated) DEVICE — BAG DRAIN ANTI REFLUX 2000ML

## (undated) DEVICE — NDL 18GA X1 1/2 REG BEVEL

## (undated) DEVICE — GLOVE BIOGEL ECLIPSE SZ 7

## (undated) DEVICE — DRAPE SURG W/TWL 17 5/8X23

## (undated) DEVICE — DRESSING N ADH OIL EMUL 3X3

## (undated) DEVICE — TUBING & CANNULA JOINT SMALL

## (undated) DEVICE — DRESSING ADAPTIC TOUCH 3X4

## (undated) DEVICE — BLADE GATOR 2.0

## (undated) DEVICE — TRAP DIGIT

## (undated) DEVICE — SPLINT FIBERGLASS PAD 4X15

## (undated) DEVICE — SUT ETHILON 3-0 PS2 18 BLK

## (undated) DEVICE — GOWN SMARTGOWN LVL4 X-LONG XL

## (undated) DEVICE — SUT 4/0 18IN ETHILON BL P3

## (undated) DEVICE — SOL IRR NACL .9% 3000ML

## (undated) DEVICE — Device

## (undated) DEVICE — TOURNIQUET SB QC DP 18X4IN

## (undated) DEVICE — SUT VICRYL 4-0 18 P-3

## (undated) DEVICE — PAD CAST SPECIALIST STRL 4

## (undated) DEVICE — BLADE SURG STAINLESS STEEL #11

## (undated) DEVICE — PAD EYE OVAL CNTOUR 1.62X2.62

## (undated) DEVICE — BANDAGE MATRIX HK LOOP 2IN 5YD

## (undated) DEVICE — SEE MEDLINE ITEM 146308

## (undated) DEVICE — SEE MEDLINE ITEM 152515

## (undated) DEVICE — SYR 50ML CATH TIP

## (undated) DEVICE — PAD UNDERPAD 30X30

## (undated) DEVICE — SOL WATER STRL IRR 1000ML

## (undated) DEVICE — NDL FLTR 5MCRN BLNT TIP 18GX1

## (undated) DEVICE — SUT 3/0 27IN PDS II VIO MO

## (undated) DEVICE — BUCKET PLASTER DISPOSABLE

## (undated) DEVICE — GAUZE SPONGE 4'X4 12 PLY

## (undated) DEVICE — SPONGE COTTON TRAY 4X4IN

## (undated) DEVICE — SLING ARM LARGE FOAM STRAP

## (undated) DEVICE — KIT GREY EYE

## (undated) DEVICE — DRAPE STERI-DRAPE 1000 17X11IN

## (undated) DEVICE — SOL PVP-I SCRUB 7.5% 4OZ

## (undated) DEVICE — DRAPE C-ARM MINI DISP

## (undated) DEVICE — TUBE SET INFLOW/OUTFLOW

## (undated) DEVICE — DRAPE STERI INCISE MED 130X130

## (undated) DEVICE — SYR 10CC LUER LOCK

## (undated) DEVICE — FORCEP BIPOLAR ADSON 1MM TIP

## (undated) DEVICE — HOLDER CATH IAB ADH STATLOCK

## (undated) DEVICE — SEE L#120831

## (undated) DEVICE — GAUZE SPONGE BULKEE 6X6.75IN

## (undated) DEVICE — SOL 9P NACL IRR PIC IL

## (undated) DEVICE — CORD BIPOLAR 12 FOOT

## (undated) DEVICE — TRAY CYSTO BASIN OMC

## (undated) DEVICE — SEE MEDLINE ITEM 157131

## (undated) DEVICE — ADAPTER HOSE 10FT 8MM

## (undated) DEVICE — SPLINT PLASTER FAST SET 5X30IN

## (undated) DEVICE — SHIELD COLLAGEN 12HR CORNEAL

## (undated) DEVICE — BANDAGE ELASTIC 2X5 VELCRO ST

## (undated) DEVICE — GOWN SURGICAL X-LARGE

## (undated) DEVICE — SOL NACL IRR 1000ML BTL

## (undated) DEVICE — BLADE SURG STAINLESS STEEL #15

## (undated) DEVICE — DRESSING SPONGE 8PLY 4X4 STRL

## (undated) DEVICE — SHIELD EYE PLASTIC 3100G

## (undated) DEVICE — DRESSING GZ SURGICOUNT 4X8

## (undated) DEVICE — PACK CYSTO

## (undated) DEVICE — SOL BETADINE 5%

## (undated) DEVICE — SET IRR URLGY 2LINE UNIV SPIKE

## (undated) DEVICE — DRAPE STERI INSTRUMENT 1018

## (undated) DEVICE — UNDERGLOVES BIOGEL PI SIZE 7.5